# Patient Record
Sex: FEMALE | Race: WHITE | NOT HISPANIC OR LATINO | Employment: UNEMPLOYED | URBAN - METROPOLITAN AREA
[De-identification: names, ages, dates, MRNs, and addresses within clinical notes are randomized per-mention and may not be internally consistent; named-entity substitution may affect disease eponyms.]

---

## 2016-08-25 LAB — PAP SMEAR: NORMAL

## 2017-04-05 DIAGNOSIS — F41.9 CHRONIC ANXIETY: ICD-10-CM

## 2017-04-05 DIAGNOSIS — I10 ESSENTIAL HYPERTENSION: ICD-10-CM

## 2017-04-05 RX ORDER — CITALOPRAM 20 MG/1
TABLET, FILM COATED ORAL
Qty: 30 TABLET | Refills: 0 | Status: SHIPPED | OUTPATIENT
Start: 2017-04-05 | End: 2017-05-04 | Stop reason: SDUPTHER

## 2017-04-05 RX ORDER — LISINOPRIL 5 MG/1
TABLET ORAL
Qty: 30 TABLET | Refills: 0 | Status: SHIPPED | OUTPATIENT
Start: 2017-04-05 | End: 2017-05-04 | Stop reason: SDUPTHER

## 2017-04-05 NOTE — TELEPHONE ENCOUNTER
Patient needs to be seen. I refilled medicatin for 30 days, but she has not been seen in almost a year and a half.

## 2017-04-10 ENCOUNTER — TELEPHONE (OUTPATIENT)
Dept: FAMILY MEDICINE | Facility: CLINIC | Age: 57
End: 2017-04-10

## 2017-04-10 NOTE — TELEPHONE ENCOUNTER
----- Message from Alyse Cruz sent at 4/10/2017  2:36 PM CDT -----  Contact: patient  Patient returning a missed call. Please advise. Call to pod. No answer.  Call back .  Thanks!

## 2017-05-03 ENCOUNTER — DOCUMENTATION ONLY (OUTPATIENT)
Dept: FAMILY MEDICINE | Facility: CLINIC | Age: 57
End: 2017-05-03

## 2017-05-03 NOTE — PROGRESS NOTES
Pre-Visit Chart Review  For Appointment Scheduled on 5-4-17    Health Maintenance Due   Topic Date Due    TETANUS VACCINE  02/04/1979    Colonoscopy  02/04/2011    Pap Smear  09/05/2016

## 2017-05-04 ENCOUNTER — OFFICE VISIT (OUTPATIENT)
Dept: FAMILY MEDICINE | Facility: CLINIC | Age: 57
End: 2017-05-04
Payer: COMMERCIAL

## 2017-05-04 VITALS
BODY MASS INDEX: 20.1 KG/M2 | HEIGHT: 64 IN | SYSTOLIC BLOOD PRESSURE: 112 MMHG | OXYGEN SATURATION: 97 % | DIASTOLIC BLOOD PRESSURE: 59 MMHG | RESPIRATION RATE: 24 BRPM | WEIGHT: 117.75 LBS | HEART RATE: 76 BPM | TEMPERATURE: 99 F

## 2017-05-04 DIAGNOSIS — F41.9 CHRONIC ANXIETY: ICD-10-CM

## 2017-05-04 DIAGNOSIS — Z00.00 ANNUAL PHYSICAL EXAM: Primary | ICD-10-CM

## 2017-05-04 DIAGNOSIS — F41.9 ANXIETY: ICD-10-CM

## 2017-05-04 DIAGNOSIS — I10 GOOD HYPERTENSION CONTROL: ICD-10-CM

## 2017-05-04 DIAGNOSIS — I10 ESSENTIAL HYPERTENSION: ICD-10-CM

## 2017-05-04 PROCEDURE — 99999 PR PBB SHADOW E&M-EST. PATIENT-LVL III: CPT | Mod: PBBFAC,,, | Performed by: FAMILY MEDICINE

## 2017-05-04 PROCEDURE — 3074F SYST BP LT 130 MM HG: CPT | Mod: S$GLB,,, | Performed by: FAMILY MEDICINE

## 2017-05-04 PROCEDURE — 99396 PREV VISIT EST AGE 40-64: CPT | Mod: S$GLB,,, | Performed by: FAMILY MEDICINE

## 2017-05-04 PROCEDURE — 3078F DIAST BP <80 MM HG: CPT | Mod: S$GLB,,, | Performed by: FAMILY MEDICINE

## 2017-05-04 RX ORDER — LISINOPRIL 5 MG/1
5 TABLET ORAL DAILY
Qty: 90 TABLET | Refills: 3 | Status: SHIPPED | OUTPATIENT
Start: 2017-05-04 | End: 2018-06-06 | Stop reason: SDUPTHER

## 2017-05-04 RX ORDER — CITALOPRAM 20 MG/1
20 TABLET, FILM COATED ORAL NIGHTLY
Qty: 90 TABLET | Refills: 3 | Status: SHIPPED | OUTPATIENT
Start: 2017-05-04 | End: 2018-06-06 | Stop reason: SDUPTHER

## 2017-05-04 NOTE — PROGRESS NOTES
Subjective:       Patient ID: Claire Bowser is a 57 y.o. female.    Chief Complaint: Annual Exam    HPI Comments: 57 year old female in for annual exam and refills.  She is not fasting.  She has no active complaints.    Past Medical History:  No date: Anxiety  02/2017: Flu      Comment: [facility]  No date: Hypertension    Past Surgical History:  No date: TUBAL LIGATION    Review of patient's family history indicates:    Diabetes                       Mother                    Heart disease                  Mother                    Kidney disease                 Mother                    Hypertension                   Mother                    Stroke                         Mother                    Hearing loss                   Mother                    Melanoma                       Mother                    COPD                           Father                    Liver disease                  Paternal Grandfather      Alcohol abuse                  Paternal Grandfather      Liver disease                  Sister                    Emphysema                      Brother                   COPD                           Brother                   Sleep apnea                    Brother                   No Known Problems              Son                       Alcohol abuse                  Paternal Grandmother      Cirrhosis                      Paternal Grandmother      Heart disease                  Maternal Aunt             Diabetes                       Maternal Aunt             Cancer                         Maternal Aunt               Comment: female    Heart disease                  Maternal Uncle            Hypertension                   Maternal Uncle            No Known Problems              Paternal Uncle            Psoriasis                      Neg Hx                    Lupus                          Neg Hx                    Eczema                         Neg Hx                    Social  History    Marital status:              Spouse name:                       Years of education:                 Number of children:               Social History Main Topics    Smoking status: Current Every Day Smoker                                                     Packs/day: 0.33      Years: 7.00           Types: Cigarettes    Smokeless status: Never Used                        Comment: [phone]    Alcohol use: No                Current Outpatient Prescriptions:     citalopram (CELEXA) 20 MG tablet, Take 1 tablet (20 mg total) by mouth every evening., Disp: 90 tablet, Rfl: 3    estradiol-norethindrone acet 0.5-0.1 mg per tablet, Take 1 tablet by mouth once daily. , Disp: , Rfl:     lisinopril (PRINIVIL,ZESTRIL) 5 MG tablet, Take 1 tablet (5 mg total) by mouth once daily., Disp: 90 tablet, Rfl: 3    multivitamin (THERAGRAN) per tablet, Take 1 tablet by mouth once daily. Every day, Disp: , Rfl:     TETANUS VACCINE due on 02/04/1979-DECLIN[facility]  Colonoscopy due on 02/04/2011-SHE DEFERS FOR A YEAR DUE TO HUSBANDS MEDICAL BILLS  Pap Smear due on 09/05/2016-DID IN September, DR. SELF      Review of Systems   Constitutional: Negative for chills, diaphoresis, fatigue, fever and unexpected weight change.   HENT: Negative for congestion, ear pain, hearing loss, postnasal drip and sinus pressure.    Eyes: Negative for itching and visual disturbance.   Respiratory: Negative for cough, chest tightness, shortness of breath and wheezing.    Cardiovascular: Negative for chest pain, palpitations and leg swelling.   Gastrointestinal: Negative for abdominal pain, blood in stool, constipation, diarrhea, nausea and vomiting.   Genitourinary: Negative for dysuria, frequency, hematuria, menstrual problem, pelvic pain, vaginal bleeding and vaginal discharge.   Musculoskeletal: Negative for arthralgias, back pain, joint swelling and myalgias.   Neurological: Negative for dizziness and headaches.   Hematological: Negative  for adenopathy.   Psychiatric/Behavioral: Negative for sleep disturbance. The patient is not nervous/anxious.        Objective:      Physical Exam   Constitutional: She is oriented to person, place, and time. She appears well-developed and well-nourished. No distress.   Good blood pressure control  Patient is normal weight with bmi of 20.5.   Weight is increased by 5.8lb since last visit of 5/30/14.     HENT:   Head: Normocephalic and atraumatic.   Right Ear: External ear normal.   Left Ear: External ear normal.   Nose: Nose normal.   Mouth/Throat: Oropharynx is clear and moist. No oropharyngeal exudate.   Eyes: Conjunctivae and EOM are normal. Pupils are equal, round, and reactive to light. Right eye exhibits no discharge. Left eye exhibits no discharge. No scleral icterus.   Neck: Normal range of motion. Neck supple. No JVD present. No tracheal deviation present. No thyromegaly present.   Cardiovascular: Normal rate, regular rhythm and normal heart sounds.  Exam reveals no gallop and no friction rub.    No murmur heard.  Pulmonary/Chest: Effort normal and breath sounds normal. No respiratory distress. She has no wheezes. She has no rales. She exhibits no tenderness.   Abdominal: Soft. Bowel sounds are normal. She exhibits no distension and no mass. There is no tenderness. There is no rebound and no guarding. No hernia.   Musculoskeletal: Normal range of motion. She exhibits no edema, tenderness or deformity.   Lymphadenopathy:     She has no cervical adenopathy.   Neurological: She is alert and oriented to person, place, and time. She has normal reflexes. She displays normal reflexes. No cranial nerve deficit. She exhibits normal muscle tone. Coordination normal.   Skin: Skin is warm and dry. No rash noted. She is not diaphoretic. No erythema. No pallor.   Psychiatric: She has a normal mood and affect. Her behavior is normal. Judgment and thought content normal.   Nursing note and vitals reviewed.      Assessment:        1. Annual physical exam    2. Good hypertension control    3. Anxiety    4. Chronic anxiety    5. Essential hypertension    6. Body mass index (BMI) 20.0-20.9, adult        Plan:       1. Annual physical exam  - Comprehensive metabolic panel; Future  - CBC auto differential; Future  - Lipid panel; Future    2. Good hypertension control  No changes needed  - Comprehensive metabolic panel; Future  - CBC auto differential; Future  - Lipid panel; Future    3. Anxiety  Stable on citalopram    4. Chronic anxiety  - citalopram (CELEXA) 20 MG tablet; Take 1 tablet (20 mg total) by mouth every evening.  Dispense: 90 tablet; Refill: 3    5. Essential hypertension  - lisinopril (PRINIVIL,ZESTRIL) 5 MG tablet; Take 1 tablet (5 mg total) by mouth once daily.  Dispense: 90 tablet; Refill: 3    6. Body mass index (BMI) 20.0-20.9, adult           Patient readiness: acceptance and barriers:none    During the course of the visit the patient was educated and counseled about the following:     Hypertension:   Medication: no change.  Dietary sodium restriction.  Regular aerobic exercise.    Goals: Hypertension: Reduce Blood Pressure    Did patient meet goals/outcomes: Yes    The following self management tools provided: blood pressure log    Patient Instructions (the written plan) was given to the patient/family.     Time spent with patient: 45 minutes

## 2017-05-04 NOTE — MR AVS SNAPSHOT
[facility]  [address]  [place] [address]  Phone: [phone]  Fax: [phone]                  Claire Bowser   2017 2:40 PM   Office Visit    Description:  Female : 1960   Provider:  Samuel Brady MD   Department:  [facility]           Reason for Visit     Annual Exam           Diagnoses this Visit        Comments    Annual physical exam    -  Primary     Good hypertension control         Anxiety         Chronic anxiety         Essential hypertension                To Do List           Future Appointments        Provider Department Dept Phone    2017 8:45 AM [facility] [phone]      Goals (5 Years of Data)     None       These Medications        Disp Refills Start End    citalopram (CELEXA) 20 MG tablet 90 tablet 3 2017     Take 1 tablet (20 mg total) by mouth every evening. - Oral    Pharmacy: [facility] [address] [place], [place] [id number] [place] Ph #: [phone]       lisinopril (PRINIVIL,ZESTRIL) 5 MG tablet 90 tablet 3 2017     Take 1 tablet (5 mg total) by mouth once daily. - Oral    Pharmacy: [facility] [address] [place] [id number] [place] Ph #: [phone]         [facility]     [facility] Care [facility] -  Assistance  Unless otherwise directed by your provider, please contact [facility], our nurse care line that is available for  assistance.     Registered nurses in the [facility] provide: appointment scheduling, clinical advisement, health education, and other advisory services.  Call: [phone] (toll free)               Medications           Message regarding Medications     Verify the changes and/or additions to your medication regime listed below are the same as discussed with your clinician today.  If any of these changes or additions are incorrect, please notify your healthcare provider.        CHANGE how you are taking these  "medications     Start Taking Instead of    citalopram (CELEXA) 20 MG tablet citalopram (CELEXA) 20 MG tablet    Dosage:  Take 1 tablet (20 mg total) by mouth every evening. Dosage:  TAKE ONE TABLET BY MOUTH IN THE EVENING    Reason for Change:  Reorder     lisinopril (PRINIVIL,ZESTRIL) 5 MG tablet lisinopril (PRINIVIL,ZESTRIL) 5 MG tablet    Dosage:  Take 1 tablet (5 mg total) by mouth once daily. Dosage:  TAKE ONE TABLET BY MOUTH ONCE DAILY    Reason for Change:  Reorder       STOP taking these medications     busPIRone (BUSPAR) 10 MG tablet Take 1 tablet (10 mg total) by mouth 2 (two) times daily.           Verify that the below list of medications is an accurate representation of the medications you are currently taking.  If none reported, the list may be blank. If incorrect, please contact your healthcare provider. Carry this list with you in case of emergency.           Current Medications     citalopram (CELEXA) 20 MG tablet Take 1 tablet (20 mg total) by mouth every evening.    estradiol-norethindrone acet 0.5-0.1 mg per tablet Take 1 tablet by mouth once daily.     lisinopril (PRINIVIL,ZESTRIL) 5 MG tablet Take 1 tablet (5 mg total) by mouth once daily.    multivitamin (THERAGRAN) per tablet Take 1 tablet by mouth once daily. Every day           Clinical Reference Information           Your Vitals Were     BP Pulse Temp Resp Height Weight    112/59 (BP Location: Right arm, Patient Position: Sitting, BP Method: Automatic) 76 98.6 °F (37 °C) (Oral) 24 5' 3.5" (1.613 m) 53.4 kg (117 lb 11.6 oz)    SpO2 BMI             97% 20.53 kg/m2         Blood Pressure          Most Recent Value    BP  (!)  112/59      Allergies as of 5/4/2017     No Known Drug Allergies      Immunizations Administered on Date of Encounter - 5/4/2017     None      Orders Placed During Today's Visit     Future Labs/Procedures Expected by Expires    CBC auto differential  5/4/2017 5/5/2018    Comprehensive metabolic panel  5/4/2017 5/5/2018 "    Lipid panel  5/4/2017 5/5/2018      Smoking Cessation     If you would like to quit smoking:   You may be eligible for free services if you are a [place] resident and started smoking cigarettes before September 1, 1988.  Call the [employer]oking [employer]essation [employer] (S[employer]) toll free at (047) 924-9291 or (038) 496-5243.   Call 1-800-QUIT-NOW if you do not meet the above criteria.   Contact us via email: [email or web address]   View our website for more information: [email or web address]        Language Assistance Services     ATTENTION: Language assistance services are available, free of charge. Please call [phone].      ATENCIÓN: Si habla español, tiene a tejada disposición servicios gratuitos de asistencia lingüística. Llame al [phone].     CHÚ Ý: N?u b?n nói Ti?ng Vi?t, có các d?ch v? h? tr? ngôn ng? mi?n phí dành cho b?n. G?i s? [phone].         [facility] complies with applicable Federal civil rights laws and does not discriminate on the basis of race, color, national origin, age, disability, or sex.

## 2017-05-09 ENCOUNTER — LAB VISIT (OUTPATIENT)
Dept: LAB | Facility: HOSPITAL | Age: 57
End: 2017-05-09
Attending: FAMILY MEDICINE
Payer: COMMERCIAL

## 2017-05-09 ENCOUNTER — PATIENT MESSAGE (OUTPATIENT)
Dept: FAMILY MEDICINE | Facility: CLINIC | Age: 57
End: 2017-05-09

## 2017-05-09 DIAGNOSIS — Z00.00 ANNUAL PHYSICAL EXAM: ICD-10-CM

## 2017-05-09 DIAGNOSIS — I10 GOOD HYPERTENSION CONTROL: ICD-10-CM

## 2017-05-09 LAB
ALBUMIN SERPL BCP-MCNC: 3.9 G/DL
ALP SERPL-CCNC: 95 U/L
ALT SERPL W/O P-5'-P-CCNC: 7 U/L
ANION GAP SERPL CALC-SCNC: 11 MMOL/L
AST SERPL-CCNC: 15 U/L
BASOPHILS # BLD AUTO: 0.04 K/UL
BASOPHILS NFR BLD: 0.6 %
BILIRUB SERPL-MCNC: 0.7 MG/DL
BUN SERPL-MCNC: 9 MG/DL
CALCIUM SERPL-MCNC: 9.2 MG/DL
CHLORIDE SERPL-SCNC: 106 MMOL/L
CHOLEST/HDLC SERPL: 4.1 {RATIO}
CO2 SERPL-SCNC: 25 MMOL/L
CREAT SERPL-MCNC: 0.8 MG/DL
DIFFERENTIAL METHOD: ABNORMAL
EOSINOPHIL # BLD AUTO: 0.1 K/UL
EOSINOPHIL NFR BLD: 1.7 %
ERYTHROCYTE [DISTWIDTH] IN BLOOD BY AUTOMATED COUNT: 13.2 %
EST. GFR  (AFRICAN AMERICAN): >60 ML/MIN/1.73 M^2
EST. GFR  (NON AFRICAN AMERICAN): >60 ML/MIN/1.73 M^2
GLUCOSE SERPL-MCNC: 83 MG/DL
HCT VFR BLD AUTO: 42.3 %
HDL/CHOLESTEROL RATIO: 24.5 %
HDLC SERPL-MCNC: 196 MG/DL
HDLC SERPL-MCNC: 48 MG/DL
HGB BLD-MCNC: 14.5 G/DL
LDLC SERPL CALC-MCNC: 125 MG/DL
LYMPHOCYTES # BLD AUTO: 1.5 K/UL
LYMPHOCYTES NFR BLD: 23.8 %
MCH RBC QN AUTO: 33 PG
MCHC RBC AUTO-ENTMCNC: 34.3 %
MCV RBC AUTO: 96 FL
MONOCYTES # BLD AUTO: 0.5 K/UL
MONOCYTES NFR BLD: 7.5 %
NEUTROPHILS # BLD AUTO: 4.2 K/UL
NEUTROPHILS NFR BLD: 66.1 %
NONHDLC SERPL-MCNC: 148 MG/DL
PLATELET # BLD AUTO: 252 K/UL
PMV BLD AUTO: 10.6 FL
POTASSIUM SERPL-SCNC: 4.1 MMOL/L
PROT SERPL-MCNC: 7.3 G/DL
RBC # BLD AUTO: 4.4 M/UL
SODIUM SERPL-SCNC: 142 MMOL/L
TRIGL SERPL-MCNC: 115 MG/DL
WBC # BLD AUTO: 6.29 K/UL

## 2017-05-09 PROCEDURE — 36415 COLL VENOUS BLD VENIPUNCTURE: CPT | Mod: PO

## 2017-05-09 PROCEDURE — 80061 LIPID PANEL: CPT

## 2017-05-09 PROCEDURE — 80053 COMPREHEN METABOLIC PANEL: CPT

## 2017-05-09 PROCEDURE — 85025 COMPLETE CBC W/AUTO DIFF WBC: CPT

## 2017-11-22 DIAGNOSIS — Z12.11 COLON CANCER SCREENING: ICD-10-CM

## 2018-06-06 DIAGNOSIS — I10 ESSENTIAL HYPERTENSION: ICD-10-CM

## 2018-06-06 DIAGNOSIS — F41.9 CHRONIC ANXIETY: ICD-10-CM

## 2018-06-06 RX ORDER — CITALOPRAM 20 MG/1
TABLET, FILM COATED ORAL
Qty: 30 TABLET | Refills: 0 | Status: SHIPPED | OUTPATIENT
Start: 2018-06-06 | End: 2018-07-12 | Stop reason: SDUPTHER

## 2018-06-06 RX ORDER — LISINOPRIL 5 MG/1
TABLET ORAL
Qty: 30 TABLET | Refills: 0 | Status: SHIPPED | OUTPATIENT
Start: 2018-06-06 | End: 2018-07-12 | Stop reason: SDUPTHER

## 2018-06-07 NOTE — TELEPHONE ENCOUNTER
Spoke to patient, I informed the patient that  refilled her medication this time but is over due a office visit and needs to be seen. Patient states she was driving now and she would call back ans set up an appointment .

## 2018-07-12 DIAGNOSIS — F41.9 CHRONIC ANXIETY: ICD-10-CM

## 2018-07-12 DIAGNOSIS — I10 ESSENTIAL HYPERTENSION: ICD-10-CM

## 2018-07-12 RX ORDER — CITALOPRAM 20 MG/1
20 TABLET, FILM COATED ORAL NIGHTLY
Qty: 30 TABLET | Refills: 2 | Status: SHIPPED | OUTPATIENT
Start: 2018-07-12 | End: 2018-08-10 | Stop reason: SDUPTHER

## 2018-07-12 RX ORDER — LISINOPRIL 5 MG/1
5 TABLET ORAL DAILY
Qty: 30 TABLET | Refills: 2 | Status: SHIPPED | OUTPATIENT
Start: 2018-07-12 | End: 2018-08-10 | Stop reason: SDUPTHER

## 2018-07-12 NOTE — TELEPHONE ENCOUNTER
Patient sent email about making appt- advised her to make a 40 min annual check up appt. Advised she can get in much sooner with Mrs. Natarajan.

## 2018-08-10 ENCOUNTER — OFFICE VISIT (OUTPATIENT)
Dept: FAMILY MEDICINE | Facility: CLINIC | Age: 58
End: 2018-08-10
Payer: COMMERCIAL

## 2018-08-10 VITALS
TEMPERATURE: 98 F | DIASTOLIC BLOOD PRESSURE: 76 MMHG | HEIGHT: 64 IN | HEART RATE: 64 BPM | SYSTOLIC BLOOD PRESSURE: 121 MMHG | BODY MASS INDEX: 19.46 KG/M2 | WEIGHT: 114 LBS

## 2018-08-10 DIAGNOSIS — Z00.00 ANNUAL PHYSICAL EXAM: Primary | ICD-10-CM

## 2018-08-10 DIAGNOSIS — L57.0 ACTINIC KERATOSIS: ICD-10-CM

## 2018-08-10 DIAGNOSIS — I10 ESSENTIAL HYPERTENSION: ICD-10-CM

## 2018-08-10 DIAGNOSIS — Z12.11 SCREEN FOR COLON CANCER: ICD-10-CM

## 2018-08-10 DIAGNOSIS — F41.1 GENERALIZED ANXIETY DISORDER: ICD-10-CM

## 2018-08-10 DIAGNOSIS — Z79.899 HIGH RISK MEDICATION USE: ICD-10-CM

## 2018-08-10 DIAGNOSIS — Z72.0 TOBACCO USE: ICD-10-CM

## 2018-08-10 PROCEDURE — 99999 PR PBB SHADOW E&M-EST. PATIENT-LVL IV: CPT | Mod: PBBFAC,,, | Performed by: NURSE PRACTITIONER

## 2018-08-10 PROCEDURE — 99396 PREV VISIT EST AGE 40-64: CPT | Mod: S$GLB,,, | Performed by: NURSE PRACTITIONER

## 2018-08-10 PROCEDURE — 3078F DIAST BP <80 MM HG: CPT | Mod: CPTII,S$GLB,, | Performed by: NURSE PRACTITIONER

## 2018-08-10 PROCEDURE — 3074F SYST BP LT 130 MM HG: CPT | Mod: CPTII,S$GLB,, | Performed by: NURSE PRACTITIONER

## 2018-08-10 RX ORDER — IBUPROFEN 200 MG
1 TABLET ORAL DAILY
Qty: 28 PATCH | Refills: 0 | Status: SHIPPED | OUTPATIENT
Start: 2018-08-10 | End: 2019-10-28 | Stop reason: SDUPTHER

## 2018-08-10 RX ORDER — CITALOPRAM 40 MG/1
40 TABLET, FILM COATED ORAL NIGHTLY
Qty: 90 TABLET | Refills: 3 | Status: SHIPPED | OUTPATIENT
Start: 2018-08-10 | End: 2019-09-23 | Stop reason: SDUPTHER

## 2018-08-10 RX ORDER — NICOTINE 7MG/24HR
1 PATCH, TRANSDERMAL 24 HOURS TRANSDERMAL DAILY
Qty: 28 PATCH | Refills: 0 | Status: SHIPPED | OUTPATIENT
Start: 2018-08-10 | End: 2019-10-28 | Stop reason: SDUPTHER

## 2018-08-10 RX ORDER — LISINOPRIL 5 MG/1
5 TABLET ORAL DAILY
Qty: 90 TABLET | Refills: 3 | Status: SHIPPED | OUTPATIENT
Start: 2018-08-10 | End: 2019-09-23 | Stop reason: SDUPTHER

## 2018-08-10 NOTE — PATIENT INSTRUCTIONS
Kicking the Smoking Habit  If you smoke, quitting is one of the best changes you can make for your heart and your overall health. Your risk of heart attack goes down within one day of putting out that last cigarette. As you go longer without smoking, your risk goes down even more. Quitting isnt easy, but millions of people have done it. You can, too. Its never too late to quit.  Getting started  Boost your chances of success by deciding on your quit plan. Your health care provider and cardiac rehab team can help you develop this plan. Even if youve already quit, its easy to slip back into smoking.  Your plan can help you avoid and recover from relapse.  In any case, start by setting a date to quit within a month, and do it.    Keys to your quit plan  · Talk to your healthcare provider about prescription medicines and nicotine replacement products that help stop the urge to smoke.   · Join a support group or quit smoking program. Talking with others about the challenges of quitting can help you get through them.  · Ask other smokers in your household to quit with you.  · Look for the cues in your life that you associate with smoking and avoid them.  Track your triggers  What gives you that [id number] feeling? List all the situations that make you want a cigarette. Then think of other ways to deal with these situations. Here are some examples:  Situation How I'll handle it   Finishing a meal Get up from the table and take a walk   Having an argument Find a quiet place and breathe deeply   Feeling lonely or bored Call a friend to talk         Tips for quitting successfully  · List the benefits of quitting such as reducing heart risks and saving money. Keep this list and review it whenever you feel like smoking.  · Get support. Let your friends know you may call them to chat when you have an urge to smoke.  · If youve tried to quit before without success, this time avoid the triggers that may cause  the relapse.  · Make the most of slip-ups. Try to learn from them, and then get back on track.  · Be accountable to your friends and your calendar so that you stay on track.  For family and friends  · Be supportive and patient. Quitting smoking can be difficult and stressful.  · If you smoke, nows a great time to quit. Even if you dont quit, never smoke around your loved one. Secondhand smoke is dangerous to his or her heart.  · The best goals are accomplished in teams. Remember that when your loved one states he or she wants to stop smoking.  Date Last Reviewed: 7/1/2016  © 0672-6464 [employer]. [address] [place] [address], [place] [address]. All rights reserved. This information is not intended as a substitute for professional medical care. Always follow your healthcare professional's instructions.        Prevention Guidelines, Women Ages 50 to 64  Screening tests and vaccines are an important part of managing your health. Health counseling is essential, too. Below are guidelines for these, for women ages 50 to 64. Talk with your healthcare provider to make sure youre up to date on what you need.  Screening Who needs it How often   Type 2 diabetes or prediabetes All adults beginning at age 45 and adults without symptoms at any age who are overweight or obese and have 1 or more additional risk factors for diabetes. At  least every 3 years   Alcohol misuse All women in this age group At routine exams   Blood pressure All women in this age group Every 2 years if your blood pressure is less than 120/80 mm Hg; yearly if your systolic blood pressure is 120 to 139 mm Hg, or your diastolic blood pressure reading is 80 to 89 mm Hg   Breast cancer All women in this age group Yearly mammogram and clinical breast exam1   Cervical cancer All women in this age group, except women who have had a complete hysterectomy Pap test every 3 years or Pap test with human papillomavirus (HPV) test every 5 years   Chlamydia  Women at increased risk for infection At routine exams   Colorectal cancer All women in this age group Flexible sigmoidoscopy every 5 years, or colonoscopy every 10 years, or double-contrast barium enema every 5 years; yearly fecal occult blood test or fecal immunochemical test; or a stool DNA test as often as your health care provider advises; talk with your health care provider about which tests are best for you   Depression All women in this age group At routine exams   Gonorrhea Sexually active women at increased risk for infection At routine exams   Hepatitis C Anyone at increased risk; 1 time for those born between 1945 and 1965 At routine exams   High cholesterol or triglycerides All women in this age group who are at risk for coronary artery disease At least every 5 years   HIV All women At routine exams   Lung cancer Adults age 55 to 80 who have smoked Yearly screening in smokers with 30 pack-year history of smoking or who quit within 15 years   Obesity All women in this age group At routine exams   Osteoporosis Women who are postmenopausal Ask your healthcare provider   Syphilis Women at increased risk for infection - talk with your healthcare provider At routine exams   Tuberculosis Women at increased risk for infection - talk with your healthcare provider Ask your healthcare provider   Vision All women in this age group Ask your healthcare provider   Vaccine Who needs it How often   Chickenpox (varicella) All women in this age group who have no record of this infection or vaccine 2 doses; the second dose should be given at least 4 weeks after the first dose   Hepatitis A Women at increased risk for infection - talk with your healthcare provider 2 doses given at least 6 months apart   Hepatitis B Women at increased risk for infection - talk with your healthcare provider 3 doses over 6 months; second dose should be given 1 month after the first dose; the third dose should be given at least 2 months after  the second dose and at least 4 months after the first dose   Haemophilus influenzaeType B (HIB) Women at increased risk for infection - talk with your healthcare provider 1 to 3 doses   Influenza (flu) All women in this age group Once a year   Measles, mumps, rubella (MMR) Women in this age group through their late 50s who have no record of these infections or vaccines 1 dose   Meningococcal Women at increased risk for infection - talk with your healthcare provider 1 or more doses   Pneumococcal conjugate vaccine (PCV13) and pneumococcal polysaccharide vaccine (PPSV23) Women at increased risk for infection - talk with your healthcare provider PCV13: 1 dose ages 19 to 65 (protects against 13 types of pneumococcal bacteria)  PPSV23: 1 to 2 doses through age 64, or 1 dose at 65 or older (protects against 23 types of pneumococcal bacteria)   Tetanus/diphtheria/pertussis (Td/Tdap) booster All women in this age group Td every 10 years, or a one-time dose of Tdap instead of a Td booster after age 18, then Td every 10 years   Zoster All women ages 60 and older 1 dose   Counseling Who needs it How often   BRCA gene mutation testing for breast and ovarian cancer susceptibility Women with increased risk for having gene mutation When your risk is known   Breast cancer and chemoprevention Women at high risk for breast cancer When your risk is known   Diet and exercise Women who are overweight or obese When diagnosed, and then at routine exams   Sexually transmitted infection prevention Women at increased risk for infection - talk with your healthcare provider At routine exams   Use of daily aspirin Women ages 55 and up in this age group who are at risk for cardiovascular health problems such as stroke When your risk is known   Use of tobacco and the health effects it can cause All women in this age group Every exam   1American Cancer Society  Date Last Reviewed: 1/26/2016  © 4038-5915 [employer]. [address] [place] [address]  Road, [place] [address]. All rights reserved. This information is not intended as a substitute for professional medical care. Always follow your healthcare professional's instructions.        Planning to Quit Smoking  Your healthcare provider may have told you that you need to give up tobacco. Only you can decide if and when you are ready to quit. Quitting is hard to do. But the benefits will be worth it. When you  decide to quit, come up with a plan thats right for you. Discuss your plan with your healthcare provider. And talk to your provider about medicines to help you quit.    Line up support  To quit smoking, youll need a plan and some help. Pick a date within the next 2 to 4 weeks to quit. Use the time between now and that date to arrange for support.  · Classes and counselors. Quit-smoking classes  people like you through the process. Get to know others in a class, and support each other beyond the class. Telephone counseling also helps you keep on track. Ask your healthcare provider, local hospital, or public health department to put you in touch with a class and a phone counselor.  · Family and friends. Tell your family and friends about your quit date. Ask them to support your change. If they smoke, arrange to see them in smoke-free places. Forbid smoking in your home and car.     Finding something to replace cigarettes may be hard to do. Be aware that some things you choose may be as harmful as cigarettes:  · Smokeless (chewing) tobacco is just as harmful as regular tobacco. Tobacco should not be used as a substitute for cigarettes.  · Herbal medicines or teas may affect how your body handles nicotine. Talk to your healthcare provider before using these products.  · E-cigarettes have less toxins than the smoke from a regular cigarette. But the FDA says that these devices may still have substances that can cause cancer. E-cigarettes are not well regulated. They have not been studied enough to know  if they are a good aid to help you stop smoking. Talk with your healthcare provider before using these products.      Quit-smoking products  Many products can help you quit smoking. Some are prescription medicines that help curb your cravings and withdrawal symptoms. Other products slowly lessen the level of nicotine your body absorbs. Nicotine is the highly addictive substance found in cigarettes, cigars, and chewing tobacco. Nicotine replacement products can help get your body used to slowly decreasing amounts of nicotine after you quit smoking. These products include a nicotine patch, gum, lozenge, nasal spray, and inhaler. Be sure you follow the directions for your medicine or product carefully. Your healthcare provider may tell you to start taking the prescription medicine a week before you plan to quit. Do not smoke while you use nicotine products. Doing so can be very harmful to your health.  For more information  · https://smokefree.gov/bjni-vy-fi-expert  · National [employer] [place] Smoking Quitline: 877-44U-QUIT (590-224-7997)   Date Last Reviewed: 2/1/2017 © 2000-2017 [employer]. [address] [place] [address], [place] [address]. All rights reserved. This information is not intended as a substitute for professional medical care. Always follow your healthcare professional's instructions.

## 2018-08-10 NOTE — PROGRESS NOTES
Subjective:       Patient ID: Claire Bowser is a 58 y.o. female.    Chief Complaint: Annual Exam    HPI   Chief Complaint  Chief Complaint   Patient presents with    Annual Exam       HPI  Claire Bowser is a 58 y.o. female with medical diagnoses as listed in the medical history and problem list that presents for an annual exam and follow up of hypertension and anxiety.  Blood pressure controlled today 121/76. BMI today is 19.88 and patient has lost 3 pounds since last visit 5/4/17.  Patient is a smoker and is interested in quitting.       PAST MEDICAL HISTORY:  Past Medical History:   Diagnosis Date    Anxiety     Flu 02/2017    [facility]    Hypertension        PAST SURGICAL HISTORY:  Past Surgical History:   Procedure Laterality Date    TUBAL LIGATION         SOCIAL HISTORY:  Social History     Social History    Marital status:      Spouse name: N/A    Number of children: N/A    Years of education: N/A     Occupational History    Not on file.     Social History Main Topics    Smoking status: Current Every Day Smoker     Packs/day: 0.33     Years: 7.00     Types: Cigarettes    Smokeless tobacco: Never Used      Comment: [phone]    Alcohol use No    Drug use: Unknown    Sexual activity: Not on file     Other Topics Concern    Not on file     Social History Narrative    No narrative on file       FAMILY HISTORY:  Family History   Problem Relation Age of Onset    Diabetes Mother     Heart disease Mother     Kidney disease Mother     Hypertension Mother     Stroke Mother     Hearing loss Mother     Melanoma Mother     COPD Father     Liver disease Paternal Grandfather     Alcohol abuse Paternal Grandfather     Liver disease Sister     Emphysema Brother     COPD Brother     Sleep apnea Brother     No Known Problems Son     Alcohol abuse Paternal Grandmother     Cirrhosis Paternal Grandmother     Heart disease Maternal Aunt     Diabetes Maternal Aunt     Cancer  Maternal Aunt         female    Heart disease Maternal Uncle     Hypertension Maternal Uncle     No Known Problems Paternal Uncle     Psoriasis Neg Hx     Lupus Neg Hx     Eczema Neg Hx        ALLERGIES AND MEDICATIONS: updated and reviewed.  Review of patient's allergies indicates:   Allergen Reactions    No known drug allergies      Current Outpatient Prescriptions   Medication Sig Dispense Refill    citalopram (CELEXA) 40 MG tablet Take 1 tablet (40 mg total) by mouth every evening. 90 tablet 3    lisinopril (PRINIVIL,ZESTRIL) 5 MG tablet Take 1 tablet (5 mg total) by mouth once daily. 90 tablet 3    multivitamin (THERAGRAN) per tablet Take 1 tablet by mouth once daily. Every day      nicotine (NICODERM CQ) 14 mg/24 hr Place 1 patch onto the skin once daily. Month 1 28 patch 0    nicotine (NICODERM CQ) 7 mg/24 hr Place 1 patch onto the skin once daily. Month 2 28 patch 0     No current facility-administered medications for this visit.        I have reviewed the patient's medical history in detail and updated the computerized patient record.    Review of Systems   Constitutional: Negative for activity change, appetite change, chills, fatigue and fever.        Walks regularly QOD for an hour   HENT: Negative for congestion, ear pain, postnasal drip, rhinorrhea, sinus pain, sinus pressure, sore throat and trouble swallowing.    Eyes: Negative for visual disturbance.        Vision corrected with glasses   Respiratory: Negative for cough, shortness of breath and wheezing.    Cardiovascular: Negative for chest pain, palpitations and leg swelling.   Gastrointestinal: Negative for abdominal pain, constipation, diarrhea, nausea and vomiting.   Genitourinary: Negative for difficulty urinating, dysuria, menstrual problem and urgency.        Post menopausal x 10 years, still having some hot flashes at night. Nocturia 3 times.   Musculoskeletal: Negative for arthralgias and myalgias.   Skin: Negative for rash and  "wound.        Left forearm skin growth x 3 months, flakes but never goes away   Neurological: Negative for dizziness, numbness and headaches.   Hematological: Bruises/bleeds easily.        Has noted some easier bruising to arms   Psychiatric/Behavioral: Negative for dysphoric mood and sleep disturbance. The patient is nervous/anxious.         Some anxiety. Elderly mother is ill and this has been stressful, feels like she would like to increase dose of celexa.       Objective:       Vitals:    08/10/18 0854   BP: 121/76   BP Location: Right arm   Patient Position: Sitting   BP Method: Large (Automatic)   Pulse: 64   Temp: 98.3 °F (36.8 °C)   TempSrc: Oral   Weight: 51.7 kg (114 lb)   Height: 5' 3.5" (1.613 m)     Physical Exam   Constitutional: She is oriented to person, place, and time. Vital signs are normal. She appears well-developed and well-nourished. No distress.   Blood pressure 121/76   HENT:   Head: Normocephalic and atraumatic.   Right Ear: Tympanic membrane, external ear and ear canal normal.   Left Ear: Tympanic membrane, external ear and ear canal normal.   Nose: Nose normal. No mucosal edema.   Mouth/Throat: Oropharynx is clear and moist and mucous membranes are normal. No oropharyngeal exudate.   Eyes: Conjunctivae and lids are normal. Pupils are equal, round, and reactive to light. Right eye exhibits no discharge. Left eye exhibits no discharge. Right conjunctiva is not injected. Left conjunctiva is not injected.   Neck: Normal range of motion. Neck supple. Normal carotid pulses present. Carotid bruit is not present. No thyromegaly present.   Cardiovascular: Normal rate, regular rhythm, S1 normal, S2 normal, normal heart sounds, intact distal pulses and normal pulses.  Exam reveals no gallop and no friction rub.    No murmur heard.  Pulses:       Carotid pulses are 2+ on the right side, and 2+ on the left side.       Radial pulses are 2+ on the right side, and 2+ on the left side.        Posterior " tibial pulses are 2+ on the right side, and 2+ on the left side.   Pulmonary/Chest: Effort normal and breath sounds normal. No respiratory distress. She has no decreased breath sounds. She has no wheezes. She has no rhonchi. She has no rales.   Abdominal: Soft. Normal appearance, normal aorta and bowel sounds are normal. She exhibits no distension, no abdominal bruit, no pulsatile midline mass and no mass. There is no hepatosplenomegaly. There is no tenderness. No hernia.   Musculoskeletal: Normal range of motion. She exhibits no edema, tenderness or deformity.   Lymphadenopathy:        Head (right side): No submental, no submandibular, no tonsillar, no preauricular, no posterior auricular and no occipital adenopathy present.        Head (left side): No submental, no submandibular, no tonsillar, no preauricular, no posterior auricular and no occipital adenopathy present.     She has no cervical adenopathy.   Neurological: She is alert and oriented to person, place, and time. She has normal strength.   Skin: Skin is warm, dry and intact. Lesion noted. No rash noted. She is not diaphoretic. No pallor.        Psychiatric: She has a normal mood and affect. Her speech is normal and behavior is normal. Judgment and thought content normal. Her mood appears not anxious. Cognition and memory are normal. She does not exhibit a depressed mood.   Nursing note and vitals reviewed.      Assessment:       1. Annual physical exam    2. Essential hypertension    3. Generalized anxiety disorder    4. Tobacco use    5. Actinic keratosis    6. Body mass index (BMI) 19.9 or less, adult    7. High risk medication use    8. Screen for colon cancer        Plan:   Claire was seen today for annual exam.    Diagnoses and all orders for this visit:    Annual physical exam   Discussed healthy diet, regular exercise, necessary labs, age appropriate cancer screening, and routine vaccinations. Refuses colonoscopy but agrees to do Fit  Kit   Educational handouts provided. Prevention guidelines women age 50-64  Essential hypertension  -     lisinopril (PRINIVIL,ZESTRIL) 5 MG tablet; Take 1 tablet (5 mg total) by mouth once daily.  - Blood pressure controlled 121/76, continue current medication without change    Generalized anxiety disorder  -     citalopram (CELEXA) 40 MG tablet; Take 1 tablet (40 mg total) by mouth every evening, increased dose    Tobacco use  -     Ambulatory referral to Smoking Cessation Program  -     nicotine (NICODERM CQ) 14 mg/24 hr; Place 1 patch onto the skin once daily. Month 1  -     nicotine (NICODERM CQ) 7 mg/24 hr; Place 1 patch onto the skin once daily. Month 2  - Educational handouts provided. Kicking the smoking habit; Planning to quit smoking    Actinic keratosis   Right forearm, 3 mm in diameter, raised, flaky   Unable to perform cryo, none available in clinic and dermatology will not allow to borrow, will jayson patient when available to come back in    Body mass index (BMI) 19.9 or less, adult   BMI 19.88 today with 3 pound weight loss since last visit   Maintain healthy weight with heathy diet and exercise/active lifestyle    High risk medication use  -     Comprehensive metabolic panel; Future  -     Lipid panel; Future    Screen for colon cancer  -     Fecal Immunochemical Test (iFOBT); Future    Follow-up in about 1 year (around 8/10/2019) for annual.     Patient readiness: acceptance and barriers:none    During the course of the visit the patient was educated and counseled about the following:     Hypertension:   Medication: no change.  Dietary sodium restriction.  Regular aerobic exercise.  Follow up: 1 year and as needed.    Goals: Hypertension: Reduce Blood Pressure    Did patient meet goals/outcomes: Yes    The following self management tools provided: declined    Patient Instructions (the written plan) was given to the patient/family.     Time spent with patient: 30 minutes    Barriers to medications  present (no )    Adverse reactions to current medications (no)    Over the counter medications reviewed (Yes)

## 2018-08-11 ENCOUNTER — LAB VISIT (OUTPATIENT)
Dept: LAB | Facility: HOSPITAL | Age: 58
End: 2018-08-11
Attending: NURSE PRACTITIONER
Payer: COMMERCIAL

## 2018-08-11 DIAGNOSIS — Z79.899 HIGH RISK MEDICATION USE: ICD-10-CM

## 2018-08-11 LAB
ALBUMIN SERPL BCP-MCNC: 3.9 G/DL
ALP SERPL-CCNC: 99 U/L
ALT SERPL W/O P-5'-P-CCNC: 9 U/L
ANION GAP SERPL CALC-SCNC: 8 MMOL/L
AST SERPL-CCNC: 18 U/L
BILIRUB SERPL-MCNC: 0.9 MG/DL
BUN SERPL-MCNC: 11 MG/DL
CALCIUM SERPL-MCNC: 10.1 MG/DL
CHLORIDE SERPL-SCNC: 103 MMOL/L
CHOLEST SERPL-MCNC: 213 MG/DL
CHOLEST/HDLC SERPL: 3.8 {RATIO}
CO2 SERPL-SCNC: 29 MMOL/L
CREAT SERPL-MCNC: 0.8 MG/DL
EST. GFR  (AFRICAN AMERICAN): >60 ML/MIN/1.73 M^2
EST. GFR  (NON AFRICAN AMERICAN): >60 ML/MIN/1.73 M^2
GLUCOSE SERPL-MCNC: 81 MG/DL
HDLC SERPL-MCNC: 56 MG/DL
HDLC SERPL: 26.3 %
LDLC SERPL CALC-MCNC: 134.8 MG/DL
NONHDLC SERPL-MCNC: 157 MG/DL
POTASSIUM SERPL-SCNC: 4.3 MMOL/L
PROT SERPL-MCNC: 7.1 G/DL
SODIUM SERPL-SCNC: 140 MMOL/L
TRIGL SERPL-MCNC: 111 MG/DL

## 2018-08-11 PROCEDURE — 80053 COMPREHEN METABOLIC PANEL: CPT

## 2018-08-11 PROCEDURE — 80061 LIPID PANEL: CPT

## 2018-08-11 PROCEDURE — 36415 COLL VENOUS BLD VENIPUNCTURE: CPT | Mod: PO

## 2018-12-27 DIAGNOSIS — Z12.39 BREAST CANCER SCREENING: ICD-10-CM

## 2019-07-23 ENCOUNTER — DOCUMENTATION ONLY (OUTPATIENT)
Dept: FAMILY MEDICINE | Facility: CLINIC | Age: 59
End: 2019-07-23

## 2019-07-23 NOTE — PROGRESS NOTES
Pre-Visit Chart Review  For Appointment Scheduled on 7-30-19    Health Maintenance Due   Topic Date Due    Fecal Occult Blood Test (FOBT)/FitKit  1960    TETANUS VACCINE  02/04/1979    Mammogram  12/16/2018    Pap Smear with HPV Cotest  08/25/2019

## 2019-07-30 ENCOUNTER — OFFICE VISIT (OUTPATIENT)
Dept: FAMILY MEDICINE | Facility: CLINIC | Age: 59
End: 2019-07-30
Attending: FAMILY MEDICINE
Payer: COMMERCIAL

## 2019-07-30 ENCOUNTER — DOCUMENTATION ONLY (OUTPATIENT)
Dept: FAMILY MEDICINE | Facility: CLINIC | Age: 59
End: 2019-07-30

## 2019-07-30 VITALS
HEIGHT: 64 IN | RESPIRATION RATE: 20 BRPM | DIASTOLIC BLOOD PRESSURE: 94 MMHG | OXYGEN SATURATION: 97 % | BODY MASS INDEX: 19.5 KG/M2 | SYSTOLIC BLOOD PRESSURE: 156 MMHG | TEMPERATURE: 98 F | HEART RATE: 61 BPM | WEIGHT: 114.19 LBS

## 2019-07-30 DIAGNOSIS — L02.11 ABSCESS, NECK: Primary | ICD-10-CM

## 2019-07-30 PROCEDURE — 3080F PR MOST RECENT DIASTOLIC BLOOD PRESSURE >= 90 MM HG: ICD-10-PCS | Mod: CPTII,S$GLB,, | Performed by: FAMILY MEDICINE

## 2019-07-30 PROCEDURE — 3080F DIAST BP >= 90 MM HG: CPT | Mod: CPTII,S$GLB,, | Performed by: FAMILY MEDICINE

## 2019-07-30 PROCEDURE — 10060 I&D ABSCESS SIMPLE/SINGLE: CPT | Mod: S$GLB,,, | Performed by: FAMILY MEDICINE

## 2019-07-30 PROCEDURE — 3077F SYST BP >= 140 MM HG: CPT | Mod: CPTII,S$GLB,, | Performed by: FAMILY MEDICINE

## 2019-07-30 PROCEDURE — 3077F PR MOST RECENT SYSTOLIC BLOOD PRESSURE >= 140 MM HG: ICD-10-PCS | Mod: CPTII,S$GLB,, | Performed by: FAMILY MEDICINE

## 2019-07-30 PROCEDURE — 99999 PR PBB SHADOW E&M-EST. PATIENT-LVL III: CPT | Mod: PBBFAC,,, | Performed by: FAMILY MEDICINE

## 2019-07-30 PROCEDURE — 99213 PR OFFICE/OUTPT VISIT, EST, LEVL III, 20-29 MIN: ICD-10-PCS | Mod: 25,S$GLB,, | Performed by: FAMILY MEDICINE

## 2019-07-30 PROCEDURE — 99999 PR PBB SHADOW E&M-EST. PATIENT-LVL III: ICD-10-PCS | Mod: PBBFAC,,, | Performed by: FAMILY MEDICINE

## 2019-07-30 PROCEDURE — 99213 OFFICE O/P EST LOW 20 MIN: CPT | Mod: 25,S$GLB,, | Performed by: FAMILY MEDICINE

## 2019-07-30 PROCEDURE — 3008F BODY MASS INDEX DOCD: CPT | Mod: CPTII,S$GLB,, | Performed by: FAMILY MEDICINE

## 2019-07-30 PROCEDURE — 3008F PR BODY MASS INDEX (BMI) DOCUMENTED: ICD-10-PCS | Mod: CPTII,S$GLB,, | Performed by: FAMILY MEDICINE

## 2019-07-30 PROCEDURE — 10060 PR DRAIN SKIN ABSCESS SIMPLE: ICD-10-PCS | Mod: S$GLB,,, | Performed by: FAMILY MEDICINE

## 2019-07-30 RX ORDER — SULFAMETHOXAZOLE AND TRIMETHOPRIM 800; 160 MG/1; MG/1
1 TABLET ORAL 2 TIMES DAILY
Qty: 20 TABLET | Refills: 0 | Status: SHIPPED | OUTPATIENT
Start: 2019-07-30 | End: 2019-08-09

## 2019-07-30 NOTE — PROGRESS NOTES
Subjective:       Patient ID: Claire Bowser is a 59 y.o. female.    Chief Complaint: Cyst (neck)    59-year-old female with a tender bump on the left posterior neck just off midline the develops several days ago.  Onset occurred after she spent some time in a hot tub.  She had no other rashes and no evidence of hot tub dermatitis.  The area has not drained.  She has had no fever or chills.  She has no known at antibiotic allergies.  She has been using Neosporin topically.    Past Medical History:  No date: Anxiety  02/2017: Flu      Comment:  [facility]  No date: Hypertension    Past Surgical History:  No date: TUBAL LIGATION    Current Outpatient Medications on File Prior to Visit:  citalopram (CELEXA) 40 MG tablet, Take 1 tablet (40 mg total) by mouth every evening., Disp: 90 tablet, Rfl: 3  lisinopril (PRINIVIL,ZESTRIL) 5 MG tablet, Take 1 tablet (5 mg total) by mouth once daily., Disp: 90 tablet, Rfl: 3  multivitamin (THERAGRAN) per tablet, Take 1 tablet by mouth once daily. Every day, Disp: , Rfl:   nicotine (NICODERM CQ) 14 mg/24 hr, Place 1 patch onto the skin once daily. Month 1, Disp: 28 patch, Rfl: 0  nicotine (NICODERM CQ) 7 mg/24 hr, Place 1 patch onto the skin once daily. Month 2, Disp: 28 patch, Rfl: 0    No current facility-administered medications on file prior to visit.         Review of Systems   Constitutional: Negative for activity change, chills, fever and unexpected weight change.   HENT: Negative for hearing loss, rhinorrhea and trouble swallowing.    Eyes: Negative for discharge and visual disturbance.   Respiratory: Negative for chest tightness and wheezing.    Cardiovascular: Negative for chest pain and palpitations.   Gastrointestinal: Negative for blood in stool, constipation, diarrhea and vomiting.   Endocrine: Negative for polydipsia and polyuria.   Genitourinary: Negative for difficulty urinating, dysuria, hematuria and menstrual problem.   Musculoskeletal: Positive for neck  pain. Negative for arthralgias and joint swelling.   Neurological: Negative for weakness and headaches.   Psychiatric/Behavioral: Positive for dysphoric mood. Negative for confusion.       Objective:      Physical Exam   Constitutional: She appears well-developed and well-nourished. No distress.   Elevated blood pressure but patient very apprehensive  Normal weight with a BMI of 19.9 she is down 3.5 lb from her May 4, 2017 last visit with me   Skin: She is not diaphoretic.        Nursing note and vitals reviewed.      Assessment:       1. Abscess, neck        Plan:       1. Abscess, neck    Following verbal consent the area was cleaned with Betadine and a small amount of lidocaine without epi was placed near the area that it was pointing.  An 11 blade scalpel was used to enter the abscess draining a small amount of caseous material followed by a few tenths of a mL of purulent material.  All debris was removed from the cavity followed by some fresh red blood and clots.  The cavity was cleaned thoroughly and a gauze dressing was placed over it with a bandage.  No packing was placed.  Wound care instructions were given.  Bactrim twice daily for 10 days was sent to pharmacy.       - sulfamethoxazole-trimethoprim 800-160mg (BACTRIM DS) 800-160 mg Tab; Take 1 tablet by mouth 2 (two) times daily. for 10 days  Dispense: 20 tablet; Refill: 0

## 2019-07-30 NOTE — PATIENT INSTRUCTIONS
Controlling High Blood Pressure  High blood pressure (hypertension) is often called the silent killer. This is because many people who have it dont know it. High blood pressure is defined as 140/90 mm Hg or higher. Know your blood pressure and remember to check it regularly. Doing so can save your life. Here are some things you can do to help control your blood pressure.    Choose heart-healthy foods  · Select low-salt, low-fat foods. Limit sodium intake to 2,400 mg per day or the amount suggested by your healthcare provider.  · Limit canned, dried, cured, packaged, and fast foods. These can contain a lot of salt.  · Eat 8 to 10 servings of fruits and vegetables every day.  · Choose lean meats, fish, or chicken.  · Eat whole-grain pasta, brown rice, and beans.  · Eat 2 to 3 servings of low-fat or fat-free dairy products.  · Ask your doctor about the DASH eating plan. This plan helps reduce blood pressure.  · When you go to a restaurant, ask that your meal be prepared with no added salt.  Maintain a healthy weight  · Ask your healthcare provider how many calories to eat a day. Then stick to that number.  · Ask your healthcare provider what weight range is healthiest for you. If you are overweight, a weight loss of only 3% to 5% of your body weight can help lower blood pressure. Generally, a good weight loss goal is to lose 10% of your body weight in a year.  · Limit snacks and sweets.  · Get regular exercise.  Get up and get active  · Choose activities you enjoy. Find ones you can do with friends or family. This includes bicycling, dancing, walking, and jogging.  · Park farther away from building entrances.  · Use stairs instead of the elevator.  · When you can, walk or bike instead of driving.  · [place] leaves, garden, or do household repairs.  · Be active at a moderate to vigorous level of physical activity for at least 40 minutes for a minimum of 3 to 4 days a week.   Manage stress  · Make time to relax and enjoy  life. Find time to laugh.  · Communicate your concerns with your loved ones and your healthcare provider.  · Visit with family and friends, and keep up with hobbies.  Limit alcohol and quit smoking  · Men should have no more than 2 drinks per day.  · Women should have no more than 1 drink per day.  · Talk with your healthcare provider about quitting smoking. Smoking significantly increases your risk for heart disease and stroke. Ask your healthcare provider about community smoking cessation programs and other options.  Medicines  If lifestyle changes arent enough, your healthcare provider may prescribe high blood pressure medicine. Take all medicines as prescribed. If you have any questions about your medicines, ask your healthcare provider before stopping or changing them.   Date Last Reviewed: 4/27/2016  © 0995-0577 [employer], [employer] [address] [place] [address], [place] [address]. All rights reserved. This information is not intended as a substitute for professional medical care. Always follow your healthcare professional's instructions.

## 2019-07-31 ENCOUNTER — HOSPITAL ENCOUNTER (OUTPATIENT)
Dept: RADIOLOGY | Facility: CLINIC | Age: 59
Discharge: HOME | End: 2019-07-31
Attending: FAMILY MEDICINE
Payer: COMMERCIAL

## 2019-07-31 DIAGNOSIS — Z12.39 BREAST CANCER SCREENING: ICD-10-CM

## 2019-07-31 PROCEDURE — 77063 BREAST TOMOSYNTHESIS BI: CPT | Mod: 26,,, | Performed by: RADIOLOGY

## 2019-07-31 PROCEDURE — 77063 MAMMO DIGITAL SCREENING BILAT WITH TOMOSYNTHESIS_CAD: ICD-10-PCS | Mod: 26,,, | Performed by: RADIOLOGY

## 2019-07-31 PROCEDURE — 77067 SCR MAMMO BI INCL CAD: CPT | Mod: 26,,, | Performed by: RADIOLOGY

## 2019-07-31 PROCEDURE — 77067 SCR MAMMO BI INCL CAD: CPT | Mod: TC,PO

## 2019-07-31 PROCEDURE — 77067 MAMMO DIGITAL SCREENING BILAT WITH TOMOSYNTHESIS_CAD: ICD-10-PCS | Mod: 26,,, | Performed by: RADIOLOGY

## 2019-09-23 DIAGNOSIS — I10 ESSENTIAL HYPERTENSION: ICD-10-CM

## 2019-09-23 DIAGNOSIS — F41.1 GENERALIZED ANXIETY DISORDER: ICD-10-CM

## 2019-09-23 RX ORDER — LISINOPRIL 5 MG/1
TABLET ORAL
Qty: 90 TABLET | Refills: 0 | Status: SHIPPED | OUTPATIENT
Start: 2019-09-23 | End: 2019-10-28 | Stop reason: SDUPTHER

## 2019-09-23 RX ORDER — CITALOPRAM 40 MG/1
TABLET, FILM COATED ORAL
Qty: 90 TABLET | Refills: 0 | Status: SHIPPED | OUTPATIENT
Start: 2019-09-23 | End: 2019-10-28 | Stop reason: SDUPTHER

## 2019-09-23 NOTE — TELEPHONE ENCOUNTER
Patient was in on 7/30/19 and blood pressure was elevated. She is due for her annual and labs, The appt in July was a sick visit. Blood pressure was not addressed. Schedule annual physical please.  Thanks.  Yumiko

## 2019-09-24 ENCOUNTER — PATIENT OUTREACH (OUTPATIENT)
Dept: ADMINISTRATIVE | Facility: HOSPITAL | Age: 59
End: 2019-09-24

## 2019-09-24 NOTE — PROGRESS NOTES
Health Maintenance Due   Topic Date Due    TETANUS VACCINE  02/04/1979    Shingles Vaccine (1 of 2) 02/04/2011    Colonoscopy  02/04/2011    Influenza Vaccine (1) 09/01/2019    Pap Smear with HPV Cotest  08/25/2019

## 2019-09-24 NOTE — LETTER
AUTHORIZATION FOR RELEASE OF   CONFIDENTIAL INFORMATION    Dear LAB ARACELI,    We are seeing Claire Bowser, date of birth 1960, in the clinic at [facility]. Samuel Brady MD is the patient's PCP. Claire Bowser has an outstanding lab/procedure at the time we reviewed her chart. In order to help keep her health information updated, she has authorized us to request the following medical record(s):        (  )  MAMMOGRAM                                      (  )  COLONOSCOPY      ( X )  PAP SMEAR                                          (  )  OUTSIDE LAB RESULTS     (  )  DEXA SCAN                                          (  )  EYE EXAM            (  )  FOOT EXAM                                          (  )  ENTIRE RECORD     (  )  OUTSIDE IMMUNIZATIONS                 (  )  _______________         Please fax records to [facility], Robert W Taylor, MD, [phone]    Ivana Martinez LPN  Clinical Care Coordinator  [address] [address]  P: [phone]  F: [phone]            Patient Name: Claire Bowser  : 1960  Patient Phone #: [phone]

## 2019-10-11 DIAGNOSIS — Z12.11 COLON CANCER SCREENING: ICD-10-CM

## 2019-10-14 ENCOUNTER — PATIENT OUTREACH (OUTPATIENT)
Dept: ADMINISTRATIVE | Facility: HOSPITAL | Age: 59
End: 2019-10-14

## 2019-10-14 NOTE — LETTER
October 22, 2019    Claire Bowser  [address] [facility]  [place] [address]             [facility]  [address]  [facility] [address]  Phone: [phone] [facility] is committed to your overall health.  To help you get the most out of each of your visits, we will review your information to make sure you are up to date on all of your recommended tests and/or procedures.       Dr. Samuel Brady MD has found that your chart shows you may be due for a:     PAP SMEAR (CERVICAL CANCER SCREENING)   COLORECTAL CANCER SCREENING   IMMUNIZATIONS     If you have had any of the above done at another facility, please bring the records or information with you so that your record at [facility] will be complete.  If you would like to schedule any of these, please contact the clinic at [phone].     If you are currently taking medication, please bring it with you to your appointment for review.     Also, if you have any type of Advanced Directives, please bring them with you to your office visit so we may scan them into your chart.     Thank You,     Your [facility] Team,   MD Ivana Esteban LPN Clinical Care Coordinator   [facility]   [address] [place] [address]   Phone [phone]   Fax [phone]

## 2019-10-14 NOTE — LETTER
October 22, 2019    Claire Bowser  [address] [facility]  [place] [address]             [facility]  [address]  [facility] [address]  Phone: [phone] [facility] is committed to your overall health.  To help you get the most out of each of your visits, we will review your information to make sure you are up to date on all of your recommended tests and/or procedures.       Dr. Samuel Brady MD has found that your chart shows you may be due for a:     PAP SMEAR (CERVICAL CANCER SCREENING)   COLORECTAL CANCER SCREENING   IMMUNIZATIONS     If you have had any of the above done at another facility, please bring the records or information with you so that your record at [facility] will be complete.  If you would like to schedule any of these, please contact the clinic at [phone].     If you are currently taking medication, please bring it with you to your appointment for review.     Also, if you have any type of Advanced Directives, please bring them with you to your office visit so we may scan them into your chart.     Thank You,     Your [facility] Team,   MD Ivana Esteban LPN Clinical Care Coordinator   [facility]   [address] [place] [address]   Phone [phone]   Fax [phone]

## 2019-10-28 ENCOUNTER — OFFICE VISIT (OUTPATIENT)
Dept: FAMILY MEDICINE | Facility: CLINIC | Age: 59
End: 2019-10-28
Payer: COMMERCIAL

## 2019-10-28 VITALS
RESPIRATION RATE: 16 BRPM | SYSTOLIC BLOOD PRESSURE: 130 MMHG | BODY MASS INDEX: 22.51 KG/M2 | TEMPERATURE: 98 F | OXYGEN SATURATION: 95 % | HEART RATE: 61 BPM | WEIGHT: 114.63 LBS | DIASTOLIC BLOOD PRESSURE: 82 MMHG | HEIGHT: 60 IN

## 2019-10-28 DIAGNOSIS — Z00.00 ANNUAL PHYSICAL EXAM: Primary | ICD-10-CM

## 2019-10-28 DIAGNOSIS — R35.1 NOCTURIA: ICD-10-CM

## 2019-10-28 DIAGNOSIS — Z72.0 TOBACCO USE: ICD-10-CM

## 2019-10-28 DIAGNOSIS — E78.2 MIXED HYPERLIPIDEMIA: ICD-10-CM

## 2019-10-28 DIAGNOSIS — F41.1 GENERALIZED ANXIETY DISORDER: ICD-10-CM

## 2019-10-28 DIAGNOSIS — Z79.899 HIGH RISK MEDICATION USE: ICD-10-CM

## 2019-10-28 DIAGNOSIS — I10 ESSENTIAL HYPERTENSION: ICD-10-CM

## 2019-10-28 LAB — HUMAN PAPILLOMAVIRUS (HPV): POSITIVE

## 2019-10-28 PROCEDURE — 3075F PR MOST RECENT SYSTOLIC BLOOD PRESS GE 130-139MM HG: ICD-10-PCS | Mod: CPTII,S$GLB,, | Performed by: NURSE PRACTITIONER

## 2019-10-28 PROCEDURE — 99999 PR PBB SHADOW E&M-EST. PATIENT-LVL IV: CPT | Mod: PBBFAC,,, | Performed by: NURSE PRACTITIONER

## 2019-10-28 PROCEDURE — 3079F PR MOST RECENT DIASTOLIC BLOOD PRESSURE 80-89 MM HG: ICD-10-PCS | Mod: CPTII,S$GLB,, | Performed by: NURSE PRACTITIONER

## 2019-10-28 PROCEDURE — 3075F SYST BP GE 130 - 139MM HG: CPT | Mod: CPTII,S$GLB,, | Performed by: NURSE PRACTITIONER

## 2019-10-28 PROCEDURE — 99999 PR PBB SHADOW E&M-EST. PATIENT-LVL IV: ICD-10-PCS | Mod: PBBFAC,,, | Performed by: NURSE PRACTITIONER

## 2019-10-28 PROCEDURE — 99396 PREV VISIT EST AGE 40-64: CPT | Mod: S$GLB,,, | Performed by: NURSE PRACTITIONER

## 2019-10-28 PROCEDURE — 3079F DIAST BP 80-89 MM HG: CPT | Mod: CPTII,S$GLB,, | Performed by: NURSE PRACTITIONER

## 2019-10-28 PROCEDURE — 99396 PR PREVENTIVE VISIT,EST,40-64: ICD-10-PCS | Mod: S$GLB,,, | Performed by: NURSE PRACTITIONER

## 2019-10-28 RX ORDER — CITALOPRAM 40 MG/1
40 TABLET, FILM COATED ORAL NIGHTLY
Qty: 90 TABLET | Refills: 1 | Status: SHIPPED | OUTPATIENT
Start: 2019-10-28 | End: 2020-07-23 | Stop reason: SDUPTHER

## 2019-10-28 RX ORDER — IBUPROFEN 200 MG
1 TABLET ORAL DAILY
Qty: 28 PATCH | Refills: 0 | Status: SHIPPED | OUTPATIENT
Start: 2019-10-28 | End: 2020-10-29 | Stop reason: SDUPTHER

## 2019-10-28 RX ORDER — LISINOPRIL 5 MG/1
5 TABLET ORAL DAILY
Qty: 90 TABLET | Refills: 1 | Status: SHIPPED | OUTPATIENT
Start: 2019-10-28 | End: 2020-07-07 | Stop reason: SDUPTHER

## 2019-10-28 RX ORDER — NICOTINE 7MG/24HR
1 PATCH, TRANSDERMAL 24 HOURS TRANSDERMAL DAILY
Qty: 28 PATCH | Refills: 0 | Status: SHIPPED | OUTPATIENT
Start: 2019-10-28 | End: 2020-10-29 | Stop reason: SDUPTHER

## 2019-10-28 NOTE — PATIENT INSTRUCTIONS
Prevention Guidelines, Women Ages 50 to 64  Screening tests and vaccines are an important part of managing your health. Health counseling is essential, too. Below are guidelines for these, for women ages 50 to 64. Talk with your healthcare provider to make sure youre up to date on what you need.  Screening Who needs it How often   Type 2 diabetes or prediabetes All adults beginning at age 45 and adults without symptoms at any age who are overweight or obese and have 1 or more additional risk factors for diabetes. At  least every 3 years   Alcohol misuse All women in this age group At routine exams   Blood pressure All women in this age group Every 2 years if your blood pressure is less than 120/80 mm Hg; yearly if your systolic blood pressure is 120 to 139 mm Hg, or your diastolic blood pressure reading is 80 to 89 mm Hg   Breast cancer All women in this age group Yearly mammogram and clinical breast exam1   Cervical cancer All women in this age group, except women who have had a complete hysterectomy Pap test every 3 years or Pap test with human papillomavirus (HPV) test every 5 years   Chlamydia Women at increased risk for infection At routine exams   Colorectal cancer All women in this age group Flexible sigmoidoscopy every 5 years, or colonoscopy every 10 years, or double-contrast barium enema every 5 years; yearly fecal occult blood test or fecal immunochemical test; or a stool DNA test as often as your health care provider advises; talk with your health care provider about which tests are best for you   Depression All women in this age group At routine exams   Gonorrhea Sexually active women at increased risk for infection At routine exams   Hepatitis C Anyone at increased risk; 1 time for those born between 1945 and 1965 At routine exams   High cholesterol or triglycerides All women in this age group who are at risk for coronary artery disease At least every 5 years   HIV All women At routine exams   Lung  cancer Adults age 55 to 80 who have smoked Yearly screening in smokers with 30 pack-year history of smoking or who quit within 15 years   Obesity All women in this age group At routine exams   Osteoporosis Women who are postmenopausal Ask your healthcare provider   Syphilis Women at increased risk for infection - talk with your healthcare provider At routine exams   Tuberculosis Women at increased risk for infection - talk with your healthcare provider Ask your healthcare provider   Vision All women in this age group Ask your healthcare provider   Vaccine Who needs it How often   Chickenpox (varicella) All women in this age group who have no record of this infection or vaccine 2 doses; the second dose should be given at least 4 weeks after the first dose   Hepatitis A Women at increased risk for infection - talk with your healthcare provider 2 doses given at least 6 months apart   Hepatitis B Women at increased risk for infection - talk with your healthcare provider 3 doses over 6 months; second dose should be given 1 month after the first dose; the third dose should be given at least 2 months after the second dose and at least 4 months after the first dose   Haemophilus influenzaeType B (HIB) Women at increased risk for infection - talk with your healthcare provider 1 to 3 doses   Influenza (flu) All women in this age group Once a year   Measles, mumps, rubella (MMR) Women in this age group through their late 50s who have no record of these infections or vaccines 1 dose   Meningococcal Women at increased risk for infection - talk with your healthcare provider 1 or more doses   Pneumococcal conjugate vaccine (PCV13) and pneumococcal polysaccharide vaccine (PPSV23) Women at increased risk for infection - talk with your healthcare provider PCV13: 1 dose ages 19 to 65 (protects against 13 types of pneumococcal bacteria)  PPSV23: 1 to 2 doses through age 64, or 1 dose at 65 or older (protects against 23 types of  pneumococcal bacteria)   Tetanus/diphtheria/pertussis (Td/Tdap) booster All women in this age group Td every 10 years, or a one-time dose of Tdap instead of a Td booster after age 18, then Td every 10 years   Zoster All women ages 60 and older 1 dose   Counseling Who needs it How often   BRCA gene mutation testing for breast and ovarian cancer susceptibility Women with increased risk for having gene mutation When your risk is known   Breast cancer and chemoprevention Women at high risk for breast cancer When your risk is known   Diet and exercise Women who are overweight or obese When diagnosed, and then at routine exams   Sexually transmitted infection prevention Women at increased risk for infection - talk with your healthcare provider At routine exams   Use of daily aspirin Women ages 55 and up in this age group who are at risk for cardiovascular health problems such as stroke When your risk is known   Use of tobacco and the health effects it can cause All women in this age group Every exam   1American Cancer Society  Date Last Reviewed: 1/26/2016  © 1724-9278 [employer]. [address] [place] [address], [place] [address]. All rights reserved. This information is not intended as a substitute for professional medical care. Always follow your healthcare professional's instructions.        Planning to Quit Smoking  Your healthcare provider may have told you that you need to give up tobacco. Only you can decide if and when you are ready to quit. Quitting is hard to do. But the benefits will be worth it. When you  decide to quit, come up with a plan thats right for you. Discuss your plan with your healthcare provider. And talk to your provider about medicines to help you quit.    Line up support  To quit smoking, youll need a plan and some help. Pick a date within the next 2 to 4 weeks to quit. Use the time between now and that date to arrange for support.  · Classes and counselors. Quit-smoking classes   people like you through the process. Get to know others in a class, and support each other beyond the class. Telephone counseling also helps you keep on track. Ask your healthcare provider, local hospital, or public health department to put you in touch with a class and a phone counselor.  · Family and friends. Tell your family and friends about your quit date. Ask them to support your change. If they smoke, arrange to see them in smoke-free places. Forbid smoking in your home and car.     Finding something to replace cigarettes may be hard to do. Be aware that some things you choose may be as harmful as cigarettes:  · Smokeless (chewing) tobacco is just as harmful as regular tobacco. Tobacco should not be used as a substitute for cigarettes.  · Herbal medicines or teas may affect how your body handles nicotine. Talk to your healthcare provider before using these products.  · E-cigarettes have less toxins than the smoke from a regular cigarette. But the FDA says that these devices may still have substances that can cause cancer. E-cigarettes are not well regulated. They have not been studied enough to know if they are a good aid to help you stop smoking. Talk with your healthcare provider before using these products.      Quit-smoking products  Many products can help you quit smoking. Some are prescription medicines that help curb your cravings and withdrawal symptoms. Other products slowly lessen the level of nicotine your body absorbs. Nicotine is the highly addictive substance found in cigarettes, cigars, and chewing tobacco. Nicotine replacement products can help get your body used to slowly decreasing amounts of nicotine after you quit smoking. These products include a nicotine patch, gum, lozenge, nasal spray, and inhaler. Be sure you follow the directions for your medicine or product carefully. Your healthcare provider may tell you to start taking the prescription medicine a week before you plan to quit. Do  not smoke while you use nicotine products. Doing so can be very harmful to your health.  For more information  · https://smokefree.gov/jhou-mn-zm-expert  · National Cancer [place] Smoking Quitline: 877-44U-QUIT (773-859-3629)   Date Last Reviewed: 2/1/2017 © 2000-2017 [employer]. [address] [place] [address], [place] [address]. All rights reserved. This information is not intended as a substitute for professional medical care. Always follow your healthcare professional's instructions.        How to Quit Smoking  Smoking is one of the hardest habits to break. About half of all people who have ever smoked have been able to quit. Most people who still smoke want to quit. Here are some of the best ways to stop smoking.    Keep trying  Most smokers make many attempts at quitting before they are successful. Its important not to give up.  Go cold turkey  Most former smokers quit cold turkey (all at once). Trying to cut back gradually doesn't seem to work as well, perhaps because it continues the smoking habit. Also, it is possible to inhale more while smoking fewer cigarettes. This results in the same amount of nicotine in your body.  Get support  Support programs can be a big help, especially for heavy smokers. These groups offer lectures, ways to change behavior, and peer support. Here are some ways to find a support program:  · Free national quitline: 800-QUIT-NOW (737-277-6217).  · Hospital quit-smoking programs.  · American Lung Association: (514.653.5910).  · American Cancer Society (282-593-9573).  Support at home is important too. Nonsmokers can offer praise and encouragement. If the smoker in your life finds it hard to quit, encourage them to keep trying.  Over-the-counter medicines  Nicotine replacement therapy may make quitting easier. Certain aids, such as the nicotine patch, gum, and lozenges, are available without a prescription. It is best to use these under a doctors care, though. The skin  "patch provides a steady supply of nicotine. Nicotine gum and lozenges give temporary bursts of low levels of nicotine. Both methods reduce the craving for cigarettes. Warning: If you have nausea, vomiting, dizziness, weakness, or a fast heartbeat, stop using these products and see your doctor.  Prescription medicines  After reviewing your smoking patterns and past attempts to quit, your doctor may offer a prescription medicine such as bupropion, varenicline, a nicotine inhaler, or nasal spray. Each has advantages and side effects. Your doctor can review these with you.  Health benefits of quitting  The benefits of quitting start right away and keep improving the longer you go without smoking. These benefits occur at any age.  So whether you are 17 or 70, quitting is a good decision. Some of the benefits include:  · 20 minutes: Blood pressure and pulse return to normal.  · 8 hours: Oxygen levels return to normal.  · 2 days: Ability to smell and taste begin to improve as damaged nerves regrow.  · 2 to 3 weeks: Circulation and lung function improve.  · 1 to 9 months: Coughing, congestion, and shortness of breath decrease; tiredness decreases.  · 1 year: Risk of heart attack decreases by half.  · 5 years: Risk of lung cancer decreases by half; risk of stroke becomes the same as a nonsmokers.  For more on how to quit smoking, try these online resources:   · Smokefree.gov  · "Clearing the Air" booklet from the National Cancer [place]: smokefree.gov/sites/default/files/pdf/clearing-the-air-accessible.pdf  Date Last Reviewed: 3/1/2017  © 8302-3976 [employer]. [address] [place] [address], [place] [address]. All rights reserved. This information is not intended as a substitute for professional medical care. Always follow your healthcare professional's instructions.        Kicking the Smoking Habit  If you smoke, quitting is one of the best changes you can make for your heart and your overall health. Your risk of " heart attack goes down within one day of putting out that last cigarette. As you go longer without smoking, your risk goes down even more. Quitting isnt easy, but millions of people have done it. You can, too. Its never too late to quit.  Getting started  Boost your chances of success by deciding on your quit plan. Your health care provider and cardiac rehab team can help you develop this plan. Even if youve already quit, its easy to slip back into smoking.  Your plan can help you avoid and recover from relapse.  In any case, start by setting a date to quit within a month, and do it.    Keys to your quit plan  · Talk to your healthcare provider about prescription medicines and nicotine replacement products that help stop the urge to smoke.   · Join a support group or quit smoking program. Talking with others about the challenges of quitting can help you get through them.  · Ask other smokers in your household to quit with you.  · Look for the cues in your life that you associate with smoking and avoid them.  Track your triggers  What gives you that O-ccst-z-cigarette feeling? List all the situations that make you want a cigarette. Then think of other ways to deal with these situations. Here are some examples:  Situation How I'll handle it   Finishing a meal Get up from the table and take a walk   Having an argument Find a quiet place and breathe deeply   Feeling lonely or bored Call a friend to talk         Tips for quitting successfully  · List the benefits of quitting such as reducing heart risks and saving money. Keep this list and review it whenever you feel like smoking.  · Get support. Let your friends know you may call them to chat when you have an urge to smoke.  · If youve tried to quit before without success, this time avoid the triggers that may cause the relapse.  · Make the most of slip-ups. Try to learn from them, and then get back on track.  · Be accountable to your friends and your calendar  so that you stay on track.  For family and friends  · Be supportive and patient. Quitting smoking can be difficult and stressful.  · If you smoke, nows a great time to quit. Even if you dont quit, never smoke around your loved one. Secondhand smoke is dangerous to his or her heart.  · The best goals are accomplished in teams. Remember that when your loved one states he or she wants to stop smoking.  Date Last Reviewed: 7/1/2016 © 2000-2017 [employer]. [address] [place] [address], [place] [address]. All rights reserved. This information is not intended as a substitute for professional medical care. Always follow your healthcare professional's instructions.        Getting Support for Quitting Smoking  You dont have to go through the process of quitting smoking without support. Tell people you are quitting. The support of friends, coworkers, and family members can make a big difference. Face-to-face or telephone counseling can also be helpful, as can a stop-smoking class or an ex-smokers group.    Set a quit date  If youre serious about quitting smoking, choose a date within the next 2 to 4 weeks. Morgan it in bright, bold letters on a calendar you use often. Tell people about your quit date. Ask for their support. Let your friends and family know how they can help you quit.  Make a contract  A quit-smoking contract gives you a goal. Write out the contract and sign it. Have it witnessed, if you like. Then keep the contract where youll see it often, or carry it with you. Read the contract when youre tempted to smoke.  Take action  On the day you quit, reread your quit contract. Think about the benefits you gain by quitting, such as better health and an improved sense of taste.  · Remove cigarettes from your home, car, or any other place where you stash them.  · Throw away all smoking materials, including matches, lighters, and ashtrays.  · Review your list of triggers and your plan for coping with  them.  · Stay away from people or settings you link with smoking.  · Make a survival kit that includes gum, mints, carrot sticks, and things to keep your hands busy.  · Talk to your healthcare provider about using quit-smoking products, such as medication or a nicotine patch, inhaler, nasal spray, gum, or lozenges.  Ask for help  Sometimes you may just need to talk when you miss smoking. Ex-smokers are good to talk to, because theyre likely to know how you feel. You may need extra support in the first few weeks after you quit. Ask a friend to call you each day to see how youre doing. Telephone counseling can also help you keep on track. Ask your healthcare provider, local hospital, or public health department to put you in touch with a phone counselor. You may also have to deal with doubters when you decide to quit. Explain to any doubters why you are quitting. Tell them that quitting is important to you. Ask for their support. Tell your smoking buddies that you can walk together instead of smoking together. If someone thinks you wont succeed, say that you have a good quit plan and ask for their support. Let him or her know youre sticking with it.     For more information  · https://smokefree.gov/wabk-zu-cy-expert  · National Cancer [place] Smoking Quitline: 877-44U-QUIT (175-866-6138)   Date Last Reviewed: 2/1/2017  © 0408-1370 [employer]. [address] [place] [address]. All rights reserved. This information is not intended as a substitute for professional medical care. Always follow your healthcare professional's instructions.        Coping with Smoking Withdrawal  For the first few days after you quit smoking, you may feel cranky, restless, depressed, or low on energy. These are symptoms of withdrawal. Your body needs time to recover from smoking. Your symptoms should lessen within a few days.    Coping with the urge to smoke  · Deep-breathe. Inhale through your nose. Count to 5.  Slowly exhale through your mouth.  · Drink water. Try to drink 8 or more 8-ounce glasses of water a day.  · Keep your hands busy. Wash your car. Draw. Do a puzzle. Build a birdhouse.  · Delay. The urge to smoke lasts only 3 to 5 minutes.  Get support  Individual, group, and telephone counseling can help keep you on track. Ask your healthcare provider for more information about resources available to you.  Control stress  After you quit, you may feel irritable and stressed. Try taking a warm bath or shower. Listen to music. Go for a walk or to the gym. Try yoga or meditate. Call friends or talk with a professional.  Exercise  Exercise helps your body and mind feel better. There are many ways to be more active. Find something you enjoy doing. See if a friend will join you for a walk or a bike ride.  Sleep better  You may feel tired but have trouble falling asleep. Try to relax before bed. Do a few stretching exercises. Read for a while. Also, avoid caffeine for at least a few hours before bedtime.  Get fit, not fat  You may notice an increased appetite. Many people who quit smoking gain a few pounds. To limit weight gain, try to watch what you eat. Cut back on fat in your diet. Snack on low-calorie foods, like fresh fruits and vegetables. Drink low-calorie liquids, especially water. Regular exercise can also help you stay fit. And remember: Your main goal is to be a nonsmoker. Stay focused on that goal.  Quit-smoking products  There are a number of products that can help you quit smoking including medicines and nicotine replacement products. They are available over-the-counter or by prescription. Ask your healthcare provider if any of these could help you quit smoking.        For more information  · https://smokefree.gov/dswl-gv-yj-expert  · National Cancer [place] Smoking Quitline: 877-44U-QUIT (780-551-1436)   Date Last Reviewed: 2/1/2017  © 3202-6191 [employer]. [address] [place] [address], [place]  [address]. All rights reserved. This information is not intended as a substitute for professional medical care. Always follow your healthcare professional's instructions.

## 2019-10-28 NOTE — PROGRESS NOTES
Subjective:       Patient ID: Claire Bowser is a 59 y.o. female.    Chief Complaint: Annual Exam    Chief Complaint  Chief Complaint   Patient presents with    Annual Exam       HPI  Claire Bowser is a 59 y.o. female with medical diagnoses as listed in the medical history and problem list that presents for an annual visit. Patient is routinely followed for HTN and LILIA and tobacco use smoking 1 PPD of cigarettes. Today's blood pressure is 130/82. Current BMI of 114 which is unchanged since her visit on 7/30/2019.        PAST MEDICAL HISTORY:  Past Medical History:   Diagnosis Date    Anxiety     Flu 02/2017    [facility]    Hypertension        PAST SURGICAL HISTORY:  Past Surgical History:   Procedure Laterality Date    TUBAL LIGATION         SOCIAL HISTORY:  Social History     Socioeconomic History    Marital status:      Spouse name: Not on file    Number of children: Not on file    Years of education: Not on file    Highest education level: Not on file   Occupational History    Not on file   Social Needs    Financial resource strain: Somewhat hard    Food insecurity:     Worry: Never true     Inability: Never true    Transportation needs:     Medical: No     Non-medical: No   Tobacco Use    Smoking status: Current Every Day Smoker     Packs/day: 1.00     Years: 7.00     Pack years: 7.00     Types: Cigarettes    Smokeless tobacco: Never Used    Tobacco comment: [phone]   Substance and Sexual Activity    Alcohol use: No     Frequency: 2-4 times a month     Drinks per session: 1 or 2     Binge frequency: Never    Drug use: Never    Sexual activity: Not on file   Lifestyle    Physical activity:     Days per week: 4 days     Minutes per session: 20 min    Stress: Very much   Relationships    Social connections:     Talks on phone: More than three times a week     Gets together: Once a week     Attends Sabianism service: Not on file     Active member of club or organization:  Yes     Attends meetings of clubs or organizations: More than 4 times per year     Relationship status:    Other Topics Concern    Are you pregnant or think you may be? Not Asked    Breast-feeding Not Asked   Social History Narrative    Not on file       FAMILY HISTORY:  Family History   Problem Relation Age of Onset    Diabetes Mother     Heart disease Mother     Kidney disease Mother     Hypertension Mother     Stroke Mother     Hearing loss Mother     Melanoma Mother     COPD Father     Liver disease Paternal Grandfather     Alcohol abuse Paternal Grandfather     Liver disease Sister     Emphysema Brother     COPD Brother     Sleep apnea Brother     No Known Problems Son     Alcohol abuse Paternal Grandmother     Cirrhosis Paternal Grandmother     Heart disease Maternal Aunt     Diabetes Maternal Aunt     Cancer Maternal Aunt         female    Heart disease Maternal Uncle     Hypertension Maternal Uncle     No Known Problems Paternal Uncle     Psoriasis Neg Hx     Lupus Neg Hx     Eczema Neg Hx        ALLERGIES AND MEDICATIONS: updated and reviewed.  Review of patient's allergies indicates:   Allergen Reactions    No known drug allergies      Current Outpatient Medications   Medication Sig Dispense Refill    citalopram (CELEXA) 40 MG tablet Take 1 tablet (40 mg total) by mouth every evening. 90 tablet 1    lisinopril (PRINIVIL,ZESTRIL) 5 MG tablet Take 1 tablet (5 mg total) by mouth once daily. 90 tablet 1    multivitamin (THERAGRAN) per tablet Take 1 tablet by mouth once daily. Every day      nicotine (NICODERM CQ) 14 mg/24 hr Place 1 patch onto the skin once daily. Month 2 28 patch 0    nicotine (NICODERM CQ) 21 mg/24 hr Place 1 patch onto the skin once daily. Month 1 28 patch 0    nicotine (NICODERM CQ) 7 mg/24 hr Place 1 patch onto the skin once daily. Month 3 28 patch 0     No current facility-administered medications for this visit.        I have reviewed the  patient's medical, family, and social history in detail and updated the computerized patient record.    Review of Systems   Constitutional: Negative for activity change, appetite change, chills, fatigue, fever and unexpected weight change.        Patient states she is less active since retiring in February.    HENT: Negative for congestion, ear pain, hearing loss, postnasal drip, rhinorrhea, sinus pressure, sinus pain, sneezing, sore throat and trouble swallowing.    Eyes: Negative for discharge and visual disturbance.        Vision corrected with glasses.    Respiratory: Negative for cough, chest tightness, shortness of breath, wheezing and stridor.    Cardiovascular: Negative for chest pain, palpitations and leg swelling.   Gastrointestinal: Negative for abdominal pain, blood in stool, constipation, diarrhea, nausea and vomiting.   Endocrine: Positive for polydipsia and polyuria. Negative for polyphagia.        Nocturia x 3-4. Patient states she is thirsty throughout the day and drinks water and tea until she goes to bed, Family history of diabetes in mother.   Genitourinary: Negative for difficulty urinating, dysuria, frequency, hematuria and menstrual problem.   Musculoskeletal: Negative for arthralgias, back pain, joint swelling and neck pain.   Skin: Negative for rash and wound.   Neurological: Negative for dizziness, weakness, light-headedness, numbness and headaches.   Hematological: Does not bruise/bleed easily.   Psychiatric/Behavioral: Negative for confusion, dysphoric mood and sleep disturbance. The patient is nervous/anxious.         Patient states she is recently worried about finding a new job. States she has increased worry, but is able to deal with the worry in her own way. Does not wish to change her medication for anxiety at this time.          Objective:      Vitals:    10/28/19 0927   BP: 130/82   BP Location: Right arm   Patient Position: Sitting   BP Method: Small (Manual)   Pulse: 61   Resp:  16   Temp: 98.1 °F (36.7 °C)   TempSrc: Oral   SpO2: 95%   Weight: 52 kg (114 lb 10.2 oz)   Height: 5' (1.524 m)     Physical Exam   Constitutional: She is oriented to person, place, and time. Vital signs are normal. She appears well-developed and well-nourished. No distress.   Blood pressure 130/82   HENT:   Head: Normocephalic and atraumatic.   Right Ear: Hearing, tympanic membrane, external ear and ear canal normal.   Left Ear: Hearing, tympanic membrane, external ear and ear canal normal.   Nose: Nose normal. No mucosal edema.   Mouth/Throat: Oropharynx is clear and moist and mucous membranes are normal. No oropharyngeal exudate.   Eyes: Pupils are equal, round, and reactive to light. Conjunctivae, EOM and lids are normal. Right eye exhibits no discharge. Left eye exhibits no discharge. Right conjunctiva is not injected. Left conjunctiva is not injected.   Neck: Normal range of motion. Neck supple. Normal carotid pulses present. Carotid bruit is not present. No thyromegaly present.   Cardiovascular: Normal rate, regular rhythm, S1 normal, S2 normal, normal heart sounds, intact distal pulses and normal pulses.   No murmur heard.  Pulses:       Carotid pulses are 2+ on the right side, and 2+ on the left side.       Radial pulses are 2+ on the right side, and 2+ on the left side.        Posterior tibial pulses are 2+ on the right side, and 2+ on the left side.   No edema   Pulmonary/Chest: Effort normal and breath sounds normal. No respiratory distress. She has no decreased breath sounds. She has no wheezes. She has no rhonchi. She has no rales.   Abdominal: Soft. Normal appearance and bowel sounds are normal. She exhibits no distension, no abdominal bruit, no pulsatile midline mass and no mass. There is no hepatosplenomegaly. There is no tenderness. No hernia.   Musculoskeletal: Normal range of motion. She exhibits no edema, tenderness or deformity.   Lymphadenopathy:        Head (right side): No submental, no  submandibular and no tonsillar adenopathy present.        Head (left side): No submental, no submandibular and no tonsillar adenopathy present.     She has no cervical adenopathy.        Right: No supraclavicular adenopathy present.        Left: No supraclavicular adenopathy present.   Neurological: She is alert and oriented to person, place, and time. She has normal strength. Gait normal.   Skin: Skin is warm, dry and intact. No rash noted. She is not diaphoretic. No erythema. No pallor.   Psychiatric: She has a normal mood and affect. Her speech is normal and behavior is normal. Judgment and thought content normal. Her mood appears not anxious. Cognition and memory are normal. She does not exhibit a depressed mood.   Nursing note and vitals reviewed.        Assessment:       1. Annual physical exam    2. Generalized anxiety disorder    3. Essential hypertension    4. Mixed hyperlipidemia    5. Nocturia    6. Tobacco use    7. BMI 22.0-22.9, adult    8. High risk medication use          Plan:       Claire was seen today for annual exam.    Diagnoses and all orders for this visit:    Annual physical exam   Discussed age and gender appropriate health maintenance recommendations, healthy diet, regular exercise, necessary labs, age appropriate cancer screening, and routine vaccinations.  Patient declines all immunizations today.  She states that she has a fit kit at home and will mail this in.  She has her Pap smear done with Dr. Aden Lee and has an appointment scheduled on 11/20/2019.   Educational handouts provided.  Prevention guidelines women age 50-64    Generalized anxiety disorder  -     CBC auto differential; Future  -     Comprehensive metabolic panel; Future  -     citalopram (CELEXA) 40 MG tablet; Take 1 tablet (40 mg total) by mouth every evening.  - current medication effective in relieving symptoms, continue as is    Essential hypertension  -     CBC auto differential; Future  -     Comprehensive  metabolic panel; Future  -     lisinopril (PRINIVIL,ZESTRIL) 5 MG tablet; Take 1 tablet (5 mg total) by mouth once daily.  - Blood pressure controlled 130/82, continue current medication without change    Mixed hyperlipidemia  -     Comprehensive metabolic panel; Future  -     Lipid panel; Future  -   Lab Results   Component Value Date    CHOL 213 (H) 08/11/2018    CHOL 196 05/09/2017    CHOL 192 12/11/2015     Lab Results   Component Value Date    HDL 56 08/11/2018    HDL 48 05/09/2017    HDL 50 12/11/2015     Lab Results   Component Value Date    LDLCALC 134.8 08/11/2018    LDLCALC 125.0 05/09/2017    LDLCALC 121.8 12/11/2015     Lab Results   Component Value Date    TRIG 111 08/11/2018    TRIG 115 05/09/2017    TRIG 101 12/11/2015     Lab Results   Component Value Date    CHOLHDL 26.3 08/11/2018    CHOLHDL 24.5 05/09/2017    CHOLHDL 26.0 12/11/2015   The 10-year ASCVD risk score (Pretty HAMPTON Jr., et al., 2013) is: 8.5%    Values used to calculate the score:      Age: 59 years      Sex: Female      Is Non- : No      Diabetic: No      Tobacco smoker: Yes      Systolic Blood Pressure: 130 mmHg      Is BP treated: Yes      HDL Cholesterol: 56 mg/dL      Total Cholesterol: 213 mg/dL   - patient is currently not taking a statin medication, need for statin medication will be reassessed when new lipid panel results are available    Nocturia   Most likely related to continue drinking of ice tea and water up until bedtime, discussed limiting fluids after 6:00 p.m..  Patient is concerned about diabetes and comprehensive metabolic panel has been ordered with her blood work.     Tobacco use  -     nicotine (NICODERM CQ) 14 mg/24 hr; Place 1 patch onto the skin once daily. Month 2  -     nicotine (NICODERM CQ) 7 mg/24 hr; Place 1 patch onto the skin once daily. Month 3  -     nicotine (NICODERM CQ) 21 mg/24 hr; Place 1 patch onto the skin once daily. Month 1  - Educational handouts provided.  Planning to  quit smoking.  How to quit smoking.  Kicking the smoking habit.  Getting support for quitting smoking.  Coping with smoking withdrawal.  - patient declines referral to smoking cessation program    BMI 22.0-22.9, adult   BMI today is 22.39 and the patient's weight is unchanged at 114 lb since her last visit on 7/30/2019   Maintain healthy weight with heathy diet and exercise/active lifestyle    High risk medication use  -     CBC auto differential; Future  -     Comprehensive metabolic panel; Future      Follow up in about 6 months (around 4/28/2020) for Dr. Brady, 20 minutes, schedule lab fasting.      Patient readiness: acceptance and barriers:none    During the course of the visit the patient was educated and counseled about the following:     Hypertension:   Medication: no change.  Dietary sodium restriction.  Regular aerobic exercise.  Follow up: 6 months and as needed.    Goals: Hypertension: Reduce Blood Pressure    Did patient meet goals/outcomes: Yes    The following self management tools provided: declined    Patient Instructions (the written plan) was given to the patient/family.     Time spent with patient: 45 minutes    Barriers to medications present (no )    Adverse reactions to current medications (no)    Over the counter medications reviewed (Yes)

## 2019-10-30 ENCOUNTER — LAB VISIT (OUTPATIENT)
Dept: LAB | Facility: HOSPITAL | Age: 59
End: 2019-10-30
Attending: NURSE PRACTITIONER
Payer: COMMERCIAL

## 2019-10-30 DIAGNOSIS — F41.1 GENERALIZED ANXIETY DISORDER: ICD-10-CM

## 2019-10-30 DIAGNOSIS — I10 ESSENTIAL HYPERTENSION: ICD-10-CM

## 2019-10-30 DIAGNOSIS — Z79.899 HIGH RISK MEDICATION USE: ICD-10-CM

## 2019-10-30 DIAGNOSIS — Z72.0 TOBACCO USE: ICD-10-CM

## 2019-10-30 DIAGNOSIS — E78.2 MIXED HYPERLIPIDEMIA: ICD-10-CM

## 2019-10-30 LAB
ALBUMIN SERPL BCP-MCNC: 3.8 G/DL (ref 3.5–5.2)
ALP SERPL-CCNC: 95 U/L (ref 55–135)
ALT SERPL W/O P-5'-P-CCNC: 8 U/L (ref 10–44)
ANION GAP SERPL CALC-SCNC: 6 MMOL/L (ref 8–16)
AST SERPL-CCNC: 18 U/L (ref 10–40)
BASOPHILS # BLD AUTO: 0.07 K/UL (ref 0–0.2)
BASOPHILS NFR BLD: 1.2 % (ref 0–1.9)
BILIRUB SERPL-MCNC: 0.6 MG/DL (ref 0.1–1)
BUN SERPL-MCNC: 9 MG/DL (ref 6–20)
CALCIUM SERPL-MCNC: 9.9 MG/DL (ref 8.7–10.5)
CHLORIDE SERPL-SCNC: 107 MMOL/L (ref 95–110)
CHOLEST SERPL-MCNC: 215 MG/DL (ref 120–199)
CHOLEST/HDLC SERPL: 4.6 {RATIO} (ref 2–5)
CO2 SERPL-SCNC: 29 MMOL/L (ref 23–29)
CREAT SERPL-MCNC: 0.8 MG/DL (ref 0.5–1.4)
DIFFERENTIAL METHOD: ABNORMAL
EOSINOPHIL # BLD AUTO: 0.2 K/UL (ref 0–0.5)
EOSINOPHIL NFR BLD: 2.5 % (ref 0–8)
ERYTHROCYTE [DISTWIDTH] IN BLOOD BY AUTOMATED COUNT: 12.5 % (ref 11.5–14.5)
EST. GFR  (AFRICAN AMERICAN): >60 ML/MIN/1.73 M^2
EST. GFR  (NON AFRICAN AMERICAN): >60 ML/MIN/1.73 M^2
GLUCOSE SERPL-MCNC: 85 MG/DL (ref 70–110)
HCT VFR BLD AUTO: 42.2 % (ref 37–48.5)
HDLC SERPL-MCNC: 47 MG/DL (ref 40–75)
HDLC SERPL: 21.9 % (ref 20–50)
HGB BLD-MCNC: 14.1 G/DL (ref 12–16)
IMM GRANULOCYTES # BLD AUTO: 0.02 K/UL (ref 0–0.04)
IMM GRANULOCYTES NFR BLD AUTO: 0.3 % (ref 0–0.5)
LDLC SERPL CALC-MCNC: 127.8 MG/DL (ref 63–159)
LYMPHOCYTES # BLD AUTO: 1.9 K/UL (ref 1–4.8)
LYMPHOCYTES NFR BLD: 32.1 % (ref 18–48)
MCH RBC QN AUTO: 32.9 PG (ref 27–31)
MCHC RBC AUTO-ENTMCNC: 33.4 G/DL (ref 32–36)
MCV RBC AUTO: 98 FL (ref 82–98)
MONOCYTES # BLD AUTO: 0.5 K/UL (ref 0.3–1)
MONOCYTES NFR BLD: 8.7 % (ref 4–15)
NEUTROPHILS # BLD AUTO: 3.3 K/UL (ref 1.8–7.7)
NEUTROPHILS NFR BLD: 55.2 % (ref 38–73)
NONHDLC SERPL-MCNC: 168 MG/DL
NRBC BLD-RTO: 0 /100 WBC
PLATELET # BLD AUTO: 250 K/UL (ref 150–350)
PMV BLD AUTO: 10.6 FL (ref 9.2–12.9)
POTASSIUM SERPL-SCNC: 4.1 MMOL/L (ref 3.5–5.1)
PROT SERPL-MCNC: 6.9 G/DL (ref 6–8.4)
RBC # BLD AUTO: 4.29 M/UL (ref 4–5.4)
SODIUM SERPL-SCNC: 142 MMOL/L (ref 136–145)
TRIGL SERPL-MCNC: 201 MG/DL (ref 30–150)
WBC # BLD AUTO: 6.01 K/UL (ref 3.9–12.7)

## 2019-10-30 PROCEDURE — 80061 LIPID PANEL: CPT

## 2019-10-30 PROCEDURE — 80053 COMPREHEN METABOLIC PANEL: CPT

## 2019-10-30 PROCEDURE — 36415 COLL VENOUS BLD VENIPUNCTURE: CPT | Mod: PO

## 2019-10-30 PROCEDURE — 85025 COMPLETE CBC W/AUTO DIFF WBC: CPT

## 2019-12-20 ENCOUNTER — PATIENT OUTREACH (OUTPATIENT)
Dept: ADMINISTRATIVE | Facility: HOSPITAL | Age: 59
End: 2019-12-20

## 2020-04-06 ENCOUNTER — PATIENT MESSAGE (OUTPATIENT)
Dept: FAMILY MEDICINE | Facility: CLINIC | Age: 60
End: 2020-04-06

## 2020-05-05 ENCOUNTER — PATIENT MESSAGE (OUTPATIENT)
Dept: ADMINISTRATIVE | Facility: HOSPITAL | Age: 60
End: 2020-05-05

## 2020-07-07 DIAGNOSIS — I10 ESSENTIAL HYPERTENSION: ICD-10-CM

## 2020-07-07 RX ORDER — LISINOPRIL 5 MG/1
5 TABLET ORAL DAILY
Qty: 90 TABLET | Refills: 0 | Status: SHIPPED | OUTPATIENT
Start: 2020-07-07 | End: 2020-11-27

## 2020-07-14 ENCOUNTER — OFFICE VISIT (OUTPATIENT)
Dept: PRIMARY CARE CLINIC | Facility: CLINIC | Age: 60
End: 2020-07-14
Payer: COMMERCIAL

## 2020-07-14 VITALS
RESPIRATION RATE: 18 BRPM | SYSTOLIC BLOOD PRESSURE: 134 MMHG | OXYGEN SATURATION: 97 % | TEMPERATURE: 99 F | DIASTOLIC BLOOD PRESSURE: 71 MMHG | HEART RATE: 76 BPM

## 2020-07-14 DIAGNOSIS — R05.9 COUGH: ICD-10-CM

## 2020-07-14 DIAGNOSIS — U07.1 COVID-19: Primary | ICD-10-CM

## 2020-07-14 PROCEDURE — 99203 PR OFFICE/OUTPT VISIT, NEW, LEVL III, 30-44 MIN: ICD-10-PCS | Mod: S$GLB,,, | Performed by: EMERGENCY MEDICINE

## 2020-07-14 PROCEDURE — 3078F PR MOST RECENT DIASTOLIC BLOOD PRESSURE < 80 MM HG: ICD-10-PCS | Mod: CPTII,S$GLB,, | Performed by: EMERGENCY MEDICINE

## 2020-07-14 PROCEDURE — 3075F PR MOST RECENT SYSTOLIC BLOOD PRESS GE 130-139MM HG: ICD-10-PCS | Mod: CPTII,S$GLB,, | Performed by: EMERGENCY MEDICINE

## 2020-07-14 PROCEDURE — 3078F DIAST BP <80 MM HG: CPT | Mod: CPTII,S$GLB,, | Performed by: EMERGENCY MEDICINE

## 2020-07-14 PROCEDURE — U0003 INFECTIOUS AGENT DETECTION BY NUCLEIC ACID (DNA OR RNA); SEVERE ACUTE RESPIRATORY SYNDROME CORONAVIRUS 2 (SARS-COV-2) (CORONAVIRUS DISEASE [COVID-19]), AMPLIFIED PROBE TECHNIQUE, MAKING USE OF HIGH THROUGHPUT TECHNOLOGIES AS DESCRIBED BY CMS-2020-01-R: HCPCS

## 2020-07-14 PROCEDURE — 3075F SYST BP GE 130 - 139MM HG: CPT | Mod: CPTII,S$GLB,, | Performed by: EMERGENCY MEDICINE

## 2020-07-14 PROCEDURE — 99203 OFFICE O/P NEW LOW 30 MIN: CPT | Mod: S$GLB,,, | Performed by: EMERGENCY MEDICINE

## 2020-07-14 NOTE — PROGRESS NOTES
Subjective:        Time seen by provider: 11:27 AM on 07/14/2020    Claire Bowser is a 60 y.o. female with PMHx of HTN and tobacco use who presents for an evaluation of possible COVID-19. The patient c/o HA and abdominal pain. She denies fever or any other symptoms at this time. Patient reports daily exposure to mother who tested positive for COVID-19 yesterday. No pertinent PSHx. She is a current every day smoker.    Review of Systems   Constitutional: Negative for activity change, appetite change, fatigue and fever.   HENT: Negative for congestion, rhinorrhea and sore throat.    Respiratory: Negative for cough, chest tightness, shortness of breath and wheezing.    Cardiovascular: Negative for chest pain and palpitations.   Gastrointestinal: Positive for abdominal pain. Negative for diarrhea, nausea and vomiting.   Musculoskeletal: Negative for arthralgias and myalgias.   Skin: Negative for rash.   Neurological: Positive for headaches. Negative for weakness, light-headedness and numbness.       Objective:      Physical Exam  Vitals signs and nursing note reviewed.   Constitutional:       General: She is not in acute distress.     Appearance: She is well-developed. She is not diaphoretic.   HENT:      Head: Normocephalic and atraumatic.      Nose: Nose normal.   Eyes:      Conjunctiva/sclera: Conjunctivae normal.   Neck:      Musculoskeletal: Normal range of motion.   Cardiovascular:      Rate and Rhythm: Normal rate and regular rhythm.      Heart sounds: Normal heart sounds. No murmur.   Pulmonary:      Effort: No respiratory distress.      Breath sounds: Normal breath sounds. No wheezing.   Musculoskeletal: Normal range of motion.   Skin:     General: Skin is warm and dry.   Neurological:      Mental Status: She is alert and oriented to person, place, and time.         Assessment and Plan:      Diagnoses and all orders for this visit:    COVID-19  -     COVID-19 Routine Screening  - Discharge home and await  results.   - Return to clinic or ED for new or worsening symptoms.   - Follow-up with PCP as needed.     Scribe Attestation:   I, Jessica Wise, am scribing for, and in the presence of, Bettie Bravo PA-C. I performed the above scribed service and the documentation accurately describes the services I performed. I attest to the accuracy of the note.    I, Bettie Bravo PA-C, personally performed the services described in this documentation. All medical record entries made by the scribe were at my direction and in my presence.  I have reviewed the chart and agree that the record reflects my personal performance and is accurate and complete. Bettie Bravo PA-C.  1:11 PM 07/14/2020

## 2020-07-16 LAB — SARS-COV-2 RNA RESP QL NAA+PROBE: NOT DETECTED

## 2020-07-23 DIAGNOSIS — F41.1 GENERALIZED ANXIETY DISORDER: ICD-10-CM

## 2020-07-23 RX ORDER — CITALOPRAM 40 MG/1
40 TABLET, FILM COATED ORAL NIGHTLY
Qty: 90 TABLET | Refills: 0 | Status: SHIPPED | OUTPATIENT
Start: 2020-07-23 | End: 2020-10-29 | Stop reason: ALTCHOICE

## 2020-07-24 ENCOUNTER — OFFICE VISIT (OUTPATIENT)
Dept: PRIMARY CARE CLINIC | Facility: CLINIC | Age: 60
End: 2020-07-24
Payer: COMMERCIAL

## 2020-07-24 VITALS
DIASTOLIC BLOOD PRESSURE: 70 MMHG | HEART RATE: 68 BPM | TEMPERATURE: 98 F | SYSTOLIC BLOOD PRESSURE: 157 MMHG | OXYGEN SATURATION: 98 % | RESPIRATION RATE: 16 BRPM

## 2020-07-24 DIAGNOSIS — R05.9 COUGH: ICD-10-CM

## 2020-07-24 PROCEDURE — 99213 OFFICE O/P EST LOW 20 MIN: CPT | Mod: S$GLB,,, | Performed by: NURSE PRACTITIONER

## 2020-07-24 PROCEDURE — U0003 INFECTIOUS AGENT DETECTION BY NUCLEIC ACID (DNA OR RNA); SEVERE ACUTE RESPIRATORY SYNDROME CORONAVIRUS 2 (SARS-COV-2) (CORONAVIRUS DISEASE [COVID-19]), AMPLIFIED PROBE TECHNIQUE, MAKING USE OF HIGH THROUGHPUT TECHNOLOGIES AS DESCRIBED BY CMS-2020-01-R: HCPCS

## 2020-07-24 PROCEDURE — 3078F PR MOST RECENT DIASTOLIC BLOOD PRESSURE < 80 MM HG: ICD-10-PCS | Mod: CPTII,S$GLB,, | Performed by: NURSE PRACTITIONER

## 2020-07-24 PROCEDURE — 3078F DIAST BP <80 MM HG: CPT | Mod: CPTII,S$GLB,, | Performed by: NURSE PRACTITIONER

## 2020-07-24 PROCEDURE — 99213 PR OFFICE/OUTPT VISIT, EST, LEVL III, 20-29 MIN: ICD-10-PCS | Mod: S$GLB,,, | Performed by: NURSE PRACTITIONER

## 2020-07-24 PROCEDURE — 3077F SYST BP >= 140 MM HG: CPT | Mod: CPTII,S$GLB,, | Performed by: NURSE PRACTITIONER

## 2020-07-24 PROCEDURE — 3077F PR MOST RECENT SYSTOLIC BLOOD PRESSURE >= 140 MM HG: ICD-10-PCS | Mod: CPTII,S$GLB,, | Performed by: NURSE PRACTITIONER

## 2020-07-24 NOTE — PATIENT INSTRUCTIONS
Instructions for Patients with Confirmed or Suspected COVID-19    If you are awaiting your test result, you will either be called or it will be released to the patient portal.  If you have any questions about your test, please visit [email or web address] or call our COVID-19 information line at [phone].      Instructions for non-hospitalized or discharged patients with confirmed or suspected COVID-19:       Stay home except to get medical care.    Separate yourself from other people and animals in your home.    Call ahead before visiting your doctor.    Wear a face mask.    Cover your coughs and sneezes.    Clean your hands often.    Avoid sharing personal household items.    Clean all high-touch surfaces every day.    Monitor your symptoms. Seek prompt medical attention if your illness is worsening (e.g., difficulty breathing). Before seeking care, call your healthcare provider.    If you have a medical emergency and must call 911, notify the dispatcher that you have or are being evaluated for COVID-19. If possible, put on a face mask before emergency medical services arrive.    Use the following symptom-based strategy to return to normal activity following a suspected or confirmed case of COVID-19. Continue isolation until:   o At least 3 days (72 hours) have passed since recovery defined as resolution of fever without the use of fever-reducing medications and improvement in respiratory symptoms (e.g. cough, shortness of breath), and   o At least 10 days have passed since the first positive test.       As one of the next steps, you will receive a call or text from the [place] Department of Health ([facility]) COVID-19 Tracing Team. See the contact information below so you know not to ignore the health departments call. It is important that you contact them back immediately so they can help.     Contact Tracer Number:  [phone]  Caller ID for most carriers: [place] [facility]    What is  contact tracing?   Contact tracing is a process that helps identify everyone who has been in close contact with an infected person. Contact tracers let those people know they may have been exposed and guide them on next steps. Confidentiality is important for everyone; no one will be told who may have exposed them to the virus.   Your involvement is important. The more we know about where and how this virus is spreading, the better chance we have at stopping it from spreading further.  What does exposure mean?   Exposure means you have been within 6 feet for more than 15 minutes with a person who has or had COVID-19.  What kind of questions do the contact tracers ask?   A contact tracer will confirm your basic contact information including name, address, phone number, and next of kin, as well as asking about any symptoms you may have had. Theyll also ask you how you think you may have gotten sick, such as places where you may have been exposed to the virus, and people you were with. Those names will never be shared with anyone outside of that call, and will only be used to help trace and stop the spread of the virus.   I have privacy concerns. How will the state use my information?   Your privacy about your health is important. All calls are completed using call centers that use the appropriate health privacy protection measures (HIPAA compliance), meaning that your patient information is safe. No one will ever ask you any questions related to immigration status. Your health comes first.   Do I have to participate?   You do not have to participate, but we strongly encourage you to. Contact tracing can help us catch and control new outbreaks as theyre developing to keep your friends and family safe.   What if I dont hear from anyone?   If you dont receive a call within 24 hours, you can call the number above right away to inquire about your status. That line is open from 8:00 am - 8:00 p.m., 7 days a  week.  Contact tracing saves lives! Together, we have the power to beat this virus and keep our loved ones and neighbors safe.       Instructions for household members, intimate partners and caregivers in a non-healthcare setting of a patient with confirmed or suspected COVID-19:         Close contacts should monitor their health and call their healthcare provider right away if they develop symptoms suggestive of COVID-19 (e.g., fever, cough, shortness of breath).    Stay home except to get medical care. Separate yourself from other people and animals in the home.   Monitor the patients symptoms. If the patient is getting sicker, call his or her healthcare provider. If the patient has a medical emergency and you need to call 911, notify the dispatch personnel that the patient has or is being evaluated for COVID-19.    Wear a facemask when around other people such as sharing a room or vehicle and before entering a healthcare provider's office.   Cover coughs and sneezes with a tissue. Throw used tissues in a lined trash can immediately and wash hands.   Clean hands often with soap and water for at least 20 seconds or with an alcohol-based hand , rubbing hands together until they feel dry. Avoid touching your eyes, nose, and mouth with unwashed hands.   Clean all high-touch; surfaces every day, including counters, tabletops, doorknobs, bathroom fixtures, toilets, phones, keyboards, tablets, bedside tables, etc. Use a household cleaning spray or wipe according to label instructions.   Avoid sharing personal household items such as dishes, drinking glasses, cups, towels, bedding, etc. After these items are used, they should be washed thoroughly with soap and water.   Continue isolation until:   At least 3 days (72 hours) have passed since recovery defined as resolution of fever without the use of fever-reducing medications and improvement in respiratory symptoms (e.g. cough, shortness of breath),  and    At least 10 days have passed since the patients first positive test.    https://www.cdc.gov/coronavirus/2019-ncov/your-health/index.htm

## 2020-07-24 NOTE — PROGRESS NOTES
Subjective:        Time seen by provider: 9:50 AM on 07/24/2020    Claire Bowser is a 60 y.o. female with PMHx of HTN and tobacco use who presents for an evaluation of possible COVID-19. The patient c/o mild cough and intermittent HA x 1 week, believing HA to be stress-induced. She denies or any other symptoms at this time. Patient reports contact with mother who recently passed and tested positive for COVID-19. No pertinent PSHx. She is a current every day smoker.     Review of Systems   Constitutional: Negative for activity change, appetite change, fatigue and fever.   HENT: Negative for congestion, rhinorrhea and sore throat.    Respiratory: Positive for cough. Negative for chest tightness, shortness of breath and wheezing.    Cardiovascular: Negative for chest pain and palpitations.   Gastrointestinal: Negative for diarrhea, nausea and vomiting.   Musculoskeletal: Negative for arthralgias and myalgias.   Skin: Negative for rash.   Neurological: Positive for headaches. Negative for weakness, light-headedness and numbness.       Objective:      Physical Exam  Vitals signs and nursing note reviewed.   Constitutional:       General: She is not in acute distress.     Appearance: She is well-developed. She is not diaphoretic.   HENT:      Head: Normocephalic and atraumatic.      Nose: Nose normal.   Eyes:      Conjunctiva/sclera: Conjunctivae normal.   Neck:      Musculoskeletal: Normal range of motion.   Cardiovascular:      Rate and Rhythm: Normal rate and regular rhythm.      Heart sounds: Normal heart sounds. No murmur.   Pulmonary:      Effort: No respiratory distress.      Breath sounds: Normal breath sounds. No wheezing.   Musculoskeletal: Normal range of motion.   Skin:     General: Skin is warm and dry.   Neurological:      Mental Status: She is alert and oriented to person, place, and time.         Assessment:       1. Exposure to COVID-19 Virus infection  Plan:     1. Exposure to COVID-19 Virus  infection  - patient's headaches are baseline for her; she states she is requiring negative COVID-19 test to return to work due to recent exposure by mother. COVID-19 test pending    2. Discharge home and await results.   3. Return to clinic or ED for new or worsening symptoms.   4. Follow-up with PCP as needed.     Scribe Attestation:   IJessica, am scribing for, and in the presence of, RODNEY Medina. I performed the above scribed service and the documentation accurately describes the services I performed. I attest to the accuracy of the note.    I, RODNEY Medina, personally performed the services described in this documentation. All medical record entries made by the scribe were at my direction and in my presence.  I have reviewed the chart and agree that the record reflects my personal performance and is accurate and complete. RODNEY Medina.  10:01 AM 07/24/2020

## 2020-07-25 DIAGNOSIS — U07.1 COVID-19 VIRUS DETECTED: ICD-10-CM

## 2020-07-25 LAB — SARS-COV-2 RNA RESP QL NAA+PROBE: DETECTED

## 2020-08-07 ENCOUNTER — LAB VISIT (OUTPATIENT)
Dept: PRIMARY CARE CLINIC | Facility: OTHER | Age: 60
End: 2020-08-07
Attending: INTERNAL MEDICINE
Payer: COMMERCIAL

## 2020-08-07 DIAGNOSIS — Z03.818 ENCOUNTER FOR OBSERVATION FOR SUSPECTED EXPOSURE TO OTHER BIOLOGICAL AGENTS RULED OUT: ICD-10-CM

## 2020-08-07 PROCEDURE — U0003 INFECTIOUS AGENT DETECTION BY NUCLEIC ACID (DNA OR RNA); SEVERE ACUTE RESPIRATORY SYNDROME CORONAVIRUS 2 (SARS-COV-2) (CORONAVIRUS DISEASE [COVID-19]), AMPLIFIED PROBE TECHNIQUE, MAKING USE OF HIGH THROUGHPUT TECHNOLOGIES AS DESCRIBED BY CMS-2020-01-R: HCPCS

## 2020-08-12 LAB — SARS-COV-2 RNA RESP QL NAA+PROBE: NORMAL

## 2020-10-15 ENCOUNTER — PATIENT OUTREACH (OUTPATIENT)
Dept: ADMINISTRATIVE | Facility: HOSPITAL | Age: 60
End: 2020-10-15

## 2020-10-15 NOTE — PROGRESS NOTES
Chart review completed 10/15/2020.  Care Everywhere updates requested and reviewed.  Immunizations reconciled. Media reports reviewed.  Duplicate HM overrides and  orders removed.  Overdue HM topic chart audit and/or requested.  Overdue lab testing linked to upcoming lab appointments if applies.    Portal message sent for overdue HM    Health Maintenance Due   Topic Date Due    HIV Screening  1976    TETANUS VACCINE  1979    Shingles Vaccine (1 of 2) 2011    Colorectal Cancer Screening  2011    Influenza Vaccine (1) 2020

## 2020-10-15 NOTE — LETTER
October 23, 2020    Claire Bowser  [address] [facility]  [place] [address]             [facility]  [address]  [place] [address]  Phone: [phone] Dear Annette [facility] is committed to your overall health.  To help you get the most out of each of your visits, we will review your information to make sure you are up to date on all of your recommended tests and/or procedures.       Samuel Brady MD  has found that your chart shows you may be due for the following test and/or procedures:     COLON CANCER SCREEN   UPDATED IMMUNIZATIONS     If you have had any of the above done at another facility, please bring the records or information with you so that your record at [facility] will be complete.  If you would like to schedule any of these, please contact me.     If you are currently taking medication, please bring it with you to your appointment for review.       Thank you for letting us care for you,     Wendy Hernandez LPN, Clinical Care Coordinator   [address] [facility] [place] [address]   P: [phone]   F: [phone]

## 2020-10-26 DIAGNOSIS — Z12.11 COLON CANCER SCREENING: ICD-10-CM

## 2020-10-29 ENCOUNTER — LAB VISIT (OUTPATIENT)
Dept: LAB | Facility: HOSPITAL | Age: 60
End: 2020-10-29
Attending: FAMILY MEDICINE
Payer: COMMERCIAL

## 2020-10-29 ENCOUNTER — OFFICE VISIT (OUTPATIENT)
Dept: FAMILY MEDICINE | Facility: CLINIC | Age: 60
End: 2020-10-29
Attending: FAMILY MEDICINE
Payer: COMMERCIAL

## 2020-10-29 VITALS
WEIGHT: 115.31 LBS | HEIGHT: 62 IN | BODY MASS INDEX: 21.22 KG/M2 | OXYGEN SATURATION: 95 % | HEART RATE: 73 BPM | DIASTOLIC BLOOD PRESSURE: 80 MMHG | RESPIRATION RATE: 18 BRPM | TEMPERATURE: 98 F | SYSTOLIC BLOOD PRESSURE: 150 MMHG

## 2020-10-29 DIAGNOSIS — Z83.3 FAMILY HISTORY OF DIABETES MELLITUS (DM): ICD-10-CM

## 2020-10-29 DIAGNOSIS — Z72.0 TOBACCO USE: ICD-10-CM

## 2020-10-29 DIAGNOSIS — M54.2 NECK PAIN, ACUTE: ICD-10-CM

## 2020-10-29 DIAGNOSIS — F41.1 GENERALIZED ANXIETY DISORDER: ICD-10-CM

## 2020-10-29 DIAGNOSIS — E78.2 MIXED HYPERLIPIDEMIA: ICD-10-CM

## 2020-10-29 DIAGNOSIS — G47.00 INSOMNIA, UNSPECIFIED TYPE: ICD-10-CM

## 2020-10-29 DIAGNOSIS — I10 ESSENTIAL HYPERTENSION: ICD-10-CM

## 2020-10-29 LAB
25(OH)D3+25(OH)D2 SERPL-MCNC: 28 NG/ML (ref 30–96)
ALBUMIN SERPL BCP-MCNC: 3.6 G/DL (ref 3.5–5.2)
ALP SERPL-CCNC: 142 U/L (ref 55–135)
ALT SERPL W/O P-5'-P-CCNC: 11 U/L (ref 10–44)
ANION GAP SERPL CALC-SCNC: 11 MMOL/L (ref 8–16)
AST SERPL-CCNC: 16 U/L (ref 10–40)
BASOPHILS # BLD AUTO: 0.08 K/UL (ref 0–0.2)
BASOPHILS NFR BLD: 1 % (ref 0–1.9)
BILIRUB SERPL-MCNC: 0.3 MG/DL (ref 0.1–1)
BUN SERPL-MCNC: 8 MG/DL (ref 6–20)
CALCIUM SERPL-MCNC: 10.1 MG/DL (ref 8.7–10.5)
CHLORIDE SERPL-SCNC: 105 MMOL/L (ref 95–110)
CO2 SERPL-SCNC: 26 MMOL/L (ref 23–29)
CREAT SERPL-MCNC: 0.8 MG/DL (ref 0.5–1.4)
DIFFERENTIAL METHOD: ABNORMAL
EOSINOPHIL # BLD AUTO: 0.2 K/UL (ref 0–0.5)
EOSINOPHIL NFR BLD: 2.6 % (ref 0–8)
ERYTHROCYTE [DISTWIDTH] IN BLOOD BY AUTOMATED COUNT: 11.9 % (ref 11.5–14.5)
EST. GFR  (AFRICAN AMERICAN): >60 ML/MIN/1.73 M^2
EST. GFR  (NON AFRICAN AMERICAN): >60 ML/MIN/1.73 M^2
ESTIMATED AVG GLUCOSE: 100 MG/DL (ref 68–131)
GLUCOSE SERPL-MCNC: 76 MG/DL (ref 70–110)
HBA1C MFR BLD HPLC: 5.1 % (ref 4–5.6)
HCT VFR BLD AUTO: 38.9 % (ref 37–48.5)
HGB BLD-MCNC: 12.5 G/DL (ref 12–16)
IMM GRANULOCYTES # BLD AUTO: 0.03 K/UL (ref 0–0.04)
IMM GRANULOCYTES NFR BLD AUTO: 0.4 % (ref 0–0.5)
LYMPHOCYTES # BLD AUTO: 1.7 K/UL (ref 1–4.8)
LYMPHOCYTES NFR BLD: 21.4 % (ref 18–48)
MCH RBC QN AUTO: 31.8 PG (ref 27–31)
MCHC RBC AUTO-ENTMCNC: 32.1 G/DL (ref 32–36)
MCV RBC AUTO: 99 FL (ref 82–98)
MONOCYTES # BLD AUTO: 0.6 K/UL (ref 0.3–1)
MONOCYTES NFR BLD: 7.3 % (ref 4–15)
NEUTROPHILS # BLD AUTO: 5.5 K/UL (ref 1.8–7.7)
NEUTROPHILS NFR BLD: 67.3 % (ref 38–73)
NRBC BLD-RTO: 0 /100 WBC
PLATELET # BLD AUTO: 340 K/UL (ref 150–350)
PMV BLD AUTO: 10.8 FL (ref 9.2–12.9)
POTASSIUM SERPL-SCNC: 4 MMOL/L (ref 3.5–5.1)
PROT SERPL-MCNC: 7.5 G/DL (ref 6–8.4)
RBC # BLD AUTO: 3.93 M/UL (ref 4–5.4)
SODIUM SERPL-SCNC: 142 MMOL/L (ref 136–145)
TSH SERPL DL<=0.005 MIU/L-ACNC: 0.68 UIU/ML (ref 0.4–4)
VIT B12 SERPL-MCNC: 272 PG/ML (ref 210–950)
WBC # BLD AUTO: 8.09 K/UL (ref 3.9–12.7)

## 2020-10-29 PROCEDURE — 99999 PR PBB SHADOW E&M-EST. PATIENT-LVL IV: ICD-10-PCS | Mod: PBBFAC,,, | Performed by: FAMILY MEDICINE

## 2020-10-29 PROCEDURE — 36415 COLL VENOUS BLD VENIPUNCTURE: CPT | Mod: PO

## 2020-10-29 PROCEDURE — 82306 VITAMIN D 25 HYDROXY: CPT

## 2020-10-29 PROCEDURE — 84443 ASSAY THYROID STIM HORMONE: CPT

## 2020-10-29 PROCEDURE — 80053 COMPREHEN METABOLIC PANEL: CPT

## 2020-10-29 PROCEDURE — 99999 PR PBB SHADOW E&M-EST. PATIENT-LVL IV: CPT | Mod: PBBFAC,,, | Performed by: FAMILY MEDICINE

## 2020-10-29 PROCEDURE — 82607 VITAMIN B-12: CPT

## 2020-10-29 PROCEDURE — 99214 OFFICE O/P EST MOD 30 MIN: CPT | Mod: S$GLB,,, | Performed by: FAMILY MEDICINE

## 2020-10-29 PROCEDURE — 3077F PR MOST RECENT SYSTOLIC BLOOD PRESSURE >= 140 MM HG: ICD-10-PCS | Mod: CPTII,S$GLB,, | Performed by: FAMILY MEDICINE

## 2020-10-29 PROCEDURE — 83036 HEMOGLOBIN GLYCOSYLATED A1C: CPT

## 2020-10-29 PROCEDURE — 3079F PR MOST RECENT DIASTOLIC BLOOD PRESSURE 80-89 MM HG: ICD-10-PCS | Mod: CPTII,S$GLB,, | Performed by: FAMILY MEDICINE

## 2020-10-29 PROCEDURE — 3079F DIAST BP 80-89 MM HG: CPT | Mod: CPTII,S$GLB,, | Performed by: FAMILY MEDICINE

## 2020-10-29 PROCEDURE — 3008F PR BODY MASS INDEX (BMI) DOCUMENTED: ICD-10-PCS | Mod: CPTII,S$GLB,, | Performed by: FAMILY MEDICINE

## 2020-10-29 PROCEDURE — 3077F SYST BP >= 140 MM HG: CPT | Mod: CPTII,S$GLB,, | Performed by: FAMILY MEDICINE

## 2020-10-29 PROCEDURE — 3008F BODY MASS INDEX DOCD: CPT | Mod: CPTII,S$GLB,, | Performed by: FAMILY MEDICINE

## 2020-10-29 PROCEDURE — 99214 PR OFFICE/OUTPT VISIT, EST, LEVL IV, 30-39 MIN: ICD-10-PCS | Mod: S$GLB,,, | Performed by: FAMILY MEDICINE

## 2020-10-29 PROCEDURE — 85025 COMPLETE CBC W/AUTO DIFF WBC: CPT

## 2020-10-29 RX ORDER — CYCLOBENZAPRINE HCL 5 MG
5 TABLET ORAL 3 TIMES DAILY PRN
Qty: 20 TABLET | Refills: 0 | Status: SHIPPED | OUTPATIENT
Start: 2020-10-29 | End: 2020-10-29 | Stop reason: SDUPTHER

## 2020-10-29 RX ORDER — SERTRALINE HYDROCHLORIDE 25 MG/1
25 TABLET, FILM COATED ORAL DAILY
Qty: 30 TABLET | Refills: 3 | Status: SHIPPED | OUTPATIENT
Start: 2020-10-29 | End: 2020-10-29 | Stop reason: SDUPTHER

## 2020-10-29 RX ORDER — TRAZODONE HYDROCHLORIDE 50 MG/1
50 TABLET ORAL NIGHTLY
Qty: 30 TABLET | Refills: 11 | Status: SHIPPED | OUTPATIENT
Start: 2020-10-29 | End: 2020-10-29 | Stop reason: SDUPTHER

## 2020-10-29 RX ORDER — IBUPROFEN 200 MG
1 TABLET ORAL DAILY
Qty: 28 PATCH | Refills: 0 | Status: SHIPPED | OUTPATIENT
Start: 2020-10-29 | End: 2021-07-06

## 2020-10-29 RX ORDER — IBUPROFEN 200 MG
1 TABLET ORAL DAILY
Qty: 28 PATCH | Refills: 0 | Status: SHIPPED | OUTPATIENT
Start: 2020-10-29 | End: 2021-01-04 | Stop reason: SDUPTHER

## 2020-10-29 RX ORDER — NICOTINE 7MG/24HR
1 PATCH, TRANSDERMAL 24 HOURS TRANSDERMAL DAILY
Qty: 28 PATCH | Refills: 0 | Status: SHIPPED | OUTPATIENT
Start: 2020-10-29 | End: 2021-07-06

## 2020-10-29 NOTE — PROGRESS NOTES
Subjective:       Patient ID: Claire Bowser is a 60 y.o. female.    Chief Complaint: Neck Pain, Toe Pain, and Medication Refill    This patient is new to me.  Mrs Rangel presents to the clinic today with multiple complaints. Patient states she recently lost her mother to COVID, her mother in law to natural causes, and her father in law also. Patient states she feels like she is at the bottom. Patient reports she is feeling down and has previously been on anxiety and depression medication and feels like she needs this again. Patient states she takes celexa nightly but does not feel like it is helping any longer and would like to try a different medication.  Patient states when her father in law passed away they had to clean out the house and she was in line at the bank on Saturday with an arm full of rolled coins. Patient states since then she has had pain in her neck. Patient states she could not get comfortable last night to fall asleep due to the pain. Patient states she has not had any other injury to her neck to cause this pain.   Patient previously was prescribed Nicoderm to help her quit smoking. Patient states her insurance did not want to pay for this so she is still smoking. Patient would like to try sending the prescription to [facility] and see if she can get this covered to help her quit smoking.  Patient also has complaints of tingling to her bilateral big toes. Patient states her mother had diabetes and is concerned about developing diabetes. Patient would like blood work to check on this.  Patient recently had COVID and states since then she is having what she believes is side effects. Patient states she has generalized fatigue, hair loss, and insomnia. Patient states she has been stressed a lot recently so she is unsure if the fatigue and hair loss is stress or COVID side effects.  Patient educated on plan of care, verbalized understanding.     Review of Systems   Constitutional: Positive for appetite  change and fatigue. Negative for activity change, chills, diaphoresis and fever.   HENT: Negative for congestion, ear pain, postnasal drip, sinus pressure, sneezing and sore throat.    Eyes: Negative for pain, discharge, redness and itching.   Respiratory: Negative for apnea, cough, chest tightness, shortness of breath and wheezing.    Cardiovascular: Negative for chest pain and leg swelling.   Gastrointestinal: Negative for abdominal distention, abdominal pain, constipation, diarrhea, nausea and vomiting.   Genitourinary: Negative for difficulty urinating, dysuria, flank pain and frequency.   Musculoskeletal: Positive for neck pain and neck stiffness.   Skin: Negative for color change, rash and wound.   Neurological: Negative for dizziness.   Psychiatric/Behavioral: Positive for behavioral problems and sleep disturbance. Negative for self-injury and suicidal ideas. The patient is nervous/anxious.        Patient Active Problem List   Diagnosis    Anxiety    Hypertension    Generalized anxiety disorder    Tobacco use    Mixed hyperlipidemia    BMI 22.0-22.9, adult       Objective:      Physical Exam  Vitals signs reviewed.   Constitutional:       General: She is not in acute distress.     Appearance: Normal appearance. She is well-developed.   HENT:      Head: Normocephalic.      Nose: Nose normal.   Eyes:      Conjunctiva/sclera: Conjunctivae normal.      Pupils: Pupils are equal, round, and reactive to light.   Neck:      Musculoskeletal: Normal range of motion and neck supple.   Cardiovascular:      Rate and Rhythm: Normal rate and regular rhythm.      Heart sounds: Normal heart sounds.   Pulmonary:      Effort: Pulmonary effort is normal. No respiratory distress.      Breath sounds: Normal breath sounds. No stridor. No wheezing, rhonchi or rales.   Abdominal:      General: There is no distension.      Palpations: Abdomen is soft.      Tenderness: There is no abdominal tenderness.   Skin:     General: Skin  is warm and dry.      Findings: No rash.   Neurological:      Mental Status: She is alert and oriented to person, place, and time.   Psychiatric:         Attention and Perception: Attention normal.         Mood and Affect: Mood is anxious and depressed. Affect is tearful.         Behavior: Behavior normal.         Thought Content: Thought content does not include homicidal or suicidal ideation. Thought content does not include homicidal or suicidal plan.         Lab Results   Component Value Date    WBC 6.01 10/30/2019    HGB 14.1 10/30/2019    HCT 42.2 10/30/2019     10/30/2019    CHOL 215 (H) 10/30/2019    TRIG 201 (H) 10/30/2019    HDL 47 10/30/2019    ALT 8 (L) 10/30/2019    AST 18 10/30/2019     10/30/2019    K 4.1 10/30/2019     10/30/2019    CREATININE 0.8 10/30/2019    BUN 9 10/30/2019    CO2 29 10/30/2019    TSH 1.313 09/29/2012     The 10-year ASCVD risk score (Pretty MEMO Jr., et al., 2013) is: 13.7%    Values used to calculate the score:      Age: 60 years      Sex: Female      Is Non- : No      Diabetic: No      Tobacco smoker: Yes      Systolic Blood Pressure: 150 mmHg      Is BP treated: Yes      HDL Cholesterol: 47 mg/dL      Total Cholesterol: 215 mg/dL    Assessment:       1. BMI 21.0-21.9, adult    2. Generalized anxiety disorder    3. Essential hypertension    4. Neck pain, acute    5. Mixed hyperlipidemia    6. Tobacco use    7. Family history of diabetes mellitus (DM)    8. Insomnia, unspecified type        Plan:       Claire was seen today for neck pain, toe pain and medication refill.    Diagnoses and all orders for this visit:    BMI 21.0-21.9, adult  -     CBC Auto Differential; Future  -     Comprehensive Metabolic Panel; Future  -     TSH; Future  Continue healthy diet and regular exercise as tolerated.  Continue medications as prescribed.  Follow up with PCP    Generalized anxiety disorder  -     CBC Auto Differential; Future  -     Comprehensive  Metabolic Panel; Future  -     Vitamin B12; Future  -     Vitamin D; Future  -     sertraline (ZOLOFT) 25 MG tablet; Take 1 tablet (25 mg total) by mouth once daily.  -     traZODone (DESYREL) 50 MG tablet; Take 1 tablet (50 mg total) by mouth every evening.    Essential hypertension  Stable.   Patient does have slightly elevated BP today and believes this is due to pain and stress.  Patient is scheduled for follow up with PCP  Continue medications as prescribed.  Follow up with PCP    Neck pain, acute  -     cyclobenzaprine (FLEXERIL) 5 MG tablet; Take 1 tablet (5 mg total) by mouth 3 (three) times daily as needed for Muscle spasms.    Mixed hyperlipidemia  -     Lipid Panel; Future    Tobacco use  -     nicotine (NICODERM CQ) 21 mg/24 hr; Place 1 patch onto the skin once daily. Month 1  -     nicotine (NICODERM CQ) 14 mg/24 hr; Place 1 patch onto the skin once daily. Month 2  -     nicotine (NICODERM CQ) 7 mg/24 hr; Place 1 patch onto the skin once daily. Month 3    Family history of diabetes mellitus (DM)  -     Hemoglobin A1C; Future    Insomnia, unspecified type  -     traZODone (DESYREL) 50 MG tablet; Take 1 tablet (50 mg total) by mouth every evening.      Follow up in about 3 months (around 1/29/2021), or if symptoms worsen or fail to improve.      [employer] was given to patient on 10/29/2020 10:21 AM

## 2020-10-29 NOTE — TELEPHONE ENCOUNTER
----- Message from Liam Green sent at 10/29/2020  1:39 PM CDT -----  Regarding: pt advice  Contact: pt  Type:  Patient Returning Call    Who Called:  pt  Does the patient know what this is regarding?:  would like meds called into another pharmacy.  sertraline (ZOLOFT) 25 MG tablet  and cyclobenzaprine (FLEXERIL) 5 MG tablet , and  traZODone (DESYREL) 50 MG tablet .  Call into [facility] on [address]  Best Call Back Number:  [phone]  Additional Information:  Thank you

## 2020-10-29 NOTE — TELEPHONE ENCOUNTER
Care Due:                  Date            Visit Type   Department     Provider  --------------------------------------------------------------------------------                                ESTABLISHED                              PATIENT      [facility]  Last Visit: 07-      [facility]       Samuel Brady                                           [facility]  Next Visit: 12-      [facility]       Samuel Brady                                                            Last  Test          Frequency    Reason                     Performed    Due Date  --------------------------------------------------------------------------------    CMP.........  12 months..  lisinopriL...............  10-   10-    Powered by [employer]. Reference number: [id number]. 10/29/2020 2:58:14 PM   CDT

## 2020-10-29 NOTE — TELEPHONE ENCOUNTER
Flexeril, zoloft, desyrel sent to [facility] but pharmacy is closed today due to weather. Phoned in as written to [facility] on [address]. Please sign phone in order.

## 2020-10-30 DIAGNOSIS — R74.8 ELEVATED ALKALINE PHOSPHATASE LEVEL: Primary | ICD-10-CM

## 2020-10-30 DIAGNOSIS — D64.9 ANEMIA, UNSPECIFIED TYPE: ICD-10-CM

## 2020-10-30 RX ORDER — CYCLOBENZAPRINE HCL 5 MG
5 TABLET ORAL 3 TIMES DAILY PRN
Qty: 20 TABLET | Refills: 0 | Status: SHIPPED | OUTPATIENT
Start: 2020-10-30 | End: 2020-12-04 | Stop reason: SDUPTHER

## 2020-10-30 RX ORDER — SERTRALINE HYDROCHLORIDE 25 MG/1
25 TABLET, FILM COATED ORAL DAILY
Qty: 30 TABLET | Refills: 3 | Status: SHIPPED | OUTPATIENT
Start: 2020-10-30 | End: 2021-02-24

## 2020-10-30 RX ORDER — TRAZODONE HYDROCHLORIDE 50 MG/1
50 TABLET ORAL NIGHTLY
Qty: 30 TABLET | Refills: 11 | Status: SHIPPED | OUTPATIENT
Start: 2020-10-30 | End: 2021-11-03

## 2020-11-04 ENCOUNTER — TELEPHONE (OUTPATIENT)
Dept: FAMILY MEDICINE | Facility: CLINIC | Age: 60
End: 2020-11-04

## 2020-11-04 NOTE — TELEPHONE ENCOUNTER
Spoke to pt about her recent blood work and that BEVERLEY Kelly and SILVESTRE Mar  wanted to recheck her blood work prior to pts appt with Dr. Brady on 12/24/20.   Pt is scheduled for the labs on 12/17/20 at 8am

## 2020-11-17 ENCOUNTER — OFFICE VISIT (OUTPATIENT)
Dept: FAMILY MEDICINE | Facility: CLINIC | Age: 60
End: 2020-11-17
Payer: COMMERCIAL

## 2020-11-17 VITALS
DIASTOLIC BLOOD PRESSURE: 82 MMHG | BODY MASS INDEX: 21.7 KG/M2 | OXYGEN SATURATION: 98 % | SYSTOLIC BLOOD PRESSURE: 140 MMHG | TEMPERATURE: 97 F | WEIGHT: 117.94 LBS | HEART RATE: 86 BPM | HEIGHT: 62 IN

## 2020-11-17 DIAGNOSIS — M25.511 TRIGGER POINT OF SHOULDER REGION, RIGHT: Primary | ICD-10-CM

## 2020-11-17 PROCEDURE — 1125F PR PAIN SEVERITY QUANTIFIED, PAIN PRESENT: ICD-10-PCS | Mod: S$GLB,,, | Performed by: FAMILY MEDICINE

## 2020-11-17 PROCEDURE — 99999 PR PBB SHADOW E&M-EST. PATIENT-LVL III: ICD-10-PCS | Mod: PBBFAC,,, | Performed by: FAMILY MEDICINE

## 2020-11-17 PROCEDURE — 3077F SYST BP >= 140 MM HG: CPT | Mod: CPTII,S$GLB,, | Performed by: FAMILY MEDICINE

## 2020-11-17 PROCEDURE — 3079F PR MOST RECENT DIASTOLIC BLOOD PRESSURE 80-89 MM HG: ICD-10-PCS | Mod: CPTII,S$GLB,, | Performed by: FAMILY MEDICINE

## 2020-11-17 PROCEDURE — 3079F DIAST BP 80-89 MM HG: CPT | Mod: CPTII,S$GLB,, | Performed by: FAMILY MEDICINE

## 2020-11-17 PROCEDURE — 99999 PR PBB SHADOW E&M-EST. PATIENT-LVL III: CPT | Mod: PBBFAC,,, | Performed by: FAMILY MEDICINE

## 2020-11-17 PROCEDURE — 3077F PR MOST RECENT SYSTOLIC BLOOD PRESSURE >= 140 MM HG: ICD-10-PCS | Mod: CPTII,S$GLB,, | Performed by: FAMILY MEDICINE

## 2020-11-17 PROCEDURE — 99213 PR OFFICE/OUTPT VISIT, EST, LEVL III, 20-29 MIN: ICD-10-PCS | Mod: S$GLB,,, | Performed by: FAMILY MEDICINE

## 2020-11-17 PROCEDURE — 3008F BODY MASS INDEX DOCD: CPT | Mod: CPTII,S$GLB,, | Performed by: FAMILY MEDICINE

## 2020-11-17 PROCEDURE — 3008F PR BODY MASS INDEX (BMI) DOCUMENTED: ICD-10-PCS | Mod: CPTII,S$GLB,, | Performed by: FAMILY MEDICINE

## 2020-11-17 PROCEDURE — 99213 OFFICE O/P EST LOW 20 MIN: CPT | Mod: S$GLB,,, | Performed by: FAMILY MEDICINE

## 2020-11-17 PROCEDURE — 1125F AMNT PAIN NOTED PAIN PRSNT: CPT | Mod: S$GLB,,, | Performed by: FAMILY MEDICINE

## 2020-11-17 RX ORDER — GABAPENTIN 300 MG/1
CAPSULE ORAL
COMMUNITY
Start: 2020-11-08 | End: 2020-12-04 | Stop reason: ALTCHOICE

## 2020-11-17 RX ORDER — TIZANIDINE 2 MG/1
TABLET ORAL
Qty: 30 TABLET | Refills: 0 | Status: SHIPPED | OUTPATIENT
Start: 2020-11-17 | End: 2020-12-04 | Stop reason: ALTCHOICE

## 2020-11-17 RX ORDER — TIZANIDINE 2 MG/1
TABLET ORAL
COMMUNITY
Start: 2020-11-08 | End: 2020-11-17 | Stop reason: SDUPTHER

## 2020-11-17 RX ORDER — KETOROLAC TROMETHAMINE 10 MG/1
TABLET, FILM COATED ORAL
COMMUNITY
Start: 2020-11-08 | End: 2020-12-04 | Stop reason: ALTCHOICE

## 2020-11-17 NOTE — PATIENT INSTRUCTIONS
Trigger Point Injection  The cause of your muscle pain or spasms may be one or more trigger points. Your healthcare provider may decide to inject the painful spots to relax the muscle. This can help relieve your pain. Relaxing the muscle can also make movement easier. You may then be able to exercise to strengthen the muscle and help it heal.    What is a trigger point?  A trigger point is a tight, painful knot of muscle fiber. It can form where a muscle is strained or injured. The knot can sometimes be felt under the skin. A trigger point is very tender to the touch. Pain may also spread to other parts of the affected muscle. Muscles around a knee, shoulder blade, or other bones are prone to trigger points. This is because these muscles are more likely to be injured.     Injecting a trigger point can help relax the affected muscle and relieve pain.   About the injections  Any muscle in the body can have one or more trigger points. Several injections may be needed in each trigger point to best relieve pain. These injections may be given in sessions about 2 weeks apart, depending on the preference of your healthcare provider. In some cases, you may not feel much change in your symptoms until after the third injection.  Risks and possible complications  Risks and complications are very rare, but may include:  · Infection  · Bleeding  · Lung puncture (pneumothorax)  · Nerve damage   Date Last Reviewed: 9/21/2015 © 2000-2017 [employer] [address] [place] [address], [place] [address]. All rights reserved. This information is not intended as a substitute for professional medical care. Always follow your healthcare professional's instructions.

## 2020-11-17 NOTE — PROGRESS NOTES
Subjective:       Patient ID: Claire Bowser is a 60 y.o. female.    Chief Complaint: No chief complaint on file.    Patient here today as a follow-up from an urgent care visit approximately 10 days ago for neck pain.  She states that shortly before the visit she was caring a large amount of groceries in her right side for long period of time which she thinks exacerbated her pain.  She denies any numbness or tingling of the upper extremity.  Pain is made worse with certain movements of her head and neck.  She localizes the pain to the region of her traps mostly on the right side but occasionally radiating to the left.  She has been scribed muscle relaxers and anti-inflammatories for this which she finds some mild relief with.  She has a disc with her with x-rays from urgent care.   At urgent care told her she may need an MRI.    Review of Systems   Constitutional: Negative for activity change, chills and fever.   HENT: Negative for nasal congestion and sinus pressure/congestion.    Eyes: Negative for itching.   Respiratory: Negative for chest tightness and shortness of breath.    Cardiovascular: Negative for chest pain and palpitations.   Gastrointestinal: Negative for abdominal pain, constipation, nausea and vomiting.   Endocrine: Negative for cold intolerance.   Genitourinary: Negative for difficulty urinating and menstrual problem.   Musculoskeletal: Positive for myalgias and neck pain. Negative for arthralgias and joint swelling.   Integumentary:  Negative for rash.   Allergic/Immunologic: Negative for environmental allergies.   Neurological: Negative for dizziness, weakness and headaches.   Psychiatric/Behavioral: Negative for agitation and confusion.         Objective:      Physical Exam  Vitals signs and nursing note reviewed.   Constitutional:       Appearance: She is well-developed.   HENT:      Head: Normocephalic and atraumatic.   Eyes:      Pupils: Pupils are equal, round, and reactive to light.    Neck:      Musculoskeletal: Normal range of motion and neck supple.   Cardiovascular:      Rate and Rhythm: Normal rate and regular rhythm.      Heart sounds: No murmur.   Pulmonary:      Effort: Pulmonary effort is normal. No respiratory distress.      Breath sounds: Normal breath sounds. No wheezing or rales.   Abdominal:      General: There is no distension.      Palpations: Abdomen is soft.      Tenderness: There is no abdominal tenderness. There is no guarding.   Musculoskeletal: Normal range of motion.      Cervical back: She exhibits tenderness, pain and spasm. She exhibits normal range of motion, no bony tenderness, no swelling, no edema and no deformity.        Back:    Skin:     General: Skin is warm and dry.   Neurological:      Mental Status: She is alert and oriented to person, place, and time.      Deep Tendon Reflexes: Reflexes normal.   Psychiatric:         Behavior: Behavior normal.         Thought Content: Thought content normal.         Judgment: Judgment normal.         Outside x-ray images reviewed in clinic by me.  A than some mild degenerative disc findings there is no acute findings on this x-ray.  No bony abnormalities and no acute fractures.    Assessment:       1. Trigger point of shoulder region, right        Plan:       - patient has that pain as result of a trigger point in the right trapezius region.  Refilled patient's muscle relaxer that seem to be the most effective treatment for this.  Offered patient a trigger point injection in clinic today which he declined.  She may return to clinic if pain fails to improve or she reconsiders the procedure.

## 2020-12-03 ENCOUNTER — PATIENT MESSAGE (OUTPATIENT)
Dept: FAMILY MEDICINE | Facility: CLINIC | Age: 60
End: 2020-12-03

## 2020-12-04 ENCOUNTER — HOSPITAL ENCOUNTER (OUTPATIENT)
Dept: RADIOLOGY | Facility: CLINIC | Age: 60
Discharge: HOME | End: 2020-12-04
Attending: FAMILY MEDICINE
Payer: COMMERCIAL

## 2020-12-04 ENCOUNTER — OFFICE VISIT (OUTPATIENT)
Dept: FAMILY MEDICINE | Facility: CLINIC | Age: 60
End: 2020-12-04
Payer: COMMERCIAL

## 2020-12-04 VITALS
HEIGHT: 62 IN | DIASTOLIC BLOOD PRESSURE: 88 MMHG | TEMPERATURE: 98 F | OXYGEN SATURATION: 97 % | BODY MASS INDEX: 21.67 KG/M2 | WEIGHT: 117.75 LBS | SYSTOLIC BLOOD PRESSURE: 160 MMHG | HEART RATE: 82 BPM

## 2020-12-04 DIAGNOSIS — M25.50 DIFFUSE ARTHRALGIA: Primary | ICD-10-CM

## 2020-12-04 DIAGNOSIS — F17.200 SMOKER: ICD-10-CM

## 2020-12-04 DIAGNOSIS — M25.50 DIFFUSE ARTHRALGIA: ICD-10-CM

## 2020-12-04 PROCEDURE — 99999 PR PBB SHADOW E&M-EST. PATIENT-LVL V: ICD-10-PCS | Mod: PBBFAC,,, | Performed by: FAMILY MEDICINE

## 2020-12-04 PROCEDURE — 3079F PR MOST RECENT DIASTOLIC BLOOD PRESSURE 80-89 MM HG: ICD-10-PCS | Mod: CPTII,S$GLB,, | Performed by: FAMILY MEDICINE

## 2020-12-04 PROCEDURE — 73620 X-RAY EXAM OF FOOT: CPT | Mod: TC,50,FY,PO

## 2020-12-04 PROCEDURE — 3079F DIAST BP 80-89 MM HG: CPT | Mod: CPTII,S$GLB,, | Performed by: FAMILY MEDICINE

## 2020-12-04 PROCEDURE — 3077F PR MOST RECENT SYSTOLIC BLOOD PRESSURE >= 140 MM HG: ICD-10-PCS | Mod: CPTII,S$GLB,, | Performed by: FAMILY MEDICINE

## 2020-12-04 PROCEDURE — 3008F BODY MASS INDEX DOCD: CPT | Mod: CPTII,S$GLB,, | Performed by: FAMILY MEDICINE

## 2020-12-04 PROCEDURE — 99999 PR PBB SHADOW E&M-EST. PATIENT-LVL V: CPT | Mod: PBBFAC,,, | Performed by: FAMILY MEDICINE

## 2020-12-04 PROCEDURE — 73620 X-RAY EXAM OF FOOT: CPT | Mod: 26,S$GLB,, | Performed by: RADIOLOGY

## 2020-12-04 PROCEDURE — 73610 X-RAY EXAM OF ANKLE: CPT | Mod: TC,50,FY,PO

## 2020-12-04 PROCEDURE — 73110 X-RAY EXAM OF WRIST: CPT | Mod: 26,S$GLB,, | Performed by: RADIOLOGY

## 2020-12-04 PROCEDURE — 99214 OFFICE O/P EST MOD 30 MIN: CPT | Mod: S$GLB,,, | Performed by: FAMILY MEDICINE

## 2020-12-04 PROCEDURE — 73130 X-RAY EXAM OF HAND: CPT | Mod: 26,S$GLB,, | Performed by: RADIOLOGY

## 2020-12-04 PROCEDURE — 73562 XR KNEE ORTHO BILAT: ICD-10-PCS | Mod: 26,S$GLB,, | Performed by: RADIOLOGY

## 2020-12-04 PROCEDURE — 73562 X-RAY EXAM OF KNEE 3: CPT | Mod: 26,S$GLB,, | Performed by: RADIOLOGY

## 2020-12-04 PROCEDURE — 1125F AMNT PAIN NOTED PAIN PRSNT: CPT | Mod: S$GLB,,, | Performed by: FAMILY MEDICINE

## 2020-12-04 PROCEDURE — 3008F PR BODY MASS INDEX (BMI) DOCUMENTED: ICD-10-PCS | Mod: CPTII,S$GLB,, | Performed by: FAMILY MEDICINE

## 2020-12-04 PROCEDURE — 73562 X-RAY EXAM OF KNEE 3: CPT | Mod: TC,50,FY,PO

## 2020-12-04 PROCEDURE — 73110 XR WRIST COMPLETE 3 VIEWS BILATERAL: ICD-10-PCS | Mod: 26,S$GLB,, | Performed by: RADIOLOGY

## 2020-12-04 PROCEDURE — 1125F PR PAIN SEVERITY QUANTIFIED, PAIN PRESENT: ICD-10-PCS | Mod: S$GLB,,, | Performed by: FAMILY MEDICINE

## 2020-12-04 PROCEDURE — 73610 X-RAY EXAM OF ANKLE: CPT | Mod: 26,S$GLB,, | Performed by: RADIOLOGY

## 2020-12-04 PROCEDURE — 73130 X-RAY EXAM OF HAND: CPT | Mod: TC,50,FY,PO

## 2020-12-04 PROCEDURE — 3077F SYST BP >= 140 MM HG: CPT | Mod: CPTII,S$GLB,, | Performed by: FAMILY MEDICINE

## 2020-12-04 PROCEDURE — 73620 XR FOOT AP BILAT: ICD-10-PCS | Mod: 26,S$GLB,, | Performed by: RADIOLOGY

## 2020-12-04 PROCEDURE — 73130 XR HAND COMPLETE 3 VIEWS BILATERAL: ICD-10-PCS | Mod: 26,S$GLB,, | Performed by: RADIOLOGY

## 2020-12-04 PROCEDURE — 73610 XR ANKLE COMPLETE 3 VIEW BILATERAL: ICD-10-PCS | Mod: 26,S$GLB,, | Performed by: RADIOLOGY

## 2020-12-04 PROCEDURE — 99214 PR OFFICE/OUTPT VISIT, EST, LEVL IV, 30-39 MIN: ICD-10-PCS | Mod: S$GLB,,, | Performed by: FAMILY MEDICINE

## 2020-12-04 PROCEDURE — 73110 X-RAY EXAM OF WRIST: CPT | Mod: TC,50,FY,PO

## 2020-12-04 RX ORDER — CYCLOBENZAPRINE HCL 5 MG
5 TABLET ORAL 3 TIMES DAILY PRN
Qty: 90 TABLET | Refills: 3 | Status: SHIPPED | OUTPATIENT
Start: 2020-12-04 | End: 2020-12-07 | Stop reason: SDUPTHER

## 2020-12-04 NOTE — PROGRESS NOTES
Subjective:       Patient ID: Claire Bowser is a 60 y.o. female.    Chief Complaint: Pain    HPI     Ms. Bowser presents to clinic for joint pain. Going on for the last couple of months.  Has received injections for her neck along with flexeril and gabapentin for her joint pain (located in her room began to upper arms, wrists, hands, knees, ankles, and feet).  States that flexeril was the only thing that helps her with joint and muscle pain.  Also has been having swelling in her hands and feet.     Past Medical History:   Diagnosis Date    Anxiety     Flu 02/2017    [facility]    Hypertension        Past Surgical History:   Procedure Laterality Date    TUBAL LIGATION         Family History   Problem Relation Age of Onset    Diabetes Mother     Heart disease Mother     Kidney disease Mother     Hypertension Mother     Stroke Mother     Hearing loss Mother     Melanoma Mother     COPD Father     Liver disease Paternal Grandfather     Alcohol abuse Paternal Grandfather     Liver disease Sister     Emphysema Brother     COPD Brother     Sleep apnea Brother     No Known Problems Son     Alcohol abuse Paternal Grandmother     Cirrhosis Paternal Grandmother     Heart disease Maternal Aunt     Diabetes Maternal Aunt     Cancer Maternal Aunt         female    Heart disease Maternal Uncle     Hypertension Maternal Uncle     No Known Problems Paternal Uncle     Psoriasis Neg Hx     Lupus Neg Hx     Eczema Neg Hx        Social History     Tobacco Use    Smoking status: Current Every Day Smoker     Packs/day: 1.00     Years: 7.00     Pack years: 7.00     Types: Cigarettes    Smokeless tobacco: Never Used    Tobacco comment: [phone]   Substance Use Topics    Alcohol use: No     Frequency: 2-4 times a month     Drinks per session: 1 or 2     Binge frequency: Never    Drug use: Never       Social History     Substance and Sexual Activity   Sexual Activity Not on file          Current  "Outpatient Medications:     lisinopriL (PRINIVIL,ZESTRIL) 5 MG tablet, Take 1 tablet by mouth once daily, Disp: 90 tablet, Rfl: 0    multivitamin (THERAGRAN) per tablet, Take 1 tablet by mouth once daily. Every day, Disp: , Rfl:     sertraline (ZOLOFT) 25 MG tablet, Take 1 tablet (25 mg total) by mouth once daily., Disp: 30 tablet, Rfl: 3    traZODone (DESYREL) 50 MG tablet, Take 1 tablet (50 mg total) by mouth every evening., Disp: 30 tablet, Rfl: 11    nicotine (NICODERM CQ) 14 mg/24 hr, Place 1 patch onto the skin once daily. Month 2 (Patient not taking: Reported on 12/4/2020), Disp: 28 patch, Rfl: 0    nicotine (NICODERM CQ) 21 mg/24 hr, Place 1 patch onto the skin once daily. Month 1 (Patient not taking: Reported on 12/4/2020), Disp: 28 patch, Rfl: 0    nicotine (NICODERM CQ) 7 mg/24 hr, Place 1 patch onto the skin once daily. Month 3 (Patient not taking: Reported on 12/4/2020), Disp: 28 patch, Rfl: 0     Review of patient's allergies indicates:   Allergen Reactions    No known drug allergies             Review of Systems   Constitutional: Negative for chills and fever.   HENT: Negative for congestion and sore throat.    Eyes: Negative for visual disturbance.   Respiratory: Negative for cough and shortness of breath.    Cardiovascular: Negative for chest pain.   Gastrointestinal: Negative for abdominal pain, constipation, diarrhea, nausea and vomiting.   Genitourinary: Negative for dysuria.   Musculoskeletal: Positive for arthralgias and joint swelling.   Skin: Negative for rash and wound.   Neurological: Negative for dizziness and headaches.   Hematological: Does not bruise/bleed easily.           Objective:          Vitals:    12/04/20 0907 12/04/20 0940   BP: (!) 174/98 (!) 160/88   Pulse: 82    Temp: 97.8 °F (36.6 °C)    TempSrc: Other (see comments)    SpO2: 97%    Weight: 53.4 kg (117 lb 11.6 oz)    Height: 5' 2" (1.575 m)        Physical Exam  Vitals signs reviewed.   Constitutional:       " General: She is not in acute distress.     Appearance: Normal appearance. She is well-developed.   HENT:      Head: Normocephalic and atraumatic.      Right Ear: External ear normal.      Left Ear: External ear normal.   Eyes:      Conjunctiva/sclera: Conjunctivae normal.   Cardiovascular:      Rate and Rhythm: Normal rate and regular rhythm.      Pulses: Normal pulses.      Heart sounds: Normal heart sounds.   Pulmonary:      Effort: Pulmonary effort is normal.      Breath sounds: Normal breath sounds.   Abdominal:      General: Bowel sounds are normal.      Palpations: Abdomen is soft.      Tenderness: There is no abdominal tenderness.   Musculoskeletal: Normal range of motion.      Right knee: She exhibits normal range of motion.      Left knee: She exhibits normal range of motion.      Right ankle: She exhibits normal range of motion.      Left ankle: She exhibits normal range of motion.      Right upper arm: She exhibits tenderness.      Left upper arm: She exhibits tenderness.        Hands:    Skin:     General: Skin is warm.      Findings: No rash.   Neurological:      General: No focal deficit present.      Mental Status: She is alert.   Psychiatric:         Speech: Speech normal.         Behavior: Behavior normal. Behavior is cooperative.                 Assessment/Plan     Claire was seen today for pain.    Diagnoses and all orders for this visit:    Diffuse arthralgia  -     X-Ray Hand 3 View Bilateral; Future  -     X-Ray Wrist Complete Bilateral; Future  -     X-ray Knee Ortho Bilateral; Future  -     X-Ray Foot AP Bilateral; Future  -     X-Ray Ankle Complete Bilateral; Future  -     Sedimentation rate; Future  -     C-reactive protein; Future  -     MALCOM; Future  -     Rheumatoid factor; Future  -     Cyclic citrul peptide antibody, IgG; Future  -     Sjogrens syndrome-A extractable nuclear antibody; Future  -     cyclobenzaprine (FLEXERIL) 5 MG tablet; Take 1 tablet (5 mg total) by mouth 3 (three)  times daily as needed for Muscle spasms.  -     Ambulatory referral/consult to Rheumatology; Future    Smoker  -     Ambulatory referral/consult to Smoking Cessation Program; Future          Follow up if symptoms worsen or fail to improve.    Future Appointments   Date Time Provider Department Center   12/17/2020  8:00 AM JOHN DOWNS [facility] [place]   12/24/2020  3:40 PM Samuel Brady MD [facility] MED [place]   1/14/2021  8:00 AM Samuel Brady MD [facility] [place]   3/15/2021  1:00 PM Tia Cortez MD [facility] [place] ISREAL Henson MD  [facility]

## 2020-12-05 ENCOUNTER — NURSE TRIAGE (OUTPATIENT)
Dept: ADMINISTRATIVE | Facility: CLINIC | Age: 60
End: 2020-12-05

## 2020-12-07 ENCOUNTER — TELEPHONE (OUTPATIENT)
Dept: FAMILY MEDICINE | Facility: CLINIC | Age: 60
End: 2020-12-07

## 2020-12-07 DIAGNOSIS — M25.50 DIFFUSE ARTHRALGIA: ICD-10-CM

## 2020-12-07 RX ORDER — CYCLOBENZAPRINE HCL 5 MG
5 TABLET ORAL 3 TIMES DAILY PRN
Qty: 90 TABLET | Refills: 3 | Status: SHIPPED | OUTPATIENT
Start: 2020-12-07 | End: 2020-12-17

## 2020-12-07 NOTE — TELEPHONE ENCOUNTER
----- Message from Marta Bernard sent at 12/7/2020 11:00 AM CST -----  Regarding: Pt Message/Results  Contact: [phone]  Type: Needs Medical Advice  Who Called: Pt  Best Call Back Number: [phone]  Additional Information: pt is requesting a  call back in regards to lab results. Please and thank you

## 2020-12-07 NOTE — TELEPHONE ENCOUNTER
----- Message from Hazel Garcia sent at 12/5/2020  8:58 AM CST -----  Type:  Needs Medical Advice    Who Called: pt  Advice Regarding: please resend Rx cyclobenzaprine (FLEXERIL) 5 MG tablet to [facility]  Would the patient rather a call back or a response via [facility]? call  Best Call Back Number:   Additional Information: n/a

## 2020-12-10 NOTE — PROGRESS NOTES
RHEUMATOLOGY CLINIC INITIAL VISIT    Reason for referral:-  Referred for evaluation of diffuse arthralgia    Chief complaints:- Joint pain       HPI:-  Claire Price a 60 y.o. pleasant female with PMH of HTN, HLPD, LILIA, tobacco abuse  comes in for an initial visit with above chief complaints.     Patient states that she developed bilateral feet and ankle pain and swelling at the end of the day a few months ago.  Progressed to the cervical spine (neck region) to bilateral shoulder/upper arms and then now bilateral hands pain.  Hand pain associated with swelling - unable to wear her rings.  AM stiffness - over 1 hour.  Takes Advil - which alleviates the symptoms (would take 3 at one time and takes over a hour for symptoms to be alleviated).  Difficulty with ambulation due to the pain and swelling.  Heat provides minimal alleviation of symptoms.  Pain is sharp that is worsen with any movement.  Decreased hand strength and  due to the pain.  Denies any trauma to the affected areas     +stressors in 2020 - had 3 parents that passed away (between her and ).  + COVID in July     Labs: elevated RF/CCP. Elevated inflammatory markers    Fhx: no rheumatological, thyroid or IBD that she is aware of     Tobacco (1/2 ppd x 10 years), EtOH (none), recreational/illicit drugs (denies)    Occupation:      Denies Hx of clots/miscarriages -  (no complication - full term)    ROS: Denies any mouth ulcers, Raynaud's, rash, photosensitivity, unintentional weight loss, vision changes, SOB, CP, urinary/bowel symptoms, N/V, fever/chills, Sicca symptoms, recent travels/sick contacts    joint swelling, arthralgia, myalgia, depression and insomnia (from all the stressors and discomfort), hair loss (in the last few months)     Review of Systems   Constitutional: Negative for chills, diaphoresis, fever, malaise/fatigue and weight loss.   HENT: Negative for congestion, ear discharge, ear pain, hearing loss,  nosebleeds, sinus pain and tinnitus.    Eyes: Negative for photophobia, pain, discharge and redness.   Respiratory: Negative for cough, hemoptysis, sputum production, shortness of breath, wheezing and stridor.    Cardiovascular: Negative for chest pain, palpitations, orthopnea, claudication, leg swelling and PND.   Gastrointestinal: Negative for abdominal pain, constipation, diarrhea, heartburn, nausea and vomiting.   Genitourinary: Negative for dysuria, frequency, hematuria and urgency.   Musculoskeletal: Positive for joint pain, myalgias and neck pain. Negative for back pain.   Skin: Negative for rash.   Neurological: Positive for weakness. Negative for dizziness, tingling, tremors and headaches.   Endo/Heme/Allergies: Does not bruise/bleed easily.   Psychiatric/Behavioral: Positive for depression. Negative for hallucinations and suicidal ideas. The patient has insomnia. The patient is not nervous/anxious.        Past Medical History:   Diagnosis Date    Anxiety     Flu 02/2017    [facility]    Hypertension        Past Surgical History:   Procedure Laterality Date    TUBAL LIGATION          Social History     Tobacco Use    Smoking status: Current Every Day Smoker     Packs/day: 1.00     Years: 7.00     Pack years: 7.00     Types: Cigarettes    Smokeless tobacco: Never Used    Tobacco comment: [phone]   Substance Use Topics    Alcohol use: No     Frequency: 2-4 times a month     Drinks per session: 1 or 2     Binge frequency: Never    Drug use: Never       Family History   Problem Relation Age of Onset    Diabetes Mother     Heart disease Mother     Kidney disease Mother     Hypertension Mother     Stroke Mother     Hearing loss Mother     Melanoma Mother     COPD Father     Liver disease Paternal Grandfather     Alcohol abuse Paternal Grandfather     Liver disease Sister     Emphysema Brother     COPD Brother     Sleep apnea Brother     No Known Problems Son     Alcohol  "abuse Paternal Grandmother     Cirrhosis Paternal Grandmother     Heart disease Maternal Aunt     Diabetes Maternal Aunt     Cancer Maternal Aunt         female    Heart disease Maternal Uncle     Hypertension Maternal Uncle     No Known Problems Paternal Uncle     Psoriasis Neg Hx     Lupus Neg Hx     Eczema Neg Hx        Review of patient's allergies indicates:   Allergen Reactions    No known drug allergies        Vitals:    12/14/20 1255   BP: 133/77   Pulse: 98   Weight: 54.1 kg (119 lb 2.5 oz)   Height: 5' 2" (1.575 m)   PainSc:   9       Physical Exam   Constitutional: She is oriented to person, place, and time and well-developed, well-nourished, and in no distress.   HENT:   Head: Normocephalic and atraumatic.   No mouth ulcers  No malar rash    Eyes: Pupils are equal, round, and reactive to light. Conjunctivae are normal.   Neck: Normal range of motion. Neck supple.   Cardiovascular: Normal rate, regular rhythm and normal heart sounds.   Pulmonary/Chest: Effort normal and breath sounds normal.   Abdominal: Soft. Bowel sounds are normal.   Musculoskeletal: Normal range of motion.      Comments: Bilateral wrist synovitis  Decrease bilateral  strength   2nd and 3rd MCP synovitis in bilateral hands   Mild ankle synovitis  +bilateral feet squeeze test    Neurological: She is alert and oriented to person, place, and time. Gait normal.   Skin: Skin is warm and dry.   No rash    Psychiatric: Mood, memory, affect and judgment normal.       Labs:-  Results for MILTON ROBERTSON (MRN [id number]) as of 12/10/2020 11:50   Ref. Range 12/4/2020 10:09 12/4/2020 10:09 12/4/2020 10:09 12/4/2020 10:09 12/4/2020 10:18   Sed Rate Latest Ref Range: 0 - 20 mm/Hr     68 (H)   CRP Latest Ref Range: 0.0 - 8.2 mg/L     32.7 (H)   MALCOM Screen Latest Ref Range: Negative <1:80      Negative <1:80   Anti-SSA Antibody Latest Ref Range: 0.00 - 0.99 Ratio     0.05   Anti-SSA Interpretation Latest Ref Range: Negative      Negative "   CCP Antibodies Latest Ref Range: <5.0 U/mL     821.2 (H)   Rheumatoid Factor Latest Ref Range: 0.0 - 15.0 IU/mL     293.0 (H)   XR ANKLE COMPLETE 3 VIEW BILATERAL Unknown  Rpt      XR HAND COMPLETE 3 VIEWS BILATERAL Unknown    Rpt    XR KNEE ORTHO BILAT Unknown Rpt       XR WRIST COMPLETE 3 VIEWS BILATERAL Unknown   Rpt       Radiographs:-  Independent visualization of images done.  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    Old and Outside medical records :-  Reviewed old and all outside medical records available in Care Everywhere.    Medication List with Changes/Refills   New Medications    PREDNISONE (DELTASONE) 10 MG TABLET    Take 1 tablet (10 mg total) by mouth once daily.   Current Medications    CYCLOBENZAPRINE (FLEXERIL) 5 MG TABLET    Take 1 tablet (5 mg total) by mouth 3 (three) times daily as needed for Muscle spasms.    LISINOPRIL (PRINIVIL,ZESTRIL) 5 MG TABLET    Take 1 tablet by mouth once daily    MULTIVITAMIN (THERAGRAN) PER TABLET    Take 1 tablet by mouth once daily. Every day    NICOTINE (NICODERM CQ) 14 MG/24 HR    Place 1 patch onto the skin once daily. Month 2    NICOTINE (NICODERM CQ) 21 MG/24 HR    Place 1 patch onto the skin once daily. Month 1    NICOTINE (NICODERM CQ) 7 MG/24 HR    Place 1 patch onto the skin once daily. Month 3    SERTRALINE (ZOLOFT) 25 MG TABLET    Take 1 tablet (25 mg total) by mouth once daily.    TRAZODONE (DESYREL) 50 MG TABLET    Take 1 tablet (50 mg total) by mouth every evening.       Assessment/Plans:-  60 y.o. pleasant female with PMH of HTN, HLPD, LILIA, tobacco abuse referral for evaluation of joint pain with +RF/CCP.    Patient developed joint pain a few months ago (around July) after increased stressors (family) and COVID infection. Associated with decreased ROM and joint  swelling.     Labs: remarkable for +RF/CCP and elevated inflammatory markers.     Patient meets 2010 ACR/EULAR criteria for diagnosis of Rheumatoid arthritis. Imaging - no signs of erosion   - Plan to start MTX    Plan  - PreDMARDs  - cervical xray  - CXR  - CBC, CMP  - education provided on the disease process and management of RA - ACR handout also provided   - education provided on the potential side effect of MTX - ACR handout also provided  - start prednisone 10mg daily - to be take with food  - education provided on the potential side effect  - DEXA scan   - lipid panel    - NSAIDs to be taken PRN - take with food   - education provided on the importance of smoking cessation - referral to smoking cessation in place.  Awaiting call to schedule  - recommendation for annual flu vaccination - to be obtained prior to initiation of MTX      Time spent: 60 minutes in face to face discussion concerning diagnosis, prognosis, review of lab and test results, benefits of treatment as well as management of disease, counseling of patient and coordination of care between various health care providers.  Greater than half the time spent was used for coordination of care and counseling of patient.      Follow up in about 2 weeks (around 12/28/2020).    Thank you for allowing me to participate in the care ofClaire Bowser.    Disclaimer: This note was prepared using voice recognition system and is likely to have sound alike errors and is not proof read.  Please call me with any questions.

## 2020-12-13 ENCOUNTER — PATIENT OUTREACH (OUTPATIENT)
Dept: ADMINISTRATIVE | Facility: OTHER | Age: 60
End: 2020-12-13

## 2020-12-13 NOTE — PROGRESS NOTES
Chart was reviewed for overdue [facility]active [facility] Encounters ([facility])  topics  Updates were requested from care everywhere  Health Maintenance has been updated  LINKS immunization registry triggered

## 2020-12-14 ENCOUNTER — OFFICE VISIT (OUTPATIENT)
Dept: RHEUMATOLOGY | Facility: CLINIC | Age: 60
End: 2020-12-14
Payer: COMMERCIAL

## 2020-12-14 VITALS
SYSTOLIC BLOOD PRESSURE: 133 MMHG | BODY MASS INDEX: 21.93 KG/M2 | DIASTOLIC BLOOD PRESSURE: 77 MMHG | HEIGHT: 62 IN | WEIGHT: 119.19 LBS | HEART RATE: 98 BPM

## 2020-12-14 DIAGNOSIS — Z72.0 TOBACCO USE: ICD-10-CM

## 2020-12-14 DIAGNOSIS — E78.2 MIXED HYPERLIPIDEMIA: ICD-10-CM

## 2020-12-14 DIAGNOSIS — I10 ESSENTIAL HYPERTENSION: Primary | ICD-10-CM

## 2020-12-14 DIAGNOSIS — M06.9 RHEUMATOID ARTHRITIS, INVOLVING UNSPECIFIED SITE, UNSPECIFIED WHETHER RHEUMATOID FACTOR PRESENT: ICD-10-CM

## 2020-12-14 DIAGNOSIS — M25.50 DIFFUSE ARTHRALGIA: ICD-10-CM

## 2020-12-14 PROCEDURE — 3078F DIAST BP <80 MM HG: CPT | Mod: CPTII,S$GLB,, | Performed by: STUDENT IN AN ORGANIZED HEALTH CARE EDUCATION/TRAINING PROGRAM

## 2020-12-14 PROCEDURE — 99999 PR PBB SHADOW E&M-EST. PATIENT-LVL IV: ICD-10-PCS | Mod: PBBFAC,,, | Performed by: STUDENT IN AN ORGANIZED HEALTH CARE EDUCATION/TRAINING PROGRAM

## 2020-12-14 PROCEDURE — 99205 PR OFFICE/OUTPT VISIT, NEW, LEVL V, 60-74 MIN: ICD-10-PCS | Mod: S$GLB,,, | Performed by: STUDENT IN AN ORGANIZED HEALTH CARE EDUCATION/TRAINING PROGRAM

## 2020-12-14 PROCEDURE — 3078F PR MOST RECENT DIASTOLIC BLOOD PRESSURE < 80 MM HG: ICD-10-PCS | Mod: CPTII,S$GLB,, | Performed by: STUDENT IN AN ORGANIZED HEALTH CARE EDUCATION/TRAINING PROGRAM

## 2020-12-14 PROCEDURE — 99205 OFFICE O/P NEW HI 60 MIN: CPT | Mod: S$GLB,,, | Performed by: STUDENT IN AN ORGANIZED HEALTH CARE EDUCATION/TRAINING PROGRAM

## 2020-12-14 PROCEDURE — 3008F PR BODY MASS INDEX (BMI) DOCUMENTED: ICD-10-PCS | Mod: CPTII,S$GLB,, | Performed by: STUDENT IN AN ORGANIZED HEALTH CARE EDUCATION/TRAINING PROGRAM

## 2020-12-14 PROCEDURE — 1125F AMNT PAIN NOTED PAIN PRSNT: CPT | Mod: S$GLB,,, | Performed by: STUDENT IN AN ORGANIZED HEALTH CARE EDUCATION/TRAINING PROGRAM

## 2020-12-14 PROCEDURE — 99999 PR PBB SHADOW E&M-EST. PATIENT-LVL IV: CPT | Mod: PBBFAC,,, | Performed by: STUDENT IN AN ORGANIZED HEALTH CARE EDUCATION/TRAINING PROGRAM

## 2020-12-14 PROCEDURE — 3008F BODY MASS INDEX DOCD: CPT | Mod: CPTII,S$GLB,, | Performed by: STUDENT IN AN ORGANIZED HEALTH CARE EDUCATION/TRAINING PROGRAM

## 2020-12-14 PROCEDURE — 1125F PR PAIN SEVERITY QUANTIFIED, PAIN PRESENT: ICD-10-PCS | Mod: S$GLB,,, | Performed by: STUDENT IN AN ORGANIZED HEALTH CARE EDUCATION/TRAINING PROGRAM

## 2020-12-14 PROCEDURE — 3075F SYST BP GE 130 - 139MM HG: CPT | Mod: CPTII,S$GLB,, | Performed by: STUDENT IN AN ORGANIZED HEALTH CARE EDUCATION/TRAINING PROGRAM

## 2020-12-14 PROCEDURE — 3075F PR MOST RECENT SYSTOLIC BLOOD PRESS GE 130-139MM HG: ICD-10-PCS | Mod: CPTII,S$GLB,, | Performed by: STUDENT IN AN ORGANIZED HEALTH CARE EDUCATION/TRAINING PROGRAM

## 2020-12-14 RX ORDER — PREDNISONE 10 MG/1
10 TABLET ORAL DAILY
Qty: 30 TABLET | Refills: 1 | Status: SHIPPED | OUTPATIENT
Start: 2020-12-14 | End: 2021-09-26

## 2020-12-14 ASSESSMENT — ROUTINE ASSESSMENT OF PATIENT INDEX DATA (RAPID3)
PSYCHOLOGICAL DISTRESS SCORE: 7.7
MDHAQ FUNCTION SCORE: 2.2
FATIGUE SCORE: 10
PAIN SCORE: 10
WHEN YOU AWAKENED IN THE MORNING OVER THE LAST WEEK, PLEASE INDICATE THE AMOUNT OF TIME IT TAKES UNTIL YOU ARE AS LIMBER AS YOU WILL BE FOR THE DAY: 1-2 HOURS
AM STIFFNESS SCORE: 1, YES
PATIENT GLOBAL ASSESSMENT SCORE: 10
TOTAL RAPID3 SCORE: 9.11

## 2020-12-14 NOTE — LETTER
December 14, 2020      Diane Henson MD  [address]  [place] [address]           [place] [facility]  [address]  [place] [address]  Phone: [phone]  Fax: [phone]          Patient: Claire Bowser   MR Number: [id number]   YOB: 1960   Date of Visit: 12/14/2020       Dear Dr. Diane Henson:    Thank you for referring Claire Bowser to me for evaluation. Attached you will find relevant portions of my assessment and plan of care.    If you have questions, please do not hesitate to call me. I look forward to following Claire Bowser along with you.    Sincerely,    Tia Cortez MD    Enclosure  CC:  No Recipients    If you would like to receive this communication electronically, please contact [email or web address] or [phone] to request more information on [facility] access.    For providers and/or their staff who would like to refer a patient to [facility], please contact us through our one-stop-shop provider referral line, [facility] [place], at [phone].    If you feel you have received this communication in error or would no longer like to receive these types of communications, please e-mail [email or web address]

## 2020-12-17 ENCOUNTER — PATIENT MESSAGE (OUTPATIENT)
Dept: RHEUMATOLOGY | Facility: CLINIC | Age: 60
End: 2020-12-17

## 2020-12-17 ENCOUNTER — HOSPITAL ENCOUNTER (OUTPATIENT)
Dept: RADIOLOGY | Facility: CLINIC | Age: 60
Discharge: HOME | End: 2020-12-17
Attending: STUDENT IN AN ORGANIZED HEALTH CARE EDUCATION/TRAINING PROGRAM
Payer: COMMERCIAL

## 2020-12-17 ENCOUNTER — IMMUNIZATION (OUTPATIENT)
Dept: FAMILY MEDICINE | Facility: CLINIC | Age: 60
End: 2020-12-17
Payer: COMMERCIAL

## 2020-12-17 ENCOUNTER — TELEPHONE (OUTPATIENT)
Dept: RHEUMATOLOGY | Facility: CLINIC | Age: 60
End: 2020-12-17

## 2020-12-17 ENCOUNTER — LAB VISIT (OUTPATIENT)
Dept: LAB | Facility: HOSPITAL | Age: 60
End: 2020-12-17
Attending: FAMILY MEDICINE
Payer: COMMERCIAL

## 2020-12-17 DIAGNOSIS — K27.9 PUD (PEPTIC ULCER DISEASE): Primary | ICD-10-CM

## 2020-12-17 DIAGNOSIS — Z72.0 TOBACCO USE: ICD-10-CM

## 2020-12-17 DIAGNOSIS — M06.9 RHEUMATOID ARTHRITIS, INVOLVING UNSPECIFIED SITE, UNSPECIFIED WHETHER RHEUMATOID FACTOR PRESENT: ICD-10-CM

## 2020-12-17 DIAGNOSIS — D64.9 ANEMIA, UNSPECIFIED TYPE: ICD-10-CM

## 2020-12-17 DIAGNOSIS — M25.50 DIFFUSE ARTHRALGIA: ICD-10-CM

## 2020-12-17 DIAGNOSIS — I10 ESSENTIAL HYPERTENSION: ICD-10-CM

## 2020-12-17 DIAGNOSIS — R74.8 ELEVATED ALKALINE PHOSPHATASE LEVEL: ICD-10-CM

## 2020-12-17 DIAGNOSIS — E78.2 MIXED HYPERLIPIDEMIA: ICD-10-CM

## 2020-12-17 DIAGNOSIS — D64.9 ANEMIA, UNSPECIFIED TYPE: Primary | ICD-10-CM

## 2020-12-17 LAB
BASOPHILS # BLD AUTO: 0.08 K/UL (ref 0–0.2)
BASOPHILS NFR BLD: 0.7 % (ref 0–1.9)
CHOLEST SERPL-MCNC: 197 MG/DL (ref 120–199)
CHOLEST/HDLC SERPL: 3.8 {RATIO} (ref 2–5)
DIFFERENTIAL METHOD: ABNORMAL
EOSINOPHIL # BLD AUTO: 0.2 K/UL (ref 0–0.5)
EOSINOPHIL NFR BLD: 1.4 % (ref 0–8)
ERYTHROCYTE [DISTWIDTH] IN BLOOD BY AUTOMATED COUNT: 12.4 % (ref 11.5–14.5)
FOLATE SERPL-MCNC: 3.7 NG/ML (ref 4–24)
HCT VFR BLD AUTO: 33.3 % (ref 37–48.5)
HDLC SERPL-MCNC: 52 MG/DL (ref 40–75)
HDLC SERPL: 26.4 % (ref 20–50)
HGB BLD-MCNC: 10.7 G/DL (ref 12–16)
IMM GRANULOCYTES # BLD AUTO: 0.06 K/UL (ref 0–0.04)
IMM GRANULOCYTES NFR BLD AUTO: 0.5 % (ref 0–0.5)
LDLC SERPL CALC-MCNC: 116.8 MG/DL (ref 63–159)
LYMPHOCYTES # BLD AUTO: 1.8 K/UL (ref 1–4.8)
LYMPHOCYTES NFR BLD: 15.3 % (ref 18–48)
MCH RBC QN AUTO: 30.3 PG (ref 27–31)
MCHC RBC AUTO-ENTMCNC: 32.1 G/DL (ref 32–36)
MCV RBC AUTO: 94 FL (ref 82–98)
MONOCYTES # BLD AUTO: 0.9 K/UL (ref 0.3–1)
MONOCYTES NFR BLD: 7.5 % (ref 4–15)
NEUTROPHILS # BLD AUTO: 8.6 K/UL (ref 1.8–7.7)
NEUTROPHILS NFR BLD: 74.6 % (ref 38–73)
NONHDLC SERPL-MCNC: 145 MG/DL
NRBC BLD-RTO: 0 /100 WBC
PLATELET # BLD AUTO: 547 K/UL (ref 150–350)
PMV BLD AUTO: 10.3 FL (ref 9.2–12.9)
RBC # BLD AUTO: 3.53 M/UL (ref 4–5.4)
TRIGL SERPL-MCNC: 141 MG/DL (ref 30–150)
WBC # BLD AUTO: 11.54 K/UL (ref 3.9–12.7)

## 2020-12-17 PROCEDURE — 71046 X-RAY EXAM CHEST 2 VIEWS: CPT | Mod: 26,,, | Performed by: RADIOLOGY

## 2020-12-17 PROCEDURE — 71046 X-RAY EXAM CHEST 2 VIEWS: CPT | Mod: TC,FY,PO

## 2020-12-17 PROCEDURE — 72050 X-RAY EXAM NECK SPINE 4/5VWS: CPT | Mod: TC,FY,PO

## 2020-12-17 PROCEDURE — 36415 COLL VENOUS BLD VENIPUNCTURE: CPT | Mod: PO

## 2020-12-17 PROCEDURE — 90686 IIV4 VACC NO PRSV 0.5 ML IM: CPT | Mod: S$GLB,,, | Performed by: FAMILY MEDICINE

## 2020-12-17 PROCEDURE — 77080 DEXA BONE DENSITY SPINE HIP: ICD-10-PCS | Mod: 26,,, | Performed by: RADIOLOGY

## 2020-12-17 PROCEDURE — 85025 COMPLETE CBC W/AUTO DIFF WBC: CPT | Mod: 91

## 2020-12-17 PROCEDURE — 82746 ASSAY OF FOLIC ACID SERUM: CPT

## 2020-12-17 PROCEDURE — 72050 XR CERVICAL SPINE COMPLETE 5 VIEW: ICD-10-PCS | Mod: 26,,, | Performed by: RADIOLOGY

## 2020-12-17 PROCEDURE — 72050 X-RAY EXAM NECK SPINE 4/5VWS: CPT | Mod: 26,,, | Performed by: RADIOLOGY

## 2020-12-17 PROCEDURE — 77080 DXA BONE DENSITY AXIAL: CPT | Mod: 26,,, | Performed by: RADIOLOGY

## 2020-12-17 PROCEDURE — 84080 ASSAY ALKALINE PHOSPHATASES: CPT

## 2020-12-17 PROCEDURE — 90686 FLU VACCINE (QUAD) GREATER THAN OR EQUAL TO 3YO PRESERVATIVE FREE IM: ICD-10-PCS | Mod: S$GLB,,, | Performed by: FAMILY MEDICINE

## 2020-12-17 PROCEDURE — 71046 XR CHEST PA AND LATERAL: ICD-10-PCS | Mod: 26,,, | Performed by: RADIOLOGY

## 2020-12-17 PROCEDURE — 90471 FLU VACCINE (QUAD) GREATER THAN OR EQUAL TO 3YO PRESERVATIVE FREE IM: ICD-10-PCS | Mod: S$GLB,,, | Performed by: FAMILY MEDICINE

## 2020-12-17 PROCEDURE — 84075 ASSAY ALKALINE PHOSPHATASE: CPT | Mod: 91

## 2020-12-17 PROCEDURE — 77080 DXA BONE DENSITY AXIAL: CPT | Mod: TC,PO

## 2020-12-17 PROCEDURE — 90471 IMMUNIZATION ADMIN: CPT | Mod: S$GLB,,, | Performed by: FAMILY MEDICINE

## 2020-12-17 PROCEDURE — 80061 LIPID PANEL: CPT

## 2020-12-17 RX ORDER — PANTOPRAZOLE SODIUM 40 MG/1
40 TABLET, DELAYED RELEASE ORAL DAILY
Qty: 30 TABLET | Refills: 11 | Status: SHIPPED | OUTPATIENT
Start: 2020-12-17 | End: 2021-03-23 | Stop reason: SDUPTHER

## 2020-12-17 NOTE — PROGRESS NOTES
Two person identification name, d.o.b with verbal feedback.  Aseptic technique used. Administration influenza  vaccine on R deltoid.  Tolerated well.  VIS 8/7/15  given/mp

## 2020-12-17 NOTE — TELEPHONE ENCOUNTER
Drop in hemoglobin with recent blood draw    Patient admits to black stool - uncertain if this was prior to usage of prednisone 10mg daily    Protonix 40mg ordered and sent to pharmacy on file     Repeat CBC in 1 week     If symptomatic anemia - patient instructed to go to ED

## 2020-12-26 LAB
ALP BONE CFR SERPL: 25 % (ref 28–66)
ALP INTEST CFR SERPL: 0 % (ref 1–24)
ALP ISOS SERPL-IMP: ABNORMAL
ALP LIVER CFR SERPL: 75 % (ref 25–69)
ALP MACROHEPATIC CFR SERPL: ABNORMAL %
ALP PLAC CFR SERPL: 0 %
ALP SERPL-CCNC: 98 U/L (ref 37–153)

## 2020-12-27 DIAGNOSIS — R74.8 ELEVATED ALKALINE PHOSPHATASE LEVEL: Primary | ICD-10-CM

## 2020-12-27 DIAGNOSIS — D64.9 ANEMIA, UNSPECIFIED TYPE: ICD-10-CM

## 2020-12-31 ENCOUNTER — HOSPITAL ENCOUNTER (OUTPATIENT)
Dept: RADIOLOGY | Facility: HOSPITAL | Age: 60
Discharge: HOME | End: 2020-12-31
Attending: PHYSICIAN ASSISTANT
Payer: COMMERCIAL

## 2020-12-31 DIAGNOSIS — R74.8 ELEVATED ALKALINE PHOSPHATASE LEVEL: ICD-10-CM

## 2020-12-31 PROCEDURE — 76700 US ABDOMEN COMPLETE: ICD-10-PCS | Mod: 26,,, | Performed by: RADIOLOGY

## 2020-12-31 PROCEDURE — 76700 US EXAM ABDOM COMPLETE: CPT | Mod: 26,,, | Performed by: RADIOLOGY

## 2020-12-31 PROCEDURE — 76700 US EXAM ABDOM COMPLETE: CPT | Mod: TC

## 2021-01-03 DIAGNOSIS — N28.1 BILATERAL RENAL CYSTS: Primary | ICD-10-CM

## 2021-01-04 ENCOUNTER — OFFICE VISIT (OUTPATIENT)
Dept: RHEUMATOLOGY | Facility: CLINIC | Age: 61
End: 2021-01-04
Payer: COMMERCIAL

## 2021-01-04 VITALS
WEIGHT: 126.75 LBS | HEIGHT: 62 IN | HEART RATE: 80 BPM | SYSTOLIC BLOOD PRESSURE: 176 MMHG | OXYGEN SATURATION: 97 % | BODY MASS INDEX: 23.32 KG/M2 | DIASTOLIC BLOOD PRESSURE: 80 MMHG

## 2021-01-04 DIAGNOSIS — Z72.0 TOBACCO USE: ICD-10-CM

## 2021-01-04 DIAGNOSIS — Z51.81 MEDICATION MONITORING ENCOUNTER: ICD-10-CM

## 2021-01-04 DIAGNOSIS — M06.9 RHEUMATOID ARTHRITIS, INVOLVING UNSPECIFIED SITE, UNSPECIFIED WHETHER RHEUMATOID FACTOR PRESENT: Primary | ICD-10-CM

## 2021-01-04 DIAGNOSIS — M81.0 OSTEOPOROSIS, UNSPECIFIED OSTEOPOROSIS TYPE, UNSPECIFIED PATHOLOGICAL FRACTURE PRESENCE: ICD-10-CM

## 2021-01-04 PROCEDURE — 99999 PR PBB SHADOW E&M-EST. PATIENT-LVL IV: ICD-10-PCS | Mod: PBBFAC,,, | Performed by: STUDENT IN AN ORGANIZED HEALTH CARE EDUCATION/TRAINING PROGRAM

## 2021-01-04 PROCEDURE — 3079F PR MOST RECENT DIASTOLIC BLOOD PRESSURE 80-89 MM HG: ICD-10-PCS | Mod: CPTII,S$GLB,, | Performed by: STUDENT IN AN ORGANIZED HEALTH CARE EDUCATION/TRAINING PROGRAM

## 2021-01-04 PROCEDURE — 99214 OFFICE O/P EST MOD 30 MIN: CPT | Mod: S$GLB,,, | Performed by: STUDENT IN AN ORGANIZED HEALTH CARE EDUCATION/TRAINING PROGRAM

## 2021-01-04 PROCEDURE — 3008F BODY MASS INDEX DOCD: CPT | Mod: CPTII,S$GLB,, | Performed by: STUDENT IN AN ORGANIZED HEALTH CARE EDUCATION/TRAINING PROGRAM

## 2021-01-04 PROCEDURE — 3077F PR MOST RECENT SYSTOLIC BLOOD PRESSURE >= 140 MM HG: ICD-10-PCS | Mod: CPTII,S$GLB,, | Performed by: STUDENT IN AN ORGANIZED HEALTH CARE EDUCATION/TRAINING PROGRAM

## 2021-01-04 PROCEDURE — 99214 PR OFFICE/OUTPT VISIT, EST, LEVL IV, 30-39 MIN: ICD-10-PCS | Mod: S$GLB,,, | Performed by: STUDENT IN AN ORGANIZED HEALTH CARE EDUCATION/TRAINING PROGRAM

## 2021-01-04 PROCEDURE — 3008F PR BODY MASS INDEX (BMI) DOCUMENTED: ICD-10-PCS | Mod: CPTII,S$GLB,, | Performed by: STUDENT IN AN ORGANIZED HEALTH CARE EDUCATION/TRAINING PROGRAM

## 2021-01-04 PROCEDURE — 1125F AMNT PAIN NOTED PAIN PRSNT: CPT | Mod: S$GLB,,, | Performed by: STUDENT IN AN ORGANIZED HEALTH CARE EDUCATION/TRAINING PROGRAM

## 2021-01-04 PROCEDURE — 3079F DIAST BP 80-89 MM HG: CPT | Mod: CPTII,S$GLB,, | Performed by: STUDENT IN AN ORGANIZED HEALTH CARE EDUCATION/TRAINING PROGRAM

## 2021-01-04 PROCEDURE — 1125F PR PAIN SEVERITY QUANTIFIED, PAIN PRESENT: ICD-10-PCS | Mod: S$GLB,,, | Performed by: STUDENT IN AN ORGANIZED HEALTH CARE EDUCATION/TRAINING PROGRAM

## 2021-01-04 PROCEDURE — 3077F SYST BP >= 140 MM HG: CPT | Mod: CPTII,S$GLB,, | Performed by: STUDENT IN AN ORGANIZED HEALTH CARE EDUCATION/TRAINING PROGRAM

## 2021-01-04 PROCEDURE — 99999 PR PBB SHADOW E&M-EST. PATIENT-LVL IV: CPT | Mod: PBBFAC,,, | Performed by: STUDENT IN AN ORGANIZED HEALTH CARE EDUCATION/TRAINING PROGRAM

## 2021-01-04 RX ORDER — ALENDRONATE SODIUM 70 MG/1
70 TABLET ORAL
Qty: 4 TABLET | Refills: 11 | Status: SHIPPED | OUTPATIENT
Start: 2021-01-04 | End: 2021-07-26 | Stop reason: SDUPTHER

## 2021-01-04 RX ORDER — METHOTREXATE 2.5 MG/1
10 TABLET ORAL
Qty: 16 TABLET | Refills: 3 | Status: SHIPPED | OUTPATIENT
Start: 2021-01-04 | End: 2021-02-09 | Stop reason: SDUPTHER

## 2021-01-04 RX ORDER — IBUPROFEN 200 MG
1 TABLET ORAL DAILY
Qty: 28 PATCH | Refills: 0 | Status: SHIPPED | OUTPATIENT
Start: 2021-01-04 | End: 2021-07-06

## 2021-01-04 RX ORDER — FOLIC ACID 1 MG/1
1 TABLET ORAL DAILY
Qty: 360 TABLET | Refills: 0 | Status: SHIPPED | OUTPATIENT
Start: 2021-01-04 | End: 2021-07-26 | Stop reason: SDUPTHER

## 2021-01-07 ENCOUNTER — LAB VISIT (OUTPATIENT)
Dept: LAB | Facility: HOSPITAL | Age: 61
End: 2021-01-07
Attending: STUDENT IN AN ORGANIZED HEALTH CARE EDUCATION/TRAINING PROGRAM
Payer: COMMERCIAL

## 2021-01-07 DIAGNOSIS — M81.0 OSTEOPOROSIS, UNSPECIFIED OSTEOPOROSIS TYPE, UNSPECIFIED PATHOLOGICAL FRACTURE PRESENCE: ICD-10-CM

## 2021-01-07 DIAGNOSIS — M06.9 RHEUMATOID ARTHRITIS, INVOLVING UNSPECIFIED SITE, UNSPECIFIED WHETHER RHEUMATOID FACTOR PRESENT: ICD-10-CM

## 2021-01-07 LAB
ALBUMIN SERPL BCP-MCNC: 3.5 G/DL (ref 3.5–5.2)
ALP SERPL-CCNC: 111 U/L (ref 55–135)
ALT SERPL W/O P-5'-P-CCNC: 19 U/L (ref 10–44)
ANION GAP SERPL CALC-SCNC: 9 MMOL/L (ref 8–16)
AST SERPL-CCNC: 18 U/L (ref 10–40)
BASOPHILS # BLD AUTO: 0.06 K/UL (ref 0–0.2)
BASOPHILS NFR BLD: 0.5 % (ref 0–1.9)
BILIRUB SERPL-MCNC: 0.5 MG/DL (ref 0.1–1)
BUN SERPL-MCNC: 10 MG/DL (ref 6–20)
CALCIUM SERPL-MCNC: 9.9 MG/DL (ref 8.7–10.5)
CALCIUM SERPL-MCNC: 9.9 MG/DL (ref 8.7–10.5)
CHLORIDE SERPL-SCNC: 103 MMOL/L (ref 95–110)
CO2 SERPL-SCNC: 30 MMOL/L (ref 23–29)
CREAT SERPL-MCNC: 0.8 MG/DL (ref 0.5–1.4)
CRP SERPL-MCNC: 27.3 MG/L (ref 0–8.2)
DIFFERENTIAL METHOD: ABNORMAL
EOSINOPHIL # BLD AUTO: 0.1 K/UL (ref 0–0.5)
EOSINOPHIL NFR BLD: 1.1 % (ref 0–8)
ERYTHROCYTE [DISTWIDTH] IN BLOOD BY AUTOMATED COUNT: 14.4 % (ref 11.5–14.5)
ERYTHROCYTE [SEDIMENTATION RATE] IN BLOOD BY WESTERGREN METHOD: 51 MM/HR (ref 0–20)
EST. GFR  (AFRICAN AMERICAN): >60 ML/MIN/1.73 M^2
EST. GFR  (NON AFRICAN AMERICAN): >60 ML/MIN/1.73 M^2
GLUCOSE SERPL-MCNC: 93 MG/DL (ref 70–110)
HCT VFR BLD AUTO: 34.7 % (ref 37–48.5)
HGB BLD-MCNC: 11.1 G/DL (ref 12–16)
IMM GRANULOCYTES # BLD AUTO: 0.07 K/UL (ref 0–0.04)
IMM GRANULOCYTES NFR BLD AUTO: 0.6 % (ref 0–0.5)
LYMPHOCYTES # BLD AUTO: 2.1 K/UL (ref 1–4.8)
LYMPHOCYTES NFR BLD: 18 % (ref 18–48)
MCH RBC QN AUTO: 31.2 PG (ref 27–31)
MCHC RBC AUTO-ENTMCNC: 32 G/DL (ref 32–36)
MCV RBC AUTO: 98 FL (ref 82–98)
MONOCYTES # BLD AUTO: 0.8 K/UL (ref 0.3–1)
MONOCYTES NFR BLD: 7.1 % (ref 4–15)
NEUTROPHILS # BLD AUTO: 8.5 K/UL (ref 1.8–7.7)
NEUTROPHILS NFR BLD: 72.7 % (ref 38–73)
NRBC BLD-RTO: 0 /100 WBC
PLATELET # BLD AUTO: 293 K/UL (ref 150–350)
PMV BLD AUTO: 9.9 FL (ref 9.2–12.9)
POTASSIUM SERPL-SCNC: 3.8 MMOL/L (ref 3.5–5.1)
PROT SERPL-MCNC: 7.4 G/DL (ref 6–8.4)
RBC # BLD AUTO: 3.56 M/UL (ref 4–5.4)
SODIUM SERPL-SCNC: 142 MMOL/L (ref 136–145)
WBC # BLD AUTO: 11.71 K/UL (ref 3.9–12.7)

## 2021-01-07 PROCEDURE — 36415 COLL VENOUS BLD VENIPUNCTURE: CPT

## 2021-01-07 PROCEDURE — 85025 COMPLETE CBC W/AUTO DIFF WBC: CPT

## 2021-01-07 PROCEDURE — 80053 COMPREHEN METABOLIC PANEL: CPT

## 2021-01-07 PROCEDURE — 85651 RBC SED RATE NONAUTOMATED: CPT

## 2021-01-07 PROCEDURE — 86140 C-REACTIVE PROTEIN: CPT

## 2021-01-14 ENCOUNTER — OFFICE VISIT (OUTPATIENT)
Dept: FAMILY MEDICINE | Facility: CLINIC | Age: 61
End: 2021-01-14
Attending: FAMILY MEDICINE
Payer: COMMERCIAL

## 2021-01-14 VITALS
OXYGEN SATURATION: 97 % | SYSTOLIC BLOOD PRESSURE: 154 MMHG | BODY MASS INDEX: 22.84 KG/M2 | TEMPERATURE: 98 F | HEART RATE: 93 BPM | HEIGHT: 62 IN | WEIGHT: 124.13 LBS | DIASTOLIC BLOOD PRESSURE: 94 MMHG

## 2021-01-14 DIAGNOSIS — F41.1 GENERALIZED ANXIETY DISORDER: ICD-10-CM

## 2021-01-14 DIAGNOSIS — Z72.0 TOBACCO USE: ICD-10-CM

## 2021-01-14 DIAGNOSIS — I10 ESSENTIAL HYPERTENSION: ICD-10-CM

## 2021-01-14 DIAGNOSIS — M05.79 RHEUMATOID ARTHRITIS INVOLVING MULTIPLE SITES WITH POSITIVE RHEUMATOID FACTOR: ICD-10-CM

## 2021-01-14 DIAGNOSIS — Z00.00 PREVENTATIVE HEALTH CARE: Primary | ICD-10-CM

## 2021-01-14 DIAGNOSIS — Z12.31 OTHER SCREENING MAMMOGRAM: ICD-10-CM

## 2021-01-14 DIAGNOSIS — E78.2 MIXED HYPERLIPIDEMIA: ICD-10-CM

## 2021-01-14 PROCEDURE — 3078F DIAST BP <80 MM HG: CPT | Mod: CPTII,S$GLB,, | Performed by: FAMILY MEDICINE

## 2021-01-14 PROCEDURE — 99396 PR PREVENTIVE VISIT,EST,40-64: ICD-10-PCS | Mod: S$GLB,,, | Performed by: FAMILY MEDICINE

## 2021-01-14 PROCEDURE — 99999 PR PBB SHADOW E&M-EST. PATIENT-LVL V: ICD-10-PCS | Mod: PBBFAC,,, | Performed by: FAMILY MEDICINE

## 2021-01-14 PROCEDURE — 3078F PR MOST RECENT DIASTOLIC BLOOD PRESSURE < 80 MM HG: ICD-10-PCS | Mod: CPTII,S$GLB,, | Performed by: FAMILY MEDICINE

## 2021-01-14 PROCEDURE — 3008F PR BODY MASS INDEX (BMI) DOCUMENTED: ICD-10-PCS | Mod: CPTII,S$GLB,, | Performed by: FAMILY MEDICINE

## 2021-01-14 PROCEDURE — 3008F BODY MASS INDEX DOCD: CPT | Mod: CPTII,S$GLB,, | Performed by: FAMILY MEDICINE

## 2021-01-14 PROCEDURE — 3074F PR MOST RECENT SYSTOLIC BLOOD PRESSURE < 130 MM HG: ICD-10-PCS | Mod: CPTII,S$GLB,, | Performed by: FAMILY MEDICINE

## 2021-01-14 PROCEDURE — 1125F PR PAIN SEVERITY QUANTIFIED, PAIN PRESENT: ICD-10-PCS | Mod: S$GLB,,, | Performed by: FAMILY MEDICINE

## 2021-01-14 PROCEDURE — 1125F AMNT PAIN NOTED PAIN PRSNT: CPT | Mod: S$GLB,,, | Performed by: FAMILY MEDICINE

## 2021-01-14 PROCEDURE — 99999 PR PBB SHADOW E&M-EST. PATIENT-LVL V: CPT | Mod: PBBFAC,,, | Performed by: FAMILY MEDICINE

## 2021-01-14 PROCEDURE — 99396 PREV VISIT EST AGE 40-64: CPT | Mod: S$GLB,,, | Performed by: FAMILY MEDICINE

## 2021-01-14 PROCEDURE — 3074F SYST BP LT 130 MM HG: CPT | Mod: CPTII,S$GLB,, | Performed by: FAMILY MEDICINE

## 2021-01-14 RX ORDER — LISINOPRIL 10 MG/1
10 TABLET ORAL DAILY
Qty: 30 TABLET | Refills: 5 | Status: SHIPPED | OUTPATIENT
Start: 2021-01-14 | End: 2021-08-10 | Stop reason: SDUPTHER

## 2021-01-14 RX ORDER — CYANOCOBALAMIN (VITAMIN B-12) 500 MCG
400 TABLET ORAL DAILY
COMMUNITY
Start: 2020-12-14 | End: 2023-02-08

## 2021-02-05 ENCOUNTER — PATIENT OUTREACH (OUTPATIENT)
Dept: ADMINISTRATIVE | Facility: OTHER | Age: 61
End: 2021-02-05

## 2021-02-09 ENCOUNTER — OFFICE VISIT (OUTPATIENT)
Dept: GASTROENTEROLOGY | Facility: CLINIC | Age: 61
End: 2021-02-09
Payer: COMMERCIAL

## 2021-02-09 ENCOUNTER — OFFICE VISIT (OUTPATIENT)
Dept: RHEUMATOLOGY | Facility: CLINIC | Age: 61
End: 2021-02-09
Payer: COMMERCIAL

## 2021-02-09 ENCOUNTER — HOSPITAL ENCOUNTER (OUTPATIENT)
Dept: RADIOLOGY | Facility: CLINIC | Age: 61
Discharge: HOME | End: 2021-02-09
Attending: FAMILY MEDICINE
Payer: COMMERCIAL

## 2021-02-09 VITALS
DIASTOLIC BLOOD PRESSURE: 79 MMHG | BODY MASS INDEX: 23.55 KG/M2 | SYSTOLIC BLOOD PRESSURE: 162 MMHG | HEART RATE: 97 BPM | WEIGHT: 128 LBS | HEIGHT: 62 IN

## 2021-02-09 VITALS — HEIGHT: 62 IN | BODY MASS INDEX: 23.93 KG/M2 | WEIGHT: 130.06 LBS | RESPIRATION RATE: 16 BRPM

## 2021-02-09 DIAGNOSIS — Z72.0 TOBACCO USE: Primary | ICD-10-CM

## 2021-02-09 DIAGNOSIS — I10 ESSENTIAL HYPERTENSION: ICD-10-CM

## 2021-02-09 DIAGNOSIS — K92.1 BLACK STOOLS: Primary | ICD-10-CM

## 2021-02-09 DIAGNOSIS — D64.9 ANEMIA, UNSPECIFIED TYPE: ICD-10-CM

## 2021-02-09 DIAGNOSIS — R10.13 EPIGASTRIC BURNING SENSATION: ICD-10-CM

## 2021-02-09 DIAGNOSIS — Z51.81 MEDICATION MONITORING ENCOUNTER: ICD-10-CM

## 2021-02-09 DIAGNOSIS — M81.0 OSTEOPOROSIS, UNSPECIFIED OSTEOPOROSIS TYPE, UNSPECIFIED PATHOLOGICAL FRACTURE PRESENCE: ICD-10-CM

## 2021-02-09 DIAGNOSIS — M06.9 RHEUMATOID ARTHRITIS, INVOLVING UNSPECIFIED SITE, UNSPECIFIED WHETHER RHEUMATOID FACTOR PRESENT: ICD-10-CM

## 2021-02-09 DIAGNOSIS — M25.50 DIFFUSE ARTHRALGIA: ICD-10-CM

## 2021-02-09 DIAGNOSIS — Z12.31 OTHER SCREENING MAMMOGRAM: ICD-10-CM

## 2021-02-09 PROCEDURE — 77067 SCR MAMMO BI INCL CAD: CPT | Mod: 26,,, | Performed by: RADIOLOGY

## 2021-02-09 PROCEDURE — 3077F PR MOST RECENT SYSTOLIC BLOOD PRESSURE >= 140 MM HG: ICD-10-PCS | Mod: CPTII,S$GLB,, | Performed by: INTERNAL MEDICINE

## 2021-02-09 PROCEDURE — 99999 PR PBB SHADOW E&M-EST. PATIENT-LVL III: CPT | Mod: PBBFAC,,, | Performed by: STUDENT IN AN ORGANIZED HEALTH CARE EDUCATION/TRAINING PROGRAM

## 2021-02-09 PROCEDURE — 3078F PR MOST RECENT DIASTOLIC BLOOD PRESSURE < 80 MM HG: ICD-10-PCS | Mod: CPTII,S$GLB,, | Performed by: STUDENT IN AN ORGANIZED HEALTH CARE EDUCATION/TRAINING PROGRAM

## 2021-02-09 PROCEDURE — 99999 PR PBB SHADOW E&M-EST. PATIENT-LVL III: ICD-10-PCS | Mod: PBBFAC,,, | Performed by: STUDENT IN AN ORGANIZED HEALTH CARE EDUCATION/TRAINING PROGRAM

## 2021-02-09 PROCEDURE — 99204 OFFICE O/P NEW MOD 45 MIN: CPT | Mod: S$GLB,,, | Performed by: INTERNAL MEDICINE

## 2021-02-09 PROCEDURE — 99999 PR PBB SHADOW E&M-EST. PATIENT-LVL III: ICD-10-PCS | Mod: PBBFAC,,, | Performed by: INTERNAL MEDICINE

## 2021-02-09 PROCEDURE — 3008F BODY MASS INDEX DOCD: CPT | Mod: CPTII,S$GLB,, | Performed by: INTERNAL MEDICINE

## 2021-02-09 PROCEDURE — 3077F SYST BP >= 140 MM HG: CPT | Mod: CPTII,S$GLB,, | Performed by: INTERNAL MEDICINE

## 2021-02-09 PROCEDURE — 3008F BODY MASS INDEX DOCD: CPT | Mod: CPTII,S$GLB,, | Performed by: STUDENT IN AN ORGANIZED HEALTH CARE EDUCATION/TRAINING PROGRAM

## 2021-02-09 PROCEDURE — 1126F AMNT PAIN NOTED NONE PRSNT: CPT | Mod: S$GLB,,, | Performed by: INTERNAL MEDICINE

## 2021-02-09 PROCEDURE — 99999 PR PBB SHADOW E&M-EST. PATIENT-LVL III: CPT | Mod: PBBFAC,,, | Performed by: INTERNAL MEDICINE

## 2021-02-09 PROCEDURE — 99214 OFFICE O/P EST MOD 30 MIN: CPT | Mod: S$GLB,,, | Performed by: STUDENT IN AN ORGANIZED HEALTH CARE EDUCATION/TRAINING PROGRAM

## 2021-02-09 PROCEDURE — 1125F AMNT PAIN NOTED PAIN PRSNT: CPT | Mod: S$GLB,,, | Performed by: STUDENT IN AN ORGANIZED HEALTH CARE EDUCATION/TRAINING PROGRAM

## 2021-02-09 PROCEDURE — 77067 MAMMO DIGITAL SCREENING BILAT WITH TOMO: ICD-10-PCS | Mod: 26,,, | Performed by: RADIOLOGY

## 2021-02-09 PROCEDURE — 99204 PR OFFICE/OUTPT VISIT, NEW, LEVL IV, 45-59 MIN: ICD-10-PCS | Mod: S$GLB,,, | Performed by: INTERNAL MEDICINE

## 2021-02-09 PROCEDURE — 77063 MAMMO DIGITAL SCREENING BILAT WITH TOMO: ICD-10-PCS | Mod: 26,,, | Performed by: RADIOLOGY

## 2021-02-09 PROCEDURE — 3008F PR BODY MASS INDEX (BMI) DOCUMENTED: ICD-10-PCS | Mod: CPTII,S$GLB,, | Performed by: INTERNAL MEDICINE

## 2021-02-09 PROCEDURE — 77063 BREAST TOMOSYNTHESIS BI: CPT | Mod: 26,,, | Performed by: RADIOLOGY

## 2021-02-09 PROCEDURE — 3078F PR MOST RECENT DIASTOLIC BLOOD PRESSURE < 80 MM HG: ICD-10-PCS | Mod: CPTII,S$GLB,, | Performed by: INTERNAL MEDICINE

## 2021-02-09 PROCEDURE — 1125F PR PAIN SEVERITY QUANTIFIED, PAIN PRESENT: ICD-10-PCS | Mod: S$GLB,,, | Performed by: STUDENT IN AN ORGANIZED HEALTH CARE EDUCATION/TRAINING PROGRAM

## 2021-02-09 PROCEDURE — 3078F DIAST BP <80 MM HG: CPT | Mod: CPTII,S$GLB,, | Performed by: INTERNAL MEDICINE

## 2021-02-09 PROCEDURE — 99214 PR OFFICE/OUTPT VISIT, EST, LEVL IV, 30-39 MIN: ICD-10-PCS | Mod: S$GLB,,, | Performed by: STUDENT IN AN ORGANIZED HEALTH CARE EDUCATION/TRAINING PROGRAM

## 2021-02-09 PROCEDURE — 1126F PR PAIN SEVERITY QUANTIFIED, NO PAIN PRESENT: ICD-10-PCS | Mod: S$GLB,,, | Performed by: INTERNAL MEDICINE

## 2021-02-09 PROCEDURE — 3077F SYST BP >= 140 MM HG: CPT | Mod: CPTII,S$GLB,, | Performed by: STUDENT IN AN ORGANIZED HEALTH CARE EDUCATION/TRAINING PROGRAM

## 2021-02-09 PROCEDURE — 3008F PR BODY MASS INDEX (BMI) DOCUMENTED: ICD-10-PCS | Mod: CPTII,S$GLB,, | Performed by: STUDENT IN AN ORGANIZED HEALTH CARE EDUCATION/TRAINING PROGRAM

## 2021-02-09 PROCEDURE — 3077F PR MOST RECENT SYSTOLIC BLOOD PRESSURE >= 140 MM HG: ICD-10-PCS | Mod: CPTII,S$GLB,, | Performed by: STUDENT IN AN ORGANIZED HEALTH CARE EDUCATION/TRAINING PROGRAM

## 2021-02-09 PROCEDURE — 3078F DIAST BP <80 MM HG: CPT | Mod: CPTII,S$GLB,, | Performed by: STUDENT IN AN ORGANIZED HEALTH CARE EDUCATION/TRAINING PROGRAM

## 2021-02-09 PROCEDURE — 77067 SCR MAMMO BI INCL CAD: CPT | Mod: TC,PO

## 2021-02-09 RX ORDER — METHOTREXATE 2.5 MG/1
15 TABLET ORAL
Qty: 24 TABLET | Refills: 2 | Status: SHIPPED | OUTPATIENT
Start: 2021-02-09 | End: 2021-03-23 | Stop reason: SDUPTHER

## 2021-02-09 RX ORDER — PREDNISONE 10 MG/1
10 TABLET ORAL DAILY
Qty: 30 TABLET | Refills: 0 | Status: SHIPPED | OUTPATIENT
Start: 2021-02-09 | End: 2021-03-09

## 2021-02-09 ASSESSMENT — ROUTINE ASSESSMENT OF PATIENT INDEX DATA (RAPID3)
PATIENT GLOBAL ASSESSMENT SCORE: 6.5
WHEN YOU AWAKENED IN THE MORNING OVER THE LAST WEEK, PLEASE INDICATE THE AMOUNT OF TIME IT TAKES UNTIL YOU ARE AS LIMBER AS YOU WILL BE FOR THE DAY: 1 HOUR
PAIN SCORE: 7
PSYCHOLOGICAL DISTRESS SCORE: 5.5
MDHAQ FUNCTION SCORE: 0.5
AM STIFFNESS SCORE: 1, YES
TOTAL RAPID3 SCORE: 5.06
FATIGUE SCORE: 6.5

## 2021-02-23 ENCOUNTER — PATIENT MESSAGE (OUTPATIENT)
Dept: RHEUMATOLOGY | Facility: CLINIC | Age: 61
End: 2021-02-23

## 2021-02-24 DIAGNOSIS — F41.1 GENERALIZED ANXIETY DISORDER: ICD-10-CM

## 2021-02-24 RX ORDER — SERTRALINE HYDROCHLORIDE 25 MG/1
25 TABLET, FILM COATED ORAL DAILY
Qty: 90 TABLET | Refills: 3 | Status: SHIPPED | OUTPATIENT
Start: 2021-02-24 | End: 2022-03-17

## 2021-02-26 ENCOUNTER — ANESTHESIA EVENT (OUTPATIENT)
Dept: ENDOSCOPY | Facility: HOSPITAL | Age: 61
End: 2021-02-26
Payer: COMMERCIAL

## 2021-02-26 ENCOUNTER — HOSPITAL ENCOUNTER (OUTPATIENT)
Facility: HOSPITAL | Age: 61
Discharge: HOME | End: 2021-02-26
Attending: INTERNAL MEDICINE | Admitting: INTERNAL MEDICINE
Payer: COMMERCIAL

## 2021-02-26 ENCOUNTER — ANESTHESIA (OUTPATIENT)
Dept: ENDOSCOPY | Facility: HOSPITAL | Age: 61
End: 2021-02-26
Payer: COMMERCIAL

## 2021-02-26 DIAGNOSIS — K63.5 POLYP OF SIGMOID COLON, UNSPECIFIED TYPE: Primary | ICD-10-CM

## 2021-02-26 DIAGNOSIS — K92.1 BLACK STOOL: ICD-10-CM

## 2021-02-26 PROCEDURE — 63600175 PHARM REV CODE 636 W HCPCS: Performed by: NURSE ANESTHETIST, CERTIFIED REGISTERED

## 2021-02-26 PROCEDURE — 45380 PR COLONOSCOPY,BIOPSY: ICD-10-PCS | Mod: 59,,, | Performed by: INTERNAL MEDICINE

## 2021-02-26 PROCEDURE — 45385 PR COLONOSCOPY,REMV LESN,SNARE: ICD-10-PCS | Mod: 33,,, | Performed by: INTERNAL MEDICINE

## 2021-02-26 PROCEDURE — D9220A PRA ANESTHESIA: Mod: 33,ANES,, | Performed by: ANESTHESIOLOGY

## 2021-02-26 PROCEDURE — 25000003 PHARM REV CODE 250: Performed by: INTERNAL MEDICINE

## 2021-02-26 PROCEDURE — 43239 EGD BIOPSY SINGLE/MULTIPLE: CPT | Mod: 51,,, | Performed by: INTERNAL MEDICINE

## 2021-02-26 PROCEDURE — 45380 COLONOSCOPY AND BIOPSY: CPT | Mod: 59,,, | Performed by: INTERNAL MEDICINE

## 2021-02-26 PROCEDURE — 45385 COLONOSCOPY W/LESION REMOVAL: CPT | Performed by: INTERNAL MEDICINE

## 2021-02-26 PROCEDURE — 88305 TISSUE EXAM BY PATHOLOGIST: CPT | Mod: 59 | Performed by: PATHOLOGY

## 2021-02-26 PROCEDURE — D9220A PRA ANESTHESIA: Mod: 33,CRNA,, | Performed by: NURSE ANESTHETIST, CERTIFIED REGISTERED

## 2021-02-26 PROCEDURE — 37000009 HC ANESTHESIA EA ADD 15 MINS: Performed by: INTERNAL MEDICINE

## 2021-02-26 PROCEDURE — 37000008 HC ANESTHESIA 1ST 15 MINUTES: Performed by: INTERNAL MEDICINE

## 2021-02-26 PROCEDURE — D9220A PRA ANESTHESIA: ICD-10-PCS | Mod: 33,CRNA,, | Performed by: NURSE ANESTHETIST, CERTIFIED REGISTERED

## 2021-02-26 PROCEDURE — 45385 COLONOSCOPY W/LESION REMOVAL: CPT | Mod: 33,,, | Performed by: INTERNAL MEDICINE

## 2021-02-26 PROCEDURE — 45380 COLONOSCOPY AND BIOPSY: CPT | Performed by: INTERNAL MEDICINE

## 2021-02-26 PROCEDURE — 88305 TISSUE EXAM BY PATHOLOGIST: CPT | Mod: 26,,, | Performed by: PATHOLOGY

## 2021-02-26 PROCEDURE — 88305 TISSUE EXAM BY PATHOLOGIST: ICD-10-PCS | Mod: 26,,, | Performed by: PATHOLOGY

## 2021-02-26 PROCEDURE — 27201012 HC FORCEPS, HOT/COLD, DISP: Performed by: INTERNAL MEDICINE

## 2021-02-26 PROCEDURE — 43239 EGD BIOPSY SINGLE/MULTIPLE: CPT | Performed by: INTERNAL MEDICINE

## 2021-02-26 PROCEDURE — 43239 PR EGD, FLEX, W/BIOPSY, SGL/MULTI: ICD-10-PCS | Mod: 51,,, | Performed by: INTERNAL MEDICINE

## 2021-02-26 PROCEDURE — 25000003 PHARM REV CODE 250: Performed by: NURSE ANESTHETIST, CERTIFIED REGISTERED

## 2021-02-26 PROCEDURE — D9220A PRA ANESTHESIA: ICD-10-PCS | Mod: 33,ANES,, | Performed by: ANESTHESIOLOGY

## 2021-02-26 PROCEDURE — 27201089 HC SNARE, DISP (ANY): Performed by: INTERNAL MEDICINE

## 2021-02-26 RX ORDER — SODIUM CHLORIDE 9 MG/ML
INJECTION, SOLUTION INTRAVENOUS CONTINUOUS
Status: DISCONTINUED | OUTPATIENT
Start: 2021-02-26 | End: 2021-02-26 | Stop reason: HOSPADM

## 2021-02-26 RX ORDER — PROPOFOL 10 MG/ML
VIAL (ML) INTRAVENOUS
Status: DISCONTINUED | OUTPATIENT
Start: 2021-02-26 | End: 2021-02-26

## 2021-02-26 RX ORDER — LIDOCAINE HYDROCHLORIDE 20 MG/ML
INJECTION INTRAVENOUS
Status: DISCONTINUED | OUTPATIENT
Start: 2021-02-26 | End: 2021-02-26

## 2021-02-26 RX ADMIN — PROPOFOL 50 MG: 10 INJECTION, EMULSION INTRAVENOUS at 11:02

## 2021-02-26 RX ADMIN — SODIUM CHLORIDE: 0.9 INJECTION, SOLUTION INTRAVENOUS at 10:02

## 2021-02-26 RX ADMIN — PROPOFOL 100 MG: 10 INJECTION, EMULSION INTRAVENOUS at 11:02

## 2021-02-26 RX ADMIN — LIDOCAINE HYDROCHLORIDE 100 MG: 20 INJECTION, SOLUTION INTRAVENOUS at 11:02

## 2021-03-01 VITALS
WEIGHT: 127 LBS | TEMPERATURE: 98 F | DIASTOLIC BLOOD PRESSURE: 60 MMHG | BODY MASS INDEX: 22.5 KG/M2 | SYSTOLIC BLOOD PRESSURE: 125 MMHG | HEIGHT: 63 IN | HEART RATE: 65 BPM | RESPIRATION RATE: 16 BRPM | OXYGEN SATURATION: 100 %

## 2021-03-03 LAB
FINAL PATHOLOGIC DIAGNOSIS: NORMAL
GROSS: NORMAL
Lab: NORMAL

## 2021-03-18 ENCOUNTER — PATIENT OUTREACH (OUTPATIENT)
Dept: ADMINISTRATIVE | Facility: OTHER | Age: 61
End: 2021-03-18

## 2021-03-19 ENCOUNTER — LAB VISIT (OUTPATIENT)
Dept: LAB | Facility: HOSPITAL | Age: 61
End: 2021-03-19
Attending: STUDENT IN AN ORGANIZED HEALTH CARE EDUCATION/TRAINING PROGRAM
Payer: COMMERCIAL

## 2021-03-19 DIAGNOSIS — M06.9 RHEUMATOID ARTHRITIS, INVOLVING UNSPECIFIED SITE, UNSPECIFIED WHETHER RHEUMATOID FACTOR PRESENT: ICD-10-CM

## 2021-03-19 LAB
ALBUMIN SERPL BCP-MCNC: 3.8 G/DL (ref 3.5–5.2)
ALP SERPL-CCNC: 81 U/L (ref 55–135)
ALT SERPL W/O P-5'-P-CCNC: 12 U/L (ref 10–44)
ANION GAP SERPL CALC-SCNC: 6 MMOL/L (ref 8–16)
AST SERPL-CCNC: 13 U/L (ref 10–40)
BASOPHILS # BLD AUTO: 0.15 K/UL (ref 0–0.2)
BASOPHILS NFR BLD: 1.4 % (ref 0–1.9)
BILIRUB SERPL-MCNC: 0.5 MG/DL (ref 0.1–1)
BUN SERPL-MCNC: 12 MG/DL (ref 6–20)
CALCIUM SERPL-MCNC: 9.8 MG/DL (ref 8.7–10.5)
CHLORIDE SERPL-SCNC: 106 MMOL/L (ref 95–110)
CO2 SERPL-SCNC: 29 MMOL/L (ref 23–29)
CREAT SERPL-MCNC: 0.8 MG/DL (ref 0.5–1.4)
CRP SERPL-MCNC: 1.7 MG/L (ref 0–8.2)
DIFFERENTIAL METHOD: ABNORMAL
EOSINOPHIL # BLD AUTO: 0.2 K/UL (ref 0–0.5)
EOSINOPHIL NFR BLD: 2.1 % (ref 0–8)
ERYTHROCYTE [DISTWIDTH] IN BLOOD BY AUTOMATED COUNT: 15.2 % (ref 11.5–14.5)
ERYTHROCYTE [SEDIMENTATION RATE] IN BLOOD BY WESTERGREN METHOD: 12 MM/HR (ref 0–20)
EST. GFR  (AFRICAN AMERICAN): >60 ML/MIN/1.73 M^2
EST. GFR  (NON AFRICAN AMERICAN): >60 ML/MIN/1.73 M^2
GLUCOSE SERPL-MCNC: 75 MG/DL (ref 70–110)
HCT VFR BLD AUTO: 40.9 % (ref 37–48.5)
HGB BLD-MCNC: 13.2 G/DL (ref 12–16)
IMM GRANULOCYTES # BLD AUTO: 0.11 K/UL (ref 0–0.04)
IMM GRANULOCYTES NFR BLD AUTO: 1 % (ref 0–0.5)
LYMPHOCYTES # BLD AUTO: 3.2 K/UL (ref 1–4.8)
LYMPHOCYTES NFR BLD: 30 % (ref 18–48)
MCH RBC QN AUTO: 32.2 PG (ref 27–31)
MCHC RBC AUTO-ENTMCNC: 32.3 G/DL (ref 32–36)
MCV RBC AUTO: 100 FL (ref 82–98)
MONOCYTES # BLD AUTO: 0.9 K/UL (ref 0.3–1)
MONOCYTES NFR BLD: 8.4 % (ref 4–15)
NEUTROPHILS # BLD AUTO: 6.1 K/UL (ref 1.8–7.7)
NEUTROPHILS NFR BLD: 57.1 % (ref 38–73)
NRBC BLD-RTO: 0 /100 WBC
PLATELET # BLD AUTO: 357 K/UL (ref 150–350)
PMV BLD AUTO: 10.1 FL (ref 9.2–12.9)
POTASSIUM SERPL-SCNC: 3.3 MMOL/L (ref 3.5–5.1)
PROT SERPL-MCNC: 7.3 G/DL (ref 6–8.4)
RBC # BLD AUTO: 4.1 M/UL (ref 4–5.4)
SODIUM SERPL-SCNC: 141 MMOL/L (ref 136–145)
WBC # BLD AUTO: 10.73 K/UL (ref 3.9–12.7)

## 2021-03-19 PROCEDURE — 86140 C-REACTIVE PROTEIN: CPT | Performed by: STUDENT IN AN ORGANIZED HEALTH CARE EDUCATION/TRAINING PROGRAM

## 2021-03-19 PROCEDURE — 85025 COMPLETE CBC W/AUTO DIFF WBC: CPT | Performed by: STUDENT IN AN ORGANIZED HEALTH CARE EDUCATION/TRAINING PROGRAM

## 2021-03-19 PROCEDURE — 85651 RBC SED RATE NONAUTOMATED: CPT | Mod: PO | Performed by: STUDENT IN AN ORGANIZED HEALTH CARE EDUCATION/TRAINING PROGRAM

## 2021-03-19 PROCEDURE — 36415 COLL VENOUS BLD VENIPUNCTURE: CPT | Mod: PO | Performed by: STUDENT IN AN ORGANIZED HEALTH CARE EDUCATION/TRAINING PROGRAM

## 2021-03-19 PROCEDURE — 80053 COMPREHEN METABOLIC PANEL: CPT | Performed by: STUDENT IN AN ORGANIZED HEALTH CARE EDUCATION/TRAINING PROGRAM

## 2021-03-23 ENCOUNTER — PATIENT MESSAGE (OUTPATIENT)
Dept: RHEUMATOLOGY | Facility: CLINIC | Age: 61
End: 2021-03-23

## 2021-03-23 ENCOUNTER — OFFICE VISIT (OUTPATIENT)
Dept: RHEUMATOLOGY | Facility: CLINIC | Age: 61
End: 2021-03-23
Payer: COMMERCIAL

## 2021-03-23 DIAGNOSIS — I10 ESSENTIAL HYPERTENSION: ICD-10-CM

## 2021-03-23 DIAGNOSIS — M06.9 RHEUMATOID ARTHRITIS, INVOLVING UNSPECIFIED SITE, UNSPECIFIED WHETHER RHEUMATOID FACTOR PRESENT: ICD-10-CM

## 2021-03-23 DIAGNOSIS — M25.50 DIFFUSE ARTHRALGIA: ICD-10-CM

## 2021-03-23 DIAGNOSIS — D64.9 ANEMIA, UNSPECIFIED TYPE: ICD-10-CM

## 2021-03-23 DIAGNOSIS — K27.9 PUD (PEPTIC ULCER DISEASE): ICD-10-CM

## 2021-03-23 DIAGNOSIS — Z51.81 MEDICATION MONITORING ENCOUNTER: ICD-10-CM

## 2021-03-23 DIAGNOSIS — M81.0 OSTEOPOROSIS, UNSPECIFIED OSTEOPOROSIS TYPE, UNSPECIFIED PATHOLOGICAL FRACTURE PRESENCE: ICD-10-CM

## 2021-03-23 DIAGNOSIS — Z72.0 TOBACCO USE: Primary | ICD-10-CM

## 2021-03-23 PROCEDURE — 99214 PR OFFICE/OUTPT VISIT, EST, LEVL IV, 30-39 MIN: ICD-10-PCS | Mod: 95,,, | Performed by: STUDENT IN AN ORGANIZED HEALTH CARE EDUCATION/TRAINING PROGRAM

## 2021-03-23 PROCEDURE — 99214 OFFICE O/P EST MOD 30 MIN: CPT | Mod: 95,,, | Performed by: STUDENT IN AN ORGANIZED HEALTH CARE EDUCATION/TRAINING PROGRAM

## 2021-03-23 RX ORDER — PANTOPRAZOLE SODIUM 40 MG/1
40 TABLET, DELAYED RELEASE ORAL DAILY
Qty: 30 TABLET | Refills: 11 | Status: SHIPPED | OUTPATIENT
Start: 2021-03-23 | End: 2022-01-03 | Stop reason: SDUPTHER

## 2021-03-23 RX ORDER — METHOTREXATE 2.5 MG/1
20 TABLET ORAL
Qty: 32 TABLET | Refills: 3 | Status: SHIPPED | OUTPATIENT
Start: 2021-03-23 | End: 2021-07-26 | Stop reason: SDUPTHER

## 2021-03-23 RX ORDER — PREDNISONE 10 MG/1
10 TABLET ORAL DAILY
Qty: 30 TABLET | Refills: 0 | Status: SHIPPED | OUTPATIENT
Start: 2021-03-23 | End: 2021-05-18

## 2021-04-28 ENCOUNTER — LAB VISIT (OUTPATIENT)
Dept: LAB | Facility: HOSPITAL | Age: 61
End: 2021-04-28
Attending: STUDENT IN AN ORGANIZED HEALTH CARE EDUCATION/TRAINING PROGRAM
Payer: COMMERCIAL

## 2021-04-28 DIAGNOSIS — M06.9 RHEUMATOID ARTHRITIS, INVOLVING UNSPECIFIED SITE, UNSPECIFIED WHETHER RHEUMATOID FACTOR PRESENT: ICD-10-CM

## 2021-04-28 LAB
ALBUMIN SERPL BCP-MCNC: 3.7 G/DL (ref 3.5–5.2)
ALP SERPL-CCNC: 81 U/L (ref 55–135)
ALT SERPL W/O P-5'-P-CCNC: 13 U/L (ref 10–44)
ANION GAP SERPL CALC-SCNC: 10 MMOL/L (ref 8–16)
AST SERPL-CCNC: 15 U/L (ref 10–40)
BASOPHILS # BLD AUTO: 0.09 K/UL (ref 0–0.2)
BASOPHILS NFR BLD: 0.8 % (ref 0–1.9)
BILIRUB SERPL-MCNC: 0.7 MG/DL (ref 0.1–1)
BUN SERPL-MCNC: 9 MG/DL (ref 6–20)
CALCIUM SERPL-MCNC: 9.6 MG/DL (ref 8.7–10.5)
CHLORIDE SERPL-SCNC: 106 MMOL/L (ref 95–110)
CO2 SERPL-SCNC: 25 MMOL/L (ref 23–29)
CREAT SERPL-MCNC: 0.8 MG/DL (ref 0.5–1.4)
CRP SERPL-MCNC: 6.6 MG/L (ref 0–8.2)
DIFFERENTIAL METHOD: ABNORMAL
EOSINOPHIL # BLD AUTO: 0.1 K/UL (ref 0–0.5)
EOSINOPHIL NFR BLD: 0.5 % (ref 0–8)
ERYTHROCYTE [DISTWIDTH] IN BLOOD BY AUTOMATED COUNT: 14.7 % (ref 11.5–14.5)
ERYTHROCYTE [SEDIMENTATION RATE] IN BLOOD BY WESTERGREN METHOD: 18 MM/HR (ref 0–20)
EST. GFR  (AFRICAN AMERICAN): >60 ML/MIN/1.73 M^2
EST. GFR  (NON AFRICAN AMERICAN): >60 ML/MIN/1.73 M^2
GLUCOSE SERPL-MCNC: 107 MG/DL (ref 70–110)
HCT VFR BLD AUTO: 38 % (ref 37–48.5)
HGB BLD-MCNC: 12.6 G/DL (ref 12–16)
IMM GRANULOCYTES # BLD AUTO: 0.06 K/UL (ref 0–0.04)
IMM GRANULOCYTES NFR BLD AUTO: 0.5 % (ref 0–0.5)
LYMPHOCYTES # BLD AUTO: 0.7 K/UL (ref 1–4.8)
LYMPHOCYTES NFR BLD: 6.5 % (ref 18–48)
MCH RBC QN AUTO: 32.6 PG (ref 27–31)
MCHC RBC AUTO-ENTMCNC: 33.2 G/DL (ref 32–36)
MCV RBC AUTO: 98 FL (ref 82–98)
MONOCYTES # BLD AUTO: 0.3 K/UL (ref 0.3–1)
MONOCYTES NFR BLD: 2.8 % (ref 4–15)
NEUTROPHILS # BLD AUTO: 9.7 K/UL (ref 1.8–7.7)
NEUTROPHILS NFR BLD: 88.9 % (ref 38–73)
NRBC BLD-RTO: 0 /100 WBC
PLATELET # BLD AUTO: 318 K/UL (ref 150–450)
PMV BLD AUTO: 10.5 FL (ref 9.2–12.9)
POTASSIUM SERPL-SCNC: 3.8 MMOL/L (ref 3.5–5.1)
PROT SERPL-MCNC: 7.1 G/DL (ref 6–8.4)
RBC # BLD AUTO: 3.87 M/UL (ref 4–5.4)
SODIUM SERPL-SCNC: 141 MMOL/L (ref 136–145)
WBC # BLD AUTO: 10.93 K/UL (ref 3.9–12.7)

## 2021-04-28 PROCEDURE — 85651 RBC SED RATE NONAUTOMATED: CPT | Mod: PO | Performed by: STUDENT IN AN ORGANIZED HEALTH CARE EDUCATION/TRAINING PROGRAM

## 2021-04-28 PROCEDURE — 85025 COMPLETE CBC W/AUTO DIFF WBC: CPT | Performed by: STUDENT IN AN ORGANIZED HEALTH CARE EDUCATION/TRAINING PROGRAM

## 2021-04-28 PROCEDURE — 80053 COMPREHEN METABOLIC PANEL: CPT | Performed by: STUDENT IN AN ORGANIZED HEALTH CARE EDUCATION/TRAINING PROGRAM

## 2021-04-28 PROCEDURE — 86140 C-REACTIVE PROTEIN: CPT | Performed by: STUDENT IN AN ORGANIZED HEALTH CARE EDUCATION/TRAINING PROGRAM

## 2021-04-28 PROCEDURE — 36415 COLL VENOUS BLD VENIPUNCTURE: CPT | Mod: PO | Performed by: STUDENT IN AN ORGANIZED HEALTH CARE EDUCATION/TRAINING PROGRAM

## 2021-06-09 ENCOUNTER — TELEPHONE (OUTPATIENT)
Dept: RHEUMATOLOGY | Facility: CLINIC | Age: 61
End: 2021-06-09

## 2021-06-10 ENCOUNTER — PATIENT MESSAGE (OUTPATIENT)
Dept: RHEUMATOLOGY | Facility: CLINIC | Age: 61
End: 2021-06-10

## 2021-07-02 ENCOUNTER — PATIENT OUTREACH (OUTPATIENT)
Dept: ADMINISTRATIVE | Facility: OTHER | Age: 61
End: 2021-07-02

## 2021-07-06 ENCOUNTER — PATIENT MESSAGE (OUTPATIENT)
Dept: RHEUMATOLOGY | Facility: CLINIC | Age: 61
End: 2021-07-06

## 2021-07-06 ENCOUNTER — OFFICE VISIT (OUTPATIENT)
Dept: DERMATOLOGY | Facility: CLINIC | Age: 61
End: 2021-07-06
Payer: COMMERCIAL

## 2021-07-06 DIAGNOSIS — D23.9 DERMATOFIBROMA: ICD-10-CM

## 2021-07-06 DIAGNOSIS — D18.01 CHERRY ANGIOMA: ICD-10-CM

## 2021-07-06 DIAGNOSIS — D69.2 SOLAR PURPURA: ICD-10-CM

## 2021-07-06 DIAGNOSIS — D48.5 NEOPLASM OF UNCERTAIN BEHAVIOR OF SKIN: ICD-10-CM

## 2021-07-06 DIAGNOSIS — L82.1 SEBORRHEIC KERATOSES: Primary | ICD-10-CM

## 2021-07-06 PROCEDURE — 88305 TISSUE EXAM BY PATHOLOGIST: CPT | Mod: 26,,, | Performed by: PATHOLOGY

## 2021-07-06 PROCEDURE — 99203 PR OFFICE/OUTPT VISIT, NEW, LEVL III, 30-44 MIN: ICD-10-PCS | Mod: 25,S$GLB,, | Performed by: STUDENT IN AN ORGANIZED HEALTH CARE EDUCATION/TRAINING PROGRAM

## 2021-07-06 PROCEDURE — 88342 IMHCHEM/IMCYTCHM 1ST ANTB: CPT | Mod: 26,,, | Performed by: PATHOLOGY

## 2021-07-06 PROCEDURE — 1126F PR PAIN SEVERITY QUANTIFIED, NO PAIN PRESENT: ICD-10-PCS | Mod: S$GLB,,, | Performed by: STUDENT IN AN ORGANIZED HEALTH CARE EDUCATION/TRAINING PROGRAM

## 2021-07-06 PROCEDURE — 88342 IMHCHEM/IMCYTCHM 1ST ANTB: CPT | Performed by: PATHOLOGY

## 2021-07-06 PROCEDURE — 1126F AMNT PAIN NOTED NONE PRSNT: CPT | Mod: S$GLB,,, | Performed by: STUDENT IN AN ORGANIZED HEALTH CARE EDUCATION/TRAINING PROGRAM

## 2021-07-06 PROCEDURE — 99999 PR PBB SHADOW E&M-EST. PATIENT-LVL III: ICD-10-PCS | Mod: PBBFAC,,, | Performed by: STUDENT IN AN ORGANIZED HEALTH CARE EDUCATION/TRAINING PROGRAM

## 2021-07-06 PROCEDURE — 99999 PR PBB SHADOW E&M-EST. PATIENT-LVL III: CPT | Mod: PBBFAC,,, | Performed by: STUDENT IN AN ORGANIZED HEALTH CARE EDUCATION/TRAINING PROGRAM

## 2021-07-06 PROCEDURE — 88342 CHG IMMUNOCYTOCHEMISTRY: ICD-10-PCS | Mod: 26,,, | Performed by: PATHOLOGY

## 2021-07-06 PROCEDURE — 11102 TANGNTL BX SKIN SINGLE LES: CPT | Mod: S$GLB,,, | Performed by: STUDENT IN AN ORGANIZED HEALTH CARE EDUCATION/TRAINING PROGRAM

## 2021-07-06 PROCEDURE — 11102 PR TANGENTIAL BIOPSY, SKIN, SINGLE LESION: ICD-10-PCS | Mod: S$GLB,,, | Performed by: STUDENT IN AN ORGANIZED HEALTH CARE EDUCATION/TRAINING PROGRAM

## 2021-07-06 PROCEDURE — 88305 TISSUE EXAM BY PATHOLOGIST: CPT | Performed by: PATHOLOGY

## 2021-07-06 PROCEDURE — 99203 OFFICE O/P NEW LOW 30 MIN: CPT | Mod: 25,S$GLB,, | Performed by: STUDENT IN AN ORGANIZED HEALTH CARE EDUCATION/TRAINING PROGRAM

## 2021-07-06 PROCEDURE — 88305 TISSUE EXAM BY PATHOLOGIST: ICD-10-PCS | Mod: 26,,, | Performed by: PATHOLOGY

## 2021-07-06 RX ORDER — CYCLOBENZAPRINE HCL 5 MG
5 TABLET ORAL 3 TIMES DAILY PRN
COMMUNITY
Start: 2021-06-05 | End: 2022-01-03 | Stop reason: SDUPTHER

## 2021-07-13 LAB
FINAL PATHOLOGIC DIAGNOSIS: NORMAL
GROSS: NORMAL
Lab: NORMAL
MICROSCOPIC EXAM: NORMAL

## 2021-07-21 ENCOUNTER — LAB VISIT (OUTPATIENT)
Dept: LAB | Facility: HOSPITAL | Age: 61
End: 2021-07-21
Attending: STUDENT IN AN ORGANIZED HEALTH CARE EDUCATION/TRAINING PROGRAM
Payer: COMMERCIAL

## 2021-07-21 DIAGNOSIS — M06.9 RHEUMATOID ARTHRITIS, INVOLVING UNSPECIFIED SITE, UNSPECIFIED WHETHER RHEUMATOID FACTOR PRESENT: ICD-10-CM

## 2021-07-21 LAB
BASOPHILS # BLD AUTO: 0.1 K/UL (ref 0–0.2)
BASOPHILS NFR BLD: 0.7 % (ref 0–1.9)
DIFFERENTIAL METHOD: ABNORMAL
EOSINOPHIL # BLD AUTO: 0.2 K/UL (ref 0–0.5)
EOSINOPHIL NFR BLD: 1.2 % (ref 0–8)
ERYTHROCYTE [DISTWIDTH] IN BLOOD BY AUTOMATED COUNT: 14.6 % (ref 11.5–14.5)
ERYTHROCYTE [SEDIMENTATION RATE] IN BLOOD BY WESTERGREN METHOD: 19 MM/HR (ref 0–20)
HCT VFR BLD AUTO: 37.7 % (ref 37–48.5)
HGB BLD-MCNC: 12.2 G/DL (ref 12–16)
IMM GRANULOCYTES # BLD AUTO: 0.14 K/UL (ref 0–0.04)
IMM GRANULOCYTES NFR BLD AUTO: 1 % (ref 0–0.5)
LYMPHOCYTES # BLD AUTO: 1.9 K/UL (ref 1–4.8)
LYMPHOCYTES NFR BLD: 13 % (ref 18–48)
MCH RBC QN AUTO: 34.2 PG (ref 27–31)
MCHC RBC AUTO-ENTMCNC: 32.4 G/DL (ref 32–36)
MCV RBC AUTO: 106 FL (ref 82–98)
MONOCYTES # BLD AUTO: 1.3 K/UL (ref 0.3–1)
MONOCYTES NFR BLD: 8.9 % (ref 4–15)
NEUTROPHILS # BLD AUTO: 11 K/UL (ref 1.8–7.7)
NEUTROPHILS NFR BLD: 75.2 % (ref 38–73)
NRBC BLD-RTO: 0 /100 WBC
PLATELET # BLD AUTO: 273 K/UL (ref 150–450)
PMV BLD AUTO: 10.5 FL (ref 9.2–12.9)
RBC # BLD AUTO: 3.57 M/UL (ref 4–5.4)
WBC # BLD AUTO: 14.56 K/UL (ref 3.9–12.7)

## 2021-07-21 PROCEDURE — 85025 COMPLETE CBC W/AUTO DIFF WBC: CPT | Performed by: STUDENT IN AN ORGANIZED HEALTH CARE EDUCATION/TRAINING PROGRAM

## 2021-07-21 PROCEDURE — 80053 COMPREHEN METABOLIC PANEL: CPT | Performed by: STUDENT IN AN ORGANIZED HEALTH CARE EDUCATION/TRAINING PROGRAM

## 2021-07-21 PROCEDURE — 86140 C-REACTIVE PROTEIN: CPT | Performed by: STUDENT IN AN ORGANIZED HEALTH CARE EDUCATION/TRAINING PROGRAM

## 2021-07-21 PROCEDURE — 36415 COLL VENOUS BLD VENIPUNCTURE: CPT | Mod: PO | Performed by: STUDENT IN AN ORGANIZED HEALTH CARE EDUCATION/TRAINING PROGRAM

## 2021-07-21 PROCEDURE — 85651 RBC SED RATE NONAUTOMATED: CPT | Mod: PO | Performed by: STUDENT IN AN ORGANIZED HEALTH CARE EDUCATION/TRAINING PROGRAM

## 2021-07-22 LAB
ALBUMIN SERPL BCP-MCNC: 3.9 G/DL (ref 3.5–5.2)
ALP SERPL-CCNC: 77 U/L (ref 55–135)
ALT SERPL W/O P-5'-P-CCNC: 14 U/L (ref 10–44)
ANION GAP SERPL CALC-SCNC: 14 MMOL/L (ref 8–16)
AST SERPL-CCNC: 18 U/L (ref 10–40)
BILIRUB SERPL-MCNC: 0.3 MG/DL (ref 0.1–1)
BUN SERPL-MCNC: 12 MG/DL (ref 6–20)
CALCIUM SERPL-MCNC: 10.3 MG/DL (ref 8.7–10.5)
CHLORIDE SERPL-SCNC: 105 MMOL/L (ref 95–110)
CO2 SERPL-SCNC: 24 MMOL/L (ref 23–29)
CREAT SERPL-MCNC: 1 MG/DL (ref 0.5–1.4)
CRP SERPL-MCNC: 6.4 MG/L (ref 0–8.2)
EST. GFR  (AFRICAN AMERICAN): >60 ML/MIN/1.73 M^2
EST. GFR  (NON AFRICAN AMERICAN): >60 ML/MIN/1.73 M^2
GLUCOSE SERPL-MCNC: 130 MG/DL (ref 70–110)
POTASSIUM SERPL-SCNC: 3.3 MMOL/L (ref 3.5–5.1)
PROT SERPL-MCNC: 7 G/DL (ref 6–8.4)
SODIUM SERPL-SCNC: 143 MMOL/L (ref 136–145)

## 2021-07-23 ENCOUNTER — PATIENT MESSAGE (OUTPATIENT)
Dept: RHEUMATOLOGY | Facility: CLINIC | Age: 61
End: 2021-07-23

## 2021-07-26 ENCOUNTER — OFFICE VISIT (OUTPATIENT)
Dept: RHEUMATOLOGY | Facility: CLINIC | Age: 61
End: 2021-07-26
Payer: COMMERCIAL

## 2021-07-26 VITALS
SYSTOLIC BLOOD PRESSURE: 163 MMHG | HEART RATE: 97 BPM | HEIGHT: 63 IN | WEIGHT: 140.75 LBS | DIASTOLIC BLOOD PRESSURE: 93 MMHG | BODY MASS INDEX: 24.94 KG/M2

## 2021-07-26 DIAGNOSIS — Z72.0 TOBACCO USE: Primary | ICD-10-CM

## 2021-07-26 DIAGNOSIS — M06.9 RHEUMATOID ARTHRITIS, INVOLVING UNSPECIFIED SITE, UNSPECIFIED WHETHER RHEUMATOID FACTOR PRESENT: ICD-10-CM

## 2021-07-26 DIAGNOSIS — Z51.81 MEDICATION MONITORING ENCOUNTER: ICD-10-CM

## 2021-07-26 DIAGNOSIS — I10 ESSENTIAL HYPERTENSION: ICD-10-CM

## 2021-07-26 DIAGNOSIS — M81.0 OSTEOPOROSIS, UNSPECIFIED OSTEOPOROSIS TYPE, UNSPECIFIED PATHOLOGICAL FRACTURE PRESENCE: ICD-10-CM

## 2021-07-26 PROCEDURE — 3080F PR MOST RECENT DIASTOLIC BLOOD PRESSURE >= 90 MM HG: ICD-10-PCS | Mod: CPTII,S$GLB,, | Performed by: STUDENT IN AN ORGANIZED HEALTH CARE EDUCATION/TRAINING PROGRAM

## 2021-07-26 PROCEDURE — 1159F PR MEDICATION LIST DOCUMENTED IN MEDICAL RECORD: ICD-10-PCS | Mod: CPTII,S$GLB,, | Performed by: STUDENT IN AN ORGANIZED HEALTH CARE EDUCATION/TRAINING PROGRAM

## 2021-07-26 PROCEDURE — 3077F SYST BP >= 140 MM HG: CPT | Mod: CPTII,S$GLB,, | Performed by: STUDENT IN AN ORGANIZED HEALTH CARE EDUCATION/TRAINING PROGRAM

## 2021-07-26 PROCEDURE — 3077F PR MOST RECENT SYSTOLIC BLOOD PRESSURE >= 140 MM HG: ICD-10-PCS | Mod: CPTII,S$GLB,, | Performed by: STUDENT IN AN ORGANIZED HEALTH CARE EDUCATION/TRAINING PROGRAM

## 2021-07-26 PROCEDURE — 1125F PR PAIN SEVERITY QUANTIFIED, PAIN PRESENT: ICD-10-PCS | Mod: CPTII,S$GLB,, | Performed by: STUDENT IN AN ORGANIZED HEALTH CARE EDUCATION/TRAINING PROGRAM

## 2021-07-26 PROCEDURE — 1160F RVW MEDS BY RX/DR IN RCRD: CPT | Mod: CPTII,S$GLB,, | Performed by: STUDENT IN AN ORGANIZED HEALTH CARE EDUCATION/TRAINING PROGRAM

## 2021-07-26 PROCEDURE — 99214 PR OFFICE/OUTPT VISIT, EST, LEVL IV, 30-39 MIN: ICD-10-PCS | Mod: S$GLB,,, | Performed by: STUDENT IN AN ORGANIZED HEALTH CARE EDUCATION/TRAINING PROGRAM

## 2021-07-26 PROCEDURE — 3008F PR BODY MASS INDEX (BMI) DOCUMENTED: ICD-10-PCS | Mod: CPTII,S$GLB,, | Performed by: STUDENT IN AN ORGANIZED HEALTH CARE EDUCATION/TRAINING PROGRAM

## 2021-07-26 PROCEDURE — 99999 PR PBB SHADOW E&M-EST. PATIENT-LVL III: CPT | Mod: PBBFAC,,, | Performed by: STUDENT IN AN ORGANIZED HEALTH CARE EDUCATION/TRAINING PROGRAM

## 2021-07-26 PROCEDURE — 99214 OFFICE O/P EST MOD 30 MIN: CPT | Mod: S$GLB,,, | Performed by: STUDENT IN AN ORGANIZED HEALTH CARE EDUCATION/TRAINING PROGRAM

## 2021-07-26 PROCEDURE — 3008F BODY MASS INDEX DOCD: CPT | Mod: CPTII,S$GLB,, | Performed by: STUDENT IN AN ORGANIZED HEALTH CARE EDUCATION/TRAINING PROGRAM

## 2021-07-26 PROCEDURE — 1125F AMNT PAIN NOTED PAIN PRSNT: CPT | Mod: CPTII,S$GLB,, | Performed by: STUDENT IN AN ORGANIZED HEALTH CARE EDUCATION/TRAINING PROGRAM

## 2021-07-26 PROCEDURE — 1159F MED LIST DOCD IN RCRD: CPT | Mod: CPTII,S$GLB,, | Performed by: STUDENT IN AN ORGANIZED HEALTH CARE EDUCATION/TRAINING PROGRAM

## 2021-07-26 PROCEDURE — 99999 PR PBB SHADOW E&M-EST. PATIENT-LVL III: ICD-10-PCS | Mod: PBBFAC,,, | Performed by: STUDENT IN AN ORGANIZED HEALTH CARE EDUCATION/TRAINING PROGRAM

## 2021-07-26 PROCEDURE — 3080F DIAST BP >= 90 MM HG: CPT | Mod: CPTII,S$GLB,, | Performed by: STUDENT IN AN ORGANIZED HEALTH CARE EDUCATION/TRAINING PROGRAM

## 2021-07-26 PROCEDURE — 1160F PR REVIEW ALL MEDS BY PRESCRIBER/CLIN PHARMACIST DOCUMENTED: ICD-10-PCS | Mod: CPTII,S$GLB,, | Performed by: STUDENT IN AN ORGANIZED HEALTH CARE EDUCATION/TRAINING PROGRAM

## 2021-07-26 RX ORDER — FOLIC ACID 1 MG/1
1 TABLET ORAL DAILY
Qty: 360 TABLET | Refills: 0 | Status: SHIPPED | OUTPATIENT
Start: 2021-07-26 | End: 2021-10-11 | Stop reason: SDUPTHER

## 2021-07-26 RX ORDER — METHOTREXATE 2.5 MG/1
20 TABLET ORAL
Qty: 32 TABLET | Refills: 3 | Status: SHIPPED | OUTPATIENT
Start: 2021-07-26 | End: 2021-10-11 | Stop reason: SDUPTHER

## 2021-07-26 RX ORDER — ALENDRONATE SODIUM 70 MG/1
70 TABLET ORAL
Qty: 4 TABLET | Refills: 11 | Status: SHIPPED | OUTPATIENT
Start: 2021-07-26 | End: 2022-05-25

## 2021-07-26 RX ORDER — PREDNISONE 5 MG/1
10 TABLET ORAL DAILY
Qty: 60 TABLET | Refills: 0 | Status: SHIPPED | OUTPATIENT
Start: 2021-07-26 | End: 2021-09-17 | Stop reason: SDUPTHER

## 2021-07-26 ASSESSMENT — ROUTINE ASSESSMENT OF PATIENT INDEX DATA (RAPID3)
FATIGUE SCORE: 8.5
PAIN SCORE: 7.5
PATIENT GLOBAL ASSESSMENT SCORE: 3
AM STIFFNESS SCORE: 1, YES
MDHAQ FUNCTION SCORE: 0.9
TOTAL RAPID3 SCORE: 4.5
PSYCHOLOGICAL DISTRESS SCORE: 5.5

## 2021-08-08 DIAGNOSIS — I10 ESSENTIAL HYPERTENSION: ICD-10-CM

## 2021-08-10 DIAGNOSIS — I10 ESSENTIAL HYPERTENSION: ICD-10-CM

## 2021-08-10 RX ORDER — LISINOPRIL 10 MG/1
10 TABLET ORAL DAILY
Qty: 30 TABLET | Refills: 0 | Status: SHIPPED | OUTPATIENT
Start: 2021-08-10 | End: 2021-09-06 | Stop reason: SDUPTHER

## 2021-08-10 RX ORDER — LISINOPRIL 10 MG/1
TABLET ORAL
Qty: 30 TABLET | Refills: 0 | OUTPATIENT
Start: 2021-08-10

## 2021-09-06 ENCOUNTER — PATIENT MESSAGE (OUTPATIENT)
Dept: FAMILY MEDICINE | Facility: CLINIC | Age: 61
End: 2021-09-06

## 2021-09-07 DIAGNOSIS — I10 ESSENTIAL HYPERTENSION: ICD-10-CM

## 2021-09-09 RX ORDER — LISINOPRIL 10 MG/1
TABLET ORAL
Qty: 30 TABLET | Refills: 0 | OUTPATIENT
Start: 2021-09-09

## 2021-09-16 ENCOUNTER — TELEPHONE (OUTPATIENT)
Dept: PULMONOLOGY | Facility: CLINIC | Age: 61
End: 2021-09-16

## 2021-09-17 ENCOUNTER — OFFICE VISIT (OUTPATIENT)
Dept: FAMILY MEDICINE | Facility: CLINIC | Age: 61
End: 2021-09-17
Attending: FAMILY MEDICINE
Payer: COMMERCIAL

## 2021-09-17 VITALS
DIASTOLIC BLOOD PRESSURE: 88 MMHG | HEIGHT: 63 IN | SYSTOLIC BLOOD PRESSURE: 132 MMHG | HEART RATE: 85 BPM | BODY MASS INDEX: 24.92 KG/M2 | OXYGEN SATURATION: 99 % | WEIGHT: 140.63 LBS | TEMPERATURE: 99 F

## 2021-09-17 DIAGNOSIS — R00.2 PALPITATIONS: ICD-10-CM

## 2021-09-17 DIAGNOSIS — I10 ESSENTIAL HYPERTENSION: Primary | ICD-10-CM

## 2021-09-17 DIAGNOSIS — M05.79 RHEUMATOID ARTHRITIS INVOLVING MULTIPLE SITES WITH POSITIVE RHEUMATOID FACTOR: ICD-10-CM

## 2021-09-17 PROCEDURE — 3079F DIAST BP 80-89 MM HG: CPT | Mod: CPTII,S$GLB,, | Performed by: FAMILY MEDICINE

## 2021-09-17 PROCEDURE — 99999 PR PBB SHADOW E&M-EST. PATIENT-LVL IV: ICD-10-PCS | Mod: PBBFAC,,, | Performed by: FAMILY MEDICINE

## 2021-09-17 PROCEDURE — 1159F PR MEDICATION LIST DOCUMENTED IN MEDICAL RECORD: ICD-10-PCS | Mod: CPTII,S$GLB,, | Performed by: FAMILY MEDICINE

## 2021-09-17 PROCEDURE — 4010F ACE/ARB THERAPY RXD/TAKEN: CPT | Mod: CPTII,S$GLB,, | Performed by: FAMILY MEDICINE

## 2021-09-17 PROCEDURE — 4010F PR ACE/ARB THEARPY RXD/TAKEN: ICD-10-PCS | Mod: CPTII,S$GLB,, | Performed by: FAMILY MEDICINE

## 2021-09-17 PROCEDURE — 99214 OFFICE O/P EST MOD 30 MIN: CPT | Mod: S$GLB,,, | Performed by: FAMILY MEDICINE

## 2021-09-17 PROCEDURE — 3008F PR BODY MASS INDEX (BMI) DOCUMENTED: ICD-10-PCS | Mod: CPTII,S$GLB,, | Performed by: FAMILY MEDICINE

## 2021-09-17 PROCEDURE — 1160F PR REVIEW ALL MEDS BY PRESCRIBER/CLIN PHARMACIST DOCUMENTED: ICD-10-PCS | Mod: CPTII,S$GLB,, | Performed by: FAMILY MEDICINE

## 2021-09-17 PROCEDURE — 1160F RVW MEDS BY RX/DR IN RCRD: CPT | Mod: CPTII,S$GLB,, | Performed by: FAMILY MEDICINE

## 2021-09-17 PROCEDURE — 3075F PR MOST RECENT SYSTOLIC BLOOD PRESS GE 130-139MM HG: ICD-10-PCS | Mod: CPTII,S$GLB,, | Performed by: FAMILY MEDICINE

## 2021-09-17 PROCEDURE — 3008F BODY MASS INDEX DOCD: CPT | Mod: CPTII,S$GLB,, | Performed by: FAMILY MEDICINE

## 2021-09-17 PROCEDURE — 1159F MED LIST DOCD IN RCRD: CPT | Mod: CPTII,S$GLB,, | Performed by: FAMILY MEDICINE

## 2021-09-17 PROCEDURE — 99214 PR OFFICE/OUTPT VISIT, EST, LEVL IV, 30-39 MIN: ICD-10-PCS | Mod: S$GLB,,, | Performed by: FAMILY MEDICINE

## 2021-09-17 PROCEDURE — 3075F SYST BP GE 130 - 139MM HG: CPT | Mod: CPTII,S$GLB,, | Performed by: FAMILY MEDICINE

## 2021-09-17 PROCEDURE — 99999 PR PBB SHADOW E&M-EST. PATIENT-LVL IV: CPT | Mod: PBBFAC,,, | Performed by: FAMILY MEDICINE

## 2021-09-17 PROCEDURE — 3079F PR MOST RECENT DIASTOLIC BLOOD PRESSURE 80-89 MM HG: ICD-10-PCS | Mod: CPTII,S$GLB,, | Performed by: FAMILY MEDICINE

## 2021-09-17 RX ORDER — LISINOPRIL 10 MG/1
10 TABLET ORAL DAILY
Qty: 30 TABLET | Refills: 5 | Status: SHIPPED | OUTPATIENT
Start: 2021-09-17 | End: 2021-10-18

## 2021-09-17 RX ORDER — PREDNISONE 5 MG/1
TABLET ORAL
Qty: 45 TABLET | Refills: 1 | Status: SHIPPED | OUTPATIENT
Start: 2021-09-17 | End: 2021-10-11

## 2021-09-17 RX ORDER — METOPROLOL SUCCINATE 50 MG/1
50 TABLET, EXTENDED RELEASE ORAL DAILY
Qty: 30 TABLET | Refills: 5 | Status: SHIPPED | OUTPATIENT
Start: 2021-09-17 | End: 2022-04-01

## 2021-09-18 ENCOUNTER — TELEPHONE (OUTPATIENT)
Dept: RHEUMATOLOGY | Facility: CLINIC | Age: 61
End: 2021-09-18

## 2021-09-18 RX ORDER — PREDNISONE 5 MG/1
5 TABLET ORAL DAILY
Qty: 30 TABLET | Refills: 0 | Status: SHIPPED | OUTPATIENT
Start: 2021-09-18 | End: 2021-10-11

## 2021-10-07 ENCOUNTER — PATIENT OUTREACH (OUTPATIENT)
Dept: ADMINISTRATIVE | Facility: OTHER | Age: 61
End: 2021-10-07

## 2021-10-08 ENCOUNTER — LAB VISIT (OUTPATIENT)
Dept: LAB | Facility: HOSPITAL | Age: 61
End: 2021-10-08
Attending: STUDENT IN AN ORGANIZED HEALTH CARE EDUCATION/TRAINING PROGRAM
Payer: COMMERCIAL

## 2021-10-08 DIAGNOSIS — M06.9 RHEUMATOID ARTHRITIS, INVOLVING UNSPECIFIED SITE, UNSPECIFIED WHETHER RHEUMATOID FACTOR PRESENT: ICD-10-CM

## 2021-10-08 LAB
ALBUMIN SERPL BCP-MCNC: 3.8 G/DL (ref 3.5–5.2)
ALP SERPL-CCNC: 96 U/L (ref 55–135)
ALT SERPL W/O P-5'-P-CCNC: 20 U/L (ref 10–44)
ANION GAP SERPL CALC-SCNC: 13 MMOL/L (ref 8–16)
AST SERPL-CCNC: 17 U/L (ref 10–40)
BASOPHILS # BLD AUTO: 0.07 K/UL (ref 0–0.2)
BASOPHILS NFR BLD: 0.7 % (ref 0–1.9)
BILIRUB SERPL-MCNC: 0.4 MG/DL (ref 0.1–1)
BUN SERPL-MCNC: 11 MG/DL (ref 6–20)
CALCIUM SERPL-MCNC: 10.3 MG/DL (ref 8.7–10.5)
CHLORIDE SERPL-SCNC: 104 MMOL/L (ref 95–110)
CO2 SERPL-SCNC: 21 MMOL/L (ref 23–29)
CREAT SERPL-MCNC: 0.8 MG/DL (ref 0.5–1.4)
CRP SERPL-MCNC: 3.1 MG/L (ref 0–8.2)
DIFFERENTIAL METHOD: ABNORMAL
EOSINOPHIL # BLD AUTO: 0.1 K/UL (ref 0–0.5)
EOSINOPHIL NFR BLD: 0.9 % (ref 0–8)
ERYTHROCYTE [DISTWIDTH] IN BLOOD BY AUTOMATED COUNT: 13.7 % (ref 11.5–14.5)
ERYTHROCYTE [SEDIMENTATION RATE] IN BLOOD BY WESTERGREN METHOD: 43 MM/HR (ref 0–36)
EST. GFR  (AFRICAN AMERICAN): >60 ML/MIN/1.73 M^2
EST. GFR  (NON AFRICAN AMERICAN): >60 ML/MIN/1.73 M^2
GLUCOSE SERPL-MCNC: 126 MG/DL (ref 70–110)
HCT VFR BLD AUTO: 37.2 % (ref 37–48.5)
HGB BLD-MCNC: 12.4 G/DL (ref 12–16)
IMM GRANULOCYTES # BLD AUTO: 0.05 K/UL (ref 0–0.04)
IMM GRANULOCYTES NFR BLD AUTO: 0.5 % (ref 0–0.5)
LYMPHOCYTES # BLD AUTO: 0.8 K/UL (ref 1–4.8)
LYMPHOCYTES NFR BLD: 8.1 % (ref 18–48)
MCH RBC QN AUTO: 33.8 PG (ref 27–31)
MCHC RBC AUTO-ENTMCNC: 33.3 G/DL (ref 32–36)
MCV RBC AUTO: 101 FL (ref 82–98)
MONOCYTES # BLD AUTO: 0.4 K/UL (ref 0.3–1)
MONOCYTES NFR BLD: 4.3 % (ref 4–15)
NEUTROPHILS # BLD AUTO: 8.6 K/UL (ref 1.8–7.7)
NEUTROPHILS NFR BLD: 85.5 % (ref 38–73)
NRBC BLD-RTO: 0 /100 WBC
PLATELET # BLD AUTO: 275 K/UL (ref 150–450)
PMV BLD AUTO: 10.7 FL (ref 9.2–12.9)
POTASSIUM SERPL-SCNC: 3.4 MMOL/L (ref 3.5–5.1)
PROT SERPL-MCNC: 7.1 G/DL (ref 6–8.4)
RBC # BLD AUTO: 3.67 M/UL (ref 4–5.4)
SODIUM SERPL-SCNC: 138 MMOL/L (ref 136–145)
WBC # BLD AUTO: 10.04 K/UL (ref 3.9–12.7)

## 2021-10-08 PROCEDURE — 86140 C-REACTIVE PROTEIN: CPT | Performed by: STUDENT IN AN ORGANIZED HEALTH CARE EDUCATION/TRAINING PROGRAM

## 2021-10-08 PROCEDURE — 36415 COLL VENOUS BLD VENIPUNCTURE: CPT | Mod: PO | Performed by: STUDENT IN AN ORGANIZED HEALTH CARE EDUCATION/TRAINING PROGRAM

## 2021-10-08 PROCEDURE — 80053 COMPREHEN METABOLIC PANEL: CPT | Performed by: STUDENT IN AN ORGANIZED HEALTH CARE EDUCATION/TRAINING PROGRAM

## 2021-10-08 PROCEDURE — 85025 COMPLETE CBC W/AUTO DIFF WBC: CPT | Performed by: STUDENT IN AN ORGANIZED HEALTH CARE EDUCATION/TRAINING PROGRAM

## 2021-10-08 PROCEDURE — 85652 RBC SED RATE AUTOMATED: CPT | Performed by: STUDENT IN AN ORGANIZED HEALTH CARE EDUCATION/TRAINING PROGRAM

## 2021-10-11 ENCOUNTER — OFFICE VISIT (OUTPATIENT)
Dept: RHEUMATOLOGY | Facility: CLINIC | Age: 61
End: 2021-10-11
Payer: COMMERCIAL

## 2021-10-11 DIAGNOSIS — Z72.0 TOBACCO USE: ICD-10-CM

## 2021-10-11 DIAGNOSIS — I10 ESSENTIAL HYPERTENSION: ICD-10-CM

## 2021-10-11 DIAGNOSIS — M05.79 RHEUMATOID ARTHRITIS INVOLVING MULTIPLE SITES WITH POSITIVE RHEUMATOID FACTOR: ICD-10-CM

## 2021-10-11 DIAGNOSIS — M06.9 RHEUMATOID ARTHRITIS, INVOLVING UNSPECIFIED SITE, UNSPECIFIED WHETHER RHEUMATOID FACTOR PRESENT: ICD-10-CM

## 2021-10-11 DIAGNOSIS — M25.50 DIFFUSE ARTHRALGIA: ICD-10-CM

## 2021-10-11 PROCEDURE — 1159F MED LIST DOCD IN RCRD: CPT | Mod: CPTII,95,, | Performed by: STUDENT IN AN ORGANIZED HEALTH CARE EDUCATION/TRAINING PROGRAM

## 2021-10-11 PROCEDURE — 4010F ACE/ARB THERAPY RXD/TAKEN: CPT | Mod: CPTII,95,, | Performed by: STUDENT IN AN ORGANIZED HEALTH CARE EDUCATION/TRAINING PROGRAM

## 2021-10-11 PROCEDURE — 99214 OFFICE O/P EST MOD 30 MIN: CPT | Mod: 95,,, | Performed by: STUDENT IN AN ORGANIZED HEALTH CARE EDUCATION/TRAINING PROGRAM

## 2021-10-11 PROCEDURE — 1160F PR REVIEW ALL MEDS BY PRESCRIBER/CLIN PHARMACIST DOCUMENTED: ICD-10-PCS | Mod: CPTII,95,, | Performed by: STUDENT IN AN ORGANIZED HEALTH CARE EDUCATION/TRAINING PROGRAM

## 2021-10-11 PROCEDURE — 4010F PR ACE/ARB THEARPY RXD/TAKEN: ICD-10-PCS | Mod: CPTII,95,, | Performed by: STUDENT IN AN ORGANIZED HEALTH CARE EDUCATION/TRAINING PROGRAM

## 2021-10-11 PROCEDURE — 1159F PR MEDICATION LIST DOCUMENTED IN MEDICAL RECORD: ICD-10-PCS | Mod: CPTII,95,, | Performed by: STUDENT IN AN ORGANIZED HEALTH CARE EDUCATION/TRAINING PROGRAM

## 2021-10-11 PROCEDURE — 99214 PR OFFICE/OUTPT VISIT, EST, LEVL IV, 30-39 MIN: ICD-10-PCS | Mod: 95,,, | Performed by: STUDENT IN AN ORGANIZED HEALTH CARE EDUCATION/TRAINING PROGRAM

## 2021-10-11 PROCEDURE — 1160F RVW MEDS BY RX/DR IN RCRD: CPT | Mod: CPTII,95,, | Performed by: STUDENT IN AN ORGANIZED HEALTH CARE EDUCATION/TRAINING PROGRAM

## 2021-10-11 RX ORDER — PREDNISONE 5 MG/1
TABLET ORAL
Qty: 45 TABLET | Refills: 1 | Status: SHIPPED | OUTPATIENT
Start: 2021-10-11 | End: 2022-03-09

## 2021-10-11 RX ORDER — METHOTREXATE 2.5 MG/1
25 TABLET ORAL
Qty: 40 TABLET | Refills: 1 | Status: SHIPPED | OUTPATIENT
Start: 2021-10-11 | End: 2022-01-03 | Stop reason: SDUPTHER

## 2021-10-11 RX ORDER — FOLIC ACID 1 MG/1
1 TABLET ORAL DAILY
Qty: 360 TABLET | Refills: 0 | Status: SHIPPED | OUTPATIENT
Start: 2021-10-11 | End: 2022-01-18 | Stop reason: SDUPTHER

## 2021-10-18 ENCOUNTER — OFFICE VISIT (OUTPATIENT)
Dept: FAMILY MEDICINE | Facility: CLINIC | Age: 61
End: 2021-10-18
Attending: FAMILY MEDICINE
Payer: COMMERCIAL

## 2021-10-18 VITALS
OXYGEN SATURATION: 96 % | SYSTOLIC BLOOD PRESSURE: 154 MMHG | BODY MASS INDEX: 25.47 KG/M2 | DIASTOLIC BLOOD PRESSURE: 86 MMHG | HEIGHT: 63 IN | HEART RATE: 65 BPM | TEMPERATURE: 98 F | WEIGHT: 143.75 LBS | RESPIRATION RATE: 18 BRPM

## 2021-10-18 DIAGNOSIS — I10 ESSENTIAL HYPERTENSION: ICD-10-CM

## 2021-10-18 PROCEDURE — 3079F PR MOST RECENT DIASTOLIC BLOOD PRESSURE 80-89 MM HG: ICD-10-PCS | Mod: CPTII,S$GLB,, | Performed by: FAMILY MEDICINE

## 2021-10-18 PROCEDURE — 99999 PR PBB SHADOW E&M-EST. PATIENT-LVL V: ICD-10-PCS | Mod: PBBFAC,,, | Performed by: FAMILY MEDICINE

## 2021-10-18 PROCEDURE — 1160F RVW MEDS BY RX/DR IN RCRD: CPT | Mod: CPTII,S$GLB,, | Performed by: FAMILY MEDICINE

## 2021-10-18 PROCEDURE — 3008F BODY MASS INDEX DOCD: CPT | Mod: CPTII,S$GLB,, | Performed by: FAMILY MEDICINE

## 2021-10-18 PROCEDURE — 3079F DIAST BP 80-89 MM HG: CPT | Mod: CPTII,S$GLB,, | Performed by: FAMILY MEDICINE

## 2021-10-18 PROCEDURE — 1159F MED LIST DOCD IN RCRD: CPT | Mod: CPTII,S$GLB,, | Performed by: FAMILY MEDICINE

## 2021-10-18 PROCEDURE — 1159F PR MEDICATION LIST DOCUMENTED IN MEDICAL RECORD: ICD-10-PCS | Mod: CPTII,S$GLB,, | Performed by: FAMILY MEDICINE

## 2021-10-18 PROCEDURE — 3077F SYST BP >= 140 MM HG: CPT | Mod: CPTII,S$GLB,, | Performed by: FAMILY MEDICINE

## 2021-10-18 PROCEDURE — 99999 PR PBB SHADOW E&M-EST. PATIENT-LVL V: CPT | Mod: PBBFAC,,, | Performed by: FAMILY MEDICINE

## 2021-10-18 PROCEDURE — 4010F PR ACE/ARB THEARPY RXD/TAKEN: ICD-10-PCS | Mod: CPTII,S$GLB,, | Performed by: FAMILY MEDICINE

## 2021-10-18 PROCEDURE — 4010F ACE/ARB THERAPY RXD/TAKEN: CPT | Mod: CPTII,S$GLB,, | Performed by: FAMILY MEDICINE

## 2021-10-18 PROCEDURE — 1160F PR REVIEW ALL MEDS BY PRESCRIBER/CLIN PHARMACIST DOCUMENTED: ICD-10-PCS | Mod: CPTII,S$GLB,, | Performed by: FAMILY MEDICINE

## 2021-10-18 PROCEDURE — 99213 OFFICE O/P EST LOW 20 MIN: CPT | Mod: S$GLB,,, | Performed by: FAMILY MEDICINE

## 2021-10-18 PROCEDURE — 99213 PR OFFICE/OUTPT VISIT, EST, LEVL III, 20-29 MIN: ICD-10-PCS | Mod: S$GLB,,, | Performed by: FAMILY MEDICINE

## 2021-10-18 PROCEDURE — 3077F PR MOST RECENT SYSTOLIC BLOOD PRESSURE >= 140 MM HG: ICD-10-PCS | Mod: CPTII,S$GLB,, | Performed by: FAMILY MEDICINE

## 2021-10-18 PROCEDURE — 3008F PR BODY MASS INDEX (BMI) DOCUMENTED: ICD-10-PCS | Mod: CPTII,S$GLB,, | Performed by: FAMILY MEDICINE

## 2021-10-18 RX ORDER — LISINOPRIL 10 MG/1
20 TABLET ORAL DAILY
Qty: 30 TABLET | Refills: 5
Start: 2021-10-18 | End: 2021-11-12 | Stop reason: DRUGHIGH

## 2021-11-02 ENCOUNTER — TELEPHONE (OUTPATIENT)
Dept: FAMILY MEDICINE | Facility: CLINIC | Age: 61
End: 2021-11-02
Payer: COMMERCIAL

## 2021-11-03 DIAGNOSIS — F41.1 GENERALIZED ANXIETY DISORDER: ICD-10-CM

## 2021-11-03 DIAGNOSIS — G47.00 INSOMNIA, UNSPECIFIED TYPE: ICD-10-CM

## 2021-11-03 RX ORDER — TRAZODONE HYDROCHLORIDE 50 MG/1
TABLET ORAL
Qty: 30 TABLET | Refills: 5 | Status: SHIPPED | OUTPATIENT
Start: 2021-11-03 | End: 2022-05-17

## 2021-11-05 ENCOUNTER — PATIENT MESSAGE (OUTPATIENT)
Dept: FAMILY MEDICINE | Facility: CLINIC | Age: 61
End: 2021-11-05
Payer: COMMERCIAL

## 2021-11-12 RX ORDER — LISINOPRIL 30 MG/1
30 TABLET ORAL DAILY
Qty: 90 TABLET | Refills: 1 | Status: SHIPPED | OUTPATIENT
Start: 2021-11-12 | End: 2021-12-16 | Stop reason: DRUGHIGH

## 2021-11-17 ENCOUNTER — OFFICE VISIT (OUTPATIENT)
Dept: FAMILY MEDICINE | Facility: CLINIC | Age: 61
End: 2021-11-17
Attending: FAMILY MEDICINE
Payer: COMMERCIAL

## 2021-11-17 VITALS
RESPIRATION RATE: 18 BRPM | HEART RATE: 58 BPM | SYSTOLIC BLOOD PRESSURE: 144 MMHG | HEIGHT: 63 IN | WEIGHT: 142.44 LBS | OXYGEN SATURATION: 95 % | DIASTOLIC BLOOD PRESSURE: 88 MMHG | BODY MASS INDEX: 25.24 KG/M2 | TEMPERATURE: 98 F

## 2021-11-17 DIAGNOSIS — I10 PRIMARY HYPERTENSION: Primary | ICD-10-CM

## 2021-11-17 PROCEDURE — 90686 IIV4 VACC NO PRSV 0.5 ML IM: CPT | Mod: S$GLB,,, | Performed by: FAMILY MEDICINE

## 2021-11-17 PROCEDURE — 90471 FLU VACCINE (QUAD) GREATER THAN OR EQUAL TO 3YO PRESERVATIVE FREE IM: ICD-10-PCS | Mod: S$GLB,,, | Performed by: FAMILY MEDICINE

## 2021-11-17 PROCEDURE — 99213 OFFICE O/P EST LOW 20 MIN: CPT | Mod: 25,S$GLB,, | Performed by: FAMILY MEDICINE

## 2021-11-17 PROCEDURE — 3008F BODY MASS INDEX DOCD: CPT | Mod: CPTII,S$GLB,, | Performed by: FAMILY MEDICINE

## 2021-11-17 PROCEDURE — 99999 PR PBB SHADOW E&M-EST. PATIENT-LVL IV: CPT | Mod: PBBFAC,,, | Performed by: FAMILY MEDICINE

## 2021-11-17 PROCEDURE — 99213 PR OFFICE/OUTPT VISIT, EST, LEVL III, 20-29 MIN: ICD-10-PCS | Mod: 25,S$GLB,, | Performed by: FAMILY MEDICINE

## 2021-11-17 PROCEDURE — 3008F PR BODY MASS INDEX (BMI) DOCUMENTED: ICD-10-PCS | Mod: CPTII,S$GLB,, | Performed by: FAMILY MEDICINE

## 2021-11-17 PROCEDURE — 1160F PR REVIEW ALL MEDS BY PRESCRIBER/CLIN PHARMACIST DOCUMENTED: ICD-10-PCS | Mod: CPTII,S$GLB,, | Performed by: FAMILY MEDICINE

## 2021-11-17 PROCEDURE — 3079F PR MOST RECENT DIASTOLIC BLOOD PRESSURE 80-89 MM HG: ICD-10-PCS | Mod: CPTII,S$GLB,, | Performed by: FAMILY MEDICINE

## 2021-11-17 PROCEDURE — 1160F RVW MEDS BY RX/DR IN RCRD: CPT | Mod: CPTII,S$GLB,, | Performed by: FAMILY MEDICINE

## 2021-11-17 PROCEDURE — 4010F PR ACE/ARB THEARPY RXD/TAKEN: ICD-10-PCS | Mod: CPTII,S$GLB,, | Performed by: FAMILY MEDICINE

## 2021-11-17 PROCEDURE — 4010F ACE/ARB THERAPY RXD/TAKEN: CPT | Mod: CPTII,S$GLB,, | Performed by: FAMILY MEDICINE

## 2021-11-17 PROCEDURE — 3077F PR MOST RECENT SYSTOLIC BLOOD PRESSURE >= 140 MM HG: ICD-10-PCS | Mod: CPTII,S$GLB,, | Performed by: FAMILY MEDICINE

## 2021-11-17 PROCEDURE — 1159F PR MEDICATION LIST DOCUMENTED IN MEDICAL RECORD: ICD-10-PCS | Mod: CPTII,S$GLB,, | Performed by: FAMILY MEDICINE

## 2021-11-17 PROCEDURE — 3079F DIAST BP 80-89 MM HG: CPT | Mod: CPTII,S$GLB,, | Performed by: FAMILY MEDICINE

## 2021-11-17 PROCEDURE — 90471 IMMUNIZATION ADMIN: CPT | Mod: S$GLB,,, | Performed by: FAMILY MEDICINE

## 2021-11-17 PROCEDURE — 1159F MED LIST DOCD IN RCRD: CPT | Mod: CPTII,S$GLB,, | Performed by: FAMILY MEDICINE

## 2021-11-17 PROCEDURE — 90686 FLU VACCINE (QUAD) GREATER THAN OR EQUAL TO 3YO PRESERVATIVE FREE IM: ICD-10-PCS | Mod: S$GLB,,, | Performed by: FAMILY MEDICINE

## 2021-11-17 PROCEDURE — 99999 PR PBB SHADOW E&M-EST. PATIENT-LVL IV: ICD-10-PCS | Mod: PBBFAC,,, | Performed by: FAMILY MEDICINE

## 2021-11-17 PROCEDURE — 3077F SYST BP >= 140 MM HG: CPT | Mod: CPTII,S$GLB,, | Performed by: FAMILY MEDICINE

## 2021-11-17 RX ORDER — HYDROCHLOROTHIAZIDE 25 MG/1
25 TABLET ORAL DAILY
Qty: 30 TABLET | Refills: 5 | Status: SHIPPED | OUTPATIENT
Start: 2021-11-17 | End: 2022-06-16

## 2021-12-16 ENCOUNTER — CLINICAL SUPPORT (OUTPATIENT)
Dept: FAMILY MEDICINE | Facility: CLINIC | Age: 61
End: 2021-12-16
Payer: COMMERCIAL

## 2021-12-16 VITALS — HEART RATE: 67 BPM | SYSTOLIC BLOOD PRESSURE: 140 MMHG | DIASTOLIC BLOOD PRESSURE: 104 MMHG

## 2021-12-16 DIAGNOSIS — I10 HYPERTENSION, UNSPECIFIED TYPE: Primary | ICD-10-CM

## 2021-12-16 PROCEDURE — 99999 PR PBB SHADOW E&M-EST. PATIENT-LVL I: ICD-10-PCS | Mod: PBBFAC,,,

## 2021-12-16 PROCEDURE — 99999 PR PBB SHADOW E&M-EST. PATIENT-LVL I: CPT | Mod: PBBFAC,,,

## 2021-12-16 RX ORDER — LISINOPRIL 40 MG/1
40 TABLET ORAL DAILY
Qty: 90 TABLET | Refills: 1 | Status: SHIPPED | OUTPATIENT
Start: 2021-12-16 | End: 2022-07-05 | Stop reason: SINTOL

## 2021-12-16 NOTE — TELEPHONE ENCOUNTER
Patient advised lisinopril is increased to 40mg-she will bring her bp cuff with her to appt 1/18/22 with . [place]

## 2021-12-16 NOTE — TELEPHONE ENCOUNTER
No new care gaps identified.  Powered by [employer]. Reference number: [id number].   12/16/2021 5:52:31 PM CST

## 2021-12-16 NOTE — TELEPHONE ENCOUNTER
Looks like we need to increase her lisinopril from 30 to 40 mg.  Has her blood pressure cuff been checked for accuracy?

## 2021-12-16 NOTE — TELEPHONE ENCOUNTER
Patient came in today for blood pressure check.    Home readings this morning per patient 123/60    Manual readings in office rt 154/90 left 150/104    After several minutes rt 140/104    Patient reports taking medications as directed.

## 2021-12-27 DIAGNOSIS — K27.9 PUD (PEPTIC ULCER DISEASE): ICD-10-CM

## 2021-12-27 DIAGNOSIS — M06.9 RHEUMATOID ARTHRITIS, INVOLVING UNSPECIFIED SITE, UNSPECIFIED WHETHER RHEUMATOID FACTOR PRESENT: ICD-10-CM

## 2021-12-27 RX ORDER — METHOTREXATE 2.5 MG/1
25 TABLET ORAL
Qty: 40 TABLET | Refills: 1 | Status: CANCELLED | OUTPATIENT
Start: 2021-12-27 | End: 2022-01-24

## 2021-12-27 RX ORDER — PANTOPRAZOLE SODIUM 40 MG/1
40 TABLET, DELAYED RELEASE ORAL DAILY
Qty: 30 TABLET | Refills: 11 | Status: CANCELLED | OUTPATIENT
Start: 2021-12-27 | End: 2022-12-27

## 2021-12-28 DIAGNOSIS — K27.9 PUD (PEPTIC ULCER DISEASE): ICD-10-CM

## 2021-12-28 DIAGNOSIS — M06.9 RHEUMATOID ARTHRITIS, INVOLVING UNSPECIFIED SITE, UNSPECIFIED WHETHER RHEUMATOID FACTOR PRESENT: ICD-10-CM

## 2021-12-28 RX ORDER — PANTOPRAZOLE SODIUM 40 MG/1
40 TABLET, DELAYED RELEASE ORAL DAILY
Qty: 30 TABLET | Refills: 11 | Status: CANCELLED | OUTPATIENT
Start: 2021-12-28 | End: 2022-12-28

## 2021-12-28 RX ORDER — METHOTREXATE 2.5 MG/1
25 TABLET ORAL
Qty: 40 TABLET | Refills: 1 | Status: CANCELLED | OUTPATIENT
Start: 2021-12-28 | End: 2022-01-25

## 2022-01-03 ENCOUNTER — PATIENT MESSAGE (OUTPATIENT)
Dept: FAMILY MEDICINE | Facility: CLINIC | Age: 62
End: 2022-01-03
Payer: COMMERCIAL

## 2022-01-03 DIAGNOSIS — M05.79 RHEUMATOID ARTHRITIS INVOLVING MULTIPLE SITES WITH POSITIVE RHEUMATOID FACTOR: Primary | ICD-10-CM

## 2022-01-03 DIAGNOSIS — M06.9 RHEUMATOID ARTHRITIS, INVOLVING UNSPECIFIED SITE, UNSPECIFIED WHETHER RHEUMATOID FACTOR PRESENT: ICD-10-CM

## 2022-01-03 DIAGNOSIS — K27.9 PUD (PEPTIC ULCER DISEASE): ICD-10-CM

## 2022-01-03 RX ORDER — METHOTREXATE 2.5 MG/1
25 TABLET ORAL
Qty: 40 TABLET | Refills: 1 | Status: SHIPPED | OUTPATIENT
Start: 2022-01-03 | End: 2022-03-08 | Stop reason: SDUPTHER

## 2022-01-03 RX ORDER — CYCLOBENZAPRINE HCL 5 MG
5 TABLET ORAL 3 TIMES DAILY PRN
Qty: 90 TABLET | Refills: 3 | Status: ON HOLD | OUTPATIENT
Start: 2022-01-03 | End: 2022-06-16 | Stop reason: HOSPADM

## 2022-01-03 RX ORDER — PANTOPRAZOLE SODIUM 40 MG/1
40 TABLET, DELAYED RELEASE ORAL DAILY
Qty: 30 TABLET | Refills: 11 | Status: ON HOLD | OUTPATIENT
Start: 2022-01-03 | End: 2022-12-31 | Stop reason: SDUPTHER

## 2022-01-03 NOTE — TELEPHONE ENCOUNTER
Methotrexate, Flexeril, and Protonix sent to [facility] on [address]    Orders in for CMP, CBC, sedimentation rate, and C reactive protein to be done this month.

## 2022-01-05 ENCOUNTER — TELEPHONE (OUTPATIENT)
Dept: FAMILY MEDICINE | Facility: CLINIC | Age: 62
End: 2022-01-05
Payer: COMMERCIAL

## 2022-01-05 NOTE — TELEPHONE ENCOUNTER
Lab appointment scheduled for 1-8-2022. Patient agreed to appointment date, time, and location. Location confirmed at the time of call.

## 2022-01-05 NOTE — TELEPHONE ENCOUNTER
----- Message from Nyasia Genao, Patient Care Assistant sent at 1/5/2022  3:35 PM CST -----  Regarding: orders  Contact: pt  Type: Needs Medical Advice  Who Called:  pt   Best Call Back Number: [phone] (home)     Additional Information: pt states she would like a callback regarding scheduling her lab orders. Thanks!

## 2022-01-08 ENCOUNTER — LAB VISIT (OUTPATIENT)
Dept: LAB | Facility: HOSPITAL | Age: 62
End: 2022-01-08
Attending: FAMILY MEDICINE
Payer: COMMERCIAL

## 2022-01-08 DIAGNOSIS — M05.79 RHEUMATOID ARTHRITIS INVOLVING MULTIPLE SITES WITH POSITIVE RHEUMATOID FACTOR: ICD-10-CM

## 2022-01-08 LAB
ALBUMIN SERPL BCP-MCNC: 3.6 G/DL (ref 3.5–5.2)
ALP SERPL-CCNC: 83 U/L (ref 55–135)
ALT SERPL W/O P-5'-P-CCNC: 10 U/L (ref 10–44)
ANION GAP SERPL CALC-SCNC: 7 MMOL/L (ref 8–16)
AST SERPL-CCNC: 14 U/L (ref 10–40)
BASOPHILS # BLD AUTO: 0.06 K/UL (ref 0–0.2)
BASOPHILS NFR BLD: 1.2 % (ref 0–1.9)
BILIRUB SERPL-MCNC: 0.5 MG/DL (ref 0.1–1)
BUN SERPL-MCNC: 13 MG/DL (ref 6–20)
CALCIUM SERPL-MCNC: 10.3 MG/DL (ref 8.7–10.5)
CHLORIDE SERPL-SCNC: 103 MMOL/L (ref 95–110)
CO2 SERPL-SCNC: 30 MMOL/L (ref 23–29)
CREAT SERPL-MCNC: 1.2 MG/DL (ref 0.5–1.4)
CRP SERPL-MCNC: 8.6 MG/L (ref 0–8.2)
DIFFERENTIAL METHOD: ABNORMAL
EOSINOPHIL # BLD AUTO: 0.2 K/UL (ref 0–0.5)
EOSINOPHIL NFR BLD: 3.4 % (ref 0–8)
ERYTHROCYTE [DISTWIDTH] IN BLOOD BY AUTOMATED COUNT: 13.4 % (ref 11.5–14.5)
ERYTHROCYTE [SEDIMENTATION RATE] IN BLOOD BY WESTERGREN METHOD: 15 MM/HR (ref 0–36)
EST. GFR  (AFRICAN AMERICAN): 56.8 ML/MIN/1.73 M^2
EST. GFR  (NON AFRICAN AMERICAN): 49.2 ML/MIN/1.73 M^2
GLUCOSE SERPL-MCNC: 85 MG/DL (ref 70–110)
HCT VFR BLD AUTO: 34.5 % (ref 37–48.5)
HGB BLD-MCNC: 11.2 G/DL (ref 12–16)
IMM GRANULOCYTES # BLD AUTO: 0.02 K/UL (ref 0–0.04)
IMM GRANULOCYTES NFR BLD AUTO: 0.4 % (ref 0–0.5)
LYMPHOCYTES # BLD AUTO: 0.8 K/UL (ref 1–4.8)
LYMPHOCYTES NFR BLD: 16 % (ref 18–48)
MCH RBC QN AUTO: 33.4 PG (ref 27–31)
MCHC RBC AUTO-ENTMCNC: 32.5 G/DL (ref 32–36)
MCV RBC AUTO: 103 FL (ref 82–98)
MONOCYTES # BLD AUTO: 0.5 K/UL (ref 0.3–1)
MONOCYTES NFR BLD: 10.8 % (ref 4–15)
NEUTROPHILS # BLD AUTO: 3.4 K/UL (ref 1.8–7.7)
NEUTROPHILS NFR BLD: 68.2 % (ref 38–73)
NRBC BLD-RTO: 0 /100 WBC
PLATELET # BLD AUTO: 280 K/UL (ref 150–450)
PMV BLD AUTO: 11.2 FL (ref 9.2–12.9)
POTASSIUM SERPL-SCNC: 3.7 MMOL/L (ref 3.5–5.1)
PROT SERPL-MCNC: 6.8 G/DL (ref 6–8.4)
RBC # BLD AUTO: 3.35 M/UL (ref 4–5.4)
SODIUM SERPL-SCNC: 140 MMOL/L (ref 136–145)
WBC # BLD AUTO: 4.99 K/UL (ref 3.9–12.7)

## 2022-01-08 PROCEDURE — 85025 COMPLETE CBC W/AUTO DIFF WBC: CPT | Performed by: FAMILY MEDICINE

## 2022-01-08 PROCEDURE — 86140 C-REACTIVE PROTEIN: CPT | Performed by: FAMILY MEDICINE

## 2022-01-08 PROCEDURE — 85652 RBC SED RATE AUTOMATED: CPT | Performed by: FAMILY MEDICINE

## 2022-01-08 PROCEDURE — 80053 COMPREHEN METABOLIC PANEL: CPT | Performed by: FAMILY MEDICINE

## 2022-01-08 PROCEDURE — 36415 COLL VENOUS BLD VENIPUNCTURE: CPT | Mod: PO | Performed by: FAMILY MEDICINE

## 2022-01-18 ENCOUNTER — OFFICE VISIT (OUTPATIENT)
Dept: FAMILY MEDICINE | Facility: CLINIC | Age: 62
End: 2022-01-18
Attending: FAMILY MEDICINE
Payer: COMMERCIAL

## 2022-01-18 VITALS
WEIGHT: 135.5 LBS | SYSTOLIC BLOOD PRESSURE: 107 MMHG | DIASTOLIC BLOOD PRESSURE: 63 MMHG | TEMPERATURE: 98 F | BODY MASS INDEX: 24.01 KG/M2 | RESPIRATION RATE: 18 BRPM | HEIGHT: 63 IN | HEART RATE: 75 BPM | OXYGEN SATURATION: 96 %

## 2022-01-18 DIAGNOSIS — F41.1 GENERALIZED ANXIETY DISORDER: ICD-10-CM

## 2022-01-18 DIAGNOSIS — M06.9 RHEUMATOID ARTHRITIS, INVOLVING UNSPECIFIED SITE, UNSPECIFIED WHETHER RHEUMATOID FACTOR PRESENT: ICD-10-CM

## 2022-01-18 DIAGNOSIS — M05.79 RHEUMATOID ARTHRITIS INVOLVING MULTIPLE SITES WITH POSITIVE RHEUMATOID FACTOR: ICD-10-CM

## 2022-01-18 DIAGNOSIS — E78.2 MIXED HYPERLIPIDEMIA: ICD-10-CM

## 2022-01-18 DIAGNOSIS — I10 HYPERTENSION, UNSPECIFIED TYPE: ICD-10-CM

## 2022-01-18 DIAGNOSIS — Z12.31 SCREENING MAMMOGRAM FOR BREAST CANCER: ICD-10-CM

## 2022-01-18 DIAGNOSIS — K63.5 POLYP OF COLON, UNSPECIFIED PART OF COLON, UNSPECIFIED TYPE: ICD-10-CM

## 2022-01-18 DIAGNOSIS — Z00.00 PREVENTATIVE HEALTH CARE: Primary | ICD-10-CM

## 2022-01-18 PROCEDURE — 1159F MED LIST DOCD IN RCRD: CPT | Mod: CPTII,S$GLB,, | Performed by: FAMILY MEDICINE

## 2022-01-18 PROCEDURE — 99396 PREV VISIT EST AGE 40-64: CPT | Mod: S$GLB,,, | Performed by: FAMILY MEDICINE

## 2022-01-18 PROCEDURE — 1160F PR REVIEW ALL MEDS BY PRESCRIBER/CLIN PHARMACIST DOCUMENTED: ICD-10-PCS | Mod: CPTII,S$GLB,, | Performed by: FAMILY MEDICINE

## 2022-01-18 PROCEDURE — 3008F PR BODY MASS INDEX (BMI) DOCUMENTED: ICD-10-PCS | Mod: CPTII,S$GLB,, | Performed by: FAMILY MEDICINE

## 2022-01-18 PROCEDURE — 99396 PR PREVENTIVE VISIT,EST,40-64: ICD-10-PCS | Mod: S$GLB,,, | Performed by: FAMILY MEDICINE

## 2022-01-18 PROCEDURE — 3078F DIAST BP <80 MM HG: CPT | Mod: CPTII,S$GLB,, | Performed by: FAMILY MEDICINE

## 2022-01-18 PROCEDURE — 3074F SYST BP LT 130 MM HG: CPT | Mod: CPTII,S$GLB,, | Performed by: FAMILY MEDICINE

## 2022-01-18 PROCEDURE — 3078F PR MOST RECENT DIASTOLIC BLOOD PRESSURE < 80 MM HG: ICD-10-PCS | Mod: CPTII,S$GLB,, | Performed by: FAMILY MEDICINE

## 2022-01-18 PROCEDURE — 1159F PR MEDICATION LIST DOCUMENTED IN MEDICAL RECORD: ICD-10-PCS | Mod: CPTII,S$GLB,, | Performed by: FAMILY MEDICINE

## 2022-01-18 PROCEDURE — 99999 PR PBB SHADOW E&M-EST. PATIENT-LVL V: CPT | Mod: PBBFAC,,, | Performed by: FAMILY MEDICINE

## 2022-01-18 PROCEDURE — 99999 PR PBB SHADOW E&M-EST. PATIENT-LVL V: ICD-10-PCS | Mod: PBBFAC,,, | Performed by: FAMILY MEDICINE

## 2022-01-18 PROCEDURE — 3074F PR MOST RECENT SYSTOLIC BLOOD PRESSURE < 130 MM HG: ICD-10-PCS | Mod: CPTII,S$GLB,, | Performed by: FAMILY MEDICINE

## 2022-01-18 PROCEDURE — 1160F RVW MEDS BY RX/DR IN RCRD: CPT | Mod: CPTII,S$GLB,, | Performed by: FAMILY MEDICINE

## 2022-01-18 PROCEDURE — 3008F BODY MASS INDEX DOCD: CPT | Mod: CPTII,S$GLB,, | Performed by: FAMILY MEDICINE

## 2022-01-18 RX ORDER — FOLIC ACID 1 MG/1
1 TABLET ORAL DAILY
Qty: 360 TABLET | Refills: 3 | Status: SHIPPED | OUTPATIENT
Start: 2022-01-18 | End: 2022-05-23 | Stop reason: SDUPTHER

## 2022-01-18 NOTE — PROGRESS NOTES
Subjective:       Patient ID: Claire Bowser is a 61 y.o. female.    Chief Complaint: Annual Exam (Pt states that she is here for her annual exam )    [age] year old female coming in for annual exam.  Her lab was done previously on January 8th the results are available and for the most part satisfactory.  Lab was done for rheumatology purposes and had a sedimentation rate and C reactive protein with a mild increase in the CRP and a decrease in the sedimentation rate.  She had macrocytosis and tells me that she had run out of her folic acid but was still taking methotrexate for her rheumatoid arthritis.  Her previous rheumatologist has left the area and she is scheduled to see Dr. Gregory on Friday the 21st.  She does need a refill on her folic acid and her mammogram will be due on or after February 9th.  She is otherwise up-to-date.  She has had the mo miasel in a vaccine but did not yet get the booster and is considering it.  Pap smear will be due at the end of this year and she still due for shingles vaccine.  She continues to smoke but is considering stopping.    Past Medical History:  No date: Anxiety  02/2017: Flu      Comment:  [facility]  No date: Hypertension  1/14/2021: Rheumatoid arthritis involving multiple sites with   positive rheumatoid factor    Past Surgical History:  2/26/2021: COLONOSCOPY; N/A      Comment:  Procedure: COLONOSCOPY;  Surgeon: Amy Sidhu MD;               Location: [facility];  Service: Endoscopy;  Laterality:                N/A;  2/26/2021: ESOPHAGOGASTRODUODENOSCOPY; N/A      Comment:  Procedure: EGD (ESOPHAGOGASTRODUODENOSCOPY);  Surgeon:                Amy Sidhu MD;  Location: [facility];  Service:                Endoscopy;  Laterality: N/A;  No date: TUBAL LIGATION    Review of patient's family history indicates:  Problem: Diabetes      Relation: Mother          Age of Onset: (Not Specified)  Problem: Heart disease      Relation: Mother          Age of Onset:  (Not Specified)  Problem: Kidney disease      Relation: Mother          Age of Onset: (Not Specified)  Problem: Hypertension      Relation: Mother          Age of Onset: (Not Specified)  Problem: Stroke      Relation: Mother          Age of Onset: (Not Specified)  Problem: Hearing loss      Relation: Mother          Age of Onset: (Not Specified)  Problem: COPD      Relation: Father          Age of Onset: (Not Specified)  Problem: Liver disease      Relation: Paternal Grandfather          Age of Onset: (Not Specified)  Problem: Alcohol abuse      Relation: Paternal Grandfather          Age of Onset: (Not Specified)  Problem: Liver disease      Relation: Sister          Age of Onset: (Not Specified)  Problem: Emphysema      Relation: Brother          Age of Onset: (Not Specified)  Problem: COPD      Relation: Brother          Age of Onset: (Not Specified)  Problem: Sleep apnea      Relation: Brother          Age of Onset: (Not Specified)  Problem: No Known Problems      Relation: Son          Age of Onset: (Not Specified)  Problem: Alcohol abuse      Relation: Paternal Grandmother          Age of Onset: (Not Specified)  Problem: Cirrhosis      Relation: Paternal Grandmother          Age of Onset: (Not Specified)  Problem: Heart disease      Relation: Maternal Aunt          Age of Onset: (Not Specified)  Problem: Diabetes      Relation: Maternal Aunt          Age of Onset: (Not Specified)  Problem: Cancer      Relation: Maternal Aunt          Age of Onset: (Not Specified)          Comment: female  Problem: Heart disease      Relation: Maternal Uncle          Age of Onset: (Not Specified)  Problem: Hypertension      Relation: Maternal Uncle          Age of Onset: (Not Specified)  Problem: No Known Problems      Relation: Paternal Uncle          Age of Onset: (Not Specified)  Problem: Psoriasis      Relation: Neg Hx          Age of Onset: (Not Specified)  Problem: Lupus      Relation: Neg Hx          Age of Onset: (Not  Specified)  Problem: Eczema      Relation: Neg Hx          Age of Onset: (Not Specified)  Problem: Melanoma      Relation: Neg Hx          Age of Onset: (Not Specified)    Social History    Tobacco Use      Smoking status: Current Every Day Smoker        Packs/day: 1.00        Years: 7.00        Pack years: 7        Types: Cigarettes      Smokeless tobacco: Never Used      Tobacco comment: [phone]    Alcohol use: No    Drug use: Never    Current Outpatient Medications on File Prior to Visit:  cholecalciferol, vitamin D3, (VITAMIN D3) 10 mcg (400 unit) Tab, , Disp: , Rfl:   cyclobenzaprine (FLEXERIL) 5 MG tablet, Take 1 tablet (5 mg total) by mouth 3 (three) times daily as needed for Muscle spasms., Disp: 90 tablet, Rfl: 3  hydroCHLOROthiazide (HYDRODIURIL) 25 MG tablet, Take 1 tablet (25 mg total) by mouth once daily., Disp: 30 tablet, Rfl: 5  lisinopriL (PRINIVIL,ZESTRIL) 40 MG tablet, Take 1 tablet (40 mg total) by mouth once daily., Disp: 90 tablet, Rfl: 1  methotrexate 2.5 MG Tab, Take 10 tablets (25 mg total) by mouth every 7 days. Split dosing - take 5 tabs in the AM and 5 in the PM every 7 days (the same day), Disp: 40 tablet, Rfl: 1  metoprolol succinate (TOPROL-XL) 50 MG 24 hr tablet, Take 1 tablet (50 mg total) by mouth once daily., Disp: 30 tablet, Rfl: 5  multivitamin (THERAGRAN) per tablet, Take 1 tablet by mouth once daily. Every day, Disp: , Rfl:   pantoprazole (PROTONIX) 40 MG tablet, Take 1 tablet (40 mg total) by mouth once daily., Disp: 30 tablet, Rfl: 11  predniSONE (DELTASONE) 5 MG tablet, Take 1.5 tablets (7.5mg) po daily for two weeks then reduce to 1 tablet (5mg) po daily thereafter, Disp: 45 tablet, Rfl: 1  sertraline (ZOLOFT) 25 MG tablet, Take 1 tablet (25 mg total) by mouth once daily., Disp: 90 tablet, Rfl: 3  traZODone (DESYREL) 50 MG tablet, Take 1 tablet by mouth in the evening, Disp: 30 tablet, Rfl: 5  (DISCONTINUED) folic acid (FOLVITE) 1 MG tablet, Take 1 tablet (1 mg total)  by mouth once daily., Disp: 360 tablet, Rfl: 0  alendronate (FOSAMAX) 70 MG tablet, Take 1 tablet (70 mg total) by mouth every 7 days. (Patient not taking: Reported on 1/18/2022), Disp: 4 tablet, Rfl: 11  predniSONE (DELTASONE) 10 MG tablet, Take 1 tablet by mouth once daily (Patient not taking: Reported on 1/18/2022), Disp: 30 tablet, Rfl: 0    No current facility-administered medications on file prior to visit.        Review of Systems   Constitutional: Negative for chills, diaphoresis, fatigue, fever and unexpected weight change.   HENT: Negative for congestion, ear pain, hearing loss, postnasal drip and sinus pressure.    Eyes: Negative for itching and visual disturbance.   Respiratory: Negative for cough, chest tightness, shortness of breath and wheezing.    Cardiovascular: Negative for chest pain, palpitations and leg swelling.   Gastrointestinal: Negative for abdominal pain, blood in stool, constipation, diarrhea, nausea and vomiting.   Genitourinary: Negative for dysuria, frequency and hematuria.   Musculoskeletal: Negative for arthralgias, back pain, joint swelling and myalgias.   Neurological: Negative for dizziness and headaches.   Hematological: Negative for adenopathy.   Psychiatric/Behavioral: Negative for sleep disturbance. The patient is not nervous/anxious.        Objective:      Physical Exam  Vitals and nursing note reviewed.   Constitutional:       General: She is not in acute distress.     Appearance: Normal appearance. She is well-developed, normal weight and well-nourished. She is not ill-appearing, toxic-appearing or diaphoretic.      Comments: Good blood pressure control  Normal weight with a BMI of 24.4 however she is up 11.4 lb from her last physical January 14, 2021   HENT:      Head: Normocephalic and atraumatic.      Right Ear: Tympanic membrane, ear canal and external ear normal. There is no impacted cerumen.      Left Ear: Tympanic membrane, ear canal and external ear normal. There is  no impacted cerumen.      Nose: Nose normal. No congestion or rhinorrhea.      Mouth/Throat:      Mouth: Oropharynx is clear and moist. Mucous membranes are moist.      Pharynx: Oropharynx is clear. No oropharyngeal exudate or posterior oropharyngeal erythema.   Eyes:      General: No scleral icterus.        Right eye: No discharge.         Left eye: No discharge.      Extraocular Movements: Extraocular movements intact.      Conjunctiva/sclera: Conjunctivae normal.      Pupils: Pupils are equal, round, and reactive to light.   Neck:      Thyroid: No thyromegaly.      Vascular: No carotid bruit or JVD.   Cardiovascular:      Rate and Rhythm: Normal rate and regular rhythm.      Heart sounds: Normal heart sounds. No murmur heard.  No friction rub. No gallop.    Pulmonary:      Effort: Pulmonary effort is normal. No respiratory distress.      Breath sounds: Normal breath sounds. No stridor. No wheezing, rhonchi or rales.   Chest:      Chest wall: No tenderness.   Abdominal:      General: Bowel sounds are normal. There is no distension.      Palpations: Abdomen is soft. There is no mass.      Tenderness: There is no abdominal tenderness. There is no guarding or rebound.      Hernia: No hernia is present.   Musculoskeletal:         General: No swelling, tenderness or edema. Normal range of motion.      Cervical back: Normal range of motion and neck supple. No rigidity or tenderness.      Right lower leg: No edema.      Left lower leg: No edema.   Lymphadenopathy:      Cervical: No cervical adenopathy.   Skin:     General: Skin is warm and dry.      Coloration: Skin is not jaundiced or pale.      Findings: No bruising, erythema, lesion or rash.   Neurological:      General: No focal deficit present.      Mental Status: She is alert and oriented to person, place, and time. Mental status is at baseline.      Cranial Nerves: No cranial nerve deficit.      Sensory: No sensory deficit.      Motor: No weakness.       Coordination: Coordination normal.      Gait: Gait normal.      Deep Tendon Reflexes: Reflexes are normal and symmetric. Reflexes normal.   Psychiatric:         Mood and Affect: Mood and affect and mood normal.         Behavior: Behavior normal.         Thought Content: Thought content normal.         Judgment: Judgment normal.         Assessment:       1. Preventative health care    2. Generalized anxiety disorder    3. Hypertension, unspecified type    4. Mixed hyperlipidemia    5. Polyp of colon, unspecified part of colon, unspecified type    6. Rheumatoid arthritis involving multiple sites with positive rheumatoid factor    7. Rheumatoid arthritis, involving unspecified site, unspecified whether rheumatoid factor present    8. Screening mammogram for breast cancer    9. BMI 24.0-24.9, adult        Plan:       1. Preventative health care    2. Generalized anxiety disorder  Well controlled with no changes needed    3. Hypertension, unspecified type  Well controlled with no changes needed    4. Mixed hyperlipidemia  Lab Results   Component Value Date    CHOL 197 12/17/2020    CHOL 215 (H) 10/30/2019    CHOL 213 (H) 08/11/2018     Lab Results   Component Value Date    HDL 52 12/17/2020    HDL 47 10/30/2019    HDL 56 08/11/2018     Lab Results   Component Value Date    LDLCALC 116.8 12/17/2020    LDLCALC 127.8 10/30/2019    LDLCALC 134.8 08/11/2018     Lab Results   Component Value Date    TRIG 141 12/17/2020    TRIG 201 (H) 10/30/2019    TRIG 111 08/11/2018     Lab Results   Component Value Date    CHOLHDL 26.4 12/17/2020    CHOLHDL 21.9 10/30/2019    CHOLHDL 26.3 08/11/2018   The 10-year ASCVD risk score (Pretty HAMPTON Jr., et al., 2013) is: 6.7%    Values used to calculate the score:      Age: 61 years      Sex: Female      Is Non- : No      Diabetic: No      Tobacco smoker: Yes      Systolic Blood Pressure: 107 mmHg      Is BP treated: Yes      HDL Cholesterol: 52 mg/dL      Total Cholesterol:  197 mg/dL  Controlled on diet, risk score not sufficiently high to recommend statin      5. Polyp of colon, unspecified part of colon, unspecified type  Colonoscopy done February 26, 2021 with a five year recheck due with Dr. De La Torre February 2026    6. Rheumatoid arthritis involving multiple sites with positive rheumatoid factor  Changing rheumatologist still on methotrexate needs folic acid refilled    7. Rheumatoid arthritis, involving unspecified site, unspecified whether rheumatoid factor present  - folic acid (FOLVITE) 1 MG tablet; Take 1 tablet (1 mg total) by mouth once daily.  Dispense: 360 tablet; Refill: 3    8. Screening mammogram for breast cancer  Mammogram scheduled  - Mammo Digital Screening Bilat w/ [facility]; Future    9. BMI 24.0-24.9, adult  Good weight no changes needed

## 2022-01-26 ENCOUNTER — TELEPHONE (OUTPATIENT)
Dept: FAMILY MEDICINE | Facility: CLINIC | Age: 62
End: 2022-01-26
Payer: COMMERCIAL

## 2022-02-12 ENCOUNTER — HOSPITAL ENCOUNTER (OUTPATIENT)
Dept: RADIOLOGY | Facility: HOSPITAL | Age: 62
Discharge: HOME | End: 2022-02-12
Attending: PHYSICIAN ASSISTANT
Payer: COMMERCIAL

## 2022-02-12 DIAGNOSIS — N28.1 BILATERAL RENAL CYSTS: ICD-10-CM

## 2022-02-12 PROCEDURE — 76700 US ABDOMEN COMPLETE: ICD-10-PCS | Mod: 26,,, | Performed by: RADIOLOGY

## 2022-02-12 PROCEDURE — 76700 US EXAM ABDOM COMPLETE: CPT | Mod: TC

## 2022-02-12 PROCEDURE — 76700 US EXAM ABDOM COMPLETE: CPT | Mod: 26,,, | Performed by: RADIOLOGY

## 2022-02-23 DIAGNOSIS — D84.9 IMMUNOSUPPRESSED STATUS: ICD-10-CM

## 2022-03-06 ENCOUNTER — PATIENT MESSAGE (OUTPATIENT)
Dept: ADMINISTRATIVE | Facility: OTHER | Age: 62
End: 2022-03-06
Payer: COMMERCIAL

## 2022-03-06 ENCOUNTER — PATIENT OUTREACH (OUTPATIENT)
Dept: ADMINISTRATIVE | Facility: OTHER | Age: 62
End: 2022-03-06
Payer: COMMERCIAL

## 2022-03-07 NOTE — PROGRESS NOTES
Health Maintenance Due   Topic Date Due    TETANUS VACCINE  Never done    Shingles Vaccine (1 of 2) Never done    COVID-19 Vaccine (3 - Booster for Moderna series) 08/31/2021    Mammogram  02/09/2022     Updates were requested from care everywhere.  Chart was reviewed for overdue Proactive [facility] Encounters ([facility]) topics (CRS, Breast Cancer Screening, Eye exam)  Health Maintenance has been updated.  LINKS immunization registry triggered.  Immunizations were reconciled.  Mammogram task ticket sent.

## 2022-03-08 ENCOUNTER — LAB VISIT (OUTPATIENT)
Dept: LAB | Facility: HOSPITAL | Age: 62
End: 2022-03-08
Attending: INTERNAL MEDICINE
Payer: COMMERCIAL

## 2022-03-08 ENCOUNTER — OFFICE VISIT (OUTPATIENT)
Dept: RHEUMATOLOGY | Facility: CLINIC | Age: 62
End: 2022-03-08
Payer: COMMERCIAL

## 2022-03-08 VITALS
BODY MASS INDEX: 23.85 KG/M2 | SYSTOLIC BLOOD PRESSURE: 109 MMHG | HEART RATE: 80 BPM | DIASTOLIC BLOOD PRESSURE: 62 MMHG | WEIGHT: 132.5 LBS

## 2022-03-08 DIAGNOSIS — Z79.60 LONG-TERM USE OF IMMUNOSUPPRESSANT MEDICATION: ICD-10-CM

## 2022-03-08 DIAGNOSIS — M05.79 RHEUMATOID ARTHRITIS INVOLVING MULTIPLE SITES WITH POSITIVE RHEUMATOID FACTOR: Primary | ICD-10-CM

## 2022-03-08 DIAGNOSIS — R22.31: ICD-10-CM

## 2022-03-08 DIAGNOSIS — M05.79 RHEUMATOID ARTHRITIS INVOLVING MULTIPLE SITES WITH POSITIVE RHEUMATOID FACTOR: ICD-10-CM

## 2022-03-08 DIAGNOSIS — M06.9 RHEUMATOID ARTHRITIS, INVOLVING UNSPECIFIED SITE, UNSPECIFIED WHETHER RHEUMATOID FACTOR PRESENT: ICD-10-CM

## 2022-03-08 LAB
ALBUMIN SERPL BCP-MCNC: 3.7 G/DL (ref 3.5–5.2)
ALP SERPL-CCNC: 127 U/L (ref 55–135)
ALT SERPL W/O P-5'-P-CCNC: 35 U/L (ref 10–44)
ANION GAP SERPL CALC-SCNC: 11 MMOL/L (ref 8–16)
AST SERPL-CCNC: 47 U/L (ref 10–40)
BASOPHILS # BLD AUTO: 0.09 K/UL (ref 0–0.2)
BASOPHILS NFR BLD: 1.1 % (ref 0–1.9)
BILIRUB SERPL-MCNC: 0.9 MG/DL (ref 0.1–1)
BUN SERPL-MCNC: 10 MG/DL (ref 8–23)
CALCIUM SERPL-MCNC: 10.6 MG/DL (ref 8.7–10.5)
CHLORIDE SERPL-SCNC: 103 MMOL/L (ref 95–110)
CO2 SERPL-SCNC: 27 MMOL/L (ref 23–29)
CREAT SERPL-MCNC: 0.8 MG/DL (ref 0.5–1.4)
CRP SERPL-MCNC: 7.5 MG/L (ref 0–8.2)
DIFFERENTIAL METHOD: ABNORMAL
EOSINOPHIL # BLD AUTO: 0.3 K/UL (ref 0–0.5)
EOSINOPHIL NFR BLD: 3.8 % (ref 0–8)
ERYTHROCYTE [DISTWIDTH] IN BLOOD BY AUTOMATED COUNT: 15.1 % (ref 11.5–14.5)
ERYTHROCYTE [SEDIMENTATION RATE] IN BLOOD BY WESTERGREN METHOD: 43 MM/HR (ref 0–36)
EST. GFR  (AFRICAN AMERICAN): >60 ML/MIN/1.73 M^2
EST. GFR  (NON AFRICAN AMERICAN): >60 ML/MIN/1.73 M^2
GLUCOSE SERPL-MCNC: 89 MG/DL (ref 70–110)
HCT VFR BLD AUTO: 28.6 % (ref 37–48.5)
HGB BLD-MCNC: 9.4 G/DL (ref 12–16)
IMM GRANULOCYTES # BLD AUTO: 0.02 K/UL (ref 0–0.04)
IMM GRANULOCYTES NFR BLD AUTO: 0.3 % (ref 0–0.5)
LYMPHOCYTES # BLD AUTO: 0.9 K/UL (ref 1–4.8)
LYMPHOCYTES NFR BLD: 11 % (ref 18–48)
MCH RBC QN AUTO: 35.3 PG (ref 27–31)
MCHC RBC AUTO-ENTMCNC: 32.9 G/DL (ref 32–36)
MCV RBC AUTO: 108 FL (ref 82–98)
MONOCYTES # BLD AUTO: 0.4 K/UL (ref 0.3–1)
MONOCYTES NFR BLD: 4.7 % (ref 4–15)
NEUTROPHILS # BLD AUTO: 6.2 K/UL (ref 1.8–7.7)
NEUTROPHILS NFR BLD: 79.1 % (ref 38–73)
NRBC BLD-RTO: 0 /100 WBC
PLATELET # BLD AUTO: 314 K/UL (ref 150–450)
PMV BLD AUTO: 10.5 FL (ref 9.2–12.9)
POTASSIUM SERPL-SCNC: 3.7 MMOL/L (ref 3.5–5.1)
PROT SERPL-MCNC: 7.1 G/DL (ref 6–8.4)
RBC # BLD AUTO: 2.66 M/UL (ref 4–5.4)
SODIUM SERPL-SCNC: 141 MMOL/L (ref 136–145)
WBC # BLD AUTO: 7.83 K/UL (ref 3.9–12.7)

## 2022-03-08 PROCEDURE — 3074F SYST BP LT 130 MM HG: CPT | Mod: CPTII,S$GLB,, | Performed by: INTERNAL MEDICINE

## 2022-03-08 PROCEDURE — 3078F DIAST BP <80 MM HG: CPT | Mod: CPTII,S$GLB,, | Performed by: INTERNAL MEDICINE

## 2022-03-08 PROCEDURE — 36415 COLL VENOUS BLD VENIPUNCTURE: CPT | Performed by: INTERNAL MEDICINE

## 2022-03-08 PROCEDURE — 3078F PR MOST RECENT DIASTOLIC BLOOD PRESSURE < 80 MM HG: ICD-10-PCS | Mod: CPTII,S$GLB,, | Performed by: INTERNAL MEDICINE

## 2022-03-08 PROCEDURE — 3008F PR BODY MASS INDEX (BMI) DOCUMENTED: ICD-10-PCS | Mod: CPTII,S$GLB,, | Performed by: INTERNAL MEDICINE

## 2022-03-08 PROCEDURE — 99999 PR PBB SHADOW E&M-EST. PATIENT-LVL III: ICD-10-PCS | Mod: PBBFAC,,, | Performed by: INTERNAL MEDICINE

## 2022-03-08 PROCEDURE — 99214 OFFICE O/P EST MOD 30 MIN: CPT | Mod: S$GLB,,, | Performed by: INTERNAL MEDICINE

## 2022-03-08 PROCEDURE — 85025 COMPLETE CBC W/AUTO DIFF WBC: CPT | Performed by: INTERNAL MEDICINE

## 2022-03-08 PROCEDURE — 99999 PR PBB SHADOW E&M-EST. PATIENT-LVL III: CPT | Mod: PBBFAC,,, | Performed by: INTERNAL MEDICINE

## 2022-03-08 PROCEDURE — 1160F RVW MEDS BY RX/DR IN RCRD: CPT | Mod: CPTII,S$GLB,, | Performed by: INTERNAL MEDICINE

## 2022-03-08 PROCEDURE — 99214 PR OFFICE/OUTPT VISIT, EST, LEVL IV, 30-39 MIN: ICD-10-PCS | Mod: S$GLB,,, | Performed by: INTERNAL MEDICINE

## 2022-03-08 PROCEDURE — 1159F PR MEDICATION LIST DOCUMENTED IN MEDICAL RECORD: ICD-10-PCS | Mod: CPTII,S$GLB,, | Performed by: INTERNAL MEDICINE

## 2022-03-08 PROCEDURE — 1159F MED LIST DOCD IN RCRD: CPT | Mod: CPTII,S$GLB,, | Performed by: INTERNAL MEDICINE

## 2022-03-08 PROCEDURE — 86140 C-REACTIVE PROTEIN: CPT | Performed by: INTERNAL MEDICINE

## 2022-03-08 PROCEDURE — 3074F PR MOST RECENT SYSTOLIC BLOOD PRESSURE < 130 MM HG: ICD-10-PCS | Mod: CPTII,S$GLB,, | Performed by: INTERNAL MEDICINE

## 2022-03-08 PROCEDURE — 85652 RBC SED RATE AUTOMATED: CPT | Performed by: INTERNAL MEDICINE

## 2022-03-08 PROCEDURE — 3008F BODY MASS INDEX DOCD: CPT | Mod: CPTII,S$GLB,, | Performed by: INTERNAL MEDICINE

## 2022-03-08 PROCEDURE — 1160F PR REVIEW ALL MEDS BY PRESCRIBER/CLIN PHARMACIST DOCUMENTED: ICD-10-PCS | Mod: CPTII,S$GLB,, | Performed by: INTERNAL MEDICINE

## 2022-03-08 PROCEDURE — 80053 COMPREHEN METABOLIC PANEL: CPT | Performed by: INTERNAL MEDICINE

## 2022-03-08 RX ORDER — METHOTREXATE 2.5 MG/1
25 TABLET ORAL
Qty: 40 TABLET | Refills: 1 | Status: SHIPPED | OUTPATIENT
Start: 2022-03-08 | End: 2022-05-17

## 2022-03-08 RX ORDER — DOXYCYCLINE 100 MG/1
100 CAPSULE ORAL DAILY
Qty: 7 CAPSULE | Refills: 0 | Status: SHIPPED | OUTPATIENT
Start: 2022-03-08 | End: 2022-03-15

## 2022-03-09 NOTE — PROGRESS NOTES
Illness:  62-year-old female who has been followed by Dr. Cortez since December, 2020. She presented at that time with a several month history of foot and ankle pain.  It then spread to the hands.  She was diagnosed as having rheumatoid arthritis.  She was started initially on prednisone and then methotrexate.  She had been tapered off her prednisone.  She states her pain is worse with stopping the prednisone.  Her methotrexate had been increased up to 25 mg weekly.  She cut it back to 20 mg because of hair loss.  Her last rheumatology visit was in October.  She has had no recent joint swelling.  She has had no morning stiffness.  She does complain of fatigue for several days after taking the methotrexate.    She has a sore on the right hand.  It started initially is an abrasion.  It is been present for 2 months.  She has been using Neosporin.  She has had no similar breakout elsewhere.  She denies any fever, headache, conjunctivitis, oral ulcers, dry eye or mouth, Raynaud's phenomena, pleurisy, numbness or tingling.  She has had no abdominal pain.  She has had no other recent medical problems.    Physical examination:  Skin:  She has an ulcer overlying the right 3rd MCP with some induration.  It is tender to palpation.  ENT:  Adequate tears in saliva.  No conjunctivitis or oral ulcers.  Chest:  Clear to auscultation and percussion  Cardiac:  No murmurs, gallops, rubs  Abdomen:  No organomegaly or masses.  No tenderness to palpation  Extremities:  No pedal edema  Musculoskeletal:  She has full range of motion of all joints.  She has no soft tissue swelling, erythema, or increased warmth.  She does have bony hypertrophy of the PIP.  She has dorsal kyphosis.  She has no tender areas to palpation.    Assessment:  1. No evidence of active rheumatoid arthritis  2. She has underlying osteoarthritis  3. For uncle of the hand    Plans:  1. Laboratory studies obtained  2. I placed her on doxycycline 100 mg daily for 7  days  3. If her furuncle does not improve, she should see Dermatology  4. Continue methotrexate 20 mg weekly  5. Continue folic acid    6. Return in 3 months.    Answers for HPI/ROS submitted by the patient on 3/7/2022  fever: No  eye redness: No  mouth sores: No  headaches: No  shortness of breath: No  chest pain: No  trouble swallowing: No  diarrhea: No  constipation: No  unexpected weight change: No  genital sore: No  dysuria: No  During the last 3 days, have you had a skin rash?: No  Bruises or bleeds easily: Yes  cough: No

## 2022-03-17 DIAGNOSIS — F41.1 GENERALIZED ANXIETY DISORDER: ICD-10-CM

## 2022-03-17 RX ORDER — SERTRALINE HYDROCHLORIDE 25 MG/1
TABLET, FILM COATED ORAL
Qty: 90 TABLET | Refills: 0 | Status: SHIPPED | OUTPATIENT
Start: 2022-03-17 | End: 2022-07-05 | Stop reason: SDUPTHER

## 2022-03-31 DIAGNOSIS — R00.2 PALPITATIONS: ICD-10-CM

## 2022-03-31 DIAGNOSIS — I10 ESSENTIAL HYPERTENSION: ICD-10-CM

## 2022-03-31 NOTE — TELEPHONE ENCOUNTER
No new care gaps identified.  Powered by [employer]. Reference number: [id number].   3/31/2022 11:23:40 AM CDT

## 2022-04-01 RX ORDER — METOPROLOL SUCCINATE 50 MG/1
TABLET, EXTENDED RELEASE ORAL
Qty: 90 TABLET | Refills: 3 | Status: SHIPPED | OUTPATIENT
Start: 2022-04-01 | End: 2022-07-05 | Stop reason: SDUPTHER

## 2022-04-01 NOTE — TELEPHONE ENCOUNTER
Refill Authorization Note   Claire Bowser  is requesting a refill authorization.  Brief Assessment and Rationale for Refill:  Approve     Medication Therapy Plan:       Medication Reconciliation Completed: No   Comments:   --->Care Gap information included below if applicable.       Requested Prescriptions   Pending Prescriptions Disp Refills    metoprolol succinate (TOPROL-XL) 50 MG 24 hr tablet [Pharmacy Med Name: Metoprolol Succinate ER 50 MG Oral Tablet Extended Release 24 Hour] 90 tablet 3     Sig: Take 1 tablet by mouth once daily       Cardiovascular:  Beta Blockers Passed - 3/31/2022 11:23 AM        Passed - Patient is at least 18 years old        Passed - Last BP in normal range within 360 days     BP Readings from Last 3 Encounters:   03/08/22 109/62   01/18/22 107/63   12/16/21 (!) 140/104               Passed - Last Heart Rate in normal range within 360 days     Pulse Readings from Last 1 Encounters:   03/08/22 80              Passed - Valid encounter within last 15 months     Recent Visits  Date Type Provider Dept   01/18/22 Office Visit Samuel Brady MD [facility]   11/17/21 Office Visit Samuel Brady MD [facility]   10/18/21 Office Visit Samuel Brady MD [facility]   09/17/21 Office Visit Samuel Brady MD [facility]   01/14/21 Office Visit Samuel Brady MD [facility]   Showing recent visits within past 720 days and meeting all other requirements  Future Appointments  No visits were found meeting these conditions.  Showing future appointments within next 150 days and meeting all other requirements      Future Appointments              In 2 months [facility], [place]    In 2 months [id number]    In 9 months [facility]                Passed - Matches previous order       Previous Authorizing Provider:  Samuel Brady MD (metoprolol succinate (TOPROL-XL) 50 MG 24 hr tablet)  Previous Pharmacy: [facility] [address] [place] [id number] [facility]            Passed - No ED/Hospital visits since last PCP visit     Last PCP Visit: 1/18/2022 Last Admission: 2/26/2021 Last ED Visit:               Appointments  past 12m or future 3m with PCP    Date Provider   Last Visit   1/18/2022 Samuel Brady MD   Next Visit   Visit date not found Samuel Brady MD   ED visits in past 90 days: 0     Note composed:10:40 AM 04/01/2022

## 2022-05-17 DIAGNOSIS — F41.1 GENERALIZED ANXIETY DISORDER: ICD-10-CM

## 2022-05-17 DIAGNOSIS — G47.00 INSOMNIA, UNSPECIFIED TYPE: ICD-10-CM

## 2022-05-17 RX ORDER — TRAZODONE HYDROCHLORIDE 50 MG/1
TABLET ORAL
Qty: 30 TABLET | Refills: 2 | Status: SHIPPED | OUTPATIENT
Start: 2022-05-17 | End: 2022-09-01

## 2022-05-17 NOTE — TELEPHONE ENCOUNTER
No new care gaps identified.  [facility] Embedded Care Gaps. Reference number: [id number]. 5/17/2022   3:37:09 PM CDT

## 2022-05-18 NOTE — TELEPHONE ENCOUNTER
Refill Authorization Note   Claire Bowser  is requesting a refill authorization.  Brief Assessment and Rationale for Refill:  Approve     Medication Therapy Plan:       Medication Reconciliation Completed: No   Comments:     No Care Gaps recommended.     Note composed:7:15 PM 05/17/2022

## 2022-05-23 ENCOUNTER — OFFICE VISIT (OUTPATIENT)
Dept: FAMILY MEDICINE | Facility: CLINIC | Age: 62
End: 2022-05-23
Payer: COMMERCIAL

## 2022-05-23 ENCOUNTER — HOSPITAL ENCOUNTER (OUTPATIENT)
Dept: RADIOLOGY | Facility: CLINIC | Age: 62
Discharge: HOME | End: 2022-05-23
Attending: PHYSICIAN ASSISTANT
Payer: COMMERCIAL

## 2022-05-23 ENCOUNTER — TELEPHONE (OUTPATIENT)
Dept: FAMILY MEDICINE | Facility: CLINIC | Age: 62
End: 2022-05-23
Payer: COMMERCIAL

## 2022-05-23 VITALS
TEMPERATURE: 99 F | DIASTOLIC BLOOD PRESSURE: 70 MMHG | SYSTOLIC BLOOD PRESSURE: 124 MMHG | HEART RATE: 104 BPM | OXYGEN SATURATION: 93 % | HEIGHT: 63 IN | WEIGHT: 110.69 LBS | BODY MASS INDEX: 19.61 KG/M2

## 2022-05-23 DIAGNOSIS — J40 BRONCHITIS: Primary | ICD-10-CM

## 2022-05-23 DIAGNOSIS — U07.1 COVID-19: ICD-10-CM

## 2022-05-23 DIAGNOSIS — Z72.0 TOBACCO USE: ICD-10-CM

## 2022-05-23 DIAGNOSIS — E04.1 THYROID NODULE: ICD-10-CM

## 2022-05-23 DIAGNOSIS — J40 BRONCHITIS: ICD-10-CM

## 2022-05-23 DIAGNOSIS — M06.9 RHEUMATOID ARTHRITIS, INVOLVING UNSPECIFIED SITE, UNSPECIFIED WHETHER RHEUMATOID FACTOR PRESENT: ICD-10-CM

## 2022-05-23 DIAGNOSIS — B34.9 VIRAL ILLNESS: ICD-10-CM

## 2022-05-23 DIAGNOSIS — J18.9 PNEUMONIA OF BOTH LOWER LOBES DUE TO INFECTIOUS ORGANISM: ICD-10-CM

## 2022-05-23 PROCEDURE — 71046 XR CHEST PA AND LATERAL: ICD-10-PCS | Mod: 26,,, | Performed by: RADIOLOGY

## 2022-05-23 PROCEDURE — 4010F PR ACE/ARB THEARPY RXD/TAKEN: ICD-10-PCS | Mod: CPTII,S$GLB,, | Performed by: PHYSICIAN ASSISTANT

## 2022-05-23 PROCEDURE — 71046 X-RAY EXAM CHEST 2 VIEWS: CPT | Mod: TC,FY,PO

## 2022-05-23 PROCEDURE — 3078F PR MOST RECENT DIASTOLIC BLOOD PRESSURE < 80 MM HG: ICD-10-PCS | Mod: CPTII,S$GLB,, | Performed by: PHYSICIAN ASSISTANT

## 2022-05-23 PROCEDURE — 99999 PR PBB SHADOW E&M-EST. PATIENT-LVL V: ICD-10-PCS | Mod: PBBFAC,,, | Performed by: PHYSICIAN ASSISTANT

## 2022-05-23 PROCEDURE — 1160F PR REVIEW ALL MEDS BY PRESCRIBER/CLIN PHARMACIST DOCUMENTED: ICD-10-PCS | Mod: CPTII,S$GLB,, | Performed by: PHYSICIAN ASSISTANT

## 2022-05-23 PROCEDURE — 3008F BODY MASS INDEX DOCD: CPT | Mod: CPTII,S$GLB,, | Performed by: PHYSICIAN ASSISTANT

## 2022-05-23 PROCEDURE — 3074F SYST BP LT 130 MM HG: CPT | Mod: CPTII,S$GLB,, | Performed by: PHYSICIAN ASSISTANT

## 2022-05-23 PROCEDURE — 99214 OFFICE O/P EST MOD 30 MIN: CPT | Mod: S$GLB,,, | Performed by: PHYSICIAN ASSISTANT

## 2022-05-23 PROCEDURE — 3008F PR BODY MASS INDEX (BMI) DOCUMENTED: ICD-10-PCS | Mod: CPTII,S$GLB,, | Performed by: PHYSICIAN ASSISTANT

## 2022-05-23 PROCEDURE — 1160F RVW MEDS BY RX/DR IN RCRD: CPT | Mod: CPTII,S$GLB,, | Performed by: PHYSICIAN ASSISTANT

## 2022-05-23 PROCEDURE — 4010F ACE/ARB THERAPY RXD/TAKEN: CPT | Mod: CPTII,S$GLB,, | Performed by: PHYSICIAN ASSISTANT

## 2022-05-23 PROCEDURE — 1159F PR MEDICATION LIST DOCUMENTED IN MEDICAL RECORD: ICD-10-PCS | Mod: CPTII,S$GLB,, | Performed by: PHYSICIAN ASSISTANT

## 2022-05-23 PROCEDURE — 1159F MED LIST DOCD IN RCRD: CPT | Mod: CPTII,S$GLB,, | Performed by: PHYSICIAN ASSISTANT

## 2022-05-23 PROCEDURE — 3074F PR MOST RECENT SYSTOLIC BLOOD PRESSURE < 130 MM HG: ICD-10-PCS | Mod: CPTII,S$GLB,, | Performed by: PHYSICIAN ASSISTANT

## 2022-05-23 PROCEDURE — 71046 X-RAY EXAM CHEST 2 VIEWS: CPT | Mod: 26,,, | Performed by: RADIOLOGY

## 2022-05-23 PROCEDURE — 3078F DIAST BP <80 MM HG: CPT | Mod: CPTII,S$GLB,, | Performed by: PHYSICIAN ASSISTANT

## 2022-05-23 PROCEDURE — 99999 PR PBB SHADOW E&M-EST. PATIENT-LVL V: CPT | Mod: PBBFAC,,, | Performed by: PHYSICIAN ASSISTANT

## 2022-05-23 PROCEDURE — 99214 PR OFFICE/OUTPT VISIT, EST, LEVL IV, 30-39 MIN: ICD-10-PCS | Mod: S$GLB,,, | Performed by: PHYSICIAN ASSISTANT

## 2022-05-23 RX ORDER — DOXYCYCLINE 100 MG/1
100 CAPSULE ORAL 2 TIMES DAILY
Qty: 20 CAPSULE | Refills: 0 | Status: ON HOLD | OUTPATIENT
Start: 2022-05-23 | End: 2022-05-26 | Stop reason: HOSPADM

## 2022-05-23 RX ORDER — FOLIC ACID 1 MG/1
1 TABLET ORAL DAILY
Qty: 360 TABLET | Refills: 3 | Status: SHIPPED | OUTPATIENT
Start: 2022-05-23 | End: 2023-05-31 | Stop reason: SDUPTHER

## 2022-05-23 NOTE — TELEPHONE ENCOUNTER
----- Message from Jenniffer  sent at 5/23/2022  8:04 AM CDT -----  Regarding: same day access  Type: Needs Medical Advice    Who Called:      Best Call Back Number:   Additional Information: Requesting a call back from Nurse, regarding pt was covid pos on 05/13 and things are getting worse per pt ,her breathing is very funny SOB ,headaches are worse ,cough-fever 100.5 with sweats and wants to be seen today ,pt has access to Pt Portal and needs meds,please advise and call back

## 2022-05-23 NOTE — PROGRESS NOTES
Subjective:       Patient ID: Claire Bowser is a 62 y.o. female.    Chief Complaint: Shortness of Breath    HPI   Pt. With recent covid 19 illness  Sx started 5-12-22  Seen urgent for DX  Given Paxlovid but was unable to tolerate it because of GI SX  Still has cough with wheeze  Still everyday smoker   Review of Systems   Constitutional: Positive for activity change, chills and fatigue. Negative for appetite change, diaphoresis, fever and unexpected weight change.   HENT: Positive for nasal congestion and postnasal drip. Negative for sinus pressure/congestion.    Eyes: Negative.    Respiratory: Positive for cough, shortness of breath and wheezing.    Cardiovascular: Negative.  Negative for chest pain and leg swelling.   Gastrointestinal: Negative.    Endocrine: Negative.    Genitourinary: Negative.    Musculoskeletal: Negative.    Integumentary:  Negative for rash. Negative.   Neurological: Negative.          Objective:      Physical Exam  Vitals reviewed.   Constitutional:       General: She is not in acute distress.     Appearance: Normal appearance. She is normal weight. She is not ill-appearing, toxic-appearing or diaphoretic.   HENT:      Head: Normocephalic and atraumatic.      Right Ear: Tympanic membrane, ear canal and external ear normal. There is no impacted cerumen.      Left Ear: Tympanic membrane, ear canal and external ear normal. There is no impacted cerumen.      Nose: Nose normal.      Mouth/Throat:      Mouth: Mucous membranes are moist.      Pharynx: Oropharynx is clear. No oropharyngeal exudate or posterior oropharyngeal erythema.   Eyes:      General: No scleral icterus.     Conjunctiva/sclera: Conjunctivae normal.   Neck:      Vascular: No carotid bruit.      Comments: L thyroid enlargement  Cardiovascular:      Rate and Rhythm: Normal rate and regular rhythm.      Pulses: Normal pulses.      Heart sounds: Normal heart sounds. No murmur heard.    No friction rub. No gallop.   Pulmonary:       Effort: Pulmonary effort is normal. No respiratory distress.      Breath sounds: Normal breath sounds. No stridor. No wheezing, rhonchi or rales.   Musculoskeletal:         General: No swelling.      Cervical back: Normal range of motion and neck supple. No rigidity or tenderness.      Right lower leg: No edema.      Left lower leg: No edema.   Lymphadenopathy:      Cervical: No cervical adenopathy.   Skin:     General: Skin is warm and dry.      Findings: No rash.   Neurological:      General: No focal deficit present.      Mental Status: She is alert and oriented to person, place, and time.         Assessment:       Problem List Items Addressed This Visit     Tobacco use    Relevant Orders    Ambulatory referral/consult to Smoking Cessation Program    X-Ray Chest PA And Lateral (Completed)      Other Visit Diagnoses     Bronchitis    -  Primary    Relevant Orders    CBC Auto Differential    X-Ray Chest PA And Lateral (Completed)    Viral illness        Relevant Orders    CBC Auto Differential    COVID-19        Relevant Orders    CBC Auto Differential    Comprehensive Metabolic Panel    X-Ray Chest PA And Lateral (Completed)    Thyroid nodule        Relevant Orders    US Soft Tissue Head Neck Thyroid    TSH    Rheumatoid arthritis, involving unspecified site, unspecified whether rheumatoid factor present        Relevant Medications    folic acid (FOLVITE) 1 MG tablet    Pneumonia of both lower lobes due to infectious organism        Relevant Medications    doxycycline (MONODOX) 100 MG capsule          Plan:       Claire was seen today for shortness of breath.    Diagnoses and all orders for this visit:    Bronchitis  -     CBC Auto Differential; Future  -     X-Ray Chest PA And Lateral; Future    Viral illness  -     CBC Auto Differential; Future    COVID-19  -     CBC Auto Differential; Future  -     Comprehensive Metabolic Panel; Future  -     X-Ray Chest PA And Lateral; Future    Tobacco use  -     Ambulatory  referral/consult to Smoking Cessation Program; Future  -     X-Ray Chest PA And Lateral; Future    Thyroid nodule  -     US Soft Tissue Head Neck Thyroid; Future  -     TSH; Future    Rheumatoid arthritis, involving unspecified site, unspecified whether rheumatoid factor present  -     folic acid (FOLVITE) 1 MG tablet; Take 1 tablet (1 mg total) by mouth once daily.    Pneumonia of both lower lobes due to infectious organism  -     doxycycline (MONODOX) 100 MG capsule; Take 1 capsule (100 mg total) by mouth 2 (two) times daily. for 10 days    discussed otc's  Use albuteral    Chest xray today shows shows patchy infiltrates both lower lobes R>L    F/u check 3 wks  F/u PCP

## 2022-05-25 ENCOUNTER — HOSPITAL ENCOUNTER (OUTPATIENT)
Facility: HOSPITAL | Age: 62
Discharge: HOME | End: 2022-05-26
Attending: EMERGENCY MEDICINE | Admitting: HOSPITALIST
Payer: COMMERCIAL

## 2022-05-25 DIAGNOSIS — U07.1 COVID-19: ICD-10-CM

## 2022-05-25 DIAGNOSIS — N30.00 ACUTE CYSTITIS WITHOUT HEMATURIA: Primary | ICD-10-CM

## 2022-05-25 DIAGNOSIS — N28.9 ACUTE RENAL INSUFFICIENCY: ICD-10-CM

## 2022-05-25 DIAGNOSIS — R06.02 SOB (SHORTNESS OF BREATH): ICD-10-CM

## 2022-05-25 PROBLEM — E87.6 HYPOKALEMIA: Status: ACTIVE | Noted: 2022-05-25

## 2022-05-25 PROBLEM — J18.9 PNEUMONIA: Status: ACTIVE | Noted: 2022-05-25

## 2022-05-25 PROBLEM — E86.0 DEHYDRATION: Status: ACTIVE | Noted: 2022-05-25

## 2022-05-25 PROBLEM — N17.9 AKI (ACUTE KIDNEY INJURY): Status: ACTIVE | Noted: 2022-05-25

## 2022-05-25 LAB
ALBUMIN SERPL BCP-MCNC: 2.7 G/DL (ref 3.5–5.2)
ALP SERPL-CCNC: 98 U/L (ref 55–135)
ALT SERPL W/O P-5'-P-CCNC: 9 U/L (ref 10–44)
ANION GAP SERPL CALC-SCNC: 11 MMOL/L (ref 8–16)
ANISOCYTOSIS BLD QL SMEAR: SLIGHT
AST SERPL-CCNC: 13 U/L (ref 10–40)
BACTERIA #/AREA URNS HPF: ABNORMAL /HPF
BASOPHILS # BLD AUTO: 0.16 K/UL (ref 0–0.2)
BASOPHILS NFR BLD: 1 % (ref 0–1.9)
BILIRUB SERPL-MCNC: 0.4 MG/DL (ref 0.1–1)
BILIRUB UR QL STRIP: NEGATIVE
BUN SERPL-MCNC: 24 MG/DL (ref 8–23)
CALCIUM SERPL-MCNC: 11.8 MG/DL (ref 8.7–10.5)
CHLORIDE SERPL-SCNC: 95 MMOL/L (ref 95–110)
CK SERPL-CCNC: 22 U/L (ref 20–180)
CLARITY UR: CLEAR
CO2 SERPL-SCNC: 30 MMOL/L (ref 23–29)
COLOR UR: YELLOW
CREAT SERPL-MCNC: 1.7 MG/DL (ref 0.5–1.4)
CRP SERPL-MCNC: 65.6 MG/L (ref 0–8.2)
DIFFERENTIAL METHOD: ABNORMAL
EOSINOPHIL # BLD AUTO: 0.5 K/UL (ref 0–0.5)
EOSINOPHIL NFR BLD: 3.2 % (ref 0–8)
ERYTHROCYTE [DISTWIDTH] IN BLOOD BY AUTOMATED COUNT: 13.3 % (ref 11.5–14.5)
EST. GFR  (AFRICAN AMERICAN): 37 ML/MIN/1.73 M^2
EST. GFR  (NON AFRICAN AMERICAN): 32 ML/MIN/1.73 M^2
FERRITIN SERPL-MCNC: 544 NG/ML (ref 20–300)
GLUCOSE SERPL-MCNC: 87 MG/DL (ref 70–110)
GLUCOSE UR QL STRIP: NEGATIVE
HCT VFR BLD AUTO: 30.6 % (ref 37–48.5)
HGB BLD-MCNC: 11.1 G/DL (ref 12–16)
HGB UR QL STRIP: NEGATIVE
HYALINE CASTS #/AREA URNS LPF: 5 /LPF
IMM GRANULOCYTES # BLD AUTO: 0.32 K/UL (ref 0–0.04)
IMM GRANULOCYTES NFR BLD AUTO: 2.1 % (ref 0–0.5)
KETONES UR QL STRIP: NEGATIVE
LACTATE SERPL-SCNC: 1.5 MMOL/L (ref 0.5–2.2)
LACTATE SERPL-SCNC: 2.5 MMOL/L (ref 0.5–2.2)
LDH SERPL L TO P-CCNC: 181 U/L (ref 110–260)
LEUKOCYTE ESTERASE UR QL STRIP: ABNORMAL
LYMPHOCYTES # BLD AUTO: 2.5 K/UL (ref 1–4.8)
LYMPHOCYTES NFR BLD: 15.8 % (ref 18–48)
MCH RBC QN AUTO: 35.1 PG (ref 27–31)
MCHC RBC AUTO-ENTMCNC: 36.3 G/DL (ref 32–36)
MCV RBC AUTO: 97 FL (ref 82–98)
MICROSCOPIC COMMENT: ABNORMAL
MONOCYTES # BLD AUTO: 2.1 K/UL (ref 0.3–1)
MONOCYTES NFR BLD: 13.2 % (ref 4–15)
NEUTROPHILS # BLD AUTO: 10.1 K/UL (ref 1.8–7.7)
NEUTROPHILS NFR BLD: 64.7 % (ref 38–73)
NITRITE UR QL STRIP: NEGATIVE
NRBC BLD-RTO: 0 /100 WBC
PH UR STRIP: 7 [PH] (ref 5–8)
PLATELET # BLD AUTO: 460 K/UL (ref 150–450)
PMV BLD AUTO: 10.5 FL (ref 9.2–12.9)
POTASSIUM SERPL-SCNC: 3 MMOL/L (ref 3.5–5.1)
PROT SERPL-MCNC: 6.4 G/DL (ref 6–8.4)
PROT UR QL STRIP: NEGATIVE
RBC # BLD AUTO: 3.16 M/UL (ref 4–5.4)
RBC #/AREA URNS HPF: 2 /HPF (ref 0–4)
SARS-COV-2 RDRP RESP QL NAA+PROBE: NEGATIVE
SODIUM SERPL-SCNC: 136 MMOL/L (ref 136–145)
SP GR UR STRIP: 1.01 (ref 1–1.03)
SQUAMOUS #/AREA URNS HPF: 5 /HPF
TROPONIN I SERPL DL<=0.01 NG/ML-MCNC: 0.06 NG/ML (ref 0–0.03)
URN SPEC COLLECT METH UR: ABNORMAL
UROBILINOGEN UR STRIP-ACNC: NEGATIVE EU/DL
WBC # BLD AUTO: 15.53 K/UL (ref 3.9–12.7)
WBC #/AREA URNS HPF: 14 /HPF (ref 0–5)

## 2022-05-25 PROCEDURE — 96365 THER/PROPH/DIAG IV INF INIT: CPT

## 2022-05-25 PROCEDURE — G0378 HOSPITAL OBSERVATION PER HR: HCPCS

## 2022-05-25 PROCEDURE — 63600175 PHARM REV CODE 636 W HCPCS: Performed by: HOSPITALIST

## 2022-05-25 PROCEDURE — 63600175 PHARM REV CODE 636 W HCPCS: Performed by: PHYSICIAN ASSISTANT

## 2022-05-25 PROCEDURE — 85025 COMPLETE CBC W/AUTO DIFF WBC: CPT | Performed by: PHYSICIAN ASSISTANT

## 2022-05-25 PROCEDURE — 25000003 PHARM REV CODE 250: Performed by: HOSPITALIST

## 2022-05-25 PROCEDURE — 81000 URINALYSIS NONAUTO W/SCOPE: CPT | Performed by: PHYSICIAN ASSISTANT

## 2022-05-25 PROCEDURE — 36415 COLL VENOUS BLD VENIPUNCTURE: CPT | Performed by: PHYSICIAN ASSISTANT

## 2022-05-25 PROCEDURE — 83615 LACTATE (LD) (LDH) ENZYME: CPT | Performed by: PHYSICIAN ASSISTANT

## 2022-05-25 PROCEDURE — 82550 ASSAY OF CK (CPK): CPT | Performed by: PHYSICIAN ASSISTANT

## 2022-05-25 PROCEDURE — 93010 ELECTROCARDIOGRAM REPORT: CPT | Mod: ,,, | Performed by: INTERNAL MEDICINE

## 2022-05-25 PROCEDURE — 80053 COMPREHEN METABOLIC PANEL: CPT | Performed by: PHYSICIAN ASSISTANT

## 2022-05-25 PROCEDURE — 87040 BLOOD CULTURE FOR BACTERIA: CPT | Mod: 59 | Performed by: PHYSICIAN ASSISTANT

## 2022-05-25 PROCEDURE — 93010 EKG 12-LEAD: ICD-10-PCS | Mod: ,,, | Performed by: INTERNAL MEDICINE

## 2022-05-25 PROCEDURE — 84484 ASSAY OF TROPONIN QUANT: CPT | Performed by: PHYSICIAN ASSISTANT

## 2022-05-25 PROCEDURE — 83605 ASSAY OF LACTIC ACID: CPT | Performed by: PHYSICIAN ASSISTANT

## 2022-05-25 PROCEDURE — 93005 ELECTROCARDIOGRAM TRACING: CPT

## 2022-05-25 PROCEDURE — 86140 C-REACTIVE PROTEIN: CPT | Performed by: PHYSICIAN ASSISTANT

## 2022-05-25 PROCEDURE — 25000003 PHARM REV CODE 250: Performed by: PHYSICIAN ASSISTANT

## 2022-05-25 PROCEDURE — 82728 ASSAY OF FERRITIN: CPT | Performed by: PHYSICIAN ASSISTANT

## 2022-05-25 PROCEDURE — 99285 EMERGENCY DEPT VISIT HI MDM: CPT | Mod: 25

## 2022-05-25 PROCEDURE — 87086 URINE CULTURE/COLONY COUNT: CPT | Performed by: PHYSICIAN ASSISTANT

## 2022-05-25 PROCEDURE — U0002 COVID-19 LAB TEST NON-CDC: HCPCS | Performed by: PHYSICIAN ASSISTANT

## 2022-05-25 PROCEDURE — 96361 HYDRATE IV INFUSION ADD-ON: CPT

## 2022-05-25 RX ORDER — METOPROLOL SUCCINATE 50 MG/1
50 TABLET, EXTENDED RELEASE ORAL DAILY
Status: DISCONTINUED | OUTPATIENT
Start: 2022-05-26 | End: 2022-05-26 | Stop reason: HOSPADM

## 2022-05-25 RX ORDER — ONDANSETRON 4 MG/1
8 TABLET, ORALLY DISINTEGRATING ORAL EVERY 8 HOURS PRN
Status: DISCONTINUED | OUTPATIENT
Start: 2022-05-25 | End: 2022-05-26 | Stop reason: HOSPADM

## 2022-05-25 RX ORDER — CYCLOBENZAPRINE HCL 5 MG
5 TABLET ORAL 3 TIMES DAILY PRN
Status: DISCONTINUED | OUTPATIENT
Start: 2022-05-25 | End: 2022-05-26 | Stop reason: HOSPADM

## 2022-05-25 RX ORDER — SERTRALINE HYDROCHLORIDE 25 MG/1
25 TABLET, FILM COATED ORAL DAILY
Status: DISCONTINUED | OUTPATIENT
Start: 2022-05-26 | End: 2022-05-26 | Stop reason: HOSPADM

## 2022-05-25 RX ORDER — SODIUM CHLORIDE 9 MG/ML
INJECTION, SOLUTION INTRAVENOUS CONTINUOUS
Status: ACTIVE | OUTPATIENT
Start: 2022-05-25 | End: 2022-05-26

## 2022-05-25 RX ORDER — ACETAMINOPHEN 500 MG
1000 TABLET ORAL EVERY 6 HOURS PRN
Status: DISCONTINUED | OUTPATIENT
Start: 2022-05-25 | End: 2022-05-26 | Stop reason: HOSPADM

## 2022-05-25 RX ORDER — POTASSIUM CHLORIDE 20 MEQ/1
40 TABLET, EXTENDED RELEASE ORAL ONCE
Status: COMPLETED | OUTPATIENT
Start: 2022-05-25 | End: 2022-05-25

## 2022-05-25 RX ADMIN — SODIUM CHLORIDE 1000 ML: 0.9 INJECTION, SOLUTION INTRAVENOUS at 10:05

## 2022-05-25 RX ADMIN — SODIUM CHLORIDE, SODIUM LACTATE, POTASSIUM CHLORIDE, AND CALCIUM CHLORIDE 500 ML: .6; .31; .03; .02 INJECTION, SOLUTION INTRAVENOUS at 11:05

## 2022-05-25 RX ADMIN — POTASSIUM CHLORIDE 40 MEQ: 1500 TABLET, EXTENDED RELEASE ORAL at 11:05

## 2022-05-25 RX ADMIN — SODIUM CHLORIDE: 0.9 INJECTION, SOLUTION INTRAVENOUS at 05:05

## 2022-05-25 RX ADMIN — TRAZODONE HYDROCHLORIDE 50 MG: 50 TABLET ORAL at 08:05

## 2022-05-25 RX ADMIN — CEFTRIAXONE 1 G: 1 INJECTION, SOLUTION INTRAVENOUS at 07:05

## 2022-05-25 RX ADMIN — AZITHROMYCIN MONOHYDRATE 500 MG: 500 INJECTION, POWDER, LYOPHILIZED, FOR SOLUTION INTRAVENOUS at 08:05

## 2022-05-25 RX ADMIN — ACETAMINOPHEN 1000 MG: 500 TABLET ORAL at 08:05

## 2022-05-25 NOTE — PHARMACY MED REC
"Admission Medication History     The home medication history was taken by Valerie Jordan CPhT.    Medication history obtained from Patient     You may go to "Admission" then "Reconcile Home Medications" tabs to review and/or act upon these items.      The home medication list has been updated by the Pharmacy department.    Please read ALL comments highlighted in yellow.    Please address this information as you see fit.     Feel free to contact us if you have any questions or require assistance.      Valerie Jordan CPhT.  [phone]      .          "

## 2022-05-25 NOTE — ED PROVIDER NOTES
Encounter Date: 5/25/2022       History     Chief Complaint   Patient presents with    Shortness of Breath     Dx'd with COVID on 5/13--stated now on antibiotics for pneumonia. Not getting better     Claire Bowser is a 62 y.o. female presenting for evaluation of shortness of breath persisting and worsening over the last several days since being diagnosed with COVID-19 on May 13th.  She took a few doses of Paxolovid and had associated nausea and vomiting.  She was evaluated by her PCP earlier this weeks and started on Doxycyline for possible pneumonia.  She has had a decreased appetite.  No abdominal pain.  She had COVID-19 in July of 2020 and has had both doses of the vaccine.     The history is provided by the patient.     Review of patient's allergies indicates:   Allergen Reactions    No known drug allergies      Past Medical History:   Diagnosis Date    Anxiety     Flu 02/2017    [facility]    Hypertension     Rheumatoid arthritis involving multiple sites with positive rheumatoid factor 1/14/2021     Past Surgical History:   Procedure Laterality Date    COLONOSCOPY N/A 2/26/2021    Procedure: COLONOSCOPY;  Surgeon: Amy Sidhu MD;  Location: [facility];  Service: Endoscopy;  Laterality: N/A;    ESOPHAGOGASTRODUODENOSCOPY N/A 2/26/2021    Procedure: EGD (ESOPHAGOGASTRODUODENOSCOPY);  Surgeon: Amy Sidhu MD;  Location: [facility];  Service: Endoscopy;  Laterality: N/A;    TUBAL LIGATION       Family History   Problem Relation Age of Onset    Diabetes Mother     Heart disease Mother     Kidney disease Mother     Hypertension Mother     Stroke Mother     Hearing loss Mother     COPD Father     Liver disease Paternal Grandfather     Alcohol abuse Paternal Grandfather     Liver disease Sister     Emphysema Brother     COPD Brother     Sleep apnea Brother     No Known Problems Son     Alcohol abuse Paternal Grandmother     Cirrhosis Paternal Grandmother     Heart disease  Maternal Aunt     Diabetes Maternal Aunt     Cancer Maternal Aunt         female    Heart disease Maternal Uncle     Hypertension Maternal Uncle     No Known Problems Paternal Uncle     Psoriasis Neg Hx     Lupus Neg Hx     Eczema Neg Hx     Melanoma Neg Hx      Social History     Tobacco Use    Smoking status: Current Every Day Smoker     Packs/day: 1.00     Years: 7.00     Pack years: 7.00     Types: Cigarettes    Smokeless tobacco: Never Used    Tobacco comment: [phone]   Substance Use Topics    Alcohol use: No    Drug use: Never     Review of Systems   Constitutional: Positive for appetite change and fatigue. Negative for activity change and fever.   HENT: Negative for congestion, rhinorrhea and sore throat.    Eyes: Negative for visual disturbance.   Respiratory: Positive for cough and shortness of breath. Negative for chest tightness and wheezing.    Cardiovascular: Negative for chest pain and palpitations.   Gastrointestinal: Positive for nausea and vomiting. Negative for diarrhea.   Genitourinary: Negative for dysuria and hematuria.   Musculoskeletal: Negative for arthralgias and myalgias.   Skin: Negative for rash.   Neurological: Negative for weakness, light-headedness, numbness and headaches.       Physical Exam     Initial Vitals [05/25/22 0924]   BP Pulse Resp Temp SpO2   (!) 81/42 98 16 97.6 °F (36.4 °C) 97 %      MAP       --         Physical Exam    Nursing note and vitals reviewed.  Constitutional: She appears well-developed and well-nourished. She is not diaphoretic. No distress.   HENT:   Head: Normocephalic and atraumatic.   Nose: Nose normal.   Mouth/Throat: Oropharynx is clear and moist.   Eyes: Conjunctivae are normal.   Neck: Neck supple.   Normal range of motion.  Cardiovascular: Normal rate, regular rhythm, normal heart sounds and intact distal pulses.   Pulmonary/Chest: Breath sounds normal. No respiratory distress. She has no wheezes. She has no rhonchi. She has no  rales.   Equal, bilateral breath sounds noted without wheezing.     Abdominal: Abdomen is soft. She exhibits no distension and no mass. There is no abdominal tenderness.   Musculoskeletal:         General: No tenderness or edema. Normal range of motion.      Cervical back: Normal range of motion and neck supple.     Neurological: She is alert and oriented to person, place, and time. She has normal strength. No sensory deficit.   Skin: Skin is warm and dry. No rash and no abscess noted. No erythema.   Psychiatric: She has a normal mood and affect.         ED Course   Procedures  Labs Reviewed   CBC W/ AUTO DIFFERENTIAL - Abnormal; Notable for the following components:       Result Value    WBC 15.53 (*)     RBC 3.16 (*)     Hemoglobin 11.1 (*)     Hematocrit 30.6 (*)     MCH 35.1 (*)     MCHC 36.3 (*)     Platelets 460 (*)     Immature Granulocytes 2.1 (*)     Gran # (ANC) 10.1 (*)     Immature Grans (Abs) 0.32 (*)     Mono # 2.1 (*)     Lymph % 15.8 (*)     All other components within normal limits   COMPREHENSIVE METABOLIC PANEL - Abnormal; Notable for the following components:    Potassium 3.0 (*)     CO2 30 (*)     BUN 24 (*)     Creatinine 1.7 (*)     Calcium 11.8 (*)     Albumin 2.7 (*)     ALT 9 (*)     eGFR if  37 (*)     eGFR if non  32 (*)     All other components within normal limits   C-REACTIVE PROTEIN - Abnormal; Notable for the following components:    CRP 65.6 (*)     All other components within normal limits   FERRITIN - Abnormal; Notable for the following components:    Ferritin 544 (*)     All other components within normal limits   LACTIC ACID, PLASMA - Abnormal; Notable for the following components:    Lactate (Lactic Acid) 2.5 (*)     All other components within normal limits   TROPONIN I - Abnormal; Notable for the following components:    Troponin I 0.062 (*)     All other components within normal limits   CULTURE, BLOOD   CULTURE, BLOOD   LACTATE DEHYDROGENASE    CK   SARS-COV-2 RNA AMPLIFICATION, QUAL   URINALYSIS, REFLEX TO URINE CULTURE   LACTIC ACID, PLASMA          Imaging Results          X-Ray Chest 1 View (Final result)  Result time 05/25/22 10:17:18    Final result by Sebastian Olivera MD (05/25/22 10:17:18)                 Impression:      No acute process.  No significant change.      Electronically signed by: Sebastian Olivera MD  Date:    05/25/2022  Time:    10:17             Narrative:    EXAMINATION:  XR CHEST 1 VIEW    CLINICAL HISTORY:  Shortness of breath    TECHNIQUE:  Single frontal view of the chest was performed.    COMPARISON:  05/23/2022    FINDINGS:  The cardiomediastinal silhouette is within normal limits.  There are mild chronic interstitial changes.  Pleuroparenchymal changes are present the lung apices.                                 Medications   sodium chloride 0.9% bolus 1,000 mL (0 mLs Intravenous Stopped 5/25/22 1140)   lactated ringers bolus 500 mL (0 mLs Intravenous Stopped 5/25/22 1223)   potassium chloride SA CR tablet 40 mEq (40 mEq Oral Given 5/25/22 1151)     Medical Decision Making:   History:   Old Medical Records: I decided to obtain old medical records.  Old Records Summarized: records from clinic visits and records from previous admission(s).  Differential Diagnosis:   Pneumonia   Dehydration   COVID-19   Sepsis   Clinical Tests:   Lab Tests: Ordered and Reviewed  Radiological Study: Ordered and Reviewed       APC / Resident Notes:   Initially hypotensive at 81/42.  BP has slowly improved with IV fluids.  Some renal insuffiency with elevated creatinine and BUN, hypokalemia.  WBCs 15 and initial lactic acid is 2.5  COVID-19 test is now negative and CXR shows no evidence for pneumonia.  We do feel she would benefit from admission to the hospital for further evaluation and treatment.  Hospital medicine agrees to admission. Pt agrees to admission.         ED Course as of 05/25/22 1342   Wed May 25, 2022   1117 Lactate, Nitesh(!):  2.5 [HS]   1117 BP(!): 81/42  Will initiate sepsis protocol.  Pt has already received 1000 ml of normal saline and will be given an additional 500 cc of lactated ringers.  [HS]   1129 Potassium(!): 3.0 [HS]   1130 Creatinine(!): 1.7 [HS]   1130 BUN(!): 24 [HS]   1130 eGFR if non (!): 32 [HS]   1130 WBC(!): 15.53 [HS]   1242 Pt is a smoker    [HS]      ED Course User Index  [HS] Yumiko Bangura PA-C             Clinical Impression:   Final diagnoses:  [R06.02] SOB (shortness of breath)  [U07.1] COVID-19  [N28.9] Acute renal insufficiency          ED Disposition Condition    Observation               Yumiko Bangura PA-C  05/25/22 1342

## 2022-05-25 NOTE — NURSING
Pt arrived via wc, HL intact, resp even et unlabored. Spouse at bedside. POC reviewed Belongings w patient.

## 2022-05-25 NOTE — PLAN OF CARE
[facility]  Initial Discharge Assessment       Primary Care Provider: Samuel Brady MD    Admission Diagnosis: SOB (shortness of breath) [R06.02]  Acute renal insufficiency [N28.9]  COVID-19 [U07.1]    Admission Date: 5/25/2022  Expected Discharge Date:     Discharge Barriers Identified: None    Payor: BLUE CROSS BLUE SHIELD / Plan: [place] PPO / Product Type: PPO /     Extended Emergency Contact Information  Primary Emergency Contact: Robin Bowser  Address: [address] [place] [address]           [place] [address] [place]  Mobile Phone: [phone]  Relation: Spouse  Preferred language: English   needed? No    Discharge Plan A: Home  Discharge Plan B: Home with family      [facility] [place] - [address]  [address]  [place] [address]  Phone: [phone] Fax: [phone]    SW met with patient at bedside to complete discharge planning assessment.  Patient alert and oriented xs 4.  Patient verified all demographic information on facesheet is correct.  Patient verified PCP is Dr. Brady.  Patient verified primary health insurance is BCBS.  Patient with NO home health or DME.  Patient with NO POA or Living Will.  Patient not on dialysis or medication coumadin.  Patient with no 30 day admission.  Patient with no financial issues at this time.  Patient family will provide transportation upon discharge from facility.  Patient independent with ADLs, live with spouse, drives self.      Initial Assessment (most recent)     Adult Discharge Assessment - 05/25/22 1604        Discharge Assessment    Assessment Type Discharge Planning Assessment     Confirmed/corrected address, phone number and insurance Yes     Confirmed Demographics Correct on Facesheet     Source of Information patient;family     Does patient/caregiver understand observation status Yes     Communicated RASHI with patient/caregiver Yes     Lives With spouse     Facility Arrived From: home      Do you expect to return to your current living situation? Yes     Do you have help at home or someone to help you manage your care at home? Yes     Who are your caregiver(s) and their phone number(s)? spouse     Prior to hospitilization cognitive status: Alert/Oriented     Current cognitive status: Alert/Oriented     Walking or Climbing Stairs Difficulty none     Dressing/Bathing Difficulty none     Equipment Currently Used at Home none     Readmission within 30 days? No     Patient currently being followed by outpatient case management? No     Do you currently have service(s) that help you manage your care at home? No     Do you take prescription medications? Yes     Do you have prescription coverage? Yes     Do you have any problems affording any of your prescribed medications? No     Is the patient taking medications as prescribed? yes     Who is going to help you get home at discharge? spouse     How do you get to doctors appointments? car, drives self     Are you on dialysis? No     Do you take coumadin? No     Discharge Plan A Home     Discharge Plan B Home with family     DME Needed Upon Discharge  none     Discharge Plan discussed with: Patient     Discharge Barriers Identified None

## 2022-05-26 ENCOUNTER — TELEPHONE (OUTPATIENT)
Dept: FAMILY MEDICINE | Facility: CLINIC | Age: 62
End: 2022-05-26
Payer: COMMERCIAL

## 2022-05-26 VITALS
TEMPERATURE: 98 F | WEIGHT: 109.13 LBS | BODY MASS INDEX: 19.34 KG/M2 | SYSTOLIC BLOOD PRESSURE: 119 MMHG | DIASTOLIC BLOOD PRESSURE: 62 MMHG | RESPIRATION RATE: 18 BRPM | HEART RATE: 62 BPM | HEIGHT: 63 IN | OXYGEN SATURATION: 94 %

## 2022-05-26 PROBLEM — E87.6 HYPOKALEMIA: Status: RESOLVED | Noted: 2022-05-25 | Resolved: 2022-05-26

## 2022-05-26 PROBLEM — E86.0 DEHYDRATION: Status: RESOLVED | Noted: 2022-05-25 | Resolved: 2022-05-26

## 2022-05-26 PROBLEM — N17.9 AKI (ACUTE KIDNEY INJURY): Status: RESOLVED | Noted: 2022-05-25 | Resolved: 2022-05-26

## 2022-05-26 LAB
ANION GAP SERPL CALC-SCNC: 8 MMOL/L (ref 8–16)
BASOPHILS # BLD AUTO: ABNORMAL K/UL (ref 0–0.2)
BASOPHILS NFR BLD: 0 % (ref 0–1.9)
BUN SERPL-MCNC: 18 MG/DL (ref 8–23)
CALCIUM SERPL-MCNC: 10.7 MG/DL (ref 8.7–10.5)
CHLORIDE SERPL-SCNC: 107 MMOL/L (ref 95–110)
CO2 SERPL-SCNC: 26 MMOL/L (ref 23–29)
CREAT SERPL-MCNC: 1.1 MG/DL (ref 0.5–1.4)
DIFFERENTIAL METHOD: ABNORMAL
EOSINOPHIL # BLD AUTO: ABNORMAL K/UL (ref 0–0.5)
EOSINOPHIL NFR BLD: 5 % (ref 0–8)
ERYTHROCYTE [DISTWIDTH] IN BLOOD BY AUTOMATED COUNT: 13.3 % (ref 11.5–14.5)
EST. GFR  (AFRICAN AMERICAN): >60 ML/MIN/1.73 M^2
EST. GFR  (NON AFRICAN AMERICAN): 54 ML/MIN/1.73 M^2
GLUCOSE SERPL-MCNC: 72 MG/DL (ref 70–110)
HCT VFR BLD AUTO: 27.7 % (ref 37–48.5)
HGB BLD-MCNC: 9.3 G/DL (ref 12–16)
IMM GRANULOCYTES # BLD AUTO: ABNORMAL K/UL (ref 0–0.04)
IMM GRANULOCYTES NFR BLD AUTO: ABNORMAL % (ref 0–0.5)
LYMPHOCYTES # BLD AUTO: ABNORMAL K/UL (ref 1–4.8)
LYMPHOCYTES NFR BLD: 23 % (ref 18–48)
MCH RBC QN AUTO: 33.6 PG (ref 27–31)
MCHC RBC AUTO-ENTMCNC: 33.6 G/DL (ref 32–36)
MCV RBC AUTO: 100 FL (ref 82–98)
MONOCYTES # BLD AUTO: ABNORMAL K/UL (ref 0.3–1)
MONOCYTES NFR BLD: 12 % (ref 4–15)
NEUTROPHILS NFR BLD: 60 % (ref 38–73)
NRBC BLD-RTO: 0 /100 WBC
PLATELET # BLD AUTO: 460 K/UL (ref 150–450)
PLATELET BLD QL SMEAR: ABNORMAL
PMV BLD AUTO: 10.3 FL (ref 9.2–12.9)
POTASSIUM SERPL-SCNC: 3.5 MMOL/L (ref 3.5–5.1)
RBC # BLD AUTO: 2.77 M/UL (ref 4–5.4)
SODIUM SERPL-SCNC: 141 MMOL/L (ref 136–145)
WBC # BLD AUTO: 8.89 K/UL (ref 3.9–12.7)

## 2022-05-26 PROCEDURE — 80048 BASIC METABOLIC PNL TOTAL CA: CPT | Performed by: HOSPITALIST

## 2022-05-26 PROCEDURE — 85007 BL SMEAR W/DIFF WBC COUNT: CPT | Mod: NCS | Performed by: HOSPITALIST

## 2022-05-26 PROCEDURE — 63600175 PHARM REV CODE 636 W HCPCS: Performed by: HOSPITALIST

## 2022-05-26 PROCEDURE — 85027 COMPLETE CBC AUTOMATED: CPT | Performed by: HOSPITALIST

## 2022-05-26 PROCEDURE — G0378 HOSPITAL OBSERVATION PER HR: HCPCS

## 2022-05-26 PROCEDURE — 96375 TX/PRO/DX INJ NEW DRUG ADDON: CPT

## 2022-05-26 PROCEDURE — 36415 COLL VENOUS BLD VENIPUNCTURE: CPT | Performed by: HOSPITALIST

## 2022-05-26 PROCEDURE — 25000003 PHARM REV CODE 250: Performed by: HOSPITALIST

## 2022-05-26 RX ORDER — SODIUM CHLORIDE 9 MG/ML
INJECTION, SOLUTION INTRAVENOUS CONTINUOUS
Status: ACTIVE | OUTPATIENT
Start: 2022-05-26 | End: 2022-05-26

## 2022-05-26 RX ORDER — CEFDINIR 300 MG/1
300 CAPSULE ORAL 2 TIMES DAILY
Qty: 14 CAPSULE | Refills: 0 | Status: ON HOLD | OUTPATIENT
Start: 2022-05-26 | End: 2022-06-05 | Stop reason: HOSPADM

## 2022-05-26 RX ADMIN — SODIUM CHLORIDE: 0.9 INJECTION, SOLUTION INTRAVENOUS at 09:05

## 2022-05-26 RX ADMIN — SERTRALINE HYDROCHLORIDE 25 MG: 25 TABLET ORAL at 09:05

## 2022-05-26 RX ADMIN — METOPROLOL SUCCINATE 50 MG: 50 TABLET, EXTENDED RELEASE ORAL at 09:05

## 2022-05-26 RX ADMIN — CEFTRIAXONE 1 G: 1 INJECTION, SOLUTION INTRAVENOUS at 09:05

## 2022-05-26 NOTE — SUBJECTIVE & OBJECTIVE
Past Medical History:   Diagnosis Date    Anxiety     Flu 02/2017    [facility]    Hypertension     Rheumatoid arthritis involving multiple sites with positive rheumatoid factor 1/14/2021       Past Surgical History:   Procedure Laterality Date    COLONOSCOPY N/A 2/26/2021    Procedure: COLONOSCOPY;  Surgeon: Amy Sidhu MD;  Location: [facility];  Service: Endoscopy;  Laterality: N/A;    ESOPHAGOGASTRODUODENOSCOPY N/A 2/26/2021    Procedure: EGD (ESOPHAGOGASTRODUODENOSCOPY);  Surgeon: Amy Sidhu MD;  Location: [facility];  Service: Endoscopy;  Laterality: N/A;    TUBAL LIGATION         Review of patient's allergies indicates:   Allergen Reactions    No known drug allergies        No current facility-administered medications on file prior to encounter.     Current Outpatient Medications on File Prior to Encounter   Medication Sig    cholecalciferol, vitamin D3, (VITAMIN D3) 10 mcg (400 unit) Tab Take 400 Units by mouth once daily.    doxycycline (MONODOX) 100 MG capsule Take 1 capsule (100 mg total) by mouth 2 (two) times daily. for 10 days    folic acid (FOLVITE) 1 MG tablet Take 1 tablet (1 mg total) by mouth once daily.    hydroCHLOROthiazide (HYDRODIURIL) 25 MG tablet Take 1 tablet (25 mg total) by mouth once daily.    lisinopriL (PRINIVIL,ZESTRIL) 40 MG tablet Take 1 tablet (40 mg total) by mouth once daily.    methotrexate 2.5 MG Tab TAKE 10 TABLETS BY MOUTH EVERY 7 DAYS SPLIT DOSING. TAKE 5 TABS IN THE AM AND 5 TABS IN THE PM EVERY 7 DAYS THE SAME DAY    metoprolol succinate (TOPROL-XL) 50 MG 24 hr tablet Take 1 tablet by mouth once daily    pantoprazole (PROTONIX) 40 MG tablet Take 1 tablet (40 mg total) by mouth once daily.    sertraline (ZOLOFT) 25 MG tablet Take 1 tablet by mouth once daily    traZODone (DESYREL) 50 MG tablet Take 1 tablet by mouth in the evening    cyclobenzaprine (FLEXERIL) 5 MG tablet Take 1 tablet (5 mg total) by mouth 3 (three) times daily as needed for Muscle  spasms.    [DISCONTINUED] alendronate (FOSAMAX) 70 MG tablet Take 1 tablet (70 mg total) by mouth every 7 days.    [DISCONTINUED] multivitamin (THERAGRAN) per tablet Take 1 tablet by mouth once daily. Every day     Family History       Problem Relation (Age of Onset)    Alcohol abuse Paternal Grandfather, Paternal Grandmother    COPD Father, Brother    Cancer Maternal Aunt    Cirrhosis Paternal Grandmother    Diabetes Mother, Maternal Aunt    Emphysema Brother    Hearing loss Mother    Heart disease Mother, Maternal Aunt, Maternal Uncle    Hypertension Mother, Maternal Uncle    Kidney disease Mother    Liver disease Paternal Grandfather, Sister    No Known Problems Son, Paternal Uncle    Sleep apnea Brother    Stroke Mother          Tobacco Use    Smoking status: Current Every Day Smoker     Packs/day: 1.00     Years: 7.00     Pack years: 7.00     Types: Cigarettes    Smokeless tobacco: Never Used    Tobacco comment: [phone]   Substance and Sexual Activity    Alcohol use: No    Drug use: Never    Sexual activity: Yes     Partners: Male     Review of Systems   Constitutional:  Negative for chills, fatigue and fever.   HENT:  Negative for congestion and sinus pressure.    Eyes:  Negative for pain and visual disturbance.   Respiratory:  Positive for cough and shortness of breath. Negative for wheezing.    Cardiovascular:  Negative for chest pain, palpitations and leg swelling.   Gastrointestinal:  Positive for nausea and vomiting. Negative for abdominal pain and diarrhea.   Genitourinary:  Negative for difficulty urinating, hematuria, urgency and vaginal discharge.   Musculoskeletal:  Negative for arthralgias, joint swelling and myalgias.   Skin:  Negative for rash and wound.   Neurological:  Positive for weakness and light-headedness. Negative for dizziness and headaches.   Hematological:  Negative for adenopathy. Does not bruise/bleed easily.   Psychiatric/Behavioral:  Negative for confusion and dysphoric mood.  The patient is not nervous/anxious.    Objective:     Vital Signs (Most Recent):  Temp: 97.8 °F (36.6 °C) (05/25/22 2023)  Pulse: 61 (05/25/22 1756)  Resp: 18 (05/25/22 1644)  BP: (!) 123/58 (05/25/22 1644)  SpO2: (!) 92 % (05/25/22 1644)   Vital Signs (24h Range):  Temp:  [97.6 °F (36.4 °C)-98.2 °F (36.8 °C)] 97.8 °F (36.6 °C)  Pulse:  [54-98] 61  Resp:  [16-20] 18  SpO2:  [92 %-97 %] 92 %  BP: ()/(42-62) 123/58     Weight: 49.5 kg (109 lb 2 oz)  Body mass index is 19.33 kg/m².    Physical Exam  Constitutional:       General: She is not in acute distress.     Appearance: She is ill-appearing. She is not diaphoretic.   HENT:      Head: Normocephalic and atraumatic.      Right Ear: External ear normal.      Left Ear: External ear normal.      Mouth/Throat:      Mouth: Mucous membranes are dry.      Pharynx: No oropharyngeal exudate.   Eyes:      General: No scleral icterus.        Right eye: No discharge.         Left eye: No discharge.      Conjunctiva/sclera: Conjunctivae normal.   Neck:      Thyroid: No thyromegaly.      Vascular: No JVD.   Cardiovascular:      Rate and Rhythm: Normal rate and regular rhythm.      Heart sounds:     No gallop.   Pulmonary:      Effort: Pulmonary effort is normal.      Breath sounds: Normal breath sounds. No wheezing.   Abdominal:      General: Bowel sounds are normal. There is no distension.      Palpations: Abdomen is soft. There is no hepatomegaly or mass.      Tenderness: There is no abdominal tenderness.   Musculoskeletal:         General: No deformity.      Cervical back: Normal range of motion and neck supple.   Lymphadenopathy:      Cervical: No cervical adenopathy.   Skin:     General: Skin is warm and dry.      Findings: No rash.   Neurological:      Mental Status: She is alert and oriented to person, place, and time.   Psychiatric:         Behavior: Behavior normal. Behavior is cooperative.           Significant Labs: BMP:   Recent Labs   Lab 05/25/22  1036   GLU 87       K 3.0*   CL 95   CO2 30*   BUN 24*   CREATININE 1.7*   CALCIUM 11.8*     CBC:   Recent Labs   Lab 05/25/22  1036   WBC 15.53*   HGB 11.1*   HCT 30.6*   *     Cardiac Markers: No results for input(s): CKMB, MYOGLOBIN, BNP, TROPISTAT in the last 48 hours.  Lactic Acid:   Recent Labs   Lab 05/25/22  1036 05/25/22  1342   LACTATE 2.5* 1.5     Troponin:   Recent Labs   Lab 05/25/22  1036   TROPONINI 0.062*     Urine Studies:   Recent Labs   Lab 05/25/22  1312   COLORU Yellow   APPEARANCEUA Clear   PHUR 7.0   SPECGRAV 1.010   PROTEINUA Negative   GLUCUA Negative   KETONESU Negative   BILIRUBINUA Negative   OCCULTUA Negative   NITRITE Negative   UROBILINOGEN Negative   LEUKOCYTESUR Trace*   RBCUA 2   WBCUA 14*   BACTERIA Occasional   SQUAMEPITHEL 5   HYALINECASTS 5*       Significant Imaging: I have reviewed all pertinent imaging results/findings within the past 24 hours.  I also reviewed the chest x-ray done two days ago.

## 2022-05-26 NOTE — DISCHARGE INSTRUCTIONS
Follow up with primary physician resume previous home medications Eat balanced meals if you have any problems go to nearest Emergency room.

## 2022-05-26 NOTE — HPI
Patient presented to ED today complaining of lightheadedness and shortness of breath.  Both have been going on for the past several days.  The lightheadedness is worse when she changes position from sitting to standing.  The shortness of breath is constant and worse with exertion.  Not worsened by lying flat.  Associated with cough.  She was diagnosed with COVID-19 a couple of weeks ago and was treated with Paxlovid, which caused her to be severely nauseous.  She took only a couple of days of it.  Her primary care diagnosed her with pneumonia two days ago and prescribed an oral antibiotc doxycycline.  Symptoms didn't decrease since starting the antibiotic.  She has rheumatoid arthritis and is on methotrexate.  Not on daily steroid therapy.

## 2022-05-26 NOTE — ASSESSMENT & PLAN NOTE
Due to volume depletion.  Will give IV saline to expand volume.  Monitor kidney function and urine output.  Avoid NSAID's.

## 2022-05-26 NOTE — H&P
[facility] Hospital Medicine  History & Physical    Patient Name: Claire Bowser  MRN: [id number]  Patient Class: OP- Observation  Admission Date: 5/25/2022  Attending Physician: Paul Lobo MD   Primary Care Provider: Samuel Brady MD         Patient information was obtained from patient, past medical records and ER records.     Subjective:     Principal Problem:SHANDA (acute kidney injury)    Chief Complaint:   Chief Complaint   Patient presents with    Shortness of Breath     Dx'd with COVID on 5/13--stated now on antibiotics for pneumonia. Not getting better        HPI: Patient presented to ED today complaining of lightheadedness and shortness of breath.  Both have been going on for the past several days.  The lightheadedness is worse when she changes position from sitting to standing.  The shortness of breath is constant and worse with exertion.  Not worsened by lying flat.  Associated with cough.  She was diagnosed with COVID-19 a couple of weeks ago and was treated with Paxlovid, which caused her to be severely nauseous.  She took only a couple of days of it.  Her primary care diagnosed her with pneumonia two days ago and prescribed an oral antibiotc doxycycline.  Symptoms didn't decrease since starting the antibiotic.  She has rheumatoid arthritis and is on methotrexate.  Not on daily steroid therapy.      Past Medical History:   Diagnosis Date    Anxiety     Flu 02/2017    [facility]    Hypertension     Rheumatoid arthritis involving multiple sites with positive rheumatoid factor 1/14/2021       Past Surgical History:   Procedure Laterality Date    COLONOSCOPY N/A 2/26/2021    Procedure: COLONOSCOPY;  Surgeon: Amy Sidhu MD;  Location: [facility];  Service: Endoscopy;  Laterality: N/A;    ESOPHAGOGASTRODUODENOSCOPY N/A 2/26/2021    Procedure: EGD (ESOPHAGOGASTRODUODENOSCOPY);  Surgeon: Amy Sidhu MD;  Location: [facility];  Service: Endoscopy;   Laterality: N/A;    TUBAL LIGATION         Review of patient's allergies indicates:   Allergen Reactions    No known drug allergies        No current facility-administered medications on file prior to encounter.     Current Outpatient Medications on File Prior to Encounter   Medication Sig    cholecalciferol, vitamin D3, (VITAMIN D3) 10 mcg (400 unit) Tab Take 400 Units by mouth once daily.    doxycycline (MONODOX) 100 MG capsule Take 1 capsule (100 mg total) by mouth 2 (two) times daily. for 10 days    folic acid (FOLVITE) 1 MG tablet Take 1 tablet (1 mg total) by mouth once daily.    hydroCHLOROthiazide (HYDRODIURIL) 25 MG tablet Take 1 tablet (25 mg total) by mouth once daily.    lisinopriL (PRINIVIL,ZESTRIL) 40 MG tablet Take 1 tablet (40 mg total) by mouth once daily.    methotrexate 2.5 MG Tab TAKE 10 TABLETS BY MOUTH EVERY 7 DAYS SPLIT DOSING. TAKE 5 TABS IN THE AM AND 5 TABS IN THE PM EVERY 7 DAYS THE SAME DAY    metoprolol succinate (TOPROL-XL) 50 MG 24 hr tablet Take 1 tablet by mouth once daily    pantoprazole (PROTONIX) 40 MG tablet Take 1 tablet (40 mg total) by mouth once daily.    sertraline (ZOLOFT) 25 MG tablet Take 1 tablet by mouth once daily    traZODone (DESYREL) 50 MG tablet Take 1 tablet by mouth in the evening    cyclobenzaprine (FLEXERIL) 5 MG tablet Take 1 tablet (5 mg total) by mouth 3 (three) times daily as needed for Muscle spasms.    [DISCONTINUED] alendronate (FOSAMAX) 70 MG tablet Take 1 tablet (70 mg total) by mouth every 7 days.    [DISCONTINUED] multivitamin (THERAGRAN) per tablet Take 1 tablet by mouth once daily. Every day     Family History       Problem Relation (Age of Onset)    Alcohol abuse Paternal Grandfather, Paternal Grandmother    COPD Father, Brother    Cancer Maternal Aunt    Cirrhosis Paternal Grandmother    Diabetes Mother, Maternal Aunt    Emphysema Brother    Hearing loss Mother    Heart disease Mother, Maternal Aunt, Maternal Uncle    Hypertension  Mother, Maternal Uncle    Kidney disease Mother    Liver disease Paternal Grandfather, Sister    No Known Problems Son, Paternal Uncle    Sleep apnea Brother    Stroke Mother          Tobacco Use    Smoking status: Current Every Day Smoker     Packs/day: 1.00     Years: 7.00     Pack years: 7.00     Types: Cigarettes    Smokeless tobacco: Never Used    Tobacco comment: [phone]   Substance and Sexual Activity    Alcohol use: No    Drug use: Never    Sexual activity: Yes     Partners: Male     Review of Systems   Constitutional:  Negative for chills, fatigue and fever.   HENT:  Negative for congestion and sinus pressure.    Eyes:  Negative for pain and visual disturbance.   Respiratory:  Positive for cough and shortness of breath. Negative for wheezing.    Cardiovascular:  Negative for chest pain, palpitations and leg swelling.   Gastrointestinal:  Positive for nausea and vomiting. Negative for abdominal pain and diarrhea.   Genitourinary:  Negative for difficulty urinating, hematuria, urgency and vaginal discharge.   Musculoskeletal:  Negative for arthralgias, joint swelling and myalgias.   Skin:  Negative for rash and wound.   Neurological:  Positive for weakness and light-headedness. Negative for dizziness and headaches.   Hematological:  Negative for adenopathy. Does not bruise/bleed easily.   Psychiatric/Behavioral:  Negative for confusion and dysphoric mood. The patient is not nervous/anxious.    Objective:     Vital Signs (Most Recent):  Temp: 97.8 °F (36.6 °C) (05/25/22 2023)  Pulse: 61 (05/25/22 1756)  Resp: 18 (05/25/22 1644)  BP: (!) 123/58 (05/25/22 1644)  SpO2: (!) 92 % (05/25/22 1644)   Vital Signs (24h Range):  Temp:  [97.6 °F (36.4 °C)-98.2 °F (36.8 °C)] 97.8 °F (36.6 °C)  Pulse:  [54-98] 61  Resp:  [16-20] 18  SpO2:  [92 %-97 %] 92 %  BP: ()/(42-62) 123/58     Weight: 49.5 kg (109 lb 2 oz)  Body mass index is 19.33 kg/m².    Physical Exam  Constitutional:       General: She is not in  acute distress.     Appearance: She is ill-appearing. She is not diaphoretic.   HENT:      Head: Normocephalic and atraumatic.      Right Ear: External ear normal.      Left Ear: External ear normal.      Mouth/Throat:      Mouth: Mucous membranes are dry.      Pharynx: No oropharyngeal exudate.   Eyes:      General: No scleral icterus.        Right eye: No discharge.         Left eye: No discharge.      Conjunctiva/sclera: Conjunctivae normal.   Neck:      Thyroid: No thyromegaly.      Vascular: No JVD.   Cardiovascular:      Rate and Rhythm: Normal rate and regular rhythm.      Heart sounds:     No gallop.   Pulmonary:      Effort: Pulmonary effort is normal.      Breath sounds: Normal breath sounds. No wheezing.   Abdominal:      General: Bowel sounds are normal. There is no distension.      Palpations: Abdomen is soft. There is no hepatomegaly or mass.      Tenderness: There is no abdominal tenderness.   Musculoskeletal:         General: No deformity.      Cervical back: Normal range of motion and neck supple.   Lymphadenopathy:      Cervical: No cervical adenopathy.   Skin:     General: Skin is warm and dry.      Findings: No rash.   Neurological:      Mental Status: She is alert and oriented to person, place, and time.   Psychiatric:         Behavior: Behavior normal. Behavior is cooperative.           Significant Labs: BMP:   Recent Labs   Lab 05/25/22  1036   GLU 87      K 3.0*   CL 95   CO2 30*   BUN 24*   CREATININE 1.7*   CALCIUM 11.8*     CBC:   Recent Labs   Lab 05/25/22  1036   WBC 15.53*   HGB 11.1*   HCT 30.6*   *     Cardiac Markers: No results for input(s): CKMB, MYOGLOBIN, BNP, TROPISTAT in the last 48 hours.  Lactic Acid:   Recent Labs   Lab 05/25/22  1036 05/25/22  1342   LACTATE 2.5* 1.5     Troponin:   Recent Labs   Lab 05/25/22  1036   TROPONINI 0.062*     Urine Studies:   Recent Labs   Lab 05/25/22  1312   COLORU Yellow   APPEARANCEUA Clear   PHUR 7.0   SPECGRAV 1.010    PROTEINUA Negative   GLUCUA Negative   KETONESU Negative   BILIRUBINUA Negative   OCCULTUA Negative   NITRITE Negative   UROBILINOGEN Negative   LEUKOCYTESUR Trace*   RBCUA 2   WBCUA 14*   BACTERIA Occasional   SQUAMEPITHEL 5   HYALINECASTS 5*       Significant Imaging: I have reviewed all pertinent imaging results/findings within the past 24 hours.  I also reviewed the chest x-ray done two days ago.    Assessment/Plan:     * SHANDA (acute kidney injury)  Due to volume depletion.  Will give IV saline to expand volume.  Monitor kidney function and urine output.  Avoid NSAID's.      Acute cystitis without hematuria  Ceftriaxone will cover this problem.      Hypokalemia  Give supplemental potassium and monitor level.      Dehydration  Give saline IV.      Pneumonia  Start IVAB.  Monitor leukocyte count.  Monitor symptoms.    Antibiotics (From admission, onward)            Start     Stop Route Frequency Ordered    05/25/22 1806  cefTRIAXone (ROCEPHIN) 1 g/50 mL D5W IVPB         -- IV Every 24 hours (non-standard times) 05/25/22 1806    05/25/22 1805  azithromycin 500 mg in dextrose 5 % 250 mL IVPB (ready to mix system)         -- IV Every 24 hours (non-standard times) 05/25/22 1806                VTE Risk Mitigation (From admission, onward)         Ordered     IP VTE LOW RISK PATIENT  Once         05/25/22 1628                   Paul Lobo MD  Department of Hospital Medicine   [facility]

## 2022-05-26 NOTE — TELEPHONE ENCOUNTER
----- Message from Jodee Martinez sent at 5/26/2022  9:45 AM CDT -----  Contact: Patient  Type:  Sooner Apoointment Request    Caller is requesting a sooner appointment.  Caller declined first available appointment listed below.  Caller will not accept being placed on the waitlist and is requesting a message be sent to doctor.    Name of Caller: Patient     When is the first available appointment? 06/07    Would the patient rather a call back or a response via [facility]?  Call    Best Call Back Number: [phone] (home)     Additional Information:  Patient needs to get a 1 week Hospital follow up

## 2022-05-26 NOTE — TELEPHONE ENCOUNTER
Hospital follow up appointment scheduled for the date of 6-8-22. Patient agreed to appointment date, time, and location.

## 2022-05-26 NOTE — PLAN OF CARE
POC reviewed with Patient verbalizes understanding VSS afebrile denies pain spo2 97% appetite good ABX infusing per orders,dc instructions explained verbalizes understanding at this time no acute distress noted at this time

## 2022-05-26 NOTE — TELEPHONE ENCOUNTER
----- Message from Rosaura Vallejo sent at 5/26/2022  2:11 PM CDT -----  Contact: Patient  Type: Patient Call Back         Who called: Patient         What is the request in detail: calling back to see if an appt on 6/7 was avail for a hospital f/u; states she was discharged 5/26; states he spoke with someone this morning but she could not confirm bc she had not be physically discharged;  please advise             Best call back number: [phone]         Additional Information:            Thank You

## 2022-05-26 NOTE — ASSESSMENT & PLAN NOTE
Start IVAB.  Monitor leukocyte count.  Monitor symptoms.    Antibiotics (From admission, onward)            Start     Stop Route Frequency Ordered    05/25/22 1806  cefTRIAXone (ROCEPHIN) 1 g/50 mL D5W IVPB         -- IV Every 24 hours (non-standard times) 05/25/22 1806 05/25/22 1805  azithromycin 500 mg in dextrose 5 % 250 mL IVPB (ready to mix system)         -- IV Every 24 hours (non-standard times) 05/25/22 1806

## 2022-05-26 NOTE — PLAN OF CARE
Pt is cleared from  for discharge.  Medical rounding completed.       05/26/22 1214   Final Note   Assessment Type Final Discharge Note   Anticipated Discharge Disposition Home   Hospital Resources/Appts/Education Provided Appointments scheduled by Navigator/Coordinator  (office will call pt with appointment)

## 2022-05-26 NOTE — TELEPHONE ENCOUNTER
Call placed to patient to assist with scheduling hospital follow up appointment. Patient states she did not call to schedule this appointment as she is still admitted to the hospital. States it may have been the hospital personal that called. Patient states she will call to schedule appointment once she has been discharged from hospital.

## 2022-05-27 LAB — BACTERIA UR CULT: NORMAL

## 2022-05-27 NOTE — DISCHARGE SUMMARY
[facility] Medicine  Discharge Summary      Patient Name: Claire Bowser  MRN: [id number]  Patient Class: OP- Observation  Admission Date: 5/25/2022  Hospital Length of Stay: 0 days  Discharge Date and Time: 5/26/2022  2:01 PM  Attending Physician: No att. providers found   Discharging Provider: Paul Lobo MD  Primary Care Provider: Samuel Brady MD      HPI:   Patient presented to ED today complaining of lightheadedness and shortness of breath.  Both have been going on for the past several days.  The lightheadedness is worse when she changes position from sitting to standing.  The shortness of breath is constant and worse with exertion.  Not worsened by lying flat.  Associated with cough.  She was diagnosed with COVID-19 a couple of weeks ago and was treated with Paxlovid, which caused her to be severely nauseous.  She took only a couple of days of it.  Her primary care diagnosed her with pneumonia two days ago and prescribed an oral antibiotc doxycycline.  Symptoms didn't decrease since starting the antibiotic.  She has rheumatoid arthritis and is on methotrexate.  Not on daily steroid therapy.      * No surgery found *      Hospital Course:   She was given IVAB for lower respiratory infection and for simple cystitis.  Also was given saline for volume expansion, as she appeared to be dehydrated.  Creatinine was higher than baseline.  By day 2, she felt better.  Creatinine came down to normal.  She had no fevers during her stay.  On discharge, cefdinir was prescribed.    Physical Exam  Constitutional:       General: She is not in acute distress.     Appearance: She is ill-appearing. She is not diaphoretic.   HENT:      Head: Normocephalic and atraumatic.      Right Ear: External ear normal.      Left Ear: External ear normal.      Mouth/Throat:      Mouth: Mucous membranes are dry.      Pharynx: No oropharyngeal exudate.   Eyes:      General: No scleral icterus.        Right eye:  No discharge.         Left eye: No discharge.      Conjunctiva/sclera: Conjunctivae normal.   Neck:      Thyroid: No thyromegaly.      Vascular: No JVD.   Cardiovascular:      Rate and Rhythm: Normal rate and regular rhythm.      Heart sounds:     No gallop.        Goals of Care Treatment Preferences:  Code Status: Full Code      Consults:     No new Assessment & Plan notes have been filed under this hospital service since the last note was generated.  Service: Hospital Medicine    Final Active Diagnoses:    Diagnosis Date Noted POA    Pneumonia [J18.9] 05/25/2022 Yes    Acute cystitis without hematuria [N30.00] 05/25/2022 Yes      Problems Resolved During this Admission:    Diagnosis Date Noted Date Resolved POA    PRINCIPAL PROBLEM:  SHANDA (acute kidney injury) [N17.9] 05/25/2022 05/26/2022 Yes    Dehydration [E86.0] 05/25/2022 05/26/2022 Yes    Hypokalemia [E87.6] 05/25/2022 05/26/2022 Yes       Discharged Condition: good    Disposition: Home or Self Care    Follow Up:   Follow-up Information     SILVESTRE Leonard Follow up on 5/31/2022.    Specialty: Internal Medicine  Why: apt in avs  Contact information:  [address] [facility]  [address] [place] [address] [id number] [phone]                       Patient Instructions:      Diet Adult Regular     Activity as tolerated       Significant Diagnostic Studies:   Creatinine   Date Value Ref Range Status   05/26/2022 1.1 0.5 - 1.4 mg/dL Final   05/25/2022 1.7 (H) 0.5 - 1.4 mg/dL Final   ]    Pending Diagnostic Studies:     None         Medications:  Reconciled Home Medications:      Medication List      START taking these medications    cefdinir 300 MG capsule  Commonly known as: OMNICEF  Take 1 capsule (300 mg total) by mouth 2 (two) times daily. for 7 days        CONTINUE taking these medications    cholecalciferol (vitamin D3) 10 mcg (400 unit) Tab  Commonly known as: VITAMIN D3  Take 400 Units by mouth once daily.     cyclobenzaprine 5 MG tablet  Commonly known as:  FLEXERIL  Take 1 tablet (5 mg total) by mouth 3 (three) times daily as needed for Muscle spasms.     folic acid 1 MG tablet  Commonly known as: FOLVITE  Take 1 tablet (1 mg total) by mouth once daily.     hydroCHLOROthiazide 25 MG tablet  Commonly known as: HYDRODIURIL  Take 1 tablet (25 mg total) by mouth once daily.     lisinopriL 40 MG tablet  Commonly known as: PRINIVIL,ZESTRIL  Take 1 tablet (40 mg total) by mouth once daily.     methotrexate 2.5 MG Tab  TAKE 10 TABLETS BY MOUTH EVERY 7 DAYS SPLIT DOSING. TAKE 5 TABS IN THE AM AND 5 TABS IN THE PM EVERY 7 DAYS THE SAME DAY     metoprolol succinate 50 MG 24 hr tablet  Commonly known as: TOPROL-XL  Take 1 tablet by mouth once daily     pantoprazole 40 MG tablet  Commonly known as: PROTONIX  Take 1 tablet (40 mg total) by mouth once daily.     sertraline 25 MG tablet  Commonly known as: ZOLOFT  Take 1 tablet by mouth once daily     traZODone 50 MG tablet  Commonly known as: DESYREL  Take 1 tablet by mouth in the evening        STOP taking these medications    doxycycline 100 MG capsule  Commonly known as: MONODOX            Indwelling Lines/Drains at time of discharge:   Lines/Drains/Airways     None                 Time spent on the discharge of patient: 19 minutes         Paul Lobo MD  Department of Hospital Medicine  [facility]

## 2022-05-27 NOTE — HOSPITAL COURSE
She was given IVAB for lower respiratory infection and for simple cystitis.  Also was given saline for volume expansion, as she appeared to be dehydrated.  Creatinine was higher than baseline.  By day 2, she felt better.  Creatinine came down to normal.  She had no fevers during her stay.  On discharge, cefdinir was prescribed.    Physical Exam  Constitutional:       General: She is not in acute distress.     Appearance: She is ill-appearing. She is not diaphoretic.   HENT:      Head: Normocephalic and atraumatic.      Right Ear: External ear normal.      Left Ear: External ear normal.      Mouth/Throat:      Mouth: Mucous membranes are dry.      Pharynx: No oropharyngeal exudate.   Eyes:      General: No scleral icterus.        Right eye: No discharge.         Left eye: No discharge.      Conjunctiva/sclera: Conjunctivae normal.   Neck:      Thyroid: No thyromegaly.      Vascular: No JVD.   Cardiovascular:      Rate and Rhythm: Normal rate and regular rhythm.      Heart sounds:     No gallop.

## 2022-05-29 LAB
BACTERIA BLD CULT: ABNORMAL

## 2022-05-30 LAB — BACTERIA BLD CULT: NORMAL

## 2022-05-31 ENCOUNTER — PATIENT MESSAGE (OUTPATIENT)
Dept: ADMINISTRATIVE | Facility: HOSPITAL | Age: 62
End: 2022-05-31
Payer: COMMERCIAL

## 2022-06-02 ENCOUNTER — HOSPITAL ENCOUNTER (OUTPATIENT)
Facility: HOSPITAL | Age: 62
Discharge: HOME | End: 2022-06-06
Attending: EMERGENCY MEDICINE | Admitting: INTERNAL MEDICINE
Payer: COMMERCIAL

## 2022-06-02 DIAGNOSIS — R07.9 CHEST PAIN: ICD-10-CM

## 2022-06-02 DIAGNOSIS — K85.90 ACUTE PANCREATITIS, UNSPECIFIED COMPLICATION STATUS, UNSPECIFIED PANCREATITIS TYPE: Primary | ICD-10-CM

## 2022-06-02 PROBLEM — E86.0 DEHYDRATION: Status: ACTIVE | Noted: 2022-06-02

## 2022-06-02 PROBLEM — Z71.89 ACP (ADVANCE CARE PLANNING): Status: ACTIVE | Noted: 2022-06-02

## 2022-06-02 PROBLEM — E83.52 HYPERCALCEMIA: Status: ACTIVE | Noted: 2022-06-02

## 2022-06-02 PROBLEM — R10.13 EPIGASTRIC PAIN: Status: ACTIVE | Noted: 2022-06-02

## 2022-06-02 LAB
ALBUMIN SERPL BCP-MCNC: 2.8 G/DL (ref 3.5–5.2)
ALP SERPL-CCNC: 105 U/L (ref 55–135)
ALT SERPL W/O P-5'-P-CCNC: 13 U/L (ref 10–44)
ANION GAP SERPL CALC-SCNC: 11 MMOL/L (ref 8–16)
AST SERPL-CCNC: 21 U/L (ref 10–40)
BASOPHILS # BLD AUTO: 0.16 K/UL (ref 0–0.2)
BASOPHILS NFR BLD: 1.1 % (ref 0–1.9)
BILIRUB SERPL-MCNC: 0.5 MG/DL (ref 0.1–1)
BILIRUB UR QL STRIP: NEGATIVE
BUN SERPL-MCNC: 13 MG/DL (ref 8–23)
CALCIUM SERPL-MCNC: 11.7 MG/DL (ref 8.7–10.5)
CHLORIDE SERPL-SCNC: 104 MMOL/L (ref 95–110)
CLARITY UR: ABNORMAL
CO2 SERPL-SCNC: 26 MMOL/L (ref 23–29)
COLOR UR: YELLOW
CREAT SERPL-MCNC: 1.5 MG/DL (ref 0.5–1.4)
DIFFERENTIAL METHOD: ABNORMAL
EOSINOPHIL # BLD AUTO: 0.1 K/UL (ref 0–0.5)
EOSINOPHIL NFR BLD: 0.9 % (ref 0–8)
ERYTHROCYTE [DISTWIDTH] IN BLOOD BY AUTOMATED COUNT: 13.8 % (ref 11.5–14.5)
EST. GFR  (AFRICAN AMERICAN): 43 ML/MIN/1.73 M^2
EST. GFR  (NON AFRICAN AMERICAN): 37 ML/MIN/1.73 M^2
GLUCOSE SERPL-MCNC: 119 MG/DL (ref 70–110)
GLUCOSE UR QL STRIP: NEGATIVE
HCT VFR BLD AUTO: 32.2 % (ref 37–48.5)
HGB BLD-MCNC: 11.2 G/DL (ref 12–16)
HGB UR QL STRIP: NEGATIVE
IMM GRANULOCYTES # BLD AUTO: 0.13 K/UL (ref 0–0.04)
IMM GRANULOCYTES NFR BLD AUTO: 0.9 % (ref 0–0.5)
KETONES UR QL STRIP: NEGATIVE
LEUKOCYTE ESTERASE UR QL STRIP: NEGATIVE
LIPASE SERPL-CCNC: 746 U/L (ref 4–60)
LYMPHOCYTES # BLD AUTO: 2.3 K/UL (ref 1–4.8)
LYMPHOCYTES NFR BLD: 15.1 % (ref 18–48)
MCH RBC QN AUTO: 34.1 PG (ref 27–31)
MCHC RBC AUTO-ENTMCNC: 34.8 G/DL (ref 32–36)
MCV RBC AUTO: 98 FL (ref 82–98)
MONOCYTES # BLD AUTO: 1 K/UL (ref 0.3–1)
MONOCYTES NFR BLD: 6.4 % (ref 4–15)
NEUTROPHILS # BLD AUTO: 11.3 K/UL (ref 1.8–7.7)
NEUTROPHILS NFR BLD: 75.6 % (ref 38–73)
NITRITE UR QL STRIP: NEGATIVE
NRBC BLD-RTO: 0 /100 WBC
PH UR STRIP: 7 [PH] (ref 5–8)
PLATELET # BLD AUTO: 441 K/UL (ref 150–450)
PMV BLD AUTO: 10.3 FL (ref 9.2–12.9)
POTASSIUM SERPL-SCNC: 3.1 MMOL/L (ref 3.5–5.1)
PROT SERPL-MCNC: 7 G/DL (ref 6–8.4)
PROT UR QL STRIP: ABNORMAL
RBC # BLD AUTO: 3.28 M/UL (ref 4–5.4)
SARS-COV-2 RDRP RESP QL NAA+PROBE: NEGATIVE
SODIUM SERPL-SCNC: 141 MMOL/L (ref 136–145)
SP GR UR STRIP: 1.01 (ref 1–1.03)
TRIGL SERPL-MCNC: 192 MG/DL (ref 30–150)
URN SPEC COLLECT METH UR: ABNORMAL
UROBILINOGEN UR STRIP-ACNC: NEGATIVE EU/DL
WBC # BLD AUTO: 14.92 K/UL (ref 3.9–12.7)

## 2022-06-02 PROCEDURE — 63600175 PHARM REV CODE 636 W HCPCS: Performed by: INTERNAL MEDICINE

## 2022-06-02 PROCEDURE — A4216 STERILE WATER/SALINE, 10 ML: HCPCS | Performed by: INTERNAL MEDICINE

## 2022-06-02 PROCEDURE — G0378 HOSPITAL OBSERVATION PER HR: HCPCS

## 2022-06-02 PROCEDURE — 96361 HYDRATE IV INFUSION ADD-ON: CPT

## 2022-06-02 PROCEDURE — 63600175 PHARM REV CODE 636 W HCPCS: Performed by: EMERGENCY MEDICINE

## 2022-06-02 PROCEDURE — 36415 COLL VENOUS BLD VENIPUNCTURE: CPT | Performed by: INTERNAL MEDICINE

## 2022-06-02 PROCEDURE — 96374 THER/PROPH/DIAG INJ IV PUSH: CPT | Mod: 59

## 2022-06-02 PROCEDURE — 25000003 PHARM REV CODE 250: Performed by: EMERGENCY MEDICINE

## 2022-06-02 PROCEDURE — 84478 ASSAY OF TRIGLYCERIDES: CPT | Performed by: INTERNAL MEDICINE

## 2022-06-02 PROCEDURE — 83690 ASSAY OF LIPASE: CPT | Performed by: EMERGENCY MEDICINE

## 2022-06-02 PROCEDURE — 96372 THER/PROPH/DIAG INJ SC/IM: CPT | Mod: 59 | Performed by: INTERNAL MEDICINE

## 2022-06-02 PROCEDURE — 96375 TX/PRO/DX INJ NEW DRUG ADDON: CPT

## 2022-06-02 PROCEDURE — 25000003 PHARM REV CODE 250: Performed by: INTERNAL MEDICINE

## 2022-06-02 PROCEDURE — 81003 URINALYSIS AUTO W/O SCOPE: CPT | Performed by: EMERGENCY MEDICINE

## 2022-06-02 PROCEDURE — U0002 COVID-19 LAB TEST NON-CDC: HCPCS | Performed by: INTERNAL MEDICINE

## 2022-06-02 PROCEDURE — 80053 COMPREHEN METABOLIC PANEL: CPT | Performed by: EMERGENCY MEDICINE

## 2022-06-02 PROCEDURE — 99285 EMERGENCY DEPT VISIT HI MDM: CPT | Mod: 25

## 2022-06-02 PROCEDURE — 85025 COMPLETE CBC W/AUTO DIFF WBC: CPT | Performed by: EMERGENCY MEDICINE

## 2022-06-02 RX ORDER — POLYETHYLENE GLYCOL 3350 17 G/17G
17 POWDER, FOR SOLUTION ORAL DAILY PRN
Status: DISCONTINUED | OUTPATIENT
Start: 2022-06-02 | End: 2022-06-06 | Stop reason: HOSPADM

## 2022-06-02 RX ORDER — LANOLIN ALCOHOL/MO/W.PET/CERES
800 CREAM (GRAM) TOPICAL
Status: DISCONTINUED | OUTPATIENT
Start: 2022-06-02 | End: 2022-06-06 | Stop reason: HOSPADM

## 2022-06-02 RX ORDER — ACETAMINOPHEN 325 MG/1
650 TABLET ORAL EVERY 8 HOURS PRN
Status: DISCONTINUED | OUTPATIENT
Start: 2022-06-02 | End: 2022-06-06 | Stop reason: HOSPADM

## 2022-06-02 RX ORDER — FAMOTIDINE 10 MG/ML
20 INJECTION INTRAVENOUS
Status: COMPLETED | OUTPATIENT
Start: 2022-06-02 | End: 2022-06-02

## 2022-06-02 RX ORDER — SODIUM,POTASSIUM PHOSPHATES 280-250MG
2 POWDER IN PACKET (EA) ORAL
Status: DISCONTINUED | OUTPATIENT
Start: 2022-06-02 | End: 2022-06-06 | Stop reason: HOSPADM

## 2022-06-02 RX ORDER — IBUPROFEN 200 MG
24 TABLET ORAL
Status: DISCONTINUED | OUTPATIENT
Start: 2022-06-02 | End: 2022-06-06 | Stop reason: HOSPADM

## 2022-06-02 RX ORDER — ONDANSETRON 2 MG/ML
4 INJECTION INTRAMUSCULAR; INTRAVENOUS EVERY 8 HOURS PRN
Status: DISCONTINUED | OUTPATIENT
Start: 2022-06-02 | End: 2022-06-06 | Stop reason: HOSPADM

## 2022-06-02 RX ORDER — NALOXONE HCL 0.4 MG/ML
0.02 VIAL (ML) INJECTION
Status: DISCONTINUED | OUTPATIENT
Start: 2022-06-02 | End: 2022-06-06 | Stop reason: HOSPADM

## 2022-06-02 RX ORDER — MORPHINE SULFATE 4 MG/ML
4 INJECTION, SOLUTION INTRAMUSCULAR; INTRAVENOUS
Status: COMPLETED | OUTPATIENT
Start: 2022-06-02 | End: 2022-06-02

## 2022-06-02 RX ORDER — ONDANSETRON 2 MG/ML
4 INJECTION INTRAMUSCULAR; INTRAVENOUS
Status: COMPLETED | OUTPATIENT
Start: 2022-06-02 | End: 2022-06-02

## 2022-06-02 RX ORDER — PANTOPRAZOLE SODIUM 40 MG/1
40 TABLET, DELAYED RELEASE ORAL DAILY
Status: DISCONTINUED | OUTPATIENT
Start: 2022-06-02 | End: 2022-06-06 | Stop reason: HOSPADM

## 2022-06-02 RX ORDER — SODIUM CHLORIDE 0.9 % (FLUSH) 0.9 %
10 SYRINGE (ML) INJECTION EVERY 8 HOURS
Status: DISCONTINUED | OUTPATIENT
Start: 2022-06-02 | End: 2022-06-06 | Stop reason: HOSPADM

## 2022-06-02 RX ORDER — FOLIC ACID 1 MG/1
1 TABLET ORAL DAILY
Status: DISCONTINUED | OUTPATIENT
Start: 2022-06-02 | End: 2022-06-06 | Stop reason: HOSPADM

## 2022-06-02 RX ORDER — HYDROCODONE BITARTRATE AND ACETAMINOPHEN 5; 325 MG/1; MG/1
1 TABLET ORAL EVERY 6 HOURS PRN
Status: DISCONTINUED | OUTPATIENT
Start: 2022-06-02 | End: 2022-06-06 | Stop reason: HOSPADM

## 2022-06-02 RX ORDER — TALC
6 POWDER (GRAM) TOPICAL NIGHTLY PRN
Status: DISCONTINUED | OUTPATIENT
Start: 2022-06-02 | End: 2022-06-06 | Stop reason: HOSPADM

## 2022-06-02 RX ORDER — GLUCAGON 1 MG
1 KIT INJECTION
Status: DISCONTINUED | OUTPATIENT
Start: 2022-06-02 | End: 2022-06-06 | Stop reason: HOSPADM

## 2022-06-02 RX ORDER — IBUPROFEN 200 MG
16 TABLET ORAL
Status: DISCONTINUED | OUTPATIENT
Start: 2022-06-02 | End: 2022-06-06 | Stop reason: HOSPADM

## 2022-06-02 RX ORDER — ENOXAPARIN SODIUM 100 MG/ML
40 INJECTION SUBCUTANEOUS EVERY 24 HOURS
Status: DISCONTINUED | OUTPATIENT
Start: 2022-06-02 | End: 2022-06-06 | Stop reason: HOSPADM

## 2022-06-02 RX ORDER — SERTRALINE HYDROCHLORIDE 25 MG/1
25 TABLET, FILM COATED ORAL DAILY
Status: DISCONTINUED | OUTPATIENT
Start: 2022-06-02 | End: 2022-06-06 | Stop reason: HOSPADM

## 2022-06-02 RX ORDER — LISINOPRIL 40 MG/1
40 TABLET ORAL DAILY
Status: DISCONTINUED | OUTPATIENT
Start: 2022-06-02 | End: 2022-06-06 | Stop reason: HOSPADM

## 2022-06-02 RX ORDER — SODIUM CHLORIDE, SODIUM LACTATE, POTASSIUM CHLORIDE, CALCIUM CHLORIDE 600; 310; 30; 20 MG/100ML; MG/100ML; MG/100ML; MG/100ML
INJECTION, SOLUTION INTRAVENOUS CONTINUOUS
Status: DISCONTINUED | OUTPATIENT
Start: 2022-06-02 | End: 2022-06-06 | Stop reason: HOSPADM

## 2022-06-02 RX ADMIN — FAMOTIDINE 20 MG: 10 INJECTION, SOLUTION INTRAVENOUS at 09:06

## 2022-06-02 RX ADMIN — MORPHINE SULFATE 4 MG: 4 INJECTION INTRAVENOUS at 11:06

## 2022-06-02 RX ADMIN — SODIUM CHLORIDE, SODIUM LACTATE, POTASSIUM CHLORIDE, AND CALCIUM CHLORIDE: .6; .31; .03; .02 INJECTION, SOLUTION INTRAVENOUS at 03:06

## 2022-06-02 RX ADMIN — SODIUM CHLORIDE, SODIUM LACTATE, POTASSIUM CHLORIDE, AND CALCIUM CHLORIDE: .6; .31; .03; .02 INJECTION, SOLUTION INTRAVENOUS at 11:06

## 2022-06-02 RX ADMIN — FOLIC ACID 1 MG: 1 TABLET ORAL at 03:06

## 2022-06-02 RX ADMIN — ENOXAPARIN SODIUM 40 MG: 100 INJECTION SUBCUTANEOUS at 04:06

## 2022-06-02 RX ADMIN — ONDANSETRON 4 MG: 2 INJECTION INTRAMUSCULAR; INTRAVENOUS at 09:06

## 2022-06-02 RX ADMIN — TRAZODONE HYDROCHLORIDE 50 MG: 50 TABLET ORAL at 09:06

## 2022-06-02 RX ADMIN — PANTOPRAZOLE SODIUM 40 MG: 40 TABLET, DELAYED RELEASE ORAL at 03:06

## 2022-06-02 RX ADMIN — Medication 10 ML: at 03:06

## 2022-06-02 RX ADMIN — SERTRALINE HYDROCHLORIDE 25 MG: 25 TABLET ORAL at 03:06

## 2022-06-02 RX ADMIN — LISINOPRIL 40 MG: 40 TABLET ORAL at 03:06

## 2022-06-02 NOTE — H&P
[facility] Medicine  History & Physical    Patient Name: Claire Bowser  MRN: [id number]  Patient Class: OP- Observation  Admission Date: 6/2/2022  Attending Physician: Jose Jimenez MD   Primary Care Provider: Samuel Brady MD         Patient information was obtained from patient, spouse/SO and ER records.     Subjective:     Principal Problem:Acute pancreatitis    Chief Complaint:   Chief Complaint   Patient presents with    Abdominal Pain     Started last night         HPI: Patient is a 62-year-old  female with past medical history significant for hypertension, hyperlipidemia, anxiety disorder and rheumatoid arthritis (on methotrexate therapy) and recent history of COVID-19 infection (May 13, 2022; received Paxlovid for 3 days) is being admitted to Hospital Medicine under observation status from [facility] Emergency Room with ongoing epigastric abdominal pain for 1 day.  Abdominal pain is moderate to severe in intensity, nonradiating, without any aggravating or relieving factors.  Patient reports markedly reduced oral intake and feels dehydrated.  Denies any recent fevers, chills, prior history of acute pancreatitis, melena, bleeding per rectum or hematemesis.  No recent use of nonsteroidal anti-inflammatory medications reported.  For COVID treatment patient has received and completed 10 days of oral doxycycline and 9 days of cefuroxime antibiotic therapy.  No recent fevers or chills reported.  Patient had 3 loose bowel movements today.  No mucus per rectum reported.  No urinary difficulties present.  Patient denies any alcohol consumption.              Past Medical History:   Diagnosis Date    Anxiety     Flu 02/2017    [facility]    Hypertension     Rheumatoid arthritis involving multiple sites with positive rheumatoid factor 1/14/2021       Past Surgical History:   Procedure Laterality Date    COLONOSCOPY N/A 2/26/2021    Procedure:  COLONOSCOPY;  Surgeon: Amy Sidhu MD;  Location: [facility];  Service: Endoscopy;  Laterality: N/A;    ESOPHAGOGASTRODUODENOSCOPY N/A 2/26/2021    Procedure: EGD (ESOPHAGOGASTRODUODENOSCOPY);  Surgeon: Amy Sidhu MD;  Location: [facility] ENDO;  Service: Endoscopy;  Laterality: N/A;    TUBAL LIGATION         Review of patient's allergies indicates:   Allergen Reactions    No known drug allergies        No current facility-administered medications on file prior to encounter.     Current Outpatient Medications on File Prior to Encounter   Medication Sig    cefdinir (OMNICEF) 300 MG capsule Take 1 capsule (300 mg total) by mouth 2 (two) times daily. for 7 days    cholecalciferol, vitamin D3, (VITAMIN D3) 10 mcg (400 unit) Tab Take 400 Units by mouth once daily.    cyclobenzaprine (FLEXERIL) 5 MG tablet Take 1 tablet (5 mg total) by mouth 3 (three) times daily as needed for Muscle spasms.    folic acid (FOLVITE) 1 MG tablet Take 1 tablet (1 mg total) by mouth once daily.    hydroCHLOROthiazide (HYDRODIURIL) 25 MG tablet Take 1 tablet (25 mg total) by mouth once daily.    lisinopriL (PRINIVIL,ZESTRIL) 40 MG tablet Take 1 tablet (40 mg total) by mouth once daily.    methotrexate 2.5 MG Tab TAKE 10 TABLETS BY MOUTH EVERY 7 DAYS SPLIT DOSING. TAKE 5 TABS IN THE AM AND 5 TABS IN THE PM EVERY 7 DAYS THE SAME DAY    metoprolol succinate (TOPROL-XL) 50 MG 24 hr tablet Take 1 tablet by mouth once daily    pantoprazole (PROTONIX) 40 MG tablet Take 1 tablet (40 mg total) by mouth once daily.    sertraline (ZOLOFT) 25 MG tablet Take 1 tablet by mouth once daily    traZODone (DESYREL) 50 MG tablet Take 1 tablet by mouth in the evening     Family History       Problem Relation (Age of Onset)    Alcohol abuse Paternal Grandfather, Paternal Grandmother    COPD Father, Brother    Cancer Maternal Aunt    Cirrhosis Paternal Grandmother    Diabetes Mother, Maternal Aunt    Emphysema Brother    Hearing loss Mother     Heart disease Mother, Maternal Aunt, Maternal Uncle    Hypertension Mother, Maternal Uncle    Kidney disease Mother    Liver disease Paternal Grandfather, Sister    No Known Problems Son, Paternal Uncle    Sleep apnea Brother    Stroke Mother          Tobacco Use    Smoking status: Current Every Day Smoker     Packs/day: 1.00     Years: 7.00     Pack years: 7.00     Types: Cigarettes    Smokeless tobacco: Never Used    Tobacco comment: [phone]   Substance and Sexual Activity    Alcohol use: No    Drug use: Never    Sexual activity: Yes     Partners: Male     Review of Systems   Constitutional:  Positive for fatigue.   Gastrointestinal:  Positive for abdominal pain, diarrhea and nausea.   Neurological:  Positive for weakness.   All other systems reviewed and are negative.  Objective:     Vital Signs (Most Recent):  Temp: 97.4 °F (36.3 °C) (06/02/22 0823)  Pulse: 63 (06/02/22 1331)  Resp: 18 (06/02/22 1331)  BP: (!) 168/77 (06/02/22 1333)  SpO2: 98 % (06/02/22 1331)   Vital Signs (24h Range):  Temp:  [97.4 °F (36.3 °C)] 97.4 °F (36.3 °C)  Pulse:  [46-72] 63  Resp:  [16-18] 18  SpO2:  [95 %-98 %] 98 %  BP: (161-200)/(67-83) 168/77     Weight: 49.4 kg (109 lb)  Body mass index is 19.31 kg/m².    Physical Exam  Constitutional:       Appearance: She is well-developed.   HENT:      Head: Normocephalic and atraumatic.      Mouth/Throat:      Mouth: Mucous membranes are dry.   Eyes:      Conjunctiva/sclera: Conjunctivae normal.      Pupils: Pupils are equal, round, and reactive to light.   Neck:      Thyroid: No thyromegaly.      Vascular: No JVD.   Cardiovascular:      Rate and Rhythm: Normal rate and regular rhythm.      Heart sounds: No murmur heard.    No friction rub. No gallop.   Pulmonary:      Effort: Pulmonary effort is normal.      Breath sounds: Normal breath sounds.   Abdominal:      General: Bowel sounds are normal. There is no distension.      Palpations: Abdomen is soft. There is no mass.       Tenderness: There is no abdominal tenderness.      Comments: Epigastric tenderness to deep palpation without peritoneal signs guarding or rigidity.  No hepatosplenomegaly present.   Musculoskeletal:         General: Normal range of motion.      Cervical back: Neck supple.   Skin:     General: Skin is warm and dry.   Neurological:      Mental Status: She is oriented to person, place, and time.      Cranial Nerves: No cranial nerve deficit.   Psychiatric:         Behavior: Behavior normal.         CRANIAL NERVES     CN III, IV, VI   Pupils are equal, round, and reactive to light.     Significant Labs: All pertinent labs within the past 24 hours have been reviewed.  CBC:   Recent Labs   Lab 06/02/22  0905   WBC 14.92*   HGB 11.2*   HCT 32.2*        CMP:   Recent Labs   Lab 06/02/22  0905      K 3.1*      CO2 26   *   BUN 13   CREATININE 1.5*   CALCIUM 11.7*   PROT 7.0   ALBUMIN 2.8*   BILITOT 0.5   ALKPHOS 105   AST 21   ALT 13   ANIONGAP 11   EGFRNONAA 37*     Lactic Acid: No results for input(s): LACTATE in the last 48 hours.  Lipase:   Recent Labs   Lab 06/02/22  0905   LIPASE 746*     Urine Studies:   Recent Labs   Lab 06/02/22  0949   COLORU Yellow   APPEARANCEUA Hazy*   PHUR 7.0   SPECGRAV 1.015   PROTEINUA Trace*   GLUCUA Negative   KETONESU Negative   BILIRUBINUA Negative   OCCULTUA Negative   NITRITE Negative   UROBILINOGEN Negative   LEUKOCYTESUR Negative       Significant Imaging:   CT abdomen and pelvis without contrast:  1. Findings suspicious for acute pancreatitis.  Assessment somewhat limited in the absence of IV contrast.  2. Pulmonary emphysema with minimal scattered ground-glass disease which could reflect edema, fibrosis, pneumonitis.  3. Questionable gallbladder sludge.      Assessment/Plan:     * Acute pancreatitis  Clear liquid diet.  Supportive care with IV fluid hydration, use of intravenous antiemetics and narcotics as needed.  Trend lipase level.  CT abdomen and  pelvis results reviewed with patient and her .  Check triglyceride level.      ACP (advance care planning)  Advance Care Planning     Date: 06/02/2022    Living Will  During this visit, I engaged the patient  in the advance care planning process.  The patient and I reviewed the role for advance directives and their purpose in directing future healthcare if the patient's unable to speak for him/herself.  At this point in time, the patient does have full decision-making capacity.  We discussed different extreme health states that she could experience, and reviewed what kind of medical care she would want in those situations.  The patient communicated that if she were comatose and had little chance of a meaningful recovery, she would want machines/life-sustaining treatments used. I spent a total of 16 minutes engaging the patient in this advance care planning discussion.                Hypercalcemia  Likely secondary to dehydration and concomitant use of activated vitamin D3.  Continue IV fluid hydration.  Hold vitamin-D supplementation.  Monitor serum calcium daily.      Dehydration  Continue IV fluid hydration.      Epigastric pain  Likely secondary to acute pancreatitis.  See management of acute pancreatitis.      Hypokalemia  Replete potassium chloride.  Follow BMP.      Rheumatoid arthritis involving multiple sites with positive rheumatoid factor  On weekly methotrexate.      Mixed hyperlipidemia  Chronic problem. Will continue chronic medications and monitor for any changes, adjusting as needed.          Tobacco use  Smoking cessation counseling performed. Dangers of cigarette smoking were reviewed with patient in detail and patient was encouraged to quit. Nicotine replacement options were discussed for > 3 minutes.        Hypertension  Chronic problem. Will continue chronic medications and monitor for any changes, adjusting as needed.          Anxiety  Chronic problem. Will continue chronic medications  and monitor for any changes, adjusting as needed.          DVT prophylaxis:  Use sequential compression stockings and Randy hose.  Lovenox 40 mg subQ daily.    Jose Jimenez MD  Department of Hospital Medicine   [facility] - Emergency Dept

## 2022-06-02 NOTE — SUBJECTIVE & OBJECTIVE
Past Medical History:   Diagnosis Date    Anxiety     Flu 02/2017    [facility]    Hypertension     Rheumatoid arthritis involving multiple sites with positive rheumatoid factor 1/14/2021       Past Surgical History:   Procedure Laterality Date    COLONOSCOPY N/A 2/26/2021    Procedure: COLONOSCOPY;  Surgeon: Amy Sidhu MD;  Location: [facility];  Service: Endoscopy;  Laterality: N/A;    ESOPHAGOGASTRODUODENOSCOPY N/A 2/26/2021    Procedure: EGD (ESOPHAGOGASTRODUODENOSCOPY);  Surgeon: Amy Sidhu MD;  Location: [facility];  Service: Endoscopy;  Laterality: N/A;    TUBAL LIGATION         Review of patient's allergies indicates:   Allergen Reactions    No known drug allergies        No current facility-administered medications on file prior to encounter.     Current Outpatient Medications on File Prior to Encounter   Medication Sig    cefdinir (OMNICEF) 300 MG capsule Take 1 capsule (300 mg total) by mouth 2 (two) times daily. for 7 days    cholecalciferol, vitamin D3, (VITAMIN D3) 10 mcg (400 unit) Tab Take 400 Units by mouth once daily.    cyclobenzaprine (FLEXERIL) 5 MG tablet Take 1 tablet (5 mg total) by mouth 3 (three) times daily as needed for Muscle spasms.    folic acid (FOLVITE) 1 MG tablet Take 1 tablet (1 mg total) by mouth once daily.    hydroCHLOROthiazide (HYDRODIURIL) 25 MG tablet Take 1 tablet (25 mg total) by mouth once daily.    lisinopriL (PRINIVIL,ZESTRIL) 40 MG tablet Take 1 tablet (40 mg total) by mouth once daily.    methotrexate 2.5 MG Tab TAKE 10 TABLETS BY MOUTH EVERY 7 DAYS SPLIT DOSING. TAKE 5 TABS IN THE AM AND 5 TABS IN THE PM EVERY 7 DAYS THE SAME DAY    metoprolol succinate (TOPROL-XL) 50 MG 24 hr tablet Take 1 tablet by mouth once daily    pantoprazole (PROTONIX) 40 MG tablet Take 1 tablet (40 mg total) by mouth once daily.    sertraline (ZOLOFT) 25 MG tablet Take 1 tablet by mouth once daily    traZODone (DESYREL) 50 MG tablet Take 1 tablet by mouth in the  evening     Family History       Problem Relation (Age of Onset)    Alcohol abuse Paternal Grandfather, Paternal Grandmother    COPD Father, Brother    Cancer Maternal Aunt    Cirrhosis Paternal Grandmother    Diabetes Mother, Maternal Aunt    Emphysema Brother    Hearing loss Mother    Heart disease Mother, Maternal Aunt, Maternal Uncle    Hypertension Mother, Maternal Uncle    Kidney disease Mother    Liver disease Paternal Grandfather, Sister    No Known Problems Son, Paternal Uncle    Sleep apnea Brother    Stroke Mother          Tobacco Use    Smoking status: Current Every Day Smoker     Packs/day: 1.00     Years: 7.00     Pack years: 7.00     Types: Cigarettes    Smokeless tobacco: Never Used    Tobacco comment: [phone]   Substance and Sexual Activity    Alcohol use: No    Drug use: Never    Sexual activity: Yes     Partners: Male     Review of Systems   Constitutional:  Positive for fatigue.   Gastrointestinal:  Positive for abdominal pain, diarrhea and nausea.   Neurological:  Positive for weakness.   All other systems reviewed and are negative.  Objective:     Vital Signs (Most Recent):  Temp: 97.4 °F (36.3 °C) (06/02/22 0823)  Pulse: 63 (06/02/22 1331)  Resp: 18 (06/02/22 1331)  BP: (!) 168/77 (06/02/22 1333)  SpO2: 98 % (06/02/22 1331)   Vital Signs (24h Range):  Temp:  [97.4 °F (36.3 °C)] 97.4 °F (36.3 °C)  Pulse:  [46-72] 63  Resp:  [16-18] 18  SpO2:  [95 %-98 %] 98 %  BP: (161-200)/(67-83) 168/77     Weight: 49.4 kg (109 lb)  Body mass index is 19.31 kg/m².    Physical Exam  Constitutional:       Appearance: She is well-developed.   HENT:      Head: Normocephalic and atraumatic.      Mouth/Throat:      Mouth: Mucous membranes are dry.   Eyes:      Conjunctiva/sclera: Conjunctivae normal.      Pupils: Pupils are equal, round, and reactive to light.   Neck:      Thyroid: No thyromegaly.      Vascular: No JVD.   Cardiovascular:      Rate and Rhythm: Normal rate and regular rhythm.      Heart sounds:  No murmur heard.    No friction rub. No gallop.   Pulmonary:      Effort: Pulmonary effort is normal.      Breath sounds: Normal breath sounds.   Abdominal:      General: Bowel sounds are normal. There is no distension.      Palpations: Abdomen is soft. There is no mass.      Tenderness: There is no abdominal tenderness.      Comments: Epigastric tenderness to deep palpation without peritoneal signs guarding or rigidity.  No hepatosplenomegaly present.   Musculoskeletal:         General: Normal range of motion.      Cervical back: Neck supple.   Skin:     General: Skin is warm and dry.   Neurological:      Mental Status: She is oriented to person, place, and time.      Cranial Nerves: No cranial nerve deficit.   Psychiatric:         Behavior: Behavior normal.         CRANIAL NERVES     CN III, IV, VI   Pupils are equal, round, and reactive to light.     Significant Labs: All pertinent labs within the past 24 hours have been reviewed.  CBC:   Recent Labs   Lab 06/02/22  0905   WBC 14.92*   HGB 11.2*   HCT 32.2*        CMP:   Recent Labs   Lab 06/02/22  0905      K 3.1*      CO2 26   *   BUN 13   CREATININE 1.5*   CALCIUM 11.7*   PROT 7.0   ALBUMIN 2.8*   BILITOT 0.5   ALKPHOS 105   AST 21   ALT 13   ANIONGAP 11   EGFRNONAA 37*     Lactic Acid: No results for input(s): LACTATE in the last 48 hours.  Lipase:   Recent Labs   Lab 06/02/22  0905   LIPASE 746*     Urine Studies:   Recent Labs   Lab 06/02/22  0949   COLORU Yellow   APPEARANCEUA Hazy*   PHUR 7.0   SPECGRAV 1.015   PROTEINUA Trace*   GLUCUA Negative   KETONESU Negative   BILIRUBINUA Negative   OCCULTUA Negative   NITRITE Negative   UROBILINOGEN Negative   LEUKOCYTESUR Negative       Significant Imaging:   CT abdomen and pelvis without contrast:  1. Findings suspicious for acute pancreatitis.  Assessment somewhat limited in the absence of IV contrast.  2. Pulmonary emphysema with minimal scattered ground-glass disease which could  reflect edema, fibrosis, pneumonitis.  3. Questionable gallbladder sludge.

## 2022-06-02 NOTE — ASSESSMENT & PLAN NOTE
Advance Care Planning     Date: 06/02/2022    Living Will  During this visit, I engaged the patient  in the advance care planning process.  The patient and I reviewed the role for advance directives and their purpose in directing future healthcare if the patient's unable to speak for him/herself.  At this point in time, the patient does have full decision-making capacity.  We discussed different extreme health states that she could experience, and reviewed what kind of medical care she would want in those situations.  The patient communicated that if she were comatose and had little chance of a meaningful recovery, she would want machines/life-sustaining treatments used. I spent a total of 16 minutes engaging the patient in this advance care planning discussion.

## 2022-06-02 NOTE — HPI
Patient is a 62-year-old  female with past medical history significant for hypertension, hyperlipidemia, anxiety disorder and rheumatoid arthritis (on methotrexate therapy) and recent history of COVID-19 infection (May 13, 2022; received Paxlovid for 3 days) is being admitted to Hospital Medicine under observation status from [facility] Emergency Room with ongoing epigastric abdominal pain for 1 day.  Abdominal pain is moderate to severe in intensity, nonradiating, without any aggravating or relieving factors.  Patient reports markedly reduced oral intake and feels dehydrated.  Denies any recent fevers, chills, prior history of acute pancreatitis, melena, bleeding per rectum or hematemesis.  No recent use of nonsteroidal anti-inflammatory medications reported.  For COVID treatment patient has received and completed 10 days of oral doxycycline and 9 days of cefuroxime antibiotic therapy.  No recent fevers or chills reported.  Patient had 3 loose bowel movements today.  No mucus per rectum reported.  No urinary difficulties present.  Patient denies any alcohol consumption.

## 2022-06-02 NOTE — PHARMACY MED REC
"Admission Medication History     The home medication history was taken by Valerie Jordan CPhT.    Medication history obtained from Patient     You may go to "Admission" then "Reconcile Home Medications" tabs to review and/or act upon these items.      The home medication list has been updated by the Pharmacy department.    Please read ALL comments highlighted in yellow.    Please address this information as you see fit.     Feel free to contact us if you have any questions or require assistance.      Valerie Jordan CPhT.  [phone]        .          "

## 2022-06-02 NOTE — ASSESSMENT & PLAN NOTE
Clear liquid diet.  Supportive care with IV fluid hydration, use of intravenous antiemetics and narcotics as needed.  Trend lipase level.  CT abdomen and pelvis results reviewed with patient and her .  Check triglyceride level.

## 2022-06-02 NOTE — PLAN OF CARE
Problem: Adult Inpatient Plan of Care  Goal: Plan of Care Review  Outcome: Ongoing, Progressing  Goal: Patient-Specific Goal (Individualized)  Outcome: Ongoing, Progressing  Goal: Absence of Hospital-Acquired Illness or Injury  Outcome: Ongoing, Progressing  Goal: Optimal Comfort and Wellbeing  Outcome: Ongoing, Progressing  Goal: Readiness for Transition of Care  Outcome: Ongoing, Progressing     Problem: Fluid Imbalance (Pneumonia)  Goal: Fluid Balance  Outcome: Ongoing, Progressing     Problem: Infection (Pneumonia)  Goal: Resolution of Infection Signs and Symptoms  Outcome: Ongoing, Progressing     Problem: Respiratory Compromise (Pneumonia)  Goal: Effective Oxygenation and Ventilation  Outcome: Ongoing, Progressing

## 2022-06-02 NOTE — ASSESSMENT & PLAN NOTE
Likely secondary to dehydration and concomitant use of activated vitamin D3.  Continue IV fluid hydration.  Hold vitamin-D supplementation.  Monitor serum calcium daily.

## 2022-06-02 NOTE — NURSING
Report called by Leodan at 1434. Pt arrived to unit at approximately 1500 via wheelchair, on RA. IV intact free from S/S of infection/infiltration. Tele monitor on (#[id number]). Admission/ Shift assessment done. Vitals WNL.  Pt and family oriented to the unit. All questions have been answered, verbalized understanding. Will continue to monitor.

## 2022-06-02 NOTE — ED PROVIDER NOTES
Encounter Date: 6/2/2022    SCRIBE #1 NOTE: Chapin WAYNE am scribing for, and in the presence of, Navarro Beckham MD.       History     Chief Complaint   Patient presents with    Abdominal Pain     Started last night      Time seen by provider: 8:36 AM on 06/02/2022    Claire Bowser is a 62 y.o. female who presents to the ED with diarrhea, abdominal pain, and nausea. The Pt states that she has had symptoms all last night and states she has not felt well since being diagnosed with COVID-19 on May 13th. The patient denies vomiting, fever, hematuria, blood in stool, dysuria, or any other symptoms at this time. PMHx of HTN and anxiety. PSHx of tubal ligation and colonoscopy.      The history is provided by the patient and the spouse.     Review of patient's allergies indicates:   Allergen Reactions    No known drug allergies      Past Medical History:   Diagnosis Date    Anxiety     Flu 02/2017    [facility]    Hypertension     Rheumatoid arthritis involving multiple sites with positive rheumatoid factor 1/14/2021     Past Surgical History:   Procedure Laterality Date    COLONOSCOPY N/A 2/26/2021    Procedure: COLONOSCOPY;  Surgeon: Amy Sidhu MD;  Location: [facility];  Service: Endoscopy;  Laterality: N/A;    ESOPHAGOGASTRODUODENOSCOPY N/A 2/26/2021    Procedure: EGD (ESOPHAGOGASTRODUODENOSCOPY);  Surgeon: Amy Sidhu MD;  Location: [facility];  Service: Endoscopy;  Laterality: N/A;    TUBAL LIGATION       Family History   Problem Relation Age of Onset    Diabetes Mother     Heart disease Mother     Kidney disease Mother     Hypertension Mother     Stroke Mother     Hearing loss Mother     COPD Father     Liver disease Paternal Grandfather     Alcohol abuse Paternal Grandfather     Liver disease Sister     Emphysema Brother     COPD Brother     Sleep apnea Brother     No Known Problems Son     Alcohol abuse Paternal Grandmother     Cirrhosis Paternal Grandmother      Heart disease Maternal Aunt     Diabetes Maternal Aunt     Cancer Maternal Aunt         female    Heart disease Maternal Uncle     Hypertension Maternal Uncle     No Known Problems Paternal Uncle     Psoriasis Neg Hx     Lupus Neg Hx     Eczema Neg Hx     Melanoma Neg Hx      Social History     Tobacco Use    Smoking status: Current Every Day Smoker     Packs/day: 1.00     Years: 7.00     Pack years: 7.00     Types: Cigarettes    Smokeless tobacco: Never Used    Tobacco comment: [phone]   Substance Use Topics    Alcohol use: No    Drug use: Never     Review of Systems   Constitutional: Negative for fever.   HENT: Negative for sore throat.    Respiratory: Negative for shortness of breath.    Cardiovascular: Negative for chest pain.   Gastrointestinal: Positive for abdominal pain, diarrhea and nausea. Negative for blood in stool and vomiting.   Genitourinary: Negative for dysuria and hematuria.   Musculoskeletal: Negative for back pain.   Skin: Negative for rash.   Neurological: Negative for weakness.   Hematological: Does not bruise/bleed easily.       Physical Exam     Initial Vitals [06/02/22 0823]   BP Pulse Resp Temp SpO2   (!) 200/83 (!) 50 16 97.4 °F (36.3 °C) 96 %      MAP       --         Physical Exam    Nursing note and vitals reviewed.  Constitutional: She appears well-developed and well-nourished. She is not diaphoretic. No distress.   HENT:   Head: Normocephalic and atraumatic.   Eyes: EOM are normal. Pupils are equal, round, and reactive to light.   Neck: Neck supple.   Normal range of motion.  Cardiovascular: Normal rate, regular rhythm, normal heart sounds and intact distal pulses. Exam reveals no gallop and no friction rub.    No murmur heard.  Pulmonary/Chest: Breath sounds normal. No respiratory distress. She has no wheezes. She has no rhonchi. She has no rales.   Abdominal: Abdomen is soft. Bowel sounds are normal. There is abdominal tenderness in the epigastric area.    Mild epigastric tenderness. There is no rebound and no guarding.   Musculoskeletal:         General: Normal range of motion.      Cervical back: Normal range of motion and neck supple.     Neurological: She is alert and oriented to person, place, and time.   Skin: Skin is warm and dry.   Psychiatric: She has a normal mood and affect. Her behavior is normal. Judgment and thought content normal.         ED Course   Procedures  Labs Reviewed   COMPREHENSIVE METABOLIC PANEL - Abnormal; Notable for the following components:       Result Value    Potassium 3.1 (*)     Glucose 119 (*)     Creatinine 1.5 (*)     Calcium 11.7 (*)     Albumin 2.8 (*)     eGFR if  43 (*)     eGFR if non  37 (*)     All other components within normal limits   CBC W/ AUTO DIFFERENTIAL - Abnormal; Notable for the following components:    WBC 14.92 (*)     RBC 3.28 (*)     Hemoglobin 11.2 (*)     Hematocrit 32.2 (*)     MCH 34.1 (*)     Immature Granulocytes 0.9 (*)     Gran # (ANC) 11.3 (*)     Immature Grans (Abs) 0.13 (*)     Gran % 75.6 (*)     Lymph % 15.1 (*)     All other components within normal limits   URINALYSIS, REFLEX TO URINE CULTURE - Abnormal; Notable for the following components:    Appearance, UA Hazy (*)     Protein, UA Trace (*)     All other components within normal limits    Narrative:     Specimen Source->Urine   LIPASE - Abnormal; Notable for the following components:    Lipase 746 (*)     All other components within normal limits   SARS-COV-2 RNA AMPLIFICATION, QUAL          Imaging Results          CT Abdomen Pelvis  Without Contrast (Final result)  Result time 06/02/22 11:31:44    Final result by Jacques Rodríguez MD (06/02/22 11:31:44)                 Impression:      1. Findings suspicious for acute pancreatitis.  Assessment somewhat limited in the absence of IV contrast.  2. Pulmonary emphysema with minimal scattered ground-glass disease which could reflect edema, fibrosis,  pneumonitis.  3. Questionable gallbladder sludge.      Electronically signed by: Jacques Rodríguez  Date:    06/02/2022  Time:    11:31             Narrative:    EXAMINATION:  CT ABDOMEN PELVIS WITHOUT CONTRAST    CLINICAL HISTORY:  Epigastric pain;    TECHNIQUE:  Low dose axial images, sagittal and coronal reformations were obtained from the lung bases to the pubic symphysis.  Oral contrast was not administered.    COMPARISON:  None    FINDINGS:  There is centrilobular emphysema in the lung bases.  Minimal scattered ground-glass disease as well with some retained mucus secretions peripheral airways right middle lobe and left upper lobe.  Normal size heart.  There is some layering high density in the gallbladder near the tip without calcification.    There are simple cystic lesions along each kidney upper pole.  No hydroureteronephrosis or calcified nephro ureterolithiasis.  There is diffuse heterogeneous density of the pancreas which is slightly thickened with adjacent fat stranding.  Remaining solid abdominal organs are grossly unremarkable on this noncontrast exam.    There is no enteric contrast which limits bowel assessment.  Stomach is mildly distended.  Remaining bowel loops are normal in caliber.  Normal appendix.    Atherosclerosis.  In trace free fluid surrounding the pancreas.  Unremarkable noncontrast uterus and urinary bladder.    Mild multilevel degenerative changes in the spine.                                 Medications   ondansetron injection 4 mg (4 mg Intravenous Given 6/2/22 0908)   famotidine (PF) injection 20 mg (20 mg Intravenous Given 6/2/22 0908)   morphine injection 4 mg (4 mg Intravenous Given 6/2/22 1144)     Medical Decision Making:   History:   Old Medical Records: I decided to obtain old medical records.  Initial Assessment:   62-year-old female presents with multiple complaints.  Differential Diagnosis:   Initial differential diagnosis included but not limited to pancreatitis,  gastritis, and peptic ulcer disease.  Clinical Tests:   Lab Tests: Ordered and Reviewed  Radiological Study: Ordered and Reviewed  ED Management:  The patient was emergently evaluated in the emergency department, her evaluation was significant for an elderly female with epigastric tenderness.  The patient's labs were significant for an elevated lipase level.  The patient's CT scan shows acute pancreatitis per Radiology.  This is likely the etiology of her symptoms.  The patient was treated with parental Pepcid, parental morphine, and parental Zofran here in the emergency department.  I will admit her to the hospitalist service for further care.  The case was discussed with the hospitalist on call, Dr. Jimenez.  He has accepted the patient for admission.          Scribe Attestation:   Scribe #1: I performed the above scribed service and the documentation accurately describes the services I performed. I attest to the accuracy of the note.              I, Dr. Navarro Beckham, personally performed the services described in this documentation. All medical record entries made by the scribe were at my direction and in my presence.  I have reviewed the chart and agree that the record reflects my personal performance and is accurate and complete. Navarro Beckham MD.  1:47 PM 06/02/2022      Clinical Impression:   Final diagnoses:  [K85.90] Acute pancreatitis, unspecified complication status, unspecified pancreatitis type (Primary)          ED Disposition Condition    Observation               Navarro Beckham MD  06/02/22 2412

## 2022-06-03 LAB
ALBUMIN SERPL BCP-MCNC: 2.2 G/DL (ref 3.5–5.2)
ALP SERPL-CCNC: 102 U/L (ref 55–135)
ALT SERPL W/O P-5'-P-CCNC: 11 U/L (ref 10–44)
ANION GAP SERPL CALC-SCNC: 9 MMOL/L (ref 8–16)
ANISOCYTOSIS BLD QL SMEAR: SLIGHT
AST SERPL-CCNC: 20 U/L (ref 10–40)
BASOPHILS # BLD AUTO: 0.14 K/UL (ref 0–0.2)
BASOPHILS NFR BLD: 0.6 % (ref 0–1.9)
BILIRUB SERPL-MCNC: 0.5 MG/DL (ref 0.1–1)
BUN SERPL-MCNC: 15 MG/DL (ref 8–23)
CALCIUM SERPL-MCNC: 10.8 MG/DL (ref 8.7–10.5)
CHLORIDE SERPL-SCNC: 105 MMOL/L (ref 95–110)
CO2 SERPL-SCNC: 26 MMOL/L (ref 23–29)
CREAT SERPL-MCNC: 1.3 MG/DL (ref 0.5–1.4)
DIFFERENTIAL METHOD: ABNORMAL
EOSINOPHIL # BLD AUTO: 0.1 K/UL (ref 0–0.5)
EOSINOPHIL NFR BLD: 0.3 % (ref 0–8)
ERYTHROCYTE [DISTWIDTH] IN BLOOD BY AUTOMATED COUNT: 14.3 % (ref 11.5–14.5)
EST. GFR  (AFRICAN AMERICAN): 51 ML/MIN/1.73 M^2
EST. GFR  (NON AFRICAN AMERICAN): 44 ML/MIN/1.73 M^2
GLUCOSE SERPL-MCNC: 87 MG/DL (ref 70–110)
HCT VFR BLD AUTO: 37 % (ref 37–48.5)
HGB BLD-MCNC: 12.8 G/DL (ref 12–16)
HYPOCHROMIA BLD QL SMEAR: ABNORMAL
IMM GRANULOCYTES # BLD AUTO: 0.24 K/UL (ref 0–0.04)
IMM GRANULOCYTES NFR BLD AUTO: 1 % (ref 0–0.5)
LIPASE SERPL-CCNC: 756 U/L (ref 4–60)
LYMPHOCYTES # BLD AUTO: 3.4 K/UL (ref 1–4.8)
LYMPHOCYTES NFR BLD: 14.7 % (ref 18–48)
MCH RBC QN AUTO: 33.9 PG (ref 27–31)
MCHC RBC AUTO-ENTMCNC: 34.6 G/DL (ref 32–36)
MCV RBC AUTO: 98 FL (ref 82–98)
MONOCYTES # BLD AUTO: 1.8 K/UL (ref 0.3–1)
MONOCYTES NFR BLD: 7.7 % (ref 4–15)
NEUTROPHILS # BLD AUTO: 17.4 K/UL (ref 1.8–7.7)
NEUTROPHILS NFR BLD: 75.7 % (ref 38–73)
NRBC BLD-RTO: 0 /100 WBC
OVALOCYTES BLD QL SMEAR: ABNORMAL
PLATELET # BLD AUTO: 388 K/UL (ref 150–450)
PLATELET BLD QL SMEAR: ABNORMAL
PMV BLD AUTO: 10.7 FL (ref 9.2–12.9)
POCT GLUCOSE: 97 MG/DL (ref 70–110)
POIKILOCYTOSIS BLD QL SMEAR: SLIGHT
POLYCHROMASIA BLD QL SMEAR: ABNORMAL
POTASSIUM SERPL-SCNC: 3.5 MMOL/L (ref 3.5–5.1)
PROT SERPL-MCNC: 6.3 G/DL (ref 6–8.4)
RBC # BLD AUTO: 3.78 M/UL (ref 4–5.4)
SODIUM SERPL-SCNC: 140 MMOL/L (ref 136–145)
WBC # BLD AUTO: 23.06 K/UL (ref 3.9–12.7)

## 2022-06-03 PROCEDURE — 25000003 PHARM REV CODE 250: Performed by: INTERNAL MEDICINE

## 2022-06-03 PROCEDURE — 80053 COMPREHEN METABOLIC PANEL: CPT | Performed by: INTERNAL MEDICINE

## 2022-06-03 PROCEDURE — 63600175 PHARM REV CODE 636 W HCPCS: Performed by: INTERNAL MEDICINE

## 2022-06-03 PROCEDURE — 96361 HYDRATE IV INFUSION ADD-ON: CPT

## 2022-06-03 PROCEDURE — 99214 PR OFFICE/OUTPT VISIT, EST, LEVL IV, 30-39 MIN: ICD-10-PCS | Mod: ,,, | Performed by: INTERNAL MEDICINE

## 2022-06-03 PROCEDURE — A4216 STERILE WATER/SALINE, 10 ML: HCPCS | Performed by: INTERNAL MEDICINE

## 2022-06-03 PROCEDURE — 99214 OFFICE O/P EST MOD 30 MIN: CPT | Mod: ,,, | Performed by: INTERNAL MEDICINE

## 2022-06-03 PROCEDURE — 96366 THER/PROPH/DIAG IV INF ADDON: CPT

## 2022-06-03 PROCEDURE — 85025 COMPLETE CBC W/AUTO DIFF WBC: CPT | Performed by: INTERNAL MEDICINE

## 2022-06-03 PROCEDURE — 83690 ASSAY OF LIPASE: CPT | Performed by: INTERNAL MEDICINE

## 2022-06-03 PROCEDURE — 96372 THER/PROPH/DIAG INJ SC/IM: CPT | Performed by: INTERNAL MEDICINE

## 2022-06-03 PROCEDURE — G0378 HOSPITAL OBSERVATION PER HR: HCPCS

## 2022-06-03 PROCEDURE — 36415 COLL VENOUS BLD VENIPUNCTURE: CPT | Performed by: INTERNAL MEDICINE

## 2022-06-03 PROCEDURE — 97161 PT EVAL LOW COMPLEX 20 MIN: CPT

## 2022-06-03 RX ADMIN — TRAZODONE HYDROCHLORIDE 50 MG: 50 TABLET ORAL at 08:06

## 2022-06-03 RX ADMIN — LISINOPRIL 40 MG: 40 TABLET ORAL at 09:06

## 2022-06-03 RX ADMIN — HYDROCODONE BITARTRATE AND ACETAMINOPHEN 1 TABLET: 5; 325 TABLET ORAL at 08:06

## 2022-06-03 RX ADMIN — FOLIC ACID 1 MG: 1 TABLET ORAL at 09:06

## 2022-06-03 RX ADMIN — PIPERACILLIN SODIUM AND TAZOBACTAM SODIUM 4.5 G: 4; .5 INJECTION, POWDER, LYOPHILIZED, FOR SOLUTION INTRAVENOUS at 06:06

## 2022-06-03 RX ADMIN — PANTOPRAZOLE SODIUM 40 MG: 40 TABLET, DELAYED RELEASE ORAL at 09:06

## 2022-06-03 RX ADMIN — HYDROCODONE BITARTRATE AND ACETAMINOPHEN 1 TABLET: 5; 325 TABLET ORAL at 10:06

## 2022-06-03 RX ADMIN — Medication 10 ML: at 02:06

## 2022-06-03 RX ADMIN — SERTRALINE HYDROCHLORIDE 25 MG: 25 TABLET ORAL at 09:06

## 2022-06-03 RX ADMIN — PIPERACILLIN SODIUM AND TAZOBACTAM SODIUM 4.5 G: 4; .5 INJECTION, POWDER, LYOPHILIZED, FOR SOLUTION INTRAVENOUS at 10:06

## 2022-06-03 RX ADMIN — ENOXAPARIN SODIUM 40 MG: 100 INJECTION SUBCUTANEOUS at 06:06

## 2022-06-03 NOTE — CONSULTS
[facility]     CC: Abdominal pain    HPI 62 y.o. female with past medical history significant for hypertension, hyperlipidemia, anxiety disorder and rheumatoid arthritis (on methotrexate therapy) and recent history of COVID-19 infection (May 13, 2022; received Paxlovid for 3 days) is being admitted to Hospital Medicine under observation status from [facility] Emergency Room with ongoing epigastric abdominal pain for 1 day.  Abdominal pain is moderate to severe in intensity, nonradiating, without any aggravating or relieving factors.  Patient reports markedly reduced oral intake and feels dehydrated.  Denies any recent fevers, chills, prior history of acute pancreatitis, melena, bleeding per rectum or hematemesis.  No recent use of nonsteroidal anti-inflammatory medications reported.  For COVID treatment patient has received and completed 10 days of oral doxycycline and 9 days of cefuroxime antibiotic therapy.  No recent fevers or chills reported.  Patient had 3 loose bowel movements today.  No mucus per rectum reported.  No urinary difficulties present.  Patient denies any alcohol consumption.                    Overview/Hospital Course:  No notes on file     Interval History:  Patient is continuing to report severe epigastric abdominal pain associated with nausea.  Worsening leukocytosis noted.  No hematemesis, melena or bleeding per rectum reported.  Patient denied any chest pain or shortness of breath.    FURTHER HISTORY:  Above obtained from independent review of records from admitting provider as well as from direct discussion with nursing who states patient appears comfortable at present.  In addition, on my interview, I note the following:  Patient complains of abdominal pain, recent onset, moderate to severe, associated with nausea and nonbloody emesis, with no alleviating/exacerbating factors.  No EtOH use.  No abdominal surgeries.  No new medications.  Sludge noted in  GB on imaging.  Lipase elevated and patient has leukocytosis but LFTs WNL.  No other acute GI issues.      Past Medical History:   Diagnosis Date    Anxiety     Flu 02/2017    [facility]    Hypertension     Rheumatoid arthritis involving multiple sites with positive rheumatoid factor 1/14/2021       Past Surgical History:   Procedure Laterality Date    COLONOSCOPY N/A 2/26/2021    Procedure: COLONOSCOPY;  Surgeon: Amy Sidhu MD;  Location: [facility] ENDO;  Service: Endoscopy;  Laterality: N/A;    ESOPHAGOGASTRODUODENOSCOPY N/A 2/26/2021    Procedure: EGD (ESOPHAGOGASTRODUODENOSCOPY);  Surgeon: Amy Sidhu MD;  Location: [facility] ENDO;  Service: Endoscopy;  Laterality: N/A;    TUBAL LIGATION         Social History     Tobacco Use    Smoking status: Current Every Day Smoker     Packs/day: 1.00     Years: 7.00     Pack years: 7.00     Types: Cigarettes    Smokeless tobacco: Never Used    Tobacco comment: [phone]   Substance Use Topics    Alcohol use: No    Drug use: Never       Family History   Problem Relation Age of Onset    Diabetes Mother     Heart disease Mother     Kidney disease Mother     Hypertension Mother     Stroke Mother     Hearing loss Mother     COPD Father     Liver disease Paternal Grandfather     Alcohol abuse Paternal Grandfather     Liver disease Sister     Emphysema Brother     COPD Brother     Sleep apnea Brother     No Known Problems Son     Alcohol abuse Paternal Grandmother     Cirrhosis Paternal Grandmother     Heart disease Maternal Aunt     Diabetes Maternal Aunt     Cancer Maternal Aunt         female    Heart disease Maternal Uncle     Hypertension Maternal Uncle     No Known Problems Paternal Uncle     Psoriasis Neg Hx     Lupus Neg Hx     Eczema Neg Hx     Melanoma Neg Hx        Review of Systems  General ROS: negative for - chills, fever or weight loss  Psychological ROS: negative for - hallucination, depression or suicidal  "ideation  Ophthalmic ROS: negative for - blurry vision, photophobia or eye pain  ENT ROS: negative for - epistaxis, sore throat or rhinorrhea  Respiratory ROS: no cough, shortness of breath, or wheezing  Cardiovascular ROS: no chest pain or dyspnea on exertion  Gastrointestinal ROS: + pain, N/V  Genito-Urinary ROS: no dysuria, trouble voiding, or hematuria  Musculoskeletal ROS: negative for - arthralgia, myalgia, weakness  Neurological ROS: no syncope or seizures; no ataxia  Dermatological ROS: negative for pruritis, rash and jaundice    Physical Examination  BP (!) 168/77 (BP Location: Right arm, Patient Position: Lying)   Pulse 75   Temp 97.7 °F (36.5 °C) (Oral)   Resp 18   Ht 5' 3" (1.6 m)   Wt 49.4 kg (109 lb)   SpO2 (!) 94%   BMI 19.31 kg/m²   General appearance: alert, cooperative, no distress  HENT: Normocephalic, atraumatic, neck symmetrical, no nasal discharge   Eyes: conjunctivae/corneas clear, PERRL, EOM's intact, sclera anicteric  Lungs: clear to auscultation bilaterally, no dullness to percussion bilaterally, symmetric expansion, breathing unlabored  Heart: regular rate and rhythm without rub; no displacement of the PMI   Abdomen: soft with mild TTP in epigastrum  Extremities: extremities symmetric; no clubbing, cyanosis, or edema  Integument: Skin color, texture, turgor normal; no rashes; hair distrubution normal, no jaundice  Neurologic: Alert and oriented X 3, no focal sensory or motor neurologic deficits  Psychiatric: no pressured speech; normal affect; no evidence of impaired cognition, no anxiety/depression     Labs:  Lab Results   Component Value Date    WBC 23.06 (H) 06/03/2022    HGB 12.8 06/03/2022    HCT 37.0 06/03/2022    MCV 98 06/03/2022     06/03/2022         CMP  Sodium   Date Value Ref Range Status   06/03/2022 140 136 - 145 mmol/L Final     Potassium   Date Value Ref Range Status   06/03/2022 3.5 3.5 - 5.1 mmol/L Final     Chloride   Date Value Ref Range Status "   06/03/2022 105 95 - 110 mmol/L Final     CO2   Date Value Ref Range Status   06/03/2022 26 23 - 29 mmol/L Final     Glucose   Date Value Ref Range Status   06/03/2022 87 70 - 110 mg/dL Final     BUN   Date Value Ref Range Status   06/03/2022 15 8 - 23 mg/dL Final     Creatinine   Date Value Ref Range Status   06/03/2022 1.3 0.5 - 1.4 mg/dL Final     Calcium   Date Value Ref Range Status   06/03/2022 10.8 (H) 8.7 - 10.5 mg/dL Final     Total Protein   Date Value Ref Range Status   06/03/2022 6.3 6.0 - 8.4 g/dL Final     Albumin   Date Value Ref Range Status   06/03/2022 2.2 (L) 3.5 - 5.2 g/dL Final     Total Bilirubin   Date Value Ref Range Status   06/03/2022 0.5 0.1 - 1.0 mg/dL Final     Comment:     For infants and newborns, interpretation of results should be based  on gestational age, weight and in agreement with clinical  observations.    Premature Infant recommended reference ranges:  Up to 24 hours.............<8.0 mg/dL  Up to 48 hours............<12.0 mg/dL  3-5 days..................<15.0 mg/dL  6-29 days.................<15.0 mg/dL       Alkaline Phosphatase   Date Value Ref Range Status   06/03/2022 102 55 - 135 U/L Final     AST   Date Value Ref Range Status   06/03/2022 20 10 - 40 U/L Final     ALT   Date Value Ref Range Status   06/03/2022 11 10 - 44 U/L Final     Anion Gap   Date Value Ref Range Status   06/03/2022 9 8 - 16 mmol/L Final     eGFR if    Date Value Ref Range Status   06/03/2022 51 (A) >60 mL/min/1.73 m^2 Final     eGFR if non    Date Value Ref Range Status   06/03/2022 44 (A) >60 mL/min/1.73 m^2 Final     Comment:     Calculation used to obtain the estimated glomerular filtration  rate (eGFR) is the CKD-EPI equation.        Lab Results   Component Value Date    LIPASE 756 (H) 06/03/2022         Imaging:  CT scan was independently visualized and reviewed by me and showed pancreatitis and GB sludge.    I have personally reviewed these images    Case  discussed as above.    Assessment:   1.  Abdominal pain  2.  Acute pancreatitis  3.  GB sludge without overt evidence of cholecystitis/choledocholithiasis  4.  Leukocytosis    Plan:  1.  Agree with antibiotic coverage given leukocytosis  2.  IVFs for support to keep UOP greater than 0.5 cc/kg/hr  3.  Symptomatic management for pain and nausea  4.  PO intake as tolerated  5.  Consider surgical consultation for cholecystectomy  6.  Consider outpatient EUS for pancreatitis at some point.  7.  Will follow  8.  Further recommendations to follow after above.  9.  Communication will be sent to the referring provider, Dr. Jimenez regarding my assessment and plan on this patient via EPIC.      Antwan Barron MD  [facility]  [address], [address]  [place] [address]  Office: [phone]  Fax: [phone]

## 2022-06-03 NOTE — PT/OT/SLP PROGRESS
Physical Therapy      Patient Name:  Claire Bowser   MRN:  [id number]    Patient not seen this morning secondary to patient declined participation secondary to 7/10 abdominal pain. RN aware and charge nurse bringing pain medication. Will follow-up this afternoon.

## 2022-06-03 NOTE — ASSESSMENT & PLAN NOTE
Clear liquid diet.  Supportive care with IV fluid hydration, use of intravenous antiemetics and narcotics as needed.  Trend lipase level.  CT abdomen and pelvis results reviewed with patient and her .  At intravenous Zosyn antibiotic therapy.  Will consult GI specialist for opinion.

## 2022-06-03 NOTE — PT/OT/SLP EVAL
Physical Therapy Evaluation    Patient Name:  Claire Bowser   MRN:  [id number]    Recommendations:     Discharge Recommendations:  home   Discharge Equipment Recommendations: none   Barriers to discharge: None    Assessment:     Claire Bowser is a 62 y.o. female admitted with a medical diagnosis of Acute pancreatitis.  She presents with the following impairments/functional limitations:  weakness, impaired endurance, impaired functional mobilty, gait instability, impaired balance, decreased lower extremity function, pain. Patient is agreeable to participation with PT evaluation. She reports improvement in abdominal pain only rating it a 3/10 (rated 7/10 when PT attempted at 1035 this morning). She is independent at baseline and works as a . She performs supine <> sit <> stand with supervision. She ambulated 250' with no AD, CGA, and mildly antalgic gait with patient attributing to rheumatoid arthritis. Patient does endorse mild weakness and impaired endurance currently versus baseline. Upon return to room patient agreeable to sit up in chair with chair alarm on and RN notified.     Rehab Prognosis: Good; patient would benefit from acute skilled PT services to address these deficits and reach maximum level of function.    Recent Surgery: * No surgery found *      Plan:     During this hospitalization, patient to be seen 6 x/week to address the identified rehab impairments via gait training, therapeutic activities, therapeutic exercises and progress toward the following goals:    · Plan of Care Expires:  07/03/22    Subjective     Chief Complaint: 3/10 abdominal pain   Patient/Family Comments/goals: sit up in chair   Pain/Comfort:  · Pain Rating 1: 3/10  · Location 1: abdomen  · Pain Addressed 1: Pre-medicate for activity    Patients cultural, spiritual, Gnosticism conflicts given the current situation:      Living Environment:  Patient lives with spouse in a [facility] with no ARIA   Prior to admission,  patients level of function was indepdent. She works as a . She denies any recent falls or HHPT. Her spouse does the cooking and cleaning. Equipment used at home: none.  DME owned (not currently used): none.  Upon discharge, patient will have assistance from spouse.     Objective:     Communicated with SARKIS Pollack prior to session.  Patient found HOB elevated with peripheral IV  upon PT entry to room.    General Precautions: Standard, fall   Orthopedic Precautions:N/A   Braces: N/A  Respiratory Status: Room air    Exams:  · RLE Strength: 4/5  · LLE Strength: 4/5    Functional Mobility:  · Bed Mobility:     · Supine to Sit: supervision  · Transfers:     · Sit to Stand:  supervision with no AD  · Gait: 250' with no AD, CGA, and mildly antalgic gait with patient attributing to rheumatoid arthritis    Therapeutic Activities and Exercises:   Patient was educated on the importance of OOB activity and functional mobility to negate negative effects of prolonged bed rest during hospitalization, safe transfers and ambulation, and D/C planning     AM-PAC 6 CLICK MOBILITY  Total Score:23     Patient left up in chair with all lines intact, call button in reach, chair alarm on and RN notified.    GOALS:   Multidisciplinary Problems     Physical Therapy Goals        Problem: Physical Therapy    Goal Priority Disciplines Outcome Goal Variances Interventions   Physical Therapy Goal     PT, PT/OT      Description: Goals to be met by: 7/3/22     Patient will increase functional independence with mobility by performin. Supine to sit with [place]  2. Sit to stand transfer with [place]  3. Bed to chair transfer with [place] using No Assistive Device  4. Gait  x 250 feet with [place] using No Assistive Device.   5. Lower extremity exercise program x20 reps per handout, with supervision                     History:     Past Medical History:   Diagnosis Date    Anxiety     Flu 2017    [facility]    Hypertension     Rheumatoid arthritis involving multiple sites with positive rheumatoid factor 1/14/2021       Past Surgical History:   Procedure Laterality Date    COLONOSCOPY N/A 2/26/2021    Procedure: COLONOSCOPY;  Surgeon: Amy Sidhu MD;  Location: [facility];  Service: Endoscopy;  Laterality: N/A;    ESOPHAGOGASTRODUODENOSCOPY N/A 2/26/2021    Procedure: EGD (ESOPHAGOGASTRODUODENOSCOPY);  Surgeon: Amy Sidhu MD;  Location: [facility] ENDO;  Service: Endoscopy;  Laterality: N/A;    TUBAL LIGATION         Time Tracking:     PT Received On: 06/03/22  PT Start Time: 1252     PT Stop Time: 1303  PT Total Time (min): 11 min     Billable Minutes: Evaluation 11      06/03/2022

## 2022-06-03 NOTE — PT/OT/SLP PROGRESS
Occupational Therapy      Patient Name:  Claire Bowser   MRN:  [id number]    Attempted OT evaluation. Patient refused participation due to persistent abdominal pain. Will follow up when appropriate.     6/3/2022

## 2022-06-03 NOTE — PROGRESS NOTES
[facility] Hospital Medicine  Progress Note    Patient Name: Claire Bowser  MRN: [id number]  Patient Class: OP- Observation   Admission Date: 6/2/2022  Length of Stay: 0 days  Attending Physician: Jose Jimenez MD  Primary Care Provider: Samuel Brady MD        Subjective:     Principal Problem:Acute pancreatitis        HPI:  Patient is a 62-year-old  female with past medical history significant for hypertension, hyperlipidemia, anxiety disorder and rheumatoid arthritis (on methotrexate therapy) and recent history of COVID-19 infection (May 13, 2022; received Paxlovid for 3 days) is being admitted to Hospital Medicine under observation status from [facility] Emergency Room with ongoing epigastric abdominal pain for 1 day.  Abdominal pain is moderate to severe in intensity, nonradiating, without any aggravating or relieving factors.  Patient reports markedly reduced oral intake and feels dehydrated.  Denies any recent fevers, chills, prior history of acute pancreatitis, melena, bleeding per rectum or hematemesis.  No recent use of nonsteroidal anti-inflammatory medications reported.  For COVID treatment patient has received and completed 10 days of oral doxycycline and 9 days of cefuroxime antibiotic therapy.  No recent fevers or chills reported.  Patient had 3 loose bowel movements today.  No mucus per rectum reported.  No urinary difficulties present.  Patient denies any alcohol consumption.              Overview/Hospital Course:  No notes on file    Interval History:  Patient is continuing to report severe epigastric abdominal pain associated with nausea.  Worsening leukocytosis noted.  No hematemesis, melena or bleeding per rectum reported.  Patient denied any chest pain or shortness of breath.    Review of Systems   Constitutional:  Positive for fatigue.   Gastrointestinal:  Positive for abdominal pain, diarrhea and nausea.   Neurological:  Positive for  weakness.   All other systems reviewed and are negative.  Objective:     Vital Signs (Most Recent):  Temp: 97.9 °F (36.6 °C) (06/03/22 0748)  Pulse: 86 (06/03/22 0748)  Resp: 18 (06/03/22 0748)  BP: (!) 198/89 (06/03/22 0748)  SpO2: 95 % (06/03/22 0748)   Vital Signs (24h Range):  Temp:  [97.2 °F (36.2 °C)-98.4 °F (36.9 °C)] 97.9 °F (36.6 °C)  Pulse:  [46-93] 86  Resp:  [16-18] 18  SpO2:  [92 %-98 %] 95 %  BP: (148-198)/(67-89) 198/89     Weight: 49.4 kg (109 lb)  Body mass index is 19.31 kg/m².    Intake/Output Summary (Last 24 hours) at 6/3/2022 0840  Last data filed at 6/3/2022 0554  Gross per 24 hour   Intake 1753.09 ml   Output --   Net 1753.09 ml      Physical Exam  Constitutional:       Appearance: She is well-developed.   HENT:      Head: Normocephalic and atraumatic.      Mouth/Throat:      Mouth: Mucous membranes are dry.   Eyes:      Conjunctiva/sclera: Conjunctivae normal.      Pupils: Pupils are equal, round, and reactive to light.   Neck:      Thyroid: No thyromegaly.      Vascular: No JVD.   Cardiovascular:      Rate and Rhythm: Normal rate and regular rhythm.      Heart sounds: No murmur heard.    No friction rub. No gallop.   Pulmonary:      Effort: Pulmonary effort is normal.      Breath sounds: Normal breath sounds.   Abdominal:      General: Bowel sounds are normal. There is no distension.      Palpations: Abdomen is soft. There is no mass.      Tenderness: There is no abdominal tenderness.      Comments: Epigastric tenderness to deep palpation without peritoneal signs guarding or rigidity.  No hepatosplenomegaly present.   Musculoskeletal:         General: Normal range of motion.      Cervical back: Neck supple.   Skin:     General: Skin is warm and dry.   Neurological:      Mental Status: She is oriented to person, place, and time.      Cranial Nerves: No cranial nerve deficit.   Psychiatric:         Behavior: Behavior normal.       Significant Labs: All pertinent labs within the past 24 hours  have been reviewed.  CBC:   Recent Labs   Lab 06/02/22  0905 06/03/22 0421   WBC 14.92* 23.06*   HGB 11.2* 12.8   HCT 32.2* 37.0    388     CMP:   Recent Labs   Lab 06/02/22  0905 06/03/22 0421    140   K 3.1* 3.5    105   CO2 26 26   * 87   BUN 13 15   CREATININE 1.5* 1.3   CALCIUM 11.7* 10.8*   PROT 7.0 6.3   ALBUMIN 2.8* 2.2*   BILITOT 0.5 0.5   ALKPHOS 105 102   AST 21 20   ALT 13 11   ANIONGAP 11 9   EGFRNONAA 37* 44*     Lactic Acid: No results for input(s): LACTATE in the last 48 hours.  Lipase:   Recent Labs   Lab 06/02/22 0905 06/03/22 0421   LIPASE 746* 756*     Microbiology Results (last 7 days)       ** No results found for the last 168 hours. **          Significant Imaging:   CT abdomen and pelvis without contrast:  1. Findings suspicious for acute pancreatitis.  Assessment somewhat limited in the absence of IV contrast.  2. Pulmonary emphysema with minimal scattered ground-glass disease which could reflect edema, fibrosis, pneumonitis.  3. Questionable gallbladder sludge.      Assessment/Plan:      * Acute pancreatitis  Clear liquid diet.  Supportive care with IV fluid hydration, use of intravenous antiemetics and narcotics as needed.  Trend lipase level.  CT abdomen and pelvis results reviewed with patient and her .  At intravenous Zosyn antibiotic therapy.  Will consult GI specialist for opinion.      ACP (advance care planning)  Advance Care Planning     Date: 06/02/2022    Living Will  During this visit, I engaged the patient  in the advance care planning process.  The patient and I reviewed the role for advance directives and their purpose in directing future healthcare if the patient's unable to speak for him/herself.  At this point in time, the patient does have full decision-making capacity.  We discussed different extreme health states that she could experience, and reviewed what kind of medical care she would want in those situations.  The patient  communicated that if she were comatose and had little chance of a meaningful recovery, she would want machines/life-sustaining treatments used. I spent a total of 16 minutes engaging the patient in this advance care planning discussion.                Hypercalcemia  Likely secondary to dehydration and concomitant use of activated vitamin D3.  Continue IV fluid hydration.  Hold vitamin-D supplementation.  Monitor serum calcium daily.      Dehydration  Continue IV fluid hydration.      Epigastric pain  Likely secondary to acute pancreatitis.  See management of acute pancreatitis.      Hypokalemia  Replete potassium chloride.  Follow BMP.      Rheumatoid arthritis involving multiple sites with positive rheumatoid factor  On weekly methotrexate.      Mixed hyperlipidemia  Chronic problem. Will continue chronic medications and monitor for any changes, adjusting as needed.          Tobacco use  Smoking cessation counseling performed. Dangers of cigarette smoking were reviewed with patient in detail and patient was encouraged to quit. Nicotine replacement options were discussed for > 3 minutes.        Hypertension  Chronic problem. Will continue chronic medications and monitor for any changes, adjusting as needed.          Anxiety  Chronic problem. Will continue chronic medications and monitor for any changes, adjusting as needed.          Discussed with patient's  at bedside, answered all questions.  VTE Risk Mitigation (From admission, onward)         Ordered     enoxaparin injection 40 mg  Daily         06/02/22 1459     IP VTE HIGH RISK PATIENT  Once         06/02/22 1459     Place sequential compression device  Until discontinued         06/02/22 1459                Discharge Planning   RASHI: 6/5/22     Code Status: Full Code   Is the patient medically ready for discharge?:     Reason for patient still in hospital (select all that apply): Patient trending condition and Consult recommendations                      Jose Jimenez MD  Department of Hospital Medicine   [facility]

## 2022-06-03 NOTE — NURSING
Pt is A&O (x4). IV and tele intact. Pt reports no pain at this moment. Report given to BRINDA Atkins. Discussed the following:New admission. NPO status. IV intact and infusing.  All questions answered. Nurse verbalized understanding.

## 2022-06-03 NOTE — SUBJECTIVE & OBJECTIVE
Interval History:  Patient is continuing to report severe epigastric abdominal pain associated with nausea.  Worsening leukocytosis noted.  No hematemesis, melena or bleeding per rectum reported.  Patient denied any chest pain or shortness of breath.    Review of Systems   Constitutional:  Positive for fatigue.   Gastrointestinal:  Positive for abdominal pain, diarrhea and nausea.   Neurological:  Positive for weakness.   All other systems reviewed and are negative.  Objective:     Vital Signs (Most Recent):  Temp: 97.9 °F (36.6 °C) (06/03/22 0748)  Pulse: 86 (06/03/22 0748)  Resp: 18 (06/03/22 0748)  BP: (!) 198/89 (06/03/22 0748)  SpO2: 95 % (06/03/22 0748)   Vital Signs (24h Range):  Temp:  [97.2 °F (36.2 °C)-98.4 °F (36.9 °C)] 97.9 °F (36.6 °C)  Pulse:  [46-93] 86  Resp:  [16-18] 18  SpO2:  [92 %-98 %] 95 %  BP: (148-198)/(67-89) 198/89     Weight: 49.4 kg (109 lb)  Body mass index is 19.31 kg/m².    Intake/Output Summary (Last 24 hours) at 6/3/2022 0840  Last data filed at 6/3/2022 0554  Gross per 24 hour   Intake 1753.09 ml   Output --   Net 1753.09 ml      Physical Exam  Constitutional:       Appearance: She is well-developed.   HENT:      Head: Normocephalic and atraumatic.      Mouth/Throat:      Mouth: Mucous membranes are dry.   Eyes:      Conjunctiva/sclera: Conjunctivae normal.      Pupils: Pupils are equal, round, and reactive to light.   Neck:      Thyroid: No thyromegaly.      Vascular: No JVD.   Cardiovascular:      Rate and Rhythm: Normal rate and regular rhythm.      Heart sounds: No murmur heard.    No friction rub. No gallop.   Pulmonary:      Effort: Pulmonary effort is normal.      Breath sounds: Normal breath sounds.   Abdominal:      General: Bowel sounds are normal. There is no distension.      Palpations: Abdomen is soft. There is no mass.      Tenderness: There is no abdominal tenderness.      Comments: Epigastric tenderness to deep palpation without peritoneal signs guarding or rigidity.   No hepatosplenomegaly present.   Musculoskeletal:         General: Normal range of motion.      Cervical back: Neck supple.   Skin:     General: Skin is warm and dry.   Neurological:      Mental Status: She is oriented to person, place, and time.      Cranial Nerves: No cranial nerve deficit.   Psychiatric:         Behavior: Behavior normal.       Significant Labs: All pertinent labs within the past 24 hours have been reviewed.  CBC:   Recent Labs   Lab 06/02/22  0905 06/03/22  0421   WBC 14.92* 23.06*   HGB 11.2* 12.8   HCT 32.2* 37.0    388     CMP:   Recent Labs   Lab 06/02/22  0905 06/03/22  0421    140   K 3.1* 3.5    105   CO2 26 26   * 87   BUN 13 15   CREATININE 1.5* 1.3   CALCIUM 11.7* 10.8*   PROT 7.0 6.3   ALBUMIN 2.8* 2.2*   BILITOT 0.5 0.5   ALKPHOS 105 102   AST 21 20   ALT 13 11   ANIONGAP 11 9   EGFRNONAA 37* 44*     Lactic Acid: No results for input(s): LACTATE in the last 48 hours.  Lipase:   Recent Labs   Lab 06/02/22  0905 06/03/22  0421   LIPASE 746* 756*     Microbiology Results (last 7 days)       ** No results found for the last 168 hours. **          Significant Imaging:   CT abdomen and pelvis without contrast:  1. Findings suspicious for acute pancreatitis.  Assessment somewhat limited in the absence of IV contrast.  2. Pulmonary emphysema with minimal scattered ground-glass disease which could reflect edema, fibrosis, pneumonitis.  3. Questionable gallbladder sludge.

## 2022-06-03 NOTE — PLAN OF CARE
Plan of care reviewed with patient and understanding verbalized. IV intact and fluids infusing. No complaints of pain or discomfort noted. Tele in place and being monitored. No nausea or vomiting. VSS. Safety maintained. Call light in reach and instructed to call for assistance. Will continue to monitor.

## 2022-06-03 NOTE — PROGRESS NOTES
"Pharmacist Renal Dose Adjustment Note    Claire Bowser is a 62 y.o. female being treated with the medication Zosyn    Patient Data:    Vital Signs (Most Recent):  Temp: 97.9 °F (36.6 °C) (06/03/22 0748)  Pulse: 86 (06/03/22 0748)  Resp: 18 (06/03/22 0748)  BP: (!) 198/89 (06/03/22 0748)  SpO2: 95 % (06/03/22 0748)   Vital Signs (72h Range):  Temp:  [97.2 °F (36.2 °C)-98.4 °F (36.9 °C)]   Pulse:  [46-93]   Resp:  [16-18]   BP: (148-200)/(67-89)   SpO2:  [92 %-98 %]      Recent Labs   Lab 06/02/22  0905 06/03/22  0421   CREATININE 1.5* 1.3     Serum creatinine: 1.3 mg/dL 06/03/22 0421  Estimated creatinine clearance: 35.4 mL/min    Pharmacist Renal Dose Adjustment Note  Patient is on Zosyn 4.5 g IV Q6h over 30 minutes (intermittent infusion). Due to the patient's current diagnosis, zosyn via extended infusion provides better clinical outcomes regarding decreased mortality and decreased length of stay in comparison to intermittent infusion. Zosyn dose will be adjusted to Zosyn 4.5 g IV Q8h over 4 hours. Per pharmacy protocol, Zosyn can be adjusted automatically. Providers can override adjustment by re-ordering intermittent infusion with "dispense as written" in the administration instructions.     Joseline Goldman  [phone]       "

## 2022-06-03 NOTE — PLAN OF CARE
[facility]  Initial Discharge Assessment       Primary Care Provider: Samuel Brady MD    Admission Diagnosis: Acute pancreatitis, unspecified complication status, unspecified pancreatitis type [K85.90]    Admission Date: 6/2/2022  Expected Discharge Date:     Discharge Barriers Identified: None    Payor: BLUE CROSS BLUE SHIELD / Plan: [employer] [place] PPO / Product Type: PPO /     Extended Emergency Contact Information  Primary Emergency Contact: Robin Bowser  Address: [address] [place] [address]           [place] [address] [place]  Mobile Phone: [phone]  Relation: Spouse  Preferred language: English   needed? No    Discharge Plan A: Home  Discharge Plan B: Home      [facility]  [place], [place] - [address]  [address]  [facility] [address]  Phone: [phone] Fax: [phone]    SW met with patient and patient's spouse at bedside to complete discharge planning assessment.  Patient alert and oriented xs 4.  Patient verified all demographic information on facesheet is correct.  Patient verified PCP is Dr. Brady.  Patient verified primary health insurance is BCBS.  Patient with NO home health or DME.  Patient with NO POA or Living Will.  Patient not on dialysis or medication coumadin.  Patient with no 30 day admission.  Patient with no financial issues at this time.  Patient family will provide transportation upon discharge from facility.  Patient independent with ADLs, live with spouse, drives self.      Initial Assessment (most recent)     Adult Discharge Assessment - 06/03/22 1608        Discharge Assessment    Assessment Type Discharge Planning Assessment     Confirmed/corrected address, phone number and insurance Yes     Confirmed Demographics Correct on Facesheet     Source of Information patient     Does patient/caregiver understand observation status Yes     Communicated RASHI with patient/caregiver Yes     Lives With spouse     Facility Arrived From:  home     Do you expect to return to your current living situation? Yes     Do you have help at home or someone to help you manage your care at home? Yes     Who are your caregiver(s) and their phone number(s)? spouse     Prior to hospitilization cognitive status: Alert/Oriented     Current cognitive status: Alert/Oriented     Walking or Climbing Stairs Difficulty none     Dressing/Bathing Difficulty none     Equipment Currently Used at Home none     Readmission within 30 days? No     Patient currently being followed by outpatient case management? No     Do you currently have service(s) that help you manage your care at home? No     Do you take prescription medications? Yes     Do you have prescription coverage? Yes     Do you have any problems affording any of your prescribed medications? No     Is the patient taking medications as prescribed? yes     Who is going to help you get home at discharge? spouse     How do you get to doctors appointments? car, drives self     Are you on dialysis? No     Do you take coumadin? No     Discharge Plan A Home     Discharge Plan B Home     DME Needed Upon Discharge  none     Discharge Plan discussed with: Patient;Spouse/sig other     Discharge Barriers Identified None

## 2022-06-04 LAB
ALBUMIN SERPL BCP-MCNC: 1.9 G/DL (ref 3.5–5.2)
ALP SERPL-CCNC: 97 U/L (ref 55–135)
ALT SERPL W/O P-5'-P-CCNC: 11 U/L (ref 10–44)
ANION GAP SERPL CALC-SCNC: 7 MMOL/L (ref 8–16)
ANISOCYTOSIS BLD QL SMEAR: SLIGHT
AST SERPL-CCNC: 21 U/L (ref 10–40)
BASOPHILS # BLD AUTO: 0.1 K/UL (ref 0–0.2)
BASOPHILS NFR BLD: 0.5 % (ref 0–1.9)
BILIRUB SERPL-MCNC: 0.7 MG/DL (ref 0.1–1)
BUN SERPL-MCNC: 15 MG/DL (ref 8–23)
CALCIUM SERPL-MCNC: 9.9 MG/DL (ref 8.7–10.5)
CHLORIDE SERPL-SCNC: 101 MMOL/L (ref 95–110)
CO2 SERPL-SCNC: 28 MMOL/L (ref 23–29)
CREAT SERPL-MCNC: 1.2 MG/DL (ref 0.5–1.4)
DIFFERENTIAL METHOD: ABNORMAL
EOSINOPHIL # BLD AUTO: 0.1 K/UL (ref 0–0.5)
EOSINOPHIL NFR BLD: 0.7 % (ref 0–8)
ERYTHROCYTE [DISTWIDTH] IN BLOOD BY AUTOMATED COUNT: 14.3 % (ref 11.5–14.5)
EST. GFR  (AFRICAN AMERICAN): 56 ML/MIN/1.73 M^2
EST. GFR  (NON AFRICAN AMERICAN): 49 ML/MIN/1.73 M^2
GLUCOSE SERPL-MCNC: 74 MG/DL (ref 70–110)
HCT VFR BLD AUTO: 30.6 % (ref 37–48.5)
HGB BLD-MCNC: 10.6 G/DL (ref 12–16)
IMM GRANULOCYTES # BLD AUTO: 0.15 K/UL (ref 0–0.04)
IMM GRANULOCYTES NFR BLD AUTO: 0.7 % (ref 0–0.5)
LIPASE SERPL-CCNC: 345 U/L (ref 4–60)
LYMPHOCYTES # BLD AUTO: 2.9 K/UL (ref 1–4.8)
LYMPHOCYTES NFR BLD: 13.9 % (ref 18–48)
MCH RBC QN AUTO: 33.3 PG (ref 27–31)
MCHC RBC AUTO-ENTMCNC: 34.6 G/DL (ref 32–36)
MCV RBC AUTO: 96 FL (ref 82–98)
MONOCYTES # BLD AUTO: 2.1 K/UL (ref 0.3–1)
MONOCYTES NFR BLD: 10 % (ref 4–15)
NEUTROPHILS # BLD AUTO: 15.7 K/UL (ref 1.8–7.7)
NEUTROPHILS NFR BLD: 74.2 % (ref 38–73)
NRBC BLD-RTO: 0 /100 WBC
PLATELET # BLD AUTO: 266 K/UL (ref 150–450)
PLATELET BLD QL SMEAR: ABNORMAL
PMV BLD AUTO: 10.2 FL (ref 9.2–12.9)
POLYCHROMASIA BLD QL SMEAR: ABNORMAL
POTASSIUM SERPL-SCNC: 3.3 MMOL/L (ref 3.5–5.1)
PROT SERPL-MCNC: 5.4 G/DL (ref 6–8.4)
RBC # BLD AUTO: 3.18 M/UL (ref 4–5.4)
SODIUM SERPL-SCNC: 136 MMOL/L (ref 136–145)
WBC # BLD AUTO: 21.09 K/UL (ref 3.9–12.7)

## 2022-06-04 PROCEDURE — 96361 HYDRATE IV INFUSION ADD-ON: CPT

## 2022-06-04 PROCEDURE — 25000003 PHARM REV CODE 250: Performed by: INTERNAL MEDICINE

## 2022-06-04 PROCEDURE — 36415 COLL VENOUS BLD VENIPUNCTURE: CPT | Performed by: INTERNAL MEDICINE

## 2022-06-04 PROCEDURE — 99214 PR OFFICE/OUTPT VISIT, EST, LEVL IV, 30-39 MIN: ICD-10-PCS | Mod: ,,, | Performed by: INTERNAL MEDICINE

## 2022-06-04 PROCEDURE — 96365 THER/PROPH/DIAG IV INF INIT: CPT

## 2022-06-04 PROCEDURE — 80053 COMPREHEN METABOLIC PANEL: CPT | Performed by: INTERNAL MEDICINE

## 2022-06-04 PROCEDURE — 85025 COMPLETE CBC W/AUTO DIFF WBC: CPT | Performed by: INTERNAL MEDICINE

## 2022-06-04 PROCEDURE — 96372 THER/PROPH/DIAG INJ SC/IM: CPT | Performed by: INTERNAL MEDICINE

## 2022-06-04 PROCEDURE — G0378 HOSPITAL OBSERVATION PER HR: HCPCS

## 2022-06-04 PROCEDURE — A4216 STERILE WATER/SALINE, 10 ML: HCPCS | Performed by: INTERNAL MEDICINE

## 2022-06-04 PROCEDURE — 96366 THER/PROPH/DIAG IV INF ADDON: CPT

## 2022-06-04 PROCEDURE — 63600175 PHARM REV CODE 636 W HCPCS: Performed by: INTERNAL MEDICINE

## 2022-06-04 PROCEDURE — 83690 ASSAY OF LIPASE: CPT | Performed by: INTERNAL MEDICINE

## 2022-06-04 PROCEDURE — 99214 OFFICE O/P EST MOD 30 MIN: CPT | Mod: ,,, | Performed by: INTERNAL MEDICINE

## 2022-06-04 RX ADMIN — PIPERACILLIN SODIUM AND TAZOBACTAM SODIUM 4.5 G: 4; .5 INJECTION, POWDER, LYOPHILIZED, FOR SOLUTION INTRAVENOUS at 10:06

## 2022-06-04 RX ADMIN — ENOXAPARIN SODIUM 40 MG: 100 INJECTION SUBCUTANEOUS at 06:06

## 2022-06-04 RX ADMIN — Medication 10 ML: at 02:06

## 2022-06-04 RX ADMIN — FOLIC ACID 1 MG: 1 TABLET ORAL at 10:06

## 2022-06-04 RX ADMIN — POTASSIUM BICARBONATE 35 MEQ: 391 TABLET, EFFERVESCENT ORAL at 10:06

## 2022-06-04 RX ADMIN — TRAZODONE HYDROCHLORIDE 50 MG: 50 TABLET ORAL at 09:06

## 2022-06-04 RX ADMIN — Medication 10 ML: at 06:06

## 2022-06-04 RX ADMIN — LISINOPRIL 40 MG: 40 TABLET ORAL at 10:06

## 2022-06-04 RX ADMIN — PIPERACILLIN SODIUM AND TAZOBACTAM SODIUM 4.5 G: 4; .5 INJECTION, POWDER, LYOPHILIZED, FOR SOLUTION INTRAVENOUS at 06:06

## 2022-06-04 RX ADMIN — HYDROCODONE BITARTRATE AND ACETAMINOPHEN 1 TABLET: 5; 325 TABLET ORAL at 10:06

## 2022-06-04 RX ADMIN — PIPERACILLIN SODIUM AND TAZOBACTAM SODIUM 4.5 G: 4; .5 INJECTION, POWDER, LYOPHILIZED, FOR SOLUTION INTRAVENOUS at 02:06

## 2022-06-04 RX ADMIN — PANTOPRAZOLE SODIUM 40 MG: 40 TABLET, DELAYED RELEASE ORAL at 10:06

## 2022-06-04 RX ADMIN — POTASSIUM BICARBONATE 35 MEQ: 391 TABLET, EFFERVESCENT ORAL at 06:06

## 2022-06-04 RX ADMIN — SERTRALINE HYDROCHLORIDE 25 MG: 25 TABLET ORAL at 10:06

## 2022-06-04 RX ADMIN — SODIUM CHLORIDE, SODIUM LACTATE, POTASSIUM CHLORIDE, AND CALCIUM CHLORIDE: .6; .31; .03; .02 INJECTION, SOLUTION INTRAVENOUS at 12:06

## 2022-06-04 NOTE — PT/OT/SLP PROGRESS
Occupational Therapy      Patient Name:  Claire Bowser   MRN:  [id number]    Patient not seen today secondary to Other (Comment), Patient unwilling to participate (Pt reports nausea in AM and Diarrhea in PM.). Will follow-up.    6/4/2022

## 2022-06-04 NOTE — PROGRESS NOTES
[facility] Hospital Medicine  Progress Note    Patient Name: Claire Bowser  MRN: [id number]  Patient Class: OP- Observation   Admission Date: 6/2/2022  Length of Stay: 0 days  Attending Physician: Jose Jimenez MD  Primary Care Provider: Samuel Brady MD        Subjective:     Principal Problem:Acute pancreatitis        HPI:  Patient is a 62-year-old  female with past medical history significant for hypertension, hyperlipidemia, anxiety disorder and rheumatoid arthritis (on methotrexate therapy) and recent history of COVID-19 infection (May 13, 2022; received Paxlovid for 3 days) is being admitted to Hospital Medicine under observation status from [facility] Emergency Room with ongoing epigastric abdominal pain for 1 day.  Abdominal pain is moderate to severe in intensity, nonradiating, without any aggravating or relieving factors.  Patient reports markedly reduced oral intake and feels dehydrated.  Denies any recent fevers, chills, prior history of acute pancreatitis, melena, bleeding per rectum or hematemesis.  No recent use of nonsteroidal anti-inflammatory medications reported.  For COVID treatment patient has received and completed 10 days of oral doxycycline and 9 days of cefuroxime antibiotic therapy.  No recent fevers or chills reported.  Patient had 3 loose bowel movements today.  No mucus per rectum reported.  No urinary difficulties present.  Patient denies any alcohol consumption.              Overview/Hospital Course:  No notes on file    Interval History:  Patient is continuing to report severe epigastric abdominal pain associated with nausea.  No hematemesis, melena or bleeding per rectum reported.  Patient denied any chest pain or shortness of breath.  Patient's  is present at bedside.    Review of Systems   Constitutional:  Positive for fatigue.   Gastrointestinal:  Positive for abdominal pain, diarrhea and nausea.   Neurological:  Positive  for weakness.   All other systems reviewed and are negative.  Objective:     Vital Signs (Most Recent):  Temp: 97.9 °F (36.6 °C) (06/04/22 0850)  Pulse: 98 (06/04/22 0850)  Resp: 17 (06/04/22 0850)  BP: 136/81 (06/04/22 0850)  SpO2: (!) 91 % (06/04/22 0850)   Vital Signs (24h Range):  Temp:  [97.7 °F (36.5 °C)-98.8 °F (37.1 °C)] 97.9 °F (36.6 °C)  Pulse:  [] 98  Resp:  [16-19] 17  SpO2:  [91 %-94 %] 91 %  BP: (106-168)/(55-81) 136/81     Weight: 52.4 kg (115 lb 8.3 oz)  Body mass index is 20.46 kg/m².    Intake/Output Summary (Last 24 hours) at 6/4/2022 0931  Last data filed at 6/4/2022 0719  Gross per 24 hour   Intake 2381.92 ml   Output --   Net 2381.92 ml        Physical Exam  Constitutional:       Appearance: She is well-developed.   HENT:      Head: Normocephalic and atraumatic.      Mouth/Throat:      Mouth: Mucous membranes are dry.   Eyes:      Conjunctiva/sclera: Conjunctivae normal.      Pupils: Pupils are equal, round, and reactive to light.   Neck:      Thyroid: No thyromegaly.      Vascular: No JVD.   Cardiovascular:      Rate and Rhythm: Normal rate and regular rhythm.      Heart sounds: No murmur heard.    No friction rub. No gallop.   Pulmonary:      Effort: Pulmonary effort is normal.      Breath sounds: Normal breath sounds.   Abdominal:      General: Bowel sounds are normal. There is no distension.      Palpations: Abdomen is soft. There is no mass.      Tenderness: There is no abdominal tenderness.      Comments: Epigastric tenderness to deep palpation without peritoneal signs guarding or rigidity.  No hepatosplenomegaly present.   Musculoskeletal:         General: Normal range of motion.      Cervical back: Neck supple.   Skin:     General: Skin is warm and dry.   Neurological:      Mental Status: She is oriented to person, place, and time.      Cranial Nerves: No cranial nerve deficit.   Psychiatric:         Behavior: Behavior normal.       Significant Labs: All pertinent labs within the  past 24 hours have been reviewed.  CBC:   Recent Labs   Lab 06/03/22 0421 06/04/22 0426   WBC 23.06* 21.09*   HGB 12.8 10.6*   HCT 37.0 30.6*    266       CMP:   Recent Labs   Lab 06/03/22 0421 06/04/22 0426    136   K 3.5 3.3*    101   CO2 26 28   GLU 87 74   BUN 15 15   CREATININE 1.3 1.2   CALCIUM 10.8* 9.9   PROT 6.3 5.4*   ALBUMIN 2.2* 1.9*   BILITOT 0.5 0.7   ALKPHOS 102 97   AST 20 21   ALT 11 11   ANIONGAP 9 7*   EGFRNONAA 44* 49*       Lactic Acid: No results for input(s): LACTATE in the last 48 hours.  Lipase:   Recent Labs   Lab 06/03/22 0421   LIPASE 756*       Microbiology Results (last 7 days)       ** No results found for the last 168 hours. **          Significant Imaging:   CT abdomen and pelvis without contrast:  1. Findings suspicious for acute pancreatitis.  Assessment somewhat limited in the absence of IV contrast.  2. Pulmonary emphysema with minimal scattered ground-glass disease which could reflect edema, fibrosis, pneumonitis.  3. Questionable gallbladder sludge.      Assessment/Plan:      * Acute pancreatitis  Clear liquid diet.  Supportive care with IV fluid hydration, use of intravenous antiemetics and narcotics as needed.  Trend lipase level.  CT abdomen and pelvis results reviewed with patient and her .  At intravenous Zosyn antibiotic therapy.  Follow GI recommendations.  Consulting general surgeon for opinion regarding persisting epigastric pain, gallbladder sludge.    ACP (advance care planning)  Advance Care Planning     Date: 06/02/2022    Living Will  During this visit, I engaged the patient  in the advance care planning process.  The patient and I reviewed the role for advance directives and their purpose in directing future healthcare if the patient's unable to speak for him/herself.  At this point in time, the patient does have full decision-making capacity.  We discussed different extreme health states that she could experience, and reviewed what  kind of medical care she would want in those situations.  The patient communicated that if she were comatose and had little chance of a meaningful recovery, she would want machines/life-sustaining treatments used. I spent a total of 16 minutes engaging the patient in this advance care planning discussion.                Hypercalcemia  Likely secondary to dehydration and concomitant use of activated vitamin D3.  Continue IV fluid hydration.  Hold vitamin-D supplementation.  Monitor serum calcium daily.      Dehydration  Continue IV fluid hydration.      Epigastric pain  Likely secondary to acute pancreatitis.  See management of acute pancreatitis.      Hypokalemia  Replete potassium chloride.  Follow BMP.      Rheumatoid arthritis involving multiple sites with positive rheumatoid factor  On weekly methotrexate.      Mixed hyperlipidemia  Chronic problem. Will continue chronic medications and monitor for any changes, adjusting as needed.          Tobacco use  Smoking cessation counseling performed. Dangers of cigarette smoking were reviewed with patient in detail and patient was encouraged to quit. Nicotine replacement options were discussed for > 3 minutes.        Hypertension  Chronic problem. Will continue chronic medications and monitor for any changes, adjusting as needed.          Anxiety  Chronic problem. Will continue chronic medications and monitor for any changes, adjusting as needed.          Encouraged patient to get out of bed, increase ambulation.  VTE Risk Mitigation (From admission, onward)         Ordered     enoxaparin injection 40 mg  Daily         06/02/22 1459     IP VTE HIGH RISK PATIENT  Once         06/02/22 1459     Place sequential compression device  Until discontinued         06/02/22 1459                Discharge Planning   RASHI:  June 5, 2022    Code Status: Full Code   Is the patient medically ready for discharge?:     Reason for patient still in hospital (select all that apply):  Patient trending condition and Consult recommendations  Discharge Plan A: Home                  Jose Jimenez MD  Department of Hospital Medicine   [facility]

## 2022-06-04 NOTE — PROGRESS NOTES
[facility] Note    CC: Abdominal pain    HPI 62 y.o. female with past medical history significant for hypertension, hyperlipidemia, anxiety disorder and rheumatoid arthritis (on methotrexate therapy) and recent history of COVID-19 infection (May 13, 2022; received Paxlovid for 3 days) is being admitted to Hospital Medicine under observation status from [facility] Emergency Room with ongoing epigastric abdominal pain for 1 day.  Abdominal pain is moderate to severe in intensity, nonradiating, without any aggravating or relieving factors.  Patient reports markedly reduced oral intake and feels dehydrated.  Denies any recent fevers, chills, prior history of acute pancreatitis, melena, bleeding per rectum or hematemesis.  No recent use of nonsteroidal anti-inflammatory medications reported.  For COVID treatment patient has received and completed 10 days of oral doxycycline and 9 days of cefuroxime antibiotic therapy.  No recent fevers or chills reported.  Patient had 3 loose bowel movements today.  No mucus per rectum reported.  No urinary difficulties present.  Patient denies any alcohol consumption.     Overview/Hospital Course:  No notes on file     Interval History:  Patient is continuing to report severe epigastric abdominal pain associated with nausea.  Worsening leukocytosis noted.  No hematemesis, melena or bleeding per rectum reported.  Patient denied any chest pain or shortness of breath.     FURTHER HISTORY:  Above obtained from independent review of records from admitting provider as well as from direct discussion with nursing who states patient appears comfortable at present.  In addition, on my interview, I note the following:  Patient complains of abdominal pain, recent onset, moderate to severe, associated with nausea and nonbloody emesis, with no alleviating/exacerbating factors.  No EtOH use.  No abdominal surgeries.  No new medications.  Sludge noted in GB on  "imaging.  Lipase elevated and patient has leukocytosis but LFTs WNL.  No other acute GI issues.    INTERVAL HISTORY:  Patient tolerating clears.  Pain improved.  Leukocytosis improved.  No emesis.  No change in bowel habits.      Past Medical History:   Diagnosis Date    Anxiety     Flu 02/2017    [facility]    Hypertension     Rheumatoid arthritis involving multiple sites with positive rheumatoid factor 1/14/2021         Review of Systems  General ROS: negative for - chills, fever or weight loss  Cardiovascular ROS: no chest pain or dyspnea on exertion  Gastrointestinal ROS: symptoms improved    Physical Examination  BP (!) 116/58   Pulse 97   Temp 98.3 °F (36.8 °C)   Resp 17   Ht 5' 3" (1.6 m)   Wt 52.4 kg (115 lb 8.3 oz)   SpO2 (!) 92%   BMI 20.46 kg/m²   General appearance: alert, cooperative, no distress  HENT: Normocephalic, atraumatic, neck symmetrical, no nasal discharge, sclera anicteric   Lungs: clear to auscultation bilaterally, symmetric chest wall expansion bilaterally  Heart: regular rate and rhythm without rub; no displacement of the PMI   Abdomen: soft   Extremities: extremities symmetric; no clubbing, cyanosis, or edema  Neurologic: Alert and oriented X 3, no sensory or motor neurologic deficits      Labs:  Lab Results   Component Value Date    WBC 21.09 (H) 06/04/2022    HGB 10.6 (L) 06/04/2022    HCT 30.6 (L) 06/04/2022    MCV 96 06/04/2022     06/04/2022         CMP  Sodium   Date Value Ref Range Status   06/04/2022 136 136 - 145 mmol/L Final     Potassium   Date Value Ref Range Status   06/04/2022 3.3 (L) 3.5 - 5.1 mmol/L Final     Chloride   Date Value Ref Range Status   06/04/2022 101 95 - 110 mmol/L Final     CO2   Date Value Ref Range Status   06/04/2022 28 23 - 29 mmol/L Final     Glucose   Date Value Ref Range Status   06/04/2022 74 70 - 110 mg/dL Final     BUN   Date Value Ref Range Status   06/04/2022 15 8 - 23 mg/dL Final     Creatinine   Date Value Ref Range " Status   06/04/2022 1.2 0.5 - 1.4 mg/dL Final     Calcium   Date Value Ref Range Status   06/04/2022 9.9 8.7 - 10.5 mg/dL Final     Total Protein   Date Value Ref Range Status   06/04/2022 5.4 (L) 6.0 - 8.4 g/dL Final     Albumin   Date Value Ref Range Status   06/04/2022 1.9 (L) 3.5 - 5.2 g/dL Final     Total Bilirubin   Date Value Ref Range Status   06/04/2022 0.7 0.1 - 1.0 mg/dL Final     Comment:     For infants and newborns, interpretation of results should be based  on gestational age, weight and in agreement with clinical  observations.    Premature Infant recommended reference ranges:  Up to 24 hours.............<8.0 mg/dL  Up to 48 hours............<12.0 mg/dL  3-5 days..................<15.0 mg/dL  6-29 days.................<15.0 mg/dL       Alkaline Phosphatase   Date Value Ref Range Status   06/04/2022 97 55 - 135 U/L Final     AST   Date Value Ref Range Status   06/04/2022 21 10 - 40 U/L Final     ALT   Date Value Ref Range Status   06/04/2022 11 10 - 44 U/L Final     Anion Gap   Date Value Ref Range Status   06/04/2022 7 (L) 8 - 16 mmol/L Final     eGFR if    Date Value Ref Range Status   06/04/2022 56 (A) >60 mL/min/1.73 m^2 Final     eGFR if non    Date Value Ref Range Status   06/04/2022 49 (A) >60 mL/min/1.73 m^2 Final     Comment:     Calculation used to obtain the estimated glomerular filtration  rate (eGFR) is the CKD-EPI equation.            Assessment:   1.  Abdominal pain  2.  Acute pancreatitis  3.  GB sludge without overt evidence of cholecystitis/choledocholithiasis  4.  Leukocytosis     Plan:  1.  Agree with antibiotic coverage given leukocytosis  2.  IVFs for support to keep UOP greater than 0.5 cc/kg/hr  3.  Symptomatic management for pain and nausea  4.  PO intake as tolerated.  Advance diet  5.  Consider surgical consultation for cholecystectomy  6.  Consider outpatient EUS for pancreatitis at some point.  7.  Will follow  8.  Further recommendations to  follow after above.      Antwan Barron MD  [facility]  [address], [address]  [place] [address]  Office: [phone]  Fax: [phone]

## 2022-06-04 NOTE — PT/OT/SLP PROGRESS
Physical Therapy      Patient Name:  Claire Bowser   MRN:  [id number]    Patient not seen today secondary to Patient unwilling to participate, Nausea/vomiting. Will follow-up.

## 2022-06-04 NOTE — PLAN OF CARE
AAOX4 VSs Afebrile Pain managed with PRN medications this shift Tolerating clear liquid diet well telemetry monitored this shift no acute distress noted at this time.

## 2022-06-04 NOTE — SUBJECTIVE & OBJECTIVE
Interval History:  Patient is continuing to report severe epigastric abdominal pain associated with nausea.  No hematemesis, melena or bleeding per rectum reported.  Patient denied any chest pain or shortness of breath.  Patient's  is present at bedside.    Review of Systems   Constitutional:  Positive for fatigue.   Gastrointestinal:  Positive for abdominal pain, diarrhea and nausea.   Neurological:  Positive for weakness.   All other systems reviewed and are negative.  Objective:     Vital Signs (Most Recent):  Temp: 97.9 °F (36.6 °C) (06/04/22 0850)  Pulse: 98 (06/04/22 0850)  Resp: 17 (06/04/22 0850)  BP: 136/81 (06/04/22 0850)  SpO2: (!) 91 % (06/04/22 0850)   Vital Signs (24h Range):  Temp:  [97.7 °F (36.5 °C)-98.8 °F (37.1 °C)] 97.9 °F (36.6 °C)  Pulse:  [] 98  Resp:  [16-19] 17  SpO2:  [91 %-94 %] 91 %  BP: (106-168)/(55-81) 136/81     Weight: 52.4 kg (115 lb 8.3 oz)  Body mass index is 20.46 kg/m².    Intake/Output Summary (Last 24 hours) at 6/4/2022 0931  Last data filed at 6/4/2022 0719  Gross per 24 hour   Intake 2381.92 ml   Output --   Net 2381.92 ml        Physical Exam  Constitutional:       Appearance: She is well-developed.   HENT:      Head: Normocephalic and atraumatic.      Mouth/Throat:      Mouth: Mucous membranes are dry.   Eyes:      Conjunctiva/sclera: Conjunctivae normal.      Pupils: Pupils are equal, round, and reactive to light.   Neck:      Thyroid: No thyromegaly.      Vascular: No JVD.   Cardiovascular:      Rate and Rhythm: Normal rate and regular rhythm.      Heart sounds: No murmur heard.    No friction rub. No gallop.   Pulmonary:      Effort: Pulmonary effort is normal.      Breath sounds: Normal breath sounds.   Abdominal:      General: Bowel sounds are normal. There is no distension.      Palpations: Abdomen is soft. There is no mass.      Tenderness: There is no abdominal tenderness.      Comments: Epigastric tenderness to deep palpation without peritoneal signs  guarding or rigidity.  No hepatosplenomegaly present.   Musculoskeletal:         General: Normal range of motion.      Cervical back: Neck supple.   Skin:     General: Skin is warm and dry.   Neurological:      Mental Status: She is oriented to person, place, and time.      Cranial Nerves: No cranial nerve deficit.   Psychiatric:         Behavior: Behavior normal.       Significant Labs: All pertinent labs within the past 24 hours have been reviewed.  CBC:   Recent Labs   Lab 06/03/22 0421 06/04/22 0426   WBC 23.06* 21.09*   HGB 12.8 10.6*   HCT 37.0 30.6*    266       CMP:   Recent Labs   Lab 06/03/22 0421 06/04/22 0426    136   K 3.5 3.3*    101   CO2 26 28   GLU 87 74   BUN 15 15   CREATININE 1.3 1.2   CALCIUM 10.8* 9.9   PROT 6.3 5.4*   ALBUMIN 2.2* 1.9*   BILITOT 0.5 0.7   ALKPHOS 102 97   AST 20 21   ALT 11 11   ANIONGAP 9 7*   EGFRNONAA 44* 49*       Lactic Acid: No results for input(s): LACTATE in the last 48 hours.  Lipase:   Recent Labs   Lab 06/03/22 0421   LIPASE 756*       Microbiology Results (last 7 days)       ** No results found for the last 168 hours. **          Significant Imaging:   CT abdomen and pelvis without contrast:  1. Findings suspicious for acute pancreatitis.  Assessment somewhat limited in the absence of IV contrast.  2. Pulmonary emphysema with minimal scattered ground-glass disease which could reflect edema, fibrosis, pneumonitis.  3. Questionable gallbladder sludge.

## 2022-06-04 NOTE — ASSESSMENT & PLAN NOTE
Clear liquid diet.  Supportive care with IV fluid hydration, use of intravenous antiemetics and narcotics as needed.  Trend lipase level.  CT abdomen and pelvis results reviewed with patient and her .  At intravenous Zosyn antibiotic therapy.  Follow GI recommendations.  Consulting general surgeon for opinion regarding persisting epigastric pain, gallbladder sludge.

## 2022-06-04 NOTE — PLAN OF CARE
Plan of care reviewed with pt. Pt verbalized understanding. Patient is alert and oriented x 4, able to make needs known. Continuous cardiac monitoring, TELE #[id number], NSR. A-febrile throughout the shift. ABX administered as scheduled. Meds given per MAR. IVF infusing as ordered. Pt on room air. Pain managed with PRN pain medication. Purposeful hourly/q2hr rounding done during shift to promote patient safety. Safety maintained with side rails up x3, bed wheels locked, bed in lowest positioned, call light in reach. Patient educated to call for assistance with ambulation if needed, verbalized understanding. Pt remains free of falls. Will continue to monitor.

## 2022-06-05 PROBLEM — E43 SEVERE MALNUTRITION: Status: ACTIVE | Noted: 2022-06-05

## 2022-06-05 LAB
ALBUMIN SERPL BCP-MCNC: 1.7 G/DL (ref 3.5–5.2)
ALP SERPL-CCNC: 110 U/L (ref 55–135)
ALT SERPL W/O P-5'-P-CCNC: 13 U/L (ref 10–44)
ANION GAP SERPL CALC-SCNC: 7 MMOL/L (ref 8–16)
ANISOCYTOSIS BLD QL SMEAR: SLIGHT
AST SERPL-CCNC: 27 U/L (ref 10–40)
BASOPHILS # BLD AUTO: 0.1 K/UL (ref 0–0.2)
BASOPHILS NFR BLD: 0.6 % (ref 0–1.9)
BILIRUB SERPL-MCNC: 0.8 MG/DL (ref 0.1–1)
BUN SERPL-MCNC: 9 MG/DL (ref 8–23)
CALCIUM SERPL-MCNC: 9.4 MG/DL (ref 8.7–10.5)
CHLORIDE SERPL-SCNC: 103 MMOL/L (ref 95–110)
CO2 SERPL-SCNC: 26 MMOL/L (ref 23–29)
CREAT SERPL-MCNC: 1 MG/DL (ref 0.5–1.4)
DIFFERENTIAL METHOD: ABNORMAL
EOSINOPHIL # BLD AUTO: 0.3 K/UL (ref 0–0.5)
EOSINOPHIL NFR BLD: 1.5 % (ref 0–8)
ERYTHROCYTE [DISTWIDTH] IN BLOOD BY AUTOMATED COUNT: 14.6 % (ref 11.5–14.5)
EST. GFR  (AFRICAN AMERICAN): >60 ML/MIN/1.73 M^2
EST. GFR  (NON AFRICAN AMERICAN): >60 ML/MIN/1.73 M^2
GLUCOSE SERPL-MCNC: 69 MG/DL (ref 70–110)
HCT VFR BLD AUTO: 26.3 % (ref 37–48.5)
HGB BLD-MCNC: 9.2 G/DL (ref 12–16)
HYPOCHROMIA BLD QL SMEAR: ABNORMAL
IMM GRANULOCYTES # BLD AUTO: 0.17 K/UL (ref 0–0.04)
IMM GRANULOCYTES NFR BLD AUTO: 1 % (ref 0–0.5)
LIPASE SERPL-CCNC: 106 U/L (ref 4–60)
LYMPHOCYTES # BLD AUTO: 2.7 K/UL (ref 1–4.8)
LYMPHOCYTES NFR BLD: 15.5 % (ref 18–48)
MCH RBC QN AUTO: 33.5 PG (ref 27–31)
MCHC RBC AUTO-ENTMCNC: 35 G/DL (ref 32–36)
MCV RBC AUTO: 96 FL (ref 82–98)
MONOCYTES # BLD AUTO: 2 K/UL (ref 0.3–1)
MONOCYTES NFR BLD: 11.7 % (ref 4–15)
NEUTROPHILS # BLD AUTO: 12 K/UL (ref 1.8–7.7)
NEUTROPHILS NFR BLD: 69.7 % (ref 38–73)
NRBC BLD-RTO: 0 /100 WBC
OVALOCYTES BLD QL SMEAR: ABNORMAL
PLATELET # BLD AUTO: 226 K/UL (ref 150–450)
PLATELET BLD QL SMEAR: ABNORMAL
PMV BLD AUTO: 11 FL (ref 9.2–12.9)
POIKILOCYTOSIS BLD QL SMEAR: SLIGHT
POLYCHROMASIA BLD QL SMEAR: ABNORMAL
POTASSIUM SERPL-SCNC: 3.4 MMOL/L (ref 3.5–5.1)
PROT SERPL-MCNC: 5.2 G/DL (ref 6–8.4)
RBC # BLD AUTO: 2.75 M/UL (ref 4–5.4)
SODIUM SERPL-SCNC: 136 MMOL/L (ref 136–145)
WBC # BLD AUTO: 17.15 K/UL (ref 3.9–12.7)

## 2022-06-05 PROCEDURE — G0378 HOSPITAL OBSERVATION PER HR: HCPCS

## 2022-06-05 PROCEDURE — 99214 PR OFFICE/OUTPT VISIT, EST, LEVL IV, 30-39 MIN: ICD-10-PCS | Mod: ,,, | Performed by: INTERNAL MEDICINE

## 2022-06-05 PROCEDURE — 96376 TX/PRO/DX INJ SAME DRUG ADON: CPT

## 2022-06-05 PROCEDURE — 85025 COMPLETE CBC W/AUTO DIFF WBC: CPT | Performed by: INTERNAL MEDICINE

## 2022-06-05 PROCEDURE — 25000003 PHARM REV CODE 250: Performed by: INTERNAL MEDICINE

## 2022-06-05 PROCEDURE — 96361 HYDRATE IV INFUSION ADD-ON: CPT

## 2022-06-05 PROCEDURE — 63600175 PHARM REV CODE 636 W HCPCS: Performed by: INTERNAL MEDICINE

## 2022-06-05 PROCEDURE — 96366 THER/PROPH/DIAG IV INF ADDON: CPT

## 2022-06-05 PROCEDURE — 80053 COMPREHEN METABOLIC PANEL: CPT | Performed by: INTERNAL MEDICINE

## 2022-06-05 PROCEDURE — 96372 THER/PROPH/DIAG INJ SC/IM: CPT | Performed by: INTERNAL MEDICINE

## 2022-06-05 PROCEDURE — 97116 GAIT TRAINING THERAPY: CPT

## 2022-06-05 PROCEDURE — 99214 OFFICE O/P EST MOD 30 MIN: CPT | Mod: ,,, | Performed by: INTERNAL MEDICINE

## 2022-06-05 PROCEDURE — 83690 ASSAY OF LIPASE: CPT | Performed by: INTERNAL MEDICINE

## 2022-06-05 PROCEDURE — 36415 COLL VENOUS BLD VENIPUNCTURE: CPT | Performed by: INTERNAL MEDICINE

## 2022-06-05 PROCEDURE — A4216 STERILE WATER/SALINE, 10 ML: HCPCS | Performed by: INTERNAL MEDICINE

## 2022-06-05 RX ORDER — CIPROFLOXACIN 500 MG/1
500 TABLET ORAL EVERY 12 HOURS
Qty: 10 TABLET | Refills: 0 | Status: SHIPPED | OUTPATIENT
Start: 2022-06-05 | End: 2022-06-14

## 2022-06-05 RX ADMIN — LISINOPRIL 40 MG: 40 TABLET ORAL at 08:06

## 2022-06-05 RX ADMIN — POTASSIUM BICARBONATE 35 MEQ: 391 TABLET, EFFERVESCENT ORAL at 09:06

## 2022-06-05 RX ADMIN — PIPERACILLIN SODIUM AND TAZOBACTAM SODIUM 4.5 G: 4; .5 INJECTION, POWDER, LYOPHILIZED, FOR SOLUTION INTRAVENOUS at 09:06

## 2022-06-05 RX ADMIN — Medication 10 ML: at 09:06

## 2022-06-05 RX ADMIN — FOLIC ACID 1 MG: 1 TABLET ORAL at 08:06

## 2022-06-05 RX ADMIN — ENOXAPARIN SODIUM 40 MG: 100 INJECTION SUBCUTANEOUS at 05:06

## 2022-06-05 RX ADMIN — POTASSIUM BICARBONATE 35 MEQ: 391 TABLET, EFFERVESCENT ORAL at 06:06

## 2022-06-05 RX ADMIN — PANTOPRAZOLE SODIUM 40 MG: 40 TABLET, DELAYED RELEASE ORAL at 08:06

## 2022-06-05 RX ADMIN — ONDANSETRON 4 MG: 2 INJECTION INTRAMUSCULAR; INTRAVENOUS at 09:06

## 2022-06-05 RX ADMIN — TRAZODONE HYDROCHLORIDE 50 MG: 50 TABLET ORAL at 09:06

## 2022-06-05 RX ADMIN — PIPERACILLIN SODIUM AND TAZOBACTAM SODIUM 4.5 G: 4; .5 INJECTION, POWDER, LYOPHILIZED, FOR SOLUTION INTRAVENOUS at 01:06

## 2022-06-05 RX ADMIN — SERTRALINE HYDROCHLORIDE 25 MG: 25 TABLET ORAL at 08:06

## 2022-06-05 RX ADMIN — PIPERACILLIN SODIUM AND TAZOBACTAM SODIUM 4.5 G: 4; .5 INJECTION, POWDER, LYOPHILIZED, FOR SOLUTION INTRAVENOUS at 04:06

## 2022-06-05 RX ADMIN — SODIUM CHLORIDE, SODIUM LACTATE, POTASSIUM CHLORIDE, AND CALCIUM CHLORIDE: .6; .31; .03; .02 INJECTION, SOLUTION INTRAVENOUS at 09:06

## 2022-06-05 NOTE — CONSULTS
[facility]  General Surgery  Consult Note    Inpatient consult to General Surgery  Consult performed by: Ramón Hwang III, MD  Consult ordered by: Jose Jimenez MD  Reason for consult: Pancreatitis        Subjective:     Chief Complaint/Reason for Admission:  Pancreatitis    History of Present Illness:  Patient is 62-year-old female admitted to the hospital with acute pancreatitis.  She presented with sudden onset of severe epigastric and mid abdominal pain radiating to her back.  Associated with nausea and vomiting.  CT scan showed evidence of acute pancreatitis, sludge in the gallbladder without evidence of acute cholecystitis.  She is feeling better today.  She states pain is still present but much improved.  Nausea is much better.  She is tolerating clear liquids.  Lipase on admission was 7 50. It is 345 today.  Her LFTs are within normal limits.  Patient states she does not drink alcohol.  She was recently diagnosed as COVID positive in mid May.  She received Paxlovid for 3 days during that time.  Patient states that the medication upset her stomach quite a bit.  She stopped the medication at 3 days.  Pancreatitis occurred about 10 days later.  She has no previous abdominal surgical history.  She has past medical history of rheumatoid arthritis.  She has long smoking history and has evidence of emphysema on imaging.      No current facility-administered medications on file prior to encounter.     Current Outpatient Medications on File Prior to Encounter   Medication Sig    cholecalciferol, vitamin D3, (VITAMIN D3) 10 mcg (400 unit) Tab Take 400 Units by mouth once daily.    cyclobenzaprine (FLEXERIL) 5 MG tablet Take 1 tablet (5 mg total) by mouth 3 (three) times daily as needed for Muscle spasms.    folic acid (FOLVITE) 1 MG tablet Take 1 tablet (1 mg total) by mouth once daily.    hydroCHLOROthiazide (HYDRODIURIL) 25 MG tablet Take 1 tablet (25 mg total) by mouth once daily.     lisinopriL (PRINIVIL,ZESTRIL) 40 MG tablet Take 1 tablet (40 mg total) by mouth once daily.    methotrexate 2.5 MG Tab TAKE 10 TABLETS BY MOUTH EVERY 7 DAYS SPLIT DOSING. TAKE 5 TABS IN THE AM AND 5 TABS IN THE PM EVERY 7 DAYS THE SAME DAY    metoprolol succinate (TOPROL-XL) 50 MG 24 hr tablet Take 1 tablet by mouth once daily    pantoprazole (PROTONIX) 40 MG tablet Take 1 tablet (40 mg total) by mouth once daily.    sertraline (ZOLOFT) 25 MG tablet Take 1 tablet by mouth once daily    traZODone (DESYREL) 50 MG tablet Take 1 tablet by mouth in the evening       Review of patient's allergies indicates:   Allergen Reactions    No known drug allergies        Past Medical History:   Diagnosis Date    Anxiety     Flu 02/2017    [facility]    Hypertension     Rheumatoid arthritis involving multiple sites with positive rheumatoid factor 1/14/2021     Past Surgical History:   Procedure Laterality Date    COLONOSCOPY N/A 2/26/2021    Procedure: COLONOSCOPY;  Surgeon: Amy Sidhu MD;  Location: [facility];  Service: Endoscopy;  Laterality: N/A;    ESOPHAGOGASTRODUODENOSCOPY N/A 2/26/2021    Procedure: EGD (ESOPHAGOGASTRODUODENOSCOPY);  Surgeon: Amy Sidhu MD;  Location: [facility] ENDO;  Service: Endoscopy;  Laterality: N/A;    TUBAL LIGATION       Family History     Problem Relation (Age of Onset)    Alcohol abuse Paternal Grandfather, Paternal Grandmother    COPD Father, Brother    Cancer Maternal Aunt    Cirrhosis Paternal Grandmother    Diabetes Mother, Maternal Aunt    Emphysema Brother    Hearing loss Mother    Heart disease Mother, Maternal Aunt, Maternal Uncle    Hypertension Mother, Maternal Uncle    Kidney disease Mother    Liver disease Paternal Grandfather, Sister    No Known Problems Son, Paternal Uncle    Sleep apnea Brother    Stroke Mother        Tobacco Use    Smoking status: Current Every Day Smoker     Packs/day: 1.00     Years: 7.00     Pack years: 7.00     Types: Cigarettes     Smokeless tobacco: Never Used    Tobacco comment: [phone]   Substance and Sexual Activity    Alcohol use: No    Drug use: Never    Sexual activity: Yes     Partners: Male     Review of Systems   Constitutional: Negative for appetite change, chills, fever and unexpected weight change.   HENT: Negative for hearing loss, rhinorrhea, sore throat and voice change.    Eyes: Negative for photophobia and visual disturbance.   Respiratory: Negative for cough, choking and shortness of breath.    Cardiovascular: Negative for chest pain, palpitations and leg swelling.   Gastrointestinal: Positive for abdominal pain. Negative for blood in stool, constipation, diarrhea, nausea and vomiting.   Endocrine: Negative for cold intolerance, heat intolerance, polydipsia and polyuria.   Musculoskeletal: Negative for arthralgias, back pain, joint swelling and neck stiffness.   Skin: Negative for color change, pallor and rash.   Neurological: Negative for dizziness, seizures, syncope and headaches.   Hematological: Negative for adenopathy. Does not bruise/bleed easily.   Psychiatric/Behavioral: Negative for agitation, behavioral problems and confusion.     Objective:     Vital Signs (Most Recent):  Temp: 98.1 °F (36.7 °C) (06/04/22 1945)  Pulse: 103 (06/04/22 1945)  Resp: 18 (06/04/22 1945)  BP: 129/61 (06/04/22 1945)  SpO2: (!) 92 % (06/04/22 1945) Vital Signs (24h Range):  Temp:  [97.9 °F (36.6 °C)-98.8 °F (37.1 °C)] 98.1 °F (36.7 °C)  Pulse:  [] 103  Resp:  [17-18] 18  SpO2:  [91 %-93 %] 92 %  BP: (106-136)/(55-81) 129/61     Weight: 52.4 kg (115 lb 8.3 oz)  Body mass index is 20.46 kg/m².      Intake/Output Summary (Last 24 hours) at 6/4/2022 2032  Last data filed at 6/4/2022 1955  Gross per 24 hour   Intake 2288.99 ml   Output --   Net 2288.99 ml       Physical Exam  Constitutional:       General: She is awake. She is not in acute distress.     Appearance: Normal appearance. She is well-developed.   HENT:      Head:  Normocephalic and atraumatic.   Pulmonary:      Effort: Pulmonary effort is normal. No tachypnea, bradypnea, accessory muscle usage or respiratory distress.   Abdominal:      General: There is no distension.      Palpations: Abdomen is soft.      Tenderness: There is abdominal tenderness in the epigastric area. There is no guarding.      Comments: Still moderately tender epigastrium with palpation.  Did not guard on exam today.   Musculoskeletal:      Cervical back: Neck supple.   Skin:     Coloration: Skin is not jaundiced.   Neurological:      Mental Status: She is alert and oriented to person, place, and time.   Psychiatric:         Behavior: Behavior is cooperative.         Significant Labs:  CBC:   Recent Labs   Lab 06/04/22 0426   WBC 21.09*   RBC 3.18*   HGB 10.6*   HCT 30.6*      MCV 96   MCH 33.3*   MCHC 34.6     CMP:   Recent Labs   Lab 06/04/22 0426   GLU 74   CALCIUM 9.9   ALBUMIN 1.9*   PROT 5.4*      K 3.3*   CO2 28      BUN 15   CREATININE 1.2   ALKPHOS 97   ALT 11   AST 21   BILITOT 0.7     Lipase:   Recent Labs   Lab 06/04/22 0426   LIPASE 345*       Significant Diagnostics:  I have reviewed all pertinent imaging results/findings within the past 24 hours.    Assessment/Plan:     Active Diagnoses:    Diagnosis Date Noted POA    PRINCIPAL PROBLEM:  Acute pancreatitis [K85.90] 06/02/2022 Yes    Epigastric pain [R10.13] 06/02/2022 Yes    Dehydration [E86.0] 06/02/2022 Yes    Hypercalcemia [E83.52] 06/02/2022 Yes    ACP (advance care planning) [Z71.89] 06/02/2022 Not Applicable    Hypokalemia [E87.6] 05/25/2022 Yes    Rheumatoid arthritis involving multiple sites with positive rheumatoid factor [M05.79] 01/14/2021 Yes    Mixed hyperlipidemia [E78.2] 10/28/2019 Yes    Tobacco use [Z72.0] 08/10/2018 Yes    Hypertension [I10] 10/04/2013 Yes    Anxiety [F41.9]  Yes      Problems Resolved During this Admission:   -patient admitted with acute pancreatitis.  She does not drink  alcohol.  She has sludge in the gallbladder.  This is most likely biliary pancreatitis.  She did received Paxlovid for 3 days about a week to 10 days before her presentation.  Would be difficult to rule out the medication as the cause but biliary pancreatitis would be more likely.  I agree that cholecystectomy is indicated.  Will plan on cholecystectomy once pancreatitis completely resolves.  I am okay with discharge and scheduling as outpatient in the short term.  If patient would like to stay in the hospital will find time to perform surgery this hospital stay.     Thank you for your consult.     Ramón Hwang III, MD  General Surgery  [facility]

## 2022-06-05 NOTE — ASSESSMENT & PLAN NOTE
Clear liquid diet.  Supportive care with IV fluid hydration, use of intravenous antiemetics and narcotics as needed.  Trend lipase level.  CT abdomen and pelvis results reviewed with patient and her .  At intravenous Zosyn antibiotic therapy.  Follow GI recommendations.  Patient is not ready for discharge.  General surgeon recommendations for outpatient elective laparoscopic cholecystectomy noted.

## 2022-06-05 NOTE — PROGRESS NOTES
[facility] Note    CC: Abdominal pain    HPI 62 y.o. female with past medical history significant for hypertension, hyperlipidemia, anxiety disorder and rheumatoid arthritis (on methotrexate therapy) and recent history of COVID-19 infection (May 13, 2022; received Paxlovid for 3 days) is being admitted to Hospital Medicine under observation status from [facility] with ongoing epigastric abdominal pain for 1 day.  Abdominal pain is moderate to severe in intensity, nonradiating, without any aggravating or relieving factors.  Patient reports markedly reduced oral intake and feels dehydrated.  Denies any recent fevers, chills, prior history of acute pancreatitis, melena, bleeding per rectum or hematemesis.  No recent use of nonsteroidal anti-inflammatory medications reported.  For COVID treatment patient has received and completed 10 days of oral doxycycline and 9 days of cefuroxime antibiotic therapy.  No recent fevers or chills reported.  Patient had 3 loose bowel movements today.  No mucus per rectum reported.  No urinary difficulties present.  Patient denies any alcohol consumption.     Overview/Hospital Course:  No notes on file     Interval History:  Patient is continuing to report severe epigastric abdominal pain associated with nausea.  Worsening leukocytosis noted.  No hematemesis, melena or bleeding per rectum reported.  Patient denied any chest pain or shortness of breath.     FURTHER HISTORY:  Above obtained from independent review of records from admitting provider as well as from direct discussion with nursing who states patient appears comfortable at present.  In addition, on my interview, I note the following:  Patient complains of abdominal pain, recent onset, moderate to severe, associated with nausea and nonbloody emesis, with no alleviating/exacerbating factors.  No EtOH use.  No abdominal surgeries.  No new medications.  Sludge noted in GB on  "imaging.  Lipase elevated and patient has leukocytosis but LFTs WNL.  No other acute GI issues.    INTERVAL HISTORY:  Patient tolerating clears.  Pain improved.  Leukocytosis improved.  No emesis.  No change in bowel habits.  Surgical recommendations noted and case discussed with hospital medicine.  Patient prefers to have cholecystectomy as outpatient.    Past Medical History:   Diagnosis Date    Anxiety     Flu 02/2017    [facility]    Hypertension     Rheumatoid arthritis involving multiple sites with positive rheumatoid factor 1/14/2021         Review of Systems  General ROS: negative for - chills, fever or weight loss  Cardiovascular ROS: no chest pain or dyspnea on exertion  Gastrointestinal ROS: symptoms improved    Physical Examination  BP (!) 118/55   Pulse 85   Temp 98.1 °F (36.7 °C)   Resp 17   Ht 5' 3" (1.6 m)   Wt 52.9 kg (116 lb 10 oz)   SpO2 (!) 93%   BMI 20.66 kg/m²   General appearance: alert, cooperative, no distress  HENT: Normocephalic, atraumatic, neck symmetrical, no nasal discharge, sclera anicteric   Lungs: clear to auscultation bilaterally, symmetric chest wall expansion bilaterally  Heart: regular rate and rhythm without rub; no displacement of the PMI   Abdomen: soft   Extremities: extremities symmetric; no clubbing, cyanosis, or edema  Neurologic: Alert and oriented X 3, no sensory or motor neurologic deficits      Labs:  Lab Results   Component Value Date    WBC 17.15 (H) 06/05/2022    HGB 9.2 (L) 06/05/2022    HCT 26.3 (L) 06/05/2022    MCV 96 06/05/2022     06/05/2022         CMP  Sodium   Date Value Ref Range Status   06/05/2022 136 136 - 145 mmol/L Final     Potassium   Date Value Ref Range Status   06/05/2022 3.4 (L) 3.5 - 5.1 mmol/L Final     Chloride   Date Value Ref Range Status   06/05/2022 103 95 - 110 mmol/L Final     CO2   Date Value Ref Range Status   06/05/2022 26 23 - 29 mmol/L Final     Glucose   Date Value Ref Range Status   06/05/2022 69 (L) " 70 - 110 mg/dL Final     BUN   Date Value Ref Range Status   06/05/2022 9 8 - 23 mg/dL Final     Creatinine   Date Value Ref Range Status   06/05/2022 1.0 0.5 - 1.4 mg/dL Final     Calcium   Date Value Ref Range Status   06/05/2022 9.4 8.7 - 10.5 mg/dL Final     Total Protein   Date Value Ref Range Status   06/05/2022 5.2 (L) 6.0 - 8.4 g/dL Final     Albumin   Date Value Ref Range Status   06/05/2022 1.7 (L) 3.5 - 5.2 g/dL Final     Total Bilirubin   Date Value Ref Range Status   06/05/2022 0.8 0.1 - 1.0 mg/dL Final     Comment:     For infants and newborns, interpretation of results should be based  on gestational age, weight and in agreement with clinical  observations.    Premature Infant recommended reference ranges:  Up to 24 hours.............<8.0 mg/dL  Up to 48 hours............<12.0 mg/dL  3-5 days..................<15.0 mg/dL  6-29 days.................<15.0 mg/dL       Alkaline Phosphatase   Date Value Ref Range Status   06/05/2022 110 55 - 135 U/L Final     AST   Date Value Ref Range Status   06/05/2022 27 10 - 40 U/L Final     ALT   Date Value Ref Range Status   06/05/2022 13 10 - 44 U/L Final     Anion Gap   Date Value Ref Range Status   06/05/2022 7 (L) 8 - 16 mmol/L Final     eGFR if    Date Value Ref Range Status   06/05/2022 >60 >60 mL/min/1.73 m^2 Final     eGFR if non    Date Value Ref Range Status   06/05/2022 >60 >60 mL/min/1.73 m^2 Final     Comment:     Calculation used to obtain the estimated glomerular filtration  rate (eGFR) is the CKD-EPI equation.            Assessment:   1.  Abdominal pain  2.  Acute pancreatitis  3.  GB sludge without overt evidence of cholecystitis/choledocholithiasis  4.  Leukocytosis     Plan:  1.  Advance diet  2.  Symptomatic management for pain and nausea  3.  Outpatient surgical follow up for cholecystectomy  4.  Consider outpatient EUS for pancreatitis at some point.  5.  Follow up in my office in 4-6 weeks. Will sign off for  now.  Call for questions.        Antwan Barron MD  [facility]  [address], [address]  [place] [address]  Office: [phone]  Fax: [phone]

## 2022-06-05 NOTE — PT/OT/SLP PROGRESS
Physical Therapy Treatment    Patient Name:  Claire Bowser   MRN:  [id number]    Recommendations:     Discharge Recommendations:  home   Discharge Equipment Recommendations: none   Barriers to discharge: None    Assessment:     Claire Bowser is a 62 y.o. female admitted with a medical diagnosis of Acute pancreatitis.  She presents with the following impairments/functional limitations:  impaired endurance, impaired balance, gait instability, impaired functional mobilty, decreased lower extremity function, weakness  .    Rehab Prognosis: Good; patient would benefit from acute skilled PT services to address these deficits and reach maximum level of function.    Recent Surgery: * No surgery found *      Plan:     During this hospitalization, patient to be seen 6 x/week to address the identified rehab impairments via gait training, therapeutic activities, therapeutic exercises and progress toward the following goals:    · Plan of Care Expires:  07/03/22    Subjective     Patient/Family Comments/goals: agrees to work with PT to walk in rollins    Objective:     Communicated with nurse prior to session.  Patient found supine with peripheral IV upon PT entry to room.     General Precautions: Standard, fall   Orthopedic Precautions:N/A   Braces: N/A  Respiratory Status: Room air     Functional Mobility training:  · Bed Mobility:     · Rolling Left:  stand by assistance  · Supine to Sit: stand by assistance  · Sit to Supine: stand by assistance  · Transfers:     · Sit to Stand:  stand by assistance with no AD  · Gait: 250' with SBA to mod I (assist only for IV pole)      AM-PAC 6 CLICK MOBILITY  Turning over in bed (including adjusting bedclothes, sheets and blankets)?: 4  Sitting down on and standing up from a chair with arms (e.g., wheelchair, bedside commode, etc.): 4  Moving from lying on back to sitting on the side of the bed?: 4  Moving to and from a bed to a chair (including a wheelchair)?: 4  Need to walk in hospital  room?: 3  Climbing 3-5 steps with a railing?: 3 (est.)  Basic Mobility Total Score: 22       Therapeutic Activities and Exercises:   mobility training as above with cues for technique    Patient left supine (declines to sit in chair) with all lines intact, call button in reach and visitor present.    GOALS:   Multidisciplinary Problems     Physical Therapy Goals        Problem: Physical Therapy    Goal Priority Disciplines Outcome Goal Variances Interventions   Physical Therapy Goal     PT, PT/OT      Description: Goals to be met by: 7/3/22     Patient will increase functional independence with mobility by performin. Supine to sit with [place]  2. Sit to stand transfer with [place]  3. Bed to chair transfer with [place] using No Assistive Device  4. Gait  x 250 feet with [place] using No Assistive Device.   5. Lower extremity exercise program x20 reps per handout, with supervision                     Time Tracking:     PT Received On: 22  PT Start Time: 857     PT Stop Time: 907  PT Total Time (min): 10 min     Billable Minutes: Gait Training 10    Treatment Type: Treatment  PT/PTA: PT     PTA Visit Number: 0     2022

## 2022-06-05 NOTE — SUBJECTIVE & OBJECTIVE
Interval History:  Patient is continuing to report severe epigastric abdominal pain , patient was unable to tolerate diet..  No hematemesis, melena or bleeding per rectum reported.  Patient denied any chest pain or shortness of breath.  Patient's  is present at bedside.    Review of Systems   Constitutional:  Positive for fatigue.   Gastrointestinal:  Positive for abdominal pain, diarrhea and nausea.   Neurological:  Positive for weakness.   All other systems reviewed and are negative.  Objective:     Vital Signs (Most Recent):  Temp: 98.1 °F (36.7 °C) (06/05/22 1033)  Pulse: 85 (06/05/22 1033)  Resp: 17 (06/05/22 1033)  BP: (!) 118/55 (06/05/22 1033)  SpO2: (!) 93 % (06/05/22 1033)   Vital Signs (24h Range):  Temp:  [97.7 °F (36.5 °C)-99.3 °F (37.4 °C)] 98.1 °F (36.7 °C)  Pulse:  [] 85  Resp:  [17-18] 17  SpO2:  [86 %-93 %] 93 %  BP: (118-136)/(55-77) 118/55     Weight: 52.9 kg (116 lb 10 oz)  Body mass index is 20.66 kg/m².    Intake/Output Summary (Last 24 hours) at 6/5/2022 1514  Last data filed at 6/5/2022 0915  Gross per 24 hour   Intake 2260.85 ml   Output --   Net 2260.85 ml        Physical Exam  Constitutional:       Appearance: She is well-developed.   HENT:      Head: Normocephalic and atraumatic.      Mouth/Throat:      Mouth: Mucous membranes are dry.   Eyes:      Conjunctiva/sclera: Conjunctivae normal.      Pupils: Pupils are equal, round, and reactive to light.   Neck:      Thyroid: No thyromegaly.      Vascular: No JVD.   Cardiovascular:      Rate and Rhythm: Normal rate and regular rhythm.      Heart sounds: No murmur heard.    No friction rub. No gallop.   Pulmonary:      Effort: Pulmonary effort is normal.      Breath sounds: Normal breath sounds.   Abdominal:      General: Bowel sounds are normal. There is no distension.      Palpations: Abdomen is soft. There is no mass.      Tenderness: There is no abdominal tenderness.      Comments: Epigastric tenderness to deep palpation  without peritoneal signs guarding or rigidity.  No hepatosplenomegaly present.   Musculoskeletal:         General: Normal range of motion.      Cervical back: Neck supple.   Skin:     General: Skin is warm and dry.   Neurological:      Mental Status: She is oriented to person, place, and time.      Cranial Nerves: No cranial nerve deficit.   Psychiatric:         Behavior: Behavior normal.       Significant Labs: All pertinent labs within the past 24 hours have been reviewed.  CBC:   Recent Labs   Lab 06/04/22 0426 06/05/22 0426   WBC 21.09* 17.15*   HGB 10.6* 9.2*   HCT 30.6* 26.3*    226       CMP:   Recent Labs   Lab 06/04/22 0426 06/05/22 0426    136   K 3.3* 3.4*    103   CO2 28 26   GLU 74 69*   BUN 15 9   CREATININE 1.2 1.0   CALCIUM 9.9 9.4   PROT 5.4* 5.2*   ALBUMIN 1.9* 1.7*   BILITOT 0.7 0.8   ALKPHOS 97 110   AST 21 27   ALT 11 13   ANIONGAP 7* 7*   EGFRNONAA 49* >60       Lactic Acid: No results for input(s): LACTATE in the last 48 hours.  Lipase:   Recent Labs   Lab 06/04/22 0426 06/05/22 0426   LIPASE 345* 106*       Microbiology Results (last 7 days)       ** No results found for the last 168 hours. **          Significant Imaging:   CT abdomen and pelvis without contrast:  1. Findings suspicious for acute pancreatitis.  Assessment somewhat limited in the absence of IV contrast.  2. Pulmonary emphysema with minimal scattered ground-glass disease which could reflect edema, fibrosis, pneumonitis.  3. Questionable gallbladder sludge.

## 2022-06-05 NOTE — PROGRESS NOTES
[facility] Hospital Medicine  Progress Note    Patient Name: Claire Bowser  MRN: [id number]  Patient Class: OP- Observation   Admission Date: 6/2/2022  Length of Stay: 0 days  Attending Physician: Jose Jimenez MD  Primary Care Provider: Samuel Brady MD        Subjective:     Principal Problem:Acute pancreatitis        HPI:  Patient is a 62-year-old  female with past medical history significant for hypertension, hyperlipidemia, anxiety disorder and rheumatoid arthritis (on methotrexate therapy) and recent history of COVID-19 infection (May 13, 2022; received Paxlovid for 3 days) is being admitted to Hospital Medicine under observation status from [facility] Emergency Room with ongoing epigastric abdominal pain for 1 day.  Abdominal pain is moderate to severe in intensity, nonradiating, without any aggravating or relieving factors.  Patient reports markedly reduced oral intake and feels dehydrated.  Denies any recent fevers, chills, prior history of acute pancreatitis, melena, bleeding per rectum or hematemesis.  No recent use of nonsteroidal anti-inflammatory medications reported.  For COVID treatment patient has received and completed 10 days of oral doxycycline and 9 days of cefuroxime antibiotic therapy.  No recent fevers or chills reported.  Patient had 3 loose bowel movements today.  No mucus per rectum reported.  No urinary difficulties present.  Patient denies any alcohol consumption.              Overview/Hospital Course:  No notes on file    Interval History:  Patient is continuing to report severe epigastric abdominal pain , patient was unable to tolerate diet..  No hematemesis, melena or bleeding per rectum reported.  Patient denied any chest pain or shortness of breath.  Patient's  is present at bedside.    Review of Systems   Constitutional:  Positive for fatigue.   Gastrointestinal:  Positive for abdominal pain, diarrhea and nausea.    Neurological:  Positive for weakness.   All other systems reviewed and are negative.  Objective:     Vital Signs (Most Recent):  Temp: 98.1 °F (36.7 °C) (06/05/22 1033)  Pulse: 85 (06/05/22 1033)  Resp: 17 (06/05/22 1033)  BP: (!) 118/55 (06/05/22 1033)  SpO2: (!) 93 % (06/05/22 1033)   Vital Signs (24h Range):  Temp:  [97.7 °F (36.5 °C)-99.3 °F (37.4 °C)] 98.1 °F (36.7 °C)  Pulse:  [] 85  Resp:  [17-18] 17  SpO2:  [86 %-93 %] 93 %  BP: (118-136)/(55-77) 118/55     Weight: 52.9 kg (116 lb 10 oz)  Body mass index is 20.66 kg/m².    Intake/Output Summary (Last 24 hours) at 6/5/2022 1514  Last data filed at 6/5/2022 0915  Gross per 24 hour   Intake 2260.85 ml   Output --   Net 2260.85 ml        Physical Exam  Constitutional:       Appearance: She is well-developed.   HENT:      Head: Normocephalic and atraumatic.      Mouth/Throat:      Mouth: Mucous membranes are dry.   Eyes:      Conjunctiva/sclera: Conjunctivae normal.      Pupils: Pupils are equal, round, and reactive to light.   Neck:      Thyroid: No thyromegaly.      Vascular: No JVD.   Cardiovascular:      Rate and Rhythm: Normal rate and regular rhythm.      Heart sounds: No murmur heard.    No friction rub. No gallop.   Pulmonary:      Effort: Pulmonary effort is normal.      Breath sounds: Normal breath sounds.   Abdominal:      General: Bowel sounds are normal. There is no distension.      Palpations: Abdomen is soft. There is no mass.      Tenderness: There is no abdominal tenderness.      Comments: Epigastric tenderness to deep palpation without peritoneal signs guarding or rigidity.  No hepatosplenomegaly present.   Musculoskeletal:         General: Normal range of motion.      Cervical back: Neck supple.   Skin:     General: Skin is warm and dry.   Neurological:      Mental Status: She is oriented to person, place, and time.      Cranial Nerves: No cranial nerve deficit.   Psychiatric:         Behavior: Behavior normal.       Significant Labs:  All pertinent labs within the past 24 hours have been reviewed.  CBC:   Recent Labs   Lab 06/04/22 0426 06/05/22 0426   WBC 21.09* 17.15*   HGB 10.6* 9.2*   HCT 30.6* 26.3*    226       CMP:   Recent Labs   Lab 06/04/22 0426 06/05/22 0426    136   K 3.3* 3.4*    103   CO2 28 26   GLU 74 69*   BUN 15 9   CREATININE 1.2 1.0   CALCIUM 9.9 9.4   PROT 5.4* 5.2*   ALBUMIN 1.9* 1.7*   BILITOT 0.7 0.8   ALKPHOS 97 110   AST 21 27   ALT 11 13   ANIONGAP 7* 7*   EGFRNONAA 49* >60       Lactic Acid: No results for input(s): LACTATE in the last 48 hours.  Lipase:   Recent Labs   Lab 06/04/22 0426 06/05/22 0426   LIPASE 345* 106*       Microbiology Results (last 7 days)       ** No results found for the last 168 hours. **          Significant Imaging:   CT abdomen and pelvis without contrast:  1. Findings suspicious for acute pancreatitis.  Assessment somewhat limited in the absence of IV contrast.  2. Pulmonary emphysema with minimal scattered ground-glass disease which could reflect edema, fibrosis, pneumonitis.  3. Questionable gallbladder sludge.      Assessment/Plan:      * Acute pancreatitis  Clear liquid diet.  Supportive care with IV fluid hydration, use of intravenous antiemetics and narcotics as needed.  Trend lipase level.  CT abdomen and pelvis results reviewed with patient and her .  At intravenous Zosyn antibiotic therapy.  Follow GI recommendations.  Patient is not ready for discharge.  General surgeon recommendations for outpatient elective laparoscopic cholecystectomy noted.    ACP (advance care planning)  Advance Care Planning     Date: 06/02/2022    Living Will  During this visit, I engaged the patient  in the advance care planning process.  The patient and I reviewed the role for advance directives and their purpose in directing future healthcare if the patient's unable to speak for him/herself.  At this point in time, the patient does have full decision-making capacity.  We  discussed different extreme health states that she could experience, and reviewed what kind of medical care she would want in those situations.  The patient communicated that if she were comatose and had little chance of a meaningful recovery, she would want machines/life-sustaining treatments used. I spent a total of 16 minutes engaging the patient in this advance care planning discussion.                Hypercalcemia  Likely secondary to dehydration and concomitant use of activated vitamin D3.  Continue IV fluid hydration.  Hold vitamin-D supplementation.  Monitor serum calcium daily.      Dehydration  Continue IV fluid hydration.      Epigastric pain  Likely secondary to acute pancreatitis.  See management of acute pancreatitis.      Hypokalemia  Replete potassium chloride.  Follow BMP.      Rheumatoid arthritis involving multiple sites with positive rheumatoid factor  On weekly methotrexate.      Mixed hyperlipidemia  Chronic problem. Will continue chronic medications and monitor for any changes, adjusting as needed.          Tobacco use  Smoking cessation counseling performed. Dangers of cigarette smoking were reviewed with patient in detail and patient was encouraged to quit. Nicotine replacement options were discussed for > 3 minutes.        Hypertension  Chronic problem. Will continue chronic medications and monitor for any changes, adjusting as needed.          Anxiety  Chronic problem. Will continue chronic medications and monitor for any changes, adjusting as needed.            VTE Risk Mitigation (From admission, onward)         Ordered     enoxaparin injection 40 mg  Daily         06/02/22 1459     IP VTE HIGH RISK PATIENT  Once         06/02/22 1459     Place sequential compression device  Until discontinued         06/02/22 1459                Discharge Planning   RASHI: 6/6/2022     Code Status: Full Code   Is the patient medically ready for discharge?:     Reason for patient still in hospital  (select all that apply): Patient trending condition and Consult recommendations  Discharge Plan A: Home   Discharge Delays: None known at this time              Jose Jimenez MD  Department of Hospital Medicine   [facility]

## 2022-06-05 NOTE — DISCHARGE SUMMARY
[facility] Hospital Medicine  Discharge Summary      Patient Name: Claire Bowser  MRN: [id number]  Patient Class: OP- Observation  Admission Date: 6/2/2022  Hospital Length of Stay: 0 days  Discharge Date and Time:  06/05/2022 11:18 AM  Attending Physician: Jose Jimenez MD   Discharging Provider: Jose Jimenez MD  Primary Care Provider: Samuel Brady MD      HPI:   Patient is a 62-year-old  female with past medical history significant for hypertension, hyperlipidemia, anxiety disorder and rheumatoid arthritis (on methotrexate therapy) and recent history of COVID-19 infection (May 13, 2022; received Paxlovid for 3 days) is being admitted to Hospital Medicine under observation status from [facility] Emergency Room with ongoing epigastric abdominal pain for 1 day.  Abdominal pain is moderate to severe in intensity, nonradiating, without any aggravating or relieving factors.  Patient reports markedly reduced oral intake and feels dehydrated.  Denies any recent fevers, chills, prior history of acute pancreatitis, melena, bleeding per rectum or hematemesis.  No recent use of nonsteroidal anti-inflammatory medications reported.  For COVID treatment patient has received and completed 10 days of oral doxycycline and 9 days of cefuroxime antibiotic therapy.  No recent fevers or chills reported.  Patient had 3 loose bowel movements today.  No mucus per rectum reported.  No urinary difficulties present.  Patient denies any alcohol consumption.              * No surgery found *      Hospital Course:   Patient was admitted to medicine service.  Patient was provided supportive care with bowel rest, IV fluid hydration and use of intravenous antiemetics and narcotics.  Serum lipase level was trended.  Dr. Salter from GI followed the patient.  Patient was also placed on antibiotic therapy.  Much allergy results remain negative.  A consultation with general surgeon obtain for  possibility of biliary pancreatitis.  Patient to follow-up with general surgeon as outpatient for elective laparoscopic cholecystectomy.  Discharge plan of care reviewed with patient and her  in detail.  During hospital stay patient was counseled regarding smoking cessation.  Patient will have LFTs checked next week.  Discharge plan of care reviewed with patient and her  who voiced understanding.    Goals of Care Treatment Preferences:  Code Status: Full Code      Consults:   Consults (From admission, onward)        Status Ordering Provider     Inpatient consult to General Surgery  Once        Provider:  Ramón Hwang III, MD    Completed SULTANGREGORIO     Inpatient consult to Gastroenterology  Once        Provider:  Antwan Salter MD    Completed SULTAN, AQIB     Case Management/  Once        Provider:  (Not yet assigned)    Acknowledged SULKATIE AQIB        CT abdomen and pelvis without contrast:  1. Findings suspicious for acute pancreatitis.  Assessment somewhat limited in the absence of IV contrast.  2. Pulmonary emphysema with minimal scattered ground-glass disease which could reflect edema, fibrosis, pneumonitis.  3. Questionable gallbladder sludge.    Final Active Diagnoses:    Diagnosis Date Noted POA    PRINCIPAL PROBLEM:  Acute pancreatitis [K85.90] 06/02/2022 Yes    Severe malnutrition [E43] 06/05/2022 Yes    Epigastric pain [R10.13] 06/02/2022 Yes    Dehydration [E86.0] 06/02/2022 Yes    Hypercalcemia [E83.52] 06/02/2022 Yes    ACP (advance care planning) [Z71.89] 06/02/2022 Not Applicable    Hypokalemia [E87.6] 05/25/2022 Yes    Rheumatoid arthritis involving multiple sites with positive rheumatoid factor [M05.79] 01/14/2021 Yes    Mixed hyperlipidemia [E78.2] 10/28/2019 Yes    Tobacco use [Z72.0] 08/10/2018 Yes    Hypertension [I10] 10/04/2013 Yes    Anxiety [F41.9]  Yes      Problems Resolved During this Admission:       Discharged Condition:  good    Disposition: Home or Self Care    Follow Up:   Follow-up Information     Samuel Brady MD Follow up in 1 week(s).    Specialty: Family Medicine  Contact information:  [address]  [address]  [place] [address]  [phone]             Ramón Hwang III, MD Follow up.    Specialties: General Surgery, Surgery  Why: Please call Dr. Hwang does office on Monday morning to schedule elective laparoscopic cholecystectomy.  Contact information:  [address]  [address]  [place] [address] [phone]             Antwan Salter MD Follow up.    Specialty: Gastroenterology  Why: As needed  Contact information:  [address]  [address]  [place] [address]  [phone]                       Patient Instructions:      COMPREHENSIVE METABOLIC PANEL   Standing Status: Future Standing Exp. Date: 08/04/23     Diet Cardiac   Order Comments: Low-fat diet     Notify your health care provider if you experience any of the following:  temperature >100.4     Notify your health care provider if you experience any of the following:  persistent nausea and vomiting or diarrhea     Notify your health care provider if you experience any of the following:  severe uncontrolled pain     Activity as tolerated   Order Comments: Fall precautions       Significant Diagnostic Studies: Labs:   CMP   Recent Labs   Lab 06/04/22  0426 06/05/22 0426    136   K 3.3* 3.4*    103   CO2 28 26   GLU 74 69*   BUN 15 9   CREATININE 1.2 1.0   CALCIUM 9.9 9.4   PROT 5.4* 5.2*   ALBUMIN 1.9* 1.7*   BILITOT 0.7 0.8   ALKPHOS 97 110   AST 21 27   ALT 11 13   ANIONGAP 7* 7*   ESTGFRAFRICA 56* >60   EGFRNONAA 49* >60   , CBC   Recent Labs   Lab 06/04/22  0426 06/05/22  0426   WBC 21.09* 17.15*   HGB 10.6* 9.2*   HCT 30.6* 26.3*    226    and INR No results found for: INR, PROTIME    Pending Diagnostic Studies:     None         Medications:  Reconciled Home Medications:      Medication List      START taking these medications     ciprofloxacin HCl 500 MG tablet  Commonly known as: CIPRO  Take 1 tablet (500 mg total) by mouth every 12 (twelve) hours.        CONTINUE taking these medications    cholecalciferol (vitamin D3) 10 mcg (400 unit) Tab  Commonly known as: VITAMIN D3  Take 400 Units by mouth once daily.     cyclobenzaprine 5 MG tablet  Commonly known as: FLEXERIL  Take 1 tablet (5 mg total) by mouth 3 (three) times daily as needed for Muscle spasms.     folic acid 1 MG tablet  Commonly known as: FOLVITE  Take 1 tablet (1 mg total) by mouth once daily.     hydroCHLOROthiazide 25 MG tablet  Commonly known as: HYDRODIURIL  Take 1 tablet (25 mg total) by mouth once daily.     lisinopriL 40 MG tablet  Commonly known as: PRINIVIL,ZESTRIL  Take 1 tablet (40 mg total) by mouth once daily.     methotrexate 2.5 MG Tab  TAKE 10 TABLETS BY MOUTH EVERY 7 DAYS SPLIT DOSING. TAKE 5 TABS IN THE AM AND 5 TABS IN THE PM EVERY 7 DAYS THE SAME DAY     metoprolol succinate 50 MG 24 hr tablet  Commonly known as: TOPROL-XL  Take 1 tablet by mouth once daily     pantoprazole 40 MG tablet  Commonly known as: PROTONIX  Take 1 tablet (40 mg total) by mouth once daily.     sertraline 25 MG tablet  Commonly known as: ZOLOFT  Take 1 tablet by mouth once daily     traZODone 50 MG tablet  Commonly known as: DESYREL  Take 1 tablet by mouth in the evening        STOP taking these medications    cefdinir 300 MG capsule  Commonly known as: OMNICEF            Indwelling Lines/Drains at time of discharge:   Lines/Drains/Airways     None                 Time spent on the discharge of patient: 30 minutes         Jose Jimenez MD  Department of Hospital Medicine  [facility]

## 2022-06-05 NOTE — SUBJECTIVE & OBJECTIVE
Interval History:  Patient is continuing to report severe epigastric abdominal pain associated with nausea.  No hematemesis, melena or bleeding per rectum reported.  Patient denied any chest pain or shortness of breath.  Patient's  is present at bedside.    Review of Systems   Constitutional:  Positive for fatigue.   Gastrointestinal:  Positive for abdominal pain, diarrhea and nausea.   Neurological:  Positive for weakness.   All other systems reviewed and are negative.  Objective:     Vital Signs (Most Recent):  Temp: 99.3 °F (37.4 °C) (06/05/22 0725)  Pulse: 90 (06/05/22 0725)  Resp: 18 (06/05/22 0725)  BP: 136/60 (06/05/22 0725)  SpO2: (!) 90 % (06/05/22 0725)   Vital Signs (24h Range):  Temp:  [97.7 °F (36.5 °C)-99.3 °F (37.4 °C)] 99.3 °F (37.4 °C)  Pulse:  [] 90  Resp:  [17-18] 18  SpO2:  [86 %-93 %] 90 %  BP: (116-136)/(58-77) 136/60     Weight: 52.9 kg (116 lb 10 oz)  Body mass index is 20.66 kg/m².    Intake/Output Summary (Last 24 hours) at 6/5/2022 0933  Last data filed at 6/5/2022 0551  Gross per 24 hour   Intake 1910.85 ml   Output --   Net 1910.85 ml        Physical Exam  Constitutional:       Appearance: She is well-developed.   HENT:      Head: Normocephalic and atraumatic.      Mouth/Throat:      Mouth: Mucous membranes are dry.   Eyes:      Conjunctiva/sclera: Conjunctivae normal.      Pupils: Pupils are equal, round, and reactive to light.   Neck:      Thyroid: No thyromegaly.      Vascular: No JVD.   Cardiovascular:      Rate and Rhythm: Normal rate and regular rhythm.      Heart sounds: No murmur heard.    No friction rub. No gallop.   Pulmonary:      Effort: Pulmonary effort is normal.      Breath sounds: Normal breath sounds.   Abdominal:      General: Bowel sounds are normal. There is no distension.      Palpations: Abdomen is soft. There is no mass.      Tenderness: There is no abdominal tenderness.      Comments: Epigastric tenderness to deep palpation without peritoneal signs  guarding or rigidity.  No hepatosplenomegaly present.   Musculoskeletal:         General: Normal range of motion.      Cervical back: Neck supple.   Skin:     General: Skin is warm and dry.   Neurological:      Mental Status: She is oriented to person, place, and time.      Cranial Nerves: No cranial nerve deficit.   Psychiatric:         Behavior: Behavior normal.       Significant Labs: All pertinent labs within the past 24 hours have been reviewed.  CBC:   Recent Labs   Lab 06/04/22 0426 06/05/22 0426   WBC 21.09* 17.15*   HGB 10.6* 9.2*   HCT 30.6* 26.3*    226       CMP:   Recent Labs   Lab 06/04/22 0426 06/05/22 0426    136   K 3.3* 3.4*    103   CO2 28 26   GLU 74 69*   BUN 15 9   CREATININE 1.2 1.0   CALCIUM 9.9 9.4   PROT 5.4* 5.2*   ALBUMIN 1.9* 1.7*   BILITOT 0.7 0.8   ALKPHOS 97 110   AST 21 27   ALT 11 13   ANIONGAP 7* 7*   EGFRNONAA 49* >60       Lactic Acid: No results for input(s): LACTATE in the last 48 hours.  Lipase:   Recent Labs   Lab 06/04/22 0426 06/05/22 0426   LIPASE 345* 106*       Microbiology Results (last 7 days)       ** No results found for the last 168 hours. **          Significant Imaging:   CT abdomen and pelvis without contrast:  1. Findings suspicious for acute pancreatitis.  Assessment somewhat limited in the absence of IV contrast.  2. Pulmonary emphysema with minimal scattered ground-glass disease which could reflect edema, fibrosis, pneumonitis.  3. Questionable gallbladder sludge.

## 2022-06-05 NOTE — PLAN OF CARE
Pt has been cleared by [facility] for DC.        06/05/22 1122   Final Note   Assessment Type Final Discharge Note   Anticipated Discharge Disposition Home   What phone number can be called within the next 1-3 days to see how you are doing after discharge? [phone]   Hospital Resources/Appts/Education Provided Appointments scheduled and added to AVS;Appointments scheduled by Navigator/Coordinator   Post-Acute Status   Discharge Delays None known at this time

## 2022-06-05 NOTE — PLAN OF CARE
Plan of care reviewed with pt. Pt verbalized understanding. Patient is alert and oriented x 4, able to make needs known. Continuous cardiac monitoring, TELE #5019, NSR. A-febrile throughout the shift. ABX administered as scheduled. Meds given per MAR. IVF infusing as ordered. Pt on room air. Purposeful hourly/q2hr rounding done during shift to promote patient safety. Safety maintained with side rails up x3, bed wheels locked, bed in lowest positioned, call light in reach. Patient educated to call for assistance with ambulation if needed, verbalized understanding. Pt remains free of falls. Will continue to monitor.

## 2022-06-05 NOTE — PT/OT/SLP PROGRESS
Occupational Therapy      Patient Name:  Claire Bowser   MRN:  [id number]    Patient not seen today secondary to patient was unwilling to participate.     6/5/2022

## 2022-06-05 NOTE — DISCHARGE INSTRUCTIONS
Please stop tobacco cigarette smoking.    Avoid use of nonsteroidal anti-inflammatory medication use.  Check CMP blood test next week.

## 2022-06-05 NOTE — PLAN OF CARE
Plan of care reviewed with pt. Pt verbalized understanding. Patient is alert and oriented x 4, able to make needs known. Continuous cardiac monitoring, TELE #0125, NSR. A-febrile throughout the shift. ABX administered as scheduled. Meds given per MAR. IVF infusing as ordered. Pt on room air. Pain managed with PRN pain medication. Purposeful hourly/q2hr rounding done during shift to promote patient safety. Safety maintained with side rails up x3, bed wheels locked, bed in lowest positioned, call light in reach. Patient educated to call for assistance with ambulation if needed, verbalized understanding. Pt remains free of falls. Will continue to monitor.

## 2022-06-06 ENCOUNTER — TELEPHONE (OUTPATIENT)
Dept: GASTROENTEROLOGY | Facility: CLINIC | Age: 62
End: 2022-06-06
Payer: COMMERCIAL

## 2022-06-06 VITALS
BODY MASS INDEX: 20.66 KG/M2 | WEIGHT: 116.63 LBS | HEIGHT: 63 IN | OXYGEN SATURATION: 95 % | TEMPERATURE: 97 F | DIASTOLIC BLOOD PRESSURE: 62 MMHG | HEART RATE: 92 BPM | SYSTOLIC BLOOD PRESSURE: 145 MMHG | RESPIRATION RATE: 17 BRPM

## 2022-06-06 LAB
ALBUMIN SERPL BCP-MCNC: 1.7 G/DL (ref 3.5–5.2)
ALP SERPL-CCNC: 101 U/L (ref 55–135)
ALT SERPL W/O P-5'-P-CCNC: 15 U/L (ref 10–44)
ANION GAP SERPL CALC-SCNC: 8 MMOL/L (ref 8–16)
AST SERPL-CCNC: 26 U/L (ref 10–40)
BASOPHILS # BLD AUTO: 0.08 K/UL (ref 0–0.2)
BASOPHILS NFR BLD: 0.6 % (ref 0–1.9)
BILIRUB SERPL-MCNC: 0.7 MG/DL (ref 0.1–1)
BUN SERPL-MCNC: 6 MG/DL (ref 8–23)
CALCIUM SERPL-MCNC: 9.3 MG/DL (ref 8.7–10.5)
CHLORIDE SERPL-SCNC: 105 MMOL/L (ref 95–110)
CO2 SERPL-SCNC: 25 MMOL/L (ref 23–29)
CREAT SERPL-MCNC: 0.9 MG/DL (ref 0.5–1.4)
DIFFERENTIAL METHOD: ABNORMAL
EOSINOPHIL # BLD AUTO: 0.4 K/UL (ref 0–0.5)
EOSINOPHIL NFR BLD: 3.2 % (ref 0–8)
ERYTHROCYTE [DISTWIDTH] IN BLOOD BY AUTOMATED COUNT: 14.5 % (ref 11.5–14.5)
EST. GFR  (AFRICAN AMERICAN): >60 ML/MIN/1.73 M^2
EST. GFR  (NON AFRICAN AMERICAN): >60 ML/MIN/1.73 M^2
GLUCOSE SERPL-MCNC: 79 MG/DL (ref 70–110)
HCT VFR BLD AUTO: 25.2 % (ref 37–48.5)
HGB BLD-MCNC: 9 G/DL (ref 12–16)
IMM GRANULOCYTES # BLD AUTO: 0.09 K/UL (ref 0–0.04)
IMM GRANULOCYTES NFR BLD AUTO: 0.7 % (ref 0–0.5)
LYMPHOCYTES # BLD AUTO: 1.8 K/UL (ref 1–4.8)
LYMPHOCYTES NFR BLD: 13.4 % (ref 18–48)
MCH RBC QN AUTO: 34.2 PG (ref 27–31)
MCHC RBC AUTO-ENTMCNC: 35.7 G/DL (ref 32–36)
MCV RBC AUTO: 96 FL (ref 82–98)
MONOCYTES # BLD AUTO: 1.5 K/UL (ref 0.3–1)
MONOCYTES NFR BLD: 10.8 % (ref 4–15)
NEUTROPHILS # BLD AUTO: 9.6 K/UL (ref 1.8–7.7)
NEUTROPHILS NFR BLD: 71.3 % (ref 38–73)
NRBC BLD-RTO: 0 /100 WBC
PLATELET # BLD AUTO: 217 K/UL (ref 150–450)
PMV BLD AUTO: 10.7 FL (ref 9.2–12.9)
POTASSIUM SERPL-SCNC: 3.3 MMOL/L (ref 3.5–5.1)
PROT SERPL-MCNC: 5.4 G/DL (ref 6–8.4)
RBC # BLD AUTO: 2.63 M/UL (ref 4–5.4)
SODIUM SERPL-SCNC: 138 MMOL/L (ref 136–145)
WBC # BLD AUTO: 13.43 K/UL (ref 3.9–12.7)

## 2022-06-06 PROCEDURE — 36415 COLL VENOUS BLD VENIPUNCTURE: CPT

## 2022-06-06 PROCEDURE — 25000003 PHARM REV CODE 250: Performed by: INTERNAL MEDICINE

## 2022-06-06 PROCEDURE — 80053 COMPREHEN METABOLIC PANEL: CPT

## 2022-06-06 PROCEDURE — G0378 HOSPITAL OBSERVATION PER HR: HCPCS

## 2022-06-06 PROCEDURE — 85025 COMPLETE CBC W/AUTO DIFF WBC: CPT

## 2022-06-06 PROCEDURE — 96361 HYDRATE IV INFUSION ADD-ON: CPT

## 2022-06-06 PROCEDURE — 96366 THER/PROPH/DIAG IV INF ADDON: CPT

## 2022-06-06 PROCEDURE — 97116 GAIT TRAINING THERAPY: CPT | Mod: CQ

## 2022-06-06 PROCEDURE — 63600175 PHARM REV CODE 636 W HCPCS: Performed by: INTERNAL MEDICINE

## 2022-06-06 PROCEDURE — 97165 OT EVAL LOW COMPLEX 30 MIN: CPT

## 2022-06-06 RX ADMIN — PIPERACILLIN SODIUM AND TAZOBACTAM SODIUM 4.5 G: 4; .5 INJECTION, POWDER, LYOPHILIZED, FOR SOLUTION INTRAVENOUS at 03:06

## 2022-06-06 RX ADMIN — SODIUM CHLORIDE, SODIUM LACTATE, POTASSIUM CHLORIDE, AND CALCIUM CHLORIDE: .6; .31; .03; .02 INJECTION, SOLUTION INTRAVENOUS at 12:06

## 2022-06-06 RX ADMIN — SERTRALINE HYDROCHLORIDE 25 MG: 25 TABLET ORAL at 09:06

## 2022-06-06 RX ADMIN — PIPERACILLIN SODIUM AND TAZOBACTAM SODIUM 4.5 G: 4; .5 INJECTION, POWDER, LYOPHILIZED, FOR SOLUTION INTRAVENOUS at 10:06

## 2022-06-06 RX ADMIN — FOLIC ACID 1 MG: 1 TABLET ORAL at 09:06

## 2022-06-06 RX ADMIN — LISINOPRIL 40 MG: 40 TABLET ORAL at 09:06

## 2022-06-06 RX ADMIN — PANTOPRAZOLE SODIUM 40 MG: 40 TABLET, DELAYED RELEASE ORAL at 09:06

## 2022-06-06 NOTE — PLAN OF CARE
POC reviewed with patient; understanding verbalized. Tele in place with NSR. PRN nausea medication administered, as needed. Scheduled IV antibiotics administered. IVF @ 125. Free from falls and injuries this shift. Pt with nonskid footwear on, bed in lowest position, and locked with bed rails up x 2. Pt has call light and personal items within reach. VSS and afebrile this shift. All questions and concerns addressed at this time. Will continue to monitor.

## 2022-06-06 NOTE — PLAN OF CARE
Problem: Physical Therapy  Goal: Physical Therapy Goal  Description: Goals to be met by: 7/3/22     Patient will increase functional independence with mobility by performin. Supine to sit with [place]  2. Sit to stand transfer with [place]  3. Bed to chair transfer with [place] using No Assistive Device  4. Gait  x 250 feet with [place] using No Assistive Device.   5. Lower extremity exercise program x20 reps per handout, with supervision    Outcome: Ongoing, Progressing   Ambulate with assistance for safety.

## 2022-06-06 NOTE — NURSING
Maksim HIGGINS reviewed and discussed w patient and spouse no questions.States [facility] notified of med ready. Belongings given to patients . Escorted out via  w [place].

## 2022-06-06 NOTE — PT/OT/SLP PROGRESS
Physical Therapy Treatment    Patient Name:  Claire Bowser   MRN:  [id number]    Recommendations:     Discharge Recommendations:  home   Discharge Equipment Recommendations: none   Barriers to discharge: None    Assessment:     Claire Bowser is a 62 y.o. female admitted with a medical diagnosis of Acute pancreatitis.  She presents with the following impairments/functional limitations:  weakness, impaired endurance . Agreed to participate in therapy. Reports needing gallbladder surgery in the near future and still having trouble keeping food down.  Ambulated 250' with SBA, IV in tow.     Rehab Prognosis: Good; patient would benefit from acute skilled PT services to address these deficits and reach maximum level of function.    Recent Surgery: * No surgery found *      Plan:     During this hospitalization, patient to be seen 6 x/week to address the identified rehab impairments via gait training, therapeutic activities, therapeutic exercises and progress toward the following goals:    · Plan of Care Expires:  07/03/22    Subjective     Chief Complaint: nausea/ vomiting  Patient/Family Comments/goals: to return home  Pain/Comfort:  · Pain Rating 1: 0/10      Objective:     Communicated with nurse Melvin prior to session.  Patient found supine with telemetry upon PT entry to room.     General Precautions: Standard, fall   Orthopedic Precautions:N/A   Braces: N/A  Respiratory Status: Room air     Functional Mobility:  · Bed Mobility:     · Supine to Sit: modified independence  · Sit to Supine: modified independence  · Transfers:     · Sit to Stand:  supervision with no AD  · Gait: 250' with SBA , IV in tow.       AM-PAC 6 CLICK MOBILITY          Therapeutic Activities and Exercises:   Ambulated with SBA for safety.     Patient left supine with all lines intact, call button in reach and nurse Melvin notified..    GOALS:   Multidisciplinary Problems     Physical Therapy Goals        Problem: Physical Therapy    Goal  Priority Disciplines Outcome Goal Variances Interventions   Physical Therapy Goal     PT, PT/OT      Description: Goals to be met by: 7/3/22     Patient will increase functional independence with mobility by performin. Supine to sit with [place]  2. Sit to stand transfer with [place]  3. Bed to chair transfer with [place] using No Assistive Device  4. Gait  x 250 feet with [place] using No Assistive Device.   5. Lower extremity exercise program x20 reps per handout, with supervision                     Time Tracking:     PT Received On: 22  PT Start Time: 1410     PT Stop Time: 1420  PT Total Time (min): 10 min     Billable Minutes: Gait Training 10min    Treatment Type: Treatment  PT/PTA: PTA     PTA Visit Number: 1     2022

## 2022-06-06 NOTE — PT/OT/SLP EVAL
Occupational Therapy   Evaluation and Discharge Note    Name: Claire Bowser  MRN: [id number]  Admitting Diagnosis:  Acute pancreatitis   Recent Surgery: * No surgery found *      Recommendations:     Discharge Recommendations: home  Discharge Equipment Recommendations:  none  Barriers to discharge:  None    Assessment:     Claire Bowser is a 62 y.o. female with a medical diagnosis of Acute pancreatitis. At this time, patient is independent with ADLs. Patient does not require further acute OT services.     Plan:     During this hospitalization, patient does not require further acute OT services.  Please re-consult if situation changes.    · Plan of Care Reviewed with: patient    Subjective     Chief Complaint: none  Patient/Family Comments/goals: none    Occupational Profile:  Living Environment: Patient lives with spouse in a [facility].   Previous level of function: Patient was independent with ADLs and mobility.   Roles and Routines: Patient still employed and still drives.   Equipment Used at home:  none  Assistance upon Discharge: Patient will receive assistance from spouse if needed.     Pain/Comfort:  · Pain Rating 1: 0/10  · Pain Rating Post-Intervention 1: 0/10    Patients cultural, spiritual, Anabaptist conflicts given the current situation:      Objective:     Communicated with: nurse Melvin prior to session.  Patient found HOB elevated with telemetry, peripheral IV upon OT entry to room.    General Precautions: Standard, fall   Orthopedic Precautions:N/A   Braces: N/A  Respiratory Status: Room air     Occupational Performance:    Bed Mobility:    · Patient completed Scooting/Bridging with modified independence  · Patient completed Supine to Sit with modified independence  · Patient completed Sit to Supine with modified independence    Functional Mobility/Transfers:  · Patient completed Sit <> Stand Transfer with modified independence  with  no assistive device   · Patient completed Toilet Transfer Stand Pivot  technique with modified independence with  no AD    Activities of Daily Living:  · Grooming: independence   · Upper Body Dressing: independence   · Lower Body Dressing: independence   · Toileting: independence     Cognitive/Visual Perceptual:  Cognitive/Psychosocial Skills:     -       Oriented to: x4   -       Follows Commands/attention:Follows multistep  commands  -       Communication: clear/fluent  -       Safety awareness/insight to disability: intact   -       Mood/Affect/Coping skills/emotional control: Appropriate to situation and Cooperative  Visual/Perceptual:      -Intact     Physical Exam:  Postural examination/scapula alignment:    -       Rounded shoulders  -       Forward head  Upper Extremity Range of Motion:     -       Right Upper Extremity: WFL  -       Left Upper Extremity: WFL  Upper Extremity Strength:    -       Right Upper Extremity: WFL  -       Left Upper Extremity: WFL   Strength:    -       Right Upper Extremity: WFL  -       Left Upper Extremity: WFL  Fine Motor Coordination:    -       Intact  Gross motor coordination:   WFL    AMPAC 6 Click ADL:  AMPAC Total Score: 24    Treatment & Education:  OT ed pt on OT role & POC as well as discharge recommendations.    Education:    Patient left HOB elevated with all lines intact, call button in reach, nurse notified and spouse present    GOALS:   Multidisciplinary Problems     Occupational Therapy Goals     Not on file                History:     Past Medical History:   Diagnosis Date    Anxiety     Flu 02/2017    [facility]    Hypertension     Rheumatoid arthritis involving multiple sites with positive rheumatoid factor 1/14/2021       Past Surgical History:   Procedure Laterality Date    COLONOSCOPY N/A 2/26/2021    Procedure: COLONOSCOPY;  Surgeon: Amy Sidhu MD;  Location: [facility];  Service: Endoscopy;  Laterality: N/A;    ESOPHAGOGASTRODUODENOSCOPY N/A 2/26/2021    Procedure: EGD (ESOPHAGOGASTRODUODENOSCOPY);   Surgeon: Amy Sidhu MD;  Location[facility];  Service: Endoscopy;  Laterality: N/A;    TUBAL LIGATION         Time Tracking:     OT Date of Treatment: 06/06/22  OT Start Time: 0910  OT Stop Time: 0918  OT Total Time (min): 8 min    Billable Minutes:Evaluation 8    6/6/2022

## 2022-06-06 NOTE — TELEPHONE ENCOUNTER
----- Message from Antwan Salter MD sent at 6/5/2022  3:08 PM CDT -----  Office with me in 4-6 weeks.

## 2022-06-07 NOTE — PLAN OF CARE
06/07/22 0726   Final Note   Assessment Type Final Discharge Note   Anticipated Discharge Disposition Home

## 2022-06-08 ENCOUNTER — OFFICE VISIT (OUTPATIENT)
Dept: FAMILY MEDICINE | Facility: CLINIC | Age: 62
End: 2022-06-08
Attending: FAMILY MEDICINE
Payer: COMMERCIAL

## 2022-06-08 ENCOUNTER — LAB VISIT (OUTPATIENT)
Dept: LAB | Facility: HOSPITAL | Age: 62
End: 2022-06-08
Attending: INTERNAL MEDICINE
Payer: COMMERCIAL

## 2022-06-08 VITALS
HEIGHT: 63 IN | OXYGEN SATURATION: 96 % | TEMPERATURE: 98 F | HEART RATE: 82 BPM | RESPIRATION RATE: 18 BRPM | BODY MASS INDEX: 20.13 KG/M2 | WEIGHT: 113.63 LBS | DIASTOLIC BLOOD PRESSURE: 60 MMHG | SYSTOLIC BLOOD PRESSURE: 110 MMHG

## 2022-06-08 DIAGNOSIS — K85.90 ACUTE PANCREATITIS, UNSPECIFIED COMPLICATION STATUS, UNSPECIFIED PANCREATITIS TYPE: ICD-10-CM

## 2022-06-08 DIAGNOSIS — M05.79 RHEUMATOID ARTHRITIS INVOLVING MULTIPLE SITES WITH POSITIVE RHEUMATOID FACTOR: ICD-10-CM

## 2022-06-08 DIAGNOSIS — K85.90 ACUTE PANCREATITIS WITHOUT INFECTION OR NECROSIS, UNSPECIFIED PANCREATITIS TYPE: ICD-10-CM

## 2022-06-08 DIAGNOSIS — E43 SEVERE MALNUTRITION: ICD-10-CM

## 2022-06-08 DIAGNOSIS — K85.90 ACUTE PANCREATITIS WITHOUT INFECTION OR NECROSIS, UNSPECIFIED PANCREATITIS TYPE: Primary | ICD-10-CM

## 2022-06-08 PROCEDURE — 3078F PR MOST RECENT DIASTOLIC BLOOD PRESSURE < 80 MM HG: ICD-10-PCS | Mod: CPTII,S$GLB,, | Performed by: FAMILY MEDICINE

## 2022-06-08 PROCEDURE — 3008F PR BODY MASS INDEX (BMI) DOCUMENTED: ICD-10-PCS | Mod: CPTII,S$GLB,, | Performed by: FAMILY MEDICINE

## 2022-06-08 PROCEDURE — 1160F PR REVIEW ALL MEDS BY PRESCRIBER/CLIN PHARMACIST DOCUMENTED: ICD-10-PCS | Mod: CPTII,S$GLB,, | Performed by: FAMILY MEDICINE

## 2022-06-08 PROCEDURE — 1159F MED LIST DOCD IN RCRD: CPT | Mod: CPTII,S$GLB,, | Performed by: FAMILY MEDICINE

## 2022-06-08 PROCEDURE — 3074F PR MOST RECENT SYSTOLIC BLOOD PRESSURE < 130 MM HG: ICD-10-PCS | Mod: CPTII,S$GLB,, | Performed by: FAMILY MEDICINE

## 2022-06-08 PROCEDURE — 83690 ASSAY OF LIPASE: CPT | Performed by: FAMILY MEDICINE

## 2022-06-08 PROCEDURE — 3074F SYST BP LT 130 MM HG: CPT | Mod: CPTII,S$GLB,, | Performed by: FAMILY MEDICINE

## 2022-06-08 PROCEDURE — 1160F RVW MEDS BY RX/DR IN RCRD: CPT | Mod: CPTII,S$GLB,, | Performed by: FAMILY MEDICINE

## 2022-06-08 PROCEDURE — 99999 PR PBB SHADOW E&M-EST. PATIENT-LVL V: CPT | Mod: PBBFAC,,, | Performed by: FAMILY MEDICINE

## 2022-06-08 PROCEDURE — 36415 COLL VENOUS BLD VENIPUNCTURE: CPT | Mod: PO | Performed by: INTERNAL MEDICINE

## 2022-06-08 PROCEDURE — 3008F BODY MASS INDEX DOCD: CPT | Mod: CPTII,S$GLB,, | Performed by: FAMILY MEDICINE

## 2022-06-08 PROCEDURE — 99214 OFFICE O/P EST MOD 30 MIN: CPT | Mod: S$GLB,,, | Performed by: FAMILY MEDICINE

## 2022-06-08 PROCEDURE — 3078F DIAST BP <80 MM HG: CPT | Mod: CPTII,S$GLB,, | Performed by: FAMILY MEDICINE

## 2022-06-08 PROCEDURE — 80053 COMPREHEN METABOLIC PANEL: CPT | Performed by: INTERNAL MEDICINE

## 2022-06-08 PROCEDURE — 4010F ACE/ARB THERAPY RXD/TAKEN: CPT | Mod: CPTII,S$GLB,, | Performed by: FAMILY MEDICINE

## 2022-06-08 PROCEDURE — 99999 PR PBB SHADOW E&M-EST. PATIENT-LVL V: ICD-10-PCS | Mod: PBBFAC,,, | Performed by: FAMILY MEDICINE

## 2022-06-08 PROCEDURE — 80076 HEPATIC FUNCTION PANEL: CPT | Performed by: FAMILY MEDICINE

## 2022-06-08 PROCEDURE — 4010F PR ACE/ARB THEARPY RXD/TAKEN: ICD-10-PCS | Mod: CPTII,S$GLB,, | Performed by: FAMILY MEDICINE

## 2022-06-08 PROCEDURE — 1159F PR MEDICATION LIST DOCUMENTED IN MEDICAL RECORD: ICD-10-PCS | Mod: CPTII,S$GLB,, | Performed by: FAMILY MEDICINE

## 2022-06-08 PROCEDURE — 99214 PR OFFICE/OUTPT VISIT, EST, LEVL IV, 30-39 MIN: ICD-10-PCS | Mod: S$GLB,,, | Performed by: FAMILY MEDICINE

## 2022-06-08 NOTE — PROGRESS NOTES
Subjective:       Patient ID: Claire Bowser is a 62 y.o. female.    Chief Complaint: Follow-up (Hospital )    62-year-old female coming in for hospital follow-up.  She developed upper respiratory symptoms in mid May and on May 14th went to urgent care where she was diagnosed with COVID-19.  She was given Paxlovid but it made her very ill and she was only able to take one dose and part of another due to severe vomiting and she did not complete the treatment.  On May 25th she went to the emergency room and was found to have a lower respiratory infection with suggestion of pneumonia, urinary tract infection and dehydration.  She was hydrated with IV saline and the following day was discharged on oral cefdinir.  She continued to feel ill with progressively worsening GI upset nausea vomiting and abdominal discomfort and bloating.  She returned to the emergency room on June 2nd where she was diagnosed with acute pancreatitis with a lipase level greater than 700. She had no alcohol use, had no history of hyperlipidemia and had had no previous history of gallbladder disease.  she incidentally had an abdominal ultrasound in February which showed normal gallbladder with no stones no evidence of obstruction.  A CT scan done on admission on June 2nd showed some changes of pancreatitis and reported suspected biliary sludge.  She had no elevation of liver enzymes and a normal alkaline phosphatases and they remained normal through the rest of the hospital stay.  Her lipase levels gradually decreased such that on discharge on June 6 she was down to 106. She was discharged on Cipro and has an appointment coming up next week with Dr. Coreas to discuss cholecystectomy.  The CT scan of the abdomen was limited by the lack of contrast dye which is in short supply nationwide.      Past Medical History:  No date: Anxiety  02/2017: Flu      Comment:  [facility]  No date: Hypertension  1/14/2021: Rheumatoid arthritis involving  multiple sites with   positive rheumatoid factor    Past Surgical History:  2/26/2021: COLONOSCOPY; N/A      Comment:  Procedure: COLONOSCOPY;  Surgeon: mAy Sidhu MD;               Location: [facility] ENDO;  Service: Endoscopy;  Laterality:                N/A;  2/26/2021: ESOPHAGOGASTRODUODENOSCOPY; N/A      Comment:  Procedure: EGD (ESOPHAGOGASTRODUODENOSCOPY);  Surgeon:                Amy Sidhu MD;  Location: [facility] ENDO;  Service:                Endoscopy;  Laterality: N/A;  No date: TUBAL LIGATION    Current Outpatient Medications on File Prior to Visit:  cholecalciferol, vitamin D3, (VITAMIN D3) 10 mcg (400 unit) Tab, Take 400 Units by mouth once daily., Disp: , Rfl:   ciprofloxacin HCl (CIPRO) 500 MG tablet, Take 1 tablet (500 mg total) by mouth every 12 (twelve) hours., Disp: 10 tablet, Rfl: 0  cyclobenzaprine (FLEXERIL) 5 MG tablet, Take 1 tablet (5 mg total) by mouth 3 (three) times daily as needed for Muscle spasms., Disp: 90 tablet, Rfl: 3  folic acid (FOLVITE) 1 MG tablet, Take 1 tablet (1 mg total) by mouth once daily., Disp: 360 tablet, Rfl: 3  hydroCHLOROthiazide (HYDRODIURIL) 25 MG tablet, Take 1 tablet (25 mg total) by mouth once daily., Disp: 30 tablet, Rfl: 5  lisinopriL (PRINIVIL,ZESTRIL) 40 MG tablet, Take 1 tablet (40 mg total) by mouth once daily., Disp: 90 tablet, Rfl: 1  methotrexate 2.5 MG Tab, TAKE 10 TABLETS BY MOUTH EVERY 7 DAYS SPLIT DOSING. TAKE 5 TABS IN THE AM AND 5 TABS IN THE PM EVERY 7 DAYS THE SAME DAY, Disp: 40 tablet, Rfl: 1  metoprolol succinate (TOPROL-XL) 50 MG 24 hr tablet, Take 1 tablet by mouth once daily, Disp: 90 tablet, Rfl: 3  pantoprazole (PROTONIX) 40 MG tablet, Take 1 tablet (40 mg total) by mouth once daily., Disp: 30 tablet, Rfl: 11  sertraline (ZOLOFT) 25 MG tablet, Take 1 tablet by mouth once daily, Disp: 90 tablet, Rfl: 0  traZODone (DESYREL) 50 MG tablet, Take 1 tablet by mouth in the evening, Disp: 30 tablet, Rfl: 2    No current  facility-administered medications on file prior to visit.        Review of Systems   Constitutional: Negative for chills, diaphoresis and fever.   Respiratory: Negative for chest tightness and shortness of breath.    Cardiovascular: Negative for chest pain and palpitations.   Gastrointestinal: Positive for diarrhea and nausea. Negative for abdominal pain, anal bleeding, blood in stool, constipation and vomiting.   Genitourinary: Negative for dysuria, frequency and hematuria.       Objective:      Physical Exam  Vitals and nursing note reviewed.   Constitutional:       General: She is not in acute distress.     Appearance: Normal appearance. She is normal weight. She is not ill-appearing, toxic-appearing or diaphoretic.      Comments: Good blood pressure control  Normal pulse with regular rhythm  Normal weight with a BMI of 20.1 but it is noted that she has lost 21.8 lb since I last saw her January 18, 2022 and she thinks she has lost almost all of that in the last three weeks.   Cardiovascular:      Rate and Rhythm: Normal rate and regular rhythm.      Heart sounds: Normal heart sounds. No murmur heard.    No friction rub. No gallop.   Pulmonary:      Effort: Pulmonary effort is normal. No respiratory distress.      Breath sounds: Normal breath sounds. No stridor. No wheezing, rhonchi or rales.   Chest:      Chest wall: No tenderness.   Abdominal:      General: Abdomen is flat. Bowel sounds are normal.      Palpations: Abdomen is soft. There is no hepatomegaly or splenomegaly.      Tenderness: There is abdominal tenderness in the epigastric area. There is no guarding or rebound. Negative signs include Batista's sign and McBurney's sign.   Neurological:      General: No focal deficit present.      Mental Status: She is alert and oriented to person, place, and time.   Psychiatric:         Mood and Affect: Mood normal.         Behavior: Behavior normal.         Thought Content: Thought content normal.         Judgment:  Judgment normal.         Assessment:       1. Acute pancreatitis without infection or necrosis, unspecified pancreatitis type    2. Rheumatoid arthritis involving multiple sites with positive rheumatoid factor    3. Severe malnutrition        Plan:       1. Acute pancreatitis without infection or necrosis, unspecified pancreatitis type  Appears to be resolving although she still has some tenderness to deep palpation over the epigastric area.  There is no tenderness over the gallbladder with a negative Batista sign and questionable sludge on noncontrast CT scan  Will try to get ultrasound of the right upper quadrant, ERCP might be helpful but might also trigger a flare-up of the pancreatitis  - US Abdomen Limited; Future  - Hepatic Function Panel; Future  - Lipase; Future    2. Rheumatoid arthritis involving multiple sites with positive rheumatoid factor  Followed by rheumatology, on methotrexate but she states she has not taken the methotrexate for the last three weeks due to her illness    3. Severe malnutrition  Still within normal BMI range but she has lost a significant amount of body weight recently    I spent 37 minutes on this encounter.  This time includes face-to-face time, orders, chart review, test review, and documentation.

## 2022-06-09 LAB
ALBUMIN SERPL BCP-MCNC: 2.5 G/DL (ref 3.5–5.2)
ALBUMIN SERPL BCP-MCNC: 2.5 G/DL (ref 3.5–5.2)
ALP SERPL-CCNC: 97 U/L (ref 55–135)
ALP SERPL-CCNC: 97 U/L (ref 55–135)
ALT SERPL W/O P-5'-P-CCNC: 13 U/L (ref 10–44)
ALT SERPL W/O P-5'-P-CCNC: 13 U/L (ref 10–44)
ANION GAP SERPL CALC-SCNC: 11 MMOL/L (ref 8–16)
AST SERPL-CCNC: 19 U/L (ref 10–40)
AST SERPL-CCNC: 19 U/L (ref 10–40)
BILIRUB DIRECT SERPL-MCNC: 0.2 MG/DL (ref 0.1–0.3)
BILIRUB SERPL-MCNC: 0.4 MG/DL (ref 0.1–1)
BILIRUB SERPL-MCNC: 0.4 MG/DL (ref 0.1–1)
BUN SERPL-MCNC: 5 MG/DL (ref 8–23)
CALCIUM SERPL-MCNC: 10.7 MG/DL (ref 8.7–10.5)
CHLORIDE SERPL-SCNC: 101 MMOL/L (ref 95–110)
CO2 SERPL-SCNC: 25 MMOL/L (ref 23–29)
CREAT SERPL-MCNC: 0.8 MG/DL (ref 0.5–1.4)
EST. GFR  (AFRICAN AMERICAN): >60 ML/MIN/1.73 M^2
EST. GFR  (NON AFRICAN AMERICAN): >60 ML/MIN/1.73 M^2
GLUCOSE SERPL-MCNC: 100 MG/DL (ref 70–110)
LIPASE SERPL-CCNC: 139 U/L (ref 4–60)
POTASSIUM SERPL-SCNC: 3.6 MMOL/L (ref 3.5–5.1)
PROT SERPL-MCNC: 7.4 G/DL (ref 6–8.4)
PROT SERPL-MCNC: 7.4 G/DL (ref 6–8.4)
SODIUM SERPL-SCNC: 137 MMOL/L (ref 136–145)

## 2022-06-10 ENCOUNTER — OFFICE VISIT (OUTPATIENT)
Dept: RHEUMATOLOGY | Facility: CLINIC | Age: 62
End: 2022-06-10
Payer: COMMERCIAL

## 2022-06-10 VITALS
DIASTOLIC BLOOD PRESSURE: 60 MMHG | HEART RATE: 80 BPM | BODY MASS INDEX: 19.68 KG/M2 | WEIGHT: 111.13 LBS | SYSTOLIC BLOOD PRESSURE: 118 MMHG

## 2022-06-10 DIAGNOSIS — K85.90 ACUTE PANCREATITIS, UNSPECIFIED COMPLICATION STATUS, UNSPECIFIED PANCREATITIS TYPE: ICD-10-CM

## 2022-06-10 DIAGNOSIS — Z79.60 LONG-TERM USE OF IMMUNOSUPPRESSANT MEDICATION: ICD-10-CM

## 2022-06-10 DIAGNOSIS — M05.79 RHEUMATOID ARTHRITIS INVOLVING MULTIPLE SITES WITH POSITIVE RHEUMATOID FACTOR: Primary | ICD-10-CM

## 2022-06-10 PROCEDURE — 1159F MED LIST DOCD IN RCRD: CPT | Mod: CPTII,S$GLB,, | Performed by: INTERNAL MEDICINE

## 2022-06-10 PROCEDURE — 99999 PR PBB SHADOW E&M-EST. PATIENT-LVL III: ICD-10-PCS | Mod: PBBFAC,,, | Performed by: INTERNAL MEDICINE

## 2022-06-10 PROCEDURE — 4010F PR ACE/ARB THEARPY RXD/TAKEN: ICD-10-PCS | Mod: CPTII,S$GLB,, | Performed by: INTERNAL MEDICINE

## 2022-06-10 PROCEDURE — 99214 PR OFFICE/OUTPT VISIT, EST, LEVL IV, 30-39 MIN: ICD-10-PCS | Mod: S$GLB,,, | Performed by: INTERNAL MEDICINE

## 2022-06-10 PROCEDURE — 3078F DIAST BP <80 MM HG: CPT | Mod: CPTII,S$GLB,, | Performed by: INTERNAL MEDICINE

## 2022-06-10 PROCEDURE — 1160F PR REVIEW ALL MEDS BY PRESCRIBER/CLIN PHARMACIST DOCUMENTED: ICD-10-PCS | Mod: CPTII,S$GLB,, | Performed by: INTERNAL MEDICINE

## 2022-06-10 PROCEDURE — 3008F BODY MASS INDEX DOCD: CPT | Mod: CPTII,S$GLB,, | Performed by: INTERNAL MEDICINE

## 2022-06-10 PROCEDURE — 3074F SYST BP LT 130 MM HG: CPT | Mod: CPTII,S$GLB,, | Performed by: INTERNAL MEDICINE

## 2022-06-10 PROCEDURE — 3078F PR MOST RECENT DIASTOLIC BLOOD PRESSURE < 80 MM HG: ICD-10-PCS | Mod: CPTII,S$GLB,, | Performed by: INTERNAL MEDICINE

## 2022-06-10 PROCEDURE — 3074F PR MOST RECENT SYSTOLIC BLOOD PRESSURE < 130 MM HG: ICD-10-PCS | Mod: CPTII,S$GLB,, | Performed by: INTERNAL MEDICINE

## 2022-06-10 PROCEDURE — 99214 OFFICE O/P EST MOD 30 MIN: CPT | Mod: S$GLB,,, | Performed by: INTERNAL MEDICINE

## 2022-06-10 PROCEDURE — 3008F PR BODY MASS INDEX (BMI) DOCUMENTED: ICD-10-PCS | Mod: CPTII,S$GLB,, | Performed by: INTERNAL MEDICINE

## 2022-06-10 PROCEDURE — 1159F PR MEDICATION LIST DOCUMENTED IN MEDICAL RECORD: ICD-10-PCS | Mod: CPTII,S$GLB,, | Performed by: INTERNAL MEDICINE

## 2022-06-10 PROCEDURE — 99999 PR PBB SHADOW E&M-EST. PATIENT-LVL III: CPT | Mod: PBBFAC,,, | Performed by: INTERNAL MEDICINE

## 2022-06-10 PROCEDURE — 1160F RVW MEDS BY RX/DR IN RCRD: CPT | Mod: CPTII,S$GLB,, | Performed by: INTERNAL MEDICINE

## 2022-06-10 PROCEDURE — 4010F ACE/ARB THERAPY RXD/TAKEN: CPT | Mod: CPTII,S$GLB,, | Performed by: INTERNAL MEDICINE

## 2022-06-10 RX ORDER — PREDNISONE 5 MG/1
5 TABLET ORAL DAILY
Qty: 30 TABLET | Refills: 4 | Status: SHIPPED | OUTPATIENT
Start: 2022-06-10 | End: 2022-07-10

## 2022-06-11 ENCOUNTER — HOSPITAL ENCOUNTER (OUTPATIENT)
Dept: RADIOLOGY | Facility: HOSPITAL | Age: 62
Discharge: HOME | End: 2022-06-11
Attending: FAMILY MEDICINE
Payer: COMMERCIAL

## 2022-06-11 DIAGNOSIS — K85.90 ACUTE PANCREATITIS WITHOUT INFECTION OR NECROSIS, UNSPECIFIED PANCREATITIS TYPE: ICD-10-CM

## 2022-06-11 PROCEDURE — 76705 ECHO EXAM OF ABDOMEN: CPT | Mod: 26,,, | Performed by: RADIOLOGY

## 2022-06-11 PROCEDURE — 76705 ECHO EXAM OF ABDOMEN: CPT | Mod: TC

## 2022-06-11 PROCEDURE — 76705 US ABDOMEN LIMITED: ICD-10-PCS | Mod: 26,,, | Performed by: RADIOLOGY

## 2022-06-13 ENCOUNTER — TELEPHONE (OUTPATIENT)
Dept: FAMILY MEDICINE | Facility: CLINIC | Age: 62
End: 2022-06-13
Payer: COMMERCIAL

## 2022-06-13 ENCOUNTER — PATIENT MESSAGE (OUTPATIENT)
Dept: FAMILY MEDICINE | Facility: CLINIC | Age: 62
End: 2022-06-13
Payer: COMMERCIAL

## 2022-06-13 DIAGNOSIS — K86.1 CHRONIC BILIARY PANCREATITIS: Primary | ICD-10-CM

## 2022-06-13 NOTE — TELEPHONE ENCOUNTER
----- Message from Nohemi Man sent at 6/13/2022  3:41 PM CDT -----  Contact: pt  Who Called: PT  Regarding: The pt is calling to speak with the provider stating she has to make some surgery decisions and is requesting a callback.   Would the patient rather a call back or a response via [facility]? Call back  Best Call Back Number: [phone]  Additional Information:

## 2022-06-13 NOTE — PROGRESS NOTES
Illness:  62-year-old female who has been followed by Dr. Cortez since December, 2020. She presented at that time with a several month history of foot and ankle pain.  It then spread to the hands.  She was diagnosed as having rheumatoid arthritis.  She was started initially on prednisone and then methotrexate.  She had been tapered off her prednisone.  She states her pain is worse with stopping the prednisone.  Her methotrexate had been increased up to 25 mg weekly.  She cut it back to 20 mg because of hair loss.     I saw her for the 1st time in March.  She had no evidence of active synovitis.  She did have a furuncle on the right hand.  I treated her with doxycycline and it went away.  She was hospitalized for COVID since her last visit.  She subsequently developed pancreatitis P her symptoms are now improving.  She has been off methotrexate for the past month.  She developed increased pain and swelling.  She went back on prednisone, a initially 10 mg, she is now on 5 mg.    Physical examination:  Musculoskeletal:  She has synovitis of the left MCPs.  She has bony hypertrophy of the PIP.  She has no other areas of synovitis.      Assessment:  1. Rheumatoid arthritis with mild synovitis  2.  Osteoarthritis   3. Pancreatitis, possibly secondary to COVID or maybe to gallbladder sludge     Plans:  1.  Continue prednisone 5 mg daily  2.  Remain off methotrexate until her pancreatic problem has been resolved  3.  Return in 3 months  Answers for HPI/ROS submitted by the patient on 6/9/2022  fever: Yes  eye redness: No  mouth sores: No  headaches: Yes  shortness of breath: Yes  chest pain: No  trouble swallowing: No  diarrhea: Yes  constipation: No  unexpected weight change: Yes  genital sore: No  dysuria: No  During the last 3 days, have you had a skin rash?: No  Bruises or bleeds easily: No  cough: No

## 2022-06-14 ENCOUNTER — HOSPITAL ENCOUNTER (OUTPATIENT)
Facility: HOSPITAL | Age: 62
Discharge: HOME | End: 2022-06-16
Attending: EMERGENCY MEDICINE | Admitting: INTERNAL MEDICINE
Payer: COMMERCIAL

## 2022-06-14 DIAGNOSIS — R07.9 CHEST PAIN: ICD-10-CM

## 2022-06-14 DIAGNOSIS — R55 SYNCOPE, UNSPECIFIED SYNCOPE TYPE: ICD-10-CM

## 2022-06-14 DIAGNOSIS — R55 SYNCOPAL EPISODES: ICD-10-CM

## 2022-06-14 DIAGNOSIS — E87.6 HYPOKALEMIA: Primary | ICD-10-CM

## 2022-06-14 DIAGNOSIS — R55 SYNCOPE: ICD-10-CM

## 2022-06-14 DIAGNOSIS — K85.10 ACUTE BILIARY PANCREATITIS, UNSPECIFIED COMPLICATION STATUS: ICD-10-CM

## 2022-06-14 LAB
ALBUMIN SERPL BCP-MCNC: 2.4 G/DL (ref 3.5–5.2)
ALP SERPL-CCNC: 73 U/L (ref 55–135)
ALT SERPL W/O P-5'-P-CCNC: 10 U/L (ref 10–44)
ANION GAP SERPL CALC-SCNC: 10 MMOL/L (ref 8–16)
AST SERPL-CCNC: 15 U/L (ref 10–40)
BASOPHILS # BLD AUTO: 0.14 K/UL (ref 0–0.2)
BASOPHILS NFR BLD: 0.9 % (ref 0–1.9)
BILIRUB SERPL-MCNC: 0.3 MG/DL (ref 0.1–1)
BUN SERPL-MCNC: 12 MG/DL (ref 8–23)
CALCIUM SERPL-MCNC: 9.9 MG/DL (ref 8.7–10.5)
CHLORIDE SERPL-SCNC: 100 MMOL/L (ref 95–110)
CK MB SERPL-MCNC: 0.7 NG/ML (ref 0.1–6.5)
CK MB SERPL-RTO: 4.1 % (ref 0–5)
CK SERPL-CCNC: 17 U/L (ref 20–180)
CO2 SERPL-SCNC: 31 MMOL/L (ref 23–29)
CORTIS SERPL-MCNC: 7.2 UG/DL
CREAT SERPL-MCNC: 1 MG/DL (ref 0.5–1.4)
DIFFERENTIAL METHOD: ABNORMAL
EOSINOPHIL # BLD AUTO: 0.4 K/UL (ref 0–0.5)
EOSINOPHIL NFR BLD: 2.6 % (ref 0–8)
ERYTHROCYTE [DISTWIDTH] IN BLOOD BY AUTOMATED COUNT: 14.9 % (ref 11.5–14.5)
EST. GFR  (AFRICAN AMERICAN): >60 ML/MIN/1.73 M^2
EST. GFR  (NON AFRICAN AMERICAN): >60 ML/MIN/1.73 M^2
FOLATE SERPL-MCNC: 10.8 NG/ML (ref 4–24)
GLUCOSE SERPL-MCNC: 100 MG/DL (ref 70–110)
HCT VFR BLD AUTO: 25.8 % (ref 37–48.5)
HGB BLD-MCNC: 8.8 G/DL (ref 12–16)
IMM GRANULOCYTES # BLD AUTO: 0.09 K/UL (ref 0–0.04)
IMM GRANULOCYTES NFR BLD AUTO: 0.6 % (ref 0–0.5)
IRON SERPL-MCNC: 34 UG/DL (ref 30–160)
LIPASE SERPL-CCNC: 153 U/L (ref 4–60)
LYMPHOCYTES # BLD AUTO: 2.2 K/UL (ref 1–4.8)
LYMPHOCYTES NFR BLD: 14.2 % (ref 18–48)
MAGNESIUM SERPL-MCNC: 1.6 MG/DL (ref 1.6–2.6)
MCH RBC QN AUTO: 34 PG (ref 27–31)
MCHC RBC AUTO-ENTMCNC: 34.1 G/DL (ref 32–36)
MCV RBC AUTO: 100 FL (ref 82–98)
MONOCYTES # BLD AUTO: 1.3 K/UL (ref 0.3–1)
MONOCYTES NFR BLD: 8.7 % (ref 4–15)
NEUTROPHILS # BLD AUTO: 11 K/UL (ref 1.8–7.7)
NEUTROPHILS NFR BLD: 73 % (ref 38–73)
NRBC BLD-RTO: 0 /100 WBC
PLATELET # BLD AUTO: 470 K/UL (ref 150–450)
PMV BLD AUTO: 9.6 FL (ref 9.2–12.9)
POCT GLUCOSE: 103 MG/DL (ref 70–110)
POTASSIUM SERPL-SCNC: 2.8 MMOL/L (ref 3.5–5.1)
PROT SERPL-MCNC: 6.3 G/DL (ref 6–8.4)
RBC # BLD AUTO: 2.59 M/UL (ref 4–5.4)
SARS-COV-2 RDRP RESP QL NAA+PROBE: NEGATIVE
SATURATED IRON: 15 % (ref 20–50)
SODIUM SERPL-SCNC: 141 MMOL/L (ref 136–145)
TOTAL IRON BINDING CAPACITY: 231 UG/DL (ref 250–450)
TRANSFERRIN SERPL-MCNC: 156 MG/DL (ref 200–375)
TROPONIN I SERPL DL<=0.01 NG/ML-MCNC: 0.01 NG/ML (ref 0–0.03)
TROPONIN I SERPL DL<=0.01 NG/ML-MCNC: <0.006 NG/ML (ref 0–0.03)
TROPONIN I SERPL DL<=0.01 NG/ML-MCNC: <0.006 NG/ML (ref 0–0.03)
TSH SERPL DL<=0.005 MIU/L-ACNC: 0.42 UIU/ML (ref 0.4–4)
VIT B12 SERPL-MCNC: 486 PG/ML (ref 210–950)
WBC # BLD AUTO: 15.11 K/UL (ref 3.9–12.7)

## 2022-06-14 PROCEDURE — 84443 ASSAY THYROID STIM HORMONE: CPT | Performed by: INTERNAL MEDICINE

## 2022-06-14 PROCEDURE — 96361 HYDRATE IV INFUSION ADD-ON: CPT

## 2022-06-14 PROCEDURE — 36415 COLL VENOUS BLD VENIPUNCTURE: CPT | Performed by: INTERNAL MEDICINE

## 2022-06-14 PROCEDURE — 93005 ELECTROCARDIOGRAM TRACING: CPT

## 2022-06-14 PROCEDURE — 82962 GLUCOSE BLOOD TEST: CPT

## 2022-06-14 PROCEDURE — 94761 N-INVAS EAR/PLS OXIMETRY MLT: CPT

## 2022-06-14 PROCEDURE — 83690 ASSAY OF LIPASE: CPT | Performed by: EMERGENCY MEDICINE

## 2022-06-14 PROCEDURE — 96365 THER/PROPH/DIAG IV INF INIT: CPT

## 2022-06-14 PROCEDURE — A4216 STERILE WATER/SALINE, 10 ML: HCPCS | Performed by: INTERNAL MEDICINE

## 2022-06-14 PROCEDURE — G0378 HOSPITAL OBSERVATION PER HR: HCPCS

## 2022-06-14 PROCEDURE — 85025 COMPLETE CBC W/AUTO DIFF WBC: CPT | Performed by: EMERGENCY MEDICINE

## 2022-06-14 PROCEDURE — 96372 THER/PROPH/DIAG INJ SC/IM: CPT | Mod: 59 | Performed by: INTERNAL MEDICINE

## 2022-06-14 PROCEDURE — 84484 ASSAY OF TROPONIN QUANT: CPT | Mod: 91 | Performed by: INTERNAL MEDICINE

## 2022-06-14 PROCEDURE — 84466 ASSAY OF TRANSFERRIN: CPT | Performed by: INTERNAL MEDICINE

## 2022-06-14 PROCEDURE — 36415 COLL VENOUS BLD VENIPUNCTURE: CPT | Performed by: EMERGENCY MEDICINE

## 2022-06-14 PROCEDURE — 25000003 PHARM REV CODE 250: Performed by: INTERNAL MEDICINE

## 2022-06-14 PROCEDURE — 82533 TOTAL CORTISOL: CPT | Performed by: INTERNAL MEDICINE

## 2022-06-14 PROCEDURE — 93010 ELECTROCARDIOGRAM REPORT: CPT | Mod: ,,, | Performed by: SPECIALIST

## 2022-06-14 PROCEDURE — 83735 ASSAY OF MAGNESIUM: CPT | Performed by: EMERGENCY MEDICINE

## 2022-06-14 PROCEDURE — 63600175 PHARM REV CODE 636 W HCPCS: Performed by: EMERGENCY MEDICINE

## 2022-06-14 PROCEDURE — 84484 ASSAY OF TROPONIN QUANT: CPT | Performed by: EMERGENCY MEDICINE

## 2022-06-14 PROCEDURE — 93010 EKG 12-LEAD: ICD-10-PCS | Mod: ,,, | Performed by: SPECIALIST

## 2022-06-14 PROCEDURE — 82553 CREATINE MB FRACTION: CPT | Performed by: INTERNAL MEDICINE

## 2022-06-14 PROCEDURE — 99285 EMERGENCY DEPT VISIT HI MDM: CPT | Mod: 25

## 2022-06-14 PROCEDURE — 63600175 PHARM REV CODE 636 W HCPCS: Performed by: INTERNAL MEDICINE

## 2022-06-14 PROCEDURE — U0002 COVID-19 LAB TEST NON-CDC: HCPCS | Performed by: INTERNAL MEDICINE

## 2022-06-14 PROCEDURE — 25000003 PHARM REV CODE 250: Performed by: EMERGENCY MEDICINE

## 2022-06-14 PROCEDURE — 82607 VITAMIN B-12: CPT | Performed by: INTERNAL MEDICINE

## 2022-06-14 PROCEDURE — 82746 ASSAY OF FOLIC ACID SERUM: CPT | Performed by: INTERNAL MEDICINE

## 2022-06-14 PROCEDURE — 80053 COMPREHEN METABOLIC PANEL: CPT | Performed by: EMERGENCY MEDICINE

## 2022-06-14 RX ORDER — POLYETHYLENE GLYCOL 3350 17 G/17G
17 POWDER, FOR SOLUTION ORAL DAILY PRN
Status: DISCONTINUED | OUTPATIENT
Start: 2022-06-14 | End: 2022-06-16 | Stop reason: HOSPADM

## 2022-06-14 RX ORDER — ACETAMINOPHEN 325 MG/1
650 TABLET ORAL EVERY 8 HOURS PRN
Status: DISCONTINUED | OUTPATIENT
Start: 2022-06-14 | End: 2022-06-16 | Stop reason: HOSPADM

## 2022-06-14 RX ORDER — CHOLECALCIFEROL (VITAMIN D3) 10 MCG
400 TABLET ORAL DAILY
Status: DISCONTINUED | OUTPATIENT
Start: 2022-06-14 | End: 2022-06-16 | Stop reason: HOSPADM

## 2022-06-14 RX ORDER — SODIUM,POTASSIUM PHOSPHATES 280-250MG
2 POWDER IN PACKET (EA) ORAL
Status: DISCONTINUED | OUTPATIENT
Start: 2022-06-14 | End: 2022-06-16 | Stop reason: HOSPADM

## 2022-06-14 RX ORDER — FOLIC ACID 1 MG/1
1 TABLET ORAL DAILY
Status: DISCONTINUED | OUTPATIENT
Start: 2022-06-14 | End: 2022-06-16 | Stop reason: HOSPADM

## 2022-06-14 RX ORDER — LANOLIN ALCOHOL/MO/W.PET/CERES
800 CREAM (GRAM) TOPICAL
Status: DISCONTINUED | OUTPATIENT
Start: 2022-06-14 | End: 2022-06-16 | Stop reason: HOSPADM

## 2022-06-14 RX ORDER — SODIUM CHLORIDE 0.9 % (FLUSH) 0.9 %
10 SYRINGE (ML) INJECTION EVERY 8 HOURS
Status: DISCONTINUED | OUTPATIENT
Start: 2022-06-14 | End: 2022-06-16 | Stop reason: HOSPADM

## 2022-06-14 RX ORDER — ENOXAPARIN SODIUM 100 MG/ML
40 INJECTION SUBCUTANEOUS EVERY 24 HOURS
Status: DISCONTINUED | OUTPATIENT
Start: 2022-06-14 | End: 2022-06-16 | Stop reason: HOSPADM

## 2022-06-14 RX ORDER — IBUPROFEN 200 MG
24 TABLET ORAL
Status: DISCONTINUED | OUTPATIENT
Start: 2022-06-14 | End: 2022-06-16 | Stop reason: HOSPADM

## 2022-06-14 RX ORDER — HYDROCODONE BITARTRATE AND ACETAMINOPHEN 5; 325 MG/1; MG/1
1 TABLET ORAL EVERY 6 HOURS PRN
Status: DISCONTINUED | OUTPATIENT
Start: 2022-06-14 | End: 2022-06-16 | Stop reason: HOSPADM

## 2022-06-14 RX ORDER — SERTRALINE HYDROCHLORIDE 25 MG/1
25 TABLET, FILM COATED ORAL DAILY
Status: DISCONTINUED | OUTPATIENT
Start: 2022-06-14 | End: 2022-06-16 | Stop reason: HOSPADM

## 2022-06-14 RX ORDER — POTASSIUM CHLORIDE 7.45 MG/ML
10 INJECTION INTRAVENOUS
Status: COMPLETED | OUTPATIENT
Start: 2022-06-14 | End: 2022-06-14

## 2022-06-14 RX ORDER — TALC
6 POWDER (GRAM) TOPICAL NIGHTLY PRN
Status: DISCONTINUED | OUTPATIENT
Start: 2022-06-14 | End: 2022-06-16 | Stop reason: HOSPADM

## 2022-06-14 RX ORDER — GLUCAGON 1 MG
1 KIT INJECTION
Status: DISCONTINUED | OUTPATIENT
Start: 2022-06-14 | End: 2022-06-16 | Stop reason: HOSPADM

## 2022-06-14 RX ORDER — ONDANSETRON 2 MG/ML
4 INJECTION INTRAMUSCULAR; INTRAVENOUS EVERY 8 HOURS PRN
Status: DISCONTINUED | OUTPATIENT
Start: 2022-06-14 | End: 2022-06-16 | Stop reason: HOSPADM

## 2022-06-14 RX ORDER — PANTOPRAZOLE SODIUM 40 MG/1
40 TABLET, DELAYED RELEASE ORAL DAILY
Status: DISCONTINUED | OUTPATIENT
Start: 2022-06-14 | End: 2022-06-16 | Stop reason: HOSPADM

## 2022-06-14 RX ORDER — NALOXONE HCL 0.4 MG/ML
0.02 VIAL (ML) INJECTION
Status: DISCONTINUED | OUTPATIENT
Start: 2022-06-14 | End: 2022-06-16 | Stop reason: HOSPADM

## 2022-06-14 RX ORDER — SODIUM CHLORIDE 9 MG/ML
INJECTION, SOLUTION INTRAVENOUS CONTINUOUS
Status: DISCONTINUED | OUTPATIENT
Start: 2022-06-14 | End: 2022-06-16 | Stop reason: HOSPADM

## 2022-06-14 RX ORDER — IBUPROFEN 200 MG
16 TABLET ORAL
Status: DISCONTINUED | OUTPATIENT
Start: 2022-06-14 | End: 2022-06-16 | Stop reason: HOSPADM

## 2022-06-14 RX ADMIN — SODIUM CHLORIDE 1000 ML: 0.9 INJECTION, SOLUTION INTRAVENOUS at 12:06

## 2022-06-14 RX ADMIN — SERTRALINE HYDROCHLORIDE 25 MG: 25 TABLET ORAL at 05:06

## 2022-06-14 RX ADMIN — SODIUM CHLORIDE: 0.9 INJECTION, SOLUTION INTRAVENOUS at 04:06

## 2022-06-14 RX ADMIN — SODIUM CHLORIDE: 0.9 INJECTION, SOLUTION INTRAVENOUS at 03:06

## 2022-06-14 RX ADMIN — Medication 10 ML: at 02:06

## 2022-06-14 RX ADMIN — FOLIC ACID 1 MG: 1 TABLET ORAL at 04:06

## 2022-06-14 RX ADMIN — ENOXAPARIN SODIUM 40 MG: 40 INJECTION SUBCUTANEOUS at 04:06

## 2022-06-14 RX ADMIN — POTASSIUM CHLORIDE 10 MEQ: 7.46 INJECTION, SOLUTION INTRAVENOUS at 02:06

## 2022-06-14 RX ADMIN — PANTOPRAZOLE SODIUM 40 MG: 40 TABLET, DELAYED RELEASE ORAL at 03:06

## 2022-06-14 RX ADMIN — Medication 400 UNITS: at 05:06

## 2022-06-14 RX ADMIN — POTASSIUM BICARBONATE 40 MEQ: 391 TABLET, EFFERVESCENT ORAL at 02:06

## 2022-06-14 RX ADMIN — Medication 10 ML: at 09:06

## 2022-06-14 NOTE — ASSESSMENT & PLAN NOTE
Nutrition consulted. Most recent weight and BMI monitored-   Nutrition consulted. Encourage maximal PO intake. Diet supplementation ordered per nutrition approval. Will encourage PO and monitor closely for weight changes.                        Measurements:  Wt Readings from Last 1 Encounters:   06/10/22 50.4 kg (111 lb 1.8 oz)   There is no height or weight on file to calculate BMI.    Recommendations:      Patient has been screened and assessed by RD. RD will follow patient.

## 2022-06-14 NOTE — PLAN OF CARE
POC/Meds reviewed, pt verbalized understanding. Vitals stable. Afebrile. Remains on room air. IVPB abx administered. Tele In place-NSR. Up with x1 assist to BSC. IS at bedside, instructed on use and return demonstration performed. No complaints of pain. Repositions self. Hourly/Q2hr rounding performed, safety maintained. Bed in lowest position, wheels locked, SR up x2, call light in easy reach. No complaints at this time. Will continue to monitor.

## 2022-06-14 NOTE — ASSESSMENT & PLAN NOTE
Neuro-checks.  Tele-monitoring.   Check TSH.   Check Orthostatics.   2D Echocardiogram.   Carotid doppler U/S.   Fall precautions.   Check serum cortisol level.

## 2022-06-14 NOTE — ED NOTES
"Pt identifiers checked and accurate with Claire Bowser    Pt presents to ED with complaints of near syncopal episode while shopping at [employer] this AM. Pt reports "splotches" in vision with generalized weakness. Pt denies LOC, head trauma, dizziness, N/V.    "

## 2022-06-14 NOTE — ASSESSMENT & PLAN NOTE
Trend lipase level.  Clear liquid diet for now.  Trend LFTs.  Obtain MRCP for biliary dilatation seen on abdominal ultrasound on June 11, 2022.

## 2022-06-14 NOTE — H&P
[facility] [place]  [facility] Medicine  History & Physical    Patient Name: Claire Bowser  MRN: [id number]  Patient Class: OP- Observation  Admission Date: 6/14/2022  Attending Physician: Jose Jimenez MD   Primary Care Provider: Samuel Brady MD         Patient information was obtained from patient and ER records.     Subjective:     Principal Problem:Syncope    Chief Complaint:   Chief Complaint   Patient presents with    Loss of Consciousness     At [facility]. + hypotension        HPI: Patient is a 62-year-old  female with past medical history significant for hypertension, hyperlipidemia, anxiety disorder and rheumatoid arthritis (on methotrexate therapy) and recent history of COVID-19 infection (May 13, 2022; received Paxlovid for 3 days) is being admitted to Hospital Medicine under observation status from [facility] Emergency Room with syncope episode prior to presentation.  Patient was recently hospitalized at our facility from June 2nd through June 6, 2022 related to biliary pancreatitis.  Patient was brought to the emergency room via EMS.  Patient states she went to [facility] prior to seeing general surgeon for elective cholecystectomy. Patient states, she was browsing in an aisle for about 10 minutes when she started to feel dizzy like the room was spinning then lost consciousness. She states that her  stopped her from falling. When EMS arrived BP was 80/40. Patient currently feels fatigued and nauseated.  Patient was seen by her rheumatologist 3 days ago when her methotrexate therapy was discontinued and patient was placed on prednisone 5 mg daily.  Prior to her syncopal episode she had no complaints. She denies recent vomiting, dark stools, diarrhea, dysuria, hematuria, chest pain, SOB, or abdominal pain.  Patient reports her appetite has improved, she is eating better.  Last bowel movement 6 days ago.  No urinary complaints reported.        Past Medical  History:   Diagnosis Date    Anxiety     Flu 02/2017    [facility]    Hypertension     Rheumatoid arthritis involving multiple sites with positive rheumatoid factor 1/14/2021       Past Surgical History:   Procedure Laterality Date    COLONOSCOPY N/A 2/26/2021    Procedure: COLONOSCOPY;  Surgeon: Amy Sidhu MD;  Location: [facility];  Service: Endoscopy;  Laterality: N/A;    ESOPHAGOGASTRODUODENOSCOPY N/A 2/26/2021    Procedure: EGD (ESOPHAGOGASTRODUODENOSCOPY);  Surgeon: Amy Sidhu MD;  Location: [facility];  Service: Endoscopy;  Laterality: N/A;    TUBAL LIGATION         Review of patient's allergies indicates:   Allergen Reactions    No known drug allergies        No current facility-administered medications on file prior to encounter.     Current Outpatient Medications on File Prior to Encounter   Medication Sig    cholecalciferol, vitamin D3, (VITAMIN D3) 10 mcg (400 unit) Tab Take 400 Units by mouth once daily.    cyclobenzaprine (FLEXERIL) 5 MG tablet Take 1 tablet (5 mg total) by mouth 3 (three) times daily as needed for Muscle spasms.    folic acid (FOLVITE) 1 MG tablet Take 1 tablet (1 mg total) by mouth once daily.    hydroCHLOROthiazide (HYDRODIURIL) 25 MG tablet Take 1 tablet (25 mg total) by mouth once daily.    lisinopriL (PRINIVIL,ZESTRIL) 40 MG tablet Take 1 tablet (40 mg total) by mouth once daily.    methotrexate 2.5 MG Tab TAKE 10 TABLETS BY MOUTH EVERY 7 DAYS SPLIT DOSING. TAKE 5 TABS IN THE AM AND 5 TABS IN THE PM EVERY 7 DAYS THE SAME DAY    metoprolol succinate (TOPROL-XL) 50 MG 24 hr tablet Take 1 tablet by mouth once daily    pantoprazole (PROTONIX) 40 MG tablet Take 1 tablet (40 mg total) by mouth once daily.    predniSONE (DELTASONE) 5 MG tablet Take 1 tablet (5 mg total) by mouth once daily.    sertraline (ZOLOFT) 25 MG tablet Take 1 tablet by mouth once daily    traZODone (DESYREL) 50 MG tablet Take 1 tablet by mouth in the evening     [DISCONTINUED] ciprofloxacin HCl (CIPRO) 500 MG tablet Take 1 tablet (500 mg total) by mouth every 12 (twelve) hours.     Family History       Problem Relation (Age of Onset)    Alcohol abuse Paternal Grandfather, Paternal Grandmother    COPD Father, Brother    Cancer Maternal Aunt    Cirrhosis Paternal Grandmother    Diabetes Mother, Maternal Aunt    Emphysema Brother    Hearing loss Mother    Heart disease Mother, Maternal Aunt, Maternal Uncle    Hypertension Mother, Maternal Uncle    Kidney disease Mother    Liver disease Paternal Grandfather, Sister    No Known Problems Son, Paternal Uncle    Sleep apnea Brother    Stroke Mother          Tobacco Use    Smoking status: Current Every Day Smoker     Packs/day: 1.00     Years: 7.00     Pack years: 7.00     Types: Cigarettes    Smokeless tobacco: Never Used    Tobacco comment: [phone]   Substance and Sexual Activity    Alcohol use: No    Drug use: Never    Sexual activity: Yes     Partners: Male     Review of Systems   Constitutional:  Positive for appetite change.   Gastrointestinal:  Positive for abdominal pain and nausea.   Neurological:  Positive for dizziness, syncope and light-headedness.   All other systems reviewed and are negative.  Objective:     Vital Signs (Most Recent):  Pulse: 62 (06/14/22 1406)  Resp: 18 (06/14/22 1406)  BP: 136/63 (06/14/22 1406)  SpO2: 96 % (06/14/22 1406) Vital Signs (24h Range):  Pulse:  [60-69] 62  Resp:  [16-18] 18  SpO2:  [96 %-99 %] 96 %  BP: ()/(42-63) 136/63        There is no height or weight on file to calculate BMI.    Physical Exam  Constitutional:       Appearance: She is well-developed.   HENT:      Head: Normocephalic and atraumatic.   Eyes:      Conjunctiva/sclera: Conjunctivae normal.      Pupils: Pupils are equal, round, and reactive to light.   Neck:      Thyroid: No thyromegaly.      Vascular: No JVD.   Cardiovascular:      Rate and Rhythm: Normal rate and regular rhythm.      Heart sounds: No  murmur heard.    No friction rub. No gallop.   Pulmonary:      Effort: Pulmonary effort is normal.      Breath sounds: Normal breath sounds.   Abdominal:      General: Bowel sounds are normal. There is no distension.      Palpations: Abdomen is soft. There is no mass.      Tenderness: There is no abdominal tenderness.   Musculoskeletal:         General: Normal range of motion.      Cervical back: Neck supple.   Skin:     General: Skin is warm and dry.   Neurological:      Mental Status: She is oriented to person, place, and time.      Cranial Nerves: No cranial nerve deficit.   Psychiatric:         Behavior: Behavior normal.         CRANIAL NERVES     CN III, IV, VI   Pupils are equal, round, and reactive to light.     Significant Labs: All pertinent labs within the past 24 hours have been reviewed.  CBC:   Recent Labs   Lab 06/14/22  1201   WBC 15.11*   HGB 8.8*   HCT 25.8*   *     CMP:   Recent Labs   Lab 06/14/22  1201      K 2.8*      CO2 31*      BUN 12   CREATININE 1.0   CALCIUM 9.9   PROT 6.3   ALBUMIN 2.4*   BILITOT 0.3   ALKPHOS 73   AST 15   ALT 10   ANIONGAP 10   EGFRNONAA >60     Lipase:   Recent Labs   Lab 06/14/22  1201   LIPASE 153*     Troponin:   Recent Labs   Lab 06/14/22  1201   TROPONINI <0.006     Microbiology Results (last 7 days)       ** No results found for the last 168 hours. **          Significant Imaging:   US abdomen (06-11-22):  Biliary ductal dilatation.  Consider follow-up with MRCP.  Fatty infiltration of the liver.  Hypoechoic structure adjacent to the pancreatic head is suspected to be an enlarged peripancreatic lymph node.  Follow-up with MRI, with and without IV contrast, would be helpful for further evaluation.    Assessment/Plan:     * Syncope  Neuro-checks.  Tele-monitoring.   Check TSH.   Check Orthostatics.   2D Echocardiogram.   Carotid doppler U/S.   Fall precautions.   Check serum cortisol level.        Acute biliary pancreatitis  Trend lipase  level.  Clear liquid diet for now.  Trend LFTs.  Obtain MRCP for biliary dilatation seen on abdominal ultrasound on June 11, 2022.       Severe malnutrition  Nutrition consulted. Most recent weight and BMI monitored-   Nutrition consulted. Encourage maximal PO intake. Diet supplementation ordered per nutrition approval. Will encourage PO and monitor closely for weight changes.                        Measurements:  Wt Readings from Last 1 Encounters:   06/10/22 50.4 kg (111 lb 1.8 oz)   There is no height or weight on file to calculate BMI.    Recommendations:      Patient has been screened and assessed by RD. RD will follow patient.      ACP (advance care planning)  Advance Care Planning     Date: 06/14/2022    Living Will  During this visit, I engaged the patient  in the advance care planning process.  The patient and I reviewed the role for advance directives and their purpose in directing future healthcare if the patient's unable to speak for him/herself.  At this point in time, the patient does have full decision-making capacity.  We discussed different extreme health states that she could experience, and reviewed what kind of medical care she would want in those situations.  The patient communicated that if she were comatose and had little chance of a meaningful recovery, she would want machines/life-sustaining treatments used. I spent a total of 16 minutes engaging the patient in this advance care planning discussion.                Dehydration  Continue IV fluid hydration with normal saline      Rheumatoid arthritis involving multiple sites with positive rheumatoid factor  Continue prednisone 5 mg daily.  Patient may benefit with ACTH stimulation test.      Tobacco use  Smoking cessation counseling performed. Dangers of cigarette smoking were reviewed with patient in detail and patient was encouraged to quit. Nicotine replacement options were discussed for > 3 minutes.        Generalized anxiety disorder  Use  anxiolytics as needed        VTE Risk Mitigation (From admission, onward)         Ordered     enoxaparin injection 40 mg  Daily         06/14/22 1429     IP VTE HIGH RISK PATIENT  Once         06/14/22 1429     Place sequential compression device  Until discontinued         06/14/22 1429                   Jose Jimenez MD  Department of Hospital Medicine   [facility] - Emergency Dept

## 2022-06-14 NOTE — SUBJECTIVE & OBJECTIVE
Past Medical History:   Diagnosis Date    Anxiety     Flu 02/2017    [facility]    Hypertension     Rheumatoid arthritis involving multiple sites with positive rheumatoid factor 1/14/2021       Past Surgical History:   Procedure Laterality Date    COLONOSCOPY N/A 2/26/2021    Procedure: COLONOSCOPY;  Surgeon: Amy Sidhu MD;  Location: [facility];  Service: Endoscopy;  Laterality: N/A;    ESOPHAGOGASTRODUODENOSCOPY N/A 2/26/2021    Procedure: EGD (ESOPHAGOGASTRODUODENOSCOPY);  Surgeon: Amy Sidhu MD;  Location: [facility];  Service: Endoscopy;  Laterality: N/A;    TUBAL LIGATION         Review of patient's allergies indicates:   Allergen Reactions    No known drug allergies        No current facility-administered medications on file prior to encounter.     Current Outpatient Medications on File Prior to Encounter   Medication Sig    cholecalciferol, vitamin D3, (VITAMIN D3) 10 mcg (400 unit) Tab Take 400 Units by mouth once daily.    cyclobenzaprine (FLEXERIL) 5 MG tablet Take 1 tablet (5 mg total) by mouth 3 (three) times daily as needed for Muscle spasms.    folic acid (FOLVITE) 1 MG tablet Take 1 tablet (1 mg total) by mouth once daily.    hydroCHLOROthiazide (HYDRODIURIL) 25 MG tablet Take 1 tablet (25 mg total) by mouth once daily.    lisinopriL (PRINIVIL,ZESTRIL) 40 MG tablet Take 1 tablet (40 mg total) by mouth once daily.    methotrexate 2.5 MG Tab TAKE 10 TABLETS BY MOUTH EVERY 7 DAYS SPLIT DOSING. TAKE 5 TABS IN THE AM AND 5 TABS IN THE PM EVERY 7 DAYS THE SAME DAY    metoprolol succinate (TOPROL-XL) 50 MG 24 hr tablet Take 1 tablet by mouth once daily    pantoprazole (PROTONIX) 40 MG tablet Take 1 tablet (40 mg total) by mouth once daily.    predniSONE (DELTASONE) 5 MG tablet Take 1 tablet (5 mg total) by mouth once daily.    sertraline (ZOLOFT) 25 MG tablet Take 1 tablet by mouth once daily    traZODone (DESYREL) 50 MG tablet Take 1 tablet by mouth in the evening    [DISCONTINUED]  ciprofloxacin HCl (CIPRO) 500 MG tablet Take 1 tablet (500 mg total) by mouth every 12 (twelve) hours.     Family History       Problem Relation (Age of Onset)    Alcohol abuse Paternal Grandfather, Paternal Grandmother    COPD Father, Brother    Cancer Maternal Aunt    Cirrhosis Paternal Grandmother    Diabetes Mother, Maternal Aunt    Emphysema Brother    Hearing loss Mother    Heart disease Mother, Maternal Aunt, Maternal Uncle    Hypertension Mother, Maternal Uncle    Kidney disease Mother    Liver disease Paternal Grandfather, Sister    No Known Problems Son, Paternal Uncle    Sleep apnea Brother    Stroke Mother          Tobacco Use    Smoking status: Current Every Day Smoker     Packs/day: 1.00     Years: 7.00     Pack years: 7.00     Types: Cigarettes    Smokeless tobacco: Never Used    Tobacco comment: [phone]   Substance and Sexual Activity    Alcohol use: No    Drug use: Never    Sexual activity: Yes     Partners: Male     Review of Systems   Constitutional:  Positive for appetite change.   Gastrointestinal:  Positive for abdominal pain and nausea.   Neurological:  Positive for dizziness, syncope and light-headedness.   All other systems reviewed and are negative.  Objective:     Vital Signs (Most Recent):  Pulse: 62 (06/14/22 1406)  Resp: 18 (06/14/22 1406)  BP: 136/63 (06/14/22 1406)  SpO2: 96 % (06/14/22 1406) Vital Signs (24h Range):  Pulse:  [60-69] 62  Resp:  [16-18] 18  SpO2:  [96 %-99 %] 96 %  BP: ()/(42-63) 136/63        There is no height or weight on file to calculate BMI.    Physical Exam  Constitutional:       Appearance: She is well-developed.   HENT:      Head: Normocephalic and atraumatic.   Eyes:      Conjunctiva/sclera: Conjunctivae normal.      Pupils: Pupils are equal, round, and reactive to light.   Neck:      Thyroid: No thyromegaly.      Vascular: No JVD.   Cardiovascular:      Rate and Rhythm: Normal rate and regular rhythm.      Heart sounds: No murmur heard.    No  friction rub. No gallop.   Pulmonary:      Effort: Pulmonary effort is normal.      Breath sounds: Normal breath sounds.   Abdominal:      General: Bowel sounds are normal. There is no distension.      Palpations: Abdomen is soft. There is no mass.      Tenderness: There is no abdominal tenderness.   Musculoskeletal:         General: Normal range of motion.      Cervical back: Neck supple.   Skin:     General: Skin is warm and dry.   Neurological:      Mental Status: She is oriented to person, place, and time.      Cranial Nerves: No cranial nerve deficit.   Psychiatric:         Behavior: Behavior normal.         CRANIAL NERVES     CN III, IV, VI   Pupils are equal, round, and reactive to light.     Significant Labs: All pertinent labs within the past 24 hours have been reviewed.  CBC:   Recent Labs   Lab 06/14/22  1201   WBC 15.11*   HGB 8.8*   HCT 25.8*   *     CMP:   Recent Labs   Lab 06/14/22  1201      K 2.8*      CO2 31*      BUN 12   CREATININE 1.0   CALCIUM 9.9   PROT 6.3   ALBUMIN 2.4*   BILITOT 0.3   ALKPHOS 73   AST 15   ALT 10   ANIONGAP 10   EGFRNONAA >60     Lipase:   Recent Labs   Lab 06/14/22  1201   LIPASE 153*     Troponin:   Recent Labs   Lab 06/14/22  1201   TROPONINI <0.006     Microbiology Results (last 7 days)       ** No results found for the last 168 hours. **          Significant Imaging:   US abdomen (06-11-22):  Biliary ductal dilatation.  Consider follow-up with MRCP.  Fatty infiltration of the liver.  Hypoechoic structure adjacent to the pancreatic head is suspected to be an enlarged peripancreatic lymph node.  Follow-up with MRI, with and without IV contrast, would be helpful for further evaluation.

## 2022-06-14 NOTE — ASSESSMENT & PLAN NOTE
Advance Care Planning     Date: 06/14/2022    Living Will  During this visit, I engaged the patient  in the advance care planning process.  The patient and I reviewed the role for advance directives and their purpose in directing future healthcare if the patient's unable to speak for him/herself.  At this point in time, the patient does have full decision-making capacity.  We discussed different extreme health states that she could experience, and reviewed what kind of medical care she would want in those situations.  The patient communicated that if she were comatose and had little chance of a meaningful recovery, she would want machines/life-sustaining treatments used. I spent a total of 16 minutes engaging the patient in this advance care planning discussion.

## 2022-06-14 NOTE — NURSING
Pt to room 412. Oriented to room. Instructed to call on call light for assistance ambulating. VSS. Room air. Bed alarm set. Bed locked and low, call light in reach.

## 2022-06-14 NOTE — HPI
Patient is a 62-year-old  female with past medical history significant for hypertension, hyperlipidemia, anxiety disorder and rheumatoid arthritis (on methotrexate therapy) and recent history of COVID-19 infection (May 13, 2022; received Paxlovid for 3 days) is being admitted to Hospital Medicine under observation status from [facility] Emergency Room with syncope episode prior to presentation.  Patient was recently hospitalized at our facility from June 2nd through June 6, 2022 related to biliary pancreatitis.  Patient was brought to the emergency room via EMS.  Patient states she went to [facility] prior to seeing general surgeon for elective cholecystectomy. Patient states, she was browsing in an aisle for about 10 minutes when she started to feel dizzy like the room was spinning then lost consciousness. She states that her  stopped her from falling. When EMS arrived BP was 80/40. Patient currently feels fatigued and nauseated.  Patient was seen by her rheumatologist 3 days ago when her methotrexate therapy was discontinued and patient was placed on prednisone 5 mg daily.  Prior to her syncopal episode she had no complaints. She denies recent vomiting, dark stools, diarrhea, dysuria, hematuria, chest pain, SOB, or abdominal pain.  Patient reports her appetite has improved, she is eating better.  Last bowel movement 6 days ago.  No urinary complaints reported.

## 2022-06-14 NOTE — ED PROVIDER NOTES
Encounter Date: 6/14/2022    SCRIBE #1 NOTE: I, Indu Beebe, am scribing for, and in the presence of, Jacques Henson MD.       History     Chief Complaint   Patient presents with    Loss of Consciousness     At [facility]. + hypotension     Time seen by provider: 11:50 AM on 06/14/2022    Claire Bowser is a 62 y.o. female who presents to the ED via EMS after a syncopal event at [facility] prior to arrival. Patient states she was browsing in an aisle for about 10 minutes when she started to feel dizzy like the room was spinning then lost consciousness. She states that her  stopped her from falling. When EMS arrived BP was 80/40. Patient currently feels fatigued and nauseated. Prior to her syncopal episode she had no complaints. She denies recent vomiting, dark stools, diarrhea, dysuria, hematuria, chest pain, SOB, or abdominal pain. Patient has a PMHx of HTN and rheumatoid arthritis on prednisone and methotrexate and has since been tapered off methotrexate. Earlier this month patient was admitted with suspected biliary pancreatitis and was referred to surgery for a possible elective cholecystectomy as an outpatient. Patient is currently on a bland diet. She reports being constipated stating her last bowel movement was 6 days ago (6/8/22).    The history is provided by the patient.     Review of patient's allergies indicates:   Allergen Reactions    No known drug allergies      Past Medical History:   Diagnosis Date    Anxiety     Flu 02/2017    [facility]    Hypertension     Rheumatoid arthritis involving multiple sites with positive rheumatoid factor 1/14/2021     Past Surgical History:   Procedure Laterality Date    COLONOSCOPY N/A 2/26/2021    Procedure: COLONOSCOPY;  Surgeon: Amy Sidhu MD;  Location: [facility];  Service: Endoscopy;  Laterality: N/A;    ESOPHAGOGASTRODUODENOSCOPY N/A 2/26/2021    Procedure: EGD (ESOPHAGOGASTRODUODENOSCOPY);  Surgeon: Amy Sidhu MD;   Location: [facility];  Service: Endoscopy;  Laterality: N/A;    TUBAL LIGATION       Family History   Problem Relation Age of Onset    Diabetes Mother     Heart disease Mother     Kidney disease Mother     Hypertension Mother     Stroke Mother     Hearing loss Mother     COPD Father     Liver disease Paternal Grandfather     Alcohol abuse Paternal Grandfather     Liver disease Sister     Emphysema Brother     COPD Brother     Sleep apnea Brother     No Known Problems Son     Alcohol abuse Paternal Grandmother     Cirrhosis Paternal Grandmother     Heart disease Maternal Aunt     Diabetes Maternal Aunt     Cancer Maternal Aunt         female    Heart disease Maternal Uncle     Hypertension Maternal Uncle     No Known Problems Paternal Uncle     Psoriasis Neg Hx     Lupus Neg Hx     Eczema Neg Hx     Melanoma Neg Hx      Social History     Tobacco Use    Smoking status: Current Every Day Smoker     Packs/day: 1.00     Years: 7.00     Pack years: 7.00     Types: Cigarettes    Smokeless tobacco: Never Used    Tobacco comment: [phone]   Substance Use Topics    Alcohol use: No    Drug use: Never     Review of Systems   Constitutional: Positive for fatigue. Negative for fever.   HENT: Negative for sore throat.    Respiratory: Negative for shortness of breath.    Cardiovascular: Negative for chest pain.   Gastrointestinal: Positive for constipation and nausea. Negative for abdominal pain, blood in stool, diarrhea and vomiting.   Genitourinary: Negative for dysuria and hematuria.   Musculoskeletal: Negative for back pain.   Skin: Negative for rash.   Neurological: Positive for dizziness and syncope. Negative for weakness and headaches.   Hematological: Does not bruise/bleed easily.   Psychiatric/Behavioral: Negative for confusion.       Physical Exam     Initial Vitals   BP Pulse Resp Temp SpO2   06/14/22 1147 06/14/22 1147 06/14/22 1147 06/14/22 1533 06/14/22 1147   (!) 71/42 63 16 98.9  °F (37.2 °C) 96 %      MAP       --                Physical Exam    Nursing note and vitals reviewed.  Constitutional: She appears well-developed and well-nourished. She is not diaphoretic. No distress.   HENT:   Head: Normocephalic and atraumatic.   Mouth/Throat: Oropharynx is clear and moist.   Eyes: Conjunctivae are normal. No scleral icterus.   Neck: Neck supple.   Cardiovascular: Normal rate, regular rhythm, normal heart sounds and intact distal pulses. Exam reveals no gallop and no friction rub.    No murmur heard.  Pulmonary/Chest: Breath sounds normal. She has no wheezes. She has no rhonchi. She has no rales.   Abdominal: Abdomen is soft. She exhibits no distension. There is no abdominal tenderness. There is no guarding.   Musculoskeletal:         General: No edema. Normal range of motion.      Cervical back: Neck supple.     Neurological: She is alert and oriented to person, place, and time. She has normal strength. No cranial nerve deficit or sensory deficit.   Skin: Capillary refill takes less than 2 seconds. No rash noted. No erythema.   Psychiatric: She has a normal mood and affect. Her speech is normal. Thought content normal.         ED Course   Procedures  Labs Reviewed   CBC W/ AUTO DIFFERENTIAL - Abnormal; Notable for the following components:       Result Value    WBC 15.11 (*)     RBC 2.59 (*)     Hemoglobin 8.8 (*)     Hematocrit 25.8 (*)      (*)     MCH 34.0 (*)     RDW 14.9 (*)     Platelets 470 (*)     Immature Granulocytes 0.6 (*)     Gran # (ANC) 11.0 (*)     Immature Grans (Abs) 0.09 (*)     Mono # 1.3 (*)     Lymph % 14.2 (*)     All other components within normal limits   COMPREHENSIVE METABOLIC PANEL - Abnormal; Notable for the following components:    Potassium 2.8 (*)     CO2 31 (*)     Albumin 2.4 (*)     All other components within normal limits   LIPASE - Abnormal; Notable for the following components:    Lipase 153 (*)     All other components within normal limits    TROPONIN I   MAGNESIUM   SARS-COV-2 RNA AMPLIFICATION, QUAL   TROPONIN I   CORTISOL, RANDOM   IRON AND TIBC   FOLATE   VITAMIN B12   TSH   POCT GLUCOSE   POCT GLUCOSE MONITORING CONTINUOUS          Imaging Results          US Carotid Bilateral (In process)                  Medications   cholecalciferol (vitamin D3) capsule/tablet 400 Units (has no administration in time range)   folic acid tablet 1 mg (has no administration in time range)   pantoprazole EC tablet 40 mg (40 mg Oral Given 6/14/22 1532)   sertraline tablet 25 mg (has no administration in time range)   sodium chloride 0.9% flush 10 mL (10 mLs Intravenous Given 6/14/22 1430)   melatonin tablet 6 mg (has no administration in time range)   polyethylene glycol packet 17 g (has no administration in time range)   acetaminophen tablet 650 mg (has no administration in time range)   HYDROcodone-acetaminophen 5-325 mg per tablet 1 tablet (has no administration in time range)   naloxone 0.4 mg/mL injection 0.02 mg (has no administration in time range)   potassium bicarbonate disintegrating tablet 50 mEq (has no administration in time range)   potassium bicarbonate disintegrating tablet 35 mEq (has no administration in time range)   potassium bicarbonate disintegrating tablet 60 mEq (has no administration in time range)   magnesium oxide tablet 800 mg (has no administration in time range)   magnesium oxide tablet 800 mg (has no administration in time range)   potassium, sodium phosphates 280-160-250 mg packet 2 packet (has no administration in time range)   potassium, sodium phosphates 280-160-250 mg packet 2 packet (has no administration in time range)   potassium, sodium phosphates 280-160-250 mg packet 2 packet (has no administration in time range)   glucose chewable tablet 16 g (has no administration in time range)   glucose chewable tablet 24 g (has no administration in time range)   glucagon (human recombinant) injection 1 mg (has no administration in time  range)   dextrose 10% bolus 125 mL (has no administration in time range)   dextrose 10% bolus 250 mL (has no administration in time range)   0.9%  NaCl infusion ( Intravenous New Bag 6/14/22 1533)   enoxaparin injection 40 mg (has no administration in time range)   ondansetron injection 4 mg (has no administration in time range)   sodium chloride 0.9% bolus 1,000 mL (0 mLs Intravenous Stopped 6/14/22 1342)   potassium chloride 10 mEq in 100 mL IVPB (0 mEq Intravenous Stopped 6/14/22 1506)   potassium bicarbonate disintegrating tablet 40 mEq (40 mEq Oral Given 6/14/22 1405)     Medical Decision Making:   History:   Old Medical Records: I decided to obtain old medical records.  Clinical Tests:   Lab Tests: Ordered and Reviewed  Medical Tests: Reviewed and Ordered          Scribe Attestation:   Scribe #1: I performed the above scribed service and the documentation accurately describes the services I performed. I attest to the accuracy of the note.         Claire Bowser is a 62 y.o. female presenting with syncopal episode while at the store.  Unclear etiology.  Workup significant for hypokalemia.  No clear EKG change or arrhythmia.  Low suspicion for ACS.  Initial cardiac biomarker is negative.  In the setting of significant hypokalemia I will admit for cardiac monitoring and IV repletion.  Oral repletion also begun here.  Hypokalemia may be related to modify diet with recent chronic pancreatitis with outpatient workup ongoing.  No current abdominal pain or sign of acute pancreatitis with minimally persistent elevated lipase noted.  She has a nonfocal neurological exam with no other complaints suggestive of acute intracranial process such as CVA.  I have discussed with hospital medicine who will assume care.  No clear sign of acute infectious process with mild leukocytosis possibly related to chronic steroid therapy.      ED Course as of 06/14/22 1540   Tue Jun 14, 2022   1220 EKG:  NSR with sinus arrhythmia, rate of  71, normal intervals and axis.  There are no acute ST or T wave changes suggestive of acute ischemia or infarction.  No sign of arrhythmia including no delta waves, Brugada syndrome, or signs of hypertrophic cardiomyopathy. [MR]      ED Course User Index  [MR] Jacques Henson MD             Clinical Impression:   Final diagnoses:  [R55] Syncopal episodes  [E87.6] Hypokalemia (Primary)  [R55] Syncope  [R07.9] Chest pain          ED Disposition Condition    Observation               Jacques Henson MD  06/14/22 [id number]

## 2022-06-15 LAB
ALBUMIN SERPL BCP-MCNC: 2.3 G/DL (ref 3.5–5.2)
ALP SERPL-CCNC: 80 U/L (ref 55–135)
ALT SERPL W/O P-5'-P-CCNC: 11 U/L (ref 10–44)
ANION GAP SERPL CALC-SCNC: 8 MMOL/L (ref 8–16)
AST SERPL-CCNC: 13 U/L (ref 10–40)
BASOPHILS # BLD AUTO: 0.11 K/UL (ref 0–0.2)
BASOPHILS NFR BLD: 0.9 % (ref 0–1.9)
BILIRUB SERPL-MCNC: 0.2 MG/DL (ref 0.1–1)
BUN SERPL-MCNC: 10 MG/DL (ref 8–23)
CALCIUM SERPL-MCNC: 9.4 MG/DL (ref 8.7–10.5)
CHLORIDE SERPL-SCNC: 106 MMOL/L (ref 95–110)
CHOLEST SERPL-MCNC: 140 MG/DL (ref 120–199)
CHOLEST/HDLC SERPL: 4.2 {RATIO} (ref 2–5)
CK MB SERPL-MCNC: 0.6 NG/ML (ref 0.1–6.5)
CK MB SERPL-RTO: 4 % (ref 0–5)
CK SERPL-CCNC: 15 U/L (ref 20–180)
CO2 SERPL-SCNC: 27 MMOL/L (ref 23–29)
CREAT SERPL-MCNC: 0.8 MG/DL (ref 0.5–1.4)
DIFFERENTIAL METHOD: ABNORMAL
EOSINOPHIL # BLD AUTO: 0.4 K/UL (ref 0–0.5)
EOSINOPHIL NFR BLD: 3.2 % (ref 0–8)
ERYTHROCYTE [DISTWIDTH] IN BLOOD BY AUTOMATED COUNT: 14.6 % (ref 11.5–14.5)
EST. GFR  (AFRICAN AMERICAN): >60 ML/MIN/1.73 M^2
EST. GFR  (NON AFRICAN AMERICAN): >60 ML/MIN/1.73 M^2
GLUCOSE SERPL-MCNC: 92 MG/DL (ref 70–110)
HCT VFR BLD AUTO: 25.7 % (ref 37–48.5)
HDLC SERPL-MCNC: 33 MG/DL (ref 40–75)
HDLC SERPL: 23.6 % (ref 20–50)
HGB BLD-MCNC: 8.8 G/DL (ref 12–16)
IMM GRANULOCYTES # BLD AUTO: 0.05 K/UL (ref 0–0.04)
IMM GRANULOCYTES NFR BLD AUTO: 0.4 % (ref 0–0.5)
LDLC SERPL CALC-MCNC: 48 MG/DL (ref 63–159)
LIPASE SERPL-CCNC: 127 U/L (ref 4–60)
LIPASE SERPL-CCNC: 127 U/L (ref 4–60)
LYMPHOCYTES # BLD AUTO: 2.7 K/UL (ref 1–4.8)
LYMPHOCYTES NFR BLD: 20.9 % (ref 18–48)
MAGNESIUM SERPL-MCNC: 1.5 MG/DL (ref 1.6–2.6)
MCH RBC QN AUTO: 33.6 PG (ref 27–31)
MCHC RBC AUTO-ENTMCNC: 34.2 G/DL (ref 32–36)
MCV RBC AUTO: 98 FL (ref 82–98)
MONOCYTES # BLD AUTO: 1.1 K/UL (ref 0.3–1)
MONOCYTES NFR BLD: 8.5 % (ref 4–15)
NEUTROPHILS # BLD AUTO: 8.4 K/UL (ref 1.8–7.7)
NEUTROPHILS NFR BLD: 66.1 % (ref 38–73)
NONHDLC SERPL-MCNC: 107 MG/DL
NRBC BLD-RTO: 0 /100 WBC
PHOSPHATE SERPL-MCNC: 1.7 MG/DL (ref 2.7–4.5)
PLATELET # BLD AUTO: 473 K/UL (ref 150–450)
PMV BLD AUTO: 9.7 FL (ref 9.2–12.9)
POTASSIUM SERPL-SCNC: 3 MMOL/L (ref 3.5–5.1)
PROT SERPL-MCNC: 6.2 G/DL (ref 6–8.4)
RBC # BLD AUTO: 2.62 M/UL (ref 4–5.4)
SODIUM SERPL-SCNC: 141 MMOL/L (ref 136–145)
TRIGL SERPL-MCNC: 295 MG/DL (ref 30–150)
TROPONIN I SERPL DL<=0.01 NG/ML-MCNC: 0.02 NG/ML (ref 0–0.03)
TROPONIN I SERPL DL<=0.01 NG/ML-MCNC: <0.006 NG/ML (ref 0–0.03)
TSH SERPL DL<=0.005 MIU/L-ACNC: 1.84 UIU/ML (ref 0.4–4)
WBC # BLD AUTO: 12.71 K/UL (ref 3.9–12.7)

## 2022-06-15 PROCEDURE — 99204 OFFICE O/P NEW MOD 45 MIN: CPT | Mod: ,,, | Performed by: STUDENT IN AN ORGANIZED HEALTH CARE EDUCATION/TRAINING PROGRAM

## 2022-06-15 PROCEDURE — 99214 OFFICE O/P EST MOD 30 MIN: CPT | Mod: ,,, | Performed by: INTERNAL MEDICINE

## 2022-06-15 PROCEDURE — 96366 THER/PROPH/DIAG IV INF ADDON: CPT

## 2022-06-15 PROCEDURE — 85025 COMPLETE CBC W/AUTO DIFF WBC: CPT | Performed by: INTERNAL MEDICINE

## 2022-06-15 PROCEDURE — 80053 COMPREHEN METABOLIC PANEL: CPT | Performed by: INTERNAL MEDICINE

## 2022-06-15 PROCEDURE — G0378 HOSPITAL OBSERVATION PER HR: HCPCS

## 2022-06-15 PROCEDURE — 25000003 PHARM REV CODE 250: Performed by: INTERNAL MEDICINE

## 2022-06-15 PROCEDURE — 84443 ASSAY THYROID STIM HORMONE: CPT | Performed by: INTERNAL MEDICINE

## 2022-06-15 PROCEDURE — A4216 STERILE WATER/SALINE, 10 ML: HCPCS | Performed by: INTERNAL MEDICINE

## 2022-06-15 PROCEDURE — 83735 ASSAY OF MAGNESIUM: CPT | Performed by: INTERNAL MEDICINE

## 2022-06-15 PROCEDURE — 83690 ASSAY OF LIPASE: CPT | Performed by: INTERNAL MEDICINE

## 2022-06-15 PROCEDURE — 80061 LIPID PANEL: CPT | Performed by: INTERNAL MEDICINE

## 2022-06-15 PROCEDURE — 99214 PR OFFICE/OUTPT VISIT, EST, LEVL IV, 30-39 MIN: ICD-10-PCS | Mod: ,,, | Performed by: INTERNAL MEDICINE

## 2022-06-15 PROCEDURE — 96367 TX/PROPH/DG ADDL SEQ IV INF: CPT

## 2022-06-15 PROCEDURE — 97165 OT EVAL LOW COMPLEX 30 MIN: CPT

## 2022-06-15 PROCEDURE — 82553 CREATINE MB FRACTION: CPT | Performed by: INTERNAL MEDICINE

## 2022-06-15 PROCEDURE — 84100 ASSAY OF PHOSPHORUS: CPT | Performed by: INTERNAL MEDICINE

## 2022-06-15 PROCEDURE — 97161 PT EVAL LOW COMPLEX 20 MIN: CPT

## 2022-06-15 PROCEDURE — 36415 COLL VENOUS BLD VENIPUNCTURE: CPT | Performed by: INTERNAL MEDICINE

## 2022-06-15 PROCEDURE — 96361 HYDRATE IV INFUSION ADD-ON: CPT

## 2022-06-15 PROCEDURE — 97116 GAIT TRAINING THERAPY: CPT

## 2022-06-15 PROCEDURE — 99204 PR OFFICE/OUTPT VISIT, NEW, LEVL IV, 45-59 MIN: ICD-10-PCS | Mod: ,,, | Performed by: STUDENT IN AN ORGANIZED HEALTH CARE EDUCATION/TRAINING PROGRAM

## 2022-06-15 PROCEDURE — 84484 ASSAY OF TROPONIN QUANT: CPT | Performed by: INTERNAL MEDICINE

## 2022-06-15 PROCEDURE — 63600175 PHARM REV CODE 636 W HCPCS: Performed by: INTERNAL MEDICINE

## 2022-06-15 RX ORDER — MAGNESIUM SULFATE HEPTAHYDRATE 40 MG/ML
2 INJECTION, SOLUTION INTRAVENOUS ONCE
Status: COMPLETED | OUTPATIENT
Start: 2022-06-15 | End: 2022-06-15

## 2022-06-15 RX ADMIN — Medication 400 UNITS: at 08:06

## 2022-06-15 RX ADMIN — MAGNESIUM SULFATE 2 G: 2 INJECTION INTRAVENOUS at 12:06

## 2022-06-15 RX ADMIN — PANTOPRAZOLE SODIUM 40 MG: 40 TABLET, DELAYED RELEASE ORAL at 08:06

## 2022-06-15 RX ADMIN — POTASSIUM PHOSPHATE, MONOBASIC AND POTASSIUM PHOSPHATE, DIBASIC 30 MMOL: 224; 236 INJECTION, SOLUTION, CONCENTRATE INTRAVENOUS at 03:06

## 2022-06-15 RX ADMIN — SERTRALINE HYDROCHLORIDE 25 MG: 25 TABLET ORAL at 08:06

## 2022-06-15 RX ADMIN — FOLIC ACID 1 MG: 1 TABLET ORAL at 08:06

## 2022-06-15 RX ADMIN — Medication 10 ML: at 05:06

## 2022-06-15 NOTE — ASSESSMENT & PLAN NOTE
Neuro-checks.  Tele-monitoring.   Check TSH.   Patient is not orthostatic.  2D Echocardiogram.   Carotid doppler U/S results reviewed.   Fall precautions.   Check serum cortisol level.

## 2022-06-15 NOTE — PT/OT/SLP EVAL
Physical Therapy Evaluation and Discharge Note    Patient Name:  Claire Bowser   MRN:  [id number]    Recommendations:     Discharge Recommendations:  home   Discharge Equipment Recommendations: none   Barriers to discharge: None    Assessment:     Claire Bowser is a 62 y.o. female admitted with a medical diagnosis of Syncope. .  At this time, patient is functioning at their prior level of function and does not require further acute PT services. Upon arrival, pt was w/ transport sitting on EOB on room air. Spouse present w/in room. Pt was Indep sup<>sit, Indep sit<>stand w/o AD, and amb 250ft w/o AD, tends to cross midline, has slight LOB. Pt reported she has been walking like that all her life. Sup BP-147/71, Sitting /72, standing BP-180/81 2nd attempt 170/81. After amb, /98. Pt reported Asymptomatic during session. Eval/DCPT    Recent Surgery: * No surgery found *      Plan:     During this hospitalization, patient does not require further acute PT services.  Please re-consult if situation changes.      Subjective     Chief Complaint: None stated  Patient/Family Comments/goals: Return home w/ spouse  Pain/Comfort:  Pain Rating 1: 0/10    Patients cultural, spiritual, Sikhism conflicts given the current situation: no    Living Environment:  Pt lives w/  in 1SH, has small steps upon entry.  Prior to admission, patients level of function was independent.  Equipment used at home: none.  DME owned (not currently used): none.  Upon discharge, patient will have assistance from .    Objective:     Communicated with Tim PARRY prior to session.  Patient found sitting edge of bed with peripheral IV upon PT entry to room.    General Precautions: Standard,     Orthopedic Precautions:N/A   Braces: N/A   Respiratory Status: Room air    Exams:  · Cognitive Exam:  Patient is oriented to Person, Place, Time and Situation  · Gross Motor Coordination:  WFL  · Postural Exam:  Patient presented with the  following abnormalities:    · -       No postural abnormalities identified  · RLE ROM: WFL  · RLE Strength: WFL  · LLE ROM: WFL  · LLE Strength: WFL    Functional Mobility:  · Bed Mobility:     · Supine to Sit: independence  · Sit to Supine: independence  · Transfers:     · Sit to Stand:  independence with no AD  · Gait: 250ft w/o AD, tends to cross midline, has LOB  · Balance: good static and Good dynamic, Fair+ gait    AM-PAC 6 CLICK MOBILITY  Total Score:24       Therapeutic Activities and Exercises:   Safety awareness eval/DCPT, importance of mob, BP w/ PT activity     AM-PAC 6 CLICK MOBILITY  Total Score:24     Patient left sitting edge of bed with all lines intact, call button in reach and Echocardiogram present.    GOALS:   Multidisciplinary Problems     Physical Therapy Goals     Not on file                History:     Past Medical History:   Diagnosis Date    Anxiety     Flu 02/2017    [facility]    Hypertension     Rheumatoid arthritis involving multiple sites with positive rheumatoid factor 1/14/2021       Past Surgical History:   Procedure Laterality Date    COLONOSCOPY N/A 2/26/2021    Procedure: COLONOSCOPY;  Surgeon: Amy Sidhu MD;  Location: [facility];  Service: Endoscopy;  Laterality: N/A;    ESOPHAGOGASTRODUODENOSCOPY N/A 2/26/2021    Procedure: EGD (ESOPHAGOGASTRODUODENOSCOPY);  Surgeon: Amy Sidhu MD;  Location: [facility];  Service: Endoscopy;  Laterality: N/A;    TUBAL LIGATION         Time Tracking:     PT Received On: 06/15/22  PT Start Time: 1112     PT Stop Time: 1136  PT Total Time (min): 24 min     Billable Minutes: Evaluation 14 and Gait Training 10      06/15/2022

## 2022-06-15 NOTE — CONSULTS
[facility]     CC: Abnormal imaging    HPI 62 y.o. female with abnormal imaging, characterized by oval lesion at pancreatic neck, with no biliary dilation or elevation of LFTs.  She had recent admission for pancreatitis and has no complaints of such at this time.  Her lipase is improved from prior and she has no imaging findings of acute pancreatitis.  She presented for syncopal episode.  Denies abdominal pain or emesis.  Notes from past consult reviewed.    Past Medical History:   Diagnosis Date    Anxiety     Flu 02/2017    [facility]    Hypertension     Rheumatoid arthritis involving multiple sites with positive rheumatoid factor 1/14/2021       Past Surgical History:   Procedure Laterality Date    COLONOSCOPY N/A 2/26/2021    Procedure: COLONOSCOPY;  Surgeon: Amy Sidhu MD;  Location: [facility];  Service: Endoscopy;  Laterality: N/A;    ESOPHAGOGASTRODUODENOSCOPY N/A 2/26/2021    Procedure: EGD (ESOPHAGOGASTRODUODENOSCOPY);  Surgeon: Amy Sidhu MD;  Location: [facility];  Service: Endoscopy;  Laterality: N/A;    TUBAL LIGATION         Social History     Tobacco Use    Smoking status: Current Every Day Smoker     Packs/day: 1.00     Years: 7.00     Pack years: 7.00     Types: Cigarettes    Smokeless tobacco: Never Used    Tobacco comment: [phone]   Substance Use Topics    Alcohol use: No    Drug use: Never       Family History   Problem Relation Age of Onset    Diabetes Mother     Heart disease Mother     Kidney disease Mother     Hypertension Mother     Stroke Mother     Hearing loss Mother     COPD Father     Liver disease Paternal Grandfather     Alcohol abuse Paternal Grandfather     Liver disease Sister     Emphysema Brother     COPD Brother     Sleep apnea Brother     No Known Problems Son     Alcohol abuse Paternal Grandmother     Cirrhosis Paternal Grandmother     Heart disease Maternal Aunt     Diabetes Maternal Aunt     Cancer  "Maternal Aunt         female    Heart disease Maternal Uncle     Hypertension Maternal Uncle     No Known Problems Paternal Uncle     Psoriasis Neg Hx     Lupus Neg Hx     Eczema Neg Hx     Melanoma Neg Hx        Review of Systems  General ROS: negative for - chills, fever or weight loss  Psychological ROS: negative for - hallucination, depression or suicidal ideation  Ophthalmic ROS: negative for - blurry vision, photophobia or eye pain  ENT ROS: negative for - epistaxis, sore throat or rhinorrhea  Respiratory ROS: no cough, shortness of breath, or wheezing + syncopal episode PTA  Cardiovascular ROS: no chest pain or dyspnea on exertion  Gastrointestinal ROS: no pain or emesis  Genito-Urinary ROS: no dysuria, trouble voiding, or hematuria  Musculoskeletal ROS: negative for - arthralgia, myalgia, weakness  Neurological ROS: no syncope or seizures; no ataxia  Dermatological ROS: negative for pruritis, rash and jaundice    Physical Examination  BP (!) 158/73   Pulse 63   Temp 96.4 °F (35.8 °C)   Resp 16   Ht 5' 3" (1.6 m)   Wt 52.2 kg (115 lb)   SpO2 97%   BMI 20.37 kg/m²   General appearance: alert, cooperative, no distress  HENT: Normocephalic, atraumatic, neck symmetrical, no nasal discharge   Eyes: conjunctivae/corneas clear, PERRL, EOM's intact, sclera anicteric  Lungs: clear to auscultation bilaterally, no dullness to percussion bilaterally, symmetric expansion, breathing unlabored  Heart: regular rate and rhythm without rub; no displacement of the PMI   Abdomen: soft, NT  Extremities: extremities symmetric; no clubbing, cyanosis, or edema  Integument: Skin color, texture, turgor normal; no rashes; hair distrubution normal, no jaundice  Neurologic: Alert and oriented X 3, no focal sensory or motor neurologic deficits  Psychiatric: no pressured speech; normal affect; no evidence of impaired cognition, no anxiety/depression     Labs:  Lab Results   Component Value Date    WBC 12.71 (H) 06/15/2022    " HGB 8.8 (L) 06/15/2022    HCT 25.7 (L) 06/15/2022    MCV 98 06/15/2022     (H) 06/15/2022         CMP  Sodium   Date Value Ref Range Status   06/15/2022 141 136 - 145 mmol/L Final     Potassium   Date Value Ref Range Status   06/15/2022 3.0 (L) 3.5 - 5.1 mmol/L Final     Chloride   Date Value Ref Range Status   06/15/2022 106 95 - 110 mmol/L Final     CO2   Date Value Ref Range Status   06/15/2022 27 23 - 29 mmol/L Final     Glucose   Date Value Ref Range Status   06/15/2022 92 70 - 110 mg/dL Final     BUN   Date Value Ref Range Status   06/15/2022 10 8 - 23 mg/dL Final     Creatinine   Date Value Ref Range Status   06/15/2022 0.8 0.5 - 1.4 mg/dL Final     Calcium   Date Value Ref Range Status   06/15/2022 9.4 8.7 - 10.5 mg/dL Final     Total Protein   Date Value Ref Range Status   06/15/2022 6.2 6.0 - 8.4 g/dL Final     Albumin   Date Value Ref Range Status   06/15/2022 2.3 (L) 3.5 - 5.2 g/dL Final     Total Bilirubin   Date Value Ref Range Status   06/15/2022 0.2 0.1 - 1.0 mg/dL Final     Comment:     For infants and newborns, interpretation of results should be based  on gestational age, weight and in agreement with clinical  observations.    Premature Infant recommended reference ranges:  Up to 24 hours.............<8.0 mg/dL  Up to 48 hours............<12.0 mg/dL  3-5 days..................<15.0 mg/dL  6-29 days.................<15.0 mg/dL       Alkaline Phosphatase   Date Value Ref Range Status   06/15/2022 80 55 - 135 U/L Final     AST   Date Value Ref Range Status   06/15/2022 13 10 - 40 U/L Final     ALT   Date Value Ref Range Status   06/15/2022 11 10 - 44 U/L Final     Anion Gap   Date Value Ref Range Status   06/15/2022 8 8 - 16 mmol/L Final     eGFR if    Date Value Ref Range Status   06/15/2022 >60 >60 mL/min/1.73 m^2 Final     eGFR if non    Date Value Ref Range Status   06/15/2022 >60 >60 mL/min/1.73 m^2 Final     Comment:     Calculation used to obtain the  estimated glomerular filtration  rate (eGFR) is the CKD-EPI equation.          Lab Results   Component Value Date    LIPASE 127 (H) 06/15/2022    LIPASE 127 (H) 06/15/2022         Imaging:  MRCP was independently visualized and reviewed by me and showed no CBD dilatation.  1.5 cm abnormality again noted.  No PD dilation.  No pancreatitis or obstruction noted.    I have personally reviewed these images    Imaging reviewed as above.    Assessment:   1.  Recent admission for acute pancreatitis - now resolved  2.  Abnormal imaging  3.  Syncope    Plan:  1.  IVFs   2.  Diet as tolerated  3.  Patient does not have recurrent acute pancreatitis.  She does not have abdominal pain or imaging findings of pancreatitis this admission and her lipase is improved from prior.  In addition she does not have distended CBD or filling defect on imaging.  She has no indication for ERCP.  She does have lesion noted on imaging and recommendation for this remains outpatient EUS.    4.  Recommend workup syncopal episode per primary team which is reason for this presentation.  5.  Outpatient referral for EUS.  GI to sign off.  6.  Communication will be sent to the referring provider regarding my assessment and plan on this patient via EPIC.      Antwan Barron MD  [facility]  [address], [address]  [place] [address]  Office: [phone]  Fax: [phone]

## 2022-06-15 NOTE — CARE UPDATE
06/14/22 2040   Patient Assessment/Suction   Level of Consciousness (AVPU) alert   Respiratory Effort Normal;Unlabored   Expansion/Accessory Muscles/Retractions no use of accessory muscles   PRE-TX-O2   O2 Device (Oxygen Therapy) room air   SpO2 95 %   Pulse Oximetry Type Intermittent   $ Pulse Oximetry - Multiple Charge Pulse Oximetry - Multiple   Pulse 62   Resp 16

## 2022-06-15 NOTE — CONSULTS
[facility] Surgery  Consult Note    Consults  Subjective:     Chief Complaint/Reason for Admission:  Syncopal episode    History of Present Illness:  This is a 62-year-old female admitted for a syncopal episode.  During workup, it was noted that her lipase was elevated consistent with biochemical pancreatitis.  She was having some mild abdominal pain around the time of admission.  Pain has since resolved.  I was consulted for possible biliary pancreatitis as the cause of her pancreatitis.    No current facility-administered medications on file prior to encounter.     Current Outpatient Medications on File Prior to Encounter   Medication Sig    cholecalciferol, vitamin D3, (VITAMIN D3) 10 mcg (400 unit) Tab Take 400 Units by mouth once daily.    cyclobenzaprine (FLEXERIL) 5 MG tablet Take 1 tablet (5 mg total) by mouth 3 (three) times daily as needed for Muscle spasms.    folic acid (FOLVITE) 1 MG tablet Take 1 tablet (1 mg total) by mouth once daily.    hydroCHLOROthiazide (HYDRODIURIL) 25 MG tablet Take 1 tablet (25 mg total) by mouth once daily.    lisinopriL (PRINIVIL,ZESTRIL) 40 MG tablet Take 1 tablet (40 mg total) by mouth once daily.    methotrexate 2.5 MG Tab TAKE 10 TABLETS BY MOUTH EVERY 7 DAYS SPLIT DOSING. TAKE 5 TABS IN THE AM AND 5 TABS IN THE PM EVERY 7 DAYS THE SAME DAY    metoprolol succinate (TOPROL-XL) 50 MG 24 hr tablet Take 1 tablet by mouth once daily    pantoprazole (PROTONIX) 40 MG tablet Take 1 tablet (40 mg total) by mouth once daily.    predniSONE (DELTASONE) 5 MG tablet Take 1 tablet (5 mg total) by mouth once daily.    sertraline (ZOLOFT) 25 MG tablet Take 1 tablet by mouth once daily    traZODone (DESYREL) 50 MG tablet Take 1 tablet by mouth in the evening       Review of patient's allergies indicates:   Allergen Reactions    No known drug allergies        Past Medical History:   Diagnosis Date    Anxiety     Flu 02/2017    [facility]     Hypertension     Rheumatoid arthritis involving multiple sites with positive rheumatoid factor 1/14/2021     Past Surgical History:   Procedure Laterality Date    COLONOSCOPY N/A 2/26/2021    Procedure: COLONOSCOPY;  Surgeon: Amy Sidhu MD;  Location: [facility] ENDO;  Service: Endoscopy;  Laterality: N/A;    ESOPHAGOGASTRODUODENOSCOPY N/A 2/26/2021    Procedure: EGD (ESOPHAGOGASTRODUODENOSCOPY);  Surgeon: Amy Sidhu MD;  Location: [facility] ENDO;  Service: Endoscopy;  Laterality: N/A;    TUBAL LIGATION       Family History     Problem Relation (Age of Onset)    Alcohol abuse Paternal Grandfather, Paternal Grandmother    COPD Father, Brother    Cancer Maternal Aunt    Cirrhosis Paternal Grandmother    Diabetes Mother, Maternal Aunt    Emphysema Brother    Hearing loss Mother    Heart disease Mother, Maternal Aunt, Maternal Uncle    Hypertension Mother, Maternal Uncle    Kidney disease Mother    Liver disease Paternal Grandfather, Sister    No Known Problems Son, Paternal Uncle    Sleep apnea Brother    Stroke Mother        Tobacco Use    Smoking status: Current Every Day Smoker     Packs/day: 1.00     Years: 7.00     Pack years: 7.00     Types: Cigarettes    Smokeless tobacco: Never Used    Tobacco comment: [phone]   Substance and Sexual Activity    Alcohol use: No    Drug use: Never    Sexual activity: Yes     Partners: Male     Review of Systems   Constitutional: Negative for fever.   HENT: Negative.    Eyes: Negative.    Respiratory: Negative.    Cardiovascular: Negative.    Gastrointestinal: Negative.    Endocrine: Negative.    Genitourinary: Negative.    Musculoskeletal: Negative.    Skin: Negative.    Allergic/Immunologic: Negative.    Neurological: Negative.    Hematological: Negative.    Psychiatric/Behavioral: Negative.      Objective:     Vital Signs (Most Recent):  Temp: 96.4 °F (35.8 °C) (06/15/22 1531)  Pulse: 63 (06/15/22 1531)  Resp: 16 (06/15/22 1531)  BP: (!) 158/73 (06/15/22  1531)  SpO2: 97 % (06/15/22 1531) Vital Signs (24h Range):  Temp:  [96.4 °F (35.8 °C)-98.9 °F (37.2 °C)] 96.4 °F (35.8 °C)  Pulse:  [61-67] 63  Resp:  [16-18] 16  SpO2:  [92 %-97 %] 97 %  BP: (115-167)/(60-79) 158/73     Weight: 52.2 kg (115 lb)  Body mass index is 20.37 kg/m².      Intake/Output Summary (Last 24 hours) at 6/15/2022 1810  Last data filed at 6/15/2022 1700  Gross per 24 hour   Intake 655.28 ml   Output 0 ml   Net 655.28 ml       Physical Exam  Constitutional:       General: She is not in acute distress.     Appearance: Normal appearance. She is not ill-appearing, toxic-appearing or diaphoretic.   HENT:      Head: Normocephalic.      Nose: Nose normal.   Eyes:      Conjunctiva/sclera: Conjunctivae normal.   Cardiovascular:      Rate and Rhythm: Normal rate and regular rhythm.   Pulmonary:      Effort: Pulmonary effort is normal.   Abdominal:      Palpations: Abdomen is soft.      Tenderness: There is no abdominal tenderness.   Musculoskeletal:         General: Normal range of motion.      Cervical back: Normal range of motion.   Skin:     General: Skin is warm.   Neurological:      General: No focal deficit present.      Mental Status: She is alert.   Psychiatric:         Mood and Affect: Mood normal.         Significant Labs:  CBC:   Recent Labs   Lab 06/15/22  0225   WBC 12.71*   RBC 2.62*   HGB 8.8*   HCT 25.7*   *   MCV 98   MCH 33.6*   MCHC 34.2     CMP:   Recent Labs   Lab 06/15/22  0225   GLU 92   CALCIUM 9.4   ALBUMIN 2.3*   PROT 6.2      K 3.0*   CO2 27      BUN 10   CREATININE 0.8   ALKPHOS 80   ALT 11   AST 13   BILITOT 0.2     Coagulation: No results for input(s): PT, INR, APTT in the last 48 hours.  Lactic Acid: No results for input(s): LACTATE in the last 48 hours.  Lipase:   Recent Labs   Lab 06/15/22  0229   LIPASE 127*  127*       Significant Diagnostics:  Initial CT was reviewed as well as MRCP and right upper quadrant ultrasound.  No evidence of gallstones on any  imaging modality.  Possible pancreatitis on initial CT although the lack of IV contrast limits the study.  No gallbladder wall thickening or pericholecystic fluid.  She has a 1.5 cm mass in the pancreas which is not completely classified    Assessment/Plan:     Active Diagnoses:    Diagnosis Date Noted POA    PRINCIPAL PROBLEM:  Syncope [R55] 06/14/2022 Yes    Acute biliary pancreatitis [K85.10] 06/14/2022 Yes    Severe malnutrition [E43] 06/05/2022 Yes    ACP (advance care planning) [Z71.89] 06/02/2022 Not Applicable    Dehydration [E86.0] 06/02/2022 Yes    Rheumatoid arthritis involving multiple sites with positive rheumatoid factor [M05.79] 01/14/2021 Yes    Generalized anxiety disorder [F41.1] 08/10/2018 Yes    Tobacco use [Z72.0] 08/10/2018 Yes      Problems Resolved During this Admission:     62-year-old female admitted with syncope.  Imaging so far has not shown any evidence of gallstones making biliary pancreatitis unlikely.  She has no filling defects on MRCP.    Agree with GI assessment.  No urgent indications for ERCP.  She does need an EUS as the likely neck next best test to evaluate the 1.5 cm pancreatic mass.  Agree that this can be done as an outpatient.  No current indications for cholecystectomy, especially not until the pancreatic mass has been worked up further.    I will sign off.  Please call with questions.      Thank you for your consult. I will sign off. Please contact us if you have any additional questions.    Paul Sheehan MD  General Surgery  [facility]

## 2022-06-15 NOTE — PROGRESS NOTES
[facility] Hospital Medicine  Progress Note    Patient Name: Claire Bowser  MRN: [id number]  Patient Class: OP- Observation   Admission Date: 6/14/2022  Length of Stay: 0 days  Attending Physician: Jose Jimenez MD  Primary Care Provider: Samuel Brady MD        Subjective:     Principal Problem:Syncope        HPI:  Patient is a 62-year-old  female with past medical history significant for hypertension, hyperlipidemia, anxiety disorder and rheumatoid arthritis (on methotrexate therapy) and recent history of COVID-19 infection (May 13, 2022; received Paxlovid for 3 days) is being admitted to Hospital Medicine under observation status from [facility] Emergency Room with syncope episode prior to presentation.  Patient was recently hospitalized at our facility from June 2nd through June 6, 2022 related to biliary pancreatitis.  Patient was brought to the emergency room via EMS.  Patient states she went to [facility] prior to seeing general surgeon for elective cholecystectomy. Patient states, she was browsing in an aisle for about 10 minutes when she started to feel dizzy like the room was spinning then lost consciousness. She states that her  stopped her from falling. When EMS arrived BP was 80/40. Patient currently feels fatigued and nauseated.  Patient was seen by her rheumatologist 3 days ago when her methotrexate therapy was discontinued and patient was placed on prednisone 5 mg daily.  Prior to her syncopal episode she had no complaints. She denies recent vomiting, dark stools, diarrhea, dysuria, hematuria, chest pain, SOB, or abdominal pain.  Patient reports her appetite has improved, she is eating better.  Last bowel movement 6 days ago.  No urinary complaints reported.        Overview/Hospital Course:  No notes on file    Interval History:  Feeling better, no further abdominal pain nausea vomiting lightheadedness dizziness near-syncope or syncope  episodes reported.  Denies any chest pain, palpitations.  Scheduled for MRCP today.  Patient's  is at bedside.    Review of Systems   Constitutional:  Positive for appetite change.   Gastrointestinal:  Positive for abdominal pain and nausea.   Neurological:  Positive for dizziness, syncope and light-headedness.   All other systems reviewed and are negative.  Objective:     Vital Signs (Most Recent):  Temp: 96.6 °F (35.9 °C) (06/15/22 0754)  Pulse: 67 (06/15/22 0754)  Resp: 16 (06/15/22 0754)  BP: (!) 155/69 (06/15/22 0754)  SpO2: 95 % (06/15/22 0754)   Vital Signs (24h Range):  Temp:  [96.6 °F (35.9 °C)-98.9 °F (37.2 °C)] 96.6 °F (35.9 °C)  Pulse:  [60-69] 67  Resp:  [16-18] 16  SpO2:  [92 %-99 %] 95 %  BP: ()/(42-70) 155/69     Weight: 52.2 kg (115 lb)  Body mass index is 20.37 kg/m².    Intake/Output Summary (Last 24 hours) at 6/15/2022 0926  Last data filed at 6/14/2022 1927  Gross per 24 hour   Intake 1515.28 ml   Output --   Net 1515.28 ml      Physical Exam  Constitutional:       Appearance: She is well-developed.   HENT:      Head: Normocephalic and atraumatic.   Eyes:      Conjunctiva/sclera: Conjunctivae normal.      Pupils: Pupils are equal, round, and reactive to light.   Neck:      Thyroid: No thyromegaly.      Vascular: No JVD.   Cardiovascular:      Rate and Rhythm: Normal rate and regular rhythm.      Heart sounds: No murmur heard.    No friction rub. No gallop.   Pulmonary:      Effort: Pulmonary effort is normal.      Breath sounds: Normal breath sounds.   Abdominal:      General: Bowel sounds are normal. There is no distension.      Palpations: Abdomen is soft. There is no mass.      Tenderness: There is no abdominal tenderness.   Musculoskeletal:         General: Normal range of motion.      Cervical back: Neck supple.   Skin:     General: Skin is warm and dry.   Neurological:      Mental Status: She is oriented to person, place, and time.      Cranial Nerves: No cranial nerve  deficit.   Psychiatric:         Behavior: Behavior normal.       Significant Labs: All pertinent labs within the past 24 hours have been reviewed.  CBC:   Recent Labs   Lab 06/14/22  1201 06/15/22  0225   WBC 15.11* 12.71*   HGB 8.8* 8.8*   HCT 25.8* 25.7*   * 473*     CMP:   Recent Labs   Lab 06/14/22  1201 06/15/22  0225    141   K 2.8* 3.0*    106   CO2 31* 27    92   BUN 12 10   CREATININE 1.0 0.8   CALCIUM 9.9 9.4   PROT 6.3 6.2   ALBUMIN 2.4* 2.3*   BILITOT 0.3 0.2   ALKPHOS 73 80   AST 15 13   ALT 10 11   ANIONGAP 10 8   EGFRNONAA >60 >60     TSH:   Recent Labs   Lab 06/15/22  0229   TSH 1.841     Urine Studies: No results for input(s): COLORU, APPEARANCEUA, PHUR, SPECGRAV, PROTEINUA, GLUCUA, KETONESU, BILIRUBINUA, OCCULTUA, NITRITE, UROBILINOGEN, LEUKOCYTESUR, RBCUA, WBCUA, BACTERIA, SQUAMEPITHEL, HYALINECASTS in the last 48 hours.    Invalid input(s): [facility]  Microbiology Results (last 7 days)       ** No results found for the last 168 hours. **          Significant Imaging:   Carotid US: No evidence of a hemodynamically significant carotid bifurcation stenosis.    MRCP: Pending.     ECHO: Pending.       Assessment/Plan:      * Syncope  Neuro-checks.  Tele-monitoring.   Check TSH.   Patient is not orthostatic.  2D Echocardiogram.   Carotid doppler U/S results reviewed.   Fall precautions.   Check serum cortisol level.        Acute biliary pancreatitis  Trend lipase level.  Currently NPO.  Trend LFTs.  Follow MRCP for biliary dilatation seen on abdominal ultrasound on June 11, 2022. Consulting GI specialist for opinion and possible need for ERCP.      Severe malnutrition  Nutrition consulted. Most recent weight and BMI monitored-   Nutrition consulted. Encourage maximal PO intake. Diet supplementation ordered per nutrition approval. Will encourage PO and monitor closely for weight changes.                        Measurements:  Wt Readings from Last 1 Encounters:   06/10/22 50.4  kg (111 lb 1.8 oz)   There is no height or weight on file to calculate BMI.    Recommendations:      Patient has been screened and assessed by RD. RD will follow patient.      ACP (advance care planning)  Advance Care Planning     Date: 06/14/2022    Living Will  During this visit, I engaged the patient  in the advance care planning process.  The patient and I reviewed the role for advance directives and their purpose in directing future healthcare if the patient's unable to speak for him/herself.  At this point in time, the patient does have full decision-making capacity.  We discussed different extreme health states that she could experience, and reviewed what kind of medical care she would want in those situations.  The patient communicated that if she were comatose and had little chance of a meaningful recovery, she would want machines/life-sustaining treatments used. I spent a total of 16 minutes engaging the patient in this advance care planning discussion.                Dehydration  Continue IV fluid hydration with normal saline      Rheumatoid arthritis involving multiple sites with positive rheumatoid factor  Continue prednisone 5 mg daily.  Patient may benefit with ACTH stimulation test.      Tobacco use  Smoking cessation counseling performed. Dangers of cigarette smoking were reviewed with patient in detail and patient was encouraged to quit. Nicotine replacement options were discussed for > 3 minutes.        Generalized anxiety disorder  Use anxiolytics as needed        VTE Risk Mitigation (From admission, onward)         Ordered     enoxaparin injection 40 mg  Daily         06/14/22 1429     IP VTE HIGH RISK PATIENT  Once         06/14/22 1429     Place sequential compression device  Until discontinued         06/14/22 1429                Discharge Planning   RASHI:6/16/22      Code Status: Full Code   Is the patient medically ready for discharge?:     Reason for patient still in hospital (select all  that apply): Patient trending condition and Consult recommendations                     Jose Jimenez MD  Department of Hospital Medicine   [facility]

## 2022-06-15 NOTE — PLAN OF CARE
[facility]  Initial Discharge Assessment       Primary Care Provider: Samuel Brady MD    Admission Diagnosis: Hypokalemia [E87.6]  Syncopal episodes [R55]  Syncope [R55]  Chest pain [R07.9]    Admission Date: 6/14/2022  Expected Discharge Date:  6/16/2022    Cm completed the assessment with pt at bedside. Pt lives with spouse and independent in care. Demographic:  Pt verbalized is current on face sheet. PCP and insurance verified by patient.  Pt denies diabetes, dialysis and coumadin. Denies POA/LW. Denies dme.  Disposition:  Pt will discharge to home. Spouse will provide transportation on discharge.   No Needs verbalized at this time.    Discharge Barriers Identified: None    Payor: BLUE [employer] BLUE SHIELD / Plan: [employer] [place] PPO / Product Type: PPO /     Extended Emergency Contact Information  Primary Emergency Contact: Robin Bowser  Address: [address] [place] [address] [facility]  Mobile Phone: [phone]  Relation: Spouse  Preferred language: English   needed? No    Discharge Plan A: Home with family  Discharge Plan B: Home with family      [facility]  [place], [place] - [address] [address]  [address]  [facility] [address]  Phone: [phone] Fax: [phone]      Initial Assessment (most recent)     Adult Discharge Assessment - 06/15/22 1227        Discharge Assessment    Assessment Type Discharge Planning Assessment     Confirmed/corrected address, phone number and insurance Yes     Confirmed Demographics Correct on Facesheet     Source of Information patient     Does patient/caregiver understand observation status Yes     Communicated RASHI with patient/caregiver Yes     Lives With spouse     Facility Arrived From: home     Do you expect to return to your current living situation? Yes     Do you have help at home or someone to help you manage your care at home? Yes     Who are your caregiver(s) and their phone number(s)? spouse- Robin  - [phone]     Prior to hospitilization cognitive status: Alert/Oriented     Current cognitive status: Alert/Oriented     Walking or Climbing Stairs Difficulty none     Dressing/Bathing Difficulty none     Equipment Currently Used at Home none     Readmission within 30 days? No     Do you currently have service(s) that help you manage your care at home? No     Do you take prescription medications? Yes     Do you have prescription coverage? Yes     Do you have any problems affording any of your prescribed medications? No     Is the patient taking medications as prescribed? yes     Who is going to help you get home at discharge? spouse     How do you get to doctors appointments? car, drives self     Are you on dialysis? No     Do you take coumadin? No     Discharge Plan A Home with family     Discharge Plan B Home with family     DME Needed Upon Discharge  none     Discharge Barriers Identified None

## 2022-06-15 NOTE — PT/OT/SLP EVAL
Occupational Therapy   Evaluation and Discharge Note    Name: Claire Bowser  MRN: [id number]  Admitting Diagnosis:  Syncope   Recent Surgery: * No surgery found *      Recommendations:     Discharge Recommendations: home  Discharge Equipment Recommendations:  none  Barriers to discharge:       Assessment:     Claire Bowser is a 62 y.o. female with a medical diagnosis of Syncope. At this time, patient is functioning at their prior level of function and does not require further acute OT services.     Plan:     During this hospitalization, patient does not require further acute OT services.  Please re-consult if situation changes.    · Plan of Care Reviewed with: patient, spouse    Subjective     Chief Complaint: No complaints  Patient/Family Comments/goals: To go home    Occupational Profile:  Living Environment: Pt lives with spouse in 1 story home with no ARIA. Pt has tub/shower and standard toilet.  Previous level of function: Independent  Equipment Used at home:  none  Assistance upon Discharge: Spouse    Pain/Comfort:  · Pain Rating 1: 0/10  · Pain Rating Post-Intervention 1: 0/10    Patients cultural, spiritual, Islam conflicts given the current situation:      Objective:     Communicated with: Nurse Craig prior to session.  Patient found HOB elevated with peripheral IV, telemetry upon OT entry to room.    General Precautions: Standard, fall   Orthopedic Precautions:N/A   Braces: N/A  Respiratory Status: Room air     Occupational Performance:    Bed Mobility:    · Patient completed Supine to Sit with independence  · Patient completed Sit to Supine with independence    Functional Mobility/Transfers:  · Patient completed Sit <> Stand Transfer with independence  with  no assistive device   · Functional Mobility: Pt was able to ambulate in room with no AD and no loss of balance during OT Eval.    Activities of Daily Living:  · Feeding:  independence    · Grooming: independence    · Bathing: supervision as a  precaution  · Upper Body Dressing: independence    · Lower Body Dressing: independence    · Toileting: independence      Cognitive/Visual Perceptual:  Pt alert and oriented    Physical Exam:  Upper Extremity Strength:    -       Right Upper Extremity: WNL  -       Left Upper Extremity: WNL    AMPAC 6 Click ADL:  AMPAC Total Score: 24    Treatment & Education:  OT provided education in role of OT. Pt/spouse verbalized understanding and pt was agreeable to OT Eval. Pt did not feel that further OT was indicated.  OT provided instruction in home safety with ADL's/IADL's including review of home set up and DME/AE. Pt/spouse verbalized understanding.  Education:    Patient left HOB elevated with all lines intact, call button in reach, Nurse Rafa notified and Spouse present    GOALS:   Multidisciplinary Problems     Occupational Therapy Goals        Problem: Occupational Therapy    Goal Priority Disciplines Outcome Interventions   Occupational Therapy Goal     OT, PT/OT                     History:     Past Medical History:   Diagnosis Date    Anxiety     Flu 02/2017    [facility]    Hypertension     Rheumatoid arthritis involving multiple sites with positive rheumatoid factor 1/14/2021       Past Surgical History:   Procedure Laterality Date    COLONOSCOPY N/A 2/26/2021    Procedure: COLONOSCOPY;  Surgeon: Amy Sidhu MD;  Location: [facility];  Service: Endoscopy;  Laterality: N/A;    ESOPHAGOGASTRODUODENOSCOPY N/A 2/26/2021    Procedure: EGD (ESOPHAGOGASTRODUODENOSCOPY);  Surgeon: Amy Sidhu MD;  Location: [facility];  Service: Endoscopy;  Laterality: N/A;    TUBAL LIGATION         Time Tracking:     OT Date of Treatment: 06/15/22  OT Start Time: 0924  OT Stop Time: 0932  OT Total Time (min): 8 min    Billable Minutes:Evaluation 8    6/15/2022

## 2022-06-15 NOTE — ASSESSMENT & PLAN NOTE
Trend lipase level.  Currently NPO.  Trend LFTs.  Follow MRCP for biliary dilatation seen on abdominal ultrasound on June 11, 2022. Consulting GI specialist for opinion and possible need for ERCP.

## 2022-06-15 NOTE — PLAN OF CARE
POC/Meds reviewed, pt verbalized understanding. Vitals stable. Afebrile. Remains on room air. IVPB abx administered. Tele In place-NSR. Uses bathroom independently. IS at bedside, instructed on use and return demonstration performed. No complaints of pain. Repositions self. Hourly/Q2hr rounding performed, safety maintained. Bed in lowest position, wheels locked, SR up x2, call light in easy reach. No complaints at this time. Will continue to monitor.

## 2022-06-15 NOTE — SUBJECTIVE & OBJECTIVE
Interval History:  Feeling better, no further abdominal pain nausea vomiting lightheadedness dizziness near-syncope or syncope episodes reported.  Denies any chest pain, palpitations.  Scheduled for MRCP today.  Patient's  is at bedside.    Review of Systems   Constitutional:  Positive for appetite change.   Gastrointestinal:  Positive for abdominal pain and nausea.   Neurological:  Positive for dizziness, syncope and light-headedness.   All other systems reviewed and are negative.  Objective:     Vital Signs (Most Recent):  Temp: 96.6 °F (35.9 °C) (06/15/22 0754)  Pulse: 67 (06/15/22 0754)  Resp: 16 (06/15/22 0754)  BP: (!) 155/69 (06/15/22 0754)  SpO2: 95 % (06/15/22 0754)   Vital Signs (24h Range):  Temp:  [96.6 °F (35.9 °C)-98.9 °F (37.2 °C)] 96.6 °F (35.9 °C)  Pulse:  [60-69] 67  Resp:  [16-18] 16  SpO2:  [92 %-99 %] 95 %  BP: ()/(42-70) 155/69     Weight: 52.2 kg (115 lb)  Body mass index is 20.37 kg/m².    Intake/Output Summary (Last 24 hours) at 6/15/2022 0926  Last data filed at 6/14/2022 1927  Gross per 24 hour   Intake 1515.28 ml   Output --   Net 1515.28 ml      Physical Exam  Constitutional:       Appearance: She is well-developed.   HENT:      Head: Normocephalic and atraumatic.   Eyes:      Conjunctiva/sclera: Conjunctivae normal.      Pupils: Pupils are equal, round, and reactive to light.   Neck:      Thyroid: No thyromegaly.      Vascular: No JVD.   Cardiovascular:      Rate and Rhythm: Normal rate and regular rhythm.      Heart sounds: No murmur heard.    No friction rub. No gallop.   Pulmonary:      Effort: Pulmonary effort is normal.      Breath sounds: Normal breath sounds.   Abdominal:      General: Bowel sounds are normal. There is no distension.      Palpations: Abdomen is soft. There is no mass.      Tenderness: There is no abdominal tenderness.   Musculoskeletal:         General: Normal range of motion.      Cervical back: Neck supple.   Skin:     General: Skin is warm and  dry.   Neurological:      Mental Status: She is oriented to person, place, and time.      Cranial Nerves: No cranial nerve deficit.   Psychiatric:         Behavior: Behavior normal.       Significant Labs: All pertinent labs within the past 24 hours have been reviewed.  CBC:   Recent Labs   Lab 06/14/22  1201 06/15/22  0225   WBC 15.11* 12.71*   HGB 8.8* 8.8*   HCT 25.8* 25.7*   * 473*     CMP:   Recent Labs   Lab 06/14/22  1201 06/15/22  0225    141   K 2.8* 3.0*    106   CO2 31* 27    92   BUN 12 10   CREATININE 1.0 0.8   CALCIUM 9.9 9.4   PROT 6.3 6.2   ALBUMIN 2.4* 2.3*   BILITOT 0.3 0.2   ALKPHOS 73 80   AST 15 13   ALT 10 11   ANIONGAP 10 8   EGFRNONAA >60 >60     TSH:   Recent Labs   Lab 06/15/22  0229   TSH 1.841     Urine Studies: No results for input(s): COLORU, APPEARANCEUA, PHUR, SPECGRAV, PROTEINUA, GLUCUA, KETONESU, BILIRUBINUA, OCCULTUA, NITRITE, UROBILINOGEN, LEUKOCYTESUR, RBCUA, WBCUA, BACTERIA, SQUAMEPITHEL, HYALINECASTS in the last 48 hours.    Invalid input(s): WRIGHTSUR  Microbiology Results (last 7 days)       ** No results found for the last 168 hours. **          Significant Imaging:   Carotid US: No evidence of a hemodynamically significant carotid bifurcation stenosis.    MRCP: Pending.     ECHO: Pending.

## 2022-06-16 VITALS
HEART RATE: 80 BPM | DIASTOLIC BLOOD PRESSURE: 84 MMHG | BODY MASS INDEX: 20.38 KG/M2 | OXYGEN SATURATION: 96 % | RESPIRATION RATE: 16 BRPM | WEIGHT: 115 LBS | TEMPERATURE: 97 F | SYSTOLIC BLOOD PRESSURE: 170 MMHG | HEIGHT: 63 IN

## 2022-06-16 LAB
ALBUMIN SERPL BCP-MCNC: 2.3 G/DL (ref 3.5–5.2)
ALP SERPL-CCNC: 87 U/L (ref 55–135)
ALT SERPL W/O P-5'-P-CCNC: 13 U/L (ref 10–44)
ANION GAP SERPL CALC-SCNC: 10 MMOL/L (ref 8–16)
AORTIC ROOT ANNULUS: 2.95 CM
AORTIC VALVE CUSP SEPERATION: 2.05 CM
AST SERPL-CCNC: 15 U/L (ref 10–40)
AV INDEX (PROSTH): 0.7
AV MEAN GRADIENT: 5 MMHG
AV PEAK GRADIENT: 10 MMHG
AV VALVE AREA: 2.11 CM2
AV VELOCITY RATIO: 0.79
BASOPHILS # BLD AUTO: 0.11 K/UL (ref 0–0.2)
BASOPHILS NFR BLD: 1.3 % (ref 0–1.9)
BILIRUB SERPL-MCNC: 0.4 MG/DL (ref 0.1–1)
BSA FOR ECHO PROCEDURE: 1.52 M2
BUN SERPL-MCNC: 4 MG/DL (ref 8–23)
CALCIUM SERPL-MCNC: 9.2 MG/DL (ref 8.7–10.5)
CHLORIDE SERPL-SCNC: 105 MMOL/L (ref 95–110)
CO2 SERPL-SCNC: 26 MMOL/L (ref 23–29)
CREAT SERPL-MCNC: 0.7 MG/DL (ref 0.5–1.4)
CV ECHO LV RWT: 0.34 CM
DIFFERENTIAL METHOD: ABNORMAL
DOP CALC AO PEAK VEL: 1.55 M/S
DOP CALC AO VTI: 32.86 CM
DOP CALC LVOT AREA: 3 CM2
DOP CALC LVOT DIAMETER: 1.96 CM
DOP CALC LVOT PEAK VEL: 1.23 M/S
DOP CALC LVOT STROKE VOLUME: 69.42 CM3
DOP CALC MV VTI: 28.04 CM
DOP CALCLVOT PEAK VEL VTI: 23.02 CM
E WAVE DECELERATION TIME: 161.12 MSEC
E/A RATIO: 1.7
E/E' RATIO: 8.76 M/S
ECHO LV POSTERIOR WALL: 0.79 CM (ref 0.6–1.1)
EJECTION FRACTION: 54 %
EOSINOPHIL # BLD AUTO: 0.6 K/UL (ref 0–0.5)
EOSINOPHIL NFR BLD: 7.6 % (ref 0–8)
ERYTHROCYTE [DISTWIDTH] IN BLOOD BY AUTOMATED COUNT: 14.6 % (ref 11.5–14.5)
EST. GFR  (AFRICAN AMERICAN): >60 ML/MIN/1.73 M^2
EST. GFR  (NON AFRICAN AMERICAN): >60 ML/MIN/1.73 M^2
FRACTIONAL SHORTENING: 14 % (ref 28–44)
GLUCOSE SERPL-MCNC: 84 MG/DL (ref 70–110)
HCT VFR BLD AUTO: 25.9 % (ref 37–48.5)
HGB BLD-MCNC: 8.8 G/DL (ref 12–16)
IMM GRANULOCYTES # BLD AUTO: 0.06 K/UL (ref 0–0.04)
IMM GRANULOCYTES NFR BLD AUTO: 0.7 % (ref 0–0.5)
INTERVENTRICULAR SEPTUM: 0.76 CM (ref 0.6–1.1)
IVRT: 117.98 MSEC
LA MAJOR: 4.31 CM
LA MINOR: 4.64 CM
LA WIDTH: 3.95 CM
LEFT ATRIUM SIZE: 2.95 CM
LEFT ATRIUM VOLUME INDEX MOD: 24.6 ML/M2
LEFT ATRIUM VOLUME INDEX: 28.9 ML/M2
LEFT ATRIUM VOLUME MOD: 37.58 CM3
LEFT ATRIUM VOLUME: 44.26 CM3
LEFT INTERNAL DIMENSION IN SYSTOLE: 4 CM (ref 2.1–4)
LEFT VENTRICLE DIASTOLIC VOLUME INDEX: 65.8 ML/M2
LEFT VENTRICLE DIASTOLIC VOLUME: 100.67 ML
LEFT VENTRICLE MASS INDEX: 76 G/M2
LEFT VENTRICLE SYSTOLIC VOLUME INDEX: 45.8 ML/M2
LEFT VENTRICLE SYSTOLIC VOLUME: 70.11 ML
LEFT VENTRICULAR INTERNAL DIMENSION IN DIASTOLE: 4.67 CM (ref 3.5–6)
LEFT VENTRICULAR MASS: 116.08 G
LV LATERAL E/E' RATIO: 8.36 M/S
LV SEPTAL E/E' RATIO: 9.2 M/S
LYMPHOCYTES # BLD AUTO: 2 K/UL (ref 1–4.8)
LYMPHOCYTES NFR BLD: 23.5 % (ref 18–48)
MAGNESIUM SERPL-MCNC: 1.9 MG/DL (ref 1.6–2.6)
MCH RBC QN AUTO: 32.8 PG (ref 27–31)
MCHC RBC AUTO-ENTMCNC: 34 G/DL (ref 32–36)
MCV RBC AUTO: 97 FL (ref 82–98)
MONOCYTES # BLD AUTO: 0.9 K/UL (ref 0.3–1)
MONOCYTES NFR BLD: 10.3 % (ref 4–15)
MV A" WAVE DURATION": 10.66 MSEC
MV MEAN GRADIENT: 0 MMHG
MV PEAK A VEL: 0.54 M/S
MV PEAK E VEL: 0.92 M/S
MV PEAK GRADIENT: 4 MMHG
MV STENOSIS PRESSURE HALF TIME: 46.73 MS
MV VALVE AREA BY CONTINUITY EQUATION: 2.48 CM2
MV VALVE AREA P 1/2 METHOD: 4.71 CM2
NEUTROPHILS # BLD AUTO: 4.8 K/UL (ref 1.8–7.7)
NEUTROPHILS NFR BLD: 56.6 % (ref 38–73)
NRBC BLD-RTO: 0 /100 WBC
PHOSPHATE SERPL-MCNC: 3.6 MG/DL (ref 2.7–4.5)
PISA MRMAX VEL: 0.05 M/S
PISA TR MAX VEL: 3 M/S
PLATELET # BLD AUTO: 484 K/UL (ref 150–450)
PMV BLD AUTO: 9.5 FL (ref 9.2–12.9)
POTASSIUM SERPL-SCNC: 3 MMOL/L (ref 3.5–5.1)
PROT SERPL-MCNC: 6.4 G/DL (ref 6–8.4)
PULM VEIN S/D RATIO: 1.42
PV PEAK D VEL: 0.53 M/S
PV PEAK S VEL: 0.75 M/S
PV PEAK VELOCITY: 0.81 CM/S
RA MAJOR: 4.06 CM
RA PRESSURE: 8 MMHG
RA WIDTH: 2.9 CM
RBC # BLD AUTO: 2.68 M/UL (ref 4–5.4)
RIGHT VENTRICULAR END-DIASTOLIC DIMENSION: 2.48 CM
RV TISSUE DOPPLER FREE WALL SYSTOLIC VELOCITY 1 (APICAL 4 CHAMBER VIEW): 14.83 CM/S
SODIUM SERPL-SCNC: 141 MMOL/L (ref 136–145)
TDI LATERAL: 0.11 M/S
TDI SEPTAL: 0.1 M/S
TDI: 0.11 M/S
TR MAX PG: 36 MMHG
TRICUSPID ANNULAR PLANE SYSTOLIC EXCURSION: 2.7 CM
TV REST PULMONARY ARTERY PRESSURE: 44 MMHG
WBC # BLD AUTO: 8.42 K/UL (ref 3.9–12.7)

## 2022-06-16 PROCEDURE — 96361 HYDRATE IV INFUSION ADD-ON: CPT

## 2022-06-16 PROCEDURE — 84100 ASSAY OF PHOSPHORUS: CPT | Performed by: INTERNAL MEDICINE

## 2022-06-16 PROCEDURE — 83735 ASSAY OF MAGNESIUM: CPT | Performed by: INTERNAL MEDICINE

## 2022-06-16 PROCEDURE — 80053 COMPREHEN METABOLIC PANEL: CPT | Performed by: INTERNAL MEDICINE

## 2022-06-16 PROCEDURE — 99406 BEHAV CHNG SMOKING 3-10 MIN: CPT

## 2022-06-16 PROCEDURE — 36415 COLL VENOUS BLD VENIPUNCTURE: CPT | Performed by: INTERNAL MEDICINE

## 2022-06-16 PROCEDURE — 25000003 PHARM REV CODE 250: Performed by: INTERNAL MEDICINE

## 2022-06-16 PROCEDURE — G0378 HOSPITAL OBSERVATION PER HR: HCPCS

## 2022-06-16 PROCEDURE — 85025 COMPLETE CBC W/AUTO DIFF WBC: CPT | Performed by: INTERNAL MEDICINE

## 2022-06-16 RX ORDER — POTASSIUM CHLORIDE 750 MG/1
20 TABLET, EXTENDED RELEASE ORAL DAILY
Qty: 30 TABLET | Refills: 0 | Status: SHIPPED | OUTPATIENT
Start: 2022-06-16 | End: 2022-07-05 | Stop reason: ALTCHOICE

## 2022-06-16 RX ADMIN — SERTRALINE HYDROCHLORIDE 25 MG: 25 TABLET ORAL at 09:06

## 2022-06-16 RX ADMIN — POTASSIUM BICARBONATE 60 MEQ: 391 TABLET, EFFERVESCENT ORAL at 09:06

## 2022-06-16 RX ADMIN — Medication 400 UNITS: at 09:06

## 2022-06-16 RX ADMIN — POTASSIUM BICARBONATE 60 MEQ: 391 TABLET, EFFERVESCENT ORAL at 11:06

## 2022-06-16 RX ADMIN — PANTOPRAZOLE SODIUM 40 MG: 40 TABLET, DELAYED RELEASE ORAL at 09:06

## 2022-06-16 RX ADMIN — FOLIC ACID 1 MG: 1 TABLET ORAL at 09:06

## 2022-06-16 NOTE — PLAN OF CARE
Problem: Adult Inpatient Plan of Care  Goal: Plan of Care Review  Outcome: Met  Goal: Patient-Specific Goal (Individualized)  Outcome: Met  Goal: Absence of Hospital-Acquired Illness or Injury  Outcome: Met  Goal: Optimal Comfort and Wellbeing  Outcome: Met  Goal: Readiness for Transition of Care  Outcome: Met     Problem: Fluid Imbalance (Pneumonia)  Goal: Fluid Balance  Outcome: Met     Problem: Infection (Pneumonia)  Goal: Resolution of Infection Signs and Symptoms  Outcome: Met     Problem: Respiratory Compromise (Pneumonia)  Goal: Effective Oxygenation and Ventilation  Outcome: Met       POC reviewed with patient for shift. Orthostatic VS obtained during shift. Safety/Fall precautions maintained. No issues with dizziness or syncope throughout shift. Reviewed discharge instructions with patient. Verbalized understanding of instructions. Informed of potassium rich foods and low fat diet. Informed of all follow up appts on discharge paper. Pt. Verbalized understanding. PIV and telemetry monitor removed intact. Pt. Left floor via wheelchair with PCT.

## 2022-06-16 NOTE — PLAN OF CARE
Pt is cleared from case manger for discharge.  Appointments in avs.       06/16/22 1111   Final Note   Assessment Type Final Discharge Note   Anticipated Discharge Disposition Home   Hospital Resources/Appts/Education Provided Appointments scheduled by Navigator/Coordinator

## 2022-06-16 NOTE — DISCHARGE SUMMARY
[facility] Hospital Medicine  Discharge Summary      Patient Name: Claire Bowser  MRN: [id number]  Patient Class: OP- Observation  Admission Date: 6/14/2022  Hospital Length of Stay: 0 days  Discharge Date and Time:  06/16/2022 9:30 AM  Attending Physician: Jose Jimenez MD   Discharging Provider: Jose Jimenez MD  Primary Care Provider: Samuel Brady MD      HPI:   Patient is a 62-year-old  female with past medical history significant for hypertension, hyperlipidemia, anxiety disorder and rheumatoid arthritis (on methotrexate therapy) and recent history of COVID-19 infection (May 13, 2022; received Paxlovid for 3 days) is being admitted to Hospital Medicine under observation status from [facility] Emergency Room with syncope episode prior to presentation.  Patient was recently hospitalized at our facility from June 2nd through June 6, 2022 related to biliary pancreatitis.  Patient was brought to the emergency room via EMS.  Patient states she went to [facility] prior to seeing general surgeon for elective cholecystectomy. Patient states, she was browsing in an aisle for about 10 minutes when she started to feel dizzy like the room was spinning then lost consciousness. She states that her  stopped her from falling. When EMS arrived BP was 80/40. Patient currently feels fatigued and nauseated.  Patient was seen by her rheumatologist 3 days ago when her methotrexate therapy was discontinued and patient was placed on prednisone 5 mg daily.  Prior to her syncopal episode she had no complaints. She denies recent vomiting, dark stools, diarrhea, dysuria, hematuria, chest pain, SOB, or abdominal pain.  Patient reports her appetite has improved, she is eating better.  Last bowel movement 6 days ago.  No urinary complaints reported.        * No surgery found *      Hospital Course:   Patient was placed on tele-monitoring and routine neuro-checks performed. 2 D ECHO and  carotid ultrasounds obtained, results below. Serial cardiac enzymes and TSH reviewed. Patient remained asymptomatic during hospital stay and ambulated without any difficulties.  During hospital stay patient was evaluated by GI specialist and general surgeon regarding elevated lipases and distended common bile duct seen on right upper quadrant ultrasound.  Patient underwent MRCP.  No further need for ERCP reported by GI specialist.  Regarding pancreatic lesion seen on the imaging, patient was directed to follow-up with Dr. Grant with EUS for further management and care.  Patient advised to stay well hydrated and improve nutritional status.  Hydrochlorothiazide and Flexeril were discontinued.  Fall precautions discussed with patient and her .  Patient is feeling back to baseline and is anxious to go home.  In case of worsening symptoms, stroke or stroke-like symptoms, patient to return to the nearest emergency room as soon as possible.  Once again patient was counseled regarding smoking cessation and dangers of tobacco cigarette smoking discussed with the patient.      Goals of Care Treatment Preferences:  Code Status: Full Code      Consults:   Consults (From admission, onward)        Status Ordering Provider     Inpatient consult to General Surgery  Once        Provider:  Paul Sheehan MD    Completed GREGORIO BARAHONA     Inpatient consult to Gastroenterology  Once        Provider:  Antwan Salter MD    Completed GREGORIO BARAHONA     Case Management/  Once        Provider:  (Not yet assigned)    Acknowledged GREGORIO BARAHONA        Microbiology Results (last 7 days)     ** No results found for the last 168 hours. **        Carotid US: No evidence of a hemodynamically significant carotid bifurcation stenosis.     MRCP:   The common bile duct, gallbladder and intrahepatic biliary ducts are within normal limits.  There is a 1.5 cm mass of the neck of the pancreas.  This may represent a cyst,  pancreatic pseudocyst or a neoplasm and further evaluation by CT of the pancreas with and without contrast or endoscopic ultrasound may be indicated.     ECHO:   · The left ventricle is normal in size with normal systolic function.  · The estimated ejection fraction is 54%.  · Normal left ventricular diastolic function.  · Atrial fibrillation not observed.  · Normal right ventricular size with normal right ventricular systolic function.  · There is mild pulmonary hypertension.  · Mild to moderate tricuspid regurgitation.  · Intermediate central venous pressure (8 mmHg).  · The estimated PA systolic pressure is 44 mmHg.  · No bubble study        Final Active Diagnoses:    Diagnosis Date Noted POA    PRINCIPAL PROBLEM:  Syncope [R55] 06/14/2022 Yes    Acute biliary pancreatitis [K85.10] 06/14/2022 Yes    Severe malnutrition [E43] 06/05/2022 Yes    ACP (advance care planning) [Z71.89] 06/02/2022 Not Applicable    Dehydration [E86.0] 06/02/2022 Yes    Rheumatoid arthritis involving multiple sites with positive rheumatoid factor [M05.79] 01/14/2021 Yes    Generalized anxiety disorder [F41.1] 08/10/2018 Yes    Tobacco use [Z72.0] 08/10/2018 Yes      Problems Resolved During this Admission:       Discharged Condition: good    Disposition: Home or Self Care    Follow Up:   Follow-up Information     Donavon Jewell III, MD Follow up.    Specialty: Gastroenterology  Why: Pt need EUS  Contact information:  [address] [facility] [address]  [phone]             Samuel Brady MD Follow up in 1 week(s).    Specialty: Family Medicine  Contact information:  [address] [facility] [address] [facility] [address]  [facility] [address] [phone]                       Patient Instructions:      CBC W/ AUTO DIFFERENTIAL   Standing Status: Future Standing Exp. Date: 08/15/23     COMPREHENSIVE METABOLIC PANEL   Standing Status: Future Standing Exp. Date: 08/15/23     Diet Cardiac   Order Comments: Low fat diet     Notify your health care  provider if you experience any of the following:  temperature >100.4     Notify your health care provider if you experience any of the following:  persistent nausea and vomiting or diarrhea     Notify your health care provider if you experience any of the following:  severe uncontrolled pain     Notify your health care provider if you experience any of the following:  persistent dizziness, light-headedness, or visual disturbances     Notify your health care provider if you experience any of the following:  increased confusion or weakness     Activity as tolerated   Order Comments: Fall precautions       Significant Diagnostic Studies: Labs:   CMP   Recent Labs   Lab 06/14/22  1201 06/15/22  0225 06/16/22 0414    141 141   K 2.8* 3.0* 3.0*    106 105   CO2 31* 27 26    92 84   BUN 12 10 4*   CREATININE 1.0 0.8 0.7   CALCIUM 9.9 9.4 9.2   PROT 6.3 6.2 6.4   ALBUMIN 2.4* 2.3* 2.3*   BILITOT 0.3 0.2 0.4   ALKPHOS 73 80 87   AST 15 13 15   ALT 10 11 13   ANIONGAP 10 8 10   ESTGFRAFRICA >60 >60 >60   EGFRNONAA >60 >60 >60   , CBC   Recent Labs   Lab 06/14/22  1201 06/15/22  0225 06/16/22  0414   WBC 15.11* 12.71* 8.42   HGB 8.8* 8.8* 8.8*   HCT 25.8* 25.7* 25.9*   * 473* 484*    and INR No results found for: INR, PROTIME    Pending Diagnostic Studies:     Procedure Component Value Units Date/Time    Echo [[id number]] Resulted: 06/15/22 1214    Order Status: Sent Lab Status: In process Updated: 06/15/22 1214     AV mean gradient 5 mmHg      Ao peak leland 1.55 m/s      Ao VTI 32.86 cm      IVRT 117.98 msec      IVS 0.76 cm      LA size 2.95 cm      Left Atrium Major Axis 4.31 cm      Left Atrium Minor Axis 4.64 cm      LVIDd 4.67 cm      LVIDs 4.00 cm      LVOT diameter 1.96 cm      LVOT peak VTI 23.02 cm      Posterior Wall 0.79 cm      MV Peak A Leland 0.54 m/s      E wave deceleration time 161.12 msec      MV Peak E Leland 0.92 m/s      PV Peak D Leland 0.53 m/s      PV Peak S Leland 0.75 m/s      RA Major  "Axis 4.06 cm      RA Width 2.90 cm      RVDD 2.48 cm      TAPSE 2.70 cm      TR Max Leland 3.00 m/s      LA WIDTH 3.95 cm      Ao root annulus 2.95 cm      AORTIC VALVE CUSP SEPERATION 2.05 cm      PV PEAK VELOCITY 0.81 cm/s      MV stenosis pressure 1/2 time 46.73 ms      LV Diastolic Volume 100.67 mL      LV Systolic Volume 70.11 mL      LVOT peak leland 1.23 m/s      TDI LATERAL 0.11 m/s      TDI SEPTAL 0.10 m/s      Mr max leland 0.05 m/s      LA volume (mod) 37.58 cm3      MV "A" wave duration 10.66 msec      MV mean gradient 0 mmHg      MV peak gradient 4 mmHg      RV S' 14.83 cm/s      MV VTI 28.04 cm      LV LATERAL E/E' RATIO 8.36 m/s      LV SEPTAL E/E' RATIO 9.20 m/s      FS 14 %      LA volume 44.26 cm3      LV mass 116.08 g      Left Ventricle Relative Wall Thickness 0.34 cm      AV valve area 2.11 cm2      AV Velocity Ratio 0.79     AV index (prosthetic) 0.70     MV valve area p 1/2 method 4.71 cm2      MV valve area by continuity eq 2.48 cm2      E/A ratio 1.70     Mean e' 0.11 m/s      Pulm vein S/D ratio 1.42     LVOT area 3.0 cm2      LVOT stroke volume 69.42 cm3      AV peak gradient 10 mmHg      E/E' ratio 8.76 m/s      LV Systolic Volume Index 45.8 mL/m2      LV Diastolic Volume Index 65.80 mL/m2      LA Volume Index 28.9 mL/m2      LV Mass Index 76 g/m2      Triscuspid Valve Regurgitation Peak Gradient 36 mmHg      LA Volume Index (Mod) 24.6 mL/m2      BSA 1.52 m2          Medications:  Reconciled Home Medications:      Medication List      START taking these medications    potassium chloride SA 10 MEQ tablet  Commonly known as: K-DUR,KLOR-CON  Take 2 tablets (20 mEq total) by mouth once daily.        CONTINUE taking these medications    cholecalciferol (vitamin D3) 10 mcg (400 unit) Tab  Commonly known as: VITAMIN D3  Take 400 Units by mouth once daily.     folic acid 1 MG tablet  Commonly known as: FOLVITE  Take 1 tablet (1 mg total) by mouth once daily.     lisinopriL 40 MG tablet  Commonly known as: " PRINIVIL,ZESTRIL  Take 1 tablet (40 mg total) by mouth once daily.     methotrexate 2.5 MG Tab  TAKE 10 TABLETS BY MOUTH EVERY 7 DAYS SPLIT DOSING. TAKE 5 TABS IN THE AM AND 5 TABS IN THE PM EVERY 7 DAYS THE SAME DAY     metoprolol succinate 50 MG 24 hr tablet  Commonly known as: TOPROL-XL  Take 1 tablet by mouth once daily     pantoprazole 40 MG tablet  Commonly known as: PROTONIX  Take 1 tablet (40 mg total) by mouth once daily.     predniSONE 5 MG tablet  Commonly known as: DELTASONE  Take 1 tablet (5 mg total) by mouth once daily.     sertraline 25 MG tablet  Commonly known as: ZOLOFT  Take 1 tablet by mouth once daily     traZODone 50 MG tablet  Commonly known as: DESYREL  Take 1 tablet by mouth in the evening        STOP taking these medications    cyclobenzaprine 5 MG tablet  Commonly known as: FLEXERIL     hydroCHLOROthiazide 25 MG tablet  Commonly known as: HYDRODIURIL            Indwelling Lines/Drains at time of discharge:   Lines/Drains/Airways     None                 Time spent on the discharge of patient: 28 minutes         Jose Jimenez MD  Department of Hospital Medicine  [facility]

## 2022-06-16 NOTE — CARE UPDATE
06/16/22 1100   Tobacco Cessation Intervention   Do you use any type of tobacco product? Yes   Are you interested in quitting use of tobacco products? Not interested   Are you interested in Nicotine Replacement for symptom relief? No   $ Smoking Cessation Charges Smoking Cessation - Intermediate (CTTS)

## 2022-06-22 ENCOUNTER — PATIENT MESSAGE (OUTPATIENT)
Dept: FAMILY MEDICINE | Facility: CLINIC | Age: 62
End: 2022-06-22
Payer: COMMERCIAL

## 2022-06-23 ENCOUNTER — LAB VISIT (OUTPATIENT)
Dept: LAB | Facility: HOSPITAL | Age: 62
End: 2022-06-23
Attending: INTERNAL MEDICINE
Payer: COMMERCIAL

## 2022-06-23 DIAGNOSIS — R55 SYNCOPE: ICD-10-CM

## 2022-06-23 DIAGNOSIS — E87.6 HYPOKALEMIA: ICD-10-CM

## 2022-06-23 LAB
ALBUMIN SERPL BCP-MCNC: 3.1 G/DL (ref 3.5–5.2)
ALP SERPL-CCNC: 84 U/L (ref 55–135)
ALT SERPL W/O P-5'-P-CCNC: 10 U/L (ref 10–44)
ANION GAP SERPL CALC-SCNC: 8 MMOL/L (ref 8–16)
AST SERPL-CCNC: 15 U/L (ref 10–40)
BASOPHILS # BLD AUTO: 0.09 K/UL (ref 0–0.2)
BASOPHILS NFR BLD: 0.8 % (ref 0–1.9)
BILIRUB SERPL-MCNC: 0.3 MG/DL (ref 0.1–1)
BUN SERPL-MCNC: 11 MG/DL (ref 8–23)
CALCIUM SERPL-MCNC: 10.8 MG/DL (ref 8.7–10.5)
CHLORIDE SERPL-SCNC: 104 MMOL/L (ref 95–110)
CO2 SERPL-SCNC: 27 MMOL/L (ref 23–29)
CREAT SERPL-MCNC: 0.9 MG/DL (ref 0.5–1.4)
DIFFERENTIAL METHOD: ABNORMAL
EOSINOPHIL # BLD AUTO: 0.2 K/UL (ref 0–0.5)
EOSINOPHIL NFR BLD: 1.3 % (ref 0–8)
ERYTHROCYTE [DISTWIDTH] IN BLOOD BY AUTOMATED COUNT: 15 % (ref 11.5–14.5)
EST. GFR  (AFRICAN AMERICAN): >60 ML/MIN/1.73 M^2
EST. GFR  (NON AFRICAN AMERICAN): >60 ML/MIN/1.73 M^2
GLUCOSE SERPL-MCNC: 119 MG/DL (ref 70–110)
HCT VFR BLD AUTO: 32.2 % (ref 37–48.5)
HGB BLD-MCNC: 10 G/DL (ref 12–16)
IMM GRANULOCYTES # BLD AUTO: 0.04 K/UL (ref 0–0.04)
IMM GRANULOCYTES NFR BLD AUTO: 0.3 % (ref 0–0.5)
LYMPHOCYTES # BLD AUTO: 1.2 K/UL (ref 1–4.8)
LYMPHOCYTES NFR BLD: 9.8 % (ref 18–48)
MCH RBC QN AUTO: 33 PG (ref 27–31)
MCHC RBC AUTO-ENTMCNC: 31.1 G/DL (ref 32–36)
MCV RBC AUTO: 106 FL (ref 82–98)
MONOCYTES # BLD AUTO: 0.6 K/UL (ref 0.3–1)
MONOCYTES NFR BLD: 4.8 % (ref 4–15)
NEUTROPHILS # BLD AUTO: 9.9 K/UL (ref 1.8–7.7)
NEUTROPHILS NFR BLD: 83 % (ref 38–73)
NRBC BLD-RTO: 0 /100 WBC
PLATELET # BLD AUTO: 370 K/UL (ref 150–450)
PMV BLD AUTO: 11 FL (ref 9.2–12.9)
POTASSIUM SERPL-SCNC: 3.9 MMOL/L (ref 3.5–5.1)
PROT SERPL-MCNC: 7.4 G/DL (ref 6–8.4)
RBC # BLD AUTO: 3.03 M/UL (ref 4–5.4)
SODIUM SERPL-SCNC: 139 MMOL/L (ref 136–145)
WBC # BLD AUTO: 11.98 K/UL (ref 3.9–12.7)

## 2022-06-23 PROCEDURE — 36415 COLL VENOUS BLD VENIPUNCTURE: CPT | Mod: PO | Performed by: INTERNAL MEDICINE

## 2022-06-23 PROCEDURE — 85025 COMPLETE CBC W/AUTO DIFF WBC: CPT | Performed by: INTERNAL MEDICINE

## 2022-06-23 PROCEDURE — 80053 COMPREHEN METABOLIC PANEL: CPT | Performed by: INTERNAL MEDICINE

## 2022-06-27 ENCOUNTER — OFFICE VISIT (OUTPATIENT)
Dept: FAMILY MEDICINE | Facility: CLINIC | Age: 62
End: 2022-06-27
Payer: COMMERCIAL

## 2022-06-27 VITALS
WEIGHT: 108.94 LBS | TEMPERATURE: 99 F | DIASTOLIC BLOOD PRESSURE: 74 MMHG | BODY MASS INDEX: 19.3 KG/M2 | HEART RATE: 96 BPM | SYSTOLIC BLOOD PRESSURE: 120 MMHG | OXYGEN SATURATION: 95 % | HEIGHT: 63 IN

## 2022-06-27 DIAGNOSIS — R55 SYNCOPE, UNSPECIFIED SYNCOPE TYPE: ICD-10-CM

## 2022-06-27 DIAGNOSIS — Z09 HOSPITAL DISCHARGE FOLLOW-UP: Primary | ICD-10-CM

## 2022-06-27 PROCEDURE — 3074F PR MOST RECENT SYSTOLIC BLOOD PRESSURE < 130 MM HG: ICD-10-PCS | Mod: CPTII,S$GLB,, | Performed by: PHYSICIAN ASSISTANT

## 2022-06-27 PROCEDURE — 99999 PR PBB SHADOW E&M-EST. PATIENT-LVL IV: ICD-10-PCS | Mod: PBBFAC,,, | Performed by: PHYSICIAN ASSISTANT

## 2022-06-27 PROCEDURE — 3008F PR BODY MASS INDEX (BMI) DOCUMENTED: ICD-10-PCS | Mod: CPTII,S$GLB,, | Performed by: PHYSICIAN ASSISTANT

## 2022-06-27 PROCEDURE — 3078F PR MOST RECENT DIASTOLIC BLOOD PRESSURE < 80 MM HG: ICD-10-PCS | Mod: CPTII,S$GLB,, | Performed by: PHYSICIAN ASSISTANT

## 2022-06-27 PROCEDURE — 99999 PR PBB SHADOW E&M-EST. PATIENT-LVL IV: CPT | Mod: PBBFAC,,, | Performed by: PHYSICIAN ASSISTANT

## 2022-06-27 PROCEDURE — 4010F PR ACE/ARB THEARPY RXD/TAKEN: ICD-10-PCS | Mod: CPTII,S$GLB,, | Performed by: PHYSICIAN ASSISTANT

## 2022-06-27 PROCEDURE — 1159F PR MEDICATION LIST DOCUMENTED IN MEDICAL RECORD: ICD-10-PCS | Mod: CPTII,S$GLB,, | Performed by: PHYSICIAN ASSISTANT

## 2022-06-27 PROCEDURE — 4010F ACE/ARB THERAPY RXD/TAKEN: CPT | Mod: CPTII,S$GLB,, | Performed by: PHYSICIAN ASSISTANT

## 2022-06-27 PROCEDURE — 3078F DIAST BP <80 MM HG: CPT | Mod: CPTII,S$GLB,, | Performed by: PHYSICIAN ASSISTANT

## 2022-06-27 PROCEDURE — 3074F SYST BP LT 130 MM HG: CPT | Mod: CPTII,S$GLB,, | Performed by: PHYSICIAN ASSISTANT

## 2022-06-27 PROCEDURE — 99214 PR OFFICE/OUTPT VISIT, EST, LEVL IV, 30-39 MIN: ICD-10-PCS | Mod: S$GLB,,, | Performed by: PHYSICIAN ASSISTANT

## 2022-06-27 PROCEDURE — 1159F MED LIST DOCD IN RCRD: CPT | Mod: CPTII,S$GLB,, | Performed by: PHYSICIAN ASSISTANT

## 2022-06-27 PROCEDURE — 3008F BODY MASS INDEX DOCD: CPT | Mod: CPTII,S$GLB,, | Performed by: PHYSICIAN ASSISTANT

## 2022-06-27 PROCEDURE — 99214 OFFICE O/P EST MOD 30 MIN: CPT | Mod: S$GLB,,, | Performed by: PHYSICIAN ASSISTANT

## 2022-06-27 NOTE — PROGRESS NOTES
Subjective:       Patient ID: Claire Bowser is a 62 y.o. female.    Chief Complaint: Hospital Follow Up    Patient is new to me. She presents for hospital follow up.   She hospitalized 6/14/2022 for a syncopal episode.  She was previously diagnosed with covid and took paxlovid.  She developed severe GI symptoms and ended up hospitalized with biliary pancreatitis.  She is scheduled for EUS and biopsy of pancreatic mass. She has lost over 25 lbs since 1/2022.  She was previous on 3 BP medications but these were held since hospitalization. Her syncope work up was unremarkable except for low bp. Since her discharge she is feeling well.   Patients patient medical/surgical, social and family histories have been reviewed       Review of Systems   Constitutional: Positive for unexpected weight change. Negative for activity change, appetite change, chills, diaphoresis, fatigue and fever.   Respiratory: Negative for cough.    Cardiovascular: Negative for chest pain, palpitations and leg swelling.   Gastrointestinal: Negative for abdominal pain, diarrhea, nausea and vomiting.   Neurological: Negative for dizziness, weakness, light-headedness and headaches.       Objective:      Physical Exam  Constitutional:       General: She is awake. She is not in acute distress.     Appearance: Normal appearance. She is well-developed, well-groomed and underweight. She is not ill-appearing.   HENT:      Head: Normocephalic and atraumatic.   Eyes:      Conjunctiva/sclera: Conjunctivae normal.   Cardiovascular:      Rate and Rhythm: Normal rate and regular rhythm.      Heart sounds: Normal heart sounds.   Pulmonary:      Effort: Pulmonary effort is normal.      Breath sounds: Normal breath sounds.   Musculoskeletal:      Right lower leg: No edema.      Left lower leg: No edema.   Skin:     General: Skin is warm and dry.   Neurological:      Mental Status: She is alert.   Psychiatric:         Mood and Affect: Mood normal.         Behavior:  "Behavior is cooperative.         Assessment:       1. Hospital discharge follow-up    2. Syncope, unspecified syncope type        Plan:       Claire was seen today for hospital follow up.    Diagnoses and all orders for this visit:    Hospital discharge follow-up    Syncope, unspecified syncope type,   BP today 120/74 and off medications.  Continue BP log at home   Follow up with GI regarding the pancreatic mass bx and EUS.            Documentation entered by me for this encounter may have been done in part using speech-recognition technology. Although I have made an effort to ensure accuracy, "sound like" errors may exist and should be interpreted in context.   I spent a total of 35 minutes on the day of the visit.This includes face to face time and non-face to face time preparing to see the patient (eg, review of tests), obtaining and/or reviewing separately obtained history, documenting clinical information in the electronic or other health record, independently interpreting results and communicating results to the patient/family/caregiver, or care coordinator.    "

## 2022-06-29 ENCOUNTER — HOSPITAL ENCOUNTER (OUTPATIENT)
Dept: PREADMISSION TESTING | Facility: HOSPITAL | Age: 62
Discharge: HOME | End: 2022-06-29
Attending: INTERNAL MEDICINE
Payer: COMMERCIAL

## 2022-07-01 ENCOUNTER — ANESTHESIA (OUTPATIENT)
Dept: SURGERY | Facility: HOSPITAL | Age: 62
End: 2022-07-01
Payer: COMMERCIAL

## 2022-07-01 ENCOUNTER — ANESTHESIA EVENT (OUTPATIENT)
Dept: SURGERY | Facility: HOSPITAL | Age: 62
End: 2022-07-01
Payer: COMMERCIAL

## 2022-07-01 ENCOUNTER — HOSPITAL ENCOUNTER (OUTPATIENT)
Facility: HOSPITAL | Age: 62
Discharge: HOME | End: 2022-07-01
Attending: INTERNAL MEDICINE | Admitting: INTERNAL MEDICINE
Payer: COMMERCIAL

## 2022-07-01 VITALS
HEIGHT: 65 IN | DIASTOLIC BLOOD PRESSURE: 76 MMHG | SYSTOLIC BLOOD PRESSURE: 142 MMHG | OXYGEN SATURATION: 97 % | BODY MASS INDEX: 17.99 KG/M2 | HEART RATE: 63 BPM | RESPIRATION RATE: 18 BRPM | WEIGHT: 108 LBS | TEMPERATURE: 97 F

## 2022-07-01 VITALS
OXYGEN SATURATION: 100 % | DIASTOLIC BLOOD PRESSURE: 58 MMHG | RESPIRATION RATE: 20 BRPM | SYSTOLIC BLOOD PRESSURE: 118 MMHG | HEART RATE: 69 BPM

## 2022-07-01 DIAGNOSIS — K86.89 PANCREATIC MASS: ICD-10-CM

## 2022-07-01 PROCEDURE — 27000675 HC TUBING MICRODRIP: Performed by: NURSE ANESTHETIST, CERTIFIED REGISTERED

## 2022-07-01 PROCEDURE — 37000008 HC ANESTHESIA 1ST 15 MINUTES: Performed by: INTERNAL MEDICINE

## 2022-07-01 PROCEDURE — 27202103: Performed by: NURSE ANESTHETIST, CERTIFIED REGISTERED

## 2022-07-01 PROCEDURE — 27000671 HC TUBING MICROBORE EXT: Performed by: NURSE ANESTHETIST, CERTIFIED REGISTERED

## 2022-07-01 PROCEDURE — 43239 EGD BIOPSY SINGLE/MULTIPLE: CPT | Performed by: INTERNAL MEDICINE

## 2022-07-01 PROCEDURE — 25000003 PHARM REV CODE 250: Performed by: NURSE ANESTHETIST, CERTIFIED REGISTERED

## 2022-07-01 PROCEDURE — 37000009 HC ANESTHESIA EA ADD 15 MINS: Performed by: INTERNAL MEDICINE

## 2022-07-01 PROCEDURE — 27000284 HC CANNULA NASAL: Performed by: NURSE ANESTHETIST, CERTIFIED REGISTERED

## 2022-07-01 PROCEDURE — 63600175 PHARM REV CODE 636 W HCPCS: Performed by: NURSE ANESTHETIST, CERTIFIED REGISTERED

## 2022-07-01 PROCEDURE — 27202053 HC NEEDLE BIOPSY EUS: Performed by: INTERNAL MEDICINE

## 2022-07-01 PROCEDURE — 43238 EGD US FINE NEEDLE BX/ASPIR: CPT | Performed by: INTERNAL MEDICINE

## 2022-07-01 PROCEDURE — 27200043 HC FORCEPS, BIOPSY: Performed by: INTERNAL MEDICINE

## 2022-07-01 RX ORDER — PROPOFOL 10 MG/ML
VIAL (ML) INTRAVENOUS
Status: DISCONTINUED | OUTPATIENT
Start: 2022-07-01 | End: 2022-07-01

## 2022-07-01 RX ORDER — LABETALOL HYDROCHLORIDE 5 MG/ML
INJECTION, SOLUTION INTRAVENOUS
Status: DISCONTINUED | OUTPATIENT
Start: 2022-07-01 | End: 2022-07-01

## 2022-07-01 RX ADMIN — PROPOFOL 50 MG: 10 INJECTION, EMULSION INTRAVENOUS at 09:07

## 2022-07-01 RX ADMIN — SODIUM CHLORIDE: 0.9 INJECTION, SOLUTION INTRAVENOUS at 08:07

## 2022-07-01 RX ADMIN — LABETALOL HYDROCHLORIDE 5 MG: 5 INJECTION, SOLUTION INTRAVENOUS at 09:07

## 2022-07-01 NOTE — ANESTHESIA PREPROCEDURE EVALUATION
07/01/2022  Claire Bowser is a 62 y.o., female.    Patient Active Problem List   Diagnosis    Anxiety    Hypertension    Generalized anxiety disorder    Tobacco use    Mixed hyperlipidemia    BMI 22.0-22.9, adult    Rheumatoid arthritis involving multiple sites with positive rheumatoid factor    Black stool    Colon polyps    Pneumonia    Hypokalemia    Acute cystitis without hematuria    Acute pancreatitis    Epigastric pain    Dehydration    Hypercalcemia    ACP (advance care planning)    Severe malnutrition    Long-term use of immunosuppressant medication    Syncope    Acute biliary pancreatitis       Past Surgical History:   Procedure Laterality Date    COLONOSCOPY N/A 2/26/2021    Procedure: COLONOSCOPY;  Surgeon: Amy Sidhu MD;  Location: [facility] ENDO;  Service: Endoscopy;  Laterality: N/A;    ESOPHAGOGASTRODUODENOSCOPY N/A 2/26/2021    Procedure: EGD (ESOPHAGOGASTRODUODENOSCOPY);  Surgeon: Amy Sidhu MD;  Location: [facility] ENDO;  Service: Endoscopy;  Laterality: N/A;    TUBAL LIGATION          Tobacco Use:  The patient  reports that she has been smoking cigarettes. She has a 7.00 pack-year smoking history. She has never used smokeless tobacco.     Results for orders placed or performed during the hospital encounter of 06/14/22   EKG and show to ED MD    Collection Time: 06/14/22 12:10 PM    Narrative    Test Reason : R55,    Vent. Rate : 071 BPM     Atrial Rate : 071 BPM     P-R Int : 192 ms          QRS Dur : 076 ms      QT Int : 416 ms       P-R-T Axes : 085 047 047 degrees     QTc Int : 452 ms    Normal sinus rhythm with sinus arrhythmia  Normal ECG  When compared with ECG of 25-MAY-2022 10:49,  Nonspecific T wave abnormality has replaced inverted T waves in Inferior  leads  T wave inversion no longer evident in Anterior leads  Nonspecific T wave abnormality, worse  in Lateral leads  Confirmed by Ta Wynne MD ([id number]) on 6/17/2022 7:46:52 PM    Referred By: [email or web address]   SELF           Confirmed By:Ta Wynne MD             Lab Results   Component Value Date    WBC 11.98 06/23/2022    HGB 10.0 (L) 06/23/2022    HCT 32.2 (L) 06/23/2022     (H) 06/23/2022     06/23/2022     BMP  Lab Results   Component Value Date     06/23/2022    K 3.9 06/23/2022     06/23/2022    CO2 27 06/23/2022    BUN 11 06/23/2022    CREATININE 0.9 06/23/2022    CALCIUM 10.8 (H) 06/23/2022    ANIONGAP 8 06/23/2022     (H) 06/23/2022    GLU 84 06/16/2022    GLU 92 06/15/2022       Results for orders placed during the hospital encounter of 06/14/22    Echo    Interpretation Summary  · The left ventricle is normal in size with normal systolic function.  · The estimated ejection fraction is 54%.  · Normal left ventricular diastolic function.  · Atrial fibrillation not observed.  · Normal right ventricular size with normal right ventricular systolic function.  · There is mild pulmonary hypertension.  · Mild to moderate tricuspid regurgitation.  · Intermediate central venous pressure (8 mmHg).  · The estimated PA systolic pressure is 44 mmHg.  · No bubble study            Pre-op Assessment    I have reviewed the Patient Summary Reports.     I have reviewed the Nursing Notes. I have reviewed the NPO Status.   I have reviewed the Medications.     Review of Systems  Anesthesia Hx:  No problems with previous Anesthesia  Denies Family Hx of Anesthesia complications.   Denies Personal Hx of Anesthesia complications.   Social:  Non-Smoker    Hematology/Oncology:         -- Anemia:   EENT/Dental:EENT/Dental Normal   Cardiovascular:   Hypertension (resolved after weight loss, no longer takes antihypertensives.)    Pulmonary:   Pneumonia (Jun 2022)    Renal/:  Renal/ Normal     Hepatic/GI:  Hepatic/GI Normal  Hepatic/GI Symptoms: (Pacreatic mass)     Musculoskeletal:  Musculoskeletal Normal    Neurological:   Neuromuscular Disease, (mal nutrition)    Endocrine:  Endocrine Normal    Psych:   anxiety          Physical Exam  General: Well nourished    Airway:  Mallampati: III / II  Mouth Opening: Small, but > 3cm  TM Distance: Normal  Tongue: Normal  Neck ROM: Normal ROM    Dental:  Periodontal disease  Lower front teeth are very loose  Chest/Lungs:  Clear to auscultation, Normal Respiratory Rate    Heart:  Rate: Normal  Rhythm: Regular Rhythm        Anesthesia Plan  Type of Anesthesia, risks & benefits discussed:    Anesthesia Type: MAC  Intra-op Monitoring Plan: Standard ASA Monitors  Post Op Pain Control Plan: IV/PO Opioids PRN  (medical reason for not using multimodal pain management)  Informed Consent: Informed consent signed with the Patient and all parties understand the risks and agree with anesthesia plan.  All questions answered.   ASA Score: 3  Anesthesia Plan Notes: MAC with propofol    Ready For Surgery From Anesthesia Perspective.     .

## 2022-07-01 NOTE — ANESTHESIA POSTPROCEDURE EVALUATION
Anesthesia Post Evaluation    Patient: Claire Bowser    Procedure(s) Performed: Procedure(s) (LRB):  ULTRASOUND, UPPER GI TRACT, ENDOSCOPIC (N/A)    Final Anesthesia Type: MAC      Patient location during evaluation: GI PACU  Patient participation: Yes- Able to Participate  Level of consciousness: awake and alert  Post-procedure vital signs: reviewed and stable  Pain management: adequate  Airway patency: patent    PONV status at discharge: No PONV  Anesthetic complications: no      Cardiovascular status: blood pressure returned to baseline and stable  Respiratory status: unassisted and room air  Hydration status: euvolemic  Follow-up not needed.          Vitals Value Taken Time   /76 07/01/22 0950   Temp 36.3 °C (97.3 °F) 07/01/22 0930   Pulse 63 07/01/22 0950   Resp 18 07/01/22 0950   SpO2 97 % 07/01/22 0950         Event Time   Out of Recovery 10:02:22         Pain/Gerald Score: No data recorded

## 2022-07-01 NOTE — TRANSFER OF CARE
"Anesthesia Transfer of Care Note    Patient: Claire Bowser    Procedure(s) Performed: Procedure(s) (LRB):  ULTRASOUND, UPPER GI TRACT, ENDOSCOPIC (N/A)    Patient location: GI    Anesthesia Type: MAC    Transport from OR: Transported from OR on room air with adequate spontaneous ventilation    Post pain: adequate analgesia    Post assessment: no apparent anesthetic complications    Post vital signs: stable    Level of consciousness: awake and alert    Nausea/Vomiting: no nausea/vomiting    Complications: none    Transfer of care protocol was followed      Last vitals:   Visit Vitals  BP (!) 140/65 (BP Location: Left arm, Patient Position: Lying)   Pulse 67   Temp 36 °C (96.8 °F) (Axillary)   Resp 16   Ht 5' 5" (1.651 m)   Wt 49 kg (108 lb)   SpO2 100%   Breastfeeding No   BMI 17.97 kg/m²     "

## 2022-07-01 NOTE — DISCHARGE INSTRUCTIONS
No driving for 24 hours  No alcohol for 24 hours  Do not make any critical decisions or sign legal documents until tomorrow.  Doctor Best will call you with results of pathology in about 1 week.

## 2022-07-01 NOTE — [employer]ATION PATIENT INSTRUCTIONS
Discharge Summary/Instructions after an Endoscopic Procedure  Patient Name: Claire Bowser  Patient MRN: [id number]  Patient YOB: 1960 Friday, July 1, 2022  Donavon Jewell III, MD  RESTRICTIONS:  During your procedure today, you received medications for sedation.  These   medications may affect your judgment, balance and coordination.  Therefore,   for 24 hours, you have the following restrictions:   - DO NOT drive a car, operate machinery, make legal/financial decisions,   sign important papers or drink alcohol.    ACTIVITY:  Today: no heavy lifting, straining or running due to procedural   sedation/anesthesia.  The following day: return to full activity including work.  DIET:  Eat and drink normally unless instructed otherwise.     TREATMENT FOR COMMON SIDE EFFECTS:  - Mild abdominal pain, nausea, belching, bloating or excessive gas:  rest,   eat lightly and use a heating pad.  - Sore Throat: treat with throat lozenges and/or gargle with warm salt   water.  - Because air was used during the procedure, expelling large amounts of air   from your rectum or belching is normal.  - If a bowel prep was taken, you may not have a bowel movement for 1-3 days.    This is normal.  SYMPTOMS TO WATCH FOR AND REPORT TO YOUR PHYSICIAN:  1. Abdominal pain or bloating, other than gas cramps.  2. Chest pain.  3. Back pain.  4. Signs of infection such as: chills or fever occurring within 24 hours   after the procedure.  5. Rectal bleeding, which would show as bright red, maroon, or black stools.   (A tablespoon of blood from the rectum is not serious, especially if   hemorrhoids are present.)  6. Vomiting.  7. Weakness or dizziness.  GO DIRECTLY TO THE NEAREST EMERGENCY ROOM IF YOU HAVE ANY OF THE FOLLOWING:      Difficulty breathing              Chills and/or fever over 101 F   Persistent vomiting and/or vomiting blood   Severe abdominal pain   Severe chest pain   Black, tarry stools   Bleeding- more than one  tablespoon   Any other symptom or condition that you feel may need urgent attention  Your doctor recommends these additional instructions:  If any biopsies were taken, your doctors clinic will contact you in 1 to 2   weeks with any results.  - Discharge patient to home.   - Patient has a contact number available for emergencies.  The signs and   symptoms of potential delayed complications were discussed with the   patient.  Return to normal activities tomorrow.  Written discharge   instructions were provided to the patient.   - Resume regular diet.   - Continue present medications.   - Appearance not typical for cancer or cyst; suspect pancreatic phelgmon   from recent pancreatitis. Assuming path is benign, will proceed w/ lap   martin and MRI pancreas in 2-3 months.  For questions, problems or results please call your physician - Donavon Jewell III, MD at Work:  [phone].  [facility], EMERGENCY ROOM PHONE NUMBER: [phone]  IF A COMPLICATION OR EMERGENCY SITUATION ARISES AND YOU ARE UNABLE TO REACH   YOUR PHYSICIAN - GO DIRECTLY TO THE EMERGENCY ROOM.  Donavon Jewell III, MD  7/1/2022 9:31:28 AM  This report has been verified and signed electronically.  Dear patient,  As a result of recent federal legislation (The Federal Cures Act), you may   receive lab or pathology results from your procedure in your [facility]   account before your physician is able to contact you. Your physician or   their representative will relay the results to you with their   recommendations at their soonest availability.  Thank you,  [employer]ATION

## 2022-07-01 NOTE — H&P
GASTROENTEROLOGY PRE-PROCEDURE H&P NOTE  Patient Name: Claire Bowser  Patient MRN: [id number]  Patient : 1960    Service date: 2022    PCP: Samuel Brady MD    No chief complaint on file.      HPI: Patient is a 62 y.o. female with PMHx as below here for evaluation of pancreatic lesion on CT.     Past Medical History:  Past Medical History:   Diagnosis Date    Anxiety     Flu 2017    [facility]    Hypertension     Rheumatoid arthritis involving multiple sites with positive rheumatoid factor 2021        Past Surgical History:  Past Surgical History:   Procedure Laterality Date    COLONOSCOPY N/A 2021    Procedure: COLONOSCOPY;  Surgeon: Amy Sidhu MD;  Location: [facility] ENDO;  Service: Endoscopy;  Laterality: N/A;    ESOPHAGOGASTRODUODENOSCOPY N/A 2021    Procedure: EGD (ESOPHAGOGASTRODUODENOSCOPY);  Surgeon: Amy Sidhu MD;  Location: [facility] ENDO;  Service: Endoscopy;  Laterality: N/A;    TUBAL LIGATION          Home Medications:  Medications Prior to Admission   Medication Sig Dispense Refill Last Dose    cholecalciferol, vitamin D3, (VITAMIN D3) 10 mcg (400 unit) Tab Take 400 Units by mouth once daily.   2022 at Unknown time    folic acid (FOLVITE) 1 MG tablet Take 1 tablet (1 mg total) by mouth once daily. 360 tablet 3 2022 at Unknown time    lisinopriL (PRINIVIL,ZESTRIL) 40 MG tablet Take 1 tablet (40 mg total) by mouth once daily. 90 tablet 1 Past Month at Unknown time    metoprolol succinate (TOPROL-XL) 50 MG 24 hr tablet Take 1 tablet by mouth once daily 90 tablet 3 Past Month at Unknown time    pantoprazole (PROTONIX) 40 MG tablet Take 1 tablet (40 mg total) by mouth once daily. 30 tablet 11 2022 at Unknown time    potassium chloride SA (K-DUR,KLOR-CON) 10 MEQ tablet Take 2 tablets (20 mEq total) by mouth once daily. 30 tablet 0 2022 at Unknown time    predniSONE (DELTASONE) 5 MG tablet Take 1 tablet (5 mg total) by mouth once  daily. 30 tablet 4 6/30/2022 at Unknown time    sertraline (ZOLOFT) 25 MG tablet Take 1 tablet by mouth once daily 90 tablet 0 6/30/2022 at Unknown time    traZODone (DESYREL) 50 MG tablet Take 1 tablet by mouth in the evening 30 tablet 2 6/30/2022 at Unknown time       Inpatient Medications:        Review of patient's allergies indicates:   Allergen Reactions    No known drug allergies        Social History:   Social History     Occupational History    Not on file   Tobacco Use    Smoking status: Current Every Day Smoker     Packs/day: 1.00     Years: 7.00     Pack years: 7.00     Types: Cigarettes    Smokeless tobacco: Never Used    Tobacco comment: [phone]   Substance and Sexual Activity    Alcohol use: No    Drug use: Never    Sexual activity: Yes     Partners: Male       Family History:   Family History   Problem Relation Age of Onset    Diabetes Mother     Heart disease Mother     Kidney disease Mother     Hypertension Mother     Stroke Mother     Hearing loss Mother     COPD Father     Liver disease Paternal Grandfather     Alcohol abuse Paternal Grandfather     Liver disease Sister     Emphysema Brother     COPD Brother     Sleep apnea Brother     No Known Problems Son     Alcohol abuse Paternal Grandmother     Cirrhosis Paternal Grandmother     Heart disease Maternal Aunt     Diabetes Maternal Aunt     Cancer Maternal Aunt         female    Heart disease Maternal Uncle     Hypertension Maternal Uncle     No Known Problems Paternal Uncle     Psoriasis Neg Hx     Lupus Neg Hx     Eczema Neg Hx     Melanoma Neg Hx        Review of Systems:  A 10 point review of systems was performed and was normal, except as mentioned in the HPI, including constitutional, HEENT, heme, lymph, cardiovascular, respiratory, gastrointestinal, genitourinary, neurologic, endocrine, psychiatric and musculoskeletal.      OBJECTIVE:    Physical Exam:  24 Hour Vital Sign Ranges: Temp:  [98.7 °F  "(37.1 °C)] 98.7 °F (37.1 °C)  Pulse:  [69] 69  Resp:  [16] 16  SpO2:  [97 %] 97 %  BP: (171)/(70) 171/70  Most recent vitals: BP (!) 171/70 (BP Location: Left arm, Patient Position: Lying)   Pulse 69   Temp 98.7 °F (37.1 °C) (Oral)   Resp 16   Ht 5' 5" (1.651 m)   Wt 49 kg (108 lb)   SpO2 97%   Breastfeeding No   BMI 17.97 kg/m²    GEN: well-developed, well-nourished, awake and alert, non-toxic appearing adult  HEENT: PERRL, sclera anicteric, oral mucosa pink and moist without lesion  NECK: trachea midline; Good ROM  CV: regular rate and rhythm, no murmurs or gallops  RESP: clear to auscultation bilaterally, no wheezes, rhonci or rales  ABD: soft, non-tender, non-distended, normal bowel sounds  EXT: no swelling or edema, 2+ pulses distally  SKIN: no rashes or jaundice  PSYCH: normal affect    Labs:   No results for input(s): WBC, MCV, PLT in the last 72 hours.    Invalid input(s): HGBAU  No results for input(s): NA, K, CL, CO2, BUN, GLU in the last 72 hours.    Invalid input(s): CREA  No results for input(s): ALB in the last 72 hours.    Invalid input(s): ALKP, SGOT, SGPT, TBIL, DBIL, TPRO  No results for input(s): PT, INR, PTT in the last 72 hours.      IMPRESSION / RECOMMENDATIONS:  EGD / EUS w/ interventions as warranted.   RIsks, benefits, alternatives discussed in detail regarding upcoming procedures and sedation. Some of the more common endoscopic complications include but not limited to immediate or delayed perforation, bleeding, infections, pain, inadvertent injury to surrounding tissue / organs and possible need for surgical evaluation. Patient expressed understanding, all questions answered and will proceed with procedure as planned.     Donavon ALVARADO [employer] III  7/1/2022  8:56 AM      "

## 2022-07-05 ENCOUNTER — OFFICE VISIT (OUTPATIENT)
Dept: FAMILY MEDICINE | Facility: CLINIC | Age: 62
End: 2022-07-05
Attending: FAMILY MEDICINE
Payer: COMMERCIAL

## 2022-07-05 VITALS
SYSTOLIC BLOOD PRESSURE: 148 MMHG | DIASTOLIC BLOOD PRESSURE: 84 MMHG | HEIGHT: 65 IN | RESPIRATION RATE: 17 BRPM | OXYGEN SATURATION: 96 % | HEART RATE: 66 BPM | TEMPERATURE: 98 F | WEIGHT: 110.31 LBS | BODY MASS INDEX: 18.38 KG/M2

## 2022-07-05 DIAGNOSIS — I95.9 HYPOTENSION, UNSPECIFIED HYPOTENSION TYPE: ICD-10-CM

## 2022-07-05 DIAGNOSIS — K86.89 PANCREATIC MASS: ICD-10-CM

## 2022-07-05 DIAGNOSIS — F41.1 GENERALIZED ANXIETY DISORDER: ICD-10-CM

## 2022-07-05 DIAGNOSIS — R55 SYNCOPE, UNSPECIFIED SYNCOPE TYPE: Primary | ICD-10-CM

## 2022-07-05 DIAGNOSIS — R00.2 PALPITATIONS: ICD-10-CM

## 2022-07-05 DIAGNOSIS — I10 ESSENTIAL HYPERTENSION: ICD-10-CM

## 2022-07-05 PROCEDURE — 3077F PR MOST RECENT SYSTOLIC BLOOD PRESSURE >= 140 MM HG: ICD-10-PCS | Mod: CPTII,S$GLB,, | Performed by: FAMILY MEDICINE

## 2022-07-05 PROCEDURE — 4010F PR ACE/ARB THEARPY RXD/TAKEN: ICD-10-PCS | Mod: CPTII,S$GLB,, | Performed by: FAMILY MEDICINE

## 2022-07-05 PROCEDURE — 1159F MED LIST DOCD IN RCRD: CPT | Mod: CPTII,S$GLB,, | Performed by: FAMILY MEDICINE

## 2022-07-05 PROCEDURE — 4010F ACE/ARB THERAPY RXD/TAKEN: CPT | Mod: CPTII,S$GLB,, | Performed by: FAMILY MEDICINE

## 2022-07-05 PROCEDURE — 3008F BODY MASS INDEX DOCD: CPT | Mod: CPTII,S$GLB,, | Performed by: FAMILY MEDICINE

## 2022-07-05 PROCEDURE — 1160F RVW MEDS BY RX/DR IN RCRD: CPT | Mod: CPTII,S$GLB,, | Performed by: FAMILY MEDICINE

## 2022-07-05 PROCEDURE — 3079F DIAST BP 80-89 MM HG: CPT | Mod: CPTII,S$GLB,, | Performed by: FAMILY MEDICINE

## 2022-07-05 PROCEDURE — 99213 PR OFFICE/OUTPT VISIT, EST, LEVL III, 20-29 MIN: ICD-10-PCS | Mod: S$GLB,,, | Performed by: FAMILY MEDICINE

## 2022-07-05 PROCEDURE — 3077F SYST BP >= 140 MM HG: CPT | Mod: CPTII,S$GLB,, | Performed by: FAMILY MEDICINE

## 2022-07-05 PROCEDURE — 3008F PR BODY MASS INDEX (BMI) DOCUMENTED: ICD-10-PCS | Mod: CPTII,S$GLB,, | Performed by: FAMILY MEDICINE

## 2022-07-05 PROCEDURE — 1159F PR MEDICATION LIST DOCUMENTED IN MEDICAL RECORD: ICD-10-PCS | Mod: CPTII,S$GLB,, | Performed by: FAMILY MEDICINE

## 2022-07-05 PROCEDURE — 99213 OFFICE O/P EST LOW 20 MIN: CPT | Mod: S$GLB,,, | Performed by: FAMILY MEDICINE

## 2022-07-05 PROCEDURE — 1160F PR REVIEW ALL MEDS BY PRESCRIBER/CLIN PHARMACIST DOCUMENTED: ICD-10-PCS | Mod: CPTII,S$GLB,, | Performed by: FAMILY MEDICINE

## 2022-07-05 PROCEDURE — 99999 PR PBB SHADOW E&M-EST. PATIENT-LVL IV: ICD-10-PCS | Mod: PBBFAC,,, | Performed by: FAMILY MEDICINE

## 2022-07-05 PROCEDURE — 3079F PR MOST RECENT DIASTOLIC BLOOD PRESSURE 80-89 MM HG: ICD-10-PCS | Mod: CPTII,S$GLB,, | Performed by: FAMILY MEDICINE

## 2022-07-05 PROCEDURE — 99999 PR PBB SHADOW E&M-EST. PATIENT-LVL IV: CPT | Mod: PBBFAC,,, | Performed by: FAMILY MEDICINE

## 2022-07-05 RX ORDER — METOPROLOL SUCCINATE 50 MG/1
25 TABLET, EXTENDED RELEASE ORAL DAILY
Qty: 90 TABLET | Refills: 3
Start: 2022-07-05 | End: 2022-07-19

## 2022-07-05 RX ORDER — SERTRALINE HYDROCHLORIDE 25 MG/1
25 TABLET, FILM COATED ORAL DAILY
Qty: 90 TABLET | Refills: 1 | Status: ON HOLD | OUTPATIENT
Start: 2022-07-05 | End: 2022-10-18

## 2022-07-05 NOTE — PROGRESS NOTES
Subjective:       Patient ID: Claire Bowser is a 62 y.o. female.    Chief Complaint: Follow-up (Pt states that she is here to follow up, patient had a pneumonia dx on 05/23/2022)    62-year-old female last seen June 8, 2022 after an episode of pancreatitis had an MRCP ordered but she was admitted to the hospital for a syncopal episode June 14 through 16 after she became dizzy with vertigo and proceeded to black out while in [employer] shopping.  Her  was able to catch urine prevent any injury and EMS was called obtaining a blood pressure of 80/40 on arrival.  She was taken to [facility] where she was admitted taken off of lisinopril and metoprolol that had been used for high blood pressure and palpitations.  The patient reports no palpitations prior to on set of her vertigo and syncope.  She had no complaints of chest pain she did have some visual changes just prior to blacking out.  She had the MRI MRCP while in the hospital and it did show a pancreatic mass.  After discharge she was admitted for an outpatient endoscopic ultrasound of the pancreas finding a pancreatic mass of uncertain etiology with possibilities including a pancreatic cyst, pancreatic pseudocyst may be resulting from the pancreatitis, or pancreatic neoplasm.  The pathology report from biopsies taken is still pending at this time.  The patient's blood pressure has increased to 154/76 and on repeat 148 over 84 with a good pulse of 66 and irregular rhythm.    Review of Systems   Constitutional: Positive for unexpected weight change. Negative for activity change.   HENT: Negative for hearing loss, rhinorrhea and trouble swallowing.    Eyes: Negative for discharge and visual disturbance.   Respiratory: Negative for chest tightness and wheezing.    Cardiovascular: Negative for chest pain and palpitations.   Gastrointestinal: Negative for blood in stool, constipation, diarrhea and vomiting.   Endocrine: Negative for polydipsia and  polyuria.   Genitourinary: Negative for difficulty urinating, dysuria, hematuria and menstrual problem.   Musculoskeletal: Negative for arthralgias, joint swelling and neck pain.   Neurological: Negative for dizziness, weakness, light-headedness and headaches.   Psychiatric/Behavioral: Negative for confusion and dysphoric mood.       Objective:      Physical Exam  Vitals and nursing note reviewed.   Constitutional:       General: She is not in acute distress.     Appearance: Normal appearance. She is not ill-appearing, toxic-appearing or diaphoretic.      Comments: Mildly elevated blood pressure  Underweight with a BMI of 18.4 she is down 3.4 lb from her previous visit June 8, 2022   HENT:      Head: Normocephalic and atraumatic.   Cardiovascular:      Rate and Rhythm: Normal rate and regular rhythm.   Pulmonary:      Effort: Pulmonary effort is normal.      Breath sounds: Normal breath sounds.   Neurological:      General: No focal deficit present.      Mental Status: She is oriented to person, place, and time. Mental status is at baseline.   Psychiatric:         Mood and Affect: Mood normal.         Behavior: Behavior normal.         Thought Content: Thought content normal.         Judgment: Judgment normal.         Assessment:       1. Syncope, unspecified syncope type    2. Pancreatic mass    3. Essential hypertension    4. Palpitations    5. Generalized anxiety disorder    6. Hypotension, unspecified hypotension type    7. Body mass index (BMI) less than 19        Plan:       1. Syncope, unspecified syncope type  Apparently secondary to hypotension brought on by weight loss.  She had been taken off of hydrochlorothiazide prior to the incident but was taken off of Toprol XL 50 daily and lisinopril daily subsequently.    2. Pancreatic mass  Pathology report still pending    3. Essential hypertension  Pressure elevated at this time, pulse adequate.  We do not want to resume lisinopril which potentially could cause  or aggravate pancreatitis.  Will resume 1/2 metoprolol daily  - metoprolol succinate (TOPROL-XL) 50 MG 24 hr tablet; Take 0.5 tablets (25 mg total) by mouth once daily.  Dispense: 90 tablet; Refill: 3    4. Palpitations  See above  - metoprolol succinate (TOPROL-XL) 50 MG 24 hr tablet; Take 0.5 tablets (25 mg total) by mouth once daily.  Dispense: 90 tablet; Refill: 3    5. Generalized anxiety disorder  Needs refill of sertraline  - sertraline (ZOLOFT) 25 MG tablet; Take 1 tablet (25 mg total) by mouth once daily.  Dispense: 90 tablet; Refill: 1    6. Hypotension, unspecified hypotension type  See above    7. Body mass index (BMI) less than 19    Office visit in three weeks to recheck pulse, blood pressure, and potassium level.  Patient told she may discontinue potassium since the hydrochlorothiazide has been discontinued and her last potassium level was good at 3.9

## 2022-07-13 ENCOUNTER — OFFICE VISIT (OUTPATIENT)
Dept: GASTROENTEROLOGY | Facility: CLINIC | Age: 62
End: 2022-07-13
Payer: COMMERCIAL

## 2022-07-13 VITALS
DIASTOLIC BLOOD PRESSURE: 68 MMHG | BODY MASS INDEX: 18.33 KG/M2 | HEIGHT: 65 IN | WEIGHT: 110 LBS | SYSTOLIC BLOOD PRESSURE: 149 MMHG | HEART RATE: 77 BPM

## 2022-07-13 DIAGNOSIS — K85.90 ACUTE PANCREATITIS, UNSPECIFIED COMPLICATION STATUS, UNSPECIFIED PANCREATITIS TYPE: ICD-10-CM

## 2022-07-13 DIAGNOSIS — K63.5 POLYP OF COLON, UNSPECIFIED PART OF COLON, UNSPECIFIED TYPE: ICD-10-CM

## 2022-07-13 DIAGNOSIS — F41.9 ANXIETY: ICD-10-CM

## 2022-07-13 DIAGNOSIS — K85.10 ACUTE BILIARY PANCREATITIS, UNSPECIFIED COMPLICATION STATUS: Primary | ICD-10-CM

## 2022-07-13 PROCEDURE — 3078F DIAST BP <80 MM HG: CPT | Mod: CPTII,S$GLB,, | Performed by: INTERNAL MEDICINE

## 2022-07-13 PROCEDURE — 1159F PR MEDICATION LIST DOCUMENTED IN MEDICAL RECORD: ICD-10-PCS | Mod: CPTII,S$GLB,, | Performed by: INTERNAL MEDICINE

## 2022-07-13 PROCEDURE — 3077F PR MOST RECENT SYSTOLIC BLOOD PRESSURE >= 140 MM HG: ICD-10-PCS | Mod: CPTII,S$GLB,, | Performed by: INTERNAL MEDICINE

## 2022-07-13 PROCEDURE — 4010F ACE/ARB THERAPY RXD/TAKEN: CPT | Mod: CPTII,S$GLB,, | Performed by: INTERNAL MEDICINE

## 2022-07-13 PROCEDURE — 3008F PR BODY MASS INDEX (BMI) DOCUMENTED: ICD-10-PCS | Mod: CPTII,S$GLB,, | Performed by: INTERNAL MEDICINE

## 2022-07-13 PROCEDURE — 99214 PR OFFICE/OUTPT VISIT, EST, LEVL IV, 30-39 MIN: ICD-10-PCS | Mod: S$GLB,,, | Performed by: INTERNAL MEDICINE

## 2022-07-13 PROCEDURE — 99999 PR PBB SHADOW E&M-EST. PATIENT-LVL III: CPT | Mod: PBBFAC,,, | Performed by: INTERNAL MEDICINE

## 2022-07-13 PROCEDURE — 99999 PR PBB SHADOW E&M-EST. PATIENT-LVL III: ICD-10-PCS | Mod: PBBFAC,,, | Performed by: INTERNAL MEDICINE

## 2022-07-13 PROCEDURE — 1159F MED LIST DOCD IN RCRD: CPT | Mod: CPTII,S$GLB,, | Performed by: INTERNAL MEDICINE

## 2022-07-13 PROCEDURE — 3078F PR MOST RECENT DIASTOLIC BLOOD PRESSURE < 80 MM HG: ICD-10-PCS | Mod: CPTII,S$GLB,, | Performed by: INTERNAL MEDICINE

## 2022-07-13 PROCEDURE — 4010F PR ACE/ARB THEARPY RXD/TAKEN: ICD-10-PCS | Mod: CPTII,S$GLB,, | Performed by: INTERNAL MEDICINE

## 2022-07-13 PROCEDURE — 99214 OFFICE O/P EST MOD 30 MIN: CPT | Mod: S$GLB,,, | Performed by: INTERNAL MEDICINE

## 2022-07-13 PROCEDURE — 3077F SYST BP >= 140 MM HG: CPT | Mod: CPTII,S$GLB,, | Performed by: INTERNAL MEDICINE

## 2022-07-13 PROCEDURE — 3008F BODY MASS INDEX DOCD: CPT | Mod: CPTII,S$GLB,, | Performed by: INTERNAL MEDICINE

## 2022-07-13 NOTE — PROGRESS NOTES
"Subjective:       Patient ID: Claire Bowser is a 62 y.o. female.    This is an established patient.      Chief Complaint: Pancreatitis    Patient seen for follow up of hospital admission for pancreatitis, acute, associated with abnormality on imaging (lesion at pancreatic neck), now with symptoms improved, with no alleviating/exacerbating factors.  She has had follow up with Dr. Jewell for EUS.  Lesion appeared to be inflammatory focus/phlegmon rather than neoplasm or cyst, and FNA was consistent with that as well.  Dr. Jewell recommended referral for cholecystectomy to be followed by repeat MRI in 2-3 months.  She is feeling better since admission and wished to discuss plan.  No other acute GI issues.    Review of Systems   Constitutional: Negative for chills, fatigue and fever.   HENT: Negative for sore throat and trouble swallowing.    Respiratory: Negative for cough, shortness of breath and wheezing.    Cardiovascular: Negative for chest pain and palpitations.   Gastrointestinal: Positive for abdominal pain (improved). Negative for blood in stool, nausea and vomiting.   Genitourinary: Negative for dysuria and hematuria.   Musculoskeletal: Negative for arthralgias and myalgias.   Integumentary:  Negative for color change and rash.   Neurological: Negative for dizziness and headaches.   Hematological: Negative for adenopathy.   Psychiatric/Behavioral: Negative for confusion. The patient is not nervous/anxious.    All other systems reviewed and are negative.        Objective:       Vitals:    07/13/22 1518   BP: (!) 149/68   Pulse: 77   Weight: 49.9 kg (110 lb 0.2 oz)   Height: 5' 5" (1.651 m)       Physical Exam  Constitutional:       Appearance: She is well-developed.   HENT:      Head: Normocephalic and atraumatic.   Eyes:      General: No scleral icterus.     Pupils: Pupils are equal, round, and reactive to light.   Cardiovascular:      Rate and Rhythm: Normal rate and regular rhythm.      Heart sounds: " No murmur heard.  Pulmonary:      Effort: Pulmonary effort is normal.      Breath sounds: Normal breath sounds. No wheezing.   Abdominal:      General: Bowel sounds are normal. There is no distension.      Palpations: Abdomen is soft.      Tenderness: There is no abdominal tenderness.   Musculoskeletal:         General: No tenderness.      Cervical back: Normal range of motion.   Lymphadenopathy:      Cervical: No cervical adenopathy.   Skin:     General: Skin is warm and dry.      Findings: No rash.   Neurological:      Mental Status: She is alert.           Lab Results   Component Value Date    WBC 11.98 06/23/2022    HGB 10.0 (L) 06/23/2022    HCT 32.2 (L) 06/23/2022     (H) 06/23/2022     06/23/2022         CMP  Sodium   Date Value Ref Range Status   06/23/2022 139 136 - 145 mmol/L Final     Potassium   Date Value Ref Range Status   06/23/2022 3.9 3.5 - 5.1 mmol/L Final     Chloride   Date Value Ref Range Status   06/23/2022 104 95 - 110 mmol/L Final     CO2   Date Value Ref Range Status   06/23/2022 27 23 - 29 mmol/L Final     Glucose   Date Value Ref Range Status   06/23/2022 119 (H) 70 - 110 mg/dL Final     BUN   Date Value Ref Range Status   06/23/2022 11 8 - 23 mg/dL Final     Creatinine   Date Value Ref Range Status   06/23/2022 0.9 0.5 - 1.4 mg/dL Final     Calcium   Date Value Ref Range Status   06/23/2022 10.8 (H) 8.7 - 10.5 mg/dL Final     Total Protein   Date Value Ref Range Status   06/23/2022 7.4 6.0 - 8.4 g/dL Final     Albumin   Date Value Ref Range Status   06/23/2022 3.1 (L) 3.5 - 5.2 g/dL Final     Total Bilirubin   Date Value Ref Range Status   06/23/2022 0.3 0.1 - 1.0 mg/dL Final     Comment:     For infants and newborns, interpretation of results should be based  on gestational age, weight and in agreement with clinical  observations.    Premature Infant recommended reference ranges:  Up to 24 hours.............<8.0 mg/dL  Up to 48 hours............<12.0 mg/dL  3-5  days..................<15.0 mg/dL  6-29 days.................<15.0 mg/dL       Alkaline Phosphatase   Date Value Ref Range Status   06/23/2022 84 55 - 135 U/L Final     AST   Date Value Ref Range Status   06/23/2022 15 10 - 40 U/L Final     ALT   Date Value Ref Range Status   06/23/2022 10 10 - 44 U/L Final     Anion Gap   Date Value Ref Range Status   06/23/2022 8 8 - 16 mmol/L Final     eGFR if    Date Value Ref Range Status   06/23/2022 >60.0 >60 mL/min/1.73 m^2 Final     eGFR if non    Date Value Ref Range Status   06/23/2022 >60.0 >60 mL/min/1.73 m^2 Final     Comment:     Calculation used to obtain the estimated glomerular filtration  rate (eGFR) is the CKD-EPI equation.          Old records from Dr. Jewell reviewed and are as summarized above in the HPI.    Assessment:       Problem List Items Addressed This Visit     Anxiety    Colon polyps    Acute pancreatitis    Relevant Orders    Ambulatory referral/consult to General Surgery    Acute biliary pancreatitis - Primary    Relevant Orders    Ambulatory referral/consult to General Surgery          Plan:       1.  Agree with Dr. Jewell.  Recommend follow up with Dr. Baker for cholecystectomy  2.  Agree with plan for repeat MRI in 2-3 months  3.  Repeat colonoscopy for surveillance in 2026  4.  Follow up in my office PRN in interim.

## 2022-07-14 ENCOUNTER — TELEPHONE (OUTPATIENT)
Dept: FAMILY MEDICINE | Facility: CLINIC | Age: 62
End: 2022-07-14
Payer: COMMERCIAL

## 2022-07-14 NOTE — TELEPHONE ENCOUNTER
"----- Message from Renee Salamanca sent at 7/14/2022  1:49 PM CDT -----  "Type:  Patient Call Back    Who Called:PT    What is the reqeust in detail:Pt was informed doctor will be putting in new labs before her appointment on 7/19. Please advise    Can the clinic reply by [facility]?no    Best Call Back Number:[phone]      Additional Information:Pt not sure what orders             "

## 2022-07-19 ENCOUNTER — OFFICE VISIT (OUTPATIENT)
Dept: FAMILY MEDICINE | Facility: CLINIC | Age: 62
End: 2022-07-19
Attending: FAMILY MEDICINE
Payer: COMMERCIAL

## 2022-07-19 VITALS
RESPIRATION RATE: 17 BRPM | SYSTOLIC BLOOD PRESSURE: 178 MMHG | OXYGEN SATURATION: 99 % | HEART RATE: 68 BPM | DIASTOLIC BLOOD PRESSURE: 80 MMHG | TEMPERATURE: 98 F | WEIGHT: 111.88 LBS | BODY MASS INDEX: 18.64 KG/M2 | HEIGHT: 65 IN

## 2022-07-19 DIAGNOSIS — M05.79 RHEUMATOID ARTHRITIS INVOLVING MULTIPLE SITES WITH POSITIVE RHEUMATOID FACTOR: ICD-10-CM

## 2022-07-19 DIAGNOSIS — R00.2 PALPITATIONS: ICD-10-CM

## 2022-07-19 DIAGNOSIS — Z79.60 LONG-TERM USE OF IMMUNOSUPPRESSANT MEDICATION: ICD-10-CM

## 2022-07-19 DIAGNOSIS — K85.90 ACUTE PANCREATITIS, UNSPECIFIED COMPLICATION STATUS, UNSPECIFIED PANCREATITIS TYPE: ICD-10-CM

## 2022-07-19 DIAGNOSIS — I10 ESSENTIAL HYPERTENSION: ICD-10-CM

## 2022-07-19 DIAGNOSIS — I10 PRIMARY HYPERTENSION: Primary | ICD-10-CM

## 2022-07-19 PROCEDURE — 4010F PR ACE/ARB THEARPY RXD/TAKEN: ICD-10-PCS | Mod: CPTII,S$GLB,, | Performed by: FAMILY MEDICINE

## 2022-07-19 PROCEDURE — 3008F BODY MASS INDEX DOCD: CPT | Mod: CPTII,S$GLB,, | Performed by: FAMILY MEDICINE

## 2022-07-19 PROCEDURE — 1159F PR MEDICATION LIST DOCUMENTED IN MEDICAL RECORD: ICD-10-PCS | Mod: CPTII,S$GLB,, | Performed by: FAMILY MEDICINE

## 2022-07-19 PROCEDURE — 3079F PR MOST RECENT DIASTOLIC BLOOD PRESSURE 80-89 MM HG: ICD-10-PCS | Mod: CPTII,S$GLB,, | Performed by: FAMILY MEDICINE

## 2022-07-19 PROCEDURE — 3008F PR BODY MASS INDEX (BMI) DOCUMENTED: ICD-10-PCS | Mod: CPTII,S$GLB,, | Performed by: FAMILY MEDICINE

## 2022-07-19 PROCEDURE — 1160F RVW MEDS BY RX/DR IN RCRD: CPT | Mod: CPTII,S$GLB,, | Performed by: FAMILY MEDICINE

## 2022-07-19 PROCEDURE — 99213 PR OFFICE/OUTPT VISIT, EST, LEVL III, 20-29 MIN: ICD-10-PCS | Mod: S$GLB,,, | Performed by: FAMILY MEDICINE

## 2022-07-19 PROCEDURE — 3079F DIAST BP 80-89 MM HG: CPT | Mod: CPTII,S$GLB,, | Performed by: FAMILY MEDICINE

## 2022-07-19 PROCEDURE — 3077F PR MOST RECENT SYSTOLIC BLOOD PRESSURE >= 140 MM HG: ICD-10-PCS | Mod: CPTII,S$GLB,, | Performed by: FAMILY MEDICINE

## 2022-07-19 PROCEDURE — 99213 OFFICE O/P EST LOW 20 MIN: CPT | Mod: S$GLB,,, | Performed by: FAMILY MEDICINE

## 2022-07-19 PROCEDURE — 99999 PR PBB SHADOW E&M-EST. PATIENT-LVL IV: ICD-10-PCS | Mod: PBBFAC,,, | Performed by: FAMILY MEDICINE

## 2022-07-19 PROCEDURE — 1160F PR REVIEW ALL MEDS BY PRESCRIBER/CLIN PHARMACIST DOCUMENTED: ICD-10-PCS | Mod: CPTII,S$GLB,, | Performed by: FAMILY MEDICINE

## 2022-07-19 PROCEDURE — 4010F ACE/ARB THERAPY RXD/TAKEN: CPT | Mod: CPTII,S$GLB,, | Performed by: FAMILY MEDICINE

## 2022-07-19 PROCEDURE — 3077F SYST BP >= 140 MM HG: CPT | Mod: CPTII,S$GLB,, | Performed by: FAMILY MEDICINE

## 2022-07-19 PROCEDURE — 99999 PR PBB SHADOW E&M-EST. PATIENT-LVL IV: CPT | Mod: PBBFAC,,, | Performed by: FAMILY MEDICINE

## 2022-07-19 PROCEDURE — 1159F MED LIST DOCD IN RCRD: CPT | Mod: CPTII,S$GLB,, | Performed by: FAMILY MEDICINE

## 2022-07-19 RX ORDER — METOPROLOL SUCCINATE 50 MG/1
50 TABLET, EXTENDED RELEASE ORAL DAILY
Qty: 90 TABLET | Refills: 3 | Status: ON HOLD
Start: 2022-07-19 | End: 2022-10-19 | Stop reason: SDUPTHER

## 2022-07-19 NOTE — PROGRESS NOTES
Subjective:       Patient ID: Claire Bowser is a 62 y.o. female.    Chief Complaint: Follow-up (Pt states that she is here to follow up from pancreatitis, patient states she would like to speak with you regarding if you are in agreement with the results from the [place])    62-year-old female coming in to follow-up from her previous visit of July 5, 2022. She developed COVID in late May and was treated with Paxlovid taking it for only three days due to severe nausea.  She was subsequently admitted due to shortness of breath and worsening symptoms on May 25 to to May 26, 2022 with pneumonia failing outpatient treatment with doxycycline.  She improved and was discharged only to return to the hospital on June 2nd remaining in-house from June 2nd through June 6 with acute pancreatitis suspected to be biliary due to a CT of the abdomen that showed suspected sludge as well as pancreatitis.  She then had an ultrasound on June 11, 2022 showing dilated bile ducts but no sign of stone and no sign of sludge.  She was readmitted to the hospital with a syncopal episode that occurred while she was shopping in [employer] on June 14 and remained in the hospital through June 16. Her blood pressure at the time of admission was 80/40 and she was dehydrated, at discharge she was taken off of metoprolol 50 mg daily, lisinopril 40 mg daily, and hydrochlorothiazide 25 mg daily.  During this hospitalization she also had an MRI and MRCP which showed a 1.5 cm mass in the neck of the pancreas felt possibly a cyst, pseudocyst, or solid tumor and a biopsy was recommended.  After discharge she was readmitted briefly for an endoscopic ultrasound and biopsy biopsy later improving to show inflammatory debris but no sign of malignancy.  In the interval she was seen by me on July 5, 2022 with her blood pressure increasing and was put back on 1/2 of her previous dose of metoprolol, 25 mg daily.  She is here for a follow-up from that visit.  She feels well,  she has no abdominal pain and she is eating well with no postprandial bloating or belching.  She has no diarrhea or constipation issues at this time.  Blood pressure is noted to be still elevated and pulse is satisfactory at a rate of 68 and regular rhythm.    Past Medical History:  No date: Anxiety  02/2017: Flu      Comment:  [facility]  No date: Hypertension  1/14/2021: Rheumatoid arthritis involving multiple sites with   positive rheumatoid factor    Past Surgical History:  2/26/2021: COLONOSCOPY; N/A      Comment:  Procedure: COLONOSCOPY;  Surgeon: Amy Sidhu MD;               Location: [facility];  Service: Endoscopy;  Laterality:                N/A;  7/1/2022: ENDOSCOPIC ULTRASOUND OF UPPER GASTROINTESTINAL TRACT; N/A      Comment:  Procedure: ULTRASOUND, UPPER GI TRACT, ENDOSCOPIC;                 Surgeon: Donavon Jewell III, MD;  Location: [facility];  Service: Endoscopy;  Laterality: N/A;  2/26/2021: ESOPHAGOGASTRODUODENOSCOPY; N/A      Comment:  Procedure: EGD (ESOPHAGOGASTRODUODENOSCOPY);  Surgeon:                Amy Sidhu MD;  Location: [facility];  Service:                Endoscopy;  Laterality: N/A;  No date: TUBAL LIGATION    Current Outpatient Medications on File Prior to Visit:  cholecalciferol, vitamin D3, (VITAMIN D3) 10 mcg (400 unit) Tab, Take 400 Units by mouth once daily., Disp: , Rfl:   folic acid (FOLVITE) 1 MG tablet, Take 1 tablet (1 mg total) by mouth once daily., Disp: 360 tablet, Rfl: 3  pantoprazole (PROTONIX) 40 MG tablet, Take 1 tablet (40 mg total) by mouth once daily., Disp: 30 tablet, Rfl: 11  sertraline (ZOLOFT) 25 MG tablet, Take 1 tablet (25 mg total) by mouth once daily., Disp: 90 tablet, Rfl: 1  traZODone (DESYREL) 50 MG tablet, Take 1 tablet by mouth in the evening, Disp: 30 tablet, Rfl: 2  metoprolol succinate (TOPROL-XL) 50 MG 24 hr tablet, Take 0.5 tablets (25 mg total) by mouth once daily., Disp: 90 tablet, Rfl: 3    No current  facility-administered medications on file prior to visit.        Review of Systems   Constitutional: Negative for chills, diaphoresis and fever.   Gastrointestinal: Negative for abdominal distention, abdominal pain, constipation, diarrhea, nausea and vomiting.       Objective:      Physical Exam  Vitals and nursing note reviewed.   Constitutional:       General: She is not in acute distress.     Appearance: Normal appearance. She is not ill-appearing, toxic-appearing or diaphoretic.      Comments: Elevated systolic blood pressure  Underweight still with a BMI of 18.6 she is up 1.6 lb from her   Cardiovascular:      Rate and Rhythm: Normal rate and regular rhythm.      Heart sounds: No murmur heard.    No friction rub. No gallop.   Pulmonary:      Effort: Pulmonary effort is normal. No respiratory distress.      Breath sounds: Normal breath sounds. No stridor. No wheezing, rhonchi or rales.   Abdominal:      General: Abdomen is flat. Bowel sounds are normal. There is no distension.      Palpations: Abdomen is soft. There is no mass.      Tenderness: There is no abdominal tenderness. There is no guarding or rebound.      Hernia: No hernia is present.   Neurological:      Mental Status: She is alert.         Assessment:       1. Primary hypertension    2. Acute pancreatitis, unspecified complication status, unspecified pancreatitis type    3. Rheumatoid arthritis involving multiple sites with positive rheumatoid factor    4. Long-term use of immunosuppressant medication    5. Essential hypertension    6. Palpitations        Plan:     1. Primary hypertension  Elevated systolic blood pressure with adequate pulses.  The metoprolol is being increased to its original dose of 50 mg daily.  If needed we could add HCTZ possibly starting with 1/2 dose 12.5 mg. I would prefer to avoid using the lisinopril since it does have the potential side effect of pancreatitis.  Patient is to wait 7 to 10 days and then get several blood  pressure and pulse readings and report to may    2. Acute pancreatitis, unspecified complication status, unspecified pancreatitis type  Appears to be resolving with a cystic mass in the pancreas that is likely the result of the pancreatitis and perhaps a pseudocyst in formation.  Patient is to see General surgery in the near future to consider cholecystectomy.  At this point it appears less likely that the gallbladder was the etiology of the pancreatitis.  Paxlovid's side effect profile is essentially unknown and it may have contributed but the lisinopril was also a potential factor.  Consideration to performing a HIDA scan prior to doing a cholecystectomy may be useful    3. Rheumatoid arthritis involving multiple sites with positive rheumatoid factor  Followed by Rheumatology, patient recently taken off of methotrexate because of her recent medical issues    4. Long-term use of immunosuppressant medication  See above    5. Essential hypertension  - metoprolol succinate (TOPROL-XL) 50 MG 24 hr tablet; Take 1 tablet (50 mg total) by mouth once daily.  Dispense: 90 tablet; Refill: 3    6. Palpitations  - metoprolol succinate (TOPROL-XL) 50 MG 24 hr tablet; Take 1 tablet (50 mg total) by mouth once daily.  Dispense: 90 tablet; Refill: 3

## 2022-07-21 ENCOUNTER — OFFICE VISIT (OUTPATIENT)
Dept: SURGERY | Facility: CLINIC | Age: 62
End: 2022-07-21
Payer: COMMERCIAL

## 2022-07-21 VITALS
HEART RATE: 96 BPM | DIASTOLIC BLOOD PRESSURE: 83 MMHG | SYSTOLIC BLOOD PRESSURE: 129 MMHG | TEMPERATURE: 98 F | BODY MASS INDEX: 18.49 KG/M2 | HEIGHT: 65 IN | WEIGHT: 111 LBS

## 2022-07-21 DIAGNOSIS — K85.10 ACUTE BILIARY PANCREATITIS, UNSPECIFIED COMPLICATION STATUS: ICD-10-CM

## 2022-07-21 PROCEDURE — 4010F ACE/ARB THERAPY RXD/TAKEN: CPT | Mod: CPTII,S$GLB,, | Performed by: SURGERY

## 2022-07-21 PROCEDURE — 99214 PR OFFICE/OUTPT VISIT, EST, LEVL IV, 30-39 MIN: ICD-10-PCS | Mod: S$GLB,,, | Performed by: SURGERY

## 2022-07-21 PROCEDURE — 99214 OFFICE O/P EST MOD 30 MIN: CPT | Mod: S$GLB,,, | Performed by: SURGERY

## 2022-07-21 PROCEDURE — 3074F SYST BP LT 130 MM HG: CPT | Mod: CPTII,S$GLB,, | Performed by: SURGERY

## 2022-07-21 PROCEDURE — 4010F PR ACE/ARB THEARPY RXD/TAKEN: ICD-10-PCS | Mod: CPTII,S$GLB,, | Performed by: SURGERY

## 2022-07-21 PROCEDURE — 1159F MED LIST DOCD IN RCRD: CPT | Mod: CPTII,S$GLB,, | Performed by: SURGERY

## 2022-07-21 PROCEDURE — 3008F BODY MASS INDEX DOCD: CPT | Mod: CPTII,S$GLB,, | Performed by: SURGERY

## 2022-07-21 PROCEDURE — 3079F DIAST BP 80-89 MM HG: CPT | Mod: CPTII,S$GLB,, | Performed by: SURGERY

## 2022-07-21 PROCEDURE — 3074F PR MOST RECENT SYSTOLIC BLOOD PRESSURE < 130 MM HG: ICD-10-PCS | Mod: CPTII,S$GLB,, | Performed by: SURGERY

## 2022-07-21 PROCEDURE — 3079F PR MOST RECENT DIASTOLIC BLOOD PRESSURE 80-89 MM HG: ICD-10-PCS | Mod: CPTII,S$GLB,, | Performed by: SURGERY

## 2022-07-21 PROCEDURE — 1159F PR MEDICATION LIST DOCUMENTED IN MEDICAL RECORD: ICD-10-PCS | Mod: CPTII,S$GLB,, | Performed by: SURGERY

## 2022-07-21 PROCEDURE — 3008F PR BODY MASS INDEX (BMI) DOCUMENTED: ICD-10-PCS | Mod: CPTII,S$GLB,, | Performed by: SURGERY

## 2022-07-21 NOTE — PROGRESS NOTES
Subjective:       Patient ID: Claire Bowser is a 62 y.o. female.    Chief Complaint: Establish Care (Gallbladder)      HPI:  62 year old female presents to the office to discuss cholecystectomy. She was admitted for biliary pancreatitis last month. She has been feeling well since discharge. She has no pain today. No fever or chills since discharge.  She had imaging while inpatient including MRCP.  MRCP did show a 2 cm mass at the neck of the pancreas.  She had an endoscopic ultrasound on July 1st that showed irregular mass suspicious for inflammatory phlegmon.  Biopsies have returned negative for malignancy.  Plan is for cholecystectomy with follow-up MRCP in 3 months.      Past Medical History:   Diagnosis Date    Anxiety     Flu 02/2017    [facility]    Hypertension     Rheumatoid arthritis involving multiple sites with positive rheumatoid factor 1/14/2021     Past Surgical History:   Procedure Laterality Date    COLONOSCOPY N/A 2/26/2021    Procedure: COLONOSCOPY;  Surgeon: Amy Sidhu MD;  Location: [facility];  Service: Endoscopy;  Laterality: N/A;    ENDOSCOPIC ULTRASOUND OF UPPER GASTROINTESTINAL TRACT N/A 7/1/2022    Procedure: ULTRASOUND, UPPER GI TRACT, ENDOSCOPIC;  Surgeon: Donavon Jewell III, MD;  Location: [facility];  Service: Endoscopy;  Laterality: N/A;    ESOPHAGOGASTRODUODENOSCOPY N/A 2/26/2021    Procedure: EGD (ESOPHAGOGASTRODUODENOSCOPY);  Surgeon: Amy Sidhu MD;  Location: [facility];  Service: Endoscopy;  Laterality: N/A;    TUBAL LIGATION       Review of patient's allergies indicates:   Allergen Reactions    No known drug allergies      Medication List with Changes/Refills   Current Medications    CHOLECALCIFEROL, VITAMIN D3, (VITAMIN D3) 10 MCG (400 UNIT) TAB    Take 400 Units by mouth once daily.    FOLIC ACID (FOLVITE) 1 MG TABLET    Take 1 tablet (1 mg total) by mouth once daily.    METOPROLOL SUCCINATE (TOPROL-XL) 50 MG 24 HR TABLET    Take 1 tablet  (50 mg total) by mouth once daily.    PANTOPRAZOLE (PROTONIX) 40 MG TABLET    Take 1 tablet (40 mg total) by mouth once daily.    SERTRALINE (ZOLOFT) 25 MG TABLET    Take 1 tablet (25 mg total) by mouth once daily.    TRAZODONE (DESYREL) 50 MG TABLET    Take 1 tablet by mouth in the evening     Family History   Problem Relation Age of Onset    Diabetes Mother     Heart disease Mother     Kidney disease Mother     Hypertension Mother     Stroke Mother     Hearing loss Mother     COPD Father     Liver disease Paternal Grandfather     Alcohol abuse Paternal Grandfather     Liver disease Sister     Emphysema Brother     COPD Brother     Sleep apnea Brother     No Known Problems Son     Alcohol abuse Paternal Grandmother     Cirrhosis Paternal Grandmother     Heart disease Maternal Aunt     Diabetes Maternal Aunt     Cancer Maternal Aunt         female    Heart disease Maternal Uncle     Hypertension Maternal Uncle     No Known Problems Paternal Uncle     Psoriasis Neg Hx     Lupus Neg Hx     Eczema Neg Hx     Melanoma Neg Hx      Social History     Socioeconomic History    Marital status:    Tobacco Use    Smoking status: Current Every Day Smoker     Packs/day: 1.00     Years: 7.00     Pack years: 7.00     Types: Cigarettes    Smokeless tobacco: Never Used    Tobacco comment: [phone]   Substance and Sexual Activity    Alcohol use: No    Drug use: Never    Sexual activity: Yes     Partners: Male     Social Determinants of Health     Financial Resource Strain: Low Risk     Difficulty of Paying Living Expenses: Not hard at all   Food Insecurity: No Food Insecurity    Worried About Running Out of Food in the Last Year: Never true    Ran Out of Food in the Last Year: Never true   Transportation Needs: No Transportation Needs    Lack of Transportation (Medical): No    Lack of Transportation (Non-Medical): No   Physical Activity: Insufficiently Active    Days of Exercise per  Week: 2 days    Minutes of Exercise per Session: 20 min   Stress: Stress Concern Present    Feeling of Stress : To some extent   Social Connections: Unknown    Frequency of Communication with Friends and Family: Twice a week    Frequency of Social Gatherings with Friends and Family: Once a week    Active Member of Clubs or Organizations: Yes    Attends Club or Organization Meetings: More than 4 times per year    Marital Status:    Housing Stability: Low Risk     Unable to Pay for Housing in the Last Year: No    Number of Places Lived in the Last Year: 1    Unstable Housing in the Last Year: No         Review of Systems   Constitutional: Negative for appetite change, chills, fever and unexpected weight change.   HENT: Negative for hearing loss, rhinorrhea, sore throat and voice change.    Eyes: Negative for photophobia and visual disturbance.   Respiratory: Negative for cough, choking and shortness of breath.    Cardiovascular: Negative for chest pain, palpitations and leg swelling.   Gastrointestinal: Negative for abdominal pain, blood in stool, constipation, diarrhea, nausea and vomiting.   Endocrine: Negative for cold intolerance, heat intolerance, polydipsia and polyuria.   Musculoskeletal: Negative for arthralgias, back pain, joint swelling and neck stiffness.   Skin: Negative for color change, pallor and rash.   Neurological: Negative for dizziness, seizures, syncope and headaches.   Hematological: Negative for adenopathy. Does not bruise/bleed easily.   Psychiatric/Behavioral: Negative for agitation, behavioral problems and confusion.       Objective:      Physical Exam  Constitutional:       General: She is not in acute distress.     Appearance: Normal appearance. She is well-developed. She is not toxic-appearing.   HENT:      Head: Normocephalic and atraumatic. No abrasion or laceration.      Right Ear: External ear normal.      Left Ear: External ear normal.      Nose: Nose normal.   Eyes:       Pupils: Pupils are equal, round, and reactive to light.   Neck:      Trachea: Trachea and phonation normal. No tracheal deviation.   Cardiovascular:      Rate and Rhythm: Normal rate and regular rhythm.   Pulmonary:      Effort: Pulmonary effort is normal. No tachypnea, accessory muscle usage or respiratory distress.   Abdominal:      General: There is no distension.      Palpations: Abdomen is soft. There is no mass.      Tenderness: There is no abdominal tenderness. There is no guarding or rebound.   Musculoskeletal:      Cervical back: Neck supple. Normal range of motion.   Lymphadenopathy:      Cervical: No cervical adenopathy.   Skin:     General: Skin is warm.   Neurological:      Mental Status: She is alert and oriented to person, place, and time.      Coordination: Coordination normal.      Gait: Gait normal.   Psychiatric:         Speech: Speech normal.         Behavior: Behavior normal.         Assessment/Plan:   Acute biliary pancreatitis, unspecified complication status  -     Ambulatory referral/consult to General Surgery    Will plan for cholecystectomy in the next week or 2. Patient needs to discuss definite timing with her work.  Risks and benefits of the surgery were discussed with the patient.      I discussed the proposed procedures with the patient including risks, benefits, indications, alternatives and special concerns.  The patient appears to understand and agrees to go ahead with surgery.  I have made no promises, warranties or verbal agreements beyond what was discussed above.    No follow-ups on file.

## 2022-07-25 ENCOUNTER — LAB VISIT (OUTPATIENT)
Dept: LAB | Facility: HOSPITAL | Age: 62
End: 2022-07-25
Attending: SURGERY
Payer: COMMERCIAL

## 2022-07-25 ENCOUNTER — HOSPITAL ENCOUNTER (OUTPATIENT)
Dept: RADIOLOGY | Facility: HOSPITAL | Age: 62
Discharge: HOME | End: 2022-07-25
Attending: SURGERY
Payer: COMMERCIAL

## 2022-07-25 DIAGNOSIS — K85.10 ACUTE BILIARY PANCREATITIS, UNSPECIFIED COMPLICATION STATUS: ICD-10-CM

## 2022-07-25 LAB
ALBUMIN SERPL BCP-MCNC: 3.1 G/DL (ref 3.5–5.2)
ALP SERPL-CCNC: 72 U/L (ref 55–135)
ALT SERPL W/O P-5'-P-CCNC: 8 U/L (ref 10–44)
ANION GAP SERPL CALC-SCNC: 9 MMOL/L (ref 8–16)
AST SERPL-CCNC: 13 U/L (ref 10–40)
BASOPHILS # BLD AUTO: 0.05 K/UL (ref 0–0.2)
BASOPHILS NFR BLD: 0.6 % (ref 0–1.9)
BILIRUB SERPL-MCNC: 0.5 MG/DL (ref 0.1–1)
BUN SERPL-MCNC: 8 MG/DL (ref 8–23)
CALCIUM SERPL-MCNC: 9.8 MG/DL (ref 8.7–10.5)
CHLORIDE SERPL-SCNC: 99 MMOL/L (ref 95–110)
CO2 SERPL-SCNC: 29 MMOL/L (ref 23–29)
CREAT SERPL-MCNC: 0.8 MG/DL (ref 0.5–1.4)
DIFFERENTIAL METHOD: ABNORMAL
EOSINOPHIL # BLD AUTO: 0.1 K/UL (ref 0–0.5)
EOSINOPHIL NFR BLD: 1 % (ref 0–8)
ERYTHROCYTE [DISTWIDTH] IN BLOOD BY AUTOMATED COUNT: 13.4 % (ref 11.5–14.5)
EST. GFR  (AFRICAN AMERICAN): >60 ML/MIN/1.73 M^2
EST. GFR  (NON AFRICAN AMERICAN): >60 ML/MIN/1.73 M^2
GLUCOSE SERPL-MCNC: 149 MG/DL (ref 70–110)
HCT VFR BLD AUTO: 29.6 % (ref 37–48.5)
HGB BLD-MCNC: 9.5 G/DL (ref 12–16)
IMM GRANULOCYTES # BLD AUTO: 0.03 K/UL (ref 0–0.04)
IMM GRANULOCYTES NFR BLD AUTO: 0.4 % (ref 0–0.5)
LYMPHOCYTES # BLD AUTO: 1.5 K/UL (ref 1–4.8)
LYMPHOCYTES NFR BLD: 17.7 % (ref 18–48)
MCH RBC QN AUTO: 31.7 PG (ref 27–31)
MCHC RBC AUTO-ENTMCNC: 32.1 G/DL (ref 32–36)
MCV RBC AUTO: 99 FL (ref 82–98)
MONOCYTES # BLD AUTO: 0.7 K/UL (ref 0.3–1)
MONOCYTES NFR BLD: 8.2 % (ref 4–15)
NEUTROPHILS # BLD AUTO: 6.1 K/UL (ref 1.8–7.7)
NEUTROPHILS NFR BLD: 72.1 % (ref 38–73)
NRBC BLD-RTO: 0 /100 WBC
PLATELET # BLD AUTO: 455 K/UL (ref 150–450)
PMV BLD AUTO: 9.2 FL (ref 9.2–12.9)
POTASSIUM SERPL-SCNC: 3 MMOL/L (ref 3.5–5.1)
PROT SERPL-MCNC: 7.1 G/DL (ref 6–8.4)
RBC # BLD AUTO: 3 M/UL (ref 4–5.4)
SODIUM SERPL-SCNC: 137 MMOL/L (ref 136–145)
WBC # BLD AUTO: 8.41 K/UL (ref 3.9–12.7)

## 2022-07-25 PROCEDURE — 36415 COLL VENOUS BLD VENIPUNCTURE: CPT | Performed by: SURGERY

## 2022-07-25 PROCEDURE — 80053 COMPREHEN METABOLIC PANEL: CPT | Performed by: SURGERY

## 2022-07-25 PROCEDURE — 71046 X-RAY EXAM CHEST 2 VIEWS: CPT | Mod: TC

## 2022-07-25 PROCEDURE — 85025 COMPLETE CBC W/AUTO DIFF WBC: CPT | Performed by: SURGERY

## 2022-07-27 ENCOUNTER — ANESTHESIA EVENT (OUTPATIENT)
Dept: SURGERY | Facility: HOSPITAL | Age: 62
End: 2022-07-27
Payer: COMMERCIAL

## 2022-07-27 ENCOUNTER — HOSPITAL ENCOUNTER (OUTPATIENT)
Facility: HOSPITAL | Age: 62
Discharge: HOME | End: 2022-07-27
Attending: SURGERY | Admitting: SURGERY
Payer: COMMERCIAL

## 2022-07-27 ENCOUNTER — ANESTHESIA (OUTPATIENT)
Dept: SURGERY | Facility: HOSPITAL | Age: 62
End: 2022-07-27
Payer: COMMERCIAL

## 2022-07-27 VITALS
DIASTOLIC BLOOD PRESSURE: 67 MMHG | RESPIRATION RATE: 18 BRPM | TEMPERATURE: 98 F | BODY MASS INDEX: 18.49 KG/M2 | HEIGHT: 65 IN | SYSTOLIC BLOOD PRESSURE: 133 MMHG | HEART RATE: 64 BPM | WEIGHT: 111 LBS | OXYGEN SATURATION: 99 %

## 2022-07-27 DIAGNOSIS — K85.10 ACUTE BILIARY PANCREATITIS WITHOUT INFECTION OR NECROSIS: ICD-10-CM

## 2022-07-27 DIAGNOSIS — Z01.818 PREOP TESTING: Primary | ICD-10-CM

## 2022-07-27 DIAGNOSIS — K85.10 ACUTE BILIARY PANCREATITIS, UNSPECIFIED COMPLICATION STATUS: ICD-10-CM

## 2022-07-27 LAB
POTASSIUM SERPL-SCNC: 2.9 MMOL/L (ref 3.5–5.1)
POTASSIUM SERPL-SCNC: 3.1 MMOL/L (ref 3.5–5.1)

## 2022-07-27 PROCEDURE — 25000003 PHARM REV CODE 250: Performed by: ANESTHESIOLOGY

## 2022-07-27 PROCEDURE — 25000003 PHARM REV CODE 250: Performed by: NURSE ANESTHETIST, CERTIFIED REGISTERED

## 2022-07-27 PROCEDURE — 63600175 PHARM REV CODE 636 W HCPCS: Performed by: SURGERY

## 2022-07-27 PROCEDURE — 25000003 PHARM REV CODE 250: Performed by: SURGERY

## 2022-07-27 PROCEDURE — 27201423 OPTIME MED/SURG SUP & DEVICES STERILE SUPPLY: Performed by: SURGERY

## 2022-07-27 PROCEDURE — 37000008 HC ANESTHESIA 1ST 15 MINUTES: Performed by: SURGERY

## 2022-07-27 PROCEDURE — 36000709 HC OR TIME LEV III EA ADD 15 MIN: Performed by: SURGERY

## 2022-07-27 PROCEDURE — 36000708 HC OR TIME LEV III 1ST 15 MIN: Performed by: SURGERY

## 2022-07-27 PROCEDURE — 47562 LAPAROSCOPIC CHOLECYSTECTOMY: CPT | Mod: ,,, | Performed by: SURGERY

## 2022-07-27 PROCEDURE — 63600175 PHARM REV CODE 636 W HCPCS: Performed by: NURSE ANESTHETIST, CERTIFIED REGISTERED

## 2022-07-27 PROCEDURE — 27000653 HC CATH, IV CATHLN: Performed by: ANESTHESIOLOGY

## 2022-07-27 PROCEDURE — 84132 ASSAY OF SERUM POTASSIUM: CPT | Mod: 91 | Performed by: ANESTHESIOLOGY

## 2022-07-27 PROCEDURE — 27201107 HC STYLET, STANDARD: Performed by: ANESTHESIOLOGY

## 2022-07-27 PROCEDURE — 47562 PR LAP,CHOLECYSTECTOMY: ICD-10-PCS | Mod: ,,, | Performed by: SURGERY

## 2022-07-27 PROCEDURE — 71000033 HC RECOVERY, INTIAL HOUR: Performed by: SURGERY

## 2022-07-27 PROCEDURE — 37000009 HC ANESTHESIA EA ADD 15 MINS: Performed by: SURGERY

## 2022-07-27 PROCEDURE — 27000675 HC TUBING MICRODRIP: Performed by: ANESTHESIOLOGY

## 2022-07-27 PROCEDURE — 71000039 HC RECOVERY, EACH ADD'L HOUR: Performed by: SURGERY

## 2022-07-27 PROCEDURE — 27000671 HC TUBING MICROBORE EXT: Performed by: ANESTHESIOLOGY

## 2022-07-27 PROCEDURE — 27000080 OPTIME MED/SURG SUP & DEVICES GENERAL CLASSIFICATION: Performed by: SURGERY

## 2022-07-27 PROCEDURE — 84132 ASSAY OF SERUM POTASSIUM: CPT | Performed by: STUDENT IN AN ORGANIZED HEALTH CARE EDUCATION/TRAINING PROGRAM

## 2022-07-27 PROCEDURE — 27202107 HC XP QUATRO SENSOR: Performed by: ANESTHESIOLOGY

## 2022-07-27 PROCEDURE — 71000015 HC POSTOP RECOV 1ST HR: Performed by: SURGERY

## 2022-07-27 PROCEDURE — C9290 INJ, BUPIVACAINE LIPOSOME: HCPCS | Performed by: SURGERY

## 2022-07-27 PROCEDURE — 27000673 HC TUBING BLOOD Y: Performed by: ANESTHESIOLOGY

## 2022-07-27 RX ORDER — HYDROMORPHONE HYDROCHLORIDE 1 MG/ML
0.2 INJECTION, SOLUTION INTRAMUSCULAR; INTRAVENOUS; SUBCUTANEOUS EVERY 5 MIN PRN
Status: DISCONTINUED | OUTPATIENT
Start: 2022-07-27 | End: 2022-07-27 | Stop reason: HOSPADM

## 2022-07-27 RX ORDER — PROPOFOL 10 MG/ML
VIAL (ML) INTRAVENOUS
Status: DISCONTINUED | OUTPATIENT
Start: 2022-07-27 | End: 2022-07-27

## 2022-07-27 RX ORDER — ONDANSETRON 2 MG/ML
4 INJECTION INTRAMUSCULAR; INTRAVENOUS DAILY PRN
Status: DISCONTINUED | OUTPATIENT
Start: 2022-07-27 | End: 2022-07-27 | Stop reason: HOSPADM

## 2022-07-27 RX ORDER — MEPERIDINE HYDROCHLORIDE 50 MG/ML
12.5 INJECTION INTRAMUSCULAR; INTRAVENOUS; SUBCUTANEOUS EVERY 10 MIN PRN
Status: DISCONTINUED | OUTPATIENT
Start: 2022-07-27 | End: 2022-07-27 | Stop reason: HOSPADM

## 2022-07-27 RX ORDER — LIDOCAINE HYDROCHLORIDE 20 MG/ML
INJECTION, SOLUTION EPIDURAL; INFILTRATION; INTRACAUDAL; PERINEURAL
Status: DISCONTINUED | OUTPATIENT
Start: 2022-07-27 | End: 2022-07-27

## 2022-07-27 RX ORDER — LIDOCAINE HYDROCHLORIDE 20 MG/ML
JELLY TOPICAL
Status: DISCONTINUED | OUTPATIENT
Start: 2022-07-27 | End: 2022-07-27

## 2022-07-27 RX ORDER — ONDANSETRON 2 MG/ML
INJECTION INTRAMUSCULAR; INTRAVENOUS
Status: DISCONTINUED | OUTPATIENT
Start: 2022-07-27 | End: 2022-07-27

## 2022-07-27 RX ORDER — KETAMINE HYDROCHLORIDE 50 MG/ML
INJECTION, SOLUTION INTRAMUSCULAR; INTRAVENOUS
Status: DISCONTINUED | OUTPATIENT
Start: 2022-07-27 | End: 2022-07-27

## 2022-07-27 RX ORDER — OXYCODONE HYDROCHLORIDE 5 MG/1
5 TABLET ORAL
Status: DISCONTINUED | OUTPATIENT
Start: 2022-07-27 | End: 2022-07-27 | Stop reason: HOSPADM

## 2022-07-27 RX ORDER — MIDAZOLAM HYDROCHLORIDE 1 MG/ML
INJECTION INTRAMUSCULAR; INTRAVENOUS
Status: DISCONTINUED | OUTPATIENT
Start: 2022-07-27 | End: 2022-07-27

## 2022-07-27 RX ORDER — HYDROCODONE BITARTRATE AND ACETAMINOPHEN 5; 325 MG/1; MG/1
1 TABLET ORAL EVERY 6 HOURS PRN
Qty: 25 TABLET | Refills: 0 | Status: SHIPPED | OUTPATIENT
Start: 2022-07-27 | End: 2022-09-27

## 2022-07-27 RX ORDER — FENTANYL CITRATE 50 UG/ML
INJECTION, SOLUTION INTRAMUSCULAR; INTRAVENOUS
Status: DISCONTINUED | OUTPATIENT
Start: 2022-07-27 | End: 2022-07-27

## 2022-07-27 RX ORDER — BUPIVACAINE HYDROCHLORIDE AND EPINEPHRINE 5; 5 MG/ML; UG/ML
INJECTION, SOLUTION EPIDURAL; INTRACAUDAL; PERINEURAL
Status: DISCONTINUED | OUTPATIENT
Start: 2022-07-27 | End: 2022-07-27 | Stop reason: HOSPADM

## 2022-07-27 RX ORDER — ROCURONIUM BROMIDE 10 MG/ML
INJECTION, SOLUTION INTRAVENOUS
Status: DISCONTINUED | OUTPATIENT
Start: 2022-07-27 | End: 2022-07-27

## 2022-07-27 RX ORDER — SODIUM CHLORIDE 0.9 % (FLUSH) 0.9 %
10 SYRINGE (ML) INJECTION
Status: DISCONTINUED | OUTPATIENT
Start: 2022-07-27 | End: 2022-07-27 | Stop reason: HOSPADM

## 2022-07-27 RX ORDER — DIPHENHYDRAMINE HYDROCHLORIDE 50 MG/ML
12.5 INJECTION INTRAMUSCULAR; INTRAVENOUS
Status: DISCONTINUED | OUTPATIENT
Start: 2022-07-27 | End: 2022-07-27 | Stop reason: HOSPADM

## 2022-07-27 RX ORDER — SUCCINYLCHOLINE CHLORIDE 20 MG/ML
INJECTION INTRAMUSCULAR; INTRAVENOUS
Status: DISCONTINUED | OUTPATIENT
Start: 2022-07-27 | End: 2022-07-27

## 2022-07-27 RX ORDER — FAMOTIDINE 10 MG/ML
INJECTION INTRAVENOUS
Status: DISCONTINUED | OUTPATIENT
Start: 2022-07-27 | End: 2022-07-27

## 2022-07-27 RX ORDER — SODIUM CHLORIDE, SODIUM LACTATE, POTASSIUM CHLORIDE, CALCIUM CHLORIDE 600; 310; 30; 20 MG/100ML; MG/100ML; MG/100ML; MG/100ML
INJECTION, SOLUTION INTRAVENOUS CONTINUOUS PRN
Status: DISCONTINUED | OUTPATIENT
Start: 2022-07-27 | End: 2022-07-27

## 2022-07-27 RX ADMIN — PROPOFOL 150 MG: 10 INJECTION, EMULSION INTRAVENOUS at 12:07

## 2022-07-27 RX ADMIN — LIDOCAINE HYDROCHLORIDE 60 MG: 20 INJECTION, SOLUTION EPIDURAL; INFILTRATION; INTRACAUDAL; PERINEURAL at 12:07

## 2022-07-27 RX ADMIN — KETAMINE HYDROCHLORIDE 25 MG: 50 INJECTION INTRAMUSCULAR; INTRAVENOUS at 12:07

## 2022-07-27 RX ADMIN — MIDAZOLAM HYDROCHLORIDE 2 MG: 1 INJECTION, SOLUTION INTRAMUSCULAR; INTRAVENOUS at 12:07

## 2022-07-27 RX ADMIN — FAMOTIDINE 20 MG: 10 INJECTION, SOLUTION INTRAVENOUS at 12:07

## 2022-07-27 RX ADMIN — SUGAMMADEX 200 MG: 100 INJECTION, SOLUTION INTRAVENOUS at 12:07

## 2022-07-27 RX ADMIN — ROCURONIUM BROMIDE 20 MG: 10 INJECTION, SOLUTION INTRAVENOUS at 12:07

## 2022-07-27 RX ADMIN — SODIUM CHLORIDE, SODIUM LACTATE, POTASSIUM CHLORIDE, AND CALCIUM CHLORIDE: .6; .31; .03; .02 INJECTION, SOLUTION INTRAVENOUS at 11:07

## 2022-07-27 RX ADMIN — HYDROCORTISONE SODIUM SUCCINATE 100 MG: 100 INJECTION, POWDER, FOR SOLUTION INTRAMUSCULAR; INTRAVENOUS at 12:07

## 2022-07-27 RX ADMIN — SUCCINYLCHOLINE CHLORIDE 120 MG: 20 INJECTION, SOLUTION INTRAMUSCULAR; INTRAVENOUS at 12:07

## 2022-07-27 RX ADMIN — LIDOCAINE HYDROCHLORIDE 4 ML: 20 JELLY TOPICAL at 12:07

## 2022-07-27 RX ADMIN — SODIUM CHLORIDE, SODIUM LACTATE, POTASSIUM CHLORIDE, AND CALCIUM CHLORIDE: .6; .31; .03; .02 INJECTION, SOLUTION INTRAVENOUS at 12:07

## 2022-07-27 RX ADMIN — ONDANSETRON 4 MG: 2 INJECTION INTRAMUSCULAR; INTRAVENOUS at 12:07

## 2022-07-27 RX ADMIN — OXYCODONE HYDROCHLORIDE 5 MG: 5 TABLET ORAL at 02:07

## 2022-07-27 RX ADMIN — PROPOFOL 50 MG: 10 INJECTION, EMULSION INTRAVENOUS at 12:07

## 2022-07-27 RX ADMIN — FENTANYL CITRATE 50 MCG: 50 INJECTION INTRAMUSCULAR; INTRAVENOUS at 12:07

## 2022-07-27 NOTE — ANESTHESIA PROCEDURE NOTES
Intubation    Date/Time: 7/27/2022 12:15 PM  Performed by: Marlena Zamudio CRNA  Authorized by: Mike Garsia Jr., MD     Intubation:     Induction:  Intravenous    Intubated:  Postinduction    Mask Ventilation:  Easy mask    Attempts:  1    Attempted By:  CRNA    Method of Intubation:  Video laryngoscopy    Blade:  Thompson 3    Laryngeal View Grade: Grade I - full view of cords      Difficult Airway Encountered?: No      Complications:  None    Airway Device:  Oral endotracheal tube    Airway Device Size:  7.5    Style/Cuff Inflation:  Cuffed (inflated to minimal occlusive pressure)    Inflation Amount (mL):  7    Tube secured:  21    Secured at:  The lips    Placement Verified By:  Capnometry    Complicating Factors:  None    Findings Post-Intubation:  BS equal bilateral and atraumatic/condition of teeth unchanged

## 2022-07-27 NOTE — ANESTHESIA PREPROCEDURE EVALUATION
07/27/2022  Claire Bowser is a 62 y.o., female.      Pre-op Assessment    I have reviewed the Patient Summary Reports.     I have reviewed the Nursing Notes. I have reviewed the NPO Status.   I have reviewed the Medications.   Steroids Taken In Past Year: Prednisone    Review of Systems  Anesthesia Hx:  No problems with previous Anesthesia Denies Hx of Anesthetic complications  Neg history of prior surgery. Denies Family Hx of Anesthesia complications.   Denies Personal Hx of Anesthesia complications.   Social:  Smoker, No Alcohol Use    Hematology/Oncology:  Hematology Normal   Oncology Normal     EENT/Dental:EENT/Dental Normal   Cardiovascular:   Hypertension    Pulmonary:  Pulmonary Normal    Renal/:  Renal/ Normal     Hepatic/GI:  Hepatic/GI Normal    Musculoskeletal:   Arthritis (RA)   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 rheumatoid factor    Black stool    Colon polyps    Pneumonia    Hypokalemia    Acute cystitis without hematuria    Acute pancreatitis    Epigastric pain    Dehydration    Hypercalcemia    ACP (advance care planning)    Severe malnutrition    Long-term use of immunosuppressant medication    Syncope    Acute biliary pancreatitis       Past Surgical History:   Procedure Laterality Date    COLONOSCOPY N/A 2/26/2021    Procedure: COLONOSCOPY;  Surgeon: Amy Sidhu MD;  Location: [facility];  Service: Endoscopy;  Laterality: N/A;    ENDOSCOPIC ULTRASOUND OF UPPER GASTROINTESTINAL TRACT N/A 7/1/2022     Procedure: ULTRASOUND, UPPER GI TRACT, ENDOSCOPIC;  Surgeon: Donavon Jewell III, MD;  [facility] END[facility];  Service: Endoscopy;  Laterality: N/A;    ESOPHAGOGASTRODUODENOSCOPY N/A 2/26/2021    Procedure: EGD (ESOPHAGOGASTRODUODENOSCOPY);  Surgeon: Amy Sidhu MD;  Location: [facility] ENDO;  Service: Endoscopy;  Laterality: N/A;    TUBAL LIGATION          Tobacco Use:  The patient  reports that she has been smoking cigarettes. She has a 7.00 pack-year smoking history. She has never used smokeless tobacco.     Results for orders placed or performed during the hospital encounter of 06/14/22   EKG and show to ED MD    Collection Time: 06/14/22 12:10 PM    Narrative    Test Reason : R55,    Vent. Rate : 071 BPM     Atrial Rate : 071 BPM     P-R Int : 192 ms          QRS Dur : 076 ms      QT Int : 416 ms       P-R-T Axes : 085 047 047 degrees     QTc Int : 452 ms    Normal sinus rhythm with sinus arrhythmia  Normal ECG  When compared with ECG of 25-MAY-2022 10:49,  Nonspecific T wave abnormality has replaced inverted T waves in Inferior  leads  T wave inversion no longer evident in Anterior leads  Nonspecific T wave abnormality, worse in Lateral leads  Confirmed by Ricci MARIANO, Ta DANIEL ([id number]) on 6/17/2022 7:46:52 PM    Referred By: OSCARERR   SELF           Confirmed By:Ta Wynne MD             Lab Results   Component Value Date    WBC 8.41 07/25/2022    HGB 9.5 (L) 07/25/2022    HCT 29.6 (L) 07/25/2022    MCV 99 (H) 07/25/2022     (H) 07/25/2022     BMP  Lab Results   Component Value Date     07/25/2022    K 2.9 (LL) 07/27/2022    CL 99 07/25/2022    CO2 29 07/25/2022    BUN 8 07/25/2022    CREATININE 0.8 07/25/2022    CALCIUM 9.8 07/25/2022    ANIONGAP 9 07/25/2022     (H) 07/25/2022     (H) 06/23/2022    GLU 84 06/16/2022       Results for orders placed during the hospital encounter of 06/14/22    Echo    Interpretation Summary  · The left ventricle is normal in size with  normal systolic function.  · The estimated ejection fraction is 54%.  · Normal left ventricular diastolic function.  · Atrial fibrillation not observed.  · Normal right ventricular size with normal right ventricular systolic function.  · There is mild pulmonary hypertension.  · Mild to moderate tricuspid regurgitation.  · Intermediate central venous pressure (8 mmHg).  · The estimated PA systolic pressure is 44 mmHg.  · No bubble study          Physical Exam  General: Well nourished and Alert    Airway:  Mallampati: II   Mouth Opening: Normal  TM Distance: Normal  Tongue: Normal  Neck ROM: Normal ROM    Dental:  Periodontal disease  Poor dentition, loose lower teeth  Chest/Lungs:  Clear to auscultation, Normal Respiratory Rate    Heart:  Rate: Normal  Rhythm: Regular Rhythm  Sounds: Normal        Anesthesia Plan  Type of Anesthesia, risks & benefits discussed:    Anesthesia Type: Gen ETT  Intra-op Monitoring Plan: Standard ASA Monitors  Post Op Pain Control Plan: multimodal analgesia  Induction:  IV  Airway Plan: Video, Post-Induction  Informed Consent: Informed consent signed with the Patient and all parties understand the risks and agree with anesthesia plan.  All questions answered. Patient consented to blood products? Yes  ASA Score: 3  Anesthesia Plan Notes: KCL 20 meq,   Hydrocortisone 100 mg IV  Tylenol 1 gm IV, Ketamine  Zofran 4 mg, Benadryl 6.25 mg, Pepcid 20 mg IV  No hyperextension of neck      Ready For Surgery From Anesthesia Perspective.     .

## 2022-07-27 NOTE — PROGRESS NOTES
Received new order for Potassium level per Dr. Garsia. Lab drawn and sent for analysis. Waiting for result before release to [facility]

## 2022-07-27 NOTE — OP NOTE
Surgery Date: 7/27/2022     Surgeon(s) and Role:     * Ramón Hwang III, MD - Primary    Assisting Surgeon: None    Pre-op Diagnosis:  Acute biliary pancreatitis, unspecified complication status [K85.10]    Post-op Diagnosis:  Post-Op Diagnosis Codes:     * Acute biliary pancreatitis, unspecified complication status [K85.10]    Procedure(s) (LRB):  CHOLECYSTECTOMY, LAPAROSCOPIC (N/A)    Anesthesia: General    Operative Findings: The patient was brought to the operating room and transferred to the operating room table in the supine position.  Patient was given general anesthesia and intubated.  The abdomen was prepped and draped.  A transverse infraumbilical incision was made.  Dissection was carried down through the skin and subcutaneous tissue.  The abdomen was insufflated with the Veress needle. The abdomen was entered with the 5 mm visiport. Under direct visualization, three ports were placed.  One 12 mm was placed in the subxiphoid position, one 5 mm in the right anterior axillary line and the other 5 mm in the mid clavicular line.  The gallbladder body was retracted superiorly and the infundibulum was retracted laterally.  The peritoneum overlying the cystic duct and artery was carefully taken down.  The cystic duct and cystic artery were individually isolated and dissected free 360 degrees.  They were individually clipped proximally and distally.  They were transected using endo lillie.  Electrocautery was used to take the gallbladder off the liver bed.  The Endo-Catch bag was used to remove the gallbladder from the abdomen.  We irrigated out the right upper quadrant with irrigation.  We checked again and there was no evidence of any bile leak or bleeding from our clips or from the liver bed.  The patient tolerated the procedure well.  We removed all of our ports. We repaired the skin with 4-0 Monocryl.  After that was done, the patient was extubated and taken to the recovery room in stable condition.   All lap and instrument counts were correct.    Estimated Blood Loss: 5 mL  complications none     Estimated Blood Loss has been documented.         Specimens:   Specimen (24h ago, onward)             Start     Ordered    07/27/22 1232  Specimen to Pathology - Surgery  Once        Comments: Pre-op Diagnosis: Acute biliary pancreatitis, unspecified complication status [K85.10]Procedure(s):CHOLECYSTECTOMY, LAPAROSCOPIC Number of specimens: 1Name of specimens: gallbladder     Question:  Release to patient  Answer:  Immediate    07/27/22 1257                [id number]

## 2022-07-27 NOTE — DISCHARGE SUMMARY
Discharge Summary  General Surgery      Admit Date: 7/27/2022    Discharge Date :7/27/2022    Attending Physician: Ramón Hwang III     Discharge Physician: Ramón Hwang III    Discharged Condition: good    Discharge Diagnosis: Acute biliary pancreatitis, unspecified complication status [K85.10]    Treatments/Procedures: Procedure(s) (LRB):  CHOLECYSTECTOMY, LAPAROSCOPIC (N/A)    Hospital Course: Uneventful; Discharged home from Recovery    Significant Diagnostic Studies: none    Disposition: Home or Self Care    Diet: Regular    Follow up: Office 10-14 days    Activity: No heavy lifting till seen in office.    Patient Instructions:   Current Discharge Medication List      START taking these medications    Details   HYDROcodone-acetaminophen (NORCO) 5-325 mg per tablet Take 1 tablet by mouth every 6 (six) hours as needed for Pain.  Qty: 25 tablet, Refills: 0    Comments: Quantity prescribed more than 7 day supply? No         CONTINUE these medications which have NOT CHANGED    Details   cholecalciferol, vitamin D3, (VITAMIN D3) 10 mcg (400 unit) Tab Take 400 Units by mouth once daily.      folic acid (FOLVITE) 1 MG tablet Take 1 tablet (1 mg total) by mouth once daily.  Qty: 360 tablet, Refills: 3    Associated Diagnoses: Rheumatoid arthritis, involving unspecified site, unspecified whether rheumatoid factor present      metoprolol succinate (TOPROL-XL) 50 MG 24 hr tablet Take 1 tablet (50 mg total) by mouth once daily.  Qty: 90 tablet, Refills: 3    Comments: .  Associated Diagnoses: Essential hypertension; Palpitations      pantoprazole (PROTONIX) 40 MG tablet Take 1 tablet (40 mg total) by mouth once daily.  Qty: 30 tablet, Refills: 11    Associated Diagnoses: PUD (peptic ulcer disease)      predniSONE (DELTASONE) 5 MG tablet Take 5 mg by mouth once daily.      sertraline (ZOLOFT) 25 MG tablet Take 1 tablet (25 mg total) by mouth once daily.  Qty: 90 tablet, Refills: 1    Associated Diagnoses:  Generalized anxiety disorder      traZODone (DESYREL) 50 MG tablet Take 1 tablet by mouth in the evening  Qty: 30 tablet, Refills: 2    Associated Diagnoses: Generalized anxiety disorder; Insomnia, unspecified type         STOP taking these medications       hydroCHLOROthiazide (HYDRODIURIL) 25 MG tablet Comments:   Reason for Stopping:               Discharge Procedure Orders   Diet Adult Regular     Lifting restrictions   Order Comments: No lifting over twenty pounds for six weeks     Remove dressing in 48 hours

## 2022-07-27 NOTE — TRANSFER OF CARE
"Anesthesia Transfer of Care Note    Patient: Claire Bowesr    Procedure(s) Performed: Procedure(s) (LRB):  CHOLECYSTECTOMY, LAPAROSCOPIC (N/A)    Patient location: PACU    Anesthesia Type: general    Transport from OR: Transported from OR on room air with adequate spontaneous ventilation    Post pain: adequate analgesia    Post assessment: no apparent anesthetic complications    Post vital signs: stable    Level of consciousness: awake and alert    Nausea/Vomiting: no nausea/vomiting    Complications: none    Transfer of care protocol was followed      Last vitals:   Visit Vitals  BP (!) 131/47 (BP Location: Right arm, Patient Position: Lying)   Pulse 87   Temp 36.6 °C (97.9 °F) (Oral)   Resp 17   Ht 5' 5" (1.651 m)   Wt 50.3 kg (111 lb)   SpO2 (!) 9%   Breastfeeding No   BMI 18.47 kg/m²     "

## 2022-07-27 NOTE — BRIEF OP NOTE
[facility]  Brief Operative Note    SUMMARY     Surgery Date: 7/27/2022     Surgeon(s) and Role:     * Ramón Hwang III, MD - Primary    Assisting Surgeon: None    Pre-op Diagnosis:  Acute biliary pancreatitis, unspecified complication status [K85.10]    Post-op Diagnosis:  Post-Op Diagnosis Codes:     * Acute biliary pancreatitis, unspecified complication status [K85.10]    Procedure(s) (LRB):  CHOLECYSTECTOMY, LAPAROSCOPIC (N/A)    Anesthesia: General    Operative Findings: The patient was brought to the operating room and transferred to the operating room table in the supine position.  Patient was given general anesthesia and intubated.  The abdomen was prepped and draped.  A transverse infraumbilical incision was made.  Dissection was carried down through the skin and subcutaneous tissue.  The abdomen was insufflated with the Veress needle. The abdomen was entered with the 5 mm visiport. Under direct visualization, three ports were placed.  One 12 mm was placed in the subxiphoid position, one 5 mm in the right anterior axillary line and the other 5 mm in the mid clavicular line.  The gallbladder body was retracted superiorly and the infundibulum was retracted laterally.  The peritoneum overlying the cystic duct and artery was carefully taken down.  The cystic duct and cystic artery were individually isolated and dissected free 360 degrees.  They were individually clipped proximally and distally.  They were transected using endo lillie.  Electrocautery was used to take the gallbladder off the liver bed.  The Endo-Catch bag was used to remove the gallbladder from the abdomen.  We irrigated out the right upper quadrant with irrigation.  We checked again and there was no evidence of any bile leak or bleeding from our clips or from the liver bed.  The patient tolerated the procedure well.  We removed all of our ports. We repaired the skin with 4-0 Monocryl.  After that was done, the patient was  extubated and taken to the recovery room in stable condition.  All lap and instrument counts were correct.    Estimated Blood Loss: 5 mL  complications none     Estimated Blood Loss has been documented.         Specimens:   Specimen (24h ago, onward)             Start     Ordered    07/27/22 1232  Specimen to Pathology - Surgery  Once        Comments: Pre-op Diagnosis: Acute biliary pancreatitis, unspecified complication status [K85.10]Procedure(s):CHOLECYSTECTOMY, LAPAROSCOPIC Number of specimens: 1Name of specimens: gallbladder     Question:  Release to patient  Answer:  Immediate    07/27/22 1257                [id number]

## 2022-07-28 NOTE — ANESTHESIA POSTPROCEDURE EVALUATION
Anesthesia Post Evaluation    Patient: Claire Bowser    Procedure(s) Performed: Procedure(s) (LRB):  CHOLECYSTECTOMY, LAPAROSCOPIC (N/A)    Final Anesthesia Type: general      Patient location during evaluation: PACU  Patient participation: Yes- Able to Participate  Level of consciousness: awake and alert and oriented  Post-procedure vital signs: reviewed and stable  Pain management: adequate  Airway patency: patent    PONV status at discharge: No PONV  Anesthetic complications: no      Cardiovascular status: blood pressure returned to baseline, hemodynamically stable and stable  Respiratory status: unassisted, spontaneous ventilation and room air  Hydration status: euvolemic  Follow-up not needed.          Vitals Value Taken Time   /67 07/27/22 1545   Temp 36.5 °C (97.7 °F) 07/27/22 1545   Pulse 64 07/27/22 1545   Resp 18 07/27/22 1545   SpO2 99 % 07/27/22 1545         Event Time   Out of Recovery 14:58:57         Pain/Gerald Score: Pain Rating Prior to Med Admin: 4 (7/27/2022  2:19 PM)  Gerald Score: 10 (7/27/2022  2:45 PM)

## 2022-08-09 ENCOUNTER — OFFICE VISIT (OUTPATIENT)
Dept: SURGERY | Facility: CLINIC | Age: 62
End: 2022-08-09
Payer: COMMERCIAL

## 2022-08-09 VITALS — TEMPERATURE: 97 F | SYSTOLIC BLOOD PRESSURE: 168 MMHG | DIASTOLIC BLOOD PRESSURE: 79 MMHG | HEART RATE: 54 BPM

## 2022-08-09 DIAGNOSIS — K85.10 ACUTE BILIARY PANCREATITIS, UNSPECIFIED COMPLICATION STATUS: Primary | ICD-10-CM

## 2022-08-09 PROCEDURE — 1159F PR MEDICATION LIST DOCUMENTED IN MEDICAL RECORD: ICD-10-PCS | Mod: CPTII,S$GLB,, | Performed by: SURGERY

## 2022-08-09 PROCEDURE — 99024 PR POST-OP FOLLOW-UP VISIT: ICD-10-PCS | Mod: S$GLB,,, | Performed by: SURGERY

## 2022-08-09 PROCEDURE — 1160F RVW MEDS BY RX/DR IN RCRD: CPT | Mod: CPTII,S$GLB,, | Performed by: SURGERY

## 2022-08-09 PROCEDURE — 4010F PR ACE/ARB THEARPY RXD/TAKEN: ICD-10-PCS | Mod: CPTII,S$GLB,, | Performed by: SURGERY

## 2022-08-09 PROCEDURE — 3077F PR MOST RECENT SYSTOLIC BLOOD PRESSURE >= 140 MM HG: ICD-10-PCS | Mod: CPTII,S$GLB,, | Performed by: SURGERY

## 2022-08-09 PROCEDURE — 99024 POSTOP FOLLOW-UP VISIT: CPT | Mod: S$GLB,,, | Performed by: SURGERY

## 2022-08-09 PROCEDURE — 3077F SYST BP >= 140 MM HG: CPT | Mod: CPTII,S$GLB,, | Performed by: SURGERY

## 2022-08-09 PROCEDURE — 3078F PR MOST RECENT DIASTOLIC BLOOD PRESSURE < 80 MM HG: ICD-10-PCS | Mod: CPTII,S$GLB,, | Performed by: SURGERY

## 2022-08-09 PROCEDURE — 1160F PR REVIEW ALL MEDS BY PRESCRIBER/CLIN PHARMACIST DOCUMENTED: ICD-10-PCS | Mod: CPTII,S$GLB,, | Performed by: SURGERY

## 2022-08-09 PROCEDURE — 4010F ACE/ARB THERAPY RXD/TAKEN: CPT | Mod: CPTII,S$GLB,, | Performed by: SURGERY

## 2022-08-09 PROCEDURE — 3078F DIAST BP <80 MM HG: CPT | Mod: CPTII,S$GLB,, | Performed by: SURGERY

## 2022-08-09 PROCEDURE — 1159F MED LIST DOCD IN RCRD: CPT | Mod: CPTII,S$GLB,, | Performed by: SURGERY

## 2022-08-10 NOTE — PROGRESS NOTES
Subjective:       Patient ID: Claire Bowser is a 62 y.o. female.    Chief Complaint: Post-op Evaluation      HPI:  S/p lap martin. Feeling well. Path benign.     Review of Systems    Objective:      Physical Exam  Constitutional:       General: She is not in acute distress.  Pulmonary:      Effort: Pulmonary effort is normal. No respiratory distress.   Abdominal:      General: There is no distension.      Palpations: Abdomen is soft.      Tenderness: There is no abdominal tenderness. There is no guarding or rebound.   Skin:     Comments: Incisions are clean, dry and intact  There is no evidence of infection, hematoma or seroma    Neurological:      Mental Status: She is alert and oriented to person, place, and time.   Psychiatric:         Behavior: Behavior is cooperative.         Assessment/Plan:   Acute biliary pancreatitis, unspecified complication status    s/p lap martin. Doing well.  Path benign.  RTC PRN

## 2022-08-24 ENCOUNTER — PATIENT MESSAGE (OUTPATIENT)
Dept: ADMINISTRATIVE | Facility: HOSPITAL | Age: 62
End: 2022-08-24
Payer: COMMERCIAL

## 2022-09-09 ENCOUNTER — OFFICE VISIT (OUTPATIENT)
Dept: RHEUMATOLOGY | Facility: CLINIC | Age: 62
End: 2022-09-09
Payer: COMMERCIAL

## 2022-09-09 ENCOUNTER — LAB VISIT (OUTPATIENT)
Dept: LAB | Facility: HOSPITAL | Age: 62
End: 2022-09-09
Attending: INTERNAL MEDICINE
Payer: COMMERCIAL

## 2022-09-09 VITALS
HEART RATE: 86 BPM | WEIGHT: 111.75 LBS | HEIGHT: 65 IN | DIASTOLIC BLOOD PRESSURE: 69 MMHG | SYSTOLIC BLOOD PRESSURE: 121 MMHG | BODY MASS INDEX: 18.62 KG/M2

## 2022-09-09 DIAGNOSIS — Z79.60 LONG-TERM USE OF IMMUNOSUPPRESSANT MEDICATION: ICD-10-CM

## 2022-09-09 DIAGNOSIS — M05.79 RHEUMATOID ARTHRITIS INVOLVING MULTIPLE SITES WITH POSITIVE RHEUMATOID FACTOR: Primary | ICD-10-CM

## 2022-09-09 DIAGNOSIS — M05.79 RHEUMATOID ARTHRITIS INVOLVING MULTIPLE SITES WITH POSITIVE RHEUMATOID FACTOR: ICD-10-CM

## 2022-09-09 LAB
ALBUMIN SERPL BCP-MCNC: 2.3 G/DL (ref 3.5–5.2)
ALP SERPL-CCNC: 100 U/L (ref 55–135)
ALT SERPL W/O P-5'-P-CCNC: <5 U/L (ref 10–44)
ANION GAP SERPL CALC-SCNC: 8 MMOL/L (ref 8–16)
AST SERPL-CCNC: 10 U/L (ref 10–40)
BASOPHILS # BLD AUTO: 0.08 K/UL (ref 0–0.2)
BASOPHILS NFR BLD: 0.8 % (ref 0–1.9)
BILIRUB SERPL-MCNC: 0.6 MG/DL (ref 0.1–1)
BUN SERPL-MCNC: 6 MG/DL (ref 8–23)
CALCIUM SERPL-MCNC: 9.6 MG/DL (ref 8.7–10.5)
CHLORIDE SERPL-SCNC: 103 MMOL/L (ref 95–110)
CO2 SERPL-SCNC: 29 MMOL/L (ref 23–29)
CREAT SERPL-MCNC: 0.7 MG/DL (ref 0.5–1.4)
CRP SERPL-MCNC: 31.4 MG/L (ref 0–8.2)
DIFFERENTIAL METHOD: ABNORMAL
EOSINOPHIL # BLD AUTO: 0.5 K/UL (ref 0–0.5)
EOSINOPHIL NFR BLD: 4.7 % (ref 0–8)
ERYTHROCYTE [DISTWIDTH] IN BLOOD BY AUTOMATED COUNT: 14.2 % (ref 11.5–14.5)
ERYTHROCYTE [SEDIMENTATION RATE] IN BLOOD BY PHOTOMETRIC METHOD: 52 MM/HR (ref 0–36)
EST. GFR  (NO RACE VARIABLE): >60 ML/MIN/1.73 M^2
GLUCOSE SERPL-MCNC: 93 MG/DL (ref 70–110)
HCT VFR BLD AUTO: 33 % (ref 37–48.5)
HGB BLD-MCNC: 10.8 G/DL (ref 12–16)
IMM GRANULOCYTES # BLD AUTO: 0.03 K/UL (ref 0–0.04)
IMM GRANULOCYTES NFR BLD AUTO: 0.3 % (ref 0–0.5)
LYMPHOCYTES # BLD AUTO: 0.9 K/UL (ref 1–4.8)
LYMPHOCYTES NFR BLD: 9.6 % (ref 18–48)
MCH RBC QN AUTO: 30 PG (ref 27–31)
MCHC RBC AUTO-ENTMCNC: 32.7 G/DL (ref 32–36)
MCV RBC AUTO: 92 FL (ref 82–98)
MONOCYTES # BLD AUTO: 0.5 K/UL (ref 0.3–1)
MONOCYTES NFR BLD: 5.5 % (ref 4–15)
NEUTROPHILS # BLD AUTO: 7.8 K/UL (ref 1.8–7.7)
NEUTROPHILS NFR BLD: 79.1 % (ref 38–73)
NRBC BLD-RTO: 0 /100 WBC
PLATELET # BLD AUTO: 366 K/UL (ref 150–450)
PMV BLD AUTO: 11.6 FL (ref 9.2–12.9)
POTASSIUM SERPL-SCNC: 2.9 MMOL/L (ref 3.5–5.1)
PROT SERPL-MCNC: 6.1 G/DL (ref 6–8.4)
RBC # BLD AUTO: 3.6 M/UL (ref 4–5.4)
SODIUM SERPL-SCNC: 140 MMOL/L (ref 136–145)
WBC # BLD AUTO: 9.8 K/UL (ref 3.9–12.7)

## 2022-09-09 PROCEDURE — 1159F MED LIST DOCD IN RCRD: CPT | Mod: CPTII,S$GLB,, | Performed by: INTERNAL MEDICINE

## 2022-09-09 PROCEDURE — 36415 COLL VENOUS BLD VENIPUNCTURE: CPT | Performed by: INTERNAL MEDICINE

## 2022-09-09 PROCEDURE — 4010F PR ACE/ARB THEARPY RXD/TAKEN: ICD-10-PCS | Mod: CPTII,S$GLB,, | Performed by: INTERNAL MEDICINE

## 2022-09-09 PROCEDURE — 99214 PR OFFICE/OUTPT VISIT, EST, LEVL IV, 30-39 MIN: ICD-10-PCS | Mod: S$GLB,,, | Performed by: INTERNAL MEDICINE

## 2022-09-09 PROCEDURE — 3008F PR BODY MASS INDEX (BMI) DOCUMENTED: ICD-10-PCS | Mod: CPTII,S$GLB,, | Performed by: INTERNAL MEDICINE

## 2022-09-09 PROCEDURE — 99999 PR PBB SHADOW E&M-EST. PATIENT-LVL III: CPT | Mod: PBBFAC,,, | Performed by: INTERNAL MEDICINE

## 2022-09-09 PROCEDURE — 3078F PR MOST RECENT DIASTOLIC BLOOD PRESSURE < 80 MM HG: ICD-10-PCS | Mod: CPTII,S$GLB,, | Performed by: INTERNAL MEDICINE

## 2022-09-09 PROCEDURE — 3074F PR MOST RECENT SYSTOLIC BLOOD PRESSURE < 130 MM HG: ICD-10-PCS | Mod: CPTII,S$GLB,, | Performed by: INTERNAL MEDICINE

## 2022-09-09 PROCEDURE — 1160F RVW MEDS BY RX/DR IN RCRD: CPT | Mod: CPTII,S$GLB,, | Performed by: INTERNAL MEDICINE

## 2022-09-09 PROCEDURE — 1160F PR REVIEW ALL MEDS BY PRESCRIBER/CLIN PHARMACIST DOCUMENTED: ICD-10-PCS | Mod: CPTII,S$GLB,, | Performed by: INTERNAL MEDICINE

## 2022-09-09 PROCEDURE — 80053 COMPREHEN METABOLIC PANEL: CPT | Performed by: INTERNAL MEDICINE

## 2022-09-09 PROCEDURE — 99999 PR PBB SHADOW E&M-EST. PATIENT-LVL III: ICD-10-PCS | Mod: PBBFAC,,, | Performed by: INTERNAL MEDICINE

## 2022-09-09 PROCEDURE — 86140 C-REACTIVE PROTEIN: CPT | Performed by: INTERNAL MEDICINE

## 2022-09-09 PROCEDURE — 1159F PR MEDICATION LIST DOCUMENTED IN MEDICAL RECORD: ICD-10-PCS | Mod: CPTII,S$GLB,, | Performed by: INTERNAL MEDICINE

## 2022-09-09 PROCEDURE — 99214 OFFICE O/P EST MOD 30 MIN: CPT | Mod: S$GLB,,, | Performed by: INTERNAL MEDICINE

## 2022-09-09 PROCEDURE — 3074F SYST BP LT 130 MM HG: CPT | Mod: CPTII,S$GLB,, | Performed by: INTERNAL MEDICINE

## 2022-09-09 PROCEDURE — 3078F DIAST BP <80 MM HG: CPT | Mod: CPTII,S$GLB,, | Performed by: INTERNAL MEDICINE

## 2022-09-09 PROCEDURE — 85652 RBC SED RATE AUTOMATED: CPT | Performed by: INTERNAL MEDICINE

## 2022-09-09 PROCEDURE — 85025 COMPLETE CBC W/AUTO DIFF WBC: CPT | Performed by: INTERNAL MEDICINE

## 2022-09-09 PROCEDURE — 4010F ACE/ARB THERAPY RXD/TAKEN: CPT | Mod: CPTII,S$GLB,, | Performed by: INTERNAL MEDICINE

## 2022-09-09 PROCEDURE — 3008F BODY MASS INDEX DOCD: CPT | Mod: CPTII,S$GLB,, | Performed by: INTERNAL MEDICINE

## 2022-09-09 RX ORDER — CYCLOBENZAPRINE HCL 5 MG
5 TABLET ORAL NIGHTLY
Qty: 30 TABLET | Refills: 4 | Status: SHIPPED | OUTPATIENT
Start: 2022-09-09 | End: 2022-10-09

## 2022-09-09 RX ORDER — LEFLUNOMIDE 20 MG/1
20 TABLET ORAL DAILY
Qty: 30 TABLET | Refills: 3 | Status: SHIPPED | OUTPATIENT
Start: 2022-09-09 | End: 2022-12-27

## 2022-09-10 NOTE — PROGRESS NOTES
History of present illness:  62-year-old female who has been followed by Dr. Cortez since December, 2020. She presented at that time with a several month history of foot and ankle pain.  It then spread to the hands.  She was diagnosed as having rheumatoid arthritis.  She was started initially on prednisone and then methotrexate.  She had been tapered off her prednisone.  She states her pain is worse with stopping the prednisone.  Her methotrexate had been increased up to 25 mg weekly.  She cut it back to 20 mg because of hair loss.     She remains on prednisone 5 mg daily.  She is been off methotrexate since she had COVID.  She subsequently developed gallstone pancreatitis.  She had gallbladder surgery in July.  She has recovered.    She is still having significant arthritic pain.  Her worst pain is shoulder pain at night.  She had taken Flexeril in the past but it was discontinued.  In general, however, she is feeling better since she is been off the methotrexate.  She is had no further oral ulcers.  Her hair has started growing back.  She is had no other recent medical problems.      Physical examination:  Musculoskeletal:  Cervical spine has good range of motion without pain on range of motion.    Shoulders, elbows, wrists are unremarkable.    She has synovitis of the right 2nd and 4th MCP and PIP.  She has synovitis of the left 2nd and 3rd MCP.  She has synovitis of the left 2nd through 5th PIP.  Hips, knees, ankles, small joints the feet are unremarkable.    Assessment:  1. She has evidence of active rheumatoid arthritis   2. She was having side effects from methotrexate     Plans:  1.  I discussed with her different options and elected to place her on leflunomide 20 mg daily  2.  She is to resume Flexeril 5 mg at bedtime  3.  Continue prednisone 5 mg daily until leflunomide is in her system  4.  Laboratories today and monthly  5.  Return in 3 months  Answers submitted by the patient for this  visit:  Rheumatology Questionnaire (Submitted on 9/7/2022)  fever: No  eye redness: No  mouth sores: No  headaches: No  shortness of breath: No  chest pain: No  trouble swallowing: No  diarrhea: No  constipation: No  unexpected weight change: Yes  genital sore: No  dysuria: No  During the last 3 days, have you had a skin rash?: No  Bruises or bleeds easily: Yes  cough: No

## 2022-09-19 ENCOUNTER — PATIENT MESSAGE (OUTPATIENT)
Dept: FAMILY MEDICINE | Facility: CLINIC | Age: 62
End: 2022-09-19
Payer: COMMERCIAL

## 2022-09-19 DIAGNOSIS — E87.6 HYPOKALEMIA: Primary | ICD-10-CM

## 2022-09-19 DIAGNOSIS — Z98.890 DIARRHEA FOLLOWING GASTROINTESTINAL SURGERY: ICD-10-CM

## 2022-09-19 DIAGNOSIS — R19.7 DIARRHEA FOLLOWING GASTROINTESTINAL SURGERY: ICD-10-CM

## 2022-09-19 RX ORDER — POTASSIUM CHLORIDE 20 MEQ/1
TABLET, EXTENDED RELEASE ORAL
Qty: 70 TABLET | Refills: 2 | Status: SHIPPED | OUTPATIENT
Start: 2022-09-19 | End: 2022-11-18 | Stop reason: SDUPTHER

## 2022-09-19 NOTE — TELEPHONE ENCOUNTER
Her potassium is very low at 2.9.  Please confirm that she is no longer taking the hydrochlorothiazide/diuretic/fluid pill.  Is she having any vomiting or diarrhea now?  Any kidney function changes?  Is she on a potassium supplement now?  She will need to go on a potassium supplement until we can get her levels back to normal and stable.    E-mail sent to patient

## 2022-09-20 ENCOUNTER — PATIENT MESSAGE (OUTPATIENT)
Dept: FAMILY MEDICINE | Facility: CLINIC | Age: 62
End: 2022-09-20
Payer: COMMERCIAL

## 2022-09-20 NOTE — TELEPHONE ENCOUNTER
Patient started on K Dur 20, two twice a day for three days then one twice a day.  Order in for potassium level Monday.

## 2022-09-26 ENCOUNTER — HOSPITAL ENCOUNTER (EMERGENCY)
Facility: HOSPITAL | Age: 62
Discharge: HOME | End: 2022-09-26
Attending: EMERGENCY MEDICINE
Payer: COMMERCIAL

## 2022-09-26 ENCOUNTER — TELEPHONE (OUTPATIENT)
Dept: FAMILY MEDICINE | Facility: CLINIC | Age: 62
End: 2022-09-26
Payer: COMMERCIAL

## 2022-09-26 ENCOUNTER — LAB VISIT (OUTPATIENT)
Dept: LAB | Facility: HOSPITAL | Age: 62
End: 2022-09-26
Attending: FAMILY MEDICINE
Payer: COMMERCIAL

## 2022-09-26 VITALS
SYSTOLIC BLOOD PRESSURE: 133 MMHG | OXYGEN SATURATION: 99 % | RESPIRATION RATE: 18 BRPM | DIASTOLIC BLOOD PRESSURE: 72 MMHG | WEIGHT: 100 LBS | HEIGHT: 63 IN | BODY MASS INDEX: 17.72 KG/M2 | HEART RATE: 66 BPM | TEMPERATURE: 98 F

## 2022-09-26 DIAGNOSIS — M79.606 LEG PAIN: ICD-10-CM

## 2022-09-26 DIAGNOSIS — I82.462 ACUTE DEEP VEIN THROMBOSIS (DVT) OF CALF MUSCLE VEIN OF LEFT LOWER EXTREMITY: Primary | ICD-10-CM

## 2022-09-26 DIAGNOSIS — E87.6 HYPOKALEMIA: ICD-10-CM

## 2022-09-26 LAB
ANION GAP SERPL CALC-SCNC: 11 MMOL/L (ref 8–16)
BASOPHILS # BLD AUTO: 0.11 K/UL (ref 0–0.2)
BASOPHILS NFR BLD: 1 % (ref 0–1.9)
BUN SERPL-MCNC: 13 MG/DL (ref 8–23)
CALCIUM SERPL-MCNC: 10 MG/DL (ref 8.7–10.5)
CHLORIDE SERPL-SCNC: 98 MMOL/L (ref 95–110)
CO2 SERPL-SCNC: 28 MMOL/L (ref 23–29)
CREAT SERPL-MCNC: 0.8 MG/DL (ref 0.5–1.4)
DIFFERENTIAL METHOD: ABNORMAL
EOSINOPHIL # BLD AUTO: 0.6 K/UL (ref 0–0.5)
EOSINOPHIL NFR BLD: 5.4 % (ref 0–8)
ERYTHROCYTE [DISTWIDTH] IN BLOOD BY AUTOMATED COUNT: 14.9 % (ref 11.5–14.5)
EST. GFR  (NO RACE VARIABLE): >60 ML/MIN/1.73 M^2
GLUCOSE SERPL-MCNC: 133 MG/DL (ref 70–110)
HCT VFR BLD AUTO: 33.6 % (ref 37–48.5)
HGB BLD-MCNC: 11.2 G/DL (ref 12–16)
IMM GRANULOCYTES # BLD AUTO: 0.04 K/UL (ref 0–0.04)
IMM GRANULOCYTES NFR BLD AUTO: 0.4 % (ref 0–0.5)
LYMPHOCYTES # BLD AUTO: 0.9 K/UL (ref 1–4.8)
LYMPHOCYTES NFR BLD: 8 % (ref 18–48)
MAGNESIUM SERPL-MCNC: 1.7 MG/DL (ref 1.6–2.6)
MCH RBC QN AUTO: 30.4 PG (ref 27–31)
MCHC RBC AUTO-ENTMCNC: 33.3 G/DL (ref 32–36)
MCV RBC AUTO: 91 FL (ref 82–98)
MONOCYTES # BLD AUTO: 0.8 K/UL (ref 0.3–1)
MONOCYTES NFR BLD: 7.4 % (ref 4–15)
NEUTROPHILS # BLD AUTO: 8.5 K/UL (ref 1.8–7.7)
NEUTROPHILS NFR BLD: 77.8 % (ref 38–73)
NRBC BLD-RTO: 0 /100 WBC
PLATELET # BLD AUTO: 403 K/UL (ref 150–450)
PMV BLD AUTO: 9.2 FL (ref 9.2–12.9)
POTASSIUM SERPL-SCNC: 3.5 MMOL/L (ref 3.5–5.1)
POTASSIUM SERPL-SCNC: 3.5 MMOL/L (ref 3.5–5.1)
RBC # BLD AUTO: 3.68 M/UL (ref 4–5.4)
SODIUM SERPL-SCNC: 137 MMOL/L (ref 136–145)
WBC # BLD AUTO: 10.94 K/UL (ref 3.9–12.7)

## 2022-09-26 PROCEDURE — 85025 COMPLETE CBC W/AUTO DIFF WBC: CPT | Performed by: EMERGENCY MEDICINE

## 2022-09-26 PROCEDURE — 84132 ASSAY OF SERUM POTASSIUM: CPT | Performed by: FAMILY MEDICINE

## 2022-09-26 PROCEDURE — 36415 COLL VENOUS BLD VENIPUNCTURE: CPT | Performed by: EMERGENCY MEDICINE

## 2022-09-26 PROCEDURE — 83735 ASSAY OF MAGNESIUM: CPT | Performed by: EMERGENCY MEDICINE

## 2022-09-26 PROCEDURE — 80048 BASIC METABOLIC PNL TOTAL CA: CPT | Performed by: EMERGENCY MEDICINE

## 2022-09-26 PROCEDURE — 99284 EMERGENCY DEPT VISIT MOD MDM: CPT | Mod: 25

## 2022-09-26 PROCEDURE — 36415 COLL VENOUS BLD VENIPUNCTURE: CPT | Performed by: FAMILY MEDICINE

## 2022-09-26 RX ORDER — TRAMADOL HYDROCHLORIDE 50 MG/1
50 TABLET ORAL EVERY 6 HOURS PRN
Qty: 10 TABLET | Refills: 0 | Status: SHIPPED | OUTPATIENT
Start: 2022-09-26 | End: 2022-10-25 | Stop reason: ALTCHOICE

## 2022-09-26 RX ORDER — MUPIROCIN 20 MG/G
OINTMENT TOPICAL 3 TIMES DAILY
Qty: 30 G | Refills: 0 | Status: SHIPPED | OUTPATIENT
Start: 2022-09-26 | End: 2022-12-29

## 2022-09-26 NOTE — TELEPHONE ENCOUNTER
----- Message from Radha Brady sent at 9/26/2022  8:49 AM CDT -----  Contact: Self  Type:  Same Day Appointment Request    Caller is requesting a same day appointment.  Caller declined first available appointment listed below.      Name of Caller:  Patient  When is the first available appointment?  N/a  Symptoms:  potassium issues, L Calf pain, can't stand on it long  Best Call Back Number:  [phone]  Additional Information:   Please call pt back to advise. Thank You

## 2022-09-26 NOTE — TELEPHONE ENCOUNTER
A week ago patient injured front of leg on piece of wood. Since has had severe pain in calf to point she can't bear weight or walk normally. Describes pain as 10/10. Directed her to go to ED and she agreed to.

## 2022-09-26 NOTE — ED PROVIDER NOTES
"Encounter Date: 9/26/2022    SCRIBE #1 NOTE: IIndu, lai scribing for, and in the presence of,  Navarro Beckham MD.     History     Chief Complaint   Patient presents with    Cellulitis     Left lower leg wound x 1 week      Time seen by provider: 2:08 PM on 09/26/2022    Claire Bowser is a 62 y.o. female who presents to the ED with a CC of wounds to the left anterior shin and cramping to the left calf that started one week ago after she "bumped into a wooden box". Patient denies fever, chest pain, cough, congestion, n/v/d, abd pain, or SOB. She had blood work done on 9/9/22 with her PCP and was found to be hypokalemic at 2.9. She was placed on PO potassium and had a repeat K+ level today of 3.5. PMHx of HTN, RA, and anxiety. PSHx of tubal ligation.    The history is provided by the patient.   Review of patient's allergies indicates:   Allergen Reactions    No known drug allergies      Past Medical History:   Diagnosis Date    Anxiety     Digestive disorder     Flu 02/2017    [facility]    Hypertension     Rheumatoid arthritis involving multiple sites with positive rheumatoid factor 01/14/2021     Past Surgical History:   Procedure Laterality Date    COLONOSCOPY N/A 2/26/2021    Procedure: COLONOSCOPY;  Surgeon: Amy Sidhu MD;  Location: [facility];  Service: Endoscopy;  Laterality: N/A;    ENDOSCOPIC ULTRASOUND OF UPPER GASTROINTESTINAL TRACT N/A 7/1/2022    Procedure: ULTRASOUND, UPPER GI TRACT, ENDOSCOPIC;  Surgeon: Donavon Jewell III, MD;  Location: [facility] ENDO;  Service: Endoscopy;  Laterality: N/A;    ESOPHAGOGASTRODUODENOSCOPY N/A 2/26/2021    Procedure: EGD (ESOPHAGOGASTRODUODENOSCOPY);  Surgeon: Amy Sidhu MD;  Location: [facility];  Service: Endoscopy;  Laterality: N/A;    LAPAROSCOPIC CHOLECYSTECTOMY N/A 7/27/2022    Procedure: CHOLECYSTECTOMY, LAPAROSCOPIC;  Surgeon: Ramón Hwang III, MD;  Location: [facility] OR;  Service: General;  Laterality: N/A;    TUBAL " LIGATION       Family History   Problem Relation Age of Onset    Diabetes Mother     Heart disease Mother     Kidney disease Mother     Hypertension Mother     Stroke Mother     Hearing loss Mother     COPD Father     Liver disease Paternal Grandfather     Alcohol abuse Paternal Grandfather     Liver disease Sister     Emphysema Brother     COPD Brother     Sleep apnea Brother     No Known Problems Son     Alcohol abuse Paternal Grandmother     Cirrhosis Paternal Grandmother     Heart disease Maternal Aunt     Diabetes Maternal Aunt     Cancer Maternal Aunt         female    Heart disease Maternal Uncle     Hypertension Maternal Uncle     No Known Problems Paternal Uncle     Psoriasis Neg Hx     Lupus Neg Hx     Eczema Neg Hx     Melanoma Neg Hx      Social History     Tobacco Use    Smoking status: Every Day     Packs/day: 1.00     Years: 7.00     Pack years: 7.00     Types: Cigarettes    Smokeless tobacco: Never    Tobacco comments:     [phone]   Substance Use Topics    Alcohol use: No    Drug use: Never     Review of Systems   Constitutional:  Negative for fever.   HENT:  Negative for sore throat.    Respiratory:  Negative for cough and shortness of breath.    Cardiovascular:  Negative for chest pain.   Gastrointestinal:  Negative for abdominal pain, diarrhea, nausea and vomiting.   Genitourinary:  Negative for dysuria.   Musculoskeletal:  Positive for myalgias (left calf).   Skin:  Positive for wound (anterior left shin).   Neurological:  Negative for weakness.   Hematological:  Does not bruise/bleed easily.     Physical Exam     Initial Vitals   BP Pulse Resp Temp SpO2   09/26/22 1539 09/26/22 1343 09/26/22 1343 09/26/22 1343 09/26/22 1343   133/72 102 18 98.4 °F (36.9 °C) 99 %      MAP       --                Physical Exam    Nursing note and vitals reviewed.  Constitutional: She appears well-developed and well-nourished. She is not diaphoretic. No distress.   HENT:   Head: Normocephalic and atraumatic.    Eyes: EOM are normal. Pupils are equal, round, and reactive to light.   Neck: Neck supple.   Normal range of motion.  Cardiovascular:  Normal rate, regular rhythm, normal heart sounds and intact distal pulses.     Exam reveals no gallop and no friction rub.       No murmur heard.  Pulmonary/Chest: Breath sounds normal. No respiratory distress. She has no wheezes. She has no rhonchi. She has no rales.   Musculoskeletal:         General: Normal range of motion.      Cervical back: Normal range of motion and neck supple.     Neurological: She is alert and oriented to person, place, and time.   Skin: Skin is warm and dry. Abrasion noted.        Psychiatric: She has a normal mood and affect. Her behavior is normal. Judgment and thought content normal.       ED Course   Procedures  Labs Reviewed   CBC W/ AUTO DIFFERENTIAL - Abnormal; Notable for the following components:       Result Value    RBC 3.68 (*)     Hemoglobin 11.2 (*)     Hematocrit 33.6 (*)     RDW 14.9 (*)     Gran # (ANC) 8.5 (*)     Lymph # 0.9 (*)     Eos # 0.6 (*)     Gran % 77.8 (*)     Lymph % 8.0 (*)     All other components within normal limits   BASIC METABOLIC PANEL - Abnormal; Notable for the following components:    Glucose 133 (*)     All other components within normal limits   MAGNESIUM          Imaging Results              US Lower Extremity Veins Left (Final result)  Result time 09/26/22 14:59:40      Final result by Sebastian Olivera MD (09/26/22 14:59:40)                   Impression:      Positive examination demonstrating left lower extremity DVT involving the popliteal, posterior tibial, anterior tibial, and peroneal veins.    COMMUNICATION  This critical result was discovered/received at 14:55.  The critical information above was relayed directly by me by telephone to Dr. Brando Beck on 09/26/2022 at 14:58.      Electronically signed by: Sebastian Olivera MD  Date:    09/26/2022  Time:    14:59               Narrative:     EXAMINATION:  US LOWER EXTREMITY VEINS LEFT    CLINICAL HISTORY:  Pain in leg, unspecified    TECHNIQUE:  Duplex and color flow Doppler evaluation and graded compression of the left lower extremity veins was performed.    COMPARISON:  None    FINDINGS:  Left thigh veins: The common femoral and femoral veins are patent.  Thrombus is present within the left popliteal vein.    Left calf veins: Thrombosis is present within the posterior tibial, anterior tibial, and peroneal veins.    Contralateral CFV: The contralateral (right) common femoral vein is patent and free of thrombus.    Miscellaneous: None                                       X-Ray Tibia Fibula 2 View Left (Final result)  Result time 09/26/22 14:46:13      Final result by Sebastian Olivera MD (09/26/22 14:46:13)                   Impression:      No acute osseous abnormality.      Electronically signed by: Sebastian Olivera MD  Date:    09/26/2022  Time:    14:46               Narrative:    EXAMINATION:  XR TIBIA FIBULA 2 VIEW LEFT    CLINICAL HISTORY:  Pain in leg, unspecified    TECHNIQUE:  AP and lateral views of the left tibia and fibula were performed.    COMPARISON:  None.    FINDINGS:  There is no fracture or dislocation.  The soft tissues are unremarkable.                                       Medications - No data to display  Medical Decision Making:   History:   Old Medical Records: I decided to obtain old medical records.  Initial Assessment:   62-year-old female presented for evaluation of leg pain.  Differential Diagnosis:   Initial differential diagnosis included but not limited to strain, abscess, cellulitis, and DVT.  Clinical Tests:   Lab Tests: Ordered and Reviewed  Radiological Study: Reviewed and Ordered  ED Management:  The patient was emergently evaluated in the emergency department, her evaluation was significant for an elderly female with abrasions noted to the anterior lower leg with mild redness around them.  There is no signs  of an abscess.  Patient does report calf pain to this leg.  The patient's labs showed no acute abnormalities.  The patient's x-ray shows no acute bony abnormalities per Radiology.  The patient's ultrasound does show  positive DVT to the region.  This is likely the etiology of her symptoms.  The patient is stable for discharge home.  She will be discharged home on p.o. Eliquis, p.o. Ultram, and topical Bactroban to apply to her abrasions.  She is referred to primary care for follow-up.        Scribe Attestation:   Scribe #1: I performed the above scribed service and the documentation accurately describes the services I performed. I attest to the accuracy of the note.               I, Dr. Navarro Beckham, personally performed the services described in this documentation. All medical record entries made by the scribe were at my direction and in my presence.  I have reviewed the chart and agree that the record reflects my personal performance and is accurate and complete. Navarro Beckham MD.  4:13 PM 09/26/2022      Clinical Impression:   Final diagnoses:  [M79.606] Leg pain  [I82.462] Acute deep vein thrombosis (DVT) of calf muscle vein of left lower extremity (Primary)      ED Disposition Condition    Discharge Stable          ED Prescriptions       Medication Sig Dispense Start Date End Date Auth. Provider    traMADoL (ULTRAM) 50 mg tablet Take 1 tablet (50 mg total) by mouth every 6 (six) hours as needed for Pain. 10 tablet 9/26/2022 -- Navarro Beckham MD    mupirocin (BACTROBAN) 2 % ointment Apply topically 3 (three) times daily. 30 g 9/26/2022 -- Navarro Beckham MD    apixaban (ELIQUIS) 5 mg Tab Take 1 tablet (5 mg total) by mouth 2 (two) times daily. 60 tablet 9/26/2022 10/26/2022 Navarro Beckham MD          Follow-up Information       Follow up With Specialties Details Why Contact Info    Samuel Brady MD Family Medicine Schedule an appointment as soon as possible for a visit   [address] [facility]  [address]  [place] [place] [address]  [phone]               Navarro Beckham MD  09/26/22 2417

## 2022-09-26 NOTE — ED NOTES
D/C instructions and Rx in pt possession along with belongings. No other needs at this time. AAOx4, ABC's intact, NADN. Pt ambulatory to ED Lobby without assistance, steady gait.

## 2022-09-27 ENCOUNTER — TELEPHONE (OUTPATIENT)
Dept: FAMILY MEDICINE | Facility: CLINIC | Age: 62
End: 2022-09-27
Payer: COMMERCIAL

## 2022-09-27 NOTE — TELEPHONE ENCOUNTER
----- Message from Leodan Macdonald sent at 9/27/2022 10:21 AM CDT -----  Type:  Patient Returning Call    Who Called:  Pt  Who Left Message for Patient:  Jes  Does the patient know what this is regarding?:  Test results   Best Call Back Number:  [phone]     Additional Information:  Please advise -- Thank you

## 2022-09-27 NOTE — TELEPHONE ENCOUNTER
ER follow up appointment scheduled for the date of 9-29-22 with BEVERLEY Thorpe. Patient agreed to appointment date, time, location, provider.

## 2022-09-27 NOTE — TELEPHONE ENCOUNTER
Patient states that she was seen in the ER with a DVT and was put on eliquis.  She states that she was told to follow up with Dr. Brady.

## 2022-09-28 NOTE — TELEPHONE ENCOUNTER
----- Message from Jes Estrada MA sent at 9/27/2022 10:35 AM CDT -----  Patient notified by phone of results and verbalized understanding.  Patient states that the diarrhea has resolved.

## 2022-09-28 NOTE — TELEPHONE ENCOUNTER
Good, as long as the potassium loss has stopped we should be able to catch-up and hopefully get her off the potassium

## 2022-09-29 ENCOUNTER — OFFICE VISIT (OUTPATIENT)
Dept: FAMILY MEDICINE | Facility: CLINIC | Age: 62
End: 2022-09-29
Payer: COMMERCIAL

## 2022-09-29 VITALS
DIASTOLIC BLOOD PRESSURE: 62 MMHG | HEART RATE: 80 BPM | TEMPERATURE: 99 F | SYSTOLIC BLOOD PRESSURE: 108 MMHG | OXYGEN SATURATION: 96 % | HEIGHT: 63 IN | BODY MASS INDEX: 17.57 KG/M2 | WEIGHT: 99.19 LBS

## 2022-09-29 DIAGNOSIS — I82.4Y2 ACUTE DEEP VEIN THROMBOSIS (DVT) OF PROXIMAL VEIN OF LEFT LOWER EXTREMITY: Primary | ICD-10-CM

## 2022-09-29 PROCEDURE — 1160F PR REVIEW ALL MEDS BY PRESCRIBER/CLIN PHARMACIST DOCUMENTED: ICD-10-PCS | Mod: CPTII,S$GLB,, | Performed by: NURSE PRACTITIONER

## 2022-09-29 PROCEDURE — 4010F PR ACE/ARB THEARPY RXD/TAKEN: ICD-10-PCS | Mod: CPTII,S$GLB,, | Performed by: NURSE PRACTITIONER

## 2022-09-29 PROCEDURE — 1159F PR MEDICATION LIST DOCUMENTED IN MEDICAL RECORD: ICD-10-PCS | Mod: CPTII,S$GLB,, | Performed by: NURSE PRACTITIONER

## 2022-09-29 PROCEDURE — 99999 PR PBB SHADOW E&M-EST. PATIENT-LVL V: CPT | Mod: PBBFAC,,, | Performed by: NURSE PRACTITIONER

## 2022-09-29 PROCEDURE — 99999 PR PBB SHADOW E&M-EST. PATIENT-LVL V: ICD-10-PCS | Mod: PBBFAC,,, | Performed by: NURSE PRACTITIONER

## 2022-09-29 PROCEDURE — 3074F PR MOST RECENT SYSTOLIC BLOOD PRESSURE < 130 MM HG: ICD-10-PCS | Mod: CPTII,S$GLB,, | Performed by: NURSE PRACTITIONER

## 2022-09-29 PROCEDURE — 3008F PR BODY MASS INDEX (BMI) DOCUMENTED: ICD-10-PCS | Mod: CPTII,S$GLB,, | Performed by: NURSE PRACTITIONER

## 2022-09-29 PROCEDURE — 3078F PR MOST RECENT DIASTOLIC BLOOD PRESSURE < 80 MM HG: ICD-10-PCS | Mod: CPTII,S$GLB,, | Performed by: NURSE PRACTITIONER

## 2022-09-29 PROCEDURE — 4010F ACE/ARB THERAPY RXD/TAKEN: CPT | Mod: CPTII,S$GLB,, | Performed by: NURSE PRACTITIONER

## 2022-09-29 PROCEDURE — 3074F SYST BP LT 130 MM HG: CPT | Mod: CPTII,S$GLB,, | Performed by: NURSE PRACTITIONER

## 2022-09-29 PROCEDURE — 1159F MED LIST DOCD IN RCRD: CPT | Mod: CPTII,S$GLB,, | Performed by: NURSE PRACTITIONER

## 2022-09-29 PROCEDURE — 99214 PR OFFICE/OUTPT VISIT, EST, LEVL IV, 30-39 MIN: ICD-10-PCS | Mod: S$GLB,,, | Performed by: NURSE PRACTITIONER

## 2022-09-29 PROCEDURE — 3078F DIAST BP <80 MM HG: CPT | Mod: CPTII,S$GLB,, | Performed by: NURSE PRACTITIONER

## 2022-09-29 PROCEDURE — 3008F BODY MASS INDEX DOCD: CPT | Mod: CPTII,S$GLB,, | Performed by: NURSE PRACTITIONER

## 2022-09-29 PROCEDURE — 99214 OFFICE O/P EST MOD 30 MIN: CPT | Mod: S$GLB,,, | Performed by: NURSE PRACTITIONER

## 2022-09-29 PROCEDURE — 1160F RVW MEDS BY RX/DR IN RCRD: CPT | Mod: CPTII,S$GLB,, | Performed by: NURSE PRACTITIONER

## 2022-09-29 RX ORDER — HYDROCODONE BITARTRATE AND ACETAMINOPHEN 5; 325 MG/1; MG/1
1 TABLET ORAL EVERY 8 HOURS PRN
Qty: 60 TABLET | Refills: 0 | Status: SHIPPED | OUTPATIENT
Start: 2022-09-29 | End: 2022-10-06 | Stop reason: SDUPTHER

## 2022-09-29 RX ORDER — HYDROCHLOROTHIAZIDE 25 MG/1
25 TABLET ORAL DAILY
COMMUNITY
End: 2022-12-29

## 2022-09-29 NOTE — PROGRESS NOTES
This dictation has been generated using Modal Fluency Dictation some phonetic errors may occur. Please contact author for clarification if needed.     Problem List Items Addressed This Visit    None  Visit Diagnoses       Acute deep vein thrombosis (DVT) of proximal vein of left lower extremity    -  Primary    Relevant Medications    HYDROcodone-acetaminophen (NORCO) 5-325 mg per tablet            Orders Placed This Encounter    HYDROcodone-acetaminophen (NORCO) 5-325 mg per tablet     Acute DVT.  Recent ER visit.  Reviewed CT results with the patient.  No red flags no chest pain or shortness of breath.  Ongoing leg pain without relief from tramadol use pain medication as above.  Discussed strict ER follow-up contact emergency services for symptoms of pulmonary emboli.    Follow up in about 2 weeks (around 10/13/2022).    ________________________________________________________________  ________________________________________________________________      Chief Complaint   Patient presents with    Hospital Follow Up     History of present illness  This 62 y.o. presents today for complaint of follow-up from ER.  Recent diagnosis with DVT and 3 veins in the lower extremity on the left.  Patient denies chest pain shortness of breath sweating.  Notes ongoing left leg pain.  Notes an abrasion on the from the leg without signs and symptoms of cellulitis.  No drainage noted.  Notes swelling has improved somewhat in leg but still painful.  She is taking blood thinner as directed.  She does ask about duration of expected pain.        Past Medical History:   Diagnosis Date    Anxiety     Digestive disorder     Flu 02/2017    [facility]    Hypertension     Rheumatoid arthritis involving multiple sites with positive rheumatoid factor 01/14/2021       Past Surgical History:   Procedure Laterality Date    COLONOSCOPY N/A 2/26/2021    Procedure: COLONOSCOPY;  Surgeon: Amy Sidhu MD;  Location: [facility];   Service: Endoscopy;  Laterality: N/A;    ENDOSCOPIC ULTRASOUND OF UPPER GASTROINTESTINAL TRACT N/A 7/1/2022    Procedure: ULTRASOUND, UPPER GI TRACT, ENDOSCOPIC;  Surgeon: Donavon Jewell III, MD;  Location: [facility] ENDO;  Service: Endoscopy;  Laterality: N/A;    ESOPHAGOGASTRODUODENOSCOPY N/A 2/26/2021    Procedure: EGD (ESOPHAGOGASTRODUODENOSCOPY);  Surgeon: Amy Sidhu MD;  Location: [facility] ENDO;  Service: Endoscopy;  Laterality: N/A;    LAPAROSCOPIC CHOLECYSTECTOMY N/A 7/27/2022    Procedure: CHOLECYSTECTOMY, LAPAROSCOPIC;  Surgeon: Ramón Hwang III, MD;  Location: [facility] OR;  Service: General;  Laterality: N/A;    TUBAL LIGATION         Family History   Problem Relation Age of Onset    Diabetes Mother     Heart disease Mother     Kidney disease Mother     Hypertension Mother     Stroke Mother     Hearing loss Mother     COPD Father     Liver disease Paternal Grandfather     Alcohol abuse Paternal Grandfather     Liver disease Sister     Emphysema Brother     COPD Brother     Sleep apnea Brother     No Known Problems Son     Alcohol abuse Paternal Grandmother     Cirrhosis Paternal Grandmother     Heart disease Maternal Aunt     Diabetes Maternal Aunt     Cancer Maternal Aunt         female    Heart disease Maternal Uncle     Hypertension Maternal Uncle     No Known Problems Paternal Uncle     Psoriasis Neg Hx     Lupus Neg Hx     Eczema Neg Hx     Melanoma Neg Hx        Social History     Socioeconomic History    Marital status:    Tobacco Use    Smoking status: Every Day     Packs/day: 1.00     Years: 7.00     Pack years: 7.00     Types: Cigarettes    Smokeless tobacco: Never    Tobacco comments:     [phone]   Substance and Sexual Activity    Alcohol use: No    Drug use: Never    Sexual activity: Yes     Partners: Male     Social Determinants of Health     Financial Resource Strain: Low Risk     Difficulty of Paying Living Expenses: Not hard at all   Food Insecurity: No Food Insecurity     Worried About Running Out of Food in the Last Year: Never true    Ran Out of Food in the Last Year: Never true   Transportation Needs: No Transportation Needs    Lack of Transportation (Medical): No    Lack of Transportation (Non-Medical): No   Physical Activity: Insufficiently Active    Days of Exercise per Week: 2 days    Minutes of Exercise per Session: 20 min   Stress: Stress Concern Present    Feeling of Stress : To some extent   Social Connections: Unknown    Frequency of Communication with Friends and Family: Twice a week    Frequency of Social Gatherings with Friends and Family: Once a week    Active Member of Clubs or Organizations: Yes    Attends Club or Organization Meetings: More than 4 times per year    Marital Status:    Housing Stability: Low Risk     Unable to Pay for Housing in the Last Year: No    Number of Places Lived in the Last Year: 1    Unstable Housing in the Last Year: No       Current Outpatient Medications   Medication Sig Dispense Refill    apixaban (ELIQUIS) 5 mg Tab Take 1 tablet (5 mg total) by mouth 2 (two) times daily. 60 tablet 0    cholecalciferol, vitamin D3, (VITAMIN D3) 10 mcg (400 unit) Tab Take 400 Units by mouth once daily.      cyclobenzaprine (FLEXERIL) 5 MG tablet Take 1 tablet (5 mg total) by mouth nightly. 30 tablet 4    folic acid (FOLVITE) 1 MG tablet Take 1 tablet (1 mg total) by mouth once daily. 360 tablet 3    hydroCHLOROthiazide (HYDRODIURIL) 25 MG tablet Take 25 mg by mouth once daily.      leflunomide (ARAVA) 20 MG Tab Take 1 tablet (20 mg total) by mouth once daily. 30 tablet 3    metoprolol succinate (TOPROL-XL) 50 MG 24 hr tablet Take 1 tablet (50 mg total) by mouth once daily. (Patient taking differently: Take 50 mg by mouth once daily. 1/2 tab qd) 90 tablet 3    mupirocin (BACTROBAN) 2 % ointment Apply topically 3 (three) times daily. 30 g 0    pantoprazole (PROTONIX) 40 MG tablet Take 1 tablet (40 mg total) by mouth once daily. 30 tablet 11     potassium chloride SA (K-DUR,KLOR-CON) 20 MEQ tablet Take two twice a day for three days then reduce to one twice a day 70 tablet 2    predniSONE (DELTASONE) 5 MG tablet Take 5 mg by mouth once daily.      sertraline (ZOLOFT) 25 MG tablet Take 1 tablet (25 mg total) by mouth once daily. 90 tablet 1    traMADoL (ULTRAM) 50 mg tablet Take 1 tablet (50 mg total) by mouth every 6 (six) hours as needed for Pain. 10 tablet 0    traZODone (DESYREL) 50 MG tablet Take 1 tablet (50 mg total) by mouth every evening. 90 tablet 1    HYDROcodone-acetaminophen (NORCO) 5-325 mg per tablet Take 1 tablet by mouth every 8 (eight) hours as needed for Pain (dvt left lower leg pain). 60 tablet 0     No current facility-administered medications for this visit.       Review of patient's allergies indicates:   Allergen Reactions    No known drug allergies        Physical examination  Vitals Reviewed\  Vitals:    09/29/22 1511   BP: 108/62   Pulse: 80   Temp: 99.3 °F (37.4 °C)     Body mass index is 17.57 kg/m². .FLOWAMB[14    Weight: 45 kg (99 lb 3.3 oz)    Gen. Well-dressed well-nourished   Skin warm dry and intact.  No rashes noted.   Neck is supple without adenopathy  Chest.  Respirations are even unlabored.  Lungs are clear to auscultation.  Cardiac regular rate and rhythm.  No chest wall adenopathy noted.  Neuro. Awake alert oriented x4.  Normal judgment and cognition noted.  Extremities no clubbing cyanosis or edema noted.  Homans positive.  For brace shins noted to the shin with minimal erythematous changes noted around to which they state has improved.  No lymphatic streaking.    Call or return to clinic prn if these symptoms worsen or fail to improve as anticipated.

## 2022-09-29 NOTE — PATIENT INSTRUCTIONS
eBnja Rangel,     If you are due for any health screening(s) below please notify me so we can arrange them to be ordered and scheduled to maintain your health. Most healthy patients complete it. Don't lose out on improving your health.     Tests to Keep You Healthy    Mammogram: ORDERED BUT NOT SCHEDULED  Colon Cancer Screening: Met on 2/26/2021  Cervical Cancer Screening: Met on 10/28/2019  Last Blood Pressure <= 139/89 (9/29/2022): Yes  Tobacco Cessation: NO      Breast Cancer Screening    Breast cancer is the second most common cancer in women after skin cancer, and the second leading cause of death from cancer after lung cancer. Mammograms can detect breast cancer early, which significantly increases the chances of curing the cancer.      A screening mammogram is an x-ray image of the breasts used for early breast cancer detection. It can help reduce the number of deaths from breast cancer among women. To get a clear image, the breast is placed between two plastic plates to make it flat. How often a mammogram is needed depends on your age and your breast cancer risk.    Although you are still overdue for this important screening, due to the COVID-19 pandemic, we recommend scheduling it in the near future.

## 2022-09-30 ENCOUNTER — TELEPHONE (OUTPATIENT)
Dept: FAMILY MEDICINE | Facility: CLINIC | Age: 62
End: 2022-09-30
Payer: COMMERCIAL

## 2022-09-30 DIAGNOSIS — I82.4Y2 ACUTE DEEP VEIN THROMBOSIS (DVT) OF PROXIMAL VEIN OF LEFT LOWER EXTREMITY: ICD-10-CM

## 2022-09-30 NOTE — TELEPHONE ENCOUNTER
----- Message from Elizabeth Hays sent at 9/30/2022  3:49 PM CDT -----  Contact: [phone]  Type: Needs Medical Advice  Who Called:  Pt     Best Call Back Number: [phone]    Additional Information: Pt is calling about herHYDROcodone-acetaminophen (NORCO) 5-325 mg per tablet. Pt states pharmacy wants a form filled out and faxed back. Pls call back and advise      [facility] [address] [place] [phone] [place]  [address] [facility] [address]  Phone: [phone] Fax: [phone]

## 2022-09-30 NOTE — TELEPHONE ENCOUNTER
Spoke with pharmacy via phone. They state that pt's Dillon needs PA in order to fill. Due to timeframe pharmacist stated that he can authorize 7 day fill now for pt but provider will need to send new prescription for anything after the 7 days. Spoke with pt via phone to inform her of 7 day fill. Pt verbalized understanding. No further questions.

## 2022-10-06 RX ORDER — HYDROCODONE BITARTRATE AND ACETAMINOPHEN 5; 325 MG/1; MG/1
1 TABLET ORAL EVERY 8 HOURS PRN
Qty: 40 TABLET | Refills: 0 | Status: SHIPPED | OUTPATIENT
Start: 2022-10-07 | End: 2022-10-13 | Stop reason: ALTCHOICE

## 2022-10-06 NOTE — TELEPHONE ENCOUNTER
Pt had an rx for 60 tablets. She has a dvt and other meds contraindicated.   Sending 40 tablets please do PA  Tell the pt that the med supply has to last until the end of the month.

## 2022-10-08 ENCOUNTER — LAB VISIT (OUTPATIENT)
Dept: LAB | Facility: HOSPITAL | Age: 62
End: 2022-10-08
Attending: INTERNAL MEDICINE
Payer: COMMERCIAL

## 2022-10-08 DIAGNOSIS — M05.79 RHEUMATOID ARTHRITIS INVOLVING MULTIPLE SITES WITH POSITIVE RHEUMATOID FACTOR: ICD-10-CM

## 2022-10-08 DIAGNOSIS — Z79.60 LONG-TERM USE OF IMMUNOSUPPRESSANT MEDICATION: ICD-10-CM

## 2022-10-08 LAB
ALBUMIN SERPL BCP-MCNC: 2.8 G/DL (ref 3.5–5.2)
ALP SERPL-CCNC: 83 U/L (ref 55–135)
ALT SERPL W/O P-5'-P-CCNC: 10 U/L (ref 10–44)
ANION GAP SERPL CALC-SCNC: 6 MMOL/L (ref 8–16)
AST SERPL-CCNC: 15 U/L (ref 10–40)
BASOPHILS # BLD AUTO: 0.15 K/UL (ref 0–0.2)
BASOPHILS NFR BLD: 2 % (ref 0–1.9)
BILIRUB SERPL-MCNC: 0.6 MG/DL (ref 0.1–1)
BUN SERPL-MCNC: 16 MG/DL (ref 8–23)
CALCIUM SERPL-MCNC: 10.5 MG/DL (ref 8.7–10.5)
CHLORIDE SERPL-SCNC: 100 MMOL/L (ref 95–110)
CO2 SERPL-SCNC: 35 MMOL/L (ref 23–29)
CREAT SERPL-MCNC: 0.8 MG/DL (ref 0.5–1.4)
CRP SERPL-MCNC: 3.19 MG/DL
DIFFERENTIAL METHOD: ABNORMAL
EOSINOPHIL # BLD AUTO: 0.6 K/UL (ref 0–0.5)
EOSINOPHIL NFR BLD: 8.3 % (ref 0–8)
ERYTHROCYTE [DISTWIDTH] IN BLOOD BY AUTOMATED COUNT: 14.8 % (ref 11.5–14.5)
ERYTHROCYTE [SEDIMENTATION RATE] IN BLOOD BY WESTERGREN METHOD: 30 MM/HR (ref 0–20)
EST. GFR  (NO RACE VARIABLE): >60 ML/MIN/1.73 M^2
GLUCOSE SERPL-MCNC: 110 MG/DL (ref 70–110)
HCT VFR BLD AUTO: 32.7 % (ref 37–48.5)
HGB BLD-MCNC: 10.2 G/DL (ref 12–16)
IMM GRANULOCYTES # BLD AUTO: 0.02 K/UL (ref 0–0.04)
IMM GRANULOCYTES NFR BLD AUTO: 0.3 % (ref 0–0.5)
LYMPHOCYTES # BLD AUTO: 1.8 K/UL (ref 1–4.8)
LYMPHOCYTES NFR BLD: 24.3 % (ref 18–48)
MCH RBC QN AUTO: 28.5 PG (ref 27–31)
MCHC RBC AUTO-ENTMCNC: 31.2 G/DL (ref 32–36)
MCV RBC AUTO: 91 FL (ref 82–98)
MONOCYTES # BLD AUTO: 0.9 K/UL (ref 0.3–1)
MONOCYTES NFR BLD: 11.2 % (ref 4–15)
NEUTROPHILS # BLD AUTO: 4.1 K/UL (ref 1.8–7.7)
NEUTROPHILS NFR BLD: 53.9 % (ref 38–73)
NRBC BLD-RTO: 0 /100 WBC
PLATELET # BLD AUTO: 516 K/UL (ref 150–450)
PMV BLD AUTO: 8.8 FL (ref 9.2–12.9)
POTASSIUM SERPL-SCNC: 3 MMOL/L (ref 3.5–5.1)
PROT SERPL-MCNC: 7.8 G/DL (ref 6–8.4)
RBC # BLD AUTO: 3.58 M/UL (ref 4–5.4)
SODIUM SERPL-SCNC: 141 MMOL/L (ref 136–145)
WBC # BLD AUTO: 7.56 K/UL (ref 3.9–12.7)

## 2022-10-08 PROCEDURE — 85025 COMPLETE CBC W/AUTO DIFF WBC: CPT | Performed by: INTERNAL MEDICINE

## 2022-10-08 PROCEDURE — 86140 C-REACTIVE PROTEIN: CPT | Performed by: INTERNAL MEDICINE

## 2022-10-08 PROCEDURE — 85651 RBC SED RATE NONAUTOMATED: CPT | Performed by: INTERNAL MEDICINE

## 2022-10-08 PROCEDURE — 36415 COLL VENOUS BLD VENIPUNCTURE: CPT | Performed by: INTERNAL MEDICINE

## 2022-10-08 PROCEDURE — 80053 COMPREHEN METABOLIC PANEL: CPT | Performed by: INTERNAL MEDICINE

## 2022-10-10 ENCOUNTER — PATIENT MESSAGE (OUTPATIENT)
Dept: ADMINISTRATIVE | Facility: HOSPITAL | Age: 62
End: 2022-10-10
Payer: COMMERCIAL

## 2022-10-13 ENCOUNTER — OFFICE VISIT (OUTPATIENT)
Dept: FAMILY MEDICINE | Facility: CLINIC | Age: 62
End: 2022-10-13
Payer: COMMERCIAL

## 2022-10-13 VITALS
BODY MASS INDEX: 16.06 KG/M2 | SYSTOLIC BLOOD PRESSURE: 114 MMHG | HEART RATE: 83 BPM | DIASTOLIC BLOOD PRESSURE: 70 MMHG | HEIGHT: 63 IN | OXYGEN SATURATION: 96 % | WEIGHT: 90.63 LBS

## 2022-10-13 DIAGNOSIS — L03.116 CELLULITIS OF LEFT LOWER EXTREMITY: ICD-10-CM

## 2022-10-13 DIAGNOSIS — I82.4Y2 ACUTE DEEP VEIN THROMBOSIS (DVT) OF PROXIMAL VEIN OF LEFT LOWER EXTREMITY: Primary | ICD-10-CM

## 2022-10-13 PROCEDURE — 3074F SYST BP LT 130 MM HG: CPT | Mod: CPTII,S$GLB,, | Performed by: NURSE PRACTITIONER

## 2022-10-13 PROCEDURE — 3078F DIAST BP <80 MM HG: CPT | Mod: CPTII,S$GLB,, | Performed by: NURSE PRACTITIONER

## 2022-10-13 PROCEDURE — 99214 PR OFFICE/OUTPT VISIT, EST, LEVL IV, 30-39 MIN: ICD-10-PCS | Mod: S$GLB,,, | Performed by: NURSE PRACTITIONER

## 2022-10-13 PROCEDURE — 99214 OFFICE O/P EST MOD 30 MIN: CPT | Mod: S$GLB,,, | Performed by: NURSE PRACTITIONER

## 2022-10-13 PROCEDURE — 1159F PR MEDICATION LIST DOCUMENTED IN MEDICAL RECORD: ICD-10-PCS | Mod: CPTII,S$GLB,, | Performed by: NURSE PRACTITIONER

## 2022-10-13 PROCEDURE — 4010F PR ACE/ARB THEARPY RXD/TAKEN: ICD-10-PCS | Mod: CPTII,S$GLB,, | Performed by: NURSE PRACTITIONER

## 2022-10-13 PROCEDURE — 1159F MED LIST DOCD IN RCRD: CPT | Mod: CPTII,S$GLB,, | Performed by: NURSE PRACTITIONER

## 2022-10-13 PROCEDURE — 3078F PR MOST RECENT DIASTOLIC BLOOD PRESSURE < 80 MM HG: ICD-10-PCS | Mod: CPTII,S$GLB,, | Performed by: NURSE PRACTITIONER

## 2022-10-13 PROCEDURE — 99999 PR PBB SHADOW E&M-EST. PATIENT-LVL IV: CPT | Mod: PBBFAC,,, | Performed by: NURSE PRACTITIONER

## 2022-10-13 PROCEDURE — 4010F ACE/ARB THERAPY RXD/TAKEN: CPT | Mod: CPTII,S$GLB,, | Performed by: NURSE PRACTITIONER

## 2022-10-13 PROCEDURE — 99999 PR PBB SHADOW E&M-EST. PATIENT-LVL IV: ICD-10-PCS | Mod: PBBFAC,,, | Performed by: NURSE PRACTITIONER

## 2022-10-13 PROCEDURE — 3074F PR MOST RECENT SYSTOLIC BLOOD PRESSURE < 130 MM HG: ICD-10-PCS | Mod: CPTII,S$GLB,, | Performed by: NURSE PRACTITIONER

## 2022-10-13 RX ORDER — SULFAMETHOXAZOLE AND TRIMETHOPRIM 800; 160 MG/1; MG/1
1 TABLET ORAL 2 TIMES DAILY
Qty: 14 TABLET | Refills: 0 | Status: SHIPPED | OUTPATIENT
Start: 2022-10-13 | End: 2022-10-20

## 2022-10-13 NOTE — PROGRESS NOTES
This dictation has been generated using Modal Fluency Dictation some phonetic errors may occur. Please contact author for clarification if needed.     Problem List Items Addressed This Visit    None  Visit Diagnoses       Acute deep vein thrombosis (DVT) of proximal vein of left lower extremity    -  Primary    Cellulitis of left lower extremity                Orders Placed This Encounter    sulfamethoxazole-trimethoprim 800-160mg (BACTRIM DS) 800-160 mg Tab    apixaban (ELIQUIS) 5 mg Tab       Cellulitis left lower leg.  Add Bactrim as above.  DVT Eliquis 6 months and discuss  LOV Acute DVT.  Recent ER visit.  Reviewed CT results with the patient.  No red flags no chest pain or shortness of breath.  Ongoing leg pain without relief from tramadol use pain medication as above.  Discussed strict ER follow-up contact emergency services for symptoms of pulmonary emboli.    Follow up in about 1 week (around 10/20/2022).    ________________________________________________________________  ________________________________________________________________      No chief complaint on file.    History of present illness  This 62 y.o. presents today for complaint of notes overall improvement in the swelling and pain in the leg.  She has some tenderness of the scan and redness around the abrasion sites.  Patient denies any drainage.  No fever chills.  No longer taking hydrocodone.  She did take a couple of tramadol recently but seems to be out at this point.  LOV follow-up from ER.  Recent diagnosis with DVT and 3 veins in the lower extremity on the left.  Patient denies chest pain shortness of breath sweating.  Notes ongoing left leg pain.  Notes an abrasion on the from the leg without signs and symptoms of cellulitis.  No drainage noted.  Notes swelling has improved somewhat in leg but still painful.  She is taking blood thinner as directed.  She does ask about duration of expected pain.        Past Medical History:   Diagnosis  Date    Anxiety     Digestive disorder     Flu 02/2017    [facility]    Hypertension     Rheumatoid arthritis involving multiple sites with positive rheumatoid factor 01/14/2021       Past Surgical History:   Procedure Laterality Date    COLONOSCOPY N/A 2/26/2021    Procedure: COLONOSCOPY;  Surgeon: Amy Sidhu MD;  Location: [facility];  Service: Endoscopy;  Laterality: N/A;    ENDOSCOPIC ULTRASOUND OF UPPER GASTROINTESTINAL TRACT N/A 7/1/2022    Procedure: ULTRASOUND, UPPER GI TRACT, ENDOSCOPIC;  Surgeon: Donavon Jewell III, MD;  Location: [facility] ENDO;  Service: Endoscopy;  Laterality: N/A;    ESOPHAGOGASTRODUODENOSCOPY N/A 2/26/2021    Procedure: EGD (ESOPHAGOGASTRODUODENOSCOPY);  Surgeon: Amy Sidhu MD;  Location: [facility];  Service: Endoscopy;  Laterality: N/A;    LAPAROSCOPIC CHOLECYSTECTOMY N/A 7/27/2022    Procedure: CHOLECYSTECTOMY, LAPAROSCOPIC;  Surgeon: Ramón Hwang III, MD;  Location: [facility] OR;  Service: General;  Laterality: N/A;    TUBAL LIGATION         Family History   Problem Relation Age of Onset    Diabetes Mother     Heart disease Mother     Kidney disease Mother     Hypertension Mother     Stroke Mother     Hearing loss Mother     COPD Father     Liver disease Paternal Grandfather     Alcohol abuse Paternal Grandfather     Liver disease Sister     Emphysema Brother     COPD Brother     Sleep apnea Brother     No Known Problems Son     Alcohol abuse Paternal Grandmother     Cirrhosis Paternal Grandmother     Heart disease Maternal Aunt     Diabetes Maternal Aunt     Cancer Maternal Aunt         female    Heart disease Maternal Uncle     Hypertension Maternal Uncle     No Known Problems Paternal Uncle     Psoriasis Neg Hx     Lupus Neg Hx     Eczema Neg Hx     Melanoma Neg Hx        Social History     Socioeconomic History    Marital status:    Tobacco Use    Smoking status: Every Day     Packs/day: 1.00     Years: 7.00     Pack years: 7.00     Types:  Cigarettes    Smokeless tobacco: Never    Tobacco comments:     [phone]   Substance and Sexual Activity    Alcohol use: No    Drug use: Never    Sexual activity: Yes     Partners: Male     Social Determinants of Health     Financial Resource Strain: Low Risk     Difficulty of Paying Living Expenses: Not hard at all   Food Insecurity: No Food Insecurity    Worried About Running Out of Food in the Last Year: Never true    Ran Out of Food in the Last Year: Never true   Transportation Needs: No Transportation Needs    Lack of Transportation (Medical): No    Lack of Transportation (Non-Medical): No   Physical Activity: Insufficiently Active    Days of Exercise per Week: 2 days    Minutes of Exercise per Session: 20 min   Stress: Stress Concern Present    Feeling of Stress : To some extent   Social Connections: Unknown    Frequency of Communication with Friends and Family: Twice a week    Frequency of Social Gatherings with Friends and Family: Once a week    Active Member of Clubs or Organizations: Yes    Attends Club or Organization Meetings: More than 4 times per year    Marital Status:    Housing Stability: Low Risk     Unable to Pay for Housing in the Last Year: No    Number of Places Lived in the Last Year: 1    Unstable Housing in the Last Year: No       Current Outpatient Medications   Medication Sig Dispense Refill    cholecalciferol, vitamin D3, (VITAMIN D3) 10 mcg (400 unit) Tab Take 400 Units by mouth once daily.      folic acid (FOLVITE) 1 MG tablet Take 1 tablet (1 mg total) by mouth once daily. 360 tablet 3    hydroCHLOROthiazide (HYDRODIURIL) 25 MG tablet Take 25 mg by mouth once daily.      metoprolol succinate (TOPROL-XL) 50 MG 24 hr tablet Take 1 tablet (50 mg total) by mouth once daily. (Patient taking differently: Take 50 mg by mouth once daily. 1/2 tab qd) 90 tablet 3    mupirocin (BACTROBAN) 2 % ointment Apply topically 3 (three) times daily. 30 g 0    pantoprazole (PROTONIX) 40 MG tablet  Take 1 tablet (40 mg total) by mouth once daily. 30 tablet 11    potassium chloride SA (K-DUR,KLOR-CON) 20 MEQ tablet Take two twice a day for three days then reduce to one twice a day 70 tablet 2    predniSONE (DELTASONE) 5 MG tablet Take 5 mg by mouth once daily.      sertraline (ZOLOFT) 25 MG tablet Take 1 tablet (25 mg total) by mouth once daily. 90 tablet 1    traMADoL (ULTRAM) 50 mg tablet Take 1 tablet (50 mg total) by mouth every 6 (six) hours as needed for Pain. 10 tablet 0    traZODone (DESYREL) 50 MG tablet Take 1 tablet (50 mg total) by mouth every evening. 90 tablet 1    apixaban (ELIQUIS) 5 mg Tab Take 1 tablet (5 mg total) by mouth 2 (two) times daily. 60 tablet 6    leflunomide (ARAVA) 20 MG Tab Take 1 tablet (20 mg total) by mouth once daily. 30 tablet 3    sulfamethoxazole-trimethoprim 800-160mg (BACTRIM DS) 800-160 mg Tab Take 1 tablet by mouth 2 (two) times daily. for 7 days 14 tablet 0     No current facility-administered medications for this visit.       Review of patient's allergies indicates:   Allergen Reactions    No known drug allergies        Physical examination  Vitals Reviewed\  Vitals:    10/13/22 0804   BP: 114/70   Pulse: 83     Body mass index is 16.05 kg/m². .FLOWAMB[14    Weight: 41.1 kg (90 lb 9.7 oz)    Gen. Well-dressed well-nourished   Skin warm dry and intact.  No rashes noted.   Neck is supple without adenopathy  Chest.  Respirations are even unlabored.  Lungs are clear to auscultation.  Cardiac regular rate and rhythm.  No chest wall adenopathy noted.  Neuro. Awake alert oriented x4.  Normal judgment and cognition noted.  Extremities no clubbing cyanosis or edema noted.  Homans positive.  For brace shins noted to the shin with minimal erythematous changes noted around to which they state has improved.  No lymphatic streaking.     Call or return to clinic prn if these symptoms worsen or fail to improve as anticipated.

## 2022-10-13 NOTE — PATIENT INSTRUCTIONS
Benja Rangel,     If you are due for any health screening(s) below please notify me so we can arrange them to be ordered and scheduled to maintain your health. Most healthy patients complete it. Don't lose out on improving your health.     Tests to Keep You Healthy    Mammogram: ORDERED BUT NOT SCHEDULED  Colon Cancer Screening: Met on 2/26/2021  Cervical Cancer Screening: Met on 10/28/2019  Last Blood Pressure <= 139/89 (10/13/2022): Yes  Tobacco Cessation: NO      Breast Cancer Screening    Breast cancer is the second most common cancer in women after skin cancer, and the second leading cause of death from cancer after lung cancer. Mammograms can detect breast cancer early, which significantly increases the chances of curing the cancer.      A screening mammogram is an x-ray image of the breasts used for early breast cancer detection. It can help reduce the number of deaths from breast cancer among women. To get a clear image, the breast is placed between two plastic plates to make it flat. How often a mammogram is needed depends on your age and your breast cancer risk.    Although you are still overdue for this important screening, due to the COVID-19 pandemic, we recommend scheduling it in the near future.

## 2022-10-17 ENCOUNTER — HOSPITAL ENCOUNTER (OUTPATIENT)
Facility: HOSPITAL | Age: 62
Discharge: HOME | End: 2022-10-19
Attending: EMERGENCY MEDICINE | Admitting: EMERGENCY MEDICINE
Payer: COMMERCIAL

## 2022-10-17 DIAGNOSIS — R00.0 TACHYCARDIA: ICD-10-CM

## 2022-10-17 DIAGNOSIS — R79.89 ELEVATED TROPONIN: Primary | ICD-10-CM

## 2022-10-17 DIAGNOSIS — R07.9 CHEST PAIN: ICD-10-CM

## 2022-10-17 DIAGNOSIS — I10 ESSENTIAL HYPERTENSION: ICD-10-CM

## 2022-10-17 DIAGNOSIS — I25.10 ASCVD (ARTERIOSCLEROTIC CARDIOVASCULAR DISEASE): ICD-10-CM

## 2022-10-17 DIAGNOSIS — K86.1 CHRONIC BILIARY PANCREATITIS: ICD-10-CM

## 2022-10-17 DIAGNOSIS — E83.42 HYPOMAGNESEMIA: ICD-10-CM

## 2022-10-17 DIAGNOSIS — E87.6 HYPOKALEMIA: ICD-10-CM

## 2022-10-17 DIAGNOSIS — R00.2 PALPITATIONS: ICD-10-CM

## 2022-10-17 DIAGNOSIS — K85.90 ACUTE PANCREATITIS, UNSPECIFIED COMPLICATION STATUS, UNSPECIFIED PANCREATITIS TYPE: ICD-10-CM

## 2022-10-17 PROBLEM — R73.9 HYPERGLYCEMIA: Status: ACTIVE | Noted: 2022-10-17

## 2022-10-17 LAB
ALBUMIN SERPL BCP-MCNC: 2.9 G/DL (ref 3.5–5.2)
ALP SERPL-CCNC: 95 U/L (ref 55–135)
ALT SERPL W/O P-5'-P-CCNC: 6 U/L (ref 10–44)
ANION GAP SERPL CALC-SCNC: 13 MMOL/L (ref 8–16)
AST SERPL-CCNC: 14 U/L (ref 10–40)
BASOPHILS # BLD AUTO: 0.11 K/UL (ref 0–0.2)
BASOPHILS NFR BLD: 1.5 % (ref 0–1.9)
BILIRUB SERPL-MCNC: 0.7 MG/DL (ref 0.1–1)
BUN SERPL-MCNC: 13 MG/DL (ref 8–23)
CALCIUM SERPL-MCNC: 10.2 MG/DL (ref 8.7–10.5)
CHLORIDE SERPL-SCNC: 91 MMOL/L (ref 95–110)
CO2 SERPL-SCNC: 29 MMOL/L (ref 23–29)
CREAT SERPL-MCNC: 0.9 MG/DL (ref 0.5–1.4)
DIFFERENTIAL METHOD: ABNORMAL
EOSINOPHIL # BLD AUTO: 0.7 K/UL (ref 0–0.5)
EOSINOPHIL NFR BLD: 9.8 % (ref 0–8)
ERYTHROCYTE [DISTWIDTH] IN BLOOD BY AUTOMATED COUNT: 15 % (ref 11.5–14.5)
EST. GFR  (NO RACE VARIABLE): >60 ML/MIN/1.73 M^2
GLUCOSE SERPL-MCNC: 174 MG/DL (ref 70–110)
HCT VFR BLD AUTO: 36.4 % (ref 37–48.5)
HGB BLD-MCNC: 12.1 G/DL (ref 12–16)
IMM GRANULOCYTES # BLD AUTO: 0.02 K/UL (ref 0–0.04)
IMM GRANULOCYTES NFR BLD AUTO: 0.3 % (ref 0–0.5)
INFLUENZA A, MOLECULAR: NEGATIVE
INFLUENZA B, MOLECULAR: NEGATIVE
LYMPHOCYTES # BLD AUTO: 1.8 K/UL (ref 1–4.8)
LYMPHOCYTES NFR BLD: 25 % (ref 18–48)
MAGNESIUM SERPL-MCNC: 1.5 MG/DL (ref 1.6–2.6)
MCH RBC QN AUTO: 29.8 PG (ref 27–31)
MCHC RBC AUTO-ENTMCNC: 33.2 G/DL (ref 32–36)
MCV RBC AUTO: 90 FL (ref 82–98)
MONOCYTES # BLD AUTO: 0.8 K/UL (ref 0.3–1)
MONOCYTES NFR BLD: 11.2 % (ref 4–15)
NEUTROPHILS # BLD AUTO: 3.7 K/UL (ref 1.8–7.7)
NEUTROPHILS NFR BLD: 52.2 % (ref 38–73)
NRBC BLD-RTO: 0 /100 WBC
PLATELET # BLD AUTO: 467 K/UL (ref 150–450)
PMV BLD AUTO: 9.5 FL (ref 9.2–12.9)
POTASSIUM SERPL-SCNC: 2.5 MMOL/L (ref 3.5–5.1)
PROT SERPL-MCNC: 7.5 G/DL (ref 6–8.4)
RBC # BLD AUTO: 4.06 M/UL (ref 4–5.4)
SARS-COV-2 RDRP RESP QL NAA+PROBE: NEGATIVE
SODIUM SERPL-SCNC: 133 MMOL/L (ref 136–145)
SPECIMEN SOURCE: NORMAL
TROPONIN I SERPL DL<=0.01 NG/ML-MCNC: 0.04 NG/ML (ref 0–0.03)
TSH SERPL DL<=0.005 MIU/L-ACNC: 2.25 UIU/ML (ref 0.4–4)
WBC # BLD AUTO: 7.16 K/UL (ref 3.9–12.7)

## 2022-10-17 PROCEDURE — G0378 HOSPITAL OBSERVATION PER HR: HCPCS

## 2022-10-17 PROCEDURE — 96376 TX/PRO/DX INJ SAME DRUG ADON: CPT

## 2022-10-17 PROCEDURE — 86803 HEPATITIS C AB TEST: CPT | Performed by: EMERGENCY MEDICINE

## 2022-10-17 PROCEDURE — 80053 COMPREHEN METABOLIC PANEL: CPT | Performed by: EMERGENCY MEDICINE

## 2022-10-17 PROCEDURE — 94761 N-INVAS EAR/PLS OXIMETRY MLT: CPT

## 2022-10-17 PROCEDURE — 93010 ELECTROCARDIOGRAM REPORT: CPT | Mod: ,,, | Performed by: SPECIALIST

## 2022-10-17 PROCEDURE — 87502 INFLUENZA DNA AMP PROBE: CPT | Performed by: EMERGENCY MEDICINE

## 2022-10-17 PROCEDURE — 63600175 PHARM REV CODE 636 W HCPCS: Performed by: EMERGENCY MEDICINE

## 2022-10-17 PROCEDURE — 96367 TX/PROPH/DG ADDL SEQ IV INF: CPT

## 2022-10-17 PROCEDURE — 96361 HYDRATE IV INFUSION ADD-ON: CPT

## 2022-10-17 PROCEDURE — 25000003 PHARM REV CODE 250: Performed by: EMERGENCY MEDICINE

## 2022-10-17 PROCEDURE — 93005 ELECTROCARDIOGRAM TRACING: CPT

## 2022-10-17 PROCEDURE — 99900035 HC TECH TIME PER 15 MIN (STAT)

## 2022-10-17 PROCEDURE — 84443 ASSAY THYROID STIM HORMONE: CPT | Performed by: EMERGENCY MEDICINE

## 2022-10-17 PROCEDURE — 87389 HIV-1 AG W/HIV-1&-2 AB AG IA: CPT | Performed by: EMERGENCY MEDICINE

## 2022-10-17 PROCEDURE — 85025 COMPLETE CBC W/AUTO DIFF WBC: CPT | Performed by: EMERGENCY MEDICINE

## 2022-10-17 PROCEDURE — 83735 ASSAY OF MAGNESIUM: CPT | Performed by: EMERGENCY MEDICINE

## 2022-10-17 PROCEDURE — 96365 THER/PROPH/DIAG IV INF INIT: CPT

## 2022-10-17 PROCEDURE — 36415 COLL VENOUS BLD VENIPUNCTURE: CPT | Performed by: EMERGENCY MEDICINE

## 2022-10-17 PROCEDURE — 84484 ASSAY OF TROPONIN QUANT: CPT | Performed by: NURSE PRACTITIONER

## 2022-10-17 PROCEDURE — 96366 THER/PROPH/DIAG IV INF ADDON: CPT

## 2022-10-17 PROCEDURE — 93010 EKG 12-LEAD: ICD-10-PCS | Mod: ,,, | Performed by: SPECIALIST

## 2022-10-17 PROCEDURE — U0002 COVID-19 LAB TEST NON-CDC: HCPCS | Performed by: EMERGENCY MEDICINE

## 2022-10-17 PROCEDURE — 99285 EMERGENCY DEPT VISIT HI MDM: CPT | Mod: 25

## 2022-10-17 PROCEDURE — 36415 COLL VENOUS BLD VENIPUNCTURE: CPT | Performed by: NURSE PRACTITIONER

## 2022-10-17 PROCEDURE — 25000003 PHARM REV CODE 250: Performed by: NURSE PRACTITIONER

## 2022-10-17 PROCEDURE — 63600175 PHARM REV CODE 636 W HCPCS: Performed by: NURSE PRACTITIONER

## 2022-10-17 PROCEDURE — 84484 ASSAY OF TROPONIN QUANT: CPT | Mod: 91 | Performed by: EMERGENCY MEDICINE

## 2022-10-17 RX ORDER — SERTRALINE HYDROCHLORIDE 25 MG/1
25 TABLET, FILM COATED ORAL DAILY
Status: DISCONTINUED | OUTPATIENT
Start: 2022-10-18 | End: 2022-10-19 | Stop reason: HOSPADM

## 2022-10-17 RX ORDER — ACETAMINOPHEN 325 MG/1
650 TABLET ORAL EVERY 4 HOURS PRN
Status: DISCONTINUED | OUTPATIENT
Start: 2022-10-17 | End: 2022-10-19 | Stop reason: HOSPADM

## 2022-10-17 RX ORDER — SODIUM,POTASSIUM PHOSPHATES 280-250MG
2 POWDER IN PACKET (EA) ORAL
Status: DISCONTINUED | OUTPATIENT
Start: 2022-10-17 | End: 2022-10-19 | Stop reason: HOSPADM

## 2022-10-17 RX ORDER — IBUPROFEN 200 MG
16 TABLET ORAL
Status: DISCONTINUED | OUTPATIENT
Start: 2022-10-17 | End: 2022-10-19 | Stop reason: HOSPADM

## 2022-10-17 RX ORDER — MAG HYDROX/ALUMINUM HYD/SIMETH 200-200-20
30 SUSPENSION, ORAL (FINAL DOSE FORM) ORAL 4 TIMES DAILY PRN
Status: DISCONTINUED | OUTPATIENT
Start: 2022-10-17 | End: 2022-10-19 | Stop reason: HOSPADM

## 2022-10-17 RX ORDER — SIMETHICONE 80 MG
1 TABLET,CHEWABLE ORAL 4 TIMES DAILY PRN
Status: DISCONTINUED | OUTPATIENT
Start: 2022-10-17 | End: 2022-10-19 | Stop reason: HOSPADM

## 2022-10-17 RX ORDER — POTASSIUM CHLORIDE 7.45 MG/ML
10 INJECTION INTRAVENOUS
Status: DISPENSED | OUTPATIENT
Start: 2022-10-17 | End: 2022-10-17

## 2022-10-17 RX ORDER — HYDROCHLOROTHIAZIDE 25 MG/1
25 TABLET ORAL DAILY
Status: DISCONTINUED | OUTPATIENT
Start: 2022-10-18 | End: 2022-10-19

## 2022-10-17 RX ORDER — INSULIN ASPART 100 [IU]/ML
1-10 INJECTION, SOLUTION INTRAVENOUS; SUBCUTANEOUS
Status: DISCONTINUED | OUTPATIENT
Start: 2022-10-17 | End: 2022-10-19 | Stop reason: HOSPADM

## 2022-10-17 RX ORDER — PANTOPRAZOLE SODIUM 40 MG/1
40 TABLET, DELAYED RELEASE ORAL DAILY
Status: DISCONTINUED | OUTPATIENT
Start: 2022-10-18 | End: 2022-10-19 | Stop reason: HOSPADM

## 2022-10-17 RX ORDER — POTASSIUM CHLORIDE 20 MEQ/1
40 TABLET, EXTENDED RELEASE ORAL
Status: COMPLETED | OUTPATIENT
Start: 2022-10-17 | End: 2022-10-17

## 2022-10-17 RX ORDER — NALOXONE HCL 0.4 MG/ML
0.02 VIAL (ML) INJECTION
Status: DISCONTINUED | OUTPATIENT
Start: 2022-10-17 | End: 2022-10-19 | Stop reason: HOSPADM

## 2022-10-17 RX ORDER — SODIUM CHLORIDE 9 MG/ML
INJECTION, SOLUTION INTRAVENOUS
Status: COMPLETED | OUTPATIENT
Start: 2022-10-17 | End: 2022-10-17

## 2022-10-17 RX ORDER — ACETAMINOPHEN 325 MG/1
650 TABLET ORAL EVERY 6 HOURS PRN
Status: DISCONTINUED | OUTPATIENT
Start: 2022-10-17 | End: 2022-10-19 | Stop reason: HOSPADM

## 2022-10-17 RX ORDER — TALC
9 POWDER (GRAM) TOPICAL NIGHTLY PRN
Status: DISCONTINUED | OUTPATIENT
Start: 2022-10-17 | End: 2022-10-19 | Stop reason: HOSPADM

## 2022-10-17 RX ORDER — LANOLIN ALCOHOL/MO/W.PET/CERES
800 CREAM (GRAM) TOPICAL
Status: DISCONTINUED | OUTPATIENT
Start: 2022-10-17 | End: 2022-10-19 | Stop reason: HOSPADM

## 2022-10-17 RX ORDER — IBUPROFEN 200 MG
24 TABLET ORAL
Status: DISCONTINUED | OUTPATIENT
Start: 2022-10-17 | End: 2022-10-19 | Stop reason: HOSPADM

## 2022-10-17 RX ORDER — TRAZODONE HYDROCHLORIDE 50 MG/1
50 TABLET ORAL NIGHTLY
Status: DISCONTINUED | OUTPATIENT
Start: 2022-10-17 | End: 2022-10-19 | Stop reason: HOSPADM

## 2022-10-17 RX ORDER — HYDROCODONE BITARTRATE AND ACETAMINOPHEN 5; 325 MG/1; MG/1
1 TABLET ORAL EVERY 6 HOURS PRN
Status: DISCONTINUED | OUTPATIENT
Start: 2022-10-17 | End: 2022-10-19 | Stop reason: HOSPADM

## 2022-10-17 RX ORDER — AMOXICILLIN 250 MG
1 CAPSULE ORAL 2 TIMES DAILY
Status: DISCONTINUED | OUTPATIENT
Start: 2022-10-17 | End: 2022-10-19 | Stop reason: HOSPADM

## 2022-10-17 RX ORDER — IPRATROPIUM BROMIDE AND ALBUTEROL SULFATE 2.5; .5 MG/3ML; MG/3ML
3 SOLUTION RESPIRATORY (INHALATION) EVERY 4 HOURS PRN
Status: DISCONTINUED | OUTPATIENT
Start: 2022-10-17 | End: 2022-10-19 | Stop reason: HOSPADM

## 2022-10-17 RX ORDER — SODIUM CHLORIDE 0.9 % (FLUSH) 0.9 %
10 SYRINGE (ML) INJECTION EVERY 8 HOURS PRN
Status: DISCONTINUED | OUTPATIENT
Start: 2022-10-17 | End: 2022-10-19 | Stop reason: HOSPADM

## 2022-10-17 RX ORDER — POTASSIUM CHLORIDE 7.45 MG/ML
10 INJECTION INTRAVENOUS
Status: COMPLETED | OUTPATIENT
Start: 2022-10-17 | End: 2022-10-18

## 2022-10-17 RX ORDER — ONDANSETRON 2 MG/ML
4 INJECTION INTRAMUSCULAR; INTRAVENOUS EVERY 8 HOURS PRN
Status: DISCONTINUED | OUTPATIENT
Start: 2022-10-17 | End: 2022-10-19 | Stop reason: HOSPADM

## 2022-10-17 RX ORDER — METOPROLOL SUCCINATE 25 MG/1
25 TABLET, EXTENDED RELEASE ORAL DAILY
Status: DISCONTINUED | OUTPATIENT
Start: 2022-10-18 | End: 2022-10-19 | Stop reason: HOSPADM

## 2022-10-17 RX ORDER — MAGNESIUM SULFATE HEPTAHYDRATE 40 MG/ML
2 INJECTION, SOLUTION INTRAVENOUS
Status: COMPLETED | OUTPATIENT
Start: 2022-10-17 | End: 2022-10-17

## 2022-10-17 RX ORDER — GLUCAGON 1 MG
1 KIT INJECTION
Status: DISCONTINUED | OUTPATIENT
Start: 2022-10-17 | End: 2022-10-19 | Stop reason: HOSPADM

## 2022-10-17 RX ADMIN — APIXABAN 5 MG: 2.5 TABLET, FILM COATED ORAL at 10:10

## 2022-10-17 RX ADMIN — SODIUM CHLORIDE: 0.9 INJECTION, SOLUTION INTRAVENOUS at 06:10

## 2022-10-17 RX ADMIN — POTASSIUM CHLORIDE 10 MEQ: 7.46 INJECTION, SOLUTION INTRAVENOUS at 06:10

## 2022-10-17 RX ADMIN — POTASSIUM CHLORIDE 10 MEQ: 7.46 INJECTION, SOLUTION INTRAVENOUS at 11:10

## 2022-10-17 RX ADMIN — TRAZODONE HYDROCHLORIDE 50 MG: 50 TABLET ORAL at 10:10

## 2022-10-17 RX ADMIN — MAGNESIUM SULFATE 2 G: 2 INJECTION INTRAVENOUS at 07:10

## 2022-10-17 RX ADMIN — POTASSIUM CHLORIDE 10 MEQ: 7.46 INJECTION, SOLUTION INTRAVENOUS at 10:10

## 2022-10-17 RX ADMIN — POTASSIUM CHLORIDE 40 MEQ: 1500 TABLET, EXTENDED RELEASE ORAL at 06:10

## 2022-10-17 NOTE — ED NOTES
Pt identifiers checked and accurate with Claire Bowser    Pt presents to ED with complaints of decreased appetite, food intolerance, nausea and intermittent chest pain. Pt reports appetite decrease and nausea while eating x several weeks with excessive weight loss since March.

## 2022-10-17 NOTE — ED PROVIDER NOTES
Encounter Date: 10/17/2022    SCRIBE #1 NOTE: I, Olivia Cortez, am scribing for, and in the presence of,  Eamon Soni MD.     History     Chief Complaint   Patient presents with    Nausea     Started last week    Shortness of Breath     Recent DVT left leg      Time seen by provider: 5:45 PM on 10/17/2022    Claire Bowser is a 62 y.o. female who presents to the ED with an onset of nausea and SOB on exertion for 1 week, as well as a few brief episodes of chest pain that woke her up last night. Patient endorses decreased appetite and fluid intake over the last week as well. She denies known sick contact. The patient denies vomiting, fever or any other symptoms at this time. Patient has a PMHx of HTN, rheumatoid arthritis, anxiety and recent LLE DVT on Eliquis.    The history is provided by the patient.   Review of patient's allergies indicates:   Allergen Reactions    No known drug allergies      Past Medical History:   Diagnosis Date    Anxiety     Digestive disorder     Flu 02/2017    [facility]    Hypertension     Rheumatoid arthritis involving multiple sites with positive rheumatoid factor 01/14/2021     Past Surgical History:   Procedure Laterality Date    COLONOSCOPY N/A 2/26/2021    Procedure: COLONOSCOPY;  Surgeon: Aym Sidhu MD;  Location: [facility] ENDO;  Service: Endoscopy;  Laterality: N/A;    ENDOSCOPIC ULTRASOUND OF UPPER GASTROINTESTINAL TRACT N/A 7/1/2022    Procedure: ULTRASOUND, UPPER GI TRACT, ENDOSCOPIC;  Surgeon: Donavon Jewell III, MD;  Location: [facility] ENDO;  Service: Endoscopy;  Laterality: N/A;    ESOPHAGOGASTRODUODENOSCOPY N/A 2/26/2021    Procedure: EGD (ESOPHAGOGASTRODUODENOSCOPY);  Surgeon: Amy Sidhu MD;  Location: [facility];  Service: Endoscopy;  Laterality: N/A;    LAPAROSCOPIC CHOLECYSTECTOMY N/A 7/27/2022    Procedure: CHOLECYSTECTOMY, LAPAROSCOPIC;  Surgeon: Ramón Hwang III, MD;  Location: [facility] OR;  Service: General;  Laterality: N/A;    TUBAL  LIGATION       Family History   Problem Relation Age of Onset    Diabetes Mother     Heart disease Mother     Kidney disease Mother     Hypertension Mother     Stroke Mother     Hearing loss Mother     COPD Father     Liver disease Paternal Grandfather     Alcohol abuse Paternal Grandfather     Liver disease Sister     Emphysema Brother     COPD Brother     Sleep apnea Brother     No Known Problems Son     Alcohol abuse Paternal Grandmother     Cirrhosis Paternal Grandmother     Heart disease Maternal Aunt     Diabetes Maternal Aunt     Cancer Maternal Aunt         female    Heart disease Maternal Uncle     Hypertension Maternal Uncle     No Known Problems Paternal Uncle     Psoriasis Neg Hx     Lupus Neg Hx     Eczema Neg Hx     Melanoma Neg Hx      Social History     Tobacco Use    Smoking status: Every Day     Packs/day: 1.00     Years: 7.00     Pack years: 7.00     Types: Cigarettes    Smokeless tobacco: Never    Tobacco comments:     [phone]   Substance Use Topics    Alcohol use: No    Drug use: Never     Review of Systems   Constitutional:  Negative for activity change, appetite change, chills, fatigue and fever.   Eyes:  Negative for visual disturbance.   Respiratory:  Positive for shortness of breath. Negative for apnea.    Cardiovascular:  Positive for chest pain. Negative for palpitations.   Gastrointestinal:  Positive for nausea. Negative for abdominal distention, abdominal pain and vomiting.   Genitourinary:  Negative for difficulty urinating.   Musculoskeletal:  Negative for neck pain.   Skin:  Negative for pallor and rash.   Neurological:  Negative for headaches.   Hematological:  Bruises/bleeds easily.   Psychiatric/Behavioral:  Negative for agitation.      Physical Exam     Initial Vitals [10/17/22 1732]   BP Pulse Resp Temp SpO2   (!) 140/79 (!) 123 20 98.2 °F (36.8 °C) 98 %      MAP       --         Physical Exam    Nursing note and vitals reviewed.  Constitutional: She appears well-developed  and well-nourished.   HENT:   Head: Normocephalic and atraumatic.   Eyes: Conjunctivae are normal.   Neck: Neck supple.   Normal range of motion.  Cardiovascular:  Normal rate, regular rhythm and normal heart sounds.     Exam reveals no gallop and no friction rub.       No murmur heard.  Pulmonary/Chest: Effort normal and breath sounds normal. No respiratory distress. She has no wheezes. She has no rhonchi. She has no rales.   Abdominal: Abdomen is soft. She exhibits no distension. There is no abdominal tenderness.   Musculoskeletal:         General: Normal range of motion.      Cervical back: Normal range of motion and neck supple.     Neurological: She is alert and oriented to person, place, and time.   Skin: Skin is warm and dry. No erythema.   Psychiatric: She has a normal mood and affect.         ED Course   Procedures  Labs Reviewed   CBC W/ AUTO DIFFERENTIAL - Abnormal; Notable for the following components:       Result Value    Hematocrit 36.4 (*)     RDW 15.0 (*)     Platelets 467 (*)     Eos # 0.7 (*)     Eosinophil % 9.8 (*)     All other components within normal limits   COMPREHENSIVE METABOLIC PANEL - Abnormal; Notable for the following components:    Sodium 133 (*)     Potassium 2.5 (*)     Chloride 91 (*)     Glucose 174 (*)     Albumin 2.9 (*)     ALT 6 (*)     All other components within normal limits    Narrative:      POTASSIUM  critical result(s) called and verbal readback obtained   from SARKIS HUERTA by TN3 10/17/2022 18:24   MAGNESIUM - Abnormal; Notable for the following components:    Magnesium 1.5 (*)     All other components within normal limits   TROPONIN I - Abnormal; Notable for the following components:    Troponin I 0.044 (*)     All other components within normal limits   INFLUENZA A & B BY MOLECULAR   SARS-COV-2 RNA AMPLIFICATION, QUAL   TSH   HIV 1 / 2 ANTIBODY   HEPATITIS C ANTIBODY   URINALYSIS, REFLEX TO URINE CULTURE     EKG Readings: (Independently Interpreted)   Rhythm:  Normal Sinus Rhythm. Heart Rate: 96. Ectopy: No Ectopy. Conduction: Normal. ST Segments: Normal ST Segments. ST Segment Depression: II, III, V2, V1, V3, V4, V5, V6 and AVF. T Waves: Normal. Clinical Impression: Normal Sinus Rhythm     Imaging Results    None          Medications   potassium chloride 10 mEq in 100 mL IVPB (0 mEq Intravenous Stopped 10/17/22 1952)   magnesium sulfate 2g in water 50mL IVPB (premix) (2 g Intravenous New Bag 10/17/22 1921)   apixaban tablet 5 mg (has no administration in time range)   hydroCHLOROthiazide tablet 25 mg (has no administration in time range)   pantoprazole EC tablet 40 mg (has no administration in time range)   sertraline tablet 25 mg (has no administration in time range)   traZODone tablet 50 mg (has no administration in time range)   metoprolol succinate (TOPROL-XL) 24 hr tablet 25 mg (has no administration in time range)   0.9%  NaCl infusion (0 mL/hr Intravenous Stopped 10/17/22 1922)   potassium chloride SA CR tablet 40 mEq (40 mEq Oral Given 10/17/22 1835)     Medical Decision Making:   History:   Old Medical Records: I decided to obtain old medical records.  Independently Interpreted Test(s):   I have ordered and independently interpreted EKG Reading(s) - see prior notes  Clinical Tests:   Lab Tests: Ordered and Reviewed  Medical Tests: Ordered and Reviewed  ED Management:  Claire Bowser is a 62 y.o. female who presents to the ED with an onset of nausea and SOB on exertion for 1 week, as well as a few brief episodes of chest pain that woke her up last night.  She has an elevated troponin concerning for possible non STEMI.  She also has a markedly lower potassium (2.5 as well as mild hypomagnesemia.  She will be admitted for electrolyte repletion and serial enzymes.     APC / Resident Notes:   I, Dr. Eamon Soni III, personally performed the services described in this documentation. All medical record entries made by the scribe were at my direction and in my  presence.  I have reviewed the chart and agree that the record reflects my personal performance and is accurate and complete   Scribe Attestation:   Scribe #1: I performed the above scribed service and the documentation accurately describes the services I performed. I attest to the accuracy of the note.                   Clinical Impression:   Final diagnoses:  [R00.0] Tachycardia  [R77.8] Elevated troponin (Primary)  [E87.6] Hypokalemia  [E83.42] Hypomagnesemia      ED Disposition Condition    Admit Stable                Eamon Soni III, MD  10/17/22 2025       Eamon Soni III, MD  10/17/22 2026

## 2022-10-18 PROBLEM — I82.409 ACUTE DVT (DEEP VENOUS THROMBOSIS): Status: ACTIVE | Noted: 2022-10-18

## 2022-10-18 PROBLEM — R63.4 WEIGHT LOSS, UNINTENTIONAL: Status: ACTIVE | Noted: 2022-10-18

## 2022-10-18 LAB
ALBUMIN SERPL BCP-MCNC: 2.3 G/DL (ref 3.5–5.2)
ALP SERPL-CCNC: 77 U/L (ref 55–135)
ALT SERPL W/O P-5'-P-CCNC: <5 U/L (ref 10–44)
ANION GAP SERPL CALC-SCNC: 8 MMOL/L (ref 8–16)
AST SERPL-CCNC: 16 U/L (ref 10–40)
BASOPHILS # BLD AUTO: 0.09 K/UL (ref 0–0.2)
BASOPHILS NFR BLD: 1.9 % (ref 0–1.9)
BILIRUB SERPL-MCNC: 0.8 MG/DL (ref 0.1–1)
BILIRUB UR QL STRIP: NEGATIVE
BUN SERPL-MCNC: 9 MG/DL (ref 8–23)
CALCIUM SERPL-MCNC: 8.7 MG/DL (ref 8.7–10.5)
CANCER AG19-9 SERPL-ACNC: 44.6 U/ML (ref 0–40)
CHLORIDE SERPL-SCNC: 106 MMOL/L (ref 95–110)
CLARITY UR: CLEAR
CO2 SERPL-SCNC: 24 MMOL/L (ref 23–29)
COLOR UR: YELLOW
CREAT SERPL-MCNC: 0.6 MG/DL (ref 0.5–1.4)
D DIMER PPP IA.FEU-MCNC: 9.61 MG/L FEU
DIFFERENTIAL METHOD: ABNORMAL
EOSINOPHIL # BLD AUTO: 0.6 K/UL (ref 0–0.5)
EOSINOPHIL NFR BLD: 12.7 % (ref 0–8)
ERYTHROCYTE [DISTWIDTH] IN BLOOD BY AUTOMATED COUNT: 15 % (ref 11.5–14.5)
EST. GFR  (NO RACE VARIABLE): >60 ML/MIN/1.73 M^2
ESTIMATED AVG GLUCOSE: 94 MG/DL (ref 68–131)
GLUCOSE SERPL-MCNC: 87 MG/DL (ref 70–110)
GLUCOSE UR QL STRIP: NEGATIVE
HBA1C MFR BLD: 4.9 % (ref 4–5.6)
HCT VFR BLD AUTO: 27.4 % (ref 37–48.5)
HCV AB SERPL QL IA: NORMAL
HGB BLD-MCNC: 9.4 G/DL (ref 12–16)
HGB UR QL STRIP: NEGATIVE
HIV 1+2 AB+HIV1 P24 AG SERPL QL IA: NORMAL
IMM GRANULOCYTES # BLD AUTO: 0.02 K/UL (ref 0–0.04)
IMM GRANULOCYTES NFR BLD AUTO: 0.4 % (ref 0–0.5)
KETONES UR QL STRIP: NEGATIVE
LEUKOCYTE ESTERASE UR QL STRIP: NEGATIVE
LIPASE SERPL-CCNC: 438 U/L (ref 4–60)
LYMPHOCYTES # BLD AUTO: 1.3 K/UL (ref 1–4.8)
LYMPHOCYTES NFR BLD: 26 % (ref 18–48)
MAGNESIUM SERPL-MCNC: 2.1 MG/DL (ref 1.6–2.6)
MCH RBC QN AUTO: 30.4 PG (ref 27–31)
MCHC RBC AUTO-ENTMCNC: 34.3 G/DL (ref 32–36)
MCV RBC AUTO: 89 FL (ref 82–98)
MONOCYTES # BLD AUTO: 0.6 K/UL (ref 0.3–1)
MONOCYTES NFR BLD: 11.6 % (ref 4–15)
NEUTROPHILS # BLD AUTO: 2.3 K/UL (ref 1.8–7.7)
NEUTROPHILS NFR BLD: 47.4 % (ref 38–73)
NITRITE UR QL STRIP: NEGATIVE
NRBC BLD-RTO: 0 /100 WBC
PH UR STRIP: 8 [PH] (ref 5–8)
PLATELET # BLD AUTO: 345 K/UL (ref 150–450)
PMV BLD AUTO: 9.3 FL (ref 9.2–12.9)
POCT GLUCOSE: 116 MG/DL (ref 70–110)
POCT GLUCOSE: 83 MG/DL (ref 70–110)
POCT GLUCOSE: 87 MG/DL (ref 70–110)
POTASSIUM SERPL-SCNC: 3.8 MMOL/L (ref 3.5–5.1)
PROT SERPL-MCNC: 5.8 G/DL (ref 6–8.4)
PROT UR QL STRIP: NEGATIVE
RBC # BLD AUTO: 3.09 M/UL (ref 4–5.4)
SODIUM SERPL-SCNC: 138 MMOL/L (ref 136–145)
SP GR UR STRIP: 1.01 (ref 1–1.03)
TROPONIN I SERPL DL<=0.01 NG/ML-MCNC: 0.04 NG/ML (ref 0–0.03)
TROPONIN I SERPL DL<=0.01 NG/ML-MCNC: 0.04 NG/ML (ref 0–0.03)
URN SPEC COLLECT METH UR: NORMAL
UROBILINOGEN UR STRIP-ACNC: NEGATIVE EU/DL
WBC # BLD AUTO: 4.81 K/UL (ref 3.9–12.7)

## 2022-10-18 PROCEDURE — G0378 HOSPITAL OBSERVATION PER HR: HCPCS

## 2022-10-18 PROCEDURE — 85025 COMPLETE CBC W/AUTO DIFF WBC: CPT | Performed by: NURSE PRACTITIONER

## 2022-10-18 PROCEDURE — 99406 BEHAV CHNG SMOKING 3-10 MIN: CPT

## 2022-10-18 PROCEDURE — 25000003 PHARM REV CODE 250: Performed by: SPECIALIST

## 2022-10-18 PROCEDURE — 81003 URINALYSIS AUTO W/O SCOPE: CPT | Performed by: EMERGENCY MEDICINE

## 2022-10-18 PROCEDURE — 97161 PT EVAL LOW COMPLEX 20 MIN: CPT

## 2022-10-18 PROCEDURE — 96376 TX/PRO/DX INJ SAME DRUG ADON: CPT

## 2022-10-18 PROCEDURE — 25500020 PHARM REV CODE 255

## 2022-10-18 PROCEDURE — 85379 FIBRIN DEGRADATION QUANT: CPT | Performed by: SPECIALIST

## 2022-10-18 PROCEDURE — 99220 PR INITIAL OBSERVATION CARE,LEVL III: ICD-10-PCS | Mod: 25,,, | Performed by: SPECIALIST

## 2022-10-18 PROCEDURE — 84484 ASSAY OF TROPONIN QUANT: CPT | Performed by: NURSE PRACTITIONER

## 2022-10-18 PROCEDURE — 99220 PR INITIAL OBSERVATION CARE,LEVL III: CPT | Mod: 25,,, | Performed by: SPECIALIST

## 2022-10-18 PROCEDURE — 97165 OT EVAL LOW COMPLEX 30 MIN: CPT

## 2022-10-18 PROCEDURE — 80053 COMPREHEN METABOLIC PANEL: CPT | Performed by: NURSE PRACTITIONER

## 2022-10-18 PROCEDURE — 83690 ASSAY OF LIPASE: CPT | Performed by: SPECIALIST

## 2022-10-18 PROCEDURE — 83036 HEMOGLOBIN GLYCOSYLATED A1C: CPT | Performed by: NURSE PRACTITIONER

## 2022-10-18 PROCEDURE — 36415 COLL VENOUS BLD VENIPUNCTURE: CPT | Performed by: NURSE PRACTITIONER

## 2022-10-18 PROCEDURE — 86301 IMMUNOASSAY TUMOR CA 19-9: CPT | Performed by: SPECIALIST

## 2022-10-18 PROCEDURE — 63600175 PHARM REV CODE 636 W HCPCS: Performed by: NURSE PRACTITIONER

## 2022-10-18 PROCEDURE — 25000003 PHARM REV CODE 250: Performed by: NURSE PRACTITIONER

## 2022-10-18 PROCEDURE — 83735 ASSAY OF MAGNESIUM: CPT | Performed by: NURSE PRACTITIONER

## 2022-10-18 PROCEDURE — 94761 N-INVAS EAR/PLS OXIMETRY MLT: CPT

## 2022-10-18 PROCEDURE — 99900035 HC TECH TIME PER 15 MIN (STAT)

## 2022-10-18 PROCEDURE — 36415 COLL VENOUS BLD VENIPUNCTURE: CPT | Performed by: SPECIALIST

## 2022-10-18 RX ORDER — ASPIRIN 81 MG/1
81 TABLET ORAL DAILY
Status: DISCONTINUED | OUTPATIENT
Start: 2022-10-18 | End: 2022-10-19 | Stop reason: HOSPADM

## 2022-10-18 RX ADMIN — APIXABAN 5 MG: 2.5 TABLET, FILM COATED ORAL at 10:10

## 2022-10-18 RX ADMIN — ASPIRIN 81 MG: 81 TABLET, COATED ORAL at 10:10

## 2022-10-18 RX ADMIN — TRAZODONE HYDROCHLORIDE 50 MG: 50 TABLET ORAL at 08:10

## 2022-10-18 RX ADMIN — PANTOPRAZOLE SODIUM 40 MG: 40 TABLET, DELAYED RELEASE ORAL at 10:10

## 2022-10-18 RX ADMIN — POTASSIUM CHLORIDE 10 MEQ: 7.46 INJECTION, SOLUTION INTRAVENOUS at 01:10

## 2022-10-18 RX ADMIN — APIXABAN 5 MG: 2.5 TABLET, FILM COATED ORAL at 08:10

## 2022-10-18 RX ADMIN — POTASSIUM CHLORIDE 10 MEQ: 7.46 INJECTION, SOLUTION INTRAVENOUS at 12:10

## 2022-10-18 RX ADMIN — METOPROLOL SUCCINATE 25 MG: 25 TABLET, EXTENDED RELEASE ORAL at 10:10

## 2022-10-18 RX ADMIN — SERTRALINE 25 MG: 25 TABLET, FILM COATED ORAL at 10:10

## 2022-10-18 RX ADMIN — IOHEXOL 75 ML: 350 INJECTION, SOLUTION INTRAVENOUS at 02:10

## 2022-10-18 NOTE — ASSESSMENT & PLAN NOTE
Acute problem  No documented history of diabetes   Likely secondary to prednisone use for RA   Will cover with sliding scale  Hemoglobin A1c pending

## 2022-10-18 NOTE — PROGRESS NOTES
[facility] Hospital Medicine  Progress Note    Patient Name: Claire Bowser  MRN: [id number]  Patient Class: OP- Observation   Admission Date: 10/17/2022  Length of Stay: 0 days  Attending Physician: Genny Tucker MD  Primary Care Provider: Samuel Brady MD        Subjective:     Principal Problem:Chest pain        HPI:  Claire Bowser is a 62-year-old female who presents emergency room complaining of generalized weakness, exertional dyspnea, left-sided chest pain, and nausea which onset approximately 1 week ago.  She denies any fever or chills.  No known sick contacts or travel.  She is vaccinated for COVID.  Shortness of breath is minimally relieved with rest and worsens with exertion.  Previous medical history includes pancreatitis, anxiety, hypertension, hyperlipidemia, rheumatoid arthritis, DVT, active smoker.  ER workup:  CBC unremarkable.  CMP with sodium of 133, potassium of 2.5, glucose of 174? ? , albumin of 2.9, magnesium decreased at 1.5.  Troponin mildly elevated 0.044.  EKG demonstrated ST Segment Depression: II, III, V2, V1, V3, V4, V5, V6 and AVF.  Patient was given magnesium and potassium in ER.  Patient admitted to Hospital Medicine for treatment management.  Will replete electrolytes p.r.n., trend troponin, and cardiac consult in a.m..      Overview/Hospital Course:  No notes on file    Interval History:  Notes reviewed, no acute events overnight.  Patient resting comfortably in bed and denies acute pain at this time. Pt reports cp is int, sharp quality, left ant cw, worsens with exertion and assoc with sob. Pt reports unintentional weight loss over the past 6 months.  She reports  intermittent nausea and decreased appetite.  Cardiology at bedside and plans for nuclear stress test in the a.m. due to equipment malfunctioning today.  Will also obtain CT chest abdomen pelvis to assess for unintentional weight loss.  Patient verbalized understanding and appreciative of care.   Will continue to monitor closely.    Review of Systems   Constitutional:  Positive for activity change, appetite change and unexpected weight change. Negative for chills, diaphoresis and fever.   HENT:  Negative for congestion, nosebleeds and tinnitus.    Eyes:  Negative for photophobia and visual disturbance.   Respiratory:  Positive for shortness of breath. Negative for cough, chest tightness and wheezing.    Cardiovascular:  Positive for chest pain. Negative for palpitations and leg swelling.   Gastrointestinal:  Positive for nausea. Negative for abdominal distention, abdominal pain, constipation, diarrhea and vomiting.   Endocrine: Negative for cold intolerance and heat intolerance.   Genitourinary:  Negative for difficulty urinating, dysuria, frequency, hematuria and urgency.   Musculoskeletal:  Negative for arthralgias, back pain and myalgias.   Skin:  Negative for pallor, rash and wound.   Allergic/Immunologic: Negative for immunocompromised state.   Neurological:  Positive for weakness. Negative for dizziness, tremors, facial asymmetry and speech difficulty.   Hematological:  Negative for adenopathy. Does not bruise/bleed easily.   Psychiatric/Behavioral:  Negative for confusion and sleep disturbance. The patient is not nervous/anxious.    All other systems reviewed and are negative.  Objective:     Vital Signs (Most Recent):  Temp: 98 °F (36.7 °C) (10/18/22 0810)  Pulse: 72 (10/18/22 1015)  Resp: 20 (10/18/22 0810)  BP: 132/62 (10/18/22 1015)  SpO2: 98 % (10/18/22 0948) Vital Signs (24h Range):  Temp:  [98 °F (36.7 °C)-98.2 °F (36.8 °C)] 98 °F (36.7 °C)  Pulse:  [] 72  Resp:  [16-20] 20  SpO2:  [92 %-99 %] 98 %  BP: (114-140)/(57-79) 132/62     Weight: 41.8 kg (92 lb 2.4 oz)  Body mass index is 16.32 kg/m².    Intake/Output Summary (Last 24 hours) at 10/18/2022 1024  Last data filed at 10/17/2022 1952  Gross per 24 hour   Intake 1100 ml   Output --   Net 1100 ml      Physical Exam  Vitals and nursing  note reviewed.   Constitutional:       General: She is not in acute distress.     Appearance: She is well-developed. She is not diaphoretic.      Comments: cachectic-appearing   HENT:      Head: Normocephalic.      Mouth/Throat:      Mouth: Mucous membranes are moist.      Pharynx: Oropharynx is clear.   Eyes:      General: No scleral icterus.     Conjunctiva/sclera: Conjunctivae normal.      Pupils: Pupils are equal, round, and reactive to light.   Neck:      Vascular: No JVD.   Cardiovascular:      Rate and Rhythm: Normal rate and regular rhythm.      Heart sounds: Normal heart sounds. No murmur heard.    No friction rub. No gallop.   Pulmonary:      Effort: Pulmonary effort is normal. No respiratory distress.      Breath sounds: Normal breath sounds. No wheezing or rales.   Abdominal:      General: Bowel sounds are normal. There is no distension.      Palpations: Abdomen is soft.      Tenderness: There is no abdominal tenderness. There is no guarding or rebound.   Musculoskeletal:         General: No tenderness. Normal range of motion.      Cervical back: Normal range of motion and neck supple.      Comments: Muscle wasting   Lymphadenopathy:      Cervical: No cervical adenopathy.   Skin:     General: Skin is warm and dry.      Capillary Refill: Capillary refill takes less than 2 seconds.      Coloration: Skin is not pale.      Findings: Lesion present. No erythema or rash.      Comments: Scabbed lesion to left anterior lower extremity with no erythema or drainage   Neurological:      Mental Status: She is alert and oriented to person, place, and time.      Cranial Nerves: No cranial nerve deficit.      Sensory: No sensory deficit.      Coordination: Coordination normal.      Deep Tendon Reflexes: Reflexes normal.   Psychiatric:         Behavior: Behavior normal.         Thought Content: Thought content normal.         Judgment: Judgment normal.       Significant Labs: All pertinent labs within the past 24 hours  have been reviewed.  CBC:   Recent Labs   Lab 10/17/22  1746 10/18/22  0340   WBC 7.16 4.81   HGB 12.1 9.4*   HCT 36.4* 27.4*   * 345     CMP:   Recent Labs   Lab 10/17/22  1746 10/18/22  0340   * 138   K 2.5* 3.8   CL 91* 106   CO2 29 24   * 87   BUN 13 9   CREATININE 0.9 0.6   CALCIUM 10.2 8.7   PROT 7.5 5.8*   ALBUMIN 2.9* 2.3*   BILITOT 0.7 0.8   ALKPHOS 95 77   AST 14 16   ALT 6* <5*   ANIONGAP 13 8       Significant Imaging: I have reviewed all pertinent imaging results/findings within the past 24 hours.      Assessment/Plan:      * Chest pain  Acute problem   Continuous cardiac monitor   Troponins trended and remained flat  Cardiology consult appreciated  Plan for nuclear stress test in AM - unable to complete today secondary to equipment malfunctioning     Acute DVT (deep venous thrombosis)  Recently dx 3 weeks ago  US reviewed  Cont eliquis  Monitor for bleeding      Weight loss, unintentional  Pt reports 40 pound unintentional weight loss over the past 6 months   Chart reviewed and patient had and pancreatic head mass that was biopsied and was negative for acute pathology   Patient reports chronic nausea and decreased p.o. intake secondary to this and also poor appetite   Will obtain CT chest abdomen and pelvis to assess for malignancy  Patient is a chronic smoker   Nutrition also consulted    Hyperglycemia  Acute problem - improved today  No documented history of diabetes   Likely secondary to prednisone use for RA   Will cover with sliding scale  Hemoglobin A1c pending        Severe malnutrition  Nutrition consulted. Most recent weight and BMI monitored-                         Measurements:  Wt Readings from Last 1 Encounters:   10/17/22 41.8 kg (92 lb 2.4 oz)   Body mass index is 16.32 kg/m².    Recommendations:      Patient has been screened and assessed by RD. RD will follow patient.      Hypokalemia  Acute problem  Patient has hypokalemia which is currently controlled. Last  electrolytes reviewed-   Recent Labs   Lab 10/17/22  1746 10/18/22  0340   K 2.5* 3.8   . Will replace potassium and monitor electrolytes closely.       Tobacco use  Chronic  Dangers of cigarette smoking were reviewed with patient in detail for 10 minutes and patient was encouraged to quit. Nicotine replacement options were discussed.      Hypertension  Chronic problem   Chronic, controlled.  Latest blood pressure and vitals reviewed-   Temp:  [98.2 °F (36.8 °C)]   Pulse:  []   Resp:  [20]   BP: (114-140)/(57-79)   SpO2:  [95 %-99 %] .   Home meds for hypertension were reviewed and noted below.   Hypertension Medications             hydroCHLOROthiazide (HYDRODIURIL) 25 MG tablet Take 25 mg by mouth once daily.    metoprolol succinate (TOPROL-XL) 50 MG 24 hr tablet Take 1 tablet (50 mg total) by mouth once daily.          While in the hospital, will manage blood pressure as follows; Continue home antihypertensive regimen    Will utilize p.r.n. blood pressure medication only if patient's blood pressure greater than  180/110 and she develops symptoms such as worsening chest pain or shortness of breath.        VTE Risk Mitigation (From admission, onward)         Ordered     apixaban tablet 5 mg  2 times daily         10/17/22 1958                Discharge Planning   RASHI:      Code Status: Full Code   Is the patient medically ready for discharge?:     Reason for patient still in hospital (select all that apply): Patient trending condition, Treatment and Imaging                     Kari Bellamy NP  Department of Hospital Medicine   [facility]

## 2022-10-18 NOTE — CARE UPDATE
10/17/22 8158   Patient Assessment/Suction   Level of Consciousness (AVPU) alert   Respiratory Effort Unlabored   PRE-TX-O2   O2 Device (Oxygen Therapy) room air   SpO2 95 %   Pulse Oximetry Type Intermittent   $ Pulse Oximetry - Multiple Charge Pulse Oximetry - Multiple   Pulse 88   Resp 19   Aerosol Therapy   $ Aerosol Therapy Charges PRN treatment not required  (pt states she doesnt take at home)

## 2022-10-18 NOTE — CARE UPDATE
10/18/22 1030   Tobacco Cessation Intervention   Do you use any type of tobacco product? Yes   Are you interested in quitting use of tobacco products? Not interested   Are you interested in Nicotine Replacement for symptom relief? No   $ Smoking Cessation Charges Smoking Cessation - Intermediate (CTTS)

## 2022-10-18 NOTE — ASSESSMENT & PLAN NOTE
Pt reports 40 pound unintentional weight loss over the past 6 months   Chart reviewed and patient had and pancreatic head mass that was biopsied and was negative for acute pathology   Patient reports chronic nausea and decreased p.o. intake secondary to this and also poor appetite   Will obtain CT chest abdomen and pelvis to assess for malignancy  Patient is a chronic smoker   Nutrition also consulted

## 2022-10-18 NOTE — ASSESSMENT & PLAN NOTE
Contributing Nutrition Diagnosis  Severe acute illness related malnutrition    Related to (etiology):   Decreased appetite    Signs and Symptoms (as evidenced by):   1) PO intakes < 75% of needs x > 1 month ( < 50% x 1-2 weeks)  2) moderate-severe wasting as charted below  3) > 20% wt loss in < 1 year    Interventions:  Collaboration with other providers and nutrition education  Nutrition Diagnosis Status:   new

## 2022-10-18 NOTE — CONSULTS
[facility]  Adult Nutrition  Consult Note    SUMMARY     Recommendations  1) Advance PO diet to goal of Cardiac diet   2) Add Boost breeze BID ( optifast also appropriate)   3) weigh weekly   4) Nutrition education and handout given    Goals: 1) PO intakes > 75% of meals and supplements at f/u  Nutrition Goal Status: new  Communication of RD Recs:  (POC, sticky note)    Assessment and Plan    Severe malnutrition  Contributing Nutrition Diagnosis  Severe acute illness related malnutrition    Related to (etiology):   Decreased appetite    Signs and Symptoms (as evidenced by):   1) PO intakes < 75% of needs x > 1 month ( < 50% x 1-2 weeks)  2) moderate-severe wasting as charted below  3) > 20% wt loss in < 1 year    Interventions:  Collaboration with other providers and nutrition education  Nutrition Diagnosis Status:   new         Malnutrition Assessment     Skin (Micronutrient):  (Eric = 22 leg wound)   Micronutrient Evaluation: suspected deficiency  Micronutrient Evaluation Comments: check iron   Weight Loss (Malnutrition): greater than 20% in 1 year  Energy Intake (Malnutrition): less than 75% for greater than or equal to 1 month   Orbital Region (Subcutaneous Fat Loss): moderate depletion  Upper Arm Region (Subcutaneous Fat Loss): severe depletion  Thoracic and Lumbar Region: severe depletion   [place] Region (Muscle Loss): moderate depletion  Clavicle Bone Region (Muscle Loss): severe depletion  Clavicle and Acromion Bone Region (Muscle Loss): severe depletion  Scapular Bone Region (Muscle Loss): severe depletion  Dorsal Hand (Muscle Loss): moderate depletion  Patellar Region (Muscle Loss): moderate depletion  Anterior Thigh Region (Muscle Loss): moderate depletion  Posterior Calf Region (Muscle Loss): moderate depletion   Edema (Fluid Accumulation): 0-->no edema present   Subcutaneous Fat Loss (Final Summary): severe protein-calorie malnutrition  Muscle Loss Evaluation (Final Summary):  "severe protein-calorie malnutrition         Reason for Assessment    Reason For Assessment: consult  Diagnosis:  (chest pain)  Relevant Medical History: recent COVID May 2022, smoking, HTN, HLD, pancreatitis, DVT, anxiety  Interdisciplinary Rounds: did not attend    General Information Comments: 61 y/o female admitted with chest pain s/p nausea and SOB yesterday. NPO for stress test today. Pt with poor appetite x 1-2 days, takes 3-4 bites and feels nauseous/gagging happens. She usually eats well but has been losing a lot of weight r/t being in and out of the hospital with various illnesses since May 2022 including pancreatitis and had gallbladder removed. Pt does not tolerate regular Boost. I educated pt on high kcal/protein diet and also low fat supplements she may tolerate better. NFPE moderate-severe wasting seen 10/18/22.    Nutrition Discharge Planning: To be determined- Cardiac diet + low fat supplements 2-3 x daily    Nutrition Risk Screen    Nutrition Risk Screen: no indicators present    Nutrition/Diet History    Patient Reported Diet/Restrictions/Preferences: general  Spiritual, Cultural Beliefs, Orthodox Practices, Values that Affect Care: no  Food Allergies: NKFA  Factors Affecting Nutritional Intake: decreased appetite, nausea/vomiting, NPO    Anthropometrics    Temp: 96.8 °F (36 °C)  Height Method: Measured (office 21)  Height: 5' 2.5"  Height (inches): 62.5 in  Weight Method: Bed Scale  Weight: 41.8 kg (92 lb 2.4 oz)  Weight (lb): 92.15 lb  Ideal Body Weight (IBW), Female: 112.5 lb  % Ideal Body Weight, Female (lb): 81.91 %  BMI (Calculated): 16.6  BMI Grade: 16 - 16.9 protein-energy malnutrition grade II  Usual Body Weight (UBW), k.6 kg (21 ( and May 2022 per pt))  % Usual Body Weight: 64.84  % Weight Change From Usual Weight: -35.29 %       Lab/Procedures/Meds    Pertinent Labs Reviewed: reviewed  BMP  Lab Results   Component Value Date     10/18/2022    K 3.8 10/18/2022    "  10/18/2022    CO2 24 10/18/2022    BUN 9 10/18/2022    CREATININE 0.6 10/18/2022    CALCIUM 8.7 10/18/2022    ANIONGAP 8 10/18/2022    ESTGFRAFRICA >60.0 07/25/2022    EGFRNONAA >60.0 07/25/2022     Lab Results   Component Value Date    ALBUMIN 2.3 (L) 10/18/2022       Pertinent Medications Reviewed: reviewed  Pertinent Medications Comments: senna, hydrochlorothiazide, insulin, KNaPhos    Estimated/Assessed Needs    Weight Used For Calorie Calculations: 41.8 kg (92 lb 2.4 oz)  Energy Calorie Requirements (kcal): MSj ( x 1.5 wt gain) = 1415 kcal  Energy Need Method: [place]  Protein Requirements: 1.2-1.5 g protein/kg ( 50-62 g)  Weight Used For Protein Calculations: 41.8 kg (92 lb 2.4 oz)  Fluid Requirements (mL): 1400 ml or per MD  Estimated Fluid Requirement Method: RDA Method  CHO Requirement: N/A      Nutrition Prescription Ordered    Current Diet Order: NPO x 1 day    Evaluation of Received Nutrient/Fluid Intake    Energy Calories Required: not meeting needs  Protein Required: not meeting needs  Fluid Required: not meeting needs  Tolerance: not tolerating     Intake/Output Summary (Last 24 hours) at 10/18/2022 1152  Last data filed at 10/17/2022 1952  Gross per 24 hour   Intake 1100 ml   Output --   Net 1100 ml       % Intake of Estimated Energy Needs: 0%  % Meal Intake: NPO    Nutrition Risk    Level of Risk/Frequency of Follow-up: moderate 2 x weekly      Monitor and Evaluation    Food and Nutrient Intake: energy intake, food and beverage intake  Food and Nutrient Adminstration: diet order  Anthropometric Measurements: weight  Biochemical Data, Medical Tests and Procedures: electrolyte and renal panel  Nutrition-Focused Physical Findings: overall appearance, skin       Nutrition Follow-Up    RD Follow-up?: Yes

## 2022-10-18 NOTE — ASSESSMENT & PLAN NOTE
Acute problem  Patient has hypokalemia which is currently controlled. Last electrolytes reviewed-   Recent Labs   Lab 10/17/22  0741 10/18/22  0340   K 2.5* 3.8   . Will replace potassium and monitor electrolytes closely.

## 2022-10-18 NOTE — SUBJECTIVE & OBJECTIVE
Past Medical History:   Diagnosis Date    Anxiety     Digestive disorder     Flu 02/2017    [facility]    Hypertension     Rheumatoid arthritis involving multiple sites with positive rheumatoid factor 01/14/2021       Past Surgical History:   Procedure Laterality Date    COLONOSCOPY N/A 2/26/2021    Procedure: COLONOSCOPY;  Surgeon: Amy Sidhu MD;  Location: [facility] ENDO;  Service: Endoscopy;  Laterality: N/A;    ENDOSCOPIC ULTRASOUND OF UPPER GASTROINTESTINAL TRACT N/A 7/1/2022    Procedure: ULTRASOUND, UPPER GI TRACT, ENDOSCOPIC;  Surgeon: Donavon Jewell III, MD;  Location: [facility] ENDO;  Service: Endoscopy;  Laterality: N/A;    ESOPHAGOGASTRODUODENOSCOPY N/A 2/26/2021    Procedure: EGD (ESOPHAGOGASTRODUODENOSCOPY);  Surgeon: Amy Sidhu MD;  Location: [facility] ENDO;  Service: Endoscopy;  Laterality: N/A;    LAPAROSCOPIC CHOLECYSTECTOMY N/A 7/27/2022    Procedure: CHOLECYSTECTOMY, LAPAROSCOPIC;  Surgeon: Ramón Hwang III, MD;  Location: [facility] OR;  Service: General;  Laterality: N/A;    TUBAL LIGATION         Review of patient's allergies indicates:   Allergen Reactions    No known drug allergies        No current facility-administered medications on file prior to encounter.     Current Outpatient Medications on File Prior to Encounter   Medication Sig    apixaban (ELIQUIS) 5 mg Tab Take 1 tablet (5 mg total) by mouth 2 (two) times daily.    cholecalciferol, vitamin D3, (VITAMIN D3) 10 mcg (400 unit) Tab Take 400 Units by mouth once daily.    folic acid (FOLVITE) 1 MG tablet Take 1 tablet (1 mg total) by mouth once daily.    hydroCHLOROthiazide (HYDRODIURIL) 25 MG tablet Take 25 mg by mouth once daily.    leflunomide (ARAVA) 20 MG Tab Take 1 tablet (20 mg total) by mouth once daily.    metoprolol succinate (TOPROL-XL) 50 MG 24 hr tablet Take 1 tablet (50 mg total) by mouth once daily. (Patient taking differently: Take 50 mg by mouth once daily. 1/2 tab qd)    mupirocin (BACTROBAN) 2 %  ointment Apply topically 3 (three) times daily.    pantoprazole (PROTONIX) 40 MG tablet Take 1 tablet (40 mg total) by mouth once daily.    potassium chloride SA (K-DUR,KLOR-CON) 20 MEQ tablet Take two twice a day for three days then reduce to one twice a day    predniSONE (DELTASONE) 5 MG tablet Take 5 mg by mouth once daily.    sertraline (ZOLOFT) 25 MG tablet Take 1 tablet (25 mg total) by mouth once daily.    sulfamethoxazole-trimethoprim 800-160mg (BACTRIM DS) 800-160 mg Tab Take 1 tablet by mouth 2 (two) times daily. for 7 days    traMADoL (ULTRAM) 50 mg tablet Take 1 tablet (50 mg total) by mouth every 6 (six) hours as needed for Pain.    traZODone (DESYREL) 50 MG tablet Take 1 tablet (50 mg total) by mouth every evening.     Family History       Problem Relation (Age of Onset)    Alcohol abuse Paternal Grandfather, Paternal Grandmother    COPD Father, Brother    Cancer Maternal Aunt    Cirrhosis Paternal Grandmother    Diabetes Mother, Maternal Aunt    Emphysema Brother    Hearing loss Mother    Heart disease Mother, Maternal Aunt, Maternal Uncle    Hypertension Mother, Maternal Uncle    Kidney disease Mother    Liver disease Paternal Grandfather, Sister    No Known Problems Son, Paternal Uncle    Sleep apnea Brother    Stroke Mother          Tobacco Use    Smoking status: Every Day     Packs/day: 1.00     Years: 7.00     Pack years: 7.00     Types: Cigarettes    Smokeless tobacco: Never    Tobacco comments:     [phone]   Substance and Sexual Activity    Alcohol use: No    Drug use: Never    Sexual activity: Yes     Partners: Male     Review of Systems   Constitutional:  Positive for activity change. Negative for chills, diaphoresis and fever.   HENT:  Negative for congestion, nosebleeds and tinnitus.    Eyes:  Negative for photophobia and visual disturbance.   Respiratory:  Positive for chest tightness and shortness of breath. Negative for cough and wheezing.    Cardiovascular:  Negative for chest  pain, palpitations and leg swelling.   Gastrointestinal:  Positive for nausea. Negative for abdominal distention, abdominal pain, constipation, diarrhea and vomiting.   Endocrine: Negative for cold intolerance and heat intolerance.   Genitourinary:  Negative for difficulty urinating, dysuria, frequency, hematuria and urgency.   Musculoskeletal:  Negative for arthralgias, back pain and myalgias.   Skin:  Negative for pallor, rash and wound.   Allergic/Immunologic: Negative for immunocompromised state.   Neurological:  Positive for weakness. Negative for dizziness, tremors, facial asymmetry and speech difficulty.   Hematological:  Negative for adenopathy. Does not bruise/bleed easily.   Psychiatric/Behavioral:  Negative for confusion and sleep disturbance. The patient is not nervous/anxious.    Objective:     Vital Signs (Most Recent):  Temp: 98.2 °F (36.8 °C) (10/17/22 1732)  Pulse: 87 (10/17/22 2102)  Resp: 20 (10/17/22 1732)  BP: 129/70 (10/17/22 2102)  SpO2: 97 % (10/17/22 2102) Vital Signs (24h Range):  Temp:  [98.2 °F (36.8 °C)] 98.2 °F (36.8 °C)  Pulse:  [] 87  Resp:  [20] 20  SpO2:  [95 %-99 %] 97 %  BP: (114-140)/(57-79) 129/70     Weight: 40.8 kg (90 lb)  Body mass index is 15.94 kg/m².    Physical Exam  Vitals and nursing note reviewed.   Constitutional:       General: She is not in acute distress.     Appearance: She is well-developed. She is not diaphoretic.   HENT:      Head: Normocephalic.      Mouth/Throat:      Mouth: Mucous membranes are moist.      Pharynx: Oropharynx is clear.   Eyes:      General: No scleral icterus.     Conjunctiva/sclera: Conjunctivae normal.      Pupils: Pupils are equal, round, and reactive to light.   Neck:      Vascular: No JVD.   Cardiovascular:      Rate and Rhythm: Normal rate and regular rhythm.      Heart sounds: Normal heart sounds. No murmur heard.    No friction rub. No gallop.   Pulmonary:      Effort: Pulmonary effort is normal. No respiratory distress.       Breath sounds: Normal breath sounds. No wheezing or rales.   Abdominal:      General: Bowel sounds are normal. There is no distension.      Palpations: Abdomen is soft.      Tenderness: There is no abdominal tenderness. There is no guarding or rebound.   Musculoskeletal:         General: No tenderness. Normal range of motion.      Cervical back: Normal range of motion and neck supple.   Lymphadenopathy:      Cervical: No cervical adenopathy.   Skin:     General: Skin is warm and dry.      Capillary Refill: Capillary refill takes less than 2 seconds.      Coloration: Skin is not pale.      Findings: No erythema or rash.   Neurological:      Mental Status: She is alert and oriented to person, place, and time.      Cranial Nerves: No cranial nerve deficit.      Sensory: No sensory deficit.      Coordination: Coordination normal.      Deep Tendon Reflexes: Reflexes normal.   Psychiatric:         Behavior: Behavior normal.         Thought Content: Thought content normal.         Judgment: Judgment normal.         CRANIAL NERVES     CN III, IV, VI   Pupils are equal, round, and reactive to light.     Significant Labs: All pertinent labs within the past 24 hours have been reviewed.  CBC:   Recent Labs   Lab 10/17/22  1746   WBC 7.16   HGB 12.1   HCT 36.4*   *     CMP:   Recent Labs   Lab 10/17/22  1746   *   K 2.5*   CL 91*   CO2 29   *   BUN 13   CREATININE 0.9   CALCIUM 10.2   PROT 7.5   ALBUMIN 2.9*   BILITOT 0.7   ALKPHOS 95   AST 14   ALT 6*   ANIONGAP 13     Cardiac Markers: No results for input(s): CKMB, MYOGLOBIN, BNP, TROPISTAT in the last 48 hours.    Significant Imaging: I have reviewed all pertinent imaging results/findings within the past 24 hours.

## 2022-10-18 NOTE — PT/OT/SLP EVAL
Physical Therapy Evaluation and Discharge Note    Patient Name:  Claire Bowser   MRN:  [id number]    Recommendations:     Discharge Recommendations:  home   Discharge Equipment Recommendations: none   Barriers to discharge: None    Assessment:     Claire Bowser is a 62 y.o. female admitted with a medical diagnosis of Chest pain. .  At this time, patient is functioning at their prior level of function and does not require further acute PT services.   Pt alert and mobilizing well in bed and to bathroom. Pt ambulated at hallways 250ft with SBA. Pt denies chest pain.    Recent Surgery: * No surgery found *      Plan:     During this hospitalization, patient does not require further acute PT services.  Please re-consult if situation changes.      Subjective   Stated unable to complete stress testing today  Pt stated that she had lost a lot of weight, not hungry  Pt asking for coffee- nurse Haven ISAACS    Chief Complaint: none  Patient/Family Comments/goals: get back home  Pain/Comfort:  Pain Rating 1: 0/10    Patients cultural, spiritual, Anglican conflicts given the current situation:      Living Environment:  Home with spouse  Prior to admission, patients level of function was ambulatory and indep.  Equipment used at home: none.  DME owned (not currently used): none.  Upon discharge, patient will have assistance from family.    Objective:     Communicated with nurse Orourke prior to session.  Patient found HOB elevated with telemetry upon PT entry to room.    General Precautions: Standard,     Orthopedic Precautions:N/A   Braces: N/A   Respiratory Status: Room air    Exams:  Postural Exam:  Patient presented with the following abnormalities:    -       Rounded shoulders  -       Forward head  -       BMI 16  RLE ROM: WFL  RLE Strength: WFL  LLE ROM: WFL  LLE Strength: WFL    Functional Mobility:  Bed Mobility:     Rolling Right: independence  Supine to Sit: independence  Sit to Supine: independence  Transfers:     Sit to  Stand:  independence with no AD  Gait: 250ft with SBA to supervision with no loss of balance    AM-PAC 6 CLICK MOBILITY  Total Score:21       Treatment and Education:   Patient was educated on the importance of OOB activity and functional mobility to negate negative effects of prolonged bed rest during hospitalization, safe transfers and ambulation, and D/C planning   Pt educated on safe mobility    AM-PAC 6 CLICK MOBILITY  Total Score:21     Patient left HOB elevated with all lines intact, call button in reach, and nurse Haven notified.    GOALS:   Multidisciplinary Problems       Physical Therapy Goals       Not on file                    History:     Past Medical History:   Diagnosis Date    Anxiety     Digestive disorder     Flu 02/2017    [facility]    Hypertension     Rheumatoid arthritis involving multiple sites with positive rheumatoid factor 01/14/2021       Past Surgical History:   Procedure Laterality Date    COLONOSCOPY N/A 2/26/2021    Procedure: COLONOSCOPY;  Surgeon: Amy Sidhu MD;  Location: [facility];  Service: Endoscopy;  Laterality: N/A;    ENDOSCOPIC ULTRASOUND OF UPPER GASTROINTESTINAL TRACT N/A 7/1/2022    Procedure: ULTRASOUND, UPPER GI TRACT, ENDOSCOPIC;  Surgeon: Donavon Jewell III, MD;  Location: [facility];  Service: Endoscopy;  Laterality: N/A;    ESOPHAGOGASTRODUODENOSCOPY N/A 2/26/2021    Procedure: EGD (ESOPHAGOGASTRODUODENOSCOPY);  Surgeon: Amy Sidhu MD;  Location: [facility];  Service: Endoscopy;  Laterality: N/A;    LAPAROSCOPIC CHOLECYSTECTOMY N/A 7/27/2022    Procedure: CHOLECYSTECTOMY, LAPAROSCOPIC;  Surgeon: Ramón Hwang III, MD;  Location: [facility] OR;  Service: General;  Laterality: N/A;    TUBAL LIGATION         Time Tracking:     PT Received On: 10/18/22  PT Start Time: 0958     PT Stop Time: 1013  PT Total Time (min): 15 min     Billable Minutes: Evaluation 15      10/18/2022

## 2022-10-18 NOTE — ED NOTES
Report given to SARKIS Osman on PCU. All questions/concerns addressed at this time. Pt updated on plan of care. Pt to be transferred to room 211 via W/C with all personal belongings. Pt placed on telemetry box #[id number] with report called to monitor room. Claire Bowser with MRN [id number] visualized by resource RN, [place], at this time. Pt is suitable for inpatient floor assignment.

## 2022-10-18 NOTE — NURSING
Nurses Note -- 4 Eyes      10/18/2022   2:38 AM      Skin assessed during: Admit      [x] No Pressure Injuries Present    []Prevention Measures Documented      [x] Yes- Altered Skin Integrity Present or Discovered   [] LDA Added if Not in Epic (Describe Wound)   [x] New Altered Skin Integrity was Present on Admit and Documented in LDA   [x] Wound Image Taken    Wound Care Consulted? Yes    Attending Nurse:  Jacqui Rincon RN     Second RN/Staff Member:    Jada Gillette RN

## 2022-10-18 NOTE — PT/OT/SLP EVAL
Occupational Therapy   Evaluation and Discharge Note    Name: Claire Bowser  MRN: [id number]  Admitting Diagnosis:  Chest pain   Recent Surgery: * No surgery found *    Recommendations:     Discharge Recommendations:  home  Discharge Equipment Recommendations:  None   Barriers to discharge:  None    Assessment:     Claire Bowser is a 62 y.o. female with a medical diagnosis of Chest pain. At this time, patient is functioning at their prior level of function and does not require further acute OT services.     Plan:     During this hospitalization, patient does not require further acute OT services.  Please re-consult if situation changes.      Subjective     Chief Complaint: None  Patient/Family Comments/goals: To go home    Occupational Profile:  Living Environment: Pt lives with her .   Previous level of function: Independent   Equipment Used at home:  None  Assistance upon Discharge: Pt will have assistance from her family.     Pain/Comfort:  Pain Rating 1: 0/10    Patients cultural, spiritual, Orthodoxy conflicts given the current situation: yes    Objective:     Communicated with: nurse prior to session.  Patient found HOB elevated upon OT entry to room.    General Precautions: Standard   Orthopedic Precautions: N/A  Braces: N/A  Respiratory Status: Room air     Occupational Performance:    Bed Mobility:    Patient completed Scooting/Bridging with independence  Patient completed Supine to Sit with independence  Patient completed Sit to Supine with independence    Functional Mobility/Transfers:  Patient completed Sit <> Stand Transfer with independence with no assistive device     Activities of Daily Living:  Feeding:  independence  Grooming: independence  Upper Body Dressing: independence  Lower Body Dressing: independence  Toileting: independence    Cognitive/Visual Perceptual:  Cognitive/Psychosocial Skills:  -       Oriented to: Person, Place, Time, and Situation   -       Follows Commands/attention:  Follows multistep commands  -       Communication: clear/fluent    Physical Exam:  Upper Extremity Range of Motion:  -       Right Upper Extremity: WFL  -       Left Upper Extremity: WFL  Upper Extremity Strength: -       Right Upper Extremity: WFL  -       Left Upper Extremity: WFL    AMPAC 6 Click ADL:  AMPAC Total Score: 24    Treatment & Education:  Pt was given education on role of OT. Pt verbalized understanding.     Patient left HOB elevated with all lines intact and call button in reach.    GOALS:   Multidisciplinary Problems       Occupational Therapy Goals       Not on file                    History:     Past Medical History:   Diagnosis Date    Anxiety     Digestive disorder     Flu 02/2017    [facility]    Hypertension     Rheumatoid arthritis involving multiple sites with positive rheumatoid factor 01/14/2021         Past Surgical History:   Procedure Laterality Date    COLONOSCOPY N/A 2/26/2021    Procedure: COLONOSCOPY;  Surgeon: Amy Sidhu MD;  Location: [facility];  Service: Endoscopy;  Laterality: N/A;    ENDOSCOPIC ULTRASOUND OF UPPER GASTROINTESTINAL TRACT N/A 7/1/2022    Procedure: ULTRASOUND, UPPER GI TRACT, ENDOSCOPIC;  Surgeon: Donavon Jewell III, MD;  Location: [facility];  Service: Endoscopy;  Laterality: N/A;    ESOPHAGOGASTRODUODENOSCOPY N/A 2/26/2021    Procedure: EGD (ESOPHAGOGASTRODUODENOSCOPY);  Surgeon: Amy Sidhu MD;  Location: [facility];  Service: Endoscopy;  Laterality: N/A;    LAPAROSCOPIC CHOLECYSTECTOMY N/A 7/27/2022    Procedure: CHOLECYSTECTOMY, LAPAROSCOPIC;  Surgeon: Ramón Hwang III, MD;  Location: [facility];  Service: General;  Laterality: N/A;    TUBAL LIGATION         Time Tracking:     OT Date of Treatment: 10/18/22  OT Start Time: 1046  OT Stop Time: 1055  OT Total Time (min): 9 min    Billable Minutes:Evaluation 9    10/18/2022

## 2022-10-18 NOTE — MEDICAL/APP STUDENT
[facility]  Progress Note    Patient Name: Claire Bowser  MRN: [id number]  Patient Class: OP- Observation   Admission Date: 10/17/2022  Length of Stay: 0 days  Attending Physician: Genny Tucker MD  Primary Care Provider: Samuel Brady MD    Subjective:     Principal Problem: Chest pain    Chief Complaint:   Chief Complaint   Patient presents with    Nausea     Started last week    Shortness of Breath     Recent DVT left leg        HPI: Claire Bowser is a 62-year-old female who presents emergency room complaining of generalized weakness, exertional dyspnea, left-sided chest pain, and nausea which onset approximately 1 week ago.  She denies any fever or chills.  No known sick contacts or travel.  She is vaccinated for COVID.  Shortness of breath is minimally relieved with rest and worsens with exertion.  Previous medical history includes pancreatitis, anxiety, hypertension, hyperlipidemia, rheumatoid arthritis, DVT, active smoker.  ER workup:  CBC unremarkable.  CMP with sodium of 133, potassium of 2.5, glucose of 174? ? , albumin of 2.9, magnesium decreased at 1.5.  Troponin mildly elevated 0.044.  EKG demonstrated ST Segment Depression: II, III, V2, V1, V3, V4, V5, V6 and AVF.  Patient was given magnesium and potassium in ER.  Patient admitted to Hospital Medicine for treatment management.  Will replete electrolytes p.r.n., trend troponin, and cardiac consult in a.m..    Hospital Course: No notes on file    Interval History:     10/18/2022:  Patient seen and examined today by Hospital Medicine. She states that she still feels generalized weakness and decreased appetite as well as SOB, CP, and nausea. However, her symptoms seem to be somewhat better from yesterday upon being admitted. She states that she has had about 40 lbs of unintentional weight loss approx. over the last 6 months. She has no PMH of thyroid disease or cancer or any past FamHx of cancer that she is aware of. It was  discussed with the patient that further workup will be done and imaging will be ordered to evaluate the weight loss and she expressed understanding. She has no other questions or concerns at this time. During chart review it was found that she has a pancreatic mass. Upon workup for her cholecystectomy during EUS (7/1/22), she was found to have a hypoechoic 29mm x 25 mm well-defined mass located in the pancreatic neck. FNA and core biopsy was taken from the mass and there was no evidence of malignancy identified.     Past Medical History:   Diagnosis Date    Anxiety     Digestive disorder     Flu 02/2017    [facility]    Hypertension     Rheumatoid arthritis involving multiple sites with positive rheumatoid factor 01/14/2021       Past Surgical History:   Procedure Laterality Date    COLONOSCOPY N/A 2/26/2021    Procedure: COLONOSCOPY;  Surgeon: Amy Sidhu MD;  Location: [facility];  Service: Endoscopy;  Laterality: N/A;    ENDOSCOPIC ULTRASOUND OF UPPER GASTROINTESTINAL TRACT N/A 7/1/2022    Procedure: ULTRASOUND, UPPER GI TRACT, ENDOSCOPIC;  Surgeon: Donavon Jewell III, MD;  Location: [facility] ENDO;  Service: Endoscopy;  Laterality: N/A;    ESOPHAGOGASTRODUODENOSCOPY N/A 2/26/2021    Procedure: EGD (ESOPHAGOGASTRODUODENOSCOPY);  Surgeon: Amy Sidhu MD;  Location: [facility];  Service: Endoscopy;  Laterality: N/A;    LAPAROSCOPIC CHOLECYSTECTOMY N/A 7/27/2022    Procedure: CHOLECYSTECTOMY, LAPAROSCOPIC;  Surgeon: Ramón Hwang III, MD;  Location: [facility] OR;  Service: General;  Laterality: N/A;    TUBAL LIGATION         Review of patient's allergies indicates:   Allergen Reactions    No known drug allergies        No current facility-administered medications on file prior to encounter.     Current Outpatient Medications on File Prior to Encounter   Medication Sig    apixaban (ELIQUIS) 5 mg Tab Take 1 tablet (5 mg total) by mouth 2 (two) times daily.    cholecalciferol, vitamin D3, (VITAMIN  D3) 10 mcg (400 unit) Tab Take 400 Units by mouth once daily.    folic acid (FOLVITE) 1 MG tablet Take 1 tablet (1 mg total) by mouth once daily.    hydroCHLOROthiazide (HYDRODIURIL) 25 MG tablet Take 25 mg by mouth once daily.    leflunomide (ARAVA) 20 MG Tab Take 1 tablet (20 mg total) by mouth once daily.    metoprolol succinate (TOPROL-XL) 50 MG 24 hr tablet Take 1 tablet (50 mg total) by mouth once daily. (Patient taking differently: Take 50 mg by mouth once daily. 1/2 tab qd)    pantoprazole (PROTONIX) 40 MG tablet Take 1 tablet (40 mg total) by mouth once daily.    potassium chloride SA (K-DUR,KLOR-CON) 20 MEQ tablet Take two twice a day for three days then reduce to one twice a day    predniSONE (DELTASONE) 5 MG tablet Take 5 mg by mouth once daily.    sertraline (ZOLOFT) 25 MG tablet Take 1 tablet (25 mg total) by mouth once daily.    sulfamethoxazole-trimethoprim 800-160mg (BACTRIM DS) 800-160 mg Tab Take 1 tablet by mouth 2 (two) times daily. for 7 days    traZODone (DESYREL) 50 MG tablet Take 1 tablet (50 mg total) by mouth every evening.    mupirocin (BACTROBAN) 2 % ointment Apply topically 3 (three) times daily.    traMADoL (ULTRAM) 50 mg tablet Take 1 tablet (50 mg total) by mouth every 6 (six) hours as needed for Pain.     Family History       Problem Relation (Age of Onset)    Alcohol abuse Paternal Grandfather, Paternal Grandmother    COPD Father, Brother    Cancer Maternal Aunt    Cirrhosis Paternal Grandmother    Diabetes Mother, Maternal Aunt    Emphysema Brother    Hearing loss Mother    Heart disease Mother, Maternal Aunt, Maternal Uncle    Hypertension Mother, Maternal Uncle    Kidney disease Mother    Liver disease Paternal Grandfather, Sister    No Known Problems Son, Paternal Uncle    Sleep apnea Brother    Stroke Mother          Tobacco Use    Smoking status: Every Day     Packs/day: 1.00     Years: 7.00     Pack years: 7.00     Types: Cigarettes    Smokeless tobacco: Never    Tobacco  comments:     [phone]   Substance and Sexual Activity    Alcohol use: No    Drug use: Never    Sexual activity: Yes     Partners: Male     Review of Systems   Constitutional:  Positive for appetite change (decreased) and unexpected weight change (unintentional weight loss). Negative for chills and fever.   Respiratory:  Positive for shortness of breath.    Cardiovascular:  Positive for chest pain.   Gastrointestinal:  Positive for nausea. Negative for abdominal pain, diarrhea and vomiting.   Neurological:  Positive for weakness (generalized).   Objective:     Vital Signs (Most Recent):  Temp: 98 °F (36.7 °C) (10/18/22 0810)  Pulse: 72 (10/18/22 1015)  Resp: 20 (10/18/22 0810)  BP: 132/62 (10/18/22 1015)  SpO2: 98 % (10/18/22 0948) Vital Signs (24h Range):  Temp:  [98 °F (36.7 °C)-98.2 °F (36.8 °C)] 98 °F (36.7 °C)  Pulse:  [] 72  Resp:  [16-20] 20  SpO2:  [92 %-99 %] 98 %  BP: (114-140)/(57-79) 132/62     Weight: 41.8 kg (92 lb 2.4 oz)  Body mass index is 16.32 kg/m².    Intake/Output Summary (Last 24 hours) at 10/18/2022 1049  Last data filed at 10/17/2022 1952  Gross per 24 hour   Intake 1100 ml   Output --   Net 1100 ml      Physical Exam  Constitutional:       General: She is awake. She is not in acute distress.     Appearance: She is cachectic. She is not toxic-appearing or diaphoretic.   Cardiovascular:      Rate and Rhythm: Normal rate and regular rhythm.      Heart sounds: S1 normal and S2 normal. No murmur heard.    No friction rub. No gallop.   Pulmonary:      Effort: Pulmonary effort is normal. No accessory muscle usage or respiratory distress.      Breath sounds: Normal breath sounds. No wheezing, rhonchi or rales.   Abdominal:      General: Abdomen is flat. There is no distension.      Tenderness: There is no abdominal tenderness. There is no guarding.   Skin:     General: Skin is warm and dry.   Neurological:      Mental Status: She is alert and oriented to person, place, and time.    Psychiatric:         Mood and Affect: Mood normal.         Behavior: Behavior normal. Behavior is cooperative.       Significant Labs: All pertinent labs within the past 24 hours have been reviewed.  CBC:   Recent Labs   Lab 10/17/22  1746 10/18/22  0340   WBC 7.16 4.81   HGB 12.1 9.4*   HCT 36.4* 27.4*   * 345     CMP:   Recent Labs   Lab 10/17/22  1746 10/18/22  0340   * 138   K 2.5* 3.8   CL 91* 106   CO2 29 24   * 87   BUN 13 9   CREATININE 0.9 0.6   CALCIUM 10.2 8.7   PROT 7.5 5.8*   ALBUMIN 2.9* 2.3*   BILITOT 0.7 0.8   ALKPHOS 95 77   AST 14 16   ALT 6* <5*   ANIONGAP 13 8     Lipase:   Recent Labs   Lab 10/18/22  0948   LIPASE 438*     Magnesium:   Recent Labs   Lab 10/17/22  1746 10/18/22  0340   MG 1.5* 2.1     Troponin:   Recent Labs   Lab 10/17/22  1746 10/17/22  2321 10/18/22  0340   TROPONINI 0.044* 0.038* 0.037*       Significant Imaging: I have reviewed all pertinent imaging results/findings within the past 24 hours.      Assessment/Plan:      Chest pain  - Telemetry monitoring  - Trending troponin  - 0.044>0.038>0.037 , trending down  - Cardiology consulted and visited patient this AM. NM stress test was ordered for tomorrow in the AM.  - NPO at midnight    2. Hyperglycemia  - No documented PMH of DM  - Likely secondary to steroids for RA  - SSI to cover elevated glucose readings  - A1c results pending  - Glucose is controlled this AM.    3. Hypokalemia  - Replaced Potassium and currently has resolved  - Will continue to trend and monitor for electrolyte changes    4. Tobacco use  - Chronic, 1/2 pack a day  - Smoking cessation counseling given    5. Hypertension  - Chronic, controlled  - Continued home BP medication  - PRN BP medication if becomes uncontrolled  - Will continue to monitor for changes    6. Acute DVT (deep venous thrombosis)  - Reviewed Left LE US from 9/26/22, shows DVT involving popliteal, posterior tibial, anterior tibial, and peroneal veins.  - Was put on  Eliquis and will continue  - Monitor for bleeding or any other acute changes    7. Severe malnutrition  - Loss of appetite  - 40 lbs unintentional weight loss over approx the last 6 months  - Dietician consult ?    8. Weight loss, unintentional  - No known FamHx or cancer  - No known PMH of cancer or thyroid disease  - CT chest, abdomen, pelvis ordered  - Loss of appetite  - 40 lbs unintentional weight loss over approx the last 6 months  - Dietician consult ?          Active Diagnoses:    Diagnosis Date Noted POA    PRINCIPAL PROBLEM:  Chest pain [R07.9] 10/17/2022 Yes    Weight loss, unintentional [R63.4] 10/18/2022 Yes    Acute DVT (deep venous thrombosis) [I82.409] 10/18/2022 Yes    Hyperglycemia [R73.9] 10/17/2022 Yes    Severe malnutrition [E43] 06/05/2022 Yes    Hypokalemia [E87.6] 05/25/2022 Yes    Tobacco use [Z72.0] 08/10/2018 Yes    Hypertension [I10] 10/04/2013 Yes      Problems Resolved During this Admission:     VTE Risk Mitigation (From admission, onward)           Ordered     apixaban tablet 5 mg  2 times daily         10/17/22 1958                       Maggi Cordoba  [facility]

## 2022-10-18 NOTE — ASSESSMENT & PLAN NOTE
Chronic problem   Chronic, controlled.  Latest blood pressure and vitals reviewed-   Temp:  [98.2 °F (36.8 °C)]   Pulse:  []   Resp:  [20]   BP: (114-140)/(57-79)   SpO2:  [95 %-99 %] .   Home meds for hypertension were reviewed and noted below.   Hypertension Medications             hydroCHLOROthiazide (HYDRODIURIL) 25 MG tablet Take 25 mg by mouth once daily.    metoprolol succinate (TOPROL-XL) 50 MG 24 hr tablet Take 1 tablet (50 mg total) by mouth once daily.          While in the hospital, will manage blood pressure as follows; Continue home antihypertensive regimen    Will utilize p.r.n. blood pressure medication only if patient's blood pressure greater than  180/110 and she develops symptoms such as worsening chest pain or shortness of breath.

## 2022-10-18 NOTE — PLAN OF CARE
SW met with patient at bedside regarding resources for Housing, financial, food.  Patient expressed in assessment the possibility of loosing her job due to illness and multiple hospital visits.  SW discussed with patient [facility].  SW left patient information at bedside for review.       10/18/22 1158   Post-Acute Status   Post-Acute Authorization Other   Hospital Resources/Appts/Education Provided Community resources provided

## 2022-10-18 NOTE — SUBJECTIVE & OBJECTIVE
Interval History:  Notes reviewed, no acute events overnight.  Patient resting comfortably in bed and denies acute pain at this time. Pt reports cp is int, sharp quality, left ant cw, worsens with exertion and assoc with sob. Pt reports unintentional weight loss over the past 6 months.  She reports  intermittent nausea and decreased appetite.  Cardiology at bedside and plans for nuclear stress test in the a.m. due to equipment malfunctioning today.  Will also obtain CT chest abdomen pelvis to assess for unintentional weight loss.  Patient verbalized understanding and appreciative of care.  Will continue to monitor closely.    Review of Systems   Constitutional:  Positive for activity change, appetite change and unexpected weight change. Negative for chills, diaphoresis and fever.   HENT:  Negative for congestion, nosebleeds and tinnitus.    Eyes:  Negative for photophobia and visual disturbance.   Respiratory:  Positive for shortness of breath. Negative for cough, chest tightness and wheezing.    Cardiovascular:  Positive for chest pain. Negative for palpitations and leg swelling.   Gastrointestinal:  Positive for nausea. Negative for abdominal distention, abdominal pain, constipation, diarrhea and vomiting.   Endocrine: Negative for cold intolerance and heat intolerance.   Genitourinary:  Negative for difficulty urinating, dysuria, frequency, hematuria and urgency.   Musculoskeletal:  Negative for arthralgias, back pain and myalgias.   Skin:  Negative for pallor, rash and wound.   Allergic/Immunologic: Negative for immunocompromised state.   Neurological:  Positive for weakness. Negative for dizziness, tremors, facial asymmetry and speech difficulty.   Hematological:  Negative for adenopathy. Does not bruise/bleed easily.   Psychiatric/Behavioral:  Negative for confusion and sleep disturbance. The patient is not nervous/anxious.    All other systems reviewed and are negative.  Objective:     Vital Signs (Most  Recent):  Temp: 98 °F (36.7 °C) (10/18/22 0810)  Pulse: 72 (10/18/22 1015)  Resp: 20 (10/18/22 0810)  BP: 132/62 (10/18/22 1015)  SpO2: 98 % (10/18/22 0948) Vital Signs (24h Range):  Temp:  [98 °F (36.7 °C)-98.2 °F (36.8 °C)] 98 °F (36.7 °C)  Pulse:  [] 72  Resp:  [16-20] 20  SpO2:  [92 %-99 %] 98 %  BP: (114-140)/(57-79) 132/62     Weight: 41.8 kg (92 lb 2.4 oz)  Body mass index is 16.32 kg/m².    Intake/Output Summary (Last 24 hours) at 10/18/2022 1024  Last data filed at 10/17/2022 1952  Gross per 24 hour   Intake 1100 ml   Output --   Net 1100 ml      Physical Exam  Vitals and nursing note reviewed.   Constitutional:       General: She is not in acute distress.     Appearance: She is well-developed. She is not diaphoretic.      Comments: cachectic-appearing   HENT:      Head: Normocephalic.      Mouth/Throat:      Mouth: Mucous membranes are moist.      Pharynx: Oropharynx is clear.   Eyes:      General: No scleral icterus.     Conjunctiva/sclera: Conjunctivae normal.      Pupils: Pupils are equal, round, and reactive to light.   Neck:      Vascular: No JVD.   Cardiovascular:      Rate and Rhythm: Normal rate and regular rhythm.      Heart sounds: Normal heart sounds. No murmur heard.    No friction rub. No gallop.   Pulmonary:      Effort: Pulmonary effort is normal. No respiratory distress.      Breath sounds: Normal breath sounds. No wheezing or rales.   Abdominal:      General: Bowel sounds are normal. There is no distension.      Palpations: Abdomen is soft.      Tenderness: There is no abdominal tenderness. There is no guarding or rebound.   Musculoskeletal:         General: No tenderness. Normal range of motion.      Cervical back: Normal range of motion and neck supple.      Comments: Muscle wasting   Lymphadenopathy:      Cervical: No cervical adenopathy.   Skin:     General: Skin is warm and dry.      Capillary Refill: Capillary refill takes less than 2 seconds.      Coloration: Skin is not pale.       Findings: Lesion present. No erythema or rash.      Comments: Scabbed lesion to left anterior lower extremity with no erythema or drainage   Neurological:      Mental Status: She is alert and oriented to person, place, and time.      Cranial Nerves: No cranial nerve deficit.      Sensory: No sensory deficit.      Coordination: Coordination normal.      Deep Tendon Reflexes: Reflexes normal.   Psychiatric:         Behavior: Behavior normal.         Thought Content: Thought content normal.         Judgment: Judgment normal.       Significant Labs: All pertinent labs within the past 24 hours have been reviewed.  CBC:   Recent Labs   Lab 10/17/22  1746 10/18/22  0340   WBC 7.16 4.81   HGB 12.1 9.4*   HCT 36.4* 27.4*   * 345     CMP:   Recent Labs   Lab 10/17/22  1746 10/18/22  0340   * 138   K 2.5* 3.8   CL 91* 106   CO2 29 24   * 87   BUN 13 9   CREATININE 0.9 0.6   CALCIUM 10.2 8.7   PROT 7.5 5.8*   ALBUMIN 2.9* 2.3*   BILITOT 0.7 0.8   ALKPHOS 95 77   AST 14 16   ALT 6* <5*   ANIONGAP 13 8       Significant Imaging: I have reviewed all pertinent imaging results/findings within the past 24 hours.

## 2022-10-18 NOTE — ASSESSMENT & PLAN NOTE
Nutrition consulted. Most recent weight and BMI monitored-                         Measurements:  Wt Readings from Last 1 Encounters:   10/17/22 41.8 kg (92 lb 2.4 oz)   Body mass index is 16.32 kg/m².    Recommendations:      Patient has been screened and assessed by RD. RD will follow patient.

## 2022-10-18 NOTE — ASSESSMENT & PLAN NOTE
Acute problem   Continuous cardiac monitor   Troponins trended and remained flat  Cardiology consult appreciated  Plan for nuclear stress test in AM - unable to complete today secondary to equipment malfunctioning

## 2022-10-18 NOTE — PLAN OF CARE
[facility]  Initial Discharge Assessment       Primary Care Provider: Samuel Brady MD    Admission Diagnosis: Hypokalemia [E87.6]  Hypomagnesemia [E83.42]  Tachycardia [R00.0]  Elevated troponin [R77.8]    Admission Date: 10/17/2022  Expected Discharge Date:     Discharge Barriers Identified: None    Payor: BLUE CROSS BLUE SHIELD / Plan: [place] PPO / Product Type: PPO /     Extended Emergency Contact Information  Primary Emergency Contact: Robin Bowser  Address: [address] [place] [address]           [place] [address] [place]  Mobile Phone: [phone]  Relation: Spouse  Preferred language: English   needed? No    Discharge Plan A: Home  Discharge Plan B: Home with family      [facility]  [place] - [id number]  [id number]  [place] [address]  Phone: [phone] Fax: [phone]    SW met with patient at bedside to complete discharge planning assessment.  Patient alert and oriented xs 4.  Patient verified all demographic information on facesheet is correct.  Patient verified PCP is Dr. Brady.  Patient verified primary health insurance is BCBS.  Patient with NO home health or DME.  Patient with NO POA or Living Will.  Patient not on dialysis or medication coumadin.  Patient with no 30 day admission.  Patient with no financial issues at this time.  Patient family will provide transportation upon discharge from facility.  Patient independent with ADLs, live with spouse and adult son, drives self.      Initial Assessment (most recent)       Adult Discharge Assessment - 10/18/22 0800          Discharge Assessment    Assessment Type Discharge Planning Assessment     Confirmed/corrected address, phone number and insurance Yes     Confirmed Demographics Correct on Facesheet     Source of Information patient     Does patient/caregiver understand observation status Yes     Communicated RASHI with patient/caregiver Yes     Lives With child(alex),  adult;spouse     Facility Arrived From: home     Do you expect to return to your current living situation? Yes     Do you have help at home or someone to help you manage your care at home? Yes     Who are your caregiver(s) and their phone number(s)? spouse     Prior to hospitilization cognitive status: Alert/Oriented     Current cognitive status: Alert/Oriented     Walking or Climbing Stairs Difficulty none     Dressing/Bathing Difficulty none     Equipment Currently Used at Home none     Readmission within 30 days? No     Patient currently being followed by outpatient case management? No     Do you currently have service(s) that help you manage your care at home? No     Do you take prescription medications? Yes     Do you have prescription coverage? Yes     Do you have any problems affording any of your prescribed medications? No     Is the patient taking medications as prescribed? yes     Who is going to help you get home at discharge? spouse     How do you get to doctors appointments? car, drives self     Are you on dialysis? No     Do you take coumadin? No     Discharge Plan A Home     Discharge Plan B Home with family     DME Needed Upon Discharge  none     Discharge Plan discussed with: Patient     Discharge Barriers Identified None        Physical Activity    On average, how many days per week do you engage in moderate to strenuous exercise (like a brisk walk)? 2 days     On average, how many minutes do you engage in exercise at this level? 60 min        Financial Resource Strain    How hard is it for you to pay for the very basics like food, housing, medical care, and heating? Somewhat hard        Housing Stability    In the last 12 months, was there a time when you were not able to pay the mortgage or rent on time? No     In the last 12 months, was there a time when you did not have a steady place to sleep or slept in a shelter (including now)? No        Transportation Needs    In the past 12 months, has  lack of transportation kept you from medical appointments or from getting medications? No     In the past 12 months, has lack of transportation kept you from meetings, work, or from getting things needed for daily living? No        Food Insecurity    Within the past 12 months, you worried that your food would run out before you got the money to buy more. Sometimes true     Within the past 12 months, the food you bought just didn't last and you didn't have money to get more. Sometimes true        Stress    Do you feel stress - tense, restless, nervous, or anxious, or unable to sleep at night because your mind is troubled all the time - these days? Only a little        Social Connections    In a typical week, how many times do you talk on the phone with family, friends, or neighbors? More than three times a week     How often do you get together with friends or relatives? More than three times a week     How often do you attend Quaker or Sabianism services? 1 to 4 times per year     Do you belong to any clubs or organizations such as Quaker groups, unions, fraternal or athletic groups, or school groups? Yes     How often do you attend meetings of the clubs or organizations you belong to? 1 to 4 times per year     Are you , , , , never , or living with a partner?         Alcohol Use    Q1: How often do you have a drink containing alcohol? Never     Q2: How many drinks containing alcohol do you have on a typical day when you are drinking? Patient does not drink     Q3: How often do you have six or more drinks on one occasion? Never

## 2022-10-18 NOTE — MED STUDENT ASSESSMENT & PLAN
Chest pain  - Telemetry monitoring  - Trending troponin  - 0.044>0.038>0.037 , trended down  - Cardiology consulted and visited patient. NM stress test was ordered for this morning.  - NPO  - No reported CP today- resolved    2. Hyperglycemia  - No documented PMH of DM  - Likely secondary to steroids for RA  - SSI to cover elevated glucose readings  - A1c: 4.9  - Glucose remains controlled.    3. Hypokalemia  - Replaced Potassium  - Currently, slightly under normal limits  - Will continue to trend and monitor for electrolyte changes and replace if needed.    4. Tobacco use  - Chronic, 1/2 pack a day  - Smoking cessation counseling given    5. Hypertension  - Chronic, controlled  - Continued home BP medication  - PRN BP medication if becomes uncontrolled  - Will continue to monitor for changes    6. Acute DVT (deep venous thrombosis)  - Reviewed Left LE US from 9/26/22, shows DVT involving popliteal, posterior tibial, anterior tibial, and peroneal veins.  - Was put on Eliquis and will continue  - No acute signs of bleeding at this time  - Will continue to monitor for bleeding or any other acute changes    7. Severe malnutrition  - Loss of appetite  - 40 lbs unintentional weight loss over approx the last 6 months  - Nutrition consulted and visited the patient. Will follow their recommendations.  - GI consult ordered    8. Weight loss, unintentional  - No known FamHx or cancer  - No known PMH of cancer or thyroid disease  - CT chest, abdomen, pelvis resulted and shows signs of acute pancreatitis as well as pancreatic pseudocysts.  - Loss of appetite  - 40 lbs unintentional weight loss over approx the last 6 months  - Nutrition consulted and visited the patient. Will follow their recommendations.  - GI consult ordered

## 2022-10-18 NOTE — ASSESSMENT & PLAN NOTE
Acute problem  Patient has hypokalemia which is currently uncontrolled. Last electrolytes reviewed- Recent Labs   Lab 10/17/22  1746   K 2.5*   . Will replace potassium and monitor electrolytes closely.

## 2022-10-18 NOTE — HPI
Claire Bowser is a 62-year-old female who presents emergency room complaining of generalized weakness, exertional dyspnea, left-sided chest pain, and nausea which onset approximately 1 week ago.  She denies any fever or chills.  No known sick contacts or travel.  She is vaccinated for COVID.  Shortness of breath is minimally relieved with rest and worsens with exertion.  Previous medical history includes pancreatitis, anxiety, hypertension, hyperlipidemia, rheumatoid arthritis, DVT, active smoker.  ER workup:  CBC unremarkable.  CMP with sodium of 133, potassium of 2.5, glucose of 174? ? , albumin of 2.9, magnesium decreased at 1.5.  Troponin mildly elevated 0.044.  EKG demonstrated ST Segment Depression: II, III, V2, V1, V3, V4, V5, V6 and AVF.  Patient was given magnesium and potassium in ER.  Patient admitted to Hospital Medicine for treatment management.  Will replete electrolytes p.r.n., trend troponin, and cardiac consult in a.m..

## 2022-10-18 NOTE — H&P
[facility] Medicine  History & Physical    Patient Name: Claire Bowser  MRN: [id number]  Patient Class: OP- Observation  Admission Date: 10/17/2022  Attending Physician: Genny Tucker MD   Primary Care Provider: Samuel Brady MD         Patient information was obtained from patient, past medical records and ER records.     Subjective:     Principal Problem:Chest pain    Chief Complaint:   Chief Complaint   Patient presents with    Nausea     Started last week    Shortness of Breath     Recent DVT left leg         HPI: Claire Bowser is a 62-year-old female who presents emergency room complaining of generalized weakness, exertional dyspnea, left-sided chest pain, and nausea which onset approximately 1 week ago.  She denies any fever or chills.  No known sick contacts or travel.  She is vaccinated for COVID.  Shortness of breath is minimally relieved with rest and worsens with exertion.  Previous medical history includes pancreatitis, anxiety, hypertension, hyperlipidemia, rheumatoid arthritis, DVT, active smoker.  ER workup:  CBC unremarkable.  CMP with sodium of 133, potassium of 2.5, glucose of 174? ? , albumin of 2.9, magnesium decreased at 1.5.  Troponin mildly elevated 0.044.  EKG demonstrated ST Segment Depression: II, III, V2, V1, V3, V4, V5, V6 and AVF.  Patient was given magnesium and potassium in ER.  Patient admitted to Hospital Medicine for treatment management.  Will replete electrolytes p.r.n., trend troponin, and cardiac consult in a.m..      Past Medical History:   Diagnosis Date    Anxiety     Digestive disorder     Flu 02/2017    [facility]    Hypertension     Rheumatoid arthritis involving multiple sites with positive rheumatoid factor 01/14/2021       Past Surgical History:   Procedure Laterality Date    COLONOSCOPY N/A 2/26/2021    Procedure: COLONOSCOPY;  Surgeon: Amy Sidhu MD;  Location: [facility];  Service: Endoscopy;  Laterality: N/A;     ENDOSCOPIC ULTRASOUND OF UPPER GASTROINTESTINAL TRACT N/A 7/1/2022    Procedure: ULTRASOUND, UPPER GI TRACT, ENDOSCOPIC;  Surgeon: Donavon Jewell III, MD;  Location: [facility] ENDO;  Service: Endoscopy;  Laterality: N/A;    ESOPHAGOGASTRODUODENOSCOPY N/A 2/26/2021    Procedure: EGD (ESOPHAGOGASTRODUODENOSCOPY);  Surgeon: Amy Sidhu MD;  Location: [facility] ENDO;  Service: Endoscopy;  Laterality: N/A;    LAPAROSCOPIC CHOLECYSTECTOMY N/A 7/27/2022    Procedure: CHOLECYSTECTOMY, LAPAROSCOPIC;  Surgeon: Ramón Hwang III, MD;  Location: [facility] OR;  Service: General;  Laterality: N/A;    TUBAL LIGATION         Review of patient's allergies indicates:   Allergen Reactions    No known drug allergies        No current facility-administered medications on file prior to encounter.     Current Outpatient Medications on File Prior to Encounter   Medication Sig    apixaban (ELIQUIS) 5 mg Tab Take 1 tablet (5 mg total) by mouth 2 (two) times daily.    cholecalciferol, vitamin D3, (VITAMIN D3) 10 mcg (400 unit) Tab Take 400 Units by mouth once daily.    folic acid (FOLVITE) 1 MG tablet Take 1 tablet (1 mg total) by mouth once daily.    hydroCHLOROthiazide (HYDRODIURIL) 25 MG tablet Take 25 mg by mouth once daily.    leflunomide (ARAVA) 20 MG Tab Take 1 tablet (20 mg total) by mouth once daily.    metoprolol succinate (TOPROL-XL) 50 MG 24 hr tablet Take 1 tablet (50 mg total) by mouth once daily. (Patient taking differently: Take 50 mg by mouth once daily. 1/2 tab qd)    mupirocin (BACTROBAN) 2 % ointment Apply topically 3 (three) times daily.    pantoprazole (PROTONIX) 40 MG tablet Take 1 tablet (40 mg total) by mouth once daily.    potassium chloride SA (K-DUR,KLOR-CON) 20 MEQ tablet Take two twice a day for three days then reduce to one twice a day    predniSONE (DELTASONE) 5 MG tablet Take 5 mg by mouth once daily.    sertraline (ZOLOFT) 25 MG tablet Take 1 tablet (25 mg total) by mouth once daily.     sulfamethoxazole-trimethoprim 800-160mg (BACTRIM DS) 800-160 mg Tab Take 1 tablet by mouth 2 (two) times daily. for 7 days    traMADoL (ULTRAM) 50 mg tablet Take 1 tablet (50 mg total) by mouth every 6 (six) hours as needed for Pain.    traZODone (DESYREL) 50 MG tablet Take 1 tablet (50 mg total) by mouth every evening.     Family History       Problem Relation (Age of Onset)    Alcohol abuse Paternal Grandfather, Paternal Grandmother    COPD Father, Brother    Cancer Maternal Aunt    Cirrhosis Paternal Grandmother    Diabetes Mother, Maternal Aunt    Emphysema Brother    Hearing loss Mother    Heart disease Mother, Maternal Aunt, Maternal Uncle    Hypertension Mother, Maternal Uncle    Kidney disease Mother    Liver disease Paternal Grandfather, Sister    No Known Problems Son, Paternal Uncle    Sleep apnea Brother    Stroke Mother          Tobacco Use    Smoking status: Every Day     Packs/day: 1.00     Years: 7.00     Pack years: 7.00     Types: Cigarettes    Smokeless tobacco: Never    Tobacco comments:     [phone]   Substance and Sexual Activity    Alcohol use: No    Drug use: Never    Sexual activity: Yes     Partners: Male     Review of Systems   Constitutional:  Positive for activity change. Negative for chills, diaphoresis and fever.   HENT:  Negative for congestion, nosebleeds and tinnitus.    Eyes:  Negative for photophobia and visual disturbance.   Respiratory:  Positive for chest tightness and shortness of breath. Negative for cough and wheezing.    Cardiovascular:  Negative for chest pain, palpitations and leg swelling.   Gastrointestinal:  Positive for nausea. Negative for abdominal distention, abdominal pain, constipation, diarrhea and vomiting.   Endocrine: Negative for cold intolerance and heat intolerance.   Genitourinary:  Negative for difficulty urinating, dysuria, frequency, hematuria and urgency.   Musculoskeletal:  Negative for arthralgias, back pain and myalgias.   Skin:   Negative for pallor, rash and wound.   Allergic/Immunologic: Negative for immunocompromised state.   Neurological:  Positive for weakness. Negative for dizziness, tremors, facial asymmetry and speech difficulty.   Hematological:  Negative for adenopathy. Does not bruise/bleed easily.   Psychiatric/Behavioral:  Negative for confusion and sleep disturbance. The patient is not nervous/anxious.    Objective:     Vital Signs (Most Recent):  Temp: 98.2 °F (36.8 °C) (10/17/22 1732)  Pulse: 87 (10/17/22 2102)  Resp: 20 (10/17/22 1732)  BP: 129/70 (10/17/22 2102)  SpO2: 97 % (10/17/22 2102) Vital Signs (24h Range):  Temp:  [98.2 °F (36.8 °C)] 98.2 °F (36.8 °C)  Pulse:  [] 87  Resp:  [20] 20  SpO2:  [95 %-99 %] 97 %  BP: (114-140)/(57-79) 129/70     Weight: 40.8 kg (90 lb)  Body mass index is 15.94 kg/m².    Physical Exam  Vitals and nursing note reviewed.   Constitutional:       General: She is not in acute distress.     Appearance: She is well-developed. She is not diaphoretic.   HENT:      Head: Normocephalic.      Mouth/Throat:      Mouth: Mucous membranes are moist.      Pharynx: Oropharynx is clear.   Eyes:      General: No scleral icterus.     Conjunctiva/sclera: Conjunctivae normal.      Pupils: Pupils are equal, round, and reactive to light.   Neck:      Vascular: No JVD.   Cardiovascular:      Rate and Rhythm: Normal rate and regular rhythm.      Heart sounds: Normal heart sounds. No murmur heard.    No friction rub. No gallop.   Pulmonary:      Effort: Pulmonary effort is normal. No respiratory distress.      Breath sounds: Normal breath sounds. No wheezing or rales.   Abdominal:      General: Bowel sounds are normal. There is no distension.      Palpations: Abdomen is soft.      Tenderness: There is no abdominal tenderness. There is no guarding or rebound.   Musculoskeletal:         General: No tenderness. Normal range of motion.      Cervical back: Normal range of motion and neck supple.   Lymphadenopathy:       Cervical: No cervical adenopathy.   Skin:     General: Skin is warm and dry.      Capillary Refill: Capillary refill takes less than 2 seconds.      Coloration: Skin is not pale.      Findings: No erythema or rash.   Neurological:      Mental Status: She is alert and oriented to person, place, and time.      Cranial Nerves: No cranial nerve deficit.      Sensory: No sensory deficit.      Coordination: Coordination normal.      Deep Tendon Reflexes: Reflexes normal.   Psychiatric:         Behavior: Behavior normal.         Thought Content: Thought content normal.         Judgment: Judgment normal.         CRANIAL NERVES     CN III, IV, VI   Pupils are equal, round, and reactive to light.     Significant Labs: All pertinent labs within the past 24 hours have been reviewed.  CBC:   Recent Labs   Lab 10/17/22  1746   WBC 7.16   HGB 12.1   HCT 36.4*   *     CMP:   Recent Labs   Lab 10/17/22  1746   *   K 2.5*   CL 91*   CO2 29   *   BUN 13   CREATININE 0.9   CALCIUM 10.2   PROT 7.5   ALBUMIN 2.9*   BILITOT 0.7   ALKPHOS 95   AST 14   ALT 6*   ANIONGAP 13     Cardiac Markers: No results for input(s): CKMB, MYOGLOBIN, BNP, TROPISTAT in the last 48 hours.    Significant Imaging: I have reviewed all pertinent imaging results/findings within the past 24 hours.    Assessment/Plan:     * Chest pain  Acute problem   Continuous cardiac monitor   Trend troponins   Cardiology consult in a.m.         Hyperglycemia  Acute problem  No documented history of diabetes   Likely secondary to prednisone use for RA   Will cover with sliding scale  Hemoglobin A1c pending        Hypokalemia  Acute problem  Patient has hypokalemia which is currently uncontrolled. Last electrolytes reviewed- Recent Labs   Lab 10/17/22  1746   K 2.5*   . Will replace potassium and monitor electrolytes closely.       Tobacco use  Chronic  Dangers of cigarette smoking were reviewed with patient in detail for 10 minutes and patient was  encouraged to quit. Nicotine replacement options were discussed.      Hypertension  Chronic problem   Chronic, controlled.  Latest blood pressure and vitals reviewed-   Temp:  [98.2 °F (36.8 °C)]   Pulse:  []   Resp:  [20]   BP: (114-140)/(57-79)   SpO2:  [95 %-99 %] .   Home meds for hypertension were reviewed and noted below.   Hypertension Medications             hydroCHLOROthiazide (HYDRODIURIL) 25 MG tablet Take 25 mg by mouth once daily.    metoprolol succinate (TOPROL-XL) 50 MG 24 hr tablet Take 1 tablet (50 mg total) by mouth once daily.          While in the hospital, will manage blood pressure as follows; Continue home antihypertensive regimen    Will utilize p.r.n. blood pressure medication only if patient's blood pressure greater than  180/110 and she develops symptoms such as worsening chest pain or shortness of breath.        VTE Risk Mitigation (From admission, onward)         Ordered     apixaban tablet 5 mg  2 times daily         10/17/22 1958                   Sebastian Jewell NP  Department of Hospital Medicine   [facility] - Emergency Dept

## 2022-10-18 NOTE — PLAN OF CARE
POC discussed with patient, verbalized understanding. Patient with uneventful night, no complaints voiced. No complaints of nausea or CP voiced. NSR on telemetry. VS stable. IVF infusing. Received 4 K riders as ordered. Monitoring labs. States voided in brief, unable to obtain urine for lab. Call light at bedside. In nad.

## 2022-10-18 NOTE — ASSESSMENT & PLAN NOTE
Acute problem - improved today  No documented history of diabetes   Likely secondary to prednisone use for RA   Will cover with sliding scale  Hemoglobin A1c pending

## 2022-10-18 NOTE — PLAN OF CARE
Recommendations  1) Advance PO diet to goal of Cardiac diet   2) Add Boost breeze BID ( optifast also appropriate)   3) weigh weekly   4) Nutrition education and handout given    Goals: 1) PO intakes > 75% of meals and supplements at f/u  Nutrition Goal Status: new  Communication of RD Recs:  (POC, sticky note)

## 2022-10-18 NOTE — CARE UPDATE
10/18/22 0740   Patient Assessment/Suction   Level of Consciousness (AVPU) alert   PRE-TX-O2   O2 Device (Oxygen Therapy) room air   SpO2 97 %   Pulse Oximetry Type Intermittent   $ Pulse Oximetry - Multiple Charge Pulse Oximetry - Multiple

## 2022-10-18 NOTE — MED STUDENT SUBJECTIVE & OBJECTIVE
Medical Student Subjective & Objective     Principal Problem: Chest pain    Chief Complaint:   Chief Complaint   Patient presents with    Nausea     Started last week    Shortness of Breath     Recent DVT left leg        HPI: Claire Bowser is a 62-year-old female who presents emergency room complaining of generalized weakness, exertional dyspnea, left-sided chest pain, and nausea which onset approximately 1 week ago.  She denies any fever or chills.  No known sick contacts or travel.  She is vaccinated for COVID.  Shortness of breath is minimally relieved with rest and worsens with exertion.  Previous medical history includes pancreatitis, anxiety, hypertension, hyperlipidemia, rheumatoid arthritis, DVT, active smoker.  ER workup:  CBC unremarkable.  CMP with sodium of 133, potassium of 2.5, glucose of 174? ? , albumin of 2.9, magnesium decreased at 1.5.  Troponin mildly elevated 0.044.  EKG demonstrated ST Segment Depression: II, III, V2, V1, V3, V4, V5, V6 and AVF.  Patient was given magnesium and potassium in ER.  Patient admitted to Hospital Medicine for treatment management.  Will replete electrolytes p.r.n., trend troponin, and cardiac consult in a.m..    Hospital Course: No notes on file    Interval History:     10/18/2022:  Patient seen and examined today by Hospital Medicine. She states that she still feels generalized weakness and decreased appetite as well as SOB, CP, and nausea. However, her symptoms seem to be somewhat better from yesterday upon being admitted. She states that she has had about 40 lbs of unintentional weight loss approx. over the last 6 months. She has no PMH of thyroid disease or cancer or any past FamHx of cancer that she is aware of. It was discussed with the patient that further workup will be done and imaging will be ordered to evaluate the weight loss and she expressed understanding. She has no other questions or concerns at this time. During chart review it was found that she has  a pancreatic mass. Upon workup for her cholecystectomy during EUS (7/1/22), she was found to have a hypoechoic 29mm x 25 mm well-defined mass located in the pancreatic neck. FNA and core biopsy was taken from the mass and there was no evidence of malignancy identified.    10/19/2022:  Patient seen and examined by [facility] Medicine. She remained afebrile and on room air throughout the night. She says she feels a lot better today than she did yesterday and thinks the IV fluids may be perking her up. She does report she has an appetite and feels like eating but knew that she was NPO and would have to wait until after the stress test. She did mention that the Boost breeze drink recommended by nutrition made her have an episode of diarrhea. She denies CP, SOB, N/V, hematochezia, and hematuria.     Past Medical History:   Diagnosis Date    Anxiety     Digestive disorder     Flu 02/2017    [facility]    Hypertension     Rheumatoid arthritis involving multiple sites with positive rheumatoid factor 01/14/2021       Past Surgical History:   Procedure Laterality Date    COLONOSCOPY N/A 2/26/2021    Procedure: COLONOSCOPY;  Surgeon: Amy Sdihu MD;  Location: [facility];  Service: Endoscopy;  Laterality: N/A;    ENDOSCOPIC ULTRASOUND OF UPPER GASTROINTESTINAL TRACT N/A 7/1/2022    Procedure: ULTRASOUND, UPPER GI TRACT, ENDOSCOPIC;  Surgeon: Donavon Jewell III, MD;  Location: [facility] ENDO;  Service: Endoscopy;  Laterality: N/A;    ESOPHAGOGASTRODUODENOSCOPY N/A 2/26/2021    Procedure: EGD (ESOPHAGOGASTRODUODENOSCOPY);  Surgeon: Amy Sidhu MD;  Location: [facility] ENDO;  Service: Endoscopy;  Laterality: N/A;    LAPAROSCOPIC CHOLECYSTECTOMY N/A 7/27/2022    Procedure: CHOLECYSTECTOMY, LAPAROSCOPIC;  Surgeon: Ramón Hwang III, MD;  Location: [facility] OR;  Service: General;  Laterality: N/A;    TUBAL LIGATION         Review of patient's allergies indicates:   Allergen Reactions    No known drug allergies         No current facility-administered medications on file prior to encounter.     Current Outpatient Medications on File Prior to Encounter   Medication Sig    apixaban (ELIQUIS) 5 mg Tab Take 1 tablet (5 mg total) by mouth 2 (two) times daily.    cholecalciferol, vitamin D3, (VITAMIN D3) 10 mcg (400 unit) Tab Take 400 Units by mouth once daily.    folic acid (FOLVITE) 1 MG tablet Take 1 tablet (1 mg total) by mouth once daily.    hydroCHLOROthiazide (HYDRODIURIL) 25 MG tablet Take 25 mg by mouth once daily.    leflunomide (ARAVA) 20 MG Tab Take 1 tablet (20 mg total) by mouth once daily.    metoprolol succinate (TOPROL-XL) 50 MG 24 hr tablet Take 1 tablet (50 mg total) by mouth once daily. (Patient taking differently: Take 50 mg by mouth once daily. 1/2 tab qd)    pantoprazole (PROTONIX) 40 MG tablet Take 1 tablet (40 mg total) by mouth once daily.    potassium chloride SA (K-DUR,KLOR-CON) 20 MEQ tablet Take two twice a day for three days then reduce to one twice a day    predniSONE (DELTASONE) 5 MG tablet Take 5 mg by mouth once daily.    sulfamethoxazole-trimethoprim 800-160mg (BACTRIM DS) 800-160 mg Tab Take 1 tablet by mouth 2 (two) times daily. for 7 days    traZODone (DESYREL) 50 MG tablet Take 1 tablet (50 mg total) by mouth every evening.    mupirocin (BACTROBAN) 2 % ointment Apply topically 3 (three) times daily.    sertraline (ZOLOFT) 25 MG tablet Take 1 tablet by mouth once daily    traMADoL (ULTRAM) 50 mg tablet Take 1 tablet (50 mg total) by mouth every 6 (six) hours as needed for Pain.     Family History       Problem Relation (Age of Onset)    Alcohol abuse Paternal Grandfather, Paternal Grandmother    COPD Father, Brother    Cancer Maternal Aunt    Cirrhosis Paternal Grandmother    Diabetes Mother, Maternal Aunt    Emphysema Brother    Hearing loss Mother    Heart disease Mother, Maternal Aunt, Maternal Uncle    Hypertension Mother, Maternal Uncle    Kidney disease Mother    Liver disease Paternal  Grandfather, Sister    No Known Problems Son, Paternal Uncle    Sleep apnea Brother    Stroke Mother          Tobacco Use    Smoking status: Every Day     Packs/day: 1.00     Years: 7.00     Pack years: 7.00     Types: Cigarettes    Smokeless tobacco: Never    Tobacco comments:     [phone]   Substance and Sexual Activity    Alcohol use: No    Drug use: Never    Sexual activity: Yes     Partners: Male     Review of Systems   Constitutional:  Positive for unexpected weight change (unintentional weight loss). Negative for chills and fever.   Respiratory:  Negative for shortness of breath.    Cardiovascular:  Negative for chest pain.   Gastrointestinal:  Positive for diarrhea (one episode). Negative for abdominal pain, blood in stool, nausea and vomiting.   Genitourinary:  Negative for hematuria.   Neurological:  Positive for weakness (generalized but improving).   Objective:     Vital Signs (Most Recent):  Temp: 96.1 °F (35.6 °C) (10/19/22 0728)  Pulse: 73 (10/19/22 0756)  Resp: 16 (10/19/22 0756)  BP: 133/60 (10/19/22 0728)  SpO2: 97 % (10/19/22 0756) Vital Signs (24h Range):  Temp:  [96.1 °F (35.6 °C)-98.7 °F (37.1 °C)] 96.1 °F (35.6 °C)  Pulse:  [70-97] 73  Resp:  [16-20] 16  SpO2:  [96 %-99 %] 97 %  BP: (127-158)/(60-76) 133/60     Weight: 44.2 kg (97 lb 7.1 oz)  Body mass index is 17.54 kg/m².    Intake/Output Summary (Last 24 hours) at 10/19/2022 0921  Last data filed at 10/19/2022 0547  Gross per 24 hour   Intake 236 ml   Output 200 ml   Net 36 ml      Physical Exam  Constitutional:       General: She is awake. She is not in acute distress.     Appearance: She is cachectic. She is not toxic-appearing or diaphoretic.   Cardiovascular:      Rate and Rhythm: Normal rate and regular rhythm.      Heart sounds: S1 normal and S2 normal. No murmur heard.    No friction rub. No gallop.   Pulmonary:      Effort: Pulmonary effort is normal. No accessory muscle usage or respiratory distress.      Breath sounds:  Examination of the right-upper field reveals decreased breath sounds. Examination of the right-middle field reveals decreased breath sounds. Examination of the right-lower field reveals decreased breath sounds. Decreased breath sounds present. No wheezing, rhonchi or rales.   Abdominal:      General: Abdomen is flat. There is no distension.      Tenderness: There is no abdominal tenderness. There is no guarding.   Skin:     General: Skin is warm and dry.   Neurological:      Mental Status: She is alert and oriented to person, place, and time.   Psychiatric:         Mood and Affect: Mood normal.         Behavior: Behavior normal. Behavior is cooperative.       Significant Labs: All pertinent labs within the past 24 hours have been reviewed.  CBC:   Recent Labs   Lab 10/17/22  1746 10/18/22  0340 10/19/22  0459   WBC 7.16 4.81 7.04   HGB 12.1 9.4* 10.1*   HCT 36.4* 27.4* 31.5*   * 345 361     CMP:   Recent Labs   Lab 10/17/22  1746 10/18/22  0340 10/19/22  0459   * 138 137   K 2.5* 3.8 3.4*   CL 91* 106 103   CO2 29 24 24   * 87 85   BUN 13 9 6*   CREATININE 0.9 0.6 0.7   CALCIUM 10.2 8.7 8.9   PROT 7.5 5.8* 6.2   ALBUMIN 2.9* 2.3* 2.4*   BILITOT 0.7 0.8 1.0   ALKPHOS 95 77 83   AST 14 16 15   ALT 6* <5* <5*   ANIONGAP 13 8 10     Lipase:   Recent Labs   Lab 10/18/22  0948   LIPASE 438*     Magnesium:   Recent Labs   Lab 10/17/22  1746 10/18/22  0340 10/19/22  0459   MG 1.5* 2.1 1.8     Troponin:   Recent Labs   Lab 10/17/22  1746 10/17/22  2321 10/18/22  0340   TROPONINI 0.044* 0.038* 0.037*       Significant Imaging: I have reviewed all pertinent imaging results/findings within the past 24 hours.    Narrative & Impression  EXAMINATION:  CT CHEST ABDOMEN PELVIS WITH CONTRAST (XPD)     CLINICAL HISTORY:  Weight loss, unintended;     TECHNIQUE:  Low dose axial images, sagittal and coronal reformations were obtained from the thoracic inlet to the pubic symphysis following the IV administration of 75  mL of Omnipaque 350 and no p.o. contrast     COMPARISON:  06/02/2022 abdomen and pelvis.  Chest none     FINDINGS:  Chest;.  Apical pleural and parenchymal scarring.  Emphysematous changes.  Dependent hypoventilatory changes.  Mild bronchiectasis and mucous plugging.  For example mucous plugging right middle lobe axial series 6, images 307 through 286.  Larger linear branching nodular opacity more caudally in the right lower lobe extending for a length of just over 2 cm for example axial series 605 images 107 through 106 and axial series 6 images 317 through 345 and smaller linear branching nodular opacity in the left apex axial series 6 image 91 likely represent mucous plugging.     No significant hilar or mediastinal adenopathy.  No pleural effusion on the right.  Small left pleural effusion and slight platelike atelectasis in the posterior dependent left lung base abutting the effusion.     Abdomen and pelvis; liver and spleen unremarkable appearance.  Cholecystectomy clips.  No biliary duct dilatation.  Abdominal aorta tapers without aneurysmal dilatation.  Adrenal glands unremarkable appearance.  Renal enhancement is symmetrical and there is no hydronephrosis.  Large upper pole renal cysts, some 6 cm on the left, 4.6 cm on the right and small subcentimeter renal cysts.     Circumscribed 1.3 cm cystic lesion in the body of the pancreas axial series 3, image 44 which corresponds as was seen and described as being in the neck of the pancreas on the prior MRCP.  Today's study however this appears contiguous with another thin walled fluid collection anterior to the pancreas that measures 2.4 x 1.1 cm for example axial series 3 images 41 through 44.  Other new peripancreatic fluid collections with somewhat defined fluid collection anterior to the tail the pancreas wrapping posterior to the stomach and posterolateral to the stomach with diameter of only up to approximately 10 mm, oval thin walled 1.6 cm collection in  the vannesa hepatis axial series 3, image 55 and more ill-defined peripancreatic fluid collections elsewhere.  Slightly irregular thick enhancing wall cystic necrotic structure in the head the pancreas measuring 8 mm series 3, image 59.  Combination of pancreatic neoplasm and pancreatitis cannot be excluded but findings could all represent pancreatitis.     Trace free fluid the pericolic gutters with large although still fairly small amount within the pelvis.  No free intraperitoneal air.  Diverticulosis without CT findings of acute diverticulitis.  Appendix is not identified with certainty but no abnormal appendix inflammatory changes appendiceal region is seen     Impression:     Findings suggesting acute pancreatitis with multiple pseudocysts formation.  This includes peripancreatic fluid collections with thin well-defined walls, ill-defined peripancreatic fluid, fluid in the pericolic gutters and more so in the pelvis most small left pleural effusion.     Additional findings as detailed above.        Electronically signed by: Liz Mccormick MD  Date:                                            10/18/2022  Time:                                           16:00      Medical Student Subjective & Objective

## 2022-10-18 NOTE — CONSULTS
[facility]  Cardiology  Consult Note    Patient Name: Claire Bowser  MRN: [id number]  Admission Date: 10/17/2022  Hospital Length of Stay: 0 days  Code Status: Full Code   Attending Provider: Genny Tucker MD   Consulting Provider: Ta Wynne MD  Primary Care Physician: Samuel Brady MD  Principal Problem:Chest pain    Patient information was obtained from patient, past medical records, and ER records.     Consults  Subjective:     Chief Complaint:  Chest pain     HPI:  problem 1 patient has some chest pain associated with weakness  and  brought  her in the emergency room; EKGs demonstrated slight ST-T changes I;slight bump in troponins are noted   She has not had previous cardiovascular history nnor has she she had revascularization   Patient does smoke cigarettes   She has not had diabetes but is being treated for hypertension with HydroDIURIL and metoprolol   Problem 2.  In July she had COVID and ended up having DVT left lower extremity and was started on high-dose apixaban several weeks ago in the emergency room and now is on apixaban 5 mg b.I.d.- ultrasound a lower extremity demonstrates DVT in the popliteal vein   Problem 3.  She had lesion at the head of the pancreas that was biopsied and apparently was negative but she has lost 45 lb of weight due to persistent nausea over the past several months shin she had COVID   She is no better since she has had her gallbladder removed   She is not having vomiting or diarrhea.  She has been on PPI eyes      Past Medical History:   Diagnosis Date    Anxiety     Digestive disorder     Flu 02/2017    [facility]    Hypertension     Rheumatoid arthritis involving multiple sites with positive rheumatoid factor 01/14/2021       Past Surgical History:   Procedure Laterality Date    COLONOSCOPY N/A 2/26/2021    Procedure: COLONOSCOPY;  Surgeon: Amy Sidhu MD;  Location: [facility];  Service: Endoscopy;  Laterality: N/A;     ENDOSCOPIC ULTRASOUND OF UPPER GASTROINTESTINAL TRACT N/A 7/1/2022    Procedure: ULTRASOUND, UPPER GI TRACT, ENDOSCOPIC;  Surgeon: Donavon Jewell III, MD;  Location: [facility] ENDO;  Service: Endoscopy;  Laterality: N/A;    ESOPHAGOGASTRODUODENOSCOPY N/A 2/26/2021    Procedure: EGD (ESOPHAGOGASTRODUODENOSCOPY);  Surgeon: Amy Sidhu MD;  Location: [facility] ENDO;  Service: Endoscopy;  Laterality: N/A;    LAPAROSCOPIC CHOLECYSTECTOMY N/A 7/27/2022    Procedure: CHOLECYSTECTOMY, LAPAROSCOPIC;  Surgeon: Ramón Hwang III, MD;  Location: [facility] OR;  Service: General;  Laterality: N/A;    TUBAL LIGATION         Review of patient's allergies indicates:   Allergen Reactions    No known drug allergies        No current facility-administered medications on file prior to encounter.     Current Outpatient Medications on File Prior to Encounter   Medication Sig    apixaban (ELIQUIS) 5 mg Tab Take 1 tablet (5 mg total) by mouth 2 (two) times daily.    cholecalciferol, vitamin D3, (VITAMIN D3) 10 mcg (400 unit) Tab Take 400 Units by mouth once daily.    folic acid (FOLVITE) 1 MG tablet Take 1 tablet (1 mg total) by mouth once daily.    hydroCHLOROthiazide (HYDRODIURIL) 25 MG tablet Take 25 mg by mouth once daily.    leflunomide (ARAVA) 20 MG Tab Take 1 tablet (20 mg total) by mouth once daily.    metoprolol succinate (TOPROL-XL) 50 MG 24 hr tablet Take 1 tablet (50 mg total) by mouth once daily. (Patient taking differently: Take 50 mg by mouth once daily. 1/2 tab qd)    pantoprazole (PROTONIX) 40 MG tablet Take 1 tablet (40 mg total) by mouth once daily.    potassium chloride SA (K-DUR,KLOR-CON) 20 MEQ tablet Take two twice a day for three days then reduce to one twice a day    predniSONE (DELTASONE) 5 MG tablet Take 5 mg by mouth once daily.    sertraline (ZOLOFT) 25 MG tablet Take 1 tablet (25 mg total) by mouth once daily.    sulfamethoxazole-trimethoprim 800-160mg (BACTRIM DS) 800-160 mg Tab Take 1 tablet by mouth 2  (two) times daily. for 7 days    traZODone (DESYREL) 50 MG tablet Take 1 tablet (50 mg total) by mouth every evening.    mupirocin (BACTROBAN) 2 % ointment Apply topically 3 (three) times daily.    traMADoL (ULTRAM) 50 mg tablet Take 1 tablet (50 mg total) by mouth every 6 (six) hours as needed for Pain.     Family History       Problem Relation (Age of Onset)    Alcohol abuse Paternal Grandfather, Paternal Grandmother    COPD Father, Brother    Cancer Maternal Aunt    Cirrhosis Paternal Grandmother    Diabetes Mother, Maternal Aunt    Emphysema Brother    Hearing loss Mother    Heart disease Mother, Maternal Aunt, Maternal Uncle    Hypertension Mother, Maternal Uncle    Kidney disease Mother    Liver disease Paternal Grandfather, Sister    No Known Problems Son, Paternal Uncle    Sleep apnea Brother    Stroke Mother          Tobacco Use    Smoking status: Every Day     Packs/day: 1.00     Years: 7.00     Pack years: 7.00     Types: Cigarettes    Smokeless tobacco: Never    Tobacco comments:     [phone]   Substance and Sexual Activity    Alcohol use: No    Drug use: Never    Sexual activity: Yes     Partners: Male     ROS  Objective:     Vital Signs (Most Recent):  Temp: 98 °F (36.7 °C) (10/18/22 0810)  Pulse: 72 (10/18/22 0810)  Resp: 20 (10/18/22 0810)  BP: 132/62 (10/18/22 0810)  SpO2: 98 % (10/18/22 0948) Vital Signs (24h Range):  Temp:  [98 °F (36.7 °C)-98.2 °F (36.8 °C)] 98 °F (36.7 °C)  Pulse:  [] 72  Resp:  [16-20] 20  SpO2:  [92 %-99 %] 98 %  BP: (114-140)/(57-79) 132/62     Weight: 41.8 kg (92 lb 2.4 oz)  Body mass index is 16.32 kg/m².    SpO2: 98 %  O2 Device (Oxygen Therapy): room air      Intake/Output Summary (Last 24 hours) at 10/18/2022 0949  Last data filed at 10/17/2022 1952  Gross per 24 hour   Intake 1100 ml   Output --   Net 1100 ml       Lines/Drains/Airways       Peripheral Intravenous Line  Duration                  Peripheral IV - Single Lumen 10/17/22 1800 20 G Right Antecubital  <1 day                    Physical Exam blood pressure is 90/60 right arm   Thin frail lady with muscle wasting   Soft right carotid bruit   Tongue is normal   No supraclavicular adenopathy   Lungs slightly diminished breath sounds   Cardiac sounds are normal   Abdomen no palpable masses   Extremities good femoral pulses decreased elasticity   Slight minimal swelling left lower extremity   Neurologic intact    Significant Labs: CMP   Recent Labs   Lab 10/17/22  1746 10/18/22  0340   * 138   K 2.5* 3.8   CL 91* 106   CO2 29 24   * 87   BUN 13 9   CREATININE 0.9 0.6   CALCIUM 10.2 8.7   PROT 7.5 5.8*   ALBUMIN 2.9* 2.3*   BILITOT 0.7 0.8   ALKPHOS 95 77   AST 14 16   ALT 6* <5*   ANIONGAP 13 8   , CBC   Recent Labs   Lab 10/17/22  1746 10/18/22  0340   WBC 7.16 4.81   HGB 12.1 9.4*   HCT 36.4* 27.4*   * 345   , and Troponin   Recent Labs   Lab 10/17/22  1746 10/17/22  2321 10/18/22  0340   TROPONINI 0.044* 0.038* 0.037*       Significant Imagin/15  /22   The common bile duct, gallbladder and intrahepatic biliary ducts are within normal limits.  There is a 1.5 cm mass of the neck of the pancreas.  This may represent a cyst, pancreatic pseudocyst or a neoplasm and further evaluation by CT of the pancreas with and without contrast or endoscopic ultrasound may be indicated.      Assessment and Plan:    1.  Atypical chest pain associated with EKG changes and slight bump in troponin   Will give low-dose aspirin and continue metoprolol and repeat EKG   Stress test in the morning   Patient may have underlying coronary disease   2.  Significant 45 lb weight loss  and persistent nauseaassociated with pancreatic lesion and DVT- rule out aggressive cancer- recommend  re-consult GI              VTE Risk Mitigation (From admission, onward)           Ordered     apixaban tablet 5 mg  2 times daily         10/17/22 1958                    Thank you for your consult.     Ta Wynne MD  Cardiology    [facility]

## 2022-10-19 VITALS
SYSTOLIC BLOOD PRESSURE: 158 MMHG | BODY MASS INDEX: 17.27 KG/M2 | WEIGHT: 97.44 LBS | DIASTOLIC BLOOD PRESSURE: 75 MMHG | HEART RATE: 104 BPM | HEIGHT: 63 IN | TEMPERATURE: 98 F | RESPIRATION RATE: 18 BRPM | OXYGEN SATURATION: 100 %

## 2022-10-19 PROBLEM — R73.9 HYPERGLYCEMIA: Status: RESOLVED | Noted: 2022-10-17 | Resolved: 2022-10-19

## 2022-10-19 LAB
ALBUMIN SERPL BCP-MCNC: 2.4 G/DL (ref 3.5–5.2)
ALP SERPL-CCNC: 83 U/L (ref 55–135)
ALT SERPL W/O P-5'-P-CCNC: <5 U/L (ref 10–44)
ANION GAP SERPL CALC-SCNC: 10 MMOL/L (ref 8–16)
AORTIC ROOT ANNULUS: 3.19 CM
AORTIC VALVE CUSP SEPERATION: 2.08 CM
ASCENDING AORTA: 2.63 CM
AST SERPL-CCNC: 15 U/L (ref 10–40)
AV INDEX (PROSTH): 0.77
AV MEAN GRADIENT: 3 MMHG
AV PEAK GRADIENT: 8 MMHG
AV VALVE AREA: 2.5 CM2
AV VELOCITY RATIO: 0.75
BASOPHILS # BLD AUTO: 0.14 K/UL (ref 0–0.2)
BASOPHILS NFR BLD: 2 % (ref 0–1.9)
BILIRUB SERPL-MCNC: 1 MG/DL (ref 0.1–1)
BSA FOR ECHO PROCEDURE: 1.36 M2
BUN SERPL-MCNC: 6 MG/DL (ref 8–23)
CALCIUM SERPL-MCNC: 8.9 MG/DL (ref 8.7–10.5)
CHLORIDE SERPL-SCNC: 103 MMOL/L (ref 95–110)
CO2 SERPL-SCNC: 24 MMOL/L (ref 23–29)
CREAT SERPL-MCNC: 0.7 MG/DL (ref 0.5–1.4)
CV ECHO LV RWT: 0.65 CM
CV PHARM DOSE: 0.8 MG
CV STRESS BASE HR: 77 BPM
DIASTOLIC BLOOD PRESSURE: 84 MMHG
DIFFERENTIAL METHOD: ABNORMAL
DOP CALC AO PEAK VEL: 1.44 M/S
DOP CALC AO VTI: 24.46 CM
DOP CALC LVOT AREA: 3.3 CM2
DOP CALC LVOT DIAMETER: 2.04 CM
DOP CALC LVOT PEAK VEL: 1.08 M/S
DOP CALC LVOT STROKE VOLUME: 61.22 CM3
DOP CALC MV VTI: 0.21 CM
DOP CALCLVOT PEAK VEL VTI: 18.74 CM
E WAVE DECELERATION TIME: 207.84 MSEC
E/A RATIO: 0.67
E/E' RATIO: 5.76 M/S
ECHO LV POSTERIOR WALL: 1.02 CM (ref 0.6–1.1)
EJECTION FRACTION: 55 %
EOSINOPHIL # BLD AUTO: 1 K/UL (ref 0–0.5)
EOSINOPHIL NFR BLD: 14.2 % (ref 0–8)
ERYTHROCYTE [DISTWIDTH] IN BLOOD BY AUTOMATED COUNT: 14.9 % (ref 11.5–14.5)
EST. GFR  (NO RACE VARIABLE): >60 ML/MIN/1.73 M^2
FRACTIONAL SHORTENING: 22 % (ref 28–44)
GLUCOSE SERPL-MCNC: 85 MG/DL (ref 70–110)
HCT VFR BLD AUTO: 31.5 % (ref 37–48.5)
HGB BLD-MCNC: 10.1 G/DL (ref 12–16)
IMM GRANULOCYTES # BLD AUTO: 0.01 K/UL (ref 0–0.04)
IMM GRANULOCYTES NFR BLD AUTO: 0.1 % (ref 0–0.5)
INTERVENTRICULAR SEPTUM: 0.99 CM (ref 0.6–1.1)
IVRT: 121.79 MSEC
LA MAJOR: 3.33 CM
LA MINOR: 4.58 CM
LA WIDTH: 3.51 CM
LEFT ATRIUM SIZE: 3.19 CM
LEFT ATRIUM VOLUME INDEX MOD: 20.5 ML/M2
LEFT ATRIUM VOLUME INDEX: 26.6 ML/M2
LEFT ATRIUM VOLUME MOD: 28.31 CM3
LEFT ATRIUM VOLUME: 36.7 CM3
LEFT INTERNAL DIMENSION IN SYSTOLE: 2.44 CM (ref 2.1–4)
LEFT VENTRICLE DIASTOLIC VOLUME INDEX: 28.03 ML/M2
LEFT VENTRICLE DIASTOLIC VOLUME: 38.68 ML
LEFT VENTRICLE MASS INDEX: 64 G/M2
LEFT VENTRICLE SYSTOLIC VOLUME INDEX: 15.2 ML/M2
LEFT VENTRICLE SYSTOLIC VOLUME: 21.02 ML
LEFT VENTRICULAR INTERNAL DIMENSION IN DIASTOLE: 3.13 CM (ref 3.5–6)
LEFT VENTRICULAR MASS: 88.07 G
LV LATERAL E/E' RATIO: 5.44 M/S
LV SEPTAL E/E' RATIO: 6.13 M/S
LYMPHOCYTES # BLD AUTO: 1.1 K/UL (ref 1–4.8)
LYMPHOCYTES NFR BLD: 14.9 % (ref 18–48)
MAGNESIUM SERPL-MCNC: 1.8 MG/DL (ref 1.6–2.6)
MCH RBC QN AUTO: 29.1 PG (ref 27–31)
MCHC RBC AUTO-ENTMCNC: 32.1 G/DL (ref 32–36)
MCV RBC AUTO: 91 FL (ref 82–98)
MONOCYTES # BLD AUTO: 0.7 K/UL (ref 0.3–1)
MONOCYTES NFR BLD: 10.4 % (ref 4–15)
MV A" WAVE DURATION": 8.37 MSEC
MV MEAN GRADIENT: 0 MMHG
MV PEAK A VEL: 0.73 M/S
MV PEAK E VEL: 0.49 M/S
MV PEAK GRADIENT: 0 MMHG
MV STENOSIS PRESSURE HALF TIME: 60.27 MS
MV VALVE AREA BY CONTINUITY EQUATION: 291.53 CM2
MV VALVE AREA P 1/2 METHOD: 3.65 CM2
NEUTROPHILS # BLD AUTO: 4.1 K/UL (ref 1.8–7.7)
NEUTROPHILS NFR BLD: 58.4 % (ref 38–73)
NRBC BLD-RTO: 0 /100 WBC
OHS CV CPX 85 PERCENT MAX PREDICTED HEART RATE MALE: 129
OHS CV CPX MAX PREDICTED HEART RATE: 152
OHS CV CPX PATIENT IS FEMALE: 1
OHS CV CPX PATIENT IS MALE: 0
OHS CV CPX PEAK DIASTOLIC BLOOD PRESSURE: 65 MMHG
OHS CV CPX PEAK HEAR RATE: 151 BPM
OHS CV CPX PEAK RATE PRESSURE PRODUCT: NORMAL
OHS CV CPX PEAK SYSTOLIC BLOOD PRESSURE: 181 MMHG
OHS CV CPX PERCENT MAX PREDICTED HEART RATE ACHIEVED: 99
OHS CV CPX RATE PRESSURE PRODUCT PRESENTING: NORMAL
PISA MRMAX VEL: 0.06 M/S
PISA TR MAX VEL: 3.31 M/S
PLATELET # BLD AUTO: 361 K/UL (ref 150–450)
PMV BLD AUTO: 10.7 FL (ref 9.2–12.9)
POCT GLUCOSE: 81 MG/DL (ref 70–110)
POCT GLUCOSE: 98 MG/DL (ref 70–110)
POTASSIUM SERPL-SCNC: 3.4 MMOL/L (ref 3.5–5.1)
PROT SERPL-MCNC: 6.2 G/DL (ref 6–8.4)
PULM VEIN S/D RATIO: 1.85
PV PEAK D VEL: 0.4 M/S
PV PEAK S VEL: 0.74 M/S
PV PEAK VELOCITY: 0.84 CM/S
RA MAJOR: 4.16 CM
RA PRESSURE: 8 MMHG
RA WIDTH: 2.62 CM
RBC # BLD AUTO: 3.47 M/UL (ref 4–5.4)
RIGHT VENTRICULAR END-DIASTOLIC DIMENSION: 2.25 CM
RV TISSUE DOPPLER FREE WALL SYSTOLIC VELOCITY 1 (APICAL 4 CHAMBER VIEW): 12.51 CM/S
SINUS: 2.52 CM
SODIUM SERPL-SCNC: 137 MMOL/L (ref 136–145)
STJ: 2.58 CM
SYSTOLIC BLOOD PRESSURE: 146 MMHG
TDI LATERAL: 0.09 M/S
TDI SEPTAL: 0.08 M/S
TDI: 0.09 M/S
TR MAX PG: 44 MMHG
TRICUSPID ANNULAR PLANE SYSTOLIC EXCURSION: 2.95 CM
TV REST PULMONARY ARTERY PRESSURE: 52 MMHG
WBC # BLD AUTO: 7.04 K/UL (ref 3.9–12.7)

## 2022-10-19 PROCEDURE — 94761 N-INVAS EAR/PLS OXIMETRY MLT: CPT

## 2022-10-19 PROCEDURE — 99225 PR SUBSEQUENT OBSERVATION CARE,LEVEL II: CPT | Mod: 25,,, | Performed by: SPECIALIST

## 2022-10-19 PROCEDURE — 63600175 PHARM REV CODE 636 W HCPCS: Performed by: HOSPITALIST

## 2022-10-19 PROCEDURE — 96375 TX/PRO/DX INJ NEW DRUG ADDON: CPT

## 2022-10-19 PROCEDURE — 85025 COMPLETE CBC W/AUTO DIFF WBC: CPT | Performed by: NURSE PRACTITIONER

## 2022-10-19 PROCEDURE — 99900035 HC TECH TIME PER 15 MIN (STAT)

## 2022-10-19 PROCEDURE — 80053 COMPREHEN METABOLIC PANEL: CPT | Performed by: NURSE PRACTITIONER

## 2022-10-19 PROCEDURE — 83735 ASSAY OF MAGNESIUM: CPT | Performed by: NURSE PRACTITIONER

## 2022-10-19 PROCEDURE — 25000003 PHARM REV CODE 250: Performed by: NURSE PRACTITIONER

## 2022-10-19 PROCEDURE — 99225 PR SUBSEQUENT OBSERVATION CARE,LEVEL II: ICD-10-PCS | Mod: 25,,, | Performed by: SPECIALIST

## 2022-10-19 PROCEDURE — G0378 HOSPITAL OBSERVATION PER HR: HCPCS

## 2022-10-19 PROCEDURE — 25000003 PHARM REV CODE 250: Performed by: SPECIALIST

## 2022-10-19 PROCEDURE — 36415 COLL VENOUS BLD VENIPUNCTURE: CPT | Performed by: NURSE PRACTITIONER

## 2022-10-19 PROCEDURE — 99213 PR OFFICE/OUTPT VISIT, EST, LEVL III, 20-29 MIN: ICD-10-PCS | Mod: ,,, | Performed by: INTERNAL MEDICINE

## 2022-10-19 PROCEDURE — 99213 OFFICE O/P EST LOW 20 MIN: CPT | Mod: ,,, | Performed by: INTERNAL MEDICINE

## 2022-10-19 RX ORDER — HYDROCHLOROTHIAZIDE 12.5 MG/1
12.5 TABLET ORAL DAILY
Status: DISCONTINUED | OUTPATIENT
Start: 2022-10-20 | End: 2022-10-19 | Stop reason: HOSPADM

## 2022-10-19 RX ORDER — REGADENOSON 0.08 MG/ML
0.4 INJECTION, SOLUTION INTRAVENOUS ONCE
Status: COMPLETED | OUTPATIENT
Start: 2022-10-19 | End: 2022-10-19

## 2022-10-19 RX ORDER — SODIUM CHLORIDE 9 MG/ML
INJECTION, SOLUTION INTRAVENOUS CONTINUOUS
Status: DISCONTINUED | OUTPATIENT
Start: 2022-10-19 | End: 2022-10-19 | Stop reason: HOSPADM

## 2022-10-19 RX ORDER — METOPROLOL SUCCINATE 50 MG/1
50 TABLET, EXTENDED RELEASE ORAL DAILY
Status: ON HOLD
Start: 2022-10-19 | End: 2022-12-31 | Stop reason: SDUPTHER

## 2022-10-19 RX ADMIN — PANTOPRAZOLE SODIUM 40 MG: 40 TABLET, DELAYED RELEASE ORAL at 11:10

## 2022-10-19 RX ADMIN — METOPROLOL SUCCINATE 25 MG: 25 TABLET, EXTENDED RELEASE ORAL at 11:10

## 2022-10-19 RX ADMIN — SERTRALINE 25 MG: 25 TABLET, FILM COATED ORAL at 11:10

## 2022-10-19 RX ADMIN — POTASSIUM BICARBONATE 20 MEQ: 391 TABLET, EFFERVESCENT ORAL at 11:10

## 2022-10-19 RX ADMIN — REGADENOSON 0.4 MG: 0.08 INJECTION, SOLUTION INTRAVENOUS at 10:10

## 2022-10-19 RX ADMIN — ASPIRIN 81 MG: 81 TABLET, COATED ORAL at 11:10

## 2022-10-19 RX ADMIN — APIXABAN 5 MG: 2.5 TABLET, FILM COATED ORAL at 11:10

## 2022-10-19 RX ADMIN — SODIUM CHLORIDE: 0.9 INJECTION, SOLUTION INTRAVENOUS at 07:10

## 2022-10-19 NOTE — MEDICAL/APP STUDENT
[facility]  Progress Note    Patient Name: Claire Bowser  MRN: [id number]  Patient Class: OP- Observation   Admission Date: 10/17/2022  Length of Stay: 0 days  Attending Physician: Genny Tucker MD  Primary Care Provider: Samuel Brady MD    Subjective:     Principal Problem: Chest pain    Chief Complaint:   Chief Complaint   Patient presents with    Nausea     Started last week    Shortness of Breath     Recent DVT left leg        HPI: Claire Bowser is a 62-year-old female who presents emergency room complaining of generalized weakness, exertional dyspnea, left-sided chest pain, and nausea which onset approximately 1 week ago.  She denies any fever or chills.  No known sick contacts or travel.  She is vaccinated for COVID.  Shortness of breath is minimally relieved with rest and worsens with exertion.  Previous medical history includes pancreatitis, anxiety, hypertension, hyperlipidemia, rheumatoid arthritis, DVT, active smoker.  ER workup:  CBC unremarkable.  CMP with sodium of 133, potassium of 2.5, glucose of 174? ? , albumin of 2.9, magnesium decreased at 1.5.  Troponin mildly elevated 0.044.  EKG demonstrated ST Segment Depression: II, III, V2, V1, V3, V4, V5, V6 and AVF.  Patient was given magnesium and potassium in ER.  Patient admitted to Hospital Medicine for treatment management.  Will replete electrolytes p.r.n., trend troponin, and cardiac consult in a.m..    Hospital Course: No notes on file    Interval History:     10/18/2022:  Patient seen and examined today by Hospital Medicine. She states that she still feels generalized weakness and decreased appetite as well as SOB, CP, and nausea. However, her symptoms seem to be somewhat better from yesterday upon being admitted. She states that she has had about 40 lbs of unintentional weight loss approx. over the last 6 months. She has no PMH of thyroid disease or cancer or any past FamHx of cancer that she is aware of. It was  discussed with the patient that further workup will be done and imaging will be ordered to evaluate the weight loss and she expressed understanding. She has no other questions or concerns at this time. During chart review it was found that she has a pancreatic mass. Upon workup for her cholecystectomy during EUS (7/1/22), she was found to have a hypoechoic 29mm x 25 mm well-defined mass located in the pancreatic neck. FNA and core biopsy was taken from the mass and there was no evidence of malignancy identified.    10/19/2022:  Patient seen and examined by Hospital Medicine. She remained afebrile and on room air throughout the night. She says she feels a lot better today than she did yesterday and thinks the IV fluids may be perking her up. She does report she has an appetite and feels like eating but knew that she was NPO and would have to wait until after the stress test. She did mention that the Boost breeze drink recommended by nutrition made her have an episode of diarrhea. She denies CP, SOB, N/V, hematochezia, and hematuria.     Past Medical History:   Diagnosis Date    Anxiety     Digestive disorder     Flu 02/2017    [facility]    Hypertension     Rheumatoid arthritis involving multiple sites with positive rheumatoid factor 01/14/2021       Past Surgical History:   Procedure Laterality Date    COLONOSCOPY N/A 2/26/2021    Procedure: COLONOSCOPY;  Surgeon: Amy Sidhu MD;  Location: [facility];  Service: Endoscopy;  Laterality: N/A;    ENDOSCOPIC ULTRASOUND OF UPPER GASTROINTESTINAL TRACT N/A 7/1/2022    Procedure: ULTRASOUND, UPPER GI TRACT, ENDOSCOPIC;  Surgeon: Donavon Jewell III, MD;  Location: [facility];  Service: Endoscopy;  Laterality: N/A;    ESOPHAGOGASTRODUODENOSCOPY N/A 2/26/2021    Procedure: EGD (ESOPHAGOGASTRODUODENOSCOPY);  Surgeon: Amy Sidhu MD;  Location: [facility];  Service: Endoscopy;  Laterality: N/A;    LAPAROSCOPIC CHOLECYSTECTOMY N/A 7/27/2022    Procedure:  CHOLECYSTECTOMY, LAPAROSCOPIC;  Surgeon: Ramón Hwang III, MD;  Location: [facility];  Service: General;  Laterality: N/A;    TUBAL LIGATION         Review of patient's allergies indicates:   Allergen Reactions    No known drug allergies        No current facility-administered medications on file prior to encounter.     Current Outpatient Medications on File Prior to Encounter   Medication Sig    apixaban (ELIQUIS) 5 mg Tab Take 1 tablet (5 mg total) by mouth 2 (two) times daily.    cholecalciferol, vitamin D3, (VITAMIN D3) 10 mcg (400 unit) Tab Take 400 Units by mouth once daily.    folic acid (FOLVITE) 1 MG tablet Take 1 tablet (1 mg total) by mouth once daily.    hydroCHLOROthiazide (HYDRODIURIL) 25 MG tablet Take 25 mg by mouth once daily.    leflunomide (ARAVA) 20 MG Tab Take 1 tablet (20 mg total) by mouth once daily.    metoprolol succinate (TOPROL-XL) 50 MG 24 hr tablet Take 1 tablet (50 mg total) by mouth once daily. (Patient taking differently: Take 50 mg by mouth once daily. 1/2 tab qd)    pantoprazole (PROTONIX) 40 MG tablet Take 1 tablet (40 mg total) by mouth once daily.    potassium chloride SA (K-DUR,KLOR-CON) 20 MEQ tablet Take two twice a day for three days then reduce to one twice a day    predniSONE (DELTASONE) 5 MG tablet Take 5 mg by mouth once daily.    sulfamethoxazole-trimethoprim 800-160mg (BACTRIM DS) 800-160 mg Tab Take 1 tablet by mouth 2 (two) times daily. for 7 days    traZODone (DESYREL) 50 MG tablet Take 1 tablet (50 mg total) by mouth every evening.    mupirocin (BACTROBAN) 2 % ointment Apply topically 3 (three) times daily.    sertraline (ZOLOFT) 25 MG tablet Take 1 tablet by mouth once daily    traMADoL (ULTRAM) 50 mg tablet Take 1 tablet (50 mg total) by mouth every 6 (six) hours as needed for Pain.     Family History       Problem Relation (Age of Onset)    Alcohol abuse Paternal Grandfather, Paternal Grandmother    COPD Father, Brother    Cancer Maternal Aunt    Cirrhosis  Paternal Grandmother    Diabetes Mother, Maternal Aunt    Emphysema Brother    Hearing loss Mother    Heart disease Mother, Maternal Aunt, Maternal Uncle    Hypertension Mother, Maternal Uncle    Kidney disease Mother    Liver disease Paternal Grandfather, Sister    No Known Problems Son, Paternal Uncle    Sleep apnea Brother    Stroke Mother          Tobacco Use    Smoking status: Every Day     Packs/day: 1.00     Years: 7.00     Pack years: 7.00     Types: Cigarettes    Smokeless tobacco: Never    Tobacco comments:     [phone]   Substance and Sexual Activity    Alcohol use: No    Drug use: Never    Sexual activity: Yes     Partners: Male     Review of Systems   Constitutional:  Positive for unexpected weight change (unintentional weight loss). Negative for chills and fever.   Respiratory:  Negative for shortness of breath.    Cardiovascular:  Negative for chest pain.   Gastrointestinal:  Positive for diarrhea (one episode). Negative for abdominal pain, blood in stool, nausea and vomiting.   Genitourinary:  Negative for hematuria.   Neurological:  Positive for weakness (generalized but improving).   Objective:     Vital Signs (Most Recent):  Temp: 96.1 °F (35.6 °C) (10/19/22 0728)  Pulse: 73 (10/19/22 0756)  Resp: 16 (10/19/22 0756)  BP: 133/60 (10/19/22 0728)  SpO2: 97 % (10/19/22 0756) Vital Signs (24h Range):  Temp:  [96.1 °F (35.6 °C)-98.7 °F (37.1 °C)] 96.1 °F (35.6 °C)  Pulse:  [70-97] 73  Resp:  [16-20] 16  SpO2:  [96 %-99 %] 97 %  BP: (127-158)/(60-76) 133/60     Weight: 44.2 kg (97 lb 7.1 oz)  Body mass index is 17.54 kg/m².    Intake/Output Summary (Last 24 hours) at 10/19/2022 0978  Last data filed at 10/19/2022 0524  Gross per 24 hour   Intake 236 ml   Output 200 ml   Net 36 ml      Physical Exam  Constitutional:       General: She is awake. She is not in acute distress.     Appearance: She is cachectic. She is not toxic-appearing or diaphoretic.   Cardiovascular:      Rate and Rhythm: Normal rate  and regular rhythm.      Heart sounds: S1 normal and S2 normal. No murmur heard.    No friction rub. No gallop.   Pulmonary:      Effort: Pulmonary effort is normal. No accessory muscle usage or respiratory distress.      Breath sounds: Examination of the right-upper field reveals decreased breath sounds. Examination of the right-middle field reveals decreased breath sounds. Examination of the right-lower field reveals decreased breath sounds. Decreased breath sounds present. No wheezing, rhonchi or rales.   Abdominal:      General: Abdomen is flat. There is no distension.      Tenderness: There is no abdominal tenderness. There is no guarding.   Skin:     General: Skin is warm and dry.   Neurological:      Mental Status: She is alert and oriented to person, place, and time.   Psychiatric:         Mood and Affect: Mood normal.         Behavior: Behavior normal. Behavior is cooperative.       Significant Labs: All pertinent labs within the past 24 hours have been reviewed.  CBC:   Recent Labs   Lab 10/17/22  1746 10/18/22  0340 10/19/22  0459   WBC 7.16 4.81 7.04   HGB 12.1 9.4* 10.1*   HCT 36.4* 27.4* 31.5*   * 345 361     CMP:   Recent Labs   Lab 10/17/22  1746 10/18/22  0340 10/19/22  0459   * 138 137   K 2.5* 3.8 3.4*   CL 91* 106 103   CO2 29 24 24   * 87 85   BUN 13 9 6*   CREATININE 0.9 0.6 0.7   CALCIUM 10.2 8.7 8.9   PROT 7.5 5.8* 6.2   ALBUMIN 2.9* 2.3* 2.4*   BILITOT 0.7 0.8 1.0   ALKPHOS 95 77 83   AST 14 16 15   ALT 6* <5* <5*   ANIONGAP 13 8 10     Lipase:   Recent Labs   Lab 10/18/22  0948   LIPASE 438*     Magnesium:   Recent Labs   Lab 10/17/22  1746 10/18/22  0340 10/19/22  0459   MG 1.5* 2.1 1.8     Troponin:   Recent Labs   Lab 10/17/22  1746 10/17/22  2321 10/18/22  0340   TROPONINI 0.044* 0.038* 0.037*       Significant Imaging: I have reviewed all pertinent imaging results/findings within the past 24 hours.    Narrative & Impression  EXAMINATION:  CT CHEST ABDOMEN PELVIS  WITH CONTRAST (XPD)     CLINICAL HISTORY:  Weight loss, unintended;     TECHNIQUE:  Low dose axial images, sagittal and coronal reformations were obtained from the thoracic inlet to the pubic symphysis following the IV administration of 75 mL of Omnipaque 350 and no p.o. contrast     COMPARISON:  06/02/2022 abdomen and pelvis.  Chest none     FINDINGS:  Chest;.  Apical pleural and parenchymal scarring.  Emphysematous changes.  Dependent hypoventilatory changes.  Mild bronchiectasis and mucous plugging.  For example mucous plugging right middle lobe axial series 6, images 307 through 286.  Larger linear branching nodular opacity more caudally in the right lower lobe extending for a length of just over 2 cm for example axial series 605 images 107 through 106 and axial series 6 images 317 through 345 and smaller linear branching nodular opacity in the left apex axial series 6 image 91 likely represent mucous plugging.     No significant hilar or mediastinal adenopathy.  No pleural effusion on the right.  Small left pleural effusion and slight platelike atelectasis in the posterior dependent left lung base abutting the effusion.     Abdomen and pelvis; liver and spleen unremarkable appearance.  Cholecystectomy clips.  No biliary duct dilatation.  Abdominal aorta tapers without aneurysmal dilatation.  Adrenal glands unremarkable appearance.  Renal enhancement is symmetrical and there is no hydronephrosis.  Large upper pole renal cysts, some 6 cm on the left, 4.6 cm on the right and small subcentimeter renal cysts.     Circumscribed 1.3 cm cystic lesion in the body of the pancreas axial series 3, image 44 which corresponds as was seen and described as being in the neck of the pancreas on the prior MRCP.  Today's study however this appears contiguous with another thin walled fluid collection anterior to the pancreas that measures 2.4 x 1.1 cm for example axial series 3 images 41 through 44.  Other new peripancreatic fluid  collections with somewhat defined fluid collection anterior to the tail the pancreas wrapping posterior to the stomach and posterolateral to the stomach with diameter of only up to approximately 10 mm, oval thin walled 1.6 cm collection in the vannesa hepatis axial series 3, image 55 and more ill-defined peripancreatic fluid collections elsewhere.  Slightly irregular thick enhancing wall cystic necrotic structure in the head the pancreas measuring 8 mm series 3, image 59.  Combination of pancreatic neoplasm and pancreatitis cannot be excluded but findings could all represent pancreatitis.     Trace free fluid the pericolic gutters with large although still fairly small amount within the pelvis.  No free intraperitoneal air.  Diverticulosis without CT findings of acute diverticulitis.  Appendix is not identified with certainty but no abnormal appendix inflammatory changes appendiceal region is seen     Impression:     Findings suggesting acute pancreatitis with multiple pseudocysts formation.  This includes peripancreatic fluid collections with thin well-defined walls, ill-defined peripancreatic fluid, fluid in the pericolic gutters and more so in the pelvis most small left pleural effusion.     Additional findings as detailed above.        Electronically signed by: Liz Mccormick MD  Date:                                            10/18/2022  Time:                                           16:00        Assessment/Plan:      Chest pain  - Telemetry monitoring  - Trending troponin  - 0.044>0.038>0.037 , trended down  - Cardiology consulted and visited patient. NM stress test was ordered for this morning.  - NPO  - No reported CP today- resolved    2. Hyperglycemia  - No documented PMH of DM  - Likely secondary to steroids for RA  - SSI to cover elevated glucose readings  - A1c: 4.9  - Glucose remains controlled.    3. Hypokalemia  - Replaced Potassium  - Currently, slightly under normal limits  - Will continue to trend  and monitor for electrolyte changes and replace if needed.    4. Tobacco use  - Chronic, 1/2 pack a day  - Smoking cessation counseling given    5. Hypertension  - Chronic, controlled  - Continued home BP medication  - PRN BP medication if becomes uncontrolled  - Will continue to monitor for changes    6. Acute DVT (deep venous thrombosis)  - Reviewed Left LE US from 9/26/22, shows DVT involving popliteal, posterior tibial, anterior tibial, and peroneal veins.  - Was put on Eliquis and will continue  - No acute signs of bleeding at this time  - Will continue to monitor for bleeding or any other acute changes    7. Severe malnutrition  - Loss of appetite  - 40 lbs unintentional weight loss over approx the last 6 months  - Nutrition consulted and visited the patient. Will follow their recommendations.  - GI consult ordered    8. Weight loss, unintentional  - No known FamHx or cancer  - No known PMH of cancer or thyroid disease  - CT chest, abdomen, pelvis resulted and shows signs of acute pancreatitis as well as pancreatic pseudocysts.  - Loss of appetite  - 40 lbs unintentional weight loss over approx the last 6 months  - Nutrition consulted and visited the patient. Will follow their recommendations.  - GI consult ordered          Active Diagnoses:    Diagnosis Date Noted POA    PRINCIPAL PROBLEM:  Chest pain [R07.9] 10/17/2022 Yes    Weight loss, unintentional [R63.4] 10/18/2022 Yes    Acute DVT (deep venous thrombosis) [I82.409] 10/18/2022 Yes    Hyperglycemia [R73.9] 10/17/2022 Yes    Severe malnutrition [E43] 06/05/2022 Yes    Hypokalemia [E87.6] 05/25/2022 Yes    Tobacco use [Z72.0] 08/10/2018 Yes    Hypertension [I10] 10/04/2013 Yes      Problems Resolved During this Admission:     VTE Risk Mitigation (From admission, onward)           Ordered     apixaban tablet 5 mg  2 times daily         10/17/22 1958                       Maggi Cordoba  [facility]

## 2022-10-19 NOTE — NURSING
Pt signed  AMA form for bed alarm refusal. Risk factors explained. Education provided. NP notified.

## 2022-10-19 NOTE — NURSING
Pt discharged to home via pov accompanied by . Iv and tele dc'd. All personal belongings taken, all questions answered. Pt and  state understanding dc instructions. Wheeled out by transport.

## 2022-10-19 NOTE — HOSPITAL COURSE
Pt was monitored closely during her stay.  Her symptoms improved.  Cardiology was consulted.  She had mild troponin elevation which was trended without significant change.  She underwent stress test on 10/19 which was negative for any acute ischemia.  She underwent CT of the chest abdomen and pelvis due to report of weight loss, unintentional, and elevated CA 19-9.  Pt had been evaluated in the past for pancreas mass which was benign after tissue biopsy.  CT here showed acute pancreatitis with findings suggestive of cirrhosis.  She had associated elevated lipase count.  She was tolerating oral intake without issue.  GI was consulted to evaluate Pt. They recommended discharge with close outpatient follow-up with Dr. Jewell who has EUS capabilities.  Pt is due for repeat MRI of the abdomen which was ordered to be performed as outpatient in accordance with GI recommendations.  Pt was eager to discharge.  The importance of close follow-up and further workup was stressed to Pt who verbalized understanding and agreement with discharge plan.  Return precautions were provided.    Pt was seen on day of discharge and deemed appropriate for discharge.

## 2022-10-19 NOTE — CONSULTS
Wounds to left lower leg anteriorly noted present on admit. Plan urgotul cut to cover the wounds then cover with a foam dressing and change twice a week. Wound care to see this patient on 10/19/22 for follow up assessment.

## 2022-10-19 NOTE — DISCHARGE SUMMARY
[facility] Hospital Medicine  Discharge Summary      Patient Name: Claire Bowser  MRN: [id number]  Patient Class: OP- Observation  Admission Date: 10/17/2022  Hospital Length of Stay: 0 days  Discharge Date and Time: 10/19/2022  3:50 PM  Attending Physician: No att. providers found   Discharging Provider: Jenniffer Giordano NP  Primary Care Provider: Samuel Brady MD      HPI:   Claire Bowser is a 62-year-old female who presents emergency room complaining of generalized weakness, exertional dyspnea, left-sided chest pain, and nausea which onset approximately 1 week ago.  She denies any fever or chills.  No known sick contacts or travel.  She is vaccinated for COVID.  Shortness of breath is minimally relieved with rest and worsens with exertion.  Previous medical history includes pancreatitis, anxiety, hypertension, hyperlipidemia, rheumatoid arthritis, DVT, active smoker.  ER workup:  CBC unremarkable.  CMP with sodium of 133, potassium of 2.5, glucose of 174? ? , albumin of 2.9, magnesium decreased at 1.5.  Troponin mildly elevated 0.044.  EKG demonstrated ST Segment Depression: II, III, V2, V1, V3, V4, V5, V6 and AVF.  Patient was given magnesium and potassium in ER.  Patient admitted to Hospital Medicine for treatment management.  Will replete electrolytes p.r.n., trend troponin, and cardiac consult in a.m..      * No surgery found *      Hospital Course:   Pt was monitored closely during her stay.  Her symptoms improved.  Cardiology was consulted.  She had mild troponin elevation which was trended without significant change.  She underwent stress test on 10/19 which was negative for any acute ischemia.  She underwent CT of the chest abdomen and pelvis due to report of weight loss, unintentional, and elevated CA 19-9.  Pt had been evaluated in the past for pancreas mass which was benign after tissue biopsy.  CT here showed acute pancreatitis with findings suggestive of cirrhosis.  She had  associated elevated lipase count.  She was tolerating oral intake without issue.  GI was consulted to evaluate Pt. They recommended discharge with close outpatient follow-up with Dr. Jewell who has EUS capabilities.  Pt is due for repeat MRI of the abdomen which was ordered to be performed as outpatient in accordance with GI recommendations.  Pt was eager to discharge.  The importance of close follow-up and further workup was stressed to Pt who verbalized understanding and agreement with discharge plan.  Return precautions were provided.    Pt was seen on day of discharge and deemed appropriate for discharge.       Goals of Care Treatment Preferences:  Code Status: Full Code      Consults:   Consults (From admission, onward)        Status Ordering Provider     Inpatient consult to Gastroenterology  Once        Provider:  Antwan Salter MD    Completed HEIDI JJ     Inpatient consult to Cardiology  Once        Provider:  Ta Wynne MD    Acknowledged DILIP MCNAMARA     IP consult to dietary  Once        Provider:  (Not yet assigned)    Completed DILIP MCNAMARA          No new Assessment & Plan notes have been filed under this hospital service since the last note was generated.  Service: Hospital Medicine    Final Active Diagnoses:    Diagnosis Date Noted POA    PRINCIPAL PROBLEM:  Chest pain [R07.9] 10/17/2022 Yes    Weight loss, unintentional [R63.4] 10/18/2022 Yes    Acute DVT (deep venous thrombosis) [I82.409] 10/18/2022 Yes    Severe malnutrition [E43] 06/05/2022 Yes    Hypokalemia [E87.6] 05/25/2022 Yes    Tobacco use [Z72.0] 08/10/2018 Yes    Hypertension [I10] 10/04/2013 Yes      Problems Resolved During this Admission:    Diagnosis Date Noted Date Resolved POA    Hyperglycemia [R73.9] 10/17/2022 10/19/2022 Yes       Discharged Condition: good    Disposition: Home or Self Care    Follow Up:   Follow-up Information     Samuel Brady MD Follow up in 1 week(s).    Specialty:  Family Medicine  Why: with NP Darren Thorpe Nov 1, 2022 at 10:20am  Contact information:  [address]  [address]  [place] [address] [phone]             Donavon Jewell III, MD Follow up in 1 week(s).    Specialty: Gastroenterology  Why: Nov 2, 2022 at 10:00am  Contact information:  [address] [facility]  [place] [address]  [phone]                       Patient Instructions:      MRI Abdomen W WO Contrast_INC MRCP (XPD)   Standing Status: Future Standing Exp. Date: 10/19/23     Order Specific Question Answer Comments   Does the patient have a pacemaker or a defibrilator (Note: Some facilities may not be able to schedule an MRI for patients with pacemakers and defibrillators. You should contact your local radiology department to determine if this is the case.)? No    Does the patient have an aneurysm or surgical clip, pump, nerve/brain stimulator, middle/inner ear prosthesis, or other metal implant or foreign object (bullet, shrapnel)? If they have a card related to their implant, ask them to bring it. Issues related to the implant may cause the MRI to be delayed. No    Is the patient claustrophobic? No    Will the patient require sedation? No    Does the patient have any of the following conditions? Diabetes, History of Renal Disease or Hypertension requiring medical therapy? Yes    May the Radiologist modify the order per protocol to meet the clinical needs of the patient? Yes    Is this part of a Research Study? No    Does the patient have on a skin patch for medication with aluminized backing? No      Diet full liquid     Notify your health care provider if you experience any of the following:  temperature >100.4     Notify your health care provider if you experience any of the following:  persistent nausea and vomiting or diarrhea     Notify your health care provider if you experience any of the following:  severe uncontrolled pain     Notify your health care provider if you experience any of the  following:  difficulty breathing or increased cough     Notify your health care provider if you experience any of the following:  persistent dizziness, light-headedness, or visual disturbances     Notify your health care provider if you experience any of the following:  increased confusion or weakness     Activity as tolerated       Significant Diagnostic Studies: Labs:   CMP   Recent Labs   Lab 10/17/22  1746 10/18/22  0340 10/19/22  0459   * 138 137   K 2.5* 3.8 3.4*   CL 91* 106 103   CO2 29 24 24   * 87 85   BUN 13 9 6*   CREATININE 0.9 0.6 0.7   CALCIUM 10.2 8.7 8.9   PROT 7.5 5.8* 6.2   ALBUMIN 2.9* 2.3* 2.4*   BILITOT 0.7 0.8 1.0   ALKPHOS 95 77 83   AST 14 16 15   ALT 6* <5* <5*   ANIONGAP 13 8 10   , CBC   Recent Labs   Lab 10/17/22  1746 10/18/22  0340 10/19/22  0459   WBC 7.16 4.81 7.04   HGB 12.1 9.4* 10.1*   HCT 36.4* 27.4* 31.5*   * 345 361   , INR No results found for: INR, PROTIME and Troponin   Recent Labs   Lab 10/17/22  1746 10/17/22  2321 10/18/22  0340   TROPONINI 0.044* 0.038* 0.037*        NM Myocardial Perfusion Spect Multi Pharmacologic [[id number]] Resulted: 10/19/22 1229   Order Status: Completed Updated: 10/19/22 1231   Narrative:     EXAMINATION:   NM MYOCARDIAL PERFUSION SPECT MULTI PHARM     CLINICAL HISTORY:   Chest pain/anginal equiv, high CAD risk, not treadmill candidate;     TECHNIQUE:   SPECT images in short, vertical and horizontal long axis were acquired 30 minutes after the injection of 12 mCi of Tc-99m sestamibi/tetrofosmin at rest and 30 mCi during a cardiac stress. The clinical stress and ECG portion of the study is to be read separately.     COMPARISON:   None.     FINDINGS:   The quality of the study is slightly compromised by GI activity adjacent to the inferior wall.     Stress SPECT images demonstrate homogenous distribution of the tracer throughout the left ventricle. On the resting images, there is matched homogenous distribution of the tracer  throughout the left ventricle.     The gated post-stress images reveal normal wall motion and normal systolic wall thickening with an estimated LVEF of 82 %. The LV cavity is not dilated with an end-diastolic volume of 33 ml and an end-systolic volume of 6 ml.    Impression:       1.  Scintigraphically negative for ischemia or infarct.   2. the global left ventricular systolic function is good with an LV ejection fraction of 82 % and no evidence of LV dilatation. Wall motion is normal.       Electronically signed by: Liz Mccormick MD   Date: 10/19/2022   Time: 12:29   CT Chest Abdomen Pelvis With Contrast (xpd) [[id number]] Resulted: 10/18/22 1600   Order Status: Completed Updated: 10/18/22 1603   Narrative:     EXAMINATION:   CT CHEST ABDOMEN PELVIS WITH CONTRAST (XPD)     CLINICAL HISTORY:   Weight loss, unintended;     TECHNIQUE:   Low dose axial images, sagittal and coronal reformations were obtained from the thoracic inlet to the pubic symphysis following the IV administration of 75 mL of Omnipaque 350 and no p.o. contrast     COMPARISON:   06/02/2022 abdomen and pelvis.  Chest none     FINDINGS:   Chest;.  Apical pleural and parenchymal scarring.  Emphysematous changes.  Dependent hypoventilatory changes.  Mild bronchiectasis and mucous plugging.  For example mucous plugging right middle lobe axial series 6, images 307 through 286.  Larger linear branching nodular opacity more caudally in the right lower lobe extending for a length of just over 2 cm for example axial series 605 images 107 through 106 and axial series 6 images 317 through 345 and smaller linear branching nodular opacity in the left apex axial series 6 image 91 likely represent mucous plugging.     No significant hilar or mediastinal adenopathy.  No pleural effusion on the right.  Small left pleural effusion and slight platelike atelectasis in the posterior dependent left lung base abutting the effusion.     Abdomen and pelvis; liver and spleen  unremarkable appearance.  Cholecystectomy clips.  No biliary duct dilatation.  Abdominal aorta tapers without aneurysmal dilatation.  Adrenal glands unremarkable appearance.  Renal enhancement is symmetrical and there is no hydronephrosis.  Large upper pole renal cysts, some 6 cm on the left, 4.6 cm on the right and small subcentimeter renal cysts.     Circumscribed 1.3 cm cystic lesion in the body of the pancreas axial series 3, image 44 which corresponds as was seen and described as being in the neck of the pancreas on the prior MRCP.  Today's study however this appears contiguous with another thin walled fluid collection anterior to the pancreas that measures 2.4 x 1.1 cm for example axial series 3 images 41 through 44.  Other new peripancreatic fluid collections with somewhat defined fluid collection anterior to the tail the pancreas wrapping posterior to the stomach and posterolateral to the stomach with diameter of only up to approximately 10 mm, oval thin walled 1.6 cm collection in the vannesa hepatis axial series 3, image 55 and more ill-defined peripancreatic fluid collections elsewhere.  Slightly irregular thick enhancing wall cystic necrotic structure in the head the pancreas measuring 8 mm series 3, image 59.  Combination of pancreatic neoplasm and pancreatitis cannot be excluded but findings could all represent pancreatitis.     Trace free fluid the pericolic gutters with large although still fairly small amount within the pelvis.  No free intraperitoneal air.  Diverticulosis without CT findings of acute diverticulitis.  Appendix is not identified with certainty but no abnormal appendix inflammatory changes appendiceal region is seen    Impression:       Findings suggesting acute pancreatitis with multiple pseudocysts formation.  This includes peripancreatic fluid collections with thin well-defined walls, ill-defined peripancreatic fluid, fluid in the pericolic gutters and more so in the pelvis most  small left pleural effusion.     Additional findings as detailed above.       Electronically signed by: Liz Mccormick MD   Date: 10/18/2022   Time: 16:00       Pending Diagnostic Studies:     Procedure Component Value Units Date/Time    EKG 12-lead [[id number]]     Order Status: Sent Lab Status: No result          Medications:  Reconciled Home Medications:      Medication List      CONTINUE taking these medications    apixaban 5 mg Tab  Commonly known as: ELIQUIS  Take 1 tablet (5 mg total) by mouth 2 (two) times daily.     cholecalciferol (vitamin D3) 10 mcg (400 unit) Tab  Commonly known as: VITAMIN D3  Take 400 Units by mouth once daily.     folic acid 1 MG tablet  Commonly known as: FOLVITE  Take 1 tablet (1 mg total) by mouth once daily.     hydroCHLOROthiazide 25 MG tablet  Commonly known as: HYDRODIURIL  Take 25 mg by mouth once daily.     leflunomide 20 MG Tab  Commonly known as: ARAVA  Take 1 tablet (20 mg total) by mouth once daily.     metoprolol succinate 50 MG 24 hr tablet  Commonly known as: TOPROL-XL  Take 1 tablet (50 mg total) by mouth once daily. 1/2 tab qd     mupirocin 2 % ointment  Commonly known as: BACTROBAN  Apply topically 3 (three) times daily.     pantoprazole 40 MG tablet  Commonly known as: PROTONIX  Take 1 tablet (40 mg total) by mouth once daily.     potassium chloride SA 20 MEQ tablet  Commonly known as: K-DUR,KLOR-CON  Take two twice a day for three days then reduce to one twice a day     predniSONE 5 MG tablet  Commonly known as: DELTASONE  Take 5 mg by mouth once daily.     sertraline 25 MG tablet  Commonly known as: ZOLOFT  Take 1 tablet by mouth once daily     sulfamethoxazole-trimethoprim 800-160mg 800-160 mg Tab  Commonly known as: BACTRIM DS  Take 1 tablet by mouth 2 (two) times daily. for 7 days     traMADoL 50 mg tablet  Commonly known as: ULTRAM  Take 1 tablet (50 mg total) by mouth every 6 (six) hours as needed for Pain.     traZODone 50 MG tablet  Commonly known as:  DESYREL  Take 1 tablet (50 mg total) by mouth every evening.            Indwelling Lines/Drains at time of discharge:   Lines/Drains/Airways     None                 Time spent on the discharge of patient: 36 minutes         Jenniffer Giordano NP  Department of Hospital Medicine  [facility]

## 2022-10-19 NOTE — CARE UPDATE
10/18/22 2004   Patient Assessment/Suction   Level of Consciousness (AVPU) alert   Respiratory Effort Normal;Unlabored   Expansion/Accessory Muscles/Retractions expansion symmetric;no retractions;no use of accessory muscles   PRE-TX-O2   O2 Device (Oxygen Therapy) room air   SpO2 98 %   Pulse Oximetry Type Intermittent   Pulse 97   Resp 18   Aerosol Therapy   $ Aerosol Therapy Charges PRN treatment not required   Respiratory Treatment Status (SVN) PRN treatment not required

## 2022-10-19 NOTE — PLAN OF CARE
[facility]  Discharge Reassessment    Primary Care Provider: Samuel Brady MD    Expected Discharge Date: 10/19/2022    Per MDRs, patient pending GI consult.  SW spoke to patient's nurse who stated she will check and return call.  CM following for discharge planning needs.    Reassessment (most recent)       Discharge Reassessment - 10/19/22 1150          Discharge Reassessment    Assessment Type Discharge Planning Reassessment     Did the patient's condition or plan change since previous assessment? Yes     Discharge Plan discussed with: Patient     Communicated RASHI with patient/caregiver Yes     Discharge Plan A Home     Discharge Plan B Home with family     DME Needed Upon Discharge  none     Discharge Barriers Identified None     Why the patient remains in the hospital Requires continued medical care

## 2022-10-19 NOTE — CONSULTS
[facility]     CC: Epigastric/chest pain    HPI 62 y.o. female who presents emergency room complaining of generalized weakness, exertional dyspnea, left-sided chest pain, and nausea which onset approximately 1 week ago.  She denies any fever or chills.  No known sick contacts or travel.  She is vaccinated for COVID.  Shortness of breath is minimally relieved with rest and worsens with exertion.  Previous medical history includes pancreatitis, anxiety, hypertension, hyperlipidemia, rheumatoid arthritis, DVT, active smoker.  ER workup:  CBC unremarkable.  CMP with sodium of 133, potassium of 2.5, glucose of 174? ? , albumin of 2.9, magnesium decreased at 1.5.  Troponin mildly elevated 0.044.  EKG demonstrated ST Segment Depression: II, III, V2, V1, V3, V4, V5, V6 and AVF.  Patient was given magnesium and potassium in ER.  Patient admitted to Hospital Medicine for treatment management.  Will replete electrolytes p.r.n., trend troponin, and cardiac consult in a.m..        Overview/Hospital Course:  No notes on file     Interval History:  Notes reviewed, no acute events overnight.  Patient resting comfortably in bed and denies acute pain at this time. Pt reports cp is int, sharp quality, left ant cw, worsens with exertion and assoc with sob. Pt reports unintentional weight loss over the past 6 months.  She reports  intermittent nausea and decreased appetite.  Cardiology at bedside and plans for nuclear stress test in the a.m. due to equipment malfunctioning today.  Will also obtain CT chest abdomen pelvis to assess for unintentional weight loss.  Patient verbalized understanding and appreciative of care.  Will continue to monitor closely.    FURTHER HISTORY:  Above obtained from independent review of records from admitting provider as well as from direct discussion with primary team who states cardiology OK with patient D/C.  In addition, on my interview, I note the following:  Patient complains of  epigastric/chest pain, recent onset, now improved with no specific alleviating/exacerbating factors.  She does not endorse specific GI symptoms at this time and she is tolerating PO fluids.  She appears comfortable.  She had recent pancreatitis complicated by pseudocysts for which she has had EUS with FNA and no evidence of malignancy.  Repeat imaging done this admission.  Findings of CT reviewed independently.  Results from recent EUS by Dr. Jewell reviewed as well and repeat MRI is now due for evaluation.      Past Medical History:   Diagnosis Date    Anxiety     Digestive disorder     Flu 02/2017    [facility]    Hypertension     Rheumatoid arthritis involving multiple sites with positive rheumatoid factor 01/14/2021       Past Surgical History:   Procedure Laterality Date    COLONOSCOPY N/A 2/26/2021    Procedure: COLONOSCOPY;  Surgeon: Amy Sidhu MD;  Location: [facility];  Service: Endoscopy;  Laterality: N/A;    ENDOSCOPIC ULTRASOUND OF UPPER GASTROINTESTINAL TRACT N/A 7/1/2022    Procedure: ULTRASOUND, UPPER GI TRACT, ENDOSCOPIC;  Surgeon: Donavon Jewell III, MD;  Location: [facility];  Service: Endoscopy;  Laterality: N/A;    ESOPHAGOGASTRODUODENOSCOPY N/A 2/26/2021    Procedure: EGD (ESOPHAGOGASTRODUODENOSCOPY);  Surgeon: Amy Sidhu MD;  Location: [facility];  Service: Endoscopy;  Laterality: N/A;    LAPAROSCOPIC CHOLECYSTECTOMY N/A 7/27/2022    Procedure: CHOLECYSTECTOMY, LAPAROSCOPIC;  Surgeon: Ramón Hwang III, MD;  Location: [facility] OR;  Service: General;  Laterality: N/A;    TUBAL LIGATION         Social History     Tobacco Use    Smoking status: Every Day     Packs/day: 1.00     Years: 7.00     Pack years: 7.00     Types: Cigarettes    Smokeless tobacco: Never    Tobacco comments:     [phone]   Substance Use Topics    Alcohol use: No    Drug use: Never       Family History   Problem Relation Age of Onset    Diabetes Mother     Heart disease Mother     Kidney  "disease Mother     Hypertension Mother     Stroke Mother     Hearing loss Mother     COPD Father     Liver disease Paternal Grandfather     Alcohol abuse Paternal Grandfather     Liver disease Sister     Emphysema Brother     COPD Brother     Sleep apnea Brother     No Known Problems Son     Alcohol abuse Paternal Grandmother     Cirrhosis Paternal Grandmother     Heart disease Maternal Aunt     Diabetes Maternal Aunt     Cancer Maternal Aunt         female    Heart disease Maternal Uncle     Hypertension Maternal Uncle     No Known Problems Paternal Uncle     Psoriasis Neg Hx     Lupus Neg Hx     Eczema Neg Hx     Melanoma Neg Hx        Review of Systems  General ROS: negative for - chills, fever or weight loss  Psychological ROS: negative for - hallucination, depression or suicidal ideation  Ophthalmic ROS: negative for - blurry vision, photophobia or eye pain  ENT ROS: negative for - epistaxis, sore throat or rhinorrhea  Respiratory ROS: no cough, shortness of breath, or wheezing  Cardiovascular ROS: no chest pain or dyspnea on exertion  Gastrointestinal ROS: as above  Genito-Urinary ROS: no dysuria, trouble voiding, or hematuria  Musculoskeletal ROS: negative for - arthralgia, myalgia, weakness  Neurological ROS: no syncope or seizures; no ataxia  Dermatological ROS: negative for pruritis, rash and jaundice    Physical Examination  BP (!) 158/75   Pulse 104   Temp 97.5 °F (36.4 °C) (Oral)   Resp 18   Ht 5' 2.5" (1.588 m)   Wt 44.2 kg (97 lb 7.1 oz)   SpO2 100%   BMI 17.54 kg/m²   General appearance: alert, cooperative, no distress  HENT: Normocephalic, atraumatic, neck symmetrical, no nasal discharge   Eyes: conjunctivae/corneas clear, PERRL, EOM's intact, sclera anicteric  Lungs: clear to auscultation bilaterally, no dullness to percussion bilaterally, symmetric expansion, breathing unlabored  Heart: regular rate and rhythm without rub; no displacement of the PMI   Abdomen: soft, NT  Extremities: " extremities symmetric; no clubbing, cyanosis, or edema  Integument: Skin color, texture, turgor normal; no rashes; hair distrubution normal, no jaundice  Neurologic: Alert and oriented X 3, no focal sensory or motor neurologic deficits  Psychiatric: no pressured speech; normal affect; no evidence of impaired cognition, no anxiety/depression     Labs:  Lab Results   Component Value Date    WBC 7.04 10/19/2022    HGB 10.1 (L) 10/19/2022    HCT 31.5 (L) 10/19/2022    MCV 91 10/19/2022     10/19/2022         CMP  Sodium   Date Value Ref Range Status   10/19/2022 137 136 - 145 mmol/L Final     Potassium   Date Value Ref Range Status   10/19/2022 3.4 (L) 3.5 - 5.1 mmol/L Final     Chloride   Date Value Ref Range Status   10/19/2022 103 95 - 110 mmol/L Final     CO2   Date Value Ref Range Status   10/19/2022 24 23 - 29 mmol/L Final     Glucose   Date Value Ref Range Status   10/19/2022 85 70 - 110 mg/dL Final     BUN   Date Value Ref Range Status   10/19/2022 6 (L) 8 - 23 mg/dL Final     Creatinine   Date Value Ref Range Status   10/19/2022 0.7 0.5 - 1.4 mg/dL Final     Calcium   Date Value Ref Range Status   10/19/2022 8.9 8.7 - 10.5 mg/dL Final     Total Protein   Date Value Ref Range Status   10/19/2022 6.2 6.0 - 8.4 g/dL Final     Albumin   Date Value Ref Range Status   10/19/2022 2.4 (L) 3.5 - 5.2 g/dL Final     Total Bilirubin   Date Value Ref Range Status   10/19/2022 1.0 0.1 - 1.0 mg/dL Final     Comment:     For infants and newborns, interpretation of results should be based  on gestational age, weight and in agreement with clinical  observations.    Premature Infant recommended reference ranges:  Up to 24 hours.............<8.0 mg/dL  Up to 48 hours............<12.0 mg/dL  3-5 days..................<15.0 mg/dL  6-29 days.................<15.0 mg/dL       Alkaline Phosphatase   Date Value Ref Range Status   10/19/2022 83 55 - 135 U/L Final     AST   Date Value Ref Range Status   10/19/2022 15 10 - 40 U/L  Final     ALT   Date Value Ref Range Status   10/19/2022 <5 (L) 10 - 44 U/L Final     Anion Gap   Date Value Ref Range Status   10/19/2022 10 8 - 16 mmol/L Final     eGFR if    Date Value Ref Range Status   07/25/2022 >60.0 >60 mL/min/1.73 m^2 Final     eGFR if non    Date Value Ref Range Status   07/25/2022 >60.0 >60 mL/min/1.73 m^2 Final     Comment:     Calculation used to obtain the estimated glomerular filtration  rate (eGFR) is the CKD-EPI equation.            Imaging:  CT scan was independently visualized and reviewed by me and showed findings as above seemingly consistent with evolving sequelae of acute pancreatitis with pseudocysts/fluid collection/phlegmon but with mass unable to be completely ruled out.    I have personally reviewed these images    Case discussed with primary team as above.    Assessment:   Recent acute pancreatitis  Pancreatic fluid collection/pseudocyst  Chest pain  Abnormal imaging    Plan:  No indication for inpatient GI workup at this time  Diet as tolerated  Follow up with Dr. Jewell for ongoing management of above  Agree with MRI as outpatient for further evaluation of pancreas.  GI to sign off. Call for questions.    Antwan Barron MD  [facility]  [address], [address]  [place] [address]  Office: [phone]  Fax: [phone]

## 2022-10-19 NOTE — PLAN OF CARE
Patient cleared for discharge from case management standpoint.    TANJA scheduled hospital follow up and places on AVS.  TANJA sent TEAMS message to scheduling to attempt to get sooner appt for patient with PCP.       10/19/22 1516   Final Note   Assessment Type Final Discharge Note   Anticipated Discharge Disposition Home   Hospital Resources/Appts/Education Provided Appointments scheduled and added to AVS;Provided patient/caregiver with written discharge plan information;Provided education on problems/symptoms using teachback

## 2022-10-19 NOTE — CONSULTS
[facility]  Cardiology  Progress Note    Patient Name: Claire Bowser  MRN: [id number]  Admission Date: 10/17/2022  Hospital Length of Stay: 0 days  Code Status: Full Code   Attending Physician: Genny Tucker MD   Primary Care Physician: Samuel Brady MD  Expected Discharge Date: 10/19/2022  Principal Problem:Chest pain    Subjective:     Hospital Course:  Nuclear stress test is basically normal  Nonspecific ST-T changes    Interval History:  45 lb weight loss associated with abnormalities the pancreas elevated lipase  GI to reassess and possibly re-biopsy      ROS  Objective:     Vital Signs (Most Recent):  Temp: 97.5 °F (36.4 °C) (10/19/22 1127)  Pulse: 104 (10/19/22 1137)  Resp: 18 (10/19/22 1137)  BP: (!) 158/75 (10/19/22 1150)  SpO2: 100 % (10/19/22 1137)   Vital Signs (24h Range):  Temp:  [96.1 °F (35.6 °C)-98.7 °F (37.1 °C)] 97.5 °F (36.4 °C)  Pulse:  [] 104  Resp:  [16-20] 18  SpO2:  [96 %-100 %] 100 %  BP: (127-158)/(60-84) 158/75     Weight: 44.2 kg (97 lb 7.1 oz)  Body mass index is 17.54 kg/m².    SpO2: 100 %  O2 Device (Oxygen Therapy): room air      Intake/Output Summary (Last 24 hours) at 10/19/2022 1209  Last data filed at 10/19/2022 0547  Gross per 24 hour   Intake 236 ml   Output 200 ml   Net 36 ml       Lines/Drains/Airways       Peripheral Intravenous Line  Duration                  Peripheral IV - Single Lumen 10/17/22 1800 20 G Right Antecubital 1 day                    Physical Exam of thin woman  Lungs are clear slight diminished breath sounds  Cardiac regular  Abdomen is soft    Significant Labs: CMP   Recent Labs   Lab 10/17/22  1746 10/18/22  0340 10/19/22  0459   * 138 137   K 2.5* 3.8 3.4*   CL 91* 106 103   CO2 29 24 24   * 87 85   BUN 13 9 6*   CREATININE 0.9 0.6 0.7   CALCIUM 10.2 8.7 8.9   PROT 7.5 5.8* 6.2   ALBUMIN 2.9* 2.3* 2.4*   BILITOT 0.7 0.8 1.0   ALKPHOS 95 77 83   AST 14 16 15   ALT 6* <5* <5*   ANIONGAP 13 8 10    and CBC   Recent  Labs   Lab 10/17/22  1746 10/18/22  0340 10/19/22  0459   WBC 7.16 4.81 7.04   HGB 12.1 9.4* 10.1*   HCT 36.4* 27.4* 31.5*   * 345 361       Significant Imaging:   Assessment and Plan:   Stable cardiovascular status  Troponin elevation probably secondary to stress demand  Nuclear MPI study is normal  Recommend continue current medicines plus potassium replacement orally  For GI workup for pancreas  Continue Eliquis for DVT  Brief HPI:     Active Diagnoses:    Diagnosis Date Noted POA    PRINCIPAL PROBLEM:  Chest pain [R07.9] 10/17/2022 Yes    Weight loss, unintentional [R63.4] 10/18/2022 Yes    Acute DVT (deep venous thrombosis) [I82.409] 10/18/2022 Yes    Hyperglycemia [R73.9] 10/17/2022 Yes    Severe malnutrition [E43] 06/05/2022 Yes    Hypokalemia [E87.6] 05/25/2022 Yes    Tobacco use [Z72.0] 08/10/2018 Yes    Hypertension [I10] 10/04/2013 Yes      Problems Resolved During this Admission:       VTE Risk Mitigation (From admission, onward)           Ordered     apixaban tablet 5 mg  2 times daily         10/17/22 1958                    Ta Wynne MD  Cardiology  [facility]

## 2022-10-19 NOTE — PLAN OF CARE
Problem: Adult Inpatient Plan of Care  Goal: Plan of Care Review  10/19/2022 0321 by Ange Santiago RN  Outcome: Ongoing, Progressing  10/19/2022 0320 by Ange Santiago RN  Outcome: Ongoing, Progressing  Goal: Readiness for Transition of Care  Outcome: Ongoing, Progressing     Problem: Infection  Goal: Absence of Infection Signs and Symptoms  10/19/2022 0321 by Ange Santiago RN  Outcome: Ongoing, Progressing  10/19/2022 0320 by Ange Santiago RN  Outcome: Ongoing, Progressing     Problem: Fluid Imbalance (Pneumonia)  Goal: Fluid Balance  Outcome: Ongoing, Progressing     Problem: Impaired Wound Healing  Goal: Optimal Wound Healing  10/19/2022 0321 by Ange Santiago RN  Outcome: Ongoing, Progressing  10/19/2022 0320 by Ange Santiago RN  Outcome: Ongoing, Progressing     Problem: Malnutrition  Goal: Improved Nutritional Intake  Outcome: Ongoing, Progressing     Problem: Chest Pain  Goal: Resolution of Chest Pain Symptoms  Outcome: Ongoing, Progressing   Pt is alert and oriented x4. Blood sugar monitored. NPO at midnight for stress test. Ambulates to bathroom independently. Swallows whole. No complaints of chets

## 2022-10-20 ENCOUNTER — TELEPHONE (OUTPATIENT)
Dept: MEDSURG UNIT | Facility: HOSPITAL | Age: 62
End: 2022-10-20
Payer: COMMERCIAL

## 2022-10-25 ENCOUNTER — OFFICE VISIT (OUTPATIENT)
Dept: FAMILY MEDICINE | Facility: CLINIC | Age: 62
End: 2022-10-25
Payer: COMMERCIAL

## 2022-10-25 ENCOUNTER — LAB VISIT (OUTPATIENT)
Dept: LAB | Facility: HOSPITAL | Age: 62
End: 2022-10-25
Attending: FAMILY MEDICINE
Payer: COMMERCIAL

## 2022-10-25 ENCOUNTER — HOSPITAL ENCOUNTER (OUTPATIENT)
Dept: RADIOLOGY | Facility: HOSPITAL | Age: 62
Discharge: HOME | End: 2022-10-25
Attending: NURSE PRACTITIONER
Payer: COMMERCIAL

## 2022-10-25 VITALS
DIASTOLIC BLOOD PRESSURE: 70 MMHG | WEIGHT: 88.88 LBS | SYSTOLIC BLOOD PRESSURE: 124 MMHG | HEART RATE: 81 BPM | HEIGHT: 63 IN | OXYGEN SATURATION: 95 % | BODY MASS INDEX: 15.75 KG/M2 | TEMPERATURE: 99 F

## 2022-10-25 DIAGNOSIS — K86.89 PANCREATIC MASS: ICD-10-CM

## 2022-10-25 DIAGNOSIS — E87.6 HYPOKALEMIA: ICD-10-CM

## 2022-10-25 DIAGNOSIS — Z09 HOSPITAL DISCHARGE FOLLOW-UP: ICD-10-CM

## 2022-10-25 DIAGNOSIS — Z09 HOSPITAL DISCHARGE FOLLOW-UP: Primary | ICD-10-CM

## 2022-10-25 DIAGNOSIS — K85.90 ACUTE PANCREATITIS, UNSPECIFIED COMPLICATION STATUS, UNSPECIFIED PANCREATITIS TYPE: ICD-10-CM

## 2022-10-25 DIAGNOSIS — K86.1 CHRONIC BILIARY PANCREATITIS: ICD-10-CM

## 2022-10-25 DIAGNOSIS — S81.802D WOUND OF LEFT LOWER EXTREMITY, SUBSEQUENT ENCOUNTER: ICD-10-CM

## 2022-10-25 LAB
ALBUMIN SERPL BCP-MCNC: 2.7 G/DL (ref 3.5–5.2)
ALP SERPL-CCNC: 101 U/L (ref 55–135)
ALT SERPL W/O P-5'-P-CCNC: 7 U/L (ref 10–44)
ANION GAP SERPL CALC-SCNC: 10 MMOL/L (ref 8–16)
AST SERPL-CCNC: 9 U/L (ref 10–40)
BILIRUB SERPL-MCNC: 0.3 MG/DL (ref 0.1–1)
BUN SERPL-MCNC: 10 MG/DL (ref 8–23)
CALCIUM SERPL-MCNC: 9.9 MG/DL (ref 8.7–10.5)
CHLORIDE SERPL-SCNC: 97 MMOL/L (ref 95–110)
CO2 SERPL-SCNC: 28 MMOL/L (ref 23–29)
CREAT SERPL-MCNC: 0.7 MG/DL (ref 0.5–1.4)
EST. GFR  (NO RACE VARIABLE): >60 ML/MIN/1.73 M^2
GLUCOSE SERPL-MCNC: 106 MG/DL (ref 70–110)
POTASSIUM SERPL-SCNC: 3 MMOL/L (ref 3.5–5.1)
PROT SERPL-MCNC: 6.9 G/DL (ref 6–8.4)
SODIUM SERPL-SCNC: 135 MMOL/L (ref 136–145)

## 2022-10-25 PROCEDURE — 4010F ACE/ARB THERAPY RXD/TAKEN: CPT | Mod: CPTII,S$GLB,,

## 2022-10-25 PROCEDURE — 4010F PR ACE/ARB THEARPY RXD/TAKEN: ICD-10-PCS | Mod: CPTII,S$GLB,,

## 2022-10-25 PROCEDURE — 36415 COLL VENOUS BLD VENIPUNCTURE: CPT | Mod: PO

## 2022-10-25 PROCEDURE — 1159F PR MEDICATION LIST DOCUMENTED IN MEDICAL RECORD: ICD-10-PCS | Mod: CPTII,S$GLB,,

## 2022-10-25 PROCEDURE — 3074F PR MOST RECENT SYSTOLIC BLOOD PRESSURE < 130 MM HG: ICD-10-PCS | Mod: CPTII,S$GLB,,

## 2022-10-25 PROCEDURE — 99999 PR PBB SHADOW E&M-EST. PATIENT-LVL V: CPT | Mod: PBBFAC,,,

## 2022-10-25 PROCEDURE — 74183 MRI ABD W/O CNTR FLWD CNTR: CPT | Mod: TC

## 2022-10-25 PROCEDURE — 1160F PR REVIEW ALL MEDS BY PRESCRIBER/CLIN PHARMACIST DOCUMENTED: ICD-10-PCS | Mod: CPTII,S$GLB,,

## 2022-10-25 PROCEDURE — A9585 GADOBUTROL INJECTION: HCPCS | Performed by: NURSE PRACTITIONER

## 2022-10-25 PROCEDURE — 80053 COMPREHEN METABOLIC PANEL: CPT

## 2022-10-25 PROCEDURE — 74183 MRI ABDOMEN WITH AND WO_INC MRCP (XPD): ICD-10-PCS | Mod: 26,,, | Performed by: RADIOLOGY

## 2022-10-25 PROCEDURE — 99999 PR PBB SHADOW E&M-EST. PATIENT-LVL V: ICD-10-PCS | Mod: PBBFAC,,,

## 2022-10-25 PROCEDURE — 76376 3D RENDER W/INTRP POSTPROCES: CPT | Mod: 26,,, | Performed by: RADIOLOGY

## 2022-10-25 PROCEDURE — 1159F MED LIST DOCD IN RCRD: CPT | Mod: CPTII,S$GLB,,

## 2022-10-25 PROCEDURE — 3078F PR MOST RECENT DIASTOLIC BLOOD PRESSURE < 80 MM HG: ICD-10-PCS | Mod: CPTII,S$GLB,,

## 2022-10-25 PROCEDURE — 74183 MRI ABD W/O CNTR FLWD CNTR: CPT | Mod: 26,,, | Performed by: RADIOLOGY

## 2022-10-25 PROCEDURE — 3078F DIAST BP <80 MM HG: CPT | Mod: CPTII,S$GLB,,

## 2022-10-25 PROCEDURE — 99214 PR OFFICE/OUTPT VISIT, EST, LEVL IV, 30-39 MIN: ICD-10-PCS | Mod: S$GLB,,,

## 2022-10-25 PROCEDURE — 99214 OFFICE O/P EST MOD 30 MIN: CPT | Mod: S$GLB,,,

## 2022-10-25 PROCEDURE — 25500020 PHARM REV CODE 255: Performed by: NURSE PRACTITIONER

## 2022-10-25 PROCEDURE — 3044F PR MOST RECENT HEMOGLOBIN A1C LEVEL <7.0%: ICD-10-PCS | Mod: CPTII,S$GLB,,

## 2022-10-25 PROCEDURE — 76376 MRI ABDOMEN WITH AND WO_INC MRCP (XPD): ICD-10-PCS | Mod: 26,,, | Performed by: RADIOLOGY

## 2022-10-25 PROCEDURE — 3044F HG A1C LEVEL LT 7.0%: CPT | Mod: CPTII,S$GLB,,

## 2022-10-25 PROCEDURE — 1160F RVW MEDS BY RX/DR IN RCRD: CPT | Mod: CPTII,S$GLB,,

## 2022-10-25 PROCEDURE — 3074F SYST BP LT 130 MM HG: CPT | Mod: CPTII,S$GLB,,

## 2022-10-25 RX ORDER — GADOBUTROL 604.72 MG/ML
4 INJECTION INTRAVENOUS
Status: COMPLETED | OUTPATIENT
Start: 2022-10-25 | End: 2022-10-25

## 2022-10-25 RX ORDER — POTASSIUM CHLORIDE 20 MEQ/1
20 TABLET, EXTENDED RELEASE ORAL 2 TIMES DAILY
Status: ON HOLD | COMMUNITY
End: 2022-12-31 | Stop reason: HOSPADM

## 2022-10-25 RX ADMIN — GADOBUTROL 4 ML: 604.72 INJECTION INTRAVENOUS at 07:10

## 2022-10-25 NOTE — PROGRESS NOTES
Transitional Care Note  Admit date: 10/17/2022  Discharge date: 10/19/2022  Hospitalized at: [facility]  Discharge diagnoses: Chest pain, weight loss, Acute DVT, hyperglycemia (R), HTN, taboacco use, Hypokalemia, Severe malnutrition     Family and/or Caretaker present at visit?  Yes.   Diagnostic tests reviewed/disposition: I have reviewed all completed as well as pending diagnostic tests at the time of discharge.  Disease/illness education: As below  Medication changes: None today  Home health/community services discussion/referrals: Patient does not have home health established from hospital visit.  They do not need home health.  If needed, we will set up home health for the patient.   Establishment or re-establishment of referral orders for community resources: No other necessary community resources.   Discussion with other health care providers: No discussion with other health care providers necessary.       Subjective:       Patient ID: Claire Bowser is a 62 y.o. female.    Chief Complaint: Hospital Follow Up    Presents to the clinic for hospital discharge follow up. Presentation and course can be seen below. Today presents doing fairly well. Had MRI done this morning, pending results at this time. Has not had GI follow up scheduled. Will get this done today. Had low potassium levels in the hospital and taking potassium supplements currently so will recheck this. Continues to have declined appetite.     Previously saw Darren Thorpe NP for wound on her leg. Wound care saw her in the hospital. Healing well now. Likely no longer needs the previously scheduled follow up so will cancel today.     HPI:   Claire Bowser is a 62-year-old female who presents emergency room complaining of generalized weakness, exertional dyspnea, left-sided chest pain, and nausea which onset approximately 1 week ago.  She denies any fever or chills.  No known sick contacts or travel.  She is vaccinated for COVID.  Shortness of breath is  minimally relieved with rest and worsens with exertion.  Previous medical history includes pancreatitis, anxiety, hypertension, hyperlipidemia, rheumatoid arthritis, DVT, active smoker.  ER workup:  CBC unremarkable.  CMP with sodium of 133, potassium of 2.5, glucose of 174? ? , albumin of 2.9, magnesium decreased at 1.5.  Troponin mildly elevated 0.044.  EKG demonstrated ST Segment Depression: II, III, V2, V1, V3, V4, V5, V6 and AVF.  Patient was given magnesium and potassium in ER.  Patient admitted to Hospital Medicine for treatment management.  Will replete electrolytes p.r.n., trend troponin, and cardiac consult in a.m..        * No surgery found *       Hospital Course:   Pt was monitored closely during her stay.  Her symptoms improved.  Cardiology was consulted.  She had mild troponin elevation which was trended without significant change.  She underwent stress test on 10/19 which was negative for any acute ischemia.  She underwent CT of the chest abdomen and pelvis due to report of weight loss, unintentional, and elevated CA 19-9.  Pt had been evaluated in the past for pancreas mass which was benign after tissue biopsy.  CT here showed acute pancreatitis with findings suggestive of cirrhosis.  She had associated elevated lipase count.  She was tolerating oral intake without issue.  GI was consulted to evaluate Pt. They recommended discharge with close outpatient follow-up with Dr. Jewell who has EUS capabilities.  Pt is due for repeat MRI of the abdomen which was ordered to be performed as outpatient in accordance with GI recommendations.  Pt was eager to discharge.  The importance of close follow-up and further workup was stressed to Pt who verbalized understanding and agreement with discharge plan.  Return precautions were provided.    Past Medical History:   Diagnosis Date    Anxiety     Digestive disorder     Flu 02/2017    [facility]    Hypertension     Rheumatoid arthritis involving  multiple sites with positive rheumatoid factor 01/14/2021       Review of patient's allergies indicates:   Allergen Reactions    No known drug allergies          Current Outpatient Medications:     apixaban (ELIQUIS) 5 mg Tab, Take 1 tablet (5 mg total) by mouth 2 (two) times daily., Disp: 60 tablet, Rfl: 6    cholecalciferol, vitamin D3, (VITAMIN D3) 10 mcg (400 unit) Tab, Take 400 Units by mouth once daily., Disp: , Rfl:     folic acid (FOLVITE) 1 MG tablet, Take 1 tablet (1 mg total) by mouth once daily., Disp: 360 tablet, Rfl: 3    hydroCHLOROthiazide (HYDRODIURIL) 25 MG tablet, Take 25 mg by mouth once daily., Disp: , Rfl:     leflunomide (ARAVA) 20 MG Tab, Take 1 tablet (20 mg total) by mouth once daily., Disp: 30 tablet, Rfl: 3    metoprolol succinate (TOPROL-XL) 50 MG 24 hr tablet, Take 1 tablet (50 mg total) by mouth once daily. 1/2 tab qd, Disp: , Rfl:     pantoprazole (PROTONIX) 40 MG tablet, Take 1 tablet (40 mg total) by mouth once daily., Disp: 30 tablet, Rfl: 11    potassium chloride SA (K-DUR,KLOR-CON) 20 MEQ tablet, Take 20 mEq by mouth 2 (two) times daily., Disp: , Rfl:     predniSONE (DELTASONE) 5 MG tablet, Take 5 mg by mouth once daily., Disp: , Rfl:     sertraline (ZOLOFT) 25 MG tablet, Take 1 tablet by mouth once daily, Disp: 90 tablet, Rfl: 0    traZODone (DESYREL) 50 MG tablet, Take 1 tablet (50 mg total) by mouth every evening., Disp: 90 tablet, Rfl: 1    mupirocin (BACTROBAN) 2 % ointment, Apply topically 3 (three) times daily. (Patient not taking: Reported on 10/25/2022), Disp: 30 g, Rfl: 0    potassium chloride SA (K-DUR,KLOR-CON) 20 MEQ tablet, Take two twice a day for three days then reduce to one twice a day (Patient taking differently: 20 mEq 2 (two) times daily.), Disp: 70 tablet, Rfl: 2    traMADoL (ULTRAM) 50 mg tablet, Take 1 tablet (50 mg total) by mouth every 6 (six) hours as needed for Pain., Disp: 10 tablet, Rfl: 0  No current facility-administered medications for this  "visit.    Review of Systems   Constitutional:  Positive for appetite change and unexpected weight change. Negative for activity change, chills, diaphoresis, fatigue and fever.   HENT:  Negative for congestion, ear pain, postnasal drip, rhinorrhea, sinus pressure, sneezing, sore throat and trouble swallowing.    Eyes:  Negative for pain, itching and visual disturbance.   Respiratory:  Negative for cough, chest tightness, shortness of breath and wheezing.    Cardiovascular:  Negative for chest pain, palpitations and leg swelling.   Gastrointestinal:  Negative for abdominal distention, abdominal pain, blood in stool, constipation, diarrhea, nausea and vomiting.   Endocrine: Negative for cold intolerance and heat intolerance.   Genitourinary:  Negative for difficulty urinating, dysuria, frequency, hematuria and urgency.   Musculoskeletal:  Negative for arthralgias, back pain, myalgias and neck pain.   Skin:  Positive for wound. Negative for color change, pallor and rash.   Neurological:  Negative for dizziness, syncope, weakness, numbness and headaches.   Hematological:  Negative for adenopathy.   Psychiatric/Behavioral:  Negative for behavioral problems. The patient is not nervous/anxious.      Objective:      /70 (BP Location: Right arm, Patient Position: Sitting, BP Method: Medium (Manual))   Pulse 81   Temp 98.6 °F (37 °C) (Oral)   Ht 5' 2.5" (1.588 m)   Wt 40.3 kg (88 lb 13.5 oz)   SpO2 95%   BMI 15.99 kg/m²   Physical Exam  Vitals reviewed.   Constitutional:       General: She is not in acute distress.     Appearance: Normal appearance. She is not ill-appearing, toxic-appearing or diaphoretic.   HENT:      Head: Normocephalic.      Right Ear: External ear normal.      Left Ear: External ear normal.      Nose: Nose normal. No congestion or rhinorrhea.      Mouth/Throat:      Mouth: Mucous membranes are moist.      Pharynx: Oropharynx is clear.   Eyes:      General: No scleral icterus.        Right eye: " No discharge.         Left eye: No discharge.      Extraocular Movements: Extraocular movements intact.      Conjunctiva/sclera: Conjunctivae normal.   Cardiovascular:      Rate and Rhythm: Normal rate and regular rhythm.      Pulses: Normal pulses.      Heart sounds: Normal heart sounds. No murmur heard.    No friction rub. No gallop.   Pulmonary:      Effort: Pulmonary effort is normal. No respiratory distress.      Breath sounds: No wheezing, rhonchi or rales.      Comments: Decreased breath sounds throughout  Chest:      Chest wall: No tenderness.   Musculoskeletal:         General: No swelling, tenderness or deformity. Normal range of motion.      Cervical back: Normal range of motion.      Right lower leg: No edema.      Left lower leg: No edema.   Skin:     General: Skin is warm and dry.      Capillary Refill: Capillary refill takes less than 2 seconds.      Coloration: Skin is not jaundiced.      Findings: No bruising, erythema, lesion or rash.      Comments: Wound of LLE healing well.      Neurological:      Mental Status: She is alert and oriented to person, place, and time.       Assessment:       1. Hospital discharge follow-up    2. Acute pancreatitis, unspecified complication status, unspecified pancreatitis type    3. Pancreatic mass    4. Hypokalemia    5. Wound of left lower extremity, subsequent encounter          Plan:       Hospital discharge follow-up  -     Ambulatory referral/consult to Gastroenterology; Future; Expected date: 11/01/2022  -     Comprehensive Metabolic Panel; Future; Expected date: 10/25/2022    Acute pancreatitis, unspecified complication status, unspecified pancreatitis type  -     Ambulatory referral/consult to Gastroenterology; Future; Expected date: 11/01/2022    Pancreatic mass  -     Ambulatory referral/consult to Gastroenterology; Future; Expected date: 11/01/2022       -       Pending MRI at this time. Will get follow up with GI scheduled.    Hypokalemia  -      Comprehensive Metabolic Panel; Future; Expected date: 10/25/2022         -    Continue meds as prescribed at this time.     Wound of left lower extremity, subsequent encounter         -     Healing well.         Has follow up parveen/ Caitlyn scheduled.        Quinn Lieberman PA-C  Family Medicine Physician Assistant       I spent a total of 20 minutes on the day of the visit.This includes face to face time and non-face to face time preparing to see the patient (eg, review of tests), obtaining and/or reviewing separately obtained history, documenting clinical information in the electronic or other health record, independently interpreting results and communicating results to the patient/family/caregiver, or care coordinator.      We have addressed [4] Moderate: 1 or more chronic illnesses with exacerbation, progression, or side effects of treatment / 2 or more stable chronic illnesses / 1 undiagnosed new problem with uncertain prognosis / 1 acute illness with systemic symptoms / 1 acute complicated injury  The complexity of the data reviewed and analyzed for this visit was [3] Limited (Reviewed prior external note, ordered unique testing or reviewed the results of each unique test)   The risk of complications and/or morbidity or mortality are [4] Moderate risk (I.e. prescription drug management / decision regarding minor surgery with identified pt or procedure risk factors / decision regarding elective major surgery without identified pt or procedure risk factors / diagnosis or treatment significantly limited by social determinants of health)   The level of Medical Decision Making for this visit is [4] Moderate

## 2022-10-25 NOTE — PATIENT INSTRUCTIONS
Benja Rangel,     If you are due for any health screening(s) below please notify me so we can arrange them to be ordered and scheduled to maintain your health. Most healthy patients complete it. Don't lose out on improving your health.     Tests to Keep You Healthy    Mammogram: ORDERED BUT NOT SCHEDULED  Colon Cancer Screening: Met on 2/26/2021  Cervical Cancer Screening: Met on 10/28/2019  Last Blood Pressure <= 139/89 (10/25/2022): Yes  Tobacco Cessation: NO      Breast Cancer Screening    Breast cancer is the second most common cancer in women after skin cancer, and the second leading cause of death from cancer after lung cancer. Mammograms can detect breast cancer early, which significantly increases the chances of curing the cancer.      A screening mammogram is an x-ray image of the breasts used for early breast cancer detection. It can help reduce the number of deaths from breast cancer among women. To get a clear image, the breast is placed between two plastic plates to make it flat. How often a mammogram is needed depends on your age and your breast cancer risk.    Although you are still overdue for this important screening, due to the COVID-19 pandemic, we recommend scheduling it in the near future.

## 2022-10-26 DIAGNOSIS — E87.6 HYPOKALEMIA: Primary | ICD-10-CM

## 2022-11-04 ENCOUNTER — LAB VISIT (OUTPATIENT)
Dept: LAB | Facility: HOSPITAL | Age: 62
End: 2022-11-04
Attending: INTERNAL MEDICINE
Payer: COMMERCIAL

## 2022-11-04 DIAGNOSIS — M05.79 RHEUMATOID ARTHRITIS INVOLVING MULTIPLE SITES WITH POSITIVE RHEUMATOID FACTOR: ICD-10-CM

## 2022-11-04 DIAGNOSIS — Z79.60 LONG-TERM USE OF IMMUNOSUPPRESSANT MEDICATION: ICD-10-CM

## 2022-11-04 LAB
ALBUMIN SERPL BCP-MCNC: 2.9 G/DL (ref 3.5–5.2)
ALP SERPL-CCNC: 97 U/L (ref 55–135)
ALT SERPL W/O P-5'-P-CCNC: 10 U/L (ref 10–44)
ANION GAP SERPL CALC-SCNC: 9 MMOL/L (ref 8–16)
AST SERPL-CCNC: 14 U/L (ref 10–40)
BASOPHILS # BLD AUTO: 0.16 K/UL (ref 0–0.2)
BASOPHILS NFR BLD: 2.1 % (ref 0–1.9)
BILIRUB SERPL-MCNC: 0.4 MG/DL (ref 0.1–1)
BUN SERPL-MCNC: 11 MG/DL (ref 8–23)
CALCIUM SERPL-MCNC: 9.8 MG/DL (ref 8.7–10.5)
CHLORIDE SERPL-SCNC: 92 MMOL/L (ref 95–110)
CO2 SERPL-SCNC: 34 MMOL/L (ref 23–29)
CREAT SERPL-MCNC: 0.7 MG/DL (ref 0.5–1.4)
CRP SERPL-MCNC: 4.12 MG/DL
DIFFERENTIAL METHOD: ABNORMAL
EOSINOPHIL # BLD AUTO: 0.9 K/UL (ref 0–0.5)
EOSINOPHIL NFR BLD: 11.8 % (ref 0–8)
ERYTHROCYTE [DISTWIDTH] IN BLOOD BY AUTOMATED COUNT: 15 % (ref 11.5–14.5)
ERYTHROCYTE [SEDIMENTATION RATE] IN BLOOD BY WESTERGREN METHOD: 16 MM/HR (ref 0–20)
EST. GFR  (NO RACE VARIABLE): >60 ML/MIN/1.73 M^2
GLUCOSE SERPL-MCNC: 108 MG/DL (ref 70–110)
HCT VFR BLD AUTO: 36 % (ref 37–48.5)
HGB BLD-MCNC: 11.6 G/DL (ref 12–16)
IMM GRANULOCYTES # BLD AUTO: 0.02 K/UL (ref 0–0.04)
IMM GRANULOCYTES NFR BLD AUTO: 0.3 % (ref 0–0.5)
LYMPHOCYTES # BLD AUTO: 1.6 K/UL (ref 1–4.8)
LYMPHOCYTES NFR BLD: 21 % (ref 18–48)
MCH RBC QN AUTO: 29.6 PG (ref 27–31)
MCHC RBC AUTO-ENTMCNC: 32.2 G/DL (ref 32–36)
MCV RBC AUTO: 92 FL (ref 82–98)
MONOCYTES # BLD AUTO: 0.9 K/UL (ref 0.3–1)
MONOCYTES NFR BLD: 11.1 % (ref 4–15)
NEUTROPHILS # BLD AUTO: 4.1 K/UL (ref 1.8–7.7)
NEUTROPHILS NFR BLD: 53.7 % (ref 38–73)
NRBC BLD-RTO: 0 /100 WBC
PLATELET # BLD AUTO: 528 K/UL (ref 150–450)
PMV BLD AUTO: 10.4 FL (ref 9.2–12.9)
POTASSIUM SERPL-SCNC: 2.6 MMOL/L (ref 3.5–5.1)
PROT SERPL-MCNC: 7.2 G/DL (ref 6–8.4)
RBC # BLD AUTO: 3.92 M/UL (ref 4–5.4)
SODIUM SERPL-SCNC: 135 MMOL/L (ref 136–145)
WBC # BLD AUTO: 7.63 K/UL (ref 3.9–12.7)

## 2022-11-04 PROCEDURE — 80053 COMPREHEN METABOLIC PANEL: CPT | Performed by: INTERNAL MEDICINE

## 2022-11-04 PROCEDURE — 86140 C-REACTIVE PROTEIN: CPT | Performed by: INTERNAL MEDICINE

## 2022-11-04 PROCEDURE — 85025 COMPLETE CBC W/AUTO DIFF WBC: CPT | Performed by: INTERNAL MEDICINE

## 2022-11-04 PROCEDURE — 36415 COLL VENOUS BLD VENIPUNCTURE: CPT | Performed by: INTERNAL MEDICINE

## 2022-11-04 PROCEDURE — 85651 RBC SED RATE NONAUTOMATED: CPT | Performed by: INTERNAL MEDICINE

## 2022-11-09 ENCOUNTER — LAB VISIT (OUTPATIENT)
Dept: LAB | Facility: HOSPITAL | Age: 62
End: 2022-11-09
Payer: MEDICAID

## 2022-11-09 DIAGNOSIS — E87.6 HYPOKALEMIA: ICD-10-CM

## 2022-11-09 LAB
ANION GAP SERPL CALC-SCNC: 11 MMOL/L (ref 8–16)
BUN SERPL-MCNC: 13 MG/DL (ref 8–23)
CALCIUM SERPL-MCNC: 10.5 MG/DL (ref 8.7–10.5)
CHLORIDE SERPL-SCNC: 95 MMOL/L (ref 95–110)
CO2 SERPL-SCNC: 32 MMOL/L (ref 23–29)
CREAT SERPL-MCNC: 0.7 MG/DL (ref 0.5–1.4)
EST. GFR  (NO RACE VARIABLE): >60 ML/MIN/1.73 M^2
GLUCOSE SERPL-MCNC: 92 MG/DL (ref 70–110)
POTASSIUM SERPL-SCNC: 2.9 MMOL/L (ref 3.5–5.1)
SODIUM SERPL-SCNC: 138 MMOL/L (ref 136–145)

## 2022-11-09 PROCEDURE — 80048 BASIC METABOLIC PNL TOTAL CA: CPT

## 2022-11-09 PROCEDURE — 36415 COLL VENOUS BLD VENIPUNCTURE: CPT | Mod: PO

## 2022-11-11 ENCOUNTER — TELEPHONE (OUTPATIENT)
Dept: FAMILY MEDICINE | Facility: CLINIC | Age: 62
End: 2022-11-11
Payer: MEDICAID

## 2022-11-11 NOTE — TELEPHONE ENCOUNTER
Spoke with patient, she don't know who or why she received a call, she not having any issues, her appointment with Dr Brady in January

## 2022-11-11 NOTE — TELEPHONE ENCOUNTER
----- Message from Diane Benavides sent at 11/11/2022 10:08 AM CST -----  Contact: Self  Type:  Patient Returning Call    Who Called:patient  Who Left Message for Patient:nobody  Does the patient know what this is regarding?:no  Would the patient rather a call back or a response via [facility]? call  Best Call Back Number:[phone]    Additional Information: patient states she just missed a call but no voicemail. Wanted to make sure it wasn't from your office and important. Req call back.

## 2022-11-18 ENCOUNTER — LAB VISIT (OUTPATIENT)
Dept: LAB | Facility: HOSPITAL | Age: 62
End: 2022-11-18
Attending: INTERNAL MEDICINE
Payer: MEDICAID

## 2022-11-18 DIAGNOSIS — K81.9 CHOLECYSTITIS, UNSPECIFIED: Primary | ICD-10-CM

## 2022-11-18 DIAGNOSIS — E87.6 HYPOKALEMIA: ICD-10-CM

## 2022-11-18 DIAGNOSIS — K86.89 SUBCUTANEOUS NODULAR FAT NECROSIS IN PANCREATITIS: ICD-10-CM

## 2022-11-18 DIAGNOSIS — K85.90 ACUTE PANCREATITIS: ICD-10-CM

## 2022-11-18 LAB — CANCER AG19-9 SERPL-ACNC: 113.2 U/ML (ref 0–40)

## 2022-11-18 PROCEDURE — 86301 IMMUNOASSAY TUMOR CA 19-9: CPT | Performed by: INTERNAL MEDICINE

## 2022-11-18 PROCEDURE — 36415 COLL VENOUS BLD VENIPUNCTURE: CPT | Mod: PO | Performed by: INTERNAL MEDICINE

## 2022-11-18 RX ORDER — POTASSIUM CHLORIDE 20 MEQ/1
40 TABLET, EXTENDED RELEASE ORAL 2 TIMES DAILY
Qty: 120 TABLET | Refills: 0 | Status: SHIPPED | OUTPATIENT
Start: 2022-11-18 | End: 2022-12-29

## 2022-11-19 ENCOUNTER — LAB VISIT (OUTPATIENT)
Dept: LAB | Facility: HOSPITAL | Age: 62
End: 2022-11-19
Attending: INTERNAL MEDICINE
Payer: MEDICAID

## 2022-11-19 DIAGNOSIS — K81.9 CHOLECYSTITIS, UNSPECIFIED: ICD-10-CM

## 2022-11-19 DIAGNOSIS — K85.90 ACUTE PANCREATITIS: Primary | ICD-10-CM

## 2022-11-19 DIAGNOSIS — K86.89 SUBCUTANEOUS NODULAR FAT NECROSIS IN PANCREATITIS: ICD-10-CM

## 2022-11-19 PROCEDURE — 82653 EL-1 FECAL QUANTITATIVE: CPT | Performed by: INTERNAL MEDICINE

## 2022-11-22 LAB — ELASTASE 1, FECAL: 436 MCG/G

## 2022-11-28 ENCOUNTER — TELEPHONE (OUTPATIENT)
Dept: RHEUMATOLOGY | Facility: CLINIC | Age: 62
End: 2022-11-28
Payer: MEDICAID

## 2022-11-28 NOTE — TELEPHONE ENCOUNTER
will be out of the office Dec 9th.Called to reschedule patient's appt. Left voicemail.     Thank you,   Cassidy

## 2022-11-29 ENCOUNTER — ANESTHESIA EVENT (OUTPATIENT)
Dept: SURGERY | Facility: HOSPITAL | Age: 62
End: 2022-11-29
Payer: COMMERCIAL

## 2022-11-29 ENCOUNTER — ANESTHESIA (OUTPATIENT)
Dept: SURGERY | Facility: HOSPITAL | Age: 62
End: 2022-11-29
Payer: MEDICAID

## 2022-11-29 ENCOUNTER — HOSPITAL ENCOUNTER (OUTPATIENT)
Facility: HOSPITAL | Age: 62
Discharge: HOME | End: 2022-11-29
Attending: INTERNAL MEDICINE | Admitting: INTERNAL MEDICINE
Payer: COMMERCIAL

## 2022-11-29 VITALS
RESPIRATION RATE: 18 BRPM | TEMPERATURE: 98 F | OXYGEN SATURATION: 97 % | HEIGHT: 66 IN | WEIGHT: 82 LBS | HEART RATE: 84 BPM | SYSTOLIC BLOOD PRESSURE: 113 MMHG | DIASTOLIC BLOOD PRESSURE: 59 MMHG | BODY MASS INDEX: 13.18 KG/M2

## 2022-11-29 DIAGNOSIS — K85.90 ACUTE PANCREATITIS, UNSPECIFIED COMPLICATION STATUS, UNSPECIFIED PANCREATITIS TYPE: ICD-10-CM

## 2022-11-29 LAB — POTASSIUM SERPL-SCNC: 3.2 MMOL/L (ref 3.5–5.1)

## 2022-11-29 PROCEDURE — 63600175 PHARM REV CODE 636 W HCPCS: Performed by: NURSE ANESTHETIST, CERTIFIED REGISTERED

## 2022-11-29 PROCEDURE — 37000008 HC ANESTHESIA 1ST 15 MINUTES: Performed by: INTERNAL MEDICINE

## 2022-11-29 PROCEDURE — 43238 EGD US FINE NEEDLE BX/ASPIR: CPT | Performed by: INTERNAL MEDICINE

## 2022-11-29 PROCEDURE — 37000009 HC ANESTHESIA EA ADD 15 MINS: Performed by: INTERNAL MEDICINE

## 2022-11-29 PROCEDURE — 00731 ANES UPR GI NDSC PX NOS: CPT | Performed by: INTERNAL MEDICINE

## 2022-11-29 PROCEDURE — 25000003 PHARM REV CODE 250: Performed by: NURSE ANESTHETIST, CERTIFIED REGISTERED

## 2022-11-29 PROCEDURE — 84132 ASSAY OF SERUM POTASSIUM: CPT | Performed by: ANESTHESIOLOGY

## 2022-11-29 PROCEDURE — 27202053 HC NEEDLE BIOPSY EUS: Performed by: INTERNAL MEDICINE

## 2022-11-29 RX ORDER — LIDOCAINE HCL/PF 100 MG/5ML
SYRINGE (ML) INTRAVENOUS
Status: DISCONTINUED | OUTPATIENT
Start: 2022-11-29 | End: 2022-11-29

## 2022-11-29 RX ORDER — PHENYLEPHRINE HYDROCHLORIDE 10 MG/ML
INJECTION INTRAVENOUS
Status: DISCONTINUED | OUTPATIENT
Start: 2022-11-29 | End: 2022-11-29

## 2022-11-29 RX ORDER — PROPOFOL 10 MG/ML
INJECTION, EMULSION INTRAVENOUS
Status: DISCONTINUED | OUTPATIENT
Start: 2022-11-29 | End: 2022-11-29

## 2022-11-29 RX ADMIN — PROPOFOL 150 MG: 10 INJECTION, EMULSION INTRAVENOUS at 12:11

## 2022-11-29 RX ADMIN — SODIUM CHLORIDE: 0.9 INJECTION, SOLUTION INTRAVENOUS at 12:11

## 2022-11-29 RX ADMIN — PHENYLEPHRINE HYDROCHLORIDE 100 MCG: 10 INJECTION INTRAVENOUS at 12:11

## 2022-11-29 RX ADMIN — PROPOFOL 50 MG: 10 INJECTION, EMULSION INTRAVENOUS at 12:11

## 2022-11-29 RX ADMIN — PROPOFOL 100 MG: 10 INJECTION, EMULSION INTRAVENOUS at 12:11

## 2022-11-29 RX ADMIN — LIDOCAINE HYDROCHLORIDE 100 MG: 20 INJECTION, SOLUTION INTRAVENOUS at 12:11

## 2022-11-29 NOTE — ANESTHESIA POSTPROCEDURE EVALUATION
Anesthesia Post Evaluation    Patient: Claire Bowser    Procedure(s) Performed: Procedure(s) (LRB):  ULTRASOUND, UPPER GI TRACT, ENDOSCOPIC (N/A)    Final Anesthesia Type: MAC      Patient location during evaluation: GI PACU  Patient participation: Yes- Able to Participate  Level of consciousness: awake and alert  Post-procedure vital signs: reviewed and stable  Pain management: adequate  Airway patency: patent    PONV status at discharge: No PONV  Anesthetic complications: no      Cardiovascular status: blood pressure returned to baseline and stable  Respiratory status: unassisted and room air  Hydration status: euvolemic  Follow-up not needed.          Vitals Value Taken Time   /58 11/29/22 1312   Temp 36.7 °C (98.1 °F) 11/29/22 1303   Pulse 77 11/29/22 1314   Resp 18 11/29/22 1305   SpO2 94 % 11/29/22 1314   Vitals shown include unvalidated device data.      No case tracking events are documented in the log.      Pain/Gerald Score: No data recorded

## 2022-11-29 NOTE — [employer] PATIENT INSTRUCTIONS
Discharge Summary/Instructions after an Endoscopic Procedure  Patient Name: Claire Bowser  Patient MRN: [id number]  Patient YOB: 1960 Tuesday, November 29, 2022  Donavon Jewell III, MD  RESTRICTIONS:  During your procedure today, you received medications for sedation.  These   medications may affect your judgment, balance and coordination.  Therefore,   for 24 hours, you have the following restrictions:   - DO NOT drive a car, operate machinery, make legal/financial decisions,   sign important papers or drink alcohol.    ACTIVITY:  Today: no heavy lifting, straining or running due to procedural   sedation/anesthesia.  The following day: return to full activity including work.  DIET:  Eat and drink normally unless instructed otherwise.     TREATMENT FOR COMMON SIDE EFFECTS:  - Mild abdominal pain, nausea, belching, bloating or excessive gas:  rest,   eat lightly and use a heating pad.  - Sore Throat: treat with throat lozenges and/or gargle with warm salt   water.  - Because air was used during the procedure, expelling large amounts of air   from your rectum or belching is normal.  - If a bowel prep was taken, you may not have a bowel movement for 1-3 days.    This is normal.  SYMPTOMS TO WATCH FOR AND REPORT TO YOUR PHYSICIAN:  1. Abdominal pain or bloating, other than gas cramps.  2. Chest pain.  3. Back pain.  4. Signs of infection such as: chills or fever occurring within 24 hours   after the procedure.  5. Rectal bleeding, which would show as bright red, maroon, or black stools.   (A tablespoon of blood from the rectum is not serious, especially if   hemorrhoids are present.)  6. Vomiting.  7. Weakness or dizziness.  GO DIRECTLY TO THE NEAREST EMERGENCY ROOM IF YOU HAVE ANY OF THE FOLLOWING:      Difficulty breathing              Chills and/or fever over 101 F   Persistent vomiting and/or vomiting blood   Severe abdominal pain   Severe chest pain   Black, tarry stools   Bleeding- more than one  tablespoon   Any other symptom or condition that you feel may need urgent attention  Your doctor recommends these additional instructions:  If any biopsies were taken, your doctors clinic will contact you in 1 to 2   weeks with any results.  - Discharge patient to home.   - Patient has a contact number available for emergencies.  The signs and   symptoms of potential delayed complications were discussed with the   patient.  Return to normal activities tomorrow.  Written discharge   instructions were provided to the patient.   - Resume regular diet.   - Continue present medications.   - F/u path; Restart blood thinner thursday AM. Consider repeat imaging vs   enzymes pending biopsy.   - Unfortunately, poor visualization today given inflammation and   pancreatitis changes.  For questions, problems or results please call your physician - Donavon Jewell III, MD at Work:  [phone].  [facility], EMERGENCY ROOM PHONE NUMBER: [phone]  IF A COMPLICATION OR EMERGENCY SITUATION ARISES AND YOU ARE UNABLE TO REACH   YOUR PHYSICIAN - GO DIRECTLY TO THE EMERGENCY ROOM.  Donavon Jewell III, MD  11/29/2022 12:50:09 PM  This report has been verified and signed electronically.  Dear patient,  As a result of recent federal legislation (The Federal Cures Act), you may   receive lab or pathology results from your procedure in your [facility]   account before your physician is able to contact you. Your physician or   their representative will relay the results to you with their   recommendations at their soonest availability.  Thank you,  [employer]

## 2022-11-29 NOTE — TRANSFER OF CARE
"Anesthesia Transfer of Care Note    Patient: Claire Bowser    Procedure(s) Performed: Procedure(s) (LRB):  ULTRASOUND, UPPER GI TRACT, ENDOSCOPIC (N/A)    Patient location: GI    Anesthesia Type: MAC    Transport from OR: Transported from OR on room air with adequate spontaneous ventilation    Post pain: adequate analgesia    Post assessment: no apparent anesthetic complications    Post vital signs: stable    Level of consciousness: awake    Nausea/Vomiting: no nausea/vomiting    Complications: none    Transfer of care protocol was followed      Last vitals:   Visit Vitals  /60   Pulse 77   Temp 36.9 °C (98.4 °F)   Resp 18   Ht 5' 6" (1.676 m)   Wt 37.2 kg (82 lb)   SpO2 95%   BMI 13.24 kg/m²     "

## 2022-11-29 NOTE — ANESTHESIA PREPROCEDURE EVALUATION
11/29/2022  Claire Bowser is a 62 y.o., female.      Pre-op Assessment    I have reviewed the Patient Summary Reports.     I have reviewed the Nursing Notes. I have reviewed the NPO Status.   I have reviewed the Medications.   Steroids Taken In Past Year: Prednisone    Review of Systems  Anesthesia Hx:  No problems with previous Anesthesia  Neg history of prior surgery. Denies Family Hx of Anesthesia complications.   Denies Personal Hx of Anesthesia complications.   Social:  Smoker, No Alcohol Use    Hematology/Oncology:  Hematology Normal   Oncology Normal     EENT/Dental:EENT/Dental Normal   Cardiovascular:   Hypertension    Pulmonary:  Pulmonary Normal    Renal/:  Renal/ Normal     Hepatic/GI:  Hepatic/GI Normal  Hepatic/GI Symptoms: (history of benign pacreatic mass)    Musculoskeletal:   Arthritis (RA)   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 rheumatoid factor    Black stool    Colon polyps    Pneumonia    Hypokalemia    Acute cystitis without hematuria    Acute pancreatitis    Epigastric pain    Dehydration    Hypercalcemia    ACP (advance care planning)    Severe malnutrition    Long-term use of immunosuppressant medication    Syncope    Acute biliary pancreatitis    Chest pain    Weight loss, unintentional    Acute DVT (deep venous thrombosis)       Past Surgical History:   Procedure Laterality Date    COLONOSCOPY N/A 2/26/2021    Procedure: COLONOSCOPY;  Surgeon: Amy Sidhu MD;  Location: [facility];  Service:  Endoscopy;  Laterality: N/A;    ENDOSCOPIC ULTRASOUND OF UPPER GASTROINTESTINAL TRACT N/A 7/1/2022    Procedure: ULTRASOUND, UPPER GI TRACT, ENDOSCOPIC;  Surgeon: Donavon Jewell III, MD;  Location: [facility] ENDO;  Service: Endoscopy;  Laterality: N/A;    ESOPHAGOGASTRODUODENOSCOPY N/A 2/26/2021    Procedure: EGD (ESOPHAGOGASTRODUODENOSCOPY);  Surgeon: Amy Sidhu MD;  Location: [facility] ENDO;  Service: Endoscopy;  Laterality: N/A;    LAPAROSCOPIC CHOLECYSTECTOMY N/A 7/27/2022    Procedure: CHOLECYSTECTOMY, LAPAROSCOPIC;  Surgeon: Ramón Hwang III, MD;  Location: [facility] OR;  Service: General;  Laterality: N/A;    TUBAL LIGATION          Tobacco Use:  The patient  reports that she has been smoking cigarettes. She has a 7.00 pack-year smoking history. She has never used smokeless tobacco.     Results for orders placed or performed during the hospital encounter of 10/17/22   EKG 12-lead    Collection Time: 10/17/22  6:05 PM    Narrative    Test Reason : R00.0,    Vent. Rate : 096 BPM     Atrial Rate : 096 BPM     P-R Int : 138 ms          QRS Dur : 074 ms      QT Int : 360 ms       P-R-T Axes : 082 041 012 degrees     QTc Int : 454 ms    Normal sinus rhythm  ST and T wave abnormality, consider inferior ischemia  ST and T wave abnormality, consider anterior ischemia  Abnormal ECG  When compared with ECG of 14-JUN-2022 12:10,  T wave inversion now evident in Inferior leads  T wave inversion now evident in Anterior leads  Nonspecific T wave abnormality, improved in Lateral leads  Confirmed by Ricci MARIANO, Ta DANIEL ([id number]) on 10/19/2022 11:35:38 AM    Referred By: [email or web address]   SELF           Confirmed By:Ta Wynne MD             Lab Results   Component Value Date    WBC 7.63 11/04/2022    HGB 11.6 (L) 11/04/2022    HCT 36.0 (L) 11/04/2022    MCV 92 11/04/2022     (H) 11/04/2022     BMP  Lab Results   Component Value Date     11/09/2022    K 2.9 (L) 11/09/2022    CL 95 11/09/2022    CO2 32 (H)  11/09/2022    BUN 13 11/09/2022    CREATININE 0.7 11/09/2022    CALCIUM 10.5 11/09/2022    ANIONGAP 11 11/09/2022    GLU 92 11/09/2022     11/04/2022     10/25/2022       Results for orders placed during the hospital encounter of 06/14/22    Echo    Interpretation Summary  · The left ventricle is normal in size with normal systolic function.  · The estimated ejection fraction is 54%.  · Normal left ventricular diastolic function.  · Atrial fibrillation not observed.  · Normal right ventricular size with normal right ventricular systolic function.  · There is mild pulmonary hypertension.  · Mild to moderate tricuspid regurgitation.  · Intermediate central venous pressure (8 mmHg).  · The estimated PA systolic pressure is 44 mmHg.  · No bubble study          Physical Exam  General: Well nourished and Alert    Airway:  Mallampati: II   Mouth Opening: Normal  TM Distance: Normal  Tongue: Normal  Neck ROM: Normal ROM    Dental:  Periodontal disease  Poor dentition, loose lower teeth  Chest/Lungs:  Clear to auscultation, Normal Respiratory Rate    Heart:  Rate: Normal  Rhythm: Regular Rhythm  Sounds: Normal        Anesthesia Plan  Type of Anesthesia, risks & benefits discussed:    Anesthesia Type: MAC  Intra-op Monitoring Plan: Standard ASA Monitors  Informed Consent: Informed consent signed with the Patient and all parties understand the risks and agree with anesthesia plan.  All questions answered. Patient consented to blood products? Yes  ASA Score: 3  Anesthesia Plan Notes: MAC with propofol  No hyperextension of neck      Ready For Surgery From Anesthesia Perspective.     .

## 2022-11-29 NOTE — H&P
GASTROENTEROLOGY PRE-PROCEDURE H&P NOTE  Patient Name: Claire Bowser  Patient MRN: [id number]  Patient : 1960    Service date: 2022    PCP: Samuel Brady MD    No chief complaint on file.      HPI: Patient is a 62 y.o. female with PMHx as below here for evaluation of HOP cyst, f/u neck lesion and mild elevation Ca 19-9.     Past Medical History:  Past Medical History:   Diagnosis Date    Anxiety     Digestive disorder     Flu 2017    [facility]    Hypertension     Rheumatoid arthritis involving multiple sites with positive rheumatoid factor 2021        Past Surgical History:  Past Surgical History:   Procedure Laterality Date    COLONOSCOPY N/A 2021    Procedure: COLONOSCOPY;  Surgeon: Amy Sidhu MD;  Location: [facility] ENDO;  Service: Endoscopy;  Laterality: N/A;    ENDOSCOPIC ULTRASOUND OF UPPER GASTROINTESTINAL TRACT N/A 2022    Procedure: ULTRASOUND, UPPER GI TRACT, ENDOSCOPIC;  Surgeon: Donavon Jewell III, MD;  Location: [facility] ENDO;  Service: Endoscopy;  Laterality: N/A;    ESOPHAGOGASTRODUODENOSCOPY N/A 2021    Procedure: EGD (ESOPHAGOGASTRODUODENOSCOPY);  Surgeon: Amy Sidhu MD;  Location: [facility] ENDO;  Service: Endoscopy;  Laterality: N/A;    LAPAROSCOPIC CHOLECYSTECTOMY N/A 2022    Procedure: CHOLECYSTECTOMY, LAPAROSCOPIC;  Surgeon: Ramón Hwang III, MD;  Location: [facility] OR;  Service: General;  Laterality: N/A;    TUBAL LIGATION          Home Medications:  Medications Prior to Admission   Medication Sig Dispense Refill Last Dose    folic acid (FOLVITE) 1 MG tablet Take 1 tablet (1 mg total) by mouth once daily. 360 tablet 3 2022    leflunomide (ARAVA) 20 MG Tab Take 1 tablet (20 mg total) by mouth once daily. 30 tablet 3 2022    metoprolol succinate (TOPROL-XL) 50 MG 24 hr tablet Take 1 tablet (50 mg total) by mouth once daily. 1/2 tab qd   2022    pantoprazole (PROTONIX) 40 MG tablet Take 1 tablet (40 mg total) by mouth  once daily. 30 tablet 11 11/28/2022    potassium chloride SA (K-DUR,KLOR-CON) 20 MEQ tablet Take 20 mEq by mouth 2 (two) times daily.   11/28/2022    potassium chloride SA (K-DUR,KLOR-CON) 20 MEQ tablet Take 2 tablets (40 mEq total) by mouth 2 (two) times daily. 120 tablet 0 11/28/2022    predniSONE (DELTASONE) 5 MG tablet Take 5 mg by mouth once daily.   11/28/2022    sertraline (ZOLOFT) 25 MG tablet Take 1 tablet by mouth once daily 90 tablet 0 11/28/2022    traZODone (DESYREL) 50 MG tablet Take 1 tablet (50 mg total) by mouth every evening. 90 tablet 1 11/28/2022    apixaban (ELIQUIS) 5 mg Tab Take 1 tablet (5 mg total) by mouth 2 (two) times daily. 60 tablet 6 11/26/2022    cholecalciferol, vitamin D3, (VITAMIN D3) 10 mcg (400 unit) Tab Take 400 Units by mouth once daily.   11/26/2022    hydroCHLOROthiazide (HYDRODIURIL) 25 MG tablet Take 25 mg by mouth once daily.       mupirocin (BACTROBAN) 2 % ointment Apply topically 3 (three) times daily. (Patient not taking: Reported on 10/25/2022) 30 g 0        Inpatient Medications:        Review of patient's allergies indicates:   Allergen Reactions    No known drug allergies        Social History:   Social History     Occupational History    Not on file   Tobacco Use    Smoking status: Every Day     Packs/day: 1.00     Years: 7.00     Pack years: 7.00     Types: Cigarettes    Smokeless tobacco: Never    Tobacco comments:     [phone]   Substance and Sexual Activity    Alcohol use: No    Drug use: Never    Sexual activity: Yes     Partners: Male       Family History:   Family History   Problem Relation Age of Onset    Diabetes Mother     Heart disease Mother     Kidney disease Mother     Hypertension Mother     Stroke Mother     Hearing loss Mother     COPD Father     Liver disease Paternal Grandfather     Alcohol abuse Paternal Grandfather     Liver disease Sister     Emphysema Brother     COPD Brother     Sleep apnea Brother     No Known Problems Son     Alcohol  "abuse Paternal Grandmother     Cirrhosis Paternal Grandmother     Heart disease Maternal Aunt     Diabetes Maternal Aunt     Cancer Maternal Aunt         female    Heart disease Maternal Uncle     Hypertension Maternal Uncle     No Known Problems Paternal Uncle     Psoriasis Neg Hx     Lupus Neg Hx     Eczema Neg Hx     Melanoma Neg Hx        Review of Systems:  A 10 point review of systems was performed and was normal, except as mentioned in the HPI, including constitutional, HEENT, heme, lymph, cardiovascular, respiratory, gastrointestinal, genitourinary, neurologic, endocrine, psychiatric and musculoskeletal.      OBJECTIVE:    Physical Exam:  24 Hour Vital Sign Ranges: Temp:  [98.4 °F (36.9 °C)] 98.4 °F (36.9 °C)  Pulse:  [77] 77  Resp:  [18] 18  SpO2:  [95 %] 95 %  BP: (122)/(60) 122/60  Most recent vitals: /60   Pulse 77   Temp 98.4 °F (36.9 °C)   Resp 18   Ht 5' 6" (1.676 m)   Wt 37.2 kg (82 lb)   SpO2 95% Comment: ROOM AIR  BMI 13.24 kg/m²    GEN: well-developed, well-nourished, awake and alert, non-toxic appearing adult  HEENT: PERRL, sclera anicteric, oral mucosa pink and moist without lesion  NECK: trachea midline; Good ROM  CV: regular rate and rhythm, no murmurs or gallops  RESP: clear to auscultation bilaterally, no wheezes, rhonci or rales  ABD: soft, non-tender, non-distended, normal bowel sounds  EXT: no swelling or edema, 2+ pulses distally  SKIN: no rashes or jaundice  PSYCH: normal affect    Labs:   No results for input(s): WBC, MCV, PLT in the last 72 hours.    Invalid input(s): HGBAU  Recent Labs     11/29/22  1003   K 3.2*     No results for input(s): ALB in the last 72 hours.    Invalid input(s): ALKP, SGOT, SGPT, TBIL, DBIL, TPRO  No results for input(s): PT, INR, PTT in the last 72 hours.      IMPRESSION / RECOMMENDATIONS:  EUS  with interventions as warranted.   RIsks, benefits, alternatives discussed in detail regarding upcoming procedures and sedation. Some of the more " common endoscopic complications include but not limited to immediate or delayed perforation, bleeding, infections, pain, inadvertent injury to surrounding tissue / organs and possible need for surgical evaluation. Patient expressed understanding, all questions answered and will proceed with procedure as planned.     Donavon Jewell III  11/29/2022  12:10 PM

## 2022-12-09 ENCOUNTER — PATIENT OUTREACH (OUTPATIENT)
Dept: ADMINISTRATIVE | Facility: HOSPITAL | Age: 62
End: 2022-12-09

## 2022-12-29 ENCOUNTER — HOSPITAL ENCOUNTER (INPATIENT)
Facility: HOSPITAL | Age: 62
LOS: 2 days | Discharge: HOME | DRG: 438 | End: 2022-12-31
Attending: EMERGENCY MEDICINE | Admitting: FAMILY MEDICINE
Payer: MEDICAID

## 2022-12-29 DIAGNOSIS — R07.9 CHEST PAIN: ICD-10-CM

## 2022-12-29 DIAGNOSIS — K27.9 PUD (PEPTIC ULCER DISEASE): ICD-10-CM

## 2022-12-29 DIAGNOSIS — R00.2 PALPITATIONS: ICD-10-CM

## 2022-12-29 DIAGNOSIS — I10 ESSENTIAL HYPERTENSION: ICD-10-CM

## 2022-12-29 DIAGNOSIS — K85.90 ACUTE PANCREATITIS: ICD-10-CM

## 2022-12-29 LAB
ALBUMIN SERPL BCP-MCNC: 2.3 G/DL (ref 3.5–5.2)
ALP SERPL-CCNC: 105 U/L (ref 55–135)
ALT SERPL W/O P-5'-P-CCNC: 17 U/L (ref 10–44)
ANION GAP SERPL CALC-SCNC: 8 MMOL/L (ref 8–16)
AST SERPL-CCNC: 25 U/L (ref 10–40)
BASOPHILS # BLD AUTO: 0.07 K/UL (ref 0–0.2)
BASOPHILS NFR BLD: 0.9 % (ref 0–1.9)
BILIRUB SERPL-MCNC: 0.7 MG/DL (ref 0.1–1)
BILIRUB UR QL STRIP: NEGATIVE
BUN SERPL-MCNC: 10 MG/DL (ref 8–23)
CALCIUM SERPL-MCNC: 9.8 MG/DL (ref 8.7–10.5)
CHLORIDE SERPL-SCNC: 98 MMOL/L (ref 95–110)
CLARITY UR: CLEAR
CO2 SERPL-SCNC: 30 MMOL/L (ref 23–29)
COLOR UR: YELLOW
CREAT SERPL-MCNC: 0.7 MG/DL (ref 0.5–1.4)
DIFFERENTIAL METHOD: ABNORMAL
EOSINOPHIL # BLD AUTO: 0.2 K/UL (ref 0–0.5)
EOSINOPHIL NFR BLD: 2.9 % (ref 0–8)
ERYTHROCYTE [DISTWIDTH] IN BLOOD BY AUTOMATED COUNT: 16.5 % (ref 11.5–14.5)
EST. GFR  (NO RACE VARIABLE): >60 ML/MIN/1.73 M^2
GLUCOSE SERPL-MCNC: 111 MG/DL (ref 70–110)
GLUCOSE UR QL STRIP: NEGATIVE
HCT VFR BLD AUTO: 34.8 % (ref 37–48.5)
HGB BLD-MCNC: 11.5 G/DL (ref 12–16)
HGB UR QL STRIP: NEGATIVE
IMM GRANULOCYTES # BLD AUTO: 0.04 K/UL (ref 0–0.04)
IMM GRANULOCYTES NFR BLD AUTO: 0.5 % (ref 0–0.5)
KETONES UR QL STRIP: NEGATIVE
LEUKOCYTE ESTERASE UR QL STRIP: NEGATIVE
LIPASE SERPL-CCNC: 955 U/L (ref 4–60)
LYMPHOCYTES # BLD AUTO: 0.8 K/UL (ref 1–4.8)
LYMPHOCYTES NFR BLD: 10 % (ref 18–48)
MAGNESIUM SERPL-MCNC: 1.7 MG/DL (ref 1.6–2.6)
MCH RBC QN AUTO: 30.1 PG (ref 27–31)
MCHC RBC AUTO-ENTMCNC: 33 G/DL (ref 32–36)
MCV RBC AUTO: 91 FL (ref 82–98)
MONOCYTES # BLD AUTO: 0.7 K/UL (ref 0.3–1)
MONOCYTES NFR BLD: 8.5 % (ref 4–15)
NEUTROPHILS # BLD AUTO: 6 K/UL (ref 1.8–7.7)
NEUTROPHILS NFR BLD: 77.2 % (ref 38–73)
NITRITE UR QL STRIP: NEGATIVE
NRBC BLD-RTO: 0 /100 WBC
PH UR STRIP: 7 [PH] (ref 5–8)
PLATELET # BLD AUTO: 510 K/UL (ref 150–450)
PMV BLD AUTO: 9 FL (ref 9.2–12.9)
POTASSIUM SERPL-SCNC: 3.2 MMOL/L (ref 3.5–5.1)
PROT SERPL-MCNC: 6.1 G/DL (ref 6–8.4)
PROT UR QL STRIP: NEGATIVE
RBC # BLD AUTO: 3.82 M/UL (ref 4–5.4)
SODIUM SERPL-SCNC: 136 MMOL/L (ref 136–145)
SP GR UR STRIP: >1.03 (ref 1–1.03)
URN SPEC COLLECT METH UR: ABNORMAL
UROBILINOGEN UR STRIP-ACNC: NEGATIVE EU/DL
WBC # BLD AUTO: 7.8 K/UL (ref 3.9–12.7)

## 2022-12-29 PROCEDURE — 25500020 PHARM REV CODE 255: Performed by: EMERGENCY MEDICINE

## 2022-12-29 PROCEDURE — 36415 COLL VENOUS BLD VENIPUNCTURE: CPT | Performed by: NURSE PRACTITIONER

## 2022-12-29 PROCEDURE — 12000002 HC ACUTE/MED SURGE SEMI-PRIVATE ROOM

## 2022-12-29 PROCEDURE — G0378 HOSPITAL OBSERVATION PER HR: HCPCS

## 2022-12-29 PROCEDURE — 96375 TX/PRO/DX INJ NEW DRUG ADDON: CPT

## 2022-12-29 PROCEDURE — 96366 THER/PROPH/DIAG IV INF ADDON: CPT

## 2022-12-29 PROCEDURE — 99285 EMERGENCY DEPT VISIT HI MDM: CPT | Mod: 25

## 2022-12-29 PROCEDURE — 96365 THER/PROPH/DIAG IV INF INIT: CPT

## 2022-12-29 PROCEDURE — 63600175 PHARM REV CODE 636 W HCPCS: Performed by: EMERGENCY MEDICINE

## 2022-12-29 PROCEDURE — 63600175 PHARM REV CODE 636 W HCPCS: Performed by: FAMILY MEDICINE

## 2022-12-29 PROCEDURE — 25000003 PHARM REV CODE 250: Performed by: EMERGENCY MEDICINE

## 2022-12-29 PROCEDURE — 81003 URINALYSIS AUTO W/O SCOPE: CPT | Performed by: NURSE PRACTITIONER

## 2022-12-29 PROCEDURE — 83735 ASSAY OF MAGNESIUM: CPT | Performed by: NURSE PRACTITIONER

## 2022-12-29 PROCEDURE — 96376 TX/PRO/DX INJ SAME DRUG ADON: CPT

## 2022-12-29 PROCEDURE — 25000003 PHARM REV CODE 250: Performed by: FAMILY MEDICINE

## 2022-12-29 PROCEDURE — 85025 COMPLETE CBC W/AUTO DIFF WBC: CPT | Performed by: NURSE PRACTITIONER

## 2022-12-29 PROCEDURE — 83690 ASSAY OF LIPASE: CPT | Performed by: NURSE PRACTITIONER

## 2022-12-29 PROCEDURE — 80053 COMPREHEN METABOLIC PANEL: CPT | Performed by: NURSE PRACTITIONER

## 2022-12-29 RX ORDER — SERTRALINE HYDROCHLORIDE 25 MG/1
25 TABLET, FILM COATED ORAL DAILY
Status: DISCONTINUED | OUTPATIENT
Start: 2022-12-30 | End: 2022-12-31 | Stop reason: HOSPADM

## 2022-12-29 RX ORDER — TRAZODONE HYDROCHLORIDE 50 MG/1
50 TABLET ORAL NIGHTLY
Status: DISCONTINUED | OUTPATIENT
Start: 2022-12-29 | End: 2022-12-31 | Stop reason: HOSPADM

## 2022-12-29 RX ORDER — ACETAMINOPHEN 325 MG/1
650 TABLET ORAL EVERY 4 HOURS PRN
Status: DISCONTINUED | OUTPATIENT
Start: 2022-12-29 | End: 2022-12-31 | Stop reason: HOSPADM

## 2022-12-29 RX ORDER — ONDANSETRON 2 MG/ML
4 INJECTION INTRAMUSCULAR; INTRAVENOUS
Status: COMPLETED | OUTPATIENT
Start: 2022-12-29 | End: 2022-12-29

## 2022-12-29 RX ORDER — GLUCAGON 1 MG
1 KIT INJECTION
Status: DISCONTINUED | OUTPATIENT
Start: 2022-12-29 | End: 2022-12-31 | Stop reason: HOSPADM

## 2022-12-29 RX ORDER — PANTOPRAZOLE SODIUM 40 MG/1
40 TABLET, DELAYED RELEASE ORAL DAILY
Status: DISCONTINUED | OUTPATIENT
Start: 2022-12-30 | End: 2022-12-31 | Stop reason: HOSPADM

## 2022-12-29 RX ORDER — POLYETHYLENE GLYCOL 3350 17 G/17G
17 POWDER, FOR SOLUTION ORAL 2 TIMES DAILY PRN
Status: DISCONTINUED | OUTPATIENT
Start: 2022-12-29 | End: 2022-12-31 | Stop reason: HOSPADM

## 2022-12-29 RX ORDER — LANOLIN ALCOHOL/MO/W.PET/CERES
800 CREAM (GRAM) TOPICAL
Status: DISCONTINUED | OUTPATIENT
Start: 2022-12-29 | End: 2022-12-31 | Stop reason: HOSPADM

## 2022-12-29 RX ORDER — POTASSIUM CHLORIDE 7.45 MG/ML
10 INJECTION INTRAVENOUS ONCE
Status: COMPLETED | OUTPATIENT
Start: 2022-12-29 | End: 2022-12-29

## 2022-12-29 RX ORDER — SODIUM,POTASSIUM PHOSPHATES 280-250MG
2 POWDER IN PACKET (EA) ORAL
Status: DISCONTINUED | OUTPATIENT
Start: 2022-12-29 | End: 2022-12-31 | Stop reason: HOSPADM

## 2022-12-29 RX ORDER — CYCLOBENZAPRINE HCL 5 MG
5 TABLET ORAL NIGHTLY
Status: ON HOLD | COMMUNITY
Start: 2022-11-22 | End: 2022-12-31 | Stop reason: HOSPADM

## 2022-12-29 RX ORDER — MORPHINE SULFATE 4 MG/ML
4 INJECTION, SOLUTION INTRAMUSCULAR; INTRAVENOUS
Status: COMPLETED | OUTPATIENT
Start: 2022-12-29 | End: 2022-12-29

## 2022-12-29 RX ORDER — HYDROCODONE BITARTRATE AND ACETAMINOPHEN 5; 325 MG/1; MG/1
1 TABLET ORAL EVERY 6 HOURS PRN
Status: DISCONTINUED | OUTPATIENT
Start: 2022-12-29 | End: 2022-12-31 | Stop reason: HOSPADM

## 2022-12-29 RX ORDER — MAG HYDROX/ALUMINUM HYD/SIMETH 200-200-20
30 SUSPENSION, ORAL (FINAL DOSE FORM) ORAL 4 TIMES DAILY PRN
Status: DISCONTINUED | OUTPATIENT
Start: 2022-12-29 | End: 2022-12-31 | Stop reason: HOSPADM

## 2022-12-29 RX ORDER — PREDNISONE 5 MG/1
5 TABLET ORAL DAILY
Status: DISCONTINUED | OUTPATIENT
Start: 2022-12-30 | End: 2022-12-31 | Stop reason: HOSPADM

## 2022-12-29 RX ORDER — SODIUM CHLORIDE 0.9 % (FLUSH) 0.9 %
10 SYRINGE (ML) INJECTION EVERY 12 HOURS PRN
Status: DISCONTINUED | OUTPATIENT
Start: 2022-12-29 | End: 2022-12-31 | Stop reason: HOSPADM

## 2022-12-29 RX ORDER — NALOXONE HCL 0.4 MG/ML
0.02 VIAL (ML) INJECTION
Status: DISCONTINUED | OUTPATIENT
Start: 2022-12-29 | End: 2022-12-31 | Stop reason: HOSPADM

## 2022-12-29 RX ORDER — PROCHLORPERAZINE EDISYLATE 5 MG/ML
5 INJECTION INTRAMUSCULAR; INTRAVENOUS EVERY 6 HOURS PRN
Status: DISCONTINUED | OUTPATIENT
Start: 2022-12-29 | End: 2022-12-31 | Stop reason: HOSPADM

## 2022-12-29 RX ORDER — SODIUM CHLORIDE 9 MG/ML
1000 INJECTION, SOLUTION INTRAVENOUS
Status: COMPLETED | OUTPATIENT
Start: 2022-12-29 | End: 2022-12-29

## 2022-12-29 RX ORDER — IBUPROFEN 200 MG
16 TABLET ORAL
Status: DISCONTINUED | OUTPATIENT
Start: 2022-12-29 | End: 2022-12-31 | Stop reason: HOSPADM

## 2022-12-29 RX ORDER — MORPHINE SULFATE 2 MG/ML
2 INJECTION, SOLUTION INTRAMUSCULAR; INTRAVENOUS EVERY 4 HOURS PRN
Status: DISCONTINUED | OUTPATIENT
Start: 2022-12-29 | End: 2022-12-31 | Stop reason: HOSPADM

## 2022-12-29 RX ORDER — PANCRELIPASE 36000; 180000; 114000 [USP'U]/1; [USP'U]/1; [USP'U]/1
2 CAPSULE, DELAYED RELEASE PELLETS ORAL 3 TIMES DAILY
Status: ON HOLD | COMMUNITY
Start: 2022-12-14 | End: 2022-12-31 | Stop reason: SDUPTHER

## 2022-12-29 RX ORDER — ONDANSETRON 4 MG/1
4 TABLET, ORALLY DISINTEGRATING ORAL EVERY 6 HOURS PRN
Status: ON HOLD | COMMUNITY
Start: 2022-11-21 | End: 2022-12-31 | Stop reason: HOSPADM

## 2022-12-29 RX ORDER — ACETAMINOPHEN 325 MG/1
650 TABLET ORAL EVERY 8 HOURS PRN
Status: DISCONTINUED | OUTPATIENT
Start: 2022-12-29 | End: 2022-12-31 | Stop reason: HOSPADM

## 2022-12-29 RX ORDER — IBUPROFEN 200 MG
24 TABLET ORAL
Status: DISCONTINUED | OUTPATIENT
Start: 2022-12-29 | End: 2022-12-31 | Stop reason: HOSPADM

## 2022-12-29 RX ADMIN — PROCHLORPERAZINE EDISYLATE 5 MG: 5 INJECTION INTRAMUSCULAR; INTRAVENOUS at 08:12

## 2022-12-29 RX ADMIN — MORPHINE SULFATE 4 MG: 4 INJECTION, SOLUTION INTRAMUSCULAR; INTRAVENOUS at 12:12

## 2022-12-29 RX ADMIN — IOHEXOL 100 ML: 350 INJECTION, SOLUTION INTRAVENOUS at 12:12

## 2022-12-29 RX ADMIN — PANCRELIPASE 3 CAPSULE: 60000; 12000; 38000 CAPSULE, DELAYED RELEASE PELLETS ORAL at 08:12

## 2022-12-29 RX ADMIN — ONDANSETRON 4 MG: 2 INJECTION INTRAMUSCULAR; INTRAVENOUS at 12:12

## 2022-12-29 RX ADMIN — SODIUM CHLORIDE 1000 ML: 0.9 INJECTION, SOLUTION INTRAVENOUS at 12:12

## 2022-12-29 RX ADMIN — TRAZODONE HYDROCHLORIDE 50 MG: 50 TABLET ORAL at 08:12

## 2022-12-29 RX ADMIN — POTASSIUM CHLORIDE 10 MEQ: 7.46 INJECTION, SOLUTION INTRAVENOUS at 12:12

## 2022-12-29 RX ADMIN — MORPHINE SULFATE 2 MG: 2 INJECTION, SOLUTION INTRAMUSCULAR; INTRAVENOUS at 08:12

## 2022-12-29 NOTE — ED PROVIDER NOTES
Encounter Date: 12/29/2022       History     Chief Complaint   Patient presents with    Abdominal Pain     X 3 DAYS    Nausea     Patient presents emergency department with reported abdominal pain onset over last 3 days associated nausea vomiting unable to keep anything down over last 2 days patient has a history of pancreatitis apparently idiopathic in nature she had 1st onset of pancreatitis in May she is lost a significant amount of weight she had call with cystectomy performed in July but has had little impact on her symptoms patient states she was seen Dr. Alysia cuellar but has lost her insurance he was providing her with Creon samples however since losing the insurance she has been unable to afford the medications      Review of patient's allergies indicates:   Allergen Reactions    No known drug allergies      Past Medical History:   Diagnosis Date    Anxiety     Digestive disorder     Flu 02/2017    [facility]    Hypertension     Rheumatoid arthritis involving multiple sites with positive rheumatoid factor 01/14/2021     Past Surgical History:   Procedure Laterality Date    COLONOSCOPY N/A 2/26/2021    Procedure: COLONOSCOPY;  Surgeon: Amy Sidhu MD;  Location: [facility];  Service: Endoscopy;  Laterality: N/A;    ENDOSCOPIC ULTRASOUND OF UPPER GASTROINTESTINAL TRACT N/A 7/1/2022    Procedure: ULTRASOUND, UPPER GI TRACT, ENDOSCOPIC;  Surgeon: Donavon Jewell III, MD;  Location: [facility];  Service: Endoscopy;  Laterality: N/A;    ENDOSCOPIC ULTRASOUND OF UPPER GASTROINTESTINAL TRACT N/A 11/29/2022    Procedure: ULTRASOUND, UPPER GI TRACT, ENDOSCOPIC;  Surgeon: Donavon Jewell III, MD;  Location: [facility];  Service: Endoscopy;  Laterality: N/A;    ESOPHAGOGASTRODUODENOSCOPY N/A 2/26/2021    Procedure: EGD (ESOPHAGOGASTRODUODENOSCOPY);  Surgeon: Amy Sidhu MD;  Location: [facility];  Service: Endoscopy;  Laterality: N/A;    LAPAROSCOPIC CHOLECYSTECTOMY N/A 7/27/2022    Procedure:  CHOLECYSTECTOMY, LAPAROSCOPIC;  Surgeon: Ramón Hwang III, MD;  Location: [facility];  Service: General;  Laterality: N/A;    TUBAL LIGATION       Family History   Problem Relation Age of Onset    Diabetes Mother     Heart disease Mother     Kidney disease Mother     Hypertension Mother     Stroke Mother     Hearing loss Mother     COPD Father     Liver disease Paternal Grandfather     Alcohol abuse Paternal Grandfather     Liver disease Sister     Emphysema Brother     COPD Brother     Sleep apnea Brother     No Known Problems Son     Alcohol abuse Paternal Grandmother     Cirrhosis Paternal Grandmother     Heart disease Maternal Aunt     Diabetes Maternal Aunt     Cancer Maternal Aunt         female    Heart disease Maternal Uncle     Hypertension Maternal Uncle     No Known Problems Paternal Uncle     Psoriasis Neg Hx     Lupus Neg Hx     Eczema Neg Hx     Melanoma Neg Hx      Social History     Tobacco Use    Smoking status: Every Day     Packs/day: 1.00     Years: 7.00     Pack years: 7.00     Types: Cigarettes    Smokeless tobacco: Never    Tobacco comments:     [phone]   Substance Use Topics    Alcohol use: No    Drug use: Never     Review of Systems   Constitutional:  Positive for activity change and appetite change. Negative for chills, fatigue and fever.   Respiratory:  Negative for cough and shortness of breath.    Cardiovascular:  Negative for chest pain.   Gastrointestinal:  Positive for abdominal distention, abdominal pain, nausea and vomiting. Negative for anal bleeding, blood in stool, constipation and diarrhea.   Genitourinary:  Positive for decreased urine volume. Negative for dysuria and hematuria.   All other systems reviewed and are negative.    Physical Exam     Initial Vitals [12/29/22 1032]   BP Pulse Resp Temp SpO2   (!) 159/88 110 18 98.1 °F (36.7 °C) 99 %      MAP       --         Physical Exam    Constitutional: She appears well-developed and well-nourished. No distress.   HENT:    Head: Normocephalic and atraumatic.   Right Ear: External ear normal.   Left Ear: External ear normal.   Mouth/Throat: Oropharynx is clear and moist.   Eyes: Conjunctivae and EOM are normal. Pupils are equal, round, and reactive to light.   Neck: Neck supple.   Normal range of motion.  Cardiovascular:  Regular rhythm, normal heart sounds and intact distal pulses.           Tachycardic   Pulmonary/Chest: Breath sounds normal. No respiratory distress.   Abdominal: Abdomen is soft. She exhibits distension. There is abdominal tenderness.   Absent bowel sounds throughout   Musculoskeletal:         General: No edema. Normal range of motion.      Cervical back: Normal range of motion and neck supple.     Neurological: She is alert and oriented to person, place, and time. GCS score is 15. GCS eye subscore is 4. GCS verbal subscore is 5. GCS motor subscore is 6.   Skin: Skin is warm and dry. Capillary refill takes less than 2 seconds. No rash noted.   Psychiatric: She has a normal mood and affect. Her behavior is normal.       ED Course   Procedures  Labs Reviewed   CBC W/ AUTO DIFFERENTIAL - Abnormal; Notable for the following components:       Result Value    RBC 3.82 (*)     Hemoglobin 11.5 (*)     Hematocrit 34.8 (*)     RDW 16.5 (*)     Platelets 510 (*)     MPV 9.0 (*)     Lymph # 0.8 (*)     Gran % 77.2 (*)     Lymph % 10.0 (*)     All other components within normal limits    Narrative:     For upper or mid abdominal pain.   COMPREHENSIVE METABOLIC PANEL - Abnormal; Notable for the following components:    Potassium 3.2 (*)     CO2 30 (*)     Glucose 111 (*)     Albumin 2.3 (*)     All other components within normal limits    Narrative:     For upper or mid abdominal pain.   URINALYSIS, REFLEX TO URINE CULTURE - Abnormal; Notable for the following components:    Specific Gravity, UA >1.030 (*)     All other components within normal limits    Narrative:     In and Out Cath as needed it patient unable to  void  Specimen Source->Urine   LIPASE - Abnormal; Notable for the following components:    Lipase 955 (*)     All other components within normal limits    Narrative:     For upper or mid abdominal pain.   MAGNESIUM   MAGNESIUM          Imaging Results              CT Abdomen Pelvis With Contrast (Final result)  Result time 12/29/22 13:26:22      Final result by Rosaura Duran MD (12/29/22 13:26:22)                   Narrative:    All CT scans at this facility used dose modulation, iterative reconstruction and/or weight-based dosing when appropriate to reduce radiation doses  as low as reasonably achievable.    HISTORY: Pancreatitis, acute, severe    FINDINGS: Axial postcontrast imaging was performed with 100 mL Omnipaque 350 IV contrast .    COMPARISON: 10/18/2022    CT ABDOMEN: There are mild emphysematous changes in lung bases. There are no infiltrates or pleural effusions. There is development of moderate ascites.  The liver and spleen demonstrate normal enhancement without focal mass or biliary duct dilatation.    There are multiple enlarging peripancreatic pseudocysts. The largest is anterior to the tail of the pancreas measuring 5.3 x 2.3 cm. There is a 2 cm cyst within the head of the pancreas. There is normal enhancement of the pancreas    The adrenal glands are normal.    The kidneys enhance symmetrically without hydronephrosis. There are bilateral renal cysts largest arising from the upper pole the left kidney measuring 6.2 cm.    There is dilatation of the duodenum measuring 3.4 cm which may be secondary to focal ileus. The remainder the small bowel and colon are normal. The aorta is normal in caliber. There is no mesenteric or retroperitoneal adenopathy.    . CT PELVIS: Moderate ascites. The uterus, ovaries and bladder are normal. There are degenerative changes of the spine.    IMPRESSION: Development of moderate ascites    Enlarging pseudocyst anterior to the pancreatic tail and within the head of  the pancreas    Bilateral renal cysts    Prior cholecystectomy    Dilatation of the second portion of the duodenum most likely secondary to ileus    Electronically signed by:  Rosaura Duran MD  12/29/2022 1:26 PM CST Workstation: [id number]                                     Medications   0.9%  NaCl infusion (1,000 mLs Intravenous New Bag 12/29/22 1205)   ondansetron injection 4 mg (4 mg Intravenous Given 12/29/22 1204)   morphine injection 4 mg (4 mg Intravenous Given 12/29/22 1204)   potassium chloride 10 mEq in 100 mL IVPB (10 mEq Intravenous New Bag 12/29/22 1214)   iohexoL (OMNIPAQUE 350) injection 100 mL (100 mLs Intravenous Given 12/29/22 1249)     Medical Decision Making:   ED Management:  Laboratory evaluation reviewed patient has evidence of pancreatitis laboratory evaluation with significant elevation of her lipase CT scan shows evidence of evolving pancreatic pseudocyst and ascites I have discussed patient's findings with Dr. Leong patient received IV fluid resuscitation pain control and nausea medication will admit for further evaluation and treatment                        Clinical Impression:   Final diagnoses:  [K85.90] Acute pancreatitis        ED Disposition Condition    Observation                 Syd Pérez MD  12/29/22 7085

## 2022-12-29 NOTE — CONSULTS
GASTROENTEROLOGY INPATIENT CONSULT NOTE  Patient Name: Claire Bowser  Patient MRN: [id number]  Patient : 1960    Admit Date: 2022  Service date: 2022    Reason for Consult: acute / chronic pancreatitis; ascites    PCP: Samuel Brady MD    Chief Complaint   Patient presents with    Abdominal Pain     X 3 DAYS    Nausea       HPI: Patient is a 62 y.o. female with PMHx BTL, COVID , ongoing tobacco use, cholecystectomy, DVT (Eliquis) that presents for evaluation of pancreatitis. Acute / chronic, intermittent, progressive on admission. No ETOH use. States also developed mild ascites recently which is new. + wt loss but has been off pancreatic enzymes since lost job / insurance.     CHART REVIEW:  CT ABd  - emphysema; multiple pancreatic pseudocysts; mild dilated duodenum ? Ileus ?; moderate ascites  EUS  - chronic panc; panc cysts in HOP / BOP; Mass like region in Neck s/p FNA; 6mm CBD s/p martin  -Bx - pancreatic fibrosis  Labs  - CA 19-9 - 113  Labs 10/'22 - CA 19-9 44.6  MRI 10/'22 - nml biliary s/p martin; 7mm HOP cyst; no mass; fluid by tail; tics  CT 10/'22 - pancreatitis w/ multiple cysts <2cm  EGD / EUS  - ill defined 2.5 cm region in neck s/p FNA  -HP neg gastritis; Neg panc bx w/ low suspicion for cancer. surgery for lap martin and MRI pancreas 2 months  MRCP  - 1.5 cm mass vs cyst in neck of panc; nml CBD / PD  EGD  - mild gastrtis  Colon  - small polyps.     Past Medical History:  Past Medical History:   Diagnosis Date    Anxiety     Digestive disorder     Flu 2017    [facility]    Hypertension     Rheumatoid arthritis involving multiple sites with positive rheumatoid factor 2021        Past Surgical History:  Past Surgical History:   Procedure Laterality Date    COLONOSCOPY N/A 2021    Procedure: COLONOSCOPY;  Surgeon: Amy Sidhu MD;  Location: [facility];  Service: Endoscopy;  Laterality: N/A;    ENDOSCOPIC ULTRASOUND OF  UPPER GASTROINTESTINAL TRACT N/A 7/1/2022    Procedure: ULTRASOUND, UPPER GI TRACT, ENDOSCOPIC;  Surgeon: Donavon Jewell III, MD;  Location: [facility] ENDO;  Service: Endoscopy;  Laterality: N/A;    ENDOSCOPIC ULTRASOUND OF UPPER GASTROINTESTINAL TRACT N/A 11/29/2022    Procedure: ULTRASOUND, UPPER GI TRACT, ENDOSCOPIC;  Surgeon: Donavon Jewell III, MD;  Location: [facility] ENDO;  Service: Endoscopy;  Laterality: N/A;    ESOPHAGOGASTRODUODENOSCOPY N/A 2/26/2021    Procedure: EGD (ESOPHAGOGASTRODUODENOSCOPY);  Surgeon: Amy Sidhu MD;  Location: [facility] ENDO;  Service: Endoscopy;  Laterality: N/A;    LAPAROSCOPIC CHOLECYSTECTOMY N/A 7/27/2022    Procedure: CHOLECYSTECTOMY, LAPAROSCOPIC;  Surgeon: Ramón Hwang III, MD;  Location: [facility] OR;  Service: General;  Laterality: N/A;    TUBAL LIGATION          Home Medications:  (Not in a hospital admission)      Inpatient Medications:        Review of patient's allergies indicates:   Allergen Reactions    No known drug allergies        Social History:   Social History     Occupational History    Not on file   Tobacco Use    Smoking status: Every Day     Packs/day: 1.00     Years: 7.00     Pack years: 7.00     Types: Cigarettes    Smokeless tobacco: Never    Tobacco comments:     [phone]   Substance and Sexual Activity    Alcohol use: No    Drug use: Never    Sexual activity: Yes     Partners: Male       Family History:   Family History   Problem Relation Age of Onset    Diabetes Mother     Heart disease Mother     Kidney disease Mother     Hypertension Mother     Stroke Mother     Hearing loss Mother     COPD Father     Liver disease Paternal Grandfather     Alcohol abuse Paternal Grandfather     Liver disease Sister     Emphysema Brother     COPD Brother     Sleep apnea Brother     No Known Problems Son     Alcohol abuse Paternal Grandmother     Cirrhosis Paternal Grandmother     Heart disease Maternal Aunt     Diabetes Maternal Aunt     Cancer Maternal Aunt   "       female    Heart disease Maternal Uncle     Hypertension Maternal Uncle     No Known Problems Paternal Uncle     Psoriasis Neg Hx     Lupus Neg Hx     Eczema Neg Hx     Melanoma Neg Hx        Review of Systems:  A 10 point review of systems was performed and was normal, except as mentioned in the HPI, including constitutional, HEENT, heme, lymph, cardiovascular, respiratory, gastrointestinal, genitourinary, neurologic, endocrine, psychiatric and musculoskeletal.      OBJECTIVE:    Physical Exam:  24 Hour Vital Sign Ranges: Temp:  [98.1 °F (36.7 °C)] 98.1 °F (36.7 °C)  Pulse:  [] 90  Resp:  [16-18] 16  SpO2:  [96 %-99 %] 98 %  BP: (136-160)/(70-88) 144/88  Most recent vitals: BP (!) 144/88   Pulse 90   Temp 98.1 °F (36.7 °C) (Oral)   Resp 16   Ht 5' 4" (1.626 m)   Wt 36.3 kg (80 lb)   SpO2 98%   Breastfeeding No   BMI 13.73 kg/m²    GEN: well-developed, well-nourished, awake and alert, non-toxic appearing adult  HEENT: PERRL, sclera anicteric, oral mucosa pink and moist without lesion  NECK: trachea midline; Good ROM  CV: regular rate and rhythm, no murmurs or gallops  RESP: clear to auscultation bilaterally, no wheezes, rhonci or rales  ABD: soft, min-tender, mild-distended, normal bowel sounds  EXT: no swelling or edema, 1+ pulses distally  SKIN: no rashes or jaundice  PSYCH: normal affect    Labs:   Recent Labs     12/29/22  1111   WBC 7.80   MCV 91   *     Recent Labs     12/29/22  1111      K 3.2*   CL 98   CO2 30*   BUN 10   *     No results for input(s): ALB in the last 72 hours.    Invalid input(s): ALKP, SGOT, SGPT, TBIL, DBIL, TPRO  No results for input(s): PT, INR, PTT in the last 72 hours.      Radiology Review:  CT Abdomen Pelvis With Contrast   Final Result            IMPRESSION / RECOMMENDATIONS:  62 y.o. female with PMHx BTL, COVID 5/'22, ongoing tobacco use, cholecystectomy, DVT (Eliquis) that presents for evaluation of pancreatitis and abd swelling. Get para " to r/o PD leak or possible malignancy. No evidence of cancer on imaging or EUS but limited by acute / chronic pancreatitis findings.     -Send off amylase, cytology, cell count, protein, albumin, and culture on ascites  -Send off Ca 19-9 but no evidence of cancer on imaging / bx yet.   -Restart pancreatic enzymes  -Tobacco cessation  -Social work for assistance w/ medicaid, etc to see if can get back coverage  -If ascites positive for elevated amylase, EGD / ERCP tomorrow w/ PD stenting.     Thank you for this consult.    Donavon ALVARADO DauteriRezQ III  12/29/2022  5:07 PM

## 2022-12-29 NOTE — H&P
[facility]   History & Physical   Patient Name: Claire Bowser  MRN: [id number]  Admission Date: 12/29/2022 10:28 AM  Attending Physician:  Chapin Leong MD  Primary Care Provider: Samuel Brady MD  Face-to-Face encounter date: 12/29/2022    Patient information was obtained from patient, past medical records, ER physician, and ER records.     HISTORY OF PRESENT ILLNESS:     Claire Bowser is a 62 y.o. White female   With PMH of recurrent pancreatitis, severe protein calorie malnutrition, hypertension, GERD, rheumatoid arthritis, DVT in September.    who presents with 2-3 days worsening left-sided abdominal pain that is sharp and achy in nature.  Some radiation to left flank.  Pain 8/10 at worse.  Exacerbated by trying to eat.  No fevers, chills.  She endorses nausea, but no vomiting.  Has had decreased p.o. intake and 30 lb weight loss since last June.    Has been seen for recurrent pancreatitis in the past and was seeing Dr. Joseph as an outpatient before losing insurance.  She was getting Creon samples, but has run out of these as she can not afford the cost out of pocket.      ED evaluation: Significant for potassium 3.2.  Lipase 955.  Urinalysis unremarkable.      CT of the abdomen/pelvis demonstrates moderate ascites, enlarging pseudocyst, findings consistent with ileus.        REVIEW OF SYSTEMS:     All systems reviewed and are negative except as noted per above.    PAST MEDICAL HISTORY:     Past Medical History:   Diagnosis Date    Anxiety     Digestive disorder     Flu 02/2017    [facility]    Hypertension     Rheumatoid arthritis involving multiple sites with positive rheumatoid factor 01/14/2021       PAST SURGICAL HISTORY:     Past Surgical History:   Procedure Laterality Date    COLONOSCOPY N/A 2/26/2021    Procedure: COLONOSCOPY;  Surgeon: Amy Sidhu MD;  Location: [facility];  Service: Endoscopy;  Laterality: N/A;    ENDOSCOPIC ULTRASOUND OF UPPER  GASTROINTESTINAL TRACT N/A 7/1/2022    Procedure: ULTRASOUND, UPPER GI TRACT, ENDOSCOPIC;  Surgeon: Donavon Jewell III, MD;  Location: [facility] ENDO;  Service: Endoscopy;  Laterality: N/A;    ENDOSCOPIC ULTRASOUND OF UPPER GASTROINTESTINAL TRACT N/A 11/29/2022    Procedure: ULTRASOUND, UPPER GI TRACT, ENDOSCOPIC;  Surgeon: Donavon Jewell III, MD;  Location: [facility] ENDO;  Service: Endoscopy;  Laterality: N/A;    ESOPHAGOGASTRODUODENOSCOPY N/A 2/26/2021    Procedure: EGD (ESOPHAGOGASTRODUODENOSCOPY);  Surgeon: Amy Sidhu MD;  Location: [facility] ENDO;  Service: Endoscopy;  Laterality: N/A;    LAPAROSCOPIC CHOLECYSTECTOMY N/A 7/27/2022    Procedure: CHOLECYSTECTOMY, LAPAROSCOPIC;  Surgeon: Ramón Hwang III, MD;  Location: [facility] OR;  Service: General;  Laterality: N/A;    TUBAL LIGATION         ALLERGIES:   No known drug allergies    FAMILY HISTORY:     Family History   Problem Relation Age of Onset    Diabetes Mother     Heart disease Mother     Kidney disease Mother     Hypertension Mother     Stroke Mother     Hearing loss Mother     COPD Father     Liver disease Paternal Grandfather     Alcohol abuse Paternal Grandfather     Liver disease Sister     Emphysema Brother     COPD Brother     Sleep apnea Brother     No Known Problems Son     Alcohol abuse Paternal Grandmother     Cirrhosis Paternal Grandmother     Heart disease Maternal Aunt     Diabetes Maternal Aunt     Cancer Maternal Aunt         female    Heart disease Maternal Uncle     Hypertension Maternal Uncle     No Known Problems Paternal Uncle     Psoriasis Neg Hx     Lupus Neg Hx     Eczema Neg Hx     Melanoma Neg Hx        SOCIAL HISTORY:     Social History     Tobacco Use    Smoking status: Every Day     Packs/day: 1.00     Years: 7.00     Pack years: 7.00     Types: Cigarettes    Smokeless tobacco: Never    Tobacco comments:     [phone]   Substance Use Topics    Alcohol use: No        Social History     Substance and Sexual Activity    Sexual Activity Yes    Partners: Male        HOME MEDICATIONS:     Prior to Admission medications    Medication Sig Start Date End Date Taking? Authorizing Provider   apixaban (ELIQUIS) 5 mg Tab Take 1 tablet (5 mg total) by mouth 2 (two) times daily. 10/13/22  Yes Darren Thorpe NP   cholecalciferol, vitamin D3, (VITAMIN D3) 10 mcg (400 unit) Tab Take 400 Units by mouth once daily. 12/14/20  Yes Historical Provider   CREON 36,000-114,000- 180,000 unit CpDR Take 2 capsules by mouth 3 (three) times daily. 12/14/22  Yes Historical Provider   cyclobenzaprine (FLEXERIL) 5 MG tablet Take 5 mg by mouth nightly. 11/22/22  Yes Historical Provider   folic acid (FOLVITE) 1 MG tablet Take 1 tablet (1 mg total) by mouth once daily. 5/23/22 5/23/23 Yes Flash Fairbanks PA-C   leflunomide (ARAVA) 20 MG Tab Take 1 tablet by mouth once daily 12/27/22  Yes Hernandez Gregory MD   metoprolol succinate (TOPROL-XL) 50 MG 24 hr tablet Take 1 tablet (50 mg total) by mouth once daily. 1/2 tab qd 10/19/22  Yes Jenniffer Giordano NP   ondansetron (ZOFRAN-ODT) 4 MG TbDL Take 4 mg by mouth every 6 (six) hours as needed. 11/21/22  Yes Historical Provider   pantoprazole (PROTONIX) 40 MG tablet Take 1 tablet (40 mg total) by mouth once daily. 1/3/22 1/3/23 Yes Samuel Brady MD   potassium chloride SA (K-DUR,KLOR-CON) 20 MEQ tablet Take 20 mEq by mouth 2 (two) times daily.   Yes Historical Provider   predniSONE (DELTASONE) 5 MG tablet Take 5 mg by mouth once daily.   Yes Historical Provider   sertraline (ZOLOFT) 25 MG tablet Take 1 tablet by mouth once daily 10/18/22  Yes Samuel Brady MD   traZODone (DESYREL) 50 MG tablet Take 1 tablet (50 mg total) by mouth every evening. 9/1/22  Yes Samuel Brady MD   hydroCHLOROthiazide (HYDRODIURIL) 25 MG tablet Take 25 mg by mouth once daily.  12/29/22  Historical Provider   mupirocin (BACTROBAN) 2 % ointment Apply topically 3 (three) times daily.  Patient not taking: Reported on 10/25/2022  "9/26/22 12/29/22  Navarro Beckham MD   potassium chloride SA (K-DUR,KLOR-CON) 20 MEQ tablet Take 2 tablets (40 mEq total) by mouth 2 (two) times daily. 11/18/22 12/29/22  Azul Davis PA-C       PHYSICAL EXAM:     BP (!) 147/75   Pulse 84   Temp 98.1 °F (36.7 °C) (Oral)   Resp 16   Ht 5' 4" (1.626 m)   Wt 36.3 kg (80 lb)   SpO2 98%   Breastfeeding No   BMI 13.73 kg/m²     General:  Alert and oriented x4.  No acute distress  HEENT:  Normocephalic  Cardiovascular:  Regular rate and rhythm, no murmurs rubs or gallops.  No lower extremity edema.  Pulmonary:  Clear to auscultation bilaterally  Abdomen:  Soft, moderate tenderness to palpation in left upper quadrant, minimal distention.  No guarding.  No rebound.  Negative Batista's.  Positive bowel sounds.  Extremity:  Moves all extremities equally.  Dermatology:  No rashes appreciated on exposed skin  Psychiatric:  Normal affect    LABS AND IMAGING:     Labs Reviewed   CBC W/ AUTO DIFFERENTIAL - Abnormal; Notable for the following components:       Result Value    RBC 3.82 (*)     Hemoglobin 11.5 (*)     Hematocrit 34.8 (*)     RDW 16.5 (*)     Platelets 510 (*)     MPV 9.0 (*)     Lymph # 0.8 (*)     Gran % 77.2 (*)     Lymph % 10.0 (*)     All other components within normal limits    Narrative:     For upper or mid abdominal pain.   COMPREHENSIVE METABOLIC PANEL - Abnormal; Notable for the following components:    Potassium 3.2 (*)     CO2 30 (*)     Glucose 111 (*)     Albumin 2.3 (*)     All other components within normal limits    Narrative:     For upper or mid abdominal pain.   URINALYSIS, REFLEX TO URINE CULTURE - Abnormal; Notable for the following components:    Specific Gravity, UA >1.030 (*)     All other components within normal limits    Narrative:     In and Out Cath as needed it patient unable to void  Specimen Source->Urine   LIPASE - Abnormal; Notable for the following components:    Lipase 955 (*)     All other components within normal " limits    Narrative:     For upper or mid abdominal pain.   MAGNESIUM   MAGNESIUM       CT Abdomen Pelvis With Contrast   Final Result          ASSESSMENT & PLAN:   Claire Bowser is a 62 y.o. female admitted for    Active Hospital Problems    Diagnosis  POA    *Acute pancreatitis [K85.90]  Yes    Acute DVT (deep venous thrombosis) [I82.409]  Yes    Severe protein-calorie malnutrition [E43]  Unknown    Rheumatoid arthritis involving multiple sites with positive rheumatoid factor [M05.79]  Yes    Hypertension [I10]  Yes      Resolved Hospital Problems   No resolved problems to display.        Plan    1. Acute pancreatitis.    Clear liquid diet   Pain management  Consult Gastroenterology as known to their service  Worsening pseudocyst, I will hold off on consulting surgery for now   Will plan to slowly advance diet as tolerated  Antinausea medications   IV fluids  Creon as diet is tolerated    2. History of acute DVT   Three months ago  Will restart Eliquis for now, although affording at discharge will be a challenge.    3. Severe protein calorie malnutrition   Likely secondary to poor p.o. intake associated with pancreatitis an enzyme deficiency   Consult nutritionist     Rheumatoid arthritis   Does not appear to be in active flare     5. Hypertension       Admit to observation status.      Chapin Leong MD  [facility] Hospitalist  12/29/2022

## 2022-12-30 ENCOUNTER — ANESTHESIA EVENT (OUTPATIENT)
Dept: SURGERY | Facility: HOSPITAL | Age: 62
DRG: 438 | End: 2022-12-30
Payer: MEDICAID

## 2022-12-30 ENCOUNTER — ANESTHESIA (OUTPATIENT)
Dept: SURGERY | Facility: HOSPITAL | Age: 62
DRG: 438 | End: 2022-12-30
Payer: MEDICAID

## 2022-12-30 ENCOUNTER — TELEPHONE (OUTPATIENT)
Dept: RADIOLOGY | Facility: HOSPITAL | Age: 62
End: 2022-12-30

## 2022-12-30 LAB
ALBUMIN FLD-MCNC: 1.1 G/DL
ALBUMIN SERPL BCP-MCNC: 1.8 G/DL (ref 3.5–5.2)
ALP SERPL-CCNC: 85 U/L (ref 55–135)
ALT SERPL W/O P-5'-P-CCNC: 14 U/L (ref 10–44)
AMYLASE, BODY FLUID: 2096 U/L
ANION GAP SERPL CALC-SCNC: 4 MMOL/L (ref 8–16)
APPEARANCE FLD: CLEAR
AST SERPL-CCNC: 23 U/L (ref 10–40)
BASOPHILS # BLD AUTO: 0.08 K/UL (ref 0–0.2)
BASOPHILS NFR BLD: 1.3 % (ref 0–1.9)
BILIRUB SERPL-MCNC: 0.8 MG/DL (ref 0.1–1)
BODY FLD TYPE: NORMAL
BODY FLUID SOURCE AMYLASE: NORMAL
BUN SERPL-MCNC: 8 MG/DL (ref 8–23)
CALCIUM SERPL-MCNC: 8.8 MG/DL (ref 8.7–10.5)
CHLORIDE SERPL-SCNC: 102 MMOL/L (ref 95–110)
CO2 SERPL-SCNC: 28 MMOL/L (ref 23–29)
COLOR FLD: YELLOW
CREAT SERPL-MCNC: 0.6 MG/DL (ref 0.5–1.4)
DIFFERENTIAL METHOD: ABNORMAL
EOSINOPHIL # BLD AUTO: 0.6 K/UL (ref 0–0.5)
EOSINOPHIL NFR BLD: 10.6 % (ref 0–8)
ERYTHROCYTE [DISTWIDTH] IN BLOOD BY AUTOMATED COUNT: 17.1 % (ref 11.5–14.5)
EST. GFR  (NO RACE VARIABLE): >60 ML/MIN/1.73 M^2
GLUCOSE SERPL-MCNC: 72 MG/DL (ref 70–110)
HCT VFR BLD AUTO: 28.5 % (ref 37–48.5)
HGB BLD-MCNC: 9.1 G/DL (ref 12–16)
IMM GRANULOCYTES # BLD AUTO: 0.02 K/UL (ref 0–0.04)
IMM GRANULOCYTES NFR BLD AUTO: 0.3 % (ref 0–0.5)
LYMPHOCYTES # BLD AUTO: 0.7 K/UL (ref 1–4.8)
LYMPHOCYTES NFR BLD: 11.4 % (ref 18–48)
LYMPHOCYTES NFR FLD MANUAL: 13 %
MAGNESIUM SERPL-MCNC: 1.7 MG/DL (ref 1.6–2.6)
MCH RBC QN AUTO: 29.6 PG (ref 27–31)
MCHC RBC AUTO-ENTMCNC: 31.9 G/DL (ref 32–36)
MCV RBC AUTO: 93 FL (ref 82–98)
MONOCYTES # BLD AUTO: 0.6 K/UL (ref 0.3–1)
MONOCYTES NFR BLD: 9.6 % (ref 4–15)
MONOS+MACROS NFR FLD MANUAL: 85 %
NEUTROPHILS # BLD AUTO: 4 K/UL (ref 1.8–7.7)
NEUTROPHILS NFR BLD: 66.8 % (ref 38–73)
NEUTROPHILS NFR FLD MANUAL: 2 %
NRBC BLD-RTO: 0 /100 WBC
PLATELET # BLD AUTO: 391 K/UL (ref 150–450)
PMV BLD AUTO: 10.1 FL (ref 9.2–12.9)
POTASSIUM SERPL-SCNC: 3.1 MMOL/L (ref 3.5–5.1)
PROT FLD-MCNC: <3 G/DL
PROT SERPL-MCNC: 4.6 G/DL (ref 6–8.4)
RBC # BLD AUTO: 3.07 M/UL (ref 4–5.4)
SODIUM SERPL-SCNC: 134 MMOL/L (ref 136–145)
SPECIMEN SOURCE: NORMAL
SPECIMEN SOURCE: NORMAL
WBC # BLD AUTO: 6.03 K/UL (ref 3.9–12.7)
WBC # FLD: 336 /CU MM

## 2022-12-30 PROCEDURE — D9220A PRA ANESTHESIA: Mod: CRNA,,, | Performed by: NURSE ANESTHETIST, CERTIFIED REGISTERED

## 2022-12-30 PROCEDURE — 37000008 HC ANESTHESIA 1ST 15 MINUTES: Performed by: INTERNAL MEDICINE

## 2022-12-30 PROCEDURE — 27202125 HC BALLOON, EXTRACTION (ANY): Performed by: INTERNAL MEDICINE

## 2022-12-30 PROCEDURE — 37000009 HC ANESTHESIA EA ADD 15 MINS: Performed by: INTERNAL MEDICINE

## 2022-12-30 PROCEDURE — 87075 CULTR BACTERIA EXCEPT BLOOD: CPT | Performed by: INTERNAL MEDICINE

## 2022-12-30 PROCEDURE — 87205 SMEAR GRAM STAIN: CPT | Performed by: INTERNAL MEDICINE

## 2022-12-30 PROCEDURE — D9220A PRA ANESTHESIA: ICD-10-PCS | Mod: CRNA,,, | Performed by: NURSE ANESTHETIST, CERTIFIED REGISTERED

## 2022-12-30 PROCEDURE — 85025 COMPLETE CBC W/AUTO DIFF WBC: CPT | Performed by: FAMILY MEDICINE

## 2022-12-30 PROCEDURE — 25000003 PHARM REV CODE 250: Performed by: RADIOLOGY

## 2022-12-30 PROCEDURE — 84157 ASSAY OF PROTEIN OTHER: CPT | Performed by: INTERNAL MEDICINE

## 2022-12-30 PROCEDURE — 43274 ERCP DUCT STENT PLACEMENT: CPT | Performed by: INTERNAL MEDICINE

## 2022-12-30 PROCEDURE — 87070 CULTURE OTHR SPECIMN AEROBIC: CPT | Performed by: INTERNAL MEDICINE

## 2022-12-30 PROCEDURE — 86301 IMMUNOASSAY TUMOR CA 19-9: CPT | Performed by: INTERNAL MEDICINE

## 2022-12-30 PROCEDURE — 12000002 HC ACUTE/MED SURGE SEMI-PRIVATE ROOM

## 2022-12-30 PROCEDURE — 80053 COMPREHEN METABOLIC PANEL: CPT | Performed by: FAMILY MEDICINE

## 2022-12-30 PROCEDURE — 82042 OTHER SOURCE ALBUMIN QUAN EA: CPT | Performed by: INTERNAL MEDICINE

## 2022-12-30 PROCEDURE — 89051 BODY FLUID CELL COUNT: CPT | Performed by: INTERNAL MEDICINE

## 2022-12-30 PROCEDURE — 25000003 PHARM REV CODE 250: Performed by: FAMILY MEDICINE

## 2022-12-30 PROCEDURE — 25000003 PHARM REV CODE 250: Performed by: NURSE ANESTHETIST, CERTIFIED REGISTERED

## 2022-12-30 PROCEDURE — 63600175 PHARM REV CODE 636 W HCPCS: Performed by: FAMILY MEDICINE

## 2022-12-30 PROCEDURE — D9220A PRA ANESTHESIA: ICD-10-PCS | Mod: ANES,,, | Performed by: ANESTHESIOLOGY

## 2022-12-30 PROCEDURE — 63600175 PHARM REV CODE 636 W HCPCS: Performed by: INTERNAL MEDICINE

## 2022-12-30 PROCEDURE — C1769 GUIDE WIRE: HCPCS | Performed by: INTERNAL MEDICINE

## 2022-12-30 PROCEDURE — C1877 STENT, NON-COAT/COV W/O DEL: HCPCS | Performed by: INTERNAL MEDICINE

## 2022-12-30 PROCEDURE — 27201028 HC NEEDLE, SCLERO: Performed by: INTERNAL MEDICINE

## 2022-12-30 PROCEDURE — 36415 COLL VENOUS BLD VENIPUNCTURE: CPT | Performed by: INTERNAL MEDICINE

## 2022-12-30 PROCEDURE — 63600175 PHARM REV CODE 636 W HCPCS: Performed by: NURSE ANESTHETIST, CERTIFIED REGISTERED

## 2022-12-30 PROCEDURE — 83735 ASSAY OF MAGNESIUM: CPT | Performed by: FAMILY MEDICINE

## 2022-12-30 PROCEDURE — 25000003 PHARM REV CODE 250: Performed by: INTERNAL MEDICINE

## 2022-12-30 PROCEDURE — D9220A PRA ANESTHESIA: Mod: ANES,,, | Performed by: ANESTHESIOLOGY

## 2022-12-30 PROCEDURE — 96376 TX/PRO/DX INJ SAME DRUG ADON: CPT | Mod: 59

## 2022-12-30 PROCEDURE — 82150 ASSAY OF AMYLASE: CPT | Performed by: INTERNAL MEDICINE

## 2022-12-30 PROCEDURE — 43262 ENDO CHOLANGIOPANCREATOGRAPH: CPT | Performed by: INTERNAL MEDICINE

## 2022-12-30 PROCEDURE — 43236 UPPR GI SCOPE W/SUBMUC INJ: CPT | Performed by: INTERNAL MEDICINE

## 2022-12-30 RX ORDER — PHENYLEPHRINE HCL IN 0.9% NACL 1 MG/10 ML
SYRINGE (ML) INTRAVENOUS
Status: DISCONTINUED | OUTPATIENT
Start: 2022-12-30 | End: 2022-12-30

## 2022-12-30 RX ORDER — LIDOCAINE HYDROCHLORIDE 10 MG/ML
INJECTION INFILTRATION; PERINEURAL
Status: COMPLETED | OUTPATIENT
Start: 2022-12-30 | End: 2022-12-30

## 2022-12-30 RX ORDER — SUCCINYLCHOLINE CHLORIDE 20 MG/ML
INJECTION INTRAMUSCULAR; INTRAVENOUS
Status: DISCONTINUED | OUTPATIENT
Start: 2022-12-30 | End: 2022-12-30

## 2022-12-30 RX ORDER — ONDANSETRON 2 MG/ML
INJECTION INTRAMUSCULAR; INTRAVENOUS
Status: DISCONTINUED | OUTPATIENT
Start: 2022-12-30 | End: 2022-12-30

## 2022-12-30 RX ORDER — PROPOFOL 10 MG/ML
VIAL (ML) INTRAVENOUS
Status: DISCONTINUED | OUTPATIENT
Start: 2022-12-30 | End: 2022-12-30

## 2022-12-30 RX ORDER — INDOMETHACIN 50 MG/1
SUPPOSITORY RECTAL
Status: DISCONTINUED | OUTPATIENT
Start: 2022-12-30 | End: 2022-12-30 | Stop reason: HOSPADM

## 2022-12-30 RX ORDER — EPINEPHRINE 0.1 MG/ML
INJECTION INTRAVENOUS
Status: DISCONTINUED | OUTPATIENT
Start: 2022-12-30 | End: 2022-12-30 | Stop reason: HOSPADM

## 2022-12-30 RX ORDER — DEXAMETHASONE SODIUM PHOSPHATE 4 MG/ML
INJECTION, SOLUTION INTRA-ARTICULAR; INTRALESIONAL; INTRAMUSCULAR; INTRAVENOUS; SOFT TISSUE
Status: DISCONTINUED | OUTPATIENT
Start: 2022-12-30 | End: 2022-12-30

## 2022-12-30 RX ORDER — LIDOCAINE HYDROCHLORIDE 20 MG/ML
INJECTION, SOLUTION EPIDURAL; INFILTRATION; INTRACAUDAL; PERINEURAL
Status: DISCONTINUED | OUTPATIENT
Start: 2022-12-30 | End: 2022-12-30

## 2022-12-30 RX ORDER — ROCURONIUM BROMIDE 10 MG/ML
INJECTION, SOLUTION INTRAVENOUS
Status: DISCONTINUED | OUTPATIENT
Start: 2022-12-30 | End: 2022-12-30

## 2022-12-30 RX ORDER — FAMOTIDINE 10 MG/ML
INJECTION INTRAVENOUS
Status: DISCONTINUED | OUTPATIENT
Start: 2022-12-30 | End: 2022-12-30

## 2022-12-30 RX ORDER — MIDAZOLAM HYDROCHLORIDE 1 MG/ML
INJECTION INTRAMUSCULAR; INTRAVENOUS
Status: DISCONTINUED | OUTPATIENT
Start: 2022-12-30 | End: 2022-12-30

## 2022-12-30 RX ORDER — POTASSIUM CHLORIDE 7.45 MG/ML
10 INJECTION INTRAVENOUS
Status: DISCONTINUED | OUTPATIENT
Start: 2022-12-30 | End: 2022-12-30

## 2022-12-30 RX ORDER — LIDOCAINE HYDROCHLORIDE 20 MG/ML
JELLY TOPICAL
Status: DISCONTINUED | OUTPATIENT
Start: 2022-12-30 | End: 2022-12-30

## 2022-12-30 RX ORDER — FENTANYL CITRATE 50 UG/ML
INJECTION, SOLUTION INTRAMUSCULAR; INTRAVENOUS
Status: DISCONTINUED | OUTPATIENT
Start: 2022-12-30 | End: 2022-12-30

## 2022-12-30 RX ADMIN — ROCURONIUM BROMIDE 15 MG: 10 INJECTION, SOLUTION INTRAVENOUS at 01:12

## 2022-12-30 RX ADMIN — SUGAMMADEX 200 MG: 100 INJECTION, SOLUTION INTRAVENOUS at 02:12

## 2022-12-30 RX ADMIN — PANCRELIPASE 3 CAPSULE: 60000; 12000; 38000 CAPSULE, DELAYED RELEASE PELLETS ORAL at 08:12

## 2022-12-30 RX ADMIN — TRAZODONE HYDROCHLORIDE 50 MG: 50 TABLET ORAL at 08:12

## 2022-12-30 RX ADMIN — ROCURONIUM BROMIDE 5 MG: 10 INJECTION, SOLUTION INTRAVENOUS at 01:12

## 2022-12-30 RX ADMIN — LIDOCAINE HYDROCHLORIDE 8 ML: 10 INJECTION, SOLUTION INFILTRATION; PERINEURAL at 09:12

## 2022-12-30 RX ADMIN — DEXAMETHASONE SODIUM PHOSPHATE 8 MG: 4 INJECTION, SOLUTION INTRAMUSCULAR; INTRAVENOUS at 01:12

## 2022-12-30 RX ADMIN — PROPOFOL 80 MG: 10 INJECTION, EMULSION INTRAVENOUS at 01:12

## 2022-12-30 RX ADMIN — Medication 100 MCG: at 01:12

## 2022-12-30 RX ADMIN — POTASSIUM BICARBONATE 35 MEQ: 391 TABLET, EFFERVESCENT ORAL at 10:12

## 2022-12-30 RX ADMIN — POTASSIUM BICARBONATE 35 MEQ: 391 TABLET, EFFERVESCENT ORAL at 04:12

## 2022-12-30 RX ADMIN — LIDOCAINE HYDROCHLORIDE 1 EACH: 20 JELLY TOPICAL at 01:12

## 2022-12-30 RX ADMIN — SODIUM CHLORIDE, SODIUM LACTATE, POTASSIUM CHLORIDE, AND CALCIUM CHLORIDE: .6; .31; .03; .02 INJECTION, SOLUTION INTRAVENOUS at 01:12

## 2022-12-30 RX ADMIN — Medication 120 MG: at 01:12

## 2022-12-30 RX ADMIN — FAMOTIDINE 20 MG: 10 INJECTION, SOLUTION INTRAVENOUS at 01:12

## 2022-12-30 RX ADMIN — PROCHLORPERAZINE EDISYLATE 5 MG: 5 INJECTION INTRAMUSCULAR; INTRAVENOUS at 10:12

## 2022-12-30 RX ADMIN — FENTANYL CITRATE 50 MCG: 50 INJECTION INTRAMUSCULAR; INTRAVENOUS at 01:12

## 2022-12-30 RX ADMIN — MIDAZOLAM HYDROCHLORIDE 1 MG: 1 INJECTION, SOLUTION INTRAMUSCULAR; INTRAVENOUS at 01:12

## 2022-12-30 RX ADMIN — SERTRALINE HYDROCHLORIDE 25 MG: 25 TABLET ORAL at 08:12

## 2022-12-30 RX ADMIN — ONDANSETRON 4 MG: 2 INJECTION INTRAMUSCULAR; INTRAVENOUS at 01:12

## 2022-12-30 RX ADMIN — LIDOCAINE HYDROCHLORIDE 60 MG: 20 INJECTION, SOLUTION EPIDURAL; INFILTRATION; INTRACAUDAL; PERINEURAL at 01:12

## 2022-12-30 RX ADMIN — PREDNISONE 5 MG: 5 TABLET ORAL at 08:12

## 2022-12-30 NOTE — ANESTHESIA PREPROCEDURE EVALUATION
12/30/2022  Claire Bowser is a 62 y.o., female.      Pre-op Assessment    I have reviewed the Patient Summary Reports.     I have reviewed the Nursing Notes. I have reviewed the NPO Status.   I have reviewed the Medications.   Steroids Taken In Past Year: Prednisone    Review of Systems  Anesthesia Hx:  Denies Family Hx of Anesthesia complications.   Denies Personal Hx of Anesthesia complications.   Social:  Smoker, No Alcohol Use    Hematology/Oncology:  Hematology Normal   Oncology Normal     EENT/Dental:   Both lower incisors are loose.   Cardiovascular:   Hypertension    Pulmonary:  Pulmonary Normal    Renal/:  Renal/ Normal     Hepatic/GI:  Hepatic/GI Normal Recurrent pancreatitis.  1300 cc ascites drained today.  Mild nausea recently but no emesis. Hepatic/GI Symptoms: (history of benign pacreatic mass)    Musculoskeletal:   Arthritis (RA)  Patient has full range of motion of her neck with no pain currently. 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 rheumatoid factor    Black stool    Colon polyps    Pneumonia    Hypokalemia    Acute cystitis without hematuria    Acute pancreatitis    Epigastric pain    Dehydration    Hypercalcemia    ACP (advance care planning)    Severe protein-calorie malnutrition    Long-term use of immunosuppressant medication    Syncope    Acute biliary pancreatitis    Chest pain    Weight loss, unintentional    Acute DVT (deep venous thrombosis)       Past Surgical History:   Procedure Laterality  Date    COLONOSCOPY N/A 2/26/2021    Procedure: COLONOSCOPY;  Surgeon: Amy Sidhu MD;  Location: [facility] ENDO;  Service: Endoscopy;  Laterality: N/A;    ENDOSCOPIC ULTRASOUND OF UPPER GASTROINTESTINAL TRACT N/A 7/1/2022    Procedure: ULTRASOUND, UPPER GI TRACT, ENDOSCOPIC;  Surgeon: Donavon Jewell III, MD;  Location: [facility] ENDO;  Service: Endoscopy;  Laterality: N/A;    ENDOSCOPIC ULTRASOUND OF UPPER GASTROINTESTINAL TRACT N/A 11/29/2022    Procedure: ULTRASOUND, UPPER GI TRACT, ENDOSCOPIC;  Surgeon: Donavon Jewell III, MD;  Location: [facility] ENDO;  Service: Endoscopy;  Laterality: N/A;    ESOPHAGOGASTRODUODENOSCOPY N/A 2/26/2021    Procedure: EGD (ESOPHAGOGASTRODUODENOSCOPY);  Surgeon: Amy Sidhu MD;  Location: [facility] ENDO;  Service: Endoscopy;  Laterality: N/A;    LAPAROSCOPIC CHOLECYSTECTOMY N/A 7/27/2022    Procedure: CHOLECYSTECTOMY, LAPAROSCOPIC;  Surgeon: Ramón Hwang III, MD;  Location: [facility] OR;  Service: General;  Laterality: N/A;    TUBAL LIGATION          Tobacco Use:  The patient  reports that she has been smoking cigarettes. She has a 7.00 pack-year smoking history. She has never used smokeless tobacco.     Results for orders placed or performed during the hospital encounter of 10/17/22   EKG 12-lead    Collection Time: 10/17/22  6:05 PM    Narrative    Test Reason : R00.0,    Vent. Rate : 096 BPM     Atrial Rate : 096 BPM     P-R Int : 138 ms          QRS Dur : 074 ms      QT Int : 360 ms       P-R-T Axes : 082 041 012 degrees     QTc Int : 454 ms    Normal sinus rhythm  ST and T wave abnormality, consider inferior ischemia  ST and T wave abnormality, consider anterior ischemia  Abnormal ECG  When compared with ECG of 14-JUN-2022 12:10,  T wave inversion now evident in Inferior leads  T wave inversion now evident in Anterior leads  Nonspecific T wave abnormality, improved in Lateral leads  Confirmed by Ta Wynne MD ([id number]) on 10/19/2022 11:35:38 AM    Referred By:  [email or web address]   SELF           Confirmed By:Ta Wynne MD             Lab Results   Component Value Date    WBC 6.03 12/30/2022    HGB 9.1 (L) 12/30/2022    HCT 28.5 (L) 12/30/2022    MCV 93 12/30/2022     12/30/2022     BMP  Lab Results   Component Value Date     (L) 12/30/2022    K 3.1 (L) 12/30/2022     12/30/2022    CO2 28 12/30/2022    BUN 8 12/30/2022    CREATININE 0.6 12/30/2022    CALCIUM 8.8 12/30/2022    ANIONGAP 4 (L) 12/30/2022    GLU 72 12/30/2022     (H) 12/29/2022    GLU 92 11/09/2022       Results for orders placed during the hospital encounter of 06/14/22    Echo    Interpretation Summary  · The left ventricle is normal in size with normal systolic function.  · The estimated ejection fraction is 54%.  · Normal left ventricular diastolic function.  · Atrial fibrillation not observed.  · Normal right ventricular size with normal right ventricular systolic function.  · There is mild pulmonary hypertension.  · Mild to moderate tricuspid regurgitation.  · Intermediate central venous pressure (8 mmHg).  · The estimated PA systolic pressure is 44 mmHg.  · No bubble study          Physical Exam  General: Well nourished, Alert, Cooperative and Oriented    Airway:  Mallampati: II   Mouth Opening: Normal  TM Distance: Normal  Tongue: Normal  Neck ROM: Normal ROM    Dental:  Periodontal disease  Poor dentition, loose lower teeth  Chest/Lungs:  Clear to auscultation, Normal Respiratory Rate    Heart:  Rate: Normal  Rhythm: Regular Rhythm  Sounds: Normal        Anesthesia Plan  Type of Anesthesia, risks & benefits discussed:    Anesthesia Type: Gen ETT  Intra-op Monitoring Plan: Standard ASA Monitors  Informed Consent: Informed consent signed with the Patient and all parties understand the risks and agree with anesthesia plan.  All questions answered. Patient consented to blood products? Yes  ASA Score: 3  Anesthesia Plan Notes: Zofran, Pepcid, Decadron 4 mg      Ready For Surgery From  Anesthesia Perspective.     .

## 2022-12-30 NOTE — PROGRESS NOTES
[facility] Medicine Progress Note  Patient Name: Claire Bowser MRN: [id number]   Patient Class: IP- Inpatient  Length of Stay: 0   Admission Date: 12/29/2022 10:28 AM Attending Physician: Chapin Leong MD   Primary Care Provider: Samuel Brady MD Face-to-Face encounter date: 12/30/2022   Chief Complaint: Abdominal Pain (X 3 DAYS) and Nausea    Assessment & Plan:   Claire Bowser is a 62 y.o. female admitted for acute pancreatitis    Active Problems/Assessment & Plan  1. Acute pancreatitis   Advance diet as tolerated   Pain management   Gastroenterology following   ERCP today::  - A mild ectactic, slight narrowed region just above ampulla was found.   - A pancreatic sphincterotomy was performed. Pancreatic sludge / debris seen folowing sphincterotomy.   - One pancreatic stent was placed into the ventral pancreatic duct.   - The entire main bile duct was mildly dilated.   - The patient has had a cholecystectomy.   - A biliary sphincterotomy was performed.    12/29-EUS   Unrevealing    Patient to resume clear diet.  Recommendation for repeat ERCP in 4-6 weeks.    2. History of acute DVT   Will plan to restart Eliquis on discharge     3. Severe protein calorie malnutrition   Nutrition following   Recommending ensure Clear once diet restarts   Anticipate improvement of appetite with interventions as above    4. Ascites   Paracentesis today   Fluid counts not consistent with SBP    Core measures:  - Code status: full code   - Consultants:  Gastroenterology  - Diet:  Liquid diet.  Advance as tolerated    Discussed with GI physician    Hospital Course     12/30/2022          Discharge Planning:   No mobility needs. Patient will be discharged in 1 day    PT C/S for debility/fraility/deconditioning and assistance in discharge needs.     Subjective:    Interval History   Patient is doing well.  Pain much improved today.  Seen after ERCP earlier today.  Patient also had paracentesis.  Denies chest  "pain, shortness of breath, palpitations, abdominal pain, nausea/vomiting.   No concerns/issues overnight reported by the patient or the nursing staff.  Reviewed the labs and discussed the plan of care.   No family present at bedside.     Review of Systems   All other Review of Systems were found to be negative expect for that mentioned already in HPI.   Objective:   Physical Exam  BP (!) 154/90 (BP Location: Left arm, Patient Position: Lying)   Pulse 77   Temp 97.1 °F (36.2 °C) (Oral)   Resp 18   Ht 5' 4" (1.626 m)   Wt 42.6 kg (94 lb)   SpO2 97%   Breastfeeding No   BMI 16.14 kg/m²   Vitals reviewed.    Constitutional: No distress.  Cachectic appearing  HENT: Atraumatic.   Cardiovascular: Normal rate, regular rhythm and normal heart sounds.   Pulmonary/Chest: Effort normal. Clear to auscultation bilaterally. No wheezes.   Abdominal: Soft. Bowel sounds are normal. Exhibits no distension and no mass. No tenderness  Neurological: Alert.   Skin: Skin is warm and dry.     Following labs were Reviewed   Recent Labs   Lab 12/30/22  0647   WBC 6.03   HGB 9.1*   HCT 28.5*      CALCIUM 8.8   ALBUMIN 1.8*   PROT 4.6*   *   K 3.1*   CO2 28      BUN 8   CREATININE 0.6   ALKPHOS 85   ALT 14   AST 23   BILITOT 0.8     No results found for: POCTGLUCOSE     All labs within the past 24 hours have been reviewed  Microbiology Results (last 7 days)       Procedure Component Value Units Date/Time    Culture, Body Fluid (Aerobic) w/ GS [[id number]] Collected: 12/30/22 0933    Order Status: Completed Specimen: Peritoneal Fluid Updated: 12/30/22 1622     Gram Stain Result Few WBC's      No organisms seen    Narrative:      Peritoneal Fluid    Culture, Anaerobic [[id number]] Collected: 12/30/22 0933    Order Status: Sent Specimen: Body Fluid from Peritoneal Fluid Updated: 12/30/22 0956          FL ERCP Biliary And Pancreatic By Mobile Card Tech   Final Result      US Guided Paracentesis   Final Result      CT Abdomen Pelvis " With Contrast   Final Result          Inpatient medications  Scheduled Meds:   lipase-protease-amylase 12,000-38,000-60,000 units  3 capsule Oral TID    pantoprazole  40 mg Oral Daily    predniSONE  5 mg Oral Daily    sertraline  25 mg Oral Daily    traZODone  50 mg Oral QHS     Continuous Infusions:  PRN Meds:.acetaminophen, acetaminophen, aluminum-magnesium hydroxide-simethicone, dextrose 10%, dextrose 10%, glucagon (human recombinant), glucose, glucose, HYDROcodone-acetaminophen, magnesium oxide, magnesium oxide, morphine, naloxone, polyethylene glycol, potassium bicarbonate, potassium bicarbonate, potassium bicarbonate, potassium, sodium phosphates, potassium, sodium phosphates, potassium, sodium phosphates, prochlorperazine, prochlorperazine, sodium chloride 0.9%    Above encounter included review of the medical records, interviewing and examining the patient face-to-face, discussion with family and other health care providers, ordering and interpreting lab/test results and formulating a plan of care.     Medical Decision Making:    [] Low Complexity  [x] Moderate Complexity  [] High Complexity    Chapin Leong  [facility] Hospitalist  12/30/2022

## 2022-12-30 NOTE — PLAN OF CARE
[facility]  Initial Discharge Assessment       Primary Care Provider: Samuel Brady MD    Admission Diagnosis: Acute pancreatitis [K85.90]    Admission Date: 12/29/2022  Expected Discharge Date:     Discharge assessment completed with patient and spouse at bedside. Information verified as correct on facesheet. Denies HH/HD/DME at home/coumadin. Discharge plan is home. Spouse to provide transport on discharge. Patient and spouse in contact with Mila Hernandez and patient is now [place] medicaid PENDING.     Discharge Barriers Identified: None    Payor: MEDICAID / Plan: PENDING MEDICAID / Product Type: Government /     Extended Emergency Contact Information  Primary Emergency Contact: NielsRobin  Address: [address] [place] [id number] [place]  Mobile Phone: [phone]  Relation: Spouse  Preferred language: English   needed? No    Discharge Plan A: Home with family  Discharge Plan B: Home      [facility] [address] [place] [id number] [address]  [address] [employer]  [facility] [address]  Phone: [phone] Fax: [phone]      Initial Assessment (most recent)       Adult Discharge Assessment - 12/30/22 1152          Discharge Assessment    Assessment Type Discharge Planning Assessment     Confirmed/corrected address, phone number and insurance Yes     Confirmed Demographics Correct on Facesheet     Source of Information patient     Does patient/caregiver understand observation status Yes     Communicated RASHI with patient/caregiver Yes     Reason For Admission acute pancreatitis     People in Home spouse     Facility Arrived From: home     Do you expect to return to your current living situation? Yes     Do you have help at home or someone to help you manage your care at home? Yes     Who are your caregiver(s) and their phone number(s)? jase Robin     Prior to hospitilization cognitive status: Alert/Oriented     Current cognitive status: Alert/Oriented      Equipment Currently Used at Home none     Readmission within 30 days? No     Patient currently being followed by outpatient case management? No     Do you currently have service(s) that help you manage your care at home? No     Do you take prescription medications? Yes     Do you have prescription coverage? No   In-state medicaid PENDING    Do you have any problems affording any of your prescribed medications? TBD     Is the patient taking medications as prescribed? yes     Who is going to help you get home at discharge? spouse     How do you get to doctors appointments? car, drives self;family or friend will provide     Are you on dialysis? No     Do you take coumadin? No     Discharge Plan A Home with family     Discharge Plan B Home     DME Needed Upon Discharge  none     Discharge Plan discussed with: Patient;Spouse/sig other     Discharge Barriers Identified None

## 2022-12-30 NOTE — PLAN OF CARE
Tolerated well.  VSS.  1.3L dark yellow fluid drained and sample sent for testing.  AAO x 3, denies pain or nausea, INGRAM x 4, resp REU.  Large bandaid and gauze to site-RLQ with paper tape reinforcement.  Transported back to room via w/c with transporter in attendance

## 2022-12-30 NOTE — TRANSFER OF CARE
"Anesthesia Transfer of Care Note    Patient: Claire Bowser    Procedure(s) Performed: Procedure(s) (LRB):  ERCP (ENDOSCOPIC RETROGRADE CHOLANGIOPANCREATOGRAPHY) (N/A)    Patient location: PACU    Anesthesia Type: general    Transport from OR: Transported from OR on room air with adequate spontaneous ventilation    Post pain: adequate analgesia    Post assessment: no apparent anesthetic complications and tolerated procedure well    Post vital signs: stable    Level of consciousness: responds to stimulation    Nausea/Vomiting: no nausea/vomiting    Complications: none    Transfer of care protocol was followedComments: SV, exchanging well.  To PACU, VSS upon arrival. Report to RN       Last vitals:   Visit Vitals  /63   Pulse 88   Temp 36.7 °C (98 °F) (Oral)   Resp 16   Ht 5' 4" (1.626 m)   Wt 42.6 kg (94 lb)   SpO2 99%   Breastfeeding No   BMI 16.14 kg/m²     "

## 2022-12-30 NOTE — PROVATION PATIENT INSTRUCTIONS
Discharge Summary/Instructions after an Endoscopic Procedure  Patient Name: Claire Bowser  Patient MRN: [id number]  Patient YOB: 1960 Friday, December 30, 2022  Donavon Jewell III, MD  RESTRICTIONS:  During your procedure today, you received medications for sedation.  These   medications may affect your judgment, balance and coordination.  Therefore,   for 24 hours, you have the following restrictions:   - DO NOT drive a car, operate machinery, make legal/financial decisions,   sign important papers or drink alcohol.    ACTIVITY:  Today: no heavy lifting, straining or running due to procedural   sedation/anesthesia.  The following day: return to full activity including work.  DIET:  Eat and drink normally unless instructed otherwise.     TREATMENT FOR COMMON SIDE EFFECTS:  - Mild abdominal pain, nausea, belching, bloating or excessive gas:  rest,   eat lightly and use a heating pad.  - Sore Throat: treat with throat lozenges and/or gargle with warm salt   water.  - Because air was used during the procedure, expelling large amounts of air   from your rectum or belching is normal.  - If a bowel prep was taken, you may not have a bowel movement for 1-3 days.    This is normal.  SYMPTOMS TO WATCH FOR AND REPORT TO YOUR PHYSICIAN:  1. Abdominal pain or bloating, other than gas cramps.  2. Chest pain.  3. Back pain.  4. Signs of infection such as: chills or fever occurring within 24 hours   after the procedure.  5. Rectal bleeding, which would show as bright red, maroon, or black stools.   (A tablespoon of blood from the rectum is not serious, especially if   hemorrhoids are present.)  6. Vomiting.  7. Weakness or dizziness.  GO DIRECTLY TO THE NEAREST EMERGENCY ROOM IF YOU HAVE ANY OF THE FOLLOWING:      Difficulty breathing              Chills and/or fever over 101 F   Persistent vomiting and/or vomiting blood   Severe abdominal pain   Severe chest pain   Black, tarry stools   Bleeding- more than one  tablespoon   Any other symptom or condition that you feel may need urgent attention  Your doctor recommends these additional instructions:  If any biopsies were taken, your doctors clinic will contact you in 1 to 2   weeks with any results.  - Clear liquid diet.   - ERCP in 4-6 weeks.   - Return patient to hospital acosta for ongoing care.  For questions, problems or results please call your physician - Donavon Jewell III, MD at Work:  [phone].  [facility], EMERGENCY ROOM PHONE NUMBER: [phone]  IF A COMPLICATION OR EMERGENCY SITUATION ARISES AND YOU ARE UNABLE TO REACH   YOUR PHYSICIAN - GO DIRECTLY TO THE EMERGENCY ROOM.  Donavon Jewell III, MD  12/30/2022 2:47:20 PM  This report has been verified and signed electronically.  Dear patient,  As a result of recent federal legislation (The Federal Cures Act), you may   receive lab or pathology results from your procedure in your [facility]   account before your physician is able to contact you. Your physician or   their representative will relay the results to you with their   recommendations at their soonest availability.  Thank you,  PROVATION

## 2022-12-30 NOTE — PLAN OF CARE
Third liter of LR initiated. Dr. Jewell at bedside to speak with patient about procedure and follow up. Pt verbalized understanding

## 2022-12-30 NOTE — CARE UPDATE
Case Management sent secure chat to Mila Hernandez requesting her to run patient for medicaid. CM awaiting response at this time.     0918 - Per Mila- she contacted spouse and will follow along with patient.

## 2022-12-30 NOTE — PLAN OF CARE
Problem: Adult Inpatient Plan of Care  Goal: Plan of Care Review  Outcome: Ongoing, Progressing  Goal: Patient-Specific Goal (Individualized)  Outcome: Ongoing, Progressing  Goal: Absence of Hospital-Acquired Illness or Injury  Outcome: Ongoing, Progressing  Goal: Optimal Comfort and Wellbeing  Outcome: Ongoing, Progressing  Goal: Readiness for Transition of Care  Outcome: Ongoing, Progressing     Problem: Fluid Imbalance (Pneumonia)  Goal: Fluid Balance  Outcome: Ongoing, Progressing     Problem: Infection (Pneumonia)  Goal: Resolution of Infection Signs and Symptoms  Outcome: Ongoing, Progressing     Problem: Respiratory Compromise (Pneumonia)  Goal: Effective Oxygenation and Ventilation  Outcome: Ongoing, Progressing     Problem: Impaired Wound Healing  Goal: Optimal Wound Healing  Outcome: Ongoing, Progressing

## 2022-12-30 NOTE — CONSULTS
"[facility]  Adult Nutrition   Consult Note (Initial Assessment)     SUMMARY     Recommendations  Recommendation/Intervention:   1. Advance diet as tolerated to [place].   2. Offer Ensure Clear then +HP when diet advances. 3. Consider an appetite stimulant when appropriate.  4. RD to follow for tolerance, intake and labs PRN.    Goals:   1. Diet to advance in 24-48 hrs.   2. Intake to be >/= 50% EEN / EPN meals and supplements.   3. Lab values trend to target range.  Nutrition Goal Status: new    Dietitian Rounds Brief  RD consult for severe malnutrition. Patient admit for acute pancreatitis. Pt reports multiple illnesses since May of this year resulting in poor intake/appetite and wt loss. Patient is NPO for scope today.   Severe Malnutrition in the context of chronic illness related to inadequate protein-energy intake > 1 month and weight loss > 5% in 1 month.; muscle wasting to clavicle (deltoid muscle).  RD to offer Ensure Clear when diet progresses; pt states others give her diarrhea. Last BM 12/27/22.     Diet order:   Current Diet Order: Clear Liquid diet      Evaluation of Received Nutrient/Fluid Intake  Energy Calories Required: not meeting needs  Protein Required: not meeting needs  Fluid Required: not meeting needs  Tolerance: tolerating     % Intake of Estimated Energy Needs: 0%  % Meal Intake: NPO    Intake/Output Summary (Last 24 hours) at 12/30/2022 1327  Last data filed at 12/30/2022 0938  Gross per 24 hour   Intake 1100 ml   Output 1300 ml   Net -200 ml      Anthropometrics  Temp: 98 °F (36.7 °C)  Height Method: Stated  Height: 5' 4" (162.6 cm)  Height (inches): 64 in  Weight Method: Bed Scale  Weight: 42.6 kg (94 lb)  Weight (lb): 94 lb  Ideal Body Weight (IBW), Female: 120 lb  % Ideal Body Weight, Female (lb): 78.33 %  BMI (Calculated): 16.1     Estimated/Assessed Needs  Weight Used For Calorie Calculations: 42.6 kg (93 lb 14.7 oz)  Energy Calorie Requirements (kcal): 2080-7573 " (30-35)     Protein Requirements: 51-64 (1.2-1.5)  Weight Used For Protein Calculations: 42.6 kg (93 lb 14.7 oz)     Estimated Fluid Requirement Method: RDA Method  RDA Method (mL): 1278     Reason for Assessment  Reason For Assessment: consult  Diagnosis: gastrointestinal disease  Relevant Medical History: pancreatitis, severe protein calorie malnutrition, hypertension, GERD, rheumatoid arthritis, DVT  Interdisciplinary Rounds: did not attend    Nutrition/Diet History  Food Allergies: NKFA  Factors Affecting Nutritional Intake: decreased appetite, abdominal pain    Nutrition Risk Screen  Nutrition Risk Screen: no indicators present     MST Score: 4  Have you recently lost weight without trying?: Yes: 24-33 lbs  Weight loss score: 3  Have you been eating poorly because of a decreased appetite?: Yes  Appetite score: 1     Weight History:  Wt Readings from Last 5 Encounters:   12/29/22 42.6 kg (94 lb)   11/29/22 37.2 kg (82 lb)   10/25/22 40.3 kg (88 lb 13.5 oz)   10/19/22 44.2 kg (97 lb 7.1 oz)   10/13/22 41.1 kg (90 lb 9.7 oz)      Lab/Procedures/Meds: Pertinent Labs/Meds Reviewed    Medications:Pertinent Medications Reviewed  Scheduled Meds:   lipase-protease-amylase 12,000-38,000-60,000 units  3 capsule Oral TID    pantoprazole  40 mg Oral Daily    predniSONE  5 mg Oral Daily    sertraline  25 mg Oral Daily    traZODone  50 mg Oral QHS     Continuous Infusions:  PRN Meds:.acetaminophen, acetaminophen, aluminum-magnesium hydroxide-simethicone, dextrose 10%, dextrose 10%, glucagon (human recombinant), glucose, glucose, HYDROcodone-acetaminophen, magnesium oxide, magnesium oxide, morphine, naloxone, polyethylene glycol, potassium bicarbonate, potassium bicarbonate, potassium bicarbonate, potassium, sodium phosphates, potassium, sodium phosphates, potassium, sodium phosphates, prochlorperazine, prochlorperazine, sodium chloride 0.9%    Labs: Pertinent Labs Reviewed  Clinical Chemistry:  Recent Labs   Lab  12/29/22  1111 12/30/22  0647    134*   K 3.2* 3.1*   CL 98 102   CO2 30* 28   * 72   BUN 10 8   CREATININE 0.7 0.6   CALCIUM 9.8 8.8   PROT 6.1 4.6*   ALBUMIN 2.3* 1.8*   BILITOT 0.7 0.8   ALKPHOS 105 85   AST 25 23   ALT 17 14   ANIONGAP 8 4*   MG 1.7 1.7   LIPASE 955*  --      CBC:   Recent Labs   Lab 12/30/22  0647   WBC 6.03   RBC 3.07*   HGB 9.1*   HCT 28.5*      MCV 93   MCH 29.6   MCHC 31.9*     Monitor and Evaluation  Food and Nutrient Intake: energy intake, food and beverage intake  Food and Nutrient Adminstration: diet order  Knowledge/Beliefs/Attitudes: food and nutrition knowledge/skill  Physical Activity and Function: nutrition-related ADLs and IADLs  Anthropometric Measurements: weight, weight change, body mass index  Biochemical Data, Medical Tests and Procedures: inflammatory profile, glucose/endocrine profile, gastrointestinal profile, electrolyte and renal panel, lipid profile  Nutrition-Focused Physical Findings: overall appearance     Nutrition Risk  Level of Risk/Frequency of Follow-up: moderate - high     Nutrition Follow-Up  RD Follow-up?: Yes      Sara Duggan RD, LDN 12/30/2022 1:27 PM

## 2022-12-31 VITALS
TEMPERATURE: 99 F | DIASTOLIC BLOOD PRESSURE: 92 MMHG | OXYGEN SATURATION: 97 % | HEIGHT: 64 IN | WEIGHT: 94 LBS | RESPIRATION RATE: 18 BRPM | HEART RATE: 79 BPM | BODY MASS INDEX: 16.05 KG/M2 | SYSTOLIC BLOOD PRESSURE: 144 MMHG

## 2022-12-31 LAB
ALBUMIN SERPL BCP-MCNC: 1.8 G/DL (ref 3.5–5.2)
ALP SERPL-CCNC: 104 U/L (ref 55–135)
ALT SERPL W/O P-5'-P-CCNC: 15 U/L (ref 10–44)
ANION GAP SERPL CALC-SCNC: 4 MMOL/L (ref 8–16)
AST SERPL-CCNC: 22 U/L (ref 10–40)
BASOPHILS # BLD AUTO: 0.01 K/UL (ref 0–0.2)
BASOPHILS NFR BLD: 0.3 % (ref 0–1.9)
BILIRUB SERPL-MCNC: 0.7 MG/DL (ref 0.1–1)
BUN SERPL-MCNC: 9 MG/DL (ref 8–23)
CALCIUM SERPL-MCNC: 8.8 MG/DL (ref 8.7–10.5)
CANCER AG19-9 SERPL-ACNC: 71 U/ML (ref 0–35)
CHLORIDE SERPL-SCNC: 102 MMOL/L (ref 95–110)
CO2 SERPL-SCNC: 29 MMOL/L (ref 23–29)
CREAT SERPL-MCNC: 0.5 MG/DL (ref 0.5–1.4)
DIFFERENTIAL METHOD: ABNORMAL
EOSINOPHIL # BLD AUTO: 0 K/UL (ref 0–0.5)
EOSINOPHIL NFR BLD: 0 % (ref 0–8)
ERYTHROCYTE [DISTWIDTH] IN BLOOD BY AUTOMATED COUNT: 16.5 % (ref 11.5–14.5)
EST. GFR  (NO RACE VARIABLE): >60 ML/MIN/1.73 M^2
GLUCOSE SERPL-MCNC: 116 MG/DL (ref 70–110)
HCT VFR BLD AUTO: 28.8 % (ref 37–48.5)
HGB BLD-MCNC: 9.4 G/DL (ref 12–16)
IMM GRANULOCYTES # BLD AUTO: 0.02 K/UL (ref 0–0.04)
IMM GRANULOCYTES NFR BLD AUTO: 0.5 % (ref 0–0.5)
LYMPHOCYTES # BLD AUTO: 0.5 K/UL (ref 1–4.8)
LYMPHOCYTES NFR BLD: 11.5 % (ref 18–48)
MAGNESIUM SERPL-MCNC: 1.6 MG/DL (ref 1.6–2.6)
MCH RBC QN AUTO: 30 PG (ref 27–31)
MCHC RBC AUTO-ENTMCNC: 32.6 G/DL (ref 32–36)
MCV RBC AUTO: 92 FL (ref 82–98)
MONOCYTES # BLD AUTO: 0.3 K/UL (ref 0.3–1)
MONOCYTES NFR BLD: 7.4 % (ref 4–15)
NEUTROPHILS # BLD AUTO: 3.2 K/UL (ref 1.8–7.7)
NEUTROPHILS NFR BLD: 80.3 % (ref 38–73)
NRBC BLD-RTO: 0 /100 WBC
PLATELET # BLD AUTO: 406 K/UL (ref 150–450)
PMV BLD AUTO: 9.9 FL (ref 9.2–12.9)
POTASSIUM SERPL-SCNC: 4.1 MMOL/L (ref 3.5–5.1)
PROT SERPL-MCNC: 4.6 G/DL (ref 6–8.4)
RBC # BLD AUTO: 3.13 M/UL (ref 4–5.4)
SODIUM SERPL-SCNC: 135 MMOL/L (ref 136–145)
WBC # BLD AUTO: 3.93 K/UL (ref 3.9–12.7)

## 2022-12-31 PROCEDURE — 25000003 PHARM REV CODE 250: Performed by: FAMILY MEDICINE

## 2022-12-31 PROCEDURE — 36415 COLL VENOUS BLD VENIPUNCTURE: CPT | Performed by: FAMILY MEDICINE

## 2022-12-31 PROCEDURE — 83735 ASSAY OF MAGNESIUM: CPT | Performed by: FAMILY MEDICINE

## 2022-12-31 PROCEDURE — 80053 COMPREHEN METABOLIC PANEL: CPT | Performed by: FAMILY MEDICINE

## 2022-12-31 PROCEDURE — 85025 COMPLETE CBC W/AUTO DIFF WBC: CPT | Performed by: FAMILY MEDICINE

## 2022-12-31 PROCEDURE — 63600175 PHARM REV CODE 636 W HCPCS: Performed by: FAMILY MEDICINE

## 2022-12-31 RX ORDER — PANCRELIPASE 36000; 180000; 114000 [USP'U]/1; [USP'U]/1; [USP'U]/1
2 CAPSULE, DELAYED RELEASE PELLETS ORAL 3 TIMES DAILY
Qty: 180 CAPSULE | Refills: 0 | Status: SHIPPED | OUTPATIENT
Start: 2022-12-31 | End: 2023-01-30

## 2022-12-31 RX ORDER — PANTOPRAZOLE SODIUM 40 MG/1
40 TABLET, DELAYED RELEASE ORAL DAILY
Qty: 30 TABLET | Refills: 0 | Status: ON HOLD | OUTPATIENT
Start: 2022-12-31 | End: 2023-02-16

## 2022-12-31 RX ORDER — METOPROLOL SUCCINATE 50 MG/1
25 TABLET, EXTENDED RELEASE ORAL DAILY
Status: ON HOLD
Start: 2022-12-31 | End: 2023-02-16 | Stop reason: HOSPADM

## 2022-12-31 RX ORDER — PREDNISONE 5 MG/1
5 TABLET ORAL DAILY
Qty: 30 TABLET | Refills: 0 | Status: SHIPPED | OUTPATIENT
Start: 2022-12-31 | End: 2023-01-30

## 2022-12-31 RX ADMIN — PANCRELIPASE 3 CAPSULE: 60000; 12000; 38000 CAPSULE, DELAYED RELEASE PELLETS ORAL at 08:12

## 2022-12-31 RX ADMIN — PANTOPRAZOLE SODIUM 40 MG: 40 TABLET, DELAYED RELEASE ORAL at 05:12

## 2022-12-31 RX ADMIN — SERTRALINE HYDROCHLORIDE 25 MG: 25 TABLET ORAL at 08:12

## 2022-12-31 RX ADMIN — PREDNISONE 5 MG: 5 TABLET ORAL at 08:12

## 2022-12-31 NOTE — DISCHARGE SUMMARY
Critical access hospital Medicine  Discharge Summary      Patient Name: Claire Bowser  MRN: [id number]  ARIA: [id number]  Patient Class: IP- Inpatient  Admission Date: 12/29/2022  Hospital Length of Stay: 1 days  Discharge Date and Time:  12/31/2022 3:13 PM  Attending Physician: No att. providers found   Discharging Provider: Chapin Leong MD  Primary Care Provider: Samuel Brady MD    Primary Care Team: Networked reference to record PCT     HPI:   No notes on file    Procedure(s) (LRB):  ERCP (ENDOSCOPIC RETROGRADE CHOLANGIOPANCREATOGRAPHY) (N/A)      Hospital Course:   Patient admitted to the hospital for further evaluation.    Gastroenterology was consulted   Electrolytes replaced   GI performed ERCP:  12/30/2022   - A mild ectactic, slight narrowed region just above ampulla was found.   - A pancreatic sphincterotomy was performed. Pancreatic sludge / debris seen folowing sphincterotomy.   - One pancreatic stent was placed into the ventral pancreatic duct.   - The entire main bile duct was mildly dilated.   - The patient has had a cholecystectomy.   - A biliary sphincterotomy was performed.    12/29 EUS   Unrevealing    Patient had history of DVT 2 months prior.  Had not been on Eliquis secondary to cost of medication.  Fortunately were able to get patient established with insurance.    At discharge patient given prescription for Eliquis 5 mg b.i.d.     Patient noted to have ascites.  Paracentesis ordered.  Fluid significant for no evidence of SBP.  Patient noted to have protein calorie malnutrition likely secondary to malabsorption from lack of Creon with meals.  It is felt that she is likely to do well now that she will have access to Creon and is postprocedure as above.    Patient given prescription for 1 month supply for medications with directions to follow up with PCP in next week or so and follow-up with gastroenterology in 1-2 weeks.    At time of discharge he was tolerating regular diet well  with Creon.  Patient discharged home in stable condition.  All questions answered.      General:  Alert and oriented x4.  No acute distress.  Cachectic.  HEENT:  Normocephalic  Cardiovascular:  Regular rate and rhythm, no murmurs rubs or gallops.  No lower extremity edema.  Pulmonary:  Clear to auscultation bilaterally  Abdomen:  Soft, nontender, nondistended.  No guarding.  No rebound.  Negative Batista's.  Positive bowel sounds.  Extremity:  Moves all extremities equally.  Dermatology:  No rashes appreciated on exposed skin  Psychiatric:  Normal affect       Goals of Care Treatment Preferences:  Code Status: Full Code      Consults:   Consults (From admission, onward)        Status Ordering Provider     Case Management/  Once        Provider:  (Not yet assigned)    Completed CHAPIN PRESTON     Inpatient consult to Registered Dietitian/Nutritionist  Once        Provider:  (Not yet assigned)    Completed CHAPIN PRESTON     Inpatient consult to Gastroenterology  Once        Provider:  Morgan Brown MD    Acknowledged CHAPIN PRESTON     Inpatient consult to   Once        Provider:  (Not yet assigned)    Completed SREEKANTH DUNNE III     Inpatient consult to Hospitalist  Once        Provider:  Chapin Preston MD    Acknowledged CHAPIN PRESTON          No new Assessment & Plan notes have been filed under this hospital service since the last note was generated.  Service: Hospital Medicine    Final Active Diagnoses:    Diagnosis Date Noted POA    PRINCIPAL PROBLEM:  Acute pancreatitis [K85.90] 06/02/2022 Yes    Acute DVT (deep venous thrombosis) [I82.409] 10/18/2022 Yes    Severe protein-calorie malnutrition [E43] 06/05/2022 Unknown    Rheumatoid arthritis involving multiple sites with positive rheumatoid factor [M05.79] 01/14/2021 Yes    Hypertension [I10] 10/04/2013 Yes      Problems Resolved During this Admission:       Discharged Condition: fair    Disposition: Home or Self  Care    Follow Up:   Follow-up Information     Donavon Jewell III, MD Follow up in 2 week(s).    Specialty: Gastroenterology  Contact information:  [address]  [place] [address]  [phone]             Samuel Brady MD Follow up in 1 week(s).    Specialty: Family Medicine  Contact information:  [address]  [address]  [place] [address]  [phone]                       Patient Instructions:   No discharge procedures on file.    Significant Diagnostic Studies: Labs:   BMP:   Recent Labs   Lab 12/30/22  0647 12/31/22  0614   GLU 72 116*   * 135*   K 3.1* 4.1    102   CO2 28 29   BUN 8 9   CREATININE 0.6 0.5   CALCIUM 8.8 8.8   MG 1.7 1.6   , CBC   Recent Labs   Lab 12/30/22  0647 12/31/22  0614   WBC 6.03 3.93   HGB 9.1* 9.4*   HCT 28.5* 28.8*    406   , A1C:   Recent Labs   Lab 10/18/22  0340   HGBA1C 4.9    and All labs within the past 24 hours have been reviewed    Pending Diagnostic Studies:     None         Medications:  Reconciled Home Medications:      Medication List      CHANGE how you take these medications    metoprolol succinate 50 MG 24 hr tablet  Commonly known as: TOPROL-XL  Take 0.5 tablets (25 mg total) by mouth once daily. 1/2 tab qd  What changed: how much to take        CONTINUE taking these medications    apixaban 5 mg Tab  Commonly known as: ELIQUIS  Take 1 tablet (5 mg total) by mouth 2 (two) times daily.     cholecalciferol (vitamin D3) 10 mcg (400 unit) Tab  Commonly known as: VITAMIN D3  Take 400 Units by mouth once daily.     CREON 36,000-114,000- 180,000 unit Cpdr  Generic drug: lipase-protease-amylase  Take 2 capsules by mouth 3 (three) times daily.     folic acid 1 MG tablet  Commonly known as: FOLVITE  Take 1 tablet (1 mg total) by mouth once daily.     leflunomide 20 MG Tab  Commonly known as: ARAVA  Take 1 tablet by mouth once daily     pantoprazole 40 MG tablet  Commonly known as: PROTONIX  Take 1 tablet (40 mg total) by mouth once daily.      predniSONE 5 MG tablet  Commonly known as: DELTASONE  Take 1 tablet (5 mg total) by mouth once daily.     sertraline 25 MG tablet  Commonly known as: ZOLOFT  Take 1 tablet by mouth once daily     traZODone 50 MG tablet  Commonly known as: DESYREL  Take 1 tablet (50 mg total) by mouth every evening.        STOP taking these medications    cyclobenzaprine 5 MG tablet  Commonly known as: FLEXERIL     ondansetron 4 MG Tbdl  Commonly known as: ZOFRAN-ODT     potassium chloride SA 20 MEQ tablet  Commonly known as: K-DUR,KLOR-CON            Indwelling Lines/Drains at time of discharge:   Lines/Drains/Airways     None                 Time spent on the discharge of patient: 35 minutes         Chapin Leong MD  Department of Hospital Medicine  [facility]

## 2022-12-31 NOTE — PLAN OF CARE
12/31/22 1332   Final Note   Assessment Type Final Discharge Note   Anticipated Discharge Disposition Home   What phone number can be called within the next 1-3 days to see how you are doing after discharge? [phone]   Post-Acute Status   Discharge Delays None known at this time       Discharge orders and chart reviewed with no further post-acute discharge needs identified at this time.  At this time, patient is cleared for discharge from Case Management standpoint.

## 2022-12-31 NOTE — ANESTHESIA POSTPROCEDURE EVALUATION
Anesthesia Post Evaluation    Patient: Claire Bowser    Procedure(s) Performed: Procedure(s) (LRB):  ERCP (ENDOSCOPIC RETROGRADE CHOLANGIOPANCREATOGRAPHY) (N/A)    Final Anesthesia Type: general      Patient location during evaluation: PACU  Patient participation: Yes- Able to Participate  Level of consciousness: awake and alert  Post-procedure vital signs: reviewed and stable  Pain management: adequate  Airway patency: patent    PONV status at discharge: No PONV  Anesthetic complications: no      Cardiovascular status: stable  Respiratory status: unassisted and spontaneous ventilation  Hydration status: euvolemic  Follow-up not needed.          Vitals Value Taken Time   /90 12/30/22 1636   Temp 36.2 °C (97.1 °F) 12/30/22 1636   Pulse 77 12/30/22 1636   Resp 18 12/30/22 1636   SpO2 97 % 12/30/22 1636         Event Time   Out of Recovery 12/30/2022 15:56:00         Pain/Gerald Score: Pain Rating Prior to Med Admin: 7 (12/29/2022  8:41 PM)  Pain Rating Post Med Admin: 0 (12/29/2022  9:41 PM)  Gerald Score: 10 (12/30/2022  3:15 PM)

## 2022-12-31 NOTE — HOSPITAL COURSE
Patient admitted to the hospital for further evaluation.    Gastroenterology was consulted   Electrolytes replaced   GI performed ERCP:  12/30/2022   - A mild ectactic, slight narrowed region just above ampulla was found.   - A pancreatic sphincterotomy was performed. Pancreatic sludge / debris seen folowing sphincterotomy.   - One pancreatic stent was placed into the ventral pancreatic duct.   - The entire main bile duct was mildly dilated.   - The patient has had a cholecystectomy.   - A biliary sphincterotomy was performed.    12/29 EUS   Unrevealing    Patient had history of DVT 2 months prior.  Had not been on Eliquis secondary to cost of medication.  Fortunately were able to get patient established with insurance.    At discharge patient given prescription for Eliquis 5 mg b.i.d.     Patient noted to have ascites.  Paracentesis ordered.  Fluid significant for no evidence of SBP.  Patient noted to have protein calorie malnutrition likely secondary to malabsorption from lack of Creon with meals.  It is felt that she is likely to do well now that she will have access to Creon and is postprocedure as above.    Patient given prescription for 1 month supply for medications with directions to follow up with PCP in next week or so and follow-up with gastroenterology in 1-2 weeks.    At time of discharge he was tolerating regular diet well with Creon.  Patient discharged home in stable condition.  All questions answered.      General:  Alert and oriented x4.  No acute distress.  Cachectic.  HEENT:  Normocephalic  Cardiovascular:  Regular rate and rhythm, no murmurs rubs or gallops.  No lower extremity edema.  Pulmonary:  Clear to auscultation bilaterally  Abdomen:  Soft, nontender, nondistended.  No guarding.  No rebound.  Negative Batista's.  Positive bowel sounds.  Extremity:  Moves all extremities equally.  Dermatology:  No rashes appreciated on exposed skin  Psychiatric:  Normal affect

## 2023-01-02 ENCOUNTER — HOSPITAL ENCOUNTER (INPATIENT)
Facility: HOSPITAL | Age: 63
LOS: 4 days | Discharge: HOME | DRG: 438 | End: 2023-01-06
Attending: EMERGENCY MEDICINE | Admitting: INTERNAL MEDICINE
Payer: MEDICAID

## 2023-01-02 DIAGNOSIS — M05.79 RHEUMATOID ARTHRITIS INVOLVING MULTIPLE SITES WITH POSITIVE RHEUMATOID FACTOR: ICD-10-CM

## 2023-01-02 DIAGNOSIS — K86.1 OTHER CHRONIC PANCREATITIS: ICD-10-CM

## 2023-01-02 DIAGNOSIS — R53.1 WEAKNESS: ICD-10-CM

## 2023-01-02 DIAGNOSIS — R10.12 LEFT UPPER QUADRANT ABDOMINAL PAIN: ICD-10-CM

## 2023-01-02 DIAGNOSIS — R10.9 ABDOMINAL PAIN: ICD-10-CM

## 2023-01-02 DIAGNOSIS — E86.0 DEHYDRATION: Primary | ICD-10-CM

## 2023-01-02 DIAGNOSIS — K86.3 PANCREATIC PSEUDOCYST: ICD-10-CM

## 2023-01-02 PROBLEM — D84.9 IMMUNOCOMPROMISED: Status: ACTIVE | Noted: 2023-01-02

## 2023-01-02 LAB
ALBUMIN SERPL BCP-MCNC: 2.1 G/DL (ref 3.5–5.2)
ALP SERPL-CCNC: 107 U/L (ref 55–135)
ALT SERPL W/O P-5'-P-CCNC: 14 U/L (ref 10–44)
ANION GAP SERPL CALC-SCNC: 9 MMOL/L (ref 8–16)
AST SERPL-CCNC: 19 U/L (ref 10–40)
BACTERIA #/AREA URNS HPF: NEGATIVE /HPF
BACTERIA FLD AEROBE CULT: NO GROWTH
BACTERIA SPEC ANAEROBE CULT: NORMAL
BASOPHILS # BLD AUTO: 0.03 K/UL (ref 0–0.2)
BASOPHILS NFR BLD: 0.2 % (ref 0–1.9)
BILIRUB SERPL-MCNC: 1.2 MG/DL (ref 0.1–1)
BILIRUB UR QL STRIP: ABNORMAL
BUN SERPL-MCNC: 11 MG/DL (ref 8–23)
CALCIUM SERPL-MCNC: 9.1 MG/DL (ref 8.7–10.5)
CHLORIDE SERPL-SCNC: 99 MMOL/L (ref 95–110)
CLARITY UR: CLEAR
CO2 SERPL-SCNC: 25 MMOL/L (ref 23–29)
COLOR UR: YELLOW
CREAT SERPL-MCNC: 0.5 MG/DL (ref 0.5–1.4)
CREAT SERPL-MCNC: 0.5 MG/DL (ref 0.5–1.4)
DIFFERENTIAL METHOD: ABNORMAL
EOSINOPHIL # BLD AUTO: 0 K/UL (ref 0–0.5)
EOSINOPHIL NFR BLD: 0 % (ref 0–8)
ERYTHROCYTE [DISTWIDTH] IN BLOOD BY AUTOMATED COUNT: 17.6 % (ref 11.5–14.5)
EST. GFR  (NO RACE VARIABLE): >60 ML/MIN/1.73 M^2
GLUCOSE SERPL-MCNC: 71 MG/DL (ref 70–110)
GLUCOSE UR QL STRIP: NEGATIVE
GRAM STN SPEC: NORMAL
GRAM STN SPEC: NORMAL
HCT VFR BLD AUTO: 34.6 % (ref 37–48.5)
HGB BLD-MCNC: 11.2 G/DL (ref 12–16)
HGB UR QL STRIP: NEGATIVE
HYALINE CASTS #/AREA URNS LPF: 14 /LPF
IMM GRANULOCYTES # BLD AUTO: 0.07 K/UL (ref 0–0.04)
IMM GRANULOCYTES NFR BLD AUTO: 0.4 % (ref 0–0.5)
KETONES UR QL STRIP: ABNORMAL
LACTATE SERPL-SCNC: 1.5 MMOL/L (ref 0.5–1.9)
LEUKOCYTE ESTERASE UR QL STRIP: NEGATIVE
LIPASE SERPL-CCNC: 271 U/L (ref 4–60)
LYMPHOCYTES # BLD AUTO: 0.5 K/UL (ref 1–4.8)
LYMPHOCYTES NFR BLD: 3 % (ref 18–48)
MCH RBC QN AUTO: 30.3 PG (ref 27–31)
MCHC RBC AUTO-ENTMCNC: 32.4 G/DL (ref 32–36)
MCV RBC AUTO: 94 FL (ref 82–98)
MICROSCOPIC COMMENT: ABNORMAL
MONOCYTES # BLD AUTO: 0.6 K/UL (ref 0.3–1)
MONOCYTES NFR BLD: 3.7 % (ref 4–15)
NEUTROPHILS # BLD AUTO: 15.3 K/UL (ref 1.8–7.7)
NEUTROPHILS NFR BLD: 92.7 % (ref 38–73)
NITRITE UR QL STRIP: NEGATIVE
NRBC BLD-RTO: 0 /100 WBC
PH UR STRIP: 7 [PH] (ref 5–8)
PLATELET # BLD AUTO: 539 K/UL (ref 150–450)
PMV BLD AUTO: 9.1 FL (ref 9.2–12.9)
POTASSIUM SERPL-SCNC: 3.4 MMOL/L (ref 3.5–5.1)
PROT SERPL-MCNC: 5.8 G/DL (ref 6–8.4)
PROT UR QL STRIP: ABNORMAL
RBC # BLD AUTO: 3.7 M/UL (ref 4–5.4)
RBC #/AREA URNS HPF: 2 /HPF (ref 0–4)
SAMPLE: NORMAL
SODIUM SERPL-SCNC: 133 MMOL/L (ref 136–145)
SP GR UR STRIP: 1.02 (ref 1–1.03)
SQUAMOUS #/AREA URNS HPF: 6 /HPF
URN SPEC COLLECT METH UR: ABNORMAL
UROBILINOGEN UR STRIP-ACNC: ABNORMAL EU/DL
WBC # BLD AUTO: 16.54 K/UL (ref 3.9–12.7)
WBC #/AREA URNS HPF: 3 /HPF (ref 0–5)

## 2023-01-02 PROCEDURE — 63600175 PHARM REV CODE 636 W HCPCS: Performed by: INTERNAL MEDICINE

## 2023-01-02 PROCEDURE — 85025 COMPLETE CBC W/AUTO DIFF WBC: CPT | Performed by: NURSE PRACTITIONER

## 2023-01-02 PROCEDURE — 93010 EKG 12-LEAD: ICD-10-PCS | Mod: ,,, | Performed by: INTERNAL MEDICINE

## 2023-01-02 PROCEDURE — G0378 HOSPITAL OBSERVATION PER HR: HCPCS

## 2023-01-02 PROCEDURE — 87040 BLOOD CULTURE FOR BACTERIA: CPT | Performed by: NURSE PRACTITIONER

## 2023-01-02 PROCEDURE — 25500020 PHARM REV CODE 255: Performed by: NURSE PRACTITIONER

## 2023-01-02 PROCEDURE — 12000002 HC ACUTE/MED SURGE SEMI-PRIVATE ROOM

## 2023-01-02 PROCEDURE — 93005 ELECTROCARDIOGRAM TRACING: CPT | Performed by: INTERNAL MEDICINE

## 2023-01-02 PROCEDURE — 80053 COMPREHEN METABOLIC PANEL: CPT | Performed by: NURSE PRACTITIONER

## 2023-01-02 PROCEDURE — 25000003 PHARM REV CODE 250: Performed by: EMERGENCY MEDICINE

## 2023-01-02 PROCEDURE — 93010 ELECTROCARDIOGRAM REPORT: CPT | Mod: ,,, | Performed by: INTERNAL MEDICINE

## 2023-01-02 PROCEDURE — 81001 URINALYSIS AUTO W/SCOPE: CPT | Performed by: NURSE PRACTITIONER

## 2023-01-02 PROCEDURE — 63600175 PHARM REV CODE 636 W HCPCS: Performed by: EMERGENCY MEDICINE

## 2023-01-02 PROCEDURE — 96361 HYDRATE IV INFUSION ADD-ON: CPT

## 2023-01-02 PROCEDURE — 83690 ASSAY OF LIPASE: CPT | Performed by: NURSE PRACTITIONER

## 2023-01-02 PROCEDURE — 83605 ASSAY OF LACTIC ACID: CPT | Performed by: NURSE PRACTITIONER

## 2023-01-02 PROCEDURE — 99285 EMERGENCY DEPT VISIT HI MDM: CPT | Mod: 25

## 2023-01-02 PROCEDURE — 96375 TX/PRO/DX INJ NEW DRUG ADDON: CPT

## 2023-01-02 PROCEDURE — 96367 TX/PROPH/DG ADDL SEQ IV INF: CPT

## 2023-01-02 PROCEDURE — 96365 THER/PROPH/DIAG IV INF INIT: CPT

## 2023-01-02 RX ORDER — FOLIC ACID 1 MG/1
1 TABLET ORAL DAILY
Status: DISCONTINUED | OUTPATIENT
Start: 2023-01-03 | End: 2023-01-06 | Stop reason: HOSPADM

## 2023-01-02 RX ORDER — ACETAMINOPHEN 325 MG/1
650 TABLET ORAL EVERY 4 HOURS PRN
Status: DISCONTINUED | OUTPATIENT
Start: 2023-01-02 | End: 2023-01-06 | Stop reason: HOSPADM

## 2023-01-02 RX ORDER — ONDANSETRON 2 MG/ML
4 INJECTION INTRAMUSCULAR; INTRAVENOUS EVERY 6 HOURS PRN
Status: DISCONTINUED | OUTPATIENT
Start: 2023-01-02 | End: 2023-01-06 | Stop reason: HOSPADM

## 2023-01-02 RX ORDER — LEFLUNOMIDE 20 MG/1
20 TABLET ORAL DAILY
Status: DISCONTINUED | OUTPATIENT
Start: 2023-01-03 | End: 2023-01-06 | Stop reason: HOSPADM

## 2023-01-02 RX ORDER — CYANOCOBALAMIN (VITAMIN B-12) 500 MCG
400 TABLET ORAL DAILY
Status: DISCONTINUED | OUTPATIENT
Start: 2023-01-03 | End: 2023-01-06 | Stop reason: HOSPADM

## 2023-01-02 RX ORDER — MORPHINE SULFATE 2 MG/ML
2 INJECTION, SOLUTION INTRAMUSCULAR; INTRAVENOUS
Status: COMPLETED | OUTPATIENT
Start: 2023-01-02 | End: 2023-01-02

## 2023-01-02 RX ORDER — PANTOPRAZOLE SODIUM 40 MG/1
40 TABLET, DELAYED RELEASE ORAL DAILY
Status: DISCONTINUED | OUTPATIENT
Start: 2023-01-03 | End: 2023-01-06 | Stop reason: HOSPADM

## 2023-01-02 RX ORDER — MAGNESIUM GLUCONATE 27 MG(500)
1 TABLET ORAL DAILY
Status: ON HOLD | COMMUNITY
End: 2023-01-25 | Stop reason: CLARIF

## 2023-01-02 RX ORDER — METOPROLOL SUCCINATE 25 MG/1
25 TABLET, EXTENDED RELEASE ORAL DAILY
Status: DISCONTINUED | OUTPATIENT
Start: 2023-01-03 | End: 2023-01-06 | Stop reason: HOSPADM

## 2023-01-02 RX ORDER — LANOLIN ALCOHOL/MO/W.PET/CERES
800 CREAM (GRAM) TOPICAL
Status: DISCONTINUED | OUTPATIENT
Start: 2023-01-02 | End: 2023-01-06 | Stop reason: HOSPADM

## 2023-01-02 RX ORDER — HYDROMORPHONE HYDROCHLORIDE 1 MG/ML
0.5 INJECTION, SOLUTION INTRAMUSCULAR; INTRAVENOUS; SUBCUTANEOUS EVERY 6 HOURS PRN
Status: DISCONTINUED | OUTPATIENT
Start: 2023-01-02 | End: 2023-01-06 | Stop reason: HOSPADM

## 2023-01-02 RX ORDER — HYDROCODONE BITARTRATE AND ACETAMINOPHEN 5; 325 MG/1; MG/1
1 TABLET ORAL EVERY 6 HOURS PRN
Status: DISCONTINUED | OUTPATIENT
Start: 2023-01-02 | End: 2023-01-06 | Stop reason: HOSPADM

## 2023-01-02 RX ORDER — PREDNISONE 5 MG/1
5 TABLET ORAL DAILY
Status: DISCONTINUED | OUTPATIENT
Start: 2023-01-03 | End: 2023-01-06 | Stop reason: HOSPADM

## 2023-01-02 RX ORDER — SODIUM,POTASSIUM PHOSPHATES 280-250MG
2 POWDER IN PACKET (EA) ORAL
Status: DISCONTINUED | OUTPATIENT
Start: 2023-01-02 | End: 2023-01-06 | Stop reason: HOSPADM

## 2023-01-02 RX ORDER — TRAZODONE HYDROCHLORIDE 50 MG/1
50 TABLET ORAL NIGHTLY
Status: DISCONTINUED | OUTPATIENT
Start: 2023-01-02 | End: 2023-01-06 | Stop reason: HOSPADM

## 2023-01-02 RX ORDER — ONDANSETRON 2 MG/ML
4 INJECTION INTRAMUSCULAR; INTRAVENOUS
Status: COMPLETED | OUTPATIENT
Start: 2023-01-02 | End: 2023-01-02

## 2023-01-02 RX ORDER — MEROPENEM AND SODIUM CHLORIDE 1 G/50ML
1 INJECTION, SOLUTION INTRAVENOUS
Status: COMPLETED | OUTPATIENT
Start: 2023-01-02 | End: 2023-01-02

## 2023-01-02 RX ORDER — CYCLOBENZAPRINE HCL 5 MG
5 TABLET ORAL NIGHTLY
COMMUNITY
End: 2023-04-12

## 2023-01-02 RX ADMIN — ONDANSETRON HYDROCHLORIDE 4 MG: 2 SOLUTION INTRAMUSCULAR; INTRAVENOUS at 08:01

## 2023-01-02 RX ADMIN — IOHEXOL 100 ML: 350 INJECTION, SOLUTION INTRAVENOUS at 06:01

## 2023-01-02 RX ADMIN — PIPERACILLIN AND TAZOBACTAM 4.5 G: 4; .5 INJECTION, POWDER, LYOPHILIZED, FOR SOLUTION INTRAVENOUS; PARENTERAL at 09:01

## 2023-01-02 RX ADMIN — SODIUM CHLORIDE, SODIUM LACTATE, POTASSIUM CHLORIDE, AND CALCIUM CHLORIDE 500 ML: .6; .31; .03; .02 INJECTION, SOLUTION INTRAVENOUS at 09:01

## 2023-01-02 RX ADMIN — MORPHINE SULFATE 2 MG: 2 INJECTION, SOLUTION INTRAMUSCULAR; INTRAVENOUS at 08:01

## 2023-01-02 RX ADMIN — MEROPENEM AND SODIUM CHLORIDE 1 G: 1 INJECTION, SOLUTION INTRAVENOUS at 10:01

## 2023-01-02 NOTE — FIRST PROVIDER EVALUATION
"Medical screening examination initiated.  I have conducted a focused provider triage encounter, findings are as follows:    Brief history of present illness:  Pt is a 63 yo female presenting for weakness, nausea and vomiting since Saturday.  Pt was admitted last week and had a pancreatic stent placed last week.  Pt states symptoms worse since that time.  No relief with home Zofran.      Vitals:    01/02/23 1311   BP: (!) 140/56   Pulse: (!) 122   Resp: 18   Temp: 98.5 °F (36.9 °C)   SpO2: 98%   Weight: 42.6 kg (94 lb)   Height: 5' 4" (1.626 m)       Pertinent physical exam:  Abdomen distended.  Tachycardic.      Brief workup plan:  labs, imaging    Preliminary workup initiated; this workup will be continued and followed by the physician or advanced practice provider that is assigned to the patient when roomed.  "

## 2023-01-03 ENCOUNTER — ANESTHESIA EVENT (OUTPATIENT)
Dept: SURGERY | Facility: HOSPITAL | Age: 63
DRG: 438 | End: 2023-01-03
Payer: MEDICAID

## 2023-01-03 ENCOUNTER — ANESTHESIA (OUTPATIENT)
Dept: SURGERY | Facility: HOSPITAL | Age: 63
DRG: 438 | End: 2023-01-03
Payer: COMMERCIAL

## 2023-01-03 VITALS
HEART RATE: 99 BPM | RESPIRATION RATE: 16 BRPM | DIASTOLIC BLOOD PRESSURE: 52 MMHG | OXYGEN SATURATION: 100 % | SYSTOLIC BLOOD PRESSURE: 108 MMHG

## 2023-01-03 LAB
ALBUMIN SERPL BCP-MCNC: 1.5 G/DL (ref 3.5–5.2)
ALP SERPL-CCNC: 74 U/L (ref 55–135)
ALT SERPL W/O P-5'-P-CCNC: 11 U/L (ref 10–44)
ANION GAP SERPL CALC-SCNC: 5 MMOL/L (ref 8–16)
AST SERPL-CCNC: 11 U/L (ref 10–40)
BILIRUB SERPL-MCNC: 1 MG/DL (ref 0.1–1)
BUN SERPL-MCNC: 11 MG/DL (ref 8–23)
CALCIUM SERPL-MCNC: 8.2 MG/DL (ref 8.7–10.5)
CHLORIDE SERPL-SCNC: 98 MMOL/L (ref 95–110)
CO2 SERPL-SCNC: 28 MMOL/L (ref 23–29)
CREAT SERPL-MCNC: 0.6 MG/DL (ref 0.5–1.4)
CRP SERPL-MCNC: 21.96 MG/DL
EST. GFR  (NO RACE VARIABLE): >60 ML/MIN/1.73 M^2
GLUCOSE SERPL-MCNC: 78 MG/DL (ref 70–110)
LIPASE SERPL-CCNC: 185 U/L (ref 4–60)
POTASSIUM SERPL-SCNC: 3.4 MMOL/L (ref 3.5–5.1)
PROCALCITONIN SERPL IA-MCNC: 3.74 NG/ML (ref 0–0.5)
PROT SERPL-MCNC: 4.3 G/DL (ref 6–8.4)
SODIUM SERPL-SCNC: 131 MMOL/L (ref 136–145)

## 2023-01-03 PROCEDURE — 25000003 PHARM REV CODE 250: Performed by: INTERNAL MEDICINE

## 2023-01-03 PROCEDURE — 25000003 PHARM REV CODE 250: Performed by: NURSE ANESTHETIST, CERTIFIED REGISTERED

## 2023-01-03 PROCEDURE — 96372 THER/PROPH/DIAG INJ SC/IM: CPT | Performed by: INTERNAL MEDICINE

## 2023-01-03 PROCEDURE — 25000242 PHARM REV CODE 250 ALT 637 W/ HCPCS: Performed by: INTERNAL MEDICINE

## 2023-01-03 PROCEDURE — 84145 PROCALCITONIN (PCT): CPT | Performed by: INTERNAL MEDICINE

## 2023-01-03 PROCEDURE — 80053 COMPREHEN METABOLIC PANEL: CPT | Performed by: INTERNAL MEDICINE

## 2023-01-03 PROCEDURE — 96375 TX/PRO/DX INJ NEW DRUG ADDON: CPT | Mod: 59

## 2023-01-03 PROCEDURE — 83690 ASSAY OF LIPASE: CPT | Performed by: INTERNAL MEDICINE

## 2023-01-03 PROCEDURE — 96367 TX/PROPH/DG ADDL SEQ IV INF: CPT | Mod: 59

## 2023-01-03 PROCEDURE — 27202053 HC NEEDLE BIOPSY EUS: Performed by: INTERNAL MEDICINE

## 2023-01-03 PROCEDURE — G0378 HOSPITAL OBSERVATION PER HR: HCPCS

## 2023-01-03 PROCEDURE — 12000002 HC ACUTE/MED SURGE SEMI-PRIVATE ROOM

## 2023-01-03 PROCEDURE — 63600175 PHARM REV CODE 636 W HCPCS: Performed by: INTERNAL MEDICINE

## 2023-01-03 PROCEDURE — 86140 C-REACTIVE PROTEIN: CPT | Performed by: INTERNAL MEDICINE

## 2023-01-03 PROCEDURE — 96376 TX/PRO/DX INJ SAME DRUG ADON: CPT | Mod: 59

## 2023-01-03 PROCEDURE — 96366 THER/PROPH/DIAG IV INF ADDON: CPT | Mod: 59

## 2023-01-03 PROCEDURE — 63600175 PHARM REV CODE 636 W HCPCS: Performed by: NURSE ANESTHETIST, CERTIFIED REGISTERED

## 2023-01-03 PROCEDURE — 96361 HYDRATE IV INFUSION ADD-ON: CPT | Mod: 59

## 2023-01-03 PROCEDURE — 85027 COMPLETE CBC AUTOMATED: CPT | Performed by: INTERNAL MEDICINE

## 2023-01-03 PROCEDURE — 37000009 HC ANESTHESIA EA ADD 15 MINS: Performed by: INTERNAL MEDICINE

## 2023-01-03 PROCEDURE — 43238 EGD US FINE NEEDLE BX/ASPIR: CPT | Performed by: INTERNAL MEDICINE

## 2023-01-03 PROCEDURE — 37000008 HC ANESTHESIA 1ST 15 MINUTES: Performed by: INTERNAL MEDICINE

## 2023-01-03 RX ORDER — ENOXAPARIN SODIUM 100 MG/ML
40 INJECTION SUBCUTANEOUS EVERY 24 HOURS
Status: DISCONTINUED | OUTPATIENT
Start: 2023-01-03 | End: 2023-01-06 | Stop reason: HOSPADM

## 2023-01-03 RX ORDER — SODIUM CHLORIDE 9 MG/ML
INJECTION, SOLUTION INTRAVENOUS CONTINUOUS
Status: DISCONTINUED | OUTPATIENT
Start: 2023-01-03 | End: 2023-01-03

## 2023-01-03 RX ORDER — PROPOFOL 10 MG/ML
VIAL (ML) INTRAVENOUS
Status: DISCONTINUED | OUTPATIENT
Start: 2023-01-03 | End: 2023-01-03

## 2023-01-03 RX ORDER — MEROPENEM AND SODIUM CHLORIDE 1 G/50ML
1 INJECTION, SOLUTION INTRAVENOUS
Status: DISCONTINUED | OUTPATIENT
Start: 2023-01-03 | End: 2023-01-04

## 2023-01-03 RX ORDER — PHENYLEPHRINE HYDROCHLORIDE 10 MG/ML
INJECTION INTRAVENOUS
Status: DISCONTINUED | OUTPATIENT
Start: 2023-01-03 | End: 2023-01-03

## 2023-01-03 RX ORDER — LIDOCAINE HCL/PF 100 MG/5ML
SYRINGE (ML) INTRAVENOUS
Status: DISCONTINUED | OUTPATIENT
Start: 2023-01-03 | End: 2023-01-03

## 2023-01-03 RX ADMIN — MEROPENEM AND SODIUM CHLORIDE 1 G: 1 INJECTION, SOLUTION INTRAVENOUS at 09:01

## 2023-01-03 RX ADMIN — SODIUM CHLORIDE, SODIUM LACTATE, POTASSIUM CHLORIDE, AND CALCIUM CHLORIDE: .6; .31; .03; .02 INJECTION, SOLUTION INTRAVENOUS at 01:01

## 2023-01-03 RX ADMIN — SODIUM CHLORIDE: 0.9 INJECTION, SOLUTION INTRAVENOUS at 03:01

## 2023-01-03 RX ADMIN — PROPOFOL 100 MG: 10 INJECTION, EMULSION INTRAVENOUS at 01:01

## 2023-01-03 RX ADMIN — PHENYLEPHRINE HYDROCHLORIDE 100 MCG: 10 INJECTION INTRAVENOUS at 01:01

## 2023-01-03 RX ADMIN — LIDOCAINE HYDROCHLORIDE 75 MG: 20 INJECTION, SOLUTION INTRAVENOUS at 01:01

## 2023-01-03 RX ADMIN — PREDNISONE 5 MG: 5 TABLET ORAL at 09:01

## 2023-01-03 RX ADMIN — TRAZODONE HYDROCHLORIDE 50 MG: 50 TABLET ORAL at 09:01

## 2023-01-03 RX ADMIN — TRAZODONE HYDROCHLORIDE 50 MG: 50 TABLET ORAL at 01:01

## 2023-01-03 RX ADMIN — PANTOPRAZOLE SODIUM 40 MG: 40 TABLET, DELAYED RELEASE ORAL at 09:01

## 2023-01-03 RX ADMIN — GLYCOPYRROLATE 0.2 MG: 0.2 INJECTION, SOLUTION INTRAMUSCULAR; INTRAVITREAL at 01:01

## 2023-01-03 RX ADMIN — ENOXAPARIN SODIUM 40 MG: 40 INJECTION SUBCUTANEOUS at 09:01

## 2023-01-03 RX ADMIN — FOLIC ACID 1 MG: 1 TABLET ORAL at 09:01

## 2023-01-03 RX ADMIN — HYDROCODONE BITARTRATE AND ACETAMINOPHEN 1 TABLET: 5; 325 TABLET ORAL at 01:01

## 2023-01-03 RX ADMIN — VANCOMYCIN HYDROCHLORIDE 750 MG: 750 INJECTION, POWDER, LYOPHILIZED, FOR SOLUTION INTRAVENOUS at 01:01

## 2023-01-03 RX ADMIN — LEUCINE, PHENYLALANINE, LYSINE, METHIONINE, ISOLEUCINE, VALINE, HISTIDINE, THREONINE, TRYPTOPHAN, ALANINE, GLYCINE, ARGININE, PROLINE, SERINE, TYROSINE, SODIUM ACETATE, DIBASIC POTASSIUM PHOSPHATE, MAGNESIUM CHLORIDE, SODIUM CHLORIDE, CALCIUM CHLORIDE, DEXTROSE
311; 238; 247; 170; 255; 247; 204; 179; 77; 880; 438; 489; 289; 213; 17; 297; 261; 51; 77; 33; 5 INJECTION INTRAVENOUS at 10:01

## 2023-01-03 RX ADMIN — Medication 400 UNITS: at 09:01

## 2023-01-03 RX ADMIN — ONDANSETRON 4 MG: 2 INJECTION INTRAMUSCULAR; INTRAVENOUS at 01:01

## 2023-01-03 RX ADMIN — PROPOFOL 50 MG: 10 INJECTION, EMULSION INTRAVENOUS at 01:01

## 2023-01-03 RX ADMIN — METOPROLOL SUCCINATE 25 MG: 25 TABLET, EXTENDED RELEASE ORAL at 09:01

## 2023-01-03 RX ADMIN — HYDROCODONE BITARTRATE AND ACETAMINOPHEN 1 TABLET: 5; 325 TABLET ORAL at 09:01

## 2023-01-03 RX ADMIN — MEROPENEM AND SODIUM CHLORIDE 1 G: 1 INJECTION, SOLUTION INTRAVENOUS at 04:01

## 2023-01-03 NOTE — TRANSFER OF CARE
"Anesthesia Transfer of Care Note    Patient: Claire Bowser    Procedure(s) Performed: Procedure(s) (LRB):  ULTRASOUND, UPPER GI TRACT, ENDOSCOPIC (N/A)    Patient location: GI    Anesthesia Type: MAC    Transport from OR: Transported from OR on room air with adequate spontaneous ventilation    Post pain: adequate analgesia    Post assessment: no apparent anesthetic complications    Post vital signs: stable    Level of consciousness: awake and alert    Nausea/Vomiting: no nausea/vomiting    Complications: none    Transfer of care protocol was followed      Last vitals:   Visit Vitals  BP (!) 92/44 (BP Location: Right leg, Patient Position: Lying)   Pulse 94   Temp 36.9 °C (98.5 °F) (Oral)   Resp (!) 21   Ht 5' 4" (1.626 m)   Wt 37 kg (81 lb 9.1 oz)   SpO2 100%   Breastfeeding No   BMI 14.00 kg/m²     "

## 2023-01-03 NOTE — CONSULTS
[facility]  Adult Nutrition   Consult Note (Initial Assessment)     SUMMARY     Recommendations  Recommendation/Intervention:   1. Advance diet as tolerated to [place].   2. Order Ensure Clear with meals when appropriate.   3. Recommend an appetite stimulant.   4. Consider Clinimix E @ 50ml/hr for supplemental nutrition support. Add Severe Malnutrition to problem list.    Goals: 1. Patient diet to advance in 24-48 hrs. 2. Po intake or NS to meet >/=50% EEN and EPN.  Nutrition Goal Status: new  Communication of RD Recs: reviewed with physician    Dietitian Rounds Brief  RD consult for weight loss; poor appetite. Patient identified last admit:Severe Malnutrition. Pt reports multiple illnesses since May of this year resulting in poor intake/appetite and wt loss. Patient is NPO for scope today. Significant other waiting in room. Explained Clinimix was for added kcal and protein, he voiced understanding. RD to offer Ensure Clear when diet progresses; pt states others give her diarrhea.   Also recommend Clinimix E @ 50 ml/hr to replace Current IV fluids. Provides 408 kcal and 51 gm protein. Patient  may need JPEG for long-term nutrition support. Last BM 1/2/23. MD ordered Clinimix to start post-procedure.  Severe Malnutrition in the context of chronic illness related to inadequate protein-energy intake > 1 month and weight loss > 5% in 1 month and > 10% in 6 months.; muscle wasting to clavicle (deltoid muscle); and poor intake > 1 month.    Diet order:   Current Diet Order: NPO      Evaluation of Received Nutrient/Fluid Intake  Energy Calories Required: not meeting needs  Protein Required: not meeting needs  Fluid Required: not meeting needs  Tolerance: tolerating     % Intake of Estimated Energy Needs: 0%  % Meal Intake: NPO    Intake/Output Summary (Last 24 hours) at 1/3/2023 1005  Last data filed at 1/3/2023 0700  Gross per 24 hour   Intake 1125 ml   Output --   Net 1125 ml      Anthropometrics  Temp: 99.3  "°F (37.4 °C)  Height Method: Stated  Height: 5' 4" (162.6 cm)  Height (inches): 64 in  Weight Method: Bed Scale  Weight: 37 kg (81 lb 9.1 oz)  Weight (lb): 81.57 lb  Ideal Body Weight (IBW), Female: 120 lb  % Ideal Body Weight, Female (lb): 67.98 %  BMI (Calculated): 14       Estimated/Assessed Needs  Weight Used For Calorie Calculations: 37 kg (81 lb 9.1 oz)  Energy Calorie Requirements (kcal): 9729-2738 (35-40) ; 1650 (44.5) (MSJ X 1.25 + 500)  Energy Need Method: Kcal/kg, [facility]  Protein Requirements: 55-74 (1.5-2.0)  Weight Used For Protein Calculations: 37 kg (81 lb 9.1 oz)  Fluid Requirements (mL): 1480 (40ml/kg)  Estimated Fluid Requirement Method: RDA Method  RDA Method (mL): 1295     Reason for Assessment  Reason For Assessment: consult  Diagnosis: gastrointestinal disease  Relevant Medical History: chronic pancreatitis with acute  flare ups;s/p ERCP with stent insertion in Pancreatic duct and had 1300 ml paracentesis 12/30/22.  Interdisciplinary Rounds: did not attend    Nutrition/Diet History  Food Allergies: NKFA  Factors Affecting Nutritional Intake: NPO    Nutrition Risk Screen  Nutrition Risk Screen: unintentional loss of 10 lbs or more in the past 2 months, reduced oral intake over the last month       Altered Skin Integrity 01/03/23 0118 Thoracic spine Ulceration-Wound Image: Images linked  MST Score: 4  Have you recently lost weight without trying?: Yes: 24-33 lbs  Weight loss score: 3  Have you been eating poorly because of a decreased appetite?: Yes  Appetite score: 1       Weight History:  Wt Readings from Last 5 Encounters:   01/03/23 37 kg (81 lb 9.1 oz)   12/29/22 42.6 kg (94 lb)   11/29/22 37.2 kg (82 lb)   10/25/22 40.3 kg (88 lb 13.5 oz)   10/19/22 44.2 kg (97 lb 7.1 oz)        Lab/Procedures/Meds: Pertinent Labs/Meds Reviewed    Medications:Pertinent Medications Reviewed  Scheduled Meds:   cholecalciferol (vitamin D3)  400 Units Oral Daily    folic acid  1 mg Oral Daily    " leflunomide  20 mg Oral Daily    meropenem (MERREM) IVPB  1 g Intravenous Q8H    metoprolol succinate  25 mg Oral Daily    pantoprazole  40 mg Oral Daily    predniSONE  5 mg Oral Daily    traZODone  50 mg Oral QHS     Continuous Infusions:   sodium chloride 0.9% 75 mL/hr at 01/03/23 0356     PRN Meds:.acetaminophen, HYDROcodone-acetaminophen, HYDROmorphone, iohexoL, magnesium oxide, magnesium oxide, ondansetron, potassium bicarbonate, potassium bicarbonate, potassium bicarbonate, potassium, sodium phosphates, potassium, sodium phosphates, potassium, sodium phosphates    Labs: Pertinent Labs Reviewed  Clinical Chemistry:  Recent Labs   Lab 12/29/22  1111 12/31/22  0614 01/02/23  1624 01/03/23  0620    135* 133* 131*   K 3.2* 4.1 3.4* 3.4*   CL 98 102 99 98   CO2 30* 29 25 28   * 116* 71 78   BUN 10 9 11 11   CREATININE 0.7 0.5 0.5 0.6   CALCIUM 9.8 8.8 9.1 8.2*   PROT 6.1 4.6* 5.8* 4.3*   ALBUMIN 2.3* 1.8* 2.1* 1.5*   BILITOT 0.7 0.7 1.2* 1.0   ALKPHOS 105 104 107 74   AST 25 22 19 11   ALT 17 15 14 11   ANIONGAP 8 4* 9 5*   MG 1.7 1.6  --   --    LIPASE 955*  --  271* 185*    < > = values in this interval not displayed.     CBC:   Recent Labs   Lab 01/02/23  1624   WBC 16.54*   RBC 3.70*   HGB 11.2*   HCT 34.6*   *   MCV 94   MCH 30.3   MCHC 32.4     Lipid Panel:  Triglycerides elevated    Recent Labs   Lab 01/03/23  0620   CRP 21.96*     Monitor and Evaluation  Food and Nutrient Intake: energy intake, food and beverage intake  Food and Nutrient Adminstration: diet order  Knowledge/Beliefs/Attitudes: food and nutrition knowledge/skill  Physical Activity and Function: nutrition-related ADLs and IADLs  Anthropometric Measurements: weight, weight change, body mass index  Biochemical Data, Medical Tests and Procedures: electrolyte and renal panel, lipid profile, gastrointestinal profile, glucose/endocrine profile, inflammatory profile  Nutrition-Focused Physical Findings: overall appearance      Nutrition Risk  Level of Risk/Frequency of Follow-up: high     Nutrition Follow-Up  RD Follow-up?: Yes      Sara Duggan RD, LDN 01/03/2023 9:49 AM

## 2023-01-03 NOTE — SUBJECTIVE & OBJECTIVE
Past Medical History:   Diagnosis Date    Anxiety     Digestive disorder     Flu 02/2017    [facility]    Hypertension     Rheumatoid arthritis involving multiple sites with positive rheumatoid factor 01/14/2021       Past Surgical History:   Procedure Laterality Date    COLONOSCOPY N/A 2/26/2021    Procedure: COLONOSCOPY;  Surgeon: Amy Sidhu MD;  Location: [facility] ENDO;  Service: Endoscopy;  Laterality: N/A;    ENDOSCOPIC ULTRASOUND OF UPPER GASTROINTESTINAL TRACT N/A 7/1/2022    Procedure: ULTRASOUND, UPPER GI TRACT, ENDOSCOPIC;  Surgeon: Donavon Jewell III, MD;  Location: [facility] ENDO;  Service: Endoscopy;  Laterality: N/A;    ENDOSCOPIC ULTRASOUND OF UPPER GASTROINTESTINAL TRACT N/A 11/29/2022    Procedure: ULTRASOUND, UPPER GI TRACT, ENDOSCOPIC;  Surgeon: Donavon Jewell III, MD;  Location: [facility] ENDO;  Service: Endoscopy;  Laterality: N/A;    ESOPHAGOGASTRODUODENOSCOPY N/A 2/26/2021    Procedure: EGD (ESOPHAGOGASTRODUODENOSCOPY);  Surgeon: Amy Sidhu MD;  Location: [facility] ENDO;  Service: Endoscopy;  Laterality: N/A;    LAPAROSCOPIC CHOLECYSTECTOMY N/A 7/27/2022    Procedure: CHOLECYSTECTOMY, LAPAROSCOPIC;  Surgeon: Ramón Hwang III, MD;  Location: [facility] OR;  Service: General;  Laterality: N/A;    TUBAL LIGATION         Review of patient's allergies indicates:   Allergen Reactions    No known drug allergies        No current facility-administered medications on file prior to encounter.     Current Outpatient Medications on File Prior to Encounter   Medication Sig    apixaban (ELIQUIS) 5 mg Tab Take 1 tablet (5 mg total) by mouth 2 (two) times daily.    cholecalciferol, vitamin D3, (VITAMIN D3) 10 mcg (400 unit) Tab Take 400 Units by mouth once daily.    CREON 36,000-114,000- 180,000 unit CpDR Take 2 capsules by mouth 3 (three) times daily.    cyclobenzaprine (FLEXERIL) 5 MG tablet Take 5 mg by mouth nightly.    folic acid (FOLVITE) 1 MG tablet Take 1 tablet (1 mg total) by mouth  once daily.    leflunomide (ARAVA) 20 MG Tab Take 1 tablet by mouth once daily (Patient taking differently: Take 20 mg by mouth once daily.)    metoprolol succinate (TOPROL-XL) 50 MG 24 hr tablet Take 0.5 tablets (25 mg total) by mouth once daily. 1/2 tab qd    pantoprazole (PROTONIX) 40 MG tablet Take 1 tablet (40 mg total) by mouth once daily.    potassium gluconate 600 mg (99 mg) Tab Take 1 tablet by mouth once daily.    predniSONE (DELTASONE) 5 MG tablet Take 1 tablet (5 mg total) by mouth once daily.    sertraline (ZOLOFT) 25 MG tablet Take 1 tablet by mouth once daily (Patient taking differently: Take 25 mg by mouth once daily.)    traZODone (DESYREL) 50 MG tablet Take 1 tablet (50 mg total) by mouth every evening.     Family History       Problem Relation (Age of Onset)    Alcohol abuse Paternal Grandfather, Paternal Grandmother    COPD Father, Brother    Cancer Maternal Aunt    Cirrhosis Paternal Grandmother    Diabetes Mother, Maternal Aunt    Emphysema Brother    Hearing loss Mother    Heart disease Mother, Maternal Aunt, Maternal Uncle    Hypertension Mother, Maternal Uncle    Kidney disease Mother    Liver disease Paternal Grandfather, Sister    No Known Problems Son, Paternal Uncle    Sleep apnea Brother    Stroke Mother          Tobacco Use    Smoking status: Every Day     Packs/day: 1.00     Years: 7.00     Pack years: 7.00     Types: Cigarettes    Smokeless tobacco: Never    Tobacco comments:     [phone]   Substance and Sexual Activity    Alcohol use: No    Drug use: Never    Sexual activity: Yes     Partners: Male     Review of Systems   Constitutional:  Negative for activity change and appetite change.   HENT:  Negative for congestion and dental problem.    Eyes:  Negative for discharge and itching.   Respiratory:  Negative for shortness of breath.    Cardiovascular:  Negative for chest pain.   Gastrointestinal:  Positive for abdominal pain, nausea and vomiting. Negative for abdominal  distention.   Endocrine: Negative for cold intolerance.   Genitourinary:  Negative for difficulty urinating and dysuria.   Musculoskeletal:  Negative for arthralgias and back pain.   Skin:  Negative for color change.   Neurological:  Negative for dizziness and facial asymmetry.   Hematological:  Negative for adenopathy.   Psychiatric/Behavioral:  Negative for agitation and behavioral problems.    Objective:     Vital Signs (Most Recent):  Temp: 98.5 °F (36.9 °C) (01/02/23 1311)  Pulse: 92 (01/02/23 2230)  Resp: 18 (01/03/23 0118)  BP: 132/68 (01/02/23 2230)  SpO2: 99 % (01/02/23 2230) Vital Signs (24h Range):  Temp:  [98.5 °F (36.9 °C)] 98.5 °F (36.9 °C)  Pulse:  [] 92  Resp:  [16-18] 18  SpO2:  [95 %-99 %] 99 %  BP: (122-148)/(56-72) 132/68     Weight: 37 kg (81 lb 9.1 oz)  Body mass index is 14 kg/m².    Physical Exam  Vitals and nursing note reviewed.   Constitutional:       General: She is not in acute distress.  HENT:      Head: Atraumatic.      Right Ear: External ear normal.      Left Ear: External ear normal.      Nose: Nose normal.      Mouth/Throat:      Mouth: Mucous membranes are moist.   Cardiovascular:      Rate and Rhythm: Normal rate.   Pulmonary:      Effort: Pulmonary effort is normal.   Musculoskeletal:         General: Normal range of motion.      Cervical back: Normal range of motion.   Skin:     General: Skin is warm.   Neurological:      Mental Status: She is alert and oriented to person, place, and time.   Psychiatric:         Behavior: Behavior normal.           Significant Labs: All pertinent labs within the past 24 hours have been reviewed.  CBC:   Recent Labs   Lab 01/02/23  1624   WBC 16.54*   HGB 11.2*   HCT 34.6*   *     CMP:   Recent Labs   Lab 01/02/23  1624   *   K 3.4*   CL 99   CO2 25   GLU 71   BUN 11   CREATININE 0.5   CALCIUM 9.1   PROT 5.8*   ALBUMIN 2.1*   BILITOT 1.2*   ALKPHOS 107   AST 19   ALT 14   ANIONGAP 9       Significant Imaging: I have reviewed  all pertinent imaging results/findings within the past 24 hours.

## 2023-01-03 NOTE — CONSULTS
GASTROENTEROLOGY INPATIENT CONSULT NOTE  Patient Name: Claire Bowser  Patient MRN: [id number]  Patient : 1960    Admit Date: 2023  Service date: 1/3/2023    Reason for Consult: ascites / pancreatitis / abnormal CT    PCP: Samuel Brady MD    Chief Complaint   Patient presents with    Abdominal Pain     Abd distention and pain        HPI: Patient is a 62 y.o. female with PMHx BTL, COVID , ongoing tobacco use, cholecystectomy, DVT (Eliquis) that presents for evaluation of pancreatitis. Acute / chronic, intermittent, progressive on admission. No ETOH use. States also developed recent ascites with mild elevated amylase concerning for PD / cyst lekage s/p recent pancreatic ERCP. Did well after ERCP but mild pain over past 1-2 days. No f/c, bleeding or other concerns.   + wt loss but has been off pancreatic enzymes since lost job / insurance. Last dose of Eliquis 2 days ago.      CHART REVIEW:  Lipase 271 (improved since last admission)  CT ABd  - emphysema; multiple pancreatic pseudocysts now w/ air (likley from ERCP); PD stent in ventral duct;  moderate ascites; vascular dz  ERCP  - No obvious leak; HOP cyst filled w/ injection; dual sphincterotomy; PD stent placed (not placed out to tail due to narrow PD)  CA 19-9  -   EUS  - chronic panc; panc cysts in HOP / BOP; Mass like region in Neck s/p FNA; 6mm CBD s/p martin  -Bx - pancreatic fibrosis  Labs  - CA 19-9 - 113  Labs 10/'22 - CA 19-9 44.6  MRI 10/'22 - nml biliary s/p martin; 7mm HOP cyst; no mass; fluid by tail; tics  EGD / EUS  - ill defined 2.5 cm region in neck s/p FNA  -HP neg gastritis; Neg panc bx w/ low suspicion for cancer. surgery for lap martin and MRI pancreas 2 months  MRCP  - 1.5 cm mass vs cyst in neck of panc; nml CBD / PD  EGD  - mild gastrtis  Colon  - small polyps. .     Past Medical History:  Past Medical History:   Diagnosis Date    Anxiety     Digestive disorder     Flu 2017     [facility]    Hypertension     Rheumatoid arthritis involving multiple sites with positive rheumatoid factor 01/14/2021        Past Surgical History:  Past Surgical History:   Procedure Laterality Date    COLONOSCOPY N/A 2/26/2021    Procedure: COLONOSCOPY;  Surgeon: Amy Sidhu MD;  Location: [facility] ENDO;  Service: Endoscopy;  Laterality: N/A;    ENDOSCOPIC ULTRASOUND OF UPPER GASTROINTESTINAL TRACT N/A 7/1/2022    Procedure: ULTRASOUND, UPPER GI TRACT, ENDOSCOPIC;  Surgeon: Donavon Jewell III, MD;  Location: [facility] ENDO;  Service: Endoscopy;  Laterality: N/A;    ENDOSCOPIC ULTRASOUND OF UPPER GASTROINTESTINAL TRACT N/A 11/29/2022    Procedure: ULTRASOUND, UPPER GI TRACT, ENDOSCOPIC;  Surgeon: Donavon Jewell III, MD;  Location: [facility] ENDO;  Service: Endoscopy;  Laterality: N/A;    ERCP N/A 12/30/2022    Procedure: ERCP (ENDOSCOPIC RETROGRADE CHOLANGIOPANCREATOGRAPHY);  Surgeon: Donavon Jewell III, MD;  Location: [facility] ENDO;  Service: Endoscopy;  Laterality: N/A;    ESOPHAGOGASTRODUODENOSCOPY N/A 2/26/2021    Procedure: EGD (ESOPHAGOGASTRODUODENOSCOPY);  Surgeon: Amy Sidhu MD;  Location: [facility] ENDO;  Service: Endoscopy;  Laterality: N/A;    LAPAROSCOPIC CHOLECYSTECTOMY N/A 7/27/2022    Procedure: CHOLECYSTECTOMY, LAPAROSCOPIC;  Surgeon: Ramón Hwang III, MD;  Location: [facility] OR;  Service: General;  Laterality: N/A;    TUBAL LIGATION          Home Medications:  Medications Prior to Admission   Medication Sig Dispense Refill Last Dose    apixaban (ELIQUIS) 5 mg Tab Take 1 tablet (5 mg total) by mouth 2 (two) times daily. 60 tablet 0 1/1/2023    cholecalciferol, vitamin D3, (VITAMIN D3) 10 mcg (400 unit) Tab Take 400 Units by mouth once daily.   1/1/2023    CREON 36,000-114,000- 180,000 unit CpDR Take 2 capsules by mouth 3 (three) times daily. 180 capsule 0 1/1/2023    cyclobenzaprine (FLEXERIL) 5 MG tablet Take 5 mg by mouth nightly.   1/1/2023    folic acid (FOLVITE) 1 MG tablet  Take 1 tablet (1 mg total) by mouth once daily. 360 tablet 3 1/1/2023    leflunomide (ARAVA) 20 MG Tab Take 1 tablet by mouth once daily (Patient taking differently: Take 20 mg by mouth once daily.) 30 tablet 0 1/1/2023    metoprolol succinate (TOPROL-XL) 50 MG 24 hr tablet Take 0.5 tablets (25 mg total) by mouth once daily. 1/2 tab qd   1/1/2023    pantoprazole (PROTONIX) 40 MG tablet Take 1 tablet (40 mg total) by mouth once daily. 30 tablet 0 1/1/2023    potassium gluconate 600 mg (99 mg) Tab Take 1 tablet by mouth once daily.   1/1/2023    predniSONE (DELTASONE) 5 MG tablet Take 1 tablet (5 mg total) by mouth once daily. 30 tablet 0 1/1/2023    sertraline (ZOLOFT) 25 MG tablet Take 1 tablet by mouth once daily (Patient taking differently: Take 25 mg by mouth once daily.) 90 tablet 0 1/1/2023    traZODone (DESYREL) 50 MG tablet Take 1 tablet (50 mg total) by mouth every evening. 90 tablet 1 1/1/2023       Inpatient Medications:   cholecalciferol (vitamin D3)  400 Units Oral Daily    folic acid  1 mg Oral Daily    leflunomide  20 mg Oral Daily    meropenem (MERREM) IVPB  1 g Intravenous Q8H    metoprolol succinate  25 mg Oral Daily    pantoprazole  40 mg Oral Daily    predniSONE  5 mg Oral Daily    traZODone  50 mg Oral QHS     acetaminophen, HYDROcodone-acetaminophen, HYDROmorphone, iohexoL, magnesium oxide, magnesium oxide, ondansetron, potassium bicarbonate, potassium bicarbonate, potassium bicarbonate, potassium, sodium phosphates, potassium, sodium phosphates, potassium, sodium phosphates    Review of patient's allergies indicates:   Allergen Reactions    No known drug allergies        Social History:   Social History     Occupational History    Not on file   Tobacco Use    Smoking status: Every Day     Packs/day: 1.00     Years: 7.00     Pack years: 7.00     Types: Cigarettes    Smokeless tobacco: Never    Tobacco comments:     [phone]   Substance and Sexual Activity    Alcohol use: No    Drug use:  "Never    Sexual activity: Yes     Partners: Male       Family History:   Family History   Problem Relation Age of Onset    Diabetes Mother     Heart disease Mother     Kidney disease Mother     Hypertension Mother     Stroke Mother     Hearing loss Mother     COPD Father     Liver disease Paternal Grandfather     Alcohol abuse Paternal Grandfather     Liver disease Sister     Emphysema Brother     COPD Brother     Sleep apnea Brother     No Known Problems Son     Alcohol abuse Paternal Grandmother     Cirrhosis Paternal Grandmother     Heart disease Maternal Aunt     Diabetes Maternal Aunt     Cancer Maternal Aunt         female    Heart disease Maternal Uncle     Hypertension Maternal Uncle     No Known Problems Paternal Uncle     Psoriasis Neg Hx     Lupus Neg Hx     Eczema Neg Hx     Melanoma Neg Hx        Review of Systems:  A 10 point review of systems was performed and was normal, except as mentioned in the HPI, including constitutional, HEENT, heme, lymph, cardiovascular, respiratory, gastrointestinal, genitourinary, neurologic, endocrine, psychiatric and musculoskeletal.      OBJECTIVE:    Physical Exam:  24 Hour Vital Sign Ranges: Temp:  [97.6 °F (36.4 °C)-99.3 °F (37.4 °C)] 98.7 °F (37.1 °C)  Pulse:  [] 93  Resp:  [16-18] 18  SpO2:  [95 %-99 %] 96 %  BP: (105-148)/(49-72) 105/49  Most recent vitals: BP (!) 105/49   Pulse 93   Temp 98.7 °F (37.1 °C) (Oral)   Resp 18   Ht 5' 4" (1.626 m)   Wt 37 kg (81 lb 9.1 oz)   SpO2 96%   Breastfeeding No   BMI 14.00 kg/m²    GEN: well-developed, well-nourished, awake and alert, non-toxic appearing adult  HEENT: PERRL, sclera anicteric, oral mucosa pink and moist without lesion  NECK: trachea midline; Good ROM  CV: regular rate and rhythm, no murmurs or gallops  RESP: clear to auscultation bilaterally, no wheezes, rhonci or rales  ABD: soft, non-tender, non-distended, normal bowel sounds  EXT: no swelling or edema, 2+ pulses distally  SKIN: no rashes or " jaundice  PSYCH: normal affect    Labs:   Recent Labs     01/02/23  1624   WBC 16.54*   MCV 94   *     Recent Labs     01/02/23  1624 01/03/23  0620   * 131*   K 3.4* 3.4*   CL 99 98   CO2 25 28   BUN 11 11   GLU 71 78     No results for input(s): ALB in the last 72 hours.    Invalid input(s): ALKP, SGOT, SGPT, TBIL, DBIL, TPRO  No results for input(s): PT, INR, PTT in the last 72 hours.      Radiology Review:  CT Abdomen Pelvis With Contrast   Final Result            IMPRESSION / RECOMMENDATIONS:  62 y.o. female with PMHx BTL, COVID 5/'22, ongoing tobacco use, cholecystectomy, DVT (Eliquis) that presents for evaluation of pancreatitis and abd swelling. Patient w/ concens for PD / cyst leakage s/p recent ERCP and now w/ slight progression of cysts w/ air in cyst. RIsks, benefits, alternatives discussed in detail regarding upcoming procedures and sedation and possible complications. Some of the more common endoscopic complications include but not limited to immediate or delayed perforation, bleeding, infections, pain, inadvertent injury to surrounding tissue / organs and possible need for surgical evaluation. All questions answered and will proceed with procedure as planned.      -EUS w/ drainage of cyst assuming just liquid  -If any concerns, could repeat ERCP to see if can get stent to distal body / tail as possible communication w/ PD distally but unclear if PD large enough to get it to tail  -If continues to have issues, may need distal pancreatectomy  -Continue pancreatic enzymes  -Tobacco cessation    Thank you for this consult.    Donavon Jewell III  1/3/2023  12:13 PM

## 2023-01-03 NOTE — PLAN OF CARE
Problem: Fatigue  Goal: Improved Activity Tolerance  Outcome: Ongoing, Progressing     Problem: Impaired Wound Healing  Goal: Optimal Wound Healing  Outcome: Ongoing, Progressing     Problem: Fall Injury Risk  Goal: Absence of Fall and Fall-Related Injury  Outcome: Ongoing, Progressing

## 2023-01-03 NOTE — ANESTHESIA PREPROCEDURE EVALUATION
01/03/2023  Claire Bowser is a 62 y.o., female.      Pre-op Assessment    I have reviewed the Patient Summary Reports.     I have reviewed the Nursing Notes. I have reviewed the NPO Status.   I have reviewed the Medications.   Steroids Taken In Past Year: Prednisone    Review of Systems  Anesthesia Hx:  Denies Family Hx of Anesthesia complications.   Denies Personal Hx of Anesthesia complications.   Social:  Smoker, No Alcohol Use    Hematology/Oncology:     Oncology Normal    -- Anemia:   EENT/Dental:   Both lower incisors are loose.   Cardiovascular:   Hypertension    Pulmonary:  Pulmonary Normal    Renal/:  Renal/ Normal     Hepatic/GI:  Hepatic/GI Normal Recurrent pancreatitis.  Hx of having large volume of ascitic fluid drained.  Hepatic/GI Symptoms: (history of benign pacreatic mass)    Musculoskeletal:   Arthritis (RA)  Patient has full range of motion of her neck with no pain currently. 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 rheumatoid factor    Black stool    Colon polyps    Pneumonia    Hypokalemia    Acute cystitis without hematuria    Acute pancreatitis    Epigastric pain    Dehydration    Hypercalcemia    ACP (advance care planning)    Severe protein-calorie malnutrition    Long-term use of immunosuppressant medication    Syncope    Acute biliary pancreatitis    Chest pain    Weight loss, unintentional    Acute DVT (deep venous thrombosis)    Abdominal pain    Chronic pancreatitis    Immunocompromised        Past Surgical History:   Procedure Laterality Date    COLONOSCOPY N/A 2/26/2021    Procedure: COLONOSCOPY;  Surgeon: Amy Sidhu MD;  Location: [facility] ENDO;  Service: Endoscopy;  Laterality: N/A;    ENDOSCOPIC ULTRASOUND OF UPPER GASTROINTESTINAL TRACT N/A 7/1/2022    Procedure: ULTRASOUND, UPPER GI TRACT, ENDOSCOPIC;  Surgeon: Donavon Jewell III, MD;  Location: [facility] ENDO;  Service: Endoscopy;  Laterality: N/A;    ENDOSCOPIC ULTRASOUND OF UPPER GASTROINTESTINAL TRACT N/A 11/29/2022    Procedure: ULTRASOUND, UPPER GI TRACT, ENDOSCOPIC;  Surgeon: Donavon Jewell III, MD;  Location: [facility] ENDO;  Service: Endoscopy;  Laterality: N/A;    ERCP N/A 12/30/2022    Procedure: ERCP (ENDOSCOPIC RETROGRADE CHOLANGIOPANCREATOGRAPHY);  Surgeon: Donavon Jewell III, MD;  Location: [facility] ENDO;  Service: Endoscopy;  Laterality: N/A;    ESOPHAGOGASTRODUODENOSCOPY N/A 2/26/2021    Procedure: EGD (ESOPHAGOGASTRODUODENOSCOPY);  Surgeon: Amy Sidhu MD;  Location: [facility] ENDO;  Service: Endoscopy;  Laterality: N/A;    LAPAROSCOPIC CHOLECYSTECTOMY N/A 7/27/2022    Procedure: CHOLECYSTECTOMY, LAPAROSCOPIC;  Surgeon: Ramón Hwang III, MD;  Location: [facility] OR;  Service: General;  Laterality: N/A;    TUBAL LIGATION          Tobacco Use:  The patient  reports that she has been smoking cigarettes. She has a 7.00 pack-year smoking history. She has never used smokeless tobacco.     Results for orders placed or performed during the hospital encounter of 01/02/23   EKG 12-lead    Collection Time: 01/02/23  1:10 PM    Narrative    Test Reason : R53.1,    Vent. Rate : 119 BPM     Atrial Rate : 119 BPM     P-R Int : 126 ms          QRS Dur : 068 ms      QT Int : 308 ms       P-R-T Axes : 089 004 072 degrees     QTc Int : 433 ms    Sinus tachycardia  Septal infarct ,age undetermined  Abnormal ECG  When compared with ECG of 17-OCT-2022 18:05,  Septal infarct is now Present  T wave inversion no longer evident in  Anterior leads    Referred By: [email or web address]   SELF           Confirmed By:              Lab Results   Component Value Date    WBC 16.54 (H) 01/02/2023    HGB 11.2 (L) 01/02/2023    HCT 34.6 (L) 01/02/2023    MCV 94 01/02/2023     (H) 01/02/2023     BMP  Lab Results   Component Value Date     (L) 01/03/2023    K 3.4 (L) 01/03/2023    CL 98 01/03/2023    CO2 28 01/03/2023    BUN 11 01/03/2023    CREATININE 0.6 01/03/2023    CALCIUM 8.2 (L) 01/03/2023    ANIONGAP 5 (L) 01/03/2023    GLU 78 01/03/2023    GLU 71 01/02/2023     (H) 12/31/2022       Results for orders placed during the hospital encounter of 06/14/22    Echo    Interpretation Summary  · The left ventricle is normal in size with normal systolic function.  · The estimated ejection fraction is 54%.  · Normal left ventricular diastolic function.  · Atrial fibrillation not observed.  · Normal right ventricular size with normal right ventricular systolic function.  · There is mild pulmonary hypertension.  · Mild to moderate tricuspid regurgitation.  · Intermediate central venous pressure (8 mmHg).  · The estimated PA systolic pressure is 44 mmHg.  · No bubble study          Physical Exam  General: Well nourished, Alert, Cooperative and Oriented    Airway:  Mallampati: II   Mouth Opening: Normal  TM Distance: Normal  Tongue: Normal  Neck ROM: Normal ROM    Dental:  Periodontal disease  Poor dentition, loose lower teeth  Chest/Lungs:  Clear to auscultation, Normal Respiratory Rate    Heart:  Rate: Normal  Rhythm: Regular Rhythm  Sounds: Normal        Anesthesia Plan  Type of Anesthesia, risks & benefits discussed:    Anesthesia Type: MAC  Intra-op Monitoring Plan: Standard ASA Monitors  Informed Consent: Informed consent signed with the Patient and all parties understand the risks and agree with anesthesia plan.  All questions answered. Patient consented to blood products? Yes  ASA Score: 3  Anesthesia Plan Notes:       Ready For Surgery From  Anesthesia Perspective.     .

## 2023-01-03 NOTE — ANESTHESIA POSTPROCEDURE EVALUATION
Anesthesia Post Evaluation    Patient: Claire Bowser    Procedure(s) Performed: Procedure(s) (LRB):  ULTRASOUND, UPPER GI TRACT, ENDOSCOPIC (N/A)    Final Anesthesia Type: MAC      Patient location during evaluation: GI PACU  Patient participation: Yes- Able to Participate  Level of consciousness: awake and alert  Post-procedure vital signs: reviewed and stable  Pain management: adequate  Airway patency: patent    PONV status at discharge: No PONV  Anesthetic complications: no      Cardiovascular status: stable  Respiratory status: unassisted  Hydration status: euvolemic  Follow-up not needed.          Vitals Value Taken Time   /55 01/03/23 1420   Temp 36.4 °C (97.6 °F) 01/03/23 1405   Pulse 86 01/03/23 1423   Resp 16 01/03/23 1423   SpO2 95 % 01/03/23 1423   Vitals shown include unvalidated device data.      No case tracking events are documented in the log.      Pain/Gerald Score: Pain Rating Prior to Med Admin: 8 (1/3/2023  9:23 AM)  Pain Rating Post Med Admin: 5 (1/3/2023 10:15 AM)

## 2023-01-03 NOTE — CONSULTS
stage 3 pressure injury mid back 0.8x0.6cm wound bed covered 100%   yellow slough, cleaned with wound cleanser, applied Medihoney, covered with optifoam and tegaderm.  Patient moves well, encouraged to stay off back and turn side to side.  Verbalizes understanding.

## 2023-01-03 NOTE — ED PROVIDER NOTES
Encounter Date: 1/2/2023       History     Chief Complaint   Patient presents with    Abdominal Pain     Abd distention and pain      62-year-old female with a history of rheumatoid arthritis, hypertension, DVT, recent pancreatic stent/sphincterotomy secondary to ERCP findings :  ERCP  12/30:  - A mild ectactic, slight narrowed region just above ampulla was found.   - A pancreatic sphincterotomy was performed. Pancreatic sludge / debris seen folowing sphincterotomy.   - One pancreatic stent was placed into the ventral pancreatic duct.   - The entire main bile duct was mildly dilated.   - The patient has had a cholecystectomy.   - A biliary sphincterotomy was performed.  Who presents today secondary to persistent not improving abdominal pain, poor appetite and poor p.o. intake.  She denies chest pain fever or shortness of breath.  She has had constipation but did have a small bowel movement this morning.  No GI bleeding noted.  Has had decreased dark urine output but denies dysuria or flank pain.  She is had nausea and vomiting x1.  She denies any other problems or complaints.      Review of patient's allergies indicates:   Allergen Reactions    No known drug allergies      Past Medical History:   Diagnosis Date    Anxiety     Digestive disorder     Flu 02/2017    [facility]    Hypertension     Rheumatoid arthritis involving multiple sites with positive rheumatoid factor 01/14/2021     Past Surgical History:   Procedure Laterality Date    COLONOSCOPY N/A 2/26/2021    Procedure: COLONOSCOPY;  Surgeon: Amy Sidhu MD;  Location: [facility] ENDO;  Service: Endoscopy;  Laterality: N/A;    ENDOSCOPIC ULTRASOUND OF UPPER GASTROINTESTINAL TRACT N/A 7/1/2022    Procedure: ULTRASOUND, UPPER GI TRACT, ENDOSCOPIC;  Surgeon: Donavon Jewell III, MD;  Location: [facility] ENDO;  Service: Endoscopy;  Laterality: N/A;    ENDOSCOPIC ULTRASOUND OF UPPER GASTROINTESTINAL TRACT N/A 11/29/2022    Procedure: ULTRASOUND, UPPER GI  TRACT, ENDOSCOPIC;  Surgeon: Donavon Jewell III, MD;  Location: [facility] ENDO;  Service: Endoscopy;  Laterality: N/A;    ESOPHAGOGASTRODUODENOSCOPY N/A 2/26/2021    Procedure: EGD (ESOPHAGOGASTRODUODENOSCOPY);  Surgeon: Amy Sidhu MD;  Location: [facility] ENDO;  Service: Endoscopy;  Laterality: N/A;    LAPAROSCOPIC CHOLECYSTECTOMY N/A 7/27/2022    Procedure: CHOLECYSTECTOMY, LAPAROSCOPIC;  Surgeon: Ramón Hwang III, MD;  Location: [facility] OR;  Service: General;  Laterality: N/A;    TUBAL LIGATION       Family History   Problem Relation Age of Onset    Diabetes Mother     Heart disease Mother     Kidney disease Mother     Hypertension Mother     Stroke Mother     Hearing loss Mother     COPD Father     Liver disease Paternal Grandfather     Alcohol abuse Paternal Grandfather     Liver disease Sister     Emphysema Brother     COPD Brother     Sleep apnea Brother     No Known Problems Son     Alcohol abuse Paternal Grandmother     Cirrhosis Paternal Grandmother     Heart disease Maternal Aunt     Diabetes Maternal Aunt     Cancer Maternal Aunt         female    Heart disease Maternal Uncle     Hypertension Maternal Uncle     No Known Problems Paternal Uncle     Psoriasis Neg Hx     Lupus Neg Hx     Eczema Neg Hx     Melanoma Neg Hx      Social History     Tobacco Use    Smoking status: Every Day     Packs/day: 1.00     Years: 7.00     Pack years: 7.00     Types: Cigarettes    Smokeless tobacco: Never    Tobacco comments:     [phone]   Substance Use Topics    Alcohol use: No    Drug use: Never     Review of Systems   Constitutional:  Positive for activity change, appetite change and fatigue. Negative for chills and fever.   HENT: Negative.  Negative for congestion, dental problem, ear pain, rhinorrhea, sinus pressure, sinus pain, sore throat and trouble swallowing.    Eyes: Negative.  Negative for photophobia, pain, redness and visual disturbance.   Respiratory: Negative.  Negative for cough, chest  tightness, shortness of breath and wheezing.    Cardiovascular: Negative.  Negative for chest pain, palpitations and leg swelling.   Gastrointestinal:  Positive for abdominal pain, constipation, nausea and vomiting. Negative for abdominal distention, anal bleeding, blood in stool and diarrhea.   Endocrine: Negative.    Genitourinary: Negative.  Negative for decreased urine volume, difficulty urinating, dysuria, flank pain, frequency, hematuria, pelvic pain and urgency.   Musculoskeletal: Negative.  Negative for arthralgias, back pain, gait problem, joint swelling, myalgias, neck pain and neck stiffness.   Skin: Negative.  Negative for color change, pallor and rash.   Neurological:  Positive for weakness (nonfocal generalized weakness and fatigue.). Negative for dizziness, tremors, seizures, syncope, facial asymmetry, speech difficulty, light-headedness, numbness and headaches.   Hematological: Negative.  Does not bruise/bleed easily.   Psychiatric/Behavioral: Negative.  Negative for confusion.    All other systems reviewed and are negative.    Physical Exam     Initial Vitals [01/02/23 1311]   BP Pulse Resp Temp SpO2   (!) 140/56 (!) 122 18 98.5 °F (36.9 °C) 98 %      MAP       --         Physical Exam    Nursing note and vitals reviewed.  Constitutional: She appears cachectic. She is active and cooperative. She has a sickly appearance. No distress.   HENT:   Head: Normocephalic and atraumatic.   Nose: Nose normal.   Mouth/Throat: Uvula is midline and oropharynx is clear and moist. Mucous membranes are dry. No oral lesions. No uvula swelling. No oropharyngeal exudate, posterior oropharyngeal edema or posterior oropharyngeal erythema.   Eyes: Conjunctivae, EOM and lids are normal. Pupils are equal, round, and reactive to light. No scleral icterus.   Neck: Trachea normal and phonation normal. Neck supple. No thyroid mass and no thyromegaly present. No stridor present. No JVD present.   Normal range of motion.   Full  passive range of motion without pain.     Cardiovascular:  Regular rhythm, normal heart sounds, intact distal pulses and normal pulses.   Tachycardia present.   Exam reveals no gallop, no distant heart sounds, no friction rub and no decreased pulses.       No murmur heard.  Pulmonary/Chest: Effort normal and breath sounds normal. No accessory muscle usage or stridor. No tachypnea. No respiratory distress. She has no decreased breath sounds. She has no wheezes. She has no rhonchi. She has no rales.   Abdominal: Abdomen is soft. Bowel sounds are normal. She exhibits no distension, no pulsatile midline mass and no mass. There is abdominal tenderness in the right upper quadrant, epigastric area and left upper quadrant. No hernia.   No right CVA tenderness.  No left CVA tenderness. There is no rigidity, no rebound, no guarding and negative Batista's sign. negative psoas sign and negative Rovsing's sign  Musculoskeletal:         General: No tenderness or edema. Normal range of motion.      Right hand: Normal. Normal range of motion. Normal strength. Normal sensation. Normal capillary refill. Normal pulse.      Left hand: Normal. Normal range of motion. Normal strength. Normal sensation. Normal capillary refill. Normal pulse.      Cervical back: Normal, full passive range of motion without pain, normal range of motion and neck supple. No edema, erythema, rigidity or bony tenderness. No spinous process tenderness or muscular tenderness. Normal range of motion.      Thoracic back: Normal. No bony tenderness. Normal range of motion.      Lumbar back: Normal. No bony tenderness. Normal range of motion.      Right foot: Normal. Normal range of motion and normal capillary refill. No tenderness or bony tenderness. Normal pulse.      Left foot: Normal. Normal range of motion and normal capillary refill. No tenderness or bony tenderness. Normal pulse.      Comments: Pulses 2+ throughout, no extremity abnormalities     Neurological:  She is alert and oriented to person, place, and time. She has normal strength. She displays normal reflexes. No cranial nerve deficit or sensory deficit. Gait normal.   No focal deficits.   Skin: Skin is warm, dry and intact. Capillary refill takes less than 2 seconds. No ecchymosis, no petechiae and no rash noted. No erythema. No pallor.   Psychiatric: She has a normal mood and affect. Her speech is normal and behavior is normal. Judgment and thought content normal. Cognition and memory are normal.       ED Course   Procedures  Labs Reviewed   CBC W/ AUTO DIFFERENTIAL - Abnormal; Notable for the following components:       Result Value    WBC 16.54 (*)     RBC 3.70 (*)     Hemoglobin 11.2 (*)     Hematocrit 34.6 (*)     RDW 17.6 (*)     Platelets 539 (*)     MPV 9.1 (*)     Gran # (ANC) 15.3 (*)     Immature Grans (Abs) 0.07 (*)     Lymph # 0.5 (*)     Gran % 92.7 (*)     Lymph % 3.0 (*)     Mono % 3.7 (*)     All other components within normal limits   COMPREHENSIVE METABOLIC PANEL - Abnormal; Notable for the following components:    Sodium 133 (*)     Potassium 3.4 (*)     Total Protein 5.8 (*)     Albumin 2.1 (*)     Total Bilirubin 1.2 (*)     All other components within normal limits   LIPASE - Abnormal; Notable for the following components:    Lipase 271 (*)     All other components within normal limits   URINALYSIS, REFLEX TO URINE CULTURE - Abnormal; Notable for the following components:    Protein, UA 1+ (*)     Ketones, UA 1+ (*)     Bilirubin (UA) 1+ (*)     Urobilinogen, UA 4.0-6.0 (*)     All other components within normal limits    Narrative:     Specimen Source->Urine   URINALYSIS MICROSCOPIC - Abnormal; Notable for the following components:    Hyaline Casts, UA 14 (*)     All other components within normal limits    Narrative:     Specimen Source->Urine   CULTURE, BLOOD   CULTURE, BLOOD   LACTIC ACID, PLASMA   ISTAT CREATININE   POCT CREATININE        ECG Results              EKG 12-lead (In  process)  Result time 01/02/23 13:46:35      In process by Interface, Lab In [facility] (01/02/23 13:46:35)                   Narrative:    Test Reason : R53.1,    Vent. Rate : 119 BPM     Atrial Rate : 119 BPM     P-R Int : 126 ms          QRS Dur : 068 ms      QT Int : 308 ms       P-R-T Axes : 089 004 072 degrees     QTc Int : 433 ms    Sinus tachycardia  Septal infarct ,age undetermined  Abnormal ECG  When compared with ECG of 17-OCT-2022 18:05,  Septal infarct is now Present  T wave inversion no longer evident in Anterior leads    Referred By: [email or web address]   SELF           Confirmed By:                                   Imaging Results              CT Abdomen Pelvis With Contrast (Final result)  Result time 01/02/23 19:19:08      Final result by Raymundo Ramos MD (01/02/23 19:19:08)                   Narrative:    CMS MANDATED QUALITY DATA - CT RADIATION - 436    All CT scans at this facility utilize dose modulation, iterative reconstruction, and/or weight based dosing when appropriate to reduce radiation dose to as low as reasonably achievable.        Reason: Abdominal pain, acute, nonlocalized weakness, nausea and vomiting since Saturday.  Pt was admitted last week and had a pancreatic stent placed last week.  Pt states symptoms worse since that time.  No relief with home Zofran.    TECHNIQUE: CT abdomen and pelvis with 100 mL Omnipaque 350.    COMPARISON: CT 12/29/2022    CT ABDOMEN:  Partially visualized lung bases show emphysema. Distended opacified bronchioles in right middle lobe unchanged.    Stent traversing the ampulla has short segment in downstream main pancreatic duct and courses into the second portion of duodenum. Surgical clips in gallbladder fossa indicate cholecystectomy. No intrahepatic or extrahepatic bile duct dilation.    Liver, spleen, and adrenals are normal. Some mucosal edema is suggested about the stomach, similar to prior exam. Wall thickening of the small intestines also similar to the  prior exam. Diffuse intestinal mucosal hyperenhancement is unchanged. Large intestines are unremarkable. A normal appendix is present.    Well-circumscribed rim-enhancing collection coursing along posterior aspect of stomach and anterior aspect of pancreatic tail and pancreatic body is increased in size, currently measuring 3.5 cm in diameter previously measuring 2.3 cm. Rim-enhancing collection of fluid also extends anterior to the celiac axis trifurcation, with associated foci of gas which are felt to be extraluminal in location. Rim-enhancing fluid collections also lie along anterior and inferior aspect of spleen, coursing inferiorly into left upper quadrant, also slightly larger than that on prior exam. Additional rim-enhancing collections affect the pancreatic head including 2.1 cm collection which previously measured 2 cm.    Large volume low density ascites has not significantly changed. Mild aortoiliac calcifications unchanged. Diffuse subcutaneous fat stranding about the abdominal wall suggesting edema unchanged.    No acute osseous abnormality.    CT PELVIS:  Bladder is normal. Uterus and adnexa are unremarkable. Subcutaneous fat stranding occurs throughout the pelvis and proximal thighs, unchanged. Focus of increased mineralization in left acetabular roof unchanged. Tarlov cyst at level of S2 noted.    IMPRESSION:    1. Emphysema.  2. Distended opacified bronchiole in right middle lobe, unchanged.  3. Interval placement of stent in the downstream main pancreatic duct as described. Majority of the stent lies in second portion of duodenum. Correlation with desired placement is requested.  4. Multifocal rim-enhancing fluid collections in the upper abdomen, larger of which lie in peripancreatic location, with enlargement since 12/29/2022. Pancreatic pseudocyst are most likely etiology. Several collections tracking anteriorly and superior to the celiac axis trifurcation contain associated gas.  5. Unchanged  large volume ascites.  6. Unchanged diffuse body wall edema.    Electronically signed by:  Raymundo Ramos MD  1/2/2023 7:19 PM CST Workstation: [id number]                                     Medications   piperacillin-tazobactam 4.5 g in dextrose 5 % 100 mL IVPB (ready to mix system) (has no administration in time range)   lactated ringers bolus 500 mL (has no administration in time range)   iohexoL (OMNIPAQUE 350) injection 100 mL (100 mLs Intravenous Given 1/2/23 1838)   morphine injection 2 mg (2 mg Intravenous Given 1/2/23 2012)   ondansetron injection 4 mg (4 mg Intravenous Given 1/2/23 2012)     Medical Decision Making:   Clinical Tests:   Lab Tests: Reviewed  Radiological Study: Reviewed  Medical Tests: Reviewed  ED Management:  Discussed case with Dr. Addison on-call for Gastroenterology, recommends that Eliquis be held, patient be kept NPO after midnight in anticipation for endoscopic procedure tomorrow.  Will discuss with hospitalist for admission.                        Clinical Impression:   Final diagnoses:  [R53.1] Weakness               Luz Marina Burns MD  01/18/23 7250

## 2023-01-03 NOTE — PLAN OF CARE
[facility]  Initial Discharge Assessment       Primary Care Provider: Samuel Brady MD    Admission Diagnosis: Abdominal pain [R10.9]    Admission Date: 1/2/2023  Expected Discharge Date:     Discharge Barriers Identified: (P) Other (see comments) (Pt pending medicaid)    CM met with Pt at bedside to complete discharge assessment. Info on facesheet verified as correct. Pt independent in all ADLs. Pt awaiting medicaid approval and in the mean time can not afford her prescriptions;see below. No other needs identified. CM to follow.     Payor: MEDICAID / Plan: PENDING MEDICAID / Product Type: Government /     Extended Emergency Contact Information  Primary Emergency Contact: Robin Bowser  Address: [address] [place] [id number] [place]  Mobile Phone: [phone]  Relation: Spouse  Preferred language: English   needed? No    Discharge Plan A: (P) Home with family  Discharge Plan B: (P) Home with family      [facility] [address] [place] [id number] [employer]  [address] [facility] [address]  Phone: [phone] Fax: [phone]      Initial Assessment (most recent)       Adult Discharge Assessment - 01/03/23 0946          Discharge Assessment    Assessment Type Discharge Planning Assessment (P)      Confirmed/corrected address, phone number and insurance Yes (P)      Confirmed Demographics Correct on Facesheet (P)      Source of Information patient (P)      Does patient/caregiver understand observation status Yes (P)      Communicated RASHI with patient/caregiver Date not available/Unable to determine (P)      Reason For Admission abdominal pain (P)      People in Home spouse (P)      Facility Arrived From: home (P)      Do you expect to return to your current living situation? Yes (P)      Do you have help at home or someone to help you manage your care at home? Yes (P)      Who are your caregiver(s) and their phone number(s)? Robin Niels [phone]  (P)      Prior to hospitilization cognitive status: Alert/Oriented (P)      Current cognitive status: Alert/Oriented (P)      Equipment Currently Used at Home none (P)      Readmission within 30 days? Yes (P)      Patient currently being followed by outpatient case management? No (P)      Do you currently have service(s) that help you manage your care at home? No (P)      Do you take prescription medications? Yes (P)      Do you have prescription coverage? No (P)      Do you have any problems affording any of your prescribed medications? Yes (P)      If yes, what medications? Creon- pancreatic enzyme (P)      Is the patient taking medications as prescribed? no (P)      If no, which medications is patient not taking? Creon (P)      Who is going to help you get home at discharge? Robin Bowser (P)      How do you get to doctors appointments? car, drives self;family or friend will provide (P)      Are you on dialysis? No (P)      Do you take coumadin? No (P)      Discharge Plan A Home with family (P)      Discharge Plan B Home with family (P)      DME Needed Upon Discharge  none (P)      Discharge Plan discussed with: Patient (P)      Discharge Barriers Identified Other (see comments) (P)    Pt pending medicaid       Physical Activity    On average, how many days per week do you engage in moderate to strenuous exercise (like a brisk walk)? 5 days (P)      On average, how many minutes do you engage in exercise at this level? 20 min (P)         Financial Resource Strain    How hard is it for you to pay for the very basics like food, housing, medical care, and heating? Somewhat hard (P)         Housing Stability    In the last 12 months, was there a time when you were not able to pay the mortgage or rent on time? No (P)      In the last 12 months, how many places have you lived? 1 (P)      In the last 12 months, was there a time when you did not have a steady place to sleep or slept in a shelter (including now)? No (P)          Transportation Needs    In the past 12 months, has lack of transportation kept you from medical appointments or from getting medications? No (P)      In the past 12 months, has lack of transportation kept you from meetings, work, or from getting things needed for daily living? No (P)         Food Insecurity    Within the past 12 months, you worried that your food would run out before you got the money to buy more. Never true (P)      Within the past 12 months, the food you bought just didn't last and you didn't have money to get more. Never true (P)         Stress    Do you feel stress - tense, restless, nervous, or anxious, or unable to sleep at night because your mind is troubled all the time - these days? To some extent (P)         Social Connections    In a typical week, how many times do you talk on the phone with family, friends, or neighbors? More than three times a week (P)      How often do you get together with friends or relatives? More than three times a week (P)      How often do you attend Jainism or Holiness services? Never (P)      Do you belong to any clubs or organizations such as Jainism groups, unions, fraternal or athletic groups, or school groups? Yes (P)      How often do you attend meetings of the clubs or organizations you belong to? More than 4 times per year (P)      Are you , , , , never , or living with a partner?  (P)         Alcohol Use    Q1: How often do you have a drink containing alcohol? Never (P)      Q2: How many drinks containing alcohol do you have on a typical day when you are drinking? Patient does not drink (P)      Q3: How often do you have six or more drinks on one occasion? Never (P)         OTHER    Name(s) of People in Home Robin Bowser (P)

## 2023-01-03 NOTE — PROGRESS NOTES
[facility] Medicine  Progress Note    Patient Name: Claire Bowser  MRN: [id number]  Patient Class: OP- Observation  Admission Date: 1/2/2023  Attending Physician: Binu Arciniega MD   Primary Care Provider: Samuel Brady MD  DOS: 01/03/2023    Subjective:     Principal Problem:Abdominal pain    Chief Complaint:   Chief Complaint   Patient presents with    Abdominal Pain     Abd distention and pain         HPI: 62 year old patient getting admitted with abdominal pain  Patient suffers from chronic pancreatitis with acute  flare ups  Few days ago pt had ERCP with stent insertion in Pancreatic duct and had 1300 ml paracentesis  Pt did fine and went home next day  Few days later she started having abdominal pain:Pain is generalized /band like/associated with Nausea and few episodes of Vomiting  Denies radiation of pain . No aggravating and relieving  factors   Later symptoms got worse and she came to ER today and got admitted   Imaging in ER showed :Multifocal rim-enhancing fluid collections in the upper abdomen    Interval History:  1/3: Patient seen and examined.  Patient reports mild intensity abdominal pain, improved with treatment.  Minimal nausea but no vomiting.  Patient reports poor oral intake and weight loss recently.  No fever or chills.  No chest pain or shortness of breath.  Patient underwent endoscopy today with draining of pancreatic pseudocyst.  Started on Clinimix.  Family at bedside.    ROS: 3 point ROS reviewed and negative except as per HPI above      Vitals:    01/03/23 1425 01/03/23 1435 01/03/23 1454 01/03/23 1542   BP: (!) 109/59 115/61 (!) 96/59 105/62   BP Location:   Right arm    Patient Position:   Lying    Pulse: 86 84 83 71   Resp: 16 15  16   Temp:    97.7 °F (36.5 °C)   TempSrc:    Oral   SpO2: 95% 95%  95%   Weight:       Height:         Physical Exam  Constitutional:       General: She is not in acute distress.  Nontoxic nondiaphoretic  HENT:      Mouth:  Mucous membranes are moist.  No thrush  EYES:  Anicteric sclera, no conjunctival discharge  Cardiovascular:      Rate and Rhythm: Normal rate.  2+ radial pulses  GI:  Abdomen soft with minimal distention, minimal tenderness without guarding or rebound  Pulmonary:      Effort: Pulmonary effort is normal.  Lungs are clear bilaterally  Skin:     General: Skin is warm.  Dry and warm with no jaundice  Neurological:      Mental Status: She is alert and oriented to person, place, and time.  Fluent speech, follows commands appropriately  Psychiatric:         Behavior: Behavior normal.  Mood is good, insight fair      LABS:  Potassium 3.4   Sodium 131   Creatinine 1.6  CRP 21   Lipase 185    Assessment/Plan:     Abdominal pain  Pancreatic pseudocyst   Possible intra-abdominal abscess?  Multifocal rim-enhancing fluid collections in the upper abdomen per imaging  Pt suffers from pancreatitis  Check CRP/Lipase  Start iv meropenem  Also give one dose of iv vancomycin  NPO for possible EUS ?  Pancreatic duct stent misplaced ?  Consult GI MD    Appreciate GI, endoscopy today with drainage of pancreatic pseudocyst  Continue empiric broad-spectrum IV antibiotics  Follow up all culture data  Infectious disease consultation pending  GI prophylaxis with PPI   VTE prophylaxis initiate low-molecular weight heparin      Chronic pancreatitis  Serial lipase monitoring   Gentle IV fluids        Weight loss, unintentional  Severe protein calorie malnutrition  Significant weight loss, per pt  Start Clinimix, appreciate nutrition  Plan for appetite stimulant and low-dose mood stabilizer in near future    Rheumatoid arthritis involving multiple sites with positive rheumatoid factor  Immunocompromised  On Leflunomide and Prednisone   Continue for now, consider holding if infectious etiology identified    Tobacco use  Aware     Hypokalemia        Replace potassium, serial labs    High-risk patient secondary to severe exacerbation chronic illness;  parental controlled substances for symptom control; invasive endoscopy procedure today with risk factors; administration of medication requiring close monitoring of levels to prevent toxicity-vancomycin    VTE Risk Mitigation (From admission, onward)           Ordered     IP VTE HIGH RISK PATIENT  Once         01/02/23 2207     Place sequential compression device  Until discontinued         01/02/23 2207                       Binu Arciniega MD  Department of Hospital Medicine   Counts include 234 beds at the [facility]

## 2023-01-03 NOTE — PLAN OF CARE
"   01/03/23 0953   Readmission   Why were you hospitalized in the last 30 days? yes   Why were you readmitted? Related to previous admission   When you left the hospital how did you feel? "Better"   When you left the hospital where did you go? Home with Family   Did patient/caregiver refused recommended DC plan? No   Tell me about what happened between when you left the hospital and the day you returned. I started having stomach pain   Did you try to manage your symptoms your self? No   Did you call anyone? No   Did you try to see or did see a doctor or nurse before you came? No   Did you have  a follow-up appointment on discharge? Yes   Did you go? No   Why?   (readmitted before follow up)   Was this a planned readmission? No       "

## 2023-01-03 NOTE — PROGRESS NOTES
1/3/2023 Pharmacokinetic Assessment: IV Vancomycin  Vanco DAY 1 - Claire Bowser is a 62 y.o. female being treated for intra-abdominal infection. Goal 10 to 15 mcg/mL.     Dialysis Method (if applicable):N/A     Vancomycin serum concentration assessment(s) (last 3 results):  No results for input(s): VANCOMYCINRA, VANCOMYCINPE, VANCOMYCINTR, VANCOTROUGH in the last 72 hours.    Vancomycin Regimen Plan: 750 mg  x1 then 500mg Q24H. Trough/Random before 4th dose on 1/6/2023 @ 00:00.  Day of Thx Date Current Weight (kg) Laboratory  Doses    Vancomycin Level      Time SCr CrCl Scheduled Time Time Dose Dosage (mcg/ml) Peak,  Random,  Trough?         VANCOMYCIN 750mg x1 then 500mg q24h     1 1/3 37.0  0.6 57.5 - 01:18 1 750     2 1/4     01:00  2 500     3 1/5     01:00  3 500     4 1/6     00:00  - -  Trough     Rationale for Plan: per protocol ([facility])    Labs:  Estimated Creatinine Clearance: 57.5 mL/min (based on SCr of 0.6 mg/dL).  Recent Labs   Lab Result Units 01/02/23 1624 01/03/23  0620   WBC K/uL 16.54*  --    Creatinine mg/dL 0.5 0.6       Cxs:   Microbiology Results (last 7 days)       Procedure Component Value Units Date/Time    Blood culture #2 **CANNOT BE ORDERED STAT** [[id number]] Collected: 01/02/23 1624    Order Status: Completed Specimen: Blood from Peripheral, Antecubital, Right Updated: 01/02/23 2317     Blood Culture, Routine No Growth to date    Blood culture #1 **CANNOT BE ORDERED STAT** [[id number]] Collected: 01/02/23 1624    Order Status: Completed Specimen: Blood from Peripheral, Forearm, Left Updated: 01/02/23 2317     Blood Culture, Routine No Growth to date            Pharmacy will continue to follow and monitor vancomycin.   Please contact pharmacy at extension [phone] with any questions regarding this assessment.     Thank you for the consult,   Travis Sanchez

## 2023-01-03 NOTE — PLAN OF CARE
Problem: Adult Inpatient Plan of Care  Pt educated on plan of care  Goal: Plan of Care Review  Outcome: Ongoing, Progressing  Goal: Patient-Specific Goal (Individualized)  Outcome: Ongoing, Progressing  Goal: Absence of Hospital-Acquired Illness or Injury  Outcome: Ongoing, Progressing  Goal: Optimal Comfort and Wellbeing  Outcome: Ongoing, Progressing  Goal: Readiness for Transition of Care  Outcome: Ongoing, Progressing

## 2023-01-03 NOTE — ASSESSMENT & PLAN NOTE
Multifocal rim-enhancing fluid collections in the upper abdomen per imaging  Pt suffers from pancreatitis  Check CRP/Lipase  Start iv meropenem  Also give one dose of iv vancomycin  NPO for possible EUS ?  Pancreatic duct stent misplaced ?  Consult GI MD

## 2023-01-03 NOTE — [employer] PATIENT INSTRUCTIONS
Discharge Summary/Instructions after an Endoscopic Procedure  Patient Name: Claire Bowser  Patient MRN: [id number]  Patient YOB: 1960  Tuesday, January 3, 2023  Donavon Jewell III, MD  RESTRICTIONS:  During your procedure today, you received medications for sedation.  These   medications may affect your judgment, balance and coordination.  Therefore,   for 24 hours, you have the following restrictions:   - DO NOT drive a car, operate machinery, make legal/financial decisions,   sign important papers or drink alcohol.    ACTIVITY:  Today: no heavy lifting, straining or running due to procedural   sedation/anesthesia.  The following day: return to full activity including work.  DIET:  Eat and drink normally unless instructed otherwise.     TREATMENT FOR COMMON SIDE EFFECTS:  - Mild abdominal pain, nausea, belching, bloating or excessive gas:  rest,   eat lightly and use a heating pad.  - Sore Throat: treat with throat lozenges and/or gargle with warm salt   water.  - Because air was used during the procedure, expelling large amounts of air   from your rectum or belching is normal.  - If a bowel prep was taken, you may not have a bowel movement for 1-3 days.    This is normal.  SYMPTOMS TO WATCH FOR AND REPORT TO YOUR PHYSICIAN:  1. Abdominal pain or bloating, other than gas cramps.  2. Chest pain.  3. Back pain.  4. Signs of infection such as: chills or fever occurring within 24 hours   after the procedure.  5. Rectal bleeding, which would show as bright red, maroon, or black stools.   (A tablespoon of blood from the rectum is not serious, especially if   hemorrhoids are present.)  6. Vomiting.  7. Weakness or dizziness.  GO DIRECTLY TO THE NEAREST EMERGENCY ROOM IF YOU HAVE ANY OF THE FOLLOWING:      Difficulty breathing              Chills and/or fever over 101 F   Persistent vomiting and/or vomiting blood   Severe abdominal pain   Severe chest pain   Black, tarry stools   Bleeding- more than one  tablespoon   Any other symptom or condition that you feel may need urgent attention  Your doctor recommends these additional instructions:  If any biopsies were taken, your doctors clinic will contact you in 1 to 2   weeks with any results.  - Patient has a contact number available for emergencies.  The signs and   symptoms of potential delayed complications were discussed with the   patient.  Return to normal activities tomorrow.  Written discharge   instructions were provided to the patient.   - Continue present medications.   - Clear liquid diet.   - Will d/w surg-onc to see if feel distal pancreatectomy indicated vs   conservative   - Return patient to hospital [facility] for ongoing care.  For questions, problems or results please call your physician - Donavon Jewell III, MD at Work:  [phone].  [facility], EMERGENCY ROOM PHONE NUMBER: [phone]  IF A COMPLICATION OR EMERGENCY SITUATION ARISES AND YOU ARE UNABLE TO REACH   YOUR PHYSICIAN - GO DIRECTLY TO THE EMERGENCY ROOM.  Donavon Jewell III, MD  1/3/2023 1:46:41 PM  This report has been verified and signed electronically.  Dear patient,  As a result of recent federal legislation (The Federal Cures Act), you may   receive lab or pathology results from your procedure in your [facility]   account before your physician is able to contact you. Your physician or   their representative will relay the results to you with their   recommendations at their soonest availability.  Thank you,  [employer]

## 2023-01-03 NOTE — H&P
[facility] - Emergency Dept  [facility] Medicine  History & Physical    Patient Name: Claire Bowser  MRN: [id number]  Patient Class: OP- Observation  Admission Date: 1/2/2023  Attending Physician: Willie Moyer MD   Primary Care Provider: Samuel Brady MD         Patient information was obtained from patient, past medical records and ER records.     Subjective:     Principal Problem:Abdominal pain    Chief Complaint:   Chief Complaint   Patient presents with    Abdominal Pain     Abd distention and pain         HPI: 62 year old patient getting admitted with abdominal pain  Patient suffers from chronic pancreatitis with acute  flare ups  Few days ago pt had ERCP with stent insertion in Pancreatic duct and had 1300 ml paracentesis  Pt did fine and went home next day  Few days later she started having abdominal pain:Pain is generalized /band like/associated with Nausea and few episodes of Vomiting  Denies radiation of pain . 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 ENDO;  Service: Endoscopy;  Laterality: N/A;    ENDOSCOPIC ULTRASOUND OF UPPER GASTROINTESTINAL TRACT N/A 7/1/2022    Procedure: ULTRASOUND, UPPER GI TRACT, ENDOSCOPIC;  Surgeon: Donavon Jewell III, MD;  Location: [facility];  Service: Endoscopy;  Laterality: N/A;    ENDOSCOPIC ULTRASOUND OF UPPER GASTROINTESTINAL TRACT N/A 11/29/2022    Procedure: ULTRASOUND, UPPER GI TRACT, ENDOSCOPIC;  Surgeon:  Donavon Jewell III, MD;  Location: [facility] ENDO;  Service: Endoscopy;  Laterality: N/A;    ESOPHAGOGASTRODUODENOSCOPY N/A 2/26/2021    Procedure: EGD (ESOPHAGOGASTRODUODENOSCOPY);  Surgeon: Amy Sidhu MD;  Location: [facility] ENDO;  Service: Endoscopy;  Laterality: N/A;    LAPAROSCOPIC CHOLECYSTECTOMY N/A 7/27/2022    Procedure: CHOLECYSTECTOMY, LAPAROSCOPIC;  Surgeon: Ramón Hwang III, MD;  Location: [facility] OR;  Service: General;  Laterality: N/A;    TUBAL LIGATION         Review of patient's allergies indicates:   Allergen Reactions    No known drug allergies        No current facility-administered medications on file prior to encounter.     Current Outpatient Medications on File Prior to Encounter   Medication Sig    apixaban (ELIQUIS) 5 mg Tab Take 1 tablet (5 mg total) by mouth 2 (two) times daily.    cholecalciferol, vitamin D3, (VITAMIN D3) 10 mcg (400 unit) Tab Take 400 Units by mouth once daily.    CREON 36,000-114,000- 180,000 unit CpDR Take 2 capsules by mouth 3 (three) times daily.    cyclobenzaprine (FLEXERIL) 5 MG tablet Take 5 mg by mouth nightly.    folic acid (FOLVITE) 1 MG tablet Take 1 tablet (1 mg total) by mouth once daily.    leflunomide (ARAVA) 20 MG Tab Take 1 tablet by mouth once daily (Patient taking differently: Take 20 mg by mouth once daily.)    metoprolol succinate (TOPROL-XL) 50 MG 24 hr tablet Take 0.5 tablets (25 mg total) by mouth once daily. 1/2 tab qd    pantoprazole (PROTONIX) 40 MG tablet Take 1 tablet (40 mg total) by mouth once daily.    potassium gluconate 600 mg (99 mg) Tab Take 1 tablet by mouth once daily.    predniSONE (DELTASONE) 5 MG tablet Take 1 tablet (5 mg total) by mouth once daily.    sertraline (ZOLOFT) 25 MG tablet Take 1 tablet by mouth once daily (Patient taking differently: Take 25 mg by mouth once daily.)    traZODone (DESYREL) 50 MG tablet Take 1 tablet (50 mg total) by mouth every evening.     Family History       Problem Relation  (Age of Onset)    Alcohol abuse Paternal Grandfather, Paternal Grandmother    COPD Father, Brother    Cancer Maternal Aunt    Cirrhosis Paternal Grandmother    Diabetes Mother, Maternal Aunt    Emphysema Brother    Hearing loss Mother    Heart disease Mother, Maternal Aunt, Maternal Uncle    Hypertension Mother, Maternal Uncle    Kidney disease Mother    Liver disease Paternal Grandfather, Sister    No Known Problems Son, Paternal Uncle    Sleep apnea Brother    Stroke Mother          Tobacco Use    Smoking status: Every Day     Packs/day: 1.00     Years: 7.00     Pack years: 7.00     Types: Cigarettes    Smokeless tobacco: Never    Tobacco comments:     [phone]   Substance and Sexual Activity    Alcohol use: No    Drug use: Never    Sexual activity: Yes     Partners: Male     Review of Systems   Constitutional:  Negative for activity change and appetite change.   HENT:  Negative for congestion and dental problem.    Eyes:  Negative for discharge and itching.   Respiratory:  Negative for shortness of breath.    Cardiovascular:  Negative for chest pain.   Gastrointestinal:  Positive for abdominal pain, nausea and vomiting. Negative for abdominal distention.   Endocrine: Negative for cold intolerance.   Genitourinary:  Negative for difficulty urinating and dysuria.   Musculoskeletal:  Negative for arthralgias and back pain.   Skin:  Negative for color change.   Neurological:  Negative for dizziness and facial asymmetry.   Hematological:  Negative for adenopathy.   Psychiatric/Behavioral:  Negative for agitation and behavioral problems.    Objective:     Vital Signs (Most Recent):  Temp: 98.5 °F (36.9 °C) (01/02/23 1311)  Pulse: 92 (01/02/23 2230)  Resp: 18 (01/03/23 0118)  BP: 132/68 (01/02/23 2230)  SpO2: 99 % (01/02/23 2230) Vital Signs (24h Range):  Temp:  [98.5 °F (36.9 °C)] 98.5 °F (36.9 °C)  Pulse:  [] 92  Resp:  [16-18] 18  SpO2:  [95 %-99 %] 99 %  BP: (122-148)/(56-72) 132/68     Weight: 37 kg  (81 lb 9.1 oz)  Body mass index is 14 kg/m².    Physical Exam  Vitals and nursing note reviewed.   Constitutional:       General: She is not in acute distress.  HENT:      Head: Atraumatic.      Right Ear: External ear normal.      Left Ear: External ear normal.      Nose: Nose normal.      Mouth/Throat:      Mouth: Mucous membranes are moist.   Cardiovascular:      Rate and Rhythm: Normal rate.   Pulmonary:      Effort: Pulmonary effort is normal.   Musculoskeletal:         General: Normal range of motion.      Cervical back: Normal range of motion.   Skin:     General: Skin is warm.   Neurological:      Mental Status: She is alert and oriented to person, place, and time.   Psychiatric:         Behavior: Behavior normal.           Significant Labs: All pertinent labs within the past 24 hours have been reviewed.  CBC:   Recent Labs   Lab 01/02/23  1624   WBC 16.54*   HGB 11.2*   HCT 34.6*   *     CMP:   Recent Labs   Lab 01/02/23  1624   *   K 3.4*   CL 99   CO2 25   GLU 71   BUN 11   CREATININE 0.5   CALCIUM 9.1   PROT 5.8*   ALBUMIN 2.1*   BILITOT 1.2*   ALKPHOS 107   AST 19   ALT 14   ANIONGAP 9       Significant Imaging: I have reviewed all pertinent imaging results/findings within the past 24 hours.    Assessment/Plan:     * Abdominal pain  Multifocal rim-enhancing fluid collections in the upper abdomen per imaging  Pt suffers from pancreatitis  Check CRP/Lipase  Start iv meropenem  Also give one dose of iv vancomycin  NPO for possible EUS ?  Pancreatic duct stent misplaced ?  Consult GI MD        Immunocompromised  Pt has RA and on immunosuppressants       Chronic pancreatitis  Aware       Weight loss, unintentional  Significant weight loss, per pt      Rheumatoid arthritis involving multiple sites with positive rheumatoid factor  On Leflunomide and Prednisone       Tobacco use  Aware       VTE Risk Mitigation (From admission, onward)         Ordered     IP VTE HIGH RISK PATIENT  Once          01/02/23 2207     Place sequential compression device  Until discontinued         01/02/23 2207                   Willie Moyer MD  Department of Hospital Medicine   [facility] - Emergency Dept

## 2023-01-03 NOTE — HPI
62 year old patient getting admitted with abdominal pain  Patient suffers from chronic pancreatitis with acute  flare ups  Few days ago pt had ERCP with stent insertion in Pancreatic duct and had 1300 ml paracentesis  Pt did fine and went home next day  Few days later she started having abdominal pain:Pain is generalized /band like/associated with Nausea and few episodes of Vomiting  Denies radiation of pain . No aggravating and relieving  factors   Later symptoms got worse and she came to ER today and got admitted   Imaging in ER showed :Multifocal rim-enhancing fluid collections in the upper abdomen

## 2023-01-04 PROBLEM — K86.3 PANCREATIC PSEUDOCYST: Status: ACTIVE | Noted: 2023-01-04

## 2023-01-04 LAB
ALBUMIN SERPL BCP-MCNC: 1.4 G/DL (ref 3.5–5.2)
ALP SERPL-CCNC: 64 U/L (ref 55–135)
ALT SERPL W/O P-5'-P-CCNC: 10 U/L (ref 10–44)
AMYLASE, BODY FLUID: 344 U/L
ANION GAP SERPL CALC-SCNC: 4 MMOL/L (ref 8–16)
APPEARANCE FLD: CLEAR
AST SERPL-CCNC: 11 U/L (ref 10–40)
BILIRUB SERPL-MCNC: 0.6 MG/DL (ref 0.1–1)
BODY FLD TYPE: NORMAL
BODY FLUID SOURCE AMYLASE: NORMAL
BUN SERPL-MCNC: 15 MG/DL (ref 8–23)
CALCIUM SERPL-MCNC: 8.4 MG/DL (ref 8.7–10.5)
CHLORIDE SERPL-SCNC: 102 MMOL/L (ref 95–110)
CO2 SERPL-SCNC: 26 MMOL/L (ref 23–29)
COLOR FLD: YELLOW
CREAT SERPL-MCNC: 0.5 MG/DL (ref 0.5–1.4)
CRP SERPL-MCNC: 24.04 MG/DL
ERYTHROCYTE [DISTWIDTH] IN BLOOD BY AUTOMATED COUNT: 18.1 % (ref 11.5–14.5)
EST. GFR  (NO RACE VARIABLE): >60 ML/MIN/1.73 M^2
GLUCOSE SERPL-MCNC: 91 MG/DL (ref 70–110)
HCT VFR BLD AUTO: 26.8 % (ref 37–48.5)
HGB BLD-MCNC: 8.7 G/DL (ref 12–16)
LYMPHOCYTES NFR FLD MANUAL: 2 %
MCH RBC QN AUTO: 30.4 PG (ref 27–31)
MCHC RBC AUTO-ENTMCNC: 32.5 G/DL (ref 32–36)
MCV RBC AUTO: 94 FL (ref 82–98)
MESOTHL CELL NFR FLD MANUAL: 3 %
MONOS+MACROS NFR FLD MANUAL: 20 %
NEUTROPHILS NFR FLD MANUAL: 75 %
PLATELET # BLD AUTO: 416 K/UL (ref 150–450)
PMV BLD AUTO: 9.4 FL (ref 9.2–12.9)
POTASSIUM SERPL-SCNC: 3.3 MMOL/L (ref 3.5–5.1)
PROT SERPL-MCNC: 4.2 G/DL (ref 6–8.4)
RBC # BLD AUTO: 2.86 M/UL (ref 4–5.4)
SODIUM SERPL-SCNC: 132 MMOL/L (ref 136–145)
WBC # BLD AUTO: 12.43 K/UL (ref 3.9–12.7)
WBC # FLD: 1494 /CU MM

## 2023-01-04 PROCEDURE — 87075 CULTR BACTERIA EXCEPT BLOOD: CPT | Performed by: INTERNAL MEDICINE

## 2023-01-04 PROCEDURE — 99223 1ST HOSP IP/OBS HIGH 75: CPT | Mod: ,,, | Performed by: INTERNAL MEDICINE

## 2023-01-04 PROCEDURE — 25000003 PHARM REV CODE 250: Performed by: RADIOLOGY

## 2023-01-04 PROCEDURE — 82150 ASSAY OF AMYLASE: CPT | Performed by: INTERNAL MEDICINE

## 2023-01-04 PROCEDURE — 63600175 PHARM REV CODE 636 W HCPCS: Performed by: INTERNAL MEDICINE

## 2023-01-04 PROCEDURE — 25000003 PHARM REV CODE 250: Performed by: INTERNAL MEDICINE

## 2023-01-04 PROCEDURE — 85027 COMPLETE CBC AUTOMATED: CPT | Performed by: INTERNAL MEDICINE

## 2023-01-04 PROCEDURE — 87205 SMEAR GRAM STAIN: CPT | Performed by: INTERNAL MEDICINE

## 2023-01-04 PROCEDURE — 99223 PR INITIAL HOSPITAL CARE,LEVL III: ICD-10-PCS | Mod: ,,, | Performed by: INTERNAL MEDICINE

## 2023-01-04 PROCEDURE — 12000002 HC ACUTE/MED SURGE SEMI-PRIVATE ROOM

## 2023-01-04 PROCEDURE — 96366 THER/PROPH/DIAG IV INF ADDON: CPT

## 2023-01-04 PROCEDURE — 80053 COMPREHEN METABOLIC PANEL: CPT | Performed by: INTERNAL MEDICINE

## 2023-01-04 PROCEDURE — 36415 COLL VENOUS BLD VENIPUNCTURE: CPT | Performed by: INTERNAL MEDICINE

## 2023-01-04 PROCEDURE — 89051 BODY FLUID CELL COUNT: CPT | Performed by: INTERNAL MEDICINE

## 2023-01-04 PROCEDURE — 87070 CULTURE OTHR SPECIMN AEROBIC: CPT | Performed by: INTERNAL MEDICINE

## 2023-01-04 PROCEDURE — 86140 C-REACTIVE PROTEIN: CPT | Performed by: INTERNAL MEDICINE

## 2023-01-04 RX ORDER — DRONABINOL 2.5 MG/1
2.5 CAPSULE ORAL
Status: DISCONTINUED | OUTPATIENT
Start: 2023-01-04 | End: 2023-01-06 | Stop reason: HOSPADM

## 2023-01-04 RX ORDER — DRONABINOL 2.5 MG/1
2.5 CAPSULE ORAL
Status: DISCONTINUED | OUTPATIENT
Start: 2023-01-05 | End: 2023-01-05

## 2023-01-04 RX ORDER — SERTRALINE HYDROCHLORIDE 50 MG/1
50 TABLET, FILM COATED ORAL NIGHTLY
Status: DISCONTINUED | OUTPATIENT
Start: 2023-01-04 | End: 2023-01-06 | Stop reason: HOSPADM

## 2023-01-04 RX ORDER — LIDOCAINE HYDROCHLORIDE 10 MG/ML
INJECTION INFILTRATION; PERINEURAL
Status: COMPLETED | OUTPATIENT
Start: 2023-01-04 | End: 2023-01-04

## 2023-01-04 RX ADMIN — MEROPENEM AND SODIUM CHLORIDE 1 G: 1 INJECTION, SOLUTION INTRAVENOUS at 01:01

## 2023-01-04 RX ADMIN — TRAZODONE HYDROCHLORIDE 50 MG: 50 TABLET ORAL at 08:01

## 2023-01-04 RX ADMIN — SERTRALINE HYDROCHLORIDE 50 MG: 50 TABLET ORAL at 08:01

## 2023-01-04 RX ADMIN — LIDOCAINE HYDROCHLORIDE 9 ML: 10 INJECTION, SOLUTION INFILTRATION; PERINEURAL at 09:01

## 2023-01-04 RX ADMIN — Medication 400 UNITS: at 09:01

## 2023-01-04 RX ADMIN — CEFTRIAXONE 1 G: 1 INJECTION, SOLUTION INTRAVENOUS at 10:01

## 2023-01-04 RX ADMIN — VANCOMYCIN HYDROCHLORIDE 500 MG: 500 INJECTION, POWDER, LYOPHILIZED, FOR SOLUTION INTRAVENOUS at 12:01

## 2023-01-04 RX ADMIN — METOPROLOL SUCCINATE 25 MG: 25 TABLET, EXTENDED RELEASE ORAL at 09:01

## 2023-01-04 RX ADMIN — PANTOPRAZOLE SODIUM 40 MG: 40 TABLET, DELAYED RELEASE ORAL at 09:01

## 2023-01-04 RX ADMIN — PREDNISONE 5 MG: 5 TABLET ORAL at 09:01

## 2023-01-04 RX ADMIN — FOLIC ACID 1 MG: 1 TABLET ORAL at 09:01

## 2023-01-04 RX ADMIN — ENOXAPARIN SODIUM 40 MG: 40 INJECTION SUBCUTANEOUS at 05:01

## 2023-01-04 RX ADMIN — MEROPENEM AND SODIUM CHLORIDE 1 G: 1 INJECTION, SOLUTION INTRAVENOUS at 08:01

## 2023-01-04 RX ADMIN — DRONABINOL 2.5 MG: 2.5 CAPSULE ORAL at 06:01

## 2023-01-04 RX ADMIN — HYDROCODONE BITARTRATE AND ACETAMINOPHEN 1 TABLET: 5; 325 TABLET ORAL at 05:01

## 2023-01-04 RX ADMIN — LEUCINE, PHENYLALANINE, LYSINE, METHIONINE, ISOLEUCINE, VALINE, HISTIDINE, THREONINE, TRYPTOPHAN, ALANINE, GLYCINE, ARGININE, PROLINE, SERINE, TYROSINE, SODIUM ACETATE, DIBASIC POTASSIUM PHOSPHATE, MAGNESIUM CHLORIDE, SODIUM CHLORIDE, CALCIUM CHLORIDE, DEXTROSE
311; 238; 247; 170; 255; 247; 204; 179; 77; 880; 438; 489; 289; 213; 17; 297; 261; 51; 77; 33; 5 INJECTION INTRAVENOUS at 10:01

## 2023-01-04 NOTE — NURSING
01/04/2023      Assumed care of patient.   Assessment and vital signs assessed.   Labs and meds reviewed.  Last documentation =  Temp: 98 °F (36.7 °C) (01/04/23 0433)  Pulse: 90 (01/04/23 0935)  Resp: 18 (01/04/23 0935)  BP: 110/61 (01/04/23 0935)  SpO2: 98 % (01/04/23 0935)   AOX4, assisted patient to bathroom.  Patient going for paracentesis this morning.     0930  Patient off floor for procedure

## 2023-01-04 NOTE — PROGRESS NOTES
"Patient Name: Claire Bowser  Patient MRN: [id number]  Patient : 1960    Admit Date: 2023  Service date: 2023    Reason for Consult: acute / chronic panc; ascites; pseudocyst    PCP: Samuel Brady MD    S: Min abd discomfort. Angel PO. No f/c. No bleeding.     Inpatient Medications:   cholecalciferol (vitamin D3)  400 Units Oral Daily    enoxparin  40 mg Subcutaneous Daily    folic acid  1 mg Oral Daily    leflunomide  20 mg Oral Daily    meropenem (MERREM) IVPB  1 g Intravenous Q8H    metoprolol succinate  25 mg Oral Daily    pantoprazole  40 mg Oral Daily    predniSONE  5 mg Oral Daily    traZODone  50 mg Oral QHS    vancomycin (VANCOCIN) IVPB  500 mg Intravenous Q24H     acetaminophen, HYDROcodone-acetaminophen, HYDROmorphone, iohexoL, magnesium oxide, magnesium oxide, ondansetron, potassium bicarbonate, potassium bicarbonate, potassium bicarbonate, potassium, sodium phosphates, potassium, sodium phosphates, potassium, sodium phosphates, Pharmacy to dose Vancomycin consult **AND** vancomycin - pharmacy to dose    Review of Systems:  A 10 point review of systems was performed and was normal, except as mentioned in the HPI, including constitutional, HEENT, heme, lymph, cardiovascular, respiratory, gastrointestinal, genitourinary, neurologic, endocrine, psychiatric and musculoskeletal.      OBJECTIVE:    Physical Exam:  24 Hour Vital Sign Ranges: Temp:  [97.6 °F (36.4 °C)-98.7 °F (37.1 °C)] 98 °F (36.7 °C)  Pulse:  [] 88  Resp:  [14-21] 16  SpO2:  [94 %-100 %] 96 %  BP: ()/(44-75) 100/49  Most recent vitals: BP (!) 100/49   Pulse 88   Temp 98 °F (36.7 °C) (Oral)   Resp 16   Ht 5' 4" (1.626 m)   Wt 37 kg (81 lb 9.1 oz)   SpO2 96%   Breastfeeding No   BMI 14.00 kg/m²    GEN: well-developed, well-nourished, awake and alert, non-toxic appearing adult  HEENT: PERRL, sclera anicteric, oral mucosa pink and moist without lesion  NECK: trachea midline; Good ROM  CV: regular rate and rhythm, " no murmurs or gallops  RESP: clear to auscultation bilaterally, no wheezes, rhonci or rales  ABD: soft, non-tender, mild-distended, normal bowel sounds  EXT: no swelling or edema, 2+ pulses distally  SKIN: no rashes or jaundice  PSYCH: normal affect    Labs:   Recent Labs     01/02/23  1624 01/04/23  0725   WBC 16.54* 12.43   MCV 94 94   * 416     Recent Labs     01/02/23  1624 01/03/23  0620 01/04/23  0617   * 131* 132*   K 3.4* 3.4* 3.3*   CL 99 98 102   CO2 25 28 26   BUN 11 11 15   GLU 71 78 91     No results for input(s): ALB in the last 72 hours.    Invalid input(s): ALKP, SGOT, SGPT, TBIL, DBIL, TPRO  No results for input(s): PT, INR, PTT in the last 72 hours.      Radiology Review:  CT Abdomen Pelvis With Contrast   Final Result            IMPRESSION / RECOMMENDATIONS:  62 y.o. female with PMHx BTL, COVID 5/'22, ongoing tobacco use, cholecystectomy, DVT (Eliquis) that presents for evaluation of pancreatitis and abd swelling. Patient w/ concens for PD / cyst leakage s/p recent ERCP and now w/ slight progression of cysts w/ air in cyst. Underwent EUS w/ FNA w/ drainage of about 150cc fluid and improvement in WBC w/ abx.       -Paracentesis w/ additional fluid removal  -Conservative for now   -If re-accumulation of fluid or any concerns, may need axios stent at tertiary facility.   -If any concerns, could repeat ERCP to see if can get stent to distal body / tail as possible communication w/ PD distally but unclear if PD large enough to get it to tail  -If continues to have issues, may need distal pancreatectomy; Case discussed w/ surg onc and recommend endoscopic interventions for now to to see if surgery can be avoided kyle w/ degree of inflammation  -Continue pancreatic enzymes  -Encouraged Tobacco cessation      Donavon Jewell III  1/4/2023  8:09 AM

## 2023-01-04 NOTE — PROGRESS NOTES
[facility]  Progress Note    Patient Name: Claire Bowser  MRN: [id number]  Patient Class: IP- Inpatient  Admission Date: 1/2/2023  Attending Physician: Binu Arciniega MD   Primary Care Provider: Samuel Brady MD  DOS: 01/04/2023    Subjective:     Principal Problem:Abdominal pain    Chief Complaint:   Chief Complaint   Patient presents with    Abdominal Pain     Abd distention and pain         HPI: 62 year old patient getting admitted with abdominal pain  Patient suffers from chronic pancreatitis with acute  flare ups  Few days ago pt had ERCP with stent insertion in Pancreatic duct and had 1300 ml paracentesis  Pt did fine and went home next day  Few days later she started having abdominal pain:Pain is generalized /band like/associated with Nausea and few episodes of Vomiting  Denies radiation of pain . No aggravating and relieving  factors   Later symptoms got worse and she came to ER today and got admitted   Imaging in ER showed :Multifocal rim-enhancing fluid collections in the upper abdomen    Interval History:  1/4: Patient seen and examined.  Patient reports mild intensity abdominal pain, improved with treatment so far.  Minimal nausea but no vomiting.  Patient reports persistent poor oral intake and lack of appetite.  No fever or chills.  No chest pain or shortness of breath.  Patient underwent paracentesis today.    Tolerating Clinimix.  She reports worsening depression.      ROS: 3 point ROS reviewed and negative except as per HPI above      Vitals:    01/04/23 0935 01/04/23 0945 01/04/23 1621 01/04/23 1706   BP: 110/61 115/72 122/68    Pulse: 90 91 94    Resp: 18 18 18 (P) 18   Temp:   98.2 °F (36.8 °C)    TempSrc:   Oral    SpO2: 98% 100% 96%    Weight:       Height:         Physical Exam  Constitutional:       General: She is not in acute distress.  Nontoxic nondiaphoretic  HENT:      Mouth: Mucous membranes are moist.  No thrush  EYES:  Anicteric sclera, no  conjunctival discharge  Cardiovascular:      Rate and Rhythm: Normal rate.  2+ radial pulses  GI:  Abdomen soft with minimal distention, minimal tenderness without guarding or rebound  Pulmonary:      Effort: Pulmonary effort is normal.  Lungs are clear bilaterally  Skin:     General: Skin is warm.  Dry and warm with no jaundice  Neurological:      Mental Status: She is alert and oriented to person, place, and time.  Fluent speech, follows commands appropriately  Psychiatric:         Behavior: Behavior normal.  Mood is down, insight fair      LABS:  Potassium 3.3   Sodium 132   Creatinine 0.5   Hemoglobin 8.7    Assessment/Plan:     Abdominal pain  Pancreatic pseudocyst   Possible intra-abdominal abscess?  Multifocal rim-enhancing fluid collections in the upper abdomen per imaging  Pt suffers from pancreatitis  Check CRP/Lipase  Start iv meropenem  Also give one dose of iv vancomycin  NPO for possible EUS ?  Pancreatic duct stent misplaced ?  Consult GI MD    Appreciate GI, endoscopy today with drainage of pancreatic pseudocyst  Status post paracentesis  Continue empiric broad-spectrum IV antibiotics  Follow up all culture data  Infectious disease consultation pending  GI prophylaxis with PPI   VTE prophylaxis continue low-molecular weight heparin      Chronic pancreatitis  Serial lipase monitoring   Gentle IV fluids        Weight loss, unintentional  Severe protein calorie malnutrition  Significant weight loss, per pt  Continue Clinimix, appreciate nutrition  Advance to full liquid diet  Increase Zoloft dosing   Start low-dose Marinol    Rheumatoid arthritis involving multiple sites with positive rheumatoid factor  Immunocompromised  On Leflunomide and Prednisone   Continue for now, consider holding if infectious etiology identified    Tobacco use  Aware     Hypokalemia        Replace potassium, serial labs    High-risk patient secondary to severe exacerbation chronic illness; parental controlled substances for  symptom control; invasive endoscopy procedure today with risk factors; administration of medication requiring close monitoring of levels to prevent toxicity-vancomycin    VTE Risk Mitigation (From admission, onward)           Ordered     enoxaparin injection 40 mg  Daily         01/03/23 1756     IP VTE HIGH RISK PATIENT  Once         01/02/23 2207     Place sequential compression device  Until discontinued         01/02/23 2207                       Binu Arciniega MD  Department of Hospital Medicine   [facility]

## 2023-01-04 NOTE — CONSULTS
Consult Note  Infectious Disease    Reason for Consult:  ?intra-abdominal abscesses    HPI: Claire Bowser is a 62 y.o. female with a history of chronic pancreatitis since biliary pancreatitis and cholecystectomy 7/2022, underwent pancreatic stent insertion on 12/30/22 and had 1300 mL paracentesis . The culture of this was fluid was negative and there were 336 WBC mostly monos.   A few days later she had worsening abdominal pain with nausea and retching. She had no chills or fever. She came to the ED on 1/2 and CT showed numerous rim enhancing fluid collections worrisome for abscess but most consisent with pancreatic pseudocyts. She underwent EUS per Dr. Jewell who transgastricly aspirated the cyst relieving 150 cc of opaque brown fluid and this procedure brought her some relief. This fluid was not submitted for culture. She has had no fever above 99.3, and WBC are improved from 16.5 to 12.4. she has been on vanc and meropenem since admission. She has RA and is on Arava and low dose prednisone. Blood cultures are negative .. today's diagnostic paracentesis demonstrates 1494 WBC, mostly segs.  CRP is greater than 21. She is tolerating full liquids.      GI history per Dr. Jewell's note  Lipase 271 (improved since last admission)  CT ABd 1/'23 - emphysema; multiple pancreatic pseudocysts now w/ air (likley from ERCP); PD stent in ventral duct;  moderate ascites; vascular dz  ERCP 12/'22 - No obvious leak; HOP cyst filled w/ injection; dual sphincterotomy; PD stent placed (not placed out to tail due to narrow PD)  CA 19-9 12/'22 - 71  EUS 11/'22 - chronic panc; panc cysts in HOP / BOP; Mass like region in Neck s/p FNA; 6mm CBD s/p martin  -Bx - pancreatic fibrosis  Labs 11/'22 - CA 19-9 - 113  Labs 10/'22 - CA 19-9 44.6  MRI 10/'22 - nml biliary s/p martin; 7mm HOP cyst; no mass; fluid by tail; tics  EGD / EUS 7/'22 - ill defined 2.5 cm region in neck s/p FNA  -HP neg gastritis; Neg panc bx w/ low suspicion for  cancer. surgery for lap martin and MRI pancreas 2 months  MRCP 6/'22 - 1.5 cm mass vs cyst in neck of panc; nml CBD / PD  EGD '21 - mild gastrtis  Colon '21 - small polyps. .     Review of patient's allergies indicates:   Allergen Reactions    No known drug allergies      Past Medical History:   Diagnosis Date    Acute biliary pancreatitis 6/14/2022    Anxiety     Chronic pancreatitis 1/2/2023    Digestive disorder     Flu 02/2017    [facility]    Hypertension     Long-term use of immunosuppressant medication 6/10/2022    Rheumatoid arthritis involving multiple sites with positive rheumatoid factor 01/14/2021     Past Surgical History:   Procedure Laterality Date    COLONOSCOPY N/A 2/26/2021    Procedure: COLONOSCOPY;  Surgeon: Amy Sidhu MD;  Location: [facility];  Service: Endoscopy;  Laterality: N/A;    ENDOSCOPIC ULTRASOUND OF UPPER GASTROINTESTINAL TRACT N/A 7/1/2022    Procedure: ULTRASOUND, UPPER GI TRACT, ENDOSCOPIC;  Surgeon: Donavon Jewell III, MD;  Location: [facility];  Service: Endoscopy;  Laterality: N/A;    ENDOSCOPIC ULTRASOUND OF UPPER GASTROINTESTINAL TRACT N/A 11/29/2022    Procedure: ULTRASOUND, UPPER GI TRACT, ENDOSCOPIC;  Surgeon: Donavon Jewell III, MD;  Location: [facility] ENDO;  Service: Endoscopy;  Laterality: N/A;    ENDOSCOPIC ULTRASOUND OF UPPER GASTROINTESTINAL TRACT N/A 1/3/2023    Procedure: ULTRASOUND, UPPER GI TRACT, ENDOSCOPIC;  Surgeon: Donavon Jewell III, MD;  Location: [facility] ENDO;  Service: Endoscopy;  Laterality: N/A;    ERCP N/A 12/30/2022    Procedure: ERCP (ENDOSCOPIC RETROGRADE CHOLANGIOPANCREATOGRAPHY);  Surgeon: Donavon Jewell III, MD;  Location: [facility];  Service: Endoscopy;  Laterality: N/A;    ESOPHAGOGASTRODUODENOSCOPY N/A 2/26/2021    Procedure: EGD (ESOPHAGOGASTRODUODENOSCOPY);  Surgeon: Amy Sidhu MD;  Location: [facility];  Service: Endoscopy;  Laterality: N/A;    LAPAROSCOPIC CHOLECYSTECTOMY N/A 7/27/2022    Procedure:  CHOLECYSTECTOMY, LAPAROSCOPIC;  Surgeon: Ramón Hwang III, MD;  Location: [facility];  Service: General;  Laterality: N/A;    TUBAL LIGATION       Social History     Socioeconomic History    Marital status:    Tobacco Use    Smoking status: Every Day     Packs/day: 1.00     Years: 7.00     Pack years: 7.00     Types: Cigarettes    Smokeless tobacco: Never    Tobacco comments:     [phone]   Substance and Sexual Activity    Alcohol use: No    Drug use: Never    Sexual activity: Yes     Partners: Male     Social Determinants of Health     Financial Resource Strain: Medium Risk    Difficulty of Paying Living Expenses: Somewhat hard   Food Insecurity: No Food Insecurity    Worried About Running Out of Food in the Last Year: Never true    Ran Out of Food in the Last Year: Never true   Transportation Needs: No Transportation Needs    Lack of Transportation (Medical): No    Lack of Transportation (Non-Medical): No   Physical Activity: Insufficiently Active    Days of Exercise per Week: 5 days    Minutes of Exercise per Session: 20 min   Stress: Stress Concern Present    Feeling of Stress : To some extent   Social Connections: Moderately Integrated    Frequency of Communication with Friends and Family: More than three times a week    Frequency of Social Gatherings with Friends and Family: More than three times a week    Attends Rastafari Services: Never    Active Member of Clubs or Organizations: Yes    Attends Club or Organization Meetings: More than 4 times per year    Marital Status:    Housing Stability: Low Risk     Unable to Pay for Housing in the Last Year: No    Number of Places Lived in the Last Year: 1    Unstable Housing in the Last Year: No     Family History   Problem Relation Age of Onset    Diabetes Mother     Heart disease Mother     Kidney disease Mother     Hypertension Mother     Stroke Mother     Hearing loss Mother     COPD Father     Liver disease Paternal Grandfather     Alcohol  abuse Paternal Grandfather     Liver disease Sister     Emphysema Brother     COPD Brother     Sleep apnea Brother     No Known Problems Son     Alcohol abuse Paternal Grandmother     Cirrhosis Paternal Grandmother     Heart disease Maternal Aunt     Diabetes Maternal Aunt     Cancer Maternal Aunt         female    Heart disease Maternal Uncle     Hypertension Maternal Uncle     No Known Problems Paternal Uncle     Psoriasis Neg Hx     Lupus Neg Hx     Eczema Neg Hx     Melanoma Neg Hx          Review of Systems:   No chills, fever, sweats,  and has lost 30# since july  No change in vision,   No sinus congestion, purulent nasal discharge,  or facial pain  No pain in mouth or throat.    No chest pain, palpitations, syncope  No cough, sputum production, shortness of breath, dyspnea on exertion, pleurisy,   she continues to smoke  No further nausea, no vomiting, diarrhea,  and improved abd pain,  No dysphagia, odynophagia  No dysuria, hematuria,    No swelling of joints, redness of joints, injuries, or new focal pain  No unusual headaches,    No anxiety, depression, substance abuse, sleep disturbance  No diabetes, thyroid, hypogonadal conditions  No bleeding, lymphadenopathy,   malignancy, unusual bruising  No new rashes,       No recent/prior steroids     Antibiotic History: see HPI    EXAM & DIAGNOSTICS REVIEWED:   Vitals:     Temp:  [97.9 °F (36.6 °C)-98.2 °F (36.8 °C)]   Temp: 98.2 °F (36.8 °C) (01/04/23 1621)  Pulse: 94 (01/04/23 1621)  Resp: 18 (01/04/23 1706)  BP: 122/68 (01/04/23 1621)  SpO2: 96 % (01/04/23 1621)    Intake/Output Summary (Last 24 hours) at 1/4/2023 1744  Last data filed at 1/4/2023 0931  Gross per 24 hour   Intake 300 ml   Output --   Net 300 ml       General:  In NAD. Alert and attentive, cooperative, comfortable  Eyes:  Anicteric, PERRL, EOMI  ENT:  No ulcers, exudates, thrush, nares patent,    Neck:  supple, no masses or adenopathy appreciated  Lungs: Clear, no consolidation, rales, wheezes,  rub  Heart:  RRR, no gallop/murmur/rub noted  Abd:  Soft, Nontender, ND, normal BS, no masses or organomegaly appreciated.  :  Voids/   Musc:  Joints without effusion, swelling, erythema, synovitis, with generalized poor muscle bulk.   Skin:  No rashes.    Neuro:             Alert, attentive, speech fluent, face symmetric, moves all extremities, no focal weakness. Ambulatory  Psych: Calm, cooperative  Lymphatic:     No cervical, supraclavicular,  nodes  Extrem: No edema, erythema, phlebitis, cellulitis, warm and well perfused  VAD:  peripheral     Isolation:  none  Wound: She has a decubitus on her kyphotic spine        Lines/Tubes/Drains:    General Labs reviewed:  Recent Labs   Lab 12/31/22  0614 01/02/23  1624 01/04/23  0725   WBC 3.93 16.54* 12.43   HGB 9.4* 11.2* 8.7*   HCT 28.8* 34.6* 26.8*    539* 416       Recent Labs   Lab 01/02/23  1624 01/03/23  0620 01/04/23  0617   * 131* 132*   K 3.4* 3.4* 3.3*   CL 99 98 102   CO2 25 28 26   BUN 11 11 15   CREATININE 0.5 0.6 0.5   CALCIUM 9.1 8.2* 8.4*   PROT 5.8* 4.3* 4.2*   BILITOT 1.2* 1.0 0.6   ALKPHOS 107 74 64   ALT 14 11 10   AST 19 11 11     Recent Labs   Lab 01/03/23  0620 01/04/23  0617   CRP 21.96* 24.04*      Latest Reference Range & Units 12/30/22 09:29 12/30/22 09:52 01/04/23 10:04   Fluid Color   Yellow Yellow   Fluid Appearance   Clear Clear   WBC, Body Fluid /cu mm  336 1494   Body Fluid Type   Peritoneal Ascites   Segs, Fluid %  2 75   Lymphs, Fluid %  13 2   Monocytes/Macrophages, Fluid %  85 20   Mesothelial Cells, Fluid %   3   Amylase, Fluid U/L 2096  344   Body Fluid, Albumin Not Established g/dL 1.1     Body Fluid Source Amylase  Peritoneal  Ascites   Body Fluid Source, Albumin  Peritoneal     Body Fluid Source, Total Protein  Peritoneal     Body Fluid, Protein Not established g/dL <3.0           Micro:  Microbiology Results (last 7 days)       Procedure Component Value Units Date/Time    Culture, Body Fluid (Aerobic) w/ GS  [[id number]] Collected: 01/04/23 1004    Order Status: Completed Specimen: Body Fluid from Ascites Updated: 01/04/23 1625     Gram Stain Result Few WBC's      No organisms seen    Culture, Anaerobic [[id number]] Collected: 01/04/23 1004    Order Status: Sent Specimen: Body Fluid from Ascites Updated: 01/04/23 1026    Blood culture #1 **CANNOT BE ORDERED STAT** [[id number]] Collected: 01/02/23 1624    Order Status: Completed Specimen: Blood from Peripheral, Forearm, Left Updated: 01/03/23 1832     Blood Culture, Routine No Growth to date      No Growth to date    Blood culture #2 **CANNOT BE ORDERED STAT** [[id number]] Collected: 01/02/23 1624    Order Status: Completed Specimen: Blood from Peripheral, Antecubital, Right Updated: 01/03/23 1832     Blood Culture, Routine No Growth to date      No Growth to date            Imaging Reviewed:   CXR   CT abd/pelvis 12/29/22   CT abd/pelvis 1/2/23  1. Emphysema.  2. Distended opacified bronchiole in right middle lobe, unchanged.  3. Interval placement of stent in the downstream main pancreatic duct as described. Majority of the stent lies in second portion of duodenum. Correlation with desired placement is requested.  4. Multifocal rim-enhancing fluid collections in the upper abdomen, larger of which lie in peripancreatic location, with enlargement since 12/29/2022. Pancreatic pseudocyst are most likely etiology. Several collections tracking anteriorly and superior to the celiac axis trifurcation contain associated gas.  5. Unchanged large volume ascites.  6. Unchanged diffuse body wall edema.    Cardiology:    IMPRESSION & PLAN   1. Multiple pancreatic pseudocysts   While the peritoneal fluid cell count is higher, this could be associated with the procedure and not infection   I would imagine substantial signs of infection(fever, chills, prostration, worse pain, no appetite, nausea, vomiting) in an immunocompromised person should those fluid collections represent  abscesses    2. Chronic pancreatitis   Elevated CA 19-9. No cytological examination of peritoneal fluid or pseudocyst fluid    3. RA on leflunomide and low dose prednisone  4. Severely underweight, smoker      Recommendations:  Sinplifying to rocephin alone  Will f/u on peritoneal fluid culture  Will d/w Dr. Jewell    Medical Decision Making during this encounter was  [_] Low Complexity  [_] Moderate Complexity  [ xx ] High Complexity

## 2023-01-04 NOTE — PLAN OF CARE
20 ml of yellow fluid extracted from pts right abdomen with 20 gauge needle per sterile technique by dr back without difficulty, vs stable no co pain no acute distress noted

## 2023-01-05 LAB
ALBUMIN SERPL BCP-MCNC: 1.5 G/DL (ref 3.5–5.2)
ALP SERPL-CCNC: 75 U/L (ref 55–135)
ALT SERPL W/O P-5'-P-CCNC: 9 U/L (ref 10–44)
ANION GAP SERPL CALC-SCNC: 4 MMOL/L (ref 8–16)
AST SERPL-CCNC: 12 U/L (ref 10–40)
BILIRUB SERPL-MCNC: 0.5 MG/DL (ref 0.1–1)
BUN SERPL-MCNC: 13 MG/DL (ref 8–23)
CALCIUM SERPL-MCNC: 8.3 MG/DL (ref 8.7–10.5)
CHLORIDE SERPL-SCNC: 102 MMOL/L (ref 95–110)
CO2 SERPL-SCNC: 26 MMOL/L (ref 23–29)
CREAT SERPL-MCNC: 0.4 MG/DL (ref 0.5–1.4)
CRP SERPL-MCNC: 13.05 MG/DL
ERYTHROCYTE [DISTWIDTH] IN BLOOD BY AUTOMATED COUNT: 17.5 % (ref 11.5–14.5)
EST. GFR  (NO RACE VARIABLE): >60 ML/MIN/1.73 M^2
GLUCOSE SERPL-MCNC: 90 MG/DL (ref 70–110)
HCT VFR BLD AUTO: 24.5 % (ref 37–48.5)
HGB BLD-MCNC: 8 G/DL (ref 12–16)
MCH RBC QN AUTO: 30.4 PG (ref 27–31)
MCHC RBC AUTO-ENTMCNC: 32.7 G/DL (ref 32–36)
MCV RBC AUTO: 93 FL (ref 82–98)
PLATELET # BLD AUTO: 399 K/UL (ref 150–450)
PMV BLD AUTO: 9.6 FL (ref 9.2–12.9)
POTASSIUM SERPL-SCNC: 3 MMOL/L (ref 3.5–5.1)
PROCALCITONIN SERPL IA-MCNC: 2.31 NG/ML (ref 0–0.5)
PROT SERPL-MCNC: 4.4 G/DL (ref 6–8.4)
RBC # BLD AUTO: 2.63 M/UL (ref 4–5.4)
SODIUM SERPL-SCNC: 132 MMOL/L (ref 136–145)
WBC # BLD AUTO: 9.3 K/UL (ref 3.9–12.7)

## 2023-01-05 PROCEDURE — 25000242 PHARM REV CODE 250 ALT 637 W/ HCPCS: Performed by: INTERNAL MEDICINE

## 2023-01-05 PROCEDURE — 63600175 PHARM REV CODE 636 W HCPCS: Performed by: INTERNAL MEDICINE

## 2023-01-05 PROCEDURE — 84145 PROCALCITONIN (PCT): CPT | Performed by: INTERNAL MEDICINE

## 2023-01-05 PROCEDURE — 86140 C-REACTIVE PROTEIN: CPT | Performed by: INTERNAL MEDICINE

## 2023-01-05 PROCEDURE — 25000003 PHARM REV CODE 250: Performed by: INTERNAL MEDICINE

## 2023-01-05 PROCEDURE — 36415 COLL VENOUS BLD VENIPUNCTURE: CPT | Performed by: INTERNAL MEDICINE

## 2023-01-05 PROCEDURE — 99232 PR SUBSEQUENT HOSPITAL CARE,LEVL II: ICD-10-PCS | Mod: ,,, | Performed by: INTERNAL MEDICINE

## 2023-01-05 PROCEDURE — 80053 COMPREHEN METABOLIC PANEL: CPT | Performed by: INTERNAL MEDICINE

## 2023-01-05 PROCEDURE — 12000002 HC ACUTE/MED SURGE SEMI-PRIVATE ROOM

## 2023-01-05 PROCEDURE — 85027 COMPLETE CBC AUTOMATED: CPT | Performed by: INTERNAL MEDICINE

## 2023-01-05 PROCEDURE — 99232 SBSQ HOSP IP/OBS MODERATE 35: CPT | Mod: ,,, | Performed by: INTERNAL MEDICINE

## 2023-01-05 RX ADMIN — Medication 400 UNITS: at 09:01

## 2023-01-05 RX ADMIN — POTASSIUM BICARBONATE 60 MEQ: 391 TABLET, EFFERVESCENT ORAL at 08:01

## 2023-01-05 RX ADMIN — TRAZODONE HYDROCHLORIDE 50 MG: 50 TABLET ORAL at 08:01

## 2023-01-05 RX ADMIN — POTASSIUM BICARBONATE 25 MEQ: 977.5 TABLET, EFFERVESCENT ORAL at 03:01

## 2023-01-05 RX ADMIN — CEFTRIAXONE 1 G: 1 INJECTION, SOLUTION INTRAVENOUS at 08:01

## 2023-01-05 RX ADMIN — FOLIC ACID 1 MG: 1 TABLET ORAL at 09:01

## 2023-01-05 RX ADMIN — ENOXAPARIN SODIUM 40 MG: 40 INJECTION SUBCUTANEOUS at 05:01

## 2023-01-05 RX ADMIN — DRONABINOL 2.5 MG: 2.5 CAPSULE ORAL at 05:01

## 2023-01-05 RX ADMIN — PANTOPRAZOLE SODIUM 40 MG: 40 TABLET, DELAYED RELEASE ORAL at 09:01

## 2023-01-05 RX ADMIN — METOPROLOL SUCCINATE 25 MG: 25 TABLET, EXTENDED RELEASE ORAL at 09:01

## 2023-01-05 RX ADMIN — LEUCINE, PHENYLALANINE, LYSINE, METHIONINE, ISOLEUCINE, VALINE, HISTIDINE, THREONINE, TRYPTOPHAN, ALANINE, GLYCINE, ARGININE, PROLINE, SERINE, TYROSINE, SODIUM ACETATE, DIBASIC POTASSIUM PHOSPHATE, MAGNESIUM CHLORIDE, SODIUM CHLORIDE, CALCIUM CHLORIDE, DEXTROSE
311; 238; 247; 170; 255; 247; 204; 179; 77; 880; 438; 489; 289; 213; 17; 297; 261; 51; 77; 33; 5 INJECTION INTRAVENOUS at 09:01

## 2023-01-05 RX ADMIN — PREDNISONE 5 MG: 5 TABLET ORAL at 09:01

## 2023-01-05 RX ADMIN — LEFLUNOMIDE 20 MG: 20 TABLET ORAL at 09:01

## 2023-01-05 RX ADMIN — SERTRALINE HYDROCHLORIDE 50 MG: 50 TABLET ORAL at 08:01

## 2023-01-05 NOTE — PROGRESS NOTES
"[facility]  Adult Nutrition   Progress Note (Follow-Up)    SUMMARY     Recommendations  Recommendation/Intervention:   1. Advance diet as tolerated to [place].   2. Ordered Ensure Clear with meals.  3. Continue appetite stimulant.   4. Continue Clinimix E @ 50ml/hr for supplemental nutrition support.   5. Continue to replete Na and K+ with ss.  6. Add Severe Malnutrition to problem list.----  Severe Malnutrition in the context of chronic illness related to inadequate protein-energy intake > 1 month and weight loss > 5% in 1 month and > 10% in 6 months.; muscle wasting to clavicle (deltoid muscle); and poor intake > 1 month.    Goals: 1. Patient diet to advance in 24-48 hrs. 2. Po intake or NS to meet >/=50% EEN and EPN.  Nutrition Goal Status: progressing.    Dietitian Rounds Brief  Patient intake of Full Liquid diet a little better, having nausea without vomiting per nursing. MD order for appetite stimulant noted. Clinimix infusing @ 50 ml/hr. Labs reviewed. MD to continue ss to replete lytes prn. Mag and phos stable. Pt s/p paracentesis.Will add Ensure clear with meals. Last BM 1/3/23. RD to monitor intake, diet progression, and labs, making adjustments to PN, Po diet as needed.    Diet order:   Current Diet Order: Full Liquid diet     Evaluation of Received Nutrient/Fluid Intake  Energy Calories Required: not meeting needs  Protein Required: not meeting needs  Fluid Required: not meeting needs  Tolerance: tolerating     % Intake of Estimated Energy Needs: 25 - 50 %  %Meal Intake: 25 - 50 %    Intake/Output Summary (Last 24 hours) at 1/5/2023 0850  Last data filed at 1/4/2023 0931  Gross per 24 hour   Intake 300 ml   Output --   Net 300 ml      Anthropometrics  Temp: 98.6 °F (37 °C)  Height Method: Stated  Height: 5' 4" (162.6 cm)  Height (inches): 64 in  Weight Method: Bed Scale  Weight: 37 kg (81 lb 9.1 oz)  Weight (lb): 81.57 lb  Ideal Body Weight (IBW), Female: 120 lb  % Ideal Body Weight, Female " (lb): 67.98 %  BMI (Calculated): 14     Estimated/Assessed Needs  Weight Used For Calorie Calculations: 37 kg (81 lb 9.1 oz)  Energy Calorie Requirements (kcal): 1542-6331 (35-40) ; 1650 (44.5) (MSJ X 1.25 + 500)  Energy Need Method: Kcal/kg, [facility]  Protein Requirements: 55-74 (1.5-2.0)  Weight Used For Protein Calculations: 37 kg (81 lb 9.1 oz)  Fluid Requirements (mL): 1480 (40ml/kg)  Estimated Fluid Requirement Method: RDA Method  RDA Method (mL): 1295     Reason for Assessment  Reason For Assessment: consult  Diagnosis: gastrointestinal disease  Relevant Medical History: chronic pancreatitis with acute  flare ups;s/p ERCP with stent insertion in Pancreatic duct and had 1300 ml paracentesis 12/30/22.  Interdisciplinary Rounds: did not attend    Nutrition/Diet History  Food Allergies: NKFA  Factors Affecting Nutritional Intake: NPO    Nutrition Risk Screen  Nutrition Risk Screen: unintentional loss of 10 lbs or more in the past 2 months, reduced oral intake over the last month       Altered Skin Integrity 01/03/23 0118 Thoracic spine Ulceration-Wound Image: Images linked  MST Score: 4  Have you recently lost weight without trying?: Yes: 24-33 lbs  Weight loss score: 3  Have you been eating poorly because of a decreased appetite?: Yes  Appetite score: 1     Weight History:  Wt Readings from Last 5 Encounters:   01/03/23 37 kg (81 lb 9.1 oz)   12/29/22 42.6 kg (94 lb)   11/29/22 37.2 kg (82 lb)   10/25/22 40.3 kg (88 lb 13.5 oz)   10/19/22 44.2 kg (97 lb 7.1 oz)      Lab/Procedures/Meds: Pertinent Labs/Meds Reviewed    Medications:Pertinent Medications Reviewed  Scheduled Meds:   cefTRIAXone (ROCEPHIN) IVPB  1 g Intravenous Q24H    cholecalciferol (vitamin D3)  400 Units Oral Daily    dronabinoL  2.5 mg Oral with lunch    dronabinoL  2.5 mg Oral Daily with dinner    enoxparin  40 mg Subcutaneous Daily    folic acid  1 mg Oral Daily    leflunomide  20 mg Oral Daily    metoprolol succinate  25 mg Oral Daily     pantoprazole  40 mg Oral Daily    predniSONE  5 mg Oral Daily    sertraline  50 mg Oral QHS    traZODone  50 mg Oral QHS     Continuous Infusions:   amino acids 4.25%-[id number]-[id number] 50 mL/hr at 01/04/23 6279     PRN Meds:.acetaminophen, HYDROcodone-acetaminophen, HYDROmorphone, iohexoL, magnesium oxide, magnesium oxide, ondansetron, potassium bicarbonate, potassium bicarbonate, potassium bicarbonate, potassium, sodium phosphates, potassium, sodium phosphates, potassium, sodium phosphates    Labs: Pertinent Labs Reviewed  Clinical Chemistry:  Recent Labs   Lab 12/29/22  1111 12/31/22  0614 01/02/23  1624 01/03/23  0620 01/05/23  0449    135* 133* 131* 132*   K 3.2* 4.1 3.4* 3.4* 3.0*   CL 98 102 99 98 102   CO2 30* 29 25 28 26   * 116* 71 78 90   BUN 10 9 11 11 13   CREATININE 0.7 0.5 0.5 0.6 0.4*   CALCIUM 9.8 8.8 9.1 8.2* 8.3*   PROT 6.1 4.6* 5.8* 4.3* 4.4*   ALBUMIN 2.3* 1.8* 2.1* 1.5* 1.5*   BILITOT 0.7 0.7 1.2* 1.0 0.5   ALKPHOS 105 104 107 74 75   AST 25 22 19 11 12   ALT 17 15 14 11 9*   ANIONGAP 8 4* 9 5* 4*   MG 1.7 1.6  --   --   --    LIPASE 955*  --  271* 185*  --     < > = values in this interval not displayed.     CBC:   Recent Labs   Lab 01/05/23  0450   WBC 9.30   RBC 2.63*   HGB 8.0*   HCT 24.5*      MCV 93   MCH 30.4   MCHC 32.7     Inflammatory Labs:  Recent Labs   Lab 01/03/23  0620 01/04/23  0617 01/05/23  0449   CRP 21.96* 24.04* 13.05*     Monitor and Evaluation  Food and Nutrient Intake: energy intake, food and beverage intake  Food and Nutrient Adminstration: diet order  Knowledge/Beliefs/Attitudes: food and nutrition knowledge/skill  Physical Activity and Function: nutrition-related ADLs and IADLs  Anthropometric Measurements: weight, weight change, body mass index  Biochemical Data, Medical Tests and Procedures: electrolyte and renal panel, lipid profile, gastrointestinal profile, glucose/endocrine profile, inflammatory profile  Nutrition-Focused Physical Findings:  overall appearance     Nutrition Risk  Level of Risk/Frequency of Follow-up: moderate-high     Nutrition Follow-Up  RD Follow-up?: Yes      Sara Duggan RD, LDN 01/05/2023 8:50 AM

## 2023-01-05 NOTE — NURSING
01/05/2023 0725  Report received from SARKIS Hines.   Assumed care of patient.   Assessment and vital signs assessed.   Labs and meds reviewed.  Last documentation =  Temp: 98.6 °F (37 °C) (01/05/23 0721)  Pulse: 89 (01/05/23 0721)  Resp: 19 (01/05/23 0721)  BP: (!) 152/88 (01/05/23 0721)  SpO2: (!) 94 % (01/05/23 0721)   AOX4, denies pain at this time.  Education given to patient regarding new med drabinol.   IV infusing without complication.   Patient tolerating full liquid diet.  Patient scheduled for bone scan @ 1100.

## 2023-01-05 NOTE — PROGRESS NOTES
[facility] Medicine  Progress Note    Patient Name: Claire Bowser  MRN: [id number]  Patient Class: IP- Inpatient  Admission Date: 1/2/2023  Attending Physician: Binu Arciniega MD   Primary Care Provider: Samuel Brady MD  DOS: 01/05/2023    Subjective:     Principal Problem:Abdominal pain    Chief Complaint:   Chief Complaint   Patient presents with    Abdominal Pain     Abd distention and pain         HPI: 62 year old patient getting admitted with abdominal pain  Patient suffers from chronic pancreatitis with acute  flare ups  Few days ago pt had ERCP with stent insertion in Pancreatic duct and had 1300 ml paracentesis  Pt did fine and went home next day  Few days later she started having abdominal pain:Pain is generalized /band like/associated with Nausea and few episodes of Vomiting  Denies radiation of pain . No aggravating and relieving  factors   Later symptoms got worse and she came to ER today and got admitted   Imaging in ER showed :Multifocal rim-enhancing fluid collections in the upper abdomen    Interval History:  1/5: Patient seen and examined.  Abdominal pain is improved.  Minimal nausea, no vomiting.  Tolerating diet better.  No fever.  No chest pain or shortness of breath.      ROS: 2 point ROS reviewed and negative except as per HPI above      Vitals:    01/04/23 2317 01/05/23 0330 01/05/23 0721 01/05/23 1725   BP: 121/61 (!) 156/81 (!) 152/88 (!) 145/89   BP Location:  Left arm     Pulse: 83 88 89 78   Resp: 18 20 19 19   Temp: 98.4 °F (36.9 °C) 98.7 °F (37.1 °C) 98.6 °F (37 °C) 97.9 °F (36.6 °C)   TempSrc: Oral Oral Oral Oral   SpO2: (!) 94% (!) 93% (!) 94% 95%   Weight:       Height:         Physical Exam  Constitutional:       General: She is not in acute distress.  Nontoxic nondiaphoretic  HENT:      Mouth: Mucous membranes are moist.  No thrush  Cardiovascular:      Rate and Rhythm: Normal rate.  2+ radial pulses  GI:  Abdomen soft with minimal distention,  minimal tenderness without guarding or rebound  Pulmonary:      Effort: Pulmonary effort is normal.  Lungs are clear bilaterally  Neurological:      Mental Status: She is alert and oriented to person, place, and time.  Fluent speech, follows commands appropriately  Psychiatric:         Behavior: Behavior normal.  Mood is down, insight fair      LABS:  Potassium 3.0   Creatinine 0.4   Hemoglobin 8    Ascitic fluid with leukocytosis and neutrophilic predominance      Assessment/Plan:     Abdominal pain  Pancreatic pseudocyst   Possible intra-abdominal abscess?  Multifocal rim-enhancing fluid collections in the upper abdomen per imaging  Pt suffers from pancreatitis  Check CRP/Lipase  Start iv meropenem  Also give one dose of iv vancomycin  NPO for possible EUS ?  Pancreatic duct stent misplaced ?  Consult GI MD    Appreciate consultants  Status post endoscopy with drainage of pancreatic pseudocyst  Status post paracentesis  Continue empiric broad-spectrum IV antibiotics-transition to oral antibiotics in near future  Follow up all culture data  GI prophylaxis with PPI   VTE prophylaxis continue low-molecular weight heparin      Chronic pancreatitis  Serial lipase monitoring   Gentle IV fluids        Weight loss, unintentional  Severe protein calorie malnutrition  Hypokalemia  Significant weight loss, per pt  Continue Clinimix, appreciate nutrition  Advance to full liquid diet - advance diet as tolerated  Continue increased Zoloft dosing   Continue low-dose Marinol    Rheumatoid arthritis involving multiple sites with positive rheumatoid factor  Immunocompromised  On Leflunomide and Prednisone   Continue for now, consider holding if infectious etiology identified  Continue    Tobacco use  Aware     Hypokalemia        Replace potassium, serial labs    Moderate risk due to moderate intensity illness slowly improving; moderate exacerbation of chronic illness; prescription drug management    VTE Risk Mitigation (From  admission, onward)           Ordered     enoxaparin injection 40 mg  Daily         01/03/23 1756     IP VTE HIGH RISK PATIENT  Once         01/02/23 2207     Place sequential compression device  Until discontinued         01/02/23 2207                       Binu Arciniega MD  Department of Hospital Medicine   [facility]

## 2023-01-05 NOTE — PROGRESS NOTES
Consult Note  Infectious Disease    Reason for Consult:  ?intra-abdominal abscesses    HPI: Claire Bowser is a 62 y.o. female with a history of chronic pancreatitis since biliary pancreatitis and cholecystectomy 7/2022, underwent pancreatic stent insertion on 12/30/22 and had 1300 mL paracentesis . The culture of this was fluid was negative and there were 336 WBC mostly monos.   A few days later she had worsening abdominal pain with nausea and retching. She had no chills or fever. She came to the ED on 1/2 and CT showed numerous rim enhancing fluid collections worrisome for abscess but most consisent with pancreatic pseudocyts. She underwent EUS per Dr. Jewell who transgastricly aspirated the cyst relieving 150 cc of opaque brown fluid and this procedure brought her some relief. This fluid was not submitted for culture. She has had no fever above 99.3, and WBC are improved from 16.5 to 12.4. she has been on vanc and meropenem since admission. She has RA and is on Arava and low dose prednisone. Blood cultures are negative .. today's diagnostic paracentesis demonstrates 1494 WBC, mostly segs.  CRP is greater than 21. She is tolerating full liquids.      GI history per Dr. Jewell's note  Lipase 271 (improved since last admission)  CT ABd 1/'23 - emphysema; multiple pancreatic pseudocysts now w/ air (likley from ERCP); PD stent in ventral duct;  moderate ascites; vascular dz  ERCP 12/'22 - No obvious leak; HOP cyst filled w/ injection; dual sphincterotomy; PD stent placed (not placed out to tail due to narrow PD)  CA 19-9 12/'22 - 71  EUS 11/'22 - chronic panc; panc cysts in HOP / BOP; Mass like region in Neck s/p FNA; 6mm CBD s/p martin  -Bx - pancreatic fibrosis  Labs 11/'22 - CA 19-9 - 113  Labs 10/'22 - CA 19-9 44.6  MRI 10/'22 - nml biliary s/p martin; 7mm HOP cyst; no mass; fluid by tail; tics  EGD / EUS 7/'22 - ill defined 2.5 cm region in neck s/p FNA  -HP neg gastritis; Neg panc bx w/ low suspicion for  cancer. surgery for lap martin and MRI pancreas 2 months  MRCP 6/'22 - 1.5 cm mass vs cyst in neck of panc; nml CBD / PD  EGD '21 - mild gastrtis  Colon '21 - small polyps. .     1/5/23: interim reviewed. D/w Dr. Jewell this morning. Peritoneal fluid culture is negative so far. Procalcitonin is trending down(can be elevated from pancreatitis as well). CRP trending down, WBC improved.  She was a little drowsy this morning which she attributes to the Marinol she received last night.  She has taken it earlier today.  She is still only receiving full liquids and anticipates being advanced tomorrow.  No bowel movements.  She is not receiving her Creon yet.  She is ambulatory in the room and will continue to move about.  I also encouraged her to be out of the bed into the chair to relieve pressure from her back decubitus.         EXAM & DIAGNOSTICS REVIEWED:   Vitals:     Temp:  [98.4 °F (36.9 °C)-98.7 °F (37.1 °C)]   Temp: 98.6 °F (37 °C) (01/05/23 0721)  Pulse: 89 (01/05/23 0721)  Resp: 19 (01/05/23 0721)  BP: (!) 152/88 (01/05/23 0721)  SpO2: (!) 94 % (01/05/23 0721)  No intake or output data in the 24 hours ending 01/05/23 1634      General:  In NAD. Alert and attentive, cooperative, comfortable  Eyes:  Anicteric,  , EOMI  ENT:  No ulcers, exudates, thrush, nares patent,    Neck:  supple,   Lungs: Clear, but with decreased breath sounds throughout  Heart:  RRR, no gallop/murmur/rub noted  Abd:  Soft, Nontender, a little more protuberant today, normal BS, no masses or organomegaly appreciated.  :  Voids/   Musc:  Joints without effusion, swelling, erythema, synovitis, with generalized poor muscle bulk.   Skin:  No rashes.    Neuro:             Alert, attentive, speech fluent, face symmetric, moves all extremities, no focal weakness. Ambulatory  Psych: Calm, cooperative and very pleasant  Lymphatic:        Extrem: No edema, erythema, phlebitis, cellulitis, warm and well perfused  VAD:  peripheral     Isolation:   none  Wound: She has a decubitus on her kyphotic spine        Lines/Tubes/Drains:    General Labs reviewed:  Recent Labs   Lab 01/02/23  1624 01/04/23  0725 01/05/23  0450   WBC 16.54* 12.43 9.30   HGB 11.2* 8.7* 8.0*   HCT 34.6* 26.8* 24.5*   * 416 399       Recent Labs   Lab 01/03/23  0620 01/04/23  0617 01/05/23  0449   * 132* 132*   K 3.4* 3.3* 3.0*   CL 98 102 102   CO2 28 26 26   BUN 11 15 13   CREATININE 0.6 0.5 0.4*   CALCIUM 8.2* 8.4* 8.3*   PROT 4.3* 4.2* 4.4*   BILITOT 1.0 0.6 0.5   ALKPHOS 74 64 75   ALT 11 10 9*   AST 11 11 12     Recent Labs   Lab 01/03/23  0620 01/04/23  0617 01/05/23  0449   CRP 21.96* 24.04* 13.05*      Latest Reference Range & Units 12/30/22 09:29 12/30/22 09:52 01/04/23 10:04   Fluid Color   Yellow Yellow   Fluid Appearance   Clear Clear   WBC, Body Fluid /cu mm  336 1494   Body Fluid Type   Peritoneal Ascites   Segs, Fluid %  2 75   Lymphs, Fluid %  13 2   Monocytes/Macrophages, Fluid %  85 20   Mesothelial Cells, Fluid %   3   Amylase, Fluid U/L 2096  344   Body Fluid, Albumin Not Established g/dL 1.1     Body Fluid Source Amylase  Peritoneal  Ascites   Body Fluid Source, Albumin  Peritoneal     Body Fluid Source, Total Protein  Peritoneal     Body Fluid, Protein Not established g/dL <3.0           Micro:  Microbiology Results (last 7 days)       Procedure Component Value Units Date/Time    Culture, Body Fluid (Aerobic) w/ GS [[id number]] Collected: 01/04/23 1004    Order Status: Completed Specimen: Body Fluid from Ascites Updated: 01/05/23 1458     AEROBIC CULTURE - FLUID No growth     Gram Stain Result Few WBC's      No organisms seen    Blood culture #2 **CANNOT BE ORDERED STAT** [[id number]] Collected: 01/02/23 1624    Order Status: Completed Specimen: Blood from Peripheral, Antecubital, Right Updated: 01/04/23 1832     Blood Culture, Routine No Growth to date      No Growth to date      No Growth to date    Blood culture #1 **CANNOT BE ORDERED STAT** [[id number]]  Collected: 01/02/23 1624    Order Status: Completed Specimen: Blood from Peripheral, Forearm, Left Updated: 01/04/23 1832     Blood Culture, Routine No Growth to date      No Growth to date      No Growth to date    Culture, Anaerobic [[id number]] Collected: 01/04/23 1004    Order Status: Sent Specimen: Body Fluid from Ascites Updated: 01/04/23 1026            Imaging Reviewed:   CXR   CT abd/pelvis 12/29/22   CT abd/pelvis 1/2/23  1. Emphysema.  2. Distended opacified bronchiole in right middle lobe, unchanged.  3. Interval placement of stent in the downstream main pancreatic duct as described. Majority of the stent lies in second portion of duodenum. Correlation with desired placement is requested.  4. Multifocal rim-enhancing fluid collections in the upper abdomen, larger of which lie in peripancreatic location, with enlargement since 12/29/2022. Pancreatic pseudocyst are most likely etiology. Several collections tracking anteriorly and superior to the celiac axis trifurcation contain associated gas.  5. Unchanged large volume ascites.  6. Unchanged diffuse body wall edema.    Cardiology:    IMPRESSION & PLAN   1. Multiple pancreatic pseudocysts   While the peritoneal fluid cell count is higher, this could be associated with the procedure and not infection. Cannot exclude infection, but..   I would imagine substantial signs of infection(fever, chills, prostration, worse pain, no appetite, nausea, vomiting) in an immunocompromised person should those fluid collections represent abscesses   Procalcitonin can be elevated with pancreatitis, but the trend is favorable    2. Chronic pancreatitis   Elevated CA 19-9. No cytological examination of peritoneal fluid or pseudocyst fluid    3. RA on leflunomide and low dose prednisone  4. Severely underweight, smoker      Recommendations:  continue rocephin alone until ready for discharge, then po cefuroxime or cefdinir(quinolones interact with zoloft and trazodone)    Will  f/u on peritoneal fluid culture     d/w Dr. Jewell and Dr. Arciniega    Medical Decision Making during this encounter was  [_] Low Complexity  [_] Moderate Complexity  [ xx ] High Complexity

## 2023-01-06 VITALS
RESPIRATION RATE: 18 BRPM | SYSTOLIC BLOOD PRESSURE: 149 MMHG | OXYGEN SATURATION: 97 % | BODY MASS INDEX: 16.33 KG/M2 | DIASTOLIC BLOOD PRESSURE: 88 MMHG | HEIGHT: 64 IN | WEIGHT: 95.69 LBS | TEMPERATURE: 99 F | HEART RATE: 72 BPM

## 2023-01-06 LAB
ALBUMIN SERPL BCP-MCNC: 1.5 G/DL (ref 3.5–5.2)
ALP SERPL-CCNC: 74 U/L (ref 55–135)
ALT SERPL W/O P-5'-P-CCNC: 11 U/L (ref 10–44)
ANION GAP SERPL CALC-SCNC: 3 MMOL/L (ref 8–16)
AST SERPL-CCNC: 13 U/L (ref 10–40)
BACTERIA SPEC ANAEROBE CULT: NORMAL
BILIRUB SERPL-MCNC: 0.3 MG/DL (ref 0.1–1)
BUN SERPL-MCNC: 11 MG/DL (ref 8–23)
CALCIUM SERPL-MCNC: 8.7 MG/DL (ref 8.7–10.5)
CHLORIDE SERPL-SCNC: 103 MMOL/L (ref 95–110)
CO2 SERPL-SCNC: 28 MMOL/L (ref 23–29)
CREAT SERPL-MCNC: 0.4 MG/DL (ref 0.5–1.4)
CRP SERPL-MCNC: 7.8 MG/DL
ERYTHROCYTE [DISTWIDTH] IN BLOOD BY AUTOMATED COUNT: 16.8 % (ref 11.5–14.5)
EST. GFR  (NO RACE VARIABLE): >60 ML/MIN/1.73 M^2
GLUCOSE SERPL-MCNC: 99 MG/DL (ref 70–110)
HCT VFR BLD AUTO: 26.1 % (ref 37–48.5)
HGB BLD-MCNC: 8.4 G/DL (ref 12–16)
MCH RBC QN AUTO: 30.1 PG (ref 27–31)
MCHC RBC AUTO-ENTMCNC: 32.2 G/DL (ref 32–36)
MCV RBC AUTO: 94 FL (ref 82–98)
PLATELET # BLD AUTO: 373 K/UL (ref 150–450)
PMV BLD AUTO: 10.4 FL (ref 9.2–12.9)
POTASSIUM SERPL-SCNC: 3.7 MMOL/L (ref 3.5–5.1)
PROT SERPL-MCNC: 4.7 G/DL (ref 6–8.4)
RBC # BLD AUTO: 2.79 M/UL (ref 4–5.4)
SODIUM SERPL-SCNC: 134 MMOL/L (ref 136–145)
WBC # BLD AUTO: 7.12 K/UL (ref 3.9–12.7)

## 2023-01-06 PROCEDURE — 25000003 PHARM REV CODE 250: Performed by: INTERNAL MEDICINE

## 2023-01-06 PROCEDURE — 99231 PR SUBSEQUENT HOSPITAL CARE,LEVL I: ICD-10-PCS | Mod: ,,, | Performed by: INTERNAL MEDICINE

## 2023-01-06 PROCEDURE — 86140 C-REACTIVE PROTEIN: CPT | Performed by: INTERNAL MEDICINE

## 2023-01-06 PROCEDURE — 85027 COMPLETE CBC AUTOMATED: CPT | Performed by: INTERNAL MEDICINE

## 2023-01-06 PROCEDURE — 63600175 PHARM REV CODE 636 W HCPCS: Performed by: INTERNAL MEDICINE

## 2023-01-06 PROCEDURE — 99231 SBSQ HOSP IP/OBS SF/LOW 25: CPT | Mod: ,,, | Performed by: INTERNAL MEDICINE

## 2023-01-06 PROCEDURE — 80053 COMPREHEN METABOLIC PANEL: CPT | Performed by: INTERNAL MEDICINE

## 2023-01-06 PROCEDURE — 36415 COLL VENOUS BLD VENIPUNCTURE: CPT | Performed by: INTERNAL MEDICINE

## 2023-01-06 PROCEDURE — 25000242 PHARM REV CODE 250 ALT 637 W/ HCPCS: Performed by: INTERNAL MEDICINE

## 2023-01-06 RX ORDER — SERTRALINE HYDROCHLORIDE 50 MG/1
50 TABLET, FILM COATED ORAL NIGHTLY
Qty: 30 TABLET | Refills: 11 | Status: SHIPPED | OUTPATIENT
Start: 2023-01-06 | End: 2024-01-26 | Stop reason: SDUPTHER

## 2023-01-06 RX ORDER — DRONABINOL 2.5 MG/1
2.5 CAPSULE ORAL
Qty: 30 CAPSULE | Refills: 1 | Status: SHIPPED | OUTPATIENT
Start: 2023-01-07 | End: 2023-02-08

## 2023-01-06 RX ORDER — CEFUROXIME AXETIL 500 MG/1
500 TABLET ORAL 2 TIMES DAILY
Qty: 20 TABLET | Refills: 0 | Status: SHIPPED | OUTPATIENT
Start: 2023-01-06 | End: 2023-01-16

## 2023-01-06 RX ADMIN — ENOXAPARIN SODIUM 40 MG: 40 INJECTION SUBCUTANEOUS at 04:01

## 2023-01-06 RX ADMIN — DRONABINOL 2.5 MG: 2.5 CAPSULE ORAL at 04:01

## 2023-01-06 RX ADMIN — METOPROLOL SUCCINATE 25 MG: 25 TABLET, EXTENDED RELEASE ORAL at 08:01

## 2023-01-06 RX ADMIN — LEFLUNOMIDE 20 MG: 20 TABLET ORAL at 08:01

## 2023-01-06 RX ADMIN — PANTOPRAZOLE SODIUM 40 MG: 40 TABLET, DELAYED RELEASE ORAL at 08:01

## 2023-01-06 RX ADMIN — PREDNISONE 5 MG: 5 TABLET ORAL at 08:01

## 2023-01-06 RX ADMIN — Medication 400 UNITS: at 08:01

## 2023-01-06 RX ADMIN — FOLIC ACID 1 MG: 1 TABLET ORAL at 08:01

## 2023-01-06 NOTE — PROGRESS NOTES
Consult Note  Infectious Disease    Reason for Consult:  ?intra-abdominal abscesses    HPI: Claire Bowser is a 62 y.o. female with a history of chronic pancreatitis since biliary pancreatitis and cholecystectomy 7/2022, underwent pancreatic stent insertion on 12/30/22 and had 1300 mL paracentesis . The culture of this was fluid was negative and there were 336 WBC mostly monos.   A few days later she had worsening abdominal pain with nausea and retching. She had no chills or fever. She came to the ED on 1/2 and CT showed numerous rim enhancing fluid collections worrisome for abscess but most consisent with pancreatic pseudocyts. She underwent EUS per Dr. Jewell who transgastricly aspirated the cyst relieving 150 cc of opaque brown fluid and this procedure brought her some relief. This fluid was not submitted for culture. She has had no fever above 99.3, and WBC are improved from 16.5 to 12.4. she has been on vanc and meropenem since admission. She has RA and is on Arava and low dose prednisone. Blood cultures are negative .. today's diagnostic paracentesis demonstrates 1494 WBC, mostly segs.  CRP is greater than 21. She is tolerating full liquids.      GI history per Dr. Jewell's note  Lipase 271 (improved since last admission)  CT ABd 1/'23 - emphysema; multiple pancreatic pseudocysts now w/ air (likley from ERCP); PD stent in ventral duct;  moderate ascites; vascular dz  ERCP 12/'22 - No obvious leak; HOP cyst filled w/ injection; dual sphincterotomy; PD stent placed (not placed out to tail due to narrow PD)  CA 19-9 12/'22 - 71  EUS 11/'22 - chronic panc; panc cysts in HOP / BOP; Mass like region in Neck s/p FNA; 6mm CBD s/p martin  -Bx - pancreatic fibrosis  Labs 11/'22 - CA 19-9 - 113  Labs 10/'22 - CA 19-9 44.6  MRI 10/'22 - nml biliary s/p martin; 7mm HOP cyst; no mass; fluid by tail; tics  EGD / EUS 7/'22 - ill defined 2.5 cm region in neck s/p FNA  -HP neg gastritis; Neg panc bx w/ low suspicion for  cancer. surgery for lap martin and MRI pancreas 2 months  MRCP 6/'22 - 1.5 cm mass vs cyst in neck of panc; nml CBD / PD  EGD '21 - mild gastrtis  Colon '21 - small polyps. .     1/5/23: interim reviewed. D/w Dr. Jewell this morning. Peritoneal fluid culture is negative so far. Procalcitonin is trending down(can be elevated from pancreatitis as well). CRP trending down, WBC improved.  She was a little drowsy this morning which she attributes to the Marinol she received last night.  She has taken it earlier today.  She is still only receiving full liquids and anticipates being advanced tomorrow.  No bowel movements.  She is not receiving her Creon yet.  She is ambulatory in the room and will continue to move about.  I also encouraged her to be out of the bed into the chair to relieve pressure from her back decubitus.  1/6: interim reviewed. Afebrile. Peritoneal fluid culture is negative. CRP is falling steadily. Yesterday we discussed her holding her leflunomide for a week or two while recovering from this infection?she is still not eating very much, mildly bloated. Ambulating without difficulty         EXAM & DIAGNOSTICS REVIEWED:   Vitals:     Temp:  [97.7 °F (36.5 °C)-98 °F (36.7 °C)]   Temp: 98 °F (36.7 °C) (01/06/23 1201)  Pulse: 72 (01/06/23 1201)  Resp: 18 (01/06/23 1201)  BP: (!) 162/95 (nurse notified) (01/06/23 1201)  SpO2: 96 % (01/06/23 1201)  No intake or output data in the 24 hours ending 01/06/23 1337      General:  In NAD. Alert and attentive, cooperative, comfortable  Eyes:  Anicteric,  , EOMI  ENT:  No ulcers, exudates, thrush, nares patent,    Neck:  supple,   Lungs: Clear, but with decreased breath sounds throughout  Heart:  RRR, no gallop/murmur/rub noted  Abd:  Soft, Nontender, a little distended, normal BS, no masses or organomegaly appreciated.  :  Voids/   Musc:  Joints without effusion, swelling, erythema, synovitis, with generalized poor muscle bulk.   Skin:  No rashes.    Neuro:              Alert, attentive, speech fluent, face symmetric, moves all extremities, no focal weakness. Ambulatory  Psych: Calm, cooperative and very pleasant  Lymphatic:        Extrem: No edema, erythema, phlebitis, cellulitis, warm and well perfused  VAD:  peripheral     Isolation:  none  Wound: She has a decubitus on her kyphotic spine        Lines/Tubes/Drains:    General Labs reviewed:  Recent Labs   Lab 01/04/23  0725 01/05/23  0450 01/06/23  0424   WBC 12.43 9.30 7.12   HGB 8.7* 8.0* 8.4*   HCT 26.8* 24.5* 26.1*    399 373       Recent Labs   Lab 01/04/23  0617 01/05/23  0449 01/06/23  0424   * 132* 134*   K 3.3* 3.0* 3.7    102 103   CO2 26 26 28   BUN 15 13 11   CREATININE 0.5 0.4* 0.4*   CALCIUM 8.4* 8.3* 8.7   PROT 4.2* 4.4* 4.7*   BILITOT 0.6 0.5 0.3   ALKPHOS 64 75 74   ALT 10 9* 11   AST 11 12 13     Recent Labs   Lab 01/04/23  0617 01/05/23  0449 01/06/23  0424   CRP 24.04* 13.05* 7.80*      Latest Reference Range & Units 12/30/22 09:29 12/30/22 09:52 01/04/23 10:04   Fluid Color   Yellow Yellow   Fluid Appearance   Clear Clear   WBC, Body Fluid /cu mm  336 1494   Body Fluid Type   Peritoneal Ascites   Segs, Fluid %  2 75   Lymphs, Fluid %  13 2   Monocytes/Macrophages, Fluid %  85 20   Mesothelial Cells, Fluid %   3   Amylase, Fluid U/L 2096  344   Body Fluid, Albumin Not Established g/dL 1.1     Body Fluid Source Amylase  Peritoneal  Ascites   Body Fluid Source, Albumin  Peritoneal     Body Fluid Source, Total Protein  Peritoneal     Body Fluid, Protein Not established g/dL <3.0           Micro:  Microbiology Results (last 7 days)       Procedure Component Value Units Date/Time    Culture, Anaerobic [[id number]] Collected: 01/04/23 1004    Order Status: Completed Specimen: Body Fluid from Ascites Updated: 01/06/23 1335     Anaerobic Culture No anaerobes isolated    Culture, Body Fluid (Aerobic) w/ GS [[id number]] Collected: 01/04/23 1004    Order Status: Completed Specimen: Body Fluid from  Ascites Updated: 01/06/23 0919     AEROBIC CULTURE - FLUID No growth     Gram Stain Result Few WBC's      No organisms seen    Blood culture #1 **CANNOT BE ORDERED STAT** [[id number]] Collected: 01/02/23 1624    Order Status: Completed Specimen: Blood from Peripheral, Forearm, Left Updated: 01/05/23 1832     Blood Culture, Routine No Growth to date      No Growth to date      No Growth to date      No Growth to date    Blood culture #2 **CANNOT BE ORDERED STAT** [[id number]] Collected: 01/02/23 1624    Order Status: Completed Specimen: Blood from Peripheral, Antecubital, Right Updated: 01/05/23 1832     Blood Culture, Routine No Growth to date      No Growth to date      No Growth to date      No Growth to date            Imaging Reviewed:   CXR   CT abd/pelvis 12/29/22   CT abd/pelvis 1/2/23  1. Emphysema.  2. Distended opacified bronchiole in right middle lobe, unchanged.  3. Interval placement of stent in the downstream main pancreatic duct as described. Majority of the stent lies in second portion of duodenum. Correlation with desired placement is requested.  4. Multifocal rim-enhancing fluid collections in the upper abdomen, larger of which lie in peripancreatic location, with enlargement since 12/29/2022. Pancreatic pseudocyst are most likely etiology. Several collections tracking anteriorly and superior to the celiac axis trifurcation contain associated gas.  5. Unchanged large volume ascites.  6. Unchanged diffuse body wall edema.    Cardiology:    IMPRESSION & PLAN   1. Multiple pancreatic pseudocysts   While the peritoneal fluid cell count is higher, this could be associated with the procedure and not infection. Cannot exclude infection, but..   I would imagine substantial signs of infection(fever, chills, prostration, worse pain, no appetite, nausea, vomiting) in an immunocompromised person should those fluid collections represent abscesses   Procalcitonin can be elevated with pancreatitis, but the trend is  favorable   Peritoneal fluid culture negative x 2 d    2. Chronic pancreatitis   Elevated CA 19-9. No cytological examination of peritoneal fluid or pseudocyst fluid    3. RA on leflunomide and low dose prednisone  4. Severely underweight, smoker      Recommendations:  continue rocephin alone until ready for discharge, then po cefuroxime or cefdinir(quinolones interact with zoloft and trazodone) to complete 14 days today (IV+po)    Will be available as needed, thanks          Medical Decision Making during this encounter was  [_] Low Complexity  [_] Moderate Complexity  [ xx ] High Complexity

## 2023-01-06 NOTE — PLAN OF CARE
Problem: Parenteral Nutrition  Goal: Effective Intravenous Nutrition Therapy Delivery  Intervention: Optimize Intravenous Nutrition Delivery  Flowsheets (Taken 1/6/2023 1216)  Nutrition Support Management: parenteral nutrition continued as ordered--Clinimix @ 50 ml / hr (labs trending to target range) with po Full liquid diet (po intake 50% per nursing).

## 2023-01-07 LAB
BACTERIA BLD CULT: NORMAL
BACTERIA BLD CULT: NORMAL

## 2023-01-07 NOTE — PROGRESS NOTES
Pt prepared for d/c. All questions and concerns addressed. Spouse at bedside to transport pt home via private vehicle. Pt transported to vehicle via wheelchair. Home medication sent home with pt.

## 2023-01-07 NOTE — DISCHARGE SUMMARY
[facility]  Discharge Summary      Patient Name: Claire Bowser  MRN: [id number]  ARIA: [id number]  Patient Class: IP- Inpatient  Admission Date: 1/2/2023  Hospital Length of Stay: 2 days  Discharge Date and Time:  01/06/2023 6:40 PM  Attending Physician: Binu Arciniega MD   Discharging Provider: Binu Arciniega MD  Primary Care Provider: Samuel Brady MD    Primary Care Team: Networked reference to record PCT     HPI:   62 year old patient getting admitted with abdominal pain  Patient suffers from chronic pancreatitis with acute  flare ups  Few days ago pt had ERCP with stent insertion in Pancreatic duct and had 1300 ml paracentesis  Pt did fine and went home next day  Few days later she started having abdominal pain:Pain is generalized /band like/associated with Nausea and few episodes of Vomiting  Denies radiation of pain . No aggravating and relieving  factors   Later symptoms got worse and she came to ER today and got admitted   Imaging in ER showed :Multifocal rim-enhancing fluid collections in the upper abdomen      Procedure(s) (LRB):  ULTRASOUND, UPPER GI TRACT, ENDOSCOPIC (N/A)      Hospital Course by problem:   Abdominal pain  Pancreatic pseudocyst   Ruled out intra-abdominal abscess  Multifocal rim-enhancing fluid collections in the upper abdomen per imaging  Pt suffers from pancreatitis  Check CRP/Lipase  Start iv meropenem  Also give one dose of iv vancomycin  NPO for possible EUS ?  Pancreatic duct stent misplaced ?  Consult GI MD    Appreciate consultants  Status post endoscopy with drainage of pancreatic pseudocyst  Status post paracentesis  Continue empiric broad-spectrum IV antibiotics-transition to oral antibiotics in near future - discharge to complete course of oral Ceftin  Close outpatient follow-up      Chronic pancreatitis  Serial lipase monitoring   Gentle IV fluids        Weight loss, unintentional  Severe protein calorie  malnutrition  Hypokalemia  Significant weight loss, per pt  Continue Clinimix, appreciate nutrition  Advance to full liquid diet - advance diet as tolerated  Continue increased Zoloft dosing at discharge  Continue low-dose Marinol at discharge    Rheumatoid arthritis involving multiple sites with positive rheumatoid factor  Immunocompromised  On Leflunomide and Prednisone   Continue for now, consider holding if infectious etiology identified  Continue    Tobacco use  Aware     Hypokalemia        Replace potassium, serial labs    Discharge physical exam:   Constitutional:       General: She is not in acute distress.  Nontoxic nondiaphoretic  HENT:      Mouth: Mucous membranes are moist.  No thrush  Cardiovascular:      Rate and Rhythm: Normal rate.  2+ radial pulses  GI:  Abdomen soft with minimal distention, minimal tenderness without guarding or rebound  Pulmonary:      Effort: Pulmonary effort is normal.  Lungs are clear bilaterally  Psychiatric:         Behavior:  Mood is good, in good spirits today    Goals of Care Treatment Preferences:  Code Status: Full Code      Consults:   Consults (From admission, onward)          Status Ordering Provider     Inpatient consult to Infectious Diseases  Once        Provider:  Jacqui Caldwell MD    Completed RODERICK BRANTLEY     Inpatient consult to Registered Dietitian/Nutritionist  Once        Provider:  (Not yet assigned)    Completed WILLIE MOYER     Inpatient consult to Gastroenterology  Once        Provider:  Quinn Addison MD    Completed WILLIE MOYER     Inpatient consult to Hospitalist  Once        Provider:  Willie Moyer MD    Acknowledged WILLIE MOYER            No new Assessment & Plan notes have been filed under this hospital service since the last note was generated.  Service: Hospital Medicine    Final Active Diagnoses:    Diagnosis Date Noted POA    PRINCIPAL PROBLEM:  Abdominal pain [R10.9] 01/02/2023 Unknown    Pancreatic pseudocyst [K86.3] 01/04/2023 Yes     Chronic pancreatitis [K86.1] 01/02/2023 Unknown    Immunocompromised [D84.9] 01/02/2023 Unknown    Weight loss, unintentional [R63.4] 10/18/2022 Yes    Rheumatoid arthritis involving multiple sites with positive rheumatoid factor [M05.79] 01/14/2021 Yes    Tobacco use [Z72.0] 08/10/2018 Yes      Problems Resolved During this Admission:       Discharged Condition: stable    Disposition:     Follow Up:   Follow-up Information       Donavon Jewell III, MD Follow up in 1 week(s).    Specialty: Gastroenterology  Contact information:  [address] [place] [address] [address] [place] [address]  [phone]               Samuel Brady MD Follow up in 2 week(s).    Specialty: Family Medicine  Contact information:  [address] [facility] [address]  [place] [address]  [phone]                           Patient Instructions:   No discharge procedures on file.      Pending Diagnostic Studies:       Procedure Component Value Units Date/Time    CBC Without Differential [[id number]] Collected: 01/03/23 0620    Order Status: Sent Lab Status: In process Updated: 01/03/23 0642    Specimen: Blood     Narrative:      Collection has been rescheduled by RE1 at 01/03/2023 06:24            Medications:  Reconciled Home Medications:      Medication List        START taking these medications      cefUROXime 500 MG tablet  Commonly known as: CEFTIN  Take 1 tablet (500 mg total) by mouth 2 (two) times a day. for 10 days     dronabinoL 2.5 MG capsule  Commonly known as: MARINOL  Take 1 capsule (2.5 mg total) by mouth daily with dinner or evening meal.  Start taking on: January 7, 2023            CHANGE how you take these medications      leflunomide 20 MG Tab  Commonly known as: ARAVA  Take 1 tablet by mouth once daily  What changed: when to take this     sertraline 50 MG tablet  Commonly known as: ZOLOFT  Take 1 tablet (50 mg total) by mouth every evening.  What changed:   medication strength  how much to take  when to take this            CONTINUE taking these  medications      apixaban 5 mg Tab  Commonly known as: ELIQUIS  Take 1 tablet (5 mg total) by mouth 2 (two) times daily.     cholecalciferol (vitamin D3) 10 mcg (400 unit) Tab  Commonly known as: VITAMIN D3  Take 400 Units by mouth once daily.     CREON 36,000-114,000- 180,000 unit Cpdr  Generic drug: lipase-protease-amylase  Take 2 capsules by mouth 3 (three) times daily.     cyclobenzaprine 5 MG tablet  Commonly known as: FLEXERIL  Take 5 mg by mouth nightly.     folic acid 1 MG tablet  Commonly known as: FOLVITE  Take 1 tablet (1 mg total) by mouth once daily.     metoprolol succinate 50 MG 24 hr tablet  Commonly known as: TOPROL-XL  Take 0.5 tablets (25 mg total) by mouth once daily. 1/2 tab qd     pantoprazole 40 MG tablet  Commonly known as: PROTONIX  Take 1 tablet (40 mg total) by mouth once daily.     potassium gluconate 600 mg (99 mg) Tab  Take 1 tablet by mouth once daily.     predniSONE 5 MG tablet  Commonly known as: DELTASONE  Take 1 tablet (5 mg total) by mouth once daily.     traZODone 50 MG tablet  Commonly known as: DESYREL  Take 1 tablet (50 mg total) by mouth every evening.              Indwelling Lines/Drains at time of discharge:   Lines/Drains/Airways       None                   Time spent on the discharge of patient: 32 minutes         Binu Arciniega MD  Department of Hospital Medicine  [facility]

## 2023-01-08 LAB
BACTERIA FLD AEROBE CULT: NO GROWTH
GRAM STN SPEC: NORMAL
GRAM STN SPEC: NORMAL

## 2023-01-09 NOTE — PLAN OF CARE
01/06/23 1700   Final Note   Assessment Type Final Discharge Note   Anticipated Discharge Disposition Home   What phone number can be called within the next 1-3 days to see how you are doing after discharge? [phone]   Hospital Resources/Appts/Education Provided Appointments scheduled and added to AVS  (pt to schedule own follow up appts)   Post-Acute Status   Discharge Delays None known at this time     Patient cleared for discharge from case management standpoint.    Chart and discharge orders reviewed.  Patient discharged home with no further case management needs.

## 2023-01-12 ENCOUNTER — HOSPITAL ENCOUNTER (EMERGENCY)
Facility: HOSPITAL | Age: 63
Discharge: HOME | End: 2023-01-13
Attending: STUDENT IN AN ORGANIZED HEALTH CARE EDUCATION/TRAINING PROGRAM
Payer: COMMERCIAL

## 2023-01-12 DIAGNOSIS — K86.3 PANCREATIC PSEUDOCYST: ICD-10-CM

## 2023-01-12 DIAGNOSIS — K86.1 CHRONIC PANCREATITIS, UNSPECIFIED PANCREATITIS TYPE: Primary | ICD-10-CM

## 2023-01-12 DIAGNOSIS — R10.9 ABDOMINAL PAIN: ICD-10-CM

## 2023-01-12 LAB
ALBUMIN SERPL BCP-MCNC: 2.1 G/DL (ref 3.5–5.2)
ALP SERPL-CCNC: 93 U/L (ref 55–135)
ALT SERPL W/O P-5'-P-CCNC: 9 U/L (ref 10–44)
ANION GAP SERPL CALC-SCNC: 11 MMOL/L (ref 8–16)
AST SERPL-CCNC: 16 U/L (ref 10–40)
BACTERIA #/AREA URNS HPF: NEGATIVE /HPF
BASOPHILS # BLD AUTO: 0.07 K/UL (ref 0–0.2)
BASOPHILS NFR BLD: 0.3 % (ref 0–1.9)
BILIRUB SERPL-MCNC: 0.6 MG/DL (ref 0.1–1)
BILIRUB UR QL STRIP: NEGATIVE
BUN SERPL-MCNC: 8 MG/DL (ref 8–23)
CALCIUM SERPL-MCNC: 8.8 MG/DL (ref 8.7–10.5)
CHLORIDE SERPL-SCNC: 94 MMOL/L (ref 95–110)
CLARITY UR: CLEAR
CO2 SERPL-SCNC: 25 MMOL/L (ref 23–29)
COLOR UR: YELLOW
CREAT SERPL-MCNC: 0.6 MG/DL (ref 0.5–1.4)
DIFFERENTIAL METHOD: ABNORMAL
EOSINOPHIL # BLD AUTO: 0 K/UL (ref 0–0.5)
EOSINOPHIL NFR BLD: 0 % (ref 0–8)
ERYTHROCYTE [DISTWIDTH] IN BLOOD BY AUTOMATED COUNT: 16 % (ref 11.5–14.5)
EST. GFR  (NO RACE VARIABLE): >60 ML/MIN/1.73 M^2
GLUCOSE SERPL-MCNC: 91 MG/DL (ref 70–110)
GLUCOSE UR QL STRIP: NEGATIVE
HCT VFR BLD AUTO: 26.1 % (ref 37–48.5)
HGB BLD-MCNC: 8.5 G/DL (ref 12–16)
HGB UR QL STRIP: NEGATIVE
HYALINE CASTS #/AREA URNS LPF: 52 /LPF
IMM GRANULOCYTES # BLD AUTO: 0.15 K/UL (ref 0–0.04)
IMM GRANULOCYTES NFR BLD AUTO: 0.7 % (ref 0–0.5)
KETONES UR QL STRIP: NEGATIVE
LEUKOCYTE ESTERASE UR QL STRIP: NEGATIVE
LIPASE SERPL-CCNC: 102 U/L (ref 4–60)
LYMPHOCYTES # BLD AUTO: 0.9 K/UL (ref 1–4.8)
LYMPHOCYTES NFR BLD: 4.2 % (ref 18–48)
MAGNESIUM SERPL-MCNC: 1.6 MG/DL (ref 1.6–2.6)
MCH RBC QN AUTO: 30.2 PG (ref 27–31)
MCHC RBC AUTO-ENTMCNC: 32.6 G/DL (ref 32–36)
MCV RBC AUTO: 93 FL (ref 82–98)
MICROSCOPIC COMMENT: ABNORMAL
MONOCYTES # BLD AUTO: 0.7 K/UL (ref 0.3–1)
MONOCYTES NFR BLD: 3 % (ref 4–15)
NEUTROPHILS # BLD AUTO: 20.2 K/UL (ref 1.8–7.7)
NEUTROPHILS NFR BLD: 91.8 % (ref 38–73)
NITRITE UR QL STRIP: NEGATIVE
NRBC BLD-RTO: 0 /100 WBC
PH UR STRIP: 7 [PH] (ref 5–8)
PLATELET # BLD AUTO: 484 K/UL (ref 150–450)
PLATELET BLD QL SMEAR: ABNORMAL
PMV BLD AUTO: 8.7 FL (ref 9.2–12.9)
POTASSIUM SERPL-SCNC: 3.4 MMOL/L (ref 3.5–5.1)
PROT SERPL-MCNC: 5.5 G/DL (ref 6–8.4)
PROT UR QL STRIP: ABNORMAL
RBC # BLD AUTO: 2.81 M/UL (ref 4–5.4)
RBC #/AREA URNS HPF: 2 /HPF (ref 0–4)
SODIUM SERPL-SCNC: 130 MMOL/L (ref 136–145)
SP GR UR STRIP: 1.03 (ref 1–1.03)
SQUAMOUS #/AREA URNS HPF: 9 /HPF
URN SPEC COLLECT METH UR: ABNORMAL
UROBILINOGEN UR STRIP-ACNC: NEGATIVE EU/DL
WBC # BLD AUTO: 21.99 K/UL (ref 3.9–12.7)
WBC #/AREA URNS HPF: 5 /HPF (ref 0–5)

## 2023-01-12 PROCEDURE — 83735 ASSAY OF MAGNESIUM: CPT | Performed by: NURSE PRACTITIONER

## 2023-01-12 PROCEDURE — 93005 ELECTROCARDIOGRAM TRACING: CPT | Performed by: SPECIALIST

## 2023-01-12 PROCEDURE — 81001 URINALYSIS AUTO W/SCOPE: CPT | Performed by: NURSE PRACTITIONER

## 2023-01-12 PROCEDURE — 96361 HYDRATE IV INFUSION ADD-ON: CPT

## 2023-01-12 PROCEDURE — 93010 EKG 12-LEAD: ICD-10-PCS | Mod: ,,, | Performed by: SPECIALIST

## 2023-01-12 PROCEDURE — 25500020 PHARM REV CODE 255: Performed by: STUDENT IN AN ORGANIZED HEALTH CARE EDUCATION/TRAINING PROGRAM

## 2023-01-12 PROCEDURE — 83690 ASSAY OF LIPASE: CPT | Performed by: NURSE PRACTITIONER

## 2023-01-12 PROCEDURE — 93010 ELECTROCARDIOGRAM REPORT: CPT | Mod: ,,, | Performed by: SPECIALIST

## 2023-01-12 PROCEDURE — 96375 TX/PRO/DX INJ NEW DRUG ADDON: CPT

## 2023-01-12 PROCEDURE — 99285 EMERGENCY DEPT VISIT HI MDM: CPT | Mod: 25

## 2023-01-12 PROCEDURE — 63600175 PHARM REV CODE 636 W HCPCS: Performed by: STUDENT IN AN ORGANIZED HEALTH CARE EDUCATION/TRAINING PROGRAM

## 2023-01-12 PROCEDURE — 96374 THER/PROPH/DIAG INJ IV PUSH: CPT

## 2023-01-12 PROCEDURE — 80053 COMPREHEN METABOLIC PANEL: CPT | Performed by: NURSE PRACTITIONER

## 2023-01-12 PROCEDURE — 85025 COMPLETE CBC W/AUTO DIFF WBC: CPT | Performed by: NURSE PRACTITIONER

## 2023-01-12 PROCEDURE — 25000003 PHARM REV CODE 250: Performed by: STUDENT IN AN ORGANIZED HEALTH CARE EDUCATION/TRAINING PROGRAM

## 2023-01-12 RX ORDER — HYDROMORPHONE HYDROCHLORIDE 1 MG/ML
0.5 INJECTION, SOLUTION INTRAMUSCULAR; INTRAVENOUS; SUBCUTANEOUS
Status: COMPLETED | OUTPATIENT
Start: 2023-01-12 | End: 2023-01-12

## 2023-01-12 RX ORDER — OXYCODONE AND ACETAMINOPHEN 5; 325 MG/1; MG/1
1 TABLET ORAL EVERY 6 HOURS PRN
Qty: 7 TABLET | Refills: 0 | Status: ON HOLD | OUTPATIENT
Start: 2023-01-12 | End: 2023-01-22 | Stop reason: HOSPADM

## 2023-01-12 RX ORDER — ONDANSETRON 2 MG/ML
4 INJECTION INTRAMUSCULAR; INTRAVENOUS
Status: COMPLETED | OUTPATIENT
Start: 2023-01-12 | End: 2023-01-12

## 2023-01-12 RX ADMIN — ONDANSETRON 4 MG: 2 INJECTION INTRAMUSCULAR; INTRAVENOUS at 07:01

## 2023-01-12 RX ADMIN — IOHEXOL 100 ML: 350 INJECTION, SOLUTION INTRAVENOUS at 09:01

## 2023-01-12 RX ADMIN — HYDROMORPHONE HYDROCHLORIDE 0.5 MG: 0.5 INJECTION, SOLUTION INTRAMUSCULAR; INTRAVENOUS; SUBCUTANEOUS at 07:01

## 2023-01-12 RX ADMIN — SODIUM CHLORIDE 1000 ML: 0.9 INJECTION, SOLUTION INTRAVENOUS at 07:01

## 2023-01-13 VITALS
BODY MASS INDEX: 14 KG/M2 | RESPIRATION RATE: 19 BRPM | WEIGHT: 82 LBS | HEIGHT: 64 IN | OXYGEN SATURATION: 97 % | TEMPERATURE: 99 F | DIASTOLIC BLOOD PRESSURE: 67 MMHG | HEART RATE: 92 BPM | SYSTOLIC BLOOD PRESSURE: 130 MMHG

## 2023-01-13 NOTE — DISCHARGE INSTRUCTIONS

## 2023-01-13 NOTE — ED PROVIDER NOTES
Encounter Date: 1/12/2023       History     Chief Complaint   Patient presents with    Abdominal Pain     LUQ pain started today, hx of pancreatitis    Fatigue     HPI    62-year-old female with history of chronic pancreatitis, complicated by pancreatic pseudocyst with recent upper endoscopy with Dr. Grant a week ago presents now for 3 day history of midepigastric, nonradiating abdominal pain consistent prior bouts of pancreatitis.  She is tolerating p.o. but endorses some nausea.  She reports normal bowel movements.  She denies dysuria hematuria urgency or frequency.  She denies fevers or chills.    Review of patient's allergies indicates:   Allergen Reactions    No known drug allergies      Past Medical History:   Diagnosis Date    Acute biliary pancreatitis 6/14/2022    Anxiety     Chronic pancreatitis 1/2/2023    Digestive disorder     Flu 02/2017    [facility]    Hypertension     Long-term use of immunosuppressant medication 6/10/2022    Rheumatoid arthritis involving multiple sites with positive rheumatoid factor 01/14/2021     Past Surgical History:   Procedure Laterality Date    COLONOSCOPY N/A 2/26/2021    Procedure: COLONOSCOPY;  Surgeon: Amy iSdhu MD;  Location: [facility] ENDO;  Service: Endoscopy;  Laterality: N/A;    ENDOSCOPIC ULTRASOUND OF UPPER GASTROINTESTINAL TRACT N/A 7/1/2022    Procedure: ULTRASOUND, UPPER GI TRACT, ENDOSCOPIC;  Surgeon: Donavon Jewell III, MD;  Location: [facility] ENDO;  Service: Endoscopy;  Laterality: N/A;    ENDOSCOPIC ULTRASOUND OF UPPER GASTROINTESTINAL TRACT N/A 11/29/2022    Procedure: ULTRASOUND, UPPER GI TRACT, ENDOSCOPIC;  Surgeon: Donavon Jewell III, MD;  Location: [facility] ENDO;  Service: Endoscopy;  Laterality: N/A;    ENDOSCOPIC ULTRASOUND OF UPPER GASTROINTESTINAL TRACT N/A 1/3/2023    Procedure: ULTRASOUND, UPPER GI TRACT, ENDOSCOPIC;  Surgeon: Donavon Jewell III, MD;  Location: [facility] ENDO;  Service: Endoscopy;  Laterality: N/A;    ERCP N/A  12/30/2022    Procedure: ERCP (ENDOSCOPIC RETROGRADE CHOLANGIOPANCREATOGRAPHY);  Surgeon: Donavon Jewell III, MD;  Location: [facility] ENDO;  Service: Endoscopy;  Laterality: N/A;    ESOPHAGOGASTRODUODENOSCOPY N/A 2/26/2021    Procedure: EGD (ESOPHAGOGASTRODUODENOSCOPY);  Surgeon: Amy Sidhu MD;  Location: [facility] ENDO;  Service: Endoscopy;  Laterality: N/A;    LAPAROSCOPIC CHOLECYSTECTOMY N/A 7/27/2022    Procedure: CHOLECYSTECTOMY, LAPAROSCOPIC;  Surgeon: Ramón Hwang III, MD;  Location: [facility] OR;  Service: General;  Laterality: N/A;    TUBAL LIGATION       Family History   Problem Relation Age of Onset    Diabetes Mother     Heart disease Mother     Kidney disease Mother     Hypertension Mother     Stroke Mother     Hearing loss Mother     COPD Father     Liver disease Paternal Grandfather     Alcohol abuse Paternal Grandfather     Liver disease Sister     Emphysema Brother     COPD Brother     Sleep apnea Brother     No Known Problems Son     Alcohol abuse Paternal Grandmother     Cirrhosis Paternal Grandmother     Heart disease Maternal Aunt     Diabetes Maternal Aunt     Cancer Maternal Aunt         female    Heart disease Maternal Uncle     Hypertension Maternal Uncle     No Known Problems Paternal Uncle     Psoriasis Neg Hx     Lupus Neg Hx     Eczema Neg Hx     Melanoma Neg Hx      Social History     Tobacco Use    Smoking status: Every Day     Packs/day: 1.00     Years: 7.00     Pack years: 7.00     Types: Cigarettes    Smokeless tobacco: Never    Tobacco comments:     [phone]   Substance Use Topics    Alcohol use: No    Drug use: Never     Review of Systems   Constitutional:  Negative for fever.   HENT:  Negative for sore throat.    Respiratory:  Negative for shortness of breath.    Cardiovascular:  Negative for chest pain.   Gastrointestinal:  Positive for abdominal pain and nausea.   Genitourinary:  Negative for dysuria.   Musculoskeletal:  Negative for back pain.   Skin:  Negative for  rash.   Neurological:  Negative for weakness.   Hematological:  Does not bruise/bleed easily.     Physical Exam     Initial Vitals   BP Pulse Resp Temp SpO2   01/12/23 1852 01/12/23 1851 01/12/23 1851 01/12/23 1851 01/12/23 1851   (!) 80/54 103 16 98.6 °F (37 °C) 97 %      MAP       --                Physical Exam    Nursing note and vitals reviewed.  Constitutional: She appears well-developed and well-nourished. She is not diaphoretic.   HENT:   Head: Normocephalic and atraumatic.   Mouth/Throat: Oropharynx is clear and moist.   Eyes: EOM are normal. Pupils are equal, round, and reactive to light. Right eye exhibits no discharge. Left eye exhibits no discharge.   Neck: No tracheal deviation present.   Normal range of motion.  Cardiovascular:  Normal rate, regular rhythm and intact distal pulses.           Pulmonary/Chest: No respiratory distress. She has no wheezes. She exhibits no tenderness.   Abdominal: Abdomen is soft. She exhibits no distension. There is abdominal tenderness.   Midepigastric tenderness to palpation without rebound or guarding There is no rebound and no guarding.   Musculoskeletal:         General: No tenderness or edema. Normal range of motion.      Cervical back: Normal range of motion.     Neurological: She is alert and oriented to person, place, and time. She has normal strength. No cranial nerve deficit or sensory deficit. GCS eye subscore is 4. GCS verbal subscore is 5. GCS motor subscore is 6.   Skin: Skin is warm and dry. No rash noted.   Psychiatric: She has a normal mood and affect. Her behavior is normal. Thought content normal.       ED Course   Procedures  Labs Reviewed   CBC W/ AUTO DIFFERENTIAL - Abnormal; Notable for the following components:       Result Value    WBC 21.99 (*)     RBC 2.81 (*)     Hemoglobin 8.5 (*)     Hematocrit 26.1 (*)     RDW 16.0 (*)     Platelets 484 (*)     MPV 8.7 (*)     Immature Granulocytes 0.7 (*)     Gran # (ANC) 20.2 (*)     Immature Grans (Abs)  0.15 (*)     Lymph # 0.9 (*)     Gran % 91.8 (*)     Lymph % 4.2 (*)     Mono % 3.0 (*)     All other components within normal limits   COMPREHENSIVE METABOLIC PANEL - Abnormal; Notable for the following components:    Sodium 130 (*)     Potassium 3.4 (*)     Chloride 94 (*)     Total Protein 5.5 (*)     Albumin 2.1 (*)     ALT 9 (*)     All other components within normal limits   LIPASE - Abnormal; Notable for the following components:    Lipase 102 (*)     All other components within normal limits   URINALYSIS, REFLEX TO URINE CULTURE - Abnormal; Notable for the following components:    Protein, UA 1+ (*)     All other components within normal limits    Narrative:     Specimen Source->Urine   URINALYSIS MICROSCOPIC - Abnormal; Notable for the following components:    Hyaline Casts, UA 52 (*)     All other components within normal limits    Narrative:     Specimen Source->Urine   MAGNESIUM          Imaging Results              CT Abdomen Pelvis With Contrast (Final result)  Result time 01/12/23 22:40:17      Final result by Shefali Alonso MD (01/12/23 22:40:17)                   Narrative:    EXAM:  CT Abdomen and Pelvis With Intravenous Contrast    CLINICAL HISTORY:  The patient is 62 years old and is Female; Abdominal abscess/infection suspected    TECHNIQUE:  Axial computed tomography images of the abdomen and pelvis with intravenous contrast.  Sagittal and coronal reformatted images were created and reviewed.  This CT exam was performed using one or more of the following dose reduction techniques:  automated exposure control, adjustment of the mA and/or kV according to patient size, and/or use of iterative reconstruction technique.    COMPARISON:  CT of the abdomen and pelvis January 2, 2023    FINDINGS:  LUNG BASES:  Minimal emphysematous changes of the visualized lung bases is noted.  PLEURAL SPACE:  Small left pleural effusion is present, new from prior exam.    ABDOMEN:  LIVER:  Unremarkable.  No  mass.  GALLBLADDER AND BILE DUCTS:  Surgical clips are present in the right upper quadrant, consistent with previous cholecystectomy.  A stent traversing the ampulla extending into duodenum is noted. The appearance is unchanged from prior exam.  PANCREAS:  Multiple loculated peripherally enhancing fluid collections are noted specifically within the bilateral upper quadrants, left greater than right. The largest collection is located near the tail the pancreas measuring grossly 10.5 x 2.0 cm on coronal image 28 compared to prior measurement of 6.7 x 7.2 cm on coronal images 28. The pancreatic body and tail is not well visualized. The pancreatic head and uncinate process are grossly. Collection within the head pancreas is also noted.  SPLEEN:  Unremarkable.  ADRENALS:  Unremarkable.  No mass.  KIDNEYS AND URETERS:  Bilateral exophytic renal cysts. No follow-up imaging is recommended. The kidneys enhance symmetrically. There is no hydronephrosis or hydroureter of either kidney.  STOMACH AND BOWEL:  Diffuse mucosal thickening involving the stomach is present. The stomach is decompressed. Several small bowel loops are fluid-filled and normal in caliber. The majority the colon is not well distended. Minimal stool is noted distally. There is no evidence of obstruction.    PELVIS:  APPENDIX:  The appendix is normal in caliber without surrounding inflammation.  BLADDER:  The bladder is minimally distended.  REPRODUCTIVE:  Unremarkable as visualized.    ABDOMEN and PELVIS:  INTRAPERITONEAL SPACE:  Large volume ascites is present throughout the abdomen and pelvis.  No free air.  BONES/JOINTS:  No acute fracture. Multilevel degenerative change of the spine is present.  SOFT TISSUES:  The soft tissues are normal.  VASCULATURE:  Unremarkable.  No abdominal aortic aneurysm.  LYMPH NODES:  Unremarkable. No enlarged lymph nodes.    IMPRESSION:  1.  Interval increase in size of several of the peripherally enhancing loculated fluid  collections within the left upper quadrant within and surrounding the pancreas is noted.  2.  Extensive mucosal thickening involving the stomach which appears grossly similar to prior exam.  3.  Persistent large volume ascites.  4.  Chronic findings as detailed above.    Electronically signed by:  Shefali Alonso MD  1/12/2023 10:40 PM [facility] Workstation: [id number]                                     X-Ray Chest AP Portable (Final result)  Result time 01/12/23 19:36:01      Final result by Raymundo Ramos MD (01/12/23 19:36:01)                   Impression:      No acute cardiopulmonary abnormality.      Electronically signed by: Raymundo Ramos MD  Date:    01/12/2023  Time:    19:36               Narrative:    EXAMINATION:  XR CHEST AP PORTABLE    CLINICAL HISTORY:  Unspecified abdominal pain    FINDINGS:  Portable chest at 1924 compared with 07/25/2022 shows normal cardiomediastinal silhouette.    Lungs are clear. Pulmonary vasculature is normal. No acute osseous abnormality.                                       Medications   sodium chloride 0.9% bolus 1,000 mL 1,000 mL (0 mLs Intravenous Stopped 1/12/23 2040)   HYDROmorphone injection 0.5 mg (0.5 mg Intravenous Given 1/12/23 1940)   ondansetron injection 4 mg (4 mg Intravenous Given 1/12/23 1940)   iohexoL (OMNIPAQUE 350) injection 100 mL (100 mLs Intravenous Given 1/12/23 2151)                 ED Course as of 01/13/23 0442   Thu Jan 12, 2023   1060 Case discussed Dr. Addison [BS]      ED Course User Index  [BS] Renato Talbert MD               62-year-old female with history of pancreatitis presents for midepigastric abdominal pain consistent prior bouts of pancreatitis.  Patient is pleasant, chronically ill but nontoxic-appearing. Abdomen mildly tender to palpation w/o rebound or guarding. Vitals initially notable for mildly decreased blood pressure, elevated heart rate..  Labs notable for mild elevation in lipase, leukocytosis of 22 with left shift concerning for  pancreatitis versus acute infection.  CMP w/o evidence of anion gap, notable for mild dehydration.  CT abdomen pelvis notable for re-collection of pancreatic pseudocyst that is simple appearing on imaging. Patient given fluid bolus, pain control with Dilaudid with improvement in symptoms.  Patient is requesting water and tolerating p.o. Case discussed with Gastroenterology, Dr. Addison, recommended managing disposition based upon pain control.  I discussed all the findings with the patient advised her that she does not likely have an acute life-threatening cause of her abdominal pain given her acute exacerbation of chronic pancreatitis and discussed whether to stay overnight of the hospital or go home with pain control, outpatient follow-up with her gastroenterologist.  Given her pain has completely resolved, she elected to go home with pain control.  Stable for discharge with outpatient follow-up and return precautions.      Clinical Impression:   Final diagnoses:  [R10.9] Abdominal pain  [K86.1] Chronic pancreatitis, unspecified pancreatitis type (Primary)  [K86.3] Pancreatic pseudocyst        ED Disposition Condition    Discharge Stable          ED Prescriptions       Medication Sig Dispense Start Date End Date Auth. Provider    oxyCODONE-acetaminophen (PERCOCET) 5-325 mg per tablet Take 1 tablet by mouth every 6 (six) hours as needed for Pain. 7 tablet 1/12/2023 1/17/2023 Renato Talbert MD          Follow-up Information       Follow up With Specialties Details Why Contact Info    Donavon Jewell III, MD Gastroenterology Call in 1 day To recheck today's symptoms [id number] [facility] [id number]  [phone]               Renato Talbert MD  01/13/23 4047

## 2023-01-13 NOTE — FIRST PROVIDER EVALUATION
"Medical screening examination initiated.  I have conducted a focused provider triage encounter, findings are as follows:    Brief history of present illness:  Recently admitted for pancreatitis. Began having upper abdominal pain again today. Denies fever. Reports generalized weakness.     Vitals:    01/12/23 1851 01/12/23 1852   BP:  (!) 80/54   Pulse: 103    Resp: 16    Temp: 98.6 °F (37 °C)    TempSrc: Oral    SpO2: 97%    Weight: 37.2 kg (82 lb)    Height: 5' 4" (1.626 m)        Pertinent physical exam:  Diffuse upper abdominal pain.   Hypotensive    Brief workup plan:  Labs. Will need ED bed      Preliminary workup initiated; this workup will be continued and followed by the physician or advanced practice provider that is assigned to the patient when roomed.  "

## 2023-01-15 ENCOUNTER — HOSPITAL ENCOUNTER (INPATIENT)
Facility: HOSPITAL | Age: 63
LOS: 7 days | Discharge: ANOTHER HOSPITAL | DRG: 439 | End: 2023-01-22
Attending: EMERGENCY MEDICINE | Admitting: HOSPITALIST
Payer: MEDICAID

## 2023-01-15 DIAGNOSIS — R18.8 PANCREATIC ASCITES: ICD-10-CM

## 2023-01-15 DIAGNOSIS — R10.12 LEFT UPPER QUADRANT ABDOMINAL PAIN: Primary | ICD-10-CM

## 2023-01-15 DIAGNOSIS — R18.8 OTHER ASCITES: ICD-10-CM

## 2023-01-15 LAB
ALBUMIN SERPL BCP-MCNC: 1.7 G/DL (ref 3.5–5.2)
ALP SERPL-CCNC: 86 U/L (ref 55–135)
ALT SERPL W/O P-5'-P-CCNC: 10 U/L (ref 10–44)
ANION GAP SERPL CALC-SCNC: 12 MMOL/L (ref 8–16)
AST SERPL-CCNC: 14 U/L (ref 10–40)
BACTERIA #/AREA URNS HPF: NEGATIVE /HPF
BASOPHILS NFR BLD: 0 % (ref 0–1.9)
BILIRUB SERPL-MCNC: 0.8 MG/DL (ref 0.1–1)
BILIRUB UR QL STRIP: NEGATIVE
BUN SERPL-MCNC: 14 MG/DL (ref 8–23)
CALCIUM SERPL-MCNC: 9.3 MG/DL (ref 8.7–10.5)
CHLORIDE SERPL-SCNC: 94 MMOL/L (ref 95–110)
CLARITY UR: CLEAR
CO2 SERPL-SCNC: 25 MMOL/L (ref 23–29)
COLOR UR: YELLOW
CREAT SERPL-MCNC: 0.8 MG/DL (ref 0.5–1.4)
DIFFERENTIAL METHOD: ABNORMAL
EOSINOPHIL NFR BLD: 0 % (ref 0–8)
ERYTHROCYTE [DISTWIDTH] IN BLOOD BY AUTOMATED COUNT: 16.2 % (ref 11.5–14.5)
EST. GFR  (NO RACE VARIABLE): >60 ML/MIN/1.73 M^2
GLUCOSE SERPL-MCNC: 86 MG/DL (ref 70–110)
GLUCOSE UR QL STRIP: NEGATIVE
HCT VFR BLD AUTO: 28.9 % (ref 37–48.5)
HGB BLD-MCNC: 9.3 G/DL (ref 12–16)
HGB UR QL STRIP: ABNORMAL
HYALINE CASTS #/AREA URNS LPF: 20 /LPF
IMM GRANULOCYTES # BLD AUTO: ABNORMAL K/UL (ref 0–0.04)
IMM GRANULOCYTES NFR BLD AUTO: ABNORMAL % (ref 0–0.5)
KETONES UR QL STRIP: ABNORMAL
LEUKOCYTE ESTERASE UR QL STRIP: NEGATIVE
LIPASE SERPL-CCNC: 59 U/L (ref 4–60)
LYMPHOCYTES NFR BLD: 16 % (ref 18–48)
MCH RBC QN AUTO: 30 PG (ref 27–31)
MCHC RBC AUTO-ENTMCNC: 32.2 G/DL (ref 32–36)
MCV RBC AUTO: 93 FL (ref 82–98)
MICROSCOPIC COMMENT: ABNORMAL
MONOCYTES NFR BLD: 5 % (ref 4–15)
NEUTROPHILS NFR BLD: 70 % (ref 38–73)
NEUTS BAND NFR BLD MANUAL: 9 %
NITRITE UR QL STRIP: NEGATIVE
NRBC BLD-RTO: 0 /100 WBC
PH UR STRIP: 7 [PH] (ref 5–8)
PLATELET # BLD AUTO: 602 K/UL (ref 150–450)
PLATELET BLD QL SMEAR: ABNORMAL
PMV BLD AUTO: 10.1 FL (ref 9.2–12.9)
POTASSIUM SERPL-SCNC: 4 MMOL/L (ref 3.5–5.1)
PROT SERPL-MCNC: 5.2 G/DL (ref 6–8.4)
PROT UR QL STRIP: ABNORMAL
RBC # BLD AUTO: 3.1 M/UL (ref 4–5.4)
RBC #/AREA URNS HPF: 6 /HPF (ref 0–4)
SODIUM SERPL-SCNC: 131 MMOL/L (ref 136–145)
SP GR UR STRIP: >1.03 (ref 1–1.03)
SQUAMOUS #/AREA URNS HPF: 30 /HPF
URN SPEC COLLECT METH UR: ABNORMAL
UROBILINOGEN UR STRIP-ACNC: NEGATIVE EU/DL
WBC # BLD AUTO: 4.14 K/UL (ref 3.9–12.7)
WBC #/AREA URNS HPF: 21 /HPF (ref 0–5)

## 2023-01-15 PROCEDURE — 83690 ASSAY OF LIPASE: CPT | Performed by: EMERGENCY MEDICINE

## 2023-01-15 PROCEDURE — 25000003 PHARM REV CODE 250: Performed by: EMERGENCY MEDICINE

## 2023-01-15 PROCEDURE — 63600175 PHARM REV CODE 636 W HCPCS: Performed by: EMERGENCY MEDICINE

## 2023-01-15 PROCEDURE — 96361 HYDRATE IV INFUSION ADD-ON: CPT

## 2023-01-15 PROCEDURE — 80053 COMPREHEN METABOLIC PANEL: CPT | Performed by: EMERGENCY MEDICINE

## 2023-01-15 PROCEDURE — 96375 TX/PRO/DX INJ NEW DRUG ADDON: CPT

## 2023-01-15 PROCEDURE — 85027 COMPLETE CBC AUTOMATED: CPT | Performed by: EMERGENCY MEDICINE

## 2023-01-15 PROCEDURE — 85007 BL SMEAR W/DIFF WBC COUNT: CPT | Performed by: EMERGENCY MEDICINE

## 2023-01-15 PROCEDURE — 96365 THER/PROPH/DIAG IV INF INIT: CPT

## 2023-01-15 PROCEDURE — 96367 TX/PROPH/DG ADDL SEQ IV INF: CPT

## 2023-01-15 PROCEDURE — 87086 URINE CULTURE/COLONY COUNT: CPT | Performed by: EMERGENCY MEDICINE

## 2023-01-15 PROCEDURE — 99285 EMERGENCY DEPT VISIT HI MDM: CPT | Mod: 25

## 2023-01-15 PROCEDURE — 12000002 HC ACUTE/MED SURGE SEMI-PRIVATE ROOM

## 2023-01-15 PROCEDURE — 81001 URINALYSIS AUTO W/SCOPE: CPT | Performed by: EMERGENCY MEDICINE

## 2023-01-15 PROCEDURE — 25500020 PHARM REV CODE 255: Performed by: EMERGENCY MEDICINE

## 2023-01-15 RX ORDER — SODIUM CHLORIDE 9 MG/ML
INJECTION, SOLUTION INTRAVENOUS CONTINUOUS
Status: DISCONTINUED | OUTPATIENT
Start: 2023-01-15 | End: 2023-01-22 | Stop reason: HOSPADM

## 2023-01-15 RX ORDER — SODIUM CHLORIDE 9 MG/ML
1000 INJECTION, SOLUTION INTRAVENOUS
Status: COMPLETED | OUTPATIENT
Start: 2023-01-15 | End: 2023-01-15

## 2023-01-15 RX ORDER — ONDANSETRON 2 MG/ML
4 INJECTION INTRAMUSCULAR; INTRAVENOUS
Status: COMPLETED | OUTPATIENT
Start: 2023-01-15 | End: 2023-01-15

## 2023-01-15 RX ORDER — CEFEPIME HYDROCHLORIDE 1 G/50ML
1 INJECTION, SOLUTION INTRAVENOUS
Status: DISCONTINUED | OUTPATIENT
Start: 2023-01-15 | End: 2023-01-21

## 2023-01-15 RX ORDER — OXYCODONE AND ACETAMINOPHEN 5; 325 MG/1; MG/1
1 TABLET ORAL
Status: COMPLETED | OUTPATIENT
Start: 2023-01-15 | End: 2023-01-15

## 2023-01-15 RX ORDER — TRAZODONE HYDROCHLORIDE 50 MG/1
50 TABLET ORAL ONCE
Status: COMPLETED | OUTPATIENT
Start: 2023-01-15 | End: 2023-01-15

## 2023-01-15 RX ORDER — CEFEPIME HYDROCHLORIDE 1 G/50ML
1 INJECTION, SOLUTION INTRAVENOUS
Status: DISCONTINUED | OUTPATIENT
Start: 2023-01-15 | End: 2023-01-15

## 2023-01-15 RX ADMIN — VANCOMYCIN HYDROCHLORIDE 500 MG: 500 INJECTION, POWDER, LYOPHILIZED, FOR SOLUTION INTRAVENOUS at 06:01

## 2023-01-15 RX ADMIN — IOHEXOL 100 ML: 350 INJECTION, SOLUTION INTRAVENOUS at 01:01

## 2023-01-15 RX ADMIN — TRAZODONE HYDROCHLORIDE 50 MG: 50 TABLET ORAL at 09:01

## 2023-01-15 RX ADMIN — SODIUM CHLORIDE 1000 ML: 0.9 INJECTION, SOLUTION INTRAVENOUS at 05:01

## 2023-01-15 RX ADMIN — CEFEPIME HYDROCHLORIDE 1 G: 1 INJECTION, SOLUTION INTRAVENOUS at 05:01

## 2023-01-15 RX ADMIN — OXYCODONE AND ACETAMINOPHEN 1 TABLET: 325; 5 TABLET ORAL at 09:01

## 2023-01-15 RX ADMIN — SODIUM CHLORIDE: 0.9 INJECTION, SOLUTION INTRAVENOUS at 01:01

## 2023-01-15 RX ADMIN — ONDANSETRON 4 MG: 2 INJECTION INTRAMUSCULAR; INTRAVENOUS at 02:01

## 2023-01-15 NOTE — ED NOTES
Resting comfortably. Denies pain at this time. Requested nausea medicine- will notify doctor.  at bedside.

## 2023-01-15 NOTE — ED PROVIDER NOTES
Encounter Date: 1/15/2023  Chief Complaint   Patient presents with    Abdominal Pain      62-year-old female who has a prior history of depression, hypertension, hyperlipidemia, and pancreatitis, presents emergency room with complaints of having mostly left-sided abdominal pain similar to that she experienced a prior pancreatitis.  Patient admits being anorectic and say she is lost 45 lb since July of last year.  The patient states she is had some chills and sweats.  No dysphagia.  She admits that eating makes her symptoms worse.  She is had nausea but no vomiting.  No trouble urinating.  She does complain of some left flank pain.  Her last normal bowel movement was yesterday.  The patient has had no cough but subjective shortness of breath mostly when the pain is worse.  No cardiac history no palpitations.  She has had a prior cholecystectomy.  There is no history of any alcohol use.  No history of hyperlipidemia.  She does smoke.     Review of Systems   Constitutional:  Positive for appetite change. Negative for chills, diaphoresis, fatigue and fever.   HENT: Negative.  Negative for sore throat.    Respiratory: Negative.  Negative for shortness of breath.    Cardiovascular:  Negative for chest pain, palpitations and leg swelling.   Gastrointestinal:  Positive for abdominal pain and nausea. Negative for vomiting.   Genitourinary:  Positive for flank pain. Negative for difficulty urinating, dysuria and hematuria.   Musculoskeletal:  Negative for back pain.   Skin:  Negative for pallor and rash.   Neurological:  Negative for weakness.   Hematological:  Does not bruise/bleed easily.   All other systems reviewed and are negative.     Physical Exam             Initial Vitals [01/15/23 1129]   BP Pulse Resp Temp SpO2   (!) 81/54 (!) 118 20 -- 97 %       MAP           --              Physical Exam     Vitals reviewed.  Constitutional: She appears well-developed and well-nourished. She is not diaphoretic. No distress.    Appears uncomfortable   HENT:   Head: Normocephalic and atraumatic.   Nose: Nose normal.   Mouth/Throat: Oropharynx is clear and moist. No oropharyngeal exudate.   Eyes: Conjunctivae are normal. Pupils are equal, round, and reactive to light.   Neck: Neck supple. No JVD present.   Normal range of motion.  Cardiovascular:  Normal rate, regular rhythm, normal heart sounds and intact distal pulses.     Exam reveals no gallop and no friction rub.       No murmur heard.  Pulmonary/Chest: Breath sounds normal. No respiratory distress. She has no wheezes. She has no rhonchi. She has no rales. She exhibits no tenderness.   Abdominal: Abdomen is soft. Bowel sounds are normal. She exhibits no distension. There is abdominal tenderness. There is guarding. There is no rebound.   Musculoskeletal:         General: No tenderness or edema. Normal range of motion.      Cervical back: Normal range of motion and neck supple.      Lymphadenopathy:     She has no cervical adenopathy.   Neurological: She is alert and oriented to person, place, and time. She has normal strength. GCS score is 15. GCS eye subscore is 4. GCS verbal subscore is 5. GCS motor subscore is 6.   Skin: Skin is warm and dry. Capillary refill takes less than 2 seconds. No rash noted. No erythema. No pallor.   Psychiatric: She has a normal mood and affect. Her behavior is normal. Judgment and thought content normal.         ED Course   Procedures  Labs Reviewed   POCT GLUCOSE            Imaging Results    None            Medications - No data to display     History     Chief Complaint   Patient presents with    Abdominal Pain     This 62-year-old female has had a history of pancreatitis and pancreatic cyst requiring drainage and who has had also problems with ascites, presented with complaints of left-sided abdominal pain today with nausea without vomiting or fever.  Diagnostic studies done today showed some mild anemia.  Lipase is normal and remainder of the  chemistries unremarkable including LFTs.  The patient had a CT scan of the abdomen and pelvis which showed multiloculated fluid collection left jo ann given abdomen which appeared unchanged from prior studies.  There was a large volume ascites.  The patient also has some small bilateral pleural fluid collections suggestive of effusions and associated atelectasis.  During the ED course I did speak to Dr. Alysia mccartney and he felt that the patient may have the large pancreatic fluid collection with suspected PD duct leakage.  He requested the patient be transferred to [facility].  The transfer center was contacted and Dr. John LUGO in the advanced Endo section has accepted the patient in transfer.    Review of patient's allergies indicates:   Allergen Reactions    No known drug allergies      Past Medical History:   Diagnosis Date    Acute biliary pancreatitis 6/14/2022    Anxiety     Chronic pancreatitis 1/2/2023    Digestive disorder     Flu 02/2017    [facility]    Hypertension     Long-term use of immunosuppressant medication 6/10/2022    Rheumatoid arthritis involving multiple sites with positive rheumatoid factor 01/14/2021     Past Surgical History:   Procedure Laterality Date    COLONOSCOPY N/A 2/26/2021    Procedure: COLONOSCOPY;  Surgeon: Amy Sidhu MD;  Location: [facility] ENDO;  Service: Endoscopy;  Laterality: N/A;    ENDOSCOPIC ULTRASOUND OF UPPER GASTROINTESTINAL TRACT N/A 7/1/2022    Procedure: ULTRASOUND, UPPER GI TRACT, ENDOSCOPIC;  Surgeon: Donavon Jewell III, MD;  Location: [facility] ENDO;  Service: Endoscopy;  Laterality: N/A;    ENDOSCOPIC ULTRASOUND OF UPPER GASTROINTESTINAL TRACT N/A 11/29/2022    Procedure: ULTRASOUND, UPPER GI TRACT, ENDOSCOPIC;  Surgeon: Donavon Jewell III, MD;  Location: [facility] ENDO;  Service: Endoscopy;  Laterality: N/A;    ENDOSCOPIC ULTRASOUND OF UPPER GASTROINTESTINAL TRACT N/A 1/3/2023    Procedure: ULTRASOUND, UPPER GI TRACT, ENDOSCOPIC;   Surgeon: Donavon Jewell III, MD;  Location: [facility] ENDO;  Service: Endoscopy;  Laterality: N/A;    ERCP N/A 12/30/2022    Procedure: ERCP (ENDOSCOPIC RETROGRADE CHOLANGIOPANCREATOGRAPHY);  Surgeon: Donavon Jewell III, MD;  Location: [facility] ENDO;  Service: Endoscopy;  Laterality: N/A;    ESOPHAGOGASTRODUODENOSCOPY N/A 2/26/2021    Procedure: EGD (ESOPHAGOGASTRODUODENOSCOPY);  Surgeon: Amy Sidhu MD;  Location: [facility] ENDO;  Service: Endoscopy;  Laterality: N/A;    LAPAROSCOPIC CHOLECYSTECTOMY N/A 7/27/2022    Procedure: CHOLECYSTECTOMY, LAPAROSCOPIC;  Surgeon: Ramón Hwang III, MD;  Location: [facility] OR;  Service: General;  Laterality: N/A;    TUBAL LIGATION       Family History   Problem Relation Age of Onset    Diabetes Mother     Heart disease Mother     Kidney disease Mother     Hypertension Mother     Stroke Mother     Hearing loss Mother     COPD Father     Liver disease Paternal Grandfather     Alcohol abuse Paternal Grandfather     Liver disease Sister     Emphysema Brother     COPD Brother     Sleep apnea Brother     No Known Problems Son     Alcohol abuse Paternal Grandmother     Cirrhosis Paternal Grandmother     Heart disease Maternal Aunt     Diabetes Maternal Aunt     Cancer Maternal Aunt         female    Heart disease Maternal Uncle     Hypertension Maternal Uncle     No Known Problems Paternal Uncle     Psoriasis Neg Hx     Lupus Neg Hx     Eczema Neg Hx     Melanoma Neg Hx      Social History     Tobacco Use    Smoking status: Every Day     Packs/day: 1.00     Years: 7.00     Pack years: 7.00     Types: Cigarettes    Smokeless tobacco: Never    Tobacco comments:     [phone]   Substance Use Topics    Alcohol use: No    Drug use: Never     Review of Systems    Physical Exam     Initial Vitals   BP Pulse Resp Temp SpO2   01/15/23 1136 01/15/23 1136 01/15/23 1136 01/15/23 1222 01/15/23 1136   (!) 81/54 (!) 118 20 97.7 °F (36.5 °C) 97 %      MAP       --                Physical  Exam    ED Course   Procedures  Labs Reviewed   CBC W/ AUTO DIFFERENTIAL - Abnormal; Notable for the following components:       Result Value    RBC 3.10 (*)     Hemoglobin 9.3 (*)     Hematocrit 28.9 (*)     RDW 16.2 (*)     Platelets 602 (*)     Lymph % 16.0 (*)     Platelet Estimate Increased (*)     All other components within normal limits   COMPREHENSIVE METABOLIC PANEL - Abnormal; Notable for the following components:    Sodium 131 (*)     Chloride 94 (*)     Total Protein 5.2 (*)     Albumin 1.7 (*)     All other components within normal limits   LIPASE   URINALYSIS, REFLEX TO URINE CULTURE   POCT CREATININE          Imaging Results              CT Abdomen Pelvis With Contrast (Final result)  Result time 01/15/23 14:23:05      Final result by Binu Arriola MD (01/15/23 14:23:05)                   Narrative:    CMS MANDATED QUALITY DATA - CT RADIATION - 436    All CT scans at this facility utilize dose modulation, iterative reconstruction, and/or weight based dosing when appropriate to reduce radiation dose to as low as reasonably achievable.        Reason: Abdominal pain, acute, nonlocalized    TECHNIQUE: CT abdomen and pelvis with 100 mL Omnipaque 350.    COMPARISON: CT abdomen and pelvis January 12, 2023.    FINDINGS:    Small bilateral pleural effusions with overlying compressive atelectasis again noted. Heart size is normal.    The liver is normal size. No gross hepatic lesion identified. The spleen is unchanged. Gallbladder has been removed. The adrenal glands are unremarkable. Large bilateral renal cysts again noted. No hydronephrosis. The ureters are well-seen. Bladder is fluid-filled with no focal wall abnormalities. Uterus is grossly unremarkable.    Large volume ascites noted, similar to previous exam. There is redemonstration of multiloculated rim-enhancing fluid collections of the left abdomen not significantly changed when compared with previous exam.    Large and small bowel are normal  caliber. Diffuse distal small bowel wall thickening demonstrated. This finding is similar to previous exam. The stomach is decompressed.    Abdominal aorta is normal caliber with atherosclerosis. There is no acute osseous abnormality.    IMPRESSION:    1.  Multiloculated fluid collections in the left hemiabdomen are unchanged.  2.  Large volume ascites.  3.  Small bilateral pleural effusions with overlying passive atelectasis.    Electronically signed by:  Binu Arriola DO  1/15/2023 2:23 PM CST Workstation: [id number]                                     Medications   0.9%  NaCl infusion ( Intravenous New Bag 1/15/23 1330)   iohexoL (OMNIPAQUE 350) injection 100 mL (100 mLs Intravenous Given 1/15/23 1354)   ondansetron injection 4 mg (4 mg Intravenous Given 1/15/23 1433)                              Clinical Impression:   Final diagnoses:  [R10.12] Left upper quadrant abdominal pain (Primary)  [R18.8] Other ascites        ED Disposition Condition    Transfer to Another Facility Stable                Domingo Thompson Jr., MD  01/15/23 5295

## 2023-01-15 NOTE — ED TRIAGE NOTES
"Pt c/o persistent abd pain to left side with decrease po intake x " months" with new onset weakness, lethargy seen here on Thursday for abd pain ; SBP 80s rechecked x 3  "

## 2023-01-15 NOTE — PROGRESS NOTES
Pharmacist Renal Dose Adjustment Note    Claire Bowser is a 62 y.o. female being treated with the medication cefepime    Patient Data:    Vital Signs (Most Recent):  Temp: 97.7 °F (36.5 °C) (01/15/23 1222)  Pulse: 91 (01/15/23 1650)  Resp: 15 (01/15/23 1650)  BP: (!) 126/59 (01/15/23 1650)  SpO2: 98 % (01/15/23 1650)   Vital Signs (72h Range):  Temp:  [97.7 °F (36.5 °C)]   Pulse:  []   Resp:  [14-20]   BP: ()/(54-68)   SpO2:  [97 %-99 %]      Recent Labs   Lab 01/12/23  1921 01/15/23  1230   CREATININE 0.6 0.8     Serum creatinine: 0.8 mg/dL 01/15/23 1230  Estimated creatinine clearance: 42.3 mL/min    Per [facility] renal dosing protocol:     Previous Order: cefepime 1 g Q 8 hours    Will be changed to:     New Order:Q 12 hours,    Due to: crcl 30-60 ml/min    Renal dose adjustments performed as noted above.    We will continue monitoring and adjusting as necessary.    Pharmacist: Nain Gilbert, PharmD  Ext: [phone]

## 2023-01-16 PROBLEM — R18.8 ASCITES: Status: ACTIVE | Noted: 2023-01-16

## 2023-01-16 PROBLEM — K86.2 PANCREATIC CYST: Status: ACTIVE | Noted: 2023-01-16

## 2023-01-16 LAB
ALBUMIN SERPL BCP-MCNC: 1.5 G/DL (ref 3.5–5.2)
ALP SERPL-CCNC: 57 U/L (ref 55–135)
ALT SERPL W/O P-5'-P-CCNC: 9 U/L (ref 10–44)
ANION GAP SERPL CALC-SCNC: 10 MMOL/L (ref 8–16)
AST SERPL-CCNC: 9 U/L (ref 10–40)
BILIRUB DIRECT SERPL-MCNC: 0.2 MG/DL (ref 0.1–0.3)
BILIRUB SERPL-MCNC: 0.5 MG/DL (ref 0.1–1)
BUN SERPL-MCNC: 19 MG/DL (ref 8–23)
CALCIUM SERPL-MCNC: 8.7 MG/DL (ref 8.7–10.5)
CHLORIDE SERPL-SCNC: 100 MMOL/L (ref 95–110)
CO2 SERPL-SCNC: 23 MMOL/L (ref 23–29)
CREAT SERPL-MCNC: 0.9 MG/DL (ref 0.5–1.4)
ERYTHROCYTE [DISTWIDTH] IN BLOOD BY AUTOMATED COUNT: 16.3 % (ref 11.5–14.5)
EST. GFR  (NO RACE VARIABLE): >60 ML/MIN/1.73 M^2
GLUCOSE SERPL-MCNC: 65 MG/DL (ref 70–110)
HCT VFR BLD AUTO: 27.9 % (ref 37–48.5)
HGB BLD-MCNC: 9 G/DL (ref 12–16)
LIPASE SERPL-CCNC: 47 U/L (ref 4–60)
MAGNESIUM SERPL-MCNC: 1.6 MG/DL (ref 1.6–2.6)
MCH RBC QN AUTO: 30 PG (ref 27–31)
MCHC RBC AUTO-ENTMCNC: 32.3 G/DL (ref 32–36)
MCV RBC AUTO: 93 FL (ref 82–98)
PLATELET # BLD AUTO: 457 K/UL (ref 150–450)
PMV BLD AUTO: 9.4 FL (ref 9.2–12.9)
POTASSIUM SERPL-SCNC: 3.8 MMOL/L (ref 3.5–5.1)
PROT SERPL-MCNC: 4.3 G/DL (ref 6–8.4)
RBC # BLD AUTO: 3 M/UL (ref 4–5.4)
SODIUM SERPL-SCNC: 133 MMOL/L (ref 136–145)
WBC # BLD AUTO: 7.86 K/UL (ref 3.9–12.7)

## 2023-01-16 PROCEDURE — 83690 ASSAY OF LIPASE: CPT | Performed by: STUDENT IN AN ORGANIZED HEALTH CARE EDUCATION/TRAINING PROGRAM

## 2023-01-16 PROCEDURE — 25000003 PHARM REV CODE 250: Performed by: STUDENT IN AN ORGANIZED HEALTH CARE EDUCATION/TRAINING PROGRAM

## 2023-01-16 PROCEDURE — S4991 NICOTINE PATCH NONLEGEND: HCPCS | Performed by: STUDENT IN AN ORGANIZED HEALTH CARE EDUCATION/TRAINING PROGRAM

## 2023-01-16 PROCEDURE — 12000002 HC ACUTE/MED SURGE SEMI-PRIVATE ROOM

## 2023-01-16 PROCEDURE — 83735 ASSAY OF MAGNESIUM: CPT | Performed by: STUDENT IN AN ORGANIZED HEALTH CARE EDUCATION/TRAINING PROGRAM

## 2023-01-16 PROCEDURE — 80076 HEPATIC FUNCTION PANEL: CPT | Performed by: STUDENT IN AN ORGANIZED HEALTH CARE EDUCATION/TRAINING PROGRAM

## 2023-01-16 PROCEDURE — 85027 COMPLETE CBC AUTOMATED: CPT | Performed by: STUDENT IN AN ORGANIZED HEALTH CARE EDUCATION/TRAINING PROGRAM

## 2023-01-16 PROCEDURE — 36415 COLL VENOUS BLD VENIPUNCTURE: CPT | Performed by: STUDENT IN AN ORGANIZED HEALTH CARE EDUCATION/TRAINING PROGRAM

## 2023-01-16 PROCEDURE — 80048 BASIC METABOLIC PNL TOTAL CA: CPT | Performed by: STUDENT IN AN ORGANIZED HEALTH CARE EDUCATION/TRAINING PROGRAM

## 2023-01-16 PROCEDURE — 63600175 PHARM REV CODE 636 W HCPCS: Performed by: STUDENT IN AN ORGANIZED HEALTH CARE EDUCATION/TRAINING PROGRAM

## 2023-01-16 RX ORDER — DRONABINOL 2.5 MG/1
2.5 CAPSULE ORAL
Status: DISCONTINUED | OUTPATIENT
Start: 2023-01-16 | End: 2023-01-22 | Stop reason: HOSPADM

## 2023-01-16 RX ORDER — CYCLOBENZAPRINE HCL 5 MG
5 TABLET ORAL NIGHTLY
Status: DISCONTINUED | OUTPATIENT
Start: 2023-01-16 | End: 2023-01-22 | Stop reason: HOSPADM

## 2023-01-16 RX ORDER — ONDANSETRON 2 MG/ML
4 INJECTION INTRAMUSCULAR; INTRAVENOUS EVERY 6 HOURS PRN
Status: DISCONTINUED | OUTPATIENT
Start: 2023-01-16 | End: 2023-01-22 | Stop reason: HOSPADM

## 2023-01-16 RX ORDER — IBUPROFEN 200 MG
1 TABLET ORAL DAILY
Status: DISCONTINUED | OUTPATIENT
Start: 2023-01-16 | End: 2023-01-22 | Stop reason: HOSPADM

## 2023-01-16 RX ORDER — TALC
6 POWDER (GRAM) TOPICAL NIGHTLY PRN
Status: DISCONTINUED | OUTPATIENT
Start: 2023-01-16 | End: 2023-01-22 | Stop reason: HOSPADM

## 2023-01-16 RX ORDER — PANTOPRAZOLE SODIUM 40 MG/1
40 TABLET, DELAYED RELEASE ORAL DAILY
Status: DISCONTINUED | OUTPATIENT
Start: 2023-01-16 | End: 2023-01-22 | Stop reason: HOSPADM

## 2023-01-16 RX ORDER — FOLIC ACID 1 MG/1
1 TABLET ORAL DAILY
Status: DISCONTINUED | OUTPATIENT
Start: 2023-01-16 | End: 2023-01-22 | Stop reason: HOSPADM

## 2023-01-16 RX ORDER — SERTRALINE HYDROCHLORIDE 50 MG/1
50 TABLET, FILM COATED ORAL NIGHTLY
Status: DISCONTINUED | OUTPATIENT
Start: 2023-01-16 | End: 2023-01-22 | Stop reason: HOSPADM

## 2023-01-16 RX ORDER — MORPHINE SULFATE 2 MG/ML
2 INJECTION, SOLUTION INTRAMUSCULAR; INTRAVENOUS EVERY 4 HOURS PRN
Status: DISCONTINUED | OUTPATIENT
Start: 2023-01-16 | End: 2023-01-22 | Stop reason: HOSPADM

## 2023-01-16 RX ORDER — METOPROLOL SUCCINATE 25 MG/1
25 TABLET, EXTENDED RELEASE ORAL DAILY
Status: DISCONTINUED | OUTPATIENT
Start: 2023-01-16 | End: 2023-01-22 | Stop reason: HOSPADM

## 2023-01-16 RX ORDER — SODIUM CHLORIDE 0.9 % (FLUSH) 0.9 %
3 SYRINGE (ML) INJECTION EVERY 6 HOURS PRN
Status: DISCONTINUED | OUTPATIENT
Start: 2023-01-16 | End: 2023-01-22 | Stop reason: HOSPADM

## 2023-01-16 RX ORDER — OXYCODONE AND ACETAMINOPHEN 5; 325 MG/1; MG/1
1 TABLET ORAL EVERY 6 HOURS PRN
Status: DISCONTINUED | OUTPATIENT
Start: 2023-01-16 | End: 2023-01-22 | Stop reason: HOSPADM

## 2023-01-16 RX ORDER — TRAZODONE HYDROCHLORIDE 50 MG/1
50 TABLET ORAL NIGHTLY
Status: DISCONTINUED | OUTPATIENT
Start: 2023-01-16 | End: 2023-01-22 | Stop reason: HOSPADM

## 2023-01-16 RX ORDER — LEFLUNOMIDE 20 MG/1
20 TABLET ORAL DAILY
Status: DISCONTINUED | OUTPATIENT
Start: 2023-01-16 | End: 2023-01-22 | Stop reason: HOSPADM

## 2023-01-16 RX ADMIN — TRAZODONE HYDROCHLORIDE 50 MG: 50 TABLET ORAL at 09:01

## 2023-01-16 RX ADMIN — CEFEPIME HYDROCHLORIDE 1 G: 1 INJECTION, SOLUTION INTRAVENOUS at 05:01

## 2023-01-16 RX ADMIN — PANCRELIPASE 2 CAPSULE: 60000; 12000; 38000 CAPSULE, DELAYED RELEASE PELLETS ORAL at 09:01

## 2023-01-16 RX ADMIN — METOPROLOL SUCCINATE 25 MG: 25 TABLET, FILM COATED, EXTENDED RELEASE ORAL at 09:01

## 2023-01-16 RX ADMIN — SODIUM CHLORIDE: 0.9 INJECTION, SOLUTION INTRAVENOUS at 02:01

## 2023-01-16 RX ADMIN — NICOTINE 1 PATCH: 21 PATCH, EXTENDED RELEASE TRANSDERMAL at 09:01

## 2023-01-16 RX ADMIN — OXYCODONE AND ACETAMINOPHEN 1 TABLET: 5; 325 TABLET ORAL at 09:01

## 2023-01-16 RX ADMIN — CYCLOBENZAPRINE 5 MG: 5 TABLET, FILM COATED ORAL at 09:01

## 2023-01-16 RX ADMIN — SERTRALINE HYDROCHLORIDE 50 MG: 50 TABLET ORAL at 02:01

## 2023-01-16 RX ADMIN — PANCRELIPASE 2 CAPSULE: 60000; 12000; 38000 CAPSULE, DELAYED RELEASE PELLETS ORAL at 03:01

## 2023-01-16 RX ADMIN — VANCOMYCIN HYDROCHLORIDE 750 MG: 750 INJECTION, POWDER, LYOPHILIZED, FOR SOLUTION INTRAVENOUS at 05:01

## 2023-01-16 RX ADMIN — FOLIC ACID 1 MG: 1 TABLET ORAL at 09:01

## 2023-01-16 RX ADMIN — OXYCODONE AND ACETAMINOPHEN 1 TABLET: 5; 325 TABLET ORAL at 08:01

## 2023-01-16 RX ADMIN — SERTRALINE HYDROCHLORIDE 50 MG: 50 TABLET ORAL at 09:01

## 2023-01-16 RX ADMIN — PANTOPRAZOLE SODIUM 40 MG: 40 TABLET, DELAYED RELEASE ORAL at 05:01

## 2023-01-16 RX ADMIN — CYCLOBENZAPRINE 5 MG: 5 TABLET, FILM COATED ORAL at 02:01

## 2023-01-16 NOTE — NURSING
Patient here to floor from ER per cart and ER staff. Patient alert and oriented x4. Patient able to walk from cart to bed. Denies any pain, n/v. Stage 2 noted to spine. Mepilex applied. Picture in chart. Patient aware of plan of care. Call light in reach.

## 2023-01-16 NOTE — ED PROVIDER NOTES
Encounter Date: 1/15/2023       History     Chief Complaint   Patient presents with    Abdominal Pain     HPI  Review of patient's allergies indicates:   Allergen Reactions    No known drug allergies      Past Medical History:   Diagnosis Date    Acute biliary pancreatitis 6/14/2022    Anxiety     Chronic pancreatitis 1/2/2023    Digestive disorder     Flu 02/2017    [facility]    Hypertension     Long-term use of immunosuppressant medication 6/10/2022    Rheumatoid arthritis involving multiple sites with positive rheumatoid factor 01/14/2021     Past Surgical History:   Procedure Laterality Date    COLONOSCOPY N/A 2/26/2021    Procedure: COLONOSCOPY;  Surgeon: Amy Sidhu MD;  Location: [facility] ENDO;  Service: Endoscopy;  Laterality: N/A;    ENDOSCOPIC ULTRASOUND OF UPPER GASTROINTESTINAL TRACT N/A 7/1/2022    Procedure: ULTRASOUND, UPPER GI TRACT, ENDOSCOPIC;  Surgeon: Donavon Jewell III, MD;  Location: [facility];  Service: Endoscopy;  Laterality: N/A;    ENDOSCOPIC ULTRASOUND OF UPPER GASTROINTESTINAL TRACT N/A 11/29/2022    Procedure: ULTRASOUND, UPPER GI TRACT, ENDOSCOPIC;  Surgeon: Donavon Jewell III, MD;  Location: [facility] ENDO;  Service: Endoscopy;  Laterality: N/A;    ENDOSCOPIC ULTRASOUND OF UPPER GASTROINTESTINAL TRACT N/A 1/3/2023    Procedure: ULTRASOUND, UPPER GI TRACT, ENDOSCOPIC;  Surgeon: Donavon Jewell III, MD;  Location: [facility] ENDO;  Service: Endoscopy;  Laterality: N/A;    ERCP N/A 12/30/2022    Procedure: ERCP (ENDOSCOPIC RETROGRADE CHOLANGIOPANCREATOGRAPHY);  Surgeon: Donavon Jewell III, MD;  Location: [facility];  Service: Endoscopy;  Laterality: N/A;    ESOPHAGOGASTRODUODENOSCOPY N/A 2/26/2021    Procedure: EGD (ESOPHAGOGASTRODUODENOSCOPY);  Surgeon: Amy Sidhu MD;  Location: [facility] ENDO;  Service: Endoscopy;  Laterality: N/A;    LAPAROSCOPIC CHOLECYSTECTOMY N/A 7/27/2022    Procedure: CHOLECYSTECTOMY, LAPAROSCOPIC;  Surgeon: Ramón Hwang III, MD;   Location: [facility] OR;  Service: General;  Laterality: N/A;    TUBAL LIGATION       Family History   Problem Relation Age of Onset    Diabetes Mother     Heart disease Mother     Kidney disease Mother     Hypertension Mother     Stroke Mother     Hearing loss Mother     COPD Father     Liver disease Paternal Grandfather     Alcohol abuse Paternal Grandfather     Liver disease Sister     Emphysema Brother     COPD Brother     Sleep apnea Brother     No Known Problems Son     Alcohol abuse Paternal Grandmother     Cirrhosis Paternal Grandmother     Heart disease Maternal Aunt     Diabetes Maternal Aunt     Cancer Maternal Aunt         female    Heart disease Maternal Uncle     Hypertension Maternal Uncle     No Known Problems Paternal Uncle     Psoriasis Neg Hx     Lupus Neg Hx     Eczema Neg Hx     Melanoma Neg Hx      Social History     Tobacco Use    Smoking status: Every Day     Packs/day: 1.00     Years: 7.00     Pack years: 7.00     Types: Cigarettes    Smokeless tobacco: Never    Tobacco comments:     [phone]   Substance Use Topics    Alcohol use: No    Drug use: Never     Review of Systems    Physical Exam     Initial Vitals   BP Pulse Resp Temp SpO2   01/15/23 1136 01/15/23 1136 01/15/23 1136 01/15/23 1222 01/15/23 1136   (!) 81/54 (!) 118 20 97.7 °F (36.5 °C) 97 %      MAP       --                Physical Exam    ED Course   Procedures  Labs Reviewed   CBC W/ AUTO DIFFERENTIAL - Abnormal; Notable for the following components:       Result Value    RBC 3.10 (*)     Hemoglobin 9.3 (*)     Hematocrit 28.9 (*)     RDW 16.2 (*)     Platelets 602 (*)     Lymph % 16.0 (*)     Platelet Estimate Increased (*)     All other components within normal limits   COMPREHENSIVE METABOLIC PANEL - Abnormal; Notable for the following components:    Sodium 131 (*)     Chloride 94 (*)     Total Protein 5.2 (*)     Albumin 1.7 (*)     All other components within normal limits   URINALYSIS, REFLEX TO URINE CULTURE - Abnormal;  Notable for the following components:    Specific Gravity, UA >1.030 (*)     Protein, UA 1+ (*)     Ketones, UA Trace (*)     Occult Blood UA Trace (*)     All other components within normal limits    Narrative:     Specimen Source->Urine   URINALYSIS MICROSCOPIC - Abnormal; Notable for the following components:    RBC, UA 6 (*)     WBC, UA 21 (*)     Hyaline Casts, UA 20 (*)     All other components within normal limits    Narrative:     Specimen Source->Urine   CULTURE, URINE   LIPASE   POCT CREATININE          Imaging Results              CT Abdomen Pelvis With Contrast (Final result)  Result time 01/15/23 14:23:05      Final result by Binu Arriola MD (01/15/23 14:23:05)                   Narrative:    CMS MANDATED QUALITY DATA - CT RADIATION - 436    All CT scans at this facility utilize dose modulation, iterative reconstruction, and/or weight based dosing when appropriate to reduce radiation dose to as low as reasonably achievable.        Reason: Abdominal pain, acute, nonlocalized    TECHNIQUE: CT abdomen and pelvis with 100 mL Omnipaque 350.    COMPARISON: CT abdomen and pelvis January 12, 2023.    FINDINGS:    Small bilateral pleural effusions with overlying compressive atelectasis again noted. Heart size is normal.    The liver is normal size. No gross hepatic lesion identified. The spleen is unchanged. Gallbladder has been removed. The adrenal glands are unremarkable. Large bilateral renal cysts again noted. No hydronephrosis. The ureters are well-seen. Bladder is fluid-filled with no focal wall abnormalities. Uterus is grossly unremarkable.    Large volume ascites noted, similar to previous exam. There is redemonstration of multiloculated rim-enhancing fluid collections of the left abdomen not significantly changed when compared with previous exam.    Large and small bowel are normal caliber. Diffuse distal small bowel wall thickening demonstrated. This finding is similar to previous exam. The stomach  is decompressed.    Abdominal aorta is normal caliber with atherosclerosis. There is no acute osseous abnormality.    IMPRESSION:    1.  Multiloculated fluid collections in the left hemiabdomen are unchanged.  2.  Large volume ascites.  3.  Small bilateral pleural effusions with overlying passive atelectasis.    Electronically signed by:  Binu Arriola DO  1/15/2023 2:23 PM CST Workstation: [id number]                                     Medications   0.9%  NaCl infusion (150 mL/hr Intravenous Restarted 1/15/23 1920)   vancomycin - pharmacy to dose (has no administration in time range)   cefepime in dextrose 5 % 1 gram/50 mL IVPB 1 g (0 g Intravenous Stopped 1/15/23 1754)   oxyCODONE-acetaminophen 5-325 mg per tablet 1 tablet (has no administration in time range)   traZODone tablet 50 mg (has no administration in time range)   iohexoL (OMNIPAQUE 350) injection 100 mL (100 mLs Intravenous Given 1/15/23 1354)   ondansetron injection 4 mg (4 mg Intravenous Given 1/15/23 1433)   0.9%  NaCl infusion (0 mLs Intravenous Stopped 1/15/23 1920)   vancomycin 500 mg in dextrose 5 % 100 mL IVPB (ready to mix system) (0 mg Intravenous Stopped 1/15/23 1920)     Medical Decision Making:   History:   Old Medical Records: I decided to obtain old medical records.  Clinical Tests:   Lab Tests: Reviewed  Radiological Study: Reviewed  ED Management:  Patient was scheduled for transfer.  Patient needs gastroenterology specialty intervention.  [facility] is on diversion.  No other facility capable of performing this type of intervention.  Discussed with patient's gastroenterologist, .  He continues to report no real intervention can be done here.  Best course of action is admit patient here for her comfort for continued care pending transfer.  Hospitalist consulted for admission.                        Clinical Impression:   Final diagnoses:  [R10.12] Left upper quadrant abdominal pain (Primary)  [R18.8] Other  ascites        ED Disposition Condition    Admit Stable                Donavon Cat MD  01/15/23 6944

## 2023-01-16 NOTE — ED NOTES
Spoke with Gilma from [facility], [facility] is on diversion. Still awaiting bed assignment. In order to optimize medical management, the system has made a decision that our hospital medical team should admit the patient until a bed becomes available. States that patient will not lose her place in line.   ER MDs made aware of situation.

## 2023-01-16 NOTE — PROGRESS NOTES
Reviewed the H&P  Continue current care  GI evaluation pending  Transfer to main [place] when bed available

## 2023-01-16 NOTE — SUBJECTIVE & OBJECTIVE
Past Medical History:   Diagnosis Date    Acute biliary pancreatitis 6/14/2022    Anxiety     Chronic pancreatitis 1/2/2023    Digestive disorder     Flu 02/2017    [facility]    Hypertension     Long-term use of immunosuppressant medication 6/10/2022    Rheumatoid arthritis involving multiple sites with positive rheumatoid factor 01/14/2021       Past Surgical History:   Procedure Laterality Date    COLONOSCOPY N/A 2/26/2021    Procedure: COLONOSCOPY;  Surgeon: Amy Sidhu MD;  Location: [facility] ENDO;  Service: Endoscopy;  Laterality: N/A;    ENDOSCOPIC ULTRASOUND OF UPPER GASTROINTESTINAL TRACT N/A 7/1/2022    Procedure: ULTRASOUND, UPPER GI TRACT, ENDOSCOPIC;  Surgeon: Donavon Jewell III, MD;  Location: [facility] ENDO;  Service: Endoscopy;  Laterality: N/A;    ENDOSCOPIC ULTRASOUND OF UPPER GASTROINTESTINAL TRACT N/A 11/29/2022    Procedure: ULTRASOUND, UPPER GI TRACT, ENDOSCOPIC;  Surgeon: Donavon Jewell III, MD;  Location: [facility] ENDO;  Service: Endoscopy;  Laterality: N/A;    ENDOSCOPIC ULTRASOUND OF UPPER GASTROINTESTINAL TRACT N/A 1/3/2023    Procedure: ULTRASOUND, UPPER GI TRACT, ENDOSCOPIC;  Surgeon: Donavon Jewell III, MD;  Location: [facility] ENDO;  Service: Endoscopy;  Laterality: N/A;    ERCP N/A 12/30/2022    Procedure: ERCP (ENDOSCOPIC RETROGRADE CHOLANGIOPANCREATOGRAPHY);  Surgeon: Donavon Jewell III, MD;  Location: [facility] ENDO;  Service: Endoscopy;  Laterality: N/A;    ESOPHAGOGASTRODUODENOSCOPY N/A 2/26/2021    Procedure: EGD (ESOPHAGOGASTRODUODENOSCOPY);  Surgeon: Amy Sidhu MD;  Location: [facility] ENDO;  Service: Endoscopy;  Laterality: N/A;    LAPAROSCOPIC CHOLECYSTECTOMY N/A 7/27/2022    Procedure: CHOLECYSTECTOMY, LAPAROSCOPIC;  Surgeon: Ramón Hwang III, MD;  Location: [facility] OR;  Service: General;  Laterality: N/A;    TUBAL LIGATION         Review of patient's allergies indicates:   Allergen Reactions    No known drug allergies        No current  facility-administered medications on file prior to encounter.     Current Outpatient Medications on File Prior to Encounter   Medication Sig    apixaban (ELIQUIS) 5 mg Tab Take 1 tablet (5 mg total) by mouth 2 (two) times daily.    cefUROXime (CEFTIN) 500 MG tablet Take 1 tablet (500 mg total) by mouth 2 (two) times a day. for 10 days    cholecalciferol, vitamin D3, (VITAMIN D3) 10 mcg (400 unit) Tab Take 400 Units by mouth once daily.    CREON 36,000-114,000- 180,000 unit CpDR Take 2 capsules by mouth 3 (three) times daily.    cyclobenzaprine (FLEXERIL) 5 MG tablet Take 5 mg by mouth nightly.    dronabinoL (MARINOL) 2.5 MG capsule Take 1 capsule (2.5 mg total) by mouth daily with dinner or evening meal.    folic acid (FOLVITE) 1 MG tablet Take 1 tablet (1 mg total) by mouth once daily.    leflunomide (ARAVA) 20 MG Tab Take 1 tablet by mouth once daily (Patient taking differently: Take 20 mg by mouth once daily.)    metoprolol succinate (TOPROL-XL) 50 MG 24 hr tablet Take 0.5 tablets (25 mg total) by mouth once daily. 1/2 tab qd    oxyCODONE-acetaminophen (PERCOCET) 5-325 mg per tablet Take 1 tablet by mouth every 6 (six) hours as needed for Pain.    pantoprazole (PROTONIX) 40 MG tablet Take 1 tablet (40 mg total) by mouth once daily.    potassium gluconate 600 mg (99 mg) Tab Take 1 tablet by mouth once daily.    predniSONE (DELTASONE) 5 MG tablet Take 1 tablet (5 mg total) by mouth once daily.    sertraline (ZOLOFT) 50 MG tablet Take 1 tablet (50 mg total) by mouth every evening.    traZODone (DESYREL) 50 MG tablet Take 1 tablet (50 mg total) by mouth every evening.     Family History       Problem Relation (Age of Onset)    Alcohol abuse Paternal Grandfather, Paternal Grandmother    COPD Father, Brother    Cancer Maternal Aunt    Cirrhosis Paternal Grandmother    Diabetes Mother, Maternal Aunt    Emphysema Brother    Hearing loss Mother    Heart disease Mother, Maternal Aunt, Maternal Uncle    Hypertension Mother,  Maternal Uncle    Kidney disease Mother    Liver disease Paternal Grandfather, Sister    No Known Problems Son, Paternal Uncle    Sleep apnea Brother    Stroke Mother          Tobacco Use    Smoking status: Every Day     Packs/day: 1.00     Years: 7.00     Pack years: 7.00     Types: Cigarettes    Smokeless tobacco: Never    Tobacco comments:     [phone]   Substance and Sexual Activity    Alcohol use: No    Drug use: Never    Sexual activity: Yes     Partners: Male     Review of Systems   Constitutional:  Positive for chills, fatigue and fever.   HENT:  Negative for hearing loss and sore throat.    Eyes:  Negative for pain and redness.   Respiratory:  Negative for cough and shortness of breath.    Cardiovascular:  Negative for chest pain and palpitations.   Gastrointestinal:  Positive for abdominal pain and nausea.   Endocrine: Negative for polydipsia and polyuria.   Musculoskeletal:  Positive for back pain. Negative for gait problem.        Weakness   Skin:  Negative for rash and wound.   Neurological:  Negative for dizziness and headaches.   Psychiatric/Behavioral:  Negative for confusion and hallucinations.    Objective:     Vital Signs (Most Recent):  Temp: 97.7 °F (36.5 °C) (01/15/23 1222)  Pulse: 97 (01/15/23 2330)  Resp: 15 (01/15/23 2330)  BP: 104/67 (01/15/23 2330)  SpO2: 98 % (01/15/23 2330) Vital Signs (24h Range):  Temp:  [97.7 °F (36.5 °C)] 97.7 °F (36.5 °C)  Pulse:  [] 97  Resp:  [14-20] 15  SpO2:  [97 %-99 %] 98 %  BP: ()/(54-71) 104/67     Weight: 36.3 kg (80 lb)  Body mass index is 13.73 kg/m².    Physical Exam  Constitutional:       Appearance: She is ill-appearing.   HENT:      Head: Normocephalic and atraumatic.   Eyes:      Extraocular Movements: Extraocular movements intact.      Conjunctiva/sclera: Conjunctivae normal.   Cardiovascular:      Rate and Rhythm: Normal rate and regular rhythm.   Pulmonary:      Breath sounds: No wheezing or rhonchi.   Abdominal:      General:  There is distension.      Tenderness: There is abdominal tenderness.   Musculoskeletal:      Cervical back: Neck supple. No tenderness.      Right lower leg: No edema.      Left lower leg: No edema.      Comments: Moving extremities weakly   Neurological:      General: No focal deficit present.      Mental Status: She is alert and oriented to person, place, and time.           Significant Labs: All pertinent labs within the past 24 hours have been reviewed.    Significant Imaging: I have reviewed all pertinent imaging results/findings within the past 24 hours.

## 2023-01-16 NOTE — PROGRESS NOTES
VANCOMYCIN PHARMACOKINETIC NOTE:  Vancomycin Day # 1    Objective/Assessment:    Diagnosis/Indication for Vancomycin:Intra-abdominal infection      62 y.o., female; Actual Body Weight = 44.5 kg (98 lb 1.7 oz).    The patient has the following labs:  1/16/2023 Estimated Creatinine Clearance: 51.9 mL/min (based on SCr of 0.8 mg/dL). Lab Results   Component Value Date    BUN 14 01/15/2023     Lab Results   Component Value Date    WBC 4.14 01/15/2023          Plan:  Adjust vancomycin dose and/or frequency based on the patient's actual weight and renal function:  Initiate Vancomycin 750 mg IV every 24 hours following 500 mg loading dose ( ED weight : 36.3 kg).  Orders have been entered into patient's chart.        Vancomycin trough level has been ordered for 17:00 on 1/18     Pharmacy will manage vancomycin therapy, monitor serum vancomycin levels, monitor renal function and adjust regimen as necessary.    Thank you for allowing us to participate in this patient's care.     Zayra Ornelas 1/16/2023   Department of Pharmacy  Ext [phone]

## 2023-01-16 NOTE — HPI
61 yo F with PMH including chronic pancreatitis, pancreatic pseudocyst, RA, DVT on eliquis, tobacco use who presents for abdominal pain. Patient has been recently hospitalized as well as in the ED for repeated bouts of pain related to her chronic pancreatitis. She has been managed closely by GI Dr. Jewell. During hospitalization there was concerns for pancreatic duct / cyst leakage s/p recent ERCP and progression of cysts w/ air in cyst. Patient underwent EUS w/ FNA w/ drainage of about 150cc fluid and improvement in WBC w/ abx. She also had paracentesis w/ additional fluid removal. Patient states she would have some relief after the various management for her abdomen but then the pain would return, radiate to her back, and would be associated with nausea/vomiting with patient barely eating. She also endorses subjective fevers and chills. CT showed multiloculated cysts and large volume ascites. In the ED, ED physician discussed case with Dr. Jewell who recommended transfer. They spoke with [facility] accepted by Dr. John LUGO in advanced endo for transfer. Due to patient's prolonged ED stay and updated from [facility] that no beds are imminently available, hospitalist team to admit.

## 2023-01-16 NOTE — PLAN OF CARE
[facility]  Initial Discharge Assessment       Primary Care Provider: Samuel Brady MD    Admission Diagnosis: Left upper quadrant abdominal pain [R10.12]  Pancreatic ascites [R18.8]    Admission Date: 1/15/2023  Expected Discharge Date:     Discharge Barriers Identified: None    CM met with Pt at bedside to complete discharge assessment. Info verified on facesheet as correct. Pt has [place] Medicaid but it does not provide medication coverage. She has been using [facility] to afford some of her medications. Plan is to return home upon discharge with spouse providing transport. CM to follow.     Payor: MEDICAID / Plan: MEDICAID OF [place] / Product Type: Government /     Extended Emergency Contact Information  Primary Emergency Contact: NielsRobin  Address: [address] [place] [address] [facility]  Home Phone: [phone]  Mobile Phone: [phone]  Relation: Spouse  Preferred language: English   needed? No    Discharge Plan A: Home with family  Discharge Plan B: Home with family      [facility] [address] [place] [id number] [address]  [address] [facility] [address]  Phone: [phone] Fax: [phone]      Initial Assessment (most recent)       Adult Discharge Assessment - 01/16/23 1118          Discharge Assessment    Assessment Type Discharge Planning Assessment     Confirmed/corrected address, phone number and insurance Yes     Confirmed Demographics Correct on Facesheet     Source of Information patient     Communicated RASHI with patient/caregiver Date not available/Unable to determine     Reason For Admission Pancreatic cyst     People in Home spouse     Facility Arrived From: home     Do you expect to return to your current living situation? Yes     Do you have help at home or someone to help you manage your care at home? Yes     Who are your caregiver(s) and their phone number(s)? Robin Bowser [phone]     Prior to hospitilization cognitive  status: Alert/Oriented     Current cognitive status: Alert/Oriented     Equipment Currently Used at Home none     Readmission within 30 days? Yes     Patient currently being followed by outpatient case management? No     Do you currently have service(s) that help you manage your care at home? No     Do you take prescription medications? Yes     Do you have prescription coverage? No     Do you have any problems affording any of your prescribed medications? Yes     If yes, what medications? Creon and Eliquis     Is the patient taking medications as prescribed? yes     Who is going to help you get home at discharge? Robin Bowser     How do you get to doctors appointments? family or friend will provide     Are you on dialysis? No     Do you take coumadin? No     Discharge Plan A Home with family     Discharge Plan B Home with family     DME Needed Upon Discharge  none     Discharge Plan discussed with: Patient     Discharge Barriers Identified None        Food Insecurity    Within the past 12 months, the food you bought just didn't last and you didn't have money to get more. Patient refused        Stress    Do you feel stress - tense, restless, nervous, or anxious, or unable to sleep at night because your mind is troubled all the time - these days? Patient refused        Social Connections    In a typical week, how many times do you talk on the phone with family, friends, or neighbors? Patient refused     How often do you get together with friends or relatives? Patient refused     How often do you attend Sikhism or Shinto services? Never     Do you belong to any clubs or organizations such as Sikhism groups, unions, fraternal or athletic groups, or school groups? No     How often do you attend meetings of the clubs or organizations you belong to? Never     Are you , , , , never , or living with a partner?         Alcohol Use    Q1: How often do you have a drink containing  alcohol? Never     Q2: How many drinks containing alcohol do you have on a typical day when you are drinking? Patient does not drink     Q3: How often do you have six or more drinks on one occasion? Never        OTHER    Name(s) of People in Home Robin Bowser

## 2023-01-16 NOTE — H&P
[facility] - [facility] Dept  Hospital Medicine  History & Physical    Patient Name: Claire Bowser  MRN: [id number]  Patient Class: IP- Inpatient  Admission Date: 1/15/2023  Attending Physician: Natalia Kelley MD   Primary Care Provider: Samuel Brady MD         Patient information was obtained from patient, past medical records and ER records.     Subjective:     Principal Problem:Pancreatic cyst    Chief Complaint:   Chief Complaint   Patient presents with    Abdominal Pain        HPI: 61 yo F with PMH including chronic pancreatitis, pancreatic pseudocyst, RA, DVT on eliquis, tobacco use who presents for abdominal pain. Patient has been recently hospitalized as well as in the ED for repeated bouts of pain related to her chronic pancreatitis. She has been managed closely by GI Dr. Jewell. During hospitalization there was concerns for pancreatic duct / cyst leakage s/p recent ERCP and progression of cysts w/ air in cyst. Patient underwent EUS w/ FNA w/ drainage of about 150cc fluid and improvement in WBC w/ abx. She also had paracentesis w/ additional fluid removal. Patient states she would have some relief after the various management for her abdomen but then the pain would return, radiate to her back, and would be associated with nausea/vomiting with patient barely eating. She also endorses subjective fevers and chills. CT showed multiloculated cysts and large volume ascites. In the ED, ED physician discussed case with Dr. Jewell who recommended transfer. They spoke with [facility] by Dr. John LUGO in advanced endo for transfer. Due to patient's prolonged ED stay and updated from [facility] that no beds are imminently available, hospitalist team to admit.       Past Medical History:   Diagnosis Date    Acute biliary pancreatitis 6/14/2022    Anxiety     Chronic pancreatitis 1/2/2023    Digestive disorder     Flu 02/2017    [facility]    Hypertension     Long-term  use of immunosuppressant medication 6/10/2022    Rheumatoid arthritis involving multiple sites with positive rheumatoid factor 01/14/2021       Past Surgical History:   Procedure Laterality Date    COLONOSCOPY N/A 2/26/2021    Procedure: COLONOSCOPY;  Surgeon: Amy Sidhu MD;  Location: [facility] ENDO;  Service: Endoscopy;  Laterality: N/A;    ENDOSCOPIC ULTRASOUND OF UPPER GASTROINTESTINAL TRACT N/A 7/1/2022    Procedure: ULTRASOUND, UPPER GI TRACT, ENDOSCOPIC;  Surgeon: Donavon Jewell III, MD;  Location: [facility] ENDO;  Service: Endoscopy;  Laterality: N/A;    ENDOSCOPIC ULTRASOUND OF UPPER GASTROINTESTINAL TRACT N/A 11/29/2022    Procedure: ULTRASOUND, UPPER GI TRACT, ENDOSCOPIC;  Surgeon: Donavon Jewell III, MD;  Location: [facility] ENDO;  Service: Endoscopy;  Laterality: N/A;    ENDOSCOPIC ULTRASOUND OF UPPER GASTROINTESTINAL TRACT N/A 1/3/2023    Procedure: ULTRASOUND, UPPER GI TRACT, ENDOSCOPIC;  Surgeon: Donavon Jewell III, MD;  Location: [facility] ENDO;  Service: Endoscopy;  Laterality: N/A;    ERCP N/A 12/30/2022    Procedure: ERCP (ENDOSCOPIC RETROGRADE CHOLANGIOPANCREATOGRAPHY);  Surgeon: Donavon Jewell III, MD;  Location: [facility] ENDO;  Service: Endoscopy;  Laterality: N/A;    ESOPHAGOGASTRODUODENOSCOPY N/A 2/26/2021    Procedure: EGD (ESOPHAGOGASTRODUODENOSCOPY);  Surgeon: Amy Sidhu MD;  Location: [facility] ENDO;  Service: Endoscopy;  Laterality: N/A;    LAPAROSCOPIC CHOLECYSTECTOMY N/A 7/27/2022    Procedure: CHOLECYSTECTOMY, LAPAROSCOPIC;  Surgeon: Ramón Hwang III, MD;  Location: [facility] OR;  Service: General;  Laterality: N/A;    TUBAL LIGATION         Review of patient's allergies indicates:   Allergen Reactions    No known drug allergies        No current facility-administered medications on file prior to encounter.     Current Outpatient Medications on File Prior to Encounter   Medication Sig    apixaban (ELIQUIS) 5 mg Tab Take 1 tablet (5 mg total) by mouth 2 (two) times  daily.    cefUROXime (CEFTIN) 500 MG tablet Take 1 tablet (500 mg total) by mouth 2 (two) times a day. for 10 days    cholecalciferol, vitamin D3, (VITAMIN D3) 10 mcg (400 unit) Tab Take 400 Units by mouth once daily.    CREON 36,000-114,000- 180,000 unit CpDR Take 2 capsules by mouth 3 (three) times daily.    cyclobenzaprine (FLEXERIL) 5 MG tablet Take 5 mg by mouth nightly.    dronabinoL (MARINOL) 2.5 MG capsule Take 1 capsule (2.5 mg total) by mouth daily with dinner or evening meal.    folic acid (FOLVITE) 1 MG tablet Take 1 tablet (1 mg total) by mouth once daily.    leflunomide (ARAVA) 20 MG Tab Take 1 tablet by mouth once daily (Patient taking differently: Take 20 mg by mouth once daily.)    metoprolol succinate (TOPROL-XL) 50 MG 24 hr tablet Take 0.5 tablets (25 mg total) by mouth once daily. 1/2 tab qd    oxyCODONE-acetaminophen (PERCOCET) 5-325 mg per tablet Take 1 tablet by mouth every 6 (six) hours as needed for Pain.    pantoprazole (PROTONIX) 40 MG tablet Take 1 tablet (40 mg total) by mouth once daily.    potassium gluconate 600 mg (99 mg) Tab Take 1 tablet by mouth once daily.    predniSONE (DELTASONE) 5 MG tablet Take 1 tablet (5 mg total) by mouth once daily.    sertraline (ZOLOFT) 50 MG tablet Take 1 tablet (50 mg total) by mouth every evening.    traZODone (DESYREL) 50 MG tablet Take 1 tablet (50 mg total) by mouth every evening.     Family History       Problem Relation (Age of Onset)    Alcohol abuse Paternal Grandfather, Paternal Grandmother    COPD Father, Brother    Cancer Maternal Aunt    Cirrhosis Paternal Grandmother    Diabetes Mother, Maternal Aunt    Emphysema Brother    Hearing loss Mother    Heart disease Mother, Maternal Aunt, Maternal Uncle    Hypertension Mother, Maternal Uncle    Kidney disease Mother    Liver disease Paternal Grandfather, Sister    No Known Problems Son, Paternal Uncle    Sleep apnea Brother    Stroke Mother          Tobacco Use    Smoking status:  Every Day     Packs/day: 1.00     Years: 7.00     Pack years: 7.00     Types: Cigarettes    Smokeless tobacco: Never    Tobacco comments:     [phone]   Substance and Sexual Activity    Alcohol use: No    Drug use: Never    Sexual activity: Yes     Partners: Male     Review of Systems   Constitutional:  Positive for chills, fatigue and fever.   HENT:  Negative for hearing loss and sore throat.    Eyes:  Negative for pain and redness.   Respiratory:  Negative for cough and shortness of breath.    Cardiovascular:  Negative for chest pain and palpitations.   Gastrointestinal:  Positive for abdominal pain and nausea.   Endocrine: Negative for polydipsia and polyuria.   Musculoskeletal:  Positive for back pain. Negative for gait problem.        Weakness   Skin:  Negative for rash and wound.   Neurological:  Negative for dizziness and headaches.   Psychiatric/Behavioral:  Negative for confusion and hallucinations.    Objective:     Vital Signs (Most Recent):  Temp: 97.7 °F (36.5 °C) (01/15/23 1222)  Pulse: 97 (01/15/23 2330)  Resp: 15 (01/15/23 2330)  BP: 104/67 (01/15/23 2330)  SpO2: 98 % (01/15/23 2330) Vital Signs (24h Range):  Temp:  [97.7 °F (36.5 °C)] 97.7 °F (36.5 °C)  Pulse:  [] 97  Resp:  [14-20] 15  SpO2:  [97 %-99 %] 98 %  BP: ()/(54-71) 104/67     Weight: 36.3 kg (80 lb)  Body mass index is 13.73 kg/m².    Physical Exam  Constitutional:       Appearance: She is ill-appearing.   HENT:      Head: Normocephalic and atraumatic.   Eyes:      Extraocular Movements: Extraocular movements intact.      Conjunctiva/sclera: Conjunctivae normal.   Cardiovascular:      Rate and Rhythm: Normal rate and regular rhythm.   Pulmonary:      Breath sounds: No wheezing or rhonchi.   Abdominal:      General: There is distension.      Tenderness: There is abdominal tenderness.   Musculoskeletal:      Cervical back: Neck supple. No tenderness.      Right lower leg: No edema.      Left lower leg: No edema.       Comments: Moving extremities weakly   Neurological:      General: No focal deficit present.      Mental Status: She is alert and oriented to person, place, and time.           Significant Labs: All pertinent labs within the past 24 hours have been reviewed.    Significant Imaging: I have reviewed all pertinent imaging results/findings within the past 24 hours.    Assessment/Plan:     Multiloculated cysts/ fluid collections within and surrounding pancreas  Ascites  Chronic pancreatitis  RA  DVT on eliquis  Tobacco use    -GI at our facility has exhausted facilities capabilities  -Transfer to [facility] requested  -Awaiting Bed  -Due to prolonged course, inpatient admission was decided while awaiting bed  -Continue antibiotics vanc, cefepime as ordered for now  -Pain control  -Nausea control  -Resume home meds   -Nicotine patch  -Patient states has had eliquis held previously without bridging  -Will hold eliquis in case of impending procedure   -GI consult in interim    FULL CODE  DVT ppx Holding eliquis due to procedure      Natalia Kelley MD  Department of Hospital Medicine   [facility] - Emergency Dept

## 2023-01-16 NOTE — PROVIDER TRANSFER
Outside Transfer Acceptance Note / Regional Referral Center    Referring facility: [facility]   Referring provider: DAYANA KEE JR  Accepting facility: [facility]  Accepting provider: YOON YANEZ  Reason for transfer: Higher Level of Care   Transfer diagnosis: Pancreatic Ascites with concern for pancreatic duct leak and infection  Transfer specialty requested: Pancreatic Billiary  Transfer specialty notified: no  Transfer level: NUMBER 1-5: 2  Isolation status: No active isolations   Admission class or status: IP- Inpatient    Narrative     60-year-old woman with a pertinent past hx rheumatoid arthritis, chronic pancreatitis, pancreatic pseudocyst, and severe protein calorie malnutrition (BMI 13.7) presented to [facility] today with abd pain.  The patient says that she has no appetite and has lost 45 Lbs over the past 6 months.    Patient admitted to [facility] on 12/30.  Patient underwent an ERCP with pancreatic sphincterotomy and placement of stent and a 1300 mL paracentesis.      Patient readmitted on 01/02 with abd pain, N/ V.  Imaging showed multifocal rim enhancing fluid collections in the upper abdomen. Patient was given broad-spectrum IV antibiotics and was transitioned to oral antibiotics and discharged on 1/6    On presentation today BP 81/54, , RR 20, SpO2 97%, T 97.7°.  Exam is pos for abd tenderness and guarding.    WBC 4.1, Hb 9.3, Plts 602, Na 131, K 4.0, Cr 0.8, total protein 5.2, albumin 1.7, lipase 59.      CT abd pelvis with contrast pos for multiloculated fluid collections in the left hemiabdomen that are unchanged, large volume ascites, small BL pleural effusions with overlying passive atelectasis.    Patient did not appear dry on exam but is likely third spacing She was started on gentle IVF which will be increased, and patient will be given broad spectrum antibiotics.      VS (1658) HR 91, /59.    Referring provider has spoken to [facility] who  will see the patient when she is transferred.  Transfer requested for [facility].  Transfer diagnosis:  Pancreatic ascites with concern for pancreatic duct leak and infection      Instructions      Raymundo Nation-  Admit to Hospital Medicine  Upon patient arrival to floor, please send SecureChat to [facility] or call extension [phone] (if no answer, do NOT leave a callback number after the beep, rather please send a SecureChat to [facility]), for Hospital Medicine admit team assignment and for additional admit orders for the patient.  Do not page the attending physician associated with the patient on arrival (this physician may not be on duty at the time of arrival).  Rather, always send a SecureChat to [facility] or call [phone] to reach the triage physician for orders and team assignment.

## 2023-01-17 ENCOUNTER — PATIENT MESSAGE (OUTPATIENT)
Dept: ADMINISTRATIVE | Facility: HOSPITAL | Age: 63
End: 2023-01-17
Payer: MEDICAID

## 2023-01-17 LAB
BACTERIA UR CULT: NORMAL
BACTERIA UR CULT: NORMAL

## 2023-01-17 PROCEDURE — S4991 NICOTINE PATCH NONLEGEND: HCPCS | Performed by: STUDENT IN AN ORGANIZED HEALTH CARE EDUCATION/TRAINING PROGRAM

## 2023-01-17 PROCEDURE — 25000003 PHARM REV CODE 250: Performed by: STUDENT IN AN ORGANIZED HEALTH CARE EDUCATION/TRAINING PROGRAM

## 2023-01-17 PROCEDURE — 63600175 PHARM REV CODE 636 W HCPCS: Performed by: STUDENT IN AN ORGANIZED HEALTH CARE EDUCATION/TRAINING PROGRAM

## 2023-01-17 PROCEDURE — 12000002 HC ACUTE/MED SURGE SEMI-PRIVATE ROOM

## 2023-01-17 RX ADMIN — OXYCODONE AND ACETAMINOPHEN 1 TABLET: 5; 325 TABLET ORAL at 11:01

## 2023-01-17 RX ADMIN — CEFEPIME HYDROCHLORIDE 1 G: 1 INJECTION, SOLUTION INTRAVENOUS at 05:01

## 2023-01-17 RX ADMIN — METOPROLOL SUCCINATE 25 MG: 25 TABLET, FILM COATED, EXTENDED RELEASE ORAL at 08:01

## 2023-01-17 RX ADMIN — PANCRELIPASE 2 CAPSULE: 60000; 12000; 38000 CAPSULE, DELAYED RELEASE PELLETS ORAL at 09:01

## 2023-01-17 RX ADMIN — NICOTINE 1 PATCH: 21 PATCH, EXTENDED RELEASE TRANSDERMAL at 08:01

## 2023-01-17 RX ADMIN — SERTRALINE HYDROCHLORIDE 50 MG: 50 TABLET ORAL at 09:01

## 2023-01-17 RX ADMIN — SODIUM CHLORIDE: 0.9 INJECTION, SOLUTION INTRAVENOUS at 07:01

## 2023-01-17 RX ADMIN — FOLIC ACID 1 MG: 1 TABLET ORAL at 08:01

## 2023-01-17 RX ADMIN — PANCRELIPASE 2 CAPSULE: 60000; 12000; 38000 CAPSULE, DELAYED RELEASE PELLETS ORAL at 03:01

## 2023-01-17 RX ADMIN — CYCLOBENZAPRINE 5 MG: 5 TABLET, FILM COATED ORAL at 09:01

## 2023-01-17 RX ADMIN — PANTOPRAZOLE SODIUM 40 MG: 40 TABLET, DELAYED RELEASE ORAL at 05:01

## 2023-01-17 RX ADMIN — TRAZODONE HYDROCHLORIDE 50 MG: 50 TABLET ORAL at 09:01

## 2023-01-17 RX ADMIN — VANCOMYCIN HYDROCHLORIDE 750 MG: 750 INJECTION, POWDER, LYOPHILIZED, FOR SOLUTION INTRAVENOUS at 06:01

## 2023-01-17 RX ADMIN — PANCRELIPASE 2 CAPSULE: 60000; 12000; 38000 CAPSULE, DELAYED RELEASE PELLETS ORAL at 08:01

## 2023-01-17 NOTE — CONSULTS
GASTROENTEROLOGY INPATIENT CONSULT NOTE  Patient Name: Claire Bowser  Patient MRN: [id number]  Patient : 1960    Admit Date: 1/15/2023  Service date: 2023    Reason for Consult: ascites, abdominal pain    PCP: Samuel Brady MD    Chief Complaint   Patient presents with    Abdominal Pain       HPI: Patient is a 62 y.o. female with PMHx  pancreatic duct leak with cyst formation status post recent ERCP with progression of cysts.  Recent EUS with FNA earlier this month.  Unfortunately patient continues to returned to the hospital with abdominal pain and ascites recurrence.  Plan for transfer to [facility] for higher level of care and  intervention    CT abd 1/15/23  IMPRESSION:     1.  Multiloculated fluid collections in the left hemiabdomen are unchanged.  2.  Large volume ascites.  3.  Small bilateral pleural effusions with overlying passive atelectasis.    Past Medical History:  Past Medical History:   Diagnosis Date    Acute biliary pancreatitis 2022    Anxiety     Chronic pancreatitis 2023    Digestive disorder     Flu 2017    [facility]    Hypertension     Long-term use of immunosuppressant medication 6/10/2022    Rheumatoid arthritis involving multiple sites with positive rheumatoid factor 2021        Past Surgical History:  Past Surgical History:   Procedure Laterality Date    COLONOSCOPY N/A 2021    Procedure: COLONOSCOPY;  Surgeon: Amy Sidhu MD;  Location: [facility];  Service: Endoscopy;  Laterality: N/A;    ENDOSCOPIC ULTRASOUND OF UPPER GASTROINTESTINAL TRACT N/A 2022    Procedure: ULTRASOUND, UPPER GI TRACT, ENDOSCOPIC;  Surgeon: Donavon Jewell III, MD;  Location: [facility];  Service: Endoscopy;  Laterality: N/A;    ENDOSCOPIC ULTRASOUND OF UPPER GASTROINTESTINAL TRACT N/A 2022    Procedure: ULTRASOUND, UPPER GI TRACT, ENDOSCOPIC;  Surgeon: Donavon Jewell III, MD;  Location: [facility] ENDO;  Service: Endoscopy;  Laterality: N/A;     ENDOSCOPIC ULTRASOUND OF UPPER GASTROINTESTINAL TRACT N/A 1/3/2023    Procedure: ULTRASOUND, UPPER GI TRACT, ENDOSCOPIC;  Surgeon: Donavon Jewell III, MD;  Location: [facility] ENDO;  Service: Endoscopy;  Laterality: N/A;    ERCP N/A 12/30/2022    Procedure: ERCP (ENDOSCOPIC RETROGRADE CHOLANGIOPANCREATOGRAPHY);  Surgeon: Donavon Jewell III, MD;  Location: [facility] ENDO;  Service: Endoscopy;  Laterality: N/A;    ESOPHAGOGASTRODUODENOSCOPY N/A 2/26/2021    Procedure: EGD (ESOPHAGOGASTRODUODENOSCOPY);  Surgeon: Amy Sidhu MD;  Location: [facility] ENDO;  Service: Endoscopy;  Laterality: N/A;    LAPAROSCOPIC CHOLECYSTECTOMY N/A 7/27/2022    Procedure: CHOLECYSTECTOMY, LAPAROSCOPIC;  Surgeon: Ramón Hwang III, MD;  Location: [facility] OR;  Service: General;  Laterality: N/A;    TUBAL LIGATION          Home Medications:  Medications Prior to Admission   Medication Sig Dispense Refill Last Dose    apixaban (ELIQUIS) 5 mg Tab Take 1 tablet (5 mg total) by mouth 2 (two) times daily. 60 tablet 0 1/15/2023    cefUROXime (CEFTIN) 500 MG tablet Take 1 tablet (500 mg total) by mouth 2 (two) times a day. for 10 days 20 tablet 0 1/15/2023    cholecalciferol, vitamin D3, (VITAMIN D3) 10 mcg (400 unit) Tab Take 400 Units by mouth once daily.   1/15/2023    CREON 36,000-114,000- 180,000 unit CpDR Take 2 capsules by mouth 3 (three) times daily. 180 capsule 0 1/15/2023    cyclobenzaprine (FLEXERIL) 5 MG tablet Take 5 mg by mouth nightly.   1/15/2023    folic acid (FOLVITE) 1 MG tablet Take 1 tablet (1 mg total) by mouth once daily. 360 tablet 3 1/15/2023    metoprolol succinate (TOPROL-XL) 50 MG 24 hr tablet Take 0.5 tablets (25 mg total) by mouth once daily. 1/2 tab qd   1/15/2023    oxyCODONE-acetaminophen (PERCOCET) 5-325 mg per tablet Take 1 tablet by mouth every 6 (six) hours as needed for Pain. 7 tablet 0 1/15/2023    pantoprazole (PROTONIX) 40 MG tablet Take 1 tablet (40 mg total) by mouth once daily. 30 tablet 0 1/15/2023     potassium gluconate 600 mg (99 mg) Tab Take 1 tablet by mouth once daily.   1/15/2023    predniSONE (DELTASONE) 5 MG tablet Take 1 tablet (5 mg total) by mouth once daily. 30 tablet 0 1/15/2023    sertraline (ZOLOFT) 50 MG tablet Take 1 tablet (50 mg total) by mouth every evening. 30 tablet 11 1/15/2023    traZODone (DESYREL) 50 MG tablet Take 1 tablet (50 mg total) by mouth every evening. 90 tablet 1 1/15/2023    dronabinoL (MARINOL) 2.5 MG capsule Take 1 capsule (2.5 mg total) by mouth daily with dinner or evening meal. (Patient not taking: Reported on 1/16/2023) 30 capsule 1 Not Taking    leflunomide (ARAVA) 20 MG Tab Take 1 tablet by mouth once daily (Patient not taking: Reported on 1/16/2023) 30 tablet 0 Not Taking       Inpatient Medications:   ceFEPime (MAXIPIME) IVPB  1 g Intravenous Q12H    cyclobenzaprine  5 mg Oral Nightly    dronabinoL  2.5 mg Oral Daily with dinner    folic acid  1 mg Oral Daily    leflunomide  20 mg Oral Daily    lipase-protease-amylase 12,000-38,000-60,000 units  2 capsule Oral TID    metoprolol succinate  25 mg Oral Daily    nicotine  1 patch Transdermal Daily    pantoprazole  40 mg Oral Daily    sertraline  50 mg Oral QHS    traZODone  50 mg Oral QHS    vancomycin (VANCOCIN) IVPB  750 mg Intravenous Q24H     melatonin, morphine, ondansetron, oxyCODONE-acetaminophen, promethazine (PHENERGAN) IVPB, sodium chloride 0.9%, Pharmacy to dose Vancomycin consult **AND** vancomycin - pharmacy to dose    Review of patient's allergies indicates:   Allergen Reactions    No known drug allergies        Social History:   Social History     Occupational History    Not on file   Tobacco Use    Smoking status: Every Day     Packs/day: 1.00     Years: 7.00     Pack years: 7.00     Types: Cigarettes    Smokeless tobacco: Never    Tobacco comments:     [phone]   Substance and Sexual Activity    Alcohol use: No    Drug use: Never    Sexual activity: Yes     Partners: Male       Family History:  "  Family History   Problem Relation Age of Onset    Diabetes Mother     Heart disease Mother     Kidney disease Mother     Hypertension Mother     Stroke Mother     Hearing loss Mother     COPD Father     Liver disease Paternal Grandfather     Alcohol abuse Paternal Grandfather     Liver disease Sister     Emphysema Brother     COPD Brother     Sleep apnea Brother     No Known Problems Son     Alcohol abuse Paternal Grandmother     Cirrhosis Paternal Grandmother     Heart disease Maternal Aunt     Diabetes Maternal Aunt     Cancer Maternal Aunt         female    Heart disease Maternal Uncle     Hypertension Maternal Uncle     No Known Problems Paternal Uncle     Psoriasis Neg Hx     Lupus Neg Hx     Eczema Neg Hx     Melanoma Neg Hx        Review of Systems:  A 10 point review of systems was performed and was normal, except as mentioned in the HPI, including constitutional, HEENT, heme, lymph, cardiovascular, respiratory, gastrointestinal, genitourinary, neurologic, endocrine, psychiatric and musculoskeletal.      OBJECTIVE:    Physical Exam:  24 Hour Vital Sign Ranges: Temp:  [97.5 °F (36.4 °C)-97.7 °F (36.5 °C)] 97.6 °F (36.4 °C)  Pulse:  [] 106  Resp:  [15-20] 18  SpO2:  [93 %-99 %] 93 %  BP: (103-131)/(53-71) 121/54  Most recent vitals: BP (!) 121/54   Pulse 106   Temp 97.6 °F (36.4 °C) (Oral)   Resp 18   Ht 5' 8" (1.727 m)   Wt 44.5 kg (98 lb 1.7 oz)   SpO2 (!) 93%   BMI 14.92 kg/m²    GEN: well-developed, well-nourished, awake and alert, non-toxic appearing adult  HEENT: PERRL, sclera anicteric, oral mucosa pink and moist without lesion  NECK: trachea midline; Good ROM  CV: regular rate and rhythm, no murmurs or gallops  RESP: clear to auscultation bilaterally, no wheezes, rhonci or rales  ABD: soft, non-tender, non-distended, normal bowel sounds  EXT: no swelling or edema, 2+ pulses distally  SKIN: no rashes or jaundice  PSYCH: normal affect    Labs:   Recent Labs     01/15/23  1230 " 01/16/23  0455   WBC 4.14 7.86   MCV 93 93   * 457*     Recent Labs     01/15/23  1230 01/16/23  0455   * 133*   K 4.0 3.8   CL 94* 100   CO2 25 23   BUN 14 19   GLU 86 65*     No results for input(s): ALB in the last 72 hours.    Invalid input(s): ALKP, SGOT, SGPT, TBIL, DBIL, TPRO  No results for input(s): PT, INR, PTT in the last 72 hours.      Radiology Review:  CT Abdomen Pelvis With Contrast   Final Result            IMPRESSION / RECOMMENDATIONS:  Multiloculated fluid collections along with large volume ascites and  pancreatic duct leak on ERCP status post pancreatic sphincterotomy and stent placement  -plan for transfer to [facility] for further evaluation  -if patient remains hospitalized can get paracentesis while inpatient for therapeutic purposes.  If taking more than 5 L of fluid please give albumin back    Thank you for this consult.    Morgan Brown  1/16/2023  6:03 PM

## 2023-01-18 PROCEDURE — 63600175 PHARM REV CODE 636 W HCPCS: Performed by: STUDENT IN AN ORGANIZED HEALTH CARE EDUCATION/TRAINING PROGRAM

## 2023-01-18 PROCEDURE — 25000003 PHARM REV CODE 250: Performed by: INTERNAL MEDICINE

## 2023-01-18 PROCEDURE — 25000003 PHARM REV CODE 250: Performed by: RADIOLOGY

## 2023-01-18 PROCEDURE — 25000003 PHARM REV CODE 250: Performed by: STUDENT IN AN ORGANIZED HEALTH CARE EDUCATION/TRAINING PROGRAM

## 2023-01-18 PROCEDURE — 12000002 HC ACUTE/MED SURGE SEMI-PRIVATE ROOM

## 2023-01-18 PROCEDURE — 63600175 PHARM REV CODE 636 W HCPCS: Performed by: INTERNAL MEDICINE

## 2023-01-18 PROCEDURE — S4991 NICOTINE PATCH NONLEGEND: HCPCS | Performed by: STUDENT IN AN ORGANIZED HEALTH CARE EDUCATION/TRAINING PROGRAM

## 2023-01-18 RX ORDER — LIDOCAINE HYDROCHLORIDE 10 MG/ML
INJECTION INFILTRATION; PERINEURAL
Status: COMPLETED | OUTPATIENT
Start: 2023-01-18 | End: 2023-01-18

## 2023-01-18 RX ADMIN — METOPROLOL SUCCINATE 25 MG: 25 TABLET, FILM COATED, EXTENDED RELEASE ORAL at 09:01

## 2023-01-18 RX ADMIN — DRONABINOL 2.5 MG: 2.5 CAPSULE ORAL at 05:01

## 2023-01-18 RX ADMIN — NICOTINE 1 PATCH: 21 PATCH, EXTENDED RELEASE TRANSDERMAL at 09:01

## 2023-01-18 RX ADMIN — LIDOCAINE HYDROCHLORIDE 8 ML: 10 INJECTION, SOLUTION INFILTRATION; PERINEURAL at 09:01

## 2023-01-18 RX ADMIN — CEFEPIME HYDROCHLORIDE 1 G: 1 INJECTION, SOLUTION INTRAVENOUS at 05:01

## 2023-01-18 RX ADMIN — MORPHINE SULFATE 2 MG: 2 INJECTION, SOLUTION INTRAMUSCULAR; INTRAVENOUS at 11:01

## 2023-01-18 RX ADMIN — PANCRELIPASE 2 CAPSULE: 60000; 12000; 38000 CAPSULE, DELAYED RELEASE PELLETS ORAL at 03:01

## 2023-01-18 RX ADMIN — FOLIC ACID 1 MG: 1 TABLET ORAL at 09:01

## 2023-01-18 RX ADMIN — PANTOPRAZOLE SODIUM 40 MG: 40 TABLET, DELAYED RELEASE ORAL at 05:01

## 2023-01-18 RX ADMIN — PANCRELIPASE 2 CAPSULE: 60000; 12000; 38000 CAPSULE, DELAYED RELEASE PELLETS ORAL at 09:01

## 2023-01-18 RX ADMIN — CYCLOBENZAPRINE 5 MG: 5 TABLET, FILM COATED ORAL at 08:01

## 2023-01-18 RX ADMIN — SERTRALINE HYDROCHLORIDE 50 MG: 50 TABLET ORAL at 08:01

## 2023-01-18 RX ADMIN — TRAZODONE HYDROCHLORIDE 50 MG: 50 TABLET ORAL at 08:01

## 2023-01-18 RX ADMIN — ASCORBIC ACID, VITAMIN A PALMITATE, CHOLECALCIFEROL, THIAMINE HYDROCHLORIDE, RIBOFLAVIN-5 PHOSPHATE SODIUM, PYRIDOXINE HYDROCHLORIDE, NIACINAMIDE, DEXPANTHENOL, ALPHA-TOCOPHEROL ACETATE, VITAMIN K1, FOLIC ACID, BIOTIN, CYANOCOBALAMIN: 200; 3300; 200; 6; 3.6; 6; 40; 15; 10; 150; 600; 60; 5 INJECTION, SOLUTION INTRAVENOUS at 08:01

## 2023-01-18 RX ADMIN — OXYCODONE AND ACETAMINOPHEN 1 TABLET: 5; 325 TABLET ORAL at 03:01

## 2023-01-18 RX ADMIN — PANCRELIPASE 2 CAPSULE: 60000; 12000; 38000 CAPSULE, DELAYED RELEASE PELLETS ORAL at 08:01

## 2023-01-18 NOTE — PROGRESS NOTES
Subjective:  Patient seen and examined in the morning with  at the bedside.  She says she is doing okay at this time.  Denies any active pain.  No overnight events reported.  Constitutional:       Appearance: She is ill-appearing.   HENT:      Head: Normocephalic and atraumatic.   Eyes:      Extraocular Movements: Extraocular movements intact.      Conjunctiva/sclera: Conjunctivae normal.   Cardiovascular:      Rate and Rhythm: Normal rate and regular rhythm.   Pulmonary:      Breath sounds: No wheezing or rhonchi.   Abdominal:      General: generalized abdominal discomfort  Musculoskeletal:      Cervical back: Neck supple. No tenderness.      Right lower leg: No edema.      Left lower leg: No edema.      Comments: Moving extremities weakly   Neurological:      General: No focal deficit present.      Mental Status: She is alert and oriented to person, place, and time.       Multiloculated fluid collections along large volume ascites   pancreatic duct leak on ESR P status post pancreatic sphincterectomy and stent placement   -patient currently awaiting transfer to [facility].  Currently on IV cefepime.

## 2023-01-18 NOTE — PROGRESS NOTES
Therapy with Vancomycin complete and / or consult / order discontinued by Crissy Rogers on 1/18 @ 1396     Pharmacy will sign off, please re-consult as needed.  Thank you for allowing us to participate in this patient's care.  Nain Gilbert 1/18/2023 5:14 PM  Dept of Pharmacy  Ext [phone]

## 2023-01-18 NOTE — PLAN OF CARE
Tolerated well.  VSS  2.2L of clear yellow fluid drained. No testing ordered.  Gauze/paper tape dsg to site-RLQ  AAO x 3. INGRAM x4. Generalized weakness. Resp REU.  Report called to FREDY Turner RN on 1200 wing.  Transported back to room via w/c with RIDDHI CRUZ in attendance.

## 2023-01-18 NOTE — PROGRESS NOTES
Subjective:  Patient seen and examined in the morning with  at the bedside.  She is having some abdominal pain.  He is asking for pain medication.  Still awaiting transfer to [facility].    Constitutional:       Appearance: She is ill-appearing.   HENT:      Head: Normocephalic and atraumatic.   Eyes:      Extraocular Movements: Extraocular movements intact.      Conjunctiva/sclera: Conjunctivae normal.   Cardiovascular:      Rate and Rhythm: Normal rate and regular rhythm.   Pulmonary:      Breath sounds: No wheezing or rhonchi.   Abdominal:      General: generalized abdominal discomfort  Musculoskeletal:      Cervical back: Neck supple. No tenderness.      Right lower leg: No edema.      Left lower leg: No edema.      Comments: Moving extremities weakly   Neurological:      General: No focal deficit present.      Mental Status: She is alert and oriented to person, place, and time.       Multiloculated fluid collections along large volume ascites   pancreatic duct leak on ESR P status post pancreatic sphincterectomy and stent placement   -patient currently awaiting transfer to [facility].  Currently on IV cefepime.

## 2023-01-19 PROCEDURE — S4991 NICOTINE PATCH NONLEGEND: HCPCS | Performed by: STUDENT IN AN ORGANIZED HEALTH CARE EDUCATION/TRAINING PROGRAM

## 2023-01-19 PROCEDURE — 63600175 PHARM REV CODE 636 W HCPCS: Performed by: INTERNAL MEDICINE

## 2023-01-19 PROCEDURE — 25000003 PHARM REV CODE 250: Performed by: STUDENT IN AN ORGANIZED HEALTH CARE EDUCATION/TRAINING PROGRAM

## 2023-01-19 PROCEDURE — 63600175 PHARM REV CODE 636 W HCPCS: Performed by: STUDENT IN AN ORGANIZED HEALTH CARE EDUCATION/TRAINING PROGRAM

## 2023-01-19 PROCEDURE — 25000003 PHARM REV CODE 250: Performed by: INTERNAL MEDICINE

## 2023-01-19 PROCEDURE — 12000002 HC ACUTE/MED SURGE SEMI-PRIVATE ROOM

## 2023-01-19 PROCEDURE — 63600175 PHARM REV CODE 636 W HCPCS: Mod: TB,JG | Performed by: STUDENT IN AN ORGANIZED HEALTH CARE EDUCATION/TRAINING PROGRAM

## 2023-01-19 RX ADMIN — SERTRALINE HYDROCHLORIDE 50 MG: 50 TABLET ORAL at 10:01

## 2023-01-19 RX ADMIN — METOPROLOL SUCCINATE 25 MG: 25 TABLET, FILM COATED, EXTENDED RELEASE ORAL at 08:01

## 2023-01-19 RX ADMIN — SODIUM CHLORIDE: 0.9 INJECTION, SOLUTION INTRAVENOUS at 07:01

## 2023-01-19 RX ADMIN — PANTOPRAZOLE SODIUM 40 MG: 40 TABLET, DELAYED RELEASE ORAL at 05:01

## 2023-01-19 RX ADMIN — CYCLOBENZAPRINE 5 MG: 5 TABLET, FILM COATED ORAL at 10:01

## 2023-01-19 RX ADMIN — CEFEPIME HYDROCHLORIDE 1 G: 1 INJECTION, SOLUTION INTRAVENOUS at 06:01

## 2023-01-19 RX ADMIN — CEFEPIME HYDROCHLORIDE 1 G: 1 INJECTION, SOLUTION INTRAVENOUS at 05:01

## 2023-01-19 RX ADMIN — OXYCODONE AND ACETAMINOPHEN 1 TABLET: 5; 325 TABLET ORAL at 10:01

## 2023-01-19 RX ADMIN — FOLIC ACID 1 MG: 1 TABLET ORAL at 08:01

## 2023-01-19 RX ADMIN — DRONABINOL 2.5 MG: 2.5 CAPSULE ORAL at 05:01

## 2023-01-19 RX ADMIN — PANCRELIPASE 2 CAPSULE: 60000; 12000; 38000 CAPSULE, DELAYED RELEASE PELLETS ORAL at 08:01

## 2023-01-19 RX ADMIN — TRAZODONE HYDROCHLORIDE 50 MG: 50 TABLET ORAL at 10:01

## 2023-01-19 RX ADMIN — ASCORBIC ACID, VITAMIN A PALMITATE, CHOLECALCIFEROL, THIAMINE HYDROCHLORIDE, RIBOFLAVIN-5 PHOSPHATE SODIUM, PYRIDOXINE HYDROCHLORIDE, NIACINAMIDE, DEXPANTHENOL, ALPHA-TOCOPHEROL ACETATE, VITAMIN K1, FOLIC ACID, BIOTIN, CYANOCOBALAMIN: 200; 3300; 200; 6; 3.6; 6; 40; 15; 10; 150; 600; 60; 5 INJECTION, SOLUTION INTRAVENOUS at 04:01

## 2023-01-19 RX ADMIN — PANCRELIPASE 2 CAPSULE: 60000; 12000; 38000 CAPSULE, DELAYED RELEASE PELLETS ORAL at 05:01

## 2023-01-19 RX ADMIN — NICOTINE 1 PATCH: 21 PATCH, EXTENDED RELEASE TRANSDERMAL at 08:01

## 2023-01-19 NOTE — PROGRESS NOTES
[facility]  Adult Nutrition   Progress Note (Initial Assessment)     SUMMARY     Recommendations  1.) Advance diet as medically appropriate to regular.   2.) If unable to advance diet, suggest placing double lumen and beginning Clinimix E @ 75mL/hr to provide 612 kcals, 77gm protein; GIR 1.4.   3.) Suggest 20% lipids to provide additional 500 kcals.   4.) Continue digestive enzymes.   5.) Pt continues with severe PCM. Suggest adding severe malnutrition to problem list.    Goals:   1.) Pt to consume/tolerate >25% of clear liquid diet and ONS.   2.) Pts diet to advance to meet >50% of nutritional needs.   3.) Severe PCM to be added to problem list.  4.) Pt to gradually gain 1-2 lbs weekly.  Nutrition Goal Status: new    Dietitian Rounds Brief  Pt presents for abdominal pain. Patient has been recently hospitalized as well as in the ED for repeated bouts of pain related to her chronic pancreatitis. She has been managed closely by GI Dr. Jewell. During hospitalization there was concerns for pancreatic duct / cyst leakage s/p recent ERCP and progression of cysts w/ air in cyst. Patient underwent EUS w/ FNA w/ drainage of about 150cc fluid and improvement in WBC w/ abx. She also had paracentesis w/ additional fluid removal. She continues with nausea/vomiting. Pt currently on CLD diet with very little intake. Pt agreeable to Ensure Clear. Encouraged 1 sip every 15-20 minutes. If pt with N/V between sips to hold until the next 15-20 minute apurva. No other GI distress stated. LBM 1/17. Skin: intact per chart. Single lumen in place and unable to begin PN. Wt reviewed. UBW 41.8kg (10/2022), admit wt 44.5kg likely r/t fluid retention. NFPE completed with moderate/severe fat and muscle wasting. See below. Pt is currently waiting for transfer to [facility]. Pt continues with severe PCM.    Malnutrition Assessment:   Energy intake: pt consuming <50% of nutritional needs >3 months.   Fat Loss: moderate fat loss to  "orbital and upper arm region.   Muscle Loss: Severe muscle depletion to temporal, clavicle, thigh and calf regions  Edema: moderate edema noted to abdomen  Other factors: BMI 14.92    Diet order:   Current Diet Order: clear liquids     Evaluation of Received Nutrient/Fluid Intake  Energy Calories Required: not meeting needs  Protein Required: not meeting needs  Fluid Required: meeting needs  Tolerance: tolerating     % Intake of Estimated Energy Needs: 0 - 25 %  % Meal Intake: 0 - 25 %      Intake/Output Summary (Last 24 hours) at 2023 1613  Last data filed at 2023 1756  Gross per 24 hour   Intake 2500 ml   Output --   Net 2500 ml        Anthropometrics  Temp: 98.2 °F (36.8 °C)  Height Method: Stated  Height: 5' 8" (172.7 cm)  Height (inches): 68 in  Weight Method: Bed Scale  Weight: 44.5 kg (98 lb 1.7 oz)  Weight (lb): 98.11 lb  Ideal Body Weight (IBW), Female: 140 lb  % Ideal Body Weight, Female (lb): 70.08 %  BMI (Calculated): 14.9  BMI Grade: less than 16 protein-energy malnutrition grade III  Usual Body Weight (UBW), k.8 kg (10/2022)  % Usual Body Weight: 106.68       Estimated/Assessed Needs  Weight Used For Calorie Calculations: 44.5 kg (98 lb 1.7 oz)  Energy Calorie Requirements (kcal): 8199-9507  Energy Need Method: Kcal/kg (30-35)  Protein Requirements: 54-67  Weight Used For Protein Calculations: 44.5 kg (98 lb 1.7 oz)  Fluid Requirements (mL): 4657-4667     RDA Method (mL): 1335       Reason for Assessment  Reason For Assessment: identified at risk by screening criteria, length of stay, NPO/clear liquids x 5 days  Relevant Medical History: chronic pancreatitis, pancreatic pseudocyst, RA, DVT on eliquis, tobacco use    Nutrition/Diet History  Food Allergies: NKFA  Factors Affecting Nutritional Intake: nausea/vomiting, altered gastrointestinal function, abdominal distension    Nutrition Risk Screen  Nutrition Risk Screen: no indicators present     MST Score: 2  Have you recently lost weight " without trying?: Yes: 2-13 lbs  Weight loss score: 1  Have you been eating poorly because of a decreased appetite?: Yes  Appetite score: 1       Weight History:  Wt Readings from Last 5 Encounters:   01/16/23 44.5 kg (98 lb 1.7 oz)   01/12/23 37.2 kg (82 lb)   01/06/23 43.4 kg (95 lb 11.2 oz)   12/29/22 42.6 kg (94 lb)   11/29/22 37.2 kg (82 lb)        Lab/Procedures/Meds: Pertinent Labs/Meds Reviewed    Medications:Pertinent Medications Reviewed  Scheduled Meds:   ceFEPime (MAXIPIME) IVPB  1 g Intravenous Q12H    cyclobenzaprine  5 mg Oral Nightly    dronabinoL  2.5 mg Oral Daily with dinner    folic acid  1 mg Oral Daily    leflunomide  20 mg Oral Daily    lipase-protease-amylase 12,000-38,000-60,000 units  2 capsule Oral TID WM    metoprolol succinate  25 mg Oral Daily    nicotine  1 patch Transdermal Daily    pantoprazole  40 mg Oral Daily    sertraline  50 mg Oral QHS    Banana bag   Intravenous Once    traZODone  50 mg Oral QHS     Continuous Infusions:   sodium chloride 0.9% 150 mL/hr at 01/19/23 0702     PRN Meds:.melatonin, morphine, ondansetron, oxyCODONE-acetaminophen, promethazine (PHENERGAN) IVPB, sodium chloride 0.9%    Labs: Pertinent Labs Reviewed  Clinical Chemistry:  Recent Labs   Lab 01/12/23  1921 01/15/23  1230 01/16/23  0455   * 131* 133*   K 3.4* 4.0 3.8   CL 94* 94* 100   CO2 25 25 23   GLU 91 86 65*   BUN 8 14 19   CREATININE 0.6 0.8 0.9   CALCIUM 8.8 9.3 8.7   PROT 5.5* 5.2* 4.3*   ALBUMIN 2.1* 1.7* 1.5*   BILITOT 0.6 0.8 0.5   ALKPHOS 93 86 57   AST 16 14 9*   ALT 9* 10 9*   ANIONGAP 11 12 10   MG 1.6  --  1.6   LIPASE 102* 59 47     CBC:   Recent Labs   Lab 01/16/23  0455   WBC 7.86   RBC 3.00*   HGB 9.0*   HCT 27.9*   *   MCV 93   MCH 30.0   MCHC 32.3       Monitor and Evaluation  Food and Nutrient Intake: energy intake, parenteral nutrition intake, food and beverage intake  Food and Nutrient Adminstration: diet order, enteral and parenteral nutrition  administration  Knowledge/Beliefs/Attitudes: food and nutrition knowledge/skill  Physical Activity and Function: nutrition-related ADLs and IADLs  Anthropometric Measurements: weight, weight change  Biochemical Data, Medical Tests and Procedures: electrolyte and renal panel, gastrointestinal profile, inflammatory profile, glucose/endocrine profile, lipid profile  Nutrition-Focused Physical Findings: overall appearance     Nutrition Risk  Level of Risk/Frequency of Follow-up: high     Nutrition Follow-Up  RD Follow-up?: Yes      Kari Monaco RD 01/19/2023 4:13 PM

## 2023-01-19 NOTE — PLAN OF CARE
Problem: Skin Injury Risk Increased  Goal: Skin Health and Integrity  Intervention: Promote and Optimize Oral Intake  Flowsheets (Taken 1/19/2023 1618)  Oral Nutrition Promotion:   calorie-dense liquids provided   other (see comments)     Problem: Oral Intake Inadequate  Goal: Improved Oral Intake  Outcome: Ongoing, Progressing  Intervention: Promote and Optimize Oral Intake  Flowsheets (Taken 1/19/2023 1618)  Oral Nutrition Promotion:   calorie-dense liquids provided   other (see comments)     Problem: Malnutrition  Goal: Improved Nutritional Intake  Outcome: Ongoing, Progressing  Intervention: Promote and Optimize Nutrition Support  Flowsheets (Taken 1/19/2023 1618)  Nutrition Support Management:   weight trending reviewed   other (see comments)  Intervention: Promote and Optimize Oral Intake  Flowsheets (Taken 1/19/2023 1618)  Oral Nutrition Promotion:   calorie-dense liquids provided   other (see comments)     Recommendations  1.) Advance diet as medically appropriate to regular.   2.) If unable to advance diet, suggest placing double lumen and beginning Clinimix E @ 75mL/hr to provide 612 kcals, 77gm protein; GIR 1.4.   3.) Suggest 20% lipids to provide additional 500 kcals.   4.) Continue digestive enzymes.   5.) Pt continues with severe PCM. Suggest adding severe malnutrition to problem list.     Goals:   1.) Pt to consume/tolerate >25% of clear liquid diet and ONS.   2.) Pts diet to advance to meet >50% of nutritional needs.   3.) Severe PCM to be added to problem list.  4.) Pt to gradually gain 1-2 lbs weekly.  Nutrition Goal Status: new

## 2023-01-19 NOTE — PLAN OF CARE
Problem: Adult Inpatient Plan of Care  Goal: Plan of Care Review  Outcome: Ongoing, Progressing  Goal: Patient-Specific Goal (Individualized)  Outcome: Ongoing, Progressing  Goal: Absence of Hospital-Acquired Illness or Injury  Outcome: Ongoing, Progressing  Goal: Optimal Comfort and Wellbeing  Outcome: Ongoing, Progressing  Goal: Readiness for Transition of Care  Outcome: Ongoing, Progressing     Problem: Fluid Imbalance (Pneumonia)  Goal: Fluid Balance  Outcome: Ongoing, Progressing     Problem: Infection (Pneumonia)  Goal: Resolution of Infection Signs and Symptoms  Outcome: Ongoing, Progressing     Problem: Respiratory Compromise (Pneumonia)  Goal: Effective Oxygenation and Ventilation  Outcome: Ongoing, Progressing     Problem: Skin Injury Risk Increased  Goal: Skin Health and Integrity  Outcome: Ongoing, Progressing     Problem: Oral Intake Inadequate  Goal: Improved Oral Intake  Outcome: Ongoing, Progressing     Problem: Malnutrition  Goal: Improved Nutritional Intake  Outcome: Ongoing, Progressing

## 2023-01-19 NOTE — PROGRESS NOTES
Subjective:  Patient seen and examined in the morning with  at the bedside. Abdominal pain improved markedly from yesterday.  She says she was feeling much better after getting the banana bag yesterday..  Still awaiting transfer to [facility].    Constitutional:       Appearance: She is ill-appearing.   HENT:      Head: Normocephalic and atraumatic.   Eyes:      Extraocular Movements: Extraocular movements intact.      Conjunctiva/sclera: Conjunctivae normal.   Cardiovascular:      Rate and Rhythm: Normal rate and regular rhythm.   Pulmonary:      Breath sounds: No wheezing or rhonchi.   Abdominal:      General: generalized abdominal discomfort  Musculoskeletal:      Cervical back: Neck supple. No tenderness.      Right lower leg: No edema.      Left lower leg: No edema.      Comments: Moving extremities weakly   Neurological:      General: No focal deficit present.      Mental Status: She is alert and oriented to person, place, and time.       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  Currently on IV cefepime.

## 2023-01-20 PROCEDURE — 12000002 HC ACUTE/MED SURGE SEMI-PRIVATE ROOM

## 2023-01-20 PROCEDURE — 25000003 PHARM REV CODE 250: Performed by: INTERNAL MEDICINE

## 2023-01-20 PROCEDURE — 63600175 PHARM REV CODE 636 W HCPCS: Mod: TB,JG | Performed by: STUDENT IN AN ORGANIZED HEALTH CARE EDUCATION/TRAINING PROGRAM

## 2023-01-20 PROCEDURE — S4991 NICOTINE PATCH NONLEGEND: HCPCS | Performed by: STUDENT IN AN ORGANIZED HEALTH CARE EDUCATION/TRAINING PROGRAM

## 2023-01-20 PROCEDURE — 25000003 PHARM REV CODE 250: Performed by: STUDENT IN AN ORGANIZED HEALTH CARE EDUCATION/TRAINING PROGRAM

## 2023-01-20 RX ORDER — SODIUM CHLORIDE 9 MG/ML
INJECTION, SOLUTION INTRAVENOUS CONTINUOUS
Status: CANCELLED | OUTPATIENT
Start: 2023-01-20

## 2023-01-20 RX ADMIN — FOLIC ACID 1 MG: 1 TABLET ORAL at 08:01

## 2023-01-20 RX ADMIN — CEFEPIME HYDROCHLORIDE 1 G: 1 INJECTION, SOLUTION INTRAVENOUS at 05:01

## 2023-01-20 RX ADMIN — SERTRALINE HYDROCHLORIDE 50 MG: 50 TABLET ORAL at 08:01

## 2023-01-20 RX ADMIN — OXYCODONE AND ACETAMINOPHEN 1 TABLET: 5; 325 TABLET ORAL at 05:01

## 2023-01-20 RX ADMIN — PANCRELIPASE 2 CAPSULE: 60000; 12000; 38000 CAPSULE, DELAYED RELEASE PELLETS ORAL at 04:01

## 2023-01-20 RX ADMIN — METOPROLOL SUCCINATE 25 MG: 25 TABLET, FILM COATED, EXTENDED RELEASE ORAL at 08:01

## 2023-01-20 RX ADMIN — PANCRELIPASE 2 CAPSULE: 60000; 12000; 38000 CAPSULE, DELAYED RELEASE PELLETS ORAL at 07:01

## 2023-01-20 RX ADMIN — PANTOPRAZOLE SODIUM 40 MG: 40 TABLET, DELAYED RELEASE ORAL at 06:01

## 2023-01-20 RX ADMIN — CEFEPIME HYDROCHLORIDE 1 G: 1 INJECTION, SOLUTION INTRAVENOUS at 06:01

## 2023-01-20 RX ADMIN — TRAZODONE HYDROCHLORIDE 50 MG: 50 TABLET ORAL at 08:01

## 2023-01-20 RX ADMIN — CYCLOBENZAPRINE 5 MG: 5 TABLET, FILM COATED ORAL at 08:01

## 2023-01-20 RX ADMIN — NICOTINE 1 PATCH: 21 PATCH, EXTENDED RELEASE TRANSDERMAL at 08:01

## 2023-01-20 RX ADMIN — PANCRELIPASE 2 CAPSULE: 60000; 12000; 38000 CAPSULE, DELAYED RELEASE PELLETS ORAL at 11:01

## 2023-01-20 RX ADMIN — LEUCINE, PHENYLALANINE, LYSINE, METHIONINE, ISOLEUCINE, VALINE, HISTIDINE, THREONINE, TRYPTOPHAN, ALANINE, GLYCINE, ARGININE, PROLINE, SERINE, TYROSINE, SODIUM ACETATE, DIBASIC POTASSIUM PHOSPHATE, MAGNESIUM CHLORIDE, SODIUM CHLORIDE, CALCIUM CHLORIDE, DEXTROSE
311; 238; 247; 170; 255; 247; 204; 179; 77; 880; 438; 489; 289; 213; 17; 297; 261; 51; 77; 33; 5 INJECTION INTRAVENOUS at 04:01

## 2023-01-20 NOTE — PROGRESS NOTES
Subjective:  Patient seen and examined in the morning with  at the bedside.  She is doing well today.  Having no abdominal discomfort this morning.  Has no questions or complaints.    Constitutional:       Appearance: She is ill-appearing.   HENT:      Head: Normocephalic and atraumatic.   Eyes:      Extraocular Movements: Extraocular movements intact.      Conjunctiva/sclera: Conjunctivae normal.   Cardiovascular:      Rate and Rhythm: Normal rate and regular rhythm.   Pulmonary:      Breath sounds: No wheezing or rhonchi.   Abdominal:      General: generalized abdominal discomfort  Musculoskeletal:      Cervical back: Neck supple. No tenderness.      Right lower leg: No edema.      Left lower leg: No edema.      Comments: Moving extremities weakly   Neurological:      General: No focal deficit present.      Mental Status: She is alert and oriented to person, place, and time.       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  Currently on IV cefepime. Started paraenteral therapy.

## 2023-01-20 NOTE — PROGRESS NOTES
Recommendations    1.) Advance diet as medically appropriate to regular.   2.) If unable to advance diet, suggest placing double lumen and beginning Clinimix E @ 75mL/hr to provide 612 kcals, 77gm protein; GIR 1.4. (reduce NS to 75 ml/hr)  3.) Continue digestive enzymes.   4.) Pt continues with severe PCM. Suggest adding severe malnutrition to problem list.     Goals:   1.) Pt to consume/tolerate >25% of clear liquid diet and ONS.   2.) Pts diet to advance to meet >50% of nutritional needs.   3.) Severe PCM to be added to problem list.  4.) Pt to gradually gain 1-2 lbs weekly.  Nutrition Goal Status: new     Dietitian Rounds Brief  Single lumen in place and unable to begin PN.  NFPE completed with moderate/severe fat and muscle wasting. Pt is currently waiting for transfer to [facility]. Pt continues with severe PCM.   Patient NPO Clears X 5 days--  RD discussed with MD via secure chat RD recommendation to:  Place double lumen  Decrease IV NaCl 0.9% /2 150 to 75ml/hr  Start Clinimix E @ 75mL/hr to provide 612 kcals, 77gm protein   DID NOT ORDER due to high triglycerides.  20% lipids to provide additional 500 kcals.

## 2023-01-21 PROCEDURE — 25000003 PHARM REV CODE 250: Performed by: STUDENT IN AN ORGANIZED HEALTH CARE EDUCATION/TRAINING PROGRAM

## 2023-01-21 PROCEDURE — 12000002 HC ACUTE/MED SURGE SEMI-PRIVATE ROOM

## 2023-01-21 PROCEDURE — 63600175 PHARM REV CODE 636 W HCPCS: Performed by: STUDENT IN AN ORGANIZED HEALTH CARE EDUCATION/TRAINING PROGRAM

## 2023-01-21 PROCEDURE — S4991 NICOTINE PATCH NONLEGEND: HCPCS | Performed by: STUDENT IN AN ORGANIZED HEALTH CARE EDUCATION/TRAINING PROGRAM

## 2023-01-21 PROCEDURE — 25000003 PHARM REV CODE 250: Performed by: INTERNAL MEDICINE

## 2023-01-21 PROCEDURE — 63600175 PHARM REV CODE 636 W HCPCS: Mod: TB,JG | Performed by: STUDENT IN AN ORGANIZED HEALTH CARE EDUCATION/TRAINING PROGRAM

## 2023-01-21 RX ADMIN — PANTOPRAZOLE SODIUM 40 MG: 40 TABLET, DELAYED RELEASE ORAL at 05:01

## 2023-01-21 RX ADMIN — SERTRALINE HYDROCHLORIDE 50 MG: 50 TABLET ORAL at 08:01

## 2023-01-21 RX ADMIN — CEFEPIME HYDROCHLORIDE 1 G: 1 INJECTION, SOLUTION INTRAVENOUS at 05:01

## 2023-01-21 RX ADMIN — NICOTINE 1 PATCH: 21 PATCH, EXTENDED RELEASE TRANSDERMAL at 09:01

## 2023-01-21 RX ADMIN — OXYCODONE AND ACETAMINOPHEN 1 TABLET: 5; 325 TABLET ORAL at 07:01

## 2023-01-21 RX ADMIN — DRONABINOL 2.5 MG: 2.5 CAPSULE ORAL at 05:01

## 2023-01-21 RX ADMIN — PANCRELIPASE 2 CAPSULE: 60000; 12000; 38000 CAPSULE, DELAYED RELEASE PELLETS ORAL at 09:01

## 2023-01-21 RX ADMIN — LEUCINE, PHENYLALANINE, LYSINE, METHIONINE, ISOLEUCINE, VALINE, HISTIDINE, THREONINE, TRYPTOPHAN, ALANINE, GLYCINE, ARGININE, PROLINE, SERINE, TYROSINE, SODIUM ACETATE, DIBASIC POTASSIUM PHOSPHATE, MAGNESIUM CHLORIDE, SODIUM CHLORIDE, CALCIUM CHLORIDE, DEXTROSE
311; 238; 247; 170; 255; 247; 204; 179; 77; 880; 438; 489; 289; 213; 17; 297; 261; 51; 77; 33; 5 INJECTION INTRAVENOUS at 05:01

## 2023-01-21 RX ADMIN — LEUCINE, PHENYLALANINE, LYSINE, METHIONINE, ISOLEUCINE, VALINE, HISTIDINE, THREONINE, TRYPTOPHAN, ALANINE, GLYCINE, ARGININE, PROLINE, SERINE, TYROSINE, SODIUM ACETATE, DIBASIC POTASSIUM PHOSPHATE, MAGNESIUM CHLORIDE, SODIUM CHLORIDE, CALCIUM CHLORIDE, DEXTROSE
311; 238; 247; 170; 255; 247; 204; 179; 77; 880; 438; 489; 289; 213; 17; 297; 261; 51; 77; 33; 5 INJECTION INTRAVENOUS at 06:01

## 2023-01-21 RX ADMIN — OXYCODONE AND ACETAMINOPHEN 1 TABLET: 5; 325 TABLET ORAL at 08:01

## 2023-01-21 RX ADMIN — METOPROLOL SUCCINATE 25 MG: 25 TABLET, FILM COATED, EXTENDED RELEASE ORAL at 09:01

## 2023-01-21 RX ADMIN — PANCRELIPASE 2 CAPSULE: 60000; 12000; 38000 CAPSULE, DELAYED RELEASE PELLETS ORAL at 05:01

## 2023-01-21 RX ADMIN — TRAZODONE HYDROCHLORIDE 50 MG: 50 TABLET ORAL at 08:01

## 2023-01-21 RX ADMIN — FOLIC ACID 1 MG: 1 TABLET ORAL at 09:01

## 2023-01-21 RX ADMIN — CYCLOBENZAPRINE 5 MG: 5 TABLET, FILM COATED ORAL at 08:01

## 2023-01-21 RX ADMIN — PANCRELIPASE 2 CAPSULE: 60000; 12000; 38000 CAPSULE, DELAYED RELEASE PELLETS ORAL at 12:01

## 2023-01-21 NOTE — PLAN OF CARE
Problem: Adult Inpatient Plan of Care  Goal: Plan of Care Review  Outcome: Ongoing, Progressing  Goal: Patient-Specific Goal (Individualized)  Outcome: Ongoing, Progressing  Goal: Absence of Hospital-Acquired Illness or Injury  Outcome: Ongoing, Progressing  Goal: Optimal Comfort and Wellbeing  Outcome: Ongoing, Progressing  Goal: Readiness for Transition of Care  Outcome: Ongoing, Progressing     Problem: Fluid Imbalance (Pneumonia)  Goal: Fluid Balance  Outcome: Ongoing, Progressing     Problem: Infection (Pneumonia)  Goal: Resolution of Infection Signs and Symptoms  Outcome: Ongoing, Progressing     Problem: Respiratory Compromise (Pneumonia)  Goal: Effective Oxygenation and Ventilation  Outcome: Ongoing, Progressing     Problem: Skin Injury Risk Increased  Goal: Skin Health and Integrity  Outcome: Ongoing, Progressing     Problem: Oral Intake Inadequate  Goal: Improved Oral Intake  Outcome: Ongoing, Progressing     Problem: Malnutrition  Goal: Improved Nutritional Intake  Outcome: Ongoing, Progressing     Problem: Infection  Goal: Absence of Infection Signs and Symptoms  Outcome: Ongoing, Progressing

## 2023-01-21 NOTE — PROGRESS NOTES
Subjective:  Patient seen and examined in the morning with  at the bedside.  She is doing well today.  Having no abdominal discomfort this morning.  Has no questions or complaints.    Constitutional:       Appearance: She is ill-appearing.   HENT:      Head: Normocephalic and atraumatic.   Eyes:      Extraocular Movements: Extraocular movements intact.      Conjunctiva/sclera: Conjunctivae normal.   Cardiovascular:      Rate and Rhythm: Normal rate and regular rhythm.   Pulmonary:      Breath sounds: No wheezing or rhonchi.   Abdominal:      General: generalized abdominal discomfort  Musculoskeletal:      Cervical back: Neck supple. No tenderness.      Right lower leg: No edema.      Left lower leg: No edema.      Comments: Moving extremities weakly   Neurological:      General: No focal deficit present.      Mental Status: She is alert and oriented to person, place, and time.       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    -patient fluid collections are unchanged from previous admission.  On previous admission she was treated with IV Rocephin then discharged with p.o. cefuroxime and completed entire course.  No fevers or evidence of infection here.  She is been on cefepime.  Will DC this now.  Very likely fluid collection does not represent abscesses.  We will get GI to re-evaluate today given she is been waiting for transfer since Monday.   -parenteral therapy ordered as well.

## 2023-01-22 ENCOUNTER — HOSPITAL ENCOUNTER (INPATIENT)
Facility: HOSPITAL | Age: 63
LOS: 5 days | Discharge: HOME | DRG: 438 | End: 2023-01-27
Attending: HOSPITALIST | Admitting: HOSPITALIST
Payer: MEDICAID

## 2023-01-22 VITALS
SYSTOLIC BLOOD PRESSURE: 136 MMHG | HEART RATE: 96 BPM | WEIGHT: 118.63 LBS | DIASTOLIC BLOOD PRESSURE: 88 MMHG | OXYGEN SATURATION: 90 % | TEMPERATURE: 98 F | HEIGHT: 68 IN | RESPIRATION RATE: 18 BRPM | BODY MASS INDEX: 17.98 KG/M2

## 2023-01-22 DIAGNOSIS — R53.81 DEBILITY: ICD-10-CM

## 2023-01-22 DIAGNOSIS — R07.9 CHEST PAIN: ICD-10-CM

## 2023-01-22 DIAGNOSIS — R18.8 PANCREATIC ASCITES: ICD-10-CM

## 2023-01-22 DIAGNOSIS — R94.31 QT PROLONGATION: ICD-10-CM

## 2023-01-22 DIAGNOSIS — Z86.718 HISTORY OF DVT OF LOWER EXTREMITY: Primary | Chronic | ICD-10-CM

## 2023-01-22 LAB
ALBUMIN SERPL BCP-MCNC: 1 G/DL (ref 3.5–5.2)
ALP SERPL-CCNC: 100 U/L (ref 55–135)
ALT SERPL W/O P-5'-P-CCNC: 6 U/L (ref 10–44)
ANION GAP SERPL CALC-SCNC: 7 MMOL/L (ref 8–16)
AST SERPL-CCNC: 15 U/L (ref 10–40)
BILIRUB SERPL-MCNC: 0.3 MG/DL (ref 0.1–1)
BUN SERPL-MCNC: 14 MG/DL (ref 8–23)
CALCIUM SERPL-MCNC: 8.7 MG/DL (ref 8.7–10.5)
CHLORIDE SERPL-SCNC: 104 MMOL/L (ref 95–110)
CO2 SERPL-SCNC: 20 MMOL/L (ref 23–29)
CREAT SERPL-MCNC: 0.4 MG/DL (ref 0.5–1.4)
EST. GFR  (NO RACE VARIABLE): >60 ML/MIN/1.73 M^2
GLUCOSE SERPL-MCNC: 87 MG/DL (ref 70–110)
MAGNESIUM SERPL-MCNC: 1.8 MG/DL (ref 1.6–2.6)
PHOSPHATE SERPL-MCNC: 2 MG/DL (ref 2.7–4.5)
POCT GLUCOSE: 94 MG/DL (ref 70–110)
POTASSIUM SERPL-SCNC: 3.4 MMOL/L (ref 3.5–5.1)
PROT SERPL-MCNC: 5 G/DL (ref 6–8.4)
SODIUM SERPL-SCNC: 131 MMOL/L (ref 136–145)

## 2023-01-22 PROCEDURE — S4991 NICOTINE PATCH NONLEGEND: HCPCS | Performed by: STUDENT IN AN ORGANIZED HEALTH CARE EDUCATION/TRAINING PROGRAM

## 2023-01-22 PROCEDURE — 25000003 PHARM REV CODE 250: Performed by: STUDENT IN AN ORGANIZED HEALTH CARE EDUCATION/TRAINING PROGRAM

## 2023-01-22 PROCEDURE — 85610 PROTHROMBIN TIME: CPT | Performed by: STUDENT IN AN ORGANIZED HEALTH CARE EDUCATION/TRAINING PROGRAM

## 2023-01-22 PROCEDURE — 20600001 HC STEP DOWN PRIVATE ROOM

## 2023-01-22 PROCEDURE — 25000003 PHARM REV CODE 250: Performed by: INTERNAL MEDICINE

## 2023-01-22 PROCEDURE — 83735 ASSAY OF MAGNESIUM: CPT | Performed by: STUDENT IN AN ORGANIZED HEALTH CARE EDUCATION/TRAINING PROGRAM

## 2023-01-22 PROCEDURE — 85730 THROMBOPLASTIN TIME PARTIAL: CPT | Performed by: STUDENT IN AN ORGANIZED HEALTH CARE EDUCATION/TRAINING PROGRAM

## 2023-01-22 PROCEDURE — 84100 ASSAY OF PHOSPHORUS: CPT | Performed by: STUDENT IN AN ORGANIZED HEALTH CARE EDUCATION/TRAINING PROGRAM

## 2023-01-22 PROCEDURE — 36415 COLL VENOUS BLD VENIPUNCTURE: CPT | Performed by: STUDENT IN AN ORGANIZED HEALTH CARE EDUCATION/TRAINING PROGRAM

## 2023-01-22 PROCEDURE — 80053 COMPREHEN METABOLIC PANEL: CPT | Performed by: STUDENT IN AN ORGANIZED HEALTH CARE EDUCATION/TRAINING PROGRAM

## 2023-01-22 RX ORDER — IBUPROFEN 200 MG
24 TABLET ORAL
Status: DISCONTINUED | OUTPATIENT
Start: 2023-01-22 | End: 2023-01-27 | Stop reason: HOSPADM

## 2023-01-22 RX ORDER — MUPIROCIN 20 MG/G
OINTMENT TOPICAL 2 TIMES DAILY
Status: DISCONTINUED | OUTPATIENT
Start: 2023-01-23 | End: 2023-01-27 | Stop reason: HOSPADM

## 2023-01-22 RX ORDER — METOPROLOL SUCCINATE 25 MG/1
25 TABLET, EXTENDED RELEASE ORAL DAILY
Status: DISCONTINUED | OUTPATIENT
Start: 2023-01-23 | End: 2023-01-27 | Stop reason: HOSPADM

## 2023-01-22 RX ORDER — SERTRALINE HYDROCHLORIDE 25 MG/1
50 TABLET, FILM COATED ORAL NIGHTLY
Status: DISCONTINUED | OUTPATIENT
Start: 2023-01-22 | End: 2023-01-27 | Stop reason: HOSPADM

## 2023-01-22 RX ORDER — CYCLOBENZAPRINE HCL 5 MG
5 TABLET ORAL NIGHTLY
Status: DISCONTINUED | OUTPATIENT
Start: 2023-01-22 | End: 2023-01-27 | Stop reason: HOSPADM

## 2023-01-22 RX ORDER — IBUPROFEN 200 MG
16 TABLET ORAL
Status: DISCONTINUED | OUTPATIENT
Start: 2023-01-22 | End: 2023-01-27 | Stop reason: HOSPADM

## 2023-01-22 RX ORDER — TRAZODONE HYDROCHLORIDE 50 MG/1
50 TABLET ORAL NIGHTLY
Status: DISCONTINUED | OUTPATIENT
Start: 2023-01-22 | End: 2023-01-27 | Stop reason: HOSPADM

## 2023-01-22 RX ORDER — OXYCODONE AND ACETAMINOPHEN 5; 325 MG/1; MG/1
1 TABLET ORAL EVERY 6 HOURS PRN
Status: DISCONTINUED | OUTPATIENT
Start: 2023-01-22 | End: 2023-01-27 | Stop reason: HOSPADM

## 2023-01-22 RX ORDER — NALOXONE HCL 0.4 MG/ML
0.02 VIAL (ML) INJECTION
Status: DISCONTINUED | OUTPATIENT
Start: 2023-01-22 | End: 2023-01-27 | Stop reason: HOSPADM

## 2023-01-22 RX ORDER — SODIUM CHLORIDE 0.9 % (FLUSH) 0.9 %
10 SYRINGE (ML) INJECTION EVERY 12 HOURS PRN
Status: DISCONTINUED | OUTPATIENT
Start: 2023-01-22 | End: 2023-01-27 | Stop reason: HOSPADM

## 2023-01-22 RX ORDER — MORPHINE SULFATE 2 MG/ML
2 INJECTION, SOLUTION INTRAMUSCULAR; INTRAVENOUS EVERY 4 HOURS PRN
Status: DISCONTINUED | OUTPATIENT
Start: 2023-01-22 | End: 2023-01-27 | Stop reason: HOSPADM

## 2023-01-22 RX ORDER — GLUCAGON 1 MG
1 KIT INJECTION
Status: DISCONTINUED | OUTPATIENT
Start: 2023-01-22 | End: 2023-01-27 | Stop reason: HOSPADM

## 2023-01-22 RX ORDER — PROCHLORPERAZINE EDISYLATE 5 MG/ML
2.5 INJECTION INTRAMUSCULAR; INTRAVENOUS EVERY 6 HOURS PRN
Status: DISCONTINUED | OUTPATIENT
Start: 2023-01-23 | End: 2023-01-27 | Stop reason: HOSPADM

## 2023-01-22 RX ORDER — INSULIN ASPART 100 [IU]/ML
0-5 INJECTION, SOLUTION INTRAVENOUS; SUBCUTANEOUS
Status: DISCONTINUED | OUTPATIENT
Start: 2023-01-22 | End: 2023-01-27 | Stop reason: HOSPADM

## 2023-01-22 RX ORDER — FOLIC ACID 1 MG/1
1 TABLET ORAL DAILY
Status: DISCONTINUED | OUTPATIENT
Start: 2023-01-23 | End: 2023-01-27 | Stop reason: HOSPADM

## 2023-01-22 RX ORDER — TALC
6 POWDER (GRAM) TOPICAL NIGHTLY PRN
Status: DISCONTINUED | OUTPATIENT
Start: 2023-01-22 | End: 2023-01-27 | Stop reason: HOSPADM

## 2023-01-22 RX ORDER — IBUPROFEN 200 MG
1 TABLET ORAL DAILY
Status: DISCONTINUED | OUTPATIENT
Start: 2023-01-23 | End: 2023-01-27 | Stop reason: HOSPADM

## 2023-01-22 RX ORDER — PANTOPRAZOLE SODIUM 40 MG/1
40 TABLET, DELAYED RELEASE ORAL DAILY
Status: DISCONTINUED | OUTPATIENT
Start: 2023-01-23 | End: 2023-01-27 | Stop reason: HOSPADM

## 2023-01-22 RX ORDER — ONDANSETRON 2 MG/ML
4 INJECTION INTRAMUSCULAR; INTRAVENOUS EVERY 6 HOURS PRN
Status: DISCONTINUED | OUTPATIENT
Start: 2023-01-22 | End: 2023-01-27 | Stop reason: HOSPADM

## 2023-01-22 RX ADMIN — PANCRELIPASE 2 CAPSULE: 60000; 12000; 38000 CAPSULE, DELAYED RELEASE PELLETS ORAL at 05:01

## 2023-01-22 RX ADMIN — OXYCODONE AND ACETAMINOPHEN 1 TABLET: 5; 325 TABLET ORAL at 10:01

## 2023-01-22 RX ADMIN — OXYCODONE HYDROCHLORIDE AND ACETAMINOPHEN 1 TABLET: 5; 325 TABLET ORAL at 11:01

## 2023-01-22 RX ADMIN — TRAZODONE HYDROCHLORIDE 50 MG: 50 TABLET ORAL at 11:01

## 2023-01-22 RX ADMIN — PANCRELIPASE 2 CAPSULE: 60000; 12000; 38000 CAPSULE, DELAYED RELEASE PELLETS ORAL at 02:01

## 2023-01-22 RX ADMIN — LEUCINE, PHENYLALANINE, LYSINE, METHIONINE, ISOLEUCINE, VALINE, HISTIDINE, THREONINE, TRYPTOPHAN, ALANINE, GLYCINE, ARGININE, PROLINE, SERINE, TYROSINE, SODIUM ACETATE, DIBASIC POTASSIUM PHOSPHATE, MAGNESIUM CHLORIDE, SODIUM CHLORIDE, CALCIUM CHLORIDE, DEXTROSE
311; 238; 247; 170; 255; 247; 204; 179; 77; 880; 438; 489; 289; 213; 17; 297; 261; 51; 77; 33; 5 INJECTION INTRAVENOUS at 05:01

## 2023-01-22 RX ADMIN — NICOTINE 1 PATCH: 21 PATCH, EXTENDED RELEASE TRANSDERMAL at 10:01

## 2023-01-22 RX ADMIN — SERTRALINE HYDROCHLORIDE 50 MG: 25 TABLET ORAL at 11:01

## 2023-01-22 RX ADMIN — PANTOPRAZOLE SODIUM 40 MG: 40 TABLET, DELAYED RELEASE ORAL at 05:01

## 2023-01-22 RX ADMIN — METOPROLOL SUCCINATE 25 MG: 25 TABLET, FILM COATED, EXTENDED RELEASE ORAL at 10:01

## 2023-01-22 RX ADMIN — PANCRELIPASE 2 CAPSULE: 60000; 12000; 38000 CAPSULE, DELAYED RELEASE PELLETS ORAL at 10:01

## 2023-01-22 RX ADMIN — FOLIC ACID 1 MG: 1 TABLET ORAL at 10:01

## 2023-01-22 RX ADMIN — CYCLOBENZAPRINE HYDROCHLORIDE 5 MG: 5 TABLET, FILM COATED ORAL at 11:01

## 2023-01-22 NOTE — PLAN OF CARE
Patient discharging to [facility]   01/22/23 1612   Final Note   Assessment Type Final Discharge Note   Anticipated Discharge Disposition [facility]   What phone number can be called within the next 1-3 days to see how you are doing after discharge? [phone]   Post-Acute Status   Discharge Delays None known at this time

## 2023-01-22 NOTE — DISCHARGE SUMMARY
[facility]  Discharge Summary  Patient Name: Claire Bowser MRN: [id number]   Patient Class: IP- Inpatient  Length of Stay: 7   Admission Date: 1/15/2023 12:19 PM Attending Physician: Markell Plunkett MD   Primary Care Provider: Samuel Brady MD Face-to-Face encounter date: 01/22/2023   Chief Complaint: Abdominal Pain    Date of Discharge: 1/22/2023  Discharge Disposition:Another Health Care Inst*    Condition: Stable       Reason for Hospitalization   Pancreatic duct leak      Patient Active Problem List   Diagnosis    Anxiety    Hypertension    Generalized anxiety disorder    Tobacco use    Mixed hyperlipidemia    BMI 22.0-22.9, adult    Rheumatoid arthritis involving multiple sites with positive rheumatoid factor    Black stool    Colon polyps    Pneumonia    Hypokalemia    Acute cystitis without hematuria    Acute pancreatitis    Epigastric pain    Dehydration    Hypercalcemia    ACP (advance care planning)    Severe protein-calorie malnutrition    Long-term use of immunosuppressant medication    Syncope    Acute biliary pancreatitis    Chest pain    Weight loss, unintentional    Acute DVT (deep venous thrombosis)    Abdominal pain    Chronic pancreatitis    Immunocompromised    Pancreatic pseudocyst    Pancreatic cyst    Ascites       Brief History of Present Illness   HPI: 61 yo F with PMH including chronic pancreatitis, pancreatic pseudocyst, RA, DVT on eliquis, tobacco use who presents for abdominal pain. Patient has been recently hospitalized as well as in the ED for repeated bouts of pain related to her chronic pancreatitis. She has been managed closely by GI Dr. Jewell. During hospitalization there was concerns for pancreatic duct / cyst leakage s/p recent ERCP and progression of cysts w/ air in cyst. Patient underwent EUS w/ FNA w/ drainage of about 150cc fluid and improvement in WBC w/ abx. She also had paracentesis w/ additional fluid removal. Patient states she would have some relief  "after the various management for her abdomen but then the pain would return, radiate to her back, and would be associated with nausea/vomiting with patient barely eating. She also endorses subjective fevers and chills. CT showed multiloculated cysts and large volume ascites. In the ED, ED physician discussed case with Dr. Jewell who recommended transfer. They spoke with [facility] accepted by Dr. John LUGO in advanced endo for transfer. Due to patient's prolonged ED stay and updated from [facility] that no beds are imminently available, hospitalist team to admit.      Hospital Course By Problem with Pertinent Findings    Patient was admitted here as she was accepted for transfer to [facility].  Her transfer was delayed due to them not having any beds in the facility.  Her stay was unremarkable.   She has had no abdominal pain or discomfort, fever, chills or any evidence of infection.  Antibiotics were stopped.  She finally got a bed at [facility] today and will be transferring there.      Patient was seen and examined on the date of discharge and determined to be suitable for discharge.    Physical Exam  /88   Pulse 96   Temp 98.4 °F (36.9 °C) (Oral)   Resp 18   Ht 5' 8" (1.727 m)   Wt 53.8 kg (118 lb 9.7 oz)   SpO2 (!) 90%   BMI 18.03 kg/m²   Vitals reviewed..     Following labs were Reviewed   No results for input(s): WBC, HGB, HCT, PLT, GLUCOSE, CALCIUM, ALBUMIN, PROT, NA, K, CO2, CL, BUN, CREATININE, ALKPHOS, ALT, AST, BILITOT in the last 24 hours.  No results found for: POCTGLUCOSE         Microbiology Results (last 7 days)       Procedure Component Value Units Date/Time    Urine culture [[id number]] Collected: 01/15/23 1631    Order Status: Completed Specimen: Urine Updated: 01/17/23 0801     Urine Culture, Routine Multiple organisms isolated. None in predominance.  Repeat if      clinically necessary.    Narrative:      Specimen Source->Urine          US Guided Paracentesis   Final Result    "   1. Successful ultrasound-guided paracentesis as above.   2. In comparison to the prior study from January 4, ascites fluid is now complex, with septations and low level internal echoes.         Electronically signed by: Chapin Carmona MD   Date:    01/19/2023   Time:    15:16      CT Abdomen Pelvis With Contrast   Final Result          No results found for this or any previous visit.      Consultants and Procedures   Consultants:  Consults (From admission, onward)          Status Ordering Provider     Inpatient consult to Gastroenterology  Once        Provider:  ANDREY Stokes MD    Acknowledged ZENON MORENO     Inpatient consult to Registered Dietitian/Nutritionist  Once        Provider:  (Not yet assigned)    Completed ZENON MORENO     Inpatient consult to Midline team  Once        Provider:  (Not yet assigned)    Acknowledged ZENON MORENO     Inpatient consult to Hospitalist  Once        Provider:  Natalia Valadez MD    Acknowledged NATALIA VALADEZ     Inpatient consult to Gastroenterology  Once        Provider:  Donavon Jewell III, MD    Completed NATALIA VALADEZ                        Pending laboratory work/Tests to be performed/followed by the Primary care Physician:    The patient was discharged in the care of her parents//wife/family/caregiver, with discharge instructions were reviewed in written and verbal form. All pertinent questions were discussed and prescriptions were provided. The importance of making follow up appointments and compliance of medications has been stressed repeatedly. The patient will follow up in 1 week or sooner as needed with the PCP, and the patient is on board with the plan. Upon discharge, patient needs to be on following medications.    Discharge Medications:     Medication List        CONTINUE taking these medications      cholecalciferol (vitamin D3) 10 mcg (400 unit) Tab  Commonly known as: VITAMIN D3     CREON  36,000-114,000- 180,000 unit Cpdr  Generic drug: lipase-protease-amylase  Take 2 capsules by mouth 3 (three) times daily.     cyclobenzaprine 5 MG tablet  Commonly known as: FLEXERIL     folic acid 1 MG tablet  Commonly known as: FOLVITE  Take 1 tablet (1 mg total) by mouth once daily.     metoprolol succinate 50 MG 24 hr tablet  Commonly known as: TOPROL-XL  Take 0.5 tablets (25 mg total) by mouth once daily. 1/2 tab qd     pantoprazole 40 MG tablet  Commonly known as: PROTONIX  Take 1 tablet (40 mg total) by mouth once daily.     potassium gluconate 600 mg (99 mg) Tab     predniSONE 5 MG tablet  Commonly known as: DELTASONE  Take 1 tablet (5 mg total) by mouth once daily.     sertraline 50 MG tablet  Commonly known as: ZOLOFT  Take 1 tablet (50 mg total) by mouth every evening.     traZODone 50 MG tablet  Commonly known as: DESYREL  Take 1 tablet (50 mg total) by mouth every evening.            STOP taking these medications      apixaban 5 mg Tab  Commonly known as: ELIQUIS     oxyCODONE-acetaminophen 5-325 mg per tablet  Commonly known as: PERCOCET            ASK your doctor about these medications      cefUROXime 500 MG tablet  Commonly known as: CEFTIN  Take 1 tablet (500 mg total) by mouth 2 (two) times a day. for 10 days  Ask about: Should I take this medication?     dronabinoL 2.5 MG capsule  Commonly known as: MARINOL  Take 1 capsule (2.5 mg total) by mouth daily with dinner or evening meal.     leflunomide 20 MG Tab  Commonly known as: ARAVA  Take 1 tablet by mouth once daily                I spent 45 minutes preparing the discharge including reviewing records from previous encounters, preparation of discharge summary, assessing and final examination of the patient, discharge medicine reconciliation, discussing plan of care, follow up and education and prescriptions.       Markell English  [facility] Hospitalist  01/22/2023

## 2023-01-23 ENCOUNTER — ANESTHESIA EVENT (OUTPATIENT)
Dept: ENDOSCOPY | Facility: HOSPITAL | Age: 63
DRG: 438 | End: 2023-01-23
Payer: MEDICAID

## 2023-01-23 PROBLEM — Z86.718 HISTORY OF DVT OF LOWER EXTREMITY: Status: ACTIVE | Noted: 2023-01-23

## 2023-01-23 LAB
ABO + RH BLD: NORMAL
ALBUMIN FLD-MCNC: 0.7 G/DL
ALBUMIN SERPL BCP-MCNC: 1 G/DL (ref 3.5–5.2)
ALP SERPL-CCNC: 101 U/L (ref 55–135)
ALT SERPL W/O P-5'-P-CCNC: 8 U/L (ref 10–44)
ANION GAP SERPL CALC-SCNC: 6 MMOL/L (ref 8–16)
APPEARANCE FLD: NORMAL
APTT BLDCRRT: 26.2 SEC (ref 21–32)
AST SERPL-CCNC: 19 U/L (ref 10–40)
BASOPHILS # BLD AUTO: 0.04 K/UL (ref 0–0.2)
BASOPHILS NFR BLD: 0.3 % (ref 0–1.9)
BILIRUB SERPL-MCNC: 0.3 MG/DL (ref 0.1–1)
BLD GP AB SCN CELLS X3 SERPL QL: NORMAL
BLD PROD TYP BPU: NORMAL
BLOOD UNIT EXPIRATION DATE: NORMAL
BLOOD UNIT TYPE CODE: 5100
BLOOD UNIT TYPE: NORMAL
BODY FLD TYPE: NORMAL
BODY FLUID SOURCE, LDH: NORMAL
BUN SERPL-MCNC: 12 MG/DL (ref 8–23)
CALCIUM SERPL-MCNC: 8.9 MG/DL (ref 8.7–10.5)
CHLORIDE SERPL-SCNC: 104 MMOL/L (ref 95–110)
CO2 SERPL-SCNC: 23 MMOL/L (ref 23–29)
CODING SYSTEM: NORMAL
COLOR FLD: YELLOW
CREAT SERPL-MCNC: 0.4 MG/DL (ref 0.5–1.4)
DIFFERENTIAL METHOD: ABNORMAL
DISPENSE STATUS: NORMAL
EOSINOPHIL # BLD AUTO: 0.1 K/UL (ref 0–0.5)
EOSINOPHIL NFR BLD: 0.9 % (ref 0–8)
ERYTHROCYTE [DISTWIDTH] IN BLOOD BY AUTOMATED COUNT: 17 % (ref 11.5–14.5)
EST. GFR  (NO RACE VARIABLE): >60 ML/MIN/1.73 M^2
GLUCOSE SERPL-MCNC: 75 MG/DL (ref 70–110)
GRAM STN SPEC: NORMAL
GRAM STN SPEC: NORMAL
HCT VFR BLD AUTO: 21.8 % (ref 37–48.5)
HGB BLD-MCNC: 6.7 G/DL (ref 12–16)
IMM GRANULOCYTES # BLD AUTO: 0.29 K/UL (ref 0–0.04)
IMM GRANULOCYTES NFR BLD AUTO: 2 % (ref 0–0.5)
INR PPP: 1.2 (ref 0.8–1.2)
LDH FLD L TO P-CCNC: 9309 U/L
LYMPHOCYTES # BLD AUTO: 0.9 K/UL (ref 1–4.8)
LYMPHOCYTES NFR BLD: 6.2 % (ref 18–48)
MAGNESIUM SERPL-MCNC: 1.9 MG/DL (ref 1.6–2.6)
MCH RBC QN AUTO: 29.3 PG (ref 27–31)
MCHC RBC AUTO-ENTMCNC: 30.7 G/DL (ref 32–36)
MCV RBC AUTO: 95 FL (ref 82–98)
MONOCYTES # BLD AUTO: 0.7 K/UL (ref 0.3–1)
MONOCYTES NFR BLD: 5 % (ref 4–15)
NEUTROPHILS # BLD AUTO: 12.6 K/UL (ref 1.8–7.7)
NEUTROPHILS NFR BLD: 85.6 % (ref 38–73)
NRBC BLD-RTO: 0 /100 WBC
NUM UNITS TRANS PACKED RBC: NORMAL
PHOSPHATE SERPL-MCNC: 2.2 MG/DL (ref 2.7–4.5)
PLATELET # BLD AUTO: 267 K/UL (ref 150–450)
PMV BLD AUTO: 10.7 FL (ref 9.2–12.9)
POCT GLUCOSE: 104 MG/DL (ref 70–110)
POCT GLUCOSE: 68 MG/DL (ref 70–110)
POCT GLUCOSE: 73 MG/DL (ref 70–110)
POCT GLUCOSE: 93 MG/DL (ref 70–110)
POCT GLUCOSE: 96 MG/DL (ref 70–110)
POTASSIUM SERPL-SCNC: 3.1 MMOL/L (ref 3.5–5.1)
PROT FLD-MCNC: 2.2 G/DL
PROT SERPL-MCNC: 4.7 G/DL (ref 6–8.4)
PROTHROMBIN TIME: 12 SEC (ref 9–12.5)
RBC # BLD AUTO: 2.29 M/UL (ref 4–5.4)
SODIUM SERPL-SCNC: 133 MMOL/L (ref 136–145)
SPECIMEN SOURCE: NORMAL
SPECIMEN SOURCE: NORMAL
WBC # BLD AUTO: 14.73 K/UL (ref 3.9–12.7)
WBC # FLD: NORMAL /CU MM

## 2023-01-23 PROCEDURE — P9016 RBC LEUKOCYTES REDUCED: HCPCS | Performed by: STUDENT IN AN ORGANIZED HEALTH CARE EDUCATION/TRAINING PROGRAM

## 2023-01-23 PROCEDURE — 84100 ASSAY OF PHOSPHORUS: CPT | Performed by: STUDENT IN AN ORGANIZED HEALTH CARE EDUCATION/TRAINING PROGRAM

## 2023-01-23 PROCEDURE — 87070 CULTURE OTHR SPECIMN AEROBIC: CPT | Performed by: STUDENT IN AN ORGANIZED HEALTH CARE EDUCATION/TRAINING PROGRAM

## 2023-01-23 PROCEDURE — 80053 COMPREHEN METABOLIC PANEL: CPT | Performed by: STUDENT IN AN ORGANIZED HEALTH CARE EDUCATION/TRAINING PROGRAM

## 2023-01-23 PROCEDURE — 87075 CULTR BACTERIA EXCEPT BLOOD: CPT | Performed by: STUDENT IN AN ORGANIZED HEALTH CARE EDUCATION/TRAINING PROGRAM

## 2023-01-23 PROCEDURE — 87205 SMEAR GRAM STAIN: CPT | Performed by: STUDENT IN AN ORGANIZED HEALTH CARE EDUCATION/TRAINING PROGRAM

## 2023-01-23 PROCEDURE — 36415 COLL VENOUS BLD VENIPUNCTURE: CPT | Performed by: INTERNAL MEDICINE

## 2023-01-23 PROCEDURE — 99223 PR INITIAL HOSPITAL CARE,LEVL III: ICD-10-PCS | Mod: ,,, | Performed by: INTERNAL MEDICINE

## 2023-01-23 PROCEDURE — 84157 ASSAY OF PROTEIN OTHER: CPT | Performed by: STUDENT IN AN ORGANIZED HEALTH CARE EDUCATION/TRAINING PROGRAM

## 2023-01-23 PROCEDURE — S4991 NICOTINE PATCH NONLEGEND: HCPCS | Performed by: STUDENT IN AN ORGANIZED HEALTH CARE EDUCATION/TRAINING PROGRAM

## 2023-01-23 PROCEDURE — 63600175 PHARM REV CODE 636 W HCPCS: Performed by: STUDENT IN AN ORGANIZED HEALTH CARE EDUCATION/TRAINING PROGRAM

## 2023-01-23 PROCEDURE — 99223 1ST HOSP IP/OBS HIGH 75: CPT | Mod: ,,, | Performed by: INTERNAL MEDICINE

## 2023-01-23 PROCEDURE — 87076 CULTURE ANAEROBE IDENT EACH: CPT | Performed by: STUDENT IN AN ORGANIZED HEALTH CARE EDUCATION/TRAINING PROGRAM

## 2023-01-23 PROCEDURE — 25000003 PHARM REV CODE 250: Performed by: INTERNAL MEDICINE

## 2023-01-23 PROCEDURE — 85025 COMPLETE CBC W/AUTO DIFF WBC: CPT | Performed by: STUDENT IN AN ORGANIZED HEALTH CARE EDUCATION/TRAINING PROGRAM

## 2023-01-23 PROCEDURE — 82042 OTHER SOURCE ALBUMIN QUAN EA: CPT | Performed by: STUDENT IN AN ORGANIZED HEALTH CARE EDUCATION/TRAINING PROGRAM

## 2023-01-23 PROCEDURE — 83735 ASSAY OF MAGNESIUM: CPT | Performed by: STUDENT IN AN ORGANIZED HEALTH CARE EDUCATION/TRAINING PROGRAM

## 2023-01-23 PROCEDURE — 86920 COMPATIBILITY TEST SPIN: CPT | Performed by: STUDENT IN AN ORGANIZED HEALTH CARE EDUCATION/TRAINING PROGRAM

## 2023-01-23 PROCEDURE — 36430 TRANSFUSION BLD/BLD COMPNT: CPT

## 2023-01-23 PROCEDURE — 83615 LACTATE (LD) (LDH) ENZYME: CPT | Performed by: STUDENT IN AN ORGANIZED HEALTH CARE EDUCATION/TRAINING PROGRAM

## 2023-01-23 PROCEDURE — 36415 COLL VENOUS BLD VENIPUNCTURE: CPT | Performed by: STUDENT IN AN ORGANIZED HEALTH CARE EDUCATION/TRAINING PROGRAM

## 2023-01-23 PROCEDURE — 89051 BODY FLUID CELL COUNT: CPT | Performed by: STUDENT IN AN ORGANIZED HEALTH CARE EDUCATION/TRAINING PROGRAM

## 2023-01-23 PROCEDURE — 20600001 HC STEP DOWN PRIVATE ROOM

## 2023-01-23 PROCEDURE — 25000003 PHARM REV CODE 250: Performed by: STUDENT IN AN ORGANIZED HEALTH CARE EDUCATION/TRAINING PROGRAM

## 2023-01-23 PROCEDURE — 86900 BLOOD TYPING SEROLOGIC ABO: CPT | Performed by: STUDENT IN AN ORGANIZED HEALTH CARE EDUCATION/TRAINING PROGRAM

## 2023-01-23 RX ORDER — SODIUM,POTASSIUM PHOSPHATES 280-250MG
2 POWDER IN PACKET (EA) ORAL ONCE
Status: COMPLETED | OUTPATIENT
Start: 2023-01-23 | End: 2023-01-23

## 2023-01-23 RX ORDER — HYDROCODONE BITARTRATE AND ACETAMINOPHEN 500; 5 MG/1; MG/1
TABLET ORAL
Status: DISCONTINUED | OUTPATIENT
Start: 2023-01-23 | End: 2023-01-24

## 2023-01-23 RX ORDER — POTASSIUM CHLORIDE 20 MEQ/1
40 TABLET, EXTENDED RELEASE ORAL ONCE
Status: COMPLETED | OUTPATIENT
Start: 2023-01-23 | End: 2023-01-23

## 2023-01-23 RX ADMIN — FOLIC ACID 1 MG: 1 TABLET ORAL at 09:01

## 2023-01-23 RX ADMIN — CYCLOBENZAPRINE HYDROCHLORIDE 5 MG: 5 TABLET, FILM COATED ORAL at 08:01

## 2023-01-23 RX ADMIN — POTASSIUM CHLORIDE 40 MEQ: 1500 TABLET, EXTENDED RELEASE ORAL at 09:01

## 2023-01-23 RX ADMIN — POTASSIUM & SODIUM PHOSPHATES POWDER PACK 280-160-250 MG 2 PACKET: 280-160-250 PACK at 09:01

## 2023-01-23 RX ADMIN — SERTRALINE HYDROCHLORIDE 50 MG: 25 TABLET ORAL at 08:01

## 2023-01-23 RX ADMIN — PANCRELIPASE 2 CAPSULE: 60000; 12000; 38000 CAPSULE, DELAYED RELEASE PELLETS ORAL at 09:01

## 2023-01-23 RX ADMIN — METOPROLOL SUCCINATE 25 MG: 25 TABLET, EXTENDED RELEASE ORAL at 09:01

## 2023-01-23 RX ADMIN — CEFTRIAXONE 1 G: 1 INJECTION, POWDER, FOR SOLUTION INTRAMUSCULAR; INTRAVENOUS at 12:01

## 2023-01-23 RX ADMIN — PANCRELIPASE 2 CAPSULE: 60000; 12000; 38000 CAPSULE, DELAYED RELEASE PELLETS ORAL at 01:01

## 2023-01-23 RX ADMIN — Medication 1 PATCH: at 09:01

## 2023-01-23 RX ADMIN — DEXTROSE MONOHYDRATE 125 ML: 100 INJECTION, SOLUTION INTRAVENOUS at 12:01

## 2023-01-23 RX ADMIN — MUPIROCIN: 20 OINTMENT TOPICAL at 08:01

## 2023-01-23 RX ADMIN — PANCRELIPASE 2 CAPSULE: 60000; 12000; 38000 CAPSULE, DELAYED RELEASE PELLETS ORAL at 06:01

## 2023-01-23 RX ADMIN — TRAZODONE HYDROCHLORIDE 50 MG: 50 TABLET ORAL at 08:01

## 2023-01-23 RX ADMIN — MUPIROCIN: 20 OINTMENT TOPICAL at 09:01

## 2023-01-23 NOTE — CONSULTS
[facility] ([facility]  Adult Nutrition  Consult Note    SUMMARY     Recommendations    1. Diet advancement per MD.   2. TPN recommendations: 240g D, 70g AA + IV Lipids to provide 1596 calories & 70g of protein (GIR: 3.1).  3. RD to monitor & follow-up.    Goals: Meet % EEN, EPN by RD f/u date  Nutrition Goal Status: new  Communication of RD Recs: reviewed with RN    Assessment and Plan    Severe protein-calorie malnutrition    Nutrition Problem:  Severe Protein-Calorie Malnutrition  Malnutrition in the context of Chronic Illness/Injury    Related to (etiology):  Inability to consume sufficient energy     Signs and Symptoms (as evidenced by):  Energy Intake: <75% of estimated energy requirement for > 3 months   Body Fat Depletion: severe depletion of orbitals and triceps   Muscle Mass Depletion: severe depletion of temples, clavicle region, scapular region and lower extremities   Weight Loss: 27% x 6 months (per pt)    Interventions(treatment strategy):  Collaboration of nutrition care w/ other providers  PN     Nutrition Diagnosis Status:  New/Continues     Malnutrition Assessment    Weight Loss (Malnutrition): greater than 10% in 6 months  Energy Intake (Malnutrition): less than 75% for greater than or equal to 3 months  Subcutaneous Fat (Malnutrition): severe depletion  Muscle Mass (Malnutrition): severe depletion     Reason for Assessment    Reason For Assessment: consult  Diagnosis: other (see comments) (Panc. ascites)  Relevant Medical History: Chronic pancreatitis  Interdisciplinary Rounds: did not attend    General Information Comments: NPO, paracentesis this AM. Information obtained from RD assessment 1/20: Severe wasting noted on physical exam & < 50% energy intake x 3 month PTA. Pt met criteria for severe malnutrition, which continues at this time. Please see PES statement for details. Pt also reports 45# weight loss x 6 months.  Nutrition Discharge Planning: Adequate  "nutrition    Nutrition/Diet History    Factors Affecting Nutritional Intake: altered gastrointestinal function    Anthropometrics    Temp: 97.5 °F (36.4 °C)  Height: 5' 8" (172.7 cm)  Height (inches): 68 in  Weight Method: Bed Scale  Weight: 53.8 kg (118 lb 9.7 oz)  Weight (lb): 118.61 lb  Ideal Body Weight (IBW), Female: 140 lb  % Ideal Body Weight, Female (lb): 84.72 %  BMI (Calculated): 18  BMI Grade: 17 - 18.4 protein-energy malnutrition grade I  Usual Body Weight (UBW), k kg  % Usual Body Weight: 72.85  % Weight Change From Usual Weight: -27.3 %    Lab/Procedures/Meds    Pertinent Labs Reviewed: reviewed  Pertinent Labs Comments: Na 133  Pertinent Medications Reviewed: reviewed    Estimated/Assessed Needs    Weight Used For Calorie Calculations: 53.8 kg (118 lb 9.7 oz)    Energy Calorie Requirements (kcal): 1614 kcal/d  Energy Need Method: Kcal/kg (30 kcal/kg)    Protein Requirements: 70 g/d (1.3 g/kg)  Weight Used For Protein Calculations: 53.8 kg (118 lb 9.7 oz)    Estimated Fluid Requirement Method: other (see comments) (Per MD or 1 mL/kcal)  RDA Method (mL): 1614    Nutrition Prescription Ordered    Current Diet Order: NPO    Evaluation of Received Nutrient/Fluid Intake    I/O: -    Comments: LBM:     Nutrition Risk    Level of Risk/Frequency of Follow-up:  (1x/week)     Monitor and Evaluation    Food and Nutrient Intake: energy intake, food and beverage intake, parenteral nutrition intake  Food and Nutrient Adminstration: diet order, enteral and parenteral nutrition administration  Physical Activity and Function: nutrition-related ADLs and IADLs  Anthropometric Measurements: weight, weight change  Biochemical Data, Medical Tests and Procedures: inflammatory profile, glucose/endocrine profile, gastrointestinal profile, electrolyte and renal panel  Nutrition-Focused Physical Findings: overall appearance     Nutrition Follow-Up    RD Follow-up?: Yes    "

## 2023-01-23 NOTE — HPI
Ms. Bowser is a 62 y.o F w/ hx of rheumatoid arthritis, chronic pancreatitis, pancreatic pseudocyst, pancreatic sphincterectomy and stent placement, cholecystectomy, severe protein calorie malnutrition, DVT, tobacco use who initially presented to [facility] for evaluation of worsening abdominal pain, nausea and vomiting.  She has had multiple recent admissions in the past few months due to similar symptoms associated with her chronic pancreatitis.  She reports abdominal pain with any oral intake and has had very poor appetite mostly limited to liquids including soup and Jell-O.  Reports around 45 lbs weight loss over the past 6 months.  She has been evaluated in the past year with biopsy for a suspicious mass at the neck of pancreas with workup negative for malignancy at the time.       OSH course notable for imaging with concerns for large volume ascites, small bilateral pleural effusions, unchanged multiloculated fluid collections in the left hemiabdomen. She underwent paracentesis[01/18] with 2.2 L of fluid removal, and ascites fluid was concerning for complex appearance with septations, changed from prior. She completed a course of broad-spectrum antibiotics(vancomycin, cefepime).     She was transferred for [facility] for  AES evaluation for:  Pancreatic ascites with concern for pancreatic duct leak and infection.

## 2023-01-23 NOTE — ANESTHESIA PREPROCEDURE EVALUATION
[facility]  Anesthesia Pre-Operative Evaluation         Patient Name: Claire Bowser  YOB: 1960  MRN: [id number]    SUBJECTIVE:     Pre-operative evaluation for Procedure(s) (LRB):  ERCP (ENDOSCOPIC RETROGRADE CHOLANGIOPANCREATOGRAPHY) (N/A)     01/23/2023    Claire Bowser is a 62 y.o. female with a significant medical history of rheumatoid arthritis, chronic pancreatitis, pancreatic pseudocyst, pancreatic sphincterectomy and stent placement, cholecystectomy, severe protein calorie malnutrition, DVT who presents for the above procedure. She was transferred for [facility] for  AES evaluation for:  Pancreatic ascites with concern for pancreatic duct leak and infection.    LDA:        Midline Catheter Insertion/Assessment  - Single Lumen 01/20/23 1430 Right basilic vein (medial side of arm) 20g x 8cm (Active)   Site Assessment Clean;Dry;Intact 01/23/23 0934   IV Device Securement catheter securement device 01/23/23 0934   Line Status Flushed;Saline locked 01/23/23 0934   Dressing Type Central line dressing 01/23/23 0934   Dressing Status Clean;Dry;Intact 01/23/23 0934   Reason Not Rotated Not due 01/23/23 0934   Number of days: 2       Prev airway:     Mask Ventilation:  Easy mask    Attempts:  1    Attempted By:  CRNA    Method of Intubation:  Video laryngoscopy    Blade:  Thompson 3    Laryngeal View Grade: Grade I - full view of cords      Difficult Airway Encountered?: No      Complications:  None    Airway Device:  Oral endotracheal tube    Airway Device Size:  7.5      Patient Active Problem List   Diagnosis    Anxiety    Hypertension    Generalized anxiety disorder    Tobacco use    Mixed hyperlipidemia    BMI 22.0-22.9, adult    Rheumatoid arthritis involving multiple sites with positive rheumatoid factor    Black stool    Colon polyps    Pneumonia    Hypokalemia    Acute cystitis without hematuria    Acute pancreatitis    Epigastric pain    Dehydration    Hypercalcemia     ACP (advance care planning)    Severe protein-calorie malnutrition    Long-term use of immunosuppressant medication    Syncope    Acute biliary pancreatitis    Chest pain    Weight loss, unintentional    Acute DVT (deep venous thrombosis)    Abdominal pain    Chronic pancreatitis    Immunocompromised    Pancreatic pseudocyst    Pancreatic cyst    Pancreatic ascites    History of DVT of lower extremity       Review of patient's allergies indicates:   Allergen Reactions    No known drug allergies        Current Inpatient Medications:   cefTRIAXone (ROCEPHIN) IVPB  1 g Intravenous Q24H    cyclobenzaprine  5 mg Oral Nightly    folic acid  1 mg Oral Daily    lipase-protease-amylase 12,000-38,000-60,000 units  2 capsule Oral TID WM    metoprolol succinate  25 mg Oral Daily    mupirocin   Nasal BID    nicotine  1 patch Transdermal Daily    pantoprazole  40 mg Oral Daily    sertraline  50 mg Oral QHS    traZODone  50 mg Oral QHS       No current facility-administered medications on file prior to encounter.     Current Outpatient Medications on File Prior to Encounter   Medication Sig Dispense Refill    cholecalciferol, vitamin D3, (VITAMIN D3) 10 mcg (400 unit) Tab Take 400 Units by mouth once daily.      CREON 36,000-114,000- 180,000 unit CpDR Take 2 capsules by mouth 3 (three) times daily. 180 capsule 0    cyclobenzaprine (FLEXERIL) 5 MG tablet Take 5 mg by mouth nightly.      folic acid (FOLVITE) 1 MG tablet Take 1 tablet (1 mg total) by mouth once daily. 360 tablet 3    pantoprazole (PROTONIX) 40 MG tablet Take 1 tablet (40 mg total) by mouth once daily. 30 tablet 0    predniSONE (DELTASONE) 5 MG tablet Take 1 tablet (5 mg total) by mouth once daily. 30 tablet 0    sertraline (ZOLOFT) 50 MG tablet Take 1 tablet (50 mg total) by mouth every evening. 30 tablet 11    traZODone (DESYREL) 50 MG tablet Take 1 tablet (50 mg total) by mouth every evening. 90 tablet 1    dronabinoL (MARINOL)  2.5 MG capsule Take 1 capsule (2.5 mg total) by mouth daily with dinner or evening meal. (Patient not taking: Reported on 1/16/2023) 30 capsule 1    leflunomide (ARAVA) 20 MG Tab Take 1 tablet by mouth once daily (Patient not taking: Reported on 1/16/2023) 30 tablet 0    metoprolol succinate (TOPROL-XL) 50 MG 24 hr tablet Take 0.5 tablets (25 mg total) by mouth once daily. 1/2 tab qd      potassium gluconate 600 mg (99 mg) Tab Take 1 tablet by mouth once daily.         Past Surgical History:   Procedure Laterality Date    COLONOSCOPY N/A 2/26/2021    Procedure: COLONOSCOPY;  Surgeon: Amy Sidhu MD;  Location: [facility];  Service: Endoscopy;  Laterality: N/A;    ENDOSCOPIC ULTRASOUND OF UPPER GASTROINTESTINAL TRACT N/A 7/1/2022    Procedure: ULTRASOUND, UPPER GI TRACT, ENDOSCOPIC;  Surgeon: Donavon Jewell III, MD;  Location: [facility] ENDO;  Service: Endoscopy;  Laterality: N/A;    ENDOSCOPIC ULTRASOUND OF UPPER GASTROINTESTINAL TRACT N/A 11/29/2022    Procedure: ULTRASOUND, UPPER GI TRACT, ENDOSCOPIC;  Surgeon: Donavon Jewell III, MD;  Location: [facility] ENDO;  Service: Endoscopy;  Laterality: N/A;    ENDOSCOPIC ULTRASOUND OF UPPER GASTROINTESTINAL TRACT N/A 1/3/2023    Procedure: ULTRASOUND, UPPER GI TRACT, ENDOSCOPIC;  Surgeon: Donavon Jewell III, MD;  Location: [facility] ENDO;  Service: Endoscopy;  Laterality: N/A;    ERCP N/A 12/30/2022    Procedure: ERCP (ENDOSCOPIC RETROGRADE CHOLANGIOPANCREATOGRAPHY);  Surgeon: Donavon Jewell III, MD;  Location: [facility] ENDO;  Service: Endoscopy;  Laterality: N/A;    ESOPHAGOGASTRODUODENOSCOPY N/A 2/26/2021    Procedure: EGD (ESOPHAGOGASTRODUODENOSCOPY);  Surgeon: Amy Sidhu MD;  Location: [facility];  Service: Endoscopy;  Laterality: N/A;    LAPAROSCOPIC CHOLECYSTECTOMY N/A 7/27/2022    Procedure: CHOLECYSTECTOMY, LAPAROSCOPIC;  Surgeon: Ramón Hwang III, MD;  Location: [facility] OR;  Service: General;  Laterality: N/A;    TUBAL LIGATION          Social History     Socioeconomic History    Marital status:    Tobacco Use    Smoking status: Every Day     Packs/day: 1.00     Years: 7.00     Pack years: 7.00     Types: Cigarettes    Smokeless tobacco: Never    Tobacco comments:     [phone]   Substance and Sexual Activity    Alcohol use: No    Drug use: Never    Sexual activity: Yes     Partners: Male     Social Determinants of Health     Financial Resource Strain: Medium Risk    Difficulty of Paying Living Expenses: Somewhat hard   Food Insecurity: Unknown    Worried About Running Out of Food in the Last Year: Never true    Ran Out of Food in the Last Year: Patient refused   Transportation Needs: No Transportation Needs    Lack of Transportation (Medical): No    Lack of Transportation (Non-Medical): No   Physical Activity: Insufficiently Active    Days of Exercise per Week: 5 days    Minutes of Exercise per Session: 20 min   Stress: Unknown    Feeling of Stress : Patient refused   Social Connections: Unknown    Frequency of Communication with Friends and Family: Patient refused    Frequency of Social Gatherings with Friends and Family: Patient refused    Attends [employer]ist Services: Never    Active Member of Clubs or Organizations: No    Attends Club or Organization Meetings: Never    Marital Status:    Housing Stability: Low Risk     Unable to Pay for Housing in the Last Year: No    Number of Places Lived in the Last Year: 1    Unstable Housing in the Last Year: No       OBJECTIVE:     Vital Signs Range:  Vitals - 1 value per visit 1/23/2023 1/23/2023 1/23/2023   SYSTOLIC 117 116 -   DIASTOLIC 66 58 -   Remote BP - - -   Pulse 80 85 -   Temp - 98.2 -   Resp - 18 -   SPO2 99 97 -   Weight (lb) - - 118.61   Weight (kg) - - 53.8   Height - - 68   BMI (Calculated) - - 18   VISIT REPORT - - -   Pain Score  - - -   Some recent data might be hidden         CBC:   Lab Results   Component Value Date    WBC 14.73 (H)  01/23/2023    HGB 6.7 (L) 01/23/2023    HCT 21.8 (L) 01/23/2023    MCV 95 01/23/2023     01/23/2023         CMP:   Sodium   Date Value Ref Range Status   01/23/2023 133 (L) 136 - 145 mmol/L Final     Potassium   Date Value Ref Range Status   01/23/2023 3.1 (L) 3.5 - 5.1 mmol/L Final     Chloride   Date Value Ref Range Status   01/23/2023 104 95 - 110 mmol/L Final     CO2   Date Value Ref Range Status   01/23/2023 23 23 - 29 mmol/L Final     Glucose   Date Value Ref Range Status   01/23/2023 75 70 - 110 mg/dL Final     BUN   Date Value Ref Range Status   01/23/2023 12 8 - 23 mg/dL Final     Creatinine   Date Value Ref Range Status   01/23/2023 0.4 (L) 0.5 - 1.4 mg/dL Final     Calcium   Date Value Ref Range Status   01/23/2023 8.9 8.7 - 10.5 mg/dL Final     Total Protein   Date Value Ref Range Status   01/23/2023 4.7 (L) 6.0 - 8.4 g/dL Final     Albumin   Date Value Ref Range Status   01/23/2023 1.0 (L) 3.5 - 5.2 g/dL Final     Total Bilirubin   Date Value Ref Range Status   01/23/2023 0.3 0.1 - 1.0 mg/dL Final     Comment:     For infants and newborns, interpretation of results should be based  on gestational age, weight and in agreement with clinical  observations.    Premature Infant recommended reference ranges:  Up to 24 hours.............<8.0 mg/dL  Up to 48 hours............<12.0 mg/dL  3-5 days..................<15.0 mg/dL  6-29 days.................<15.0 mg/dL       Alkaline Phosphatase   Date Value Ref Range Status   01/23/2023 101 55 - 135 U/L Final     AST   Date Value Ref Range Status   01/23/2023 19 10 - 40 U/L Final     ALT   Date Value Ref Range Status   01/23/2023 8 (L) 10 - 44 U/L Final     Anion Gap   Date Value Ref Range Status   01/23/2023 6 (L) 8 - 16 mmol/L Final     eGFR if    Date Value Ref Range Status   07/25/2022 >60.0 >60 mL/min/1.73 m^2 Final     eGFR if non    Date Value Ref Range Status   07/25/2022 >60.0 >60 mL/min/1.73 m^2 Final     Comment:      Calculation used to obtain the estimated glomerular filtration  rate (eGFR) is the CKD-EPI equation.          INR:  Lab Results   Component Value Date    INR 1.2 01/22/2023       EKG:   Results for orders placed or performed during the hospital encounter of 01/12/23   EKG 12-lead    Collection Time: 01/12/23  7:47 PM    Narrative    Test Reason : R10.9,    Vent. Rate : 092 BPM     Atrial Rate : 092 BPM     P-R Int : 122 ms          QRS Dur : 078 ms      QT Int : 402 ms       P-R-T Axes : 074 019 -12 degrees     QTc Int : 497 ms    Normal sinus rhythm  Septal infarct (cited on or before 02-JAN-2023)  T wave abnormality, consider anterior ischemia  Abnormal ECG  When compared with ECG of 02-JAN-2023 13:10,  T wave inversion now evident in Anterior leads  Confirmed by Ricci MARIANO, Ta DAINEL ([id number]) on 1/15/2023 12:14:26 PM    Referred By: [email or web address]   SELF           Confirmed By:Ta Wynne MD        2D ECHO:   Results for orders placed during the hospital encounter of 10/17/22    Echo    Interpretation Summary  · The left ventricle is normal in size with concentric remodeling and normal systolic function. No wall motion abnormalities  · The estimated ejection fraction is 55%.  · Normal left ventricular diastolic function.  · Atrial fibrillation not observed.  · Normal right ventricular size with normal right ventricular systolic function.  · There is mild pulmonary hypertension.  · Mild tricuspid regurgitation.  · Intermediate central venous pressure (8 mmHg).  · The estimated PA systolic pressure is 52 mmHg.         ASSESSMENT/PLAN:         Pre-op Assessment    I have reviewed the Patient Summary Reports.     I have reviewed the Nursing Notes. I have reviewed the NPO Status.   I have reviewed the Medications.   Steroids Taken In Past Year: Prednisone    Review of Systems  Anesthesia Hx:  History of prior surgery of interest to airway management or planning: Denies Family Hx of Anesthesia complications.   Denies  Personal Hx of Anesthesia complications.   Social:  Smoker, No Alcohol Use    Hematology/Oncology:  Hematology Normal   Oncology Normal     EENT/Dental:   Both lower incisors are loose.   Cardiovascular:   Hypertension    Pulmonary:  Pulmonary Normal    Renal/:  Renal/ Normal     Hepatic/GI:  Hepatic/GI Normal Recurrent pancreatitis.  1300 cc ascites drained today.  Mild nausea recently but no emesis. Hepatic/GI Symptoms: (history of benign pacreatic mass)    Musculoskeletal:   Arthritis (RA)  Patient has full range of motion of her neck with no pain currently. Spine Disorders: cervical Degenerative disease    Neurological:  Neurology Normal    Endocrine:  Endocrine Normal    Dermatological:  Skin Normal    Psych:   anxiety          Physical Exam  General: Well nourished, Alert, Cooperative and Oriented    Airway:  Mallampati: II   Mouth Opening: Normal  TM Distance: Normal  Tongue: Normal  Neck ROM: Normal ROM    Dental:  Periodontal disease  Poor dentition, loose lower teeth      Anesthesia Plan  Type of Anesthesia, risks & benefits discussed:    Anesthesia Type: Gen ETT, Gen Supraglottic Airway, Gen Natural Airway, MAC  Intra-op Monitoring Plan: Standard ASA Monitors  Post Op Pain Control Plan: multimodal analgesia and IV/PO Opioids PRN  Induction:  IV  Airway Plan: Direct and Video, Post-Induction  Informed Consent: Informed consent signed with the Patient and all parties understand the risks and agree with anesthesia plan.  All questions answered.   ASA Score: 3  Day of Surgery Review of History & Physical: H&P Update referred to the surgeon/provider.    Ready For Surgery From Anesthesia Perspective.     .

## 2023-01-23 NOTE — SUBJECTIVE & OBJECTIVE
Past Medical History:   Diagnosis Date    Acute biliary pancreatitis 6/14/2022    Anxiety     Chronic pancreatitis 1/2/2023    Digestive disorder     Flu 02/2017    [facility]    Hypertension     Long-term use of immunosuppressant medication 6/10/2022    Rheumatoid arthritis involving multiple sites with positive rheumatoid factor 01/14/2021       Past Surgical History:   Procedure Laterality Date    COLONOSCOPY N/A 2/26/2021    Procedure: COLONOSCOPY;  Surgeon: Amy Sidhu MD;  Location: [facility] ENDO;  Service: Endoscopy;  Laterality: N/A;    ENDOSCOPIC ULTRASOUND OF UPPER GASTROINTESTINAL TRACT N/A 7/1/2022    Procedure: ULTRASOUND, UPPER GI TRACT, ENDOSCOPIC;  Surgeon: Donavon Jewell III, MD;  Location: [facility] ENDO;  Service: Endoscopy;  Laterality: N/A;    ENDOSCOPIC ULTRASOUND OF UPPER GASTROINTESTINAL TRACT N/A 11/29/2022    Procedure: ULTRASOUND, UPPER GI TRACT, ENDOSCOPIC;  Surgeon: Donavon Jewell III, MD;  Location: [facility] ENDO;  Service: Endoscopy;  Laterality: N/A;    ENDOSCOPIC ULTRASOUND OF UPPER GASTROINTESTINAL TRACT N/A 1/3/2023    Procedure: ULTRASOUND, UPPER GI TRACT, ENDOSCOPIC;  Surgeon: Donavon Jewell III, MD;  Location: [facility] ENDO;  Service: Endoscopy;  Laterality: N/A;    ERCP N/A 12/30/2022    Procedure: ERCP (ENDOSCOPIC RETROGRADE CHOLANGIOPANCREATOGRAPHY);  Surgeon: Donavon Jewell III, MD;  Location: [facility] ENDO;  Service: Endoscopy;  Laterality: N/A;    ESOPHAGOGASTRODUODENOSCOPY N/A 2/26/2021    Procedure: EGD (ESOPHAGOGASTRODUODENOSCOPY);  Surgeon: Amy Sidhu MD;  Location: [facility] ENDO;  Service: Endoscopy;  Laterality: N/A;    LAPAROSCOPIC CHOLECYSTECTOMY N/A 7/27/2022    Procedure: CHOLECYSTECTOMY, LAPAROSCOPIC;  Surgeon: Ramón Hwang III, MD;  Location: [facility] OR;  Service: General;  Laterality: N/A;    TUBAL LIGATION         Review of patient's allergies indicates:   Allergen Reactions    No known drug allergies      Family History       Problem  Relation (Age of Onset)    Alcohol abuse Paternal Grandfather, Paternal Grandmother    COPD Father, Brother    Cancer Maternal Aunt    Cirrhosis Paternal Grandmother    Diabetes Mother, Maternal Aunt    Emphysema Brother    Hearing loss Mother    Heart disease Mother, Maternal Aunt, Maternal Uncle    Hypertension Mother, Maternal Uncle    Kidney disease Mother    Liver disease Paternal Grandfather, Sister    No Known Problems Son, Paternal Uncle    Sleep apnea Brother    Stroke Mother          Tobacco Use    Smoking status: Every Day     Packs/day: 1.00     Years: 7.00     Pack years: 7.00     Types: Cigarettes    Smokeless tobacco: Never    Tobacco comments:     [phone]   Substance and Sexual Activity    Alcohol use: No    Drug use: Never    Sexual activity: Yes     Partners: Male     Review of Systems  Objective:     Vital Signs (Most Recent):  Temp: 97.5 °F (36.4 °C) (01/23/23 0415)  Pulse: 77 (01/23/23 0754)  Resp: 15 (01/23/23 0754)  BP: (!) 103/54 (01/23/23 0754)  SpO2: 99 % (01/23/23 0754)   Vital Signs (24h Range):  Temp:  [97.5 °F (36.4 °C)-98.7 °F (37.1 °C)] 97.5 °F (36.4 °C)  Pulse:  [72-97] 77  Resp:  [15-20] 15  SpO2:  [90 %-100 %] 99 %  BP: (102-151)/(54-91) 103/54     Weight: 53.8 kg (118 lb 9.7 oz) (01/22/23 2316)  Body mass index is 18.03 kg/m².    No intake or output data in the 24 hours ending 01/23/23 0821    Lines/Drains/Airways       Peripheral Intravenous Line  Duration                  Midline Catheter Insertion/Assessment  - Single Lumen 01/20/23 1430 Right basilic vein (medial side of arm) 20g x 8cm 2 days                    Physical Exam    Significant Labs:  CBC:   Recent Labs   Lab 01/23/23  0458   WBC 14.73*   HGB 6.7*   HCT 21.8*        CMP:   Recent Labs   Lab 01/23/23  0457   GLU 75   CALCIUM 8.9   ALBUMIN 1.0*   PROT 4.7*   *   K 3.1*   CO2 23      BUN 12   CREATININE 0.4*   ALKPHOS 101   ALT 8*   AST 19   BILITOT 0.3     Coagulation:   Recent Labs   Lab  01/22/23  2305   INR 1.2   APTT 26.2     CRP: No results for input(s): CRP in the last 48 hours.  Peritoneal Fluid Cultures: No results for input(s): AEROBICCULTU, LABGRAM in the last 48 hours.    Significant Imaging:  Imaging results within the past 24 hours have been reviewed.

## 2023-01-23 NOTE — ASSESSMENT & PLAN NOTE
Nutrition Problem:  Severe Protein-Calorie Malnutrition  Malnutrition in the context of Chronic Illness/Injury    Related to (etiology):  Inability to consume sufficient energy     Signs and Symptoms (as evidenced by):  Energy Intake: <75% of estimated energy requirement for > 3 months   Body Fat Depletion: severe depletion of orbitals and triceps   Muscle Mass Depletion: severe depletion of temples, clavicle region, scapular region and lower extremities   Weight Loss: 27% x 6 months (per pt)    Interventions(treatment strategy):  Collaboration of nutrition care w/ other providers  PN     Nutrition Diagnosis Status:  New/Continues

## 2023-01-23 NOTE — SUBJECTIVE & OBJECTIVE
Past Medical History:   Diagnosis Date    Acute biliary pancreatitis 6/14/2022    Anxiety     Chronic pancreatitis 1/2/2023    Digestive disorder     Flu 02/2017    [facility]    Hypertension     Long-term use of immunosuppressant medication 6/10/2022    Rheumatoid arthritis involving multiple sites with positive rheumatoid factor 01/14/2021       Past Surgical History:   Procedure Laterality Date    COLONOSCOPY N/A 2/26/2021    Procedure: COLONOSCOPY;  Surgeon: Amy Sidhu MD;  Location: [facility] ENDO;  Service: Endoscopy;  Laterality: N/A;    ENDOSCOPIC ULTRASOUND OF UPPER GASTROINTESTINAL TRACT N/A 7/1/2022    Procedure: ULTRASOUND, UPPER GI TRACT, ENDOSCOPIC;  Surgeon: Donavon Jewell III, MD;  Location: [facility] ENDO;  Service: Endoscopy;  Laterality: N/A;    ENDOSCOPIC ULTRASOUND OF UPPER GASTROINTESTINAL TRACT N/A 11/29/2022    Procedure: ULTRASOUND, UPPER GI TRACT, ENDOSCOPIC;  Surgeon: Donavon Jewell III, MD;  Location: [facility] ENDO;  Service: Endoscopy;  Laterality: N/A;    ENDOSCOPIC ULTRASOUND OF UPPER GASTROINTESTINAL TRACT N/A 1/3/2023    Procedure: ULTRASOUND, UPPER GI TRACT, ENDOSCOPIC;  Surgeon: Donavon Jewell III, MD;  Location: [facility] ENDO;  Service: Endoscopy;  Laterality: N/A;    ERCP N/A 12/30/2022    Procedure: ERCP (ENDOSCOPIC RETROGRADE CHOLANGIOPANCREATOGRAPHY);  Surgeon: Donavon Jewell III, MD;  Location: [facility] ENDO;  Service: Endoscopy;  Laterality: N/A;    ESOPHAGOGASTRODUODENOSCOPY N/A 2/26/2021    Procedure: EGD (ESOPHAGOGASTRODUODENOSCOPY);  Surgeon: Amy Sidhu MD;  Location: [facility] ENDO;  Service: Endoscopy;  Laterality: N/A;    LAPAROSCOPIC CHOLECYSTECTOMY N/A 7/27/2022    Procedure: CHOLECYSTECTOMY, LAPAROSCOPIC;  Surgeon: Ramón Hwang III, MD;  Location: [facility] OR;  Service: General;  Laterality: N/A;    TUBAL LIGATION         Review of patient's allergies indicates:   Allergen Reactions    No known drug allergies        Current Facility-Administered  Medications on File Prior to Encounter   Medication    [] amino acid 4.25% - dextrose 5% (CLINIMIX-E) solution    [DISCONTINUED] 0.9%  NaCl infusion    [DISCONTINUED] amino acid 4.25% - dextrose 5% (CLINIMIX-E) solution    [DISCONTINUED] amino acid 4.25% - dextrose 5% (CLINIMIX-E) solution    [DISCONTINUED] cyclobenzaprine tablet 5 mg    [DISCONTINUED] dronabinoL capsule 2.5 mg    [DISCONTINUED] folic acid tablet 1 mg    [DISCONTINUED] leflunomide tablet 20 mg    [DISCONTINUED] lipase-protease-amylase 12,000-38,000-60,000 units per capsule 2 capsule    [DISCONTINUED] melatonin tablet 6 mg    [DISCONTINUED] metoprolol succinate (TOPROL-XL) 24 hr tablet 25 mg    [DISCONTINUED] morphine injection 2 mg    [DISCONTINUED] nicotine 21 mg/24 hr 1 patch    [DISCONTINUED] ondansetron injection 4 mg    [DISCONTINUED] oxyCODONE-acetaminophen 5-325 mg per tablet 1 tablet    [DISCONTINUED] pantoprazole EC tablet 40 mg    [DISCONTINUED] promethazine (PHENERGAN) 25 mg in dextrose 5 % 50 mL IVPB    [DISCONTINUED] sertraline tablet 50 mg    [DISCONTINUED] sodium chloride 0.9% flush 3 mL    [DISCONTINUED] traZODone tablet 50 mg     Current Outpatient Medications on File Prior to Encounter   Medication Sig    cholecalciferol, vitamin D3, (VITAMIN D3) 10 mcg (400 unit) Tab Take 400 Units by mouth once daily.    CREON 36,000-114,000- 180,000 unit CpDR Take 2 capsules by mouth 3 (three) times daily.    cyclobenzaprine (FLEXERIL) 5 MG tablet Take 5 mg by mouth nightly.    folic acid (FOLVITE) 1 MG tablet Take 1 tablet (1 mg total) by mouth once daily.    pantoprazole (PROTONIX) 40 MG tablet Take 1 tablet (40 mg total) by mouth once daily.    predniSONE (DELTASONE) 5 MG tablet Take 1 tablet (5 mg total) by mouth once daily.    sertraline (ZOLOFT) 50 MG tablet Take 1 tablet (50 mg total) by mouth every evening.    traZODone (DESYREL) 50 MG tablet Take 1 tablet (50 mg total) by mouth every evening.    dronabinoL (MARINOL) 2.5 MG  capsule Take 1 capsule (2.5 mg total) by mouth daily with dinner or evening meal. (Patient not taking: Reported on 1/16/2023)    leflunomide (ARAVA) 20 MG Tab Take 1 tablet by mouth once daily (Patient not taking: Reported on 1/16/2023)    metoprolol succinate (TOPROL-XL) 50 MG 24 hr tablet Take 0.5 tablets (25 mg total) by mouth once daily. 1/2 tab qd    potassium gluconate 600 mg (99 mg) Tab Take 1 tablet by mouth once daily.     Family History       Problem Relation (Age of Onset)    Alcohol abuse Paternal Grandfather, Paternal Grandmother    COPD Father, Brother    Cancer Maternal Aunt    Cirrhosis Paternal Grandmother    Diabetes Mother, Maternal Aunt    Emphysema Brother    Hearing loss Mother    Heart disease Mother, Maternal Aunt, Maternal Uncle    Hypertension Mother, Maternal Uncle    Kidney disease Mother    Liver disease Paternal Grandfather, Sister    No Known Problems Son, Paternal Uncle    Sleep apnea Brother    Stroke Mother          Tobacco Use    Smoking status: Every Day     Packs/day: 1.00     Years: 7.00     Pack years: 7.00     Types: Cigarettes    Smokeless tobacco: Never    Tobacco comments:     [phone]   Substance and Sexual Activity    Alcohol use: No    Drug use: Never    Sexual activity: Yes     Partners: Male     Review of Systems   Constitutional:  Positive for activity change, appetite change and fatigue. Negative for chills and fever.   HENT:  Negative for congestion, rhinorrhea and sore throat.    Respiratory:  Positive for shortness of breath. Negative for cough.    Cardiovascular:  Positive for leg swelling. Negative for chest pain.   Gastrointestinal:  Positive for abdominal pain and nausea. Negative for constipation, diarrhea and vomiting.   Genitourinary:  Negative for difficulty urinating, dysuria and urgency.   Musculoskeletal:  Negative for arthralgias and back pain.   Neurological:  Negative for dizziness and light-headedness.   Objective:     Vital Signs (Most  Recent):  Temp: 98.7 °F (37.1 °C) (01/22/23 2029)  Pulse: 94 (01/22/23 2029)  Resp: 18 (01/22/23 2359)  BP: 129/61 (01/22/23 2029)  SpO2: (!) 94 % (01/22/23 2029)   Vital Signs (24h Range):  Temp:  [97.9 °F (36.6 °C)-98.7 °F (37.1 °C)] 98.7 °F (37.1 °C)  Pulse:  [88-97] 94  Resp:  [14-20] 18  SpO2:  [90 %-94 %] 94 %  BP: (129-151)/(61-91) 129/61     Weight: 53.8 kg (118 lb 9.7 oz)  Body mass index is 18.03 kg/m².    Physical Exam  Vitals and nursing note reviewed.   Constitutional:       General: She is not in acute distress.     Appearance: She is cachectic. She is not ill-appearing, toxic-appearing or diaphoretic.   HENT:      Head: Normocephalic and atraumatic.      Mouth/Throat:      Mouth: Mucous membranes are moist.   Eyes:      General: No scleral icterus.        Right eye: No discharge.         Left eye: No discharge.   Cardiovascular:      Rate and Rhythm: Normal rate and regular rhythm.      Heart sounds: Normal heart sounds.   Pulmonary:      Effort: Pulmonary effort is normal. No respiratory distress.      Breath sounds: Normal breath sounds.   Abdominal:      General: Bowel sounds are normal. There is distension.      Palpations: Abdomen is soft.      Tenderness: There is generalized abdominal tenderness.      Comments: Tenderness: Suprapubic>epigastric   Musculoskeletal:         General: Swelling present.      Cervical back: No rigidity.      Right lower leg: Edema present.      Left lower leg: Edema present.   Skin:     General: Skin is warm and dry.      Coloration: Skin is not jaundiced.   Neurological:      Mental Status: She is oriented to person, place, and time. Mental status is at baseline.   Psychiatric:         Mood and Affect: Mood normal.         Behavior: Behavior normal.           Significant Labs: All pertinent labs within the past 24 hours have been reviewed.    CMP:   Recent Labs   Lab 01/22/23  2305   *   K 3.4*      CO2 20*   GLU 87   BUN 14   CREATININE 0.4*   CALCIUM  8.7   PROT 5.0*   ALBUMIN 1.0*   BILITOT 0.3   ALKPHOS 100   AST 15   ALT 6*   ANIONGAP 7*       Significant Imaging: I have reviewed all pertinent imaging results/findings within the past 24 hours.

## 2023-01-23 NOTE — ASSESSMENT & PLAN NOTE
Nutrition consulted. Most recent weight and BMI monitored-   -Reports 45 lbs. weight loss over the past 6 months due to poor oral intake from abdominal symptoms. Prior labs with CA 19-9 elevation, and she has been evaluated in the past year with biopsy for a suspicious mass at the neck of pancreas with workup negative for malignancy at the time.     Measurements:  Wt Readings from Last 1 Encounters:   01/22/23 53.8 kg (118 lb 9.7 oz)   Body mass index is 18.03 kg/m².    PLAN:    Patient was on Clinimix TPN at recent admission. Nutrition consulted for TPN recs.

## 2023-01-23 NOTE — PLAN OF CARE
Problem: Adult Inpatient Plan of Care  Goal: Plan of Care Review  Outcome: Ongoing, Progressing  Goal: Patient-Specific Goal (Individualized)  Outcome: Ongoing, Progressing  Goal: Absence of Hospital-Acquired Illness or Injury  Outcome: Ongoing, Progressing  Goal: Optimal Comfort and Wellbeing  Outcome: Ongoing, Progressing  Goal: Readiness for Transition of Care  Outcome: Ongoing, Progressing     Problem: Fluid Imbalance (Pneumonia)  Goal: Fluid Balance  Outcome: Ongoing, Progressing     Problem: Infection (Pneumonia)  Goal: Resolution of Infection Signs and Symptoms  Outcome: Ongoing, Progressing     Problem: Respiratory Compromise (Pneumonia)  Goal: Effective Oxygenation and Ventilation  Outcome: Ongoing, Progressing     Problem: Infection  Goal: Absence of Infection Signs and Symptoms  Outcome: Ongoing, Progressing     Problem: Skin Injury Risk Increased  Goal: Skin Health and Integrity  Outcome: Ongoing, Progressing

## 2023-01-23 NOTE — H&P
[facility] ([address] Medicine  History & Physical    Patient Name: Claire Bowser  MRN: [id number]  Patient Class: IP- Inpatient  Admission Date: 1/22/2023  Attending Physician: Margie Dugan MD   Primary Care Provider: Samuel Brady MD         Patient information was obtained from patient, spouse/SO, past medical records and ER records.     Subjective:     Principal Problem:Pancreatic ascites    Chief Complaint:   Chief Complaint   Patient presents with    Pancreatic ascites with concern for pancreatic duct leak.        HPI: Ms. Bowser is a 62 y.o F w/ hx of rheumatoid arthritis, chronic pancreatitis, pancreatic pseudocyst, pancreatic sphincterectomy and stent placement, cholecystectomy, severe protein calorie malnutrition, DVT, tobacco use who initially presented to [facility] for evaluation of worsening abdominal pain, nausea and vomiting.  She has had multiple recent admissions in the past few months due to similar symptoms associated with her chronic pancreatitis.  She reports abdominal pain with any oral intake and has had very poor appetite mostly limited to liquids including soup and Jell-O.  Reports around 45 lbs weight loss over the past 6 months.  She has been evaluated in the past year with biopsy for a suspicious mass at the neck of pancreas with workup negative for malignancy at the time.       OSH course notable for imaging with concerns for large volume ascites, small bilateral pleural effusions, unchanged multiloculated fluid collections in the left hemiabdomen. She underwent paracentesis[01/18] with 2.2 L of fluid removal, and ascites fluid was concerning for complex appearance with septations, changed from prior. She completed a course of broad-spectrum antibiotics(vancomycin, cefepime).     [place] [facility] [place] for:  Pancreatic ascites with concern for pancreatic duct leak and infection.      Past Medical History:   Diagnosis Date     Acute biliary pancreatitis 6/14/2022    Anxiety     Chronic pancreatitis 1/2/2023    Digestive disorder     Flu 02/2017    [facility]    Hypertension     Long-term use of immunosuppressant medication 6/10/2022    Rheumatoid arthritis involving multiple sites with positive rheumatoid factor 01/14/2021       Past Surgical History:   Procedure Laterality Date    COLONOSCOPY N/A 2/26/2021    Procedure: COLONOSCOPY;  Surgeon: Amy Sidhu MD;  Location: [facility] ENDO;  Service: Endoscopy;  Laterality: N/A;    ENDOSCOPIC ULTRASOUND OF UPPER GASTROINTESTINAL TRACT N/A 7/1/2022    Procedure: ULTRASOUND, UPPER GI TRACT, ENDOSCOPIC;  Surgeon: Donavon Jewell III, MD;  Location: [facility] ENDO;  Service: Endoscopy;  Laterality: N/A;    ENDOSCOPIC ULTRASOUND OF UPPER GASTROINTESTINAL TRACT N/A 11/29/2022    Procedure: ULTRASOUND, UPPER GI TRACT, ENDOSCOPIC;  Surgeon: Donavon Jewell III, MD;  Location: [facility] ENDO;  Service: Endoscopy;  Laterality: N/A;    ENDOSCOPIC ULTRASOUND OF UPPER GASTROINTESTINAL TRACT N/A 1/3/2023    Procedure: ULTRASOUND, UPPER GI TRACT, ENDOSCOPIC;  Surgeon: Donavon Jewell III, MD;  Location: [facility] ENDO;  Service: Endoscopy;  Laterality: N/A;    ERCP N/A 12/30/2022    Procedure: ERCP (ENDOSCOPIC RETROGRADE CHOLANGIOPANCREATOGRAPHY);  Surgeon: Donavon Jewell III, MD;  Location: [facility] ENDO;  Service: Endoscopy;  Laterality: N/A;    ESOPHAGOGASTRODUODENOSCOPY N/A 2/26/2021    Procedure: EGD (ESOPHAGOGASTRODUODENOSCOPY);  Surgeon: Amy Sidhu MD;  Location: [facility] ENDO;  Service: Endoscopy;  Laterality: N/A;    LAPAROSCOPIC CHOLECYSTECTOMY N/A 7/27/2022    Procedure: CHOLECYSTECTOMY, LAPAROSCOPIC;  Surgeon: Ramón Hwang III, MD;  Location: [facility] OR;  Service: General;  Laterality: N/A;    TUBAL LIGATION         Review of patient's allergies indicates:   Allergen Reactions    No known drug allergies        Current Facility-Administered Medications on File Prior to Encounter    Medication    [] amino acid 4.25% - dextrose 5% (CLINIMIX-E) solution    [DISCONTINUED] 0.9%  NaCl infusion    [DISCONTINUED] amino acid 4.25% - dextrose 5% (CLINIMIX-E) solution    [DISCONTINUED] amino acid 4.25% - dextrose 5% (CLINIMIX-E) solution    [DISCONTINUED] cyclobenzaprine tablet 5 mg    [DISCONTINUED] dronabinoL capsule 2.5 mg    [DISCONTINUED] folic acid tablet 1 mg    [DISCONTINUED] leflunomide tablet 20 mg    [DISCONTINUED] lipase-protease-amylase 12,000-38,000-60,000 units per capsule 2 capsule    [DISCONTINUED] melatonin tablet 6 mg    [DISCONTINUED] metoprolol succinate (TOPROL-XL) 24 hr tablet 25 mg    [DISCONTINUED] morphine injection 2 mg    [DISCONTINUED] nicotine 21 mg/24 hr 1 patch    [DISCONTINUED] ondansetron injection 4 mg    [DISCONTINUED] oxyCODONE-acetaminophen 5-325 mg per tablet 1 tablet    [DISCONTINUED] pantoprazole EC tablet 40 mg    [DISCONTINUED] promethazine (PHENERGAN) 25 mg in dextrose 5 % 50 mL IVPB    [DISCONTINUED] sertraline tablet 50 mg    [DISCONTINUED] sodium chloride 0.9% flush 3 mL    [DISCONTINUED] traZODone tablet 50 mg     Current Outpatient Medications on File Prior to Encounter   Medication Sig    cholecalciferol, vitamin D3, (VITAMIN D3) 10 mcg (400 unit) Tab Take 400 Units by mouth once daily.    CREON 36,000-114,000- 180,000 unit CpDR Take 2 capsules by mouth 3 (three) times daily.    cyclobenzaprine (FLEXERIL) 5 MG tablet Take 5 mg by mouth nightly.    folic acid (FOLVITE) 1 MG tablet Take 1 tablet (1 mg total) by mouth once daily.    pantoprazole (PROTONIX) 40 MG tablet Take 1 tablet (40 mg total) by mouth once daily.    predniSONE (DELTASONE) 5 MG tablet Take 1 tablet (5 mg total) by mouth once daily.    sertraline (ZOLOFT) 50 MG tablet Take 1 tablet (50 mg total) by mouth every evening.    traZODone (DESYREL) 50 MG tablet Take 1 tablet (50 mg total) by mouth every evening.    dronabinoL (MARINOL) 2.5 MG capsule Take 1 capsule (2.5 mg total) by  mouth daily with dinner or evening meal. (Patient not taking: Reported on 1/16/2023)    leflunomide (ARAVA) 20 MG Tab Take 1 tablet by mouth once daily (Patient not taking: Reported on 1/16/2023)    metoprolol succinate (TOPROL-XL) 50 MG 24 hr tablet Take 0.5 tablets (25 mg total) by mouth once daily. 1/2 tab qd    potassium gluconate 600 mg (99 mg) Tab Take 1 tablet by mouth once daily.     Family History       Problem Relation (Age of Onset)    Alcohol abuse Paternal Grandfather, Paternal Grandmother    COPD Father, Brother    Cancer Maternal Aunt    Cirrhosis Paternal Grandmother    Diabetes Mother, Maternal Aunt    Emphysema Brother    Hearing loss Mother    Heart disease Mother, Maternal Aunt, Maternal Uncle    Hypertension Mother, Maternal Uncle    Kidney disease Mother    Liver disease Paternal Grandfather, Sister    No Known Problems Son, Paternal Uncle    Sleep apnea Brother    Stroke Mother          Tobacco Use    Smoking status: Every Day     Packs/day: 1.00     Years: 7.00     Pack years: 7.00     Types: Cigarettes    Smokeless tobacco: Never    Tobacco comments:     [phone]   Substance and Sexual Activity    Alcohol use: No    Drug use: Never    Sexual activity: Yes     Partners: Male     Review of Systems   Constitutional:  Positive for activity change, appetite change and fatigue. Negative for chills and fever.   HENT:  Negative for congestion, rhinorrhea and sore throat.    Respiratory:  Positive for shortness of breath. Negative for cough.    Cardiovascular:  Positive for leg swelling. Negative for chest pain.   Gastrointestinal:  Positive for abdominal pain and nausea. Negative for constipation, diarrhea and vomiting.   Genitourinary:  Negative for difficulty urinating, dysuria and urgency.   Musculoskeletal:  Negative for arthralgias and back pain.   Neurological:  Negative for dizziness and light-headedness.   Objective:     Vital Signs (Most Recent):  Temp: 98.7 °F (37.1 °C) (01/22/23  2029)  Pulse: 94 (01/22/23 2029)  Resp: 18 (01/22/23 2359)  BP: 129/61 (01/22/23 2029)  SpO2: (!) 94 % (01/22/23 2029)   Vital Signs (24h Range):  Temp:  [97.9 °F (36.6 °C)-98.7 °F (37.1 °C)] 98.7 °F (37.1 °C)  Pulse:  [88-97] 94  Resp:  [14-20] 18  SpO2:  [90 %-94 %] 94 %  BP: (129-151)/(61-91) 129/61     Weight: 53.8 kg (118 lb 9.7 oz)  Body mass index is 18.03 kg/m².    Physical Exam  Vitals and nursing note reviewed.   Constitutional:       General: She is not in acute distress.     Appearance: She is cachectic. She is not ill-appearing, toxic-appearing or diaphoretic.   HENT:      Head: Normocephalic and atraumatic.      Mouth/Throat:      Mouth: Mucous membranes are moist.   Eyes:      General: No scleral icterus.        Right eye: No discharge.         Left eye: No discharge.   Cardiovascular:      Rate and Rhythm: Normal rate and regular rhythm.      Heart sounds: Normal heart sounds.   Pulmonary:      Effort: Pulmonary effort is normal. No respiratory distress.      Breath sounds: Normal breath sounds.   Abdominal:      General: Bowel sounds are normal. There is distension.      Palpations: Abdomen is soft.      Tenderness: There is generalized abdominal tenderness.      Comments: Tenderness: Suprapubic>epigastric   Musculoskeletal:         General: Swelling present.      Cervical back: No rigidity.      Right lower leg: Edema present.      Left lower leg: Edema present.   Skin:     General: Skin is warm and dry.      Coloration: Skin is not jaundiced.   Neurological:      Mental Status: She is oriented to person, place, and time. Mental status is at baseline.   Psychiatric:         Mood and Affect: Mood normal.         Behavior: Behavior normal.           Significant Labs: All pertinent labs within the past 24 hours have been reviewed.    CMP:   Recent Labs   Lab 01/22/23  2305   *   K 3.4*      CO2 20*   GLU 87   BUN 14   CREATININE 0.4*   CALCIUM 8.7   PROT 5.0*   ALBUMIN 1.0*   BILITOT 0.3    ALKPHOS 100   AST 15   ALT 6*   ANIONGAP 7*       Significant Imaging: I have reviewed all pertinent imaging results/findings within the past 24 hours.      Assessment/Plan:     * Pancreatic ascites  62 y.o F w/ hx of rheumatoid arthritis, chronic pancreatitis, pancreatic pseudocyst, cholecystectomy and severe protein calorie malnutrition, DVT, tobacco use with multiple recent admissions in the past few months due to abdominal symptoms associated with her chronic pancreatitis    -Admitted to [facility] on 12/30: underwent ERCP with pancreatic sphincterotomy, stent placement and 1300 mL paracentesis.    -Readmitted on 01/02: imaging with concerns for multifocal rim enhancing fluid collections in the upper abdomen.Treated with broad-spectrum IV antibiotics, transitioned to oral antibiotics and discharged on 1/6  -Readmitted on 01/15: CT imaging with concerns for large volume ascites, small bilateral pleural effusions, unchanged multiloculated fluid collections in the left hemiabdomen.   She underwent paracentesis[01/18] with 2.2 L of fluid removal, and ascites fluid was concerning for complex appearance with septations, changed from prior. She completed a course of broad-spectrum antibiotics .     -Gastroenterology consult at OSH[01/16] noted concerns for Multiloculated fluid collections along with large volume ascites and  pancreatic duct leak on ERCP status post pancreatic sphincterotomy and stent placement. Per consult notes, If continues to have issues, may need distal pancreatectomy; Case discussed w/ surg onc and recommend endoscopic interventions for now to to see if surgery can be avoided kyle w/ degree of inflammation    -Transferred to [facility] for  AES evaluation for:  Pancreatic ascites with concern for pancreatic duct leak and infection    PLAN  -AES consulted for further evaluation  -Recent 01/18 paracentesis with new changes concerns for complex fluid collection. No fluid studies available.   -Paracentesis labs  "ordered and started on Ceftriaxone. Broaden antibiotics if concerns for sepsis/worsening infection.    History of DVT of lower extremity  Imaging on 09/26/22 noted: Positive examination demonstrating left lower extremity DVT involving the popliteal, posterior tibial, anterior tibial, and peroneal veins.    PLAN  Home apixaban held due to possible procedure/intervention.  Resume when appropriate    Abdominal pain  See "Pancreatic ascites" A&P    Weight loss, unintentional  Nutrition consulted. Most recent weight and BMI monitored-   -Reports 45 lbs. weight loss over the past 6 months due to poor oral intake from abdominal symptoms. Prior labs with CA 19-9 elevation, and she has been evaluated in the past year with biopsy for a suspicious mass at the neck of pancreas with workup negative for malignancy at the time.     Measurements:  Wt Readings from Last 1 Encounters:   01/22/23 53.8 kg (118 lb 9.7 oz)   Body mass index is 18.03 kg/m².    PLAN:    Patient was on Clinimix TPN at recent admission. Nutrition consulted for TPN recs.    Severe protein-calorie malnutrition  See "Weight loss, unintentional" A&P    Rheumatoid arthritis involving multiple sites with positive rheumatoid factor  On Leflunomide and Prednisone for RA. Patient reports they were held recently by ID due to immunosuppression in the setting of concerns for infection.     PLAN  Followup infection rule out workup and resume as appropriate.    Tobacco use  Continue nicotine patch      Generalized anxiety disorder  Continue home trazodone, sertraline    Hypertension  Continue home metoprolol      VTE Risk Mitigation (From admission, onward)           Ordered     IP VTE HIGH RISK PATIENT  Once         01/22/23 2211     Place sequential compression device  Until discontinued         01/22/23 2211     Reason for No Pharmacological VTE Prophylaxis  Once        Question:  Reasons:  Answer:  Physician Provided (leave comment)  Comment:  Anticipated procedure    " 01/22/23 2211                       Maksim Hollins DO  Department of Hospital Medicine   [facility] - Intensive Care ([facility]    Note was created using voice recognition software. It may have occasional typographical errors not identified and edited despite initial review prior to signing.

## 2023-01-23 NOTE — ASSESSMENT & PLAN NOTE
Imaging on 09/26/22 noted: Positive examination demonstrating left lower extremity DVT involving the popliteal, posterior tibial, anterior tibial, and peroneal veins.    PLAN  Home apixaban held due to possible procedure/intervention.  Resume when appropriate

## 2023-01-23 NOTE — H&P
Inpatient Radiology Pre-procedure Note    History of Present Illness:  Claire Bowser is a 62 y.o. female who presents for ultrasound guided paracentesis.  Admission H&P reviewed.  Past Medical History:   Diagnosis Date    Acute biliary pancreatitis 6/14/2022    Anxiety     Chronic pancreatitis 1/2/2023    Digestive disorder     Flu 02/2017    [facility]    Hypertension     Long-term use of immunosuppressant medication 6/10/2022    Rheumatoid arthritis involving multiple sites with positive rheumatoid factor 01/14/2021     Past Surgical History:   Procedure Laterality Date    COLONOSCOPY N/A 2/26/2021    Procedure: COLONOSCOPY;  Surgeon: Amy Sidhu MD;  Location: [facility] ENDO;  Service: Endoscopy;  Laterality: N/A;    ENDOSCOPIC ULTRASOUND OF UPPER GASTROINTESTINAL TRACT N/A 7/1/2022    Procedure: ULTRASOUND, UPPER GI TRACT, ENDOSCOPIC;  Surgeon: Donavon Jewell III, MD;  Location: [facility] ENDO;  Service: Endoscopy;  Laterality: N/A;    ENDOSCOPIC ULTRASOUND OF UPPER GASTROINTESTINAL TRACT N/A 11/29/2022    Procedure: ULTRASOUND, UPPER GI TRACT, ENDOSCOPIC;  Surgeon: Donavon Jewell III, MD;  Location: [facility] ENDO;  Service: Endoscopy;  Laterality: N/A;    ENDOSCOPIC ULTRASOUND OF UPPER GASTROINTESTINAL TRACT N/A 1/3/2023    Procedure: ULTRASOUND, UPPER GI TRACT, ENDOSCOPIC;  Surgeon: Donavon Jewell III, MD;  Location: [facility] ENDO;  Service: Endoscopy;  Laterality: N/A;    ERCP N/A 12/30/2022    Procedure: ERCP (ENDOSCOPIC RETROGRADE CHOLANGIOPANCREATOGRAPHY);  Surgeon: Donavon Jewell III, MD;  Location: [facility] ENDO;  Service: Endoscopy;  Laterality: N/A;    ESOPHAGOGASTRODUODENOSCOPY N/A 2/26/2021    Procedure: EGD (ESOPHAGOGASTRODUODENOSCOPY);  Surgeon: Amy Sidhu MD;  Location: [facility] ENDO;  Service: Endoscopy;  Laterality: N/A;    LAPAROSCOPIC CHOLECYSTECTOMY N/A 7/27/2022    Procedure: CHOLECYSTECTOMY, LAPAROSCOPIC;  Surgeon: Ramón Hwang III, MD;  Location: [facility] OR;  Service:  General;  Laterality: N/A;    TUBAL LIGATION         Review of Systems:   As documented in primary team H&P    Home Meds:   Prior to Admission medications    Medication Sig Start Date End Date Taking? Authorizing Provider   cholecalciferol, vitamin D3, (VITAMIN D3) 10 mcg (400 unit) Tab Take 400 Units by mouth once daily. 12/14/20  Yes Historical Provider   CREON 36,000-114,000- 180,000 unit CpDR Take 2 capsules by mouth 3 (three) times daily. 12/31/22 1/30/23 Yes Chapin Leong MD   cyclobenzaprine (FLEXERIL) 5 MG tablet Take 5 mg by mouth nightly.   Yes Historical Provider   folic acid (FOLVITE) 1 MG tablet Take 1 tablet (1 mg total) by mouth once daily. 5/23/22 5/23/23 Yes Flash Fairbanks PA-C   pantoprazole (PROTONIX) 40 MG tablet Take 1 tablet (40 mg total) by mouth once daily. 12/31/22 1/30/23 Yes Chapin Leong MD   predniSONE (DELTASONE) 5 MG tablet Take 1 tablet (5 mg total) by mouth once daily. 12/31/22 1/30/23 Yes Chapin Leong MD   sertraline (ZOLOFT) 50 MG tablet Take 1 tablet (50 mg total) by mouth every evening. 1/6/23 1/6/24 Yes Binu Arciniega MD   traZODone (DESYREL) 50 MG tablet Take 1 tablet (50 mg total) by mouth every evening. 9/1/22  Yes Samuel Brady MD   dronabinoL (MARINOL) 2.5 MG capsule Take 1 capsule (2.5 mg total) by mouth daily with dinner or evening meal.  Patient not taking: Reported on 1/16/2023 1/7/23   Binu Arciniega MD   leflunomide (ARAVA) 20 MG Tab Take 1 tablet by mouth once daily  Patient not taking: Reported on 1/16/2023 12/27/22   Hernandez Gregory MD   metoprolol succinate (TOPROL-XL) 50 MG 24 hr tablet Take 0.5 tablets (25 mg total) by mouth once daily. 1/2 tab qd 12/31/22   Chapin Leong MD   potassium gluconate 600 mg (99 mg) Tab Take 1 tablet by mouth once daily.    Historical Provider     Scheduled Meds:    cefTRIAXone (ROCEPHIN) IVPB  1 g Intravenous Q24H    cyclobenzaprine  5 mg Oral Nightly    folic acid  1 mg Oral Daily    lipase-protease-amylase  12,000-38,000-60,000 units  2 capsule Oral TID WM    metoprolol succinate  25 mg Oral Daily    mupirocin   Nasal BID    nicotine  1 patch Transdermal Daily    pantoprazole  40 mg Oral Daily    potassium chloride  40 mEq Oral Once    potassium, sodium phosphates  2 packet Oral Once    sertraline  50 mg Oral QHS    traZODone  50 mg Oral QHS     Continuous Infusions:   PRN Meds:sodium chloride, dextrose 10%, dextrose 10%, glucagon (human recombinant), glucose, glucose, insulin aspart U-100, melatonin, morphine, naloxone, ondansetron, oxyCODONE-acetaminophen, prochlorperazine, sodium chloride 0.9%  Anticoagulants/Antiplatelets: no anticoagulation    Allergies:   Review of patient's allergies indicates:   Allergen Reactions    No known drug allergies      Sedation Hx: have not been any systemic reactions    Vitals:  Temp: 97.5 °F (36.4 °C) (01/23/23 0415)  Pulse: 77 (01/23/23 0754)  Resp: 15 (01/23/23 0754)  BP: (!) 103/54 (01/23/23 0754)  SpO2: 99 % (01/23/23 0754)     Physical Exam:  ASA: 3  Mallampati: n/a    General: no acute distress  Mental Status: alert and oriented to person, place and time  HEENT: normocephalic, atraumatic  Chest: unlabored breathing  Heart: regular heart rate  Abdomen: distended  Extremity: moves all extremities    Plan: ultrasound guided paracentesis  Sedation Plan: local    KYMBERLY Houser FNP  Interventional Radiology  [phone] [facility]

## 2023-01-23 NOTE — SUBJECTIVE & OBJECTIVE
Past Medical History:   Diagnosis Date    Acute biliary pancreatitis 6/14/2022    Anxiety     Chronic pancreatitis 1/2/2023    Digestive disorder     Flu 02/2017    [facility]    Hypertension     Long-term use of immunosuppressant medication 6/10/2022    Rheumatoid arthritis involving multiple sites with positive rheumatoid factor 01/14/2021       Past Surgical History:   Procedure Laterality Date    COLONOSCOPY N/A 2/26/2021    Procedure: COLONOSCOPY;  Surgeon: Amy Sidhu MD;  Location: [facility] ENDO;  Service: Endoscopy;  Laterality: N/A;    ENDOSCOPIC ULTRASOUND OF UPPER GASTROINTESTINAL TRACT N/A 7/1/2022    Procedure: ULTRASOUND, UPPER GI TRACT, ENDOSCOPIC;  Surgeon: Donavon Jewell III, MD;  Location: [facility] ENDO;  Service: Endoscopy;  Laterality: N/A;    ENDOSCOPIC ULTRASOUND OF UPPER GASTROINTESTINAL TRACT N/A 11/29/2022    Procedure: ULTRASOUND, UPPER GI TRACT, ENDOSCOPIC;  Surgeon: Donavon Jewell III, MD;  Location: [facility] ENDO;  Service: Endoscopy;  Laterality: N/A;    ENDOSCOPIC ULTRASOUND OF UPPER GASTROINTESTINAL TRACT N/A 1/3/2023    Procedure: ULTRASOUND, UPPER GI TRACT, ENDOSCOPIC;  Surgeon: Donavon Jewell III, MD;  Location: [facility] ENDO;  Service: Endoscopy;  Laterality: N/A;    ERCP N/A 12/30/2022    Procedure: ERCP (ENDOSCOPIC RETROGRADE CHOLANGIOPANCREATOGRAPHY);  Surgeon: Donavon Jewell III, MD;  Location: [facility] ENDO;  Service: Endoscopy;  Laterality: N/A;    ESOPHAGOGASTRODUODENOSCOPY N/A 2/26/2021    Procedure: EGD (ESOPHAGOGASTRODUODENOSCOPY);  Surgeon: Amy Sidhu MD;  Location: [facility] ENDO;  Service: Endoscopy;  Laterality: N/A;    LAPAROSCOPIC CHOLECYSTECTOMY N/A 7/27/2022    Procedure: CHOLECYSTECTOMY, LAPAROSCOPIC;  Surgeon: Ramón Hwang III, MD;  Location: [facility] OR;  Service: General;  Laterality: N/A;    TUBAL LIGATION         Review of patient's allergies indicates:   Allergen Reactions    No known drug allergies      Family History       Problem  Relation (Age of Onset)    Alcohol abuse Paternal Grandfather, Paternal Grandmother    COPD Father, Brother    Cancer Maternal Aunt    Cirrhosis Paternal Grandmother    Diabetes Mother, Maternal Aunt    Emphysema Brother    Hearing loss Mother    Heart disease Mother, Maternal Aunt, Maternal Uncle    Hypertension Mother, Maternal Uncle    Kidney disease Mother    Liver disease Paternal Grandfather, Sister    No Known Problems Son, Paternal Uncle    Sleep apnea Brother    Stroke Mother          Tobacco Use    Smoking status: Every Day     Packs/day: 1.00     Years: 7.00     Pack years: 7.00     Types: Cigarettes    Smokeless tobacco: Never    Tobacco comments:     [phone]   Substance and Sexual Activity    Alcohol use: No    Drug use: Never    Sexual activity: Yes     Partners: Male     Review of Systems   Constitutional:  Positive for appetite change.   HENT: Negative.     Eyes: Negative.    Respiratory: Negative.     Cardiovascular: Negative.    Gastrointestinal:  Positive for abdominal distention and abdominal pain.   Endocrine: Negative.    Genitourinary: Negative.    Musculoskeletal: Negative.    Skin: Negative.    Allergic/Immunologic: Negative.    Neurological: Negative.    Hematological: Negative.    Psychiatric/Behavioral: Negative.     Objective:     Vital Signs (Most Recent):  Temp: 97.5 °F (36.4 °C) (01/23/23 0415)  Pulse: 81 (01/23/23 0853)  Resp: 15 (01/23/23 0853)  BP: (!) 120/59 (01/23/23 0853)  SpO2: 100 % (01/23/23 0853)   Vital Signs (24h Range):  Temp:  [97.5 °F (36.4 °C)-98.7 °F (37.1 °C)] 97.5 °F (36.4 °C)  Pulse:  [72-97] 81  Resp:  [15-20] 15  SpO2:  [90 %-100 %] 100 %  BP: (102-151)/(54-91) 120/59     Weight: 53.8 kg (118 lb 9.7 oz) (01/22/23 2316)  Body mass index is 18.03 kg/m².    No intake or output data in the 24 hours ending 01/23/23 0905    Lines/Drains/Airways       Peripheral Intravenous Line  Duration                  Midline Catheter Insertion/Assessment  - Single Lumen  01/20/23 1430 Right basilic vein (medial side of arm) 20g x 8cm 2 days                    Physical Exam    Gen: NAD, lying comfortably  HENT: NCAT, oropharynx clear  Eyes: anicteric sclerae, EOMI grossly  Neck: supple, no visible masses/goiter  Cardiac: RRR, no M/R/G, S1/S2 present  Lungs: CTAB, no crackles, no wheezes  Abd: soft, NT/mildly distended, normoactive BS, no rebound  Ext: no LE edema, warm, well perfused  Skin: skin intact on exposed body parts, no visible rashes, lesions  Neuro: A&Ox4, neuro exam grossly intact, moves all extremities  Psych: appropriate mood, affect      Significant Labs:  CBC:   Recent Labs   Lab 01/23/23  0458   WBC 14.73*   HGB 6.7*   HCT 21.8*        CMP:   Recent Labs   Lab 01/23/23  0457   GLU 75   CALCIUM 8.9   ALBUMIN 1.0*   PROT 4.7*   *   K 3.1*   CO2 23      BUN 12   CREATININE 0.4*   ALKPHOS 101   ALT 8*   AST 19   BILITOT 0.3     Coagulation:   Recent Labs   Lab 01/22/23  2305   INR 1.2   APTT 26.2     CRP: No results for input(s): CRP in the last 48 hours.    Significant Imaging:  Imaging results within the past 24 hours have been reviewed.

## 2023-01-23 NOTE — CONSULTS
[facility] ([place] [address])  Gastroenterology  Consult Note    Patient Name: Claire Bowser  MRN: [id number]  Admission Date: 1/22/2023  Hospital Length of Stay: 1 days  Code Status: Full Code   Attending Provider: Margie Dugan MD   Consulting Provider: Charly Baldwin MD  Primary Care Physician: Samuel Brady MD  Principal Problem:Pancreatic ascites    Inpatient consult to [facility] ([facility])  Consult performed by: Charly Baldwin MD  Consult ordered by: Maksim Hollins DO      Subjective:     HPI:  62 F w/ PMH RA, h/o biliary pancreatitis c/b pancreatic pseudocyst with pancreatic duct leak, pancreatic ascites s/p pancreatic sphincterotomy and PD stenting 12/30/22, severe protein calorie malnutrition, DVT transferred from OSH with abdominal pain, N/V, re-accumulation of large volume ascites.  Patient reported at OSH decrease PO intake and weight loss as well.  At OSH, patient had CT showing continue large complex fluid collections with large volume ascites.  Paracentesis with elevated amylase, fluid WBC 1400 (75% neutrophils) on last paracentesis.  She completed course of Abx at that time.      On transfer, HDS and afebrile.  Labs notable for WBC 14, Plt 267, INR 1.2, Cr 0.4, Bili 0.3, AST 19, ALT 8.  IR paracentesis ordered.  Empiric Abx started.  AES consulted for further evaluation.      Past Medical History:   Diagnosis Date    Acute biliary pancreatitis 6/14/2022    Anxiety     Chronic pancreatitis 1/2/2023    Digestive disorder     Flu 02/2017    [facility]    Hypertension     Long-term use of immunosuppressant medication 6/10/2022    Rheumatoid arthritis involving multiple sites with positive rheumatoid factor 01/14/2021       Past Surgical History:   Procedure Laterality Date    COLONOSCOPY N/A 2/26/2021    Procedure: COLONOSCOPY;  Surgeon: Amy Sidhu MD;  Location: [facility];  Service: Endoscopy;  Laterality: N/A;    ENDOSCOPIC ULTRASOUND OF UPPER  GASTROINTESTINAL TRACT N/A 7/1/2022    Procedure: ULTRASOUND, UPPER GI TRACT, ENDOSCOPIC;  Surgeon: Donavon Jewell III, MD;  Location: [facility] ENDO;  Service: Endoscopy;  Laterality: N/A;    ENDOSCOPIC ULTRASOUND OF UPPER GASTROINTESTINAL TRACT N/A 11/29/2022    Procedure: ULTRASOUND, UPPER GI TRACT, ENDOSCOPIC;  Surgeon: Donavon Jewell III, MD;  Location: [facility] ENDO;  Service: Endoscopy;  Laterality: N/A;    ENDOSCOPIC ULTRASOUND OF UPPER GASTROINTESTINAL TRACT N/A 1/3/2023    Procedure: ULTRASOUND, UPPER GI TRACT, ENDOSCOPIC;  Surgeon: Donavon Jewell III, MD;  Location: [facility] ENDO;  Service: Endoscopy;  Laterality: N/A;    ERCP N/A 12/30/2022    Procedure: ERCP (ENDOSCOPIC RETROGRADE CHOLANGIOPANCREATOGRAPHY);  Surgeon: Donavon Jewell III, MD;  Location: [facility] ENDO;  Service: Endoscopy;  Laterality: N/A;    ESOPHAGOGASTRODUODENOSCOPY N/A 2/26/2021    Procedure: EGD (ESOPHAGOGASTRODUODENOSCOPY);  Surgeon: Amy Sidhu MD;  Location: [facility] ENDO;  Service: Endoscopy;  Laterality: N/A;    LAPAROSCOPIC CHOLECYSTECTOMY N/A 7/27/2022    Procedure: CHOLECYSTECTOMY, LAPAROSCOPIC;  Surgeon: Ramón Hwang III, MD;  Location: [facility] OR;  Service: General;  Laterality: N/A;    TUBAL LIGATION         Review of patient's allergies indicates:   Allergen Reactions    No known drug allergies      Family History       Problem Relation (Age of Onset)    Alcohol abuse Paternal Grandfather, Paternal Grandmother    COPD Father, Brother    Cancer Maternal Aunt    Cirrhosis Paternal Grandmother    Diabetes Mother, Maternal Aunt    Emphysema Brother    Hearing loss Mother    Heart disease Mother, Maternal Aunt, Maternal Uncle    Hypertension Mother, Maternal Uncle    Kidney disease Mother    Liver disease Paternal Grandfather, Sister    No Known Problems Son, Paternal Uncle    Sleep apnea Brother    Stroke Mother          Tobacco Use    Smoking status: Every Day     Packs/day: 1.00     Years: 7.00     Pack years: 7.00      Types: Cigarettes    Smokeless tobacco: Never    Tobacco comments:     [phone]   Substance and Sexual Activity    Alcohol use: No    Drug use: Never    Sexual activity: Yes     Partners: Male     Review of Systems   Constitutional:  Positive for appetite change.   HENT: Negative.     Eyes: Negative.    Respiratory: Negative.     Cardiovascular: Negative.    Gastrointestinal:  Positive for abdominal distention and abdominal pain.   Endocrine: Negative.    Genitourinary: Negative.    Musculoskeletal: Negative.    Skin: Negative.    Allergic/Immunologic: Negative.    Neurological: Negative.    Hematological: Negative.    Psychiatric/Behavioral: Negative.     Objective:     Vital Signs (Most Recent):  Temp: 97.5 °F (36.4 °C) (01/23/23 0415)  Pulse: 81 (01/23/23 0853)  Resp: 15 (01/23/23 0853)  BP: (!) 120/59 (01/23/23 0853)  SpO2: 100 % (01/23/23 0853)   Vital Signs (24h Range):  Temp:  [97.5 °F (36.4 °C)-98.7 °F (37.1 °C)] 97.5 °F (36.4 °C)  Pulse:  [72-97] 81  Resp:  [15-20] 15  SpO2:  [90 %-100 %] 100 %  BP: (102-151)/(54-91) 120/59     Weight: 53.8 kg (118 lb 9.7 oz) (01/22/23 2316)  Body mass index is 18.03 kg/m².    No intake or output data in the 24 hours ending 01/23/23 0905    Lines/Drains/Airways       Peripheral Intravenous Line  Duration                  Midline Catheter Insertion/Assessment  - Single Lumen 01/20/23 1430 Right basilic vein (medial side of arm) 20g x 8cm 2 days                    Physical Exam    Gen: NAD, lying comfortably  HENT: NCAT, oropharynx clear  Eyes: anicteric sclerae, EOMI grossly  Neck: supple, no visible masses/goiter  Cardiac: RRR, no M/R/G, S1/S2 present  Lungs: CTAB, no crackles, no wheezes  Abd: soft, NT/mildly distended, normoactive BS, no rebound  Ext: no LE edema, warm, well perfused  Skin: skin intact on exposed body parts, no visible rashes, lesions  Neuro: A&Ox4, neuro exam grossly intact, moves all extremities  Psych: appropriate mood, affect      Significant  Labs:  CBC:   Recent Labs   Lab 01/23/23  0458   WBC 14.73*   HGB 6.7*   HCT 21.8*        CMP:   Recent Labs   Lab 01/23/23  0457   GLU 75   CALCIUM 8.9   ALBUMIN 1.0*   PROT 4.7*   *   K 3.1*   CO2 23      BUN 12   CREATININE 0.4*   ALKPHOS 101   ALT 8*   AST 19   BILITOT 0.3     Coagulation:   Recent Labs   Lab 01/22/23  2305   INR 1.2   APTT 26.2     CRP: No results for input(s): CRP in the last 48 hours.    Significant Imaging:  Imaging results within the past 24 hours have been reviewed.  Assessment/Plan:     * Pancreatic ascites  Patient with history of complex pancreatic pseudocyst 2/2 biliary pancreatitis, patient developed large volume ascites with elevated amylase concerning for pancreatic duct leak which was seen on prior ERCP.  S/p pancreatic sphincterotomy and PD stent placement 12/30.  Patient now representing with abdominal pain and large volume ascites.  On imaging review, PD stent appears in place but could possibly be dislodged or too small to control leak.  Will plan for ERP today for further evaluation and possible upsizing of PD stent.  Agree with paracentesis.    Recommendations:  -ERP tomorrow  -keep NPO @[place]  -trend CBC, CMP, INR  -f/u paracentesis labs, send cell count, culture, amylase        Thank you for your consult. I will follow-up with patient. Please contact us if you have any additional questions.    Charly Baldwin MD  Gastroenterology  [facility] - Intensive Care ([place]

## 2023-01-23 NOTE — ASSESSMENT & PLAN NOTE
On Leflunomide and Prednisone for RA. Patient reports they were held recently by ID due to immunosuppression in the setting of concerns for infection.     PLAN  Followup infection rule out workup and resume as appropriate.

## 2023-01-23 NOTE — PLAN OF CARE
Problem: Adult Inpatient Plan of Care  Goal: Plan of Care Review  Outcome: Ongoing, Progressing  Goal: Patient-Specific Goal (Individualized)  Outcome: Ongoing, Progressing     Problem: Infection (Pneumonia)  Goal: Resolution of Infection Signs and Symptoms  Outcome: Ongoing, Progressing     Problem: Respiratory Compromise (Pneumonia)  Goal: Effective Oxygenation and Ventilation  Outcome: Ongoing, Progressing     Problem: Infection  Goal: Absence of Infection Signs and Symptoms  Outcome: Ongoing, Progressing     Problem: Skin Injury Risk Increased  Goal: Skin Health and Integrity  Outcome: Ongoing, Progressing

## 2023-01-23 NOTE — PLAN OF CARE
Para complete, patient tolerated well. 200cc of cloudy yellow fluid removed. VSS, site CDI. Report called to nurse. Patient transported to [facility] via transport team.

## 2023-01-23 NOTE — ASSESSMENT & PLAN NOTE
Patient with history of complex pancreatic pseudocyst 2/2 biliary pancreatitis, patient developed large volume ascites with elevated amylase concerning for pancreatic duct leak which was seen on prior ERCP.  S/p pancreatic sphincterotomy and PD stent placement 12/30.  Patient now representing with abdominal pain and large volume ascites.  On imaging review, PD stent appears in place but could possibly be dislodged or too small to control leak.  Will plan for ERP today for further evaluation and possible upsizing of PD stent.  Agree with paracentesis.    Recommendations:  -ERP today  -keep NPO  -trend CBC, CMP, INR  -f/u paracentesis labs, send cell count, culture, amylase

## 2023-01-23 NOTE — PROCEDURES
Radiology Post-Procedure Note    Pre Op Diagnosis: Ascites  Post Op Diagnosis: Same    Procedure: Ultrasound Guided Paracentesis    Procedure performed by: Harrison LOWERY, Aim     Written Informed Consent Obtained: Yes  Specimen Removed: YES clear yellow  Estimated Blood Loss: Minimal    Findings:   Successful paracentesis.  Albumin administered PRN per protocol.    Patient tolerated procedure well.    Ami Terry, APRN, FNP  Interventional Radiology  [phone] clinic

## 2023-01-23 NOTE — ASSESSMENT & PLAN NOTE
62 y.o F w/ hx of rheumatoid arthritis, chronic pancreatitis, pancreatic pseudocyst, cholecystectomy and severe protein calorie malnutrition, DVT, tobacco use with multiple recent admissions in the past few months due to abdominal symptoms associated with her chronic pancreatitis    -Admitted to [facility] on 12/30: underwent ERCP with pancreatic sphincterotomy, stent placement and 1300 mL paracentesis.    -Readmitted on 01/02: imaging with concerns for multifocal rim enhancing fluid collections in the upper abdomen.Treated with broad-spectrum IV antibiotics, transitioned to oral antibiotics and discharged on 1/6  -Readmitted on 01/15: CT imaging with concerns for large volume ascites, small bilateral pleural effusions, unchanged multiloculated fluid collections in the left hemiabdomen.   She underwent paracentesis[01/18] with 2.2 L of fluid removal, and ascites fluid was concerning for complex appearance with septations, changed from prior. She completed a course of broad-spectrum antibiotics .     -Gastroenterology consult at OSH[01/16] noted concerns for Multiloculated fluid collections along with large volume ascites and  pancreatic duct leak on ERCP status post pancreatic sphincterotomy and stent placement. Per consult notes, If continues to have issues, may need distal pancreatectomy; Case discussed w/ surg onc and recommend endoscopic interventions for now to to see if surgery can be avoided kyle w/ degree of inflammation    -Transferred to C for  AES evaluation for:  Pancreatic ascites with concern for pancreatic duct leak and infection    PLAN  -AES consulted for further evaluation  -Recent 01/18 paracentesis with new changes concerns for complex fluid collection. No fluid studies available.   -Paracentesis labs ordered and started on Ceftriaxone. Broaden antibiotics if concerns for sepsis/worsening infection.

## 2023-01-23 NOTE — HPI
62 F w/ PMH RA, h/o biliary pancreatitis c/b pancreatic pseudocyst with pancreatic duct leak, pancreatic ascites s/p pancreatic sphincterotomy and PD stenting 12/30/22, severe protein calorie malnutrition, DVT transferred from OSH with abdominal pain, N/V, re-accumulation of large volume ascites.  Patient reported at OSH decrease PO intake and weight loss as well.  At OSH, patient had CT showing continue large complex fluid collections with large volume ascites.  Paracentesis with elevated amylase, fluid WBC 1400 (75% neutrophils) on last paracentesis.  She completed course of Abx at that time.      On transfer, HDS and afebrile.  Labs notable for WBC 14, Plt 267, INR 1.2, Cr 0.4, Bili 0.3, AST 19, ALT 8.  IR paracentesis ordered.  Empiric Abx started.  AES consulted for further evaluation.

## 2023-01-23 NOTE — NURSING
Patient arrived to unit alert and oriented x 4. Patient c/o pain. Administered pain med as ordered with effective outcome. Denies any respiratory distress. No s/s of hypoglycemic noted. Patient slept well. Family at bedside. Safety measures maintain. Bed in lowest position and call light within reach

## 2023-01-24 ENCOUNTER — ANESTHESIA (OUTPATIENT)
Dept: ENDOSCOPY | Facility: HOSPITAL | Age: 63
DRG: 438 | End: 2023-01-24
Payer: MEDICAID

## 2023-01-24 PROBLEM — K86.89: Status: ACTIVE | Noted: 2023-01-24

## 2023-01-24 PROBLEM — K65.2 SPONTANEOUS BACTERIAL PERITONITIS: Status: ACTIVE | Noted: 2023-01-24

## 2023-01-24 LAB
ALBUMIN SERPL BCP-MCNC: 1 G/DL (ref 3.5–5.2)
ALP SERPL-CCNC: 117 U/L (ref 55–135)
ALT SERPL W/O P-5'-P-CCNC: 8 U/L (ref 10–44)
ANION GAP SERPL CALC-SCNC: 5 MMOL/L (ref 8–16)
AST SERPL-CCNC: 18 U/L (ref 10–40)
BASOPHILS # BLD AUTO: 0.05 K/UL (ref 0–0.2)
BASOPHILS NFR BLD: 0.3 % (ref 0–1.9)
BILIRUB SERPL-MCNC: 0.7 MG/DL (ref 0.1–1)
BUN SERPL-MCNC: 11 MG/DL (ref 8–23)
CALCIUM SERPL-MCNC: 8.9 MG/DL (ref 8.7–10.5)
CHLORIDE SERPL-SCNC: 104 MMOL/L (ref 95–110)
CO2 SERPL-SCNC: 22 MMOL/L (ref 23–29)
CREAT SERPL-MCNC: 0.5 MG/DL (ref 0.5–1.4)
DIFFERENTIAL METHOD: ABNORMAL
EOSINOPHIL # BLD AUTO: 0.1 K/UL (ref 0–0.5)
EOSINOPHIL NFR BLD: 0.8 % (ref 0–8)
ERYTHROCYTE [DISTWIDTH] IN BLOOD BY AUTOMATED COUNT: 16 % (ref 11.5–14.5)
EST. GFR  (NO RACE VARIABLE): >60 ML/MIN/1.73 M^2
GLUCOSE SERPL-MCNC: 76 MG/DL (ref 70–110)
HCT VFR BLD AUTO: 28.4 % (ref 37–48.5)
HGB BLD-MCNC: 9.5 G/DL (ref 12–16)
IMM GRANULOCYTES # BLD AUTO: 0.32 K/UL (ref 0–0.04)
IMM GRANULOCYTES NFR BLD AUTO: 1.8 % (ref 0–0.5)
LYMPHOCYTES # BLD AUTO: 0.9 K/UL (ref 1–4.8)
LYMPHOCYTES NFR BLD: 5.2 % (ref 18–48)
MAGNESIUM SERPL-MCNC: 1.7 MG/DL (ref 1.6–2.6)
MCH RBC QN AUTO: 30.7 PG (ref 27–31)
MCHC RBC AUTO-ENTMCNC: 33.5 G/DL (ref 32–36)
MCV RBC AUTO: 92 FL (ref 82–98)
MONOCYTES # BLD AUTO: 1 K/UL (ref 0.3–1)
MONOCYTES NFR BLD: 5.8 % (ref 4–15)
NEUTROPHILS # BLD AUTO: 15.2 K/UL (ref 1.8–7.7)
NEUTROPHILS NFR BLD: 86.1 % (ref 38–73)
NRBC BLD-RTO: 0 /100 WBC
PHOSPHATE SERPL-MCNC: 2.3 MG/DL (ref 2.7–4.5)
PLATELET # BLD AUTO: 325 K/UL (ref 150–450)
PMV BLD AUTO: 10.5 FL (ref 9.2–12.9)
POCT GLUCOSE: 77 MG/DL (ref 70–110)
POCT GLUCOSE: 84 MG/DL (ref 70–110)
POCT GLUCOSE: 88 MG/DL (ref 70–110)
POCT GLUCOSE: 88 MG/DL (ref 70–110)
POCT GLUCOSE: 90 MG/DL (ref 70–110)
POTASSIUM SERPL-SCNC: 3.8 MMOL/L (ref 3.5–5.1)
PROT SERPL-MCNC: 4.6 G/DL (ref 6–8.4)
RBC # BLD AUTO: 3.09 M/UL (ref 4–5.4)
SODIUM SERPL-SCNC: 131 MMOL/L (ref 136–145)
WBC # BLD AUTO: 17.62 K/UL (ref 3.9–12.7)

## 2023-01-24 PROCEDURE — 27201014 HC GRASPER DEVICE: Performed by: INTERNAL MEDICINE

## 2023-01-24 PROCEDURE — 83735 ASSAY OF MAGNESIUM: CPT | Performed by: STUDENT IN AN ORGANIZED HEALTH CARE EDUCATION/TRAINING PROGRAM

## 2023-01-24 PROCEDURE — 37000008 HC ANESTHESIA 1ST 15 MINUTES: Performed by: INTERNAL MEDICINE

## 2023-01-24 PROCEDURE — 25000003 PHARM REV CODE 250: Performed by: INTERNAL MEDICINE

## 2023-01-24 PROCEDURE — 74328 PR  X-RAY FOR BILE DUCT ENDOSCOPY: ICD-10-PCS | Mod: 26,,, | Performed by: INTERNAL MEDICINE

## 2023-01-24 PROCEDURE — 82962 GLUCOSE BLOOD TEST: CPT | Performed by: INTERNAL MEDICINE

## 2023-01-24 PROCEDURE — 99223 1ST HOSP IP/OBS HIGH 75: CPT | Mod: 25,,, | Performed by: INTERNAL MEDICINE

## 2023-01-24 PROCEDURE — 99223 PR INITIAL HOSPITAL CARE,LEVL III: ICD-10-PCS | Mod: 25,,, | Performed by: INTERNAL MEDICINE

## 2023-01-24 PROCEDURE — 20600001 HC STEP DOWN PRIVATE ROOM

## 2023-01-24 PROCEDURE — D9220A PRA ANESTHESIA: ICD-10-PCS | Mod: ANES,,, | Performed by: ANESTHESIOLOGY

## 2023-01-24 PROCEDURE — 80053 COMPREHEN METABOLIC PANEL: CPT | Performed by: STUDENT IN AN ORGANIZED HEALTH CARE EDUCATION/TRAINING PROGRAM

## 2023-01-24 PROCEDURE — 99233 PR SUBSEQUENT HOSPITAL CARE,LEVL III: ICD-10-PCS | Mod: ,,, | Performed by: INTERNAL MEDICINE

## 2023-01-24 PROCEDURE — D9220A PRA ANESTHESIA: Mod: ANES,,, | Performed by: ANESTHESIOLOGY

## 2023-01-24 PROCEDURE — 25000003 PHARM REV CODE 250: Performed by: NURSE ANESTHETIST, CERTIFIED REGISTERED

## 2023-01-24 PROCEDURE — 74328 X-RAY BILE DUCT ENDOSCOPY: CPT | Mod: 26,,, | Performed by: INTERNAL MEDICINE

## 2023-01-24 PROCEDURE — 87040 BLOOD CULTURE FOR BACTERIA: CPT | Mod: 59 | Performed by: STUDENT IN AN ORGANIZED HEALTH CARE EDUCATION/TRAINING PROGRAM

## 2023-01-24 PROCEDURE — 85025 COMPLETE CBC W/AUTO DIFF WBC: CPT | Performed by: STUDENT IN AN ORGANIZED HEALTH CARE EDUCATION/TRAINING PROGRAM

## 2023-01-24 PROCEDURE — 43276 PR ERCP W/RMVL/EXCH STENT BILIARY/PANCREATIC DUCT W/DILATION: ICD-10-PCS | Mod: ,,, | Performed by: INTERNAL MEDICINE

## 2023-01-24 PROCEDURE — S4991 NICOTINE PATCH NONLEGEND: HCPCS | Performed by: STUDENT IN AN ORGANIZED HEALTH CARE EDUCATION/TRAINING PROGRAM

## 2023-01-24 PROCEDURE — 43276 ERCP STENT EXCHANGE W/DILATE: CPT | Performed by: INTERNAL MEDICINE

## 2023-01-24 PROCEDURE — 36415 COLL VENOUS BLD VENIPUNCTURE: CPT | Performed by: STUDENT IN AN ORGANIZED HEALTH CARE EDUCATION/TRAINING PROGRAM

## 2023-01-24 PROCEDURE — 25000003 PHARM REV CODE 250: Performed by: STUDENT IN AN ORGANIZED HEALTH CARE EDUCATION/TRAINING PROGRAM

## 2023-01-24 PROCEDURE — D9220A PRA ANESTHESIA: ICD-10-PCS | Mod: CRNA,,, | Performed by: NURSE ANESTHETIST, CERTIFIED REGISTERED

## 2023-01-24 PROCEDURE — 63600175 PHARM REV CODE 636 W HCPCS

## 2023-01-24 PROCEDURE — 63600175 PHARM REV CODE 636 W HCPCS: Performed by: NURSE ANESTHETIST, CERTIFIED REGISTERED

## 2023-01-24 PROCEDURE — 63600175 PHARM REV CODE 636 W HCPCS: Performed by: STUDENT IN AN ORGANIZED HEALTH CARE EDUCATION/TRAINING PROGRAM

## 2023-01-24 PROCEDURE — 25500020 PHARM REV CODE 255: Performed by: INTERNAL MEDICINE

## 2023-01-24 PROCEDURE — D9220A PRA ANESTHESIA: Mod: CRNA,,, | Performed by: NURSE ANESTHETIST, CERTIFIED REGISTERED

## 2023-01-24 PROCEDURE — 99233 SBSQ HOSP IP/OBS HIGH 50: CPT | Mod: ,,, | Performed by: INTERNAL MEDICINE

## 2023-01-24 PROCEDURE — 84100 ASSAY OF PHOSPHORUS: CPT | Performed by: STUDENT IN AN ORGANIZED HEALTH CARE EDUCATION/TRAINING PROGRAM

## 2023-01-24 PROCEDURE — 43276 ERCP STENT EXCHANGE W/DILATE: CPT | Mod: ,,, | Performed by: INTERNAL MEDICINE

## 2023-01-24 PROCEDURE — 74328 X-RAY BILE DUCT ENDOSCOPY: CPT | Mod: TC | Performed by: INTERNAL MEDICINE

## 2023-01-24 PROCEDURE — C2617 STENT, NON-COR, TEM W/O DEL: HCPCS | Performed by: INTERNAL MEDICINE

## 2023-01-24 PROCEDURE — 37000009 HC ANESTHESIA EA ADD 15 MINS: Performed by: INTERNAL MEDICINE

## 2023-01-24 RX ORDER — FENTANYL CITRATE 50 UG/ML
25 INJECTION, SOLUTION INTRAMUSCULAR; INTRAVENOUS EVERY 5 MIN PRN
Status: DISCONTINUED | OUTPATIENT
Start: 2023-01-24 | End: 2023-01-25

## 2023-01-24 RX ORDER — METRONIDAZOLE 500 MG/100ML
500 INJECTION, SOLUTION INTRAVENOUS
Status: DISCONTINUED | OUTPATIENT
Start: 2023-01-24 | End: 2023-01-24

## 2023-01-24 RX ORDER — PROPOFOL 10 MG/ML
VIAL (ML) INTRAVENOUS
Status: DISCONTINUED | OUTPATIENT
Start: 2023-01-24 | End: 2023-01-24

## 2023-01-24 RX ORDER — INDOMETHACIN 50 MG/1
SUPPOSITORY RECTAL
Status: COMPLETED | OUTPATIENT
Start: 2023-01-24 | End: 2023-01-24

## 2023-01-24 RX ORDER — FENTANYL CITRATE 50 UG/ML
INJECTION, SOLUTION INTRAMUSCULAR; INTRAVENOUS
Status: COMPLETED
Start: 2023-01-24 | End: 2023-01-24

## 2023-01-24 RX ORDER — ONDANSETRON 2 MG/ML
4 INJECTION INTRAMUSCULAR; INTRAVENOUS ONCE AS NEEDED
Status: DISCONTINUED | OUTPATIENT
Start: 2023-01-24 | End: 2023-01-24 | Stop reason: HOSPADM

## 2023-01-24 RX ORDER — LIDOCAINE HYDROCHLORIDE 20 MG/ML
INJECTION INTRAVENOUS
Status: DISCONTINUED | OUTPATIENT
Start: 2023-01-24 | End: 2023-01-24

## 2023-01-24 RX ORDER — SODIUM,POTASSIUM PHOSPHATES 280-250MG
2 POWDER IN PACKET (EA) ORAL ONCE
Status: COMPLETED | OUTPATIENT
Start: 2023-01-24 | End: 2023-01-24

## 2023-01-24 RX ORDER — FENTANYL CITRATE 50 UG/ML
INJECTION, SOLUTION INTRAMUSCULAR; INTRAVENOUS
Status: DISCONTINUED | OUTPATIENT
Start: 2023-01-24 | End: 2023-01-24

## 2023-01-24 RX ORDER — ONDANSETRON 2 MG/ML
INJECTION INTRAMUSCULAR; INTRAVENOUS
Status: DISCONTINUED | OUTPATIENT
Start: 2023-01-24 | End: 2023-01-24

## 2023-01-24 RX ORDER — PROPOFOL 10 MG/ML
VIAL (ML) INTRAVENOUS CONTINUOUS PRN
Status: DISCONTINUED | OUTPATIENT
Start: 2023-01-24 | End: 2023-01-24

## 2023-01-24 RX ADMIN — FOLIC ACID 1 MG: 1 TABLET ORAL at 10:01

## 2023-01-24 RX ADMIN — TRAZODONE HYDROCHLORIDE 50 MG: 50 TABLET ORAL at 08:01

## 2023-01-24 RX ADMIN — METOPROLOL SUCCINATE 25 MG: 25 TABLET, EXTENDED RELEASE ORAL at 09:01

## 2023-01-24 RX ADMIN — PANCRELIPASE 2 CAPSULE: 60000; 12000; 38000 CAPSULE, DELAYED RELEASE PELLETS ORAL at 12:01

## 2023-01-24 RX ADMIN — FENTANYL CITRATE 25 MCG: 50 INJECTION, SOLUTION INTRAMUSCULAR; INTRAVENOUS at 11:01

## 2023-01-24 RX ADMIN — PROPOFOL 100 MCG/KG/MIN: 10 INJECTION, EMULSION INTRAVENOUS at 10:01

## 2023-01-24 RX ADMIN — FENTANYL CITRATE 25 MCG: 50 INJECTION, SOLUTION INTRAMUSCULAR; INTRAVENOUS at 10:01

## 2023-01-24 RX ADMIN — SODIUM CHLORIDE: 0.9 INJECTION, SOLUTION INTRAVENOUS at 10:01

## 2023-01-24 RX ADMIN — MUPIROCIN: 20 OINTMENT TOPICAL at 08:01

## 2023-01-24 RX ADMIN — PROPOFOL 50 MG: 10 INJECTION, EMULSION INTRAVENOUS at 10:01

## 2023-01-24 RX ADMIN — PANCRELIPASE 2 CAPSULE: 60000; 12000; 38000 CAPSULE, DELAYED RELEASE PELLETS ORAL at 04:01

## 2023-01-24 RX ADMIN — SERTRALINE HYDROCHLORIDE 50 MG: 25 TABLET ORAL at 08:01

## 2023-01-24 RX ADMIN — MUPIROCIN: 20 OINTMENT TOPICAL at 09:01

## 2023-01-24 RX ADMIN — CEFTRIAXONE 1 G: 1 INJECTION, POWDER, FOR SOLUTION INTRAMUSCULAR; INTRAVENOUS at 09:01

## 2023-01-24 RX ADMIN — ONDANSETRON 4 MG: 2 INJECTION INTRAMUSCULAR; INTRAVENOUS at 10:01

## 2023-01-24 RX ADMIN — POTASSIUM & SODIUM PHOSPHATES POWDER PACK 280-160-250 MG 2 PACKET: 280-160-250 PACK at 07:01

## 2023-01-24 RX ADMIN — Medication 1 PATCH: at 09:01

## 2023-01-24 RX ADMIN — CYCLOBENZAPRINE HYDROCHLORIDE 5 MG: 5 TABLET, FILM COATED ORAL at 08:01

## 2023-01-24 RX ADMIN — LIDOCAINE HYDROCHLORIDE 80 MG: 20 INJECTION INTRAVENOUS at 10:01

## 2023-01-24 RX ADMIN — PANTOPRAZOLE SODIUM 40 MG: 40 TABLET, DELAYED RELEASE ORAL at 05:01

## 2023-01-24 NOTE — ASSESSMENT & PLAN NOTE
Nutrition consulted. Most recent weight and BMI monitored-   -Reports 45 lbs. weight loss over the past 6 months due to poor oral intake from abdominal symptoms. Prior labs with CA 19-9 elevation, and she has been evaluated in the past year with biopsy for a suspicious mass at the neck of pancreas with workup negative for malignancy at the time.     Measurements:  Wt Readings from Last 1 Encounters:   01/23/23 53.8 kg (118 lb 9.7 oz)   Body mass index is 18.03 kg/m².    PLAN: Recommendation/Intervention: 1. Diet per MD. 2. TPN recommendations: 3. RD to monitor & follow-up.  Goals: Meet % EEN, EPN by RD f/u date  Patient was on Clinimix TPN at recent admission. Nutrition consulted for TPN recs.

## 2023-01-24 NOTE — TRANSFER OF CARE
"Anesthesia Transfer of Care Note    Patient: Claire Bowser    Procedure(s) Performed: Procedure(s) (LRB):  ERCP (ENDOSCOPIC RETROGRADE CHOLANGIOPANCREATOGRAPHY) (N/A)    Patient location: PACU    Anesthesia Type: general    Transport from OR: Transported from OR on room air with adequate spontaneous ventilation    Post pain: adequate analgesia    Post assessment: no apparent anesthetic complications and tolerated procedure well    Post vital signs: stable    Level of consciousness: awake and alert    Nausea/Vomiting: no nausea/vomiting    Complications: none    Transfer of care protocol was followed      Last vitals:   Visit Vitals  BP (!) 95/51   Pulse 83   Temp 37.2 °C (99 °F) (Temporal)   Resp 17   Ht 5' 8" (1.727 m)   Wt 53.8 kg (118 lb 9.7 oz)   SpO2 97%   Breastfeeding No   BMI 18.03 kg/m²     "

## 2023-01-24 NOTE — PROGRESS NOTES
[facility] - [facility] ([address] Medicine  Progress Note    Patient Name: Claire Bowser  MRN: [id number]  Patient Class: IP- Inpatient   Admission Date: 1/22/2023  Length of Stay: 2 days  Attending Physician: Margie Dugan MD  Primary Care Provider: Samuel Brady MD    Subjective:     Principal Problem:Pancreatic ascites    HPI:  Ms. Bowser is a 62 y.o F w/ hx of rheumatoid arthritis, chronic pancreatitis, pancreatic pseudocyst, pancreatic sphincterectomy and stent placement, cholecystectomy, severe protein calorie malnutrition, DVT, tobacco use who initially presented to [facility] for evaluation of worsening abdominal pain, nausea and vomiting.  She has had multiple recent admissions in the past few months due to similar symptoms associated with her chronic pancreatitis.  She reports abdominal pain with any oral intake and has had very poor appetite mostly limited to liquids including soup and Jell-O.  Reports around 45 lbs weight loss over the past 6 months.  She has been evaluated in the past year with biopsy for a suspicious mass at the neck of pancreas with workup negative for malignancy at the time.       OSH course notable for imaging with concerns for large volume ascites, small bilateral pleural effusions, unchanged multiloculated fluid collections in the left hemiabdomen. She underwent paracentesis[01/18] with 2.2 L of fluid removal, and ascites fluid was concerning for complex appearance with septations, changed from prior. She completed a course of broad-spectrum antibiotics(vancomycin, cefepime).     She was transferred for [facility] for  AES evaluation for:  Pancreatic ascites with concern for pancreatic duct leak and infection.    Overview/Hospital Course:  No notes on file    Interval History:   No reports of abdominal pain or nausea this morning. Underwent ERCP today with AES, now s/p stent removal and placement of plastic stent in the pancreatic duct. Para labs positive for  SBP, on CTX 2g.     Review of Systems   Constitutional:  Negative for chills and fever.   Respiratory:  Negative for shortness of breath.    Cardiovascular:  Negative for chest pain.   Gastrointestinal:  Negative for abdominal pain, diarrhea, nausea and vomiting.   Neurological:  Negative for dizziness and light-headedness.   Objective:     Vital Signs (Most Recent):  Temp: 98.7 °F (37.1 °C) (01/24/23 1145)  Pulse: 88 (01/24/23 1145)  Resp: 11 (01/24/23 1145)  BP: (!) 119/58 (01/24/23 1145)  SpO2: (!) 94 % (01/24/23 1145)   Vital Signs (24h Range):  Temp:  [97.5 °F (36.4 °C)-99 °F (37.2 °C)] 98.7 °F (37.1 °C)  Pulse:  [80-89] 88  Resp:  [11-20] 11  SpO2:  [93 %-97 %] 94 %  BP: ()/(51-64) 119/58     Weight: 53.8 kg (118 lb 9.7 oz)  Body mass index is 18.03 kg/m².    Intake/Output Summary (Last 24 hours) at 1/24/2023 1338  Last data filed at 1/24/2023 1033  Gross per 24 hour   Intake 597 ml   Output 250 ml   Net 347 ml      Physical Exam  Vitals reviewed.   Constitutional:       General: She is not in acute distress.     Appearance: She is ill-appearing.      Comments: Cachetic, chronically ill appearing.   Eyes:      Extraocular Movements: Extraocular movements intact.      Pupils: Pupils are equal, round, and reactive to light.   Cardiovascular:      Rate and Rhythm: Normal rate.   Pulmonary:      Effort: Pulmonary effort is normal. No respiratory distress.   Abdominal:      General: There is no distension.      Palpations: Abdomen is soft.      Tenderness: There is no abdominal tenderness. There is no guarding or rebound.   Musculoskeletal:         General: Normal range of motion.      Right lower leg: No edema.      Left lower leg: No edema.   Skin:     Findings: No rash.   Neurological:      Mental Status: She is alert and oriented to person, place, and time. Mental status is at baseline.   Psychiatric:         Mood and Affect: Mood normal.         Behavior: Behavior normal.         Thought Content: Thought  content normal.         Judgment: Judgment normal.       Significant Labs: All pertinent labs within the past 24 hours have been reviewed.    Significant Imaging: I have reviewed all pertinent imaging results/findings within the past 24 hours.      Assessment/Plan:      * Pancreatic ascites  62 y.o F w/ hx of rheumatoid arthritis, chronic pancreatitis, pancreatic pseudocyst, cholecystectomy and severe protein calorie malnutrition, DVT, tobacco use with multiple recent admissions in the past few months due to abdominal symptoms associated with her chronic pancreatitis    -Admitted to [facility] on 12/30: underwent ERCP with pancreatic sphincterotomy, stent placement and 1300 mL paracentesis.    -Readmitted on 01/02: imaging with concerns for multifocal rim enhancing fluid collections in the upper abdomen.Treated with broad-spectrum IV antibiotics, transitioned to oral antibiotics and discharged on 1/6  -Readmitted on 01/15: CT imaging with concerns for large volume ascites, small bilateral pleural effusions, unchanged multiloculated fluid collections in the left hemiabdomen.   She underwent paracentesis[01/18] with 2.2 L of fluid removal, and ascites fluid was concerning for complex appearance with septations, changed from prior. She completed a course of broad-spectrum antibiotics .     -Gastroenterology consult at OSH[01/16] noted concerns for Multiloculated fluid collections along with large volume ascites and  pancreatic duct leak on ERCP status post pancreatic sphincterotomy and stent placement. Per consult notes, If continues to have issues, may need distal pancreatectomy; Case discussed w/ surg onc and recommend endoscopic interventions for now to to see if surgery can be avoided kyle w/ degree of inflammation    -Transferred to [facility] for  AES evaluation for:  Pancreatic ascites with concern for pancreatic duct leak and infection    PLAN  -AES consulted; s/p stent removal and placement of plastic stent in pancreatic  "duct on 1/24  -Para labs consistent with SBP, Ceftriaxone 1g increased to 2 g  -Blood cx in process     Spontaneous bacterial peritonitis  Para labs consistent with SBP, on Ceftriaxone.     - Increase Ceftriaxone 1g to 2g q24 hours  - Blood cx in process      History of DVT of lower extremity  Imaging on 09/26/22 noted: Positive examination demonstrating left lower extremity DVT involving the popliteal, posterior tibial, anterior tibial, and peroneal veins.    PLAN  Home apixaban held for procedure; will continue to hold       Abdominal pain  See "Pancreatic ascites" A&P    Weight loss, unintentional  Nutrition consulted. Most recent weight and BMI monitored-   -Reports 45 lbs. weight loss over the past 6 months due to poor oral intake from abdominal symptoms. Prior labs with CA 19-9 elevation, and she has been evaluated in the past year with biopsy for a suspicious mass at the neck of pancreas with workup negative for malignancy at the time.     Measurements:  Wt Readings from Last 1 Encounters:   01/23/23 53.8 kg (118 lb 9.7 oz)   Body mass index is 18.03 kg/m².    PLAN: Recommendation/Intervention: 1. Diet per MD. 2. TPN recommendations: 3. RD to monitor & follow-up.  Goals: Meet % EEN, EPN by RD f/u date  Patient was on Clinimix TPN at recent admission. Nutrition consulted for TPN recs.    Severe protein-calorie malnutrition  See "Weight loss, unintentional" A&P    Rheumatoid arthritis involving multiple sites with positive rheumatoid factor  On Leflunomide and Prednisone for RA. Patient reports they were held recently by ID due to immunosuppression in the setting of concerns for infection.     PLAN  Followup infection rule out workup and resume as appropriate.    Tobacco use  Continue nicotine patch      Generalized anxiety disorder  Continue home trazodone, sertraline    Hypertension  Continue home metoprolol        VTE Risk Mitigation (From admission, onward)           Ordered     Place CARLOS hose  Until " discontinued         01/23/23 1306     IP VTE HIGH RISK PATIENT  Once         01/22/23 2211     Place sequential compression device  Until discontinued         01/22/23 2211     Reason for No Pharmacological VTE Prophylaxis  Once        Question:  Reasons:  Answer:  Physician Provided (leave comment)  Comment:  Anticipated procedure    01/22/23 2211                    Discharge Planning   RASHI: 1/26/2023     Code Status: Full Code   Is the patient medically ready for discharge?: No    Reason for patient still in hospital (select all that apply): Patient trending condition and Treatment  Discharge Plan A: Home with family        Ethel Akbar MD  Department of Hospital Medicine   [facility] - Intensive Care ([place]-[facility])

## 2023-01-24 NOTE — ASSESSMENT & PLAN NOTE
Para labs consistent with SBP, on Ceftriaxone.     - Increase Ceftriaxone 1g to 2g q24 hours  - Blood cx in process

## 2023-01-24 NOTE — PLAN OF CARE
Problem: Adult Inpatient Plan of Care  Goal: Patient-Specific Goal (Individualized)  Outcome: Ongoing, Progressing  Goal: Absence of Hospital-Acquired Illness or Injury  Outcome: Ongoing, Progressing  Goal: Optimal Comfort and Wellbeing  Outcome: Ongoing, Progressing  Goal: Readiness for Transition of Care  Outcome: Ongoing, Progressing     Problem: Fluid Imbalance (Pneumonia)  Goal: Fluid Balance  Outcome: Ongoing, Progressing     Problem: Infection (Pneumonia)  Goal: Resolution of Infection Signs and Symptoms  Outcome: Ongoing, Progressing     Problem: Respiratory Compromise (Pneumonia)  Goal: Effective Oxygenation and Ventilation  Outcome: Ongoing, Progressing     Problem: Infection  Goal: Absence of Infection Signs and Symptoms  Outcome: Ongoing, Progressing     Problem: Skin Injury Risk Increased  Goal: Skin Health and Integrity  Outcome: Ongoing, Progressing  Patient remain A&O X3, sleep well overnight, NPO since midnight, Glucose within normal throughout the night. Patient denied any pain at this time. Midline patent.  at bedside

## 2023-01-24 NOTE — PROGRESS NOTES
Vs stable, states pain improved from pre pain med admin to 4/10, abd round but soft,  notified of pending transfer.

## 2023-01-24 NOTE — ASSESSMENT & PLAN NOTE
Imaging on 09/26/22 noted: Positive examination demonstrating left lower extremity DVT involving the popliteal, posterior tibial, anterior tibial, and peroneal veins.    PLAN  Home apixaban held for procedure, will resume today

## 2023-01-24 NOTE — ASSESSMENT & PLAN NOTE
Imaging on 09/26/22 noted: Positive examination demonstrating left lower extremity DVT involving the popliteal, posterior tibial, anterior tibial, and peroneal veins.    PLAN  Home apixaban held for procedure; will resume evening dose 1/26  First DVT patient has ever had was 9/22 as above  At the time was advised to continue eliquis for 6 months

## 2023-01-24 NOTE — PROVATION PATIENT INSTRUCTIONS
Discharge Summary/Instructions after an Endoscopic Procedure  Patient Name: Claire Bowser  Patient MRN: [id number]  Patient YOB: 1960 Tuesday, January 24, 2023  Rock Martines MD  Dear patient,  As a result of recent federal legislation (The Federal Cures Act), you may   receive lab or pathology results from your procedure in your [facility]   account before your physician is able to contact you. Your physician or   their representative will relay the results to you with their   recommendations at their soonest availability.  Thank you,  RESTRICTIONS:  During your procedure today, you received medications for sedation.  These   medications may affect your judgment, balance and coordination.  Therefore,   for 24 hours, you have the following restrictions:   - DO NOT drive a car, operate machinery, make legal/financial decisions,   sign important papers or drink alcohol.    ACTIVITY:  Today: no heavy lifting, straining or running due to procedural   sedation/anesthesia.  The following day: return to full activity including work.  DIET:  Eat and drink normally unless instructed otherwise.     TREATMENT FOR COMMON SIDE EFFECTS:  - Mild abdominal pain, nausea, belching, bloating or excessive gas:  rest,   eat lightly and use a heating pad.  - Sore Throat: treat with throat lozenges and/or gargle with warm salt   water.  - Because air was used during the procedure, expelling large amounts of air   from your rectum or belching is normal.  - If a bowel prep was taken, you may not have a bowel movement for 1-3 days.    This is normal.  SYMPTOMS TO WATCH FOR AND REPORT TO YOUR PHYSICIAN:  1. Abdominal pain or bloating, other than gas cramps.  2. Chest pain.  3. Back pain.  4. Signs of infection such as: chills or fever occurring within 24 hours   after the procedure.  5. Rectal bleeding, which would show as bright red, maroon, or black stools.   (A tablespoon of blood from the rectum is not serious, especially if    hemorrhoids are present.)  6. Vomiting.  7. Weakness or dizziness.  GO DIRECTLY TO THE NEAREST EMERGENCY ROOM IF YOU HAVE ANY OF THE FOLLOWING:      Difficulty breathing              Chills and/or fever over 101 F   Persistent vomiting and/or vomiting blood   Severe abdominal pain   Severe chest pain   Black, tarry stools   Bleeding- more than one tablespoon   Any other symptom or condition that you feel may need urgent attention  Your doctor recommends these additional instructions:  If any biopsies were taken, your doctors clinic will contact you in 1 to 2   weeks with any results.  - Return patient to hospital [facility] for ongoing care.   - Repeat ERCP in 6 weeks to check healing.  For questions, problems or results please call your physician - Rock Martines MD at Work:  [phone].  [facility], EMERGENCY ROOM PHONE NUMBER: [phone]  IF A COMPLICATION OR EMERGENCY SITUATION ARISES AND YOU ARE UNABLE TO REACH   YOUR PHYSICIAN - GO DIRECTLY TO THE EMERGENCY ROOM.  Rock Martines MD  1/24/2023 10:52:58 AM  This report has been verified and signed electronically.  Dear patient,  As a result of recent federal legislation (The Federal Cures Act), you may   receive lab or pathology results from your procedure in your [facility]   account before your physician is able to contact you. Your physician or   their representative will relay the results to you with their   recommendations at their soonest availability.  Thank you,  PROVATION

## 2023-01-24 NOTE — SUBJECTIVE & OBJECTIVE
Interval History:   No reports of abdominal pain or nausea this morning. Underwent ERCP today with AES, now s/p stent removal and placement of plastic stent in the pancreatic duct. Para labs positive for SBP, on CTX 2g.     Review of Systems   Constitutional:  Negative for chills and fever.   Respiratory:  Negative for shortness of breath.    Cardiovascular:  Negative for chest pain.   Gastrointestinal:  Negative for abdominal pain, diarrhea, nausea and vomiting.   Neurological:  Negative for dizziness and light-headedness.   Objective:     Vital Signs (Most Recent):  Temp: 98.7 °F (37.1 °C) (01/24/23 1145)  Pulse: 88 (01/24/23 1145)  Resp: 11 (01/24/23 1145)  BP: (!) 119/58 (01/24/23 1145)  SpO2: (!) 94 % (01/24/23 1145)   Vital Signs (24h Range):  Temp:  [97.5 °F (36.4 °C)-99 °F (37.2 °C)] 98.7 °F (37.1 °C)  Pulse:  [80-89] 88  Resp:  [11-20] 11  SpO2:  [93 %-97 %] 94 %  BP: ()/(51-64) 119/58     Weight: 53.8 kg (118 lb 9.7 oz)  Body mass index is 18.03 kg/m².    Intake/Output Summary (Last 24 hours) at 1/24/2023 1338  Last data filed at 1/24/2023 1033  Gross per 24 hour   Intake 597 ml   Output 250 ml   Net 347 ml      Physical Exam  Vitals reviewed.   Constitutional:       General: She is not in acute distress.     Appearance: She is ill-appearing.      Comments: Cachetic, chronically ill appearing.   Eyes:      Extraocular Movements: Extraocular movements intact.      Pupils: Pupils are equal, round, and reactive to light.   Cardiovascular:      Rate and Rhythm: Normal rate.   Pulmonary:      Effort: Pulmonary effort is normal. No respiratory distress.   Abdominal:      General: There is no distension.      Palpations: Abdomen is soft.      Tenderness: There is no abdominal tenderness. There is no guarding or rebound.   Musculoskeletal:         General: Normal range of motion.      Right lower leg: No edema.      Left lower leg: No edema.   Skin:     Findings: No rash.   Neurological:      Mental Status:  She is alert and oriented to person, place, and time. Mental status is at baseline.   Psychiatric:         Mood and Affect: Mood normal.         Behavior: Behavior normal.         Thought Content: Thought content normal.         Judgment: Judgment normal.       Significant Labs: All pertinent labs within the past 24 hours have been reviewed.    Significant Imaging: I have reviewed all pertinent imaging results/findings within the past 24 hours.

## 2023-01-24 NOTE — NURSING TRANSFER
Nursing Transfer Note      1/24/2023     Reason patient is being transferred: pt released by aldretti    Transfer To: [id number]    Transfer via stretcher    Transfer with     Transported by pct    Medicines sent: none    Any special needs or follow-up needed:       Chart send with patient: Yes    Notified: spouse    Patient reassessed at: 1130 1/24/2023    Upon arrival to floor: patient oriented to room, call bell in reach, and bed in lowest position

## 2023-01-24 NOTE — ASSESSMENT & PLAN NOTE
62 y.o F w/ hx of rheumatoid arthritis, chronic pancreatitis, pancreatic pseudocyst, cholecystectomy and severe protein calorie malnutrition, DVT, tobacco use with multiple recent admissions in the past few months due to abdominal symptoms associated with her chronic pancreatitis    -Admitted to [facility] on 12/30: underwent ERCP with pancreatic sphincterotomy, stent placement and 1300 mL paracentesis.    -Readmitted on 01/02: imaging with concerns for multifocal rim enhancing fluid collections in the upper abdomen.Treated with broad-spectrum IV antibiotics, transitioned to oral antibiotics and discharged on 1/6  -Readmitted on 01/15: CT imaging with concerns for large volume ascites, small bilateral pleural effusions, unchanged multiloculated fluid collections in the left hemiabdomen.   She underwent paracentesis[01/18] with 2.2 L of fluid removal, and ascites fluid was concerning for complex appearance with septations, changed from prior. She completed a course of broad-spectrum antibiotics .     -Gastroenterology consult at OSH[01/16] noted concerns for Multiloculated fluid collections along with large volume ascites and  pancreatic duct leak on ERCP status post pancreatic sphincterotomy and stent placement. Per consult notes, If continues to have issues, may need distal pancreatectomy; Case discussed w/ surg onc and recommend endoscopic interventions for now to to see if surgery can be avoided kyle w/ degree of inflammation    -Transferred to [facility] for  AES evaluation for:  Pancreatic ascites with concern for pancreatic duct leak and infection    PLAN  -AES consulted; s/p stent removal and placement of plastic stent in pancreatic duct on 1/24  -Para labs consistent with SBP, Ceftriaxone 1g increased to 2 g  -Blood cx in process

## 2023-01-24 NOTE — TREATMENT PLAN
Brief GI treatment plan    ERCP performed today.  Findings:      Impression:            - Prior biliary sphincterotomy appeared open.                          - Prior pancreatic sphincterotomy appeared open.                          - One stent from the pancreatic duct was seen in                          the major papilla.                          - One stent was removed from the pancreatic duct.                          - A pancreatic duct leak was found.                          - One plastic stent was placed into the ventral                          pancreatic duct.   Recommendation:        - Return patient to hospital [facility] for ongoing care.                          - Repeat ERCP in 6 weeks to check healing.           Please see full endoscopy report for details.    Recommendations:    -trend CBC, CMP, INR  -monitor for response to PD stenting  -Repeat ERCP in 6 weeks      GI will continue to follow.  Please call questions.    Charly Baldwin MD  GI Fellow

## 2023-01-25 ENCOUNTER — TELEPHONE (OUTPATIENT)
Dept: ENDOSCOPY | Facility: HOSPITAL | Age: 63
End: 2023-01-25

## 2023-01-25 DIAGNOSIS — K86.89 PANCREATIC DUCT LEAK: Primary | ICD-10-CM

## 2023-01-25 LAB
ALBUMIN SERPL BCP-MCNC: 0.9 G/DL (ref 3.5–5.2)
ALP SERPL-CCNC: 121 U/L (ref 55–135)
ALT SERPL W/O P-5'-P-CCNC: 7 U/L (ref 10–44)
ANION GAP SERPL CALC-SCNC: 5 MMOL/L (ref 8–16)
APTT BLDCRRT: 29.1 SEC (ref 21–32)
AST SERPL-CCNC: 22 U/L (ref 10–40)
BASOPHILS # BLD AUTO: 0.09 K/UL (ref 0–0.2)
BASOPHILS NFR BLD: 0.4 % (ref 0–1.9)
BILIRUB SERPL-MCNC: 0.5 MG/DL (ref 0.1–1)
BUN SERPL-MCNC: 10 MG/DL (ref 8–23)
BURR CELLS BLD QL SMEAR: ABNORMAL
CALCIUM SERPL-MCNC: 8.7 MG/DL (ref 8.7–10.5)
CHLORIDE SERPL-SCNC: 105 MMOL/L (ref 95–110)
CO2 SERPL-SCNC: 22 MMOL/L (ref 23–29)
CREAT SERPL-MCNC: 0.5 MG/DL (ref 0.5–1.4)
DIFFERENTIAL METHOD: ABNORMAL
EOSINOPHIL # BLD AUTO: 0.1 K/UL (ref 0–0.5)
EOSINOPHIL NFR BLD: 0.5 % (ref 0–8)
ERYTHROCYTE [DISTWIDTH] IN BLOOD BY AUTOMATED COUNT: 16.2 % (ref 11.5–14.5)
EST. GFR  (NO RACE VARIABLE): >60 ML/MIN/1.73 M^2
GLUCOSE SERPL-MCNC: 74 MG/DL (ref 70–110)
HCT VFR BLD AUTO: 31.6 % (ref 37–48.5)
HGB BLD-MCNC: 10.1 G/DL (ref 12–16)
IMM GRANULOCYTES # BLD AUTO: 0.36 K/UL (ref 0–0.04)
IMM GRANULOCYTES NFR BLD AUTO: 1.4 % (ref 0–0.5)
INR PPP: 1.1 (ref 0.8–1.2)
LYMPHOCYTES # BLD AUTO: 0.7 K/UL (ref 1–4.8)
LYMPHOCYTES NFR BLD: 2.7 % (ref 18–48)
MCH RBC QN AUTO: 30.6 PG (ref 27–31)
MCHC RBC AUTO-ENTMCNC: 32 G/DL (ref 32–36)
MCV RBC AUTO: 96 FL (ref 82–98)
MONOCYTES # BLD AUTO: 1.2 K/UL (ref 0.3–1)
MONOCYTES NFR BLD: 4.6 % (ref 4–15)
NEUTROPHILS # BLD AUTO: 22.8 K/UL (ref 1.8–7.7)
NEUTROPHILS NFR BLD: 90.4 % (ref 38–73)
NRBC BLD-RTO: 0 /100 WBC
PHOSPHATE SERPL-MCNC: 3.1 MG/DL (ref 2.7–4.5)
PLATELET # BLD AUTO: 347 K/UL (ref 150–450)
PLATELET BLD QL SMEAR: ABNORMAL
PMV BLD AUTO: 10 FL (ref 9.2–12.9)
POCT GLUCOSE: 109 MG/DL (ref 70–110)
POCT GLUCOSE: 113 MG/DL (ref 70–110)
POCT GLUCOSE: 133 MG/DL (ref 70–110)
POCT GLUCOSE: 76 MG/DL (ref 70–110)
POIKILOCYTOSIS BLD QL SMEAR: SLIGHT
POTASSIUM SERPL-SCNC: 3.9 MMOL/L (ref 3.5–5.1)
PROT SERPL-MCNC: 4.4 G/DL (ref 6–8.4)
PROTHROMBIN TIME: 11.8 SEC (ref 9–12.5)
RBC # BLD AUTO: 3.3 M/UL (ref 4–5.4)
SODIUM SERPL-SCNC: 132 MMOL/L (ref 136–145)
WBC # BLD AUTO: 25.19 K/UL (ref 3.9–12.7)

## 2023-01-25 PROCEDURE — 99233 SBSQ HOSP IP/OBS HIGH 50: CPT | Mod: ,,,

## 2023-01-25 PROCEDURE — 97165 OT EVAL LOW COMPLEX 30 MIN: CPT

## 2023-01-25 PROCEDURE — 99233 PR SUBSEQUENT HOSPITAL CARE,LEVL III: ICD-10-PCS | Mod: ,,, | Performed by: INTERNAL MEDICINE

## 2023-01-25 PROCEDURE — 97535 SELF CARE MNGMENT TRAINING: CPT

## 2023-01-25 PROCEDURE — 99233 SBSQ HOSP IP/OBS HIGH 50: CPT | Mod: ,,, | Performed by: INTERNAL MEDICINE

## 2023-01-25 PROCEDURE — 97530 THERAPEUTIC ACTIVITIES: CPT

## 2023-01-25 PROCEDURE — 36415 COLL VENOUS BLD VENIPUNCTURE: CPT | Performed by: STUDENT IN AN ORGANIZED HEALTH CARE EDUCATION/TRAINING PROGRAM

## 2023-01-25 PROCEDURE — 20600001 HC STEP DOWN PRIVATE ROOM

## 2023-01-25 PROCEDURE — 25000003 PHARM REV CODE 250: Performed by: STUDENT IN AN ORGANIZED HEALTH CARE EDUCATION/TRAINING PROGRAM

## 2023-01-25 PROCEDURE — S4991 NICOTINE PATCH NONLEGEND: HCPCS | Performed by: STUDENT IN AN ORGANIZED HEALTH CARE EDUCATION/TRAINING PROGRAM

## 2023-01-25 PROCEDURE — 63600175 PHARM REV CODE 636 W HCPCS: Performed by: INTERNAL MEDICINE

## 2023-01-25 PROCEDURE — 36415 COLL VENOUS BLD VENIPUNCTURE: CPT

## 2023-01-25 PROCEDURE — 94761 N-INVAS EAR/PLS OXIMETRY MLT: CPT

## 2023-01-25 PROCEDURE — 85610 PROTHROMBIN TIME: CPT

## 2023-01-25 PROCEDURE — 97161 PT EVAL LOW COMPLEX 20 MIN: CPT

## 2023-01-25 PROCEDURE — 25000003 PHARM REV CODE 250

## 2023-01-25 PROCEDURE — 25000003 PHARM REV CODE 250: Performed by: INTERNAL MEDICINE

## 2023-01-25 PROCEDURE — 85025 COMPLETE CBC W/AUTO DIFF WBC: CPT | Performed by: STUDENT IN AN ORGANIZED HEALTH CARE EDUCATION/TRAINING PROGRAM

## 2023-01-25 PROCEDURE — 99233 PR SUBSEQUENT HOSPITAL CARE,LEVL III: ICD-10-PCS | Mod: ,,,

## 2023-01-25 PROCEDURE — 84100 ASSAY OF PHOSPHORUS: CPT | Performed by: STUDENT IN AN ORGANIZED HEALTH CARE EDUCATION/TRAINING PROGRAM

## 2023-01-25 PROCEDURE — 80053 COMPREHEN METABOLIC PANEL: CPT | Performed by: STUDENT IN AN ORGANIZED HEALTH CARE EDUCATION/TRAINING PROGRAM

## 2023-01-25 PROCEDURE — 85730 THROMBOPLASTIN TIME PARTIAL: CPT

## 2023-01-25 RX ORDER — POLYETHYLENE GLYCOL 3350 17 G/17G
17 POWDER, FOR SOLUTION ORAL 2 TIMES DAILY PRN
Status: DISCONTINUED | OUTPATIENT
Start: 2023-01-25 | End: 2023-01-26

## 2023-01-25 RX ORDER — LEFLUNOMIDE 20 MG/1
20 TABLET ORAL DAILY
COMMUNITY
End: 2023-02-08

## 2023-01-25 RX ORDER — BISACODYL 10 MG
10 SUPPOSITORY, RECTAL RECTAL DAILY PRN
Status: DISCONTINUED | OUTPATIENT
Start: 2023-01-25 | End: 2023-01-26

## 2023-01-25 RX ADMIN — PANCRELIPASE 2 CAPSULE: 60000; 12000; 38000 CAPSULE, DELAYED RELEASE PELLETS ORAL at 05:01

## 2023-01-25 RX ADMIN — Medication 1 PATCH: at 09:01

## 2023-01-25 RX ADMIN — PANCRELIPASE 2 CAPSULE: 60000; 12000; 38000 CAPSULE, DELAYED RELEASE PELLETS ORAL at 09:01

## 2023-01-25 RX ADMIN — MUPIROCIN: 20 OINTMENT TOPICAL at 09:01

## 2023-01-25 RX ADMIN — PANTOPRAZOLE SODIUM 40 MG: 40 TABLET, DELAYED RELEASE ORAL at 05:01

## 2023-01-25 RX ADMIN — POLYETHYLENE GLYCOL 3350 17 G: 17 POWDER, FOR SOLUTION ORAL at 08:01

## 2023-01-25 RX ADMIN — PANCRELIPASE 2 CAPSULE: 60000; 12000; 38000 CAPSULE, DELAYED RELEASE PELLETS ORAL at 11:01

## 2023-01-25 RX ADMIN — TRAZODONE HYDROCHLORIDE 50 MG: 50 TABLET ORAL at 08:01

## 2023-01-25 RX ADMIN — APIXABAN 5 MG: 5 TABLET, FILM COATED ORAL at 08:01

## 2023-01-25 RX ADMIN — FOLIC ACID 1 MG: 1 TABLET ORAL at 09:01

## 2023-01-25 RX ADMIN — MUPIROCIN: 20 OINTMENT TOPICAL at 08:01

## 2023-01-25 RX ADMIN — CYCLOBENZAPRINE HYDROCHLORIDE 5 MG: 5 TABLET, FILM COATED ORAL at 08:01

## 2023-01-25 RX ADMIN — CEFTRIAXONE 2 G: 2 INJECTION, POWDER, FOR SOLUTION INTRAMUSCULAR; INTRAVENOUS at 09:01

## 2023-01-25 RX ADMIN — SERTRALINE HYDROCHLORIDE 50 MG: 25 TABLET ORAL at 08:01

## 2023-01-25 RX ADMIN — METOPROLOL SUCCINATE 25 MG: 25 TABLET, EXTENDED RELEASE ORAL at 09:01

## 2023-01-25 RX ADMIN — OXYCODONE HYDROCHLORIDE AND ACETAMINOPHEN 1 TABLET: 5; 325 TABLET ORAL at 11:01

## 2023-01-25 NOTE — PLAN OF CARE
PT Evaluation completed and PT POC established.    Problem: Physical Therapy  Goal: Physical Therapy Goal  Description: Goals to be met by: 2023     Patient will increase functional independence with mobility by performin. Supine to sit with [place]  2. Sit to supine with [place]  3. Sit to stand transfer with Supervision  4. Bed to chair transfer with Supervision using LRAD  5. Gait  x 100 feet with Supervision using LRAD.     Outcome: Ongoing, Progressing

## 2023-01-25 NOTE — PLAN OF CARE
Problem: Occupational Therapy  Goal: Occupational Therapy Goal  Description: Goals to be met by: 02-04-23     Patient will increase functional independence with ADLs by performing:    UE Dressing with Set-up Assistance.  LE Dressing with Stand-by Assistance.  Grooming while standing at sink with Stand-by Assistance.  Toileting from toilet with Stand-by Assistance for hygiene and clothing management.   Supine to sit with [place].  Stand pivot transfers with Stand-by Assistance.  Toilet transfer to toilet with Stand-by Assistance.  Pt. To be I with HEP to BUE to improve level of endurance    Outcome: Ongoing, Progressing

## 2023-01-25 NOTE — PROGRESS NOTES
[facility] ([place] [address])  Advanced Endoscopy   Progress Note    Patient Name: Claire Bowser  MRN: [id number]  Admission Date: 1/22/2023  Hospital Length of Stay: 3 days  Code Status: Full Code   Attending Provider: Margie Dugan MD  Consulting Provider: Jenelle Douglas NP  Primary Care Physician: Samuel Brady MD  Principal Problem: Pancreatic ascites      Subjective:     Interval History: 1 day s/p ERCP for PD leak with placement of stent into ventral PD. No adverse events overnight. Patient denies abdominal pain, n/v/d, hematochezia, abdominal hematemesis. Patient is tolerating PO intake without post-prandial discomfort. Afebrile and HDS overnight.     Review of Systems   Constitutional:  Negative for chills, diaphoresis and fever.   HENT:  Negative for trouble swallowing and voice change.    Eyes:  Negative for discharge and redness.   Gastrointestinal:  Positive for abdominal distention. Negative for abdominal pain, blood in stool, diarrhea, nausea and vomiting.   Skin:  Negative for color change and pallor.   Neurological:  Negative for syncope and speech difficulty.   Objective:     Vital Signs (Most Recent):  Temp: 97.6 °F (36.4 °C) (01/25/23 0937)  Pulse: 78 (01/25/23 0430)  Resp: 11 (01/24/23 1145)  BP: 101/70 (01/25/23 0937)  SpO2: 96 % (01/25/23 0430) Vital Signs (24h Range):  Temp:  [97.5 °F (36.4 °C)-99 °F (37.2 °C)] 97.6 °F (36.4 °C)  Pulse:  [72-89] 78  Resp:  [11-20] 11  SpO2:  [93 %-97 %] 96 %  BP: ()/(51-70) 101/70     Weight: 53.8 kg (118 lb 9.7 oz) (01/23/23 1245)  Body mass index is 18.03 kg/m².      Intake/Output Summary (Last 24 hours) at 1/25/2023 1014  Last data filed at 1/24/2023 1800  Gross per 24 hour   Intake 300 ml   Output 220 ml   Net 80 ml       Lines/Drains/Airways       Peripheral Intravenous Line  Duration                  Midline Catheter Insertion/Assessment  - Single Lumen 01/20/23 1430 Right basilic vein (medial side of arm) 20g x 8cm 4 days                     Physical Exam  Vitals and nursing note reviewed.   Constitutional:       General: She is not in acute distress.     Appearance: She is underweight. She is not diaphoretic.   Eyes:      General: No scleral icterus.        Right eye: No discharge.         Left eye: No discharge.   Abdominal:      General: Abdomen is protuberant. Bowel sounds are normal. There is distension.      Tenderness: There is no abdominal tenderness. There is no guarding or rebound.   Skin:     General: Skin is warm and dry.      Coloration: Skin is not jaundiced.   Neurological:      Mental Status: She is alert and oriented to person, place, and time.       Significant Labs:  CBC:   Recent Labs   Lab 01/24/23  0224 01/25/23  0416   WBC 17.62* 25.19*   HGB 9.5* 10.1*   HCT 28.4* 31.6*    347     CMP:   Recent Labs   Lab 01/25/23 0416   GLU 74   CALCIUM 8.7   ALBUMIN 0.9*   PROT 4.4*   *   K 3.9   CO2 22*      BUN 10   CREATININE 0.5   ALKPHOS 121   ALT 7*   AST 22   BILITOT 0.5     Coagulation: No results for input(s): PT, INR, APTT in the last 48 hours.      Significant Imaging:  Imaging results within the past 24 hours have been reviewed.    Assessment/Plan:     * Pancreatic ascites  1 day s/p ERCP for PD leak with placement of plastic stent into ventral PD.    Recommendations:  - Diet as tolerated  - CT A/P pancreas protocol in 4 weeks to evaluate healing of PD leak  - Repeat ERCP in approximately 6 weeks with Dr. Martines  - 7 day course of antibiotics  - CBC and CMP daily while hospitalized          Thank you for your consult. I will follow-up with patient. Please contact us if you have any additional questions.    Jenelle Douglas NP  Gastroenterology  [facility] ([place]

## 2023-01-25 NOTE — ASSESSMENT & PLAN NOTE
1 day s/p ERCP for PD leak with placement of plastic stent into ventral PD.    Recommendations:  - Diet as tolerated  - CT A/P pancreas protocol in 4 weeks to evaluate healing of PD leak  - Repeat ERCP in approximately 6 weeks with Dr. Martines  - 7 day course of antibiotics  - CBC and CMP daily while hospitalized

## 2023-01-25 NOTE — NURSING
Pt did not have any complaints during the night. Pt in bed, spouse at bedside. Will continue to monitor

## 2023-01-25 NOTE — PROGRESS NOTES
[facility] ([address] Medicine  Progress Note    Patient Name: Claire Bowser  MRN: [id number]  Patient Class: IP- Inpatient   Admission Date: 1/22/2023  Length of Stay: 3 days  Attending Physician: Margie Dugan MD  Primary Care Provider: Samuel Brady MD        Subjective:     Principal Problem:Pancreatic ascites        HPI:  Ms. Bowser is a 62 y.o F w/ hx of rheumatoid arthritis, chronic pancreatitis, pancreatic pseudocyst, pancreatic sphincterectomy and stent placement, cholecystectomy, severe protein calorie malnutrition, DVT, tobacco use who initially presented to [facility] for evaluation of worsening abdominal pain, nausea and vomiting.  She has had multiple recent admissions in the past few months due to similar symptoms associated with her chronic pancreatitis.  She reports abdominal pain with any oral intake and has had very poor appetite mostly limited to liquids including soup and Jell-O.  Reports around 45 lbs weight loss over the past 6 months.  She has been evaluated in the past year with biopsy for a suspicious mass at the neck of pancreas with workup negative for malignancy at the time.       OSH course notable for imaging with concerns for large volume ascites, small bilateral pleural effusions, unchanged multiloculated fluid collections in the left hemiabdomen. She underwent paracentesis[01/18] with 2.2 L of fluid removal, and ascites fluid was concerning for complex appearance with septations, changed from prior. She completed a course of broad-spectrum antibiotics(vancomycin, cefepime).     She was transferred for [facility] for  AES evaluation for:  Pancreatic ascites with concern for pancreatic duct leak and infection.      Overview/Hospital Course:  Patient transferred to [facility] Medicine from OSH for AES evaluation of suspected pancreatic duct leak and intraabdominal infection. IR sample of peritoneal fluid obtained, consistent with peritonitis on our  review of the cell count. IV CTX dose increased. Cultures pending. Will transition to PO abx and treat for 14d course. Received 1u pRBC for anemia prior to AES procedure. ERCP performed by JG 1/24 with placement of stent over pancreatic duct leak.Tolerated full diet shortly thereafter procedure with resolution of her abdominal pain. H/H appropriately responded following 1u pRBC prior to procedure and found to be stable following procedure. Will resume home eliquis evening 1/25. Being treated for multiple DVT found in E September 2022 for 6 months.       Interval History:   Doing well, sitting up in her chair this morning. Able to work with PT and OT. No reports of abdominal pain or nausea this morning. Underwent ERCP yesterday with AES, now s/p stent removal and placement of plastic stent in the pancreatic duct. Tolerated full meal with steak and potatoes immediately following procedure. Will restart her eliquis with evening dose.     Review of Systems   Constitutional:  Negative for chills and fever.   Respiratory:  Negative for shortness of breath.    Cardiovascular:  Negative for chest pain.   Gastrointestinal:  Negative for abdominal pain, diarrhea, nausea and vomiting.   Neurological:  Negative for dizziness and light-headedness.   Objective:     Vital Signs (Most Recent):  Temp: 98 °F (36.7 °C) (01/25/23 1144)  Pulse: 90 (01/25/23 1144)  Resp: 20 (01/25/23 1144)  BP: (!) 112/53 (01/25/23 1144)  SpO2: 97 % (01/25/23 1144)   Vital Signs (24h Range):  Temp:  [97.5 °F (36.4 °C)-98.2 °F (36.8 °C)] 98 °F (36.7 °C)  Pulse:  [72-99] 90  Resp:  [20] 20  SpO2:  [96 %-98 %] 97 %  BP: (101-117)/(53-70) 112/53     Weight: 53.8 kg (118 lb 9.7 oz)  Body mass index is 18.03 kg/m².    Intake/Output Summary (Last 24 hours) at 1/25/2023 1234  Last data filed at 1/24/2023 1800  Gross per 24 hour   Intake --   Output 220 ml   Net -220 ml        Physical Exam  Vitals reviewed.   Constitutional:       General: She is not in acute  distress.     Appearance: She is ill-appearing.      Comments: Cachetic, chronically ill appearing.   Eyes:      Extraocular Movements: Extraocular movements intact.      Pupils: Pupils are equal, round, and reactive to light.   Cardiovascular:      Rate and Rhythm: Normal rate.   Pulmonary:      Effort: Pulmonary effort is normal. No respiratory distress.   Abdominal:      General: There is no distension.      Palpations: Abdomen is soft.      Tenderness: There is no abdominal tenderness. There is no guarding or rebound.   Musculoskeletal:         General: Normal range of motion.      Right lower leg: No edema.      Left lower leg: No edema.   Skin:     Findings: No rash.   Neurological:      Mental Status: She is alert and oriented to person, place, and time. Mental status is at baseline.   Psychiatric:         Mood and Affect: Mood normal.         Behavior: Behavior normal.         Thought Content: Thought content normal.         Judgment: Judgment normal.       Significant Labs: All pertinent labs within the past 24 hours have been reviewed.    Significant Imaging: I have reviewed all pertinent imaging results/findings within the past 24 hours.      Assessment/Plan:      * Pancreatic ascites  62 y.o F w/ hx of rheumatoid arthritis, chronic pancreatitis, pancreatic pseudocyst, cholecystectomy and severe protein calorie malnutrition, DVT, tobacco use with multiple recent admissions in the past few months due to abdominal symptoms associated with her chronic pancreatitis    -Admitted to [facility] on 12/30: underwent ERCP with pancreatic sphincterotomy, stent placement and 1300 mL paracentesis.    -Readmitted on 01/02: imaging with concerns for multifocal rim enhancing fluid collections in the upper abdomen.Treated with broad-spectrum IV antibiotics, transitioned to oral antibiotics and discharged on 1/6  -Readmitted on 01/15: CT imaging with concerns for large volume ascites, small bilateral pleural effusions, unchanged  "multiloculated fluid collections in the left hemiabdomen.   She underwent paracentesis [01/18] with 2.2 L of fluid removal, and ascites fluid was concerning for complex appearance with septations, changed from prior. She completed a course of broad-spectrum antibiotics .     -Gastroenterology consult at OSH[01/16] noted concerns for Multiloculated fluid collections along with large volume ascites and  pancreatic duct leak on ERCP status post pancreatic sphincterotomy and stent placement. Per consult notes, If continues to have issues, may need distal pancreatectomy; Case discussed w/ surg onc and recommend endoscopic interventions for now to to see if surgery can be avoided kyle w/ degree of inflammation    -Transferred to [facility] for  AES evaluation for:  Pancreatic ascites with concern for pancreatic duct leak and infection    PLAN  -AES consulted; s/p stent removal and placement of plastic stent in pancreatic duct on 1/24  -Para labs consistent with SBP, Ceftriaxone 1g increased to 2 g. Will transition to PO abx for 14 day course  -Para cultures pending  -Blood cx in process     Spontaneous bacterial peritonitis  Para labs consistent with SBP, on Ceftriaxone.     - Increase Ceftriaxone 1g to 2g q24 hours  - Blood cx in process      History of DVT of lower extremity  Imaging on 09/26/22 noted: Positive examination demonstrating left lower extremity DVT involving the popliteal, posterior tibial, anterior tibial, and peroneal veins.    PLAN  Home apixaban held for procedure; will resume evening dose 1/26  First DVT patient has ever had was 9/22 as above  At the time was advised to continue eliquis for 6 months     Abdominal pain  See "Pancreatic ascites" A&P    Weight loss, unintentional  Nutrition consulted. Most recent weight and BMI monitored-   -Reports 45 lbs. weight loss over the past 6 months due to poor oral intake from abdominal symptoms. Prior labs with CA 19-9 elevation, and she has been evaluated in the past " "year with biopsy for a suspicious mass at the neck of pancreas with workup negative for malignancy at the time.     Measurements:  Wt Readings from Last 1 Encounters:   01/23/23 53.8 kg (118 lb 9.7 oz)   Body mass index is 18.03 kg/m².    PLAN: Recommendation/Intervention: 1. Diet per MD. 2. TPN recommendations: 3. RD to monitor & follow-up.  Goals: Meet % EEN, EPN by RD f/u date  Patient was on Clinimix TPN at recent admission. Nutrition consulted for TPN recs.  Continue to encourage advancement of PO diet as tolerated    Severe protein-calorie malnutrition  See "Weight loss, unintentional" A&P    Rheumatoid arthritis involving multiple sites with positive rheumatoid factor  On Leflunomide and Prednisone for RA. Patient reports they were held recently by ID due to immunosuppression in the setting of concerns for infection.     PLAN  Followup infection rule out workup and resume as appropriate.    Tobacco use  Continue nicotine patch      Generalized anxiety disorder  Continue home trazodone, sertraline    Hypertension  Continue home metoprolol        VTE Risk Mitigation (From admission, onward)         Ordered     apixaban tablet 5 mg  2 times daily         01/25/23 0943     Place CARLOS hose  Until discontinued         01/23/23 1306     IP VTE HIGH RISK PATIENT  Once         01/22/23 2211     Place sequential compression device  Until discontinued         01/22/23 2211     Reason for No Pharmacological VTE Prophylaxis  Once        Question:  Reasons:  Answer:  Physician Provided (leave comment)  Comment:  Anticipated procedure    01/22/23 2211                Discharge Planning   RASHI: 1/26/2023     Code Status: Full Code   Is the patient medically ready for discharge?: No    Reason for patient still in hospital (select all that apply): Patient trending condition and PT / OT recommendations  Discharge Plan A: Home with family                  Bogdan Rubio DO  Department of Hospital Medicine   [facility] " Intensive Care ([facility]

## 2023-01-25 NOTE — SUBJECTIVE & OBJECTIVE
Interval History:   Doing well, sitting up in her chair this morning. Able to work with PT and OT. No reports of abdominal pain or nausea this morning. Underwent ERCP yesterday with AES, now s/p stent removal and placement of plastic stent in the pancreatic duct. Tolerated full meal with steak and potatoes immediately following procedure. Will restart her eliquis with evening dose.     Review of Systems   Constitutional:  Negative for chills and fever.   Respiratory:  Negative for shortness of breath.    Cardiovascular:  Negative for chest pain.   Gastrointestinal:  Negative for abdominal pain, diarrhea, nausea and vomiting.   Neurological:  Negative for dizziness and light-headedness.   Objective:     Vital Signs (Most Recent):  Temp: 98 °F (36.7 °C) (01/25/23 1144)  Pulse: 90 (01/25/23 1144)  Resp: 20 (01/25/23 1144)  BP: (!) 112/53 (01/25/23 1144)  SpO2: 97 % (01/25/23 1144)   Vital Signs (24h Range):  Temp:  [97.5 °F (36.4 °C)-98.2 °F (36.8 °C)] 98 °F (36.7 °C)  Pulse:  [72-99] 90  Resp:  [20] 20  SpO2:  [96 %-98 %] 97 %  BP: (101-117)/(53-70) 112/53     Weight: 53.8 kg (118 lb 9.7 oz)  Body mass index is 18.03 kg/m².    Intake/Output Summary (Last 24 hours) at 1/25/2023 1234  Last data filed at 1/24/2023 1800  Gross per 24 hour   Intake --   Output 220 ml   Net -220 ml        Physical Exam  Vitals reviewed.   Constitutional:       General: She is not in acute distress.     Appearance: She is ill-appearing.      Comments: Cachetic, chronically ill appearing.   Eyes:      Extraocular Movements: Extraocular movements intact.      Pupils: Pupils are equal, round, and reactive to light.   Cardiovascular:      Rate and Rhythm: Normal rate.   Pulmonary:      Effort: Pulmonary effort is normal. No respiratory distress.   Abdominal:      General: There is no distension.      Palpations: Abdomen is soft.      Tenderness: There is no abdominal tenderness. There is no guarding or rebound.   Musculoskeletal:          General: Normal range of motion.      Right lower leg: No edema.      Left lower leg: No edema.   Skin:     Findings: No rash.   Neurological:      Mental Status: She is alert and oriented to person, place, and time. Mental status is at baseline.   Psychiatric:         Mood and Affect: Mood normal.         Behavior: Behavior normal.         Thought Content: Thought content normal.         Judgment: Judgment normal.       Significant Labs: All pertinent labs within the past 24 hours have been reviewed.    Significant Imaging: I have reviewed all pertinent imaging results/findings within the past 24 hours.

## 2023-01-25 NOTE — ASSESSMENT & PLAN NOTE
62 y.o F w/ hx of rheumatoid arthritis, chronic pancreatitis, pancreatic pseudocyst, cholecystectomy and severe protein calorie malnutrition, DVT, tobacco use with multiple recent admissions in the past few months due to abdominal symptoms associated with her chronic pancreatitis    -Admitted to [facility] on 12/30: underwent ERCP with pancreatic sphincterotomy, stent placement and 1300 mL paracentesis.    -Readmitted on 01/02: imaging with concerns for multifocal rim enhancing fluid collections in the upper abdomen.Treated with broad-spectrum IV antibiotics, transitioned to oral antibiotics and discharged on 1/6  -Readmitted on 01/15: CT imaging with concerns for large volume ascites, small bilateral pleural effusions, unchanged multiloculated fluid collections in the left hemiabdomen.   She underwent paracentesis [01/18] with 2.2 L of fluid removal, and ascites fluid was concerning for complex appearance with septations, changed from prior. She completed a course of broad-spectrum antibiotics .     -Gastroenterology consult at OSH[01/16] noted concerns for Multiloculated fluid collections along with large volume ascites and  pancreatic duct leak on ERCP status post pancreatic sphincterotomy and stent placement. Per consult notes, If continues to have issues, may need distal pancreatectomy; Case discussed w/ surg onc and recommend endoscopic interventions for now to to see if surgery can be avoided kyle w/ degree of inflammation    -Transferred to [facility] for  AES evaluation for:  Pancreatic ascites with concern for pancreatic duct leak and infection    PLAN  -AES consulted; s/p stent removal and placement of plastic stent in pancreatic duct on 1/24  -Para labs consistent with SBP, Ceftriaxone 1g increased to 2 g. Will transition to PO abx for 14 day course  -Para cultures pending  -Blood cx in process

## 2023-01-25 NOTE — ASSESSMENT & PLAN NOTE
Nutrition consulted. Most recent weight and BMI monitored-   -Reports 45 lbs. weight loss over the past 6 months due to poor oral intake from abdominal symptoms. Prior labs with CA 19-9 elevation, and she has been evaluated in the past year with biopsy for a suspicious mass at the neck of pancreas with workup negative for malignancy at the time.     Measurements:  Wt Readings from Last 1 Encounters:   01/23/23 53.8 kg (118 lb 9.7 oz)   Body mass index is 18.03 kg/m².    PLAN: Recommendation/Intervention: 1. Diet per MD. 2. TPN recommendations: 3. RD to monitor & follow-up.  Goals: Meet % EEN, EPN by RD f/u date  Patient was on Clinimix TPN at recent admission. Nutrition consulted for TPN recs.  Continue to encourage advancement of PO diet as tolerated

## 2023-01-25 NOTE — SUBJECTIVE & OBJECTIVE
Subjective:     Interval History: 1 day s/p ERCP for PD leak with placement of stent into ventral PD. No adverse events overnight. Patient denies abdominal pain, n/v/d, hematochezia, abdominal hematemesis. Patient is tolerating PO intake without post-prandial discomfort. Afebrile and HDS overnight.     Review of Systems   Constitutional:  Negative for chills, diaphoresis and fever.   HENT:  Negative for trouble swallowing and voice change.    Eyes:  Negative for discharge and redness.   Gastrointestinal:  Positive for abdominal distention. Negative for abdominal pain, blood in stool, diarrhea, nausea and vomiting.   Skin:  Negative for color change and pallor.   Neurological:  Negative for syncope and speech difficulty.   Objective:     Vital Signs (Most Recent):  Temp: 97.6 °F (36.4 °C) (01/25/23 0937)  Pulse: 78 (01/25/23 0430)  Resp: 11 (01/24/23 1145)  BP: 101/70 (01/25/23 0937)  SpO2: 96 % (01/25/23 0430) Vital Signs (24h Range):  Temp:  [97.5 °F (36.4 °C)-99 °F (37.2 °C)] 97.6 °F (36.4 °C)  Pulse:  [72-89] 78  Resp:  [11-20] 11  SpO2:  [93 %-97 %] 96 %  BP: ()/(51-70) 101/70     Weight: 53.8 kg (118 lb 9.7 oz) (01/23/23 1245)  Body mass index is 18.03 kg/m².      Intake/Output Summary (Last 24 hours) at 1/25/2023 1014  Last data filed at 1/24/2023 1800  Gross per 24 hour   Intake 300 ml   Output 220 ml   Net 80 ml       Lines/Drains/Airways       Peripheral Intravenous Line  Duration                  Midline Catheter Insertion/Assessment  - Single Lumen 01/20/23 1430 Right basilic vein (medial side of arm) 20g x 8cm 4 days                    Physical Exam  Vitals and nursing note reviewed.   Constitutional:       General: She is not in acute distress.     Appearance: She is underweight. She is not diaphoretic.   Eyes:      General: No scleral icterus.        Right eye: No discharge.         Left eye: No discharge.   Abdominal:      General: Abdomen is protuberant. Bowel sounds are normal. There is  distension.      Tenderness: There is no abdominal tenderness. There is no guarding or rebound.   Skin:     General: Skin is warm and dry.      Coloration: Skin is not jaundiced.   Neurological:      Mental Status: She is alert and oriented to person, place, and time.       Significant Labs:  CBC:   Recent Labs   Lab 01/24/23 0224 01/25/23 0416   WBC 17.62* 25.19*   HGB 9.5* 10.1*   HCT 28.4* 31.6*    347     CMP:   Recent Labs   Lab 01/25/23 0416   GLU 74   CALCIUM 8.7   ALBUMIN 0.9*   PROT 4.4*   *   K 3.9   CO2 22*      BUN 10   CREATININE 0.5   ALKPHOS 121   ALT 7*   AST 22   BILITOT 0.5     Coagulation: No results for input(s): PT, INR, APTT in the last 48 hours.      Significant Imaging:  Imaging results within the past 24 hours have been reviewed.

## 2023-01-25 NOTE — PT/OT/SLP EVAL
Occupational Therapy   Evaluation/tx    Name: Claire Bowser  MRN: [id number]  Admitting Diagnosis: Pancreatic ascites  Recent Surgery: Procedure(s) (LRB):  ERCP (ENDOSCOPIC RETROGRADE CHOLANGIOPANCREATOGRAPHY) (N/A) 1 Day Post-Op    Recommendations:     Discharge Recommendations: home health OT (with family assist)  Discharge Equipment Recommendations:  walker, rolling  Barriers to discharge:  None    Assessment:     Claire Bowser is a 62 y.o. female with a medical diagnosis of Pancreatic ascites.  She presents with deficits in mobility and self-care tasks. HR noted to increase to 130 with activity on this date in stand. Pt. Pleasant and cooperative. Pt. Does require assist for standing especially from lower surface. RUE weakness noted at shoulder. Pt. Tiny and frail and noted to have edema in Bilateral feet/ankles and BUE. Pt. Would benefit from continued OT services to maximize safety and I with ADL tasks.  Performance deficits affecting function: weakness, impaired endurance, impaired self care skills, impaired functional mobility, gait instability, impaired cardiopulmonary response to activity, edema, decreased upper extremity function, decreased lower extremity function.      Rehab Prognosis: Good; patient would benefit from acute skilled OT services to address these deficits and reach maximum level of function.       Plan:     Patient to be seen 3 x/week to address the above listed problems via self-care/home management, therapeutic activities, therapeutic exercises  Plan of Care Expires: 02/24/23  Plan of Care Reviewed with: patient, spouse    Subjective     Chief Complaint: weak abnd being in bed for so many days.  Patient/Family Comments/goals: to get better     Occupational Profile:  Living Environment: Pt. Resides with spouse in  house with threshold step to enter. Pt. Has a tube shower with a stool and no grab bar.   Previous level of function: Pt. Was I PTA with mobility and ADL tasks and does not use  any DME other than shower stool.  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  Patient found supine with PureWick, telemetry, pulse ox (continuous), blood pressure cuff upon OT entry to room.    General Precautions: Standard, fall  Orthopedic Precautions: N/A  Braces: N/A  Respiratory Status: Room air    Occupational Performance:    Bed Mobility:    Patient completed Supine to Sit with moderate assistance with some dizziness noted initially  Mod A to scoot to EOB    Functional Mobility/Transfers:  Patient completed Sit <> Stand Transfer with minimum assistance  with  no assistive device  from EOB and Mod A from chair 2/2 lower surface  Functional Mobility: Pt. Performed MIP x 10 reps with HHA and holding bedrail . HR noted to increase to 130    Activities of Daily Living:  Grooming: stand by assistance seated to wash face EOB  Upper Body Dressing: moderate assistance to guide gown around back  Lower Body Dressing: total assistance to don socks    Cognitive/Visual Perceptual:  Cognitive/Psychosocial Skills:     -       Oriented to: Person, Place, Time, and Situation   -       Follows Commands/attention:Follows multistep  commands  -       Communication: clear/fluent  -       Memory: No Deficits noted  -       Safety awareness/insight to disability: intact   -       Mood/Affect/Coping skills/emotional control: Appropriate to situation  Visual/Perceptual:      -wears glasses    Physical Exam:  Balance: -       sit: SBA: stand Min A  Postural examination/scapula alignment:    -       Rounded shoulders  -       Posterior pelvic  tilt  Skin integrity: Thin and Dry  Dominant hand: -       right  Upper Extremity Range of Motion:     -       Right Upper Extremity: WFL  -       Left Upper Extremity: WNL   Strength:    -       Right Upper Extremity: WFL shoulder limited to 90 degrees AROM but AAROM WFL  -       Left Upper Extremity: WFL    AMPAC 6 Click ADL:  AMPAC Total Score: 17    Treatment & Education:  Pt. Educated on role of OT and pOC  Pt. Educated on safety with transfers and need for staff assist    Patient left up in chair with all lines intact, call button in reach, and spouse present    GOALS:   Multidisciplinary Problems       Occupational Therapy Goals          Problem: Occupational Therapy    Goal Priority Disciplines Outcome Interventions   Occupational Therapy Goal     OT, PT/OT Ongoing, Progressing    Description: Goals to be met by: 02-04-23     Patient will increase functional independence with ADLs by performing:    UE Dressing with Set-up Assistance.  LE Dressing with Stand-by Assistance.  Grooming while standing at sink with Stand-by Assistance.  Toileting from toilet with Stand-by Assistance for hygiene and clothing management.   Supine to sit with [place].  Stand pivot transfers with Stand-by Assistance.  Toilet transfer to toilet with Stand-by Assistance.  Pt. To be I with HEP to BUE to improve level of endurance                         History:     Past Medical History:   Diagnosis Date    Acute biliary pancreatitis 6/14/2022    Anxiety     Chronic pancreatitis 1/2/2023    Digestive disorder     Flu 02/2017    [facility]    Hypertension     Long-term use of immunosuppressant medication 6/10/2022    Rheumatoid arthritis involving multiple sites with positive rheumatoid factor 01/14/2021         Past Surgical History:   Procedure Laterality Date    COLONOSCOPY N/A 2/26/2021    Procedure: COLONOSCOPY;  Surgeon: Amy Sidhu MD;  Location: [facility];  Service: Endoscopy;  Laterality: N/A;     ENDOSCOPIC ULTRASOUND OF UPPER GASTROINTESTINAL TRACT N/A 7/1/2022    Procedure: ULTRASOUND, UPPER GI TRACT, ENDOSCOPIC;  Surgeon: Donavon Jewell III, MD;  Location: [facility] ENDO;  Service: Endoscopy;  Laterality: N/A;    ENDOSCOPIC ULTRASOUND OF UPPER GASTROINTESTINAL TRACT N/A 11/29/2022    Procedure: ULTRASOUND, UPPER GI TRACT, ENDOSCOPIC;  Surgeon: Donavon Jewell III, MD;  Location: [facility] ENDO;  Service: Endoscopy;  Laterality: N/A;    ENDOSCOPIC ULTRASOUND OF UPPER GASTROINTESTINAL TRACT N/A 1/3/2023    Procedure: ULTRASOUND, UPPER GI TRACT, ENDOSCOPIC;  Surgeon: Donavon Jewell III, MD;  Location: [facility] ENDO;  Service: Endoscopy;  Laterality: N/A;    ERCP N/A 12/30/2022    Procedure: ERCP (ENDOSCOPIC RETROGRADE CHOLANGIOPANCREATOGRAPHY);  Surgeon: Donavon Jewell III, MD;  Location: [facility] ENDO;  Service: Endoscopy;  Laterality: N/A;    ERCP N/A 1/24/2023    Procedure: ERCP (ENDOSCOPIC RETROGRADE CHOLANGIOPANCREATOGRAPHY);  Surgeon: Rock Martines MD;  Location: [facility] ENDO ([facility]);  Service: Endoscopy;  Laterality: N/A;    ESOPHAGOGASTRODUODENOSCOPY N/A 2/26/2021    Procedure: EGD (ESOPHAGOGASTRODUODENOSCOPY);  Surgeon: Amy Sidhu MD;  Location: [facility] ENDO;  Service: Endoscopy;  Laterality: N/A;    LAPAROSCOPIC CHOLECYSTECTOMY N/A 7/27/2022    Procedure: CHOLECYSTECTOMY, LAPAROSCOPIC;  Surgeon: Ramón Hwang III, MD;  Location: [facility] OR;  Service: General;  Laterality: N/A;    TUBAL LIGATION         Time Tracking:     OT Date of Treatment: 01/25/23  OT Start Time: 0735  OT Stop Time: 0805  OT Total Time (min): 30 min    Billable Minutes:Evaluation 15  Self Care/Home Management 15    1/25/2023

## 2023-01-25 NOTE — HOSPITAL COURSE
Patient transferred to [facility] Medicine from OSH for AES evaluation of suspected pancreatic duct leak and intraabdominal infection. IR sample of peritoneal fluid obtained, consistent with peritonitis on our review of the cell count. IV CTX dose increased. Cultures pending. Will transition to PO abx and treat for 14d course. Received 1u pRBC for anemia prior to AES procedure. ERCP performed by AES 1/24 with placement of stent over pancreatic duct leak.Tolerated full diet shortly thereafter procedure with resolution of her abdominal pain. H/H appropriately responded following 1u pRBC prior to procedure and found to be stable following procedure. Resumed home eliquis evening 1/25. Being treated for multiple DVT found in E September 2022 for 6 months. Transitioned patient to PO cipro to complete 7 day treatment course from date of source control for peritonitis with secondary cause. GI recommending CTAP in 4 weeks and no prophylactic antibiotics. Tolerating full diet and progressing with PT/OT, recommending home health PT/OT. Patient medically stable for discharge home with home health PT/OT 1/27. Prior to discharge she received the remaining 4 days of PO antibiotics and rolling walker for use at home via bedside delivery. Upon discharge she was instructed to follow up with her PCP and GI along with follow up imaging that has been scheduled in about 4 weeks per GI.

## 2023-01-25 NOTE — PT/OT/SLP EVAL
"Physical Therapy Evaluation    Patient Name:  Cliare Bowser   MRN:  [id number]    Recommendations:     Discharge Recommendations: home health PT (with fam assist)   Discharge Equipment Recommendations: walker, rolling   Barriers to discharge: Pt currently requiring increased level of assistance with mobility    Assessment:     Claire Bowser is a 62 y.o. female admitted with a medical diagnosis of Pancreatic ascites.  She presents with the following impairments/functional limitations: impaired endurance, weakness, impaired functional mobility, gait instability, impaired balance, decreased lower extremity function, edema. AAOx4, pleasant and cooperative, follows simple commands. Pt presented with unsteadiness without AD, however with RW pt amb with no LOB and improved overall steadiness. Pt will benefit from skilled PT services while in house in order to address the aforementioned deficits.    Rehab Prognosis: Good; patient would benefit from acute skilled PT services to address these deficits and reach maximum level of function.    Recent Surgery: Procedure(s) (LRB):  ERCP (ENDOSCOPIC RETROGRADE CHOLANGIOPANCREATOGRAPHY) (N/A) 1 Day Post-Op    Plan:     During this hospitalization, patient to be seen 3 x/week to address the identified rehab impairments via gait training, therapeutic activities, therapeutic exercises, neuromuscular re-education and progress toward the following goals:    Plan of Care Expires:  02/24/23    Subjective     "I want to get back to bed"    Pain/Comfort:  Pain Rating 1: 0/10  Pain Rating Post-Intervention 1: 0/10    Patients cultural, spiritual, Christianity conflicts given the current situation: no    Living Environment:  Per EMR, Pt. Resides with spouse in ss house with threshold step to enter. Pt. Has a tube shower with a stool and no grab bar.   Prior to admission, patients level of function was independent.  Equipment used at home:  (shower stool).  DME owned (not currently used): none.  " Upon discharge, patient will have assistance from spouse.    Objective:     Communicated with RN prior to session.  Patient found up in chair with PureWick, telemetry, pulse ox (continuous)  upon PT entry to room.    General Precautions: Standard, fall  Orthopedic Precautions:N/A   Braces: N/A  Respiratory Status: Room air    Exams:  Sensation:    -       Intact  RLE ROM: WFL  RLE Strength: WFL  LLE ROM: WFL  LLE Strength: WFL    Functional Mobility:  Bed Mobility:     Scooting: stand by assistance  Sit to Supine: minimum assistance  Transfers:     Sit to Stand:  minimum assistance with hand-held assist  Chair to bed: minimum assistance with  hand-held assist  using  Step Transfer  Gait: Pt amb 20' RW SBA and presented with improved karen, B stride length, and overall steadiness.  Balance:   Good sitting balance  Fair standing balance RW      AM-PAC 6 CLICK MOBILITY  Total Score:17       Treatment & Education:  Discussed RW management, fall prevention, and safety  Discussed amb to restroom with RW and RN/staff outside of therapy services  Discussed sitting up EOB and/or UIC with RW and RN/staff outside of therapy services    Educated pt on PT role/POC  Educated pt on importance of OOB activity and daily ambulation   Pt educated on proper body mechanics, safety techniques, and energy conservation with PT facilitation and cueing throughout session   Pt verbalized understanding      Patient left with bed in chair position with all lines intact, call button in reach, RN notified, and spouse present.    GOALS:   Multidisciplinary Problems       Physical Therapy Goals          Problem: Physical Therapy    Goal Priority Disciplines Outcome Goal Variances Interventions   Physical Therapy Goal     PT, PT/OT Ongoing, Progressing     Description: Goals to be met by: 2023     Patient will increase functional independence with mobility by performin. Supine to sit with [place]  2. Sit to supine with  [place]  3. Sit to stand transfer with Supervision  4. Bed to chair transfer with Supervision using LRAD  5. Gait  x 100 feet with Supervision using LRAD.                          History:     Past Medical History:   Diagnosis Date    Acute biliary pancreatitis 6/14/2022    Anxiety     Chronic pancreatitis 1/2/2023    Digestive disorder     Flu 02/2017    [facility]    Hypertension     Long-term use of immunosuppressant medication 6/10/2022    Rheumatoid arthritis involving multiple sites with positive rheumatoid factor 01/14/2021       Past Surgical History:   Procedure Laterality Date    COLONOSCOPY N/A 2/26/2021    Procedure: COLONOSCOPY;  Surgeon: Amy Sidhu MD;  Location: [facility];  Service: Endoscopy;  Laterality: N/A;    ENDOSCOPIC ULTRASOUND OF UPPER GASTROINTESTINAL TRACT N/A 7/1/2022    Procedure: ULTRASOUND, UPPER GI TRACT, ENDOSCOPIC;  Surgeon: Donavon Jewell III, MD;  Location: [facility];  Service: Endoscopy;  Laterality: N/A;    ENDOSCOPIC ULTRASOUND OF UPPER GASTROINTESTINAL TRACT N/A 11/29/2022    Procedure: ULTRASOUND, UPPER GI TRACT, ENDOSCOPIC;  Surgeon: Donavon Jewell III, MD;  Location: [facility];  Service: Endoscopy;  Laterality: N/A;    ENDOSCOPIC ULTRASOUND OF UPPER GASTROINTESTINAL TRACT N/A 1/3/2023    Procedure: ULTRASOUND, UPPER GI TRACT, ENDOSCOPIC;  Surgeon: Donavon Jewell III, MD;  Location: [facility];  Service: Endoscopy;  Laterality: N/A;    ERCP N/A 12/30/2022    Procedure: ERCP (ENDOSCOPIC RETROGRADE CHOLANGIOPANCREATOGRAPHY);  Surgeon: Donavon Jewell III, MD;  Location: [facility];  Service: Endoscopy;  Laterality: N/A;    ERCP N/A 1/24/2023    Procedure: ERCP (ENDOSCOPIC RETROGRADE CHOLANGIOPANCREATOGRAPHY);  Surgeon: Rock Martines MD;  Location: [address]);  Service: Endoscopy;  Laterality: N/A;    ESOPHAGOGASTRODUODENOSCOPY N/A 2/26/2021    Procedure: EGD (ESOPHAGOGASTRODUODENOSCOPY);  Surgeon: Amy Sidhu MD;   Location: [facility];  Service: Endoscopy;  Laterality: N/A;    LAPAROSCOPIC CHOLECYSTECTOMY N/A 7/27/2022    Procedure: CHOLECYSTECTOMY, LAPAROSCOPIC;  Surgeon: Ramón Hwang III, MD;  Location: [facility];  Service: General;  Laterality: N/A;    TUBAL LIGATION         Time Tracking:     PT Received On: 01/25/23  PT Start Time: 1026     PT Stop Time: 1049  PT Total Time (min): 23 min     Billable Minutes: Evaluation 10 and Therapeutic Activity 13      01/25/2023

## 2023-01-26 PROBLEM — Z72.0 TOBACCO USE: Chronic | Status: ACTIVE | Noted: 2018-08-10

## 2023-01-26 PROBLEM — M05.79 RHEUMATOID ARTHRITIS INVOLVING MULTIPLE SITES WITH POSITIVE RHEUMATOID FACTOR: Chronic | Status: ACTIVE | Noted: 2021-01-14

## 2023-01-26 PROBLEM — Z86.718 HISTORY OF DVT OF LOWER EXTREMITY: Chronic | Status: ACTIVE | Noted: 2023-01-23

## 2023-01-26 PROBLEM — E43 SEVERE PROTEIN-CALORIE MALNUTRITION: Chronic | Status: ACTIVE | Noted: 2022-06-05

## 2023-01-26 PROBLEM — F41.1 GENERALIZED ANXIETY DISORDER: Chronic | Status: ACTIVE | Noted: 2018-08-10

## 2023-01-26 PROBLEM — R10.9 ABDOMINAL PAIN: Chronic | Status: ACTIVE | Noted: 2023-01-02

## 2023-01-26 PROBLEM — R63.4 WEIGHT LOSS, UNINTENTIONAL: Chronic | Status: ACTIVE | Noted: 2022-10-18

## 2023-01-26 LAB
ALBUMIN SERPL BCP-MCNC: 0.9 G/DL (ref 3.5–5.2)
ALP SERPL-CCNC: 133 U/L (ref 55–135)
ALT SERPL W/O P-5'-P-CCNC: 8 U/L (ref 10–44)
ANION GAP SERPL CALC-SCNC: 7 MMOL/L (ref 8–16)
APTT BLDCRRT: 35.1 SEC (ref 21–32)
AST SERPL-CCNC: 17 U/L (ref 10–40)
BACTERIA SPEC AEROBE CULT: NO GROWTH
BASOPHILS # BLD AUTO: 0.07 K/UL (ref 0–0.2)
BASOPHILS NFR BLD: 0.4 % (ref 0–1.9)
BILIRUB SERPL-MCNC: 0.3 MG/DL (ref 0.1–1)
BUN SERPL-MCNC: 13 MG/DL (ref 8–23)
CALCIUM SERPL-MCNC: 8.8 MG/DL (ref 8.7–10.5)
CHLORIDE SERPL-SCNC: 105 MMOL/L (ref 95–110)
CO2 SERPL-SCNC: 22 MMOL/L (ref 23–29)
CREAT SERPL-MCNC: 0.5 MG/DL (ref 0.5–1.4)
DIFFERENTIAL METHOD: ABNORMAL
EOSINOPHIL # BLD AUTO: 0.1 K/UL (ref 0–0.5)
EOSINOPHIL NFR BLD: 0.5 % (ref 0–8)
ERYTHROCYTE [DISTWIDTH] IN BLOOD BY AUTOMATED COUNT: 16.4 % (ref 11.5–14.5)
EST. GFR  (NO RACE VARIABLE): >60 ML/MIN/1.73 M^2
GLUCOSE SERPL-MCNC: 92 MG/DL (ref 70–110)
HCT VFR BLD AUTO: 27.3 % (ref 37–48.5)
HGB BLD-MCNC: 8.7 G/DL (ref 12–16)
IMM GRANULOCYTES # BLD AUTO: 0.18 K/UL (ref 0–0.04)
IMM GRANULOCYTES NFR BLD AUTO: 1 % (ref 0–0.5)
INR PPP: 1.2 (ref 0.8–1.2)
LYMPHOCYTES # BLD AUTO: 0.8 K/UL (ref 1–4.8)
LYMPHOCYTES NFR BLD: 4.1 % (ref 18–48)
MCH RBC QN AUTO: 30.5 PG (ref 27–31)
MCHC RBC AUTO-ENTMCNC: 31.9 G/DL (ref 32–36)
MCV RBC AUTO: 96 FL (ref 82–98)
MONOCYTES # BLD AUTO: 1.1 K/UL (ref 0.3–1)
MONOCYTES NFR BLD: 6.2 % (ref 4–15)
NEUTROPHILS # BLD AUTO: 16.1 K/UL (ref 1.8–7.7)
NEUTROPHILS NFR BLD: 87.8 % (ref 38–73)
NRBC BLD-RTO: 0 /100 WBC
PHOSPHATE SERPL-MCNC: 3 MG/DL (ref 2.7–4.5)
PLATELET # BLD AUTO: 378 K/UL (ref 150–450)
PMV BLD AUTO: 10.1 FL (ref 9.2–12.9)
POCT GLUCOSE: 101 MG/DL (ref 70–110)
POCT GLUCOSE: 105 MG/DL (ref 70–110)
POCT GLUCOSE: 69 MG/DL (ref 70–110)
POCT GLUCOSE: 95 MG/DL (ref 70–110)
POTASSIUM SERPL-SCNC: 3.8 MMOL/L (ref 3.5–5.1)
PROT SERPL-MCNC: 4.4 G/DL (ref 6–8.4)
PROTHROMBIN TIME: 12.4 SEC (ref 9–12.5)
RBC # BLD AUTO: 2.85 M/UL (ref 4–5.4)
SODIUM SERPL-SCNC: 134 MMOL/L (ref 136–145)
WBC # BLD AUTO: 18.35 K/UL (ref 3.9–12.7)

## 2023-01-26 PROCEDURE — 97530 THERAPEUTIC ACTIVITIES: CPT

## 2023-01-26 PROCEDURE — 97110 THERAPEUTIC EXERCISES: CPT

## 2023-01-26 PROCEDURE — S4991 NICOTINE PATCH NONLEGEND: HCPCS | Performed by: STUDENT IN AN ORGANIZED HEALTH CARE EDUCATION/TRAINING PROGRAM

## 2023-01-26 PROCEDURE — 97116 GAIT TRAINING THERAPY: CPT

## 2023-01-26 PROCEDURE — 99233 PR SUBSEQUENT HOSPITAL CARE,LEVL III: ICD-10-PCS | Mod: ,,, | Performed by: HOSPITALIST

## 2023-01-26 PROCEDURE — 85730 THROMBOPLASTIN TIME PARTIAL: CPT

## 2023-01-26 PROCEDURE — 93010 ELECTROCARDIOGRAM REPORT: CPT | Mod: ,,, | Performed by: INTERNAL MEDICINE

## 2023-01-26 PROCEDURE — 99232 SBSQ HOSP IP/OBS MODERATE 35: CPT | Mod: ,,,

## 2023-01-26 PROCEDURE — 25000003 PHARM REV CODE 250

## 2023-01-26 PROCEDURE — 80053 COMPREHEN METABOLIC PANEL: CPT | Performed by: STUDENT IN AN ORGANIZED HEALTH CARE EDUCATION/TRAINING PROGRAM

## 2023-01-26 PROCEDURE — 85610 PROTHROMBIN TIME: CPT

## 2023-01-26 PROCEDURE — 85025 COMPLETE CBC W/AUTO DIFF WBC: CPT | Performed by: STUDENT IN AN ORGANIZED HEALTH CARE EDUCATION/TRAINING PROGRAM

## 2023-01-26 PROCEDURE — 93010 EKG 12-LEAD: ICD-10-PCS | Mod: ,,, | Performed by: INTERNAL MEDICINE

## 2023-01-26 PROCEDURE — 94761 N-INVAS EAR/PLS OXIMETRY MLT: CPT

## 2023-01-26 PROCEDURE — 25000003 PHARM REV CODE 250: Performed by: STUDENT IN AN ORGANIZED HEALTH CARE EDUCATION/TRAINING PROGRAM

## 2023-01-26 PROCEDURE — 99233 SBSQ HOSP IP/OBS HIGH 50: CPT | Mod: ,,, | Performed by: HOSPITALIST

## 2023-01-26 PROCEDURE — 93005 ELECTROCARDIOGRAM TRACING: CPT

## 2023-01-26 PROCEDURE — 36415 COLL VENOUS BLD VENIPUNCTURE: CPT

## 2023-01-26 PROCEDURE — 20600001 HC STEP DOWN PRIVATE ROOM

## 2023-01-26 PROCEDURE — 99232 PR SUBSEQUENT HOSPITAL CARE,LEVL II: ICD-10-PCS | Mod: ,,,

## 2023-01-26 PROCEDURE — 84100 ASSAY OF PHOSPHORUS: CPT | Performed by: STUDENT IN AN ORGANIZED HEALTH CARE EDUCATION/TRAINING PROGRAM

## 2023-01-26 RX ORDER — POLYETHYLENE GLYCOL 3350 17 G/17G
17 POWDER, FOR SOLUTION ORAL DAILY
Status: DISCONTINUED | OUTPATIENT
Start: 2023-01-26 | End: 2023-01-27 | Stop reason: HOSPADM

## 2023-01-26 RX ORDER — CIPROFLOXACIN 500 MG/1
500 TABLET ORAL EVERY 12 HOURS
Status: DISCONTINUED | OUTPATIENT
Start: 2023-01-26 | End: 2023-01-27 | Stop reason: HOSPADM

## 2023-01-26 RX ORDER — SENNOSIDES 8.6 MG/1
8.6 TABLET ORAL DAILY
Status: DISCONTINUED | OUTPATIENT
Start: 2023-01-26 | End: 2023-01-27 | Stop reason: HOSPADM

## 2023-01-26 RX ADMIN — Medication 1 PATCH: at 09:01

## 2023-01-26 RX ADMIN — APIXABAN 5 MG: 5 TABLET, FILM COATED ORAL at 09:01

## 2023-01-26 RX ADMIN — TRAZODONE HYDROCHLORIDE 50 MG: 50 TABLET ORAL at 08:01

## 2023-01-26 RX ADMIN — FOLIC ACID 1 MG: 1 TABLET ORAL at 09:01

## 2023-01-26 RX ADMIN — PANTOPRAZOLE SODIUM 40 MG: 40 TABLET, DELAYED RELEASE ORAL at 05:01

## 2023-01-26 RX ADMIN — SERTRALINE HYDROCHLORIDE 50 MG: 25 TABLET ORAL at 08:01

## 2023-01-26 RX ADMIN — METOPROLOL SUCCINATE 25 MG: 25 TABLET, EXTENDED RELEASE ORAL at 09:01

## 2023-01-26 RX ADMIN — CIPROFLOXACIN HYDROCHLORIDE 500 MG: 500 TABLET, FILM COATED ORAL at 08:01

## 2023-01-26 RX ADMIN — SENNOSIDES 8.6 MG: 8.6 TABLET, FILM COATED ORAL at 09:01

## 2023-01-26 RX ADMIN — POLYETHYLENE GLYCOL 3350 17 G: 17 POWDER, FOR SOLUTION ORAL at 09:01

## 2023-01-26 RX ADMIN — PANCRELIPASE 2 CAPSULE: 60000; 12000; 38000 CAPSULE, DELAYED RELEASE PELLETS ORAL at 12:01

## 2023-01-26 RX ADMIN — MUPIROCIN: 20 OINTMENT TOPICAL at 09:01

## 2023-01-26 RX ADMIN — CIPROFLOXACIN HYDROCHLORIDE 500 MG: 500 TABLET, FILM COATED ORAL at 09:01

## 2023-01-26 RX ADMIN — CYCLOBENZAPRINE HYDROCHLORIDE 5 MG: 5 TABLET, FILM COATED ORAL at 08:01

## 2023-01-26 RX ADMIN — APIXABAN 5 MG: 5 TABLET, FILM COATED ORAL at 08:01

## 2023-01-26 RX ADMIN — PANCRELIPASE 2 CAPSULE: 60000; 12000; 38000 CAPSULE, DELAYED RELEASE PELLETS ORAL at 04:01

## 2023-01-26 RX ADMIN — PANCRELIPASE 2 CAPSULE: 60000; 12000; 38000 CAPSULE, DELAYED RELEASE PELLETS ORAL at 09:01

## 2023-01-26 NOTE — SUBJECTIVE & OBJECTIVE
Interval History:   Doing well, sitting up in her chair this morning. Able to work with PT and OT. Using walker to ambulate. No abdominal pain, N/V overnight. Tolerating diet well. Has not had a BM in several days. Will continue with miralax and suppository today. Minor drop in Hgb after resuming eliquis.      Review of Systems   Constitutional:  Negative for chills and fever.   Respiratory:  Negative for shortness of breath.    Cardiovascular:  Negative for chest pain.   Gastrointestinal:  Negative for abdominal pain, diarrhea, nausea and vomiting.   Neurological:  Negative for dizziness and light-headedness.   Objective:     Vital Signs (Most Recent):  Temp: 97.9 °F (36.6 °C) (01/26/23 0045)  Pulse: 81 (01/26/23 0410)  Resp: 18 (01/25/23 2000)  BP: (!) 103/55 (01/26/23 0410)  SpO2: 97 % (01/26/23 0410)   Vital Signs (24h Range):  Temp:  [97.6 °F (36.4 °C)-98.5 °F (36.9 °C)] 97.9 °F (36.6 °C)  Pulse:  [77-99] 81  Resp:  [18-20] 18  SpO2:  [96 %-98 %] 97 %  BP: ()/(50-70) 103/55     Weight: 53.8 kg (118 lb 9.7 oz)  Body mass index is 18.03 kg/m².    Intake/Output Summary (Last 24 hours) at 1/26/2023 0825  Last data filed at 1/25/2023 1245  Gross per 24 hour   Intake --   Output 400 ml   Net -400 ml      Physical Exam  Vitals reviewed.   Constitutional:       General: She is not in acute distress.     Appearance: She is ill-appearing.      Comments: Cachetic, chronically ill appearing.   Eyes:      Extraocular Movements: Extraocular movements intact.      Pupils: Pupils are equal, round, and reactive to light.   Cardiovascular:      Rate and Rhythm: Normal rate.   Pulmonary:      Effort: Pulmonary effort is normal. No respiratory distress.   Abdominal:      General: There is no distension.      Palpations: Abdomen is soft.      Tenderness: There is no abdominal tenderness. There is no guarding or rebound.   Musculoskeletal:         General: Normal range of motion.      Right lower leg: No edema.      Left lower  leg: No edema.   Skin:     Findings: No rash.   Neurological:      Mental Status: She is alert and oriented to person, place, and time. Mental status is at baseline.   Psychiatric:         Mood and Affect: Mood normal.         Behavior: Behavior normal.         Thought Content: Thought content normal.         Judgment: Judgment normal.       Significant Labs: All pertinent labs within the past 24 hours have been reviewed.    Significant Imaging: I have reviewed all pertinent imaging results/findings within the past 24 hours.

## 2023-01-26 NOTE — PT/OT/SLP PROGRESS
"Occupational Therapy   Treatment    Name: Claire Bowser  MRN: [id number]  Admitting Diagnosis:  Pancreatic ascites  2 Days Post-Op    Recommendations:     Discharge Recommendations: home health OT, other (see comments) (with family assist)  Discharge Equipment Recommendations:  bath bench, walker, rolling  Barriers to discharge:  None    Assessment:     Claire Bowser is a 62 y.o. female with a medical diagnosis of Pancreatic ascites.  She presents with the following performance deficits affecting function are weakness, impaired endurance, impaired self care skills, impaired balance, impaired cardiopulmonary response to activity, decreased ROM, decreased lower extremity function, pain, gait instability, impaired functional mobility. Patient participated very well with therapy, improved level of mobility and benefits from increased time and cues to complete without physical assist to complete bed mobility; will continue to benefit from therapy, patient's spouse present and with good carryover of safety; will continue to benefit.     Rehab Prognosis:  Good; patient would benefit from acute skilled OT services to address these deficits and reach maximum level of function.       Plan:     Patient to be seen 3 x/week to address the above listed problems via self-care/home management, therapeutic activities, therapeutic exercises, neuromuscular re-education  Plan of Care Expires: 02/24/23  Plan of Care Reviewed with: patient, spouse    Subjective     "I do feel better knowing I can do this."    Pain/Comfort:  Pain Rating 1: 0/10  Pain Rating Post-Intervention 1: 0/10    Objective:   Communicated with: Nursing prior to session.  Patient found supine with pulse ox (continuous), peripheral IV, telemetry, PureWick upon OT entry to room.    General Precautions: Standard, fall    Orthopedic Precautions:N/A  Braces: N/A  Respiratory Status: Room air     Occupational Performance:     Bed Mobility:    Patient completed " Rolling/Turning to Left with  stand by assistance  Patient completed Scooting/Bridging with contact guard assistance  Patient completed Supine to Sit with contact guard assistance     Functional Mobility/Transfers:  Patient completed Sit <> Stand Transfer with contact guard assistance  with  hand-held assist   Patient completed Bed <> Chair Transfer using Step Transfer technique with contact guard assistance with rolling walker  Functional Mobility:     Activities of Daily Living:  Grooming: stand by assistance seated edge of bed  Upper Body Dressing: stand by assistance seated in bedside chair  Lower Body Dressing: contact guard assistance seated edge of bed     [facility] 6 Click ADL: 19    Treatment & Education:  -Patient and family educated on roles/goals of OT and POC.  -White board updated.  -Therapist provided time for questions and answered within scope of practice.  -Patient educated on importance of EOB/OOB activity to maximize recovery.     Patient left up in chair with all lines intact and call button in reach    GOALS:   Multidisciplinary Problems       Occupational Therapy Goals          Problem: Occupational Therapy    Goal Priority Disciplines Outcome Interventions   Occupational Therapy Goal     OT, PT/OT Ongoing, Progressing    Description: Goals to be met by: 02-04-23     Patient will increase functional independence with ADLs by performing:    UE Dressing with Set-up Assistance.  LE Dressing with Stand-by Assistance.  Grooming while standing at sink with Stand-by Assistance.  Toileting from toilet with Stand-by Assistance for hygiene and clothing management.   Supine to sit with [place].  Stand pivot transfers with Stand-by Assistance.  Toilet transfer to toilet with Stand-by Assistance.  Pt. To be I with HEP to BUE to improve level of endurance                         Time Tracking:     OT Date of Treatment: 01/26/23  OT Start Time: 1256  OT Stop Time: 1321  OT Total Time (min): 25  min    Billable Minutes:Therapeutic Activity 10  Therapeutic Exercise 15               1/26/2023

## 2023-01-26 NOTE — ASSESSMENT & PLAN NOTE
2 days s/p ERCP for PD leak with placement of plastic stent into ventral PD. Subsequently, patient's abdominal pain, nausea, and appetite have markedly improved.     Recommendations:  - Diet as tolerated  - CT A/P pancreas protocol in 4 weeks to evaluate healing of PD leak  - Repeat ERCP in approximately 6 weeks with Dr. Martines  - 7 day course of antibiotics  - CBC and CMP daily while hospitalized

## 2023-01-26 NOTE — PROGRESS NOTES
[facility] ([place] [address])  Advanced Endoscopy   Progress Note    Patient Name: Claire Bowser  MRN: [id number]  Admission Date: 1/22/2023  Hospital Length of Stay: 4 days  Code Status: Full Code   Attending Provider: Aniya Costa MD  Consulting Provider: Jenelle Douglas NP  Primary Care Physician: Samuel Brady MD  Principal Problem: Pancreatic ascites      Subjective:     Interval History: 2 days s/p ERCP for PD leak with placement of stent into ventral PD. No adverse events overnight. Patient denies abdominal pain, n/v/d, hematochezia, abdominal hematemesis. Patient is tolerating PO intake without post-prandial discomfort and reports significant improvement of appetite. Afebrile and HDS overnight.     Review of Systems   Constitutional:  Negative for chills, diaphoresis and fever.   HENT:  Negative for trouble swallowing and voice change.    Eyes:  Negative for discharge and redness.   Gastrointestinal:  Positive for abdominal distention. Negative for abdominal pain, blood in stool, diarrhea, nausea and vomiting.   Skin:  Negative for color change and pallor.   Neurological:  Negative for syncope and speech difficulty.   Objective:     Vital Signs (Most Recent):  Temp: 97.9 °F (36.6 °C) (01/26/23 0045)  Pulse: 81 (01/26/23 0410)  Resp: 18 (01/25/23 2000)  BP: (!) 110/55 (01/26/23 0928)  SpO2: 97 % (01/26/23 0410) Vital Signs (24h Range):  Temp:  [97.7 °F (36.5 °C)-98.5 °F (36.9 °C)] 97.9 °F (36.6 °C)  Pulse:  [77-99] 81  Resp:  [18-20] 18  SpO2:  [96 %-98 %] 97 %  BP: ()/(50-58) 110/55     Weight: 53.8 kg (118 lb 9.7 oz) (01/23/23 1245)  Body mass index is 18.03 kg/m².      Intake/Output Summary (Last 24 hours) at 1/26/2023 0959  Last data filed at 1/25/2023 1245  Gross per 24 hour   Intake --   Output 400 ml   Net -400 ml         Lines/Drains/Airways       Peripheral Intravenous Line  Duration                  Midline Catheter Insertion/Assessment  - Single Lumen 01/20/23 1430 Right  basilic vein (medial side of arm) 20g x 8cm 5 days                    Physical Exam  Vitals and nursing note reviewed.   Constitutional:       General: She is not in acute distress.     Appearance: She is underweight. She is not diaphoretic.   Eyes:      General: No scleral icterus.        Right eye: No discharge.         Left eye: No discharge.   Abdominal:      General: Abdomen is protuberant. Bowel sounds are normal. There is distension.      Tenderness: There is no abdominal tenderness. There is no guarding or rebound.   Skin:     General: Skin is warm and dry.      Coloration: Skin is not jaundiced.   Neurological:      Mental Status: She is alert and oriented to person, place, and time.       Significant Labs:  CBC:   Recent Labs   Lab 01/25/23 0416 01/26/23 0417   WBC 25.19* 18.35*   HGB 10.1* 8.7*   HCT 31.6* 27.3*    378       CMP:   Recent Labs   Lab 01/26/23 0417   GLU 92   CALCIUM 8.8   ALBUMIN 0.9*   PROT 4.4*   *   K 3.8   CO2 22*      BUN 13   CREATININE 0.5   ALKPHOS 133   ALT 8*   AST 17   BILITOT 0.3       Coagulation:   Recent Labs   Lab 01/26/23 0417   INR 1.2   APTT 35.1*           Significant Imaging:  Imaging results within the past 24 hours have been reviewed.    Assessment/Plan:     * Pancreatic ascites  2 days s/p ERCP for PD leak with placement of plastic stent into ventral PD. Subsequently, patient's abdominal pain, nausea, and appetite have markedly improved.     Recommendations:  - Diet as tolerated  - CT A/P pancreas protocol in 4 weeks to evaluate healing of PD leak  - Repeat ERCP in approximately 6 weeks with Dr. Martines  - 7 day course of antibiotics  - CBC and CMP daily while hospitalized           Thank you for your consult. After careful consideration and discussion with Dr. Martines, [facility] will sign off on this patient. Please contact us for any further questions or concerns.     Jenelle Douglas NP  Gastroenterology  [facility] ([facility]

## 2023-01-26 NOTE — SUBJECTIVE & OBJECTIVE
Subjective:     Interval History: 2 days s/p ERCP for PD leak with placement of stent into ventral PD. No adverse events overnight. Patient denies abdominal pain, n/v/d, hematochezia, abdominal hematemesis. Patient is tolerating PO intake without post-prandial discomfort and reports significant improvement of appetite. Afebrile and HDS overnight.     Review of Systems   Constitutional:  Negative for chills, diaphoresis and fever.   HENT:  Negative for trouble swallowing and voice change.    Eyes:  Negative for discharge and redness.   Gastrointestinal:  Positive for abdominal distention. Negative for abdominal pain, blood in stool, diarrhea, nausea and vomiting.   Skin:  Negative for color change and pallor.   Neurological:  Negative for syncope and speech difficulty.   Objective:     Vital Signs (Most Recent):  Temp: 97.9 °F (36.6 °C) (01/26/23 0045)  Pulse: 81 (01/26/23 0410)  Resp: 18 (01/25/23 2000)  BP: (!) 110/55 (01/26/23 0928)  SpO2: 97 % (01/26/23 0410) Vital Signs (24h Range):  Temp:  [97.7 °F (36.5 °C)-98.5 °F (36.9 °C)] 97.9 °F (36.6 °C)  Pulse:  [77-99] 81  Resp:  [18-20] 18  SpO2:  [96 %-98 %] 97 %  BP: ()/(50-58) 110/55     Weight: 53.8 kg (118 lb 9.7 oz) (01/23/23 1245)  Body mass index is 18.03 kg/m².      Intake/Output Summary (Last 24 hours) at 1/26/2023 0957  Last data filed at 1/25/2023 1245  Gross per 24 hour   Intake --   Output 400 ml   Net -400 ml         Lines/Drains/Airways       Peripheral Intravenous Line  Duration                  Midline Catheter Insertion/Assessment  - Single Lumen 01/20/23 1430 Right basilic vein (medial side of arm) 20g x 8cm 5 days                    Physical Exam  Vitals and nursing note reviewed.   Constitutional:       General: She is not in acute distress.     Appearance: She is underweight. She is not diaphoretic.   Eyes:      General: No scleral icterus.        Right eye: No discharge.         Left eye: No discharge.   Abdominal:      General: Abdomen  is protuberant. Bowel sounds are normal. There is distension.      Tenderness: There is no abdominal tenderness. There is no guarding or rebound.   Skin:     General: Skin is warm and dry.      Coloration: Skin is not jaundiced.   Neurological:      Mental Status: She is alert and oriented to person, place, and time.       Significant Labs:  CBC:   Recent Labs   Lab 01/25/23 0416 01/26/23 0417   WBC 25.19* 18.35*   HGB 10.1* 8.7*   HCT 31.6* 27.3*    378       CMP:   Recent Labs   Lab 01/26/23 0417   GLU 92   CALCIUM 8.8   ALBUMIN 0.9*   PROT 4.4*   *   K 3.8   CO2 22*      BUN 13   CREATININE 0.5   ALKPHOS 133   ALT 8*   AST 17   BILITOT 0.3       Coagulation:   Recent Labs   Lab 01/26/23 0417   INR 1.2   APTT 35.1*           Significant Imaging:  Imaging results within the past 24 hours have been reviewed.

## 2023-01-26 NOTE — PROGRESS NOTES
[facility] ([address] Medicine  Progress Note    Patient Name: Claire Bowser  MRN: [id number]  Patient Class: IP- Inpatient   Admission Date: 1/22/2023  Length of Stay: 4 days  Attending Physician: Aniya Costa MD  Primary Care Provider: Samuel Brady MD        Subjective:     Principal Problem:Pancreatic ascites        HPI:  Ms. Bowser is a 62 y.o F w/ hx of rheumatoid arthritis, chronic pancreatitis, pancreatic pseudocyst, pancreatic sphincterectomy and stent placement, cholecystectomy, severe protein calorie malnutrition, DVT, tobacco use who initially presented to [facility] for evaluation of worsening abdominal pain, nausea and vomiting.  She has had multiple recent admissions in the past few months due to similar symptoms associated with her chronic pancreatitis.  She reports abdominal pain with any oral intake and has had very poor appetite mostly limited to liquids including soup and Jell-O.  Reports around 45 lbs weight loss over the past 6 months.  She has been evaluated in the past year with biopsy for a suspicious mass at the neck of pancreas with workup negative for malignancy at the time.       OSH course notable for imaging with concerns for large volume ascites, small bilateral pleural effusions, unchanged multiloculated fluid collections in the left hemiabdomen. She underwent paracentesis[01/18] with 2.2 L of fluid removal, and ascites fluid was concerning for complex appearance with septations, changed from prior. She completed a course of broad-spectrum antibiotics(vancomycin, cefepime).     She was transferred for [facility] for  AES evaluation for:  Pancreatic ascites with concern for pancreatic duct leak and infection.      Overview/Hospital Course:  Patient transferred to [facility] from OSH for AES evaluation of suspected pancreatic duct leak and intraabdominal infection. IR sample of peritoneal fluid obtained, consistent with peritonitis on our  review of the cell count. IV CTX dose increased. Cultures pending. Will transition to PO abx and treat for 14d course. Received 1u pRBC for anemia prior to AES procedure. ERCP performed by JG 1/24 with placement of stent over pancreatic duct leak.Tolerated full diet shortly thereafter procedure with resolution of her abdominal pain. H/H appropriately responded following 1u pRBC prior to procedure and found to be stable following procedure. Resumed home eliquis evening 1/25. Being treated for multiple DVT found in LLE September 2022 for 6 months. Transitioned patient to PO cipro to complete 5 day treatment course from date of source control for peritonitis with secondary cause. GI recommending ERCP in 6 weeks and no prophylactic antibiotics. Tolerating full diet and progressing with PT/OT, recommending home health PT/OT.       Interval History:   Doing well, sitting up in her chair this morning. Able to work with PT and OT. Using walker to ambulate. No abdominal pain, N/V overnight. Tolerating diet well. Has not had a BM in several days. Will continue with miralax and suppository today. Minor drop in Hgb after resuming eliquis.      Review of Systems   Constitutional:  Negative for chills and fever.   Respiratory:  Negative for shortness of breath.    Cardiovascular:  Negative for chest pain.   Gastrointestinal:  Negative for abdominal pain, diarrhea, nausea and vomiting.   Neurological:  Negative for dizziness and light-headedness.   Objective:     Vital Signs (Most Recent):  Temp: 97.9 °F (36.6 °C) (01/26/23 0045)  Pulse: 81 (01/26/23 0410)  Resp: 18 (01/25/23 2000)  BP: (!) 103/55 (01/26/23 0410)  SpO2: 97 % (01/26/23 0410)   Vital Signs (24h Range):  Temp:  [97.6 °F (36.4 °C)-98.5 °F (36.9 °C)] 97.9 °F (36.6 °C)  Pulse:  [77-99] 81  Resp:  [18-20] 18  SpO2:  [96 %-98 %] 97 %  BP: ()/(50-70) 103/55     Weight: 53.8 kg (118 lb 9.7 oz)  Body mass index is 18.03 kg/m².    Intake/Output Summary (Last 24 hours)  at 1/26/2023 0825  Last data filed at 1/25/2023 1245  Gross per 24 hour   Intake --   Output 400 ml   Net -400 ml      Physical Exam  Vitals reviewed.   Constitutional:       General: She is not in acute distress.     Appearance: She is ill-appearing.      Comments: Cachetic, chronically ill appearing.   Eyes:      Extraocular Movements: Extraocular movements intact.      Pupils: Pupils are equal, round, and reactive to light.   Cardiovascular:      Rate and Rhythm: Normal rate.   Pulmonary:      Effort: Pulmonary effort is normal. No respiratory distress.   Abdominal:      General: There is no distension.      Palpations: Abdomen is soft.      Tenderness: There is no abdominal tenderness. There is no guarding or rebound.   Musculoskeletal:         General: Normal range of motion.      Right lower leg: No edema.      Left lower leg: No edema.   Skin:     Findings: No rash.   Neurological:      Mental Status: She is alert and oriented to person, place, and time. Mental status is at baseline.   Psychiatric:         Mood and Affect: Mood normal.         Behavior: Behavior normal.         Thought Content: Thought content normal.         Judgment: Judgment normal.       Significant Labs: All pertinent labs within the past 24 hours have been reviewed.    Significant Imaging: I have reviewed all pertinent imaging results/findings within the past 24 hours.      Assessment/Plan:      * Pancreatic ascites  62 y.o F w/ hx of rheumatoid arthritis, chronic pancreatitis, pancreatic pseudocyst, cholecystectomy and severe protein calorie malnutrition, DVT, tobacco use with multiple recent admissions in the past few months due to abdominal symptoms associated with her chronic pancreatitis    -Admitted to [facility] on 12/30: underwent ERCP with pancreatic sphincterotomy, stent placement and 1300 mL paracentesis.    -Readmitted on 01/02: imaging with concerns for multifocal rim enhancing fluid collections in the upper abdomen.Treated with  "broad-spectrum IV antibiotics, transitioned to oral antibiotics and discharged on 1/6  -Readmitted on 01/15: CT imaging with concerns for large volume ascites, small bilateral pleural effusions, unchanged multiloculated fluid collections in the left hemiabdomen.   She underwent paracentesis [01/18] with 2.2 L of fluid removal, and ascites fluid was concerning for complex appearance with septations, changed from prior. She completed a course of broad-spectrum antibiotics .     -Gastroenterology consult at OSH[01/16] noted concerns for Multiloculated fluid collections along with large volume ascites and  pancreatic duct leak on ERCP status post pancreatic sphincterotomy and stent placement. Per consult notes, If continues to have issues, may need distal pancreatectomy; Case discussed w/ surg onc and recommend endoscopic interventions for now to to see if surgery can be avoided kyle w/ degree of inflammation    -[place] [facility] [place] for:  Pancreatic ascites with concern for pancreatic duct leak and infection    PLAN   -AES consulted; s/p stent removal and placement of plastic stent in pancreatic duct on 1/24  -Para labs consistent with SBP, Ceftriaxone 1g increased to 2 g.   -Will transition to PO cipro for 5 day total course  -Para cultures pending   -Blood cx in process     Spontaneous bacterial peritonitis  Para labs consistent with SBP, on Ceftriaxone.     - Increase Ceftriaxone 1g to 2g q24 hours  - Transition to PO abx   - Blood cx in process      History of DVT of lower extremity  Imaging on 09/26/22 noted: Positive examination demonstrating left lower extremity DVT involving the popliteal, posterior tibial, anterior tibial, and peroneal veins.    PLAN  Home apixaban held for procedure; will resume evening dose 1/26  First DVT patient has ever had was 9/22 as above  At the time was advised to continue eliquis for 6 months     Abdominal pain  See "Pancreatic ascites" A&P    Weight loss, " "unintentional  Nutrition consulted. Most recent weight and BMI monitored-   -Reports 45 lbs. weight loss over the past 6 months due to poor oral intake from abdominal symptoms. Prior labs with CA 19-9 elevation, and she has been evaluated in the past year with biopsy for a suspicious mass at the neck of pancreas with workup negative for malignancy at the time.     Measurements:  Wt Readings from Last 1 Encounters:   01/23/23 53.8 kg (118 lb 9.7 oz)   Body mass index is 18.03 kg/m².    PLAN: Recommendation/Intervention: 1. Diet per MD. 2. TPN recommendations: 3. RD to monitor & follow-up.  Goals: Meet % EEN, EPN by RD f/u date  Patient was on Clinimix TPN at recent admission. Nutrition consulted for TPN recs.  Continue to encourage advancement of PO diet as tolerated    Severe protein-calorie malnutrition  See "Weight loss, unintentional" A&P    Rheumatoid arthritis involving multiple sites with positive rheumatoid factor  On Leflunomide and Prednisone for RA. Patient reports they were held recently by ID due to immunosuppression in the setting of concerns for infection.     PLAN  Followup infection rule out workup and resume as appropriate.    Tobacco use  Continue nicotine patch      Generalized anxiety disorder  Continue home trazodone, sertraline    Hypertension  Continue home metoprolol        VTE Risk Mitigation (From admission, onward)         Ordered     apixaban tablet 5 mg  2 times daily         01/25/23 0943     Place CARLOS hose  Until discontinued         01/23/23 1306     IP VTE HIGH RISK PATIENT  Once         01/22/23 2211     Place sequential compression device  Until discontinued         01/22/23 2211     Reason for No Pharmacological VTE Prophylaxis  Once        Question:  Reasons:  Answer:  Physician Provided (leave comment)  Comment:  Anticipated procedure    01/22/23 2211                Discharge Planning   RASHI: 1/26/2023     Code Status: Full Code   Is the patient medically ready for " discharge?: No    Reason for patient still in hospital (select all that apply): Patient trending condition and Pending disposition  Discharge Plan A: Home with family                  Bogdan Rubio DO  Department of Hospital Medicine   [facility] - Intensive Care ([place]-)

## 2023-01-26 NOTE — ASSESSMENT & PLAN NOTE
Para labs consistent with SBP, on Ceftriaxone.     - Increase Ceftriaxone 1g to 2g q24 hours  - Transition to PO abx   - Blood cx in process

## 2023-01-26 NOTE — PT/OT/SLP PROGRESS
Physical Therapy Treatment    Patient Name:  Claire Bowser   MRN:  [id number]    Recommendations:     Discharge Recommendations: home health PT (with fam assist)  Discharge Equipment Recommendations: walker, rolling, bath bench  Barriers to discharge: None    Assessment:     Claire Bowser is a 62 y.o. female admitted with a medical diagnosis of Pancreatic ascites.  She presents with the following impairments/functional limitations: weakness, impaired endurance, impaired functional mobility, gait instability, impaired balance, impaired cardiopulmonary response to activity. Pt amb in hallway RW SBA with fair karen and mild GALDAMEZ. Pt continues to benefit from skilled PT services while in house in order to address the aforementioned deficits.      Rehab Prognosis: Good; patient would benefit from acute skilled PT services to address these deficits and reach maximum level of function.    Recent Surgery: Procedure(s) (LRB):  ERCP (ENDOSCOPIC RETROGRADE CHOLANGIOPANCREATOGRAPHY) (N/A) 2 Days Post-Op    Plan:     During this hospitalization, patient to be seen 3 x/week to address the identified rehab impairments via gait training, therapeutic activities, therapeutic exercises, neuromuscular re-education and progress toward the following goals:    Plan of Care Expires:  02/24/23    Subjective     Pain/Comfort:  Pain Rating 1: 0/10  Pain Rating Post-Intervention 1: 0/10      Objective:     Communicated with RN prior to session.  Patient found HOB elevated with telemetry, pulse ox (continuous), peripheral IV upon PT entry to room.     General Precautions: Standard, fall  Orthopedic Precautions: N/A  Braces: N/A  Respiratory Status: Room air     Functional Mobility:  Bed Mobility:     Supine to Sit: contact guard assistance  Transfers:     Sit to Stand:    From elevated bed: [facility] CGA  From bedside chair: RW modA  Bed to Chair: stand by assistance with  rolling walker  using  Step Transfer  Gait: Pt amb 180' RW with brief standing  break at window. Pt presented with fair karen, slight trunk flexion, RW too anterior, and mild GALDAMEZ.  Balance:   Good sitting balance  Fair standing balance      AM-PAC 6 CLICK MOBILITY  Turning over in bed (including adjusting bedclothes, sheets and blankets)?: 3  Sitting down on and standing up from a chair with arms (e.g., wheelchair, bedside commode, etc.): 3  Moving from lying on back to sitting on the side of the bed?: 3  Moving to and from a bed to a chair (including a wheelchair)?: 3  Need to walk in hospital room?: 3  Climbing 3-5 steps with a railing?: 2  Basic Mobility Total Score: 17       Treatment & Education:  Discussed sitting upright in chair >1hr, pt agreeable  Discussed RW management, fall prevention, and safety  Discussed amb to restroom with RW and RN/staff outside of therapy services    Educated pt on PT role/POC  Educated pt on importance of OOB activity and daily ambulation   Pt educated on proper body mechanics, safety techniques, and energy conservation with PT facilitation and cueing throughout session   Pt verbalized understanding      Patient left up in chair with all lines intact, call button in reach, RN notified, and Spouse and OT present..    GOALS:   Multidisciplinary Problems       Physical Therapy Goals          Problem: Physical Therapy    Goal Priority Disciplines Outcome Goal Variances Interventions   Physical Therapy Goal     PT, PT/OT Ongoing, Progressing     Description: Goals to be met by: 2023     Patient will increase functional independence with mobility by performin. Supine to sit with [place]  2. Sit to supine with [place]  3. Sit to stand transfer with Supervision  4. Bed to chair transfer with Supervision using LRAD  5. Gait  x 100 feet with Supervision using LRAD.                          Time Tracking:     PT Received On: 23  PT Start Time: 1256     PT Stop Time: 1321  PT Total Time (min): 25 min     Billable Minutes: Gait Training  25    Treatment Type: Treatment  PT/PTA: PT           01/26/2023

## 2023-01-26 NOTE — ASSESSMENT & PLAN NOTE
62 y.o F w/ hx of rheumatoid arthritis, chronic pancreatitis, pancreatic pseudocyst, cholecystectomy and severe protein calorie malnutrition, DVT, tobacco use with multiple recent admissions in the past few months due to abdominal symptoms associated with her chronic pancreatitis    -Admitted to [facility] on 12/30: underwent ERCP with pancreatic sphincterotomy, stent placement and 1300 mL paracentesis.    -Readmitted on 01/02: imaging with concerns for multifocal rim enhancing fluid collections in the upper abdomen.Treated with broad-spectrum IV antibiotics, transitioned to oral antibiotics and discharged on 1/6  -Readmitted on 01/15: CT imaging with concerns for large volume ascites, small bilateral pleural effusions, unchanged multiloculated fluid collections in the left hemiabdomen.   She underwent paracentesis [01/18] with 2.2 L of fluid removal, and ascites fluid was concerning for complex appearance with septations, changed from prior. She completed a course of broad-spectrum antibiotics .     -Gastroenterology consult at OSH[01/16] noted concerns for Multiloculated fluid collections along with large volume ascites and  pancreatic duct leak on ERCP status post pancreatic sphincterotomy and stent placement. Per consult notes, If continues to have issues, may need distal pancreatectomy; Case discussed w/ surg onc and recommend endoscopic interventions for now to to see if surgery can be avoided kyle w/ degree of inflammation    -Transferred to [facility] for  AES evaluation for:  Pancreatic ascites with concern for pancreatic duct leak and infection    PLAN   -AES consulted; s/p stent removal and placement of plastic stent in pancreatic duct on 1/24  -Para labs consistent with SBP, Ceftriaxone 1g increased to 2 g.   -Will transition to PO cipro for 5 day total course  -Para cultures pending   -Blood cx in process

## 2023-01-27 ENCOUNTER — TELEPHONE (OUTPATIENT)
Dept: FAMILY MEDICINE | Facility: CLINIC | Age: 63
End: 2023-01-27
Payer: MEDICAID

## 2023-01-27 VITALS
DIASTOLIC BLOOD PRESSURE: 58 MMHG | WEIGHT: 118.63 LBS | HEART RATE: 75 BPM | SYSTOLIC BLOOD PRESSURE: 119 MMHG | RESPIRATION RATE: 18 BRPM | TEMPERATURE: 99 F | BODY MASS INDEX: 17.98 KG/M2 | HEIGHT: 68 IN | OXYGEN SATURATION: 98 %

## 2023-01-27 LAB
ALBUMIN SERPL BCP-MCNC: 1 G/DL (ref 3.5–5.2)
ALP SERPL-CCNC: 119 U/L (ref 55–135)
ALT SERPL W/O P-5'-P-CCNC: 12 U/L (ref 10–44)
ANION GAP SERPL CALC-SCNC: 6 MMOL/L (ref 8–16)
APTT BLDCRRT: 38.9 SEC (ref 21–32)
AST SERPL-CCNC: 17 U/L (ref 10–40)
BACTERIA SPEC ANAEROBE CULT: NORMAL
BASOPHILS # BLD AUTO: 0.12 K/UL (ref 0–0.2)
BASOPHILS NFR BLD: 0.9 % (ref 0–1.9)
BILIRUB SERPL-MCNC: 0.4 MG/DL (ref 0.1–1)
BUN SERPL-MCNC: 10 MG/DL (ref 8–23)
CALCIUM SERPL-MCNC: 8.8 MG/DL (ref 8.7–10.5)
CHLORIDE SERPL-SCNC: 105 MMOL/L (ref 95–110)
CO2 SERPL-SCNC: 20 MMOL/L (ref 23–29)
CREAT SERPL-MCNC: 0.5 MG/DL (ref 0.5–1.4)
DIFFERENTIAL METHOD: ABNORMAL
EOSINOPHIL # BLD AUTO: 0.1 K/UL (ref 0–0.5)
EOSINOPHIL NFR BLD: 0.9 % (ref 0–8)
ERYTHROCYTE [DISTWIDTH] IN BLOOD BY AUTOMATED COUNT: 16.2 % (ref 11.5–14.5)
EST. GFR  (NO RACE VARIABLE): >60 ML/MIN/1.73 M^2
GLUCOSE SERPL-MCNC: 72 MG/DL (ref 70–110)
HCT VFR BLD AUTO: 29.2 % (ref 37–48.5)
HGB BLD-MCNC: 8.9 G/DL (ref 12–16)
IMM GRANULOCYTES # BLD AUTO: 0.14 K/UL (ref 0–0.04)
IMM GRANULOCYTES NFR BLD AUTO: 1 % (ref 0–0.5)
INR PPP: 1.3 (ref 0.8–1.2)
LYMPHOCYTES # BLD AUTO: 0.7 K/UL (ref 1–4.8)
LYMPHOCYTES NFR BLD: 5.3 % (ref 18–48)
MCH RBC QN AUTO: 30.6 PG (ref 27–31)
MCHC RBC AUTO-ENTMCNC: 30.5 G/DL (ref 32–36)
MCV RBC AUTO: 100 FL (ref 82–98)
MONOCYTES # BLD AUTO: 0.9 K/UL (ref 0.3–1)
MONOCYTES NFR BLD: 6.5 % (ref 4–15)
NEUTROPHILS # BLD AUTO: 11.4 K/UL (ref 1.8–7.7)
NEUTROPHILS NFR BLD: 85.4 % (ref 38–73)
NRBC BLD-RTO: 0 /100 WBC
PHOSPHATE SERPL-MCNC: 2.9 MG/DL (ref 2.7–4.5)
PLATELET # BLD AUTO: 366 K/UL (ref 150–450)
PMV BLD AUTO: 10 FL (ref 9.2–12.9)
POCT GLUCOSE: 75 MG/DL (ref 70–110)
POCT GLUCOSE: 87 MG/DL (ref 70–110)
POTASSIUM SERPL-SCNC: 4 MMOL/L (ref 3.5–5.1)
PROT SERPL-MCNC: 4.5 G/DL (ref 6–8.4)
PROTHROMBIN TIME: 13.6 SEC (ref 9–12.5)
RBC # BLD AUTO: 2.91 M/UL (ref 4–5.4)
SODIUM SERPL-SCNC: 131 MMOL/L (ref 136–145)
WBC # BLD AUTO: 13.39 K/UL (ref 3.9–12.7)

## 2023-01-27 PROCEDURE — 99238 PR HOSPITAL DISCHARGE DAY,<30 MIN: ICD-10-PCS | Mod: ,,, | Performed by: HOSPITALIST

## 2023-01-27 PROCEDURE — 25000003 PHARM REV CODE 250: Performed by: STUDENT IN AN ORGANIZED HEALTH CARE EDUCATION/TRAINING PROGRAM

## 2023-01-27 PROCEDURE — 80053 COMPREHEN METABOLIC PANEL: CPT | Performed by: STUDENT IN AN ORGANIZED HEALTH CARE EDUCATION/TRAINING PROGRAM

## 2023-01-27 PROCEDURE — 99238 HOSP IP/OBS DSCHRG MGMT 30/<: CPT | Mod: ,,, | Performed by: HOSPITALIST

## 2023-01-27 PROCEDURE — 84100 ASSAY OF PHOSPHORUS: CPT | Performed by: STUDENT IN AN ORGANIZED HEALTH CARE EDUCATION/TRAINING PROGRAM

## 2023-01-27 PROCEDURE — 25000003 PHARM REV CODE 250

## 2023-01-27 PROCEDURE — 85610 PROTHROMBIN TIME: CPT

## 2023-01-27 PROCEDURE — 85025 COMPLETE CBC W/AUTO DIFF WBC: CPT | Performed by: STUDENT IN AN ORGANIZED HEALTH CARE EDUCATION/TRAINING PROGRAM

## 2023-01-27 PROCEDURE — 85730 THROMBOPLASTIN TIME PARTIAL: CPT

## 2023-01-27 PROCEDURE — 36415 COLL VENOUS BLD VENIPUNCTURE: CPT

## 2023-01-27 RX ORDER — POLYETHYLENE GLYCOL 3350 17 G/17G
17 POWDER, FOR SOLUTION ORAL DAILY
Qty: 5 EACH | Refills: 0 | Status: SHIPPED | OUTPATIENT
Start: 2023-01-27 | End: 2023-02-01

## 2023-01-27 RX ORDER — CIPROFLOXACIN 500 MG/1
500 TABLET ORAL EVERY 12 HOURS
Qty: 8 TABLET | Refills: 0 | Status: SHIPPED | OUTPATIENT
Start: 2023-01-27 | End: 2023-01-31

## 2023-01-27 RX ADMIN — APIXABAN 5 MG: 5 TABLET, FILM COATED ORAL at 09:01

## 2023-01-27 RX ADMIN — MUPIROCIN: 20 OINTMENT TOPICAL at 09:01

## 2023-01-27 RX ADMIN — PANTOPRAZOLE SODIUM 40 MG: 40 TABLET, DELAYED RELEASE ORAL at 06:01

## 2023-01-27 RX ADMIN — METOPROLOL SUCCINATE 25 MG: 25 TABLET, EXTENDED RELEASE ORAL at 09:01

## 2023-01-27 RX ADMIN — CIPROFLOXACIN HYDROCHLORIDE 500 MG: 500 TABLET, FILM COATED ORAL at 09:01

## 2023-01-27 RX ADMIN — PANCRELIPASE 2 CAPSULE: 60000; 12000; 38000 CAPSULE, DELAYED RELEASE PELLETS ORAL at 09:01

## 2023-01-27 RX ADMIN — FOLIC ACID 1 MG: 1 TABLET ORAL at 09:01

## 2023-01-27 NOTE — TELEPHONE ENCOUNTER
----- Message from Raisa Zhao sent at 1/27/2023  9:31 AM CST -----  Regarding: patient call back  Name of Who is Calling: MILTON ROBERTSON [[id number]] [facility]      What is the request in detail: [facility] nurse called to schedule a 1 week hospital f/u appt for the patient. I tried to offer the next available but no appts was available. Nurse requesting for the office staff to call the patient to schedule her appt. Please advise!         Can the clinic reply by [facility]: NO        What Number to Call Back if not in [facility]: [phone]

## 2023-01-27 NOTE — NURSING
Discharged home via private auto accompanied by . Alert and oriented x 4. VS stable. Speech clear. Discharge instructions given to patient.

## 2023-01-27 NOTE — PLAN OF CARE
Patient is discharging home today with needing home health an DME equipment. TANJA has informed [facility] DME coordinator patient needing a shower chair and rolling walker. TANJA sent  referrals and will follow up.        ,Amalia Pedro LMSW  [facility]   [id number]

## 2023-01-27 NOTE — CARE UPDATE
KHUSHBU Kline in patient access will contact the nurse in Dr. Samuel Brady's office to   schedule a PCP appointment, they will contact the PT.

## 2023-01-27 NOTE — PLAN OF CARE
[facility] ([place] [address])      HOME HEALTH ORDERS  FACE TO FACE ENCOUNTER    Patient Name: Claire Bowser  YOB: 1960    PCP: Samuel Brady MD   PCP Address: [address] [place] [address] [facility] [address]  PCP Phone Number: [phone]  PCP Fax: [phone]    Encounter Date: 1/15/23    Admit to Home Health    Diagnoses:  Active Hospital Problems    Diagnosis  POA    *Pancreatic ascites [R18.8]  Yes    Spontaneous bacterial peritonitis [K65.2]  Yes    Pancreatic duct leakage [K86.89]  Yes    History of DVT of lower extremity [Z86.718]  Not Applicable     Chronic    Abdominal pain [R10.9]  Yes     Chronic    Weight loss, unintentional [R63.4]  Yes     Chronic    Severe protein-calorie malnutrition [E43]  Yes     Chronic    Rheumatoid arthritis involving multiple sites with positive rheumatoid factor [M05.79]  Yes     Chronic    Generalized anxiety disorder [F41.1]  Yes     Chronic    Tobacco use [Z72.0]  Yes     Chronic    Hypertension [I10]  Yes     Chronic      Resolved Hospital Problems   No resolved problems to display.       Follow Up Appointments:  Future Appointments   Date Time Provider Department Center   2/23/2023 10:00 AM [facility] CT2 LIMIT 500 LBS [facility]   3/8/2023 10:30 AM Hernandez Gregory MD [facility]       Allergies:  Review of patient's allergies indicates:   Allergen Reactions    No known drug allergies        Medications: Review discharge medications with patient and family and provide education.    Current Facility-Administered Medications   Medication Dose Route Frequency Provider Last Rate Last Admin    apixaban tablet 5 mg  5 mg Oral BID Bogdan Rubio, DO   5 mg at 01/26/23 2026    ciprofloxacin HCl tablet 500 mg  500 mg Oral Q12H Bogdan Rubio DO   500 mg at 01/26/23 2026    cyclobenzaprine tablet 5 mg  5 mg Oral Nightly Maksim Scottie Gurvinder, DO   5 mg at 01/26/23 2026    dextrose 10% bolus 125 mL 125 mL  12.5 g Intravenous PRN Maksim Scottie  Gurvinder, DO   Stopped at 01/23/23 1243    dextrose 10% bolus 250 mL 250 mL  25 g Intravenous PRN Maksim Scottie Gurvinder, DO        folic acid tablet 1 mg  1 mg Oral Daily Maksim Scottie Gurvinder, DO   1 mg at 01/26/23 0928    glucagon (human recombinant) injection 1 mg  1 mg Intramuscular PRN Maksim Scottie Gurvinder, DO        glucose chewable tablet 16 g  16 g Oral PRN Maksim Scottie Gurvinder, DO        glucose chewable tablet 24 g  24 g Oral PRN Maksim Scottie Gurvinder, DO        insulin aspart U-100 pen 0-5 Units  0-5 Units Subcutaneous QID (AC + HS) PRN Maksim Scottie Gurvinder, DO        lipase-protease-amylase 12,000-38,000-60,000 units per capsule 2 capsule  2 capsule Oral TID WM Maksim Scottie Gurvinder, DO   2 capsule at 01/26/23 1621    melatonin tablet 6 mg  6 mg Oral Nightly PRN Maksim Scottie Gurvinder, DO        metoprolol succinate (TOPROL-XL) 24 hr tablet 25 mg  25 mg Oral Daily Maksim Scottie Gurvinder, DO   25 mg at 01/26/23 0928    morphine injection 2 mg  2 mg Intravenous Q4H PRN Maksim Scottie Gurvinder, DO        mupirocin 2 % ointment   Nasal BID Marige Dugan MD   Given at 01/26/23 0929    naloxone 0.4 mg/mL injection 0.02 mg  0.02 mg Intravenous PRN Maksim Scottie Gurvinder, DO        nicotine 21 mg/24 hr 1 patch  1 patch Transdermal Daily Maksim Scottie Gurvinder, DO   1 patch at 01/26/23 0929    ondansetron injection 4 mg  4 mg Intravenous Q6H PRN Maksim Scottie Gurvinder, DO        oxyCODONE-acetaminophen 5-325 mg per tablet 1 tablet  1 tablet Oral Q6H PRN Maksim Scottie Gurvinder, DO   1 tablet at 01/25/23 1144    pantoprazole EC tablet 40 mg  40 mg Oral Daily Maksim Scottie Gurvinder, DO   40 mg at 01/27/23 0622    polyethylene glycol packet 17 g  17 g Oral Daily Ethel Akbar MD   17 g at 01/26/23 0951    prochlorperazine injection Soln 2.5 mg  2.5 mg Intravenous Q6H PRN Maksim Scottie Hollins DO        senna tablet 8.6 mg  8.6 mg Oral Daily Ethel Akbar MD   8.6 mg at 01/26/23 0950    sertraline tablet 50 mg  50 mg Oral QHS Maksim Scottie Hollins DO   50 mg at 01/26/23 2026     sodium chloride 0.9% flush 10 mL  10 mL Intravenous Q12H PRN Maksim Scottie Gurvinder, DO        traZODone tablet 50 mg  50 mg Oral QHS Maksim Scottie Gurvinder, DO   50 mg at 01/26/23 2026     Current Discharge Medication List        START taking these medications    Details   ciprofloxacin HCl (CIPRO) 500 MG tablet Take 1 tablet (500 mg total) by mouth every 12 (twelve) hours. for 4 days  Qty: 8 tablet, Refills: 0      polyethylene glycol (GLYCOLAX) 17 gram PwPk Take 17 g by mouth once daily. for 5 days  Qty: 5 each, Refills: 0           CONTINUE these medications which have CHANGED    Details   apixaban (ELIQUIS) 5 mg Tab Take 1 tablet (5 mg total) by mouth 2 (two) times daily.  Qty: 60 tablet, Refills: 0    Associated Diagnoses: History of DVT of lower extremity           CONTINUE these medications which have NOT CHANGED    Details   cholecalciferol, vitamin D3, (VITAMIN D3) 10 mcg (400 unit) Tab Take 400 Units by mouth once daily.      CREON 36,000-114,000- 180,000 unit CpDR Take 2 capsules by mouth 3 (three) times daily.  Qty: 180 capsule, Refills: 0      cyclobenzaprine (FLEXERIL) 5 MG tablet Take 5 mg by mouth nightly.      dronabinoL (MARINOL) 2.5 MG capsule Take 1 capsule (2.5 mg total) by mouth daily with dinner or evening meal.  Qty: 30 capsule, Refills: 1      folic acid (FOLVITE) 1 MG tablet Take 1 tablet (1 mg total) by mouth once daily.  Qty: 360 tablet, Refills: 3    Associated Diagnoses: Rheumatoid arthritis, involving unspecified site, unspecified whether rheumatoid factor present      leflunomide (ARAVA) 20 MG Tab Take 20 mg by mouth once daily.      metoprolol succinate (TOPROL-XL) 50 MG 24 hr tablet Take 0.5 tablets (25 mg total) by mouth once daily. 1/2 tab qd    Comments: .  Associated Diagnoses: Palpitations; Essential hypertension      pantoprazole (PROTONIX) 40 MG tablet Take 1 tablet (40 mg total) by mouth once daily.  Qty: 30 tablet, Refills: 0    Associated Diagnoses: PUD (peptic ulcer disease)       predniSONE (DELTASONE) 5 MG tablet Take 1 tablet (5 mg total) by mouth once daily.  Qty: 30 tablet, Refills: 0      sertraline (ZOLOFT) 50 MG tablet Take 1 tablet (50 mg total) by mouth every evening.  Qty: 30 tablet, Refills: 11      traZODone (DESYREL) 50 MG tablet Take 1 tablet (50 mg total) by mouth every evening.  Qty: 90 tablet, Refills: 1    Associated Diagnoses: Generalized anxiety disorder; Insomnia, unspecified type           STOP taking these medications       oxyCODONE-acetaminophen (PERCOCET) 5-325 mg per tablet Comments:   Reason for Stopping:                 I have seen and examined this patient within the last 30 days. My clinical findings that support the need for the home health skilled services and home bound status are the following:no   Weakness/numbness causing balance and gait disturbance due to Infection and Weakness/Debility making it taxing to leave home.  Requiring assistive device to leave home due to unsteady gait caused by  Infection and Weakness/Debility.     Diet:   regular diet    Labs:  N/a. Per PCP    Referrals/ Consults  Physical Therapy to evaluate and treat. Evaluate for home safety and equipment needs; Establish/upgrade home exercise program. Perform / instruct on therapeutic exercises, gait training, transfer training, and Range of Motion.  Occupational Therapy to evaluate and treat. Evaluate home environment for safety and equipment needs. Perform/Instruct on transfers, ADL training, ROM, and therapeutic exercises.    Activities:   activity as tolerated    Nursing:   Agency to admit patient within 24 hours of hospital discharge unless specified on physician order or at patient request    SN to complete comprehensive assessment including routine vital signs. Instruct on disease process and s/s of complications to report to MD. Review/verify medication list sent home with the patient at time of discharge  and instruct patient/caregiver as needed. Frequency may be adjusted  depending on start of care date.     Skilled nurse to perform up to 3 visits PRN for symptoms related to diagnosis    Notify MD if SBP > 160 or < 90; DBP > 90 or < 50; HR > 120 or < 50; Temp > 101; O2 < 88%; Other:      Ok to schedule additional visits based on staff availability and patient request on consecutive days within the home health episode.    When multiple disciplines ordered:    Start of Care occurs on Sunday - Wednesday schedule remaining discipline evaluations as ordered on separate consecutive days following the start of care.    Thursday SOC -schedule subsequent evaluations Friday and Monday the following week.     Friday - Saturday SOC - schedule subsequent discipline evaluations on consecutive days starting Monday of the following week.    For all post-discharge communication and subsequent orders please contact patient's primary care physician. If unable to reach primary care physician or do not receive response within 30 minutes, please contact  for clinical staff order clarification    Miscellaneous   N/a    Home Health Aide:  Physical Therapy Three times weekly, Occupational Therapy Three times weekly, and Home Health Aide Three times weekly    Wound Care Orders  no    I certify that this patient is confined to her home and needs physical therapy and occupational therapy.

## 2023-01-27 NOTE — PLAN OF CARE
[facility] ([facility])  Discharge Final Note    Primary Care Provider: Samuel Brady MD    Expected Discharge Date: 1/27/2023    Final Discharge Note (most recent)       Final Note - 01/27/23 1356          Final Note    Assessment Type Final Discharge Note (P)      Anticipated Discharge Disposition Home or Self Care (P)      Hospital Resources/Appts/Education Provided Provided patient/caregiver with written discharge plan information (P)                      Important Message from Medicare             Contact Info       Samuel Brady MD   Specialty: Family Medicine   Relationship: PCP - General    [address]  [address]  [place] [address]   Phone: [phone]       Next Steps: Schedule an appointment as soon as possible for a visit in 1 week(s)          Patient discharging home today unable to get HH due to no insurance. Rolling walker sent to the bedside. Patient spouse will transport her home.    Future Appointments   Date Time Provider Department Center   2/10/2023 10:00 AM Ana Paula Matute NP [facility]   2/23/2023 10:00 AM [facility] CT2 LIMIT 500 LBS [facility]   3/8/2023 10:30 AM Hernandez Gregory MD [facility] LMSW  [facility]   [id number]

## 2023-01-27 NOTE — ANESTHESIA POSTPROCEDURE EVALUATION
Anesthesia Post Evaluation    Patient: Claire Bowser    Procedure(s) Performed: Procedure(s) (LRB):  ERCP (ENDOSCOPIC RETROGRADE CHOLANGIOPANCREATOGRAPHY) (N/A)    Final Anesthesia Type: general      Patient location during evaluation: PACU  Patient participation: Yes- Able to Participate  Level of consciousness: awake  Post-procedure vital signs: reviewed and stable  Pain management: adequate  Airway patency: patent    PONV status at discharge: No PONV  Anesthetic complications: no      Cardiovascular status: blood pressure returned to baseline  Respiratory status: unassisted  Hydration status: euvolemic  Follow-up not needed.          Vitals Value Taken Time   /60 01/27/23 0805   Temp 36.7 °C (98 °F) 01/27/23 0804   Pulse 86 01/27/23 0822   Resp 18 01/26/23 2325   SpO2 97 % 01/27/23 0822   Vitals shown include unvalidated device data.      Event Time   Out of Recovery 11:54:30         Pain/Gerald Score: No data recorded

## 2023-01-27 NOTE — PLAN OF CARE
Problem: Adult Inpatient Plan of Care  Goal: Plan of Care Review  Outcome: Ongoing, Progressing  Goal: Patient-Specific Goal (Individualized)  Outcome: Ongoing, Progressing  Goal: Absence of Hospital-Acquired Illness or Injury  Outcome: Ongoing, Progressing  Goal: Optimal Comfort and Wellbeing  Outcome: Ongoing, Progressing  Goal: Readiness for Transition of Care  Outcome: Ongoing, Progressing     Problem: Infection (Pneumonia)  Goal: Resolution of Infection Signs and Symptoms  Outcome: Ongoing, Progressing

## 2023-01-27 NOTE — TELEPHONE ENCOUNTER
Call placed to patient. Hospital follow up appointment scheduled for the date of 2-10-23. Patient agreed to appointment date, time, and location. Location confirmed at the time of call. Appointment also added to wait list for possible earlier access.

## 2023-01-29 LAB
BACTERIA BLD CULT: NORMAL
BACTERIA BLD CULT: NORMAL

## 2023-01-30 ENCOUNTER — PATIENT MESSAGE (OUTPATIENT)
Dept: ENDOSCOPY | Facility: HOSPITAL | Age: 63
End: 2023-01-30
Payer: MEDICAID

## 2023-01-30 ENCOUNTER — TELEPHONE (OUTPATIENT)
Dept: ENDOSCOPY | Facility: HOSPITAL | Age: 63
End: 2023-01-30
Payer: MEDICAID

## 2023-01-30 ENCOUNTER — PATIENT OUTREACH (OUTPATIENT)
Dept: ADMINISTRATIVE | Facility: CLINIC | Age: 63
End: 2023-01-30
Payer: MEDICAID

## 2023-01-30 ENCOUNTER — PES CALL (OUTPATIENT)
Dept: ADMINISTRATIVE | Facility: CLINIC | Age: 63
End: 2023-01-30
Payer: MEDICAID

## 2023-01-30 DIAGNOSIS — R18.8 PANCREATIC ASCITES: Primary | ICD-10-CM

## 2023-01-30 NOTE — TELEPHONE ENCOUNTER
Yes, she may hold her Eliquis for two days prior to ERCP.  If there are no bleeding complication she may resume it the day after the procedure  ROSELIA

## 2023-01-30 NOTE — PROGRESS NOTES
C3 nurse spoke with Claire Bowser for a TCC post hospital discharge follow up call. The patient does not have a scheduled HOSFU appointment with Samuel Brady MD within 5-7 days post hospital discharge date 1/27/23. C3 nurse was unable to schedule HOSFU appointment in [facility].    Message sent to PCP staff requesting they contact patient and schedule follow up appointment. NP HV REF PLACED.

## 2023-01-30 NOTE — TELEPHONE ENCOUNTER
Telephoned pt to schedule ERCP.  Spoke with pt and procedure scheduled for 3/7/23 at 11:00am.  Reviewed history and medications.  Approval to hold Eliquis requested from Dr. Brady-pending approval (see telephone encounter 1/30/23).  Instructed on procedure and preparation.  Pt verbalized understanding.  Instructions sent via patient portal.

## 2023-01-30 NOTE — TELEPHONE ENCOUNTER
Good morning,    Pt is tentatively scheduled for an ERCP on 3/7/23 with Dr. Lopez.  Can pt hold her Eliquis for 2 days prior to procedure per endoscopy protocol?  Please advise.    Thank you

## 2023-01-30 NOTE — TELEPHONE ENCOUNTER
Patient has hospital follow up appointment scheduled with BEVERLEY Matute on 2-10-23 (first available with department).

## 2023-01-31 NOTE — TELEPHONE ENCOUNTER
Samuel Brady MD 17 hours ago (5:51 PM)     Yes, she may hold her Eliquis for two days prior to ERCP.  If there are no bleeding complication she may resume it the day after the procedure  ROSELIA

## 2023-02-02 ENCOUNTER — PES CALL (OUTPATIENT)
Dept: ADMINISTRATIVE | Facility: CLINIC | Age: 63
End: 2023-02-02
Payer: MEDICAID

## 2023-02-03 ENCOUNTER — TELEPHONE (OUTPATIENT)
Dept: ENDOSCOPY | Facility: HOSPITAL | Age: 63
End: 2023-02-03
Payer: MEDICAID

## 2023-02-03 ENCOUNTER — NURSE TRIAGE (OUTPATIENT)
Dept: ADMINISTRATIVE | Facility: CLINIC | Age: 63
End: 2023-02-03
Payer: MEDICAID

## 2023-02-03 NOTE — TELEPHONE ENCOUNTER
----- Message from Zenaida Gallardo sent at 2/3/2023  3:27 PM CST -----  Type:  Patient Returning Call    Who Called:pt   Who Left Message for Patient:  Does the patient know what this is regarding?:pt   Would the patient rather a call back or a response via [facility]? Call   Best Call Back Number:[phone]  Additional Information: pt states that she needs creon rx prescribed again and that she has been having nonstop diarrhea since leaving hospital and he buttocks burns. Has been taking anti diarrheal and it still doesn't works. Pt has swelling in feet as well

## 2023-02-03 NOTE — TELEPHONE ENCOUNTER
Reason for Disposition   [1] Drinking very little AND [2] dehydration suspected (e.g., no urine > 12 hours, very dry mouth, very lightheaded)    Additional Information   Negative: Shock suspected (e.g., cold/pale/clammy skin, too weak to stand, low BP, rapid pulse)   Negative: Difficult to awaken or acting confused (e.g., disoriented, slurred speech)   Negative: Sounds like a life-threatening emergency to the triager   Negative: Vomiting also present and worse than the diarrhea   Negative: [1] Blood in stool AND [2] without diarrhea   Negative: Diarrhea in a cancer patient who is currently (or recently) receiving chemotherapy or radiation therapy, or cancer patient who has metastatic or end-stage cancer and is receiving palliative care   Negative: SEVERE abdominal pain (e.g., excruciating)   Negative: [1] Blood in the stool AND [2] moderate or large amount of blood (e.g., any blood clots, passing blood without stool, toilet water turns red)   Negative: Black or tarry bowel movements  (Exception: chronic-unchanged black-grey bowel movements AND is taking iron pills or Pepto-Bismol)    Protocols used: Diarrhea on Antibiotics-A-AH  Pt reports she got out of hosp last fri. on abx. having diarrhea 2-3/d for about a week, no blood. No abd pain. Afeb. On antidiarrheal med- didn't help. having pain due to sores at rectum. Rec ED or option to get in touch with Dr. Pt requests to speak with office. Pt warm transferred to speak with office. Rec ED if no response from MD within 30 mins. Pt agrees.

## 2023-02-03 NOTE — TELEPHONE ENCOUNTER
Placed call to patient to advise that antibiotics may give her diarrhea. Suggest butt paste for irritation to rectum,. Also advised to follow up with gastro group.

## 2023-02-03 NOTE — TELEPHONE ENCOUNTER
----- Message from Khloe Martin sent at 2/3/2023  3:55 PM CST -----  Contact: MILTON ROBERTSON  Type: Needs Medical Advice    Who Called:  Pt     Symptoms (please be specific):  Diarrhea (no bleeding / no fever)    Best Call Back Number: [phone] (home)     Additional Information: Pt complains of diarrhea (no bleeding / no fever) due antibiotic currently prescribe by physician.  Please call back and advise. Thanks

## 2023-02-08 ENCOUNTER — HOSPITAL ENCOUNTER (INPATIENT)
Facility: HOSPITAL | Age: 63
LOS: 8 days | Discharge: HOME | DRG: 438 | End: 2023-02-16
Attending: EMERGENCY MEDICINE | Admitting: FAMILY MEDICINE
Payer: MEDICAID

## 2023-02-08 DIAGNOSIS — R06.09 EXERTIONAL DYSPNEA: ICD-10-CM

## 2023-02-08 DIAGNOSIS — E43 SEVERE PROTEIN-CALORIE MALNUTRITION: Chronic | ICD-10-CM

## 2023-02-08 DIAGNOSIS — K86.89: ICD-10-CM

## 2023-02-08 DIAGNOSIS — K27.9 PUD (PEPTIC ULCER DISEASE): ICD-10-CM

## 2023-02-08 DIAGNOSIS — R53.1 GENERAL WEAKNESS: ICD-10-CM

## 2023-02-08 DIAGNOSIS — R07.9 CHEST PAIN: ICD-10-CM

## 2023-02-08 DIAGNOSIS — R63.4 WEIGHT LOSS, UNINTENTIONAL: Chronic | ICD-10-CM

## 2023-02-08 DIAGNOSIS — J90 BILATERAL PLEURAL EFFUSION: Primary | ICD-10-CM

## 2023-02-08 DIAGNOSIS — R53.1 GENERALIZED WEAKNESS: ICD-10-CM

## 2023-02-08 DIAGNOSIS — I50.9 CHF (CONGESTIVE HEART FAILURE): ICD-10-CM

## 2023-02-08 LAB
ALBUMIN SERPL BCP-MCNC: 1.5 G/DL (ref 3.5–5.2)
ALP SERPL-CCNC: 96 U/L (ref 55–135)
ALT SERPL W/O P-5'-P-CCNC: 42 U/L (ref 10–44)
ANION GAP SERPL CALC-SCNC: 7 MMOL/L (ref 8–16)
AST SERPL-CCNC: 53 U/L (ref 10–40)
BASOPHILS # BLD AUTO: 0.02 K/UL (ref 0–0.2)
BASOPHILS NFR BLD: 0.2 % (ref 0–1.9)
BILIRUB SERPL-MCNC: 1.1 MG/DL (ref 0.1–1)
BNP SERPL-MCNC: 80 PG/ML (ref 0–99)
BUN SERPL-MCNC: 12 MG/DL (ref 8–23)
CALCIUM SERPL-MCNC: 8.9 MG/DL (ref 8.7–10.5)
CHLORIDE SERPL-SCNC: 96 MMOL/L (ref 95–110)
CO2 SERPL-SCNC: 26 MMOL/L (ref 23–29)
CREAT SERPL-MCNC: 0.4 MG/DL (ref 0.5–1.4)
DIFFERENTIAL METHOD: ABNORMAL
EOSINOPHIL # BLD AUTO: 0 K/UL (ref 0–0.5)
EOSINOPHIL NFR BLD: 0.2 % (ref 0–8)
ERYTHROCYTE [DISTWIDTH] IN BLOOD BY AUTOMATED COUNT: 15.5 % (ref 11.5–14.5)
EST. GFR  (NO RACE VARIABLE): >60 ML/MIN/1.73 M^2
GLUCOSE SERPL-MCNC: 91 MG/DL (ref 70–110)
HCT VFR BLD AUTO: 29.9 % (ref 37–48.5)
HGB BLD-MCNC: 9.8 G/DL (ref 12–16)
IMM GRANULOCYTES # BLD AUTO: 0.1 K/UL (ref 0–0.04)
IMM GRANULOCYTES NFR BLD AUTO: 1.2 % (ref 0–0.5)
LIPASE SERPL-CCNC: 40 U/L (ref 4–60)
LYMPHOCYTES # BLD AUTO: 0.9 K/UL (ref 1–4.8)
LYMPHOCYTES NFR BLD: 10.4 % (ref 18–48)
MCH RBC QN AUTO: 29.9 PG (ref 27–31)
MCHC RBC AUTO-ENTMCNC: 32.8 G/DL (ref 32–36)
MCV RBC AUTO: 91 FL (ref 82–98)
MONOCYTES # BLD AUTO: 0.6 K/UL (ref 0.3–1)
MONOCYTES NFR BLD: 6.4 % (ref 4–15)
NEUTROPHILS # BLD AUTO: 7 K/UL (ref 1.8–7.7)
NEUTROPHILS NFR BLD: 81.6 % (ref 38–73)
NRBC BLD-RTO: 0 /100 WBC
OB PNL STL: POSITIVE
PLATELET # BLD AUTO: 310 K/UL (ref 150–450)
PMV BLD AUTO: 9.8 FL (ref 9.2–12.9)
POTASSIUM SERPL-SCNC: 3 MMOL/L (ref 3.5–5.1)
PROT SERPL-MCNC: 5.7 G/DL (ref 6–8.4)
RBC # BLD AUTO: 3.28 M/UL (ref 4–5.4)
SODIUM SERPL-SCNC: 129 MMOL/L (ref 136–145)
TROPONIN I SERPL HS-MCNC: 5.1 PG/ML (ref 0–14.9)
TROPONIN I SERPL HS-MCNC: 5.1 PG/ML (ref 0–14.9)
WBC # BLD AUTO: 8.53 K/UL (ref 3.9–12.7)
WBC #/AREA STL HPF: NORMAL /[HPF]

## 2023-02-08 PROCEDURE — 87209 SMEAR COMPLEX STAIN: CPT | Performed by: FAMILY MEDICINE

## 2023-02-08 PROCEDURE — 99285 EMERGENCY DEPT VISIT HI MDM: CPT | Mod: 25

## 2023-02-08 PROCEDURE — 85025 COMPLETE CBC W/AUTO DIFF WBC: CPT | Performed by: PHYSICIAN ASSISTANT

## 2023-02-08 PROCEDURE — 87177 OVA AND PARASITES SMEARS: CPT | Performed by: FAMILY MEDICINE

## 2023-02-08 PROCEDURE — 63600175 PHARM REV CODE 636 W HCPCS: Performed by: FAMILY MEDICINE

## 2023-02-08 PROCEDURE — 83690 ASSAY OF LIPASE: CPT | Performed by: PHYSICIAN ASSISTANT

## 2023-02-08 PROCEDURE — 80053 COMPREHEN METABOLIC PANEL: CPT | Performed by: PHYSICIAN ASSISTANT

## 2023-02-08 PROCEDURE — 87045 FECES CULTURE AEROBIC BACT: CPT | Performed by: FAMILY MEDICINE

## 2023-02-08 PROCEDURE — 96361 HYDRATE IV INFUSION ADD-ON: CPT

## 2023-02-08 PROCEDURE — 93005 ELECTROCARDIOGRAM TRACING: CPT | Performed by: SPECIALIST

## 2023-02-08 PROCEDURE — 25000003 PHARM REV CODE 250: Performed by: FAMILY MEDICINE

## 2023-02-08 PROCEDURE — 36415 COLL VENOUS BLD VENIPUNCTURE: CPT | Performed by: PHYSICIAN ASSISTANT

## 2023-02-08 PROCEDURE — 87046 STOOL CULTR AEROBIC BACT EA: CPT | Mod: 59 | Performed by: FAMILY MEDICINE

## 2023-02-08 PROCEDURE — 83036 HEMOGLOBIN GLYCOSYLATED A1C: CPT | Performed by: FAMILY MEDICINE

## 2023-02-08 PROCEDURE — 93010 EKG 12-LEAD: ICD-10-PCS | Mod: ,,, | Performed by: SPECIALIST

## 2023-02-08 PROCEDURE — G0378 HOSPITAL OBSERVATION PER HR: HCPCS

## 2023-02-08 PROCEDURE — 89055 LEUKOCYTE ASSESSMENT FECAL: CPT | Performed by: FAMILY MEDICINE

## 2023-02-08 PROCEDURE — 82272 OCCULT BLD FECES 1-3 TESTS: CPT | Performed by: FAMILY MEDICINE

## 2023-02-08 PROCEDURE — 93010 ELECTROCARDIOGRAM REPORT: CPT | Mod: ,,, | Performed by: SPECIALIST

## 2023-02-08 PROCEDURE — 96375 TX/PRO/DX INJ NEW DRUG ADDON: CPT

## 2023-02-08 PROCEDURE — 25000003 PHARM REV CODE 250: Performed by: PHYSICIAN ASSISTANT

## 2023-02-08 PROCEDURE — 84484 ASSAY OF TROPONIN QUANT: CPT | Performed by: PHYSICIAN ASSISTANT

## 2023-02-08 PROCEDURE — 84484 ASSAY OF TROPONIN QUANT: CPT | Mod: 91 | Performed by: FAMILY MEDICINE

## 2023-02-08 PROCEDURE — 21400001 HC TELEMETRY ROOM

## 2023-02-08 PROCEDURE — 83880 ASSAY OF NATRIURETIC PEPTIDE: CPT | Performed by: PHYSICIAN ASSISTANT

## 2023-02-08 RX ORDER — SERTRALINE HYDROCHLORIDE 50 MG/1
50 TABLET, FILM COATED ORAL NIGHTLY
Status: DISCONTINUED | OUTPATIENT
Start: 2023-02-08 | End: 2023-02-16 | Stop reason: HOSPADM

## 2023-02-08 RX ORDER — PANTOPRAZOLE SODIUM 40 MG/1
40 TABLET, DELAYED RELEASE ORAL
Status: DISCONTINUED | OUTPATIENT
Start: 2023-02-09 | End: 2023-02-16 | Stop reason: HOSPADM

## 2023-02-08 RX ORDER — ACETAMINOPHEN 325 MG/1
650 TABLET ORAL EVERY 8 HOURS PRN
Status: DISCONTINUED | OUTPATIENT
Start: 2023-02-08 | End: 2023-02-16 | Stop reason: HOSPADM

## 2023-02-08 RX ORDER — HYDROCODONE BITARTRATE AND ACETAMINOPHEN 5; 325 MG/1; MG/1
1 TABLET ORAL EVERY 4 HOURS PRN
Status: DISCONTINUED | OUTPATIENT
Start: 2023-02-08 | End: 2023-02-16 | Stop reason: HOSPADM

## 2023-02-08 RX ORDER — IBUPROFEN 200 MG
16 TABLET ORAL
Status: DISCONTINUED | OUTPATIENT
Start: 2023-02-08 | End: 2023-02-16 | Stop reason: HOSPADM

## 2023-02-08 RX ORDER — IBUPROFEN 200 MG
24 TABLET ORAL
Status: DISCONTINUED | OUTPATIENT
Start: 2023-02-08 | End: 2023-02-16 | Stop reason: HOSPADM

## 2023-02-08 RX ORDER — FUROSEMIDE 10 MG/ML
40 INJECTION INTRAMUSCULAR; INTRAVENOUS ONCE
Status: DISCONTINUED | OUTPATIENT
Start: 2023-02-08 | End: 2023-02-08

## 2023-02-08 RX ORDER — POLYETHYLENE GLYCOL 3350 17 G/17G
17 POWDER, FOR SOLUTION ORAL 2 TIMES DAILY PRN
Status: DISCONTINUED | OUTPATIENT
Start: 2023-02-08 | End: 2023-02-16 | Stop reason: HOSPADM

## 2023-02-08 RX ORDER — NALOXONE HCL 0.4 MG/ML
0.02 VIAL (ML) INJECTION
Status: DISCONTINUED | OUTPATIENT
Start: 2023-02-08 | End: 2023-02-16 | Stop reason: HOSPADM

## 2023-02-08 RX ORDER — IPRATROPIUM BROMIDE AND ALBUTEROL SULFATE 2.5; .5 MG/3ML; MG/3ML
3 SOLUTION RESPIRATORY (INHALATION) EVERY 4 HOURS PRN
Status: DISCONTINUED | OUTPATIENT
Start: 2023-02-08 | End: 2023-02-16 | Stop reason: HOSPADM

## 2023-02-08 RX ORDER — FUROSEMIDE 10 MG/ML
40 INJECTION INTRAMUSCULAR; INTRAVENOUS ONCE
Status: COMPLETED | OUTPATIENT
Start: 2023-02-08 | End: 2023-02-08

## 2023-02-08 RX ORDER — GLUCAGON 1 MG
1 KIT INJECTION
Status: DISCONTINUED | OUTPATIENT
Start: 2023-02-08 | End: 2023-02-16 | Stop reason: HOSPADM

## 2023-02-08 RX ORDER — TALC
6 POWDER (GRAM) TOPICAL NIGHTLY PRN
Status: DISCONTINUED | OUTPATIENT
Start: 2023-02-08 | End: 2023-02-16 | Stop reason: HOSPADM

## 2023-02-08 RX ORDER — TRAZODONE HYDROCHLORIDE 50 MG/1
50 TABLET ORAL NIGHTLY
Status: DISCONTINUED | OUTPATIENT
Start: 2023-02-08 | End: 2023-02-12

## 2023-02-08 RX ORDER — ONDANSETRON 2 MG/ML
4 INJECTION INTRAMUSCULAR; INTRAVENOUS EVERY 6 HOURS PRN
Status: DISCONTINUED | OUTPATIENT
Start: 2023-02-08 | End: 2023-02-16 | Stop reason: HOSPADM

## 2023-02-08 RX ORDER — SIMETHICONE 80 MG
1 TABLET,CHEWABLE ORAL 4 TIMES DAILY PRN
Status: DISCONTINUED | OUTPATIENT
Start: 2023-02-08 | End: 2023-02-16 | Stop reason: HOSPADM

## 2023-02-08 RX ORDER — MORPHINE SULFATE 4 MG/ML
4 INJECTION, SOLUTION INTRAMUSCULAR; INTRAVENOUS EVERY 4 HOURS PRN
Status: DISCONTINUED | OUTPATIENT
Start: 2023-02-08 | End: 2023-02-16 | Stop reason: HOSPADM

## 2023-02-08 RX ORDER — SODIUM CHLORIDE 0.9 % (FLUSH) 0.9 %
10 SYRINGE (ML) INJECTION
Status: DISCONTINUED | OUTPATIENT
Start: 2023-02-08 | End: 2023-02-16 | Stop reason: HOSPADM

## 2023-02-08 RX ORDER — FOLIC ACID 1 MG/1
1 TABLET ORAL DAILY
Status: DISCONTINUED | OUTPATIENT
Start: 2023-02-09 | End: 2023-02-16 | Stop reason: HOSPADM

## 2023-02-08 RX ORDER — AMOXICILLIN 250 MG
1 CAPSULE ORAL 2 TIMES DAILY PRN
Status: DISCONTINUED | OUTPATIENT
Start: 2023-02-08 | End: 2023-02-16 | Stop reason: HOSPADM

## 2023-02-08 RX ADMIN — APIXABAN 5 MG: 5 TABLET, FILM COATED ORAL at 08:02

## 2023-02-08 RX ADMIN — SERTRALINE HYDROCHLORIDE 50 MG: 50 TABLET ORAL at 08:02

## 2023-02-08 RX ADMIN — POTASSIUM BICARBONATE 40 MEQ: 391 TABLET, EFFERVESCENT ORAL at 12:02

## 2023-02-08 RX ADMIN — SODIUM CHLORIDE 500 ML: 0.9 INJECTION, SOLUTION INTRAVENOUS at 02:02

## 2023-02-08 RX ADMIN — HYDROCODONE BITARTRATE AND ACETAMINOPHEN 1 TABLET: 5; 325 TABLET ORAL at 05:02

## 2023-02-08 RX ADMIN — FUROSEMIDE 40 MG: 10 INJECTION, SOLUTION INTRAMUSCULAR; INTRAVENOUS at 04:02

## 2023-02-08 RX ADMIN — TRAZODONE HYDROCHLORIDE 50 MG: 50 TABLET ORAL at 08:02

## 2023-02-08 RX ADMIN — POTASSIUM BICARBONATE 25 MEQ: 977.5 TABLET, EFFERVESCENT ORAL at 04:02

## 2023-02-08 NOTE — ED PROVIDER NOTES
Encounter Date: 2/8/2023       History     Chief Complaint   Patient presents with    Post-op Problem     END OF January AT [facility] FOR BILIARY STENT    GEN WEAKNESS     64 y/o female with history of Acute biliary pancreatitis, chronic pancreatitis, HTN, RA presents to the ER with her  for evaluation due to generalized weakness, diarrhea and SOB with exertion x 1 week.  Patient had biliary stent replacement on 1/27 and reports since then her abdominal pain has resolved.  She denies black or bloody stool, foul smelling or green stool and  notes that stool is more formed today.  Patient reports perianal skin irritation from diarrhea on 2/3 and was advised to use butt paste.  They suspect diarrhea was due to recent antibiotics after surgery.  Patient reports continued discomfort is perianal area.  She denies chest pain , fever, or cough.   notes bilateral leg swelling that occurred after her surgery, swelling improves with elevation.  No leg pain.  Patient reports bilateral arm and leg weakness, R > L for 1 week,  reports she is having difficulty ambulating without assistance due to generalized weakness.  No syncope, head injury , confusion, or other complaints at this time.        Review of patient's allergies indicates:   Allergen Reactions    No known drug allergies      Past Medical History:   Diagnosis Date    Acute biliary pancreatitis 6/14/2022    Anxiety     Chronic pancreatitis 1/2/2023    Digestive disorder     Flu 02/2017    [facility]    Hypertension     Long-term use of immunosuppressant medication 6/10/2022    Rheumatoid arthritis involving multiple sites with positive rheumatoid factor 01/14/2021     Past Surgical History:   Procedure Laterality Date    COLONOSCOPY N/A 2/26/2021    Procedure: COLONOSCOPY;  Surgeon: Amy Sidhu MD;  Location: [facility];  Service: Endoscopy;  Laterality: N/A;    ENDOSCOPIC ULTRASOUND OF UPPER GASTROINTESTINAL TRACT  N/A 7/1/2022    Procedure: ULTRASOUND, UPPER GI TRACT, ENDOSCOPIC;  Surgeon: Donavon Jewell III, MD;  Location: [facility] ENDO;  Service: Endoscopy;  Laterality: N/A;    ENDOSCOPIC ULTRASOUND OF UPPER GASTROINTESTINAL TRACT N/A 11/29/2022    Procedure: ULTRASOUND, UPPER GI TRACT, ENDOSCOPIC;  Surgeon: Donavon Jewell III, MD;  Location: [facility] ENDO;  Service: Endoscopy;  Laterality: N/A;    ENDOSCOPIC ULTRASOUND OF UPPER GASTROINTESTINAL TRACT N/A 1/3/2023    Procedure: ULTRASOUND, UPPER GI TRACT, ENDOSCOPIC;  Surgeon: Donavon Jewell III, MD;  Location: [facility] ENDO;  Service: Endoscopy;  Laterality: N/A;    ERCP N/A 12/30/2022    Procedure: ERCP (ENDOSCOPIC RETROGRADE CHOLANGIOPANCREATOGRAPHY);  Surgeon: Donavon Jewell III, MD;  Location: [facility] ENDO;  Service: Endoscopy;  Laterality: N/A;    ERCP N/A 1/24/2023    Procedure: ERCP (ENDOSCOPIC RETROGRADE CHOLANGIOPANCREATOGRAPHY);  Surgeon: Rock Martines MD;  Location: [facility] ENDO ([facility]);  Service: Endoscopy;  Laterality: N/A;    ESOPHAGOGASTRODUODENOSCOPY N/A 2/26/2021    Procedure: EGD (ESOPHAGOGASTRODUODENOSCOPY);  Surgeon: Amy Sidhu MD;  Location: [facility] ENDO;  Service: Endoscopy;  Laterality: N/A;    LAPAROSCOPIC CHOLECYSTECTOMY N/A 7/27/2022    Procedure: CHOLECYSTECTOMY, LAPAROSCOPIC;  Surgeon: Ramón Hwang III, MD;  Location: [facility] OR;  Service: General;  Laterality: N/A;    TUBAL LIGATION       Family History   Problem Relation Age of Onset    Diabetes Mother     Heart disease Mother     Kidney disease Mother     Hypertension Mother     Stroke Mother     Hearing loss Mother     COPD Father     Liver disease Paternal Grandfather     Alcohol abuse Paternal Grandfather     Liver disease Sister     Emphysema Brother     COPD Brother     Sleep apnea Brother     No Known Problems Son     Alcohol abuse Paternal Grandmother     Cirrhosis Paternal Grandmother     Heart disease Maternal Aunt     Diabetes Maternal Aunt     Cancer Maternal Aunt          female    Heart disease Maternal Uncle     Hypertension Maternal Uncle     No Known Problems Paternal Uncle     Psoriasis Neg Hx     Lupus Neg Hx     Eczema Neg Hx     Melanoma Neg Hx      Social History     Tobacco Use    Smoking status: Every Day     Packs/day: 1.00     Years: 7.00     Pack years: 7.00     Types: Cigarettes    Smokeless tobacco: Never    Tobacco comments:     [phone]   Substance Use Topics    Alcohol use: No    Drug use: Never     Review of Systems   Constitutional:  Positive for fatigue. Negative for chills and fever.   HENT:  Negative for sore throat.    Respiratory:  Positive for shortness of breath.    Cardiovascular:  Positive for leg swelling. Negative for chest pain.   Gastrointestinal:  Positive for diarrhea. Negative for abdominal pain, nausea and vomiting.   Genitourinary:  Negative for dysuria.   Musculoskeletal:  Negative for back pain.   Skin:  Negative for rash.   Neurological:  Positive for weakness (generalized). Negative for dizziness, syncope and light-headedness.   Hematological:  Does not bruise/bleed easily.     Physical Exam     Initial Vitals [02/08/23 0958]   BP Pulse Resp Temp SpO2   100/65 90 20 98.6 °F (37 °C) 97 %      MAP       --         Physical Exam    Nursing note and vitals reviewed.  Constitutional: She appears well-developed and well-nourished.   HENT:   Head: Atraumatic.   Eyes: Conjunctivae and EOM are normal. Pupils are equal, round, and reactive to light.   Neck: Neck supple.   Normal range of motion.  Cardiovascular:  Normal rate and regular rhythm.           Pulmonary/Chest: Breath sounds normal. No respiratory distress. She has no wheezes. She has no rhonchi. She has no rales.   Abdominal: Abdomen is soft. Bowel sounds are normal. There is no abdominal tenderness. There is no rebound and no guarding.   Musculoskeletal:         General: Edema (2 + pitting edema bilateral legs - no skin color changes, no wounds.) present.      Cervical back: Normal  range of motion and neck supple.     Neurological: She is alert and oriented to person, place, and time. She has normal strength. GCS score is 15. GCS eye subscore is 4. GCS verbal subscore is 5. GCS motor subscore is 6.   Skin: No rash noted.   Psychiatric: She has a normal mood and affect.       ED Course   Procedures  Labs Reviewed   CBC W/ AUTO DIFFERENTIAL - Abnormal; Notable for the following components:       Result Value    RBC 3.28 (*)     Hemoglobin 9.8 (*)     Hematocrit 29.9 (*)     RDW 15.5 (*)     Immature Granulocytes 1.2 (*)     Immature Grans (Abs) 0.10 (*)     Lymph # 0.9 (*)     Gran % 81.6 (*)     Lymph % 10.4 (*)     All other components within normal limits    Narrative:     For upper or mid abdominal pain.   COMPREHENSIVE METABOLIC PANEL - Abnormal; Notable for the following components:    Sodium 129 (*)     Potassium 3.0 (*)     Creatinine 0.4 (*)     Total Protein 5.7 (*)     Albumin 1.5 (*)     Total Bilirubin 1.1 (*)     AST 53 (*)     Anion Gap 7 (*)     All other components within normal limits    Narrative:     For upper or mid abdominal pain.   CULTURE, STOOL   CLOSTRIDIUM DIFFICILE   CULTURE, BLOOD   CULTURE, BLOOD   LIPASE    Narrative:     For upper or mid abdominal pain.   TROPONIN I HIGH SENSITIVITY    Narrative:     For upper or mid abdominal pain.   B-TYPE NATRIURETIC PEPTIDE   TROPONIN I HIGH SENSITIVITY   B-TYPE NATRIURETIC PEPTIDE   URINALYSIS, REFLEX TO URINE CULTURE   STOOL EXAM-OVA,CYSTS,PARASITES   WBC, STOOL   OCCULT BLOOD X 1, STOOL   HEMOGLOBIN A1C   URINALYSIS, REFLEX TO URINE CULTURE        ECG Results              EKG 12-lead (In process)  Result time 02/08/23 10:08:08      In process by Interface, Lab In [facility] (02/08/23 10:08:08)                   Narrative:    Test Reason : R53.1,    Vent. Rate : 095 BPM     Atrial Rate : 095 BPM     P-R Int : 132 ms          QRS Dur : 066 ms      QT Int : 332 ms       P-R-T Axes : 060 026 -75 degrees     QTc Int : 417  ms    Normal sinus rhythm  Low voltage QRS  Nonspecific T wave abnormality  Abnormal ECG  When compared with ECG of 26-JAN-2023 07:57,  QT has lengthened    Referred By: [email or web address]   SELF           Confirmed By:                                   Imaging Results              CT Head Without Contrast (Final result)  Result time 02/08/23 13:44:25      Final result by Tim Carrington Jr., MD (02/08/23 13:44:25)                   Narrative:    CT OF THE HEAD WITHOUT CONTRAST    HISTORY: Neurological deficit. Stroke suspected. Generalized weakness.    Technical factors:   Spiral acquisition of the brain was generated at 3.0 mm thickness from the skull base to the skull vertex in helical fashion in the absence of intravenous contrast.  Additional coronal and sagittal reconstructed images were also included and reviewed.    CMS MANDATED QUALITY DATA - CT RADIATION  436    All CT scans at this facility utilize dose modulation, iterative reconstruction, and/or weight based dosing when appropriate to reduce radiation dose to as low as reasonably achievable.        FINDINGS:  Generalized central and cortical involutional changes are established that are age concordant in keeping with the patient's clinical age of 62 years of age. Moderately prominent low-density changes throughout both the subcortical and periventricular white matter nonspecific although attributed towards chronic microvascular ischemic changes. No evidence of intracranial hemorrhage, masses, mass effect, midline shift, acute vascular insult. Ventricles maintain normal size and overall configuration. Third ventricle is normal in size. The visualized paranasal sinuses are clear. Prominent atheromatous calcification through the cavernous and supraclinoid segments of the internal carotid arteries.    IMPRESSION:  1. Generalized cerebral atrophy and superimposed chronic deep white matter ischemia.  2. No evidence of acute intracranial event    Electronically  signed by:  Tim Carrington MD  2/8/2023 1:44 PM CST Workstation: [id number]                                     X-Ray Chest AP Portable (Final result)  Result time 02/08/23 10:41:37      Final result by Tim Carrington Jr., MD (02/08/23 10:41:37)                   Narrative:    HISTORY: Generalized weakness    COMPARISON:January 12, 2023    FINDINGS:Right-sided pleural effusion has evolved with increased accumulation and apical meniscus sign along the superior edge marginal mass effect upon the inferior edge of the right lower lobe reproducing right basilar atelectasis. This evidence of chronic interstitial markings in the basilar segments. Lungs are emphysematous with minimal perihilar scarring. No evidence of confluent infiltrate or mass lesion. Prominent arch atherosclerosis. The tracheal column is midline without evidence of shift. The osseous structures reveal no significant finding. Postoperative changes of previous gallbladder resection.    IMPRESSION:  1. Evolving right-sided pleural effusion with right lower lobe atelectasis and new finding upon comparison.  2. No evidence of acute cardiopulmonary process.    Electronically signed by:  Tim Carrington MD  2/8/2023 10:41 AM CST Workstation: [id number]                                     Medications   furosemide injection 40 mg (has no administration in time range)   potassium bicarbonate disintegrating tablet 25 mEq (has no administration in time range)   traZODone tablet 50 mg (has no administration in time range)   sertraline tablet 50 mg (has no administration in time range)   pantoprazole EC tablet 40 mg (has no administration in time range)   folic acid tablet 1 mg (has no administration in time range)   apixaban tablet 5 mg (has no administration in time range)   sodium chloride 0.9% flush 10 mL (has no administration in time range)   albuterol-ipratropium 2.5 mg-0.5 mg/3 mL nebulizer solution 3 mL (has no administration in time range)   melatonin  "tablet 6 mg (has no administration in time range)   ondansetron injection 4 mg (has no administration in time range)   polyethylene glycol packet 17 g (has no administration in time range)   senna-docusate 8.6-50 mg per tablet 1 tablet (has no administration in time range)   acetaminophen tablet 650 mg (has no administration in time range)   simethicone chewable tablet 80 mg (has no administration in time range)   HYDROcodone-acetaminophen 5-325 mg per tablet 1 tablet (has no administration in time range)   morphine injection 4 mg (has no administration in time range)   naloxone 0.4 mg/mL injection 0.02 mg (has no administration in time range)   glucose chewable tablet 16 g (has no administration in time range)   glucose chewable tablet 24 g (has no administration in time range)   glucagon (human recombinant) injection 1 mg (has no administration in time range)   dextrose 10% bolus 125 mL 125 mL (has no administration in time range)   dextrose 10% bolus 250 mL 250 mL (has no administration in time range)   potassium bicarbonate disintegrating tablet 40 mEq (40 mEq Oral Given 2/8/23 1213)           APC / Resident Notes:   Patient presenting with  for evaluation due to generalized weakness x 1 week, with diarrhea and SOB with exertion as well.  NO significant cough, no fever, no vomiting or abdominal pain.  They deny recent fall or head injury.  Patient is on eliquis for DVT.    Patients vital signs are stable.  Blood work ordered after initial screening exam.    Patients labs reveal potassium 3.0 replaced orally, she appears dry and considering recent diarrhea she is given 500 cc IV fluids.  She is hemodynamically stable, no other significant electrolyte abnormalities.  Patient has AST mildly elevated and bilirubin 1.1, No other significant increase in LFTs or lipase.   Patient has R > L extremity weakness so head CT is ordered and  shows " 1. Generalized cerebral atrophy and superimposed chronic deep white " "matter ischemia.  2. No evidence of acute intracranial event".   Patient's chest xray shows "1. Evolving right-sided pleural effusion with right lower lobe atelectasis and new finding upon comparison.  2. No evidence of acute cardiopulmonary process."  Will add on BNP and troponin.  Plan for admission as patient will need cardiac echo for new onset pleural effusion and likely diuretics.    I do not suspect PE at this time.  I discussed the care of this patient with my supervising MD.    I discussed patients admission with hospital medicine physician, Dr. REY.  She has agreed to admit the patient, she will evaluate the patient in the ED and place admission orders.  Patient and  are comfortable with plan for admission.  Vital signs are stable at time of admit.                     Clinical Impression:   Final diagnoses:  [R53.1] General weakness  [R53.1] Generalized weakness  [J90] Bilateral pleural effusion (Primary)        ED Disposition Condition    Observation                 SILVESTRE Andujar  02/08/23 [id number]    "

## 2023-02-08 NOTE — H&P
Formerly [facility], later [facility]   History & Physical     Patient Name: Claire Bowser  MRN: [id number]  Admission Date: 2/8/2023 10:00 AM  Attending Physician: Ledy Martínez MD  Face-to-Face encounter date: 02/08/2023 3:19 PM    Patient information was obtained from patient, past medical records, ER physician, and ER records.     HISTORY OF PRESENT ILLNESS:     Claire Bowser is a 63 y.o. White female   With PMH as noted below,  who presents with generalized weakness.    Onset since previous hospitalization  Progressively worsening  Pt not participating much with pt  Also reports new exertional SOB   +LE edema  +orthopnea  +PND  No CP  No fever or chills  No previous cardiac disease    REVIEW OF SYSTEMS:     All systems reviewed and are negative except as noted per above.    PAST MEDICAL HISTORY:     Past Medical History:   Diagnosis Date    Acute biliary pancreatitis 6/14/2022    Anxiety     Chronic pancreatitis 1/2/2023    Digestive disorder     Flu 02/2017    [facility]    Hypertension     Long-term use of immunosuppressant medication 6/10/2022    Rheumatoid arthritis involving multiple sites with positive rheumatoid factor 01/14/2021       PAST SURGICAL HISTORY:     Past Surgical History:   Procedure Laterality Date    COLONOSCOPY N/A 2/26/2021    Procedure: COLONOSCOPY;  Surgeon: Amy Sidhu MD;  Location: [facility];  Service: Endoscopy;  Laterality: N/A;    ENDOSCOPIC ULTRASOUND OF UPPER GASTROINTESTINAL TRACT N/A 7/1/2022    Procedure: ULTRASOUND, UPPER GI TRACT, ENDOSCOPIC;  Surgeon: Donavon Jewell III, MD;  Location: [facility];  Service: Endoscopy;  Laterality: N/A;    ENDOSCOPIC ULTRASOUND OF UPPER GASTROINTESTINAL TRACT N/A 11/29/2022    Procedure: ULTRASOUND, UPPER GI TRACT, ENDOSCOPIC;  Surgeon: Donavon Jewell III, MD;  Location: [facility];  Service: Endoscopy;  Laterality: N/A;    ENDOSCOPIC ULTRASOUND OF UPPER GASTROINTESTINAL TRACT N/A 1/3/2023    Procedure:  ULTRASOUND, UPPER GI TRACT, ENDOSCOPIC;  Surgeon: Donavon Jewell III, MD;  Location: [facility] ENDO;  Service: Endoscopy;  Laterality: N/A;    ERCP N/A 12/30/2022    Procedure: ERCP (ENDOSCOPIC RETROGRADE CHOLANGIOPANCREATOGRAPHY);  Surgeon: Donavon Jewell III, MD;  Location: [facility] ENDO;  Service: Endoscopy;  Laterality: N/A;    ERCP N/A 1/24/2023    Procedure: ERCP (ENDOSCOPIC RETROGRADE CHOLANGIOPANCREATOGRAPHY);  Surgeon: Rock Martines MD;  Location: [facility] ENDO ([facility] FLR);  Service: Endoscopy;  Laterality: N/A;    ESOPHAGOGASTRODUODENOSCOPY N/A 2/26/2021    Procedure: EGD (ESOPHAGOGASTRODUODENOSCOPY);  Surgeon: Amy Sidhu MD;  Location: [facility] ENDO;  Service: Endoscopy;  Laterality: N/A;    LAPAROSCOPIC CHOLECYSTECTOMY N/A 7/27/2022    Procedure: CHOLECYSTECTOMY, LAPAROSCOPIC;  Surgeon: Ramón Hwang III, MD;  Location: [facility] OR;  Service: General;  Laterality: N/A;    TUBAL LIGATION         ALLERGIES:   No known drug allergies    FAMILY HISTORY:     Family History   Problem Relation Age of Onset    Diabetes Mother     Heart disease Mother     Kidney disease Mother     Hypertension Mother     Stroke Mother     Hearing loss Mother     COPD Father     Liver disease Paternal Grandfather     Alcohol abuse Paternal Grandfather     Liver disease Sister     Emphysema Brother     COPD Brother     Sleep apnea Brother     No Known Problems Son     Alcohol abuse Paternal Grandmother     Cirrhosis Paternal Grandmother     Heart disease Maternal Aunt     Diabetes Maternal Aunt     Cancer Maternal Aunt         female    Heart disease Maternal Uncle     Hypertension Maternal Uncle     No Known Problems Paternal Uncle     Psoriasis Neg Hx     Lupus Neg Hx     Eczema Neg Hx     Melanoma Neg Hx        SOCIAL HISTORY:     Social History     Tobacco Use    Smoking status: Every Day     Packs/day: 1.00     Years: 7.00     Pack years: 7.00     Types: Cigarettes    Smokeless tobacco: Never    Tobacco comments:      "[phone]   Substance Use Topics    Alcohol use: No        Social History     Substance and Sexual Activity   Sexual Activity Yes    Partners: Male        HOME MEDICATIONS:     Prior to Admission medications    Medication Sig Start Date End Date Taking? Authorizing Provider   apixaban (ELIQUIS) 5 mg Tab Take 1 tablet (5 mg total) by mouth 2 (two) times daily. 1/26/23 2/26/23 Yes Aniya Costa MD   cyclobenzaprine (FLEXERIL) 5 MG tablet Take 5 mg by mouth nightly.   Yes Historical Provider   folic acid (FOLVITE) 1 MG tablet Take 1 tablet (1 mg total) by mouth once daily. 5/23/22 5/23/23 Yes Flash Fairbanks PA-C   metoprolol succinate (TOPROL-XL) 50 MG 24 hr tablet Take 0.5 tablets (25 mg total) by mouth once daily. 1/2 tab qd 12/31/22  Yes Chapin Leong MD   pantoprazole (PROTONIX) 40 MG tablet Take 1 tablet (40 mg total) by mouth once daily. 12/31/22 2/8/23 Yes Chapin Leong MD   sertraline (ZOLOFT) 50 MG tablet Take 1 tablet (50 mg total) by mouth every evening. 1/6/23 1/6/24 Yes Binu Arciniega MD   traZODone (DESYREL) 50 MG tablet Take 1 tablet (50 mg total) by mouth every evening. 9/1/22  Yes Samuel Brady MD   cholecalciferol, vitamin D3, (VITAMIN D3) 10 mcg (400 unit) Tab Take 400 Units by mouth once daily. 12/14/20 2/8/23  Historical Provider   dronabinoL (MARINOL) 2.5 MG capsule Take 1 capsule (2.5 mg total) by mouth daily with dinner or evening meal. 1/7/23 2/8/23  Binu Arciniega MD   leflunomide (ARAVA) 20 MG Tab Take 20 mg by mouth once daily.  2/8/23  Historical Provider       PHYSICAL EXAM:     BP (!) 110/56   Pulse 83   Temp 98.6 °F (37 °C) (Oral)   Resp 18   Ht 5' 8" (1.727 m)   Wt 49.9 kg (110 lb)   SpO2 (!) 94%   Breastfeeding No   BMI 16.73 kg/m²     Gen: alert, responsive, cachectic  HEENT:  Eyes - no pallor  External ears with no lesions  Nares patent  Mouth/Throat:  trachea midline  CV: RRR  Lungs: rales  Abd: +BS, soft, NT, ND  Ext: no atrophy; NO current " edema  Skin: warm, dry  Neuro: grossly intact  Psych: pleasant     LABS AND IMAGING:     Labs Reviewed   CBC W/ AUTO DIFFERENTIAL - Abnormal; Notable for the following components:       Result Value    RBC 3.28 (*)     Hemoglobin 9.8 (*)     Hematocrit 29.9 (*)     RDW 15.5 (*)     Immature Granulocytes 1.2 (*)     Immature Grans (Abs) 0.10 (*)     Lymph # 0.9 (*)     Gran % 81.6 (*)     Lymph % 10.4 (*)     All other components within normal limits    Narrative:     For upper or mid abdominal pain.   COMPREHENSIVE METABOLIC PANEL - Abnormal; Notable for the following components:    Sodium 129 (*)     Potassium 3.0 (*)     Creatinine 0.4 (*)     Total Protein 5.7 (*)     Albumin 1.5 (*)     Total Bilirubin 1.1 (*)     AST 53 (*)     Anion Gap 7 (*)     All other components within normal limits    Narrative:     For upper or mid abdominal pain.   CULTURE, STOOL   CLOSTRIDIUM DIFFICILE   CULTURE, BLOOD   CULTURE, BLOOD   LIPASE    Narrative:     For upper or mid abdominal pain.   TROPONIN I HIGH SENSITIVITY    Narrative:     For upper or mid abdominal pain.   B-TYPE NATRIURETIC PEPTIDE   TROPONIN I HIGH SENSITIVITY   B-TYPE NATRIURETIC PEPTIDE   URINALYSIS, REFLEX TO URINE CULTURE   STOOL EXAM-OVA,CYSTS,PARASITES   WBC, STOOL   OCCULT BLOOD X 1, STOOL   HEMOGLOBIN A1C   URINALYSIS, REFLEX TO URINE CULTURE       Imaging Results              CT Head Without Contrast (Final result)  Result time 02/08/23 13:44:25      Final result by Tim Carrington Jr., MD (02/08/23 13:44:25)                   Narrative:    CT OF THE HEAD WITHOUT CONTRAST    HISTORY: Neurological deficit. Stroke suspected. Generalized weakness.    Technical factors:   Spiral acquisition of the brain was generated at 3.0 mm thickness from the skull base to the skull vertex in helical fashion in the absence of intravenous contrast.  Additional coronal and sagittal reconstructed images were also included and reviewed.    CMS MANDATED QUALITY DATA - CT  RADIATION  436    All CT scans at this facility utilize dose modulation, iterative reconstruction, and/or weight based dosing when appropriate to reduce radiation dose to as low as reasonably achievable.        FINDINGS:  Generalized central and cortical involutional changes are established that are age concordant in keeping with the patient's clinical age of 62 years of age. Moderately prominent low-density changes throughout both the subcortical and periventricular white matter nonspecific although attributed towards chronic microvascular ischemic changes. No evidence of intracranial hemorrhage, masses, mass effect, midline shift, acute vascular insult. Ventricles maintain normal size and overall configuration. Third ventricle is normal in size. The visualized paranasal sinuses are clear. Prominent atheromatous calcification through the cavernous and supraclinoid segments of the internal carotid arteries.    IMPRESSION:  1. Generalized cerebral atrophy and superimposed chronic deep white matter ischemia.  2. No evidence of acute intracranial event    Electronically signed by:  Tim Carrington MD  2/8/2023 1:44 PM CST Workstation: [id number]                                     X-Ray Chest AP Portable (Final result)  Result time 02/08/23 10:41:37      Final result by Tim Carrington Jr., MD (02/08/23 10:41:37)                   Narrative:    HISTORY: Generalized weakness    COMPARISON:January 12, 2023    FINDINGS:Right-sided pleural effusion has evolved with increased accumulation and apical meniscus sign along the superior edge marginal mass effect upon the inferior edge of the right lower lobe reproducing right basilar atelectasis. This evidence of chronic interstitial markings in the basilar segments. Lungs are emphysematous with minimal perihilar scarring. No evidence of confluent infiltrate or mass lesion. Prominent arch atherosclerosis. The tracheal column is midline without evidence of shift. The osseous  structures reveal no significant finding. Postoperative changes of previous gallbladder resection.    IMPRESSION:  1. Evolving right-sided pleural effusion with right lower lobe atelectasis and new finding upon comparison.  2. No evidence of acute cardiopulmonary process.    Electronically signed by:  Tim Carrington MD  2/8/2023 10:41 AM [facility] Workstation: [id number]                                    ASSESSMENT & PLAN:   Claire Bowser is a 63 y.o. female admitted for    Active Hospital Problems    Diagnosis  POA    *CHF (congestive heart failure) [I50.9]  Yes    History of DVT of lower extremity [Z86.718]  Not Applicable     Chronic    Severe protein-calorie malnutrition [E43]  Yes     Chronic    Rheumatoid arthritis involving multiple sites with positive rheumatoid factor [M05.79]  Yes     Chronic    Mixed hyperlipidemia [E78.2]  Yes    Generalized anxiety disorder [F41.1]  Yes     Chronic    Tobacco use [Z72.0]  Yes     Chronic    Hypertension [I10]  Yes     Chronic      Resolved Hospital Problems   No resolved problems to display.        Plan    Acute CHF  ACS r/o  - lasix, evaluate response  - supplemental oxygen, wean as tolerated  - strict I/Os, daily weights, 1.5L fluid restrictions, Na restrictions  - echo  - EKG, trending troponin  - lexiscan in AM  - concern for complication of therapy / worsening renal function due to diuretics:  - trending BMP; monitor sCr    Chronic conditions as noted above/below; home medications reviewed personally by me and restarted as appropriate  Electrolyte derangement:  Trending BMP; Mg; replacement prn  DVT ppx:  home apixaban   FULL CODE      Ledy Martínez MD  [facility] Hospitalist  02/08/2023

## 2023-02-09 ENCOUNTER — CLINICAL SUPPORT (OUTPATIENT)
Dept: CARDIOLOGY | Facility: HOSPITAL | Age: 63
DRG: 438 | End: 2023-02-09
Attending: FAMILY MEDICINE
Payer: MEDICAID

## 2023-02-09 ENCOUNTER — HOSPITAL ENCOUNTER (OUTPATIENT)
Dept: RADIOLOGY | Facility: HOSPITAL | Age: 63
Discharge: HOME | DRG: 438 | End: 2023-02-09
Attending: FAMILY MEDICINE
Payer: MEDICAID

## 2023-02-09 VITALS — WEIGHT: 110 LBS | BODY MASS INDEX: 16.67 KG/M2 | HEIGHT: 68 IN

## 2023-02-09 LAB
ANION GAP SERPL CALC-SCNC: 8 MMOL/L (ref 8–16)
AV INDEX (PROSTH): 0.74
AV MEAN GRADIENT: 3 MMHG
AV PEAK GRADIENT: 5 MMHG
AV VALVE AREA: 2.33 CM2
AV VELOCITY RATIO: 0.75
BACTERIA #/AREA URNS HPF: NEGATIVE /HPF
BILIRUB UR QL STRIP: NEGATIVE
BSA FOR ECHO PROCEDURE: 1.55 M2
BUN SERPL-MCNC: 15 MG/DL (ref 8–23)
CALCIUM SERPL-MCNC: 8.4 MG/DL (ref 8.7–10.5)
CHLORIDE SERPL-SCNC: 96 MMOL/L (ref 95–110)
CLARITY UR: CLEAR
CO2 SERPL-SCNC: 28 MMOL/L (ref 23–29)
COLOR UR: YELLOW
CREAT SERPL-MCNC: 0.5 MG/DL (ref 0.5–1.4)
CV ECHO LV RWT: 0.86 CM
DOP CALC AO PEAK VEL: 1.17 M/S
DOP CALC AO VTI: 20.5 CM
DOP CALC LVOT AREA: 3.1 CM2
DOP CALC LVOT DIAMETER: 2 CM
DOP CALC LVOT PEAK VEL: 0.88 M/S
DOP CALC LVOT STROKE VOLUME: 47.73 CM3
DOP CALC MV VTI: 15.1 CM
DOP CALCLVOT PEAK VEL VTI: 15.2 CM
E WAVE DECELERATION TIME: 171 MSEC
E/A RATIO: 0.9
E/E' RATIO: 6.42 M/S
ECHO LV POSTERIOR WALL: 1.03 CM (ref 0.6–1.1)
EJECTION FRACTION: 60 %
EST. GFR  (NO RACE VARIABLE): >60 ML/MIN/1.73 M^2
ESTIMATED AVG GLUCOSE: 103 MG/DL (ref 68–131)
FRACTIONAL SHORTENING: 63 % (ref 28–44)
GLUCOSE SERPL-MCNC: 74 MG/DL (ref 70–110)
GLUCOSE UR QL STRIP: NEGATIVE
HBA1C MFR BLD: 5.2 % (ref 4.5–6.2)
HGB UR QL STRIP: NEGATIVE
HYALINE CASTS #/AREA URNS LPF: 46 /LPF
INTERVENTRICULAR SEPTUM: 0.74 CM (ref 0.6–1.1)
IVC DIAMETER: 1.26 CM
KETONES UR QL STRIP: NEGATIVE
LEFT ATRIUM VOLUME INDEX MOD: 9.4 ML/M2
LEFT ATRIUM VOLUME MOD: 15 CM3
LEFT INTERNAL DIMENSION IN SYSTOLE: 0.89 CM (ref 2.1–4)
LEFT VENTRICLE DIASTOLIC VOLUME INDEX: 38.55 ML/M2
LEFT VENTRICLE DIASTOLIC VOLUME: 61.3 ML
LEFT VENTRICLE MASS INDEX: 31 G/M2
LEFT VENTRICLE SYSTOLIC VOLUME INDEX: 15.7 ML/M2
LEFT VENTRICLE SYSTOLIC VOLUME: 25 ML
LEFT VENTRICULAR INTERNAL DIMENSION IN DIASTOLE: 2.4 CM (ref 3.5–6)
LEFT VENTRICULAR MASS: 49.43 G
LEUKOCYTE ESTERASE UR QL STRIP: NEGATIVE
LV LATERAL E/E' RATIO: 6.1 M/S
LV SEPTAL E/E' RATIO: 6.78 M/S
LVOT MG: 2 MMHG
LVOT MV: 0.56 CM/S
MAGNESIUM SERPL-MCNC: 1.6 MG/DL (ref 1.6–2.6)
MICROSCOPIC COMMENT: ABNORMAL
MV MEAN GRADIENT: 1 MMHG
MV PEAK A VEL: 0.68 M/S
MV PEAK E VEL: 0.61 M/S
MV PEAK GRADIENT: 3 MMHG
MV STENOSIS PRESSURE HALF TIME: 69 MS
MV VALVE AREA BY CONTINUITY EQUATION: 3.16 CM2
MV VALVE AREA P 1/2 METHOD: 3.19 CM2
NITRITE UR QL STRIP: NEGATIVE
PH UR STRIP: 6 [PH] (ref 5–8)
PISA TR MAX VEL: 2.84 M/S
POTASSIUM SERPL-SCNC: 3.3 MMOL/L (ref 3.5–5.1)
PROT UR QL STRIP: ABNORMAL
PV MV: 0.67 M/S
PV PEAK VELOCITY: 1.01 CM/S
RBC #/AREA URNS HPF: 5 /HPF (ref 0–4)
RIGHT VENTRICULAR END-DIASTOLIC DIMENSION: 2.4 CM
RV TISSUE DOPPLER FREE WALL SYSTOLIC VELOCITY 1 (APICAL 4 CHAMBER VIEW): 0.02 CM/S
SODIUM SERPL-SCNC: 132 MMOL/L (ref 136–145)
SP GR UR STRIP: 1.02 (ref 1–1.03)
SQUAMOUS #/AREA URNS HPF: 6 /HPF
TDI LATERAL: 0.1 M/S
TDI SEPTAL: 0.09 M/S
TDI: 0.1 M/S
TR MAX PG: 32 MMHG
TRICUSPID ANNULAR PLANE SYSTOLIC EXCURSION: 1.75 CM
URN SPEC COLLECT METH UR: ABNORMAL
UROBILINOGEN UR STRIP-ACNC: NEGATIVE EU/DL
WBC #/AREA URNS HPF: 3 /HPF (ref 0–5)

## 2023-02-09 PROCEDURE — 97162 PT EVAL MOD COMPLEX 30 MIN: CPT

## 2023-02-09 PROCEDURE — G0378 HOSPITAL OBSERVATION PER HR: HCPCS

## 2023-02-09 PROCEDURE — 81001 URINALYSIS AUTO W/SCOPE: CPT | Performed by: FAMILY MEDICINE

## 2023-02-09 PROCEDURE — 83735 ASSAY OF MAGNESIUM: CPT | Performed by: FAMILY MEDICINE

## 2023-02-09 PROCEDURE — 96365 THER/PROPH/DIAG IV INF INIT: CPT

## 2023-02-09 PROCEDURE — 93306 ECHO (CUPID ONLY): ICD-10-PCS | Mod: 26,,, | Performed by: GENERAL PRACTICE

## 2023-02-09 PROCEDURE — 87040 BLOOD CULTURE FOR BACTERIA: CPT | Performed by: FAMILY MEDICINE

## 2023-02-09 PROCEDURE — 80048 BASIC METABOLIC PNL TOTAL CA: CPT | Performed by: FAMILY MEDICINE

## 2023-02-09 PROCEDURE — 63600175 PHARM REV CODE 636 W HCPCS: Performed by: FAMILY MEDICINE

## 2023-02-09 PROCEDURE — 93306 TTE W/DOPPLER COMPLETE: CPT

## 2023-02-09 PROCEDURE — 96366 THER/PROPH/DIAG IV INF ADDON: CPT

## 2023-02-09 PROCEDURE — 97530 THERAPEUTIC ACTIVITIES: CPT

## 2023-02-09 PROCEDURE — 25000003 PHARM REV CODE 250: Performed by: FAMILY MEDICINE

## 2023-02-09 PROCEDURE — 93306 TTE W/DOPPLER COMPLETE: CPT | Mod: 26,,, | Performed by: GENERAL PRACTICE

## 2023-02-09 PROCEDURE — 21400001 HC TELEMETRY ROOM

## 2023-02-09 RX ORDER — MAGNESIUM SULFATE HEPTAHYDRATE 40 MG/ML
4 INJECTION, SOLUTION INTRAVENOUS ONCE
Status: COMPLETED | OUTPATIENT
Start: 2023-02-09 | End: 2023-02-09

## 2023-02-09 RX ADMIN — APIXABAN 5 MG: 5 TABLET, FILM COATED ORAL at 08:02

## 2023-02-09 RX ADMIN — TRAZODONE HYDROCHLORIDE 50 MG: 50 TABLET ORAL at 08:02

## 2023-02-09 RX ADMIN — HYDROCODONE BITARTRATE AND ACETAMINOPHEN 1 TABLET: 5; 325 TABLET ORAL at 05:02

## 2023-02-09 RX ADMIN — MAGNESIUM SULFATE 4 G: 2 INJECTION INTRAVENOUS at 10:02

## 2023-02-09 RX ADMIN — SERTRALINE HYDROCHLORIDE 50 MG: 50 TABLET ORAL at 08:02

## 2023-02-09 RX ADMIN — LACTOBACILLUS TAB 4 TABLET: TAB at 04:02

## 2023-02-09 RX ADMIN — POTASSIUM BICARBONATE 50 MEQ: 977.5 TABLET, EFFERVESCENT ORAL at 10:02

## 2023-02-09 RX ADMIN — LACTOBACILLUS TAB 4 TABLET: TAB at 10:02

## 2023-02-09 RX ADMIN — FOLIC ACID 1 MG: 1 TABLET ORAL at 08:02

## 2023-02-09 NOTE — PROGRESS NOTES
[facility]  Progress Note    Patient Name: Claire Bowser  MRN: [id number]  Admission Date: 2/8/2023 10:00 AM  Attending Physician: Ledy Martínez MD  Face-to-Face encounter date: 02/09/2023    Assessment & Plan:   Claire Bowser is a 63 y.o. female with:    Active Hospital Problems    Diagnosis  POA    *CHF (congestive heart failure) [I50.9]  Yes    History of DVT of lower extremity [Z86.718]  Not Applicable     Chronic    Severe protein-calorie malnutrition [E43]  Yes     Chronic    Rheumatoid arthritis involving multiple sites with positive rheumatoid factor [M05.79]  Yes     Chronic    Mixed hyperlipidemia [E78.2]  Yes    Generalized anxiety disorder [F41.1]  Yes     Chronic    Tobacco use [Z72.0]  Yes     Chronic    Hypertension [I10]  Yes     Chronic      Resolved Hospital Problems   No resolved problems to display.     Acute HFpEF, improved  ACS r/o, no CP  - lasix prn  - supplemental oxygen, weaned  - strict I/Os, daily weights, 1.5L fluid restrictions, Na restrictions  - echo in progress  - EKG, trending troponin  - lexiscan negative Oct 2022  - concern for complication of therapy / worsening renal function due to diuretics:  - trending BMP; monitor sCr  - home oxygen evalution in progress, if pt decides to discharge home    Generalized weakness  - CT head: no acute intracranial abnormality  - PT/OT  - likely will need SNF    Perineum chafing  - wound care    Other chronic conditions including but not limited to those noted above/below:  All home meds reviewed personally by me, and adjusted as appropriate.  Electrolyte derangement:  Trending BMP, Mg; Replacement prn  DVT ppx:  home apixaban, trending CBC  Dispo:  Home when medically stable.  Will need follow-up with their PCP.    FULL CODE    - The above conditions include an acute and/or chronic illness that poses a threat to life (or bodily function).  - Previous notes/encounters/external records reviewed personally by  "me.    Subjective:      Interval History:  SOB improved.  No CP.  Pt continues with generalized weakness.      Review of Systems   All systems reviewed and are negative except as noted per above.    Objective:     Physical Exam  BP (!) 119/59   Pulse 80   Temp 98.2 °F (36.8 °C) (Oral)   Resp 15   Ht 5' 8" (1.727 m)   Wt 49.9 kg (110 lb)   SpO2 99%   Breastfeeding No   BMI 16.73 kg/m²     Gen: alert, responsive, cachectic  HEENT:  Eyes - no pallor  External ears with no lesions  Nares patent  Mouth, Throat:  trachea midline  CV: RRR  Lungs: CTA B/L   Abd: +BS, soft, NT, ND  :  perineal erythema   Ext: +atrophy; no edema  Skin: warm, dry  Neuro: grossly intact  Psych: pleasant    Recent Labs   Lab 02/08/23  1041   WBC 8.53   HGB 9.8*   HCT 29.9*        Recent Labs   Lab 02/08/23  1041 02/09/23  0455   CALCIUM 8.9 8.4*   ALBUMIN 1.5*  --    PROT 5.7*  --    * 132*   K 3.0* 3.3*   CO2 26 28   CL 96 96   BUN 12 15   CREATININE 0.4* 0.5   ALKPHOS 96  --    ALT 42  --    AST 53*  --    BILITOT 1.1*  --      No results found for: POCTGLUCOSE   Microbiology Results (last 7 days)       Procedure Component Value Units Date/Time    Blood culture [[id number]] Collected: 02/09/23 0455    Order Status: Sent Specimen: Blood from Peripheral, Antecubital, Left Updated: 02/09/23 0511    Blood culture [[id number]] Collected: 02/09/23 0455    Order Status: Sent Specimen: Blood from Peripheral, Antecubital, Left Updated: 02/09/23 0511    Stool culture **cannot be ordered stat** [[id number]] Collected: 02/08/23 1638    Order Status: Sent Specimen: Stool Updated: 02/08/23 1654    Clostridium difficile EIA [[id number]] Collected: 02/08/23 1638    Order Status: Canceled Specimen: Stool           Imaging Results              CT Head Without Contrast (Final result)  Result time 02/08/23 13:44:25      Final result by Tim Carrington Jr., MD (02/08/23 13:44:25)                   Narrative:    CT OF THE HEAD WITHOUT " CONTRAST    HISTORY: Neurological deficit. Stroke suspected. Generalized weakness.    Technical factors:   Spiral acquisition of the brain was generated at 3.0 mm thickness from the skull base to the skull vertex in helical fashion in the absence of intravenous contrast.  Additional coronal and sagittal reconstructed images were also included and reviewed.    CMS MANDATED QUALITY DATA - CT RADIATION  436    All CT scans at this facility utilize dose modulation, iterative reconstruction, and/or weight based dosing when appropriate to reduce radiation dose to as low as reasonably achievable.        FINDINGS:  Generalized central and cortical involutional changes are established that are age concordant in keeping with the patient's clinical age of 62 years of age. Moderately prominent low-density changes throughout both the subcortical and periventricular white matter nonspecific although attributed towards chronic microvascular ischemic changes. No evidence of intracranial hemorrhage, masses, mass effect, midline shift, acute vascular insult. Ventricles maintain normal size and overall configuration. Third ventricle is normal in size. The visualized paranasal sinuses are clear. Prominent atheromatous calcification through the cavernous and supraclinoid segments of the internal carotid arteries.    IMPRESSION:  1. Generalized cerebral atrophy and superimposed chronic deep white matter ischemia.  2. No evidence of acute intracranial event    Electronically signed by:  Tim Carrington MD  2/8/2023 1:44 PM [facility] Workstation: [id number]                                     X-Ray Chest AP Portable (Final result)  Result time 02/08/23 10:41:37      Final result by Tim Carrington Jr., MD (02/08/23 10:41:37)                   Narrative:    HISTORY: Generalized weakness    COMPARISON:January 12, 2023    FINDINGS:Right-sided pleural effusion has evolved with increased accumulation and apical meniscus sign along the superior edge  marginal mass effect upon the inferior edge of the right lower lobe reproducing right basilar atelectasis. This evidence of chronic interstitial markings in the basilar segments. Lungs are emphysematous with minimal perihilar scarring. No evidence of confluent infiltrate or mass lesion. Prominent arch atherosclerosis. The tracheal column is midline without evidence of shift. The osseous structures reveal no significant finding. Postoperative changes of previous gallbladder resection.    IMPRESSION:  1. Evolving right-sided pleural effusion with right lower lobe atelectasis and new finding upon comparison.  2. No evidence of acute cardiopulmonary process.    Electronically signed by:  Tim Carrington MD  2/8/2023 10:41 AM [facility] Workstation: [id number]                                     Ledy Martínez MD  [facility] Hospitalist

## 2023-02-09 NOTE — PLAN OF CARE
[facility] - Emergency Dept  Initial Discharge Assessment       Primary Care Provider: Samuel Brady MD    Admission Diagnosis: CHF (congestive heart failure) [I50.9]    Admission Date: 2/8/2023  Expected Discharge Date: 2/10/2023    Social work intern met with Pt at bedside to complete discharge assessment. Robin Bowser (Spouse)   [phone] (Mobile) present during assessment. Pt AAOx4s. Demographics, PCP, and insurance verified. No home health. No dialysis. Pt reports ability to complete ADLs with assistance only since she is very weak. Pt verbalized plan to discharge to SNF (attending advised this was plan as she can for assessment while SW intern was present). Pt has also expressed a need for rollator due to increased weakness.      Discharge Barriers Identified: None    Payor: MEDICAID / Plan: MEDICAID OF [place] / Product Type: Government /     Extended Emergency Contact Information  Primary Emergency Contact: Robin Bowser  Address: [address] [place] [address] [facility]  Home Phone: [phone]  Mobile Phone: [phone]  Relation: Spouse  Preferred language: English   needed? No    Discharge Plan A: Skilled Nursing Facility  Discharge Plan B: Skilled Nursing Facility      [facility] [address] [place] [address] [address]  [address] [facility] [address]  Phone: [phone] Fax: [phone]      Initial Assessment (most recent)       Adult Discharge Assessment - 02/09/23 1110          Discharge Assessment    Assessment Type Discharge Planning Assessment     Confirmed/corrected address, phone number and insurance Yes     Confirmed Demographics Correct on Facesheet     Source of Information patient;family   Robin Bowser (Spouse)   [phone] (Mobile)    Reason For Admission congestive heart failure     People in Home spouse   Robin Bowser (Spouse)   [phone] (Mobile)    Facility Arrived From: oe     Do you expect to return to your current  living situation? No   Pt expressed a need for SNF    Do you have help at home or someone to help you manage your care at home? Yes     Who are your caregiver(s) and their phone number(s)? Robin Bowser (Spouse)   [phone] (Mobile)     Prior to hospitilization cognitive status: Alert/Oriented     Current cognitive status: Alert/Oriented     Walking or Climbing Stairs ambulation difficulty, assistance 1 person     Mobility Management  has to assist she is very weak     Dressing/Bathing bathing difficulty, assistance 1 person     Dressing/Bathing Management  must assist she is very weak     Equipment Currently Used at Home walker, standard;shower chair     Readmission within 30 days? Yes     Patient currently being followed by outpatient case management? No     Do you currently have service(s) that help you manage your care at home? No     Do you take prescription medications? Yes     Do you have prescription coverage? No   waiting on medicaid decision    Do you have any problems affording any of your prescribed medications? Yes     If yes, what medications? eliquis     Is the patient taking medications as prescribed? yes     Who is going to help you get home at discharge? Robin Bowser (Spouse)   [phone] (Mobile)     How do you get to doctors appointments? family or friend will provide     Are you on dialysis? No     Do you take coumadin? No     Discharge Plan A Skilled Nursing Facility     Discharge Plan B Skilled Nursing Facility     DME Needed Upon Discharge  rollator     Discharge Plan discussed with: Spouse/sig other;Patient     Name(s) and Number(s) Robin Bowser (Spouse)   [phone] (Mobile)     Discharge Barriers Identified None        Physical Activity    On average, how many days per week do you engage in moderate to strenuous exercise (like a brisk walk)? 0 days     On average, how many minutes do you engage in exercise at this level? 0 min        Financial Resource Strain    How hard is  it for you to pay for the very basics like food, housing, medical care, and heating? Hard        Housing Stability    In the last 12 months, was there a time when you were not able to pay the mortgage or rent on time? No     In the last 12 months, how many places have you lived? 1     In the last 12 months, was there a time when you did not have a steady place to sleep or slept in a shelter (including now)? No        Transportation Needs    In the past 12 months, has lack of transportation kept you from medical appointments or from getting medications? No     In the past 12 months, has lack of transportation kept you from meetings, work, or from getting things needed for daily living? No        Food Insecurity    Within the past 12 months, you worried that your food would run out before you got the money to buy more. Sometimes true     Within the past 12 months, the food you bought just didn't last and you didn't have money to get more. Never true        Stress    Do you feel stress - tense, restless, nervous, or anxious, or unable to sleep at night because your mind is troubled all the time - these days? Rather much        Social Connections    In a typical week, how many times do you talk on the phone with family, friends, or neighbors? More than three times a week     How often do you get together with friends or relatives? Three times a week     How often do you attend Alevism or Yazidism services? Never     Do you belong to any clubs or organizations such as Alevism groups, unions, fraternal or athletic groups, or school groups? No     How often do you attend meetings of the clubs or organizations you belong to? Never        Alcohol Use    Q1: How often do you have a drink containing alcohol? Never     Q2: How many drinks containing alcohol do you have on a typical day when you are drinking? Patient does not drink     Q3: How often do you have six or more drinks on one occasion? Never        OTHER    Name(s) of  People in Home Robin Bowser (Spouse)   [phone] (Mobile)                     Readmission Assessment (most recent)       Readmission Assessment - 02/09/23 1114          Readmission    Why were you hospitalized in the last 30 days? weakness     Why were you readmitted? Related to previous admission     When you left the hospital how did you feel? short of breath     When you left the hospital where did you go? Home with Family     Did patient/caregiver refused recommended DC plan? No     Tell me about what happened between when you left the hospital and the day you returned. began to get weaker and more short of breath     When did you start not feeling well? yesterday     Did you try to manage your symptoms your self? No     Did you call anyone? No     Why? ER     Did you try to see or did see a doctor or nurse before you came? No     Why? ER     Did you have  a follow-up appointment on discharge? No     Was this a planned readmission? No

## 2023-02-09 NOTE — PT/OT/SLP PROGRESS
Occupational Therapy      Patient Name:  Claire Bowser   MRN:  [id number]    Patient not seen today secondary to  (Off floor for stress test.). Will follow-up tomorrow.    2/9/2023

## 2023-02-09 NOTE — ED NOTES
"Medical screening exam completed.  I have conducted a focused provider triage encounter, findings are as follows:    Brief history of present illness:  Patient with history of recent biliary stent placement, presenting with diarrhea and generalized weakness.      Vitals:    02/08/23 0958   BP: 100/65   BP Location: Left arm   Patient Position: Sitting   Pulse: 90   Resp: 20   Temp: 98.6 °F (37 °C)   TempSrc: Oral   SpO2: 97%   Weight: 49.9 kg (110 lb)   Height: 5' 8" (1.727 m)       Pertinent physical exam:  no abdominal tenderness. No chest pain or shortness of breath.     Brief workup plan:  blood work, IV fluids    Preliminary workup initiated; this workup will be continued and followed by the physician or advanced practice provider that is assigned to the patient when roomed.       SILVESTRE Andujar  02/08/23 1014    "
Assessed perineum with hospitalist and she states she will order wound care.    
Bed: 28  Expected date:   Expected time:   Means of arrival:   Comments:  CC3  
Pt needs ultrasound IV- requested and machine is at bedside  
Pt states she had surgery January 27 and had a stent placed in her gallbladder. Says when she got home 5 days ago shes been sob since. Unable to walk long distances. Denies chest pain.   
Purewick placed prior to lasix admin.  Pt refused cath for ua sample due to pain- her hugo area is excoriated from wearing briefs for the last week per pt.  I have cleaned her with warm soapy water as much as she can tolerate.  Skin is red, and open in areas.  Soft stool collected and sent.  No ointment placed on skin - waiting for hositalist to examine.  Pt has OTC cream.     
WAITING ON ULTRASOUND IV FOR FLUIDS  
bilateral ear pain for 2 days

## 2023-02-09 NOTE — PT/OT/SLP EVAL
Physical Therapy Evaluation    Patient Name:  Claire Bowser   MRN:  [id number]    Recommendations:     Discharge Recommendations: nursing facility, skilled   Discharge Equipment Recommendations: other (see comments) (TBD next level of care)   Barriers to discharge: None    Assessment:     Claire Bowser is a 63 y.o. female admitted with a medical diagnosis of CHF (congestive heart failure).  She presents with the following impairments/functional limitations: weakness, impaired endurance, impaired self care skills, impaired functional mobility, gait instability, impaired balance, decreased upper extremity function, decreased lower extremity function, impaired cardiopulmonary response to activity.    Pt resting in bed with  at bedside.  She is A & Ox4 and recounts recent illness.  States she was admitted at [facility] several weeks ago then transferred to [facility] for 1 week and discharged home without any therapy post-acute but was given a RW.  She is now back in hospital due to SOB.  Pt has significant weakness and requires modA for bed mobility and transfers.  She declines ambulation today due to weakness and not feeling well upon sitting and then standing up.  Progress as able.    Rehab Prognosis: Fair; patient would benefit from acute skilled PT services to address these deficits and reach maximum level of function.    Recent Surgery: * No surgery found *      Plan:     During this hospitalization, patient to be seen 6 x/week to address the identified rehab impairments via gait training, therapeutic activities, therapeutic exercises and progress toward the following goals:    Plan of Care Expires:       Subjective     Chief Complaint: weakness/fatigue  Patient/Family Comments/goals: get stronger  Pain/Comfort:  Pain Rating 1: 0/10    Patients cultural, spiritual, Spiritism conflicts given the current situation:      Living Environment:  Pt lives with her  in  home with small step to enter  Prior to  admission, patients level of function was independent without devices 2 weeks prior to current admit.  Equipment used at home: walker, standard, shower chair.  DME owned (not currently used): none.  Upon discharge, patient will have assistance from .    Objective:     Communicated with RN prior to session.  Patient found HOB elevated with telemetry, blood pressure cuff, peripheral IV, pulse ox (continuous)  upon PT entry to room.    General Precautions: Standard, fall  Orthopedic Precautions:    Braces:    Respiratory Status: Room air    Exams:  Cognitive Exam:  Patient is oriented to Person, Place, Time, and Situation  RLE ROM: WFL  RLE Strength: 3/5  LLE ROM: WFL  LLE Strength: 3/5    Functional Mobility:  Bed Mobility:     Supine to Sit: moderate assistance  Sit to Supine: maximal assistance  Transfers:     Sit to Stand:  moderate assistance with hand-held assist      AM-PAC 6 CLICK MOBILITY  Total Score:12       Treatment & Education:  Pt was educated on the following: call light use, importance of OOB activity and functional mobility to negate the negative effects of prolonged bed rest during this hospitalization, safe transfers/ambulation and discharge planning recommendations/options.     Patient left HOB elevated with all lines intact, call button in reach, RN notified, and  present.    GOALS:   Multidisciplinary Problems       Physical Therapy Goals          Problem: Physical Therapy    Goal Priority Disciplines Outcome Goal Variances Interventions   Physical Therapy Goal     PT, PT/OT Ongoing, Progressing     Description: All PT goals to be met by discharge:    1. Supine to sit with Stand-by Assistance  2. Sit to stand transfer with Stand-by Assistance  3.. Bed to chair transfer with Supervision using Rolling Walker  4. Gait  x 100 feet with Minimal Assistance using Rolling Walker.                          History:     Past Medical History:   Diagnosis Date    Acute biliary pancreatitis  6/14/2022    Anxiety     Chronic pancreatitis 1/2/2023    Digestive disorder     Flu 02/2017    [facility]    Hypertension     Long-term use of immunosuppressant medication 6/10/2022    Rheumatoid arthritis involving multiple sites with positive rheumatoid factor 01/14/2021       Past Surgical History:   Procedure Laterality Date    COLONOSCOPY N/A 2/26/2021    Procedure: COLONOSCOPY;  Surgeon: Amy Sidhu MD;  Location: [facility];  Service: Endoscopy;  Laterality: N/A;    ENDOSCOPIC ULTRASOUND OF UPPER GASTROINTESTINAL TRACT N/A 7/1/2022    Procedure: ULTRASOUND, UPPER GI TRACT, ENDOSCOPIC;  Surgeon: Donavon Jewell III, MD;  Location: [facility] ENDO;  Service: Endoscopy;  Laterality: N/A;    ENDOSCOPIC ULTRASOUND OF UPPER GASTROINTESTINAL TRACT N/A 11/29/2022    Procedure: ULTRASOUND, UPPER GI TRACT, ENDOSCOPIC;  Surgeon: Donavon Jewell III, MD;  Location: [facility] END[facility];  Service: Endoscopy;  Laterality: N/A;    ENDOSCOPIC ULTRASOUND OF UPPER GASTROINTESTINAL TRACT N/A 1/3/2023    Procedure: ULTRASOUND, UPPER GI TRACT, ENDOSCOPIC;  Surgeon: Donavon Jewell III, MD;  Location: [facility] END[facility];  Service: Endoscopy;  Laterality: N/A;    ERCP N/A 12/30/2022    Procedure: ERCP (ENDOSCOPIC RETROGRADE CHOLANGIOPANCREATOGRAPHY);  Surgeon: Donavon Jewell III, MD;  Location: [facility];  Service: Endoscopy;  Laterality: N/A;    ERCP N/A 1/24/2023    Procedure: ERCP (ENDOSCOPIC RETROGRADE CHOLANGIOPANCREATOGRAPHY);  Surgeon: Rock Martines MD;  Location: [facility] ([address] [place] [address]);  Service: Endoscopy;  Laterality: N/A;    ESOPHAGOGASTRODUODENOSCOPY N/A 2/26/2021    Procedure: EGD (ESOPHAGOGASTRODUODENOSCOPY);  Surgeon: Amy Sidhu MD;  Location: [facility] ENDO;  Service: Endoscopy;  Laterality: N/A;    LAPAROSCOPIC CHOLECYSTECTOMY N/A 7/27/2022    Procedure: CHOLECYSTECTOMY, LAPAROSCOPIC;  Surgeon: Ramón Hwang III, MD;  Location: [facility];  Service: General;  Laterality: N/A;    TUBAL LIGATION          Time Tracking:     PT Received On: 02/09/23  PT Start Time: 1102     PT Stop Time: 1120  PT Total Time (min): 18 min     Billable Minutes: Evaluation 8 and Therapeutic Activity 10      02/09/2023

## 2023-02-09 NOTE — PLAN OF CARE
Problem: Physical Therapy  Goal: Physical Therapy Goal  Description: All PT goals to be met by discharge:    1. Supine to sit with Stand-by Assistance  2. Sit to stand transfer with Stand-by Assistance  3.. Bed to chair transfer with Supervision using Rolling Walker  4. Gait  x 100 feet with Minimal Assistance using Rolling Walker.     Outcome: Ongoing, Progressing

## 2023-02-10 LAB
ANION GAP SERPL CALC-SCNC: 6 MMOL/L (ref 8–16)
ANISOCYTOSIS BLD QL SMEAR: SLIGHT
BASOPHILS NFR BLD: 0 % (ref 0–1.9)
BUN SERPL-MCNC: 18 MG/DL (ref 8–23)
CALCIUM SERPL-MCNC: 8.4 MG/DL (ref 8.7–10.5)
CHLORIDE SERPL-SCNC: 98 MMOL/L (ref 95–110)
CO2 SERPL-SCNC: 28 MMOL/L (ref 23–29)
CREAT SERPL-MCNC: 0.5 MG/DL (ref 0.5–1.4)
DIFFERENTIAL METHOD: ABNORMAL
EOSINOPHIL NFR BLD: 0 % (ref 0–8)
ERYTHROCYTE [DISTWIDTH] IN BLOOD BY AUTOMATED COUNT: 15.6 % (ref 11.5–14.5)
ERYTHROCYTE [DISTWIDTH] IN BLOOD BY AUTOMATED COUNT: 15.7 % (ref 11.5–14.5)
EST. GFR  (NO RACE VARIABLE): >60 ML/MIN/1.73 M^2
FERRITIN SERPL-MCNC: 516 NG/ML (ref 20–300)
FOLATE SERPL-MCNC: >24.8 NG/ML (ref 4–24)
GLUCOSE SERPL-MCNC: 84 MG/DL (ref 70–110)
HCT VFR BLD AUTO: 21.3 % (ref 37–48.5)
HCT VFR BLD AUTO: 22.2 % (ref 37–48.5)
HGB BLD-MCNC: 7 G/DL (ref 12–16)
HGB BLD-MCNC: 7.2 G/DL (ref 12–16)
IMM GRANULOCYTES # BLD AUTO: ABNORMAL K/UL (ref 0–0.04)
IMM GRANULOCYTES NFR BLD AUTO: ABNORMAL % (ref 0–0.5)
IRON SERPL-MCNC: 22 UG/DL (ref 30–160)
LYMPHOCYTES NFR BLD: 14 % (ref 18–48)
MAGNESIUM SERPL-MCNC: 2 MG/DL (ref 1.6–2.6)
MCH RBC QN AUTO: 29.3 PG (ref 27–31)
MCH RBC QN AUTO: 29.6 PG (ref 27–31)
MCHC RBC AUTO-ENTMCNC: 32.4 G/DL (ref 32–36)
MCHC RBC AUTO-ENTMCNC: 32.9 G/DL (ref 32–36)
MCV RBC AUTO: 89 FL (ref 82–98)
MCV RBC AUTO: 91 FL (ref 82–98)
MONOCYTES NFR BLD: 5 % (ref 4–15)
NEUTROPHILS NFR BLD: 68 % (ref 38–73)
NEUTS BAND NFR BLD MANUAL: 13 %
NRBC BLD-RTO: 0 /100 WBC
PLATELET # BLD AUTO: 192 K/UL (ref 150–450)
PLATELET # BLD AUTO: 194 K/UL (ref 150–450)
PMV BLD AUTO: 10.5 FL (ref 9.2–12.9)
PMV BLD AUTO: 9.7 FL (ref 9.2–12.9)
POIKILOCYTOSIS BLD QL SMEAR: SLIGHT
POTASSIUM SERPL-SCNC: 3.2 MMOL/L (ref 3.5–5.1)
RBC # BLD AUTO: 2.39 M/UL (ref 4–5.4)
RBC # BLD AUTO: 2.43 M/UL (ref 4–5.4)
RETICS/RBC NFR AUTO: 0.7 % (ref 0.5–2.5)
SATURATED IRON: ABNORMAL % (ref 20–50)
SODIUM SERPL-SCNC: 132 MMOL/L (ref 136–145)
TOTAL IRON BINDING CAPACITY: ABNORMAL UG/DL (ref 250–450)
TRANSFERRIN SERPL-MCNC: <70 MG/DL (ref 200–375)
VIT B12 SERPL-MCNC: 585 PG/ML (ref 210–950)
WBC # BLD AUTO: 6.58 K/UL (ref 3.9–12.7)
WBC # BLD AUTO: 7.98 K/UL (ref 3.9–12.7)

## 2023-02-10 PROCEDURE — 97110 THERAPEUTIC EXERCISES: CPT

## 2023-02-10 PROCEDURE — 25000003 PHARM REV CODE 250: Performed by: FAMILY MEDICINE

## 2023-02-10 PROCEDURE — 82746 ASSAY OF FOLIC ACID SERUM: CPT | Performed by: STUDENT IN AN ORGANIZED HEALTH CARE EDUCATION/TRAINING PROGRAM

## 2023-02-10 PROCEDURE — 83735 ASSAY OF MAGNESIUM: CPT | Performed by: FAMILY MEDICINE

## 2023-02-10 PROCEDURE — 97166 OT EVAL MOD COMPLEX 45 MIN: CPT

## 2023-02-10 PROCEDURE — 85045 AUTOMATED RETICULOCYTE COUNT: CPT | Performed by: STUDENT IN AN ORGANIZED HEALTH CARE EDUCATION/TRAINING PROGRAM

## 2023-02-10 PROCEDURE — G0378 HOSPITAL OBSERVATION PER HR: HCPCS

## 2023-02-10 PROCEDURE — 82728 ASSAY OF FERRITIN: CPT | Performed by: STUDENT IN AN ORGANIZED HEALTH CARE EDUCATION/TRAINING PROGRAM

## 2023-02-10 PROCEDURE — 84466 ASSAY OF TRANSFERRIN: CPT | Performed by: STUDENT IN AN ORGANIZED HEALTH CARE EDUCATION/TRAINING PROGRAM

## 2023-02-10 PROCEDURE — 97530 THERAPEUTIC ACTIVITIES: CPT

## 2023-02-10 PROCEDURE — 80048 BASIC METABOLIC PNL TOTAL CA: CPT | Performed by: FAMILY MEDICINE

## 2023-02-10 PROCEDURE — 36415 COLL VENOUS BLD VENIPUNCTURE: CPT | Performed by: FAMILY MEDICINE

## 2023-02-10 PROCEDURE — 82607 VITAMIN B-12: CPT | Performed by: STUDENT IN AN ORGANIZED HEALTH CARE EDUCATION/TRAINING PROGRAM

## 2023-02-10 PROCEDURE — 21400001 HC TELEMETRY ROOM

## 2023-02-10 PROCEDURE — 85007 BL SMEAR W/DIFF WBC COUNT: CPT | Performed by: FAMILY MEDICINE

## 2023-02-10 PROCEDURE — 85027 COMPLETE CBC AUTOMATED: CPT | Mod: 91 | Performed by: STUDENT IN AN ORGANIZED HEALTH CARE EDUCATION/TRAINING PROGRAM

## 2023-02-10 PROCEDURE — 36415 COLL VENOUS BLD VENIPUNCTURE: CPT | Performed by: STUDENT IN AN ORGANIZED HEALTH CARE EDUCATION/TRAINING PROGRAM

## 2023-02-10 PROCEDURE — 97535 SELF CARE MNGMENT TRAINING: CPT

## 2023-02-10 PROCEDURE — 85027 COMPLETE CBC AUTOMATED: CPT | Performed by: FAMILY MEDICINE

## 2023-02-10 RX ORDER — SODIUM,POTASSIUM PHOSPHATES 280-250MG
2 POWDER IN PACKET (EA) ORAL
Status: DISCONTINUED | OUTPATIENT
Start: 2023-02-10 | End: 2023-02-16 | Stop reason: HOSPADM

## 2023-02-10 RX ORDER — LANOLIN ALCOHOL/MO/W.PET/CERES
800 CREAM (GRAM) TOPICAL
Status: DISCONTINUED | OUTPATIENT
Start: 2023-02-10 | End: 2023-02-16 | Stop reason: HOSPADM

## 2023-02-10 RX ADMIN — APIXABAN 5 MG: 5 TABLET, FILM COATED ORAL at 09:02

## 2023-02-10 RX ADMIN — TRAZODONE HYDROCHLORIDE 50 MG: 50 TABLET ORAL at 08:02

## 2023-02-10 RX ADMIN — LACTOBACILLUS TAB 4 TABLET: TAB at 11:02

## 2023-02-10 RX ADMIN — PANTOPRAZOLE SODIUM 40 MG: 40 TABLET, DELAYED RELEASE ORAL at 05:02

## 2023-02-10 RX ADMIN — LACTOBACILLUS TAB 4 TABLET: TAB at 07:02

## 2023-02-10 RX ADMIN — APIXABAN 5 MG: 5 TABLET, FILM COATED ORAL at 08:02

## 2023-02-10 RX ADMIN — SERTRALINE HYDROCHLORIDE 50 MG: 50 TABLET ORAL at 08:02

## 2023-02-10 RX ADMIN — FOLIC ACID 1 MG: 1 TABLET ORAL at 09:02

## 2023-02-10 RX ADMIN — HYDROCODONE BITARTRATE AND ACETAMINOPHEN 1 TABLET: 5; 325 TABLET ORAL at 11:02

## 2023-02-10 RX ADMIN — LACTOBACILLUS TAB 4 TABLET: TAB at 04:02

## 2023-02-10 NOTE — CONSULTS
"Count includes the [facility]  Adult Nutrition   Consult Note (Initial Assessment)     SUMMARY     Recommendations  Recommendation/Intervention:   1) Add Unjury with meals for patient to try.   2) Add yogurt BID to aid in diarrhea.   3) Add Banatrol with dinner to aid in diarrhea.   4) RD will continue to monitor.    Goals: Patient to meet >75% of EEN/EPN  Nutrition Goal Status: new  Communication of RD Recs: reviewed with RN    Dietitian Rounds Brief  Consult received for wounds. Patient reports decreased appetite and noted weight loss since July of ~40# (27%) which is severe. She could not tell me her UBW. Patient had severe orbital and clavicle muscle waisting, she is weak. She had 3 BMS today. Per RN, they are getting more formed now. Diarrhea x 1 week PTA. We discussed adding yogurt and Banatrol to aid in diarrhea. She cannot tolerate Ensure and stated it goes right through her. Willing to try Unjury.     Diet order:   Current Diet Order: Cardiac     Evaluation of Received Nutrient/Fluid Intake  Energy Calories Required: not meeting needs  Protein Required: not meeting needs  Tolerance: tolerating     % Intake of Estimated Energy Needs: 25 - 50 %  % Meal Intake: 25 - 50 %      Intake/Output Summary (Last 24 hours) at 2/10/2023 1440  Last data filed at 2/10/2023 0345  Gross per 24 hour   Intake --   Output 260 ml   Net -260 ml        Anthropometrics  Temp: 98.6 °F (37 °C)  Height Method: Stated  Height: 5' 8" (172.7 cm)  Height (inches): 68 in  Weight Method: Bed Scale  Weight: 48.6 kg (107 lb 3.2 oz)  Weight (lb): 107.2 lb  Ideal Body Weight (IBW), Female: 140 lb  % Ideal Body Weight, Female (lb): 74.49 %  BMI (Calculated): 16.3  BMI Grade: 16 - 16.9 protein-energy malnutrition grade II       Estimated/Assessed Needs  Weight Used For Calorie Calculations: 49 kg (108 lb 0.4 oz)  Energy Calorie Requirements (kcal): 9539-1220 (35-40)  Energy Need Method: Kcal/kg  Protein Requirements: 59-74gm (1.2-1.5gm/kg)  Weight " Used For Protein Calculations: 49 kg (108 lb 0.4 oz)  Fluid Requirements (mL): 1225ml (25ml/kg)     RDA Method (mL): 1715       Reason for Assessment  Reason For Assessment: consult  Diagnosis:  (CHF (congestive heart failure))  Relevant Medical History: Acute biliary pancreatitis, chronic pancreatitis, HTN, RA    Nutrition/Diet History  Spiritual, Cultural Beliefs, Episcopal Practices, Values that Affect Care: no  Food Allergies: NKFA  Factors Affecting Nutritional Intake: decreased appetite    Nutrition Risk Screen  Nutrition Risk Screen: no indicators present       Altered Skin Integrity 02/09/23 1622 medial;posterior Sacral spine #1 Incontinence associated dermatitis Partial thickness tissue loss. Shallow open ulcer with a red or pink wound bed, without slough. Intact or Open/Ruptured Serum-filled blister.-Wound Image: Images linked       Altered Skin Integrity 02/09/23 1653 Pubis #2 Incontinence associated dermatitis-Wound Image: Images linked  MST Score: 0  Have you recently lost weight without trying?: No  Weight loss score: 0  Have you been eating poorly because of a decreased appetite?: No  Appetite score: 0       Weight History:  Wt Readings from Last 10 Encounters:   02/10/23 48.6 kg (107 lb 3.2 oz)   02/09/23 49.9 kg (110 lb)   01/23/23 53.8 kg (118 lb 9.7 oz)   01/21/23 53.8 kg (118 lb 9.7 oz)   01/22/23 53.8 kg (118 lb 9.7 oz)   01/12/23 37.2 kg (82 lb)   01/06/23 43.4 kg (95 lb 11.2 oz)   12/29/22 42.6 kg (94 lb)   11/29/22 37.2 kg (82 lb)   10/25/22 40.3 kg (88 lb 13.5 oz)        Lab/Procedures/Meds: Pertinent Labs/Meds Reviewed    Medications:Pertinent Medications Reviewed  Scheduled Meds:   apixaban  5 mg Oral BID    folic acid  1 mg Oral Daily    Lactobacillus acidoph-L.bulgar  4 tablet Oral TID WM    pantoprazole  40 mg Oral Before breakfast    sertraline  50 mg Oral QHS    traZODone  50 mg Oral QHS     Continuous Infusions:  PRN Meds:.acetaminophen, albuterol-ipratropium, dextrose 10%, dextrose  10%, glucagon (human recombinant), glucose, glucose, HYDROcodone-acetaminophen, magnesium oxide, magnesium oxide, melatonin, morphine, naloxone, ondansetron, polyethylene glycol, potassium bicarbonate, potassium bicarbonate, potassium bicarbonate, potassium, sodium phosphates, potassium, sodium phosphates, potassium, sodium phosphates, senna-docusate 8.6-50 mg, simethicone, sodium chloride 0.9%    Labs: Pertinent Labs Reviewed  Clinical Chemistry:  Recent Labs   Lab 02/08/23  1041 02/09/23  0455 02/10/23  0336   *   < > 132*   K 3.0*   < > 3.2*   CL 96   < > 98   CO2 26   < > 28   GLU 91   < > 84   BUN 12   < > 18   CREATININE 0.4*   < > 0.5   CALCIUM 8.9   < > 8.4*   PROT 5.7*  --   --    ALBUMIN 1.5*  --   --    BILITOT 1.1*  --   --    ALKPHOS 96  --   --    AST 53*  --   --    ALT 42  --   --    ANIONGAP 7*   < > 6*   MG  --    < > 2.0   LIPASE 40  --   --     < > = values in this interval not displayed.     CBC:   Recent Labs   Lab 02/10/23  0336   WBC 7.98   RBC 2.39*   HGB 7.0*   HCT 21.3*      MCV 89   MCH 29.3   MCHC 32.9     Lipid Panel:  No results for input(s): CHOL, HDL, LDLCALC, TRIG, CHOLHDL in the last 168 hours.  Cardiac Profile:  Recent Labs   Lab 02/08/23  1041   BNP 80     Inflammatory Labs:  No results for input(s): CRP in the last 168 hours.  Diabetes:  Recent Labs   Lab 02/08/23  2040   HGBA1C 5.2     Thyroid & Parathyroid:  No results for input(s): TSH, FREET4, K4TEYOD, S4IPYBC, THYROIDAB in the last 168 hours.    Monitor and Evaluation  Food and Nutrient Intake: energy intake  Food and Nutrient Adminstration: diet order  Physical Activity and Function: nutrition-related ADLs and IADLs  Anthropometric Measurements: weight change  Biochemical Data, Medical Tests and Procedures: electrolyte and renal panel, gastrointestinal profile, glucose/endocrine profile, inflammatory profile, lipid profile  Nutrition-Focused Physical Findings: overall appearance     Nutrition Risk  Level of  Risk/Frequency of Follow-up: high     Nutrition Follow-Up  RD Follow-up?: Yes      Tia Panda, CRISTINA 02/10/2023 11:36 AM

## 2023-02-10 NOTE — PLAN OF CARE
Problem: Skin Injury Risk Increased  Goal: Skin Health and Integrity  Intervention: Promote and Optimize Oral Intake  Flowsheets (Taken 2/10/2023 1448)  Oral Nutrition Promotion: calorie-dense liquids provided     Problem: Oral Intake Inadequate  Goal: Improved Oral Intake  Intervention: Promote and Optimize Oral Intake  Flowsheets (Taken 2/10/2023 1448)  Oral Nutrition Promotion: calorie-dense liquids provided

## 2023-02-10 NOTE — PT/OT/SLP EVAL
Occupational Therapy   Evaluation    Name: Claire Bowser  MRN: [id number]  Admitting Diagnosis: CHF (congestive heart failure)  Recent Surgery: * No surgery found *      Recommendations:     Discharge Recommendations: nursing facility, skilled  Discharge Equipment Recommendations:  other (see comments) (TBD)  Barriers to discharge:  None    Assessment:     Claire Bowser is a 63 y.o. female with a medical diagnosis of CHF (congestive heart failure). Performance deficits affecting function: weakness, impaired endurance, impaired functional mobility, gait instability, decreased safety awareness, edema, impaired cardiopulmonary response to activity.  Patient with recent hospitalization and was discharged home with assistance from spouse. She found it difficult to manage care at home and is now interested in going to a facility.     Rehab Prognosis: Fair; patient would benefit from acute skilled OT services to address these deficits and reach maximum level of function.       Plan:     Patient to be seen 5 x/week to address the above listed problems via self-care/home management, therapeutic activities, therapeutic exercises  Plan of Care Expires:    Plan of Care Reviewed with: patient    Subjective     Chief Complaint: weakness  Patient/Family Comments/goals: return home    Occupational Profile:  Living Environment: lives with spouse in single story home  Previous level of function: patient was performing self care without device prior to hospitalizations  Equipment Used at Home: walker, standard, shower chair  Assistance upon Discharge: spouse is available for assistance    Pain/Comfort:  Pain Rating 1: 0/10    Patients cultural, spiritual, Druze conflicts given the current situation: no    Objective:     Communicated with: nurse prior to session.  Patient found HOB elevated with blood pressure cuff, peripheral IV, lake catheter upon OT entry to room.    General Precautions: Standard, fall  Orthopedic Precautions:  N/A  Braces: N/A  Respiratory Status: Room air    Occupational Performance:    Bed Mobility:    Patient completed Rolling/Turning to Left with  moderate assistance  Patient completed Rolling/Turning to Right with moderate assistance  Patient completed Scooting/Bridging with moderate assistance  Patient completed Supine to Sit with moderate assistance      Activities of Daily Living:  Grooming: stand by assistance/setup for grooming tasks with HOB elevated    Cognitive/Visual Perceptual:  Cognitive/Psychosocial Skills:     -       Oriented to: Person, Place, Time, and Situation   -       Follows Commands/attention:Follows multistep  commands  -       Communication: clear/fluent  -       Memory: No Deficits noted  -       Safety awareness/insight to disability: intact   -       Mood/Affect/Coping skills/emotional control: Appropriate to situation and Cooperative    Physical Exam:  Upper Extremity Range of Motion:     -       Right Upper Extremity: WFL  -       Left Upper Extremity: WFL  Upper Extremity Strength:    -       Right Upper Extremity: WFL  -       Left Upper Extremity: WFL   Strength:    -       Right Upper Extremity: WFL  -       Left Upper Extremity: WFL  Fine Motor Coordination:    -       Intact    AMPAC 6 Click ADL:  AMPAC Total Score: 16    Treatment & Education:  Patient was educated on role of OT/POC, discharge planning and equipment needs, reviewed use of call bell for assistance.     Patient left HOB elevated with all lines intact, call button in reach, and bed alarm on    GOALS:   Multidisciplinary Problems       Occupational Therapy Goals          Problem: Occupational Therapy    Goal Priority Disciplines Outcome Interventions   Occupational Therapy Goal     OT, PT/OT     Description: Goals to be met by: 3/10/23     Patient will increase functional independence with ADLs by performing:    UE Dressing with Minimal Assistance.  LE Dressing with Minimal Assistance.  Grooming while seated with  Minimal Assistance.  Toileting from toilet with Minimal Assistance for hygiene and clothing management.   Toilet transfer to toilet with Minimal Assistance.                         History:     Past Medical History:   Diagnosis Date    Acute biliary pancreatitis 6/14/2022    Anxiety     Chronic pancreatitis 1/2/2023    Digestive disorder     Flu 02/2017    [facility]    Hypertension     Long-term use of immunosuppressant medication 6/10/2022    Rheumatoid arthritis involving multiple sites with positive rheumatoid factor 01/14/2021         Past Surgical History:   Procedure Laterality Date    COLONOSCOPY N/A 2/26/2021    Procedure: COLONOSCOPY;  Surgeon: Amy Sidhu MD;  Location: [facility];  Service: Endoscopy;  Laterality: N/A;    ENDOSCOPIC ULTRASOUND OF UPPER GASTROINTESTINAL TRACT N/A 7/1/2022    Procedure: ULTRASOUND, UPPER GI TRACT, ENDOSCOPIC;  Surgeon: Donavon Jewell III, MD;  Location: [facility];  Service: Endoscopy;  Laterality: N/A;    ENDOSCOPIC ULTRASOUND OF UPPER GASTROINTESTINAL TRACT N/A 11/29/2022    Procedure: ULTRASOUND, UPPER GI TRACT, ENDOSCOPIC;  Surgeon: Donavon Jewell III, MD;  Location: [facility];  Service: Endoscopy;  Laterality: N/A;    ENDOSCOPIC ULTRASOUND OF UPPER GASTROINTESTINAL TRACT N/A 1/3/2023    Procedure: ULTRASOUND, UPPER GI TRACT, ENDOSCOPIC;  Surgeon: Donavon Jewell III, MD;  Location: [facility];  Service: Endoscopy;  Laterality: N/A;    ERCP N/A 12/30/2022    Procedure: ERCP (ENDOSCOPIC RETROGRADE CHOLANGIOPANCREATOGRAPHY);  Surgeon: Donavon Jewell III, MD;  Location: [facility];  Service: Endoscopy;  Laterality: N/A;    ERCP N/A 1/24/2023    Procedure: ERCP (ENDOSCOPIC RETROGRADE CHOLANGIOPANCREATOGRAPHY);  Surgeon: Rock Martines MD;  Location: [address]);  Service: Endoscopy;  Laterality: N/A;    ESOPHAGOGASTRODUODENOSCOPY N/A 2/26/2021    Procedure: EGD (ESOPHAGOGASTRODUODENOSCOPY);  Surgeon: Amy Sidhu MD;  Location:  [facility];  Service: Endoscopy;  Laterality: N/A;    LAPAROSCOPIC CHOLECYSTECTOMY N/A 7/27/2022    Procedure: CHOLECYSTECTOMY, LAPAROSCOPIC;  Surgeon: Ramón Hwang III, MD;  Location: [facility];  Service: General;  Laterality: N/A;    TUBAL LIGATION         Time Tracking:     OT Date of Treatment: 02/10/23  OT Start Time: 0935  OT Stop Time: 1000  OT Total Time (min): 25 min    Billable Minutes:Evaluation 10  Self Care/Home Management 15    2/10/2023

## 2023-02-10 NOTE — PROGRESS NOTES
[facility] Medicine  Progress Note    Patient Name: Claire Bowser  MRN: [id number]  Admission Date: 2/8/2023 10:00 AM  Attending Physician: Ledy Martínez MD  Face-to-Face encounter date: 02/10/2023    Assessment & Plan:   Claire Boswer is a 63 y.o. female with:    Active Hospital Problems    Diagnosis  POA    *CHF (congestive heart failure) [I50.9]  Yes    History of DVT of lower extremity [Z86.718]  Not Applicable     Chronic    Severe protein-calorie malnutrition [E43]  Yes     Chronic    Rheumatoid arthritis involving multiple sites with positive rheumatoid factor [M05.79]  Yes     Chronic    Mixed hyperlipidemia [E78.2]  Yes    Generalized anxiety disorder [F41.1]  Yes     Chronic    Tobacco use [Z72.0]  Yes     Chronic    Hypertension [I10]  Yes     Chronic      Resolved Hospital Problems   No resolved problems to display.     Acute HFpEF, improved  ACS r/o, no CP  - lasix prn  - supplemental oxygen, weaned  - strict I/Os, daily weights, 1.5L fluid restrictions, Na restrictions  - echo with preserved ejection fraction  - EKG, trending troponin  - lexiscan negative Oct 2022  - concern for complication of therapy / worsening renal function due to diuretics:  - trending BMP; monitor sCr    Generalized weakness  - CT head: no acute intracranial abnormality  - PT/OT  - likely will need SNF    Perineum chafing  - wound care    Anemia  - acute anemia  - proceed with workup  - no signs of melena, but may need GI workup if no other evident cause.     Other chronic conditions including but not limited to those noted above/below:  All home meds reviewed personally by me, and adjusted as appropriate.  Electrolyte derangement:  Trending BMP, Mg; Replacement prn  DVT ppx:  home apixaban, trending CBC  Dispo:  Home when medically stable.  Will need follow-up with their PCP.    FULL CODE    - The above conditions include an acute and/or chronic illness that poses a threat to life (or bodily  "function).  - Previous notes/encounters/external records reviewed personally by me.    Subjective:      Interval History:  She is feeling okay today.  No pain.  She is generally weak.  Waiting for skilled nursing facility placement.  No signs of GI bleeding.  Will need to workup anemia further.    Review of Systems   All systems reviewed and are negative except as noted per above.    Objective:     Physical Exam  /84   Pulse 84   Temp 98.6 °F (37 °C) (Oral)   Resp 16   Ht 5' 8" (1.727 m)   Wt 48.6 kg (107 lb 3.2 oz)   SpO2 (!) 93%   Breastfeeding No   BMI 16.30 kg/m²     Gen: alert, responsive, cachectic  HEENT:  Eyes - no pallor  External ears with no lesions  Nares patent  Mouth, Throat:  trachea midline  CV: RRR  Lungs: CTA B/L   Abd: +BS, soft, NT, ND  :  perineal erythema   Ext: +atrophy; no edema  Skin: warm, dry, severe skin breakdown of her perineum and perivaginal / perirectal area.   Neuro: grossly intact  Psych: pleasant    Recent Labs   Lab 02/10/23  0336   WBC 7.98   HGB 7.0*   HCT 21.3*        Recent Labs   Lab 02/10/23  0336   CALCIUM 8.4*   *   K 3.2*   CO2 28   CL 98   BUN 18   CREATININE 0.5     No results found for: POCTGLUCOSE   Microbiology Results (last 7 days)       Procedure Component Value Units Date/Time    Stool culture **cannot be ordered stat** [[id number]] Collected: 02/08/23 1638    Order Status: Completed Specimen: Stool Updated: 02/10/23 1321     Stool Culture Nothing significant to date    Blood culture [[id number]] Collected: 02/09/23 0455    Order Status: Completed Specimen: Blood from Peripheral, Antecubital, Left Updated: 02/10/23 0632     Blood Culture, Routine No Growth to date      No Growth to date    Blood culture [[id number]] Collected: 02/09/23 0455    Order Status: Completed Specimen: Blood from Peripheral, Antecubital, Left Updated: 02/10/23 0632     Blood Culture, Routine No Growth to date      No Growth to date    Clostridium difficile EIA " [[id number]] Collected: 02/08/23 1638    Order Status: Canceled Specimen: Stool           Imaging Results              CT Head Without Contrast (Final result)  Result time 02/08/23 13:44:25      Final result by Tim Carrington Jr., MD (02/08/23 13:44:25)                   Narrative:    CT OF THE HEAD WITHOUT CONTRAST    HISTORY: Neurological deficit. Stroke suspected. Generalized weakness.    Technical factors:   Spiral acquisition of the brain was generated at 3.0 mm thickness from the skull base to the skull vertex in helical fashion in the absence of intravenous contrast.  Additional coronal and sagittal reconstructed images were also included and reviewed.    CMS MANDATED QUALITY DATA - CT RADIATION  436    All CT scans at this facility utilize dose modulation, iterative reconstruction, and/or weight based dosing when appropriate to reduce radiation dose to as low as reasonably achievable.        FINDINGS:  Generalized central and cortical involutional changes are established that are age concordant in keeping with the patient's clinical age of 62 years of age. Moderately prominent low-density changes throughout both the subcortical and periventricular white matter nonspecific although attributed towards chronic microvascular ischemic changes. No evidence of intracranial hemorrhage, masses, mass effect, midline shift, acute vascular insult. Ventricles maintain normal size and overall configuration. Third ventricle is normal in size. The visualized paranasal sinuses are clear. Prominent atheromatous calcification through the cavernous and supraclinoid segments of the internal carotid arteries.    IMPRESSION:  1. Generalized cerebral atrophy and superimposed chronic deep white matter ischemia.  2. No evidence of acute intracranial event    Electronically signed by:  Tim Carrington MD  2/8/2023 1:44 PM [facility] Workstation: [id number]                                     X-Ray Chest AP Portable (Final result)  Result  time 02/08/23 10:41:37      Final result by Tim Carrington Jr., MD (02/08/23 10:41:37)                   Narrative:    HISTORY: Generalized weakness    COMPARISON:January 12, 2023    FINDINGS:Right-sided pleural effusion has evolved with increased accumulation and apical meniscus sign along the superior edge marginal mass effect upon the inferior edge of the right lower lobe reproducing right basilar atelectasis. This evidence of chronic interstitial markings in the basilar segments. Lungs are emphysematous with minimal perihilar scarring. No evidence of confluent infiltrate or mass lesion. Prominent arch atherosclerosis. The tracheal column is midline without evidence of shift. The osseous structures reveal no significant finding. Postoperative changes of previous gallbladder resection.    IMPRESSION:  1. Evolving right-sided pleural effusion with right lower lobe atelectasis and new finding upon comparison.  2. No evidence of acute cardiopulmonary process.    Electronically signed by:  Tim Carrington MD  2/8/2023 10:41 AM [facility] Workstation: [id number]                                     Ollie Obrien MD  [facility] Hospitalist

## 2023-02-10 NOTE — PLAN OF CARE
Problem: Adult Inpatient Plan of Care  Goal: Plan of Care Review  Outcome: Ongoing, Progressing  Goal: Patient-Specific Goal (Individualized)  Outcome: Ongoing, Progressing  Goal: Absence of Hospital-Acquired Illness or Injury  Outcome: Ongoing, Progressing  Goal: Optimal Comfort and Wellbeing  Outcome: Ongoing, Progressing  Goal: Readiness for Transition of Care  Outcome: Ongoing, Progressing     Problem: Skin Injury Risk Increased  Goal: Skin Health and Integrity  Outcome: Ongoing, Progressing     Problem: Fluid Imbalance (Pneumonia)  Goal: Fluid Balance  Outcome: Ongoing, Progressing     Problem: Infection (Pneumonia)  Goal: Resolution of Infection Signs and Symptoms  Outcome: Ongoing, Progressing     Problem: Respiratory Compromise (Pneumonia)  Goal: Effective Oxygenation and Ventilation  Outcome: Ongoing, Progressing     Problem: Impaired Wound Healing  Goal: Optimal Wound Healing  Outcome: Ongoing, Progressing     Problem: Infection  Goal: Absence of Infection Signs and Symptoms  Outcome: Ongoing, Progressing

## 2023-02-10 NOTE — PLAN OF CARE
Problem: Adult Inpatient Plan of Care  Goal: Plan of Care Review  Outcome: Ongoing, Progressing  Goal: Patient-Specific Goal (Individualized)  Outcome: Ongoing, Progressing  Goal: Absence of Hospital-Acquired Illness or Injury  Outcome: Ongoing, Progressing  Goal: Optimal Comfort and Wellbeing  Outcome: Ongoing, Progressing  Goal: Readiness for Transition of Care  Outcome: Ongoing, Progressing     Problem: Skin Injury Risk Increased  Goal: Skin Health and Integrity  Outcome: Ongoing, Progressing     Problem: Fluid Imbalance (Pneumonia)  Goal: Fluid Balance  Outcome: Ongoing, Progressing     Problem: Infection (Pneumonia)  Goal: Resolution of Infection Signs and Symptoms  Outcome: Ongoing, Progressing     Problem: Respiratory Compromise (Pneumonia)  Goal: Effective Oxygenation and Ventilation  Outcome: Ongoing, Progressing     Problem: Impaired Wound Healing  Goal: Optimal Wound Healing  Outcome: Ongoing, Progressing     Problem: Infection  Goal: Absence of Infection Signs and Symptoms  Outcome: Ongoing, Progressing     Problem: Oral Intake Inadequate  Goal: Improved Oral Intake  Outcome: Ongoing, Progressing

## 2023-02-10 NOTE — PT/OT/SLP PROGRESS
"Physical Therapy Treatment    Patient Name:  Claire Bowser   MRN:  [id number]    Recommendations:     Discharge Recommendations: nursing facility, skilled  Discharge Equipment Recommendations:  (TBD)  Barriers to discharge: Decreased caregiver support and pt seems to be failing to thrive at home    Assessment:     Claire Bowser is a 63 y.o. female admitted with a medical diagnosis of CHF (congestive heart failure).  She presents with the following impairments/functional limitations: weakness, impaired endurance, impaired self care skills, impaired functional mobility, gait instability, impaired balance, decreased safety awareness, edema, impaired skin, pain.    Pt found HOB elevated and propped with pillows toward one side for pressure relief. Pt agreeable to PT session and found with small amount of BM in brief. PT/tech cleaned pt and donned clean brief prior to transfer oob, but called RN to see excoriation in perirectal area/applied Calmoseptine per RN. Pt generally weak and has been spending most of her time in the bed with her  cleaning her after urinating or having a BM.     Rehab Prognosis: Fair and Poor; patient would benefit from acute skilled PT services to address these deficits and reach maximum level of function.    Recent Surgery: * No surgery found *      Plan:     During this hospitalization, patient to be seen 6 x/week to address the identified rehab impairments via gait training, therapeutic activities, therapeutic exercises, neuromuscular re-education and progress toward the following goals:    Plan of Care Expires:  03/09/23    Subjective     Chief Complaint: "I'm weak."  Patient/Family Comments/goals: SNF  Pain/Comfort:  Pain Rating 1:  (not rated)  Location 1: perirectal  Pain Addressed 1: Reposition, Distraction, Nurse notified      Objective:     Communicated with SARKIS Turk prior to and during session.  Patient found HOB elevated with blood pressure cuff, peripheral IV, telemetry, lake " catheter upon PT entry to room.     General Precautions: Standard, fall  Orthopedic Precautions: N/A  Braces: N/A  Respiratory Status: Room air     Functional Mobility:  Bed Mobility:   Pt cleaned in bed prior to transfer oob w/ bed mobility as noted below. RN called in due to severe redness/excoriation in perirectal area. RN gave PT Calmoseptine to apply once pt clean. Clean brief donned.   Rolling Left:  stand by assistance and contact guard assistance  Rolling Right: stand by assistance and contact guard assistance  Scooting: moderate assistance  Supine to Sit: moderate assistance  Sit to Supine: moderate assistance  Transfers:     Sit to Stand:  minimum assistance, moderate assistance, and of 1-2 persons with rolling walker and depending on bed/chair height.  Bed to Chair: minimum assistance with  rolling walker  using  Step Transfer  Gait: x 10 feet with RW and minimal A for safety due to generalized weakness.      AM-PAC 6 CLICK MOBILITY          Treatment & Education:  Pt performed B LE there ex sitting x 10 reps with vc for proper execution and joint protection: ap, laq, marching, hip abd/add; heel slides/leg press in supine x 10 each prior to transfer oob.    Pt was educated on the following: call light use, importance of OOB activity and functional mobility to negate the negative effects of prolonged bed rest during this hospitalization, safe transfers/ambulation and discharge planning recommendations/options.      Patient left HOB elevated with all lines intact, call button in reach, bed alarm on, RN  notified, and pt propped with pillow toward her L side. ..    GOALS:   Multidisciplinary Problems       Physical Therapy Goals          Problem: Physical Therapy    Goal Priority Disciplines Outcome Goal Variances Interventions   Physical Therapy Goal     PT, PT/OT Ongoing, Progressing     Description: All PT goals to be met by discharge:    1. Supine to sit with Stand-by Assistance  2. Sit to stand transfer  with Stand-by Assistance  3.. Bed to chair transfer with Supervision using Rolling Walker  4. Gait  x 100 feet with Minimal Assistance using Rolling Walker.                          Time Tracking:     PT Received On: 02/10/23  PT Start Time: 1015     PT Stop Time: 1043  PT Total Time (min): 28 min     Billable Minutes: Therapeutic Activity 18 and Therapeutic Exercise 10    Treatment Type: Treatment  PT/PTA: PT           02/10/2023

## 2023-02-10 NOTE — PLAN OF CARE
PT/OT recommending SNF.  Due to insurance, CM working up for possible Rehab placement as well.  [facility] and [facility] via [facility], and also [facility].  CM following.     1323- Azul with [facility] called CM stating they are working on getting the patient accepted to Rehab in [place].  CM following.     1424- Jose R Hernandez with [facility], none of the [facility] facilities can accept patient.  [facility] also cannot accept the patient due to insurance.  CM following.      02/10/23 1211   Post-Acute Status   Post-Acute Authorization Placement   Post-Acute Placement Status Referrals Sent   Discharge Plan   Discharge Plan A Skilled Nursing Facility   Discharge Plan B Rehab

## 2023-02-11 LAB
ANION GAP SERPL CALC-SCNC: 4 MMOL/L (ref 8–16)
BASOPHILS # BLD AUTO: 0.01 K/UL (ref 0–0.2)
BASOPHILS NFR BLD: 0.2 % (ref 0–1.9)
BUN SERPL-MCNC: 15 MG/DL (ref 8–23)
CALCIUM SERPL-MCNC: 8.4 MG/DL (ref 8.7–10.5)
CHLORIDE SERPL-SCNC: 98 MMOL/L (ref 95–110)
CO2 SERPL-SCNC: 30 MMOL/L (ref 23–29)
CREAT SERPL-MCNC: 0.5 MG/DL (ref 0.5–1.4)
DIFFERENTIAL METHOD: ABNORMAL
EOSINOPHIL # BLD AUTO: 0.1 K/UL (ref 0–0.5)
EOSINOPHIL NFR BLD: 1.1 % (ref 0–8)
ERYTHROCYTE [DISTWIDTH] IN BLOOD BY AUTOMATED COUNT: 16 % (ref 11.5–14.5)
EST. GFR  (NO RACE VARIABLE): >60 ML/MIN/1.73 M^2
GLUCOSE SERPL-MCNC: 69 MG/DL (ref 70–110)
HCT VFR BLD AUTO: 21 % (ref 37–48.5)
HGB BLD-MCNC: 7.1 G/DL (ref 12–16)
IMM GRANULOCYTES # BLD AUTO: 0.05 K/UL (ref 0–0.04)
IMM GRANULOCYTES NFR BLD AUTO: 0.8 % (ref 0–0.5)
LYMPHOCYTES # BLD AUTO: 0.8 K/UL (ref 1–4.8)
LYMPHOCYTES NFR BLD: 13 % (ref 18–48)
MAGNESIUM SERPL-MCNC: 1.8 MG/DL (ref 1.6–2.6)
MCH RBC QN AUTO: 30.3 PG (ref 27–31)
MCHC RBC AUTO-ENTMCNC: 33.8 G/DL (ref 32–36)
MCV RBC AUTO: 90 FL (ref 82–98)
MONOCYTES # BLD AUTO: 0.5 K/UL (ref 0.3–1)
MONOCYTES NFR BLD: 7.9 % (ref 4–15)
NEUTROPHILS # BLD AUTO: 4.7 K/UL (ref 1.8–7.7)
NEUTROPHILS NFR BLD: 77 % (ref 38–73)
NRBC BLD-RTO: 0 /100 WBC
PLATELET # BLD AUTO: 209 K/UL (ref 150–450)
PMV BLD AUTO: 10.2 FL (ref 9.2–12.9)
POTASSIUM SERPL-SCNC: 3.2 MMOL/L (ref 3.5–5.1)
RBC # BLD AUTO: 2.34 M/UL (ref 4–5.4)
SODIUM SERPL-SCNC: 132 MMOL/L (ref 136–145)
STOOL CULTURE: NORMAL
WBC # BLD AUTO: 6.09 K/UL (ref 3.9–12.7)

## 2023-02-11 PROCEDURE — 97110 THERAPEUTIC EXERCISES: CPT

## 2023-02-11 PROCEDURE — 80048 BASIC METABOLIC PNL TOTAL CA: CPT | Performed by: FAMILY MEDICINE

## 2023-02-11 PROCEDURE — 63600175 PHARM REV CODE 636 W HCPCS: Performed by: STUDENT IN AN ORGANIZED HEALTH CARE EDUCATION/TRAINING PROGRAM

## 2023-02-11 PROCEDURE — 83735 ASSAY OF MAGNESIUM: CPT | Performed by: FAMILY MEDICINE

## 2023-02-11 PROCEDURE — 96367 TX/PROPH/DG ADDL SEQ IV INF: CPT

## 2023-02-11 PROCEDURE — 21400001 HC TELEMETRY ROOM

## 2023-02-11 PROCEDURE — 85025 COMPLETE CBC W/AUTO DIFF WBC: CPT | Performed by: FAMILY MEDICINE

## 2023-02-11 PROCEDURE — 25000003 PHARM REV CODE 250: Performed by: FAMILY MEDICINE

## 2023-02-11 PROCEDURE — 25000003 PHARM REV CODE 250: Performed by: STUDENT IN AN ORGANIZED HEALTH CARE EDUCATION/TRAINING PROGRAM

## 2023-02-11 PROCEDURE — G0378 HOSPITAL OBSERVATION PER HR: HCPCS

## 2023-02-11 PROCEDURE — 36415 COLL VENOUS BLD VENIPUNCTURE: CPT | Performed by: FAMILY MEDICINE

## 2023-02-11 RX ADMIN — APIXABAN 5 MG: 5 TABLET, FILM COATED ORAL at 07:02

## 2023-02-11 RX ADMIN — TRAZODONE HYDROCHLORIDE 50 MG: 50 TABLET ORAL at 08:02

## 2023-02-11 RX ADMIN — SODIUM CHLORIDE 125 MG: 9 INJECTION, SOLUTION INTRAVENOUS at 02:02

## 2023-02-11 RX ADMIN — SERTRALINE HYDROCHLORIDE 50 MG: 50 TABLET ORAL at 08:02

## 2023-02-11 RX ADMIN — PANTOPRAZOLE SODIUM 40 MG: 40 TABLET, DELAYED RELEASE ORAL at 05:02

## 2023-02-11 RX ADMIN — POTASSIUM BICARBONATE 50 MEQ: 977.5 TABLET, EFFERVESCENT ORAL at 07:02

## 2023-02-11 RX ADMIN — LACTOBACILLUS TAB 4 TABLET: TAB at 07:02

## 2023-02-11 RX ADMIN — LACTOBACILLUS TAB 4 TABLET: TAB at 12:02

## 2023-02-11 RX ADMIN — FOLIC ACID 1 MG: 1 TABLET ORAL at 07:02

## 2023-02-11 RX ADMIN — LACTOBACILLUS TAB 4 TABLET: TAB at 05:02

## 2023-02-11 RX ADMIN — APIXABAN 5 MG: 5 TABLET, FILM COATED ORAL at 08:02

## 2023-02-11 NOTE — PROGRESS NOTES
[facility] Medicine  Progress Note    Patient Name: Claire Bowser  MRN: [id number]  Admission Date: 2/8/2023 10:00 AM  Attending Physician: Ledy Martínez MD  Face-to-Face encounter date: 02/11/2023    Assessment & Plan:   Claire Bowser is a 63 y.o. female with:    Active Hospital Problems    Diagnosis  POA    *CHF (congestive heart failure) [I50.9]  Yes    History of DVT of lower extremity [Z86.718]  Not Applicable     Chronic    Severe protein-calorie malnutrition [E43]  Yes     Chronic    Rheumatoid arthritis involving multiple sites with positive rheumatoid factor [M05.79]  Yes     Chronic    Mixed hyperlipidemia [E78.2]  Yes    Generalized anxiety disorder [F41.1]  Yes     Chronic    Tobacco use [Z72.0]  Yes     Chronic    Hypertension [I10]  Yes     Chronic      Resolved Hospital Problems   No resolved problems to display.     Acute HFpEF, improved  ACS r/o, no CP  - lasix prn  - supplemental oxygen, weaned  - strict I/Os, daily weights, 1.5L fluid restrictions, Na restrictions  - echo with preserved ejection fraction  - EKG, trending troponin  - lexiscan negative Oct 2022  - concern for complication of therapy / worsening renal function due to diuretics:  - trending BMP; monitor sCr    Generalized weakness  - CT head: no acute intracranial abnormality  - PT/OT  -will need SNF    Perineum chafing  - wound care    Anemia  - anemia due to iron deficiency  - transfuse 1 unit  - IV iron supplementation  - proceed with workup  - no signs of melena, but may need GI workup if no other evident cause.     Malnutrition  - nutrition eval    Other chronic conditions including but not limited to those noted above/below:  All home meds reviewed personally by me, and adjusted as appropriate.  Electrolyte derangement:  Trending BMP, Mg; Replacement prn  DVT ppx:  home apixaban, trending CBC  Dispo:  Home when medically stable.  Will need follow-up with their PCP.    FULL CODE    - The above  "conditions include an acute and/or chronic illness that poses a threat to life (or bodily function).  - Previous notes/encounters/external records reviewed personally by me.    Subjective:      Interval History:  She is feeling weak this morning.  May be due to anemia.  Will consider transfusion of 1 unit.  Continue iron supplementation based on iron deficiency.    Review of Systems   All systems reviewed and are negative except as noted per above.    Objective:     Physical Exam  /77 (BP Location: Left arm, Patient Position: Lying)   Pulse 89   Temp 98.9 °F (37.2 °C) (Oral)   Resp 18   Ht 5' 8" (1.727 m)   Wt 48.6 kg (107 lb 3.2 oz)   SpO2 (!) 92%   Breastfeeding No   BMI 16.30 kg/m²     Gen: alert, responsive, cachectic  HEENT:  Eyes - no pallor  External ears with no lesions  Nares patent  Mouth, Throat:  trachea midline  CV: RRR  Lungs: CTA B/L   Abd: +BS, soft, NT, ND  :  perineal erythema   Ext: +atrophy; no edema  Skin: warm, dry, severe skin breakdown of her perineum and perivaginal / perirectal area.   Neuro: grossly intact  Psych: pleasant    Recent Labs   Lab 02/11/23  0317   WBC 6.09   HGB 7.1*   HCT 21.0*        Recent Labs   Lab 02/11/23  0317   CALCIUM 8.4*   *   K 3.2*   CO2 30*   CL 98   BUN 15   CREATININE 0.5     No results found for: POCTGLUCOSE   Microbiology Results (last 7 days)       Procedure Component Value Units Date/Time    Stool culture **cannot be ordered stat** [[id number]] Collected: 02/08/23 1638    Order Status: Completed Specimen: Stool Updated: 02/11/23 1050     Stool Culture No Salmonella,Shigella,Vibrio,Campylobacter.      No E coli 0157:H7 isolated.      No enteric leobardo    Blood culture [[id number]] Collected: 02/09/23 0455    Order Status: Completed Specimen: Blood from Peripheral, Antecubital, Left Updated: 02/11/23 0632     Blood Culture, Routine No Growth to date      No Growth to date      No Growth to date    Blood culture [[id number]] " Collected: 02/09/23 0455    Order Status: Completed Specimen: Blood from Peripheral, Antecubital, Left Updated: 02/11/23 0632     Blood Culture, Routine No Growth to date      No Growth to date      No Growth to date    Clostridium difficile EIA [[id number]] Collected: 02/08/23 1638    Order Status: Canceled Specimen: Stool           Imaging Results              CT Head Without Contrast (Final result)  Result time 02/08/23 13:44:25      Final result by Tim Carrington Jr., MD (02/08/23 13:44:25)                   Narrative:    CT OF THE HEAD WITHOUT CONTRAST    HISTORY: Neurological deficit. Stroke suspected. Generalized weakness.    Technical factors:   Spiral acquisition of the brain was generated at 3.0 mm thickness from the skull base to the skull vertex in helical fashion in the absence of intravenous contrast.  Additional coronal and sagittal reconstructed images were also included and reviewed.    CMS MANDATED QUALITY DATA - CT RADIATION  436    All CT scans at this facility utilize dose modulation, iterative reconstruction, and/or weight based dosing when appropriate to reduce radiation dose to as low as reasonably achievable.        FINDINGS:  Generalized central and cortical involutional changes are established that are age concordant in keeping with the patient's clinical age of 62 years of age. Moderately prominent low-density changes throughout both the subcortical and periventricular white matter nonspecific although attributed towards chronic microvascular ischemic changes. No evidence of intracranial hemorrhage, masses, mass effect, midline shift, acute vascular insult. Ventricles maintain normal size and overall configuration. Third ventricle is normal in size. The visualized paranasal sinuses are clear. Prominent atheromatous calcification through the cavernous and supraclinoid segments of the internal carotid arteries.    IMPRESSION:  1. Generalized cerebral atrophy and superimposed chronic deep  white matter ischemia.  2. No evidence of acute intracranial event    Electronically signed by:  Tim Carrington MD  2/8/2023 1:44 PM CST Workstation: [id number]                                     X-Ray Chest AP Portable (Final result)  Result time 02/08/23 10:41:37      Final result by Tim Carrington Jr., MD (02/08/23 10:41:37)                   Narrative:    HISTORY: Generalized weakness    COMPARISON:January 12, 2023    FINDINGS:Right-sided pleural effusion has evolved with increased accumulation and apical meniscus sign along the superior edge marginal mass effect upon the inferior edge of the right lower lobe reproducing right basilar atelectasis. This evidence of chronic interstitial markings in the basilar segments. Lungs are emphysematous with minimal perihilar scarring. No evidence of confluent infiltrate or mass lesion. Prominent arch atherosclerosis. The tracheal column is midline without evidence of shift. The osseous structures reveal no significant finding. Postoperative changes of previous gallbladder resection.    IMPRESSION:  1. Evolving right-sided pleural effusion with right lower lobe atelectasis and new finding upon comparison.  2. No evidence of acute cardiopulmonary process.    Electronically signed by:  Tim Carrington MD  2/8/2023 10:41 AM CST Workstation: [id number]                                     Ollie Obrien MD  [facility] Hospitalist

## 2023-02-11 NOTE — PT/OT/SLP PROGRESS
"Physical Therapy Treatment    Patient Name:  Claire Bowser   MRN:  [id number]    Recommendations:     Discharge Recommendations: nursing facility, skilled  Discharge Equipment Recommendations:  (TBD)  Barriers to discharge: Decreased caregiver support    Assessment:     Claire Bowser is a 63 y.o. female admitted with a medical diagnosis of CHF (congestive heart failure).  She presents with the following impairments/functional limitations: weakness, impaired endurance, impaired self care skills, impaired functional mobility, gait instability, impaired balance, decreased safety awareness, edema, impaired skin, pain.    Pt found HOB elevated and agreeable to PT session. Pt tolerated session fairly well and required minimal A of 1-2 persons for safety with mobility. Pt able to increase gt distance slightly and tolerated increased reps for there ex in chair. Pt left sitting up in chair at end of session today for improved endurance. During there ex pt reported she has difficulty swallowing liquids as they "get caught in my throat." With questioning it seems pt has coughing with thin liquids, so PT asked RN to put in consult for ST swallow evaluation.    Rehab Prognosis: Fair; patient would benefit from acute skilled PT services to address these deficits and reach maximum level of function.    Recent Surgery: * No surgery found *      Plan:     During this hospitalization, patient to be seen 6 x/week to address the identified rehab impairments via gait training, therapeutic activities, therapeutic exercises, neuromuscular re-education and progress toward the following goals:    Plan of Care Expires:  03/09/23    Subjective     Chief Complaint: difficulty swallowing liquids as they "get caught in my throat." Pt denied pain other than discomfort in perirectal area due to excoriation from incontinence.  Patient/Family Comments/goals: SNF  Pain/Comfort:  Pain Rating 1: 0/10  Pain Rating Post-Intervention 1: " 0/10      Objective:     Communicated with SARKIS Moise prior to and after session.  Patient found HOB elevated with blood pressure cuff, peripheral IV, telemetry, lake catheter upon PT entry to room.     General Precautions: Standard, fall  Orthopedic Precautions: N/A  Braces: N/A  Respiratory Status: Room air     Functional Mobility:  Bed Mobility:     Scooting: minimum assistance  Supine to Sit: minimum assistance  Transfers:     Sit to Stand:  minimum assistance and moderate assistance with rolling walker  Gait: x 20 feet with rolling walker and minimal A for balance/safety due to generalized weakness.      AM-PAC 6 CLICK MOBILITY          Treatment & Education:  Pt performed B LE there ex sitting x 15 reps with vc for proper execution and joint protection: ap, laq, marching, hip abd/add, gs/qs.    Pt was educated on the following: call light use, importance of OOB activity and functional mobility to negate the negative effects of prolonged bed rest during this hospitalization, safe transfers/ambulation and discharge planning recommendations/options.        Patient left up in chair with all lines intact, call button in reach, chair alarm on, and RN notified..    GOALS:   Multidisciplinary Problems       Physical Therapy Goals          Problem: Physical Therapy    Goal Priority Disciplines Outcome Goal Variances Interventions   Physical Therapy Goal     PT, PT/OT Ongoing, Progressing     Description: All PT goals to be met by discharge:    1. Supine to sit with Stand-by Assistance  2. Sit to stand transfer with Stand-by Assistance  3.. Bed to chair transfer with Supervision using Rolling Walker  4. Gait  x 100 feet with Minimal Assistance using Rolling Walker.                          Time Tracking:     PT Received On: 02/11/23  PT Start Time: 1343     PT Stop Time: 1358  PT Total Time (min): 15 min     Billable Minutes: Therapeutic Exercise 15    Treatment Type: Treatment  PT/PTA: PT           02/11/2023

## 2023-02-12 LAB
ABO + RH BLD: ABNORMAL
ALBUMIN SERPL BCP-MCNC: 1.3 G/DL (ref 3.5–5.2)
ALP SERPL-CCNC: 76 U/L (ref 55–135)
ALT SERPL W/O P-5'-P-CCNC: 47 U/L (ref 10–44)
ANION GAP SERPL CALC-SCNC: 6 MMOL/L (ref 8–16)
AST SERPL-CCNC: 60 U/L (ref 10–40)
BASOPHILS # BLD AUTO: 0.01 K/UL (ref 0–0.2)
BASOPHILS NFR BLD: 0.3 % (ref 0–1.9)
BILIRUB DIRECT SERPL-MCNC: 0.3 MG/DL (ref 0.1–0.3)
BILIRUB SERPL-MCNC: 1.1 MG/DL (ref 0.1–1)
BLD GP AB SCN CELLS X3 SERPL QL: ABNORMAL
BLD PROD TYP BPU: NORMAL
BLOOD GROUP ANTIBODIES SERPL: NORMAL
BLOOD UNIT EXPIRATION DATE: NORMAL
BLOOD UNIT TYPE CODE: 5100
BLOOD UNIT TYPE: NORMAL
BUN SERPL-MCNC: 13 MG/DL (ref 8–23)
CALCIUM SERPL-MCNC: 8.3 MG/DL (ref 8.7–10.5)
CHLORIDE SERPL-SCNC: 96 MMOL/L (ref 95–110)
CO2 SERPL-SCNC: 29 MMOL/L (ref 23–29)
CODING SYSTEM: NORMAL
CREAT SERPL-MCNC: 0.5 MG/DL (ref 0.5–1.4)
CROSSMATCH INTERPRETATION: NORMAL
DIFFERENTIAL METHOD: ABNORMAL
DISPENSE STATUS: NORMAL
EOSINOPHIL # BLD AUTO: 0.1 K/UL (ref 0–0.5)
EOSINOPHIL NFR BLD: 1.3 % (ref 0–8)
ERYTHROCYTE [DISTWIDTH] IN BLOOD BY AUTOMATED COUNT: 16.1 % (ref 11.5–14.5)
EST. GFR  (NO RACE VARIABLE): >60 ML/MIN/1.73 M^2
GLUCOSE SERPL-MCNC: 73 MG/DL (ref 70–110)
HCT VFR BLD AUTO: 21.1 % (ref 37–48.5)
HGB BLD-MCNC: 7 G/DL (ref 12–16)
IMM GRANULOCYTES # BLD AUTO: 0.02 K/UL (ref 0–0.04)
IMM GRANULOCYTES NFR BLD AUTO: 0.5 % (ref 0–0.5)
LYMPHOCYTES # BLD AUTO: 0.7 K/UL (ref 1–4.8)
LYMPHOCYTES NFR BLD: 18 % (ref 18–48)
MAGNESIUM SERPL-MCNC: 1.7 MG/DL (ref 1.6–2.6)
MCH RBC QN AUTO: 29.4 PG (ref 27–31)
MCHC RBC AUTO-ENTMCNC: 32.2 G/DL (ref 32–36)
MCV RBC AUTO: 91 FL (ref 82–98)
MONOCYTES # BLD AUTO: 0.3 K/UL (ref 0.3–1)
MONOCYTES NFR BLD: 8.3 % (ref 4–15)
NEUTROPHILS # BLD AUTO: 2.8 K/UL (ref 1.8–7.7)
NEUTROPHILS NFR BLD: 71.6 % (ref 38–73)
NRBC BLD-RTO: 0 /100 WBC
NUM UNITS TRANS PACKED RBC: NORMAL
PLATELET # BLD AUTO: 215 K/UL (ref 150–450)
PMV BLD AUTO: 10.4 FL (ref 9.2–12.9)
POTASSIUM SERPL-SCNC: 3.6 MMOL/L (ref 3.5–5.1)
PROT SERPL-MCNC: 5 G/DL (ref 6–8.4)
RBC # BLD AUTO: 2.31 M/UL (ref 4–5.4)
SODIUM SERPL-SCNC: 131 MMOL/L (ref 136–145)
WBC # BLD AUTO: 3.84 K/UL (ref 3.9–12.7)

## 2023-02-12 PROCEDURE — 25000003 PHARM REV CODE 250: Performed by: STUDENT IN AN ORGANIZED HEALTH CARE EDUCATION/TRAINING PROGRAM

## 2023-02-12 PROCEDURE — P9016 RBC LEUKOCYTES REDUCED: HCPCS | Performed by: STUDENT IN AN ORGANIZED HEALTH CARE EDUCATION/TRAINING PROGRAM

## 2023-02-12 PROCEDURE — 86900 BLOOD TYPING SEROLOGIC ABO: CPT | Performed by: STUDENT IN AN ORGANIZED HEALTH CARE EDUCATION/TRAINING PROGRAM

## 2023-02-12 PROCEDURE — 25000003 PHARM REV CODE 250: Performed by: FAMILY MEDICINE

## 2023-02-12 PROCEDURE — 96366 THER/PROPH/DIAG IV INF ADDON: CPT

## 2023-02-12 PROCEDURE — 83735 ASSAY OF MAGNESIUM: CPT | Performed by: FAMILY MEDICINE

## 2023-02-12 PROCEDURE — 63600175 PHARM REV CODE 636 W HCPCS: Performed by: STUDENT IN AN ORGANIZED HEALTH CARE EDUCATION/TRAINING PROGRAM

## 2023-02-12 PROCEDURE — 86922 COMPATIBILITY TEST ANTIGLOB: CPT | Performed by: STUDENT IN AN ORGANIZED HEALTH CARE EDUCATION/TRAINING PROGRAM

## 2023-02-12 PROCEDURE — 80076 HEPATIC FUNCTION PANEL: CPT | Performed by: FAMILY MEDICINE

## 2023-02-12 PROCEDURE — 86870 RBC ANTIBODY IDENTIFICATION: CPT | Performed by: STUDENT IN AN ORGANIZED HEALTH CARE EDUCATION/TRAINING PROGRAM

## 2023-02-12 PROCEDURE — 92610 EVALUATE SWALLOWING FUNCTION: CPT

## 2023-02-12 PROCEDURE — 86902 BLOOD TYPE ANTIGEN DONOR EA: CPT | Performed by: STUDENT IN AN ORGANIZED HEALTH CARE EDUCATION/TRAINING PROGRAM

## 2023-02-12 PROCEDURE — 36430 TRANSFUSION BLD/BLD COMPNT: CPT

## 2023-02-12 PROCEDURE — 80048 BASIC METABOLIC PNL TOTAL CA: CPT | Performed by: FAMILY MEDICINE

## 2023-02-12 PROCEDURE — 85025 COMPLETE CBC W/AUTO DIFF WBC: CPT | Performed by: FAMILY MEDICINE

## 2023-02-12 PROCEDURE — 21400001 HC TELEMETRY ROOM

## 2023-02-12 PROCEDURE — 86905 BLOOD TYPING RBC ANTIGENS: CPT | Performed by: STUDENT IN AN ORGANIZED HEALTH CARE EDUCATION/TRAINING PROGRAM

## 2023-02-12 PROCEDURE — 36415 COLL VENOUS BLD VENIPUNCTURE: CPT | Performed by: FAMILY MEDICINE

## 2023-02-12 PROCEDURE — G0378 HOSPITAL OBSERVATION PER HR: HCPCS

## 2023-02-12 PROCEDURE — 36415 COLL VENOUS BLD VENIPUNCTURE: CPT | Performed by: STUDENT IN AN ORGANIZED HEALTH CARE EDUCATION/TRAINING PROGRAM

## 2023-02-12 RX ORDER — HYDROCODONE BITARTRATE AND ACETAMINOPHEN 500; 5 MG/1; MG/1
TABLET ORAL
Status: DISCONTINUED | OUTPATIENT
Start: 2023-02-12 | End: 2023-02-16 | Stop reason: HOSPADM

## 2023-02-12 RX ORDER — LOPERAMIDE HYDROCHLORIDE 2 MG/1
2 CAPSULE ORAL 4 TIMES DAILY PRN
Status: DISCONTINUED | OUTPATIENT
Start: 2023-02-12 | End: 2023-02-16 | Stop reason: HOSPADM

## 2023-02-12 RX ORDER — MEGESTROL ACETATE 20 MG/1
40 TABLET ORAL DAILY
Status: DISCONTINUED | OUTPATIENT
Start: 2023-02-12 | End: 2023-02-16 | Stop reason: HOSPADM

## 2023-02-12 RX ORDER — MIRTAZAPINE 15 MG/1
15 TABLET, FILM COATED ORAL NIGHTLY
Status: DISCONTINUED | OUTPATIENT
Start: 2023-02-12 | End: 2023-02-16 | Stop reason: HOSPADM

## 2023-02-12 RX ADMIN — MEGESTROL ACETATE 40 MG: 20 TABLET ORAL at 11:02

## 2023-02-12 RX ADMIN — POTASSIUM BICARBONATE 50 MEQ: 977.5 TABLET, EFFERVESCENT ORAL at 09:02

## 2023-02-12 RX ADMIN — FOLIC ACID 1 MG: 1 TABLET ORAL at 08:02

## 2023-02-12 RX ADMIN — PANCRELIPASE 2 CAPSULE: 60000; 12000; 38000 CAPSULE, DELAYED RELEASE PELLETS ORAL at 11:02

## 2023-02-12 RX ADMIN — MIRTAZAPINE 15 MG: 15 TABLET, FILM COATED ORAL at 08:02

## 2023-02-12 RX ADMIN — LACTOBACILLUS TAB 4 TABLET: TAB at 05:02

## 2023-02-12 RX ADMIN — APIXABAN 5 MG: 5 TABLET, FILM COATED ORAL at 08:02

## 2023-02-12 RX ADMIN — LOPERAMIDE HYDROCHLORIDE 2 MG: 2 CAPSULE ORAL at 11:02

## 2023-02-12 RX ADMIN — SODIUM CHLORIDE 125 MG: 9 INJECTION, SOLUTION INTRAVENOUS at 02:02

## 2023-02-12 RX ADMIN — LACTOBACILLUS TAB 4 TABLET: TAB at 08:02

## 2023-02-12 RX ADMIN — PANTOPRAZOLE SODIUM 40 MG: 40 TABLET, DELAYED RELEASE ORAL at 06:02

## 2023-02-12 RX ADMIN — PANCRELIPASE 2 CAPSULE: 60000; 12000; 38000 CAPSULE, DELAYED RELEASE PELLETS ORAL at 05:02

## 2023-02-12 RX ADMIN — LACTOBACILLUS TAB 4 TABLET: TAB at 11:02

## 2023-02-12 RX ADMIN — SERTRALINE HYDROCHLORIDE 50 MG: 50 TABLET ORAL at 08:02

## 2023-02-12 NOTE — PT/OT/SLP EVAL
Speech Language Pathology Evaluation  Bedside Swallow    Patient Name:  Claire Bowser   MRN:  [id number]  Admitting Diagnosis: CHF (congestive heart failure)    Recommendations:                 General Recommendations:  Follow-up not indicated  Diet recommendations:  Regular Diet - IDDSI Level 7, Thin   Aspiration Precautions: Standard aspiration precautions   General Precautions: Standard, fall  Communication strategies:  none    History:     Past Medical History:   Diagnosis Date    Acute biliary pancreatitis 6/14/2022    Anxiety     Chronic pancreatitis 1/2/2023    Digestive disorder     Flu 02/2017    [facility]    Hypertension     Long-term use of immunosuppressant medication 6/10/2022    Rheumatoid arthritis involving multiple sites with positive rheumatoid factor 01/14/2021       Past Surgical History:   Procedure Laterality Date    COLONOSCOPY N/A 2/26/2021    Procedure: COLONOSCOPY;  Surgeon: Amy Sidhu MD;  Location[facility];  Service: Endoscopy;  Laterality: N/A;    ENDOSCOPIC ULTRASOUND OF UPPER GASTROINTESTINAL TRACT N/A 7/1/2022    Procedure: ULTRASOUND, UPPER GI TRACT, ENDOSCOPIC;  Surgeon: Donavon Jewell III, MD;  Location: [facility];  Service: Endoscopy;  Laterality: N/A;    ENDOSCOPIC ULTRASOUND OF UPPER GASTROINTESTINAL TRACT N/A 11/29/2022    Procedure: ULTRASOUND, UPPER GI TRACT, ENDOSCOPIC;  Surgeon: Donavon Jewell III, MD;  Location: [facility] ENDO;  Service: Endoscopy;  Laterality: N/A;    ENDOSCOPIC ULTRASOUND OF UPPER GASTROINTESTINAL TRACT N/A 1/3/2023    Procedure: ULTRASOUND, UPPER GI TRACT, ENDOSCOPIC;  Surgeon: Donavon Jewell III, MD;  Location: [facility] ENDO;  Service: Endoscopy;  Laterality: N/A;    ERCP N/A 12/30/2022    Procedure: ERCP (ENDOSCOPIC RETROGRADE CHOLANGIOPANCREATOGRAPHY);  Surgeon: Donavon Jewell III, MD;  Location: [facility] ENDO;  Service: Endoscopy;  Laterality: N/A;    ERCP N/A 1/24/2023    Procedure: ERCP (ENDOSCOPIC RETROGRADE  CHOLANGIOPANCREATOGRAPHY);  Surgeon: Rock Martines MD;  Location: [facility] ENDO ([facility]);  Service: Endoscopy;  Laterality: N/A;    ESOPHAGOGASTRODUODENOSCOPY N/A 2/26/2021    Procedure: EGD (ESOPHAGOGASTRODUODENOSCOPY);  Surgeon: Amy Sidhu MD;  Location: [facility] ENDO;  Service: Endoscopy;  Laterality: N/A;    LAPAROSCOPIC CHOLECYSTECTOMY N/A 7/27/2022    Procedure: CHOLECYSTECTOMY, LAPAROSCOPIC;  Surgeon: Ramón Hwang III, MD;  Location: [facility] OR;  Service: General;  Laterality: N/A;    TUBAL LIGATION         Social History: Patient lives with .    Chest X-Rays: X-Ray Chest AP Portable  Order: [id number]  Status: Final result     Visible to patient: Yes (not seen)     Next appt: 02/13/2023 at 08:00 AM in [facility] (Robe Shah NP)     Dx: Generalized weakness     0 Result Notes  Details    Reading Physician Reading Date Result Priority   Tim Carrington Jr., MD  [phone] 2/8/2023      Narrative & Impression  HISTORY: Generalized weakness     COMPARISON:January 12, 2023     FINDINGS:Right-sided pleural effusion has evolved with increased accumulation and apical meniscus sign along the superior edge marginal mass effect upon the inferior edge of the right lower lobe reproducing right basilar atelectasis. This evidence of chronic interstitial markings in the basilar segments. Lungs are emphysematous with minimal perihilar scarring. No evidence of confluent infiltrate or mass lesion. Prominent arch atherosclerosis. The tracheal column is midline without evidence of shift. The osseous structures reveal no significant finding. Postoperative changes of previous gallbladder resection.     IMPRESSION:  1. Evolving right-sided pleural effusion with right lower lobe atelectasis and new finding upon comparison.  2. No evidence of acute cardiopulmonary process.     Electronically signed by:  Tim Carrington MD  2/8/2023 10:41 AM [facility] Workstation: [id number]           Specimen Collected: 02/08/23 10:23  Last Resulted: 02/08/23 10:41             Prior diet: regular/thin.    Subjective     Pt cleared by RN for bedside swallow evaluation this morning. Patient sitting upright in bed with breakfast tray,  at bedside.   Patient goals: none stated     Pain/Comfort:       Respiratory Status: Room air    Objective:     Oral Musculature Evaluation  Oral Musculature: WFL  Dentition: present and adequate  Secretion Management: adequate  Mandibular Strength and Mobility: WFL  Oral Labial Strength and Mobility: WFL  Lingual Strength and Mobility: WFL  Buccal Strength and Mobility: WFL  Volitional Cough: present, decreased  Volitional Swallow: HLE present upon digital palpation  Voice Prior to PO Intake: clear    Bedside Swallow Eval:   Consistencies Assessed:  Thin liquids water via straw sip self-administered by patient x8  Solids 1/2 radha cracker self-administered by patient with thin liquid wash via straw sip       Oral Phase:   WNL    Pharyngeal Phase:   no overt clinical signs/symptoms of aspiration  no overt clinical signs/symptoms of pharyngeal dysphagia    Compensatory Strategies  None    Treatment: Patient participated in clinical evaluation of swallow function this date secondary to MD orders for possible dysphagia. Patient reports no difficulty swallowing, denies coughing/choking, denies painful swallowing but does appreciate sensations of reflux during meals. Pt presented with no overt s/s of airway compromise or aspiration during the assessment. ST educated patient re: role of SLP, continue current diet recs, GERD diet recs, plan of care. Patient and  verbalized understanding. Discussed recs with RN. No further ST indicated at this time.     Assessment:     Claire Bowser is a 63 y.o. female presenting with complaints of reflux during meals. No overt s/s of oropharyngeal dysphagia at this time. No further ST indicated at this time. Discussed recs with RN.     Goals:   Multidisciplinary Problems        SLP Goals       Not on file                    Plan:     Patient to be seen:      Plan of Care expires:     Plan of Care reviewed with:  patient, spouse   SLP Follow-Up:  No       Discharge recommendations:      Barriers to Discharge:  None    Time Tracking:     SLP Treatment Date:   02/12/23  Speech Start Time:  0823  Speech Stop Time:  0835     Speech Total Time (min):  12 min    Billable Minutes: Eval Swallow and Oral Function 12    02/12/2023

## 2023-02-12 NOTE — PLAN OF CARE
Problem: Adult Inpatient Plan of Care  Goal: Plan of Care Review  Outcome: Ongoing, Progressing  Goal: Patient-Specific Goal (Individualized)  Outcome: Ongoing, Progressing  Goal: Absence of Hospital-Acquired Illness or Injury  Outcome: Ongoing, Progressing  Goal: Optimal Comfort and Wellbeing  Outcome: Ongoing, Progressing  Goal: Readiness for Transition of Care  Outcome: Ongoing, Progressing     Problem: Skin Injury Risk Increased  Goal: Skin Health and Integrity  Outcome: Ongoing, Progressing     Problem: Fluid Imbalance (Pneumonia)  Goal: Fluid Balance  Outcome: Ongoing, Progressing     Problem: Infection (Pneumonia)  Goal: Resolution of Infection Signs and Symptoms  Outcome: Ongoing, Progressing     Problem: Respiratory Compromise (Pneumonia)  Goal: Effective Oxygenation and Ventilation  Outcome: Ongoing, Progressing     Problem: Impaired Wound Healing  Goal: Optimal Wound Healing  Outcome: Ongoing, Progressing     Problem: Infection  Goal: Absence of Infection Signs and Symptoms  Outcome: Ongoing, Progressing     Problem: Oral Intake Inadequate  Goal: Improved Oral Intake  Outcome: Ongoing, Progressing     Problem: Fall Injury Risk  Goal: Absence of Fall and Fall-Related Injury  Outcome: Ongoing, Progressing

## 2023-02-12 NOTE — PROGRESS NOTES
On license of [facility] Medicine  Progress Note    Patient Name: Claire Bowser  MRN: [id number]  Admission Date: 2/8/2023 10:00 AM  Attending Physician: Ledy Martínez MD  Face-to-Face encounter date: 02/12/2023    Assessment & Plan:   Claire Bowser is a 63 y.o. female with:    Active Hospital Problems    Diagnosis  POA    *CHF (congestive heart failure) [I50.9]  Yes    History of DVT of lower extremity [Z86.718]  Not Applicable     Chronic    Severe protein-calorie malnutrition [E43]  Yes     Chronic    Rheumatoid arthritis involving multiple sites with positive rheumatoid factor [M05.79]  Yes     Chronic    Mixed hyperlipidemia [E78.2]  Yes    Generalized anxiety disorder [F41.1]  Yes     Chronic    Tobacco use [Z72.0]  Yes     Chronic    Hypertension [I10]  Yes     Chronic      Resolved Hospital Problems   No resolved problems to display.     Acute HFpEF, improved  ACS r/o, no CP  - lasix prn  - supplemental oxygen, weaned  - echo with preserved ejection fraction  - lexiscan negative Oct 2022  - LE edema due to hypoalbuminemia  - recheck LFT's today  - optimize nutrition  - compression hose    Severe Malnutrition  Diarrhea  - due to pancreatic insufficiency  - resume creon 6 tablets   - imodium prn  - banana flakes  - optimize nutritional intake  - added megace and remeron, discussed risk of DVT with pt. She is already on eliquis.   - trend albumin    Generalized weakness  - CT head: no acute intracranial abnormality  - weakness due to malnutrition  - PT/OT  -will need SNF    Perineum chafing  - wound care    Anemia  - anemia due to iron deficiency, likely chronic disease, possible blood loss.   - transfuse 1 unit  - IV iron supplementation  - retics very low  - no signs of melena, but may need GI workup if no other evident cause.     Chronic Pancreatitis with Pseudocyst  Pancreatic duct leak with peritonitis   - recent hospitalization discharged 1/27 from [facility] after complicated stay due  "to peritonitis with pancreatic duct leak requiring stent placement.   - completed abx for peritonitis  - no abdominal pain / symptoms at this time.     Other chronic conditions including but not limited to those noted above/below:  All home meds reviewed personally by me, and adjusted as appropriate.  Electrolyte derangement:  Trending BMP, Mg; Replacement prn  DVT ppx:  home apixaban, trending CBC  Dispo:  Home when medically stable.  Will need follow-up with their PCP.    FULL CODE    - The above conditions include an acute and/or chronic illness that poses a threat to life (or bodily function).  - Previous notes/encounters/external records reviewed personally by me.    Subjective:      Interval History:  She continues to have diarrhea this morning.  She denies abdominal pain or nausea.  She continues to feel weak and fatigued.  She has been eating slightly more over the last 2 days and is interested in trying appetite stimulant.  She feels very full quickly.    Review of Systems   All systems reviewed and are negative except as noted per above.    Objective:     Physical Exam  /71 (BP Location: Left arm, Patient Position: Lying) Comment: map 80  Pulse 102   Temp 97.4 °F (36.3 °C) (Oral)   Resp 16   Ht 5' 8" (1.727 m)   Wt 48.6 kg (107 lb 3.2 oz)   SpO2 (!) 94%   Breastfeeding No   BMI 16.30 kg/m²     Gen: alert, responsive, cachectic  HEENT:  Eyes - no pallor  External ears with no lesions  Nares patent  Mouth, Throat:  trachea midline  CV: RRR  Lungs: CTA B/L   Abd: +BS, soft, NT, ND  :  perineal erythema   Ext: +atrophy; 2+ edema  Skin: warm, dry, severe skin breakdown of her perineum and perivaginal / perirectal area.   Neuro: grossly intact  Psych: pleasant    Recent Labs   Lab 02/12/23  0328   WBC 3.84*   HGB 7.0*   HCT 21.1*        Recent Labs   Lab 02/12/23  0328   CALCIUM 8.3*   ALBUMIN 1.3*   PROT 5.0*   *   K 3.6   CO2 29   CL 96   BUN 13   CREATININE 0.5   ALKPHOS 76   ALT " 47*   AST 60*   BILITOT 1.1*     No results found for: POCTGLUCOSE   Microbiology Results (last 7 days)       Procedure Component Value Units Date/Time    Blood culture [[id number]] Collected: 02/09/23 0455    Order Status: Completed Specimen: Blood from Peripheral, Antecubital, Left Updated: 02/12/23 0632     Blood Culture, Routine No Growth to date      No Growth to date      No Growth to date      No Growth to date    Blood culture [[id number]] Collected: 02/09/23 0455    Order Status: Completed Specimen: Blood from Peripheral, Antecubital, Left Updated: 02/12/23 0632     Blood Culture, Routine No Growth to date      No Growth to date      No Growth to date      No Growth to date    Stool culture **cannot be ordered stat** [[id number]] Collected: 02/08/23 1638    Order Status: Completed Specimen: Stool Updated: 02/11/23 1050     Stool Culture No Salmonella,Shigella,Vibrio,Campylobacter.      No E coli 0157:H7 isolated.      No enteric leobardo    Clostridium difficile EIA [[id number]] Collected: 02/08/23 1638    Order Status: Canceled Specimen: Stool           Imaging Results              CT Head Without Contrast (Final result)  Result time 02/08/23 13:44:25      Final result by Tim Carrington Jr., MD (02/08/23 13:44:25)                   Narrative:    CT OF THE HEAD WITHOUT CONTRAST    HISTORY: Neurological deficit. Stroke suspected. Generalized weakness.    Technical factors:   Spiral acquisition of the brain was generated at 3.0 mm thickness from the skull base to the skull vertex in helical fashion in the absence of intravenous contrast.  Additional coronal and sagittal reconstructed images were also included and reviewed.    CMS MANDATED QUALITY DATA - CT RADIATION  436    All CT scans at this facility utilize dose modulation, iterative reconstruction, and/or weight based dosing when appropriate to reduce radiation dose to as low as reasonably achievable.        FINDINGS:  Generalized central and cortical  involutional changes are established that are age concordant in keeping with the patient's clinical age of 62 years of age. Moderately prominent low-density changes throughout both the subcortical and periventricular white matter nonspecific although attributed towards chronic microvascular ischemic changes. No evidence of intracranial hemorrhage, masses, mass effect, midline shift, acute vascular insult. Ventricles maintain normal size and overall configuration. Third ventricle is normal in size. The visualized paranasal sinuses are clear. Prominent atheromatous calcification through the cavernous and supraclinoid segments of the internal carotid arteries.    IMPRESSION:  1. Generalized cerebral atrophy and superimposed chronic deep white matter ischemia.  2. No evidence of acute intracranial event    Electronically signed by:  Tim Carrington MD  2/8/2023 1:44 PM CST Workstation: [id number]                                     X-Ray Chest AP Portable (Final result)  Result time 02/08/23 10:41:37      Final result by Tim Carrington Jr., MD (02/08/23 10:41:37)                   Narrative:    HISTORY: Generalized weakness    COMPARISON:January 12, 2023    FINDINGS:Right-sided pleural effusion has evolved with increased accumulation and apical meniscus sign along the superior edge marginal mass effect upon the inferior edge of the right lower lobe reproducing right basilar atelectasis. This evidence of chronic interstitial markings in the basilar segments. Lungs are emphysematous with minimal perihilar scarring. No evidence of confluent infiltrate or mass lesion. Prominent arch atherosclerosis. The tracheal column is midline without evidence of shift. The osseous structures reveal no significant finding. Postoperative changes of previous gallbladder resection.    IMPRESSION:  1. Evolving right-sided pleural effusion with right lower lobe atelectasis and new finding upon comparison.  2. No evidence of acute  cardiopulmonary process.    Electronically signed by:  Tim Carrington MD  2/8/2023 10:41 AM CST Workstation: [id number]                                     Ollie Obrien MD  [facility] Hospitalist

## 2023-02-13 LAB
ANION GAP SERPL CALC-SCNC: 5 MMOL/L (ref 8–16)
ANISOCYTOSIS BLD QL SMEAR: SLIGHT
BASOPHILS # BLD AUTO: 0.02 K/UL (ref 0–0.2)
BASOPHILS NFR BLD: 0.5 % (ref 0–1.9)
BUN SERPL-MCNC: 12 MG/DL (ref 8–23)
CALCIUM SERPL-MCNC: 8.6 MG/DL (ref 8.7–10.5)
CHLORIDE SERPL-SCNC: 98 MMOL/L (ref 95–110)
CO2 SERPL-SCNC: 30 MMOL/L (ref 23–29)
CREAT SERPL-MCNC: 0.4 MG/DL (ref 0.5–1.4)
DIFFERENTIAL METHOD: ABNORMAL
EOSINOPHIL # BLD AUTO: 0.1 K/UL (ref 0–0.5)
EOSINOPHIL NFR BLD: 1.2 % (ref 0–8)
ERYTHROCYTE [DISTWIDTH] IN BLOOD BY AUTOMATED COUNT: 15.7 % (ref 11.5–14.5)
EST. GFR  (NO RACE VARIABLE): >60 ML/MIN/1.73 M^2
GLUCOSE SERPL-MCNC: 85 MG/DL (ref 70–110)
HCT VFR BLD AUTO: 29.1 % (ref 37–48.5)
HGB BLD-MCNC: 9.8 G/DL (ref 12–16)
IMM GRANULOCYTES # BLD AUTO: 0.05 K/UL (ref 0–0.04)
IMM GRANULOCYTES NFR BLD AUTO: 1.2 % (ref 0–0.5)
LYMPHOCYTES # BLD AUTO: 0.7 K/UL (ref 1–4.8)
LYMPHOCYTES NFR BLD: 16.7 % (ref 18–48)
MAGNESIUM SERPL-MCNC: 1.6 MG/DL (ref 1.6–2.6)
MCH RBC QN AUTO: 29.7 PG (ref 27–31)
MCHC RBC AUTO-ENTMCNC: 33.7 G/DL (ref 32–36)
MCV RBC AUTO: 88 FL (ref 82–98)
MONOCYTES # BLD AUTO: 0.4 K/UL (ref 0.3–1)
MONOCYTES NFR BLD: 10 % (ref 4–15)
NEUTROPHILS # BLD AUTO: 2.9 K/UL (ref 1.8–7.7)
NEUTROPHILS NFR BLD: 70.4 % (ref 38–73)
NRBC BLD-RTO: 0 /100 WBC
PLATELET # BLD AUTO: 223 K/UL (ref 150–450)
PLATELET BLD QL SMEAR: ABNORMAL
PMV BLD AUTO: 9.7 FL (ref 9.2–12.9)
POIKILOCYTOSIS BLD QL SMEAR: SLIGHT
POTASSIUM SERPL-SCNC: 3.2 MMOL/L (ref 3.5–5.1)
RBC # BLD AUTO: 3.3 M/UL (ref 4–5.4)
SODIUM SERPL-SCNC: 133 MMOL/L (ref 136–145)
WBC # BLD AUTO: 4.18 K/UL (ref 3.9–12.7)

## 2023-02-13 PROCEDURE — 25000003 PHARM REV CODE 250: Performed by: STUDENT IN AN ORGANIZED HEALTH CARE EDUCATION/TRAINING PROGRAM

## 2023-02-13 PROCEDURE — 83735 ASSAY OF MAGNESIUM: CPT | Performed by: FAMILY MEDICINE

## 2023-02-13 PROCEDURE — 96375 TX/PRO/DX INJ NEW DRUG ADDON: CPT

## 2023-02-13 PROCEDURE — 97116 GAIT TRAINING THERAPY: CPT

## 2023-02-13 PROCEDURE — 63600175 PHARM REV CODE 636 W HCPCS: Performed by: STUDENT IN AN ORGANIZED HEALTH CARE EDUCATION/TRAINING PROGRAM

## 2023-02-13 PROCEDURE — 25000003 PHARM REV CODE 250: Performed by: FAMILY MEDICINE

## 2023-02-13 PROCEDURE — 80048 BASIC METABOLIC PNL TOTAL CA: CPT | Performed by: FAMILY MEDICINE

## 2023-02-13 PROCEDURE — 63600175 PHARM REV CODE 636 W HCPCS: Mod: TB,JG | Performed by: FAMILY MEDICINE

## 2023-02-13 PROCEDURE — 85025 COMPLETE CBC W/AUTO DIFF WBC: CPT | Performed by: FAMILY MEDICINE

## 2023-02-13 PROCEDURE — 21400001 HC TELEMETRY ROOM

## 2023-02-13 PROCEDURE — 36415 COLL VENOUS BLD VENIPUNCTURE: CPT | Performed by: FAMILY MEDICINE

## 2023-02-13 PROCEDURE — 96366 THER/PROPH/DIAG IV INF ADDON: CPT

## 2023-02-13 RX ORDER — POTASSIUM CHLORIDE 20 MEQ/1
20 TABLET, EXTENDED RELEASE ORAL
Status: DISCONTINUED | OUTPATIENT
Start: 2023-02-13 | End: 2023-02-16 | Stop reason: HOSPADM

## 2023-02-13 RX ADMIN — MEGESTROL ACETATE 40 MG: 20 TABLET ORAL at 08:02

## 2023-02-13 RX ADMIN — PANCRELIPASE 2 CAPSULE: 60000; 12000; 38000 CAPSULE, DELAYED RELEASE PELLETS ORAL at 08:02

## 2023-02-13 RX ADMIN — SODIUM CHLORIDE 125 MG: 9 INJECTION, SOLUTION INTRAVENOUS at 03:02

## 2023-02-13 RX ADMIN — LACTOBACILLUS TAB 4 TABLET: TAB at 12:02

## 2023-02-13 RX ADMIN — LACTOBACILLUS TAB 4 TABLET: TAB at 08:02

## 2023-02-13 RX ADMIN — APIXABAN 5 MG: 5 TABLET, FILM COATED ORAL at 08:02

## 2023-02-13 RX ADMIN — PANCRELIPASE 2 CAPSULE: 60000; 12000; 38000 CAPSULE, DELAYED RELEASE PELLETS ORAL at 12:02

## 2023-02-13 RX ADMIN — SERTRALINE HYDROCHLORIDE 50 MG: 50 TABLET ORAL at 08:02

## 2023-02-13 RX ADMIN — MORPHINE SULFATE 4 MG: 4 INJECTION, SOLUTION INTRAMUSCULAR; INTRAVENOUS at 08:02

## 2023-02-13 RX ADMIN — LACTOBACILLUS TAB 4 TABLET: TAB at 04:02

## 2023-02-13 RX ADMIN — PANTOPRAZOLE SODIUM 40 MG: 40 TABLET, DELAYED RELEASE ORAL at 05:02

## 2023-02-13 RX ADMIN — POTASSIUM CHLORIDE 20 MEQ: 1500 TABLET, EXTENDED RELEASE ORAL at 03:02

## 2023-02-13 RX ADMIN — FOLIC ACID 1 MG: 1 TABLET ORAL at 08:02

## 2023-02-13 RX ADMIN — MIRTAZAPINE 15 MG: 15 TABLET, FILM COATED ORAL at 08:02

## 2023-02-13 RX ADMIN — POTASSIUM CHLORIDE 20 MEQ: 1500 TABLET, EXTENDED RELEASE ORAL at 05:02

## 2023-02-13 RX ADMIN — PANCRELIPASE 2 CAPSULE: 60000; 12000; 38000 CAPSULE, DELAYED RELEASE PELLETS ORAL at 04:02

## 2023-02-13 NOTE — PT/OT/SLP PROGRESS
Occupational Therapy      Patient Name:  Claire Bowser   MRN:  [id number]    Patient not seen today secondary to pt unavailable when attempted (eating lunch brought in by family when attempted.)      2/13/2023

## 2023-02-13 NOTE — PT/OT/SLP PROGRESS
Physical Therapy Treatment    Patient Name:  Claire Bowser   MRN:  [id number]    Recommendations:     Discharge Recommendations: nursing facility, skilled, outpatient PT (SNF vs OP PT (pt either doesn't have benefits for IP facility or can't affort co-pays).)  Discharge Equipment Recommendations: bedside commode (transport wheelchair (narrow for use in house if needed, but definitely needed for community to get to OP therapy/MD appts).)  Barriers to discharge:  Pt has failed to thrive since recent surgery despite full time care from . Pt with low motivation to move.    Assessment:     Claire Bowser is a 63 y.o. female admitted with a medical diagnosis of CHF (congestive heart failure).  She presents with the following impairments/functional limitations: weakness, impaired endurance, impaired self care skills, impaired functional mobility, gait instability, impaired balance, decreased safety awareness, edema, impaired skin, pain.    Pt found HOB slightly elevated with  present. Pt agreeable to PT session and tolerated gt x 40 feet today with rolling walker and close CGA. PT discussed strategies for progressing activity with pt and her  and advised would provide gait belt for home use. PT recommended pt have a BSC and transport w/c for home use, as well.     Rehab Prognosis: Fair; patient would benefit from acute skilled PT services to address these deficits and reach maximum level of function.    Recent Surgery: * No surgery found *      Plan:     During this hospitalization, patient to be seen 6 x/week to address the identified rehab impairments via gait training, therapeutic activities, therapeutic exercises, neuromuscular re-education and progress toward the following goals:    Plan of Care Expires:  03/09/23    Subjective     Chief Complaint: Pt's  reported they are having financial difficulty, but getting some assistance. Pt will likely need to go home and receive PT as an outpt as  they have no benefits for SNF or rehab.   Patient/Family Comments/goals: SNF vs OP PT w/ 24/7 care at home.  Pain/Comfort:  Pain Rating 1: 0/10  Pain Rating Post-Intervention 1: 0/10      Objective:     Communicated with RN prior to session and CM after session.  Patient found HOB elevated with blood pressure cuff, peripheral IV, telemetry, lake catheter upon PT entry to room.     General Precautions: Standard, fall  Orthopedic Precautions: N/A  Braces: N/A  Respiratory Status: Room air     Functional Mobility:  Bed Mobility:     Scooting: minimum assistance  Supine to Sit: contact guard assistance  Sit to Supine: minimum assistance  Transfers:     Sit to Stand:  contact guard assistance and minimum assistance with rolling walker  Gait: x 40 feet with rolling walker and close CGA for safety due to generalized weakness.      AM-PAC 6 CLICK MOBILITY          Treatment & Education:  Pt was educated on the following: call light use, importance of OOB activity and functional mobility to negate the negative effects of prolonged bed rest during this hospitalization, safe transfers/ambulation and discharge planning recommendations/options.  PT advised that pt should be getting up to [facility] for toileting, not using bathroom in a brief due to skin excoriation. Also, recommended gait with assistance every 1-2 hours at home and there ex performance daily.       Patient left HOB elevated with all lines intact, call button in reach, bed alarm on, RN notified, and pt's  and another family member present..    GOALS:   Multidisciplinary Problems       Physical Therapy Goals          Problem: Physical Therapy    Goal Priority Disciplines Outcome Goal Variances Interventions   Physical Therapy Goal     PT, PT/OT Ongoing, Progressing     Description: All PT goals to be met by discharge:    1. Supine to sit with Stand-by Assistance  2. Sit to stand transfer with Stand-by Assistance  3.. Bed to chair transfer with Supervision using  Rolling Walker  4. Gait  x 100 feet with Minimal Assistance using Rolling Walker.                          Time Tracking:     PT Received On: 02/13/23  PT Start Time: 1018     PT Stop Time: 1034  PT Total Time (min): 16 min     Billable Minutes: Gait Training 16    Treatment Type: Treatment  PT/PTA: PT           02/13/2023

## 2023-02-13 NOTE — CONSULTS
Bilateral buttock appears less red than previous pictures.  Denuded skin from sacrum groin, labia. Red raw.  Cleaned and dried and applied antifungal powder and Triad to bilateral buttock and sacrum and Triad to groin and labia.  Red healed area mid back Mepilex dressing.

## 2023-02-13 NOTE — PLAN OF CARE
02/13/23 0953   Discharge Reassessment   Assessment Type Discharge Planning Reassessment   Did the patient's condition or plan change since previous assessment? Yes   Discharge Plan discussed with: Spouse/sig other;Patient   Communicated RASHI with patient/caregiver Yes   Discharge Plan A Home with family   Discharge Plan B Home  (outpatient therapy)   DME Needed Upon Discharge  rollator   Discharge Barriers Identified Underinsured   Why the patient remains in the hospital Requires continued medical care;Insurance issues   Post-Acute Status   Post-Acute Authorization HME   HME Status Referrals Sent     Case management met with patient and spouse at bedside to discuss SNF on discharge. CM explained that SNF referral would need to be blasted out throughout state d/t patient's payor source. Patient and spouse voiced inability for patient to discharge to a far SNF facility d/t financials. CM explained discharging home with outpatient therapy - patient and spouse both agreed and v/u. Rollator requested - orders obtained. Per Dr. Obrien - need to optimize patient's nutrition prior to discharge. CM sent therapy secure chat to notify of new discharge plan and for additional recommendations. CM to follow

## 2023-02-13 NOTE — PLAN OF CARE
02/13/23 1406   Post-Acute Status   Post-Acute Authorization HME   HME Status Set-up Complete/Auth obtained     Patient approved for BSC and rollator, per Juju Regalado. DME delivered to patient's bedside and delivery ticket signed by patient's  at bedside.

## 2023-02-13 NOTE — PROGRESS NOTES
[facility] Medicine  Progress Note    Patient Name: Claire Bowser  MRN: [id number]  Admission Date: 2/8/2023 10:00 AM  Attending Physician: Ledy Martínez MD  Face-to-Face encounter date: 02/13/2023    Assessment & Plan:   Claire Bowser is a 63 y.o. female with:    Active Hospital Problems    Diagnosis  POA    *CHF (congestive heart failure) [I50.9]  Yes    History of DVT of lower extremity [Z86.718]  Not Applicable     Chronic    Severe protein-calorie malnutrition [E43]  Yes     Chronic    Rheumatoid arthritis involving multiple sites with positive rheumatoid factor [M05.79]  Yes     Chronic    Mixed hyperlipidemia [E78.2]  Yes    Generalized anxiety disorder [F41.1]  Yes     Chronic    Tobacco use [Z72.0]  Yes     Chronic    Hypertension [I10]  Yes     Chronic      Resolved Hospital Problems   No resolved problems to display.     Acute HFpEF, improved  ACS r/o, no CP  - lasix prn  - supplemental oxygen, weaned  - echo with preserved ejection fraction  - lexiscan negative Oct 2022  - LE edema due to hypoalbuminemia  - recheck LFT's today  - optimize nutrition  - compression hose    Severe Malnutrition  Diarrhea  - due to pancreatic insufficiency  - resume creon 6 tablets   - imodium prn  - banana flakes  - optimize nutritional intake  - added megace and remeron, discussed risk of DVT with pt. She is already on eliquis.   - trend albumin  - need calorie count.   - start clinimix.   - if not eating enough, she may need a feeding tube    Generalized weakness  - CT head: no acute intracranial abnormality  - weakness due to malnutrition  - PT/OT  -will need SNF > no SNF benefits.   - they are agreeable to returning home with home health    [facility]  - wound care    Anemia  - anemia due to iron deficiency, likely chronic disease, possible blood loss.   - transfuse 1 unit  - IV iron supplementation  - retics very low  - no signs of melena, but may need GI workup if no other evident  "cause.     Chronic Pancreatitis with Pseudocyst  Pancreatic duct leak with peritonitis   - recent hospitalization discharged 1/27 from [facility] after complicated stay due to peritonitis with pancreatic duct leak requiring stent placement.   - completed abx for peritonitis  - no abdominal pain / symptoms at this time.     Other chronic conditions including but not limited to those noted above/below:  All home meds reviewed personally by me, and adjusted as appropriate.  Electrolyte derangement:  Trending BMP, Mg; Replacement prn  DVT ppx:  home apixaban, trending CBC  Dispo:  Home when medically stable.  Will need follow-up with their PCP.    FULL CODE    - The above conditions include an acute and/or chronic illness that poses a threat to life (or bodily function).  - Previous notes/encounters/external records reviewed personally by me.    Subjective:      Interval History:  Diarrhea is improving today. She is not hungry and didn't want to eat breakfast. She says she wants a Wol[place] toast. I have instructed the patient and nursing to order whatever she wants to eat. Will start a calorie count.     Review of Systems   All systems reviewed and are negative except as noted per above.    Objective:     Physical Exam  /68 (BP Location: Left arm, Patient Position: Lying)   Pulse 103   Temp 96.7 °F (35.9 °C) (Oral)   Resp 20   Ht 5' 8" (1.727 m)   Wt 48.1 kg (106 lb)   SpO2 96%   Breastfeeding No   BMI 16.12 kg/m²     Gen: alert, responsive, cachectic  HEENT:  Eyes - no pallor  External ears with no lesions  Nares patent  Mouth, Throat:  trachea midline  CV: RRR  Lungs: CTA B/L   Abd: +BS, soft, NT, ND  :  perineal erythema   Ext: +atrophy; 2+ edema  Skin: warm, dry, severe skin breakdown of her perineum and perivaginal / perirectal area.   Neuro: grossly intact  Psych: pleasant    Recent Labs   Lab 02/13/23  0320   WBC 4.18   HGB 9.8*   HCT 29.1*        Recent Labs   Lab 02/13/23  0320 "   CALCIUM 8.6*   *   K 3.2*   CO2 30*   CL 98   BUN 12   CREATININE 0.4*     No results found for: POCTGLUCOSE   Microbiology Results (last 7 days)       Procedure Component Value Units Date/Time    Blood culture [[id number]] Collected: 02/09/23 0455    Order Status: Completed Specimen: Blood from Peripheral, Antecubital, Left Updated: 02/13/23 0632     Blood Culture, Routine No Growth to date      No Growth to date      No Growth to date      No Growth to date      No Growth to date    Blood culture [[id number]] Collected: 02/09/23 0455    Order Status: Completed Specimen: Blood from Peripheral, Antecubital, Left Updated: 02/13/23 0632     Blood Culture, Routine No Growth to date      No Growth to date      No Growth to date      No Growth to date      No Growth to date    Stool culture **cannot be ordered stat** [[id number]] Collected: 02/08/23 1638    Order Status: Completed Specimen: Stool Updated: 02/11/23 1050     Stool Culture No Salmonella,Shigella,Vibrio,Campylobacter.      No E coli 0157:H7 isolated.      No enteric leobardo    Clostridium difficile EIA [[id number]] Collected: 02/08/23 1638    Order Status: Canceled Specimen: Stool           Imaging Results              CT Head Without Contrast (Final result)  Result time 02/08/23 13:44:25      Final result by Tim Carrington Jr., MD (02/08/23 13:44:25)                   Narrative:    CT OF THE HEAD WITHOUT CONTRAST    HISTORY: Neurological deficit. Stroke suspected. Generalized weakness.    Technical factors:   Spiral acquisition of the brain was generated at 3.0 mm thickness from the skull base to the skull vertex in helical fashion in the absence of intravenous contrast.  Additional coronal and sagittal reconstructed images were also included and reviewed.    CMS MANDATED QUALITY DATA - CT RADIATION  436    All CT scans at this facility utilize dose modulation, iterative reconstruction, and/or weight based dosing when appropriate to reduce radiation  dose to as low as reasonably achievable.        FINDINGS:  Generalized central and cortical involutional changes are established that are age concordant in keeping with the patient's clinical age of 62 years of age. Moderately prominent low-density changes throughout both the subcortical and periventricular white matter nonspecific although attributed towards chronic microvascular ischemic changes. No evidence of intracranial hemorrhage, masses, mass effect, midline shift, acute vascular insult. Ventricles maintain normal size and overall configuration. Third ventricle is normal in size. The visualized paranasal sinuses are clear. Prominent atheromatous calcification through the cavernous and supraclinoid segments of the internal carotid arteries.    IMPRESSION:  1. Generalized cerebral atrophy and superimposed chronic deep white matter ischemia.  2. No evidence of acute intracranial event    Electronically signed by:  Tim Carrington MD  2/8/2023 1:44 PM CST Workstation: [id number]                                     X-Ray Chest AP Portable (Final result)  Result time 02/08/23 10:41:37      Final result by Tim Carrington Jr., MD (02/08/23 10:41:37)                   Narrative:    HISTORY: Generalized weakness    COMPARISON:January 12, 2023    FINDINGS:Right-sided pleural effusion has evolved with increased accumulation and apical meniscus sign along the superior edge marginal mass effect upon the inferior edge of the right lower lobe reproducing right basilar atelectasis. This evidence of chronic interstitial markings in the basilar segments. Lungs are emphysematous with minimal perihilar scarring. No evidence of confluent infiltrate or mass lesion. Prominent arch atherosclerosis. The tracheal column is midline without evidence of shift. The osseous structures reveal no significant finding. Postoperative changes of previous gallbladder resection.    IMPRESSION:  1. Evolving right-sided pleural effusion with  right lower lobe atelectasis and new finding upon comparison.  2. No evidence of acute cardiopulmonary process.    Electronically signed by:  Tim Carrington MD  2/8/2023 10:41 AM CST Workstation: [id number]                                     Ollie Obrien MD  [facility] Hospitalist

## 2023-02-14 LAB
ANION GAP SERPL CALC-SCNC: 2 MMOL/L (ref 8–16)
ANISOCYTOSIS BLD QL SMEAR: SLIGHT
BACTERIA BLD CULT: NORMAL
BACTERIA BLD CULT: NORMAL
BASOPHILS NFR BLD: 0 % (ref 0–1.9)
BUN SERPL-MCNC: 13 MG/DL (ref 8–23)
CALCIUM SERPL-MCNC: 8.3 MG/DL (ref 8.7–10.5)
CHLORIDE SERPL-SCNC: 101 MMOL/L (ref 95–110)
CO2 SERPL-SCNC: 28 MMOL/L (ref 23–29)
CREAT SERPL-MCNC: 0.5 MG/DL (ref 0.5–1.4)
DIFFERENTIAL METHOD: ABNORMAL
EOSINOPHIL NFR BLD: 3 % (ref 0–8)
ERYTHROCYTE [DISTWIDTH] IN BLOOD BY AUTOMATED COUNT: 16 % (ref 11.5–14.5)
EST. GFR  (NO RACE VARIABLE): >60 ML/MIN/1.73 M^2
GLUCOSE SERPL-MCNC: 78 MG/DL (ref 70–110)
HCT VFR BLD AUTO: 26.8 % (ref 37–48.5)
HGB BLD-MCNC: 8.8 G/DL (ref 12–16)
IMM GRANULOCYTES # BLD AUTO: ABNORMAL K/UL (ref 0–0.04)
IMM GRANULOCYTES NFR BLD AUTO: ABNORMAL % (ref 0–0.5)
LYMPHOCYTES NFR BLD: 24 % (ref 18–48)
MAGNESIUM SERPL-MCNC: 1.7 MG/DL (ref 1.6–2.6)
MCH RBC QN AUTO: 29.4 PG (ref 27–31)
MCHC RBC AUTO-ENTMCNC: 32.8 G/DL (ref 32–36)
MCV RBC AUTO: 90 FL (ref 82–98)
MONOCYTES NFR BLD: 6 % (ref 4–15)
NEUTROPHILS NFR BLD: 43 % (ref 38–73)
NEUTS BAND NFR BLD MANUAL: 24 %
NRBC BLD-RTO: 0 /100 WBC
PLATELET # BLD AUTO: 213 K/UL (ref 150–450)
PLATELET BLD QL SMEAR: ABNORMAL
PMV BLD AUTO: 9.7 FL (ref 9.2–12.9)
POIKILOCYTOSIS BLD QL SMEAR: SLIGHT
POTASSIUM SERPL-SCNC: 3.7 MMOL/L (ref 3.5–5.1)
RBC # BLD AUTO: 2.99 M/UL (ref 4–5.4)
SODIUM SERPL-SCNC: 131 MMOL/L (ref 136–145)
WBC # BLD AUTO: 4.16 K/UL (ref 3.9–12.7)

## 2023-02-14 PROCEDURE — 97530 THERAPEUTIC ACTIVITIES: CPT

## 2023-02-14 PROCEDURE — 25000003 PHARM REV CODE 250: Performed by: FAMILY MEDICINE

## 2023-02-14 PROCEDURE — 51798 US URINE CAPACITY MEASURE: CPT

## 2023-02-14 PROCEDURE — 85007 BL SMEAR W/DIFF WBC COUNT: CPT | Performed by: FAMILY MEDICINE

## 2023-02-14 PROCEDURE — 25000003 PHARM REV CODE 250: Performed by: STUDENT IN AN ORGANIZED HEALTH CARE EDUCATION/TRAINING PROGRAM

## 2023-02-14 PROCEDURE — 36415 COLL VENOUS BLD VENIPUNCTURE: CPT | Performed by: FAMILY MEDICINE

## 2023-02-14 PROCEDURE — 80048 BASIC METABOLIC PNL TOTAL CA: CPT | Performed by: FAMILY MEDICINE

## 2023-02-14 PROCEDURE — 51701 INSERT BLADDER CATHETER: CPT

## 2023-02-14 PROCEDURE — 85027 COMPLETE CBC AUTOMATED: CPT | Performed by: FAMILY MEDICINE

## 2023-02-14 PROCEDURE — 12000002 HC ACUTE/MED SURGE SEMI-PRIVATE ROOM

## 2023-02-14 PROCEDURE — 83735 ASSAY OF MAGNESIUM: CPT | Performed by: FAMILY MEDICINE

## 2023-02-14 PROCEDURE — 97116 GAIT TRAINING THERAPY: CPT

## 2023-02-14 RX ADMIN — LACTOBACILLUS TAB 4 TABLET: TAB at 05:02

## 2023-02-14 RX ADMIN — MIRTAZAPINE 15 MG: 15 TABLET, FILM COATED ORAL at 08:02

## 2023-02-14 RX ADMIN — PANCRELIPASE 2 CAPSULE: 60000; 12000; 38000 CAPSULE, DELAYED RELEASE PELLETS ORAL at 01:02

## 2023-02-14 RX ADMIN — SERTRALINE HYDROCHLORIDE 50 MG: 50 TABLET ORAL at 08:02

## 2023-02-14 RX ADMIN — APIXABAN 5 MG: 5 TABLET, FILM COATED ORAL at 08:02

## 2023-02-14 RX ADMIN — MEGESTROL ACETATE 40 MG: 20 TABLET ORAL at 08:02

## 2023-02-14 RX ADMIN — PANCRELIPASE 2 CAPSULE: 60000; 12000; 38000 CAPSULE, DELAYED RELEASE PELLETS ORAL at 08:02

## 2023-02-14 RX ADMIN — FOLIC ACID 1 MG: 1 TABLET ORAL at 08:02

## 2023-02-14 RX ADMIN — PANTOPRAZOLE SODIUM 40 MG: 40 TABLET, DELAYED RELEASE ORAL at 05:02

## 2023-02-14 RX ADMIN — LACTOBACILLUS TAB 4 TABLET: TAB at 08:02

## 2023-02-14 RX ADMIN — PANCRELIPASE 2 CAPSULE: 60000; 12000; 38000 CAPSULE, DELAYED RELEASE PELLETS ORAL at 05:02

## 2023-02-14 RX ADMIN — LACTOBACILLUS TAB 4 TABLET: TAB at 01:02

## 2023-02-14 NOTE — PROGRESS NOTES
[facility] Medicine  Progress Note    Patient name: Claire Bowser  MRN: [id number]  Admit Date: 2/8/2023   LOS: 1 day     SUBJECTIVE:     Principal problem: Severe protein-calorie malnutrition    Interval History:  No acute overnight events reported.  She reports improvement in her appetite after liberalization of dietary restrictions.  Denies nausea or vomiting.  No abdominal pain.    Summary:   Patient is a 63-year-old  female with rheumatoid arthritis, chronic pancreatitis, essential hypertension admitted after presenting with generalized weakness and shortness of breath.  Though initially treated for acute diastolic congestive heart failure echocardiogram with no evidence of the same.    Presentation most likely consistent with failure to thrive in the setting of chronic biliary pancreatitis.  Diagnosed with severe malnutrition.  Seen by dietitian.  She has been re-initiated on pancreatic enzyme supplements.  And also initiated on appetite stimulants.  Her oral intake has improved.  Though she has been advised to go to SNF for further rehabilitation, she is unable to do so due to insurance limitations.    Scheduled Meds:   apixaban  5 mg Oral BID    folic acid  1 mg Oral Daily    Lactobacillus acidoph-L.bulgar  4 tablet Oral TID WM    lipase-protease-amylase 12,000-38,000-60,000 units  2 capsule Oral TID WM    megestroL  40 mg Oral Daily    mirtazapine  15 mg Oral QHS    pantoprazole  40 mg Oral Before breakfast    sertraline  50 mg Oral QHS     Continuous Infusions:  PRN Meds:sodium chloride, acetaminophen, albuterol-ipratropium, dextrose 10%, dextrose 10%, glucagon (human recombinant), glucose, glucose, HYDROcodone-acetaminophen, loperamide, magnesium oxide, magnesium oxide, melatonin, morphine, naloxone, ondansetron, polyethylene glycol, potassium chloride, potassium chloride, potassium chloride, potassium, sodium phosphates, potassium, sodium phosphates, potassium, sodium  phosphates, senna-docusate 8.6-50 mg, simethicone, sodium chloride 0.9%    Review of patient's allergies indicates:   Allergen Reactions    No known drug allergies        Review of Systems: As per interval history    OBJECTIVE:     Vital Signs (Most Recent)  Temp: 98.3 °F (36.8 °C) (02/14/23 0716)  Pulse: 105 (02/14/23 0716)  Resp: 18 (02/14/23 0716)  BP: 118/63 (02/14/23 0716)  SpO2: (!) 94 % (02/14/23 0716)    Vital Signs Range (Last 24H):  Temp:  [97.3 °F (36.3 °C)-98.5 °F (36.9 °C)]   Pulse:  []   Resp:  [18-20]   BP: (118-149)/(61-83)   SpO2:  [94 %-96 %]     I & O (Last 24H):  Intake/Output Summary (Last 24 hours) at 2/14/2023 1317  Last data filed at 2/14/2023 0504  Gross per 24 hour   Intake 120 ml   Output 800 ml   Net -680 ml       Physical Exam:  General: Patient resting comfortably in no acute distress.  Frail and cachectic  Eyes: No conjunctival injection. No scleral icterus.  ENT: Hearing grossly intact. No discharge from ears. No nasal discharge.   Neck: Supple, trachea midline  CVS: RRR. No LE edema BL  Lungs:  No tachypnea or accessory muscle use.  Clear to auscultation bilaterally  Abdomen:  Soft, nontender and nondistended.  No organomegaly  Neuro: AOx3. Moves all extremities. Follows commands. Responds appropriately   Skin:  No rash or erythema noted  MSK:  Muscle wasting noted    Laboratory:  I have reviewed all pertinent lab results within the past 24 hours.  CBC:   Recent Labs   Lab 02/14/23  0301   WBC 4.16   RBC 2.99*   HGB 8.8*   HCT 26.8*      MCV 90   MCH 29.4   MCHC 32.8     CMP:   Recent Labs   Lab 02/12/23  0328 02/13/23  0320 02/14/23  0301   GLU 73   < > 78   CALCIUM 8.3*   < > 8.3*   ALBUMIN 1.3*  --   --    PROT 5.0*  --   --    *   < > 131*   K 3.6   < > 3.7   CO2 29   < > 28   CL 96   < > 101   BUN 13   < > 13   CREATININE 0.5   < > 0.5   ALKPHOS 76  --   --    ALT 47*  --   --    AST 60*  --   --    BILITOT 1.1*  --   --     < > = values in this interval not  displayed.       Diagnostic Results:  Labs: Reviewed    ASSESSMENT/PLAN:       Active Hospital Problems    Diagnosis  POA    *Severe protein-calorie malnutrition [E43]  Yes     Chronic    Pancreatic duct leakage [K86.89]  Yes    History of DVT of lower extremity [Z86.718]  Not Applicable     Chronic    Rheumatoid arthritis involving multiple sites with positive rheumatoid factor [M05.79]  Yes     Chronic    Mixed hyperlipidemia [E78.2]  Yes    Generalized anxiety disorder [F41.1]  Yes     Chronic    Tobacco use [Z72.0]  Yes     Chronic    Hypertension [I10]  Yes     Chronic      Resolved Hospital Problems   No resolved problems to display.         Plan:   Continue to encourage oral intake as desired - consuming at least 75% of food  Calorie count in progress  Appetite stimulants ordered   Continue pancreatic enzyme supplement  History of left lower extremity DVT in September 2022; continue apixaban (to stop after March 2022)  Continue sertraline and mirtazapine    VTE Risk Mitigation (From admission, onward)           Ordered     Place CARLOS hose  Until discontinued         02/12/23 0816     apixaban tablet 5 mg  2 times daily         02/08/23 1518     IP VTE HIGH RISK PATIENT  Once         02/08/23 1518     Place sequential compression device  Until discontinued         02/08/23 1518                        Department Hospital Medicine  [facility]  Marbin Ty MD  Date of service: 02/14/2023

## 2023-02-14 NOTE — PROGRESS NOTES
"Pending sale to [facility]  Adult Nutrition   Progress Note (Follow-Up)    SUMMARY     Recommendations  Recommendation/Intervention:   1. Continue with Cardiac meal restriction as tolerated.   2. Continue with Unjury and Banatrol TID as tolerated.   3. Continue yogurt BID as tolerated.   4. Will continue to monitor PO intake,   tolerance, labs and weight changes.    Goals: Patient to meet > 75% EEN/EPN  Nutrition Goal Status: progressing towards goal        Dietitian Rounds Brief  Pt is a 62 y/o female with CHF and protein/energy malnutrition. DI met with patient to assess progression of her nutritional status.     Ms. Rangel's appetite and intake has greatly improved since her initial assessment. Her intake is now 75%+ with intake also included outside food brought in by family/friends. She has been drinking the Unjury provided with her meals and has mixed the Banatrol with either the oral nutrition supplement or her yogurt. She says her diarrhea has been improving.     Pt and  appeared to be in good spirits and happy with the improvements in condition.      Will continue to monitor PO intake, tolerance, weight changes, and labs. Will make further recommendations as appropriate.     Diet order:   Current Diet Order: Cardiac   Oral Nutrition Supplement: Unjury, banatrol all meals          Evaluation of Received Nutrient/Fluid Intake  Energy Calories Required: not meeting needs, meeting needs  Protein Required: meeting needs  Tolerance: tolerating     % Intake of Estimated Energy Needs: 75 - 100 %  % Meal Intake: 75 - 100 %      Intake/Output Summary (Last 24 hours) at 2/14/2023 1048  Last data filed at 2/14/2023 0504  Gross per 24 hour   Intake 240 ml   Output 800 ml   Net -560 ml        Anthropometrics  Temp: 98.3 °F (36.8 °C)  Height Method: Stated  Height: 5' 8" (172.7 cm)  Height (inches): 68 in  Weight Method: Bed Scale  Weight: 48.1 kg (106 lb)  Weight (lb): 106 lb  Ideal Body Weight (IBW), Female: 140 " lb  % Ideal Body Weight, Female (lb): 74.49 %  BMI (Calculated): 16.1  BMI Grade: 16 - 16.9 protein-energy malnutrition grade II       Estimated/Assessed Needs  Weight Used For Calorie Calculations: 48.1 kg (106 lb 0.7 oz)  Energy Calorie Requirements (kcal): 1683-1924kcal (35-40kcal/kg)  Energy Need Method: Kcal/kg  Protein Requirements: 57g-72g (1.2-1.5g/kg per guidelines for age and malnutrition)  Weight Used For Protein Calculations: 48.1 kg (106 lb 0.7 oz)  Fluid Requirements (mL): 1225ml (25ml/kg)     RDA Method (mL): 1683       Reason for Assessment  Reason For Assessment: RD follow-up  Diagnosis:  (CHF (congestive heart failure))  Relevant Medical History: Acute biliary pancreatitis, chronic pancreatitis, HTN, RA  Interdisciplinary Rounds: did not attend    Nutrition/Diet History  Spiritual, Cultural Beliefs, Mosque Practices, Values that Affect Care: no  Food Allergies: NKFA  Factors Affecting Nutritional Intake: decreased appetite    Nutrition Risk Screen  Nutrition Risk Screen: reduced oral intake over the last month, unintentional loss of 10 lbs or more in the past 2 months       Altered Skin Integrity 02/09/23 1622 medial;posterior Sacral spine #1 Incontinence associated dermatitis Partial thickness tissue loss. Shallow open ulcer with a red or pink wound bed, without slough. Intact or Open/Ruptured Serum-filled blister.-Wound Image: Images linked       Altered Skin Integrity 02/09/23 1653 Pubis #2 Incontinence associated dermatitis-Wound Image: Images linked  MST Score: 0  Have you recently lost weight without trying?: No  Weight loss score: 0  Have you been eating poorly because of a decreased appetite?: No  Appetite score: 0       Weight History:  Wt Readings from Last 5 Encounters:   02/14/23 48.1 kg (106 lb)   02/09/23 49.9 kg (110 lb)   01/23/23 53.8 kg (118 lb 9.7 oz)   01/21/23 53.8 kg (118 lb 9.7 oz)   01/22/23 53.8 kg (118 lb 9.7 oz)        Medications:Pertinent Medications  Reviewed  Scheduled Meds:   apixaban  5 mg Oral BID    folic acid  1 mg Oral Daily    Lactobacillus acidoph-L.bulgar  4 tablet Oral TID WM    lipase-protease-amylase 12,000-38,000-60,000 units  2 capsule Oral TID WM    megestroL  40 mg Oral Daily    mirtazapine  15 mg Oral QHS    pantoprazole  40 mg Oral Before breakfast    sertraline  50 mg Oral QHS     Continuous Infusions:  PRN Meds:.sodium chloride, acetaminophen, albuterol-ipratropium, dextrose 10%, dextrose 10%, glucagon (human recombinant), glucose, glucose, HYDROcodone-acetaminophen, loperamide, magnesium oxide, magnesium oxide, melatonin, morphine, naloxone, ondansetron, polyethylene glycol, potassium chloride, potassium chloride, potassium chloride, potassium, sodium phosphates, potassium, sodium phosphates, potassium, sodium phosphates, senna-docusate 8.6-50 mg, simethicone, sodium chloride 0.9%    Labs: Pertinent Labs Reviewed  Clinical Chemistry:  Recent Labs   Lab 02/08/23  1041 02/09/23  0455 02/12/23  0328 02/13/23  0320 02/14/23  0301   *   < > 131*   < > 131*   K 3.0*   < > 3.6   < > 3.7   CL 96   < > 96   < > 101   CO2 26   < > 29   < > 28   GLU 91   < > 73   < > 78   BUN 12   < > 13   < > 13   CREATININE 0.4*   < > 0.5   < > 0.5   CALCIUM 8.9   < > 8.3*   < > 8.3*   PROT 5.7*  --  5.0*  --   --    ALBUMIN 1.5*  --  1.3*  --   --    BILITOT 1.1*  --  1.1*  --   --    ALKPHOS 96  --  76  --   --    AST 53*  --  60*  --   --    ALT 42  --  47*  --   --    ANIONGAP 7*   < > 6*   < > 2*   MG  --    < > 1.7   < > 1.7   LIPASE 40  --   --   --   --     < > = values in this interval not displayed.     CBC:   Recent Labs   Lab 02/14/23  0301   WBC 4.16   RBC 2.99*   HGB 8.8*   HCT 26.8*      MCV 90   MCH 29.4   MCHC 32.8     Lipid Panel:  No results for input(s): CHOL, HDL, LDLCALC, TRIG, CHOLHDL in the last 168 hours.  Cardiac Profile:  Recent Labs   Lab 02/08/23  1041   BNP 80     Diabetes:  Recent Labs   Lab 02/08/23  2040   HGBA1C 5.2        Monitor and Evaluation  Food and Nutrient Intake: energy intake  Food and Nutrient Adminstration: diet order  Knowledge/Beliefs/Attitudes: food and nutrition knowledge/skill, beliefs and attitudes  Physical Activity and Function: nutrition-related ADLs and IADLs  Anthropometric Measurements: weight, weight change, body mass index  Biochemical Data, Medical Tests and Procedures: electrolyte and renal panel, gastrointestinal profile, glucose/endocrine profile, inflammatory profile, lipid profile  Nutrition-Focused Physical Findings: overall appearance     Nutrition Risk  Level of Risk/Frequency of Follow-up: high     Nutrition Follow-Up  RD Follow-up?: Yes      Marco A Blanco, Student Dietitian 02/14/2023 10:48 AM

## 2023-02-14 NOTE — PT/OT/SLP PROGRESS
Physical Therapy Treatment    Patient Name:  Claire Bowser   MRN:  [id number]    Recommendations:     Discharge Recommendations: outpatient PT  Discharge Equipment Recommendations: bedside commode (transport wheelchair (narrow for use in house if needed, but definitely needed for community to get to OP therapy/MD appts).)  Barriers to discharge: None    Assessment:     Claire Bowser is a 63 y.o. female admitted with a medical diagnosis of Severe protein-calorie malnutrition.  She presents with the following impairments/functional limitations: weakness, impaired endurance, impaired self care skills, impaired functional mobility, gait instability, impaired balance, decreased safety awareness, edema, impaired skin, pain.    Pt found HOB elevated and was agreeable to working with PT. As pt stood, PT noted pt was soiled with incontinent BM. Pt was able to amb into bathroom and stood at [facility] for tech to clean her and continued with gait training prior to returning to bed.     Rehab Prognosis: Fair; patient would benefit from acute skilled PT services to address these deficits and reach maximum level of function.    Recent Surgery: * No surgery found *      Plan:     During this hospitalization, patient to be seen 6 x/week to address the identified rehab impairments via gait training, therapeutic activities, therapeutic exercises, neuromuscular re-education and progress toward the following goals:    Plan of Care Expires:  03/09/23    Subjective     Chief Complaint: pt soiled and did not realize it  Patient/Family Comments/goals: home with  to assist and possibly OP PT  Pain/Comfort:  Pain Rating 1: 0/10  Pain Rating Post-Intervention 1: 0/10      Objective:     Communicated with RN prior to session.  Patient found HOB elevated with blood pressure cuff, peripheral IV, telemetry, lake catheter upon PT entry to room.     General Precautions: Standard, fall  Orthopedic Precautions: N/A  Braces: N/A  Respiratory  Status: Room air     Functional Mobility:  Bed Mobility:     Scooting: minimum assistance  Supine to Sit: minimum assistance  Sit to Supine: minimum assistance  Transfers:     Sit to Stand:  contact guard assistance with rolling walker  Gait: x 15' and 75' with  and [facility] for safety (pt performed gait training into bathroom and stood at  [facility] for tech to clean her then proceeded with gait training as noted).      AM-PAC 6 CLICK MOBILITY          Treatment & Education:  Pt was educated on the following: call light use, importance of OOB activity and functional mobility to negate the negative effects of prolonged bed rest during this hospitalization, safe transfers/ambulation and discharge planning recommendations/options.      Patient left HOB elevated with all lines intact, call button in reach, bed alarm on, and RN notified..    GOALS:   Multidisciplinary Problems       Physical Therapy Goals          Problem: Physical Therapy    Goal Priority Disciplines Outcome Goal Variances Interventions   Physical Therapy Goal     PT, PT/OT Ongoing, Progressing     Description: All PT goals to be met by discharge:    1. Supine to sit with Stand-by Assistance  2. Sit to stand transfer with Stand-by Assistance  3.. Bed to chair transfer with Supervision using Rolling Walker  4. Gait  x 100 feet with Minimal Assistance using Rolling Walker.                          Time Tracking:     PT Received On: 02/14/23  PT Start Time: 1417     PT Stop Time: 1435  PT Total Time (min): 18 min     Billable Minutes: Gait Training 18    Treatment Type: Treatment  PT/PTA: PT           02/14/2023

## 2023-02-14 NOTE — PLAN OF CARE
Problem: Occupational Therapy  Goal: Occupational Therapy Goal  Description: Goals to be met by: 3/10/23     Patient will increase functional independence with ADLs by performing:    UE Dressing with Minimal Assistance.  LE Dressing with Minimal Assistance.  Grooming while seated with Minimal Assistance.  Toileting from toilet with Minimal Assistance for hygiene and clothing management.   Toilet transfer to toilet with Minimal Assistance.    Outcome: Ongoing, Progressing

## 2023-02-14 NOTE — PT/OT/SLP PROGRESS
Occupational Therapy   Treatment    Name: Claire Bowser  MRN: [id number]  Admitting Diagnosis:  Severe protein-calorie malnutrition       Recommendations:     Discharge Recommendations: home  Discharge Equipment Recommendations:  other (see comments) (TBD)  Barriers to discharge:  None    Assessment:     Claire Bowser is a 63 y.o. female with a medical diagnosis of Severe protein-calorie malnutrition.  Performance deficits affecting function are weakness, impaired endurance, impaired self care skills, impaired functional mobility, gait instability, impaired balance.     Pt presented supine with spouse present; pt agreed to participate in functional mobility; she and her spouse were provided with a gait belt and demonstrated understanding of use; pt and spouse were also educated on importance of a toileting schedule, out of bed activity, nutrition, and skin care for wound healing.      Rehab Prognosis:  Good; patient would benefit from acute skilled OT services to address these deficits and reach maximum level of function.       Plan:     Patient to be seen 5 x/week to address the above listed problems via self-care/home management, therapeutic activities, therapeutic exercises  Plan of Care Expires:    Plan of Care Reviewed with: patient, spouse    Subjective     Pain/Comfort:  Pain Rating 1: 0/10  Pain Rating Post-Intervention 1: 0/10    Objective:     Communicated with: nurse prior to session.  Patient found supine with telemetry upon OT entry to room.    General Precautions: Standard, fall    Orthopedic Precautions:N/A  Braces: N/A  Respiratory Status: Room air     Occupational Performance:     Bed Mobility:    Patient completed Supine to Sit with minimum assistance  Patient completed Sit to Supine with minimum assistance     Functional Mobility/Transfers:  Patient completed Sit <> Stand Transfer with minimum assistance  with  rolling walker   Functional Mobility: [facility] ~20 feet with RW    Patient left HOB elevated  with all lines intact, call button in reach, and bed alarm on    GOALS:   Multidisciplinary Problems       Occupational Therapy Goals          Problem: Occupational Therapy    Goal Priority Disciplines Outcome Interventions   Occupational Therapy Goal     OT, PT/OT Ongoing, Progressing    Description: Goals to be met by: 3/10/23     Patient will increase functional independence with ADLs by performing:    UE Dressing with Minimal Assistance.  LE Dressing with Minimal Assistance.  Grooming while seated with Minimal Assistance.  Toileting from toilet with Minimal Assistance for hygiene and clothing management.   Toilet transfer to toilet with Minimal Assistance.                         Time Tracking:     OT Date of Treatment: 02/14/23  OT Start Time: 1023  OT Stop Time: 1047  OT Total Time (min): 24 min    Billable Minutes:Therapeutic Activity 24    OT/IRENA: OT          2/14/2023

## 2023-02-14 NOTE — PLAN OF CARE
[facility] NP at home appointment added to patient's AVS. DME delivered to patient's bedside on yesterday. No other discharge needs noted at this time.

## 2023-02-15 LAB
ANION GAP SERPL CALC-SCNC: 5 MMOL/L (ref 8–16)
BASOPHILS # BLD AUTO: 0.04 K/UL (ref 0–0.2)
BASOPHILS NFR BLD: 1 % (ref 0–1.9)
BUN SERPL-MCNC: 12 MG/DL (ref 8–23)
CALCIUM SERPL-MCNC: 8.5 MG/DL (ref 8.7–10.5)
CHLORIDE SERPL-SCNC: 99 MMOL/L (ref 95–110)
CO2 SERPL-SCNC: 27 MMOL/L (ref 23–29)
CREAT SERPL-MCNC: 0.5 MG/DL (ref 0.5–1.4)
DIFFERENTIAL METHOD: ABNORMAL
EOSINOPHIL # BLD AUTO: 0.1 K/UL (ref 0–0.5)
EOSINOPHIL NFR BLD: 2.6 % (ref 0–8)
ERYTHROCYTE [DISTWIDTH] IN BLOOD BY AUTOMATED COUNT: 16.3 % (ref 11.5–14.5)
EST. GFR  (NO RACE VARIABLE): >60 ML/MIN/1.73 M^2
GLUCOSE SERPL-MCNC: 76 MG/DL (ref 70–110)
HCT VFR BLD AUTO: 25.6 % (ref 37–48.5)
HGB BLD-MCNC: 8.2 G/DL (ref 12–16)
IMM GRANULOCYTES # BLD AUTO: 0.06 K/UL (ref 0–0.04)
IMM GRANULOCYTES NFR BLD AUTO: 1.4 % (ref 0–0.5)
LYMPHOCYTES # BLD AUTO: 0.8 K/UL (ref 1–4.8)
LYMPHOCYTES NFR BLD: 19.2 % (ref 18–48)
MAGNESIUM SERPL-MCNC: 1.5 MG/DL (ref 1.6–2.6)
MAGNESIUM SERPL-MCNC: 1.7 MG/DL (ref 1.6–2.6)
MCH RBC QN AUTO: 29.4 PG (ref 27–31)
MCHC RBC AUTO-ENTMCNC: 32 G/DL (ref 32–36)
MCV RBC AUTO: 92 FL (ref 82–98)
MONOCYTES # BLD AUTO: 0.3 K/UL (ref 0.3–1)
MONOCYTES NFR BLD: 8.2 % (ref 4–15)
NEUTROPHILS # BLD AUTO: 2.8 K/UL (ref 1.8–7.7)
NEUTROPHILS NFR BLD: 67.6 % (ref 38–73)
NRBC BLD-RTO: 0 /100 WBC
PLATELET # BLD AUTO: 211 K/UL (ref 150–450)
PMV BLD AUTO: 9.6 FL (ref 9.2–12.9)
POTASSIUM SERPL-SCNC: 3.3 MMOL/L (ref 3.5–5.1)
POTASSIUM SERPL-SCNC: 3.8 MMOL/L (ref 3.5–5.1)
RBC # BLD AUTO: 2.79 M/UL (ref 4–5.4)
SODIUM SERPL-SCNC: 131 MMOL/L (ref 136–145)
WBC # BLD AUTO: 4.17 K/UL (ref 3.9–12.7)

## 2023-02-15 PROCEDURE — 83735 ASSAY OF MAGNESIUM: CPT | Performed by: FAMILY MEDICINE

## 2023-02-15 PROCEDURE — 25000003 PHARM REV CODE 250: Performed by: STUDENT IN AN ORGANIZED HEALTH CARE EDUCATION/TRAINING PROGRAM

## 2023-02-15 PROCEDURE — 12000002 HC ACUTE/MED SURGE SEMI-PRIVATE ROOM

## 2023-02-15 PROCEDURE — 25000003 PHARM REV CODE 250: Performed by: FAMILY MEDICINE

## 2023-02-15 PROCEDURE — 83735 ASSAY OF MAGNESIUM: CPT | Mod: 91 | Performed by: STUDENT IN AN ORGANIZED HEALTH CARE EDUCATION/TRAINING PROGRAM

## 2023-02-15 PROCEDURE — 84132 ASSAY OF SERUM POTASSIUM: CPT | Performed by: STUDENT IN AN ORGANIZED HEALTH CARE EDUCATION/TRAINING PROGRAM

## 2023-02-15 PROCEDURE — 36415 COLL VENOUS BLD VENIPUNCTURE: CPT | Performed by: STUDENT IN AN ORGANIZED HEALTH CARE EDUCATION/TRAINING PROGRAM

## 2023-02-15 PROCEDURE — 85025 COMPLETE CBC W/AUTO DIFF WBC: CPT | Performed by: FAMILY MEDICINE

## 2023-02-15 PROCEDURE — 80048 BASIC METABOLIC PNL TOTAL CA: CPT | Performed by: FAMILY MEDICINE

## 2023-02-15 PROCEDURE — 97535 SELF CARE MNGMENT TRAINING: CPT

## 2023-02-15 PROCEDURE — 36415 COLL VENOUS BLD VENIPUNCTURE: CPT | Performed by: FAMILY MEDICINE

## 2023-02-15 RX ORDER — MIRTAZAPINE 15 MG/1
15 TABLET, FILM COATED ORAL NIGHTLY
Qty: 90 TABLET | Refills: 0 | OUTPATIENT
Start: 2023-02-15 | End: 2023-05-16

## 2023-02-15 RX ORDER — MEGESTROL ACETATE 40 MG/1
40 TABLET ORAL DAILY
Qty: 90 TABLET | Refills: 0 | OUTPATIENT
Start: 2023-02-15 | End: 2023-05-16

## 2023-02-15 RX ORDER — LOPERAMIDE HYDROCHLORIDE 2 MG/1
2 CAPSULE ORAL 4 TIMES DAILY PRN
Qty: 28 CAPSULE | Refills: 0 | OUTPATIENT
Start: 2023-02-15 | End: 2023-02-22

## 2023-02-15 RX ADMIN — LACTOBACILLUS TAB 4 TABLET: TAB at 04:02

## 2023-02-15 RX ADMIN — PANCRELIPASE 2 CAPSULE: 60000; 12000; 38000 CAPSULE, DELAYED RELEASE PELLETS ORAL at 12:02

## 2023-02-15 RX ADMIN — FOLIC ACID 1 MG: 1 TABLET ORAL at 08:02

## 2023-02-15 RX ADMIN — APIXABAN 5 MG: 5 TABLET, FILM COATED ORAL at 08:02

## 2023-02-15 RX ADMIN — PANCRELIPASE 2 CAPSULE: 60000; 12000; 38000 CAPSULE, DELAYED RELEASE PELLETS ORAL at 04:02

## 2023-02-15 RX ADMIN — PANTOPRAZOLE SODIUM 40 MG: 40 TABLET, DELAYED RELEASE ORAL at 05:02

## 2023-02-15 RX ADMIN — LACTOBACILLUS TAB 4 TABLET: TAB at 12:02

## 2023-02-15 RX ADMIN — MIRTAZAPINE 15 MG: 15 TABLET, FILM COATED ORAL at 08:02

## 2023-02-15 RX ADMIN — SERTRALINE HYDROCHLORIDE 50 MG: 50 TABLET ORAL at 08:02

## 2023-02-15 RX ADMIN — PANCRELIPASE 2 CAPSULE: 60000; 12000; 38000 CAPSULE, DELAYED RELEASE PELLETS ORAL at 08:02

## 2023-02-15 RX ADMIN — LACTOBACILLUS TAB 4 TABLET: TAB at 08:02

## 2023-02-15 RX ADMIN — MEGESTROL ACETATE 40 MG: 20 TABLET ORAL at 08:02

## 2023-02-15 RX ADMIN — Medication 800 MG: at 08:02

## 2023-02-15 RX ADMIN — POTASSIUM CHLORIDE 20 MEQ: 1500 TABLET, EXTENDED RELEASE ORAL at 10:02

## 2023-02-15 RX ADMIN — POTASSIUM CHLORIDE 20 MEQ: 1500 TABLET, EXTENDED RELEASE ORAL at 08:02

## 2023-02-15 NOTE — PLAN OF CARE
Problem: Adult Inpatient Plan of Care  Goal: Plan of Care Review  Outcome: Ongoing, Progressing  Goal: Optimal Comfort and Wellbeing  Outcome: Ongoing, Progressing  POC reviewed with pt and .  Pt denies needs.  Pt continue with loose stool.  GI to see pt.  Call light within reach.  Bed in lowest position.  Bed alarm on.

## 2023-02-15 NOTE — PROGRESS NOTES
[facility] Medicine    Progress Note    Patient Name: Claire Bowser  MRN: [id number]  Patient Class: IP- Inpatient   Admission Date: 2/8/2023 10:00 AM  Length of Stay: 2  Attending Physician: Oleg Ross MD  Primary Care Provider: Samuel Brady MD  Face-to-Face encounter date: 02/15/2023  Code status:  Chief Complaint: Post-op Problem (END OF January AT [facility] FOR BILIARY STENT) and GEN WEAKNESS        Subjective:    HPI:Claire Bowser is a 63 y.o. White female With PMH as noted below, who presents with generalized weakness. Onset since previous hospitalization  Progressively worsening. Pt not participating much with pt. Also reports new exertional SOB , +LE edema, +orthopnea, +PND. No CP. No fever or chills. No previous cardiac disease    Interval History:   2/9: SOB improved.  No CP.  Pt continues with generalized weakness.      2/10: She is feeling okay today.  No pain.  She is generally weak.  Waiting for skilled nursing facility placement.  No signs of GI bleeding.  Will need to workup anemia further.    2/11: She is feeling weak this morning.  May be due to anemia.  Will consider transfusion of 1 unit.  Continue iron supplementation based on iron deficiency.    2/12: She continues to have diarrhea this morning.  She denies abdominal pain or nausea.  She continues to feel weak and fatigued.  She has been eating slightly more over the last 2 days and is interested in trying appetite stimulant.  She feels very full quickly.    2/13: Diarrhea is improving today. She is not hungry and didn't want to eat breakfast. She says she wants a [place] toast. I have instructed the patient and nursing to order whatever she wants to eat. Will start a calorie count.     2/14: Patient is a 63-year-old  female with rheumatoid arthritis, chronic pancreatitis, essential hypertension admitted after presenting with generalized weakness and shortness of breath.  Though initially  treated for acute diastolic congestive heart failure echocardiogram with no evidence of the same.  Presentation most likely consistent with failure to thrive in the setting of chronic biliary pancreatitis.  Diagnosed with severe malnutrition.  Seen by dietitian.  She has been re-initiated on pancreatic enzyme supplements.  And also initiated on appetite stimulants.  Her oral intake has improved.  Though she has been advised to go to SNF for further rehabilitation, she is unable to do so due to insurance limitations.    2/15:  Diarrhea has improved although still persistent and probably secondary to chronic biliary pancreatitis.  Will consult Gastroenterology for further recommendations.  Continue supplement and lipase protease amylase.  Replace potassium and Mag.    Patient is doing well. Family present at bedside.No concerns/issues overnight reported by the patient or the nursing staff.    Review of Systems All other Review of Systems were found to be negative expect for that mentioned already in HPI.     Objective:     Vitals:    02/14/23 2355 02/15/23 0437 02/15/23 0833 02/15/23 1114   BP: 125/77 120/73 123/62 137/73   BP Location:       Patient Position:       Pulse: 101 96 107 107   Resp: 18 18 17    Temp: 98.3 °F (36.8 °C) 98.6 °F (37 °C) 98.3 °F (36.8 °C) 98.6 °F (37 °C)   TempSrc:   Oral Oral   SpO2: 96% 99% 97% 98%   Weight:       Height:            Vitals reviewed.  Constitutional: No distress.   HENT: NC  Head: Atraumatic.   Cardiovascular: Normal rate, regular rhythm and normal heart sounds.   Pulmonary/Chest: Effort normal. No wheezes.   Abdominal: Soft. Bowel sounds are normal. No distension and no mass. No tenderness  Neurological: Alert.   Skin: Skin is warm and dry.   Psych: Appropriate mood and affect    Following labs were Reviewed   CBC:  Recent Labs   Lab 02/15/23  0323   WBC 4.17   HGB 8.2*   HCT 25.6*        CMP:  Recent Labs   Lab 02/15/23  0323   CALCIUM 8.5*   *   K 3.3*   CO2  27   CL 99   BUN 12   CREATININE 0.5       Micro Results  Microbiology Results (last 7 days)       Procedure Component Value Units Date/Time    Blood culture [[id number]] Collected: 02/09/23 0455    Order Status: Completed Specimen: Blood from Peripheral, Antecubital, Left Updated: 02/14/23 0632     Blood Culture, Routine No growth after 5 days.    Blood culture [[id number]] Collected: 02/09/23 0455    Order Status: Completed Specimen: Blood from Peripheral, Antecubital, Left Updated: 02/14/23 0632     Blood Culture, Routine No growth after 5 days.    Stool culture **cannot be ordered stat** [[id number]] Collected: 02/08/23 1638    Order Status: Completed Specimen: Stool Updated: 02/11/23 1050     Stool Culture No Salmonella,Shigella,Vibrio,Campylobacter.      No E coli 0157:H7 isolated.      No enteric leobardo    Clostridium difficile EIA [[id number]] Collected: 02/08/23 1638    Order Status: Canceled Specimen: Stool              Radiology Reports  CT Head Without Contrast    Result Date: 2/8/2023  CT OF THE HEAD WITHOUT CONTRAST HISTORY: Neurological deficit. Stroke suspected. Generalized weakness. Technical factors:   Spiral acquisition of the brain was generated at 3.0 mm thickness from the skull base to the skull vertex in helical fashion in the absence of intravenous contrast.  Additional coronal and sagittal reconstructed images were also included and reviewed. CMS MANDATED QUALITY DATA - CT RADIATION  436 All CT scans at this facility utilize dose modulation, iterative reconstruction, and/or weight based dosing when appropriate to reduce radiation dose to as low as reasonably achievable. FINDINGS: Generalized central and cortical involutional changes are established that are age concordant in keeping with the patient's clinical age of 62 years of age. Moderately prominent low-density changes throughout both the subcortical and periventricular white matter nonspecific although attributed towards chronic  microvascular ischemic changes. No evidence of intracranial hemorrhage, masses, mass effect, midline shift, acute vascular insult. Ventricles maintain normal size and overall configuration. Third ventricle is normal in size. The visualized paranasal sinuses are clear. Prominent atheromatous calcification through the cavernous and supraclinoid segments of the internal carotid arteries. IMPRESSION: 1. Generalized cerebral atrophy and superimposed chronic deep white matter ischemia. 2. No evidence of acute intracranial event Electronically signed by:  Tim Carrington MD  2/8/2023 1:44 PM CST Workstation: [id number]    X-Ray Chest AP Portable    Result Date: 2/8/2023  HISTORY: Generalized weakness COMPARISON:January 12, 2023 FINDINGS:Right-sided pleural effusion has evolved with increased accumulation and apical meniscus sign along the superior edge marginal mass effect upon the inferior edge of the right lower lobe reproducing right basilar atelectasis. This evidence of chronic interstitial markings in the basilar segments. Lungs are emphysematous with minimal perihilar scarring. No evidence of confluent infiltrate or mass lesion. Prominent arch atherosclerosis. The tracheal column is midline without evidence of shift. The osseous structures reveal no significant finding. Postoperative changes of previous gallbladder resection. IMPRESSION: 1. Evolving right-sided pleural effusion with right lower lobe atelectasis and new finding upon comparison. 2. No evidence of acute cardiopulmonary process. Electronically signed by:  Tim Carrington MD  2/8/2023 10:41 AM CST Workstation: [id number]    US Guided Paracentesis    Result Date: 1/19/2023  EXAMINATION: US GUIDED PARACENTESIS INC IMAGING CLINICAL HISTORY: H/o pancreatitis; ascites TECHNIQUE: After obtaining written informed consent, including a discussion of the risks and benefits of the procedure to include infection, bleeding, puncture of the bowel, bleeding into the  abdominal cavity requiring surgery, and allowing the patient the opportunity to ask questions, the patient agreed to proceed. COMPARISON: January 4, 2023 FINDINGS: A moderate volume of ascites is noted in the right and left lower quadrants on planning ultrasound.  In comparison to the prior study, the fluid now appears complex, with septations and low level internal echoes. A suitable skin entrance site overlying the right lower quadrant was localized with ultrasound guidance. The skin was prepped and draped in sterile fashion, with several milliliters of lidocaine 1% injected subcutaneously to achieve adequate local anesthesia. Following, a small skin incision was made, and a paracentesis catheter over a needle was advanced into the right lower quadrant peritoneal space. Approximately 2.2L of thin, yellow ascites was obtained. There was no blood loss. The patient tolerated the procedure well, with no immediate postprocedural complications observed.     1. Successful ultrasound-guided paracentesis as above. 2. In comparison to the prior study from January 4, ascites fluid is now complex, with septations and low level internal echoes. Electronically signed by: Chapin Carmona MD Date:    01/19/2023 Time:    15:16    Echo    Result Date: 2/9/2023  · Concentric remodeling and normal systolic function. · The estimated ejection fraction is 60%. · Normal left ventricular diastolic function. · Normal right ventricular size with normal right ventricular systolic function. · Mild mitral regurgitation. · Mild to moderate tricuspid regurgitation. · There is mild pulmonary hypertension. · Guarded interpretation suboptimal secondary to patient factors      FL ERCP Biliary And Pancreatic By Non Rad Tech    Result Date: 1/24/2023  See OP Notes for results. IMPRESSION: See OP Notes for results. This procedure was auto-finalized by: Virtual Radiologist    IR Paracentesis with Imaging    Result Date: 1/23/2023  EXAMINATION:  Ultrasound-guided paracentesis Procedural Personnel Attending physician(s): Rene Swanson MD Fellow physician(s): None Resident physician(s): None Advanced practice provider(s): KYMBERLY Houser FNP Pre-procedure diagnosis: Ascites Post-procedure diagnosis: Same Complications: No immediate complications. CLINICAL HISTORY: Recurrent Ascites TECHNIQUE: - Ultrasound-guided paracentesis COMPARISON: Paracentesis 1/18/2023 FINDINGS: Pre-procedure Consent: Informed consent for the procedure was obtained and time-out was performed prior to the procedure. Preparation: The site was prepared and draped using maximal sterile barrier technique including cutaneous antisepsis. Anesthesia/sedation Level of anesthesia/sedation: No sedation Anesthesia/sedation administered by: Not applicable Total intra-service sedation time (minutes): 0 Limited abdominal ultrasound Limited abdominal ultrasound was performed. Moderate ascites. A safe window for paracentesis was identified. Paracentesis Local anesthesia was administered. The peritoneal cavity was accessed on the left lower quadrant and fluid return confirmed position. Ascites was drained. Paracentesis access technique: Real-time ultrasound guidance Catheter placed: n/a Closure A sterile bandage was applied. Post-drainage ultrasound: Not performed Additional Details Additional description of procedure: None Equipment details: None Specimens removed: Abdominal fluid Estimated blood loss (mL): Less than 10 Standardized report: SIR_Paracentesis_[id number] Attestation Signer name: Rene Swanson I attest that I reviewed the stored images and agree with the report as written.     Ultrasound-guided paracentesis with drainage of 200 mL of serous fluid. _______________________________________________________________ Electronically signed by resident: Ami Terry NP Date:    01/23/2023 Time:    14:35 Electronically signed by: Rene Swanson Date:    01/23/2023 Time:    15:21        Meds  Scheduled Meds:   apixaban  5 mg Oral BID    folic acid  1 mg Oral Daily    Lactobacillus acidoph-L.bulgar  4 tablet Oral TID WM    lipase-protease-amylase 12,000-38,000-60,000 units  2 capsule Oral TID WM    megestroL  40 mg Oral Daily    mirtazapine  15 mg Oral QHS    pantoprazole  40 mg Oral Before breakfast    sertraline  50 mg Oral QHS     Continuous Infusions:  PRN Meds:.sodium chloride, acetaminophen, albuterol-ipratropium, dextrose 10%, dextrose 10%, glucagon (human recombinant), glucose, glucose, HYDROcodone-acetaminophen, loperamide, magnesium oxide, magnesium oxide, melatonin, morphine, naloxone, ondansetron, polyethylene glycol, potassium chloride, potassium chloride, potassium chloride, potassium, sodium phosphates, potassium, sodium phosphates, potassium, sodium phosphates, senna-docusate 8.6-50 mg, simethicone, sodium chloride 0.9%.    Active PT: Yes  Active OT: Yes  Active SLP: No  Assessment & Plan:     Active Hospital Problems    Diagnosis    *Severe protein-calorie malnutrition    Pancreatic duct leakage    History of DVT of lower extremity    Rheumatoid arthritis involving multiple sites with positive rheumatoid factor    Mixed hyperlipidemia    Generalized anxiety disorder    Tobacco use    Hypertension     Plan:   Continue to encourage oral intake as desired - consuming at least 75% of food  Calorie count in progress  Appetite stimulants ordered   Continue pancreatic enzyme supplement  History of left lower extremity DVT in September 2022; continue apixaban (to stop after March 2022)  Continue sertraline and mirtazapine       Discharge Planning:   Is the patient medically ready for discharge?: no    Reason for patient still in hospital (select all that apply): Patient trending condition and Treatment    Above encounter included review of the medical records, interviewing and examining the patient face-to-face, discussion with family and other health care providers, ordering and  interpreting lab/test results and formulating a plan of care.     Medical Decision Making:      [_] Low Complexity  [_] Moderate Complexity  [x] High Complexity      Oleg Ross MD  Department of Hospital Medicine   [facility]

## 2023-02-15 NOTE — PT/OT/SLP PROGRESS
Occupational Therapy   Treatment    Name: Claire Bowser  MRN: [id number]  Admitting Diagnosis:  Severe protein-calorie malnutrition       Recommendations:     Discharge Recommendations: home  Discharge Equipment Recommendations:  other (see comments) (TBD)  Barriers to discharge:  None    Assessment:     Claire Bowser is a 63 y.o. female with a medical diagnosis of Severe protein-calorie malnutrition.  She presents with improving medical acuity and functional mobility. Patient participated in bed mobility, LB dressing sitting EOB, grooming standing at sink, toilet transfer and ambulation using rolling walker. Performance deficits affecting function are weakness, impaired endurance, impaired self care skills, impaired functional mobility, gait instability, impaired balance.     Rehab Prognosis:  Fair; patient would benefit from acute skilled OT services to address these deficits and reach maximum level of function.       Plan:     Patient to be seen 5 x/week to address the above listed problems via self-care/home management, therapeutic activities, therapeutic exercises  Plan of Care Expires:    Plan of Care Reviewed with: patient    Subjective     Pain/Comfort:  Pain Rating 1: 0/10  Pain Rating Post-Intervention 1: 0/10    Objective:     Communicated with: nurse prior to session.  Patient found HOB elevated with telemetry, peripheral IV upon OT entry to room.    General Precautions: Standard, fall    Orthopedic Precautions:N/A  Braces: N/A  Respiratory Status: Room air     Occupational Performance:     Bed Mobility:    Patient completed Scooting/Bridging with minimum assistance  Patient completed Supine to Sit with minimum assistance  Patient completed Sit to Supine with minimum assistance     Functional Mobility/Transfers:  Patient completed Sit <> Stand Transfer with minimum assistance  with  hand-held assist and rolling walker   Patient completed Toilet Transfer Step Transfer technique with minimum assistance with   hand-held assist and rolling walker  Functional Mobility: ambulated 15 feet in the hospital room and bathroom with stand by assistance using rolling walker.    Activities of Daily Living:  Grooming: stand by assistance to wash hands standing at sink.  Lower Body Dressing: stand by assistance to don/doff socks sitting EOB.      [facility] 6 Click ADL:      Treatment & Education:  Patient is making positive progress towards all OT goals.    Patient left HOB elevated with all lines intact, call button in reach, and bed alarm on    GOALS:   Multidisciplinary Problems       Occupational Therapy Goals          Problem: Occupational Therapy    Goal Priority Disciplines Outcome Interventions   Occupational Therapy Goal     OT, PT/OT Ongoing, Progressing    Description: Goals to be met by: 3/10/23     Patient will increase functional independence with ADLs by performing:    UE Dressing with Minimal Assistance.  LE Dressing with Minimal Assistance.  Grooming while seated with Minimal Assistance.  Toileting from toilet with Minimal Assistance for hygiene and clothing management.   Toilet transfer to toilet with Minimal Assistance.                         Time Tracking:     OT Date of Treatment: 02/15/23  OT Start Time: 0918  OT Stop Time: 0934  OT Total Time (min): 16 min    Billable Minutes:Self Care/Home Management 16    OT/IRENA: OT          2/15/2023
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
## 2023-02-15 NOTE — CONSULTS
GASTROENTEROLOGY INPATIENT CONSULT NOTE  Patient Name: Claire Bowser  Patient MRN: [id number]  Patient : 1960    Admit Date: 2023  Service date: 2/15/2023    Reason for Consult: loose stools    PCP: Samuel Brady MD    Chief Complaint   Patient presents with    Post-op Problem     END OF January AT MAIN [facility] FOR BILIARY STENT    GEN WEAKNESS       HPI: Patient is a 63 y.o. female with PMHx BTL, COVID , ongoing tobacco use, cholecystectomy / dual sphincterotomy, pancreatitis w/ leak fluid collection s/p stenting, DVT (Eliquis) that presents for evaluation of weakness / SOB. Acute onset, intermittent, progressive on admission. Since admission feeling better but mild loose stools. Improving and only 3 today. No recent abx. States was not having diarrhea at home. Was on 72-80K units lipase at home w meals but only 24K here. No f/c or abd swelling like previously.      CHART REVIEW:  Stool Cx  - negative  ERCP  - 7Fr x 9cm PD stent placed further into TOP; both sphincterotomies patent  EUS  - 150cc drained from TOP cyst  CT ABd  - emphysema; multiple pancreatic pseudocysts now w/ air (likley from ERCP); PD stent in ventral duct;  moderate ascites; vascular dz  ERCP  - No obvious leak; HOP cyst filled w/ injection; dual sphincterotomy; PD stent placed (not placed out to tail due to narrow PD)  CA 19-9  - 71  EUS  - chronic panc; panc cysts in HOP / BOP; Mass like region in Neck s/p FNA; 6mm CBD s/p martin  -Bx - pancreatic fibrosis  Labs  - CA 19-9 - 113  Labs 10/'22 - CA 19-9 44.6  MRI 10/'22 - nml biliary s/p martin; 7mm HOP cyst; no mass; fluid by tail; tics  EGD / EUS  - ill defined 2.5 cm region in neck s/p FNA  -HP neg gastritis; Neg panc bx w/ low suspicion for cancer. surgery for lap martin and MRI pancreas 2 months  MRCP  - 1.5 cm mass vs cyst in neck of panc; nml CBD / PD  EGD  - mild gastrtis  Colon ' - small polyps. . .     Past  Medical History:  Past Medical History:   Diagnosis Date    Acute biliary pancreatitis 6/14/2022    Anxiety     Chronic pancreatitis 1/2/2023    Digestive disorder     Flu 02/2017    [facility]    Hypertension     Long-term use of immunosuppressant medication 6/10/2022    Rheumatoid arthritis involving multiple sites with positive rheumatoid factor 01/14/2021        Past Surgical History:  Past Surgical History:   Procedure Laterality Date    COLONOSCOPY N/A 2/26/2021    Procedure: COLONOSCOPY;  Surgeon: Amy Sidhu MD;  Location: [facility];  Service: Endoscopy;  Laterality: N/A;    ENDOSCOPIC ULTRASOUND OF UPPER GASTROINTESTINAL TRACT N/A 7/1/2022    Procedure: ULTRASOUND, UPPER GI TRACT, ENDOSCOPIC;  Surgeon: Donavon Jewell III, MD;  Location: [facility];  Service: Endoscopy;  Laterality: N/A;    ENDOSCOPIC ULTRASOUND OF UPPER GASTROINTESTINAL TRACT N/A 11/29/2022    Procedure: ULTRASOUND, UPPER GI TRACT, ENDOSCOPIC;  Surgeon: Donavon Jewell III, MD;  Location: [facility];  Service: Endoscopy;  Laterality: N/A;    ENDOSCOPIC ULTRASOUND OF UPPER GASTROINTESTINAL TRACT N/A 1/3/2023    Procedure: ULTRASOUND, UPPER GI TRACT, ENDOSCOPIC;  Surgeon: Donavon Jewell III, MD;  Location: [facility];  Service: Endoscopy;  Laterality: N/A;    ERCP N/A 12/30/2022    Procedure: ERCP (ENDOSCOPIC RETROGRADE CHOLANGIOPANCREATOGRAPHY);  Surgeon: Donavon Jewell III, MD;  Location: [facility];  Service: Endoscopy;  Laterality: N/A;    ERCP N/A 1/24/2023    Procedure: ERCP (ENDOSCOPIC RETROGRADE CHOLANGIOPANCREATOGRAPHY);  Surgeon: Rock Martines MD;  Location: [facility] ([address] [place] [address]);  Service: Endoscopy;  Laterality: N/A;    ESOPHAGOGASTRODUODENOSCOPY N/A 2/26/2021    Procedure: EGD (ESOPHAGOGASTRODUODENOSCOPY);  Surgeon: Amy Sidhu MD;  Location: [facility];  Service: Endoscopy;  Laterality: N/A;    LAPAROSCOPIC CHOLECYSTECTOMY N/A 7/27/2022    Procedure: CHOLECYSTECTOMY, LAPAROSCOPIC;  Surgeon:  Ramón Hwang III, MD;  Location: [facility] OR;  Service: General;  Laterality: N/A;    TUBAL LIGATION          Home Medications:  Medications Prior to Admission   Medication Sig Dispense Refill Last Dose    apixaban (ELIQUIS) 5 mg Tab Take 1 tablet (5 mg total) by mouth 2 (two) times daily. 60 tablet 0 2/8/2023 at 08:30    cyclobenzaprine (FLEXERIL) 5 MG tablet Take 5 mg by mouth nightly.   2/7/2023 at 20:30    folic acid (FOLVITE) 1 MG tablet Take 1 tablet (1 mg total) by mouth once daily. 360 tablet 3 2/8/2023 at 08:30    metoprolol succinate (TOPROL-XL) 50 MG 24 hr tablet Take 0.5 tablets (25 mg total) by mouth once daily. 1/2 tab qd   2/7/2023 at 20:30    pantoprazole (PROTONIX) 40 MG tablet Take 1 tablet (40 mg total) by mouth once daily. 30 tablet 0 2/8/2023 at 08:30    sertraline (ZOLOFT) 50 MG tablet Take 1 tablet (50 mg total) by mouth every evening. 30 tablet 11 2/7/2023 at 20:30    traZODone (DESYREL) 50 MG tablet Take 1 tablet (50 mg total) by mouth every evening. 90 tablet 1 2/7/2023 at 20:30       Inpatient Medications:   apixaban  5 mg Oral BID    folic acid  1 mg Oral Daily    Lactobacillus acidoph-L.bulgar  4 tablet Oral TID WM    lipase-protease-amylase 12,000-38,000-60,000 units  2 capsule Oral TID WM    megestroL  40 mg Oral Daily    mirtazapine  15 mg Oral QHS    pantoprazole  40 mg Oral Before breakfast    sertraline  50 mg Oral QHS     sodium chloride, acetaminophen, albuterol-ipratropium, dextrose 10%, dextrose 10%, glucagon (human recombinant), glucose, glucose, HYDROcodone-acetaminophen, loperamide, magnesium oxide, magnesium oxide, melatonin, morphine, naloxone, ondansetron, polyethylene glycol, potassium chloride, potassium chloride, potassium chloride, potassium, sodium phosphates, potassium, sodium phosphates, potassium, sodium phosphates, senna-docusate 8.6-50 mg, simethicone, sodium chloride 0.9%    Review of patient's allergies indicates:   Allergen Reactions    No known drug  "allergies        Social History:   Social History     Occupational History    Not on file   Tobacco Use    Smoking status: Every Day     Packs/day: 1.00     Years: 7.00     Pack years: 7.00     Types: Cigarettes    Smokeless tobacco: Never    Tobacco comments:     [phone]   Substance and Sexual Activity    Alcohol use: No    Drug use: Never    Sexual activity: Yes     Partners: Male       Family History:   Family History   Problem Relation Age of Onset    Diabetes Mother     Heart disease Mother     Kidney disease Mother     Hypertension Mother     Stroke Mother     Hearing loss Mother     COPD Father     Liver disease Paternal Grandfather     Alcohol abuse Paternal Grandfather     Liver disease Sister     Emphysema Brother     COPD Brother     Sleep apnea Brother     No Known Problems Son     Alcohol abuse Paternal Grandmother     Cirrhosis Paternal Grandmother     Heart disease Maternal Aunt     Diabetes Maternal Aunt     Cancer Maternal Aunt         female    Heart disease Maternal Uncle     Hypertension Maternal Uncle     No Known Problems Paternal Uncle     Psoriasis Neg Hx     Lupus Neg Hx     Eczema Neg Hx     Melanoma Neg Hx        Review of Systems:  A 10 point review of systems was performed and was normal, except as mentioned in the HPI, including constitutional, HEENT, heme, lymph, cardiovascular, respiratory, gastrointestinal, genitourinary, neurologic, endocrine, psychiatric and musculoskeletal.      OBJECTIVE:    Physical Exam:  24 Hour Vital Sign Ranges: Temp:  [98.2 °F (36.8 °C)-98.9 °F (37.2 °C)] 98.9 °F (37.2 °C)  Pulse:  [] 98  Resp:  [16-18] 16  SpO2:  [95 %-99 %] 95 %  BP: (120-137)/(62-77) 120/64  Most recent vitals: /64   Pulse 98   Temp 98.9 °F (37.2 °C) (Oral)   Resp 16   Ht 5' 8" (1.727 m)   Wt 48.1 kg (106 lb)   SpO2 95%   Breastfeeding No   BMI 16.12 kg/m²    GEN: well-developed, well-nourished, awake and alert, non-toxic appearing adult  HEENT: PERRL, sclera " anicteric, oral mucosa pink and moist without lesion  NECK: trachea midline; Good ROM  CV: regular rate and rhythm, no murmurs or gallops  RESP: clear to auscultation bilaterally, no wheezes, rhonci or rales  ABD: soft, non-tender, non-distended, normal bowel sounds  EXT: no swelling or edema, 2+ pulses distally  SKIN: no rashes or jaundice  PSYCH: normal affect    Labs:   Recent Labs     02/13/23 0320 02/14/23  0301 02/15/23  0323   WBC 4.18 4.16 4.17   MCV 88 90 92    213 211     Recent Labs     02/13/23  0320 02/14/23  0301 02/15/23  0323 02/15/23  1354   * 131* 131*  --    K 3.2* 3.7 3.3* 3.8   CL 98 101 99  --    CO2 30* 28 27  --    BUN 12 13 12  --    GLU 85 78 76  --      No results for input(s): ALB in the last 72 hours.    Invalid input(s): ALKP, SGOT, SGPT, TBIL, DBIL, TPRO  No results for input(s): PT, INR, PTT in the last 72 hours.      Radiology Review:  CT Head Without Contrast   Final Result      X-Ray Chest AP Portable   Final Result            IMPRESSION / RECOMMENDATIONS:  63 y.o. female with PMHx BTL, COVID 5/'22, ongoing tobacco use, cholecystectomy / dual sphincterotomy, pancreatitis w/ leak fluid collection s/p stenting, DVT (Eliquis) that presents for evaluation of weakness / SOB now w/ intermittent looses stools. Suspect this is from underdosed pancreatic enzymes as was not present prior to admission.     -Increase pancreatic enzymes to 72K w/ meal; 36k w/ snack as underdosed enzymes likely cause of loose stools.   -Check c. Diff but less likely based on history / symptoms    Thank you for this consult.    Donavon Jewell III  2/15/2023  4:55 PM

## 2023-02-16 ENCOUNTER — TELEPHONE (OUTPATIENT)
Dept: PHARMACY | Facility: CLINIC | Age: 63
End: 2023-02-16
Payer: MEDICAID

## 2023-02-16 VITALS
SYSTOLIC BLOOD PRESSURE: 131 MMHG | HEIGHT: 68 IN | HEART RATE: 104 BPM | RESPIRATION RATE: 12 BRPM | DIASTOLIC BLOOD PRESSURE: 73 MMHG | TEMPERATURE: 98 F | BODY MASS INDEX: 16.06 KG/M2 | OXYGEN SATURATION: 96 % | WEIGHT: 106 LBS

## 2023-02-16 LAB
MAGNESIUM SERPL-MCNC: 1.7 MG/DL (ref 1.6–2.6)
O+P SPEC MICRO: NORMAL
O+P STL CONC: NORMAL
POTASSIUM SERPL-SCNC: 3.8 MMOL/L (ref 3.5–5.1)

## 2023-02-16 PROCEDURE — 99900031 HC PATIENT EDUCATION (STAT)

## 2023-02-16 PROCEDURE — 99900035 HC TECH TIME PER 15 MIN (STAT)

## 2023-02-16 PROCEDURE — 25000003 PHARM REV CODE 250: Performed by: INTERNAL MEDICINE

## 2023-02-16 PROCEDURE — 83735 ASSAY OF MAGNESIUM: CPT | Performed by: FAMILY MEDICINE

## 2023-02-16 PROCEDURE — 25000003 PHARM REV CODE 250: Performed by: STUDENT IN AN ORGANIZED HEALTH CARE EDUCATION/TRAINING PROGRAM

## 2023-02-16 PROCEDURE — 25000003 PHARM REV CODE 250: Performed by: FAMILY MEDICINE

## 2023-02-16 PROCEDURE — 94761 N-INVAS EAR/PLS OXIMETRY MLT: CPT

## 2023-02-16 PROCEDURE — 36415 COLL VENOUS BLD VENIPUNCTURE: CPT | Performed by: FAMILY MEDICINE

## 2023-02-16 PROCEDURE — 84132 ASSAY OF SERUM POTASSIUM: CPT | Performed by: STUDENT IN AN ORGANIZED HEALTH CARE EDUCATION/TRAINING PROGRAM

## 2023-02-16 RX ORDER — MIRTAZAPINE 15 MG/1
15 TABLET, FILM COATED ORAL NIGHTLY
Qty: 90 TABLET | Refills: 0 | Status: SHIPPED | OUTPATIENT
Start: 2023-02-16 | End: 2024-03-21

## 2023-02-16 RX ORDER — L. ACIDOPHILUS/LACTOBAC SPOR 35MM-25MM
1 TABLET ORAL DAILY
Qty: 30 TABLET | Refills: 0 | Status: SHIPPED | OUTPATIENT
Start: 2023-02-16 | End: 2024-03-21

## 2023-02-16 RX ORDER — LOPERAMIDE HYDROCHLORIDE 2 MG/1
2 CAPSULE ORAL 4 TIMES DAILY PRN
Qty: 30 CAPSULE | Refills: 0 | Status: SHIPPED | OUTPATIENT
Start: 2023-02-16 | End: 2024-03-21

## 2023-02-16 RX ORDER — MEGESTROL ACETATE 40 MG/1
40 TABLET ORAL DAILY
Qty: 30 TABLET | Refills: 0 | Status: ON HOLD | OUTPATIENT
Start: 2023-02-17 | End: 2023-03-06 | Stop reason: SDUPTHER

## 2023-02-16 RX ORDER — PANTOPRAZOLE SODIUM 40 MG/1
40 TABLET, DELAYED RELEASE ORAL DAILY
Qty: 90 TABLET | Refills: 0 | Status: SHIPPED | OUTPATIENT
Start: 2023-02-16 | End: 2023-06-30 | Stop reason: SDUPTHER

## 2023-02-16 RX ADMIN — PANCRELIPASE 6 CAPSULE: 60000; 12000; 38000 CAPSULE, DELAYED RELEASE PELLETS ORAL at 08:02

## 2023-02-16 RX ADMIN — FOLIC ACID 1 MG: 1 TABLET ORAL at 08:02

## 2023-02-16 RX ADMIN — PANTOPRAZOLE SODIUM 40 MG: 40 TABLET, DELAYED RELEASE ORAL at 05:02

## 2023-02-16 RX ADMIN — LACTOBACILLUS TAB 4 TABLET: TAB at 12:02

## 2023-02-16 RX ADMIN — LACTOBACILLUS TAB 4 TABLET: TAB at 08:02

## 2023-02-16 RX ADMIN — MEGESTROL ACETATE 40 MG: 20 TABLET ORAL at 08:02

## 2023-02-16 RX ADMIN — APIXABAN 5 MG: 5 TABLET, FILM COATED ORAL at 08:02

## 2023-02-16 NOTE — PT/OT/SLP DISCHARGE
Occupational Therapy Discharge Summary    Claire Bowser  MRN: [id number]   Principal Problem: Severe protein-calorie malnutrition      Patient Discharged from acute Occupational Therapy on 2/16/23.  Please refer to prior OT note dated 2/15/23 for functional status.    Assessment:      Goals partially met.    Objective:     GOALS:   Multidisciplinary Problems       Occupational Therapy Goals          Problem: Occupational Therapy    Goal Priority Disciplines Outcome Interventions   Occupational Therapy Goal     OT, PT/OT Ongoing, Progressing    Description: Goals to be met by: 3/10/23     Patient will increase functional independence with ADLs by performing:    UE Dressing with Minimal Assistance.  LE Dressing with Minimal Assistance.  Grooming while seated with Minimal Assistance.  Toileting from toilet with Minimal Assistance for hygiene and clothing management.   Toilet transfer to toilet with Minimal Assistance.                         Reasons for Discontinuation of Therapy Services  Transfer to alternate level of care.      Plan:     Patient Discharged to:  Home    2/16/2023

## 2023-02-16 NOTE — CARE UPDATE
02/16/23 0709   Patient Assessment/Suction   Level of Consciousness (AVPU) alert   Respiratory Effort Normal;Unlabored   Expansion/Accessory Muscles/Retractions no use of accessory muscles;no retractions;expansion symmetric   All Lung Fields Breath Sounds clear   Rhythm/Pattern, Respiratory unlabored;pattern regular;depth regular;chest wiggle adequate;no shortness of breath reported   Cough Frequency no cough   PRE-TX-O2   Device (Oxygen Therapy) room air   SpO2 97 %   Pulse Oximetry Type Intermittent   $ Pulse Oximetry - Multiple Charge Pulse Oximetry - Multiple   Pulse 97   Resp 17   Aerosol Therapy   $ Aerosol Therapy Charges PRN treatment not required   Education   $ Education Bronchodilator;15 min   Respiratory Evaluation   $ Care Plan Tech Time 15 min

## 2023-02-16 NOTE — TELEPHONE ENCOUNTER
Unfortunately, The Pharmacy Patient Assistance Team is unable to assist Ms. Bowser at this time due to the following reasons      Patient has Medicaid/[facility]             Pharmacy Patient Assistance Team

## 2023-02-16 NOTE — PLAN OF CARE
Rollator and bedside commode delivered to patient.     Discharge orders and chart reviewed with no further post-acute discharge needs identified at this time.  At this time, patient is cleared for discharge from Case Management standpoint.        02/16/23 1203   Final Note   Assessment Type Final Discharge Note   Anticipated Discharge Disposition Home   Hospital Resources/Appts/Education Provided   (Patient instructed to make post hospital follow up appointment with their PCP in 7 to 10 days from discharge)   Post-Acute Status   Post-Acute Authorization E   [facility] Status Set-up Complete/Auth obtained   Discharge Delays None known at this time

## 2023-02-16 NOTE — PT/OT/SLP PROGRESS
Physical Therapy      Patient Name:  Claire Bowser   MRN:  [id number]    Patient not seen today secondary to Patient unwilling to participate, Other (Comment) (Pt reported she is getting discharged home soon.). Will follow-up 2/17/23.

## 2023-02-16 NOTE — DISCHARGE SUMMARY
[facility]  Discharge Summary      Patient Name: Claire Bowser  MRN: [id number]  ARIA: [id number]  Patient Class: IP- Inpatient  Admission Date: 2/8/2023  Hospital Length of Stay: 3 days  Discharge Date and Time:  02/16/2023 11:43 AM  Attending Physician: Oleg Ross MD   Discharging Provider: Oleg Ross MD  Primary Care Provider: Samuel Brady MD    Primary Care Team: Networked reference to record PCT     HPI:   Claire Bowser is a 63 y.o. White female With PMH as noted below, who presents with generalized weakness. Onset since previous hospitalization  Progressively worsening. Pt not participating much with pt. Also reports new exertional SOB , +LE edema, +orthopnea, +PND. No CP. No fever or chills. No previous cardiac disease    * No surgery found *      Hospital Course:    2/9: SOB improved.  No CP.  Pt continues with generalized weakness.       2/10: She is feeling okay today.  No pain.  She is generally weak.  Waiting for skilled nursing facility placement.  No signs of GI bleeding.  Will need to workup anemia further.     2/11: She is feeling weak this morning.  May be due to anemia.  Will consider transfusion of 1 unit.  Continue iron supplementation based on iron deficiency.     2/12: She continues to have diarrhea this morning.  She denies abdominal pain or nausea.  She continues to feel weak and fatigued.  She has been eating slightly more over the last 2 days and is interested in trying appetite stimulant.  She feels very full quickly.     2/13: Diarrhea is improving today. She is not hungry and didn't want to eat breakfast. She says she wants a Macanese toast. I have instructed the patient and nursing to order whatever she wants to eat. Will start a calorie count.      2/14: Patient is a 63-year-old  female with rheumatoid arthritis, chronic pancreatitis, essential hypertension admitted after presenting with generalized weakness and shortness of  breath.  Though initially treated for acute diastolic congestive heart failure echocardiogram with no evidence of the same.  Presentation most likely consistent with failure to thrive in the setting of chronic biliary pancreatitis.  Diagnosed with severe malnutrition.  Seen by dietitian.  She has been re-initiated on pancreatic enzyme supplements.  And also initiated on appetite stimulants.  Her oral intake has improved.  Though she has been advised to go to SNF for further rehabilitation, she is unable to do so due to insurance limitations.     2/15:  Diarrhea has improved although still persistent and probably secondary to chronic biliary pancreatitis.  Will consult Gastroenterology for further recommendations.  Continue supplement and lipase protease amylase.  Replace potassium and Mag.     2/16:  Patient was seen by Gastroenterology yesterday and it was discovered that patient was under taking have lipase protease amylase.  Gastroenterology is adjusted dose and medication and patient has not had any diarrhea overnight.  Patient subsequently discharged with medications delivered to bedside..    Physical examination on discharge:  Constitutional: No distress.   HENT: NC  Head: Atraumatic.   Cardiovascular: Normal rate, regular rhythm and normal heart sounds.   Pulmonary/Chest: Effort normal. No wheezes.   Abdominal: Soft. Bowel sounds are normal. No distension and no mass. No tenderness  Neurological: Alert.   Skin: Skin is warm and dry.   Psych: Appropriate mood and affect    I have seen the patient on the day of discharge and reviewed the discharge instructions as outlined.      Goals of Care Treatment Preferences:  Code Status: Full Code      Consults:   Consults (From admission, onward)          Status Ordering Provider     Inpatient consult to Gastroenterology  Once        Provider:  Donavon Jewell III, MD    Completed MELISSA ROMEO     Inpatient consult to   Once        Provider:   (Not yet assigned)    Completed CHARU VELEZ     Inpatient consult to Registered Dietitian/Nutritionist  Once        Provider:  (Not yet assigned)    Completed REINIER REY     Inpatient consult to   Once        Provider:  (Not yet assigned)    Completed REINIER REY     Inpatient consult to Hospitalist  Once        Provider:  (Not yet assigned)    Acknowledged REINIER REY            No new Assessment & Plan notes have been filed under this hospital service since the last note was generated.  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.89] 01/24/2023 Yes    History of DVT of lower extremity [Z86.718] 01/23/2023 Not Applicable     Chronic    Rheumatoid arthritis involving multiple sites with positive rheumatoid factor [M05.79] 01/14/2021 Yes     Chronic    Mixed hyperlipidemia [E78.2] 10/28/2019 Yes    Generalized anxiety disorder [F41.1] 08/10/2018 Yes     Chronic    Tobacco use [Z72.0] 08/10/2018 Yes     Chronic    Hypertension [I10] 10/04/2013 Yes     Chronic      Problems Resolved During this Admission:       Discharged Condition: good    Disposition: Home or Self Care    Follow Up:   Contact information for follow-up providers       [facility]. Go on 2/27/2023.    Why: NP at home visit scheduled Feb. 27th at 8AM.             Donavon Jewell III, MD. Schedule an appointment as soon as possible for a visit in 4 week(s).    Specialty: Gastroenterology  Why: Hospital discharge follow up  Contact information:  [address] [facility] [id number] [phone]               Samuel Brady MD Follow up in 1 week(s).    Specialty: Family Medicine  Contact information:  [address] [facility] [address]  [facility] [id number] [phone]                       Contact information for after-discharge care        "Durable Medical Equipment       [facility] .    Service: Durable Medical Equipment  Contact information:  [address] [place] [id number]  [phone]                                 Patient Instructions:      WALKER FOR HOME USE     Order Specific Question Answer Comments   Type of Walker: Rollator with brakes and/or seat    With wheels? Yes    Height: 5' 8" (1.727 m)    Weight: 48.1 kg (106 lb)    Length of need (1-99 months): 99    Does patient have medical equipment at home? walker, standard    Does patient have medical equipment at home? shower chair    Please check all that apply: Patient is unable to safely ambulate without equipment.      COMMODE FOR HOME USE     Order Specific Question Answer Comments   Type: Standard    Height: 5' 8" (1.727 m)    Weight: 48.1 kg (106 lb)    Does patient have medical equipment at home? walker, standard    Does patient have medical equipment at home? shower chair    Length of need (1-99 months): 99      Ambulatory referral/consult to Physical/Occupational Therapy   Standing Status: Future   Referral Priority: Routine Referral Type: Physical Medicine   Referral Reason: Specialty Services Required   Requested Specialty: Physical Therapy   Number of Visits Requested: 1     Activity as tolerated       Significant Diagnostic Studies: Labs: BMP:   Recent Labs   Lab 02/15/23  0323 02/15/23  1354 02/16/23  0522   GLU 76  --   --    *  --   --    K 3.3* 3.8 3.8   CL 99  --   --    CO2 27  --   --    BUN 12  --   --    CREATININE 0.5  --   --    CALCIUM 8.5*  --   --    MG 1.5* 1.7 1.7   , CMP   Recent Labs   Lab 02/15/23  0323 02/15/23  1354 02/16/23  0522   *  --   --    K 3.3* 3.8 3.8   CL 99  --   --    CO2 27  --   --    GLU 76  --   --    BUN 12  --   --    CREATININE 0.5  --   --    CALCIUM 8.5*  --   --    ANIONGAP 5*  --   --    , CBC   Recent Labs   Lab 02/15/23  0323   WBC 4.17   HGB 8.2*   HCT 25.6*      , and All labs " within the past 24 hours have been reviewed  Radiology: X-Ray: CXR: X-Ray Chest 1 View (CXR): No results found for this visit on 02/08/23. and X-Ray Chest PA and Lateral (CXR): No results found for this visit on 02/08/23.  CT scan: CT ABDOMEN PELVIS WITH CONTRAST: No results found for this visit on 02/08/23. and CT ABDOMEN PELVIS WITHOUT CONTRAST: No results found for this visit on 02/08/23.    Pending Diagnostic Studies:       Procedure Component Value Units Date/Time    Stool Exam-Ova,Cysts,Parasites [[id number]] Collected: 02/08/23 1638    Order Status: Sent Lab Status: In process Updated: 02/08/23 9320    Specimen: Stool            Medications:  Reconciled Home Medications:      Medication List        START taking these medications      ACIDOPHILUS EX STR (L. SPOROG) 35 million- 25 million cell Tab  Generic drug: acidophilus-sporogenes  Take 1 tablet by mouth once daily.     lipase-protease-amylase 12,000-38,000-60,000 units Cpdr  Commonly known as: CREON  Take 6 capsules by mouth 3 (three) times daily with meals.     loperamide 2 mg capsule  Commonly known as: IMODIUM  Take 1 capsule (2 mg total) by mouth 4 (four) times daily as needed for Diarrhea.     megestroL 40 MG Tab  Commonly known as: MEGACE  Take 1 tablet (40 mg total) by mouth once daily.  Start taking on: February 17, 2023     mirtazapine 15 MG tablet  Commonly known as: REMERON  Take 1 tablet (15 mg total) by mouth every evening.            CONTINUE taking these medications      cyclobenzaprine 5 MG tablet  Commonly known as: FLEXERIL  Take 5 mg by mouth nightly.     ELIQUIS 5 mg Tab  Generic drug: apixaban  Take 1 tablet (5 mg total) by mouth 2 (two) times daily.     folic acid 1 MG tablet  Commonly known as: FOLVITE  Take 1 tablet (1 mg total) by mouth once daily.     pantoprazole 40 MG tablet  Commonly known as: PROTONIX  Take 1 tablet (40 mg total) by mouth once daily.     sertraline 50 MG tablet  Commonly known as: ZOLOFT  Take 1 tablet (50 mg  total) by mouth every evening.     traZODone 50 MG tablet  Commonly known as: DESYREL  Take 1 tablet (50 mg total) by mouth every evening.            STOP taking these medications      metoprolol succinate 50 MG 24 hr tablet  Commonly known as: TOPROL-XL              Indwelling Lines/Drains at time of discharge:   Lines/Drains/Airways       None                   Time spent on the discharge of patient: 35 minutes         Oleg Ross MD  Department of Hospital Medicine  [facility]

## 2023-02-17 ENCOUNTER — CLINICAL SUPPORT (OUTPATIENT)
Dept: REHABILITATION | Facility: HOSPITAL | Age: 63
End: 2023-02-17
Payer: MEDICAID

## 2023-02-17 DIAGNOSIS — K86.89: ICD-10-CM

## 2023-02-17 DIAGNOSIS — R63.4 WEIGHT LOSS, UNINTENTIONAL: Chronic | ICD-10-CM

## 2023-02-17 DIAGNOSIS — R29.898 IMPAIRED STRENGTH OF LOWER EXTREMITY: ICD-10-CM

## 2023-02-17 DIAGNOSIS — Z74.09 IMPAIRED FUNCTIONAL MOBILITY, BALANCE, GAIT, AND ENDURANCE: Primary | ICD-10-CM

## 2023-02-17 PROCEDURE — 97162 PT EVAL MOD COMPLEX 30 MIN: CPT | Mod: PN

## 2023-02-17 NOTE — PLAN OF CARE
[facility]  Physical Therapy Neurological Rehabilitation Initial Evaluation    Name: Claire Bowser  Clinic Number: [id number]    Therapy Diagnosis:   Encounter Diagnoses   Name Primary?    Pancreatic duct leakage     Weight loss, unintentional     Impaired strength of lower extremity Yes    Impaired functional mobility, balance, gait, and endurance      Physician: Oleg Ross MD    Physician Orders: PT Eval and Treat   Medical Diagnosis from Referral:   Diagnosis   R53.1 (ICD-10-CM) - Generalized weakness   K86.89 (ICD-10-CM) - Pancreatic duct leakage   R63.4 (ICD-10-CM) - Weight loss, unintentional     Evaluation Date: 2/17/2023  Authorization Period Expiration: 12/31/23  Plan of Care Expiration: 4/27/23  Visit # / Visits authorized: 1/ 1    Time In: 9:40 (late arrival for 9:30 appt)   Time Out: 10:12  Total Billable Time: 32 minutes    Precautions: Standard, blood thinners, and Fall    Progress note due: 3/22/23      Subjective     Medical History:   Past Medical History:   Diagnosis Date    Acute biliary pancreatitis 6/14/2022    Anxiety     Chronic pancreatitis 1/2/2023    Digestive disorder     Flu 02/2017    [facility]    Hypertension     Long-term use of immunosuppressant medication 6/10/2022    Rheumatoid arthritis involving multiple sites with positive rheumatoid factor 01/14/2021       Surgical History:   Claire Bowser  has a past surgical history that includes Tubal ligation; Esophagogastroduodenoscopy (N/A, 2/26/2021); Colonoscopy (N/A, 2/26/2021); Endoscopic ultrasound of upper gastrointestinal tract (N/A, 7/1/2022); Laparoscopic cholecystectomy (N/A, 7/27/2022); Endoscopic ultrasound of upper gastrointestinal tract (N/A, 11/29/2022); ERCP (N/A, 12/30/2022); Endoscopic ultrasound of upper gastrointestinal tract (N/A, 1/3/2023); and ERCP (N/A, 1/24/2023).    Medications:   Claire has a current medication list which includes the following prescription(s):  acidophilus ex str (l. sporog), apixaban, cyclobenzaprine, folic acid, lipase-protease-amylase 12,000-38,000-60,000 units, loperamide, megestrol, mirtazapine, pantoprazole, sertraline, and trazodone.    Allergies:   Review of patient's allergies indicates:   Allergen Reactions    No known drug allergies         Imaging   2/8/23- CT without contrast    IMPRESSION:  1. Generalized cerebral atrophy and superimposed chronic deep white matter ischemia.  2. No evidence of acute intracranial event      Date of onset: Dec 2022  History of current condition - Claire reports:      Pt arrives to therapy ambulating with rolling walker with  assisting. Pt able to answer subjective questions with  assisting with details. Pt reports she had her gall bladder removed, the developed pancreatitis. Reports she had a small stent placed which worked initially, but then had to get a bigger stent placed. Pt reports she has trouble with fatigue, walking, decreased strength since being hospitalized so many times. Pt reports she was in and out of the hospital 4-5 times in the past few months. She would have to stay a few days then home a few days and then ended up having to go back. She reports she got extremely weak from being so sick and in bed so much. She also hadn't been able to eat much, lost her appetite with feeling sick. Pt reports she recently has gotten her appetite back and is feeling better since her most recent surgery. Does reports she had lost 40 pounds in the last recent months. MDs aware.       ED DC note 2/16/23  HPI:   Claire Bowser is a 63 y.o. White female With PMH as noted below, who presents with generalized weakness. Onset since previous hospitalization  Progressively worsening. Pt not participating much with pt. Also reports new exertional SOB , +LE edema, +orthopnea, +PND. No CP. No fever or chills. No previous cardiac disease    2/14: Patient is a 63-year-old  female with rheumatoid  arthritis, chronic pancreatitis, essential hypertension admitted after presenting with generalized weakness and shortness of breath.  Though initially treated for acute diastolic congestive heart failure echocardiogram with no evidence of the same.  Presentation most likely consistent with failure to thrive in the setting of chronic biliary pancreatitis.  Diagnosed with severe malnutrition.  Seen by dietitian.  She has been re-initiated on pancreatic enzyme supplements.  And also initiated on appetite stimulants.  Her oral intake has improved.  Though she has been advised to go to SNF for further rehabilitation, she is unable to do so due to insurance limitations.      2/8/23 ED note  64 y/o female with history of Acute biliary pancreatitis, chronic pancreatitis, HTN, RA presents to the ER with her  for evaluation due to generalized weakness, diarrhea and SOB with exertion x 1 week.  Patient had biliary stent replacement on 1/27 and reports since then her abdominal pain has resolved.  She denies black or bloody stool, foul smelling or green stool and  notes that stool is more formed today.  Patient reports perianal skin irritation from diarrhea on 2/3 and was advised to use butt paste.  They suspect diarrhea was due to recent antibiotics after surgery.  Patient reports continued discomfort is perianal area.  She denies chest pain , fever, or cough.   notes bilateral leg swelling that occurred after her surgery, swelling improves with elevation.  No leg pain.  Patient reports bilateral arm and leg weakness, R > L for 1 week,  reports she is having difficulty ambulating without assistance due to generalized weakness.  No syncope, head injury , confusion, or other complaints at this time.     right side weaker in general. Reports scans for stroke. Swelling in bilateral feet. Reports doctors aware.      Prior Therapy:   none  Social History: lives with  Robin  Falls:   no  DME: rolling  walker, bed side commode, shower chair and rollator   Home Environment: single story house no stairs to enter. Small threshold  Family Present at time of Eval:     Occupation:  .    Prior Level of Function:  independent. No walker.   Current Level of Function: requires rolling walker for  walking,  requires help getting into bed and needs help to stand  Driving: no     Pain:  Current 0/10, worst 8/10, best 0/10   Stomach pain but hasn't had any since surgery.     Pts goals: get strength and walking back    Objective     Observation: pleasant and cooperative   Speech: normal    Mental status: alert, oriented to person, place, and time, normal mood, behavior, speech, dress, motor activity, and thought processes  Appearance: Casually dressed  Behavior:  calm and cooperative  Attention Span and Concentration:  Normal    Posture Alignment :slouched posture    Dominant hand:  right     Skin integrity:  pt reports wound on her bottom from diarrhea. Reports is it being medically treated by a MD and is improving.  Visual/Auditory: denies changes     ROM:   GROSS AROM/PROM  UPPER EXTREMITY  (R) UE: WFLs  besides limited with shoulder flexion range of motion    (L) UE: WFLs besides limited with shoulder flexion range of motion    LOWER EXTREMITY  (R) LE: WFLs passively  (L) LE: WFLs passively     BP: unable to get a reading as pt requires a pediatric cuff 2/2 very thin arms.     Lower Extremity Strength   Right LE  Left LE    Hip flexion:  2/5 Hip flexion: 3-/5   Knee extension: 3+/5 Knee extension: 3+/5   Knee flexion: 3/5 Knee flexion: 3/5   Ankle dorsiflexion:  3/5 Ankle dorsiflexion: 3/5   Ankle plantarflexion:  3-/5 Ankle plantarflexion: 3-/5     Upper extremity strength: pt has gross weakness in bilateral upper extremities. Deconditioned.     Coordination: within functional limits     Sensation: no loss of sensation reported    Swelling: bilateral ankle swelling noted. Pt and  report MDs  aware.     Functional Mobility   Evaluation    Bed mobility:  Mod A   Supine to sit: Mod A   Sit to supine:  Mod A   Rolling:  Min A   Transfers to bed: Mod A   Sit to stand Mod A   Stand pivot:   Mod A   Car transfers: Mod A   Floor transfers: Unsafe at this time.           Evaluation   5 times sit to stand Unable to perform at this time.  Mod A for 1 sit to stand   TUG Unable with fatigue   Self selected walking speed (10MWT) Unable to perform with fatigue       Gait     Evaluation   AD used:  Rolling walker    Assistance  Min/ mod   Distance 50 ft       GAIT DEVIATIONS:   Claire displays the following deviations with ambulation: very slow pace, stooped posture, minimal hip and knee flexion bilaterally   Impairments contributing to deviations: impaired motor control, impaired strength, impaired endurance, impaired balance, gait instability.    - Stairs: unsafe at this time.       Endurance Deficit: yes, fatigues very quickly      PT Evaluation Completed? Yes      CMS Impairment/Limitation/Restriction for FOTO Intake Survey    Therapist reviewed FOTO scores for Claire Bowser on 2/17/2023.   FOTO documents entered into "[employer] - see Media section.    Limitation Score: 77%         TREATMENT   Treatment not performed today secondary to time constraints    Home Exercises and Patient Education Provided    Education provided:   - educated about role of PT in  outpatient therapy . educated about POC. Educated about recommending pt have her  walk with her at this time and transfer for safety. Reports understanding.     Written HEP to be provided at first follow-up visit.    Assessment   Claire is a 63 y.o. female referred to outpatient Physical Therapy with a medical diagnosis of generalized weakness. Pt presents to PT with the following impairments leading to his/her functional decline: impaired strength, impaired endurance, impaired balance, gait instability. Pt requires mod A for most functional tranfers,  especially with sit to stand component. Pt fatigues very easily and is very deconditioned 2/2 prolonged hospital stays and weight loss. She is a high risk for falls at this time. Pt has good family support and is motivated to improve. She is a good candidate for physical therapy at this time.     Pt prognosis is Good.   Pt will benefit from skilled outpatient Physical Therapy to address the deficits stated above and in the chart below, provide pt/family education, and to maximize pt's level of independence.     Plan of care discussed with patient: Yes  Pt's spiritual, cultural and educational needs considered and patient is agreeable to the plan of care and goals as stated below:     Anticipated Barriers for therapy: none noted at this time.     Medical Necessity is demonstrated by the following  History  Co-morbidities and personal factors that may impact the plan of care Co-morbidities:   anxiety, HTN, and pancreatitis    Personal Factors:   no deficits     high   Examination  Body Structures and Functions, activity limitations and participation restrictions that may impact the plan of care Body Regions:   back  lower extremities  upper extremities  trunk    Body Systems:    gross symmetry  ROM  strength  gross coordinated movement  balance  gait  transfers  transitions        Activity limitations:   Learning and applying knowledge  no deficits    General Tasks and Commands  no deficits    Communication  no deficits    Mobility  lifting and carrying objects  walking    Self care  looking after one's health    Domestic Life  cooking  doing house work (cleaning house, washing dishes, laundry)    Interactions/Relationships  complex interpersonal interactions    Life Areas  no deficits    Community and Social Life  community life  recreation and leisure         high   Clinical Presentation evolving clinical presentation with changing clinical characteristics moderate   Decision Making/ Complexity Score: moderate          Short Term Goals: 5 weeks Date established:  Status:    1. Patient will be provided with an HEP for strength, endurance  balance. 2/17/2023     New   2.  Patient will demonstrate improve endurance and activity tolerance as evidenced by ability to perform Nu-step x 15 minutes without rests breaks. 2/17/2023  New       Long Term Goals: 10 weeks  Date established: Status:    1. Patient will be independent and compliant with updated HEP. 2/17/2023     New   2.Patient will improve her FOTO score from 77 % limited  to at least 52 % limited for improved self perception of functional mobility.(score 0-100, high score indicates greater level of function) 2/17/2023   New   3. Pt will improve simple transfers from mod A to contact guard assist  to improve independence and safety 2/17/2023    New   4.  Pt will improve car transfer from mod A  to contact guard assist  to improve independence and safety 2/17/2023   New   5. Pt will be able to ambulate 150  ft with standby assist  assist to improve function.  2/17/2023   New       Plan   Plan of care Certification: 2/17/2023 to 4/27/23.    Outpatient Physical Therapy 3 times weekly for 10 weeks to include the following interventions: Gait Training, Manual Therapy, Moist Heat/ Ice, Neuromuscular Re-ed, Patient Education, Therapeutic Activities, and Therapeutic Exercise.     Linh Sharif, PT

## 2023-02-20 ENCOUNTER — TELEPHONE (OUTPATIENT)
Dept: FAMILY MEDICINE | Facility: CLINIC | Age: 63
End: 2023-02-20
Payer: MEDICAID

## 2023-02-20 NOTE — TELEPHONE ENCOUNTER
----- Message from Samuel Brady MD sent at 2/17/2023  6:25 PM CST -----    ----- Message -----  From: Amy Gil MA  Sent: 2/17/2023   4:37 PM CST  To: Samuel Brady MD      ----- Message -----  From: Meliza Raza  Sent: 2/17/2023  11:17 AM CST  To: Gisell Padilla    Good Morning,  I am reaching out to you for the above patient. The client is needing a hospital follow up within 7-14 days. Please call the patient once you have scheduled for the patient. If you would like me to call the patient, send me a message and I will provide a courtesy phone call for an appointment.    Thank You,  Meliza Raza

## 2023-02-24 ENCOUNTER — CLINICAL SUPPORT (OUTPATIENT)
Dept: CARDIOLOGY | Facility: HOSPITAL | Age: 63
DRG: 371 | End: 2023-02-24
Attending: EMERGENCY MEDICINE
Payer: MEDICAID

## 2023-02-24 ENCOUNTER — HOSPITAL ENCOUNTER (INPATIENT)
Facility: HOSPITAL | Age: 63
LOS: 10 days | Discharge: HOME | DRG: 371 | End: 2023-03-06
Attending: EMERGENCY MEDICINE | Admitting: INTERNAL MEDICINE
Payer: MEDICAID

## 2023-02-24 VITALS — HEIGHT: 68 IN | WEIGHT: 106.06 LBS | BODY MASS INDEX: 16.07 KG/M2

## 2023-02-24 DIAGNOSIS — R79.89 ELEVATED TROPONIN: ICD-10-CM

## 2023-02-24 DIAGNOSIS — Z86.718 HISTORY OF DVT OF LOWER EXTREMITY: Chronic | ICD-10-CM

## 2023-02-24 DIAGNOSIS — K65.1 INTRA-ABDOMINAL ABSCESS: ICD-10-CM

## 2023-02-24 DIAGNOSIS — Z74.09 IMPAIRED FUNCTIONAL MOBILITY, BALANCE, GAIT, AND ENDURANCE: ICD-10-CM

## 2023-02-24 DIAGNOSIS — R07.9 CHEST PAIN: ICD-10-CM

## 2023-02-24 DIAGNOSIS — K86.1 OTHER CHRONIC PANCREATITIS: ICD-10-CM

## 2023-02-24 DIAGNOSIS — R53.1 WEAKNESS: Primary | ICD-10-CM

## 2023-02-24 DIAGNOSIS — K86.3 PSEUDOCYST OF PANCREAS: ICD-10-CM

## 2023-02-24 DIAGNOSIS — I21.4 NSTEMI (NON-ST ELEVATED MYOCARDIAL INFARCTION): ICD-10-CM

## 2023-02-24 DIAGNOSIS — R18.8: ICD-10-CM

## 2023-02-24 DIAGNOSIS — E43 SEVERE PROTEIN-CALORIE MALNUTRITION: Chronic | ICD-10-CM

## 2023-02-24 DIAGNOSIS — R18.8 OTHER ASCITES: ICD-10-CM

## 2023-02-24 PROBLEM — D64.9 ACUTE ON CHRONIC ANEMIA: Status: ACTIVE | Noted: 2023-02-24

## 2023-02-24 LAB
ALBUMIN SERPL BCP-MCNC: 1.5 G/DL (ref 3.5–5.2)
ALP SERPL-CCNC: 103 U/L (ref 55–135)
ALT SERPL W/O P-5'-P-CCNC: 39 U/L (ref 10–44)
AMMONIA PLAS-SCNC: 39 UMOL/L (ref 10–50)
ANION GAP SERPL CALC-SCNC: 7 MMOL/L (ref 8–16)
ANISOCYTOSIS BLD QL SMEAR: SLIGHT
AST SERPL-CCNC: 48 U/L (ref 10–40)
BACTERIA #/AREA URNS HPF: ABNORMAL /HPF
BASOPHILS NFR BLD: 0 % (ref 0–1.9)
BILIRUB SERPL-MCNC: 1.6 MG/DL (ref 0.1–1)
BILIRUB UR QL STRIP: NEGATIVE
BNP SERPL-MCNC: 227 PG/ML (ref 0–99)
BSA FOR ECHO PROCEDURE: 1.52 M2
BUN SERPL-MCNC: 18 MG/DL (ref 8–23)
CALCIUM SERPL-MCNC: 8.7 MG/DL (ref 8.7–10.5)
CHLORIDE SERPL-SCNC: 99 MMOL/L (ref 95–110)
CLARITY UR: ABNORMAL
CO2 SERPL-SCNC: 26 MMOL/L (ref 23–29)
COLOR UR: YELLOW
CREAT SERPL-MCNC: 0.6 MG/DL (ref 0.5–1.4)
DIFFERENTIAL METHOD: ABNORMAL
EJECTION FRACTION: 60 %
EOSINOPHIL NFR BLD: 1 % (ref 0–8)
ERYTHROCYTE [DISTWIDTH] IN BLOOD BY AUTOMATED COUNT: 20.5 % (ref 11.5–14.5)
EST. GFR  (NO RACE VARIABLE): >60 ML/MIN/1.73 M^2
FRACTIONAL SHORTENING: 48 % (ref 28–44)
GLUCOSE SERPL-MCNC: 102 MG/DL (ref 70–110)
GLUCOSE SERPL-MCNC: 61 MG/DL (ref 70–110)
GLUCOSE SERPL-MCNC: 66 MG/DL (ref 70–110)
GLUCOSE SERPL-MCNC: 92 MG/DL (ref 70–110)
GLUCOSE UR QL STRIP: NEGATIVE
HCT VFR BLD AUTO: 24.3 % (ref 37–48.5)
HGB BLD-MCNC: 7.6 G/DL (ref 12–16)
HGB UR QL STRIP: NEGATIVE
IMM GRANULOCYTES # BLD AUTO: ABNORMAL K/UL (ref 0–0.04)
IMM GRANULOCYTES NFR BLD AUTO: ABNORMAL % (ref 0–0.5)
KETONES UR QL STRIP: NEGATIVE
LACTATE SERPL-SCNC: 1.7 MMOL/L (ref 0.5–1.9)
LEFT INTERNAL DIMENSION IN SYSTOLE: 1.6 CM (ref 2.1–4)
LEFT VENTRICLE DIASTOLIC VOLUME INDEX: 23.53 ML/M2
LEFT VENTRICLE DIASTOLIC VOLUME: 36.7 ML
LEFT VENTRICLE SYSTOLIC VOLUME INDEX: 10.3 ML/M2
LEFT VENTRICLE SYSTOLIC VOLUME: 16 ML
LEFT VENTRICULAR INTERNAL DIMENSION IN DIASTOLE: 3.06 CM (ref 3.5–6)
LEUKOCYTE ESTERASE UR QL STRIP: ABNORMAL
LIPASE SERPL-CCNC: 31 U/L (ref 4–60)
LYMPHOCYTES NFR BLD: 20 % (ref 18–48)
MAGNESIUM SERPL-MCNC: 1.8 MG/DL (ref 1.6–2.6)
MCH RBC QN AUTO: 30 PG (ref 27–31)
MCHC RBC AUTO-ENTMCNC: 31.3 G/DL (ref 32–36)
MCV RBC AUTO: 96 FL (ref 82–98)
METAMYELOCYTES NFR BLD MANUAL: 1 %
MICROSCOPIC COMMENT: ABNORMAL
MONOCYTES NFR BLD: 5 % (ref 4–15)
NEUTROPHILS NFR BLD: 57 % (ref 38–73)
NEUTS BAND NFR BLD MANUAL: 16 %
NITRITE UR QL STRIP: NEGATIVE
NRBC BLD-RTO: 0 /100 WBC
PH UR STRIP: 8 [PH] (ref 5–8)
PLATELET # BLD AUTO: 310 K/UL (ref 150–450)
PLATELET BLD QL SMEAR: ABNORMAL
PMV BLD AUTO: 9.8 FL (ref 9.2–12.9)
POLYCHROMASIA BLD QL SMEAR: ABNORMAL
POTASSIUM SERPL-SCNC: 2.8 MMOL/L (ref 3.5–5.1)
PROT SERPL-MCNC: 6.3 G/DL (ref 6–8.4)
PROT UR QL STRIP: NEGATIVE
RBC # BLD AUTO: 2.53 M/UL (ref 4–5.4)
RBC #/AREA URNS HPF: 1 /HPF (ref 0–4)
SODIUM SERPL-SCNC: 132 MMOL/L (ref 136–145)
SP GR UR STRIP: 1 (ref 1–1.03)
SQUAMOUS #/AREA URNS HPF: 2 /HPF
TROPONIN I SERPL HS-MCNC: 1032.2 PG/ML (ref 0–14.9)
TROPONIN I SERPL HS-MCNC: 1340.5 PG/ML (ref 0–14.9)
URN SPEC COLLECT METH UR: ABNORMAL
UROBILINOGEN UR STRIP-ACNC: NEGATIVE EU/DL
WBC # BLD AUTO: 4.07 K/UL (ref 3.9–12.7)
WBC #/AREA URNS HPF: 20 /HPF (ref 0–5)

## 2023-02-24 PROCEDURE — 93005 ELECTROCARDIOGRAM TRACING: CPT | Performed by: SPECIALIST

## 2023-02-24 PROCEDURE — 82962 GLUCOSE BLOOD TEST: CPT

## 2023-02-24 PROCEDURE — 63600175 PHARM REV CODE 636 W HCPCS: Performed by: INTERNAL MEDICINE

## 2023-02-24 PROCEDURE — 12000002 HC ACUTE/MED SURGE SEMI-PRIVATE ROOM

## 2023-02-24 PROCEDURE — 83880 ASSAY OF NATRIURETIC PEPTIDE: CPT | Performed by: EMERGENCY MEDICINE

## 2023-02-24 PROCEDURE — 25000003 PHARM REV CODE 250: Performed by: INTERNAL MEDICINE

## 2023-02-24 PROCEDURE — 96365 THER/PROPH/DIAG IV INF INIT: CPT

## 2023-02-24 PROCEDURE — 80053 COMPREHEN METABOLIC PANEL: CPT | Performed by: EMERGENCY MEDICINE

## 2023-02-24 PROCEDURE — 99223 1ST HOSP IP/OBS HIGH 75: CPT | Mod: ,,, | Performed by: STUDENT IN AN ORGANIZED HEALTH CARE EDUCATION/TRAINING PROGRAM

## 2023-02-24 PROCEDURE — 93010 EKG 12-LEAD: ICD-10-PCS | Mod: ,,, | Performed by: SPECIALIST

## 2023-02-24 PROCEDURE — 87077 CULTURE AEROBIC IDENTIFY: CPT | Performed by: EMERGENCY MEDICINE

## 2023-02-24 PROCEDURE — 25000003 PHARM REV CODE 250: Performed by: EMERGENCY MEDICINE

## 2023-02-24 PROCEDURE — 82140 ASSAY OF AMMONIA: CPT | Performed by: EMERGENCY MEDICINE

## 2023-02-24 PROCEDURE — 96361 HYDRATE IV INFUSION ADD-ON: CPT

## 2023-02-24 PROCEDURE — 85007 BL SMEAR W/DIFF WBC COUNT: CPT | Performed by: EMERGENCY MEDICINE

## 2023-02-24 PROCEDURE — 63600175 PHARM REV CODE 636 W HCPCS: Performed by: EMERGENCY MEDICINE

## 2023-02-24 PROCEDURE — 83690 ASSAY OF LIPASE: CPT | Performed by: EMERGENCY MEDICINE

## 2023-02-24 PROCEDURE — 83605 ASSAY OF LACTIC ACID: CPT | Performed by: EMERGENCY MEDICINE

## 2023-02-24 PROCEDURE — 93308 TTE F-UP OR LMTD: CPT

## 2023-02-24 PROCEDURE — 93010 ELECTROCARDIOGRAM REPORT: CPT | Mod: ,,, | Performed by: SPECIALIST

## 2023-02-24 PROCEDURE — G0378 HOSPITAL OBSERVATION PER HR: HCPCS

## 2023-02-24 PROCEDURE — 84484 ASSAY OF TROPONIN QUANT: CPT | Performed by: EMERGENCY MEDICINE

## 2023-02-24 PROCEDURE — 87186 SC STD MICRODIL/AGAR DIL: CPT | Performed by: EMERGENCY MEDICINE

## 2023-02-24 PROCEDURE — 93308 TTE F-UP OR LMTD: CPT | Mod: 26,,, | Performed by: SPECIALIST

## 2023-02-24 PROCEDURE — 87086 URINE CULTURE/COLONY COUNT: CPT | Performed by: EMERGENCY MEDICINE

## 2023-02-24 PROCEDURE — 99223 PR INITIAL HOSPITAL CARE,LEVL III: ICD-10-PCS | Mod: ,,, | Performed by: STUDENT IN AN ORGANIZED HEALTH CARE EDUCATION/TRAINING PROGRAM

## 2023-02-24 PROCEDURE — 85027 COMPLETE CBC AUTOMATED: CPT | Performed by: EMERGENCY MEDICINE

## 2023-02-24 PROCEDURE — 83735 ASSAY OF MAGNESIUM: CPT | Performed by: EMERGENCY MEDICINE

## 2023-02-24 PROCEDURE — 36415 COLL VENOUS BLD VENIPUNCTURE: CPT | Performed by: EMERGENCY MEDICINE

## 2023-02-24 PROCEDURE — 96366 THER/PROPH/DIAG IV INF ADDON: CPT

## 2023-02-24 PROCEDURE — 99285 EMERGENCY DEPT VISIT HI MDM: CPT | Mod: 25

## 2023-02-24 PROCEDURE — 93308 ECHO (CUPID ONLY): ICD-10-PCS | Mod: 26,,, | Performed by: SPECIALIST

## 2023-02-24 PROCEDURE — 25500020 PHARM REV CODE 255: Performed by: EMERGENCY MEDICINE

## 2023-02-24 PROCEDURE — 96367 TX/PROPH/DG ADDL SEQ IV INF: CPT

## 2023-02-24 PROCEDURE — 81001 URINALYSIS AUTO W/SCOPE: CPT | Performed by: EMERGENCY MEDICINE

## 2023-02-24 RX ORDER — LANOLIN ALCOHOL/MO/W.PET/CERES
800 CREAM (GRAM) TOPICAL
Status: DISCONTINUED | OUTPATIENT
Start: 2023-02-24 | End: 2023-03-06 | Stop reason: HOSPADM

## 2023-02-24 RX ORDER — PANTOPRAZOLE SODIUM 40 MG/1
40 TABLET, DELAYED RELEASE ORAL
Status: DISCONTINUED | OUTPATIENT
Start: 2023-02-25 | End: 2023-03-06 | Stop reason: HOSPADM

## 2023-02-24 RX ORDER — SODIUM,POTASSIUM PHOSPHATES 280-250MG
2 POWDER IN PACKET (EA) ORAL
Status: DISCONTINUED | OUTPATIENT
Start: 2023-02-24 | End: 2023-03-06 | Stop reason: HOSPADM

## 2023-02-24 RX ORDER — MIRTAZAPINE 15 MG/1
15 TABLET, FILM COATED ORAL NIGHTLY
Status: DISCONTINUED | OUTPATIENT
Start: 2023-02-24 | End: 2023-03-06 | Stop reason: HOSPADM

## 2023-02-24 RX ORDER — FOLIC ACID 1 MG/1
1 TABLET ORAL DAILY
Status: DISCONTINUED | OUTPATIENT
Start: 2023-02-25 | End: 2023-03-06 | Stop reason: HOSPADM

## 2023-02-24 RX ORDER — POTASSIUM CHLORIDE 7.45 MG/ML
10 INJECTION INTRAVENOUS ONCE
Status: COMPLETED | OUTPATIENT
Start: 2023-02-24 | End: 2023-02-24

## 2023-02-24 RX ORDER — ACETAMINOPHEN 325 MG/1
650 TABLET ORAL EVERY 8 HOURS PRN
Status: DISCONTINUED | OUTPATIENT
Start: 2023-02-24 | End: 2023-03-06 | Stop reason: HOSPADM

## 2023-02-24 RX ORDER — MEGESTROL ACETATE 20 MG/1
40 TABLET ORAL DAILY
Status: DISCONTINUED | OUTPATIENT
Start: 2023-02-25 | End: 2023-03-06 | Stop reason: HOSPADM

## 2023-02-24 RX ORDER — TRAZODONE HYDROCHLORIDE 50 MG/1
50 TABLET ORAL NIGHTLY
Status: DISCONTINUED | OUTPATIENT
Start: 2023-02-24 | End: 2023-03-06 | Stop reason: HOSPADM

## 2023-02-24 RX ORDER — PREDNISONE 5 MG/1
5 TABLET ORAL DAILY
Status: DISCONTINUED | OUTPATIENT
Start: 2023-02-24 | End: 2023-03-06 | Stop reason: HOSPADM

## 2023-02-24 RX ORDER — ONDANSETRON 2 MG/ML
4 INJECTION INTRAMUSCULAR; INTRAVENOUS EVERY 8 HOURS PRN
Status: DISCONTINUED | OUTPATIENT
Start: 2023-02-24 | End: 2023-03-06 | Stop reason: HOSPADM

## 2023-02-24 RX ORDER — NALOXONE HCL 0.4 MG/ML
0.02 VIAL (ML) INJECTION
Status: DISCONTINUED | OUTPATIENT
Start: 2023-02-24 | End: 2023-03-06 | Stop reason: HOSPADM

## 2023-02-24 RX ORDER — METOPROLOL SUCCINATE 50 MG/1
50 TABLET, EXTENDED RELEASE ORAL DAILY
Status: ON HOLD | COMMUNITY
End: 2023-03-06 | Stop reason: HOSPADM

## 2023-02-24 RX ORDER — POTASSIUM CHLORIDE 20 MEQ/1
40 TABLET, EXTENDED RELEASE ORAL ONCE
Status: COMPLETED | OUTPATIENT
Start: 2023-02-24 | End: 2023-02-24

## 2023-02-24 RX ORDER — SERTRALINE HYDROCHLORIDE 50 MG/1
50 TABLET, FILM COATED ORAL NIGHTLY
Status: DISCONTINUED | OUTPATIENT
Start: 2023-02-24 | End: 2023-03-06 | Stop reason: HOSPADM

## 2023-02-24 RX ORDER — POLYETHYLENE GLYCOL 3350 17 G/17G
17 POWDER, FOR SOLUTION ORAL DAILY PRN
Status: DISCONTINUED | OUTPATIENT
Start: 2023-02-24 | End: 2023-03-06 | Stop reason: HOSPADM

## 2023-02-24 RX ORDER — DEXTROSE MONOHYDRATE 100 MG/ML
25 INJECTION, SOLUTION INTRAVENOUS
Status: COMPLETED | OUTPATIENT
Start: 2023-02-24 | End: 2023-02-24

## 2023-02-24 RX ORDER — TALC
6 POWDER (GRAM) TOPICAL NIGHTLY PRN
Status: DISCONTINUED | OUTPATIENT
Start: 2023-02-24 | End: 2023-03-06 | Stop reason: HOSPADM

## 2023-02-24 RX ADMIN — TRAZODONE HYDROCHLORIDE 50 MG: 50 TABLET ORAL at 09:02

## 2023-02-24 RX ADMIN — SODIUM CHLORIDE 1000 ML: 0.9 INJECTION, SOLUTION INTRAVENOUS at 11:02

## 2023-02-24 RX ADMIN — PREDNISONE 5 MG: 5 TABLET ORAL at 07:02

## 2023-02-24 RX ADMIN — IOHEXOL 100 ML: 350 INJECTION, SOLUTION INTRAVENOUS at 03:02

## 2023-02-24 RX ADMIN — POTASSIUM CHLORIDE 10 MEQ: 7.46 INJECTION, SOLUTION INTRAVENOUS at 03:02

## 2023-02-24 RX ADMIN — MIRTAZAPINE 15 MG: 15 TABLET, FILM COATED ORAL at 09:02

## 2023-02-24 RX ADMIN — SERTRALINE HYDROCHLORIDE 50 MG: 50 TABLET ORAL at 09:02

## 2023-02-24 RX ADMIN — POTASSIUM CHLORIDE 40 MEQ: 1500 TABLET, EXTENDED RELEASE ORAL at 02:02

## 2023-02-24 RX ADMIN — DEXTROSE 25 G: 10 SOLUTION INTRAVENOUS at 02:02

## 2023-02-24 RX ADMIN — PIPERACILLIN SODIUM AND TAZOBACTAM SODIUM 3.38 G: 3; .375 INJECTION, POWDER, LYOPHILIZED, FOR SOLUTION INTRAVENOUS at 07:02

## 2023-02-24 NOTE — ED NOTES
PT TO CT AFTER MID LINE INSERTION.  DEPENDANT EDEMA AT BILATERAL ELBOWS CONTINUES.  WORSE AT R ARM. REMAINS ALERT AND ORIENTED. VSS.

## 2023-02-24 NOTE — ED PROVIDER NOTES
Encounter Date: 2/24/2023       History     Chief Complaint   Patient presents with    Fatigue     Pancreatic stent placed a little over 2 weeks ago - patient has progressively felt more fatigued since      Patient presents with history consistent with failure to thrive in the setting of chronic biliary pancreatitis complaining of weakness.  Patient having excessive weakness over the last few days feeling faint on standing.  Too weak to get out of bed.  Patient's appetite has actually improved since receiving a larger biliary stent but weakness got worse over the last couple of days.  No chest pain.    Review of patient's allergies indicates:   Allergen Reactions    No known drug allergies      Past Medical History:   Diagnosis Date    Acute biliary pancreatitis 6/14/2022    Anxiety     Chronic pancreatitis 1/2/2023    Digestive disorder     Flu 02/2017    [facility]    Hypertension     Long-term use of immunosuppressant medication 6/10/2022    Rheumatoid arthritis involving multiple sites with positive rheumatoid factor 01/14/2021     Past Surgical History:   Procedure Laterality Date    COLONOSCOPY N/A 2/26/2021    Procedure: COLONOSCOPY;  Surgeon: Amy Sidhu MD;  Location: [facility] ENDO;  Service: Endoscopy;  Laterality: N/A;    ENDOSCOPIC ULTRASOUND OF UPPER GASTROINTESTINAL TRACT N/A 7/1/2022    Procedure: ULTRASOUND, UPPER GI TRACT, ENDOSCOPIC;  Surgeon: Donavon Jewell III, MD;  Location: [facility] ENDO;  Service: Endoscopy;  Laterality: N/A;    ENDOSCOPIC ULTRASOUND OF UPPER GASTROINTESTINAL TRACT N/A 11/29/2022    Procedure: ULTRASOUND, UPPER GI TRACT, ENDOSCOPIC;  Surgeon: Donavon Jewell III, MD;  Location: [facility] ENDO;  Service: Endoscopy;  Laterality: N/A;    ENDOSCOPIC ULTRASOUND OF UPPER GASTROINTESTINAL TRACT N/A 1/3/2023    Procedure: ULTRASOUND, UPPER GI TRACT, ENDOSCOPIC;  Surgeon: Donavon Jewell III, MD;  Location: [facility] ENDO;  Service: Endoscopy;  Laterality: N/A;    ERCP N/A  12/30/2022    Procedure: ERCP (ENDOSCOPIC RETROGRADE CHOLANGIOPANCREATOGRAPHY);  Surgeon: Donavon Jewell III, MD;  Location: [facility] ENDO;  Service: Endoscopy;  Laterality: N/A;    ERCP N/A 1/24/2023    Procedure: ERCP (ENDOSCOPIC RETROGRADE CHOLANGIOPANCREATOGRAPHY);  Surgeon: Rock Martines MD;  Location: [facility] ENDO (2ND FLR);  Service: Endoscopy;  Laterality: N/A;    ESOPHAGOGASTRODUODENOSCOPY N/A 2/26/2021    Procedure: EGD (ESOPHAGOGASTRODUODENOSCOPY);  Surgeon: Amy Sidhu MD;  Location: [facility] ENDO;  Service: Endoscopy;  Laterality: N/A;    LAPAROSCOPIC CHOLECYSTECTOMY N/A 7/27/2022    Procedure: CHOLECYSTECTOMY, LAPAROSCOPIC;  Surgeon: Ramón Hwang III, MD;  Location: [facility] OR;  Service: General;  Laterality: N/A;    TUBAL LIGATION       Family History   Problem Relation Age of Onset    Diabetes Mother     Heart disease Mother     Kidney disease Mother     Hypertension Mother     Stroke Mother     Hearing loss Mother     COPD Father     Liver disease Paternal Grandfather     Alcohol abuse Paternal Grandfather     Liver disease Sister     Emphysema Brother     COPD Brother     Sleep apnea Brother     No Known Problems Son     Alcohol abuse Paternal Grandmother     Cirrhosis Paternal Grandmother     Heart disease Maternal Aunt     Diabetes Maternal Aunt     Cancer Maternal Aunt         female    Heart disease Maternal Uncle     Hypertension Maternal Uncle     No Known Problems Paternal Uncle     Psoriasis Neg Hx     Lupus Neg Hx     Eczema Neg Hx     Melanoma Neg Hx      Social History     Tobacco Use    Smoking status: Every Day     Packs/day: 1.00     Years: 7.00     Pack years: 7.00     Types: Cigarettes    Smokeless tobacco: Never    Tobacco comments:     [phone]   Substance Use Topics    Alcohol use: No    Drug use: Never     Review of Systems   All other systems reviewed and are negative.    Physical Exam     Initial Vitals [02/24/23 0854]   BP Pulse Resp Temp SpO2   (!) 91/58 93 18 97.6  °F (36.4 °C) 97 %      MAP       --         Physical Exam    Nursing note and vitals reviewed.  Constitutional:   Pleasant, polite, cachectic-appearing   HENT:   Head: Normocephalic and atraumatic.   Eyes: EOM are normal.   Neck: Neck supple.   Normal range of motion.  Cardiovascular:  Normal rate, regular rhythm, normal heart sounds and intact distal pulses.           Pulmonary/Chest: Breath sounds normal. No respiratory distress.   Abdominal: Abdomen is soft. She exhibits distension. There is no abdominal tenderness.   Musculoskeletal:         General: Normal range of motion.      Cervical back: Normal range of motion and neck supple.     Neurological: She is alert and oriented to person, place, and time. She has normal strength.   Skin: Skin is warm and dry. Capillary refill takes less than 2 seconds.   Psychiatric: She has a normal mood and affect. Her behavior is normal. Judgment and thought content normal.       ED Course   Procedures  Labs Reviewed   CBC W/ AUTO DIFFERENTIAL - Abnormal; Notable for the following components:       Result Value    RBC 2.53 (*)     Hemoglobin 7.6 (*)     Hematocrit 24.3 (*)     MCHC 31.3 (*)     RDW 20.5 (*)     All other components within normal limits   COMPREHENSIVE METABOLIC PANEL - Abnormal; Notable for the following components:    Sodium 132 (*)     Potassium 2.8 (*)     Glucose 61 (*)     Albumin 1.5 (*)     Total Bilirubin 1.6 (*)     AST 48 (*)     Anion Gap 7 (*)     All other components within normal limits    Narrative:     K critical result(s) repeated. Called and verbal readback obtained   from Jessica Murillo RN/ed by S 02/24/2023 12:09   TROPONIN I HIGH SENSITIVITY - Abnormal; Notable for the following components:    Troponin I High Sensitivity 1340.5 (*)     All other components within normal limits    Narrative:     Tnihs critical result(s) repeated. Called and verbal readback   obtained from Jessica Murillo RN/ed by [facility] 02/24/2023 12:11   B-TYPE NATRIURETIC  PEPTIDE - Abnormal; Notable for the following components:     (*)     All other components within normal limits   LIPASE   MAGNESIUM   LACTIC ACID, PLASMA   AMMONIA   B-TYPE NATRIURETIC PEPTIDE   URINALYSIS, REFLEX TO URINE CULTURE   TROPONIN I HIGH SENSITIVITY        ECG Results              EKG 12-lead (In process)  Result time 02/24/23 10:02:15      In process by Interface, Lab In [facility] (02/24/23 10:02:15)                   Narrative:    Test Reason : R07.9,    Vent. Rate : 079 BPM     Atrial Rate : 079 BPM     P-R Int : 128 ms          QRS Dur : 066 ms      QT Int : 392 ms       P-R-T Axes : 077 013 -56 degrees     QTc Int : 449 ms    Normal sinus rhythm  Low voltage QRS  Nonspecific T wave abnormality  Abnormal ECG  When compared with ECG of 08-FEB-2023 10:05,  No significant change was found    Referred By: [email or web address]   SELF           Confirmed By:                                   Imaging Results    None          Medications   iohexoL (OMNIPAQUE 350) injection 100 mL (has no administration in time range)   sodium chloride 0.9% bolus 1,000 mL 1,000 mL (0 mLs Intravenous Stopped 2/24/23 1214)   potassium chloride SA CR tablet 40 mEq (40 mEq Oral Given 2/24/23 1407)   dextrose 10 % infusion (25 g Intravenous New Bag 2/24/23 1406)     Medical Decision Making:   History:   Old Medical Records: I decided to obtain old medical records.  Old Records Summarized: records from previous admission(s).  Initial Assessment:   Weak and frail  Differential Diagnosis:   Considerations include but are not limited to electrolyte abnormalities, dehydration, malnutrition, cardiac etiologies, tumor, mass, obstruction  Clinical Tests:   Lab Tests: Reviewed and Ordered  Radiological Study: Ordered and Reviewed  Medical Tests: Reviewed and Ordered  ED Management:  MDM    Patient presents for emergent evaluation of acute weakness that poses a possible threat to life and/or bodily function.    In the ED patient found to have  acute NSTEMI, hypokalemia.   I ordered labs and personally reviewed them.  Labs significant for high sensitivity troponin greater than a 1000, potassium 2.8.    I ordered X-rays and personally reviewed them and reviewed the radiologist interpretation.  Xray significant for no acute cardiopulmonary process.    I ordered EKG and personally reviewed it.  EKG significant for regular rate and rhythm without ST elevation.    I ordered CT scan and personally reviewed it and reviewed the radiologist interpretation.  CT significant for pending at dictation.      Admission MDM  I discussed the patient presentation labs, ekg, X-rays, CT findings with the consultant(s) Dr. Jacome who recommended stat echo , no anticoagulation at this time  Patient was managed in the ED with IV normal saline and oral potassium.    The response to treatment was improved.    Patient required emergent consultation to hospitalist for admission.           ED Course as of 02/24/23 1414   Fri Feb 24, 2023   1217 Troponin I High Sensitivity(!!): 1340.5 [AP]   1217 Sodium(!): 132 [AP]   1217 Potassium(!!): 2.8 [AP]   1217 Chloride: 99 [AP]   1217 CO2: 26 [AP]   1217 Glucose(!): 61 [AP]   1217 BUN: 18 [AP]   1217 Calcium: 8.7 [AP]   1217 PROTEIN TOTAL: 6.3 [AP]   1217 Lactate, Nitesh: 1.7 [AP]   1217 Ammonia: 39 [AP]   1217 Lipase: 31 [AP]   1217 Magnesium: 1.8 [AP]      ED Course User Index  [AP] Han Tierney MD                 Clinical Impression:   Final diagnoses:  [R07.9] Chest pain  [R53.1] Weakness (Primary)  [I21.4] NSTEMI (non-ST elevated myocardial infarction)  [R77.8] Elevated troponin        ED Disposition Condition    Admit Stable                Han Tierney MD  02/24/23 [id number]

## 2023-02-24 NOTE — PROCEDURES
Procedure Location:ER room 17  Education/need:midline  Prep:Chloraprep  Supplies:   Brand:Arrow   Gauge/ Length:20ga/8cm   Lot #:[id number]  Extremity:left upper  Vessel:basilic  Attempts:1  Inserted by:Vidal Garay RN  Date/time placed:02/24/2023/1421  Tolerated:well  Dressing applied: Biopatch occlussive drsg

## 2023-02-24 NOTE — ED NOTES
EMACIATED.  DECREASED STRENGTH FOR A WEEK REPORTED BY FAMILY.  SR AT MONITOR.  NO RD.  FLAT ABDOMEN. SKIN WARM AND DRY.  SMALL SACRAL BREAKDOWN REPORTED BY FAMILY. BILATERAL LE EDEMA. FALLS RISK. FAMILY AT BS CALL LIGHT IN REACH. ALERT AND ORINTED.

## 2023-02-25 LAB
ABO + RH BLD: NORMAL
ANION GAP SERPL CALC-SCNC: 6 MMOL/L (ref 8–16)
BLD GP AB SCN CELLS X3 SERPL QL: NORMAL
BLD PROD TYP BPU: NORMAL
BLOOD UNIT EXPIRATION DATE: NORMAL
BLOOD UNIT TYPE CODE: 5100
BLOOD UNIT TYPE: NORMAL
BUN SERPL-MCNC: 17 MG/DL (ref 8–23)
CALCIUM SERPL-MCNC: 8.2 MG/DL (ref 8.7–10.5)
CHLORIDE SERPL-SCNC: 102 MMOL/L (ref 95–110)
CO2 SERPL-SCNC: 24 MMOL/L (ref 23–29)
CODING SYSTEM: NORMAL
CREAT SERPL-MCNC: 0.7 MG/DL (ref 0.5–1.4)
CROSSMATCH INTERPRETATION: NORMAL
DISPENSE STATUS: NORMAL
ERYTHROCYTE [DISTWIDTH] IN BLOOD BY AUTOMATED COUNT: 21.2 % (ref 11.5–14.5)
EST. GFR  (NO RACE VARIABLE): >60 ML/MIN/1.73 M^2
GLUCOSE SERPL-MCNC: 79 MG/DL (ref 70–110)
HCT VFR BLD AUTO: 21.7 % (ref 37–48.5)
HCT VFR BLD AUTO: 24.1 % (ref 37–48.5)
HGB BLD-MCNC: 6.9 G/DL (ref 12–16)
HGB BLD-MCNC: 7.9 G/DL (ref 12–16)
MAGNESIUM SERPL-MCNC: 1.7 MG/DL (ref 1.6–2.6)
MCH RBC QN AUTO: 30.5 PG (ref 27–31)
MCHC RBC AUTO-ENTMCNC: 31.8 G/DL (ref 32–36)
MCV RBC AUTO: 96 FL (ref 82–98)
NUM UNITS TRANS PACKED RBC: NORMAL
PLATELET # BLD AUTO: 278 K/UL (ref 150–450)
PMV BLD AUTO: 9.7 FL (ref 9.2–12.9)
POTASSIUM SERPL-SCNC: 3.9 MMOL/L (ref 3.5–5.1)
RBC # BLD AUTO: 2.26 M/UL (ref 4–5.4)
SODIUM SERPL-SCNC: 132 MMOL/L (ref 136–145)
WBC # BLD AUTO: 4.55 K/UL (ref 3.9–12.7)

## 2023-02-25 PROCEDURE — 99215 OFFICE O/P EST HI 40 MIN: CPT | Mod: ,,, | Performed by: INTERNAL MEDICINE

## 2023-02-25 PROCEDURE — 36569 INSJ PICC 5 YR+ W/O IMAGING: CPT

## 2023-02-25 PROCEDURE — 80048 BASIC METABOLIC PNL TOTAL CA: CPT | Performed by: INTERNAL MEDICINE

## 2023-02-25 PROCEDURE — 36415 COLL VENOUS BLD VENIPUNCTURE: CPT | Performed by: INTERNAL MEDICINE

## 2023-02-25 PROCEDURE — G0378 HOSPITAL OBSERVATION PER HR: HCPCS

## 2023-02-25 PROCEDURE — 85018 HEMOGLOBIN: CPT | Performed by: INTERNAL MEDICINE

## 2023-02-25 PROCEDURE — 25000003 PHARM REV CODE 250: Performed by: INTERNAL MEDICINE

## 2023-02-25 PROCEDURE — 12000002 HC ACUTE/MED SURGE SEMI-PRIVATE ROOM

## 2023-02-25 PROCEDURE — 83735 ASSAY OF MAGNESIUM: CPT | Performed by: INTERNAL MEDICINE

## 2023-02-25 PROCEDURE — 87040 BLOOD CULTURE FOR BACTERIA: CPT | Performed by: INTERNAL MEDICINE

## 2023-02-25 PROCEDURE — 96366 THER/PROPH/DIAG IV INF ADDON: CPT

## 2023-02-25 PROCEDURE — 86900 BLOOD TYPING SEROLOGIC ABO: CPT | Performed by: INTERNAL MEDICINE

## 2023-02-25 PROCEDURE — P9016 RBC LEUKOCYTES REDUCED: HCPCS | Performed by: INTERNAL MEDICINE

## 2023-02-25 PROCEDURE — 86922 COMPATIBILITY TEST ANTIGLOB: CPT | Performed by: INTERNAL MEDICINE

## 2023-02-25 PROCEDURE — 85027 COMPLETE CBC AUTOMATED: CPT | Performed by: INTERNAL MEDICINE

## 2023-02-25 PROCEDURE — 63600175 PHARM REV CODE 636 W HCPCS: Performed by: INTERNAL MEDICINE

## 2023-02-25 PROCEDURE — 96375 TX/PRO/DX INJ NEW DRUG ADDON: CPT

## 2023-02-25 PROCEDURE — 86902 BLOOD TYPE ANTIGEN DONOR EA: CPT | Performed by: INTERNAL MEDICINE

## 2023-02-25 PROCEDURE — C1751 CATH, INF, PER/CENT/MIDLINE: HCPCS

## 2023-02-25 PROCEDURE — 99215 PR OFFICE/OUTPT VISIT, EST, LEVL V, 40-54 MIN: ICD-10-PCS | Mod: ,,, | Performed by: INTERNAL MEDICINE

## 2023-02-25 PROCEDURE — 85014 HEMATOCRIT: CPT | Performed by: INTERNAL MEDICINE

## 2023-02-25 PROCEDURE — 36430 TRANSFUSION BLD/BLD COMPNT: CPT

## 2023-02-25 RX ORDER — FUROSEMIDE 10 MG/ML
20 INJECTION INTRAMUSCULAR; INTRAVENOUS ONCE
Status: DISCONTINUED | OUTPATIENT
Start: 2023-02-25 | End: 2023-02-25

## 2023-02-25 RX ORDER — HYDROCODONE BITARTRATE AND ACETAMINOPHEN 500; 5 MG/1; MG/1
TABLET ORAL
Status: DISCONTINUED | OUTPATIENT
Start: 2023-02-25 | End: 2023-03-06 | Stop reason: HOSPADM

## 2023-02-25 RX ORDER — FUROSEMIDE 10 MG/ML
20 INJECTION INTRAMUSCULAR; INTRAVENOUS ONCE
Status: COMPLETED | OUTPATIENT
Start: 2023-02-25 | End: 2023-02-25

## 2023-02-25 RX ADMIN — FUROSEMIDE 20 MG: 10 INJECTION, SOLUTION INTRAMUSCULAR; INTRAVENOUS at 10:02

## 2023-02-25 RX ADMIN — PIPERACILLIN SODIUM AND TAZOBACTAM SODIUM 3.38 G: 3; .375 INJECTION, POWDER, LYOPHILIZED, FOR SOLUTION INTRAVENOUS at 03:02

## 2023-02-25 RX ADMIN — PANCRELIPASE 6 CAPSULE: 60000; 12000; 38000 CAPSULE, DELAYED RELEASE PELLETS ORAL at 05:02

## 2023-02-25 RX ADMIN — Medication 800 MG: at 03:02

## 2023-02-25 RX ADMIN — TRAZODONE HYDROCHLORIDE 50 MG: 50 TABLET ORAL at 08:02

## 2023-02-25 RX ADMIN — LACTOBACILLUS TAB 4 TABLET: TAB at 12:02

## 2023-02-25 RX ADMIN — PREDNISONE 5 MG: 5 TABLET ORAL at 08:02

## 2023-02-25 RX ADMIN — PANCRELIPASE 6 CAPSULE: 60000; 12000; 38000 CAPSULE, DELAYED RELEASE PELLETS ORAL at 12:02

## 2023-02-25 RX ADMIN — MIRTAZAPINE 15 MG: 15 TABLET, FILM COATED ORAL at 08:02

## 2023-02-25 RX ADMIN — PANCRELIPASE 6 CAPSULE: 60000; 12000; 38000 CAPSULE, DELAYED RELEASE PELLETS ORAL at 06:02

## 2023-02-25 RX ADMIN — MEGESTROL ACETATE 40 MG: 20 TABLET ORAL at 08:02

## 2023-02-25 RX ADMIN — SERTRALINE HYDROCHLORIDE 50 MG: 50 TABLET ORAL at 08:02

## 2023-02-25 RX ADMIN — FOLIC ACID 1 MG: 1 TABLET ORAL at 08:02

## 2023-02-25 RX ADMIN — LACTOBACILLUS TAB 4 TABLET: TAB at 06:02

## 2023-02-25 RX ADMIN — PIPERACILLIN SODIUM AND TAZOBACTAM SODIUM 3.38 G: 3; .375 INJECTION, POWDER, LYOPHILIZED, FOR SOLUTION INTRAVENOUS at 11:02

## 2023-02-25 RX ADMIN — PANTOPRAZOLE SODIUM 40 MG: 40 TABLET, DELAYED RELEASE ORAL at 06:02

## 2023-02-25 RX ADMIN — Medication 800 MG: at 11:02

## 2023-02-25 RX ADMIN — LACTOBACILLUS TAB 4 TABLET: TAB at 05:02

## 2023-02-25 RX ADMIN — PIPERACILLIN SODIUM AND TAZOBACTAM SODIUM 3.38 G: 3; .375 INJECTION, POWDER, LYOPHILIZED, FOR SOLUTION INTRAVENOUS at 07:02

## 2023-02-25 NOTE — ED NOTES
Changed pt brief and linen, perineum cleansed. New purewick applied. New mepalex applied to sacrum. Pt turned via pillow under right side. Pt encouraged to continue to drink juice and eat.

## 2023-02-25 NOTE — PLAN OF CARE
Problem: Adult Inpatient Plan of Care  Goal: Plan of Care Review  Outcome: Ongoing, Progressing  Goal: Absence of Hospital-Acquired Illness or Injury  Outcome: Ongoing, Progressing  Goal: Optimal Comfort and Wellbeing  Outcome: Ongoing, Progressing     Problem: Fluid Imbalance (Pneumonia)  Goal: Fluid Balance  Outcome: Ongoing, Progressing     Problem: Infection  Goal: Absence of Infection Signs and Symptoms  Outcome: Ongoing, Progressing     Problem: Impaired Wound Healing  Goal: Optimal Wound Healing  Outcome: Ongoing, Progressing

## 2023-02-25 NOTE — SUBJECTIVE & OBJECTIVE
Past Medical History:   Diagnosis Date    Acute biliary pancreatitis 6/14/2022    Anxiety     Chronic pancreatitis 1/2/2023    Digestive disorder     Flu 02/2017    [facility]    Hypertension     Long-term use of immunosuppressant medication 6/10/2022    Rheumatoid arthritis involving multiple sites with positive rheumatoid factor 01/14/2021       Past Surgical History:   Procedure Laterality Date    COLONOSCOPY N/A 2/26/2021    Procedure: COLONOSCOPY;  Surgeon: Amy Sidhu MD;  Location: [facility];  Service: Endoscopy;  Laterality: N/A;    ENDOSCOPIC ULTRASOUND OF UPPER GASTROINTESTINAL TRACT N/A 7/1/2022    Procedure: ULTRASOUND, UPPER GI TRACT, ENDOSCOPIC;  Surgeon: Donavon Jewell III, MD;  Location: [facility];  Service: Endoscopy;  Laterality: N/A;    ENDOSCOPIC ULTRASOUND OF UPPER GASTROINTESTINAL TRACT N/A 11/29/2022    Procedure: ULTRASOUND, UPPER GI TRACT, ENDOSCOPIC;  Surgeon: Donavon Jewell III, MD;  Location: [facility];  Service: Endoscopy;  Laterality: N/A;    ENDOSCOPIC ULTRASOUND OF UPPER GASTROINTESTINAL TRACT N/A 1/3/2023    Procedure: ULTRASOUND, UPPER GI TRACT, ENDOSCOPIC;  Surgeon: Donavon Jewell III, MD;  Location: [facility];  Service: Endoscopy;  Laterality: N/A;    ERCP N/A 12/30/2022    Procedure: ERCP (ENDOSCOPIC RETROGRADE CHOLANGIOPANCREATOGRAPHY);  Surgeon: Donavon Jewell III, MD;  Location: [facility];  Service: Endoscopy;  Laterality: N/A;    ERCP N/A 1/24/2023    Procedure: ERCP (ENDOSCOPIC RETROGRADE CHOLANGIOPANCREATOGRAPHY);  Surgeon: Rock Martines MD;  Location: [facility] ([address] [place] [address]);  Service: Endoscopy;  Laterality: N/A;    ESOPHAGOGASTRODUODENOSCOPY N/A 2/26/2021    Procedure: EGD (ESOPHAGOGASTRODUODENOSCOPY);  Surgeon: Amy Sidhu MD;  Location: [facility];  Service: Endoscopy;  Laterality: N/A;    LAPAROSCOPIC CHOLECYSTECTOMY N/A 7/27/2022    Procedure: CHOLECYSTECTOMY, LAPAROSCOPIC;  Surgeon: Ramón Hwang III, MD;  Location: [facility]  OR;  Service: General;  Laterality: N/A;    TUBAL LIGATION         Review of patient's allergies indicates:   Allergen Reactions    No known drug allergies        No current facility-administered medications on file prior to encounter.     Current Outpatient Medications on File Prior to Encounter   Medication Sig    acidophilus-sporogenes (ACIDOPHILUS EX STR, L. SPOROG,) 35 million- 25 million cell Tab Take 1 tablet by mouth once daily.    apixaban (ELIQUIS) 5 mg Tab Take 1 tablet (5 mg total) by mouth 2 (two) times daily.    cyclobenzaprine (FLEXERIL) 5 MG tablet Take 5 mg by mouth nightly.    folic acid (FOLVITE) 1 MG tablet Take 1 tablet (1 mg total) by mouth once daily.    lipase-protease-amylase 12,000-38,000-60,000 units (CREON) CpDR Take 6 capsules by mouth 3 (three) times daily with meals.    loperamide (IMODIUM) 2 mg capsule Take 1 capsule (2 mg total) by mouth 4 (four) times daily as needed for Diarrhea.    megestroL (MEGACE) 40 MG Tab Take 1 tablet (40 mg total) by mouth once daily.    metoprolol succinate (TOPROL-XL) 50 MG 24 hr tablet Take 50 mg by mouth once daily.    mirtazapine (REMERON) 15 MG tablet Take 1 tablet (15 mg total) by mouth every evening.    pantoprazole (PROTONIX) 40 MG tablet Take 1 tablet (40 mg total) by mouth once daily.    sertraline (ZOLOFT) 50 MG tablet Take 1 tablet (50 mg total) by mouth every evening.    traZODone (DESYREL) 50 MG tablet Take 1 tablet (50 mg total) by mouth every evening.     Family History       Problem Relation (Age of Onset)    Alcohol abuse Paternal Grandfather, Paternal Grandmother    COPD Father, Brother    Cancer Maternal Aunt    Cirrhosis Paternal Grandmother    Diabetes Mother, Maternal Aunt    Emphysema Brother    Hearing loss Mother    Heart disease Mother, Maternal Aunt, Maternal Uncle    Hypertension Mother, Maternal Uncle    Kidney disease Mother    Liver disease Paternal Grandfather, Sister    No Known Problems Son, Paternal Uncle    Sleep apnea  Brother    Stroke Mother          Tobacco Use    Smoking status: Every Day     Packs/day: 1.00     Years: 7.00     Pack years: 7.00     Types: Cigarettes    Smokeless tobacco: Never    Tobacco comments:     [phone]   Substance and Sexual Activity    Alcohol use: No    Drug use: Never    Sexual activity: Yes     Partners: Male       Objective:     Vital Signs (Most Recent):  Temp: 97.6 °F (36.4 °C) (02/24/23 0854)  Pulse: 80 (02/24/23 1430)  Resp: 18 (02/24/23 1430)  BP: 117/66 (02/24/23 1430)  SpO2: 98 % (02/24/23 1430) Vital Signs (24h Range):  Temp:  [97.6 °F (36.4 °C)] 97.6 °F (36.4 °C)  Pulse:  [78-93] 80  Resp:  [18-21] 18  SpO2:  [86 %-98 %] 98 %  BP: ()/(58-66) 117/66     Weight: 48.1 kg (106 lb)  Body mass index is 16.12 kg/m².    Physical Exam      General:  Frail elderly female in no acute distress  Head: Normocephalic and atraumatic   Eyes:  No conjunctival pallor. No scleral icterus.  Mouth: Oral mucosa moist   Lungs:  No tachypnea or accessory muscle use.  Diminished breath sounds at the bases  Cor: Regular rate and rhythm. No murmurs.  2+ pitting pedal edema.  Abd: Soft. Nontender  Musculoskeletal: No arthropathy, deformity.  Skin: No rashes, swelling, or erythema.  Neuro: A&O x 3. Moving all extremities equally. Follows commands  Ext: No cyanosis. Peripheral pulses +  Psych: Normal mood and affect. Memory intact  MSK:  Muscle wasting noted     Significant Labs: All pertinent labs within the past 24 hours have been reviewed.  CBC:   Recent Labs   Lab 02/24/23  1114   WBC 4.07   HGB 7.6*   HCT 24.3*        CMP:   Recent Labs   Lab 02/24/23  1114   *   K 2.8*   CL 99   CO2 26   GLU 61*   BUN 18   CREATININE 0.6   CALCIUM 8.7   PROT 6.3   ALBUMIN 1.5*   BILITOT 1.6*   ALKPHOS 103   AST 48*   ALT 39   ANIONGAP 7*       Significant Imaging: I have reviewed all pertinent imaging results/findings within the past 24 hours.    Imaging Results              CTA Chest Non-Coronary (PE  Studies) (Final result)  Result time 02/24/23 15:32:09      Final result by Mick Rodriguez MD (02/24/23 15:32:09)                   Narrative:    CMS MANDATED QUALITY DATA-CT RADIATION DOSE-436  All CT scans at this facility use dose modulation, iterative reconstruction, and or weight-based dosing when appropriate to reduce radiation dose to as low as reasonably achievable.    HISTORY: Pulmonary embolism (PE) suspected, high prob    FINDINGS: Thin axial imaging through the chest was performed with 100 mL Omnipaque 350 IV contrast, with sagittal and coronal reformatted images and MIP reconstructions performed, and images stored in the patient's permanent electronic medical record.    Comparison to multiple prior exams including 10/18/2022. There are no pulmonary arterial filling defects to suggest pulmonary thromboembolism. The central pulmonary arteries are normal in caliber.    The aorta enhances normally and tapers appropriately, with mild scattered calcified plaque. No aortic dissection. The heart is normal in size, with no pericardial effusion. There are a few scattered coronary arterial calcifications, with no enlarged mediastinal or hilar lymph nodes.    There is apical fibronodular scarring, scattered lucencies reflecting pulmonary emphysema. There are large low-density bilateral effusions, with bilateral lower lobe compressive atelectasis. No evidence of interstitial pulmonary edema or pneumothorax. The central airways are patent.    There are no acute fractures or destructive osseous lesions. Images of the upper abdomen show low-density ascites, with cholecystectomy clips, pancreatic stent and bilateral low-density renal cysts.    IMPRESSION:  1. Negative for pulmonary thromboembolism.  2. Large simple appearing bilateral pleural effusions, with bilateral lower lobe compressive atelectasis.  3. Additional observations as described.    Electronically signed by:  Mick Rodriguez MD  2/24/2023 3:32 PM CST  Workstation: [id number]                                     CT Abdomen Pelvis With Contrast (Final result)  Result time 02/24/23 15:54:32      Final result by Nick Mendez MD (02/24/23 15:54:32)                   Narrative:      EXAM: CT ABDOMEN PELVIS WITH CONTRAST    HISTORY: Abdominal abscess/infection suspected    COMPARISON: CT scan of the abdomen and pelvis dated 1/15/2023    TECHNIQUE: Multiple axial 2 mm thick images were acquired through the abdomen and pelvis with IV contrast only.    This exam was performed according to our departmental dose-optimization program, which includes automated exposure control, adjustment of the mA and/or kV according to patient size and/or use of iterative reconstruction technique.    FINDINGS: The previous CT scan showed a large volume of ascites within the abdomen and pelvis and also multiple multiloculated collections of fluid in the left hemipelvis that appeared to communicate with the peritoneal cavity as well. These were anteriorly displacing and compressing the stomach. Free fluid within the peritoneal cavity has essentially resolved. This includes the multiloculated collections in the left upper quadrant that were compressing the stomach. However, fluid and a large amount of air persists within a large collection in the pelvis that extends into both paracolic gutters. This appears essentially new and is highly suspicious for a large abscess. This is likely a loculated collection within a portion of the peritoneal cavity. It may communicate with bowel given the large amount of air in the collection. The largest portion of the fluid collection in the pelvis measures approximately 19.6 x 13.3 x 9.6 cm in diameter. There is a well-defined enhancing wall surrounding the collection that is likely peritoneum. Further evaluation with percutaneous drainage is recommended. There are multiple segments of mildly dilated air and fluid-filled small bowel that are most likely  due to adynamic ileus. There is no definite evidence of obstruction. Formed stool is present within the colon. The liver and spleen and pancreas and adrenal glands are unremarkable. Large simple cysts are present in the upper poles of both kidneys. There is no hydronephrosis. Images through the lung bases show a large posteriorly layering right pleural effusion that has increased in size significantly. There is also a moderate-sized left pleural effusion that has increased significantly. There is compressive atelectasis of both lower lobes, particularly on the right. Diffuse anasarca is also noted.    IMPRESSION:   Essentially complete resolution of free fluid within the peritoneal cavity since the preceding CT scan dated 1/15/2023. There has been interval development of a loculated appearing collection of fluid and air in the pelvis extending up both paracolic gutters that is likely contained within a portion of the peritoneal cavity. This may represent a large abscess given the presence of the air. It may communicate with bowel, although no direct indication is evident. There is a large right and moderately large left pleural effusion that of both increased in size significantly as well.    Electronically signed by:  Leodan Mendez MD  2/24/2023 3:54 PM [facility] Workstation: [id number]

## 2023-02-25 NOTE — HPI
63-year-old  female with known rheumatoid arthritis, chronic pancreatitis complicated by pancreatic pseudocyst, pancreatic sphincterotomy, bile leak status post stenting with improvement in symptoms, DVT on apixaban (dx in 09/2022) who was recently discharged after being managed for generalized weakness and diarrhea presents with exertional dyspnea.    History supplemented by  at bedside.  Since discharge her appetite and weakness have improved.  However with last 2 days she has been experiencing dyspnea which is worse with minimal exertion and better with rest.  No chest pain, orthopnea or paroxysmal nocturnal dyspnea.  Admits chronic bilateral lower extremity edema which is largely unchanged.  It is worse at the end of the day and better with limb elevation.  No fever, chills, abdominal pain or nausea/vomiting.  No further diarrhea with increased pancreatic enzyme supplement dosing as per GI recommendations from prior hospitalization.    In the ED, she was found to have elevated troponin.  CTA chest was negative for pulmonary embolism but revealed bilateral pleural effusions.  CT of the abdomen/pelvis revealed large loculated fluid collection with air within the pelvis concerning for abscess.    Rest of the 10 point review of systems is negative except as mentioned above.

## 2023-02-25 NOTE — PLAN OF CARE
Problem: Adult Inpatient Plan of Care  Goal: Plan of Care Review  Outcome: Ongoing, Progressing  Goal: Patient-Specific Goal (Individualized)  Outcome: Ongoing, Progressing  Goal: Absence of Hospital-Acquired Illness or Injury  Outcome: Ongoing, Progressing  Goal: Optimal Comfort and Wellbeing  Outcome: Ongoing, Progressing  Goal: Readiness for Transition of Care  Outcome: Ongoing, Progressing     Problem: Skin Injury Risk Increased  Goal: Skin Health and Integrity  Outcome: Ongoing, Progressing     Problem: Fluid Imbalance (Pneumonia)  Goal: Fluid Balance  Outcome: Ongoing, Progressing     Problem: Infection (Pneumonia)  Goal: Resolution of Infection Signs and Symptoms  Outcome: Ongoing, Progressing     Problem: Respiratory Compromise (Pneumonia)  Goal: Effective Oxygenation and Ventilation  Outcome: Ongoing, Progressing     Problem: Infection  Goal: Absence of Infection Signs and Symptoms  Outcome: Ongoing, Progressing     Problem: Impaired Wound Healing  Goal: Optimal Wound Healing  Outcome: Ongoing, Progressing

## 2023-02-25 NOTE — CONSULTS
[facility]  Adult Nutrition   Consult Note (Initial Assessment)     SUMMARY     Recommendations  Recommendation/Intervention: 1. Continue regular diet. Promote PO intake of high calorie/ high protein foods. 2. Menu  to obtain meal selections and obtain food preferences daily. 3. Continue appetite stimulant.  Goals: 1. Patient to meet at least 75% of estimated energy and protein needs. 2. Prevent further weight loss.  Nutrition Goal Status: new  Communication of RD Recs: reviewed with RN     Dietitian Rounds Brief  Consult for skin tears. Pt also seen due to MST score 3. Spoke with patient and . PO intake has improved in past 1 day. PTA very poor intake. Yesterday patient ate almost all of [employer] loaded baked potato, ~75% of breakfast this AM. Also drank about 50% of smoothie myriam smoothie so far today. Patient drank ensure and it goes right through her (diarrhea), does not wish to have supplement. RD encouraged PO intake of calorie dense foods and outside foods as she wishes.   Patient is on appetite stimulant. Pt states she was getting steroid for RA, but is no longer getting. Pt states steroids increased appetite in past. Notified pt she is on megestrol and this should continue to help with appetite.   Note UBW of 138 lbs (62.7 kg) in December 2022 per patient, per wt history UBW was about 118 lbs - still indicates severe weight loss. Overt visible malnutrition and intake and weight loss alone are indicator of malnutrition.   LBM: 02/25/23, soft, loose    Nutritional Diagnosis (PES Statement)   Medical dx of severe protein calorie malnutrition. RD agrees.   Severe malnutrition related to inadequate intake due to decreased appetite as evidence by severe weight loss of 5.7 kg 10.5% in < 3 months, patient reports poor intake PTA estimated meeting < 75% needs for >/=1 month; visible severe muscle wasting temples. Noted: BMI 16.12.     Reason for Assessment  Reason For Assessment: consult,  "identified at risk by screening criteria  Relevant Medical History: Generalized anxiety disorder    Tobacco use    Severe protein-calorie malnutrition  Hypokalemia  Chronic pancreatitis  Pseudocyst of pancreas  Peritoneal fluid collection  Acute on chronic anemia  Elevated troponin Rheumatoid arthritis involving multiple sites with positive rheumatoid factor    Nutrition Risk Screen  Nutrition Risk Screen: no indicators present     MST Score: 3  Have you recently lost weight without trying?: Yes: Unsure how much  Weight loss score: 2  Have you been eating poorly because of a decreased appetite?: Yes  Appetite score: 1       Nutrition/Diet History  Patient Reported Diet/Restrictions/Preferences: general  Typical Food/Fluid Intake: poor PO intake  Food Allergies: NKFA  Factors Affecting Nutritional Intake: decreased appetite    Anthropometrics  Temp: 97.8 °F (36.6 °C)  Height Method: Stated  Height: 5' 8" (172.7 cm)  Height (inches): 68 in  Weight Method: Standard Scale  Weight: 48.1 kg (106 lb 0.7 oz)  Weight (lb): 106.04 lb  Ideal Body Weight (IBW), Female: 140 lb  % Ideal Body Weight, Female (lb): 75.74 %  BMI (Calculated): 16.1  BMI Grade: 16 - 16.9 protein-energy malnutrition grade II  Weight Loss: unintentional  Usual Body Weight (UBW), k.8 kg  % Usual Body Weight: 89.59  % Weight Change From Usual Weight: -10.6 %       Weight History:  Wt Readings from Last 10 Encounters:   23 48.1 kg (106 lb 0.7 oz)   23 48.1 kg (106 lb 0.7 oz)   23 48.1 kg (106 lb)   23 49.9 kg (110 lb)   23 53.8 kg (118 lb 9.7 oz)   23 53.8 kg (118 lb 9.7 oz)   23 53.8 kg (118 lb 9.7 oz)   23 37.2 kg (82 lb)   23 43.4 kg (95 lb 11.2 oz)   22 42.6 kg (94 lb)       Lab/Procedures/Meds: Pertinent Labs Reviewed    Clinical Chemistry:  Recent Labs   Lab 23  1114 23  0842 23  0328   *   < > 133*   K 2.8*   < > 3.0*   CL 99   < > 101   CO2 26   < > 26   GLU 61* "   < > 90   BUN 18   < > 16   CREATININE 0.6   < > 0.7   CALCIUM 8.7   < > 8.3*   PROT 6.3  --   --    ALBUMIN 1.5*  --   --    BILITOT 1.6*  --   --    ALKPHOS 103  --   --    AST 48*  --   --    ALT 39  --   --    ANIONGAP 7*   < > 6*   MG 1.8   < > 1.9   LIPASE 31  --   --     < > = values in this interval not displayed.       CBC:   Recent Labs   Lab 02/26/23  0328   WBC 3.90   RBC 2.73*   HGB 8.1*   HCT 24.7*      MCV 92   MCH 29.7   MCHC 32.8         Cardiac Profile:  Recent Labs   Lab 02/24/23  1114   *         Medications: Pertinent Medications reviewed    Scheduled Meds:   folic acid  1 mg Oral Daily    Lactobacillus acidoph-L.bulgar  4 tablet Oral TID WM    lipase-protease-amylase 12,000-38,000-60,000 units  6 capsule Oral TID WM    megestroL  40 mg Oral Daily    mirtazapine  15 mg Oral QHS    pantoprazole  40 mg Oral Before breakfast    piperacillin-tazobactam (ZOSYN) IVPB  3.375 g Intravenous Q8H    predniSONE  5 mg Oral Daily    sertraline  50 mg Oral QHS    traZODone  50 mg Oral QHS       PRN Meds:.sodium chloride, acetaminophen, magnesium oxide, magnesium oxide, melatonin, naloxone, ondansetron, polyethylene glycol, potassium bicarbonate, potassium bicarbonate, potassium bicarbonate, potassium, sodium phosphates, potassium, sodium phosphates, potassium, sodium phosphates    Estimated/Assessed Needs  Weight Used For Calorie Calculations: 48.1 kg (106 lb 0.7 oz)  Energy Calorie Requirements (kcal): 1443 - 1684 (30 - 35)  Energy Need Method: Kcal/kg  Protein Requirements: 58 - 72 ( 1.2 - 1.5)  Weight Used For Protein Calculations: 48.1 kg (106 lb 0.7 oz)     Estimated Fluid Requirement Method: RDA Method  RDA Method (mL): 1443       Nutrition Prescription Ordered  Current Diet Order: Regular    Evaluation of Received Nutrient/Fluid Intake  Energy Calories Required: not meeting needs  Protein Required: not meeting needs  Fluid Required: meeting needs  Tolerance: tolerating     Intake/Output  Summary (Last 24 hours) at 2/26/2023 1459  Last data filed at 2/26/2023 1403  Gross per 24 hour   Intake 1616.67 ml   Output 1101 ml   Net 515.67 ml      % Intake of Estimated Energy Needs: 50 - 75 %  % Meal Intake: 50 - 75 %    Nutrition Risk  Level of Risk/Frequency of Follow-up: high     Monitor and Evaluation  Food and Nutrient Intake: energy intake, food and beverage intake  Food and Nutrient Adminstration: diet order  Physical Activity and Function: nutrition-related ADLs and IADLs, factors affecting access to physical activity  Anthropometric Measurements: weight, weight change, body mass index  Biochemical Data, Medical Tests and Procedures: electrolyte and renal panel, inflammatory profile, gastrointestinal profile, lipid profile, glucose/endocrine profile  Nutrition-Focused Physical Findings: overall appearance     Nutrition Follow-Up  RD Follow-up?: Yes    Alee Mcintyre RD 02/26/2023 2:59 PM

## 2023-02-25 NOTE — CONSULTS
Consult Note  Infectious Disease    Reason for Consult:  Intra-abdominal collection    HPI: Claire Bowser is a 63 y.o. female known to ID service from January, 2023 with PMHx emphysema, smoker, HTN, DVT on Eliquis, anxiety, chronic pancreatitis since biliary pancreatitis and cholecystectomy 07/2022, ,pancreatic sphincterotomy, bile leak-status post stent 12/30/22, multiple pancreatic pseudocysts, cyst gastrostomy tube placement, malnutrition, albumin 1.5, OA,  rheumatoid arthritis,    Patient came to ED on 2/24 with complaints of generalized weakness, fatigue, dyspnea on exertion, not able to stand, but not much abdominal discomfort??  CT chest ruled out PE.  CT abdomen was impressive for a big air-fluid collection in pelvis.  Patient is seen by General surgery who is recommending percutaneous drainage, no operative intervention at this time.    Patient is started on Zosyn and we are waiting for IR.    Patient has no new complaints overnight.  She feels slightly better, but still weak.  At baseline patient lays in bed most of the time.  She uses the bathroom 3 times a day.  She eats in bed, her  who is retired attends to her.  Urine culture is no growth to date.  UA with mild pyuria 20.  She had no urinary complaints until yesterday when she had some urgency and frequency.  No blood cultures are sent.  Troponin is a 1000 and EC HO is as below.  .  Hemoglobin of 6.9.  A unit of PRBC is already ordered.  No obvious source of bleeding  Antibiotics (From admission, onward)      Start     Stop Route Frequency Ordered    02/25/23 0330  piperacillin-tazobactam 3.375 g in dextrose 5 % 50 mL IVPB (ready to mix system)         -- IV Every 8 hours (non-standard times) 02/24/23 1719          Antifungals (From admission, onward)      None          Antivirals (From admission, onward)      None              Review of patient's allergies indicates:   Allergen Reactions    No known drug allergies      Past Medical History:    Diagnosis Date    Acute biliary pancreatitis 6/14/2022    Anxiety     Chronic pancreatitis 1/2/2023    Digestive disorder     Flu 02/2017    [facility]    Hypertension     Long-term use of immunosuppressant medication 6/10/2022    Rheumatoid arthritis involving multiple sites with positive rheumatoid factor 01/14/2021     Past Surgical History:   Procedure Laterality Date    COLONOSCOPY N/A 2/26/2021    Procedure: COLONOSCOPY;  Surgeon: Amy Sidhu MD;  Location: [facility] ENDO;  Service: Endoscopy;  Laterality: N/A;    ENDOSCOPIC ULTRASOUND OF UPPER GASTROINTESTINAL TRACT N/A 7/1/2022    Procedure: ULTRASOUND, UPPER GI TRACT, ENDOSCOPIC;  Surgeon: Donavon Jewell III, MD;  Location: [facility] ENDO;  Service: Endoscopy;  Laterality: N/A;    ENDOSCOPIC ULTRASOUND OF UPPER GASTROINTESTINAL TRACT N/A 11/29/2022    Procedure: ULTRASOUND, UPPER GI TRACT, ENDOSCOPIC;  Surgeon: Donavon Jewell III, MD;  Location: [facility] ENDO;  Service: Endoscopy;  Laterality: N/A;    ENDOSCOPIC ULTRASOUND OF UPPER GASTROINTESTINAL TRACT N/A 1/3/2023    Procedure: ULTRASOUND, UPPER GI TRACT, ENDOSCOPIC;  Surgeon: Donavon Jewell III, MD;  Location: [facility] ENDO;  Service: Endoscopy;  Laterality: N/A;    ERCP N/A 12/30/2022    Procedure: ERCP (ENDOSCOPIC RETROGRADE CHOLANGIOPANCREATOGRAPHY);  Surgeon: Donavon Jewell III, MD;  Location: [facility] ENDO;  Service: Endoscopy;  Laterality: N/A;    ERCP N/A 1/24/2023    Procedure: ERCP (ENDOSCOPIC RETROGRADE CHOLANGIOPANCREATOGRAPHY);  Surgeon: Rock Martines MD;  Location: [facility] ENDO ([address] [place] [address]);  Service: Endoscopy;  Laterality: N/A;    ESOPHAGOGASTRODUODENOSCOPY N/A 2/26/2021    Procedure: EGD (ESOPHAGOGASTRODUODENOSCOPY);  Surgeon: Amy Sidhu MD;  Location: [facility] ENDO;  Service: Endoscopy;  Laterality: N/A;    LAPAROSCOPIC CHOLECYSTECTOMY N/A 7/27/2022    Procedure: CHOLECYSTECTOMY, LAPAROSCOPIC;  Surgeon: Ramón Hwang III, MD;  Location: [facility];  Service: General;   Laterality: N/A;    TUBAL LIGATION       Social History     Socioeconomic History    Marital status:    Tobacco Use    Smoking status: Every Day     Packs/day: 1.00     Years: 7.00     Pack years: 7.00     Types: Cigarettes    Smokeless tobacco: Never    Tobacco comments:     [phone]   Substance and Sexual Activity    Alcohol use: No    Drug use: Never    Sexual activity: Yes     Partners: Male     Social Determinants of Health     Financial Resource Strain: High Risk    Difficulty of Paying Living Expenses: Hard   Food Insecurity: Food Insecurity Present    Worried About Running Out of Food in the Last Year: Sometimes true    Ran Out of Food in the Last Year: Never true   Transportation Needs: No Transportation Needs    Lack of Transportation (Medical): No    Lack of Transportation (Non-Medical): No   Physical Activity: Inactive    Days of Exercise per Week: 0 days    Minutes of Exercise per Session: 0 min   Stress: Stress Concern Present    Feeling of Stress : Rather much   Social Connections: Moderately Isolated    Frequency of Communication with Friends and Family: More than three times a week    Frequency of Social Gatherings with Friends and Family: Three times a week    Attends [employer] Services: Never    Active Member of Clubs or Organizations: No    Attends Club or Organization Meetings: Never    Marital Status:    Housing Stability: Low Risk     Unable to Pay for Housing in the Last Year: No    Number of Places Lived in the Last Year: 1    Unstable Housing in the Last Year: No     Family History   Problem Relation Age of Onset    Diabetes Mother     Heart disease Mother     Kidney disease Mother     Hypertension Mother     Stroke Mother     Hearing loss Mother     COPD Father     Liver disease Paternal Grandfather     Alcohol abuse Paternal Grandfather     Liver disease Sister     Emphysema Brother     COPD Brother     Sleep apnea Brother     No Known Problems Son     Alcohol abuse  Paternal Grandmother     Cirrhosis Paternal Grandmother     Heart disease Maternal Aunt     Diabetes Maternal Aunt     Cancer Maternal Aunt         female    Heart disease Maternal Uncle     Hypertension Maternal Uncle     No Known Problems Paternal Uncle     Psoriasis Neg Hx     Lupus Neg Hx     Eczema Neg Hx     Melanoma Neg Hx          Review of Systems:   No chills, fever, sweats, weight loss  No change in vision, loss of vision or diplopia  No sinus congestion, purulent nasal discharge, post nasal drip or facial pain  No pain in mouth or throat. No problems with teeth, gums.  No chest pain, palpitations, syncope  No cough, sputum production, shortness of breath, dyspnea on exertion, pleurisy, hemoptysis  No nausea, vomiting, diarrhea, constipation, blood in stool, or focal abd pain,  No dysphagia, odynophagia  Only 1 day of dysuria, urgency and frequency.    No vaginal/penile discharge, genital ulcers, risk for STD  No swelling of joints, redness of joints, injuries, or new focal pain  No unusual headaches, dizziness, vertigo, numbness, paresthesias, neuropathy, falls  No anxiety, depression, substance abuse, sleep disturbance  No diabetes, thyroid, hypogonadal conditions  No bleeding, lymphadenopathy, anemia, malignancy, unusual bruising  No new rashes, lesions, or wounds  No TB exposure  No recent/prior steroids  Outdoor activities:  None  Travel:  No  Implants:   Antibiotic History:   Now on Zosyn   01/26-01/31/2023 ciprofloxacin  01/23/2023--01/26/2023 ceftriaxone   01/24/2023 metronidazole   01/15/2023--01/21/2023 cefepime   01/15/2023--01/18/2023 vancomycin   01/06/2023--01/16/2023 Ceftin  Prior to it she she received doses of ertapenem, Zosyn, vancomycin, Bactrim.      EXAM & DIAGNOSTICS REVIEWED:   Vitals:     Temp:  [97.2 °F (36.2 °C)-98.1 °F (36.7 °C)]   Temp: 97.2 °F (36.2 °C) (02/25/23 1048)  Pulse: 80 (02/25/23 1048)  Resp: 18 (02/25/23 1048)  BP: (!) 89/68 (02/25/23 1048)  SpO2: 97 % (02/25/23  1048)    Intake/Output Summary (Last 24 hours) at 2/25/2023 1302  Last data filed at 2/25/2023 0945  Gross per 24 hour   Intake 1015 ml   Output --   Net 1015 ml       General:  In NAD. Alert and attentive, cooperative, comfortable.  Older looking than her age.  Thin.  Eyes:  Anicteric, PERRL, EOMI  ENT:  No ulcers, exudates, thrush, nares patent, dentition is fair  Neck:  supple, no masses or adenopathy appreciated  Lungs: Clear, no consolidation, rales, wheezes, rub  Heart:  RRR, no gallop/murmur/rub noted  Abd:  Soft, NT, ND, normal BS, no masses or organomegaly appreciated.  :  Voids urine clear, no flank tenderness  Musc:  Joints without effusion, swelling, erythema, synovitis, muscle wasting.   Skin:  easy bruising.  Thin skin.  No palmar or plantar lesions. No subungual petechiae  Neuro:             Alert, attentive, speech fluent, face symmetric, moves all extremities, no focal weakness. Min Ambulatory  Psych:  Calm, cooperative.  Pleasant.  Lymphatic:     No cervical, supraclavicular, axillary, or inguinal nodes  Extrem: No edema, erythema, phlebitis, cellulitis, warm and well perfused  VAD:       Isolation:    Wound:       Lines/Tubes/Drains:    General Labs reviewed:  Recent Labs   Lab 02/24/23  1114   WBC 4.07   HGB 7.6*   HCT 24.3*          Recent Labs   Lab 02/24/23  1114 02/25/23  0842   * 132*   K 2.8* 3.9   CL 99 102   CO2 26 24   BUN 18 17   CREATININE 0.6 0.7   CALCIUM 8.7 8.2*   PROT 6.3  --    BILITOT 1.6*  --    ALKPHOS 103  --    ALT 39  --    AST 48*  --      No results for input(s): CRP in the last 168 hours.      Micro:  Microbiology Results (last 7 days)       Procedure Component Value Units Date/Time    Urine culture [[id number]] Collected: 02/24/23 1528    Order Status: Completed Specimen: Urine Updated: 02/25/23 0727     Urine Culture, Routine No growth to date    Narrative:      Specimen Source->Urine            Imaging Reviewed:   CXR   CT  Essentially complete  resolution of free fluid within the peritoneal cavity since the preceding CT scan dated 1/15/2023. There has been interval development of a loculated appearing collection of fluid and air in the pelvis extending up both paracolic gutters that is likely contained within a portion of the peritoneal cavity. This may represent a large abscess given the presence of the air. It may communicate with bowel, although no direct indication is evident. There is a large right and moderately large left pleural effusion that of both increased in size significantly as well.  Cardiology:  02/24/2023.  The left ventricle is normal in size with normal systolic function.  The estimated ejection fraction is 60%.  Normal left ventricular diastolic function.  Normal right ventricular size with normal right ventricular systolic function.  Trivial circumferential pericardial effusion.  There are bilateral pleural effusions. Large    EC HO [id number]   Concentric remodeling and normal systolic function.  The estimated ejection fraction is 60%.  Normal left ventricular diastolic function.  Normal right ventricular size with normal right ventricular systolic function.  Mild mitral regurgitation.  Mild to moderate tricuspid regurgitation.  There is mild pulmonary hypertension.  Guarded interpretation suboptimal secondary to patient factors      Procedures.    01/24/2023 ERCP - Prior biliary sphincterotomy appeared open.                          - Prior pancreatic sphincterotomy appeared open.                          - One stent from the pancreatic duct was seen in    the major papilla.                          - One stent was removed from the pancreatic duct.                          - A pancreatic duct leak was found.                          - One plastic stent was placed into the ventral  pancreatic duct.   Recommendation:   Repeat ERCP in 6 weeks to check healing.   01/03/2023 upper EUS  The pancreatic duct measured up to 2 mm in diameter.  Stent seen in PD in HOP. Therapeutic needle aspiration for fluid was performed.  The amount of fluid collected was 150 mL. The fluid was opaque and brown.   12/30/2022 ERCP  - A mild ectactic, slight narrowed region just above ampulla was found.   A pancreatic sphincterotomy was performed. Pancreatic sludge / debris seen folowing   sphincterotomy.  One pancreatic stent was placed into the ventral pancreatic duct. The entire main bile duct was mildly dilated. The patient has had a cholecystectomy.  A biliary sphincterotomy was performed.  An area successfully injected w/ epi.   11/29/2022 upper EUS.    07/01/2022 upper EUS.    02/26/2021 upper GI.    02/26/2021 colonoscopy.        IMPRESSION & PLAN   Reason for ED visit =generalized weakness which is multifactorial.  Markedly elevated troponin.  EC HO within normal limits.  As per hospitalist.  Bilateral large effusions in setting of recent pancreatitis, low albumin.   Anemia.  Status post PRBC x1    Large pelvic air fluid collection.  Cyst, versus abscess, versus communicating with bowel?  Pyuria, possible mild UTI    Immunosuppressed due to treatment for RA  Malnutrition, albumin 1.5.  Decreased appetite  History of chronic pancreatitis, pancreatic pseudocyst   History of rheumatoid arthritis on leflunomide and prednisone 5 mg,.    History of DVT on anticoagulation.      Recommendations:    Continue Zosyn until we sort out this pelvic collection   IR consultation  has already been placed.  CRP   Blood cultures  Need to improve albumin/nutrition .  Protein shakes.+-TPN?      Medical Decision Making during this encounter was  [_] Low Complexity  [_] Moderate Complexity  [ xx ] High Complexity

## 2023-02-25 NOTE — PLAN OF CARE
[facility]  Initial Discharge Assessment       Primary Care Provider: Samuel Brady MD    Admission Diagnosis: Elevated troponin [R77.8]    Admission Date: 2/24/2023  Expected Discharge Date:     Discharge Barriers Identified: None    Payor: MEDICAID / Plan: MEDICA[place] / Product Type: Government /     Extended Emergency Contact Information  Primary Emergency Contact: Robin Bowser  Address: [address] [place] [address]           [place] [address] [facility]  Home Phone: [phone]  Mobile Phone: [phone]  Relation: Spouse  Preferred language: English   needed? No    Discharge Plan A: Home with family  Discharge Plan B: Home with family      [facility]  [place] - [address]  [id number]  [place] [address]  Phone: [phone] Fax: [phone]      Initial Assessment (most recent)       Adult Discharge Assessment - 02/25/23 0932          Discharge Assessment    Assessment Type Discharge Planning Assessment     Confirmed/corrected address, phone number and insurance Yes     Confirmed Demographics Correct on Facesheet     Source of Information patient;family;health record     Reason For Admission Peritoneal fluid collection     People in Home spouse     Facility Arrived From: Home     Do you expect to return to your current living situation? Yes     Do you have help at home or someone to help you manage your care at home? Yes     Who are your caregiver(s) and their phone number(s)? Robin Bowser (Spouse)   [phone] (Mobile)     Prior to hospitilization cognitive status: Alert/Oriented     Current cognitive status: Alert/Oriented     Walking or Climbing Stairs ambulation difficulty, assistance 1 person;ambulation difficulty, requires equipment     Mobility Management Rollator and spouse assist     Dressing/Bathing bathing difficulty, requires equipment;bathing difficulty, assistance 1 person     Dressing/Bathing Management showe chair and spouse assist      Home Accessibility wheelchair accessible     Equipment Currently Used at Home shower chair;walker, standard;rollator;bedside commode     Readmission within 30 days? Yes     Patient currently being followed by outpatient case management? No     Do you currently have service(s) that help you manage your care at home? No     Do you take prescription medications? Yes     Do you have prescription coverage? Yes     Coverage Medicaid     Do you have any problems affording any of your prescribed medications? TBD     Is the patient taking medications as prescribed? yes     Who is going to help you get home at discharge? Robin Bowser (Spouse)   [phone] (Mobile)     How do you get to doctors appointments? family or friend will provide     Are you on dialysis? No     Do you take coumadin? No     Discharge Plan A Home with family     Discharge Plan B Home with family     DME Needed Upon Discharge  none     Discharge Plan discussed with: Spouse/sig other;Patient     Name(s) and Number(s) Robin Bowser (Spouse)   [phone] (Mobile)     Discharge Barriers Identified None

## 2023-02-25 NOTE — CONSULTS
[facility] - Emergency Dept  General Surgery  Consult Note    Inpatient consult to General Surgery  Consult performed by: Paul Sheehan MD  Consult ordered by: Marbin Ty MD      Subjective:     Chief Complaint/Reason for Admission:  Fatigue    History of Present Illness:  This is a 63-year-old female who has been dealing with chronic pancreatitis with multiple pseudocysts formation.  She is undergone cyst gastrostomy tube placement by history.  She is been in and out of the hospital multiple times for malnutrition in ongoing complications related to her chronic pancreatitis.  She was brought into the ED today for weakness and fatigue and being unable to stand.  Imaging in the ER showed a large pelvic fluid collection with air concerning for abscess.    No current facility-administered medications on file prior to encounter.     Current Outpatient Medications on File Prior to Encounter   Medication Sig    acidophilus-sporogenes (ACIDOPHILUS EX STR, L. SPOROG,) 35 million- 25 million cell Tab Take 1 tablet by mouth once daily.    apixaban (ELIQUIS) 5 mg Tab Take 1 tablet (5 mg total) by mouth 2 (two) times daily.    cyclobenzaprine (FLEXERIL) 5 MG tablet Take 5 mg by mouth nightly.    folic acid (FOLVITE) 1 MG tablet Take 1 tablet (1 mg total) by mouth once daily.    lipase-protease-amylase 12,000-38,000-60,000 units (CREON) CpDR Take 6 capsules by mouth 3 (three) times daily with meals.    loperamide (IMODIUM) 2 mg capsule Take 1 capsule (2 mg total) by mouth 4 (four) times daily as needed for Diarrhea.    megestroL (MEGACE) 40 MG Tab Take 1 tablet (40 mg total) by mouth once daily.    metoprolol succinate (TOPROL-XL) 50 MG 24 hr tablet Take 50 mg by mouth once daily.    mirtazapine (REMERON) 15 MG tablet Take 1 tablet (15 mg total) by mouth every evening.    pantoprazole (PROTONIX) 40 MG tablet Take 1 tablet (40 mg total) by mouth once daily.    sertraline (ZOLOFT) 50 MG tablet Take 1 tablet  (50 mg total) by mouth every evening.    traZODone (DESYREL) 50 MG tablet Take 1 tablet (50 mg total) by mouth every evening.       Review of patient's allergies indicates:   Allergen Reactions    No known drug allergies        Past Medical History:   Diagnosis Date    Acute biliary pancreatitis 6/14/2022    Anxiety     Chronic pancreatitis 1/2/2023    Digestive disorder     Flu 02/2017    [facility]    Hypertension     Long-term use of immunosuppressant medication 6/10/2022    Rheumatoid arthritis involving multiple sites with positive rheumatoid factor 01/14/2021     Past Surgical History:   Procedure Laterality Date    COLONOSCOPY N/A 2/26/2021    Procedure: COLONOSCOPY;  Surgeon: Amy Sidhu MD;  Location: [facility];  Service: Endoscopy;  Laterality: N/A;    ENDOSCOPIC ULTRASOUND OF UPPER GASTROINTESTINAL TRACT N/A 7/1/2022    Procedure: ULTRASOUND, UPPER GI TRACT, ENDOSCOPIC;  Surgeon: Donavon Jewell III, MD;  Location: [facility];  Service: Endoscopy;  Laterality: N/A;    ENDOSCOPIC ULTRASOUND OF UPPER GASTROINTESTINAL TRACT N/A 11/29/2022    Procedure: ULTRASOUND, UPPER GI TRACT, ENDOSCOPIC;  Surgeon: Donavon Jewell III, MD;  Location: [facility];  Service: Endoscopy;  Laterality: N/A;    ENDOSCOPIC ULTRASOUND OF UPPER GASTROINTESTINAL TRACT N/A 1/3/2023    Procedure: ULTRASOUND, UPPER GI TRACT, ENDOSCOPIC;  Surgeon: Donavon Jewell III, MD;  Location: [facility];  Service: Endoscopy;  Laterality: N/A;    ERCP N/A 12/30/2022    Procedure: ERCP (ENDOSCOPIC RETROGRADE CHOLANGIOPANCREATOGRAPHY);  Surgeon: Donavon Jewell III, MD;  Location: [facility];  Service: Endoscopy;  Laterality: N/A;    ERCP N/A 1/24/2023    Procedure: ERCP (ENDOSCOPIC RETROGRADE CHOLANGIOPANCREATOGRAPHY);  Surgeon: Rock Martines MD;  [facility] ([address] [place] [address]);  Service: Endoscopy;  Laterality: N/A;    ESOPHAGOGASTRODUODENOSCOPY N/A 2/26/2021    Procedure: EGD (ESOPHAGOGASTRODUODENOSCOPY);  Surgeon:  Amy Sidhu MD;  Location: [facility] ENDO;  Service: Endoscopy;  Laterality: N/A;    LAPAROSCOPIC CHOLECYSTECTOMY N/A 7/27/2022    Procedure: CHOLECYSTECTOMY, LAPAROSCOPIC;  Surgeon: Ramón Hwang III, MD;  Location: [facility] OR;  Service: General;  Laterality: N/A;    TUBAL LIGATION       Family History       Problem Relation (Age of Onset)    Alcohol abuse Paternal Grandfather, Paternal Grandmother    COPD Father, Brother    Cancer Maternal Aunt    Cirrhosis Paternal Grandmother    Diabetes Mother, Maternal Aunt    Emphysema Brother    Hearing loss Mother    Heart disease Mother, Maternal Aunt, Maternal Uncle    Hypertension Mother, Maternal Uncle    Kidney disease Mother    Liver disease Paternal Grandfather, Sister    No Known Problems Son, Paternal Uncle    Sleep apnea Brother    Stroke Mother          Tobacco Use    Smoking status: Every Day     Packs/day: 1.00     Years: 7.00     Pack years: 7.00     Types: Cigarettes    Smokeless tobacco: Never    Tobacco comments:     [phone]   Substance and Sexual Activity    Alcohol use: No    Drug use: Never    Sexual activity: Yes     Partners: Male     Review of Systems   Constitutional:  Positive for activity change, appetite change, fatigue and unexpected weight change. Negative for fever.   HENT: Negative.     Eyes: Negative.    Respiratory: Negative.     Cardiovascular: Negative.    Gastrointestinal: Negative.    Endocrine: Negative.    Genitourinary: Negative.    Musculoskeletal: Negative.    Skin: Negative.    Allergic/Immunologic: Negative.    Neurological:  Positive for weakness.   Hematological: Negative.    Psychiatric/Behavioral: Negative.     Objective:     Vital Signs (Most Recent):  Temp: 97.5 °F (36.4 °C) (02/24/23 1930)  Pulse: 83 (02/24/23 1930)  Resp: 19 (02/24/23 1930)  BP: 100/64 (02/24/23 1930)  SpO2: 98 % (02/24/23 1930) Vital Signs (24h Range):  Temp:  [97.5 °F (36.4 °C)-97.6 °F (36.4 °C)] 97.5 °F (36.4 °C)  Pulse:  [78-93] 83  Resp:   [15-26] 19  SpO2:  [86 %-100 %] 98 %  BP: ()/(55-67) 100/64     Weight: 48.1 kg (106 lb)  Body mass index is 16.12 kg/m².      Intake/Output Summary (Last 24 hours) at 2/24/2023 2009  Last data filed at 2/24/2023 1924  Gross per 24 hour   Intake 350 ml   Output --   Net 350 ml       Physical Exam  Constitutional:       General: She is not in acute distress.     Appearance: Normal appearance. She is ill-appearing. She is not toxic-appearing or diaphoretic.   HENT:      Head: Normocephalic.      Nose: Nose normal.   Eyes:      Conjunctiva/sclera: Conjunctivae normal.   Cardiovascular:      Rate and Rhythm: Normal rate and regular rhythm.   Pulmonary:      Effort: Pulmonary effort is normal.   Abdominal:      Palpations: Abdomen is soft.   Musculoskeletal:         General: Normal range of motion.      Cervical back: Normal range of motion.   Skin:     General: Skin is warm.   Neurological:      General: No focal deficit present.      Mental Status: She is alert.   Psychiatric:         Mood and Affect: Mood normal.       Significant Labs:  CBC:   Recent Labs   Lab 02/24/23  1114   WBC 4.07   RBC 2.53*   HGB 7.6*   HCT 24.3*      MCV 96   MCH 30.0   MCHC 31.3*     CMP:   Recent Labs   Lab 02/24/23  1114   GLU 61*   CALCIUM 8.7   ALBUMIN 1.5*   PROT 6.3   *   K 2.8*   CO2 26   CL 99   BUN 18   CREATININE 0.6   ALKPHOS 103   ALT 39   AST 48*   BILITOT 1.6*     Coagulation: No results for input(s): PT, INR, APTT in the last 48 hours.  Lactic Acid:   Recent Labs   Lab 02/24/23  1114   LACTATE 1.7       Significant Diagnostics:  CT scan was reviewed and compared to her most recent CT of the abdomen and pelvis.  The fluid collections in her stomach have resolved.  She had significant amounts of ascites on her last CT scan that seems to be organized and a walled off on her most recent scan.  There also appears to be air within this collection in the pelvis.    Assessment/Plan:     Active Diagnoses:     Diagnosis Date Noted POA    PRINCIPAL PROBLEM:  Peritoneal fluid collection [R18.8] 01/16/2023 Yes    Acute on chronic anemia [D64.9] 02/24/2023 Yes    Elevated troponin [R77.8] 02/24/2023 Yes    Chronic pancreatitis [K86.1] 01/02/2023 Yes    Severe protein-calorie malnutrition [E43] 06/05/2022 Yes     Chronic    Hypokalemia [E87.6] 05/25/2022 Yes    Tobacco use [Z72.0] 08/10/2018 Yes     Chronic    Generalized anxiety disorder [F41.1] 08/10/2018 Yes     Chronic      Problems Resolved During this Admission:     63-year-old female with chronic pancreatitis with pseudocyst formation who was undergone prior drainage procedures.  It looks like she has ascites from an old CT scan that is now organized and is walled off and likely represents a pseudocyst plus or minus an infected pseudocyst.      Patient is not septic  She does not have a leukocytosis  She is not complaining of significant abdominal pain    Would recommend proceeding with percutaneous drainage and sampling of the fluid which has already been ordered.  Would not recommend operative intervention currently  Will follow peripherally and follow-up percutaneous drainage      Thank you for your consult. I will follow-up with patient. Please contact us if you have any additional questions.    Paul Sheehan MD  General Surgery  [facility] - Emergency Dept

## 2023-02-25 NOTE — CONSULTS
GASTROENTEROLOGY INPATIENT CONSULT NOTE  Patient Name: Claire Bowser  Patient MRN: [id number]  Patient : 1960    Admit Date: 2023  Service date: 2023    Reason for Consult: abnormal ct scan    PCP: Samuel Brady MD    Chief Complaint   Patient presents with    Fatigue     Pancreatic stent placed a little over 2 weeks ago - patient has progressively felt more fatigued since        HPI: Patient is a 63 y.o. female with  PMHx BTL, COVID , ongoing tobacco use, cholecystectomy / dual sphincterotomy, pancreatitis w/ leak fluid collection s/p stenting, DVT (Eliquis) that presents for evaluation of weakness. Acute onset, intermittent, progressive recurrent. CT imaging showing loculated fluid collections in the abdomen.  Normal wbc. Afebrile. No abdominal pain.      CTA Chest  IMPRESSION:  1. Negative for pulmonary thromboembolism.  2. Large simple appearing bilateral pleural effusions, with bilateral lower lobe compressive atelectasis.  3. Additional observations as described.    CT Abd  IMPRESSION:   Essentially complete resolution of free fluid within the peritoneal cavity since the preceding CT scan dated 1/15/2023. There has been interval development of a loculated appearing collection of fluid and air in the pelvis extending up both paracolic gutters that is likely contained within a portion of the peritoneal cavity. This may represent a large abscess given the presence of the air. It may communicate with bowel, although no direct indication is evident. There is a large right and moderately large left pleural effusion that of both increased in size significantly as well. 19.6 x 13.3 x 9.6 cm in diameter         CHART REVIEW:  Stool Cx  - negative  ERCP  - 7Fr x 9cm PD stent placed further into TOP; both sphincterotomies patent  EUS  - 150cc drained from TOP cyst  CT ABd  - emphysema; multiple pancreatic pseudocysts now w/ air (ingris from ERCP); PD stent in ventral duct;   moderate ascites; vascular dz  ERCP 12/'22 - No obvious leak; HOP cyst filled w/ injection; dual sphincterotomy; PD stent placed (not placed out to tail due to narrow PD)  CA 19-9 12/'22 - 71  EUS 11/'22 - chronic panc; panc cysts in HOP / BOP; Mass like region in Neck s/p FNA; 6mm CBD s/p martin  -Bx - pancreatic fibrosis  Labs 11/'22 - CA 19-9 - 113  Labs 10/'22 - CA 19-9 44.6  MRI 10/'22 - nml biliary s/p martin; 7mm HOP cyst; no mass; fluid by tail; tics  EGD / EUS 7/'22 - ill defined 2.5 cm region in neck s/p FNA  -HP neg gastritis; Neg panc bx w/ low suspicion for cancer. surgery for lap martin and MRI pancreas 2 months  MRCP 6/'22 - 1.5 cm mass vs cyst in neck of panc; nml CBD / PD  EGD '21 - mild gastrtis  Colon '21 - small polyps. . .        Past Medical History:  Past Medical History:   Diagnosis Date    Acute biliary pancreatitis 6/14/2022    Anxiety     Chronic pancreatitis 1/2/2023    Digestive disorder     Flu 02/2017    [facility]    Hypertension     Long-term use of immunosuppressant medication 6/10/2022    Rheumatoid arthritis involving multiple sites with positive rheumatoid factor 01/14/2021        Past Surgical History:  Past Surgical History:   Procedure Laterality Date    COLONOSCOPY N/A 2/26/2021    Procedure: COLONOSCOPY;  Surgeon: Amy Sidhu MD;  Location: [facility] ENDO;  Service: Endoscopy;  Laterality: N/A;    ENDOSCOPIC ULTRASOUND OF UPPER GASTROINTESTINAL TRACT N/A 7/1/2022    Procedure: ULTRASOUND, UPPER GI TRACT, ENDOSCOPIC;  Surgeon: Donavon Jewell III, MD;  Location: [facility] ENDO;  Service: Endoscopy;  Laterality: N/A;    ENDOSCOPIC ULTRASOUND OF UPPER GASTROINTESTINAL TRACT N/A 11/29/2022    Procedure: ULTRASOUND, UPPER GI TRACT, ENDOSCOPIC;  Surgeon: Donavon Jewell III, MD;  Location: [facility] ENDO;  Service: Endoscopy;  Laterality: N/A;    ENDOSCOPIC ULTRASOUND OF UPPER GASTROINTESTINAL TRACT N/A 1/3/2023    Procedure: ULTRASOUND, UPPER GI TRACT, ENDOSCOPIC;  Surgeon:  Donavon Jewell III, MD;  Location: [facility] ENDO;  Service: Endoscopy;  Laterality: N/A;    ERCP N/A 12/30/2022    Procedure: ERCP (ENDOSCOPIC RETROGRADE CHOLANGIOPANCREATOGRAPHY);  Surgeon: Donavon Jewell III, MD;  Location: [facility] ENDO;  Service: Endoscopy;  Laterality: N/A;    ERCP N/A 1/24/2023    Procedure: ERCP (ENDOSCOPIC RETROGRADE CHOLANGIOPANCREATOGRAPHY);  Surgeon: Rock Martines MD;  Location: [facility] ENDO ([facility]);  Service: Endoscopy;  Laterality: N/A;    ESOPHAGOGASTRODUODENOSCOPY N/A 2/26/2021    Procedure: EGD (ESOPHAGOGASTRODUODENOSCOPY);  Surgeon: Amy Sidhu MD;  Location: [facility] ENDO;  Service: Endoscopy;  Laterality: N/A;    LAPAROSCOPIC CHOLECYSTECTOMY N/A 7/27/2022    Procedure: CHOLECYSTECTOMY, LAPAROSCOPIC;  Surgeon: Ramón Hwang III, MD;  Location: [facility] OR;  Service: General;  Laterality: N/A;    TUBAL LIGATION          Home Medications:  Medications Prior to Admission   Medication Sig Dispense Refill Last Dose    acidophilus-sporogenes (ACIDOPHILUS EX STR, L. SPOROG,) 35 million- 25 million cell Tab Take 1 tablet by mouth once daily. 30 tablet 0 2/24/2023 at 08:00    apixaban (ELIQUIS) 5 mg Tab Take 1 tablet (5 mg total) by mouth 2 (two) times daily. 60 tablet 0 2/24/2023 at 08:00    cyclobenzaprine (FLEXERIL) 5 MG tablet Take 5 mg by mouth nightly.   2/23/2023 at 20:00    folic acid (FOLVITE) 1 MG tablet Take 1 tablet (1 mg total) by mouth once daily. 360 tablet 3 2/24/2023 at 08:00    lipase-protease-amylase 12,000-38,000-60,000 units (CREON) CpDR Take 6 capsules by mouth 3 (three) times daily with meals. 540 capsule 3 2/24/2023 at 08:00    loperamide (IMODIUM) 2 mg capsule Take 1 capsule (2 mg total) by mouth 4 (four) times daily as needed for Diarrhea. 30 capsule 0 2/23/2023 at 09:00    megestroL (MEGACE) 40 MG Tab Take 1 tablet (40 mg total) by mouth once daily. 30 tablet 0 2/24/2023 at 08:00    metoprolol succinate (TOPROL-XL) 50 MG 24 hr tablet Take 50 mg by mouth once  daily.   2/23/2023 at 20:00    mirtazapine (REMERON) 15 MG tablet Take 1 tablet (15 mg total) by mouth every evening. 90 tablet 0 2/23/2023 at 20:00    pantoprazole (PROTONIX) 40 MG tablet Take 1 tablet (40 mg total) by mouth once daily. 90 tablet 0 2/24/2023 at 08:00    sertraline (ZOLOFT) 50 MG tablet Take 1 tablet (50 mg total) by mouth every evening. 30 tablet 11 2/23/2023 at 20:00    traZODone (DESYREL) 50 MG tablet Take 1 tablet (50 mg total) by mouth every evening. 90 tablet 1 2/23/2023 at 20:00       Inpatient Medications:   folic acid  1 mg Oral Daily    Lactobacillus acidoph-L.bulgar  4 tablet Oral TID WM    lipase-protease-amylase 12,000-38,000-60,000 units  6 capsule Oral TID WM    megestroL  40 mg Oral Daily    mirtazapine  15 mg Oral QHS    pantoprazole  40 mg Oral Before breakfast    piperacillin-tazobactam (ZOSYN) IVPB  3.375 g Intravenous Q8H    predniSONE  5 mg Oral Daily    sertraline  50 mg Oral QHS    traZODone  50 mg Oral QHS     acetaminophen, magnesium oxide, magnesium oxide, melatonin, naloxone, ondansetron, polyethylene glycol, potassium bicarbonate, potassium bicarbonate, potassium bicarbonate, potassium, sodium phosphates, potassium, sodium phosphates, potassium, sodium phosphates    Review of patient's allergies indicates:   Allergen Reactions    No known drug allergies        Social History:   Social History     Occupational History    Not on file   Tobacco Use    Smoking status: Every Day     Packs/day: 1.00     Years: 7.00     Pack years: 7.00     Types: Cigarettes    Smokeless tobacco: Never    Tobacco comments:     [phone]   Substance and Sexual Activity    Alcohol use: No    Drug use: Never    Sexual activity: Yes     Partners: Male       Family History:   Family History   Problem Relation Age of Onset    Diabetes Mother     Heart disease Mother     Kidney disease Mother     Hypertension Mother     Stroke Mother     Hearing loss Mother     COPD Father     Liver disease  "Paternal Grandfather     Alcohol abuse Paternal Grandfather     Liver disease Sister     Emphysema Brother     COPD Brother     Sleep apnea Brother     No Known Problems Son     Alcohol abuse Paternal Grandmother     Cirrhosis Paternal Grandmother     Heart disease Maternal Aunt     Diabetes Maternal Aunt     Cancer Maternal Aunt         female    Heart disease Maternal Uncle     Hypertension Maternal Uncle     No Known Problems Paternal Uncle     Psoriasis Neg Hx     Lupus Neg Hx     Eczema Neg Hx     Melanoma Neg Hx        Review of Systems:  A 10 point review of systems was performed and was normal, except as mentioned in the HPI, including constitutional, HEENT, heme, lymph, cardiovascular, respiratory, gastrointestinal, genitourinary, neurologic, endocrine, psychiatric and musculoskeletal.      OBJECTIVE:    Physical Exam:  24 Hour Vital Sign Ranges: Temp:  [97.3 °F (36.3 °C)-98.1 °F (36.7 °C)] 97.3 °F (36.3 °C)  Pulse:  [77-93] 84  Resp:  [15-26] 18  SpO2:  [86 %-100 %] 96 %  BP: ()/(54-79) 117/79  Most recent vitals: /79   Pulse 84   Temp 97.3 °F (36.3 °C) (Oral)   Resp 18   Ht 5' 8" (1.727 m)   Wt 48.1 kg (106 lb 0.7 oz)   SpO2 96%   BMI 16.12 kg/m²    GEN: well-developed, well-nourished, awake and alert, non-toxic appearing adult  HEENT: PERRL, sclera anicteric, oral mucosa pink and moist without lesion  NECK: trachea midline; Good ROM  CV: regular rate and rhythm, no murmurs or gallops  RESP: clear to auscultation bilaterally, no wheezes, rhonci or rales  ABD: soft, non-tender, non-distended, normal bowel sounds  EXT: no swelling or edema, 2+ pulses distally  SKIN: no rashes or jaundice  PSYCH: normal affect    Labs:   Recent Labs     02/24/23  1114   WBC 4.07   MCV 96        Recent Labs     02/24/23  1114   *   K 2.8*   CL 99   CO2 26   BUN 18   GLU 61*     No results for input(s): ALB in the last 72 hours.    Invalid input(s): ALKP, SGOT, SGPT, TBIL, DBIL, TPRO  No results " for input(s): PT, INR, PTT in the last 72 hours.      Radiology Review:  CTA Chest Non-Coronary (PE Studies)   Final Result      CT Abdomen Pelvis With Contrast   Final Result      IR Abscess Drainage Peritoneal    (Results Pending)         IMPRESSION / RECOMMENDATIONS:  Loculated fluid collections  -possible infected pancreatic cysts due to panc duct leak  -will need sampling of fluid with drainage when eliquis out of system.    -recommend cell count diff, culture and amylase level on the fluid    Thank you for this consult.    Morgan Brown  2/25/2023  8:34 AM

## 2023-02-26 LAB
ANION GAP SERPL CALC-SCNC: 6 MMOL/L (ref 8–16)
BUN SERPL-MCNC: 16 MG/DL (ref 8–23)
CALCIUM SERPL-MCNC: 8.3 MG/DL (ref 8.7–10.5)
CHLORIDE SERPL-SCNC: 101 MMOL/L (ref 95–110)
CO2 SERPL-SCNC: 26 MMOL/L (ref 23–29)
CREAT SERPL-MCNC: 0.7 MG/DL (ref 0.5–1.4)
CRP SERPL-MCNC: 7.8 MG/DL
ERYTHROCYTE [DISTWIDTH] IN BLOOD BY AUTOMATED COUNT: 19.4 % (ref 11.5–14.5)
EST. GFR  (NO RACE VARIABLE): >60 ML/MIN/1.73 M^2
GLUCOSE SERPL-MCNC: 90 MG/DL (ref 70–110)
HCT VFR BLD AUTO: 24.7 % (ref 37–48.5)
HGB BLD-MCNC: 8.1 G/DL (ref 12–16)
MAGNESIUM SERPL-MCNC: 1.9 MG/DL (ref 1.6–2.6)
MCH RBC QN AUTO: 29.7 PG (ref 27–31)
MCHC RBC AUTO-ENTMCNC: 32.8 G/DL (ref 32–36)
MCV RBC AUTO: 92 FL (ref 82–98)
PLATELET # BLD AUTO: 196 K/UL (ref 150–450)
PMV BLD AUTO: 9.3 FL (ref 9.2–12.9)
POTASSIUM SERPL-SCNC: 3 MMOL/L (ref 3.5–5.1)
RBC # BLD AUTO: 2.73 M/UL (ref 4–5.4)
SODIUM SERPL-SCNC: 133 MMOL/L (ref 136–145)
WBC # BLD AUTO: 3.9 K/UL (ref 3.9–12.7)

## 2023-02-26 PROCEDURE — 63600175 PHARM REV CODE 636 W HCPCS: Performed by: INTERNAL MEDICINE

## 2023-02-26 PROCEDURE — 86140 C-REACTIVE PROTEIN: CPT | Performed by: INTERNAL MEDICINE

## 2023-02-26 PROCEDURE — 96366 THER/PROPH/DIAG IV INF ADDON: CPT

## 2023-02-26 PROCEDURE — 85027 COMPLETE CBC AUTOMATED: CPT | Performed by: INTERNAL MEDICINE

## 2023-02-26 PROCEDURE — 99222 1ST HOSP IP/OBS MODERATE 55: CPT | Mod: ,,, | Performed by: INTERNAL MEDICINE

## 2023-02-26 PROCEDURE — 87040 BLOOD CULTURE FOR BACTERIA: CPT | Performed by: INTERNAL MEDICINE

## 2023-02-26 PROCEDURE — 83735 ASSAY OF MAGNESIUM: CPT | Performed by: INTERNAL MEDICINE

## 2023-02-26 PROCEDURE — 12000002 HC ACUTE/MED SURGE SEMI-PRIVATE ROOM

## 2023-02-26 PROCEDURE — 25000003 PHARM REV CODE 250: Performed by: INTERNAL MEDICINE

## 2023-02-26 PROCEDURE — 99222 PR INITIAL HOSPITAL CARE,LEVL II: ICD-10-PCS | Mod: ,,, | Performed by: INTERNAL MEDICINE

## 2023-02-26 PROCEDURE — 80048 BASIC METABOLIC PNL TOTAL CA: CPT | Performed by: INTERNAL MEDICINE

## 2023-02-26 RX ORDER — POTASSIUM CHLORIDE 20 MEQ/1
40 TABLET, EXTENDED RELEASE ORAL EVERY 4 HOURS
Status: COMPLETED | OUTPATIENT
Start: 2023-02-26 | End: 2023-02-26

## 2023-02-26 RX ADMIN — MEGESTROL ACETATE 40 MG: 20 TABLET ORAL at 08:02

## 2023-02-26 RX ADMIN — POTASSIUM CHLORIDE 40 MEQ: 1500 TABLET, EXTENDED RELEASE ORAL at 08:02

## 2023-02-26 RX ADMIN — PREDNISONE 5 MG: 5 TABLET ORAL at 08:02

## 2023-02-26 RX ADMIN — PANTOPRAZOLE SODIUM 40 MG: 40 TABLET, DELAYED RELEASE ORAL at 06:02

## 2023-02-26 RX ADMIN — PIPERACILLIN SODIUM AND TAZOBACTAM SODIUM 3.38 G: 3; .375 INJECTION, POWDER, LYOPHILIZED, FOR SOLUTION INTRAVENOUS at 03:02

## 2023-02-26 RX ADMIN — PIPERACILLIN SODIUM AND TAZOBACTAM SODIUM 3.38 G: 3; .375 INJECTION, POWDER, LYOPHILIZED, FOR SOLUTION INTRAVENOUS at 10:02

## 2023-02-26 RX ADMIN — LACTOBACILLUS TAB 4 TABLET: TAB at 12:02

## 2023-02-26 RX ADMIN — PANCRELIPASE 6 CAPSULE: 60000; 12000; 38000 CAPSULE, DELAYED RELEASE PELLETS ORAL at 12:02

## 2023-02-26 RX ADMIN — PANCRELIPASE 6 CAPSULE: 60000; 12000; 38000 CAPSULE, DELAYED RELEASE PELLETS ORAL at 04:02

## 2023-02-26 RX ADMIN — FOLIC ACID 1 MG: 1 TABLET ORAL at 08:02

## 2023-02-26 RX ADMIN — PANCRELIPASE 6 CAPSULE: 60000; 12000; 38000 CAPSULE, DELAYED RELEASE PELLETS ORAL at 08:02

## 2023-02-26 RX ADMIN — LACTOBACILLUS TAB 4 TABLET: TAB at 08:02

## 2023-02-26 RX ADMIN — LACTOBACILLUS TAB 4 TABLET: TAB at 04:02

## 2023-02-26 RX ADMIN — POTASSIUM CHLORIDE 40 MEQ: 1500 TABLET, EXTENDED RELEASE ORAL at 01:02

## 2023-02-26 RX ADMIN — PIPERACILLIN SODIUM AND TAZOBACTAM SODIUM 3.38 G: 3; .375 INJECTION, POWDER, LYOPHILIZED, FOR SOLUTION INTRAVENOUS at 06:02

## 2023-02-26 RX ADMIN — MIRTAZAPINE 15 MG: 15 TABLET, FILM COATED ORAL at 08:02

## 2023-02-26 RX ADMIN — TRAZODONE HYDROCHLORIDE 50 MG: 50 TABLET ORAL at 08:02

## 2023-02-26 RX ADMIN — SERTRALINE HYDROCHLORIDE 50 MG: 50 TABLET ORAL at 08:02

## 2023-02-26 NOTE — PLAN OF CARE
Problem: Malnutrition  Goal: Improved Nutritional Intake  Outcome: Ongoing, Progressing  Intervention: Promote and Optimize Oral Intake  Flowsheets (Taken 2/26/2023 4639)  Oral Nutrition Promotion:   nutritional therapy counseling provided   calorie-dense foods provided

## 2023-02-26 NOTE — NURSING
[facility]  PICC Line Insertion  Procedure Note    SUMMARY     Date of Procedure: 2/25/2023    Procedure: Insertion of #5f  PICC    Provider: Han Zeng RN    Assisting Provider: khushi barriga     Indications: Pt./ Preference    Pre-Procedure Diagnosis: generalized weakness    Post-Procedure Diagnosis: generalized weakness     Anesthesia: n/a    Technical Procedures Used: picc insertion    Description of the Findings of the Procedure:    Informed consent was obtained for the procedure, including sedation.  Risks of lung perforation, hemorrhage, and adverse drug reaction were discussed.     Maximum sterile technique was used including antiseptics, cap, gloves, gown, hand hygiene, mask, and sheet.    #5f PICC inserted to the R Upper Arm vein per hospital protocol.   Blood return:  yes    Catheter inserted to 33 cm, with 0 cm. Exposed. Mid upper arm circumference is 21 cm.  There were no changes to vital signs. Catheter was flushed with 20 cc NS. Patient did tolerate procedure well.    Recommendations:    CXR ordered to verify placement.  PICC Brochure given to patient with teaching instruction.    Significant Surgical Tasks Conducted by the Assistant(s), if Applicable: n/a    Complications: None; patient tolerated the procedure well.    Estimated Blood Loss (EBL): Minimal    Implants: n/a    Specimens: n/a           Condition: stable    Disposition:  n/a    Attestation: I was present and scrubbed for the entire procedure.    RUE PICC placed per MD request for venous access for IV abx and lab draws.

## 2023-02-26 NOTE — CONSULTS
[facility]  Department of Cardiology  Consult Note      PATIENT NAME: Claire Bowser    MRN: [id number]  TODAY'S DATE: 02/26/2023  ADMIT DATE: 2/24/2023                          CONSULT REQUESTED BY: Marbin Ty MD    SUBJECTIVE     PRINCIPAL PROBLEM: Peritoneal fluid collection      REASON FOR CONSULT:    From H&P: 63-year-old  female with known rheumatoid arthritis, chronic pancreatitis complicated by pancreatic pseudocyst, pancreatic sphincterotomy, bile leak status post stenting with improvement in symptoms, DVT on apixaban (dx in 09/2022) who was recently discharged after being managed for generalized weakness and diarrhea presents with exertional dyspnea.     History supplemented by  at bedside.  Since discharge her appetite and weakness have improved.  However with last 2 days she has been experiencing dyspnea which is worse with minimal exertion and better with rest.  No chest pain, orthopnea or paroxysmal nocturnal dyspnea.  Admits chronic bilateral lower extremity edema which is largely unchanged.  It is worse at the end of the day and better with limb elevation.  No fever, chills, abdominal pain or nausea/vomiting.  No further diarrhea with increased pancreatic enzyme supplement dosing as per GI recommendations from prior hospitalization.     In the ED, she was found to have elevated troponin.  CTA chest was negative for pulmonary embolism but revealed bilateral pleural effusions.  CT of the abdomen/pelvis revealed large loculated fluid collection with air within the pelvis concerning for abscess.     Rest of the 10 point review of systems is negative except as mentioned above.      HPI:  63-year-old female who presented to the emergency room with exertional dyspnea.  Patient noted to have bilateral pleural effusions and elevated troponin level.  Troponin level was flat.  Patient is noted to have peritoneal fluid collection and there is plan for IR drainage on Monday.   Patient seen today sitting up in bed with  at bedside.  She appears to be breathing without much difficulty on room air and denies any chest discomfort.  She denies any past history of heart related issues.  Her rhythm is stable on telemetry.        Review of patient's allergies indicates:   Allergen Reactions    No known drug allergies        Past Medical History:   Diagnosis Date    Acute biliary pancreatitis 6/14/2022    Anxiety     Chronic pancreatitis 1/2/2023    Digestive disorder     Flu 02/2017    [facility]    Hypertension     Long-term use of immunosuppressant medication 6/10/2022    Rheumatoid arthritis involving multiple sites with positive rheumatoid factor 01/14/2021     Past Surgical History:   Procedure Laterality Date    COLONOSCOPY N/A 2/26/2021    Procedure: COLONOSCOPY;  Surgeon: Amy Sidhu MD;  Location: [facility];  Service: Endoscopy;  Laterality: N/A;    ENDOSCOPIC ULTRASOUND OF UPPER GASTROINTESTINAL TRACT N/A 7/1/2022    Procedure: ULTRASOUND, UPPER GI TRACT, ENDOSCOPIC;  Surgeon: Donavon Jewell III, MD;  Location: [facility];  Service: Endoscopy;  Laterality: N/A;    ENDOSCOPIC ULTRASOUND OF UPPER GASTROINTESTINAL TRACT N/A 11/29/2022    Procedure: ULTRASOUND, UPPER GI TRACT, ENDOSCOPIC;  Surgeon: Donavon Jewell III, MD;  Location: [facility];  Service: Endoscopy;  Laterality: N/A;    ENDOSCOPIC ULTRASOUND OF UPPER GASTROINTESTINAL TRACT N/A 1/3/2023    Procedure: ULTRASOUND, UPPER GI TRACT, ENDOSCOPIC;  Surgeon: Donavon Jewell III, MD;  Location: [facility];  Service: Endoscopy;  Laterality: N/A;    ERCP N/A 12/30/2022    Procedure: ERCP (ENDOSCOPIC RETROGRADE CHOLANGIOPANCREATOGRAPHY);  Surgeon: Donavon Jewell III, MD;  Location: [facility];  Service: Endoscopy;  Laterality: N/A;    ERCP N/A 1/24/2023    Procedure: ERCP (ENDOSCOPIC RETROGRADE CHOLANGIOPANCREATOGRAPHY);  Surgeon: Rock Martines MD;  Location: [facility] ([address] [place] [address]);  Service: Endoscopy;   Laterality: N/A;    ESOPHAGOGASTRODUODENOSCOPY N/A 2/26/2021    Procedure: EGD (ESOPHAGOGASTRODUODENOSCOPY);  Surgeon: Amy Sidhu MD;  Location: [facility];  Service: Endoscopy;  Laterality: N/A;    LAPAROSCOPIC CHOLECYSTECTOMY N/A 7/27/2022    Procedure: CHOLECYSTECTOMY, LAPAROSCOPIC;  Surgeon: Ramón Hwang III, MD;  Location: [facility];  Service: General;  Laterality: N/A;    TUBAL LIGATION       Social History     Tobacco Use    Smoking status: Every Day     Packs/day: 1.00     Years: 7.00     Pack years: 7.00     Types: Cigarettes    Smokeless tobacco: Never    Tobacco comments:     [phone]   Substance Use Topics    Alcohol use: No    Drug use: Never        REVIEW OF SYSTEMS  Constitutional: Negative for chills and fever.   Eyes: No double vision, No blurred vision  Neuro: No headaches, No dizziness  Respiratory: Negative for cough, shortness of breath and wheezing.    Cardiovascular: Negative for chest pain. Negative for palpitations and leg swelling.   Gastrointestinal: + abdominal bloating  Genitourinary: Negative for dysuria and frequency, Negative for hematuria  Skin: Negative for bruising, Negative for edema or discoloration noted.   Musculoskeletal: Negative for neck pain, Negative for muscle weakness, Negative for back pain      OBJECTIVE     VITAL SIGNS (Most Recent)  Temp: 97.8 °F (36.6 °C) (02/26/23 1113)  Pulse: 95 (02/26/23 1113)  Resp: 16 (02/26/23 1113)  BP: 118/82 (02/26/23 1113)  SpO2: 98 % (02/26/23 1113)    VENTILATION STATUS  Resp: 16 (02/26/23 1113)  SpO2: 98 % (02/26/23 1113)       I & O (Last 24H):  Intake/Output Summary (Last 24 hours) at 2/26/2023 1534  Last data filed at 2/26/2023 1403  Gross per 24 hour   Intake 1616.67 ml   Output 1101 ml   Net 515.67 ml       WEIGHTS  Wt Readings from Last 1 Encounters:   02/26/23 1456 48.1 kg (106 lb 0.7 oz)   02/26/23 1453 48.1 kg (106 lb 0.7 oz)   02/25/23 0040 48.1 kg (106 lb 0.7 oz)   02/24/23 0854 48.1 kg (106 lb)        PHYSICAL EXAM  CONSTITUTIONAL: thin frail adult female lying in bed with no distress; No fever, no chills  HEENT: Normocephalic, atraumatic,pupils reactive to light                 NECK:  No JVD no carotid bruit  CVS: S1S2+, RRR  LUNGS: Clear  ABDOMEN: Soft, NT, BS+  EXTREMITIES: No cyanosis, edema  : No lake catheter  NEURO: AAO X 3  PSY: Normal affect      HOME MEDICATIONS:  No current facility-administered medications on file prior to encounter.     Current Outpatient Medications on File Prior to Encounter   Medication Sig Dispense Refill    acidophilus-sporogenes (ACIDOPHILUS EX STR, L. SPOROG,) 35 million- 25 million cell Tab Take 1 tablet by mouth once daily. 30 tablet 0    apixaban (ELIQUIS) 5 mg Tab Take 1 tablet (5 mg total) by mouth 2 (two) times daily. 60 tablet 0    cyclobenzaprine (FLEXERIL) 5 MG tablet Take 5 mg by mouth nightly.      folic acid (FOLVITE) 1 MG tablet Take 1 tablet (1 mg total) by mouth once daily. 360 tablet 3    lipase-protease-amylase 12,000-38,000-60,000 units (CREON) CpDR Take 6 capsules by mouth 3 (three) times daily with meals. 540 capsule 3    loperamide (IMODIUM) 2 mg capsule Take 1 capsule (2 mg total) by mouth 4 (four) times daily as needed for Diarrhea. 30 capsule 0    megestroL (MEGACE) 40 MG Tab Take 1 tablet (40 mg total) by mouth once daily. 30 tablet 0    metoprolol succinate (TOPROL-XL) 50 MG 24 hr tablet Take 50 mg by mouth once daily.      mirtazapine (REMERON) 15 MG tablet Take 1 tablet (15 mg total) by mouth every evening. 90 tablet 0    pantoprazole (PROTONIX) 40 MG tablet Take 1 tablet (40 mg total) by mouth once daily. 90 tablet 0    sertraline (ZOLOFT) 50 MG tablet Take 1 tablet (50 mg total) by mouth every evening. 30 tablet 11    traZODone (DESYREL) 50 MG tablet Take 1 tablet (50 mg total) by mouth every evening. 90 tablet 1       SCHEDULED MEDS:   folic acid  1 mg Oral Daily    Lactobacillus acidoph-L.bulgar  4 tablet Oral TID WM     lipase-protease-amylase 12,000-38,000-60,000 units  6 capsule Oral TID WM    megestroL  40 mg Oral Daily    mirtazapine  15 mg Oral QHS    pantoprazole  40 mg Oral Before breakfast    piperacillin-tazobactam (ZOSYN) IVPB  3.375 g Intravenous Q8H    predniSONE  5 mg Oral Daily    sertraline  50 mg Oral QHS    traZODone  50 mg Oral QHS       CONTINUOUS INFUSIONS:    PRN MEDS:sodium chloride, acetaminophen, magnesium oxide, magnesium oxide, melatonin, naloxone, ondansetron, polyethylene glycol, potassium bicarbonate, potassium bicarbonate, potassium bicarbonate, potassium, sodium phosphates, potassium, sodium phosphates, potassium, sodium phosphates    LABS AND DIAGNOSTICS     CBC LAST 3 DAYS  Recent Labs   Lab 02/24/23  1114 02/25/23  1308 02/25/23  2212 02/26/23  0328   WBC 4.07 4.55  --  3.90   RBC 2.53* 2.26*  --  2.73*   HGB 7.6* 6.9* 7.9* 8.1*   HCT 24.3* 21.7* 24.1* 24.7*   MCV 96 96  --  92   MCH 30.0 30.5  --  29.7   MCHC 31.3* 31.8*  --  32.8   RDW 20.5* 21.2*  --  19.4*    278  --  196   MPV 9.8 9.7  --  9.3   GRAN 57.0  --   --   --    LYMPH 20.0  --   --   --    MONO 5.0  --   --   --    NRBC 0  --   --   --        COAGULATION LAST 3 DAYS  No results for input(s): LABPT, INR, APTT in the last 168 hours.    CHEMISTRY LAST 3 DAYS  Recent Labs   Lab 02/24/23  1114 02/25/23  0842 02/26/23  0328   * 132* 133*   K 2.8* 3.9 3.0*   CL 99 102 101   CO2 26 24 26   ANIONGAP 7* 6* 6*   BUN 18 17 16   CREATININE 0.6 0.7 0.7   GLU 61* 79 90   CALCIUM 8.7 8.2* 8.3*   MG 1.8 1.7 1.9   ALBUMIN 1.5*  --   --    PROT 6.3  --   --    ALKPHOS 103  --   --    ALT 39  --   --    AST 48*  --   --    BILITOT 1.6*  --   --        CARDIAC PROFILE LAST 3 DAYS  Recent Labs   Lab 02/24/23  1114 02/24/23  1411   *  --    TROPONINIHS 1340.5* 1032.2*       ENDOCRINE LAST 3 DAYS  No results for input(s): TSH, PROCAL in the last 168 hours.    LAST ARTERIAL BLOOD GAS  ABG  No results for input(s): PH, PO2, PCO2, HCO3, BE  in the last 168 hours.    LAST 7 DAYS MICROBIOLOGY   Microbiology Results (last 7 days)       Procedure Component Value Units Date/Time    Blood culture [[id number]] Collected: 02/26/23 0535    Order Status: Completed Specimen: Blood from Line, PICC Right Basilic Updated: 02/26/23 1317     Blood Culture, Routine No Growth to date    Urine culture [[id number]]  (Abnormal) Collected: 02/24/23 1528    Order Status: Completed Specimen: Urine Updated: 02/26/23 0807     Urine Culture, Routine GRAM NEGATIVE COLLEEN, LACTOSE   > 100,000 cfu/ml  Identification and susceptibility pending      Narrative:      Specimen Source->Urine    Blood culture [[id number]] Collected: 02/25/23 1831    Order Status: Completed Specimen: Blood from Line, PICC Right Basilic Updated: 02/26/23 0117     Blood Culture, Routine No Growth to date            MOST RECENT IMAGING  X-Ray Chest 1 View  AP chest radiograph: 2/25/2023 7:10 PM CST    History: 63 years  old Female with Verify PICC placement.    Comparison: Chest radiograph performed 2/8/2023.    Findings: The cardiomediastinal silhouette is normal in size.    No pneumothorax is seen.    There are bibasilar space opacities with small bilateral effusions, right greater than left. These appear slightly increased since prior imaging.    There is partial obscuration of both diaphragms, likely due to overlying airspace opacities and/or effusions.    A right upper extremity PICC line catheter tip projects at the SVC/right atrial junction.    Impression: There are bibasilar space opacities with small bilateral effusions, right greater than left. These appear slightly increased since prior imaging.    Electronically signed by:  Karly Jones DO  2/25/2023 7:10 PM CST Workstation: [id number]      ECHOCARDIOGRAM RESULTS (last 5)  Results for orders placed during the hospital encounter of 02/24/23    Echo    Interpretation Summary  · The left ventricle is normal in size with normal systolic  function.  · The estimated ejection fraction is 60%.  · Normal left ventricular diastolic function.  · Normal right ventricular size with normal right ventricular systolic function.  · Trivial circumferential pericardial effusion.  · There are bilateral pleural effusions. Large      Results for orders placed during the hospital encounter of 02/08/23    Echo    Interpretation Summary  · Concentric remodeling and normal systolic function.  · The estimated ejection fraction is 60%.  · Normal left ventricular diastolic function.  · Normal right ventricular size with normal right ventricular systolic function.  · Mild mitral regurgitation.  · Mild to moderate tricuspid regurgitation.  · There is mild pulmonary hypertension.  · Guarded interpretation suboptimal secondary to patient factors      Results for orders placed during the hospital encounter of 10/17/22    STRESS TEST REPORT      Echo    Interpretation Summary  · The left ventricle is normal in size with concentric remodeling and normal systolic function. No wall motion abnormalities  · The estimated ejection fraction is 55%.  · Normal left ventricular diastolic function.  · Atrial fibrillation not observed.  · Normal right ventricular size with normal right ventricular systolic function.  · There is mild pulmonary hypertension.  · Mild tricuspid regurgitation.  · Intermediate central venous pressure (8 mmHg).  · The estimated PA systolic pressure is 52 mmHg.      Results for orders placed during the hospital encounter of 06/14/22    Echo    Interpretation Summary  · The left ventricle is normal in size with normal systolic function.  · The estimated ejection fraction is 54%.  · Normal left ventricular diastolic function.  · Atrial fibrillation not observed.  · Normal right ventricular size with normal right ventricular systolic function.  · There is mild pulmonary hypertension.  · Mild to moderate tricuspid regurgitation.  · Intermediate central venous pressure  (8 mmHg).  · The estimated PA systolic pressure is 44 mmHg.  · No bubble study      CURRENT/PREVIOUS VISIT EKG  Results for orders placed or performed during the hospital encounter of 02/24/23   EKG 12-lead    Collection Time: 02/24/23  9:38 AM    Narrative    Test Reason : R07.9,    Vent. Rate : 079 BPM     Atrial Rate : 079 BPM     P-R Int : 128 ms          QRS Dur : 066 ms      QT Int : 392 ms       P-R-T Axes : 077 013 -56 degrees     QTc Int : 449 ms    Normal sinus rhythm  Low voltage QRS  Nonspecific T wave abnormality  Abnormal ECG  When compared with ECG of 08-FEB-2023 10:05,  No significant change was found  Confirmed by Ta Wynne MD ([id number]) on 2/25/2023 6:48:33 PM    Referred By: HAM   SELF           Confirmed By:Ta Wynne MD           ASSESSMENT/PLAN:     Active Hospital Problems    Diagnosis    *Peritoneal fluid collection    Acute on chronic anemia    Elevated troponin    Pseudocyst of pancreas    Chronic pancreatitis    Severe protein-calorie malnutrition    Hypokalemia    Tobacco use    Generalized anxiety disorder       ASSESSMENT & PLAN:   Nondiagnostic troponin elevation  Acute on chronic anemia  Peritoneal fluid collection  Hypokalemia   Pancreatitis   Protein-calorie malnutrition  Smoker       RECOMMENDATIONS:    Had a discussion with the patient and her  regarding the elevated troponin level.  Will repeat a troponin today for completeness.  No indication of ACS and she is not a great candidate for invasive cardiac workup at this time due to multiple factors including anemia, infection, and overall poor conditioning.  Echocardiogram shows normal ejection fraction with no significant valve issues noted.  Continue to check and replace potassium and magnesium. Goal for potassium is 4.0, and goal for magnesium is 2.0.   Recommend outpatient follow up with cardiology. Will have our team see her tomorrow and likely sign off if she remains stable from cardiac standpoint.          Alma Marcial NP  [facility]  Department of Cardiology  Date of Service: 02/26/2023  3:34 PM     I have personally interviewed and examined the patient. I have reviewed the Nurse Practitioner's history and physical, assessment, and plan. I agree with the findings and made appropriate changes as necessary in recommendations.  Troponin elevation likely from demand ischemia secondary to anemia and infection.     ARMEN Mcmahan MD  Department of Cardiology  [facility]  02/26/2023 3:39 PM

## 2023-02-26 NOTE — PROGRESS NOTES
[facility] Medicine  Progress Note    Patient name: Claire Bowser  MRN: [id number]  Admit Date: 2/24/2023   LOS: 0 days     SUBJECTIVE:     Principal problem: Peritoneal fluid collection    Interval History:  No acute overnight events reported.  AM labs notable for drop in hemoglobin to less than 7 gm/dl.  No shortness of breath while at rest.  Denies abdominal pain or nausea/vomiting.    Scheduled Meds:   folic acid  1 mg Oral Daily    furosemide (LASIX) injection  20 mg Intravenous Once    Lactobacillus acidoph-L.bulgar  4 tablet Oral TID WM    lipase-protease-amylase 12,000-38,000-60,000 units  6 capsule Oral TID WM    megestroL  40 mg Oral Daily    mirtazapine  15 mg Oral QHS    pantoprazole  40 mg Oral Before breakfast    piperacillin-tazobactam (ZOSYN) IVPB  3.375 g Intravenous Q8H    predniSONE  5 mg Oral Daily    sertraline  50 mg Oral QHS    traZODone  50 mg Oral QHS     Continuous Infusions:  PRN Meds:sodium chloride, acetaminophen, magnesium oxide, magnesium oxide, melatonin, naloxone, ondansetron, polyethylene glycol, potassium bicarbonate, potassium bicarbonate, potassium bicarbonate, potassium, sodium phosphates, potassium, sodium phosphates, potassium, sodium phosphates    Review of patient's allergies indicates:   Allergen Reactions    No known drug allergies        Review of Systems: As per interval history    OBJECTIVE:     Vital Signs (Most Recent)  Temp: 98.1 °F (36.7 °C) (02/25/23 2036)  Pulse: 85 (02/25/23 2036)  Resp: 18 (02/25/23 2036)  BP: 122/82 (02/25/23 2036)  SpO2: 97 % (02/25/23 2036)    Vital Signs Range (Last 24H):  Temp:  [97.2 °F (36.2 °C)-98.1 °F (36.7 °C)]   Pulse:  [77-88]   Resp:  [16-22]   BP: ()/(54-89)   SpO2:  [95 %-100 %]     I & O (Last 24H):  Intake/Output Summary (Last 24 hours) at 2/25/2023 2042  Last data filed at 2/25/2023 1843  Gross per 24 hour   Intake 1215 ml   Output 100 ml   Net 1115 ml       Physical Exam:  General: Frail. No acute  distress  Eyes: No conjunctival injection. No scleral icterus.  ENT: Hearing grossly intact. No discharge from ears. No nasal discharge.   Neck: Supple, trachea midline. No JVD  CVS: RRR. No LE edema BL  Lungs:  No tachypnea or accessory muscle use.  Clear to auscultation bilaterally  Abdomen:  Soft, nontender and nondistended.  No organomegaly  Neuro: AOx3. Moves all extremities. Follows commands. Responds appropriately   Skin:  No rash or erythema noted    Laboratory:  I have reviewed all pertinent lab results within the past 24 hours.  CBC:   Recent Labs   Lab 02/25/23  1308   WBC 4.55   RBC 2.26*   HGB 6.9*   HCT 21.7*      MCV 96   MCH 30.5   MCHC 31.8*     CMP:   Recent Labs   Lab 02/24/23  1114 02/25/23  0842   GLU 61* 79   CALCIUM 8.7 8.2*   ALBUMIN 1.5*  --    PROT 6.3  --    * 132*   K 2.8* 3.9   CO2 26 24   CL 99 102   BUN 18 17   CREATININE 0.6 0.7   ALKPHOS 103  --    ALT 39  --    AST 48*  --    BILITOT 1.6*  --        Diagnostic Results:  Labs: Reviewed    ASSESSMENT/PLAN:       Active Hospital Problems    Diagnosis  POA    *Peritoneal fluid collection [R18.8]  Yes    Acute on chronic anemia [D64.9]  Yes    Elevated troponin [R77.8]  Yes    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         Plan:   Large peritoneal fluid collection concerning for possible abscess in the presence of air   No systemic signs of infection   However given size of the fluid collection will start antibiotic therapy with piperacillin-tazobactam  Infectious disease, general surgery and GI on board   IR consultation for drainage - tentatively aiming for Monday (needs to be off apixaban)     Bilateral pleural effusions  Could be 3rd  spacing in the setting of hypoalbuminemia versus less likely congestive heart failure  BNP within normal limits   Previous echocardiogram from 2 weeks ago with no evidence of congestive heart failure   Repeated echocardiogram today  2/24 - trivial pericardial effusion  Cardiology following      Chronic pancreatitis  Symptoms are stable.  Continue pancreatic enzyme supplement  GI following     Acute on chronic anemia   1 unit of pRBCs ordered followed by IV furosemide 20 mg      History of rheumatoid arthritis  Hold leflunomide.  Continue low-dose prednisone     History of DVT in September 2022   On apixaban which is held for IR drain placement   Estimated end date of anticoagulation is March 2022     Severe malnutrition  Continue megestrol      VTE Risk Mitigation (From admission, onward)           Ordered     IP VTE HIGH RISK PATIENT  Once         02/24/23 2016     Place sequential compression device  Until discontinued         02/24/23 2016     Reason for No Pharmacological VTE Prophylaxis  Once        Question:  Reasons:  Answer:  Physician Provided (leave comment)    02/24/23 2016                        Department Hospital Medicine  [facility]  Marbin Ty MD  Date of service: 02/25/2023

## 2023-02-26 NOTE — PLAN OF CARE
Problem: Adult Inpatient Plan of Care  Goal: Plan of Care Review  Outcome: Ongoing, Progressing  Goal: Patient-Specific Goal (Individualized)  Outcome: Ongoing, Progressing  Goal: Absence of Hospital-Acquired Illness or Injury  Outcome: Ongoing, Progressing  Goal: Optimal Comfort and Wellbeing  Outcome: Ongoing, Progressing  Goal: Readiness for Transition of Care  Outcome: Ongoing, Progressing     Problem: Skin Injury Risk Increased  Goal: Skin Health and Integrity  Outcome: Ongoing, Progressing     Problem: Fluid Imbalance (Pneumonia)  Goal: Fluid Balance  Outcome: Ongoing, Progressing     Problem: Infection (Pneumonia)  Goal: Resolution of Infection Signs and Symptoms  Outcome: Ongoing, Progressing     Problem: Respiratory Compromise (Pneumonia)  Goal: Effective Oxygenation and Ventilation  Outcome: Ongoing, Progressing     Problem: Infection  Goal: Absence of Infection Signs and Symptoms  Outcome: Ongoing, Progressing     Problem: Impaired Wound Healing  Goal: Optimal Wound Healing  Outcome: Ongoing, Progressing     Problem: Malnutrition  Goal: Improved Nutritional Intake  Outcome: Ongoing, Progressing

## 2023-02-26 NOTE — PROGRESS NOTES
Consult Note  Infectious Disease    Reason for Consult:  Intra-abdominal collection    HPI: Claire Bowser is a 63 y.o. female known to ID service from January, 2023 with PMHx emphysema, smoker, HTN, DVT on Eliquis, anxiety, chronic pancreatitis since biliary pancreatitis and cholecystectomy 07/2022, ,pancreatic sphincterotomy, bile leak-status post stent 12/30/22, multiple pancreatic pseudocysts, cyst gastrostomy tube placement, malnutrition, albumin 1.5, OA,  rheumatoid arthritis,    Patient came to ED on 2/24 with complaints of generalized weakness, fatigue, dyspnea on exertion, not able to stand, but not much abdominal discomfort??  CT chest ruled out PE.  CT abdomen was impressive for a big air-fluid collection in pelvis.  Patient is seen by General surgery who is recommending percutaneous drainage, no operative intervention at this time.    Patient is started on Zosyn and we are waiting for IR.    Patient has no new complaints overnight.  She feels slightly better, but still weak.  At baseline patient lays in bed most of the time.  She uses the bathroom 3 times a day.  She eats in bed, her  who is retired attends to her.  Urine culture is no growth to date.  UA with mild pyuria 20.  She had no urinary complaints until yesterday when she had some urgency and frequency.  No blood cultures are sent.  Troponin is a 1000 and EC HO is as below.  .  Hemoglobin of 6.9.  A unit of PRBC is already ordered.  No obvious source of bleeding  02/26/2023 Some cramping abd pain today am. Awaiting IR tomorrow    Antibiotics (From admission, onward)      Start     Stop Route Frequency Ordered    02/25/23 0330  piperacillin-tazobactam 3.375 g in dextrose 5 % 50 mL IVPB (ready to mix system)         -- IV Every 8 hours (non-standard times) 02/24/23 1719          Antifungals (From admission, onward)      None          Antivirals (From admission, onward)      None              Review of patient's allergies indicates:    Allergen Reactions    No known drug allergies      Past Medical History:   Diagnosis Date    Acute biliary pancreatitis 6/14/2022    Anxiety     Chronic pancreatitis 1/2/2023    Digestive disorder     Flu 02/2017    [facility]    Hypertension     Long-term use of immunosuppressant medication 6/10/2022    Rheumatoid arthritis involving multiple sites with positive rheumatoid factor 01/14/2021     Past Surgical History:   Procedure Laterality Date    COLONOSCOPY N/A 2/26/2021    Procedure: COLONOSCOPY;  Surgeon: Amy Sidhu MD;  Location: [facility];  Service: Endoscopy;  Laterality: N/A;    ENDOSCOPIC ULTRASOUND OF UPPER GASTROINTESTINAL TRACT N/A 7/1/2022    Procedure: ULTRASOUND, UPPER GI TRACT, ENDOSCOPIC;  Surgeon: Donavon Jewell III, MD;  Location: [facility];  Service: Endoscopy;  Laterality: N/A;    ENDOSCOPIC ULTRASOUND OF UPPER GASTROINTESTINAL TRACT N/A 11/29/2022    Procedure: ULTRASOUND, UPPER GI TRACT, ENDOSCOPIC;  Surgeon: Donavon Jewell III, MD;  Location: [facility];  Service: Endoscopy;  Laterality: N/A;    ENDOSCOPIC ULTRASOUND OF UPPER GASTROINTESTINAL TRACT N/A 1/3/2023    Procedure: ULTRASOUND, UPPER GI TRACT, ENDOSCOPIC;  Surgeon: Donavon Jewell III, MD;  Location: [facility];  Service: Endoscopy;  Laterality: N/A;    ERCP N/A 12/30/2022    Procedure: ERCP (ENDOSCOPIC RETROGRADE CHOLANGIOPANCREATOGRAPHY);  Surgeon: Donavon Jweell III, MD;  Location: [facility];  Service: Endoscopy;  Laterality: N/A;    ERCP N/A 1/24/2023    Procedure: ERCP (ENDOSCOPIC RETROGRADE CHOLANGIOPANCREATOGRAPHY);  Surgeon: Rock Martines MD;  Location: [facility] ([address] [place] [address]);  Service: Endoscopy;  Laterality: N/A;    ESOPHAGOGASTRODUODENOSCOPY N/A 2/26/2021    Procedure: EGD (ESOPHAGOGASTRODUODENOSCOPY);  Surgeon: Amy Sidhu MD;  Location: [facility];  Service: Endoscopy;  Laterality: N/A;    LAPAROSCOPIC CHOLECYSTECTOMY N/A 7/27/2022    Procedure: CHOLECYSTECTOMY, LAPAROSCOPIC;   Surgeon: Ramón Hwang III, MD;  Location[place] [facility];  Service: General;  Laterality: N/A;    TUBAL LIGATION       Social History     Socioeconomic History    Marital status:    Tobacco Use    Smoking status: Every Day     Packs/day: 1.00     Years: 7.00     Pack years: 7.00     Types: Cigarettes    Smokeless tobacco: Never    Tobacco comments:     [phone]   Substance and Sexual Activity    Alcohol use: No    Drug use: Never    Sexual activity: Yes     Partners: Male     Social Determinants of Health     Financial Resource Strain: High Risk    Difficulty of Paying Living Expenses: Hard   Food Insecurity: Food Insecurity Present    Worried About Running Out of Food in the Last Year: Sometimes true    Ran Out of Food in the Last Year: Never true   Transportation Needs: No Transportation Needs    Lack of Transportation (Medical): No    Lack of Transportation (Non-Medical): No   Physical Activity: Inactive    Days of Exercise per Week: 0 days    Minutes of Exercise per Session: 0 min   Stress: Stress Concern Present    Feeling of Stress : Rather much   Social Connections: Moderately Isolated    Frequency of Communication with Friends and Family: More than three times a week    Frequency of Social Gatherings with Friends and Family: Three times a week    Attends Religion Services: Never    Active Member of Clubs or Organizations: No    Attends Club or Organization Meetings: Never    Marital Status:    Housing Stability: Low Risk     Unable to Pay for Housing in the Last Year: No    Number of Places Lived in the Last Year: 1    Unstable Housing in the Last Year: No     Family History   Problem Relation Age of Onset    Diabetes Mother     Heart disease Mother     Kidney disease Mother     Hypertension Mother     Stroke Mother     Hearing loss Mother     COPD Father     Liver disease Paternal Grandfather     Alcohol abuse Paternal Grandfather     Liver disease Sister     Emphysema Brother     COPD  Brother     Sleep apnea Brother     No Known Problems Son     Alcohol abuse Paternal Grandmother     Cirrhosis Paternal Grandmother     Heart disease Maternal Aunt     Diabetes Maternal Aunt     Cancer Maternal Aunt         female    Heart disease Maternal Uncle     Hypertension Maternal Uncle     No Known Problems Paternal Uncle     Psoriasis Neg Hx     Lupus Neg Hx     Eczema Neg Hx     Melanoma Neg Hx          Review of Systems:   No chills, fever, sweats, weight loss  No change in vision, loss of vision or diplopia  No sinus congestion, purulent nasal discharge, post nasal drip or facial pain  No pain in mouth or throat. No problems with teeth, gums.  No chest pain, palpitations, syncope  No cough, sputum production, shortness of breath, dyspnea on exertion, pleurisy, hemoptysis  No nausea, vomiting, diarrhea, constipation, blood in stool, or focal abd pain,  No dysphagia, odynophagia  Only 1 day of dysuria, urgency and frequency.    No vaginal/penile discharge, genital ulcers, risk for STD  No swelling of joints, redness of joints, injuries, or new focal pain  No unusual headaches, dizziness, vertigo, numbness, paresthesias, neuropathy, falls  No anxiety, depression, substance abuse, sleep disturbance  No diabetes, thyroid, hypogonadal conditions  No bleeding, lymphadenopathy, anemia, malignancy, unusual bruising  No new rashes, lesions, or wounds  No TB exposure  No recent/prior steroids  Outdoor activities:  None  Travel:  No  Implants:   Antibiotic History:   Now on Zosyn   01/26-01/31/2023 ciprofloxacin  01/23/2023--01/26/2023 ceftriaxone   01/24/2023 metronidazole   01/15/2023--01/21/2023 cefepime   01/15/2023--01/18/2023 vancomycin   01/06/2023--01/16/2023 Ceftin  Prior to it she she received doses of ertapenem, Zosyn, vancomycin, Bactrim.      EXAM & DIAGNOSTICS REVIEWED:   Vitals:     Temp:  [97.2 °F (36.2 °C)-98.2 °F (36.8 °C)]   Temp: 97.9 °F (36.6 °C) (02/26/23 0704)  Pulse: 90 (02/26/23 0704)  Resp:  18 (02/26/23 0704)  BP: 115/75 (02/26/23 0704)  SpO2: 97 % (02/26/23 0704)    Intake/Output Summary (Last 24 hours) at 2/26/2023 0835  Last data filed at 2/26/2023 0245  Gross per 24 hour   Intake 1156.67 ml   Output 1101 ml   Net 55.67 ml       General:  In NAD. Alert and attentive, cooperative, comfortable.  Older looking than her age.  Thin.  Eyes:  Anicteric, PERRL, EOMI  ENT:  No ulcers, exudates, thrush, nares patent, dentition is fair  Neck:  supple, no masses or adenopathy appreciated  Lungs: Clear, no consolidation, rales, wheezes, rub  Heart:  RRR, no gallop/murmur/rub noted  Abd:  Soft, NT, ND, normal BS, no masses or organomegaly appreciated.  :  Voids urine clear, no flank tenderness  Musc:  Joints without effusion, swelling, erythema, synovitis, muscle wasting.   Skin:  easy bruising.  Thin skin.  No palmar or plantar lesions. No subungual petechiae  Neuro:             Alert, attentive, speech fluent, face symmetric, moves all extremities, no focal weakness. Min Ambulatory  Psych:  Calm, cooperative.  Pleasant.  Lymphatic:     No cervical, supraclavicular, axillary, or inguinal nodes  Extrem: No edema, erythema, phlebitis, cellulitis, warm and well perfused  VAD:       Isolation:    Wound:       Lines/Tubes/Drains:    General Labs reviewed:  Recent Labs   Lab 02/24/23  1114 02/25/23  1308 02/25/23  2212 02/26/23  0328   WBC 4.07 4.55  --  3.90   HGB 7.6* 6.9* 7.9* 8.1*   HCT 24.3* 21.7* 24.1* 24.7*    278  --  196       Recent Labs   Lab 02/24/23  1114 02/25/23  0842 02/26/23  0328   * 132* 133*   K 2.8* 3.9 3.0*   CL 99 102 101   CO2 26 24 26   BUN 18 17 16   CREATININE 0.6 0.7 0.7   CALCIUM 8.7 8.2* 8.3*   PROT 6.3  --   --    BILITOT 1.6*  --   --    ALKPHOS 103  --   --    ALT 39  --   --    AST 48*  --   --      Recent Labs   Lab 02/26/23  0328   CRP 7.80*         Micro:  Microbiology Results (last 7 days)       Procedure Component Value Units Date/Time    Urine culture [[id number]]   (Abnormal) Collected: 02/24/23 1528    Order Status: Completed Specimen: Urine Updated: 02/26/23 0807     Urine Culture, Routine GRAM NEGATIVE COLLEEN, LACTOSE   > 100,000 cfu/ml  Identification and susceptibility pending      Narrative:      Specimen Source->Urine    Blood culture [[id number]] Collected: 02/26/23 0535    Order Status: Sent Specimen: Blood from Line, PICC Right Basilic Updated: 02/26/23 0632    Blood culture [[id number]] Collected: 02/25/23 1831    Order Status: Completed Specimen: Blood from Line, PICC Right Basilic Updated: 02/26/23 0117     Blood Culture, Routine No Growth to date            Imaging Reviewed:   CXR   CT  Essentially complete resolution of free fluid within the peritoneal cavity since the preceding CT scan dated 1/15/2023. There has been interval development of a loculated appearing collection of fluid and air in the pelvis extending up both paracolic gutters that is likely contained within a portion of the peritoneal cavity. This may represent a large abscess given the presence of the air. It may communicate with bowel, although no direct indication is evident. There is a large right and moderately large left pleural effusion that of both increased in size significantly as well.  Cardiology:  02/24/2023.  The left ventricle is normal in size with normal systolic function.  The estimated ejection fraction is 60%.  Normal left ventricular diastolic function.  Normal right ventricular size with normal right ventricular systolic function.  Trivial circumferential pericardial effusion.  There are bilateral pleural effusions. Large    EC HO [id number]   Concentric remodeling and normal systolic function.  The estimated ejection fraction is 60%.  Normal left ventricular diastolic function.  Normal right ventricular size with normal right ventricular systolic function.  Mild mitral regurgitation.  Mild to moderate tricuspid regurgitation.  There is mild pulmonary hypertension.  Guarded  interpretation suboptimal secondary to patient factors      Procedures.    01/24/2023 ERCP - Prior biliary sphincterotomy appeared open.                          - Prior pancreatic sphincterotomy appeared open.                          - One stent from the pancreatic duct was seen in    the major papilla.                          - One stent was removed from the pancreatic duct.                          - A pancreatic duct leak was found.                          - One plastic stent was placed into the ventral  pancreatic duct.   Recommendation:   Repeat ERCP in 6 weeks to check healing.   01/03/2023 upper EUS  The pancreatic duct measured up to 2 mm in diameter. Stent seen in PD in HOP. Therapeutic needle aspiration for fluid was performed.  The amount of fluid collected was 150 mL. The fluid was opaque and brown.   12/30/2022 ERCP  - A mild ectactic, slight narrowed region just above ampulla was found.   A pancreatic sphincterotomy was performed. Pancreatic sludge / debris seen folowing   sphincterotomy.  One pancreatic stent was placed into the ventral pancreatic duct. The entire main bile duct was mildly dilated. The patient has had a cholecystectomy.  A biliary sphincterotomy was performed.  An area successfully injected w/ epi.   11/29/2022 upper EUS.    07/01/2022 upper EUS.    02/26/2021 upper GI.    02/26/2021 colonoscopy.        IMPRESSION & PLAN   Reason for ED visit =generalized weakness which is multifactorial.  NSTMI,  elevated troponin.  As per hospitalist and cardiologist.  Bilateral large effusions in setting of recent pancreatitis, low albumin.   Anemia.  Status post PRBC x1    Large pelvic air fluid collection.  Cyst, versus abscess, versus communicating with bowel?  Pyuria, possible mild UTI    Immunosuppressed due to treatment for RA  Malnutrition, albumin 1.5.  Decreased appetite  History of chronic pancreatitis, pancreatic pseudocyst   History of rheumatoid arthritis on leflunomide and  prednisone 5 mg,.    History of DVT on anticoagulation.      Recommendations:    Continue Zosyn until we sort out this pelvic collection   IR consultation  has already been placed.  CRP   Blood cultures  Need to improve albumin/nutrition       Medical Decision Making during this encounter was  [_] Low Complexity  [_] Moderate Complexity  [ xx ] High Complexity

## 2023-02-26 NOTE — PROGRESS NOTES
[facility]  Progress Note    Patient name: Claire Bowser  MRN: [id number]  Admit Date: 2/24/2023   LOS: 0 days     SUBJECTIVE:     Principal problem: Peritoneal fluid collection    Interval History:  No acute overnight events reported.  Hemoglobin with appropriate rise after blood transfusion yesterday.  Denies abdominal pain or nausea/vomiting.    Summary:  63-year-old  female with complicated GI history including pancreatitis (June 2022) complicated by leak status post stenting, status post laparoscopic cholecystectomy as well as dual sphincterotomy who was admitted for shortness of breath and was found to have peritoneal fluid collection.    Scheduled Meds:   folic acid  1 mg Oral Daily    Lactobacillus acidoph-L.bulgar  4 tablet Oral TID WM    lipase-protease-amylase 12,000-38,000-60,000 units  6 capsule Oral TID WM    megestroL  40 mg Oral Daily    mirtazapine  15 mg Oral QHS    pantoprazole  40 mg Oral Before breakfast    piperacillin-tazobactam (ZOSYN) IVPB  3.375 g Intravenous Q8H    predniSONE  5 mg Oral Daily    sertraline  50 mg Oral QHS    traZODone  50 mg Oral QHS     Continuous Infusions:  PRN Meds:sodium chloride, acetaminophen, magnesium oxide, magnesium oxide, melatonin, naloxone, ondansetron, polyethylene glycol, potassium bicarbonate, potassium bicarbonate, potassium bicarbonate, potassium, sodium phosphates, potassium, sodium phosphates, potassium, sodium phosphates    Review of patient's allergies indicates:   Allergen Reactions    No known drug allergies        Review of Systems: As per interval history    OBJECTIVE:     Vital Signs (Most Recent)  Temp: 98.8 °F (37.1 °C) (02/26/23 1540)  Pulse: 95 (02/26/23 1540)  Resp: 18 (02/26/23 1540)  BP: 100/82 (02/26/23 1540)  SpO2: 97 % (02/26/23 1540)    Vital Signs Range (Last 24H):  Temp:  [97.8 °F (36.6 °C)-98.8 °F (37.1 °C)]   Pulse:  [85-96]   Resp:  [16-18]   BP: (100-122)/(75-89)   SpO2:  [95 %-100 %]      I & O (Last 24H):  Intake/Output Summary (Last 24 hours) at 2/26/2023 1716  Last data filed at 2/26/2023 1634  Gross per 24 hour   Intake 1741.67 ml   Output 1251 ml   Net 490.67 ml         Physical Exam:  General: Frail. No acute distress  Eyes: No conjunctival injection. No scleral icterus.  ENT: Hearing grossly intact. No discharge from ears. No nasal discharge.   Neck: Supple, trachea midline. No JVD  CVS: RRR. No LE edema BL  Lungs:  No tachypnea or accessory muscle use.  Clear to auscultation bilaterally  Abdomen:  Soft, nontender and nondistended.  No organomegaly  Neuro: AOx3. Moves all extremities. Follows commands. Responds appropriately   Skin:  No rash or erythema noted    Laboratory:  I have reviewed all pertinent lab results within the past 24 hours.  CBC:   Recent Labs   Lab 02/26/23  0328   WBC 3.90   RBC 2.73*   HGB 8.1*   HCT 24.7*      MCV 92   MCH 29.7   MCHC 32.8       CMP:   Recent Labs   Lab 02/24/23  1114 02/25/23  0842 02/26/23  0328   GLU 61*   < > 90   CALCIUM 8.7   < > 8.3*   ALBUMIN 1.5*  --   --    PROT 6.3  --   --    *   < > 133*   K 2.8*   < > 3.0*   CO2 26   < > 26   CL 99   < > 101   BUN 18   < > 16   CREATININE 0.6   < > 0.7   ALKPHOS 103  --   --    ALT 39  --   --    AST 48*  --   --    BILITOT 1.6*  --   --     < > = values in this interval not displayed.         Diagnostic Results:  Labs: Reviewed    ASSESSMENT/PLAN:       Active Hospital Problems    Diagnosis  POA    *Peritoneal fluid collection [R18.8]  Yes    Acute on chronic anemia [D64.9]  Yes    Elevated troponin [R77.8]  Yes    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         Plan:   Large peritoneal fluid collection  concerning for possible abscess in the presence of air   No systemic signs of infection   However given size of the fluid collection started on antibiotic therapy with piperacillin-tazobactam  Infectious disease, general surgery and GI on board   IR consultation for drainage - tentatively aiming for Monday (needs to be off apixaban)  NPO from midnight     Bilateral pleural effusions  Elevated troponin - nondiagnostic and unlikely ACS  Could be 3rd spacing in the setting of hypoalbuminemia versus less likely congestive heart failure  BNP within normal limits   Previous echocardiogram from 2 weeks ago with no evidence of congestive heart failure   Repeated echocardiogram 02/24 - trivial pericardial effusion  Cardiology following      Chronic pancreatitis  Symptoms are stable.  Continue pancreatic enzyme supplement  GI following     Acute on chronic anemia   Improved with blood transfusion   Monitor with daily CBC     History of rheumatoid arthritis  Hold leflunomide.  Continue low-dose prednisone     History of DVT in September 2022   On apixaban which is held for IR drain placement   Estimated end date of anticoagulation is March 2022     Severe malnutrition  Continue megestrol      VTE Risk Mitigation (From admission, onward)           Ordered     IP VTE HIGH RISK PATIENT  Once         02/24/23 2016     Place sequential compression device  Until discontinued         02/24/23 2016     Reason for No Pharmacological VTE Prophylaxis  Once        Question:  Reasons:  Answer:  Physician Provided (leave comment)    02/24/23 2016                        Department Hospital Medicine  [facility]  Marbin Ty MD  Date of service: 02/26/2023

## 2023-02-27 PROBLEM — K65.1 INTRA-ABDOMINAL ABSCESS: Status: ACTIVE | Noted: 2023-02-27

## 2023-02-27 LAB
ALBUMIN FLD-MCNC: <1 G/DL
AMYLASE SERPL-CCNC: 11 U/L (ref 20–110)
AMYLASE SERPL-CCNC: 25 U/L (ref 20–110)
AMYLASE, BODY FLUID: 427 U/L
ANION GAP SERPL CALC-SCNC: 7 MMOL/L (ref 8–16)
ANION GAP SERPL CALC-SCNC: 7 MMOL/L (ref 8–16)
APPEARANCE FLD: NORMAL
BACTERIA UR CULT: ABNORMAL
BODY FLD TYPE: NORMAL
BODY FLUID SOURCE AMYLASE: NORMAL
BUN SERPL-MCNC: 12 MG/DL (ref 8–23)
BUN SERPL-MCNC: 13 MG/DL (ref 8–23)
CALCIUM SERPL-MCNC: 7.9 MG/DL (ref 8.7–10.5)
CALCIUM SERPL-MCNC: 8.1 MG/DL (ref 8.7–10.5)
CHLORIDE SERPL-SCNC: 100 MMOL/L (ref 95–110)
CHLORIDE SERPL-SCNC: 102 MMOL/L (ref 95–110)
CO2 SERPL-SCNC: 26 MMOL/L (ref 23–29)
CO2 SERPL-SCNC: 29 MMOL/L (ref 23–29)
COLOR FLD: NORMAL
CREAT SERPL-MCNC: 0.6 MG/DL (ref 0.5–1.4)
CREAT SERPL-MCNC: 0.6 MG/DL (ref 0.5–1.4)
ERYTHROCYTE [DISTWIDTH] IN BLOOD BY AUTOMATED COUNT: 19.7 % (ref 11.5–14.5)
EST. GFR  (NO RACE VARIABLE): >60 ML/MIN/1.73 M^2
EST. GFR  (NO RACE VARIABLE): >60 ML/MIN/1.73 M^2
GLUCOSE SERPL-MCNC: 247 MG/DL (ref 70–110)
GLUCOSE SERPL-MCNC: 72 MG/DL (ref 70–110)
HCT VFR BLD AUTO: 23.5 % (ref 37–48.5)
HGB BLD-MCNC: 7.8 G/DL (ref 12–16)
KOH PREP SPEC: NORMAL
LIPASE SERPL-CCNC: 19 U/L (ref 4–60)
LIPASE SERPL-CCNC: 39 U/L (ref 4–60)
MAGNESIUM SERPL-MCNC: 1.5 MG/DL (ref 1.6–2.6)
MAGNESIUM SERPL-MCNC: 1.9 MG/DL (ref 1.6–2.6)
MCH RBC QN AUTO: 30.4 PG (ref 27–31)
MCHC RBC AUTO-ENTMCNC: 33.2 G/DL (ref 32–36)
MCV RBC AUTO: 91 FL (ref 82–98)
MONOS+MACROS NFR FLD MANUAL: 4 %
NEUTROPHILS NFR FLD MANUAL: 96 %
PLATELET # BLD AUTO: 153 K/UL (ref 150–450)
PMV BLD AUTO: 9.5 FL (ref 9.2–12.9)
POTASSIUM SERPL-SCNC: 3.8 MMOL/L (ref 3.5–5.1)
POTASSIUM SERPL-SCNC: 3.9 MMOL/L (ref 3.5–5.1)
RBC # BLD AUTO: 2.57 M/UL (ref 4–5.4)
SODIUM SERPL-SCNC: 133 MMOL/L (ref 136–145)
SODIUM SERPL-SCNC: 138 MMOL/L (ref 136–145)
SPECIMEN SOURCE: NORMAL
WBC # BLD AUTO: 3.82 K/UL (ref 3.9–12.7)
WBC # FLD: NORMAL /CU MM

## 2023-02-27 PROCEDURE — 63600175 PHARM REV CODE 636 W HCPCS: Performed by: RADIOLOGY

## 2023-02-27 PROCEDURE — 36415 COLL VENOUS BLD VENIPUNCTURE: CPT | Performed by: INTERNAL MEDICINE

## 2023-02-27 PROCEDURE — 87186 SC STD MICRODIL/AGAR DIL: CPT | Mod: 59 | Performed by: INTERNAL MEDICINE

## 2023-02-27 PROCEDURE — 87205 SMEAR GRAM STAIN: CPT | Performed by: INTERNAL MEDICINE

## 2023-02-27 PROCEDURE — 80048 BASIC METABOLIC PNL TOTAL CA: CPT | Performed by: INTERNAL MEDICINE

## 2023-02-27 PROCEDURE — 85027 COMPLETE CBC AUTOMATED: CPT | Performed by: INTERNAL MEDICINE

## 2023-02-27 PROCEDURE — 82150 ASSAY OF AMYLASE: CPT | Performed by: INTERNAL MEDICINE

## 2023-02-27 PROCEDURE — 82150 ASSAY OF AMYLASE: CPT | Mod: 91 | Performed by: INTERNAL MEDICINE

## 2023-02-27 PROCEDURE — 86140 C-REACTIVE PROTEIN: CPT | Performed by: INTERNAL MEDICINE

## 2023-02-27 PROCEDURE — 99232 PR SUBSEQUENT HOSPITAL CARE,LEVL II: ICD-10-PCS | Mod: ,,, | Performed by: INTERNAL MEDICINE

## 2023-02-27 PROCEDURE — 83735 ASSAY OF MAGNESIUM: CPT | Performed by: INTERNAL MEDICINE

## 2023-02-27 PROCEDURE — 82042 OTHER SOURCE ALBUMIN QUAN EA: CPT | Performed by: INTERNAL MEDICINE

## 2023-02-27 PROCEDURE — 87070 CULTURE OTHR SPECIMN AEROBIC: CPT | Performed by: INTERNAL MEDICINE

## 2023-02-27 PROCEDURE — 87077 CULTURE AEROBIC IDENTIFY: CPT | Performed by: INTERNAL MEDICINE

## 2023-02-27 PROCEDURE — 87102 FUNGUS ISOLATION CULTURE: CPT | Performed by: INTERNAL MEDICINE

## 2023-02-27 PROCEDURE — 89051 BODY FLUID CELL COUNT: CPT | Performed by: INTERNAL MEDICINE

## 2023-02-27 PROCEDURE — 99232 SBSQ HOSP IP/OBS MODERATE 35: CPT | Mod: ,,, | Performed by: STUDENT IN AN ORGANIZED HEALTH CARE EDUCATION/TRAINING PROGRAM

## 2023-02-27 PROCEDURE — 63600175 PHARM REV CODE 636 W HCPCS: Performed by: INTERNAL MEDICINE

## 2023-02-27 PROCEDURE — 87206 SMEAR FLUORESCENT/ACID STAI: CPT | Performed by: INTERNAL MEDICINE

## 2023-02-27 PROCEDURE — 25000003 PHARM REV CODE 250: Performed by: INTERNAL MEDICINE

## 2023-02-27 PROCEDURE — 87116 MYCOBACTERIA CULTURE: CPT | Performed by: INTERNAL MEDICINE

## 2023-02-27 PROCEDURE — 83690 ASSAY OF LIPASE: CPT | Mod: 91 | Performed by: INTERNAL MEDICINE

## 2023-02-27 PROCEDURE — 87210 SMEAR WET MOUNT SALINE/INK: CPT | Performed by: INTERNAL MEDICINE

## 2023-02-27 PROCEDURE — 99232 PR SUBSEQUENT HOSPITAL CARE,LEVL II: ICD-10-PCS | Mod: ,,, | Performed by: STUDENT IN AN ORGANIZED HEALTH CARE EDUCATION/TRAINING PROGRAM

## 2023-02-27 PROCEDURE — 25000003 PHARM REV CODE 250: Performed by: STUDENT IN AN ORGANIZED HEALTH CARE EDUCATION/TRAINING PROGRAM

## 2023-02-27 PROCEDURE — 83690 ASSAY OF LIPASE: CPT | Performed by: INTERNAL MEDICINE

## 2023-02-27 PROCEDURE — 99232 SBSQ HOSP IP/OBS MODERATE 35: CPT | Mod: ,,, | Performed by: INTERNAL MEDICINE

## 2023-02-27 PROCEDURE — 12000002 HC ACUTE/MED SURGE SEMI-PRIVATE ROOM

## 2023-02-27 RX ORDER — MIDAZOLAM HYDROCHLORIDE 1 MG/ML
INJECTION INTRAMUSCULAR; INTRAVENOUS
Status: COMPLETED | OUTPATIENT
Start: 2023-02-27 | End: 2023-02-27

## 2023-02-27 RX ORDER — HYDROCODONE BITARTRATE AND ACETAMINOPHEN 7.5; 325 MG/1; MG/1
1 TABLET ORAL ONCE
Status: COMPLETED | OUTPATIENT
Start: 2023-02-27 | End: 2023-02-27

## 2023-02-27 RX ORDER — FENTANYL CITRATE 50 UG/ML
INJECTION, SOLUTION INTRAMUSCULAR; INTRAVENOUS
Status: COMPLETED | OUTPATIENT
Start: 2023-02-27 | End: 2023-02-27

## 2023-02-27 RX ADMIN — MIDAZOLAM HYDROCHLORIDE 2 MG: 1 INJECTION, SOLUTION INTRAMUSCULAR; INTRAVENOUS at 12:02

## 2023-02-27 RX ADMIN — PANCRELIPASE 6 CAPSULE: 60000; 12000; 38000 CAPSULE, DELAYED RELEASE PELLETS ORAL at 05:02

## 2023-02-27 RX ADMIN — FENTANYL CITRATE 50 MCG: 50 INJECTION INTRAMUSCULAR; INTRAVENOUS at 12:02

## 2023-02-27 RX ADMIN — HYDROCODONE BITARTRATE AND ACETAMINOPHEN 1 TABLET: 7.5; 325 TABLET ORAL at 08:02

## 2023-02-27 RX ADMIN — SERTRALINE HYDROCHLORIDE 50 MG: 50 TABLET ORAL at 08:02

## 2023-02-27 RX ADMIN — MIRTAZAPINE 15 MG: 15 TABLET, FILM COATED ORAL at 08:02

## 2023-02-27 RX ADMIN — MEGESTROL ACETATE 40 MG: 20 TABLET ORAL at 05:02

## 2023-02-27 RX ADMIN — FOLIC ACID 1 MG: 1 TABLET ORAL at 05:02

## 2023-02-27 RX ADMIN — PREDNISONE 5 MG: 5 TABLET ORAL at 05:02

## 2023-02-27 RX ADMIN — TRAZODONE HYDROCHLORIDE 50 MG: 50 TABLET ORAL at 08:02

## 2023-02-27 RX ADMIN — LACTOBACILLUS TAB 4 TABLET: TAB at 05:02

## 2023-02-27 RX ADMIN — PIPERACILLIN SODIUM AND TAZOBACTAM SODIUM 3.38 G: 3; .375 INJECTION, POWDER, LYOPHILIZED, FOR SOLUTION INTRAVENOUS at 07:02

## 2023-02-27 RX ADMIN — LEUCINE, PHENYLALANINE, LYSINE, METHIONINE, ISOLEUCINE, VALINE, HISTIDINE, THREONINE, TRYPTOPHAN, ALANINE, GLYCINE, ARGININE, PROLINE, SERINE, TYROSINE, SODIUM ACETATE, DIBASIC POTASSIUM PHOSPHATE, MAGNESIUM CHLORIDE, SODIUM CHLORIDE, CALCIUM CHLORIDE, DEXTROSE
311; 238; 247; 170; 255; 247; 204; 179; 77; 880; 438; 489; 289; 213; 17; 297; 261; 51; 77; 33; 5 INJECTION INTRAVENOUS at 06:02

## 2023-02-27 RX ADMIN — PIPERACILLIN SODIUM AND TAZOBACTAM SODIUM 3.38 G: 3; .375 INJECTION, POWDER, LYOPHILIZED, FOR SOLUTION INTRAVENOUS at 04:02

## 2023-02-27 NOTE — PROGRESS NOTES
[facility]  Department of Cardiology  Consult Note      PATIENT NAME: Claire Bowser    MRN: [id number]  TODAY'S DATE: 02/27/2023  ADMIT DATE: 2/24/2023                          CONSULT REQUESTED BY: Marbin Ty MD    SUBJECTIVE     PRINCIPAL PROBLEM: Peritoneal fluid collection      2/27/2023    Patient lying in bed in no distress. Visitor at bedside. She denies CP. She denies an increase in SOB. She has a history of DVT September 2022 in which she is on eliquis however this is being held secondary to IR drain placement. Troponin is trending down. ECHO is WNL. LVEF 60%.     REASON FOR CONSULT:    From H&P: 63-year-old  female with known rheumatoid arthritis, chronic pancreatitis complicated by pancreatic pseudocyst, pancreatic sphincterotomy, bile leak status post stenting with improvement in symptoms, DVT on apixaban (dx in 09/2022) who was recently discharged after being managed for generalized weakness and diarrhea presents with exertional dyspnea.     History supplemented by  at bedside.  Since discharge her appetite and weakness have improved.  However with last 2 days she has been experiencing dyspnea which is worse with minimal exertion and better with rest.  No chest pain, orthopnea or paroxysmal nocturnal dyspnea.  Admits chronic bilateral lower extremity edema which is largely unchanged.  It is worse at the end of the day and better with limb elevation.  No fever, chills, abdominal pain or nausea/vomiting.  No further diarrhea with increased pancreatic enzyme supplement dosing as per GI recommendations from prior hospitalization.     In the ED, she was found to have elevated troponin.  CTA chest was negative for pulmonary embolism but revealed bilateral pleural effusions.  CT of the abdomen/pelvis revealed large loculated fluid collection with air within the pelvis concerning for abscess.     Rest of the 10 point review of systems is negative except as mentioned  above.      HPI:  63-year-old female who presented to the emergency room with exertional dyspnea.  Patient noted to have bilateral pleural effusions and elevated troponin level.  Troponin level was flat.  Patient is noted to have peritoneal fluid collection and there is plan for IR drainage on Monday.  Patient seen today sitting up in bed with  at bedside.  She appears to be breathing without much difficulty on room air and denies any chest discomfort.  She denies any past history of heart related issues.  Her rhythm is stable on telemetry.        Review of patient's allergies indicates:   Allergen Reactions    No known drug allergies        Past Medical History:   Diagnosis Date    Acute biliary pancreatitis 6/14/2022    Anxiety     Chronic pancreatitis 1/2/2023    Digestive disorder     Flu 02/2017    [facility]    Hypertension     Long-term use of immunosuppressant medication 6/10/2022    Rheumatoid arthritis involving multiple sites with positive rheumatoid factor 01/14/2021     Past Surgical History:   Procedure Laterality Date    COLONOSCOPY N/A 2/26/2021    Procedure: COLONOSCOPY;  Surgeon: Amy Sidhu MD;  Location: [facility];  Service: Endoscopy;  Laterality: N/A;    ENDOSCOPIC ULTRASOUND OF UPPER GASTROINTESTINAL TRACT N/A 7/1/2022    Procedure: ULTRASOUND, UPPER GI TRACT, ENDOSCOPIC;  Surgeon: Donavon Jewell III, MD;  Location: [facility] ENDO;  Service: Endoscopy;  Laterality: N/A;    ENDOSCOPIC ULTRASOUND OF UPPER GASTROINTESTINAL TRACT N/A 11/29/2022    Procedure: ULTRASOUND, UPPER GI TRACT, ENDOSCOPIC;  Surgeon: Donavon Jewell III, MD;  Location: [facility] ENDO;  Service: Endoscopy;  Laterality: N/A;    ENDOSCOPIC ULTRASOUND OF UPPER GASTROINTESTINAL TRACT N/A 1/3/2023    Procedure: ULTRASOUND, UPPER GI TRACT, ENDOSCOPIC;  Surgeon: Donavon Jewell III, MD;  Location: [facility] ENDO;  Service: Endoscopy;  Laterality: N/A;    ERCP N/A 12/30/2022    Procedure: ERCP (ENDOSCOPIC  RETROGRADE CHOLANGIOPANCREATOGRAPHY);  Surgeon: Donavon Jewell III, MD;  Location: [facility] ENDO;  Service: Endoscopy;  Laterality: N/A;    ERCP N/A 1/24/2023    Procedure: ERCP (ENDOSCOPIC RETROGRADE CHOLANGIOPANCREATOGRAPHY);  Surgeon: Rock Martines MD;  Location: [facility] ENDO (2ND FLR);  Service: Endoscopy;  Laterality: N/A;    ESOPHAGOGASTRODUODENOSCOPY N/A 2/26/2021    Procedure: EGD (ESOPHAGOGASTRODUODENOSCOPY);  Surgeon: Amy Sidhu MD;  Location: [facility] ENDO;  Service: Endoscopy;  Laterality: N/A;    LAPAROSCOPIC CHOLECYSTECTOMY N/A 7/27/2022    Procedure: CHOLECYSTECTOMY, LAPAROSCOPIC;  Surgeon: Ramón Hwang III, MD;  Location: [facility] OR;  Service: General;  Laterality: N/A;    TUBAL LIGATION       Social History     Tobacco Use    Smoking status: Every Day     Packs/day: 1.00     Years: 7.00     Pack years: 7.00     Types: Cigarettes    Smokeless tobacco: Never    Tobacco comments:     [phone]   Substance Use Topics    Alcohol use: No    Drug use: Never        REVIEW OF SYSTEMS  Per HPI      OBJECTIVE     VITAL SIGNS (Most Recent)  Temp: 98.2 °F (36.8 °C) (02/27/23 0740)  Pulse: 105 (02/27/23 0740)  Resp: 18 (02/27/23 0740)  BP: 121/80 (02/27/23 0740)  SpO2: 97 % (02/27/23 0740)    VENTILATION STATUS  Resp: 18 (02/27/23 0740)  SpO2: 97 % (02/27/23 0740)       I & O (Last 24H):  Intake/Output Summary (Last 24 hours) at 2/27/2023 0909  Last data filed at 2/26/2023 1634  Gross per 24 hour   Intake 525 ml   Output 150 ml   Net 375 ml       WEIGHTS  Wt Readings from Last 1 Encounters:   02/26/23 1456 48.1 kg (106 lb 0.7 oz)   02/26/23 1453 48.1 kg (106 lb 0.7 oz)   02/25/23 0040 48.1 kg (106 lb 0.7 oz)   02/24/23 0854 48.1 kg (106 lb)       PHYSICAL EXAM  CONSTITUTIONAL: thin frail adult female lying in bed with no distress; No fever, no chills  HEENT: Normocephalic, atraumatic, PALLOR NOTED          NECK:  No JVD no carotid bruit  CVS: S1S2+, RRR  LUNGS: Diminished R>L  ABDOMEN: Soft, NT,  BS+  EXTREMITIES: No cyanosis, edema  : No lkae catheter  NEURO: AAO X 3  PSY: Normal affect      HOME MEDICATIONS:  No current facility-administered medications on file prior to encounter.     Current Outpatient Medications on File Prior to Encounter   Medication Sig Dispense Refill    acidophilus-sporogenes (ACIDOPHILUS EX STR, L. SPOROG,) 35 million- 25 million cell Tab Take 1 tablet by mouth once daily. 30 tablet 0    [] apixaban (ELIQUIS) 5 mg Tab Take 1 tablet (5 mg total) by mouth 2 (two) times daily. 60 tablet 0    cyclobenzaprine (FLEXERIL) 5 MG tablet Take 5 mg by mouth nightly.      folic acid (FOLVITE) 1 MG tablet Take 1 tablet (1 mg total) by mouth once daily. 360 tablet 3    lipase-protease-amylase 12,000-38,000-60,000 units (CREON) CpDR Take 6 capsules by mouth 3 (three) times daily with meals. 540 capsule 3    loperamide (IMODIUM) 2 mg capsule Take 1 capsule (2 mg total) by mouth 4 (four) times daily as needed for Diarrhea. 30 capsule 0    megestroL (MEGACE) 40 MG Tab Take 1 tablet (40 mg total) by mouth once daily. 30 tablet 0    metoprolol succinate (TOPROL-XL) 50 MG 24 hr tablet Take 50 mg by mouth once daily.      mirtazapine (REMERON) 15 MG tablet Take 1 tablet (15 mg total) by mouth every evening. 90 tablet 0    pantoprazole (PROTONIX) 40 MG tablet Take 1 tablet (40 mg total) by mouth once daily. 90 tablet 0    sertraline (ZOLOFT) 50 MG tablet Take 1 tablet (50 mg total) by mouth every evening. 30 tablet 11    traZODone (DESYREL) 50 MG tablet Take 1 tablet (50 mg total) by mouth every evening. 90 tablet 1       SCHEDULED MEDS:   folic acid  1 mg Oral Daily    Lactobacillus acidoph-L.bulgar  4 tablet Oral TID WM    lipase-protease-amylase 12,000-38,000-60,000 units  6 capsule Oral TID WM    megestroL  40 mg Oral Daily    mirtazapine  15 mg Oral QHS    pantoprazole  40 mg Oral Before breakfast    piperacillin-tazobactam (ZOSYN) IVPB  3.375 g Intravenous Q8H    predniSONE  5 mg Oral  Daily    sertraline  50 mg Oral QHS    traZODone  50 mg Oral QHS       CONTINUOUS INFUSIONS:    PRN MEDS:sodium chloride, acetaminophen, magnesium oxide, magnesium oxide, melatonin, naloxone, ondansetron, polyethylene glycol, potassium bicarbonate, potassium bicarbonate, potassium bicarbonate, potassium, sodium phosphates, potassium, sodium phosphates, potassium, sodium phosphates    LABS AND DIAGNOSTICS     CBC LAST 3 DAYS  Recent Labs   Lab 02/24/23  1114 02/25/23  1308 02/25/23  2212 02/26/23  0328 02/27/23  0445   WBC 4.07 4.55  --  3.90 3.82*   RBC 2.53* 2.26*  --  2.73* 2.57*   HGB 7.6* 6.9* 7.9* 8.1* 7.8*   HCT 24.3* 21.7* 24.1* 24.7* 23.5*   MCV 96 96  --  92 91   MCH 30.0 30.5  --  29.7 30.4   MCHC 31.3* 31.8*  --  32.8 33.2   RDW 20.5* 21.2*  --  19.4* 19.7*    278  --  196 153   MPV 9.8 9.7  --  9.3 9.5   GRAN 57.0  --   --   --   --    LYMPH 20.0  --   --   --   --    MONO 5.0  --   --   --   --    NRBC 0  --   --   --   --        COAGULATION LAST 3 DAYS  No results for input(s): LABPT, INR, APTT in the last 168 hours.    CHEMISTRY LAST 3 DAYS  Recent Labs   Lab 02/24/23  1114 02/25/23  0842 02/26/23  0328 02/27/23  0344 02/27/23  0548   *   < > 133* 138 133*   K 2.8*   < > 3.0* 3.9 3.8   CL 99   < > 101 102 100   CO2 26   < > 26 29 26   ANIONGAP 7*   < > 6* 7* 7*   BUN 18   < > 16 12 13   CREATININE 0.6   < > 0.7 0.6 0.6   GLU 61*   < > 90 247* 72   CALCIUM 8.7   < > 8.3* 7.9* 8.1*   MG 1.8   < > 1.9 1.5* 1.9   ALBUMIN 1.5*  --   --   --   --    PROT 6.3  --   --   --   --    ALKPHOS 103  --   --   --   --    ALT 39  --   --   --   --    AST 48*  --   --   --   --    BILITOT 1.6*  --   --   --   --     < > = values in this interval not displayed.       CARDIAC PROFILE LAST 3 DAYS  Recent Labs   Lab 02/24/23  1114 02/24/23  1411   *  --    TROPONINIHS 1340.5* 1032.2*       ENDOCRINE LAST 3 DAYS  No results for input(s): TSH, PROCAL in the last 168 hours.    LAST ARTERIAL BLOOD  GAS  ABG  No results for input(s): PH, PO2, PCO2, HCO3, BE in the last 168 hours.    LAST 7 DAYS MICROBIOLOGY   Microbiology Results (last 7 days)       Procedure Component Value Units Date/Time    Blood culture [[id number]] Collected: 02/26/23 0535    Order Status: Completed Specimen: Blood from Line, PICC Right Basilic Updated: 02/27/23 0832     Blood Culture, Routine No Growth to date      No Growth to date    Urine culture [[id number]]  (Abnormal)  (Susceptibility) Collected: 02/24/23 1528    Order Status: Completed Specimen: Urine Updated: 02/27/23 0650     Urine Culture, Routine ESCHERICHIA COLI  > 100,000 cfu/ml      Narrative:      Specimen Source->Urine    Gram stain [[id number]]     Order Status: No result Specimen: Body Fluid from Peritoneal Fluid     AFB culture [[id number]]     Order Status: No result Specimen: Body Fluid from Peritoneal Fluid     Fungus culture [[id number]]     Order Status: No result Specimen: Body Fluid from Peritoneal Fluid     KOH prep [[id number]]     Order Status: No result Specimen: Body Fluid from Peritoneal Fluid     Blood culture [[id number]] Collected: 02/25/23 1831    Order Status: Completed Specimen: Blood from Line, PICC Right Basilic Updated: 02/26/23 2032     Blood Culture, Routine No Growth to date      No Growth to date            MOST RECENT IMAGING  X-Ray Chest 1 View  AP chest radiograph: 2/25/2023 7:10 PM CST    History: 63 years  old Female with Verify PICC placement.    Comparison: Chest radiograph performed 2/8/2023.    Findings: The cardiomediastinal silhouette is normal in size.    No pneumothorax is seen.    There are bibasilar space opacities with small bilateral effusions, right greater than left. These appear slightly increased since prior imaging.    There is partial obscuration of both diaphragms, likely due to overlying airspace opacities and/or effusions.    A right upper extremity PICC line catheter tip projects at the SVC/right atrial  junction.    Impression: There are bibasilar space opacities with small bilateral effusions, right greater than left. These appear slightly increased since prior imaging.    Electronically signed by:  Karly Jones DO  2/25/2023 7:10 PM [facility] Workstation: [id number]      ECHOCARDIOGRAM RESULTS (last 5)  Results for orders placed during the hospital encounter of 02/24/23    Echo    Interpretation Summary  · The left ventricle is normal in size with normal systolic function.  · The estimated ejection fraction is 60%.  · Normal left ventricular diastolic function.  · Normal right ventricular size with normal right ventricular systolic function.  · Trivial circumferential pericardial effusion.  · There are bilateral pleural effusions. Large      Results for orders placed during the hospital encounter of 02/08/23    Echo    Interpretation Summary  · Concentric remodeling and normal systolic function.  · The estimated ejection fraction is 60%.  · Normal left ventricular diastolic function.  · Normal right ventricular size with normal right ventricular systolic function.  · Mild mitral regurgitation.  · Mild to moderate tricuspid regurgitation.  · There is mild pulmonary hypertension.  · Guarded interpretation suboptimal secondary to patient factors      Results for orders placed during the hospital encounter of 10/17/22    STRESS TEST REPORT      Echo    Interpretation Summary  · The left ventricle is normal in size with concentric remodeling and normal systolic function. No wall motion abnormalities  · The estimated ejection fraction is 55%.  · Normal left ventricular diastolic function.  · Atrial fibrillation not observed.  · Normal right ventricular size with normal right ventricular systolic function.  · There is mild pulmonary hypertension.  · Mild tricuspid regurgitation.  · Intermediate central venous pressure (8 mmHg).  · The estimated PA systolic pressure is 52 mmHg.      Results for orders placed during the  hospital encounter of 06/14/22    Echo    Interpretation Summary  · The left ventricle is normal in size with normal systolic function.  · The estimated ejection fraction is 54%.  · Normal left ventricular diastolic function.  · Atrial fibrillation not observed.  · Normal right ventricular size with normal right ventricular systolic function.  · There is mild pulmonary hypertension.  · Mild to moderate tricuspid regurgitation.  · Intermediate central venous pressure (8 mmHg).  · The estimated PA systolic pressure is 44 mmHg.  · No bubble study      CURRENT/PREVIOUS VISIT EKG  Results for orders placed or performed during the hospital encounter of 02/24/23   EKG 12-lead    Collection Time: 02/24/23  9:38 AM    Narrative    Test Reason : R07.9,    Vent. Rate : 079 BPM     Atrial Rate : 079 BPM     P-R Int : 128 ms          QRS Dur : 066 ms      QT Int : 392 ms       P-R-T Axes : 077 013 -56 degrees     QTc Int : 449 ms    Normal sinus rhythm  Low voltage QRS  Nonspecific T wave abnormality  Abnormal ECG  When compared with ECG of 08-FEB-2023 10:05,  No significant change was found  Confirmed by Ricci MARIANO, Ta DANIEL ([id number]) on 2/25/2023 6:48:33 PM    Referred By: [email or web address]   SELF           Confirmed By:Ta Wynne MD           ASSESSMENT/PLAN:     Active Hospital Problems    Diagnosis    *Peritoneal fluid collection    Acute on chronic anemia    Elevated troponin    Pseudocyst of pancreas    Chronic pancreatitis    Severe protein-calorie malnutrition    Hypokalemia    Tobacco use    Generalized anxiety disorder     63-year-old  female with complicated GI history including pancreatitis (June 2022) complicated by leak status post stenting, status post laparoscopic cholecystectomy as well as dual sphincterotomy who was admitted for shortness of breath and was found to have peritoneal fluid collection    ASSESSMENT & PLAN:     Nondiagnostic troponin elevation-trending down no chest pain LVEF stable at 60%  Acute  on chronic anemia  Peritoneal fluid collection  Hypokalemia   Pancreatitis   Severe malnutrition  Smoker   Hypoalbuminemia  Bilateral pleural effusion  H/O DVT -Eliquis on hold for possible IR drainage      RECOMMENDATIONS:    Troponin elevation likely from demand ischemia secondary to anemia and infection.   Would consider GI work up for anemia although malnutrition could be a cause as well  Once cleared from infection and GI standpoint because she is a smoker she can follow up in the clinic for more testing as an OP.   Will sign off for now   Reconsult if needed    Kari Paula NP  [facility]  Department of Cardiology  Date of Service: 02/27/2023  3:34 PM     I have reviewed the Nurse Practitioner's history and physical, assessment, and plan. I agree with the findings and made appropriate changes as necessary in recommendations.      Jeff Garcia MD  Department of Cardiology  [facility]  02/27/2023

## 2023-02-27 NOTE — PROGRESS NOTES
[facility]  Progress Note    Patient name: Claire Bowser  MRN: [id number]  Admit Date: 2/24/2023   LOS: 1 day     SUBJECTIVE:     Principal problem: Peritoneal fluid collection    Interval History:  No acute overnight events reported.  Awaiting IR drain placement.  Denies abdominal pain, nausea or vomiting.    Summary:  63-year-old  female with complicated GI history including pancreatitis (June 2022) complicated by leak status post stenting, status post laparoscopic cholecystectomy as well as dual sphincterotomy who was admitted for shortness of breath and was found to have peritoneal fluid collection on imaging.  Started on empiric antibiotics due to concern for infectious process.  GI, General surgery and Infectious Disease consulted.  Patient underwent IR guided peritoneal drain placement on 02/27.    Scheduled Meds:   folic acid  1 mg Oral Daily    Lactobacillus acidoph-L.bulgar  4 tablet Oral TID WM    lipase-protease-amylase 12,000-38,000-60,000 units  6 capsule Oral TID WM    megestroL  40 mg Oral Daily    mirtazapine  15 mg Oral QHS    pantoprazole  40 mg Oral Before breakfast    piperacillin-tazobactam (ZOSYN) IVPB  3.375 g Intravenous Q8H    predniSONE  5 mg Oral Daily    sertraline  50 mg Oral QHS    traZODone  50 mg Oral QHS     Continuous Infusions:  PRN Meds:sodium chloride, acetaminophen, magnesium oxide, magnesium oxide, melatonin, naloxone, ondansetron, polyethylene glycol, potassium bicarbonate, potassium bicarbonate, potassium bicarbonate, potassium, sodium phosphates, potassium, sodium phosphates, potassium, sodium phosphates    Review of patient's allergies indicates:   Allergen Reactions    No known drug allergies        Review of Systems: As per interval history    OBJECTIVE:     Vital Signs (Most Recent)  Temp: 98.6 °F (37 °C) (02/27/23 1108)  Pulse: (!) 130 (02/27/23 1224)  Resp: 18 (02/27/23 1224)  BP: 112/74 (02/27/23 1224)  SpO2: 99 % (02/27/23  1224)    Vital Signs Range (Last 24H):  Temp:  [97.9 °F (36.6 °C)-98.6 °F (37 °C)]   Pulse:  []   Resp:  [18]   BP: (112-168)/(74-93)   SpO2:  [95 %-100 %]     I & O (Last 24H):  Intake/Output Summary (Last 24 hours) at 2/27/2023 1639  Last data filed at 2/27/2023 1615  Gross per 24 hour   Intake --   Output 400 ml   Net -400 ml         Physical Exam:  General: Frail. No acute distress  Eyes: No conjunctival injection. No scleral icterus.  ENT: Hearing grossly intact. No discharge from ears. No nasal discharge.   Neck: Supple, trachea midline. No JVD  CVS: RRR. No LE edema BL  Lungs:  No tachypnea or accessory muscle use.  Clear to auscultation bilaterally  Abdomen:  Soft, nontender and nondistended.  No organomegaly  Neuro: AOx3. Moves all extremities. Follows commands. Responds appropriately   Skin:  No rash or erythema noted    Laboratory:  I have reviewed all pertinent lab results within the past 24 hours.  CBC:   Recent Labs   Lab 02/27/23  0445   WBC 3.82*   RBC 2.57*   HGB 7.8*   HCT 23.5*      MCV 91   MCH 30.4   MCHC 33.2       CMP:   Recent Labs   Lab 02/24/23  1114 02/25/23  0842 02/27/23  0548   GLU 61*   < > 72   CALCIUM 8.7   < > 8.1*   ALBUMIN 1.5*  --   --    PROT 6.3  --   --    *   < > 133*   K 2.8*   < > 3.8   CO2 26   < > 26   CL 99   < > 100   BUN 18   < > 13   CREATININE 0.6   < > 0.6   ALKPHOS 103  --   --    ALT 39  --   --    AST 48*  --   --    BILITOT 1.6*  --   --     < > = values in this interval not displayed.         Diagnostic Results:  Labs: Reviewed    ASSESSMENT/PLAN:       Active Hospital Problems    Diagnosis  POA    *Peritoneal fluid collection [R18.8]  Yes    Acute on chronic anemia [D64.9]  Yes    Elevated troponin [R77.8]  Yes    Pseudocyst of pancreas [K86.3]  Yes    Chronic pancreatitis [K86.1]  Yes    Severe protein-calorie malnutrition [E43]  Yes     Chronic    Hypokalemia [E87.6]  Yes    Tobacco use [Z72.0]  Yes     Chronic    Generalized anxiety  disorder [F41.1]  Yes     Chronic      Resolved Hospital Problems   No resolved problems to display.         Plan:   Large peritoneal fluid collection concerning for possible abscess in the presence of air   No systemic signs of infection   However given size of the fluid collection started on antibiotic therapy with piperacillin-tazobactam which will be continued pending fluid analysis  Infectious disease, general surgery and GI on board   Status post peritoneal drain placement by IR earlier today  Follow up fluid analysis     Bilateral pleural effusions  Elevated troponin - nondiagnostic and unlikely ACS  Could be 3rd spacing in the setting of hypoalbuminemia versus less likely congestive heart failure  BNP within normal limits   Previous echocardiogram from 2 weeks ago with no evidence of congestive heart failure   Repeated echocardiogram 02/24 - trivial pericardial effusion  Appreciate cardiology input     Chronic pancreatitis  Symptoms are stable.  Continue pancreatic enzyme supplement  GI following     Acute on chronic anemia   Improved with 1 unit of blood transfusion   Monitor with daily CBC     History of rheumatoid arthritis  Hold leflunomide.  Continue low-dose prednisone     History of DVT in September 2022   On apixaban which was held for IR drain placement; restart from tomorrow a.m.  Estimated end date of anticoagulation is March 2022     Severe malnutrition  Continue megestrol      VTE Risk Mitigation (From admission, onward)           Ordered     IP VTE HIGH RISK PATIENT  Once         02/24/23 2016     Place sequential compression device  Until discontinued         02/24/23 2016     Reason for No Pharmacological VTE Prophylaxis  Once        Question:  Reasons:  Answer:  Physician Provided (leave comment)    02/24/23 2016                        Department Hospital Medicine  [facility]  Marbin Ty MD  Date of service: 02/27/2023

## 2023-02-27 NOTE — SEDATION DOCUMENTATION
Ct guided peritoneal drain placement completed.  Pt peter well.  8fr drain to rLQ secured with Stay Fixx dressing attached to JULIUS drain with reservoir bag. Brownish purulent drainage noted.  Spec to lab.  Pt transported to  1203 with RN and  bedside report given to BRINDA Mo.  Sedation meds given in procedure reported to pt's nurse.

## 2023-02-27 NOTE — PROGRESS NOTES
Patient seen and examined.  Resting comfortably.  Underwent IR drain placement today.    Vitals were stable until noon when she was tachycardic   Afebrile abdomen soft  IR drain draining copious amounts of purulent fluid     No leukocytosis     Overall doing well after IR drain placement.  Leave the drain in for the next 2 weeks.  If drainage is thickened starting to clogged the drain, okay to flush the tubing with 10 cc of sterile saline as needed.  This may need to be done at home as well.  Patient may follow-up with me in the office in 2 weeks for drain removal once output is low.  Would keep inpatient on antibiotics until cultures result.

## 2023-02-27 NOTE — PROGRESS NOTES
Consult Note  Infectious Disease    Reason for Consult:  Intra-abdominal collection    HPI: Claire Bowser is a 63 y.o. female known to ID service from January, 2023 with PMHx emphysema, smoker, HTN, DVT on Eliquis, anxiety, chronic pancreatitis since biliary pancreatitis and cholecystectomy 07/2022, ,pancreatic sphincterotomy, bile leak-status post stent 12/30/22, multiple pancreatic pseudocysts, cyst gastrostomy tube placement, malnutrition, albumin 1.5, OA,  rheumatoid arthritis,    Patient came to ED on 2/24 with complaints of generalized weakness, fatigue, dyspnea on exertion, not able to stand, but not much abdominal discomfort??  CT chest ruled out PE.  CT abdomen was impressive for a big air-fluid collection in pelvis.  Patient is seen by General surgery who is recommending percutaneous drainage, no operative intervention at this time.    Patient is started on Zosyn and we are waiting for IR.    Patient has no new complaints overnight.  She feels slightly better, but still weak.  At baseline patient lays in bed most of the time.  She uses the bathroom 3 times a day.  She eats in bed, her  who is retired attends to her.  Urine culture is no growth to date.  UA with mild pyuria 20.  She had no urinary complaints until yesterday when she had some urgency and frequency.  No blood cultures are sent.  Troponin is a 1000 and EC HO is as below.  .  Hemoglobin of 6.9.  A unit of PRBC is already ordered.  No obvious source of bleeding    2/27(Paulette): consult reviewed and d/w Dr. Valenzuela, family. She is even more underweight than my last visit. The IR drain has put out 200 cc already. She has been too weak to move around for the last several days. Discussed need for IV antibiotics for a while, and this plus IV nutrition may be enough to get her into LTAC.     Antibiotics (From admission, onward)      Start     Stop Route Frequency Ordered    02/25/23 0330  piperacillin-tazobactam 3.375 g in dextrose 5 % 50 mL  IVPB (ready to mix system)         -- IV Every 8 hours (non-standard times) 02/24/23 1719          Antifungals (From admission, onward)      None           Antibiotic History:   Now on Zosyn   01/26-01/31/2023 ciprofloxacin  01/23/2023--01/26/2023 ceftriaxone   01/24/2023 metronidazole   01/15/2023--01/21/2023 cefepime   01/15/2023--01/18/2023 vancomycin   01/06/2023--01/16/2023 Ceftin  Prior to it she she received doses of ertapenem, Zosyn, vancomycin, Bactrim.      EXAM & DIAGNOSTICS REVIEWED:   Vitals:     Temp:  [97.9 °F (36.6 °C)-98.6 °F (37 °C)]   Temp: 98.3 °F (36.8 °C) (02/27/23 1653)  Pulse: (!) 122 (02/27/23 1653)  Resp: 18 (02/27/23 1653)  BP: 132/71 (02/27/23 1653)  SpO2: 96 % (02/27/23 1653)    Intake/Output Summary (Last 24 hours) at 2/27/2023 1706  Last data filed at 2/27/2023 1615  Gross per 24 hour   Intake --   Output 400 ml   Net -400 ml       General:  In NAD. Alert and attentive, cooperative, comfortable.  Older looking than her age. cachectic  Eyes:  Anicteric,  EOMI  ENT:  No ulcers, exudates, thrush, nares patent, dentition is fair  Neck:  supple,   Lungs: Clear, no consolidation, rales, wheezes, rub  Heart:  RRR, no gallop/murmur/rub noted  Abd:  Soft, NT, ND, normal BS, no masses or organomegaly appreciated.  :  Voids urine clear, no flank tenderness  Musc:  Joints without effusion, swelling, erythema, synovitis, with extreme muscle wasting.   Skin:  easy bruising.  Thin skin.     Neuro:             Alert, attentive, speech fluent, face symmetric, moves all extremities, no focal weakness. not Ambulatory  Psych:  Calm, cooperative.  Pleasant.  Lymphatic:     Extrem: No edema, erythema, phlebitis, cellulitis, warm and well perfused  VAD:  Midline left arm     Isolation:    Wound: IR drain with 200 cc of pus      Lines/Tubes/Drains:    General Labs reviewed:  Recent Labs   Lab 02/25/23  1308 02/25/23  2212 02/26/23  0328 02/27/23  0445   WBC 4.55  --  3.90 3.82*   HGB 6.9* 7.9* 8.1* 7.8*    HCT 21.7* 24.1* 24.7* 23.5*     --  196 153       Recent Labs   Lab 02/24/23  1114 02/25/23  0842 02/26/23  0328 02/27/23  0344 02/27/23  0548   *   < > 133* 138 133*   K 2.8*   < > 3.0* 3.9 3.8   CL 99   < > 101 102 100   CO2 26   < > 26 29 26   BUN 18   < > 16 12 13   CREATININE 0.6   < > 0.7 0.6 0.6   CALCIUM 8.7   < > 8.3* 7.9* 8.1*   PROT 6.3  --   --   --   --    BILITOT 1.6*  --   --   --   --    ALKPHOS 103  --   --   --   --    ALT 39  --   --   --   --    AST 48*  --   --   --   --     < > = values in this interval not displayed.     Recent Labs   Lab 02/26/23 0328   CRP 7.80*         Micro:  Microbiology Results (last 7 days)       Procedure Component Value Units Date/Time    Culture, Body Fluid (Aerobic) w/ GS [[id number]] Collected: 02/27/23 1242    Order Status: Completed Specimen: Peritoneal Fluid Updated: 02/27/23 1551     Gram Stain Result Moderate WBC's      Few Gram positive cocci      Few variable positive rods    Narrative:      Peritoneal Fluid (Abscess)    KOH prep [[id number]] Collected: 02/27/23 1242    Order Status: Completed Specimen: Body Fluid from Peritoneal Fluid Updated: 02/27/23 1551     KOH Prep No yeast or fungal elements seen    Fungus culture [[id number]] Collected: 02/27/23 1242    Order Status: Sent Specimen: Body Fluid from Peritoneal Fluid Updated: 02/27/23 1257    AFB culture [[id number]] Collected: 02/27/23 1242    Order Status: Sent Specimen: Body Fluid from Peritoneal Fluid Updated: 02/27/23 1256    Gram stain [[id number]] Collected: 02/27/23 1242    Order Status: Canceled Specimen: Body Fluid from Peritoneal Fluid     Blood culture [[id number]] Collected: 02/26/23 0535    Order Status: Completed Specimen: Blood from Line, PICC Right Basilic Updated: 02/27/23 0832     Blood Culture, Routine No Growth to date      No Growth to date    Urine culture [[id number]]  (Abnormal)  (Susceptibility) Collected: 02/24/23 1528    Order Status: Completed Specimen: Urine  Updated: 02/27/23 0650     Urine Culture, Routine ESCHERICHIA COLI  > 100,000 cfu/ml      Narrative:      Specimen Source->Urine    Blood culture [[id number]] Collected: 02/25/23 1831    Order Status: Completed Specimen: Blood from Line, PICC Right Basilic Updated: 02/26/23 2032     Blood Culture, Routine No Growth to date      No Growth to date            Imaging Reviewed:   CXR   CT  Essentially complete resolution of free fluid within the peritoneal cavity since the preceding CT scan dated 1/15/2023. There has been interval development of a loculated appearing collection of fluid and air in the pelvis extending up both paracolic gutters that is likely contained within a portion of the peritoneal cavity. This may represent a large abscess given the presence of the air. It may communicate with bowel, although no direct indication is evident. There is a large right and moderately large left pleural effusion that of both increased in size significantly as well.  Cardiology:  02/24/2023.  The left ventricle is normal in size with normal systolic function.  The estimated ejection fraction is 60%.  Normal left ventricular diastolic function.  Normal right ventricular size with normal right ventricular systolic function.  Trivial circumferential pericardial effusion.  There are bilateral pleural effusions. Large    EC HO [id number]   Concentric remodeling and normal systolic function.  The estimated ejection fraction is 60%.  Normal left ventricular diastolic function.  Normal right ventricular size with normal right ventricular systolic function.  Mild mitral regurgitation.  Mild to moderate tricuspid regurgitation.  There is mild pulmonary hypertension.  Guarded interpretation suboptimal secondary to patient factors      Procedures.    01/24/2023 ERCP - Prior biliary sphincterotomy appeared open.                          - Prior pancreatic sphincterotomy appeared open.                          - One stent from the  pancreatic duct was seen in    the major papilla.                          - One stent was removed from the pancreatic duct.                          - A pancreatic duct leak was found.                          - One plastic stent was placed into the ventral  pancreatic duct.   Recommendation:   Repeat ERCP in 6 weeks to check healing.   01/03/2023 upper EUS  The pancreatic duct measured up to 2 mm in diameter. Stent seen in PD in HOP. Therapeutic needle aspiration for fluid was performed.  The amount of fluid collected was 150 mL. The fluid was opaque and brown.   12/30/2022 ERCP  - A mild ectactic, slight narrowed region just above ampulla was found.   A pancreatic sphincterotomy was performed. Pancreatic sludge / debris seen folowing   sphincterotomy.  One pancreatic stent was placed into the ventral pancreatic duct. The entire main bile duct was mildly dilated. The patient has had a cholecystectomy.  A biliary sphincterotomy was performed.  An area successfully injected w/ epi.   11/29/2022 upper EUS.    07/01/2022 upper EUS.    02/26/2021 upper GI.    02/26/2021 colonoscopy.        IMPRESSION & PLAN   Reason for ED visit =generalized weakness which is multifactorial.  Markedly elevated troponin.  EC HO within normal limits.  As per hospitalist.      Bilateral large effusions in setting of recent pancreatitis, low albumin.   Anemia.  Status post PRBC x1      Large pelvic abscess, polymicrobial gram stain, culture in progress    Drain placed 2/27  Pyuria,  UTI Ecoli   Immunosuppressed due to treatment for RA  Malnutrition, albumin 1.5.  Decreased appetite  History of chronic pancreatitis, pancreatic pseudocyst   History of rheumatoid arthritis on leflunomide and prednisone 5 mg,.     History of DVT on anticoagulation.      Recommendations:    Continue Zosyn       Trend CRP   Push nutrition, consider clinimix or TPN,LTAC for this and IV anti biotics.    D/w Dr. Ty      Medical Decision Making during this  encounter was  [_] Low Complexity  [_] Moderate Complexity  [ xx ] High Complexity

## 2023-02-28 LAB — CRP SERPL-MCNC: 6.57 MG/DL

## 2023-02-28 PROCEDURE — 25000003 PHARM REV CODE 250: Performed by: INTERNAL MEDICINE

## 2023-02-28 PROCEDURE — 99232 PR SUBSEQUENT HOSPITAL CARE,LEVL II: ICD-10-PCS | Mod: ,,, | Performed by: INTERNAL MEDICINE

## 2023-02-28 PROCEDURE — 99232 SBSQ HOSP IP/OBS MODERATE 35: CPT | Mod: ,,, | Performed by: INTERNAL MEDICINE

## 2023-02-28 PROCEDURE — 63600175 PHARM REV CODE 636 W HCPCS: Performed by: INTERNAL MEDICINE

## 2023-02-28 PROCEDURE — 36415 COLL VENOUS BLD VENIPUNCTURE: CPT | Performed by: INTERNAL MEDICINE

## 2023-02-28 PROCEDURE — 12000002 HC ACUTE/MED SURGE SEMI-PRIVATE ROOM

## 2023-02-28 RX ADMIN — LACTOBACILLUS TAB 4 TABLET: TAB at 08:02

## 2023-02-28 RX ADMIN — PANCRELIPASE 6 CAPSULE: 60000; 12000; 38000 CAPSULE, DELAYED RELEASE PELLETS ORAL at 05:02

## 2023-02-28 RX ADMIN — LACTOBACILLUS TAB 4 TABLET: TAB at 05:02

## 2023-02-28 RX ADMIN — APIXABAN 5 MG: 5 TABLET, FILM COATED ORAL at 08:02

## 2023-02-28 RX ADMIN — PREDNISONE 5 MG: 5 TABLET ORAL at 08:02

## 2023-02-28 RX ADMIN — MIRTAZAPINE 15 MG: 15 TABLET, FILM COATED ORAL at 08:02

## 2023-02-28 RX ADMIN — SERTRALINE HYDROCHLORIDE 50 MG: 50 TABLET ORAL at 08:02

## 2023-02-28 RX ADMIN — PANCRELIPASE 6 CAPSULE: 60000; 12000; 38000 CAPSULE, DELAYED RELEASE PELLETS ORAL at 08:02

## 2023-02-28 RX ADMIN — PIPERACILLIN SODIUM AND TAZOBACTAM SODIUM 3.38 G: 3; .375 INJECTION, POWDER, LYOPHILIZED, FOR SOLUTION INTRAVENOUS at 03:02

## 2023-02-28 RX ADMIN — LACTOBACILLUS TAB 4 TABLET: TAB at 12:02

## 2023-02-28 RX ADMIN — MEGESTROL ACETATE 40 MG: 20 TABLET ORAL at 08:02

## 2023-02-28 RX ADMIN — TRAZODONE HYDROCHLORIDE 50 MG: 50 TABLET ORAL at 08:02

## 2023-02-28 RX ADMIN — FOLIC ACID 1 MG: 1 TABLET ORAL at 08:02

## 2023-02-28 RX ADMIN — PANTOPRAZOLE SODIUM 40 MG: 40 TABLET, DELAYED RELEASE ORAL at 05:02

## 2023-02-28 RX ADMIN — ACETAMINOPHEN 650 MG: 325 TABLET ORAL at 08:02

## 2023-02-28 RX ADMIN — PANCRELIPASE 6 CAPSULE: 60000; 12000; 38000 CAPSULE, DELAYED RELEASE PELLETS ORAL at 12:02

## 2023-02-28 RX ADMIN — PIPERACILLIN SODIUM AND TAZOBACTAM SODIUM 3.38 G: 3; .375 INJECTION, POWDER, LYOPHILIZED, FOR SOLUTION INTRAVENOUS at 08:02

## 2023-02-28 RX ADMIN — LEUCINE, PHENYLALANINE, LYSINE, METHIONINE, ISOLEUCINE, VALINE, HISTIDINE, THREONINE, TRYPTOPHAN, ALANINE, GLYCINE, ARGININE, PROLINE, SERINE, TYROSINE, SODIUM ACETATE, DIBASIC POTASSIUM PHOSPHATE, MAGNESIUM CHLORIDE, SODIUM CHLORIDE, CALCIUM CHLORIDE, DEXTROSE
311; 238; 247; 170; 255; 247; 204; 179; 77; 880; 438; 489; 289; 213; 17; 297; 261; 51; 77; 33; 5 INJECTION INTRAVENOUS at 08:02

## 2023-02-28 RX ADMIN — PIPERACILLIN SODIUM AND TAZOBACTAM SODIUM 3.38 G: 3; .375 INJECTION, POWDER, LYOPHILIZED, FOR SOLUTION INTRAVENOUS at 12:02

## 2023-02-28 RX ADMIN — ACETAMINOPHEN 650 MG: 325 TABLET ORAL at 05:02

## 2023-02-28 NOTE — PROGRESS NOTES
[facility] Medicine  Progress Note    Patient name: Claire Bowser  MRN: [id number]  Admit Date: 2/24/2023   LOS: 2 days     SUBJECTIVE:     Principal problem: Peritoneal fluid collection    Interval History:  No acute overnight events reported.  S/p IR drain placement - drainage improving.   Denies abdominal pain, nausea or vomiting.    Summary:  63-year-old  female with complicated GI history including pancreatitis (June 2022) complicated by leak status post stenting, status post laparoscopic cholecystectomy as well as dual sphincterotomy who was admitted for shortness of breath and was found to have peritoneal fluid collection on imaging.  Started on empiric antibiotics due to concern for infectious process.  GI, General surgery and Infectious Disease consulted.  Patient underwent IR guided peritoneal drain placement on 02/27. Cx growing Gram negative rods and Enterococcus sp.     Scheduled Meds:   apixaban  5 mg Oral BID    folic acid  1 mg Oral Daily    Lactobacillus acidoph-L.bulgar  4 tablet Oral TID WM    lipase-protease-amylase 12,000-38,000-60,000 units  6 capsule Oral TID WM    megestroL  40 mg Oral Daily    mirtazapine  15 mg Oral QHS    pantoprazole  40 mg Oral Before breakfast    piperacillin-tazobactam (ZOSYN) IVPB  3.375 g Intravenous Q8H    predniSONE  5 mg Oral Daily    sertraline  50 mg Oral QHS    traZODone  50 mg Oral QHS     Continuous Infusions:   amino acids 4.25%-[id number] 75 mL/hr at 02/27/23 1845     PRN Meds:sodium chloride, acetaminophen, magnesium oxide, magnesium oxide, melatonin, naloxone, ondansetron, polyethylene glycol, potassium bicarbonate, potassium bicarbonate, potassium bicarbonate, potassium, sodium phosphates, potassium, sodium phosphates, potassium, sodium phosphates    Review of patient's allergies indicates:   Allergen Reactions    No known drug allergies        Review of Systems: As per interval history    OBJECTIVE:     Vital Signs  (Most Recent)  Temp: 98.4 °F (36.9 °C) (02/28/23 1113)  Pulse: 106 (02/28/23 1113)  Resp: 18 (02/28/23 1113)  BP: (!) 138/91 (02/28/23 1113)  SpO2: 98 % (02/28/23 1113)    Vital Signs Range (Last 24H):  Temp:  [98 °F (36.7 °C)-98.7 °F (37.1 °C)]   Pulse:  []   Resp:  [17-18]   BP: (126-182)/(78-94)   SpO2:  [96 %-100 %]     I & O (Last 24H):  Intake/Output Summary (Last 24 hours) at 2/28/2023 1734  Last data filed at 2/28/2023 0519  Gross per 24 hour   Intake --   Output 600 ml   Net -600 ml         Physical Exam:  General: Frail. No acute distress  Eyes: No conjunctival injection. No scleral icterus.  ENT: Hearing grossly intact. No discharge from ears. No nasal discharge.   Neck: Supple, trachea midline. No JVD  CVS: RRR. No LE edema BL  Lungs:  No tachypnea or accessory muscle use.  Clear to auscultation bilaterally  Abdomen:  Soft, nontender and nondistended.  No organomegaly  Neuro: AOx3. Moves all extremities. Follows commands. Responds appropriately   Skin:  No rash or erythema noted    Laboratory:  I have reviewed all pertinent lab results within the past 24 hours.  CBC:   Recent Labs   Lab 02/27/23  0445   WBC 3.82*   RBC 2.57*   HGB 7.8*   HCT 23.5*      MCV 91   MCH 30.4   MCHC 33.2       CMP:   Recent Labs   Lab 02/24/23  1114 02/25/23  0842 02/27/23  0548   GLU 61*   < > 72   CALCIUM 8.7   < > 8.1*   ALBUMIN 1.5*  --   --    PROT 6.3  --   --    *   < > 133*   K 2.8*   < > 3.8   CO2 26   < > 26   CL 99   < > 100   BUN 18   < > 13   CREATININE 0.6   < > 0.6   ALKPHOS 103  --   --    ALT 39  --   --    AST 48*  --   --    BILITOT 1.6*  --   --     < > = values in this interval not displayed.         Diagnostic Results:  Labs: Reviewed    ASSESSMENT/PLAN:       Active Hospital Problems    Diagnosis  POA    *Peritoneal fluid collection [R18.8]  Yes    Intra-abdominal abscess [K65.1]  Yes    Acute on chronic anemia [D64.9]  Yes    Elevated troponin [R77.8]  Yes    Pseudocyst of pancreas  [K86.3]  Yes    Chronic pancreatitis [K86.1]  Yes    Severe protein-calorie malnutrition [E43]  Yes     Chronic    Hypokalemia [E87.6]  Yes    Tobacco use [Z72.0]  Yes     Chronic    Generalized anxiety disorder [F41.1]  Yes     Chronic      Resolved Hospital Problems   No resolved problems to display.         Plan:   Large peritoneal fluid collection concerning for possible abscess in the presence of air   No systemic signs of infection   However given size of the fluid collection started on antibiotic therapy with piperacillin-tazobactam which will be continued pending fluid analysis  Infectious disease, general surgery and GI on board   Status post peritoneal drain placement by IR on 02/27   Cx growing Enterococcus and Gram negative rods      Bilateral pleural effusions  Elevated troponin - nondiagnostic and unlikely ACS  Could be 3rd spacing in the setting of hypoalbuminemia versus less likely congestive heart failure  BNP within normal limits   Previous echocardiogram from 2 weeks ago with no evidence of congestive heart failure   Repeated echocardiogram 02/24 - trivial pericardial effusion  Appreciate cardiology input     Chronic pancreatitis  Symptoms are stable.  Continue pancreatic enzyme supplement  GI following     Acute on chronic anemia   Improved with 1 unit of blood transfusion   Monitor with daily CBC     History of rheumatoid arthritis  Hold leflunomide.  Continue low-dose prednisone     History of DVT in September 2022   On apixaban which was held for IR drain placement; restart from tomorrow a.m.  Estimated end date of anticoagulation is March 2022     Severe malnutrition  Continue megestrol  TPN started. Dietitian following     LTAC for long term abx and optimizing nutrition. CM consult for the same       VTE Risk Mitigation (From admission, onward)           Ordered     apixaban tablet 5 mg  2 times daily         02/27/23 1702     IP VTE HIGH RISK PATIENT  Once         02/24/23 2016     Place  sequential compression device  Until discontinued         02/24/23 2016     Reason for No Pharmacological VTE Prophylaxis  Once        Question:  Reasons:  Answer:  Physician Provided (leave comment)    02/24/23 2016                        Department Hospital Medicine  [facility]  Marbin Ty MD  Date of service: 02/28/2023

## 2023-02-28 NOTE — PLAN OF CARE
JULIUS drain clean dry intact to RLQ draining milky brown drainage. Pain controlled.   Problem: Adult Inpatient Plan of Care  Goal: Plan of Care Review  Outcome: Ongoing, Progressing  Goal: Patient-Specific Goal (Individualized)  Outcome: Ongoing, Progressing  Goal: Absence of Hospital-Acquired Illness or Injury  Outcome: Ongoing, Progressing  Goal: Optimal Comfort and Wellbeing  Outcome: Ongoing, Progressing  Goal: Readiness for Transition of Care  Outcome: Ongoing, Progressing

## 2023-02-28 NOTE — PLAN OF CARE
Spoke with patient at bedside concerning Long Term Acute Care placement, to which patient verbalizes understanding and agreement.  Patient choice form signed and scanned into .  Referral sent to the following facilities via [facility]: [facility], [facility], and [facility].       02/28/23 1122   Discharge Reassessment   Assessment Type Discharge Planning Reassessment   Did the patient's condition or plan change since previous assessment? Yes   Discharge Plan discussed with: Patient   Communicated RASHI with patient/caregiver Date not available/Unable to determine   Discharge Plan A Long-term acute care facility (LTAC)   Discharge Plan B Long-term acute care facility (LTAC)   DME Needed Upon Discharge  none   Discharge Barriers Identified None   Why the patient remains in the hospital Requires continued medical care   Post-Acute Status   Post-Acute Authorization Placement   Post-Acute Placement Status Referrals Sent   Patient choice form signed by patient/caregiver List with quality metrics by geographic area provided   Discharge Delays None known at this time

## 2023-02-28 NOTE — NURSING
Patient went for abscess drained and has a JULIUS drain with drainage pouch attached. Patient has copious amounts of brown milky drainage noted, no odor noted. Family at bedside all shift. Denies any c/o pain

## 2023-02-28 NOTE — PROGRESS NOTES
Consult Note  Infectious Disease    Reason for Consult:  Intra-abdominal collection    HPI: Claire Bowser is a 63 y.o. female known to ID service from January, 2023 with PMHx emphysema, smoker, HTN, DVT on Eliquis, anxiety, chronic pancreatitis since biliary pancreatitis and cholecystectomy 07/2022, ,pancreatic sphincterotomy, bile leak-status post stent 12/30/22, multiple pancreatic pseudocysts, cyst gastrostomy tube placement, malnutrition, albumin 1.5, OA,  rheumatoid arthritis,    Patient came to ED on 2/24 with complaints of generalized weakness, fatigue, dyspnea on exertion, not able to stand, but not much abdominal discomfort??  CT chest ruled out PE.  CT abdomen was impressive for a big air-fluid collection in pelvis.  Patient is seen by General surgery who is recommending percutaneous drainage, no operative intervention at this time.    Patient is started on Zosyn and we are waiting for IR.    Patient has no new complaints overnight.  She feels slightly better, but still weak.  At baseline patient lays in bed most of the time.  She uses the bathroom 3 times a day.  She eats in bed, her  who is retired attends to her.  Urine culture is no growth to date.  UA with mild pyuria 20.  She had no urinary complaints until yesterday when she had some urgency and frequency.  No blood cultures are sent.  Troponin is a 1000 and EC HO is as below.  .  Hemoglobin of 6.9.  A unit of PRBC is already ordered.  No obvious source of bleeding    2/27(Paulette): consult reviewed and d/w Dr. Valenzuela, family. She is even more underweight than my last visit. The IR drain has put out 200 cc already. She has been too weak to move around for the last several days. Discussed need for IV antibiotics for a while, and this plus IV nutrition may be enough to get her into LTAC.   2/28: interim reviewed. Afebrile. WBC still in the normal range. 1000 mL relieved from intra-abd abscess since placement yesterday. Enterococcus and a GNR  isolated from culture, final pending. Intake not recorded yesterday but she did eat at least one meal. She ate well today. She is reluctant to get out of bed    Antibiotics (From admission, onward)      Start     Stop Route Frequency Ordered    02/25/23 0330  piperacillin-tazobactam 3.375 g in dextrose 5 % 50 mL IVPB (ready to mix system)         -- IV Every 8 hours (non-standard times) 02/24/23 1719          Antifungals (From admission, onward)      None           Antibiotic History:   Now on Zosyn   01/26-01/31/2023 ciprofloxacin  01/23/2023--01/26/2023 ceftriaxone   01/24/2023 metronidazole   01/15/2023--01/21/2023 cefepime   01/15/2023--01/18/2023 vancomycin   01/06/2023--01/16/2023 Ceftin  Prior to it she she received doses of ertapenem, Zosyn, vancomycin, Bactrim.      EXAM & DIAGNOSTICS REVIEWED:   Vitals:     Temp:  [98 °F (36.7 °C)-98.7 °F (37.1 °C)]   Temp: 98.4 °F (36.9 °C) (02/28/23 1113)  Pulse: 106 (02/28/23 1113)  Resp: 18 (02/28/23 1113)  BP: (!) 138/91 (02/28/23 1113)  SpO2: 98 % (02/28/23 1113)    Intake/Output Summary (Last 24 hours) at 2/28/2023 1443  Last data filed at 2/28/2023 0519  Gross per 24 hour   Intake --   Output 1000 ml   Net -1000 ml       General:  In NAD. Alert and attentive, cooperative, comfortable.  Older looking than her age. cachectic  Eyes:  Anicteric,  EOMI  ENT:  No ulcers, exudates, thrush, nares patent, dentition is fair  Neck:  supple,   Lungs: Clear, no consolidation, rales, wheezes, rub  Heart:  RRR, no gallop/murmur/rub noted  Abd:  Soft, NT, ND, normal BS, no masses or organomegaly appreciated.  :  Voids purewick, urine clear but a little concentrated, no flank tenderness  Musc:  Joints without effusion, swelling, erythema, synovitis, with extreme muscle wasting.   Skin:  easy bruising.  Thin skin.     Neuro:             Alert, attentive, speech fluent, face symmetric, moves all extremities, no focal weakness. not Ambulatory  Psych:  Calm, cooperative.   Pleasant.  Lymphatic:     Extrem: No edema, erythema, phlebitis, cellulitis, warm and well perfused  VAD:  Midline left arm     Isolation:    Wound: IR drain with thinner pus      Lines/Tubes/Drains:    General Labs reviewed:  Recent Labs   Lab 02/25/23  1308 02/25/23  2212 02/26/23  0328 02/27/23  0445   WBC 4.55  --  3.90 3.82*   HGB 6.9* 7.9* 8.1* 7.8*   HCT 21.7* 24.1* 24.7* 23.5*     --  196 153       Recent Labs   Lab 02/24/23  1114 02/25/23  0842 02/26/23  0328 02/27/23  0344 02/27/23  0548   *   < > 133* 138 133*   K 2.8*   < > 3.0* 3.9 3.8   CL 99   < > 101 102 100   CO2 26   < > 26 29 26   BUN 18   < > 16 12 13   CREATININE 0.6   < > 0.7 0.6 0.6   CALCIUM 8.7   < > 8.3* 7.9* 8.1*   PROT 6.3  --   --   --   --    BILITOT 1.6*  --   --   --   --    ALKPHOS 103  --   --   --   --    ALT 39  --   --   --   --    AST 48*  --   --   --   --     < > = values in this interval not displayed.     Recent Labs   Lab 02/26/23 0328 02/27/23  0548   CRP 7.80* 6.57*         Micro:  Microbiology Results (last 7 days)       Procedure Component Value Units Date/Time    Culture, Body Fluid (Aerobic) w/ GS [[id number]]  (Abnormal) Collected: 02/27/23 1242    Order Status: Completed Specimen: Peritoneal Fluid Updated: 02/28/23 0841     AEROBIC CULTURE - FLUID ENTEROCOCCUS SPECIES  Moderate  Identification and susceptibility pending        GRAM NEGATIVE COLLEEN, LACTOSE   Moderate  Identification and susceptibility pending       Gram Stain Result Moderate WBC's      Few Gram positive cocci      Few variable positive rods    Narrative:      Peritoneal Fluid (Abscess)    Blood culture [[id number]] Collected: 02/26/23 0535    Order Status: Completed Specimen: Blood from Line, PICC Right Basilic Updated: 02/28/23 0832     Blood Culture, Routine No Growth to date      No Growth to date      No Growth to date    Blood culture [[id number]] Collected: 02/25/23 1831    Order Status: Completed Specimen: Blood from Line,  PICC Right Basilic Updated: 02/27/23 2032     Blood Culture, Routine No Growth to date      No Growth to date      No Growth to date    KOH prep [[id number]] Collected: 02/27/23 1242    Order Status: Completed Specimen: Body Fluid from Peritoneal Fluid Updated: 02/27/23 1551     KOH Prep No yeast or fungal elements seen    Fungus culture [[id number]] Collected: 02/27/23 1242    Order Status: Sent Specimen: Body Fluid from Peritoneal Fluid Updated: 02/27/23 1257    AFB culture [[id number]] Collected: 02/27/23 1242    Order Status: Sent Specimen: Body Fluid from Peritoneal Fluid Updated: 02/27/23 1256    Gram stain [[id number]] Collected: 02/27/23 1242    Order Status: Canceled Specimen: Body Fluid from Peritoneal Fluid     Urine culture [[id number]]  (Abnormal)  (Susceptibility) Collected: 02/24/23 1528    Order Status: Completed Specimen: Urine Updated: 02/27/23 0650     Urine Culture, Routine ESCHERICHIA COLI  > 100,000 cfu/ml      Narrative:      Specimen Source->Urine            Imaging Reviewed:   CXR   CT  Essentially complete resolution of free fluid within the peritoneal cavity since the preceding CT scan dated 1/15/2023. There has been interval development of a loculated appearing collection of fluid and air in the pelvis extending up both paracolic gutters that is likely contained within a portion of the peritoneal cavity. This may represent a large abscess given the presence of the air. It may communicate with bowel, although no direct indication is evident. There is a large right and moderately large left pleural effusion that of both increased in size significantly as well.  Cardiology:  02/24/2023.  The left ventricle is normal in size with normal systolic function.  The estimated ejection fraction is 60%.  Normal left ventricular diastolic function.  Normal right ventricular size with normal right ventricular systolic function.  Trivial circumferential pericardial effusion.  There are bilateral pleural  effusions. Large    EC HO [id number]   Concentric remodeling and normal systolic function.  The estimated ejection fraction is 60%.  Normal left ventricular diastolic function.  Normal right ventricular size with normal right ventricular systolic function.  Mild mitral regurgitation.  Mild to moderate tricuspid regurgitation.  There is mild pulmonary hypertension.  Guarded interpretation suboptimal secondary to patient factors      Procedures.    01/24/2023 ERCP - Prior biliary sphincterotomy appeared open.                          - Prior pancreatic sphincterotomy appeared open.                          - One stent from the pancreatic duct was seen in    the major papilla.                          - One stent was removed from the pancreatic duct.                          - A pancreatic duct leak was found.                          - One plastic stent was placed into the ventral  pancreatic duct.   Recommendation:   Repeat ERCP in 6 weeks to check healing.   01/03/2023 upper EUS  The pancreatic duct measured up to 2 mm in diameter. Stent seen in PD in HOP. Therapeutic needle aspiration for fluid was performed.  The amount of fluid collected was 150 mL. The fluid was opaque and brown.   12/30/2022 ERCP  - A mild ectactic, slight narrowed region just above ampulla was found.   A pancreatic sphincterotomy was performed. Pancreatic sludge / debris seen folowing   sphincterotomy.  One pancreatic stent was placed into the ventral pancreatic duct. The entire main bile duct was mildly dilated. The patient has had a cholecystectomy.  A biliary sphincterotomy was performed.  An area successfully injected w/ epi.   11/29/2022 upper EUS.    07/01/2022 upper EUS.    02/26/2021 upper GI.    02/26/2021 colonoscopy.        IMPRESSION & PLAN     1. Reason for ED visit =generalized weakness which is multifactorial.  2 Markedly elevated troponin.  EC HO within normal limits.  As per hospitalist.      3. Bilateral large effusions in  setting of recent pancreatitis, low albumin.  4  Anemia.  Status post PRBC x1  5.  Large pelvic abscess, polymicrobial gram stain, culture in progress    Drain placed 2/27, 1000 mL of pus relieved in the first 24 hours    6. Pyuria,  UTI Ecoli  7.  Immunosuppressed due to treatment for RA  8. Malnutrition, albumin 1.5.  Decreased appetite  9. History of chronic pancreatitis, pancreatic pseudocyst   10. History of rheumatoid arthritis on leflunomide and prednisone 5 mg,.     11. History of DVT on anticoagulation.      Recommendations:    Continue Zosyn  , awaiting final culture results     Trend CRP   Push nutrition, consider clinimix or TPN,LTAC for this and IV anti biotics.    D/w Dr. Ty and patient/    Medical Decision Making during this encounter was  [_] Low Complexity  [_] Moderate Complexity  [ xx ] High Complexity

## 2023-02-28 NOTE — CONSULTS
Small red area mid back previous pressure injury closed at this time.  Padded with Mepilex    sacrum 2 small stage 2 pressure injuries, healing 0.4x0.3x0.1cm, 0.3x0.3x0.1cm pink wound bed red periwound, redness rectum and labia.  Cleaned and dried purewick did not seem to be working, brief wet.  Notified staff who is changing purewick.  Patient cleand and new brief place.  Calmoseptine thin layer applied    Left arm healing skin tear and bruising. 2.3x4x0.1cm pink wound bed. Cleaned and applied Medihoney covered with adaptic, gauze and wrapped with kerlix    Right arm healing skin tear 2.4x1.6x0.1cm pink wound bed bruising Cleaned and applied Medihoney covered with adaptic, gauze and wrapped with kerlix.  Waffle mattress in use, elevated heels on pillows.

## 2023-03-01 LAB
ABO + RH BLD: NORMAL
ANION GAP SERPL CALC-SCNC: 3 MMOL/L (ref 8–16)
BLD GP AB SCN CELLS X3 SERPL QL: NORMAL
BLD PROD TYP BPU: NORMAL
BLOOD UNIT EXPIRATION DATE: NORMAL
BLOOD UNIT TYPE CODE: 5100
BLOOD UNIT TYPE: NORMAL
BUN SERPL-MCNC: 16 MG/DL (ref 8–23)
CALCIUM SERPL-MCNC: 8.1 MG/DL (ref 8.7–10.5)
CHLORIDE SERPL-SCNC: 104 MMOL/L (ref 95–110)
CO2 SERPL-SCNC: 27 MMOL/L (ref 23–29)
CODING SYSTEM: NORMAL
CREAT SERPL-MCNC: 0.7 MG/DL (ref 0.5–1.4)
CROSSMATCH INTERPRETATION: NORMAL
CRP SERPL-MCNC: 3.05 MG/DL
DISPENSE STATUS: NORMAL
ERYTHROCYTE [DISTWIDTH] IN BLOOD BY AUTOMATED COUNT: 18.8 % (ref 11.5–14.5)
EST. GFR  (NO RACE VARIABLE): >60 ML/MIN/1.73 M^2
GLUCOSE SERPL-MCNC: 112 MG/DL (ref 70–110)
HCT VFR BLD AUTO: 21 % (ref 37–48.5)
HGB BLD-MCNC: 6.7 G/DL (ref 12–16)
MAGNESIUM SERPL-MCNC: 2.1 MG/DL (ref 1.6–2.6)
MCH RBC QN AUTO: 29.8 PG (ref 27–31)
MCHC RBC AUTO-ENTMCNC: 31.9 G/DL (ref 32–36)
MCV RBC AUTO: 93 FL (ref 82–98)
NUM UNITS TRANS PACKED RBC: NORMAL
PLATELET # BLD AUTO: 126 K/UL (ref 150–450)
PMV BLD AUTO: 9.8 FL (ref 9.2–12.9)
POTASSIUM SERPL-SCNC: 3.7 MMOL/L (ref 3.5–5.1)
RBC # BLD AUTO: 2.25 M/UL (ref 4–5.4)
SODIUM SERPL-SCNC: 134 MMOL/L (ref 136–145)
WBC # BLD AUTO: 5.77 K/UL (ref 3.9–12.7)

## 2023-03-01 PROCEDURE — 85027 COMPLETE CBC AUTOMATED: CPT | Performed by: INTERNAL MEDICINE

## 2023-03-01 PROCEDURE — 25000003 PHARM REV CODE 250: Performed by: INTERNAL MEDICINE

## 2023-03-01 PROCEDURE — 80048 BASIC METABOLIC PNL TOTAL CA: CPT | Performed by: INTERNAL MEDICINE

## 2023-03-01 PROCEDURE — P9016 RBC LEUKOCYTES REDUCED: HCPCS | Performed by: STUDENT IN AN ORGANIZED HEALTH CARE EDUCATION/TRAINING PROGRAM

## 2023-03-01 PROCEDURE — 83735 ASSAY OF MAGNESIUM: CPT | Performed by: INTERNAL MEDICINE

## 2023-03-01 PROCEDURE — 97162 PT EVAL MOD COMPLEX 30 MIN: CPT

## 2023-03-01 PROCEDURE — 12000002 HC ACUTE/MED SURGE SEMI-PRIVATE ROOM

## 2023-03-01 PROCEDURE — 99232 SBSQ HOSP IP/OBS MODERATE 35: CPT | Mod: ,,, | Performed by: INTERNAL MEDICINE

## 2023-03-01 PROCEDURE — 97530 THERAPEUTIC ACTIVITIES: CPT

## 2023-03-01 PROCEDURE — 86902 BLOOD TYPE ANTIGEN DONOR EA: CPT | Performed by: STUDENT IN AN ORGANIZED HEALTH CARE EDUCATION/TRAINING PROGRAM

## 2023-03-01 PROCEDURE — 86900 BLOOD TYPING SEROLOGIC ABO: CPT | Performed by: STUDENT IN AN ORGANIZED HEALTH CARE EDUCATION/TRAINING PROGRAM

## 2023-03-01 PROCEDURE — 86140 C-REACTIVE PROTEIN: CPT | Performed by: INTERNAL MEDICINE

## 2023-03-01 PROCEDURE — 86922 COMPATIBILITY TEST ANTIGLOB: CPT | Performed by: STUDENT IN AN ORGANIZED HEALTH CARE EDUCATION/TRAINING PROGRAM

## 2023-03-01 PROCEDURE — 99232 PR SUBSEQUENT HOSPITAL CARE,LEVL II: ICD-10-PCS | Mod: ,,, | Performed by: INTERNAL MEDICINE

## 2023-03-01 PROCEDURE — 63600175 PHARM REV CODE 636 W HCPCS: Performed by: INTERNAL MEDICINE

## 2023-03-01 RX ORDER — HYDROCODONE BITARTRATE AND ACETAMINOPHEN 500; 5 MG/1; MG/1
TABLET ORAL
Status: DISCONTINUED | OUTPATIENT
Start: 2023-03-01 | End: 2023-03-06 | Stop reason: HOSPADM

## 2023-03-01 RX ORDER — LABETALOL HYDROCHLORIDE 5 MG/ML
10 INJECTION, SOLUTION INTRAVENOUS EVERY 6 HOURS PRN
Status: DISCONTINUED | OUTPATIENT
Start: 2023-03-01 | End: 2023-03-06 | Stop reason: HOSPADM

## 2023-03-01 RX ADMIN — PANCRELIPASE 6 CAPSULE: 60000; 12000; 38000 CAPSULE, DELAYED RELEASE PELLETS ORAL at 05:03

## 2023-03-01 RX ADMIN — PREDNISONE 5 MG: 5 TABLET ORAL at 07:03

## 2023-03-01 RX ADMIN — PIPERACILLIN SODIUM AND TAZOBACTAM SODIUM 3.38 G: 3; .375 INJECTION, POWDER, LYOPHILIZED, FOR SOLUTION INTRAVENOUS at 11:03

## 2023-03-01 RX ADMIN — PANCRELIPASE 6 CAPSULE: 60000; 12000; 38000 CAPSULE, DELAYED RELEASE PELLETS ORAL at 07:03

## 2023-03-01 RX ADMIN — PANTOPRAZOLE SODIUM 40 MG: 40 TABLET, DELAYED RELEASE ORAL at 05:03

## 2023-03-01 RX ADMIN — LACTOBACILLUS TAB 4 TABLET: TAB at 07:03

## 2023-03-01 RX ADMIN — APIXABAN 5 MG: 5 TABLET, FILM COATED ORAL at 07:03

## 2023-03-01 RX ADMIN — FOLIC ACID 1 MG: 1 TABLET ORAL at 07:03

## 2023-03-01 RX ADMIN — MIRTAZAPINE 15 MG: 15 TABLET, FILM COATED ORAL at 08:03

## 2023-03-01 RX ADMIN — PIPERACILLIN SODIUM AND TAZOBACTAM SODIUM 3.38 G: 3; .375 INJECTION, POWDER, LYOPHILIZED, FOR SOLUTION INTRAVENOUS at 03:03

## 2023-03-01 RX ADMIN — LEUCINE, PHENYLALANINE, LYSINE, METHIONINE, ISOLEUCINE, VALINE, HISTIDINE, THREONINE, TRYPTOPHAN, ALANINE, GLYCINE, ARGININE, PROLINE, SERINE, TYROSINE, SODIUM ACETATE, DIBASIC POTASSIUM PHOSPHATE, MAGNESIUM CHLORIDE, SODIUM CHLORIDE, CALCIUM CHLORIDE, DEXTROSE
311; 238; 247; 170; 255; 247; 204; 179; 77; 880; 438; 489; 289; 213; 17; 297; 261; 51; 77; 33; 5 INJECTION INTRAVENOUS at 07:03

## 2023-03-01 RX ADMIN — LACTOBACILLUS TAB 4 TABLET: TAB at 12:03

## 2023-03-01 RX ADMIN — SERTRALINE HYDROCHLORIDE 50 MG: 50 TABLET ORAL at 08:03

## 2023-03-01 RX ADMIN — PIPERACILLIN SODIUM AND TAZOBACTAM SODIUM 3.38 G: 3; .375 INJECTION, POWDER, LYOPHILIZED, FOR SOLUTION INTRAVENOUS at 06:03

## 2023-03-01 RX ADMIN — APIXABAN 5 MG: 5 TABLET, FILM COATED ORAL at 08:03

## 2023-03-01 RX ADMIN — TRAZODONE HYDROCHLORIDE 50 MG: 50 TABLET ORAL at 08:03

## 2023-03-01 RX ADMIN — LACTOBACILLUS TAB 4 TABLET: TAB at 05:03

## 2023-03-01 RX ADMIN — PIPERACILLIN SODIUM AND TAZOBACTAM SODIUM 3.38 G: 3; .375 INJECTION, POWDER, LYOPHILIZED, FOR SOLUTION INTRAVENOUS at 08:03

## 2023-03-01 RX ADMIN — PANCRELIPASE 6 CAPSULE: 60000; 12000; 38000 CAPSULE, DELAYED RELEASE PELLETS ORAL at 12:03

## 2023-03-01 NOTE — PT/OT/SLP EVAL
"Physical Therapy Evaluation    Patient Name:  Claire Bowser   MRN:  [id number]    Recommendations:     Discharge Recommendations: nursing facility, skilled, LTACH (long-term acute care hospital)   Discharge Equipment Recommendations:   TBD  Barriers to discharge:  Increased caregiver burden of care    Assessment:     Claire Bowser is a 63 y.o. female admitted with a medical diagnosis of Peritoneal fluid collection.  She presents with the following impairments/functional limitations: weakness, impaired endurance, impaired self care skills, impaired functional mobility, gait instability, impaired balance, decreased lower extremity function, decreased upper extremity function, impaired cardiopulmonary response to activity .  Pt presented in supine Alert and oriented x4. She is fail and cachectic. She t/f'ed supine to sit with Min A. She voiced fear of falling with t/f sit to stand with Min A x2.  She took 4 steps with RW and Min A. Her HR increased  from 124 to 160 with minimal activity. Pt was returning to bed as nurse entered room to report pt's HR.     Rehab Prognosis: Good; patient would benefit from acute skilled PT services to address these deficits and reach maximum level of function.    Recent Surgery: * No surgery found *      Plan:     During this hospitalization, patient to be seen 6 x/week to address the identified rehab impairments via gait training, therapeutic activities, therapeutic exercises and progress toward the following goals:    Plan of Care Expires:  04/01/23    Subjective     Chief Complaint: "I can't walk."  Patient/Family Comments/goals: Increased ability  for ambulation  Pain/Comfort:  Pain Rating 1: 0/10    Patients cultural, spiritual, [employer] conflicts given the current situation:      Living Environment:  Pt lives at home with her  in a [facility] with threshold entrance.  Prior to admission, patient required assistance with limited ambulation with AD and required assistance for " bathing and dressing.  Equipment used at home: shower chair, walker, rolling, rollator.  DME owned (not currently used): none.  Upon discharge, patient will have assistance from facility/family.    Objective:     Communicated with nurse prior to session.  Patient found supine with PICC line, telemetry, bed alarm (closed suction drain)  upon PT entry to room.    General Precautions: Standard, fall  Orthopedic Precautions:    Braces:    Respiratory Status: Room air    Exams:  Cognitive Exam:  Patient is oriented to Person, Place, Time, and Situation  RLE ROM: WFL  RLE Strength: WFL  LLE ROM: WFL  LLE Strength: WFL    Functional Mobility:  Bed Mobility:     Supine to Sit: minimum assistance  Sit to Supine: minimum assistance  Transfers:     Sit to Stand:  minimum assistance x2  with rolling walker  Gait:  4 steps with RW and Min A x2  Balance: Unsupported  sitting balance  Min A for standing balance with RW      AM-PAC 6 CLICK MOBILITY  Total Score:14       Treatment & Education:  Pt was educated on the role of PT  for assessment, treatment with emphasis on safety and increased mobility and D/C  recommendations.     Patient left supine with all lines intact, call button in reach, and bed alarm on.    GOALS:   Multidisciplinary Problems       Physical Therapy Goals          Problem: Physical Therapy    Goal Priority Disciplines Outcome Goal Variances Interventions   Physical Therapy Goal     PT, PT/OT      Description: Goals to be met by: 2023     Patient will increase functional independence with mobility by performin. Supine to sit with Stand-by Assistance  2. Sit to stand transfer with Contact Guard Assistance  3. Gait  x 50  feet with Minimal Assistance using Rolling Walker.                          History:     Past Medical History:   Diagnosis Date    Acute biliary pancreatitis 2022    Anxiety     Chronic pancreatitis 2023    Digestive disorder     Flu 2017    [facility]     Hypertension     Long-term use of immunosuppressant medication 6/10/2022    Rheumatoid arthritis involving multiple sites with positive rheumatoid factor 01/14/2021       Past Surgical History:   Procedure Laterality Date    COLONOSCOPY N/A 2/26/2021    Procedure: COLONOSCOPY;  Surgeon: Amy Sidhu MD;  Location: [facility];  Service: Endoscopy;  Laterality: N/A;    ENDOSCOPIC ULTRASOUND OF UPPER GASTROINTESTINAL TRACT N/A 7/1/2022    Procedure: ULTRASOUND, UPPER GI TRACT, ENDOSCOPIC;  Surgeon: Donavon Jewell III, MD;  Location: [facility];  Service: Endoscopy;  Laterality: N/A;    ENDOSCOPIC ULTRASOUND OF UPPER GASTROINTESTINAL TRACT N/A 11/29/2022    Procedure: ULTRASOUND, UPPER GI TRACT, ENDOSCOPIC;  Surgeon: Donavon Jewell III, MD;  Location: [facility];  Service: Endoscopy;  Laterality: N/A;    ENDOSCOPIC ULTRASOUND OF UPPER GASTROINTESTINAL TRACT N/A 1/3/2023    Procedure: ULTRASOUND, UPPER GI TRACT, ENDOSCOPIC;  Surgeon: Donavon Jewell III, MD;  Location: [facility];  Service: Endoscopy;  Laterality: N/A;    ERCP N/A 12/30/2022    Procedure: ERCP (ENDOSCOPIC RETROGRADE CHOLANGIOPANCREATOGRAPHY);  Surgeon: Donavon Jewell III, MD;  Location: [facility];  Service: Endoscopy;  Laterality: N/A;    ERCP N/A 1/24/2023    Procedure: ERCP (ENDOSCOPIC RETROGRADE CHOLANGIOPANCREATOGRAPHY);  Surgeon: Rock Martines MD;  Location: [address]);  Service: Endoscopy;  Laterality: N/A;    ESOPHAGOGASTRODUODENOSCOPY N/A 2/26/2021    Procedure: EGD (ESOPHAGOGASTRODUODENOSCOPY);  Surgeon: Amy Sidhu MD;  Location: [facility];  Service: Endoscopy;  Laterality: N/A;    LAPAROSCOPIC CHOLECYSTECTOMY N/A 7/27/2022    Procedure: CHOLECYSTECTOMY, LAPAROSCOPIC;  Surgeon: Ramón Hwang III, MD;  Location: [facility];  Service: General;  Laterality: N/A;    TUBAL LIGATION         Time Tracking:     PT Received On: 03/01/23  PT Start Time: 1037     PT Stop Time: 1055  PT Total Time (min): 18 min      Billable Minutes: Evaluation 9 minutes  and Therapeutic Activity 9 minutes      03/01/2023

## 2023-03-01 NOTE — PROGRESS NOTES
"Calorie Count  Calorie count not started until this am.  Patient reports good oral intake this morning with breakfast. 50% per nursing notes and 75% per patient. Patient reports than ever since her gallbladder surgery Ensure "goes straight through her". Pt  Agreeable to try Unjury.    Breakfast 3/01--  Total Calories  Total Protein  604 kcal  12 gm pro    "

## 2023-03-01 NOTE — NURSING
03/01/2023      Assumed care of patient. AOX4  Assessment and vital signs assessed.   Labs and meds reviewed.  Last documentation =  Temp: 98.8 °F (37.1 °C) (03/01/23 0705)  Pulse: 103 (03/01/23 0705)  Resp: 18 (03/01/23 0705)  BP: (!) 139/94 (nurse notified) (03/01/23 0705)  SpO2: 96 % (03/01/23 0705)   JULIUS draining with serous fluid  No acute distress. Denies pain.    1838  Patient received 1 unit RBC this shift. Ongoing monitoring BP/HR. PRN labetalol available if needed.   BM x 2 this shift.    Report given to SARKIS Wright

## 2023-03-01 NOTE — PROGRESS NOTES
[facility] Medicine  Progress Note    Patient name: Claire Bowser  MRN: [id number]  Admit Date: 2/24/2023   LOS: 3 days     SUBJECTIVE:     Principal problem: Peritoneal fluid collection    Interval History:  No acute overnight events reported.  Hemoglobin dropped to less than 7 grams/deciliter.  Appetite is improving as per report.  Calorie count in progress.  She denies abdominal pain, nausea/vomiting or diarrhea.    Summary:  63-year-old  female with complicated GI history including pancreatitis (June 2022) complicated by leak status post stenting, status post laparoscopic cholecystectomy as well as dual sphincterotomy who was admitted for shortness of breath and was found to have peritoneal fluid collection on imaging.  Started on empiric antibiotics due to concern for infectious process.  GI, General surgery and Infectious Disease consulted.  Patient underwent IR guided peritoneal drain placement on 02/27. Cx growing Gram negative rods and Enterococcus sp.     Scheduled Meds:   apixaban  5 mg Oral BID    folic acid  1 mg Oral Daily    Lactobacillus acidoph-L.bulgar  4 tablet Oral TID WM    lipase-protease-amylase 12,000-38,000-60,000 units  6 capsule Oral TID WM    megestroL  40 mg Oral Daily    mirtazapine  15 mg Oral QHS    pantoprazole  40 mg Oral Before breakfast    piperacillin-tazobactam (ZOSYN) IVPB  3.375 g Intravenous Q8H    predniSONE  5 mg Oral Daily    sertraline  50 mg Oral QHS    traZODone  50 mg Oral QHS     Continuous Infusions:   amino acids 4.25%-kclhg-Gd-A8H 75 mL/hr at 03/01/23 0710     PRN Meds:sodium chloride, sodium chloride, acetaminophen, labetalol, magnesium oxide, magnesium oxide, melatonin, naloxone, ondansetron, polyethylene glycol, potassium bicarbonate, potassium bicarbonate, potassium bicarbonate, potassium, sodium phosphates, potassium, sodium phosphates, potassium, sodium phosphates    Review of patient's allergies indicates:   Allergen Reactions     No known drug allergies        Review of Systems: As per interval history    OBJECTIVE:     Vital Signs (Most Recent)  Temp: 96.7 °F (35.9 °C) (03/01/23 1540)  Pulse: 102 (03/01/23 1540)  Resp: 18 (03/01/23 1540)  BP: 133/87 (03/01/23 1540)  SpO2: 98 % (03/01/23 1540)    Vital Signs Range (Last 24H):  Temp:  [96.7 °F (35.9 °C)-98.8 °F (37.1 °C)]   Pulse:  []   Resp:  [15-18]   BP: (130-185)/()   SpO2:  [95 %-98 %]     I & O (Last 24H):  Intake/Output Summary (Last 24 hours) at 3/1/2023 1604  Last data filed at 3/1/2023 0904  Gross per 24 hour   Intake 1020 ml   Output 1650 ml   Net -630 ml         Physical Exam:  General: Frail. No acute distress  Eyes: No conjunctival injection. No scleral icterus.  ENT: Hearing grossly intact. No discharge from ears. No nasal discharge.   Neck: Supple, trachea midline. No JVD  CVS: RRR. No LE edema BL  Lungs:  No tachypnea or accessory muscle use.  Clear to auscultation bilaterally  Abdomen:  Soft, nontender and nondistended.  No organomegaly  Neuro: AOx3. Moves all extremities. Follows commands. Responds appropriately   Skin:  No rash or erythema noted    Laboratory:  I have reviewed all pertinent lab results within the past 24 hours.  CBC:   Recent Labs   Lab 03/01/23  0338   WBC 5.77   RBC 2.25*   HGB 6.7*   HCT 21.0*   *   MCV 93   MCH 29.8   MCHC 31.9*       CMP:   Recent Labs   Lab 02/24/23  1114 02/25/23  0842 03/01/23  0338   GLU 61*   < > 112*   CALCIUM 8.7   < > 8.1*   ALBUMIN 1.5*  --   --    PROT 6.3  --   --    *   < > 134*   K 2.8*   < > 3.7   CO2 26   < > 27   CL 99   < > 104   BUN 18   < > 16   CREATININE 0.6   < > 0.7   ALKPHOS 103  --   --    ALT 39  --   --    AST 48*  --   --    BILITOT 1.6*  --   --     < > = values in this interval not displayed.         Diagnostic Results:  Labs: Reviewed    ASSESSMENT/PLAN:       Active Hospital Problems    Diagnosis  POA    *Peritoneal fluid collection [R18.8]  Yes    Intra-abdominal abscess  [K65.1]  Yes    Acute on chronic anemia [D64.9]  Yes    Elevated troponin [R77.8]  Yes    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         Plan:   Large peritoneal fluid collection concerning for possible abscess in the presence of air - ?  Likely walled-off pancreatic fluid collection with secondary infection  No systemic signs of infection   However given size of the fluid collection started on antibiotic therapy with piperacillin-tazobactam which will be continued pending fluid analysis  Infectious disease, general surgery and GI on board   Status post peritoneal drain placement by IR on 02/27   Cx growing Enterococcus and Gram negative rods      Bilateral pleural effusions  Elevated troponin - nondiagnostic and unlikely ACS  Could be 3rd spacing in the setting of hypoalbuminemia versus less likely congestive heart failure  BNP within normal limits   Previous echocardiogram from 2 weeks ago with no evidence of congestive heart failure   Repeated echocardiogram 02/24 - trivial pericardial effusion  Appreciate cardiology input     Chronic pancreatitis  Symptoms are stable.  Continue pancreatic enzyme supplement  Seen by GI     Acute on chronic anemia   Could be nutritional; no clinical evidence of blood loss  Another unit of blood ordered today  Monitor with daily CBC     History of rheumatoid arthritis  Hold leflunomide.  Continue low-dose prednisone     History of DVT in September 2022   On apixaban which was held for IR drain placement; restart from tomorrow a.m.  Estimated end date of anticoagulation is March 2022     Severe malnutrition  Continue megestrol  TPN started. Dietitian following     LTAC for long term abx and  optimizing nutrition. CM consult for the same       VTE Risk Mitigation (From admission, onward)           Ordered     apixaban tablet 5 mg  2 times daily         02/27/23 1702     IP VTE HIGH RISK PATIENT  Once         02/24/23 2016     Place sequential compression device  Until discontinued         02/24/23 2016     Reason for No Pharmacological VTE Prophylaxis  Once        Question:  Reasons:  Answer:  Physician Provided (leave comment)    02/24/23 2016                        Department Hospital Medicine  Formerly [facility]  Marbin Ty MD  Date of service: 03/01/2023

## 2023-03-01 NOTE — PROGRESS NOTES
Consult Note  Infectious Disease    Reason for Consult:  Intra-abdominal collection    HPI: Claire Bowser is a 63 y.o. female known to ID service from January, 2023 with PMHx emphysema, smoker, HTN, DVT on Eliquis, anxiety, chronic pancreatitis since biliary pancreatitis and cholecystectomy 07/2022, ,pancreatic sphincterotomy, bile leak-status post stent 12/30/22, multiple pancreatic pseudocysts, cyst gastrostomy tube placement, malnutrition, albumin 1.5, OA,  rheumatoid arthritis,    Patient came to ED on 2/24 with complaints of generalized weakness, fatigue, dyspnea on exertion, not able to stand, but not much abdominal discomfort??  CT chest ruled out PE.  CT abdomen was impressive for a big air-fluid collection in pelvis.  Patient is seen by General surgery who is recommending percutaneous drainage, no operative intervention at this time.    Patient is started on Zosyn and we are waiting for IR.    Patient has no new complaints overnight.  She feels slightly better, but still weak.  At baseline patient lays in bed most of the time.  She uses the bathroom 3 times a day.  She eats in bed, her  who is retired attends to her.  Urine culture is no growth to date.  UA with mild pyuria 20.  She had no urinary complaints until yesterday when she had some urgency and frequency.  No blood cultures are sent.  Troponin is a 1000 and EC HO is as below.  .  Hemoglobin of 6.9.  A unit of PRBC is already ordered.  No obvious source of bleeding    2/27(Paulette): consult reviewed and d/w Dr. Valenzuela, family. She is even more underweight than my last visit. The IR drain has put out 200 cc already. She has been too weak to move around for the last several days. Discussed need for IV antibiotics for a while, and this plus IV nutrition may be enough to get her into LTAC.   2/28: interim reviewed. Afebrile. WBC still in the normal range. 1000 mL relieved from intra-abd abscess since placement yesterday. Enterococcus and a GNR  isolated from culture, final pending. Intake not recorded yesterday but she did eat at least one meal. She ate well today. She is reluctant to get out of bed  3/1: interim reviewed. Did take a few steps with PT and  reports she is eating well. The drainage from right abdomen is thinner and output has decreased. No benefits for LTAC. She is in good spirits.    Antibiotics (From admission, onward)      Start     Stop Route Frequency Ordered    02/25/23 0330  piperacillin-tazobactam 3.375 g in dextrose 5 % 50 mL IVPB (ready to mix system)         -- IV Every 8 hours (non-standard times) 02/24/23 1719          Antifungals (From admission, onward)      None           Antibiotic History:   Now on Zosyn   01/26-01/31/2023 ciprofloxacin  01/23/2023--01/26/2023 ceftriaxone   01/24/2023 metronidazole   01/15/2023--01/21/2023 cefepime   01/15/2023--01/18/2023 vancomycin   01/06/2023--01/16/2023 Ceftin  Prior to it she she received doses of ertapenem, Zosyn, vancomycin, Bactrim.      EXAM & DIAGNOSTICS REVIEWED:   Vitals:     Temp:  [96.7 °F (35.9 °C)-98.8 °F (37.1 °C)]   Temp: 96.7 °F (35.9 °C) (03/01/23 1540)  Pulse: 102 (03/01/23 1540)  Resp: 18 (03/01/23 1540)  BP: 133/87 (03/01/23 1540)  SpO2: 98 % (03/01/23 1540)    Intake/Output Summary (Last 24 hours) at 3/1/2023 1751  Last data filed at 3/1/2023 0904  Gross per 24 hour   Intake 1020 ml   Output 1650 ml   Net -630 ml       General:  In NAD. Alert and attentive, cooperative, comfortable.  Older looking than her age. cachectic  Eyes:  Anicteric,  EOMI  ENT:  No ulcers, exudates, thrush, nares patent, dentition is fair  Neck:  supple,   Lungs: Clear, no consolidation, rales, wheezes, rub  Heart:  RRR, no gallop/murmur/rub noted  Abd:  Soft, NT, ND, normal BS, no masses or organomegaly appreciated.  :  Voids purewick,   Musc:  Joints without effusion, swelling, erythema, synovitis, with extreme muscle wasting.   Skin:  easy bruising.  Thin skin.     Neuro:              Alert, attentive, speech fluent, face symmetric, moves all extremities, no focal weakness. Ambulatory  Psych:  Calm, cooperative.  Pleasant.  Lymphatic:     Extrem: No edema, erythema, phlebitis, cellulitis, warm and well perfused  VAD:  Midline left arm     Isolation:    Wound: IR drain with thinner pus      Lines/Tubes/Drains:    General Labs reviewed:  Recent Labs   Lab 02/26/23  0328 02/27/23  0445 03/01/23  0338   WBC 3.90 3.82* 5.77   HGB 8.1* 7.8* 6.7*   HCT 24.7* 23.5* 21.0*    153 126*       Recent Labs   Lab 02/24/23  1114 02/25/23  0842 02/27/23  0344 02/27/23  0548 03/01/23  0338   *   < > 138 133* 134*   K 2.8*   < > 3.9 3.8 3.7   CL 99   < > 102 100 104   CO2 26   < > 29 26 27   BUN 18   < > 12 13 16   CREATININE 0.6   < > 0.6 0.6 0.7   CALCIUM 8.7   < > 7.9* 8.1* 8.1*   PROT 6.3  --   --   --   --    BILITOT 1.6*  --   --   --   --    ALKPHOS 103  --   --   --   --    ALT 39  --   --   --   --    AST 48*  --   --   --   --     < > = values in this interval not displayed.     Recent Labs   Lab 02/26/23 0328 02/27/23  0548 03/01/23 0338   CRP 7.80* 6.57* 3.05*         Micro:  Microbiology Results (last 7 days)       Procedure Component Value Units Date/Time    Culture, Body Fluid (Aerobic) w/ GS [[id number]]  (Abnormal)  (Susceptibility) Collected: 02/27/23 1242    Order Status: Completed Specimen: Peritoneal Fluid Updated: 03/01/23 0906     AEROBIC CULTURE - FLUID ENTEROCOCCUS FAECALIS  Moderate        GRAM NEGATIVE COLLEEN, LACTOSE   Moderate  Identification and susceptibility pending       Gram Stain Result Moderate WBC's      Few Gram positive cocci      Few variable positive rods    Narrative:      Peritoneal Fluid (Abscess)    Blood culture [[id number]] Collected: 02/26/23 0535    Order Status: Completed Specimen: Blood from Line, PICC Right Basilic Updated: 03/01/23 0832     Blood Culture, Routine No Growth to date      No Growth to date      No Growth to date      No Growth  to date    Blood culture [[id number]] Collected: 02/25/23 1831    Order Status: Completed Specimen: Blood from Line, PICC Right Basilic Updated: 02/28/23 2032     Blood Culture, Routine No Growth to date      No Growth to date      No Growth to date      No Growth to date    KOH prep [[id number]] Collected: 02/27/23 1242    Order Status: Completed Specimen: Body Fluid from Peritoneal Fluid Updated: 02/27/23 1551     KOH Prep No yeast or fungal elements seen    Fungus culture [[id number]] Collected: 02/27/23 1242    Order Status: Sent Specimen: Body Fluid from Peritoneal Fluid Updated: 02/27/23 1257    AFB culture [[id number]] Collected: 02/27/23 1242    Order Status: Sent Specimen: Body Fluid from Peritoneal Fluid Updated: 02/27/23 1256    Gram stain [[id number]] Collected: 02/27/23 1242    Order Status: Canceled Specimen: Body Fluid from Peritoneal Fluid     Urine culture [[id number]]  (Abnormal)  (Susceptibility) Collected: 02/24/23 1528    Order Status: Completed Specimen: Urine Updated: 02/27/23 0650     Urine Culture, Routine ESCHERICHIA COLI  > 100,000 cfu/ml      Narrative:      Specimen Source->Urine            Imaging Reviewed:   CXR   CT  Essentially complete resolution of free fluid within the peritoneal cavity since the preceding CT scan dated 1/15/2023. There has been interval development of a loculated appearing collection of fluid and air in the pelvis extending up both paracolic gutters that is likely contained within a portion of the peritoneal cavity. This may represent a large abscess given the presence of the air. It may communicate with bowel, although no direct indication is evident. There is a large right and moderately large left pleural effusion that of both increased in size significantly as well.  Cardiology:  02/24/2023.  The left ventricle is normal in size with normal systolic function.  The estimated ejection fraction is 60%.  Normal left ventricular diastolic function.  Normal right  ventricular size with normal right ventricular systolic function.  Trivial circumferential pericardial effusion.  There are bilateral pleural effusions. Large    EC HO [id number]   Concentric remodeling and normal systolic function.  The estimated ejection fraction is 60%.  Normal left ventricular diastolic function.  Normal right ventricular size with normal right ventricular systolic function.  Mild mitral regurgitation.  Mild to moderate tricuspid regurgitation.  There is mild pulmonary hypertension.  Guarded interpretation suboptimal secondary to patient factors      Procedures.    01/24/2023 ERCP - Prior biliary sphincterotomy appeared open.                          - Prior pancreatic sphincterotomy appeared open.                          - One stent from the pancreatic duct was seen in    the major papilla.                          - One stent was removed from the pancreatic duct.                          - A pancreatic duct leak was found.                          - One plastic stent was placed into the ventral  pancreatic duct.   Recommendation:   Repeat ERCP in 6 weeks to check healing.   01/03/2023 upper EUS  The pancreatic duct measured up to 2 mm in diameter. Stent seen in PD in HOP. Therapeutic needle aspiration for fluid was performed.  The amount of fluid collected was 150 mL. The fluid was opaque and brown.   12/30/2022 ERCP  - A mild ectactic, slight narrowed region just above ampulla was found.   A pancreatic sphincterotomy was performed. Pancreatic sludge / debris seen folowing   sphincterotomy.  One pancreatic stent was placed into the ventral pancreatic duct. The entire main bile duct was mildly dilated. The patient has had a cholecystectomy.  A biliary sphincterotomy was performed.  An area successfully injected w/ epi.   11/29/2022 upper EUS.    07/01/2022 upper EUS.    02/26/2021 upper GI.    02/26/2021 colonoscopy.        IMPRESSION & PLAN     1. Reason for ED visit =generalized weakness which  is multifactorial.  2 Markedly elevated troponin.  EC HO within normal limits.  As per hospitalist.      3. Bilateral large effusions in setting of recent pancreatitis, low albumin.  4  Anemia.  Status post PRBC x1  5.  Large pelvic abscess, polymicrobial gram stain, culture in progress    Drain placed 2/27, 1000 mL of pus relieved in the first 24 hours    6. Pyuria,  UTI Ecoli  7.  Immunosuppressed due to treatment for RA  8. Malnutrition, albumin 1.5.  Decreased appetite  9. History of chronic pancreatitis, pancreatic pseudocyst   10. History of rheumatoid arthritis on leflunomide and prednisone 5 mg,.     11. History of DVT on anticoagulation.      Recommendations:    Continue Zosyn  , awaiting final culture results     Trend CRP   Push nutrition,   \she is a candidate for home IV antibiotics. Looks like she will be able to do without prolonged TPC    D/w   patient/    Medical Decision Making during this encounter was  [_] Low Complexity  [_] Moderate Complexity  [ xx ] High Complexity

## 2023-03-01 NOTE — NURSING
Patient JULIUS drain has decreased in output and drainage is yellow and thin. Denied any c/o pain.

## 2023-03-02 LAB
ANION GAP SERPL CALC-SCNC: 6 MMOL/L (ref 8–16)
BACTERIA BLD CULT: NORMAL
BUN SERPL-MCNC: 17 MG/DL (ref 8–23)
CALCIUM SERPL-MCNC: 8.1 MG/DL (ref 8.7–10.5)
CHLORIDE SERPL-SCNC: 103 MMOL/L (ref 95–110)
CO2 SERPL-SCNC: 26 MMOL/L (ref 23–29)
CREAT SERPL-MCNC: 0.6 MG/DL (ref 0.5–1.4)
CRP SERPL-MCNC: 1.74 MG/DL
ERYTHROCYTE [DISTWIDTH] IN BLOOD BY AUTOMATED COUNT: 17.3 % (ref 11.5–14.5)
EST. GFR  (NO RACE VARIABLE): >60 ML/MIN/1.73 M^2
GLUCOSE SERPL-MCNC: 87 MG/DL (ref 70–110)
HCT VFR BLD AUTO: 30.7 % (ref 37–48.5)
HGB BLD-MCNC: 10.4 G/DL (ref 12–16)
MAGNESIUM SERPL-MCNC: 2.1 MG/DL (ref 1.6–2.6)
MCH RBC QN AUTO: 30.7 PG (ref 27–31)
MCHC RBC AUTO-ENTMCNC: 33.9 G/DL (ref 32–36)
MCV RBC AUTO: 91 FL (ref 82–98)
PLATELET # BLD AUTO: 129 K/UL (ref 150–450)
PMV BLD AUTO: 9.5 FL (ref 9.2–12.9)
POTASSIUM SERPL-SCNC: 3.4 MMOL/L (ref 3.5–5.1)
RBC # BLD AUTO: 3.39 M/UL (ref 4–5.4)
SODIUM SERPL-SCNC: 135 MMOL/L (ref 136–145)
WBC # BLD AUTO: 8.02 K/UL (ref 3.9–12.7)

## 2023-03-02 PROCEDURE — 83735 ASSAY OF MAGNESIUM: CPT | Performed by: INTERNAL MEDICINE

## 2023-03-02 PROCEDURE — 99232 PR SUBSEQUENT HOSPITAL CARE,LEVL II: ICD-10-PCS | Mod: ,,, | Performed by: INTERNAL MEDICINE

## 2023-03-02 PROCEDURE — 12000002 HC ACUTE/MED SURGE SEMI-PRIVATE ROOM

## 2023-03-02 PROCEDURE — 86140 C-REACTIVE PROTEIN: CPT | Performed by: INTERNAL MEDICINE

## 2023-03-02 PROCEDURE — 25000003 PHARM REV CODE 250: Performed by: INTERNAL MEDICINE

## 2023-03-02 PROCEDURE — 99232 SBSQ HOSP IP/OBS MODERATE 35: CPT | Mod: ,,, | Performed by: INTERNAL MEDICINE

## 2023-03-02 PROCEDURE — 63600175 PHARM REV CODE 636 W HCPCS: Performed by: INTERNAL MEDICINE

## 2023-03-02 PROCEDURE — 85027 COMPLETE CBC AUTOMATED: CPT | Performed by: INTERNAL MEDICINE

## 2023-03-02 PROCEDURE — 80048 BASIC METABOLIC PNL TOTAL CA: CPT | Performed by: INTERNAL MEDICINE

## 2023-03-02 PROCEDURE — 97116 GAIT TRAINING THERAPY: CPT

## 2023-03-02 RX ADMIN — TRAZODONE HYDROCHLORIDE 50 MG: 50 TABLET ORAL at 08:03

## 2023-03-02 RX ADMIN — PIPERACILLIN SODIUM AND TAZOBACTAM SODIUM 3.38 G: 3; .375 INJECTION, POWDER, LYOPHILIZED, FOR SOLUTION INTRAVENOUS at 11:03

## 2023-03-02 RX ADMIN — APIXABAN 5 MG: 5 TABLET, FILM COATED ORAL at 08:03

## 2023-03-02 RX ADMIN — PIPERACILLIN SODIUM AND TAZOBACTAM SODIUM 3.38 G: 3; .375 INJECTION, POWDER, LYOPHILIZED, FOR SOLUTION INTRAVENOUS at 04:03

## 2023-03-02 RX ADMIN — LEUCINE, PHENYLALANINE, LYSINE, METHIONINE, ISOLEUCINE, VALINE, HISTIDINE, THREONINE, TRYPTOPHAN, ALANINE, GLYCINE, ARGININE, PROLINE, SERINE, TYROSINE, SODIUM ACETATE, DIBASIC POTASSIUM PHOSPHATE, MAGNESIUM CHLORIDE, SODIUM CHLORIDE, CALCIUM CHLORIDE, DEXTROSE
311; 238; 247; 170; 255; 247; 204; 179; 77; 880; 438; 489; 289; 213; 17; 297; 261; 51; 77; 33; 5 INJECTION INTRAVENOUS at 04:03

## 2023-03-02 RX ADMIN — PREDNISONE 5 MG: 5 TABLET ORAL at 09:03

## 2023-03-02 RX ADMIN — PANCRELIPASE 6 CAPSULE: 60000; 12000; 38000 CAPSULE, DELAYED RELEASE PELLETS ORAL at 04:03

## 2023-03-02 RX ADMIN — MEGESTROL ACETATE 40 MG: 20 TABLET ORAL at 09:03

## 2023-03-02 RX ADMIN — PANCRELIPASE 6 CAPSULE: 60000; 12000; 38000 CAPSULE, DELAYED RELEASE PELLETS ORAL at 11:03

## 2023-03-02 RX ADMIN — FOLIC ACID 1 MG: 1 TABLET ORAL at 09:03

## 2023-03-02 RX ADMIN — LACTOBACILLUS TAB 4 TABLET: TAB at 04:03

## 2023-03-02 RX ADMIN — APIXABAN 5 MG: 5 TABLET, FILM COATED ORAL at 09:03

## 2023-03-02 RX ADMIN — PANCRELIPASE 6 CAPSULE: 60000; 12000; 38000 CAPSULE, DELAYED RELEASE PELLETS ORAL at 08:03

## 2023-03-02 RX ADMIN — LACTOBACILLUS TAB 4 TABLET: TAB at 11:03

## 2023-03-02 RX ADMIN — LACTOBACILLUS TAB 4 TABLET: TAB at 08:03

## 2023-03-02 RX ADMIN — PIPERACILLIN SODIUM AND TAZOBACTAM SODIUM 3.38 G: 3; .375 INJECTION, POWDER, LYOPHILIZED, FOR SOLUTION INTRAVENOUS at 08:03

## 2023-03-02 RX ADMIN — MIRTAZAPINE 15 MG: 15 TABLET, FILM COATED ORAL at 08:03

## 2023-03-02 RX ADMIN — PANTOPRAZOLE SODIUM 40 MG: 40 TABLET, DELAYED RELEASE ORAL at 05:03

## 2023-03-02 RX ADMIN — SERTRALINE HYDROCHLORIDE 50 MG: 50 TABLET ORAL at 08:03

## 2023-03-02 NOTE — PROGRESS NOTES
Consult Note  Infectious Disease    Reason for Consult:  Intra-abdominal collection    HPI: Claire Bowser is a 63 y.o. female known to ID service from January, 2023 with PMHx emphysema, smoker, HTN, DVT on Eliquis, anxiety, chronic pancreatitis since biliary pancreatitis and cholecystectomy 07/2022, ,pancreatic sphincterotomy, bile leak-status post stent 12/30/22, multiple pancreatic pseudocysts, cyst gastrostomy tube placement, malnutrition, albumin 1.5, OA,  rheumatoid arthritis,    Patient came to ED on 2/24 with complaints of generalized weakness, fatigue, dyspnea on exertion, not able to stand, but not much abdominal discomfort??  CT chest ruled out PE.  CT abdomen was impressive for a big air-fluid collection in pelvis.  Patient is seen by General surgery who is recommending percutaneous drainage, no operative intervention at this time.    Patient is started on Zosyn and we are waiting for IR.    Patient has no new complaints overnight.  She feels slightly better, but still weak.  At baseline patient lays in bed most of the time.  She uses the bathroom 3 times a day.  She eats in bed, her  who is retired attends to her.  Urine culture is no growth to date.  UA with mild pyuria 20.  She had no urinary complaints until yesterday when she had some urgency and frequency.  No blood cultures are sent.  Troponin is a 1000 and EC HO is as below.  .  Hemoglobin of 6.9.  A unit of PRBC is already ordered.  No obvious source of bleeding    2/27(Paulette): consult reviewed and d/w Dr. Valenzuela, family. She is even more underweight than my last visit. The IR drain has put out 200 cc already. She has been too weak to move around for the last several days. Discussed need for IV antibiotics for a while, and this plus IV nutrition may be enough to get her into LTAC.   2/28: interim reviewed. Afebrile. WBC still in the normal range. 1000 mL relieved from intra-abd abscess since placement yesterday. Enterococcus and a GNR  isolated from culture, final pending. Intake not recorded yesterday but she did eat at least one meal. She ate well today. She is reluctant to get out of bed  3/1: interim reviewed. Did take a few steps with PT and  reports she is eating well. The drainage from right abdomen is thinner and output has decreased. No benefits for LTAC. She is in good spirits.  3/2: Interim reviewed output from the drain has tapered off to 50 cc over the last 24 hours.  Surgery is planning a repeat CT scan tomorrow.  The cultures are not final as the Gram-negative jennifer id and susceptibility had to be repeated.  CRP has fallen to only slightly above normal. She walked to the door with PT. She is worried about being discharged too soon.     Antibiotics (From admission, onward)      Start     Stop Route Frequency Ordered    02/25/23 0330  piperacillin-tazobactam 3.375 g in dextrose 5 % 50 mL IVPB (ready to mix system)         -- IV Every 8 hours (non-standard times) 02/24/23 1719          Antifungals (From admission, onward)      None           Antibiotic History:   Now on Zosyn   01/26-01/31/2023 ciprofloxacin  01/23/2023--01/26/2023 ceftriaxone   01/24/2023 metronidazole   01/15/2023--01/21/2023 cefepime   01/15/2023--01/18/2023 vancomycin   01/06/2023--01/16/2023 Ceftin  Prior to it she she received doses of ertapenem, Zosyn, vancomycin, Bactrim.      EXAM & DIAGNOSTICS REVIEWED:   Vitals:     Temp:  [98.2 °F (36.8 °C)-98.7 °F (37.1 °C)]   Temp: 98.7 °F (37.1 °C) (03/02/23 1127)  Pulse: 108 (03/02/23 1127)  Resp: 18 (03/02/23 1127)  BP: (!) 164/82 (03/02/23 1127)  SpO2: (!) 94 % (03/02/23 1127)    Intake/Output Summary (Last 24 hours) at 3/2/2023 1612  Last data filed at 3/1/2023 1915  Gross per 24 hour   Intake 280 ml   Output 50 ml   Net 230 ml       General:  In NAD. Alert and attentive, cooperative, comfortable.  Older looking than her age. cachectic  Eyes:  Anicteric,  EOMI  ENT:  No ulcers, exudates, thrush, nares patent,  dentition is fair  Neck:  supple,   Lungs: Breathing comfortably  Heart:  RRR, no gallop/murmur/rub noted  Abd:  Soft, NT, ND, normal BS, no masses or organomegaly appreciated.  :  Voids   Musc:  Joints without effusion, swelling, erythema, synovitis, with extreme muscle wasting.   Skin:  easy bruising.  Thin skin.     Neuro:             Alert, attentive, speech fluent, face symmetric, moves all extremities, no focal weakness. Ambulatory  Psych:  Calm, cooperative.  Pleasant.  Lymphatic:     Extrem: No edema, erythema, phlebitis, cellulitis, warm and well perfused  VAD:  Midline left arm     Isolation:    Wound: IR drain with thinner pus      Lines/Tubes/Drains:    General Labs reviewed:  Recent Labs   Lab 02/27/23  0445 03/01/23 0338 03/02/23  0600   WBC 3.82* 5.77 8.02   HGB 7.8* 6.7* 10.4*   HCT 23.5* 21.0* 30.7*    126* 129*       Recent Labs   Lab 02/24/23  1114 02/25/23  0842 02/27/23  0548 03/01/23 0338 03/02/23  0600   *   < > 133* 134* 135*   K 2.8*   < > 3.8 3.7 3.4*   CL 99   < > 100 104 103   CO2 26   < > 26 27 26   BUN 18   < > 13 16 17   CREATININE 0.6   < > 0.6 0.7 0.6   CALCIUM 8.7   < > 8.1* 8.1* 8.1*   PROT 6.3  --   --   --   --    BILITOT 1.6*  --   --   --   --    ALKPHOS 103  --   --   --   --    ALT 39  --   --   --   --    AST 48*  --   --   --   --     < > = values in this interval not displayed.     Recent Labs   Lab 02/27/23  0548 03/01/23 0338 03/02/23  0600   CRP 6.57* 3.05* 1.74*         Micro:  Microbiology Results (last 7 days)       Procedure Component Value Units Date/Time    Blood culture [[id number]] Collected: 02/26/23 0535    Order Status: Completed Specimen: Blood from Line, PICC Right Basilic Updated: 03/02/23 0832     Blood Culture, Routine No Growth to date      No Growth to date      No Growth to date      No Growth to date      No Growth to date    Blood culture [[id number]] Collected: 02/25/23 1831    Order Status: Completed Specimen: Blood from Line, PICC  Right Basilic Updated: 03/01/23 2032     Blood Culture, Routine No Growth to date      No Growth to date      No Growth to date      No Growth to date      No Growth to date    Culture, Body Fluid (Aerobic) w/ GS [[id number]]  (Abnormal)  (Susceptibility) Collected: 02/27/23 1242    Order Status: Completed Specimen: Peritoneal Fluid Updated: 03/01/23 0906     AEROBIC CULTURE - FLUID ENTEROCOCCUS FAECALIS  Moderate        GRAM NEGATIVE COLLEEN, LACTOSE   Moderate  Identification and susceptibility pending       Gram Stain Result Moderate WBC's      Few Gram positive cocci      Few variable positive rods    Narrative:      Peritoneal Fluid (Abscess)    KOH prep [[id number]] Collected: 02/27/23 1242    Order Status: Completed Specimen: Body Fluid from Peritoneal Fluid Updated: 02/27/23 1551     KOH Prep No yeast or fungal elements seen    Fungus culture [[id number]] Collected: 02/27/23 1242    Order Status: Sent Specimen: Body Fluid from Peritoneal Fluid Updated: 02/27/23 1257    AFB culture [[id number]] Collected: 02/27/23 1242    Order Status: Sent Specimen: Body Fluid from Peritoneal Fluid Updated: 02/27/23 1256    Gram stain [[id number]] Collected: 02/27/23 1242    Order Status: Canceled Specimen: Body Fluid from Peritoneal Fluid     Urine culture [[id number]]  (Abnormal)  (Susceptibility) Collected: 02/24/23 1528    Order Status: Completed Specimen: Urine Updated: 02/27/23 0650     Urine Culture, Routine ESCHERICHIA COLI  > 100,000 cfu/ml      Narrative:      Specimen Source->Urine            Imaging Reviewed:   CXR   CT  Essentially complete resolution of free fluid within the peritoneal cavity since the preceding CT scan dated 1/15/2023. There has been interval development of a loculated appearing collection of fluid and air in the pelvis extending up both paracolic gutters that is likely contained within a portion of the peritoneal cavity. This may represent a large abscess given the presence of the air. It may  communicate with bowel, although no direct indication is evident. There is a large right and moderately large left pleural effusion that of both increased in size significantly as well.  Cardiology:  02/24/2023.  The left ventricle is normal in size with normal systolic function.  The estimated ejection fraction is 60%.  Normal left ventricular diastolic function.  Normal right ventricular size with normal right ventricular systolic function.  Trivial circumferential pericardial effusion.  There are bilateral pleural effusions. Large    EC HO [id number]   Concentric remodeling and normal systolic function.  The estimated ejection fraction is 60%.  Normal left ventricular diastolic function.  Normal right ventricular size with normal right ventricular systolic function.  Mild mitral regurgitation.  Mild to moderate tricuspid regurgitation.  There is mild pulmonary hypertension.  Guarded interpretation suboptimal secondary to patient factors      Procedures.    01/24/2023 ERCP - Prior biliary sphincterotomy appeared open.                          - Prior pancreatic sphincterotomy appeared open.                          - One stent from the pancreatic duct was seen in    the major papilla.                          - One stent was removed from the pancreatic duct.                          - A pancreatic duct leak was found.                          - One plastic stent was placed into the ventral  pancreatic duct.   Recommendation:   Repeat ERCP in 6 weeks to check healing.   01/03/2023 upper EUS  The pancreatic duct measured up to 2 mm in diameter. Stent seen in PD in HOP. Therapeutic needle aspiration for fluid was performed.  The amount of fluid collected was 150 mL. The fluid was opaque and brown.   12/30/2022 ERCP  - A mild ectactic, slight narrowed region just above ampulla was found.   A pancreatic sphincterotomy was performed. Pancreatic sludge / debris seen folowing   sphincterotomy.  One pancreatic stent was  placed into the ventral pancreatic duct. The entire main bile duct was mildly dilated. The patient has had a cholecystectomy.  A biliary sphincterotomy was performed.  An area successfully injected w/ epi.   11/29/2022 upper EUS.    07/01/2022 upper EUS.    02/26/2021 upper GI.    02/26/2021 colonoscopy.        IMPRESSION & PLAN     1. Reason for ED visit =generalized weakness which is multifactorial.  2 Markedly elevated troponin.  EC HO within normal limits.  As per hospitalist.      3. Bilateral large effusions in setting of recent pancreatitis, low albumin.  4  Anemia.  Status post PRBC x1  5.  Large pelvic abscess, polymicrobial gram stain, culture in progress    Drain placed 2/27, 1000 mL of pus relieved in the first 24 hours    6. Pyuria,  UTI Ecoli  7.  Immunosuppressed due to treatment for RA  8. Malnutrition, albumin 1.5.  Decreased appetite  9. History of chronic pancreatitis, pancreatic pseudocyst   10. History of rheumatoid arthritis on leflunomide and prednisone 5 mg,.     11. History of DVT on anticoagulation.      Recommendations:    Continue Zosyn  , awaiting final culture results     Trend CRP   Push nutrition,   \she is a candidate for home IV antibiotics. Looks like she will be able to do without prolonged TPN  Repeat CT per surgery tomorrow  Not ready for discharge  D/w   patient/    Medical Decision Making during this encounter was  [_] Low Complexity  [_] Moderate Complexity  [ xx ] High Complexity

## 2023-03-02 NOTE — NURSING
03/02/2023      Assumed care of patient.  AOX4  Assessment and vital signs assessed.   Labs and meds reviewed.  Last documentation =  Temp: 98.7 °F (37.1 °C) (03/02/23 0720)  Pulse: 109 (03/02/23 0720)  Resp: 18 (03/02/23 0720)  BP: (!) 164/108 (03/02/23 0720)  SpO2: 96 % (03/02/23 0720)   Messaged MD r/t [employer] Rx expiring.  Calorie count ongoing.    [employer] reorders to resume at 1700.  No acute distress noted. WCTM    Patient very emotional regarding health issues, stating job loss in October, in and out of the hospital every other week, feels helpless, lost, and tired. Will mention to MD regarding her depression, anxiety, and coping issues she mentioned.     Encouraged patient to get in wheelchair to go outside with  to get some fresh air. Patient went and thanked RN after for pushing her to do it.

## 2023-03-02 NOTE — PROGRESS NOTES
[facility]  Progress Note    Patient name: Claire Bowser  MRN: [id number]  Admit Date: 2/24/2023   LOS: 4 days     SUBJECTIVE:     Principal problem: Peritoneal fluid collection    Interval History:  No acute overnight events reported.  Her appetite continues to improve.  Output from abdominal JULIUS drain is declining.  Hb improved with blood transfusion.    Summary:  63-year-old  female with complicated GI history including pancreatitis (June 2022) complicated by leak status post stenting, status post laparoscopic cholecystectomy as well as dual sphincterotomy who was admitted for shortness of breath and was found to have peritoneal fluid collection on imaging.  Started on empiric antibiotics due to concern for infectious process.  GI, General surgery and Infectious Disease consulted.  Patient underwent IR guided peritoneal drain placement on 02/27. Cx growing Gram negative rods and Enterococcus sp.     Scheduled Meds:   amino acids 4.25%-[id number]   Intravenous Daily    apixaban  5 mg Oral BID    folic acid  1 mg Oral Daily    Lactobacillus acidoph-L.bulgar  4 tablet Oral TID WM    lipase-protease-amylase 12,000-38,000-60,000 units  6 capsule Oral TID WM    megestroL  40 mg Oral Daily    mirtazapine  15 mg Oral QHS    pantoprazole  40 mg Oral Before breakfast    piperacillin-tazobactam (ZOSYN) IVPB  3.375 g Intravenous Q8H    predniSONE  5 mg Oral Daily    sertraline  50 mg Oral QHS    traZODone  50 mg Oral QHS     Continuous Infusions:    PRN Meds:sodium chloride, sodium chloride, acetaminophen, labetalol, magnesium oxide, magnesium oxide, melatonin, naloxone, ondansetron, polyethylene glycol, potassium bicarbonate, potassium bicarbonate, potassium bicarbonate, potassium, sodium phosphates, potassium, sodium phosphates, potassium, sodium phosphates    Review of patient's allergies indicates:   Allergen Reactions    No known drug allergies        Review of Systems: As per  interval history    OBJECTIVE:     Vital Signs (Most Recent)  Temp: 98.7 °F (37.1 °C) (03/02/23 1127)  Pulse: 108 (03/02/23 1127)  Resp: 18 (03/02/23 1127)  BP: (!) 164/82 (03/02/23 1127)  SpO2: (!) 94 % (03/02/23 1127)    Vital Signs Range (Last 24H):  Temp:  [98.2 °F (36.8 °C)-98.7 °F (37.1 °C)]   Pulse:  []   Resp:  [18]   BP: (145-164)/()   SpO2:  [94 %-98 %]     I & O (Last 24H):  Intake/Output Summary (Last 24 hours) at 3/2/2023 1630  Last data filed at 3/1/2023 1915  Gross per 24 hour   Intake 280 ml   Output 50 ml   Net 230 ml         Physical Exam:  General: Frail. No acute distress  Eyes: No conjunctival injection. No scleral icterus.  ENT: Hearing grossly intact. No discharge from ears. No nasal discharge.   Neck: Supple, trachea midline. No JVD  CVS: RRR. No LE edema BL  Lungs:  No tachypnea or accessory muscle use.  Clear to auscultation bilaterally  Abdomen:  Soft, nontender and nondistended.  No organomegaly. JULIUS drain with serous drainage   Neuro: AOx3. Moves all extremities. Follows commands. Responds appropriately   Skin:  No rash or erythema noted    Laboratory:  I have reviewed all pertinent lab results within the past 24 hours.  CBC:   Recent Labs   Lab 03/02/23  0600   WBC 8.02   RBC 3.39*   HGB 10.4*   HCT 30.7*   *   MCV 91   MCH 30.7   MCHC 33.9       CMP:   Recent Labs   Lab 02/24/23  1114 02/25/23  0842 03/02/23  0600   GLU 61*   < > 87   CALCIUM 8.7   < > 8.1*   ALBUMIN 1.5*  --   --    PROT 6.3  --   --    *   < > 135*   K 2.8*   < > 3.4*   CO2 26   < > 26   CL 99   < > 103   BUN 18   < > 17   CREATININE 0.6   < > 0.6   ALKPHOS 103  --   --    ALT 39  --   --    AST 48*  --   --    BILITOT 1.6*  --   --     < > = values in this interval not displayed.       Microbiology Results (last 7 days)       Procedure Component Value Units Date/Time    Blood culture [[id number]] Collected: 02/26/23 0535    Order Status: Completed Specimen: Blood from Line, PICC Right Basilic  Updated: 03/02/23 0832     Blood Culture, Routine No Growth to date      No Growth to date      No Growth to date      No Growth to date      No Growth to date    Blood culture [[id number]] Collected: 02/25/23 1831    Order Status: Completed Specimen: Blood from Line, PICC Right Basilic Updated: 03/01/23 2032     Blood Culture, Routine No Growth to date      No Growth to date      No Growth to date      No Growth to date      No Growth to date    Culture, Body Fluid (Aerobic) w/ GS [[id number]]  (Abnormal)  (Susceptibility) Collected: 02/27/23 1242    Order Status: Completed Specimen: Peritoneal Fluid Updated: 03/01/23 0906     AEROBIC CULTURE - FLUID ENTEROCOCCUS FAECALIS  Moderate        GRAM NEGATIVE COLLEEN, LACTOSE   Moderate  Identification and susceptibility pending       Gram Stain Result Moderate WBC's      Few Gram positive cocci      Few variable positive rods    Narrative:      Peritoneal Fluid (Abscess)    KOH prep [[id number]] Collected: 02/27/23 1242    Order Status: Completed Specimen: Body Fluid from Peritoneal Fluid Updated: 02/27/23 1551     KOH Prep No yeast or fungal elements seen    Fungus culture [[id number]] Collected: 02/27/23 1242    Order Status: Sent Specimen: Body Fluid from Peritoneal Fluid Updated: 02/27/23 1257    AFB culture [[id number]] Collected: 02/27/23 1242    Order Status: Sent Specimen: Body Fluid from Peritoneal Fluid Updated: 02/27/23 1256    Gram stain [[id number]] Collected: 02/27/23 1242    Order Status: Canceled Specimen: Body Fluid from Peritoneal Fluid     Urine culture [[id number]]  (Abnormal)  (Susceptibility) Collected: 02/24/23 1528    Order Status: Completed Specimen: Urine Updated: 02/27/23 0650     Urine Culture, Routine ESCHERICHIA COLI  > 100,000 cfu/ml      Narrative:      Specimen Source->Urine            Diagnostic Results:  Labs: Reviewed    ASSESSMENT/PLAN:       Active Hospital Problems    Diagnosis  POA    *Peritoneal fluid collection [R18.8]  Yes     Intra-abdominal abscess [K65.1]  Yes    Acute on chronic anemia [D64.9]  Yes    Elevated troponin [R77.8]  Yes    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         Plan:   Large peritoneal fluid collection concerning for possible abscess in the presence of air - ?  Likely walled-off pancreatic fluid collection with secondary infection  No systemic signs of infection   However given size of the fluid collection started on antibiotic therapy with piperacillin-tazobactam which will be continued pending fluid analysis  Infectious disease, general surgery and GI on board   Status post peritoneal drain placement by IR on 02/27   Cx growing Enterococcus and Gram negative rods - follow until final   Repeat CT abd/pelvis ordered for tmrw      Bilateral pleural effusions  Elevated troponin - nondiagnostic and unlikely ACS  Could be 3rd spacing in the setting of hypoalbuminemia versus less likely congestive heart failure  BNP within normal limits   Previous echocardiogram from 2 weeks ago with no evidence of congestive heart failure   Repeated echocardiogram 02/24 - trivial pericardial effusion  Appreciate cardiology input     Chronic pancreatitis  Symptoms are stable.  Continue pancreatic enzyme supplement  Seen by GI     Acute on chronic anemia   Could be nutritional; no clinical evidence of blood loss  S/p a total of 2 units of pRBC this admission   Monitor with daily CBC     History of rheumatoid arthritis  Hold leflunomide.  Continue low-dose prednisone     History of DVT in September 2022   On apixaban which was held for IR drain placement; restarted  Estimated end date of anticoagulation is March 2022     Severe malnutrition  Continue  megestrol    Disposition: No LTAC benefits through insurance. Will resume outpatient PT     VTE Risk Mitigation (From admission, onward)           Ordered     apixaban tablet 5 mg  2 times daily         02/27/23 1702     IP VTE HIGH RISK PATIENT  Once         02/24/23 2016     Place sequential compression device  Until discontinued         02/24/23 2016     Reason for No Pharmacological VTE Prophylaxis  Once        Question:  Reasons:  Answer:  Physician Provided (leave comment)    02/24/23 2016                        Department Hospital Medicine  [facility]  Marbin Ty MD  Date of service: 03/02/2023

## 2023-03-02 NOTE — PLAN OF CARE
Discussed with patient and family yesterday that unfortunately, patient's spend-down [place] Medicaid plan does not offer post-acute benefits.  Reached out to Medicaid liaison [place] who verified this as accurate and states patient does not qualify for other Medicaid plan due to income limit.  Homero states she spoke with the patient's  and recommended the patient apply for SSI benefits.  Will continue to follow for further discharge planning needs.        03/02/23 0740   Discharge Reassessment   Assessment Type Discharge Planning Reassessment   Did the patient's condition or plan change since previous assessment? Yes   Discharge Plan discussed with: Patient;Spouse/sig other   Communicated RASHI with patient/caregiver Date not available/Unable to determine   Discharge Plan A Home with family   Discharge Plan B Home with family   DME Needed Upon Discharge  none   Discharge Barriers Identified None   Why the patient remains in the hospital Requires continued medical care   Post-Acute Status   Post-Acute Authorization Placement   Post-Acute Placement Status Discharge Plan Changed   Patient choice form signed by patient/caregiver List with quality metrics by geographic area provided   Discharge Delays None known at this time

## 2023-03-02 NOTE — PT/OT/SLP PROGRESS
Physical Therapy Treatment    Patient Name:  Claire Bowser   MRN:  [id number]    Recommendations:     Discharge Recommendations: outpatient PT  Discharge Equipment Recommendations:  Wheelchair  Barriers to discharge: None    Assessment:     Claire Bowser is a 63 y.o. female admitted with a medical diagnosis of Peritoneal fluid collection.  She presents with the following impairments/functional limitations: weakness, impaired endurance, impaired self care skills, impaired functional mobility, gait instability, impaired balance, decreased lower extremity function, decreased upper extremity function, impaired cardiopulmonary response to activity .    Rehab Prognosis: Good; patient would benefit from acute skilled PT services to address these deficits and reach maximum level of function.    Recent Surgery: * No surgery found *      Plan:     During this hospitalization, patient to be seen 6 x/week to address the identified rehab impairments via gait training, therapeutic activities, therapeutic exercises and progress toward the following goals:    Plan of Care Expires:  04/01/23    Subjective     Chief Complaint: weakness  Patient/Family Comments/goals: Get stronger  Pain/Comfort:  Pain Rating 1: 0/10      Objective:     Patient found supine with PICC line, telemetry, bed alarm (closed suction drain) upon PT entry to room.     General Precautions: Standard, fall  Orthopedic Precautions:    Braces:    Respiratory Status: Room air     Functional Mobility:  Bed Mobility:     Supine to Sit: minimum assistance  Sit to Supine: minimum assistance  Transfers:     Sit to Stand:  minimum assistance with rolling walker  Gait: 20 ft RW and Min A       AM-PAC 6 CLICK MOBILITY          Treatment & Education:  Bed mobility,t/f, and gait training , educated on benefits of increased time OOB     Patient left supine with all lines intact, call button in reach, and bed alarm on..    GOALS:   Multidisciplinary Problems       Physical  Therapy Goals          Problem: Physical Therapy    Goal Priority Disciplines Outcome Goal Variances Interventions   Physical Therapy Goal     PT, PT/OT Ongoing, Progressing     Description: Goals to be met by: 2023     Patient will increase functional independence with mobility by performin. Supine to sit with Stand-by Assistance  2. Sit to stand transfer with Contact Guard Assistance  3. Gait  x 50  feet with Minimal Assistance using Rolling Walker.                          Time Tracking:     PT Received On: 23  PT Start Time: 1340     PT Stop Time: 1352  PT Total Time (min): 12 min     Billable Minutes: Gait Training 12 minutes    Treatment Type: Treatment  PT/PTA: PT     PTA Visit Number: 0     2023

## 2023-03-02 NOTE — PLAN OF CARE
Problem: Physical Therapy  Goal: Physical Therapy Goal  Description: Goals to be met by: 2023     Patient will increase functional independence with mobility by performin. Supine to sit with Stand-by Assistance  2. Sit to stand transfer with Contact Guard Assistance  3. Gait  x 50  feet with Minimal Assistance using Rolling Walker.     Outcome: Ongoing, Progressing

## 2023-03-02 NOTE — PROGRESS NOTES
Calorie Count    Patient reports good oral intake this morning Pt does not like Unjury and did not drink it yesterday or today.  Noted MD order for Clinimix E @ 100 ml / hr 816 kcal and 102 gm protein, 2400 ml.   Recommend:  Discontinue Clinimix E @ 100 ml/hr and manage electrolytes via IVPB correction due to excessive calorie intake.  Consider dc Calorie count as patient has good oral intake > estimated needs: 0211-8057 kcal and 58-72 gm protein, 1448 ml/day fluid.  03/01/23 total intake documented--   2766 kcal and 67 gm protein.    3/01--    Total Calories             Total Protein    Breakfast   604 kcal                      12 gm pro    Lunch  836 kcal  18 gm pro  Dinner    1326 kcal  37 gm pro  Daily totals  2766 kcal  67 gm pro    3/02--    Total Calories             Total Protein     Breakfast   775 kcal  23 gm pro

## 2023-03-02 NOTE — PROGRESS NOTES
Patient seen and examined.  Feeling better today.  Better appetite.  Reports minimal drain output.      Vitals are stable   Afebrile  Abdomen is soft, nontender   Drain is seropurulent    Labs reviewed, no leukocytosis     Overall seems to be doing reasonably well with drain placement.  Diet as tolerated   Will plan on repeat CT scan tomorrow morning to assess for improvement

## 2023-03-03 LAB
ANION GAP SERPL CALC-SCNC: 5 MMOL/L (ref 8–16)
BACTERIA BLD CULT: NORMAL
BACTERIA FLD AEROBE CULT: ABNORMAL
BACTERIA FLD AEROBE CULT: ABNORMAL
BUN SERPL-MCNC: 16 MG/DL (ref 8–23)
CALCIUM SERPL-MCNC: 8.1 MG/DL (ref 8.7–10.5)
CHLORIDE SERPL-SCNC: 101 MMOL/L (ref 95–110)
CO2 SERPL-SCNC: 26 MMOL/L (ref 23–29)
CREAT SERPL-MCNC: 0.5 MG/DL (ref 0.5–1.4)
CRP SERPL-MCNC: 1.17 MG/DL
ERYTHROCYTE [DISTWIDTH] IN BLOOD BY AUTOMATED COUNT: 17.4 % (ref 11.5–14.5)
EST. GFR  (NO RACE VARIABLE): >60 ML/MIN/1.73 M^2
GLUCOSE SERPL-MCNC: 107 MG/DL (ref 70–110)
GRAM STN SPEC: ABNORMAL
HCT VFR BLD AUTO: 29.4 % (ref 37–48.5)
HGB BLD-MCNC: 9.9 G/DL (ref 12–16)
MAGNESIUM SERPL-MCNC: 2 MG/DL (ref 1.6–2.6)
MCH RBC QN AUTO: 30.6 PG (ref 27–31)
MCHC RBC AUTO-ENTMCNC: 33.7 G/DL (ref 32–36)
MCV RBC AUTO: 91 FL (ref 82–98)
PLATELET # BLD AUTO: 146 K/UL (ref 150–450)
PMV BLD AUTO: 9.5 FL (ref 9.2–12.9)
POTASSIUM SERPL-SCNC: 3.4 MMOL/L (ref 3.5–5.1)
RBC # BLD AUTO: 3.24 M/UL (ref 4–5.4)
SODIUM SERPL-SCNC: 132 MMOL/L (ref 136–145)
WBC # BLD AUTO: 8.44 K/UL (ref 3.9–12.7)

## 2023-03-03 PROCEDURE — 99232 SBSQ HOSP IP/OBS MODERATE 35: CPT | Mod: ,,, | Performed by: STUDENT IN AN ORGANIZED HEALTH CARE EDUCATION/TRAINING PROGRAM

## 2023-03-03 PROCEDURE — 25500020 PHARM REV CODE 255: Performed by: INTERNAL MEDICINE

## 2023-03-03 PROCEDURE — 99232 SBSQ HOSP IP/OBS MODERATE 35: CPT | Mod: ,,, | Performed by: INTERNAL MEDICINE

## 2023-03-03 PROCEDURE — 86140 C-REACTIVE PROTEIN: CPT | Performed by: INTERNAL MEDICINE

## 2023-03-03 PROCEDURE — 99232 PR SUBSEQUENT HOSPITAL CARE,LEVL II: ICD-10-PCS | Mod: ,,, | Performed by: STUDENT IN AN ORGANIZED HEALTH CARE EDUCATION/TRAINING PROGRAM

## 2023-03-03 PROCEDURE — 12000002 HC ACUTE/MED SURGE SEMI-PRIVATE ROOM

## 2023-03-03 PROCEDURE — 63600175 PHARM REV CODE 636 W HCPCS: Performed by: INTERNAL MEDICINE

## 2023-03-03 PROCEDURE — 99232 PR SUBSEQUENT HOSPITAL CARE,LEVL II: ICD-10-PCS | Mod: ,,, | Performed by: INTERNAL MEDICINE

## 2023-03-03 PROCEDURE — 97530 THERAPEUTIC ACTIVITIES: CPT | Mod: CQ

## 2023-03-03 PROCEDURE — 97116 GAIT TRAINING THERAPY: CPT | Mod: CQ

## 2023-03-03 PROCEDURE — 85027 COMPLETE CBC AUTOMATED: CPT | Performed by: INTERNAL MEDICINE

## 2023-03-03 PROCEDURE — 83735 ASSAY OF MAGNESIUM: CPT | Performed by: INTERNAL MEDICINE

## 2023-03-03 PROCEDURE — 25000003 PHARM REV CODE 250: Performed by: INTERNAL MEDICINE

## 2023-03-03 PROCEDURE — 80048 BASIC METABOLIC PNL TOTAL CA: CPT | Performed by: INTERNAL MEDICINE

## 2023-03-03 RX ADMIN — LACTOBACILLUS TAB 4 TABLET: TAB at 08:03

## 2023-03-03 RX ADMIN — SERTRALINE HYDROCHLORIDE 50 MG: 50 TABLET ORAL at 08:03

## 2023-03-03 RX ADMIN — PANTOPRAZOLE SODIUM 40 MG: 40 TABLET, DELAYED RELEASE ORAL at 05:03

## 2023-03-03 RX ADMIN — PIPERACILLIN SODIUM AND TAZOBACTAM SODIUM 3.38 G: 3; .375 INJECTION, POWDER, LYOPHILIZED, FOR SOLUTION INTRAVENOUS at 12:03

## 2023-03-03 RX ADMIN — MEGESTROL ACETATE 40 MG: 20 TABLET ORAL at 08:03

## 2023-03-03 RX ADMIN — PIPERACILLIN SODIUM AND TAZOBACTAM SODIUM 3.38 G: 3; .375 INJECTION, POWDER, LYOPHILIZED, FOR SOLUTION INTRAVENOUS at 08:03

## 2023-03-03 RX ADMIN — LACTOBACILLUS TAB 4 TABLET: TAB at 12:03

## 2023-03-03 RX ADMIN — PANCRELIPASE 2 CAPSULE: 36000; 180000; 114000 CAPSULE, DELAYED RELEASE PELLETS ORAL at 05:03

## 2023-03-03 RX ADMIN — LACTOBACILLUS TAB 4 TABLET: TAB at 05:03

## 2023-03-03 RX ADMIN — MIRTAZAPINE 15 MG: 15 TABLET, FILM COATED ORAL at 08:03

## 2023-03-03 RX ADMIN — TRAZODONE HYDROCHLORIDE 50 MG: 50 TABLET ORAL at 08:03

## 2023-03-03 RX ADMIN — PIPERACILLIN SODIUM AND TAZOBACTAM SODIUM 3.38 G: 3; .375 INJECTION, POWDER, LYOPHILIZED, FOR SOLUTION INTRAVENOUS at 03:03

## 2023-03-03 RX ADMIN — APIXABAN 5 MG: 5 TABLET, FILM COATED ORAL at 08:03

## 2023-03-03 RX ADMIN — PANCRELIPASE 6 CAPSULE: 60000; 12000; 38000 CAPSULE, DELAYED RELEASE PELLETS ORAL at 07:03

## 2023-03-03 RX ADMIN — LABETALOL HYDROCHLORIDE 10 MG: 5 INJECTION, SOLUTION INTRAVENOUS at 08:03

## 2023-03-03 RX ADMIN — PREDNISONE 5 MG: 5 TABLET ORAL at 08:03

## 2023-03-03 RX ADMIN — FOLIC ACID 1 MG: 1 TABLET ORAL at 08:03

## 2023-03-03 RX ADMIN — LEUCINE, PHENYLALANINE, LYSINE, METHIONINE, ISOLEUCINE, VALINE, HISTIDINE, THREONINE, TRYPTOPHAN, ALANINE, GLYCINE, ARGININE, PROLINE, SERINE, TYROSINE, SODIUM ACETATE, DIBASIC POTASSIUM PHOSPHATE, MAGNESIUM CHLORIDE, SODIUM CHLORIDE, CALCIUM CHLORIDE, DEXTROSE
311; 238; 247; 170; 255; 247; 204; 179; 77; 880; 438; 489; 289; 213; 17; 297; 261; 51; 77; 33; 5 INJECTION INTRAVENOUS at 05:03

## 2023-03-03 RX ADMIN — IOHEXOL 100 ML: 350 INJECTION, SOLUTION INTRAVENOUS at 08:03

## 2023-03-03 RX ADMIN — PANCRELIPASE 2 CAPSULE: 36000; 180000; 114000 CAPSULE, DELAYED RELEASE PELLETS ORAL at 12:03

## 2023-03-03 NOTE — PROGRESS NOTES
"[facility]  Adult Nutrition   Progress Note (Follow-Up)    SUMMARY     Recommendations  Recommendation/Intervention:   1) Recommend discontinuing or decreasing Clinimix rate to 50ml/hr to not overly exceed patient's protein needs. Clinimix would provide 408kcal, 51gm Protein.   2) Encouraged po intake.   3) RD will contineu to monitor.    Goals: 1. Patient to meet at least 75% of estimated energy and protein needs. 2. Prevent further weight loss.  Nutrition Goal Status: progressing towards goal  Communication of RD Recs: reviewed with RN    Dietitian Rounds Brief  Patient reports po intake improved, eating >75% of meals and loves the Unjury shake.   Calorie count   3/3 BF and lunch: 1096kcal, 77gm Protein  3/2  BF: 775kcal, 23gm Protein  3/1 all meals total: 2766kcal, 67gm Protein    Diet order:   Current Diet Order: Regular      Current Nutrition Support Formula Ordered: Clinimix E 5/20  Current Nutrition Support Rate Ordered: 100 (ml)  Current Nutrition Support Frequency Ordered: ml/hr    Evaluation of Received Nutrient/Fluid Intake  Parenteral Calories (kcal): 816  Parenteral Protein (gm): 102  Parenteral Fluid (mL): 2400  Energy Calories Required: meeting needs  Protein Required: exceeds needs  Fluid Required: meeting needs  Tolerance: tolerating     % Intake of Estimated Energy Needs: 75 - 100 %  % Meal Intake: 75 - 100 %      Intake/Output Summary (Last 24 hours) at 3/3/2023 1648  Last data filed at 3/3/2023 0505  Gross per 24 hour   Intake --   Output 75 ml   Net -75 ml        Anthropometrics  Temp: 97.8 °F (36.6 °C)  Height Method: Stated  Height: 5' 8" (172.7 cm)  Height (inches): 68 in  Weight Method: Standard Scale  Weight: 48.1 kg (106 lb 0.7 oz)  Weight (lb): 106.04 lb  Ideal Body Weight (IBW), Female: 140 lb  % Ideal Body Weight, Female (lb): 75.74 %  BMI (Calculated): 16.1  BMI Grade: 16 - 16.9 protein-energy malnutrition grade II  Weight Loss: unintentional  Usual Body Weight (UBW), " k.8 kg  % Usual Body Weight: 89.59  % Weight Change From Usual Weight: -10.6 %       Estimated/Assessed Needs  Weight Used For Calorie Calculations: 48.1 kg (106 lb 0.7 oz)  Energy Calorie Requirements (kcal): 0048-6467 (35-40)  Energy Need Method: Kcal/kg  Protein Requirements: 58 - 72 ( 1.2 - 1.5)  Weight Used For Protein Calculations: 48.1 kg (106 lb 0.7 oz)     Estimated Fluid Requirement Method: RDA Method  RDA Method (mL): 1684       Reason for Assessment  Reason For Assessment: RD follow-up  Relevant Medical History: Generalized anxiety disorder    Tobacco use    Severe protein-calorie malnutrition  Hypokalemia  Chronic pancreatitis  Pseudocyst of pancreas  Peritoneal fluid collection  Acute on chronic anemia  Elevated troponin Rheumatoid arthritis involving multiple sites with positive rheumatoid factor    Nutrition/Diet History  Patient Reported Diet/Restrictions/Preferences: general  Typical Food/Fluid Intake: poor PO intake  Spiritual, Cultural Beliefs, Hindu Practices, Values that Affect Care: no  Food Allergies: NKFA  Factors Affecting Nutritional Intake: None identified at this time    Nutrition Risk Screen  Nutrition Risk Screen: tube feeding or parenteral nutrition       Altered Skin Integrity 23 1622 medial;posterior Sacral spine #1 Incontinence associated dermatitis Partial thickness tissue loss. Shallow open ulcer with a red or pink wound bed, without slough. Intact or Open/Ruptured Serum-filled blister.-Wound Image: Images linked       Altered Skin Integrity 23 0040 Left anterior;lateral Knee Other (comment)-Wound Image: Images linked       Altered Skin Integrity 23 1101 Right lower Arm Skin Tear-Wound Image: Images linked       Altered Skin Integrity 23 1000 Left anterior;lower;proximal Arm Skin Tear-Wound Image: Images linked  MST Score: 3  Have you recently lost weight without trying?: Yes: Unsure how much  Weight loss score: 2  Have you been eating poorly  because of a decreased appetite?: Yes  Appetite score: 1       Weight History:  Wt Readings from Last 10 Encounters:   02/26/23 48.1 kg (106 lb 0.7 oz)   02/24/23 48.1 kg (106 lb 0.7 oz)   02/14/23 48.1 kg (106 lb)   02/09/23 49.9 kg (110 lb)   01/23/23 53.8 kg (118 lb 9.7 oz)   01/21/23 53.8 kg (118 lb 9.7 oz)   01/22/23 53.8 kg (118 lb 9.7 oz)   01/12/23 37.2 kg (82 lb)   01/06/23 43.4 kg (95 lb 11.2 oz)   12/29/22 42.6 kg (94 lb)        Lab/Procedures/Meds: Pertinent Labs/Meds Reviewed    Medications:Pertinent Medications Reviewed  Scheduled Meds:   amino acids 4.25%-[id number]   Intravenous Daily    apixaban  5 mg Oral BID    folic acid  1 mg Oral Daily    Lactobacillus acidoph-L.bulgar  4 tablet Oral TID WM    lipase-protease-amylase  2 capsule Oral TID WM    megestroL  40 mg Oral Daily    mirtazapine  15 mg Oral QHS    pantoprazole  40 mg Oral Before breakfast    piperacillin-tazobactam (ZOSYN) IVPB  3.375 g Intravenous Q8H    predniSONE  5 mg Oral Daily    sertraline  50 mg Oral QHS    traZODone  50 mg Oral QHS     Continuous Infusions:  PRN Meds:.sodium chloride, sodium chloride, acetaminophen, labetalol, magnesium oxide, magnesium oxide, melatonin, naloxone, ondansetron, polyethylene glycol, potassium bicarbonate, potassium bicarbonate, potassium bicarbonate, potassium, sodium phosphates, potassium, sodium phosphates, potassium, sodium phosphates    Labs: Pertinent Labs Reviewed  Clinical Chemistry:  Recent Labs   Lab 02/27/23  0344 02/27/23  0548 03/01/23  0338 03/03/23  0505    133*   < > 132*   K 3.9 3.8   < > 3.4*    100   < > 101   CO2 29 26   < > 26   * 72   < > 107   BUN 12 13   < > 16   CREATININE 0.6 0.6   < > 0.5   CALCIUM 7.9* 8.1*   < > 8.1*   ANIONGAP 7* 7*   < > 5*   MG 1.5* 1.9   < > 2.0   AMYLASE 11* 25  --   --    LIPASE 19 39  --   --     < > = values in this interval not displayed.     CBC:   Recent Labs   Lab 03/03/23  0505   WBC 8.44   RBC 3.24*   HGB 9.9*   HCT  29.4*   *   MCV 91   MCH 30.6   MCHC 33.7     Lipid Panel:  No results for input(s): CHOL, HDL, LDLCALC, TRIG, CHOLHDL in the last 168 hours.  Cardiac Profile:  No results for input(s): BNP, CPK, CPKMB, TROPONINI, CKTOTAL in the last 168 hours.  Inflammatory Labs:  Recent Labs   Lab 03/01/23  0338 03/02/23  0600 03/03/23  0505   CRP 3.05* 1.74* 1.17*     Diabetes:  No results for input(s): HGBA1C, POCTGLUCOSE in the last 168 hours.  Thyroid & Parathyroid:  No results for input(s): TSH, FREET4, T7TBZWB, B3YILHZ, THYROIDAB in the last 168 hours.    Monitor and Evaluation  Food and Nutrient Intake: energy intake, food and beverage intake  Food and Nutrient Adminstration: diet order  Physical Activity and Function: nutrition-related ADLs and IADLs, factors affecting access to physical activity  Anthropometric Measurements: weight, weight change, body mass index  Biochemical Data, Medical Tests and Procedures: electrolyte and renal panel, inflammatory profile, gastrointestinal profile, lipid profile, glucose/endocrine profile  Nutrition-Focused Physical Findings: overall appearance     Nutrition Risk  Level of Risk/Frequency of Follow-up: high     Nutrition Follow-Up  RD Follow-up?: Yes      Tia Panda, CRISTINA 03/03/2023 4:48 PM

## 2023-03-03 NOTE — NURSING
03/03/2023      Assumed care of patient. AOX4  Assessment and vital signs assessed.   Labs and meds reviewed.  Clinimix infusing no issues  No c/o pain.  Possible d/c today home with home health and IV nutrition from my understanding.    1124  Patient resting quietly. CT abd performed this morning. Dr. Sheehan came to see patient. Drain will remain in place for discharge.     1800  Patient has remained stable this shift.  Patient did have one bout with hypertension this morning and received PRN IV labetalol as ordered. Clinimix has now been discontinued. Plans for possible discharge home Monday with home health after all antibiotic therapy is arranged.

## 2023-03-03 NOTE — PROGRESS NOTES
Consult Note  Infectious Disease    Reason for Consult:  Intra-abdominal collection    HPI: Claire Bowser is a 63 y.o. female known to ID service from January, 2023 with PMHx emphysema, smoker, HTN, DVT on Eliquis, anxiety, chronic pancreatitis since biliary pancreatitis and cholecystectomy 07/2022, ,pancreatic sphincterotomy, bile leak-status post stent 12/30/22, multiple pancreatic pseudocysts, cyst gastrostomy tube placement, malnutrition, albumin 1.5, OA,  rheumatoid arthritis,    Patient came to ED on 2/24 with complaints of generalized weakness, fatigue, dyspnea on exertion, not able to stand, but not much abdominal discomfort??  CT chest ruled out PE.  CT abdomen was impressive for a big air-fluid collection in pelvis.  Patient is seen by General surgery who is recommending percutaneous drainage, no operative intervention at this time.    Patient is started on Zosyn and we are waiting for IR.    Patient has no new complaints overnight.  She feels slightly better, but still weak.  At baseline patient lays in bed most of the time.  She uses the bathroom 3 times a day.  She eats in bed, her  who is retired attends to her.  Urine culture is no growth to date.  UA with mild pyuria 20.  She had no urinary complaints until yesterday when she had some urgency and frequency.  No blood cultures are sent.  Troponin is a 1000 and EC HO is as below.  .  Hemoglobin of 6.9.  A unit of PRBC is already ordered.  No obvious source of bleeding    2/27(Paulette): consult reviewed and d/w Dr. Valenzuela, family. She is even more underweight than my last visit. The IR drain has put out 200 cc already. She has been too weak to move around for the last several days. Discussed need for IV antibiotics for a while, and this plus IV nutrition may be enough to get her into LTAC.   2/28: interim reviewed. Afebrile. WBC still in the normal range. 1000 mL relieved from intra-abd abscess since placement yesterday. Enterococcus and a GNR  isolated from culture, final pending. Intake not recorded yesterday but she did eat at least one meal. She ate well today. She is reluctant to get out of bed  3/1: interim reviewed. Did take a few steps with PT and  reports she is eating well. The drainage from right abdomen is thinner and output has decreased. No benefits for LTAC. She is in good spirits.  3/2: Interim reviewed output from the drain has tapered off to 50 cc over the last 24 hours.  Surgery is planning a repeat CT scan tomorrow.  The cultures are not final as the Gram-negative jennifer id and susceptibility had to be repeated.  CRP has fallen to only slightly above normal. She walked to the door with PT. She is worried about being discharged too soon.   3/3: interim reviewed. CT shows substantial improvement in fluid collection size, down from huge to just big. The drain is in position, but output is less than 100 cc per day. Culture from abscess on 2/27 has enterococcus and Ecoli. Micro updating the culture report(both sensitive to zosyn). CT also shows large bilateral pleural effusions, alb 1.5 on admission.  She is in good spirits, walked much farther with therapy, eating well. I was able to milk fluid from IR drain.     Antibiotics (From admission, onward)      Start     Stop Route Frequency Ordered    02/25/23 0330  piperacillin-tazobactam 3.375 g in dextrose 5 % 50 mL IVPB (ready to mix system)         -- IV Every 8 hours (non-standard times) 02/24/23 1719          Antifungals (From admission, onward)      None           Antibiotic History:   Now on Zosyn   01/26-01/31/2023 ciprofloxacin  01/23/2023--01/26/2023 ceftriaxone   01/24/2023 metronidazole   01/15/2023--01/21/2023 cefepime   01/15/2023--01/18/2023 vancomycin   01/06/2023--01/16/2023 Ceftin  Prior to it she she received doses of ertapenem, Zosyn, vancomycin, Bactrim.      EXAM & DIAGNOSTICS REVIEWED:   Vitals:     Temp:  [97.7 °F (36.5 °C)-98.4 °F (36.9 °C)]   Temp: 97.8 °F (36.6  °C) (03/03/23 1100)  Pulse: 96 (03/03/23 1100)  Resp: 16 (03/03/23 1100)  BP: (!) 169/87 (03/03/23 1100)  SpO2: 100 % (03/03/23 1100)    Intake/Output Summary (Last 24 hours) at 3/3/2023 1318  Last data filed at 3/3/2023 0505  Gross per 24 hour   Intake --   Output 75 ml   Net -75 ml       General:  In NAD. Alert and attentive, cooperative, comfortable.  Older looking than her age. cachectic  Eyes:  Anicteric,  EOMI  ENT:  No ulcers, exudates, thrush, nares patent, dentition is fair  Neck:  supple,   Lungs: Breathing comfortably  Heart:  RRR, no gallop/murmur/rub noted  Abd:  Soft, NT, ND, normal BS, no masses or organomegaly appreciated.  :  Voids   Musc:  Joints without effusion, swelling, erythema, synovitis, with extreme muscle wasting.   Skin:  easy bruising.  Thin skin.     Neuro:             Alert, attentive, speech fluent, face symmetric, moves all extremities, no focal weakness. Ambulatory  Psych:  Calm, cooperative.  Pleasant.  Lymphatic:     Extrem: No edema, erythema, phlebitis, cellulitis, warm and well perfused  VAD:  Midline left arm     Isolation:    Wound: IR drain with thinner pus, which can be milked through the tubing into the JULIUS bulb      Lines/Tubes/Drains:    General Labs reviewed:  Recent Labs   Lab 03/01/23 0338 03/02/23  0600 03/03/23  0505   WBC 5.77 8.02 8.44   HGB 6.7* 10.4* 9.9*   HCT 21.0* 30.7* 29.4*   * 129* 146*       Recent Labs   Lab 03/01/23 0338 03/02/23  0600 03/03/23  0505   * 135* 132*   K 3.7 3.4* 3.4*    103 101   CO2 27 26 26   BUN 16 17 16   CREATININE 0.7 0.6 0.5   CALCIUM 8.1* 8.1* 8.1*     Recent Labs   Lab 03/01/23 0338 03/02/23  0600 03/03/23  0505   CRP 3.05* 1.74* 1.17*         Micro:  Microbiology Results (last 7 days)       Procedure Component Value Units Date/Time    Culture, Body Fluid (Aerobic) w/ GS [[id number]]  (Abnormal)  (Susceptibility) Collected: 02/27/23 1242    Order Status: Completed Specimen: Peritoneal Fluid Updated: 03/03/23  1207     AEROBIC CULTURE - FLUID ENTEROCOCCUS FAECALIS  Moderate        GRAM NEGATIVE COLLEEN, LACTOSE   Moderate        ESCHERICHIA COLI  Moderate       Gram Stain Result Moderate WBC's      Few Gram positive cocci      Few variable positive rods    Narrative:      Peritoneal Fluid (Abscess)    Blood culture [[id number]] Collected: 02/26/23 0535    Order Status: Completed Specimen: Blood from Line, PICC Right Basilic Updated: 03/03/23 0832     Blood Culture, Routine No growth after 5 days.    Blood culture [[id number]] Collected: 02/25/23 1831    Order Status: Completed Specimen: Blood from Line, PICC Right Basilic Updated: 03/02/23 2032     Blood Culture, Routine No growth after 5 days.    KOH prep [[id number]] Collected: 02/27/23 1242    Order Status: Completed Specimen: Body Fluid from Peritoneal Fluid Updated: 02/27/23 1551     KOH Prep No yeast or fungal elements seen    Fungus culture [[id number]] Collected: 02/27/23 1242    Order Status: Sent Specimen: Body Fluid from Peritoneal Fluid Updated: 02/27/23 1257    AFB culture [[id number]] Collected: 02/27/23 1242    Order Status: Sent Specimen: Body Fluid from Peritoneal Fluid Updated: 02/27/23 1256    Gram stain [[id number]] Collected: 02/27/23 1242    Order Status: Canceled Specimen: Body Fluid from Peritoneal Fluid     Urine culture [[id number]]  (Abnormal)  (Susceptibility) Collected: 02/24/23 1528    Order Status: Completed Specimen: Urine Updated: 02/27/23 0650     Urine Culture, Routine ESCHERICHIA COLI  > 100,000 cfu/ml      Narrative:      Specimen Source->Urine            Imaging Reviewed:   CXR   CT  Essentially complete resolution of free fluid within the peritoneal cavity since the preceding CT scan dated 1/15/2023. There has been interval development of a loculated appearing collection of fluid and air in the pelvis extending up both paracolic gutters that is likely contained within a portion of the peritoneal cavity. This may represent a large  abscess given the presence of the air. It may communicate with bowel, although no direct indication is evident. There is a large right and moderately large left pleural effusion that of both increased in size significantly as well.  Cardiology:  02/24/2023.  The left ventricle is normal in size with normal systolic function.  The estimated ejection fraction is 60%.  Normal left ventricular diastolic function.  Normal right ventricular size with normal right ventricular systolic function.  Trivial circumferential pericardial effusion.  There are bilateral pleural effusions. Large    3/3/23 CT scan  Marked decrease in size of the air-fluid collection lower mid abdomen and pelvis with an 8.7 x 3.4 cm fluid collection remaining. The pigtail catheters within the fluid collection   . Pancreatic stent in place with improvement of the anterior anterior to the tail of the pancreas   Minimal ascites with diffuse anasarca   Moderate size pleural effusions and bibasilar atelectasis.        ECHO [id number]   Concentric remodeling and normal systolic function.  The estimated ejection fraction is 60%.  Normal left ventricular diastolic function.  Normal right ventricular size with normal right ventricular systolic function.  Mild mitral regurgitation.  Mild to moderate tricuspid regurgitation.  There is mild pulmonary hypertension.  Guarded interpretation suboptimal secondary to patient factors      Procedures.    01/24/2023 ERCP - Prior biliary sphincterotomy appeared open.                          - Prior pancreatic sphincterotomy appeared open.                          - One stent from the pancreatic duct was seen in    the major papilla.                          - One stent was removed from the pancreatic duct.                          - A pancreatic duct leak was found.                          - One plastic stent was placed into the ventral  pancreatic duct.   Recommendation:   Repeat ERCP in 6 weeks to check healing.    01/03/2023 upper EUS  The pancreatic duct measured up to 2 mm in diameter. Stent seen in PD in HOP. Therapeutic needle aspiration for fluid was performed.  The amount of fluid collected was 150 mL. The fluid was opaque and brown.   12/30/2022 ERCP  - A mild ectactic, slight narrowed region just above ampulla was found.   A pancreatic sphincterotomy was performed. Pancreatic sludge / debris seen folowing   sphincterotomy.  One pancreatic stent was placed into the ventral pancreatic duct. The entire main bile duct was mildly dilated. The patient has had a cholecystectomy.  A biliary sphincterotomy was performed.  An area successfully injected w/ epi.   11/29/2022 upper EUS.    07/01/2022 upper EUS.    02/26/2021 upper GI.    02/26/2021 colonoscopy.        IMPRESSION & PLAN     1. Reason for ED visit =generalized weakness which is multifactorial.  2 Markedly elevated troponin.  EC HO within normal limits.  As per hospitalist.      3. Bilateral large effusions in setting of recent pancreatitis, low albumin.  4  Anemia.  Status post PRBC x1  5.  Large pelvic abscess, polymicrobial gram stain, culture in progress    Drain placed 2/27, 1000 mL of pus relieved in the first 24 hours    6. Pyuria,  UTI Ecoli  7.  Immunosuppressed due to treatment for RA  8. Malnutrition, albumin 1.5.  Decreased appetite  9. History of chronic pancreatitis, pancreatic pseudocyst   10. History of rheumatoid arthritis on leflunomide and prednisone 5 mg,.     11. History of DVT on anticoagulation.      Recommendations:    Continue Zosyn  and anticipating home on Zosyn Monday     Trend CRP   Push nutrition,       Not ready for discharge  D/w   patient, hospital Medicine    Medical Decision Making during this encounter was  [_] Low Complexity  [_] Moderate Complexity  [ xx ] High Complexity

## 2023-03-03 NOTE — PT/OT/SLP PROGRESS
Physical Therapy Treatment    Patient Name:  Claire Bowser   MRN:  [id number]    Recommendations:     Discharge Recommendations: outpatient PT  Discharge Equipment Recommendations:    Barriers to discharge:  Pt requires min to contact guard assist for safe mobility    Assessment:     Claire Bowser is a 63 y.o. female admitted with a medical diagnosis of Peritoneal fluid collection.  She presents with the following impairments/functional limitations: weakness, impaired endurance, impaired self care skills, impaired functional mobility, gait instability, impaired balance, decreased lower extremity function, decreased upper extremity function, impaired cardiopulmonary response to activity.    Pt agreeable to visit. Pt's spouse present. Pt required min assist to contact guard assist for safety with transfers and ambulation. Pt able to ambulate 50' with RW and min to contact guard assist. Pt tolerated static standing at RW with CGA for hygiene. Pt request back to bed due to fatigue.    Rehab Prognosis: Fair; patient would benefit from acute skilled PT services to address these deficits and reach maximum level of function.    Recent Surgery: * No surgery found *      Plan:     During this hospitalization, patient to be seen 6 x/week to address the identified rehab impairments via gait training, therapeutic activities, therapeutic exercises and progress toward the following goals:    Plan of Care Expires:  04/01/23    Subjective     Chief Complaint: pt reports that she's had a long morning with having CT this am  Patient/Family Comments/goals: to get better  Pain/Comfort:         Objective:     Communicated with RN prior to session.  Patient found HOB elevated with PICC line, telemetry, bed alarm, JULIUS drain upon PT entry to room.     General Precautions: Standard, fall  Orthopedic Precautions:    Braces:    Respiratory Status: Room air     Functional Mobility:  Bed Mobility:     Supine to Sit: minimum assistance  Sit to  Supine: minimum assistance  Transfers:     Sit to Stand:  minimum assistance with rolling walker  Gait: x 50' with RW and Erica-CGA      AM-PAC 6 CLICK MOBILITY          Treatment & Education:  Pt educated on importance of time OOB, importance of intermittent mobility, safe techniques for transfers/ambulation, discharge recommendations/options, and use of call light for assistance and fall prevention.      Patient left HOB elevated with all lines intact, call button in reach, bed alarm on, RN notified, and spouse present..    GOALS:   Multidisciplinary Problems       Physical Therapy Goals          Problem: Physical Therapy    Goal Priority Disciplines Outcome Goal Variances Interventions   Physical Therapy Goal     PT, PT/OT Ongoing, Progressing     Description: Goals to be met by: 2023     Patient will increase functional independence with mobility by performin. Supine to sit with Stand-by Assistance  2. Sit to stand transfer with Contact Guard Assistance  3. Gait  x 50  feet with Minimal Assistance using Rolling Walker.                          Time Tracking:     PT Received On: 23  PT Start Time: 0945     PT Stop Time: 1010  PT Total Time (min): 25 min     Billable Minutes: Gait Training 10 and Therapeutic Activity 15    Treatment Type: Treatment  PT/PTA: PTA     PTA Visit Number: 1     2023

## 2023-03-03 NOTE — PROGRESS NOTES
Patient seen and examined.  Feeling well.  Tolerating p.o..      Vitals are stable   Afebrile  Abdomen soft   Low volume purulent fluid in the drain     Labs reviewed, no leukocytosis     CT scan was reviewed.  Significant decrease in the lower abdominal and pelvic abscess.  The drain appears to be within the residual abscess cavity    No significant changes from my perspective.  Would recommend leaving the drain in for least another few days given CT findings.  The drain can be removed in the office, likely next Thursday if patient DC today or over the weekend.  Okay for DC from my standpoint.  Will arrange for outpatient follow-up and drain removal.

## 2023-03-04 LAB
ANION GAP SERPL CALC-SCNC: 3 MMOL/L (ref 8–16)
BUN SERPL-MCNC: 16 MG/DL (ref 8–23)
CALCIUM SERPL-MCNC: 8.4 MG/DL (ref 8.7–10.5)
CHLORIDE SERPL-SCNC: 103 MMOL/L (ref 95–110)
CO2 SERPL-SCNC: 26 MMOL/L (ref 23–29)
CREAT SERPL-MCNC: 0.5 MG/DL (ref 0.5–1.4)
CRP SERPL-MCNC: 0.87 MG/DL
ERYTHROCYTE [DISTWIDTH] IN BLOOD BY AUTOMATED COUNT: 17.6 % (ref 11.5–14.5)
EST. GFR  (NO RACE VARIABLE): >60 ML/MIN/1.73 M^2
GLUCOSE SERPL-MCNC: 106 MG/DL (ref 70–110)
HCT VFR BLD AUTO: 28 % (ref 37–48.5)
HGB BLD-MCNC: 9.4 G/DL (ref 12–16)
MAGNESIUM SERPL-MCNC: 2.1 MG/DL (ref 1.6–2.6)
MCH RBC QN AUTO: 31.1 PG (ref 27–31)
MCHC RBC AUTO-ENTMCNC: 33.6 G/DL (ref 32–36)
MCV RBC AUTO: 93 FL (ref 82–98)
PLATELET # BLD AUTO: 179 K/UL (ref 150–450)
PMV BLD AUTO: 9.5 FL (ref 9.2–12.9)
POTASSIUM SERPL-SCNC: 3.7 MMOL/L (ref 3.5–5.1)
RBC # BLD AUTO: 3.02 M/UL (ref 4–5.4)
SODIUM SERPL-SCNC: 132 MMOL/L (ref 136–145)
WBC # BLD AUTO: 8.87 K/UL (ref 3.9–12.7)

## 2023-03-04 PROCEDURE — 83735 ASSAY OF MAGNESIUM: CPT | Performed by: INTERNAL MEDICINE

## 2023-03-04 PROCEDURE — 12000002 HC ACUTE/MED SURGE SEMI-PRIVATE ROOM

## 2023-03-04 PROCEDURE — 80048 BASIC METABOLIC PNL TOTAL CA: CPT | Performed by: INTERNAL MEDICINE

## 2023-03-04 PROCEDURE — 25000003 PHARM REV CODE 250: Performed by: INTERNAL MEDICINE

## 2023-03-04 PROCEDURE — 85027 COMPLETE CBC AUTOMATED: CPT | Performed by: INTERNAL MEDICINE

## 2023-03-04 PROCEDURE — 86140 C-REACTIVE PROTEIN: CPT | Performed by: INTERNAL MEDICINE

## 2023-03-04 PROCEDURE — 63600175 PHARM REV CODE 636 W HCPCS: Performed by: INTERNAL MEDICINE

## 2023-03-04 PROCEDURE — 97116 GAIT TRAINING THERAPY: CPT | Mod: CQ

## 2023-03-04 RX ORDER — AMLODIPINE BESYLATE 5 MG/1
5 TABLET ORAL DAILY
Status: DISCONTINUED | OUTPATIENT
Start: 2023-03-04 | End: 2023-03-06 | Stop reason: HOSPADM

## 2023-03-04 RX ADMIN — PIPERACILLIN SODIUM AND TAZOBACTAM SODIUM 3.38 G: 3; .375 INJECTION, POWDER, LYOPHILIZED, FOR SOLUTION INTRAVENOUS at 08:03

## 2023-03-04 RX ADMIN — PREDNISONE 5 MG: 5 TABLET ORAL at 08:03

## 2023-03-04 RX ADMIN — PIPERACILLIN SODIUM AND TAZOBACTAM SODIUM 3.38 G: 3; .375 INJECTION, POWDER, LYOPHILIZED, FOR SOLUTION INTRAVENOUS at 10:03

## 2023-03-04 RX ADMIN — AMLODIPINE BESYLATE 5 MG: 5 TABLET ORAL at 12:03

## 2023-03-04 RX ADMIN — LACTOBACILLUS TAB 4 TABLET: TAB at 04:03

## 2023-03-04 RX ADMIN — LACTOBACILLUS TAB 4 TABLET: TAB at 08:03

## 2023-03-04 RX ADMIN — LACTOBACILLUS TAB 4 TABLET: TAB at 12:03

## 2023-03-04 RX ADMIN — APIXABAN 5 MG: 5 TABLET, FILM COATED ORAL at 08:03

## 2023-03-04 RX ADMIN — PIPERACILLIN SODIUM AND TAZOBACTAM SODIUM 3.38 G: 3; .375 INJECTION, POWDER, LYOPHILIZED, FOR SOLUTION INTRAVENOUS at 03:03

## 2023-03-04 RX ADMIN — APIXABAN 5 MG: 5 TABLET, FILM COATED ORAL at 09:03

## 2023-03-04 RX ADMIN — PANTOPRAZOLE SODIUM 40 MG: 40 TABLET, DELAYED RELEASE ORAL at 05:03

## 2023-03-04 RX ADMIN — MEGESTROL ACETATE 40 MG: 20 TABLET ORAL at 09:03

## 2023-03-04 RX ADMIN — MIRTAZAPINE 15 MG: 15 TABLET, FILM COATED ORAL at 08:03

## 2023-03-04 RX ADMIN — FOLIC ACID 1 MG: 1 TABLET ORAL at 08:03

## 2023-03-04 RX ADMIN — TRAZODONE HYDROCHLORIDE 50 MG: 50 TABLET ORAL at 08:03

## 2023-03-04 RX ADMIN — SERTRALINE HYDROCHLORIDE 50 MG: 50 TABLET ORAL at 08:03

## 2023-03-04 NOTE — PROGRESS NOTES
[facility] Medicine  Progress Note    Patient name: Claire Bowser  MRN: [id number]  Admit Date: 2/24/2023   LOS: 5 days     SUBJECTIVE:     Principal problem: Peritoneal fluid collection    Interval History:  No acute overnight events reported.  Denies abdominal pain.  Walked further with PT today.  Repeat CT abdomen/pelvis today with improvement in pelvic fluid collection.    Summary:  63-year-old  female with complicated GI history including pancreatitis (June 2022) complicated by leak status post stenting, status post laparoscopic cholecystectomy as well as dual sphincterotomy who was admitted for shortness of breath and was found to have peritoneal fluid collection on imaging.  Started on empiric antibiotics due to concern for infectious process.  GI, General surgery and Infectious Disease consulted.  Patient underwent IR guided peritoneal drain placement on 02/27. Cx growing E coli and Enterococcus faecalis.    Scheduled Meds:   apixaban  5 mg Oral BID    folic acid  1 mg Oral Daily    Lactobacillus acidoph-L.bulgar  4 tablet Oral TID WM    lipase-protease-amylase  2 capsule Oral TID WM    megestroL  40 mg Oral Daily    mirtazapine  15 mg Oral QHS    pantoprazole  40 mg Oral Before breakfast    piperacillin-tazobactam (ZOSYN) IVPB  3.375 g Intravenous Q8H    predniSONE  5 mg Oral Daily    sertraline  50 mg Oral QHS    traZODone  50 mg Oral QHS     Continuous Infusions:    PRN Meds:sodium chloride, sodium chloride, acetaminophen, labetalol, magnesium oxide, magnesium oxide, melatonin, naloxone, ondansetron, polyethylene glycol, potassium bicarbonate, potassium bicarbonate, potassium bicarbonate, potassium, sodium phosphates, potassium, sodium phosphates, potassium, sodium phosphates    Review of patient's allergies indicates:   Allergen Reactions    No known drug allergies        Review of Systems: As per interval history    OBJECTIVE:     Vital Signs (Most Recent)  Temp: 97.4 °F  (36.3 °C) (03/03/23 1653)  Pulse: 105 (03/03/23 1653)  Resp: 18 (03/03/23 1653)  BP: (!) 157/87 (03/03/23 1653)  SpO2: 96 % (03/03/23 1653)    Vital Signs Range (Last 24H):  Temp:  [97.4 °F (36.3 °C)-98.4 °F (36.9 °C)]   Pulse:  []   Resp:  [16-20]   BP: (142-195)/()   SpO2:  [96 %-100 %]     I & O (Last 24H):  Intake/Output Summary (Last 24 hours) at 3/3/2023 1848  Last data filed at 3/3/2023 0505  Gross per 24 hour   Intake --   Output 50 ml   Net -50 ml         Physical Exam:  General: Frail. No acute distress  Eyes: No conjunctival injection. No scleral icterus.  ENT: Hearing grossly intact. No discharge from ears. No nasal discharge.   Neck: Supple, trachea midline. No JVD  CVS: RRR. No LE edema BL  Lungs:  No tachypnea or accessory muscle use.  Clear to auscultation bilaterally  Abdomen:  Soft, nontender and nondistended.  No organomegaly. JULIUS drain with serous drainage   Neuro: AOx3. Moves all extremities. Follows commands. Responds appropriately   Skin:  No rash or erythema noted    Laboratory:  I have reviewed all pertinent lab results within the past 24 hours.  CBC:   Recent Labs   Lab 03/03/23  0505   WBC 8.44   RBC 3.24*   HGB 9.9*   HCT 29.4*   *   MCV 91   MCH 30.6   MCHC 33.7       CMP:   Recent Labs   Lab 03/03/23  0505      CALCIUM 8.1*   *   K 3.4*   CO2 26      BUN 16   CREATININE 0.5       Microbiology Results (last 7 days)       Procedure Component Value Units Date/Time    Culture, Body Fluid (Aerobic) w/ GS [[id number]]  (Abnormal)  (Susceptibility) Collected: 02/27/23 1242    Order Status: Completed Specimen: Peritoneal Fluid Updated: 03/03/23 1650     AEROBIC CULTURE - FLUID ENTEROCOCCUS FAECALIS  Moderate        ESCHERICHIA COLI  Moderate       Gram Stain Result Moderate WBC's      Few Gram positive cocci      Few variable positive rods    Narrative:      Peritoneal Fluid (Abscess)    AFB culture [[id number]] Collected: 02/27/23 1242    Order Status:  Completed Specimen: Body Fluid from Peritoneal Fluid Updated: 03/03/23 1432     AFB CULTURE STAIN No acid fast bacilli seen.     AFB CULTURE STAIN Testing performed by:     AFB CULTURE STAIN Lab [facility] [place]     [facility] [address] [facility]     [place] [address]     AFB CULTURE STAIN Dr.Brian Ele MD    Blood culture [[id number]] Collected: 02/26/23 0535    Order Status: Completed Specimen: Blood from Line, PICC Right Basilic Updated: 03/03/23 0832     Blood Culture, Routine No growth after 5 days.    Blood culture [[id number]] Collected: 02/25/23 1831    Order Status: Completed Specimen: Blood from Line, PICC Right Basilic Updated: 03/02/23 2032     Blood Culture, Routine No growth after 5 days.    KOH prep [[id number]] Collected: 02/27/23 1242    Order Status: Completed Specimen: Body Fluid from Peritoneal Fluid Updated: 02/27/23 1551     KOH Prep No yeast or fungal elements seen    Fungus culture [[id number]] Collected: 02/27/23 1242    Order Status: Sent Specimen: Body Fluid from Peritoneal Fluid Updated: 02/27/23 1257    Gram stain [[id number]] Collected: 02/27/23 1242    Order Status: Canceled Specimen: Body Fluid from Peritoneal Fluid     Urine culture [[id number]]  (Abnormal)  (Susceptibility) Collected: 02/24/23 1528    Order Status: Completed Specimen: Urine Updated: 02/27/23 0650     Urine Culture, Routine ESCHERICHIA COLI  > 100,000 cfu/ml      Narrative:      Specimen Source->Urine            Diagnostic Results:  Labs: Reviewed  CT: Reviewed        ASSESSMENT/PLAN:       Active Hospital Problems    Diagnosis  POA    *Peritoneal fluid collection [R18.8]  Yes    Intra-abdominal abscess [K65.1]  Yes    Acute on chronic anemia [D64.9]  Yes    Elevated troponin [R77.8]  Yes    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         Plan:   Large peritoneal fluid collection concerning for possible abscess in the presence of air - ?  Likely walled-off pancreatic fluid collection with secondary infection  No systemic signs of infection   However given size of the fluid collection started on antibiotic therapy with piperacillin-tazobactam  Infectious disease, general surgery and GI on board   Status post peritoneal drain placement by IR on 02/27   Cx growing Enterococcus faecalis and E coli - continue as per ID recommendations.  Will likely go home on pip-tazo   Repeat CT abd/pelvis with improvement in size of fluid collection     Bilateral pleural effusions  Elevated troponin - nondiagnostic and unlikely ACS  Could be 3rd spacing in the setting of hypoalbuminemia versus less likely congestive heart failure  BNP within normal limits   Previous echocardiogram from 2 weeks ago with no evidence of congestive heart failure   Repeated echocardiogram 02/24 - trivial pericardial effusion  Appreciate cardiology input     Chronic pancreatitis  Symptoms are stable.  Continue pancreatic enzyme supplement  Seen by GI     Acute on chronic anemia   Could be nutritional; no clinical evidence of blood loss  S/p total of 2 units of pRBC this admission   Monitor with daily CBC     History of rheumatoid arthritis  Hold leflunomide while being treated for infection.  Continue low-dose prednisone     History of DVT in September 2022   On apixaban which was held for IR drain placement; restarted  Estimated end date of anticoagulation is March 2022     Severe malnutrition  Continue megestrol    Disposition: No LTAC benefits through insurance. Will resume outpatient PT at discharge.  Likely discharge on Monday    VTE Risk Mitigation (From admission, onward)           Ordered     apixaban tablet 5 mg  2 times daily         02/27/23  1702     IP VTE HIGH RISK PATIENT  Once         02/24/23 2016     Place sequential compression device  Until discontinued         02/24/23 2016     Reason for No Pharmacological VTE Prophylaxis  Once        Question:  Reasons:  Answer:  Physician Provided (leave comment)    02/24/23 2016                        Department Hospital Medicine  [facility]  Marbin Ty MD  Date of service: 03/03/2023

## 2023-03-04 NOTE — PLAN OF CARE
Problem: Adult Inpatient Plan of Care  Goal: Plan of Care Review  Outcome: Ongoing, Progressing  Goal: Patient-Specific Goal (Individualized)  Outcome: Ongoing, Progressing  Goal: Absence of Hospital-Acquired Illness or Injury  Outcome: Ongoing, Progressing  Goal: Optimal Comfort and Wellbeing  Outcome: Ongoing, Progressing  Goal: Readiness for Transition of Care  Outcome: Ongoing, Progressing     Problem: Skin Injury Risk Increased  Goal: Skin Health and Integrity  Outcome: Ongoing, Progressing     Problem: Fluid Imbalance (Pneumonia)  Goal: Fluid Balance  Outcome: Ongoing, Progressing     Problem: Infection (Pneumonia)  Goal: Resolution of Infection Signs and Symptoms  Outcome: Ongoing, Progressing     Problem: Respiratory Compromise (Pneumonia)  Goal: Effective Oxygenation and Ventilation  Outcome: Ongoing, Progressing     Problem: Infection  Goal: Absence of Infection Signs and Symptoms  Outcome: Ongoing, Progressing     Problem: Impaired Wound Healing  Goal: Optimal Wound Healing  Outcome: Ongoing, Progressing     Problem: Malnutrition  Goal: Improved Nutritional Intake  Outcome: Ongoing, Progressing     Problem: Fall Injury Risk  Goal: Absence of Fall and Fall-Related Injury  Outcome: Ongoing, Progressing

## 2023-03-04 NOTE — PT/OT/SLP PROGRESS
Physical Therapy Treatment    Patient Name:  Claire Bowser   MRN:  [id number]    Recommendations:     Discharge Recommendations: outpatient PT  Discharge Equipment Recommendations:    Barriers to discharge:  Pt requires min to contact guard assist for safe mobility    Assessment:     Claire Bowser is a 63 y.o. female admitted with a medical diagnosis of Peritoneal fluid collection.  She presents with the following impairments/functional limitations: weakness, impaired endurance, impaired self care skills, impaired functional mobility, gait instability, impaired balance, decreased lower extremity function, decreased upper extremity function, impaired cardiopulmonary response to activity.    Pt agreeable to visit. Spouse present throughout treatment. Pt with improved bed mobility requiring contact guard assist instead of min assist today. Pt continues to require min assist for sit to stand as she attempts to push mostly through her arms and requires verbal cuing to push through her legs. Pt ambulated a little farther today and required less assist. Discussed with pt and spouse different exercises and activities pt can do at home to help build her strength and endurance.    Rehab Prognosis: Fair; patient would benefit from acute skilled PT services to address these deficits and reach maximum level of function.    Recent Surgery: * No surgery found *      Plan:     During this hospitalization, patient to be seen 6 x/week to address the identified rehab impairments via gait training, therapeutic activities, therapeutic exercises and progress toward the following goals:    Plan of Care Expires:  04/01/23    Subjective     Chief Complaint: pt reports she still has some anxiety with transfers  Patient/Family Comments/goals: to get better  Pain/Comfort:  Pain Rating 1: 0/10      Objective:     Communicated with RN prior to session.  Patient found HOB elevated with PICC line, telemetry, bed alarm, JULIUS drain upon PT entry to  room.     General Precautions: Standard, fall  Orthopedic Precautions:    Braces:    Respiratory Status: Room air     Functional Mobility:  Bed Mobility:     Scooting: moderate assistance  Supine to Sit: contact guard assistance  Sit to Supine: contact guard assistance  Transfers:     Sit to Stand:  minimum assistance with rolling walker  Gait: x 60' with RW and CGA      AM-PAC 6 CLICK MOBILITY          Treatment & Education:  Pt educated on importance of time OOB, importance of intermittent mobility, safe techniques for transfers/ambulation, discharge recommendations/options, and use of call light for assistance and fall prevention.      Patient left HOB elevated with all lines intact, call button in reach, bed alarm on, RN notified, and spouse present..    GOALS:   Multidisciplinary Problems       Physical Therapy Goals          Problem: Physical Therapy    Goal Priority Disciplines Outcome Goal Variances Interventions   Physical Therapy Goal     PT, PT/OT Ongoing, Progressing     Description: Goals to be met by: 2023     Patient will increase functional independence with mobility by performin. Supine to sit with Stand-by Assistance  2. Sit to stand transfer with Contact Guard Assistance  3. Gait  x 50  feet with Minimal Assistance using Rolling Walker.                          Time Tracking:     PT Received On: 23  PT Start Time: 934     PT Stop Time: 954  PT Total Time (min): 20 min     Billable Minutes: Gait Training 20    Treatment Type: Treatment  PT/PTA: PTA     PTA Visit Number: 2     2023

## 2023-03-04 NOTE — PROGRESS NOTES
[facility]  Progress Note    Patient name: Claire Bowser  MRN: [id number]  Admit Date: 2/24/2023   LOS: 6 days     SUBJECTIVE:     Principal problem: Peritoneal fluid collection    Interval History:  No acute overnight events reported.  Eating and drinking okay.  I've discussed with CM who is trying to verify if she has outpatient IV abx benefits to arrange given no LTAC benefits.  Per ID's note, will be here at least until Monday with inpatient IV abx.     Summary:  63-year-old  female with complicated GI history including pancreatitis (June 2022) complicated by leak status post stenting, status post laparoscopic cholecystectomy as well as dual sphincterotomy who was admitted for shortness of breath and was found to have peritoneal fluid collection on imaging.  Started on empiric antibiotics due to concern for infectious process.  GI, General surgery and Infectious Disease consulted.  Patient underwent IR guided peritoneal drain placement on 02/27. Cx growing E coli and Enterococcus faecalis. Repeat CT scan 3/3 with marked decrease in fluid collection with cath in place. She has received 2 units of pRBCs this hospital stay with no overt signs of bleeding.  RA medication Leflunomide remains on hold given active infection.  She has bilateral pleural effusion.  Echo with normal EF and thus no indication for heart failure.  She does not have LTAC benefits and thus exploring outpatient IV abx.     Scheduled Meds:   amLODIPine  5 mg Oral Daily    apixaban  5 mg Oral BID    folic acid  1 mg Oral Daily    Lactobacillus acidoph-L.bulgar  4 tablet Oral TID WM    lipase-protease-amylase  2 capsule Oral TID WM    megestroL  40 mg Oral Daily    mirtazapine  15 mg Oral QHS    pantoprazole  40 mg Oral Before breakfast    piperacillin-tazobactam (ZOSYN) IVPB  3.375 g Intravenous Q8H    predniSONE  5 mg Oral Daily    sertraline  50 mg Oral QHS    traZODone  50 mg Oral QHS     Continuous  Infusions:    PRN Meds:sodium chloride, sodium chloride, acetaminophen, labetalol, magnesium oxide, magnesium oxide, melatonin, naloxone, ondansetron, polyethylene glycol, potassium bicarbonate, potassium bicarbonate, potassium bicarbonate, potassium, sodium phosphates, potassium, sodium phosphates, potassium, sodium phosphates    Review of patient's allergies indicates:   Allergen Reactions    No known drug allergies        Review of Systems: As per interval history    OBJECTIVE:     Vital Signs (Most Recent)  Temp: 98.2 °F (36.8 °C) (03/04/23 1120)  Pulse: (!) 111 (03/04/23 1120)  Resp: 18 (03/04/23 1120)  BP: (!) 173/112 (03/04/23 1120)  SpO2: 95 % (03/04/23 1120)    Vital Signs Range (Last 24H):  Temp:  [97.4 °F (36.3 °C)-98.8 °F (37.1 °C)]   Pulse:  []   Resp:  [18-20]   BP: (157-186)/()   SpO2:  [95 %-97 %]     I & O (Last 24H):  Intake/Output Summary (Last 24 hours) at 3/4/2023 1450  Last data filed at 3/4/2023 1010  Gross per 24 hour   Intake 360 ml   Output 100 ml   Net 260 ml         Physical Exam:  General: Frail. No acute distress  Eyes: No conjunctival injection. No scleral icterus.  ENT: Hearing grossly intact. No discharge from ears. No nasal discharge.   Neck: Supple, trachea midline. No JVD  CVS: No LE edema BL  Lungs:  No tachypnea or accessory muscle use.    Abdomen:  Soft, nontender and nondistended.  No organomegaly. JULIUS drain with serous drainage   Neuro: AOx3. Moves all extremities. Follows commands. Responds appropriately   Skin:  No rash or erythema noted    Laboratory:  I have reviewed all pertinent lab results within the past 24 hours.  CBC:   Recent Labs   Lab 03/04/23  0505   WBC 8.87   RBC 3.02*   HGB 9.4*   HCT 28.0*      MCV 93   MCH 31.1*   MCHC 33.6       CMP:   Recent Labs   Lab 03/04/23  0505      CALCIUM 8.4*   *   K 3.7   CO2 26      BUN 16   CREATININE 0.5       Microbiology Results (last 7 days)       Procedure Component Value Units  Date/Time    Culture, Body Fluid (Aerobic) w/ GS [[id number]]  (Abnormal)  (Susceptibility) Collected: 02/27/23 1242    Order Status: Completed Specimen: Peritoneal Fluid Updated: 03/03/23 1650     AEROBIC CULTURE - FLUID ENTEROCOCCUS FAECALIS  Moderate        ESCHERICHIA COLI  Moderate       Gram Stain Result Moderate WBC's      Few Gram positive cocci      Few variable positive rods    Narrative:      Peritoneal Fluid (Abscess)    AFB culture [[id number]] Collected: 02/27/23 1242    Order Status: Completed Specimen: Body Fluid from Peritoneal Fluid Updated: 03/03/23 1432     AFB CULTURE STAIN No acid fast bacilli seen.     AFB CULTURE STAIN Testing performed by:     AF[facility] CULTURE STAIN [facility] [place]     [facility]     [place] [address]     AFB CULTURE STAIN Dr.Brian Ele MD    Blood culture [[id number]] Collected: 02/26/23 0535    Order Status: Completed Specimen: Blood from Line, PICC Right Basilic Updated: 03/03/23 0832     Blood Culture, Routine No growth after 5 days.    Blood culture [[id number]] Collected: 02/25/23 1831    Order Status: Completed Specimen: Blood from Line, PICC Right Basilic Updated: 03/02/23 2032     Blood Culture, Routine No growth after 5 days.    KOH prep [[id number]] Collected: 02/27/23 1242    Order Status: Completed Specimen: Body Fluid from Peritoneal Fluid Updated: 02/27/23 1551     KOH Prep No yeast or fungal elements seen    Fungus culture [[id number]] Collected: 02/27/23 1242    Order Status: Sent Specimen: Body Fluid from Peritoneal Fluid Updated: 02/27/23 1257    Gram stain [[id number]] Collected: 02/27/23 1242    Order Status: Canceled Specimen: Body Fluid from Peritoneal Fluid     Urine culture [[id number]]  (Abnormal)  (Susceptibility) Collected: 02/24/23 1528    Order Status: Completed Specimen: Urine Updated: 02/27/23 0650     Urine Culture, Routine ESCHERICHIA COLI  > 100,000 cfu/ml      Narrative:      Specimen  Source->Urine            Diagnostic Results:  Labs: Reviewed  CT: Reviewed        ASSESSMENT/PLAN:       Active Hospital Problems    Diagnosis  POA    *Peritoneal fluid collection [R18.8]  Yes    Intra-abdominal abscess [K65.1]  Yes    Acute on chronic anemia [D64.9]  Yes    Elevated troponin [R77.8]  Yes    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         Plan:   Large peritoneal fluid collection concerning for possible abscess in the presence of air - ?  Likely walled-off pancreatic fluid collection with secondary infection  No systemic signs of infection   However given size of the fluid collection started on antibiotic therapy with piperacillin-tazobactam  Infectious disease, general surgery and GI on board   Status post peritoneal drain placement by IR on 02/27   Cx growing Enterococcus faecalis and E coli - continue as per ID recommendations.  Will likely go home on pip-tazo   Repeat CT abd/pelvis with improvement in size of fluid collection     Bilateral pleural effusions  Elevated troponin - nondiagnostic and unlikely ACS  Could be 3rd spacing in the setting of hypoalbuminemia versus less likely congestive heart failure  BNP within normal limits   Previous echocardiogram from 2 weeks ago with no evidence of congestive heart failure   Repeated echocardiogram 02/24 - trivial pericardial effusion  Appreciate cardiology input     Chronic pancreatitis  Symptoms are stable.  Continue pancreatic enzyme supplement  Seen by GI     Acute on chronic anemia   Could be nutritional; no clinical evidence of blood loss  S/p total of 2 units of pRBC this admission   Monitor with daily CBC     History of rheumatoid arthritis  Hold leflunomide  while being treated for infection.  Continue low-dose prednisone     History of DVT in September 2022   On apixaban which was held for IR drain placement; restarted  Estimated end date of anticoagulation is March 2022     Severe malnutrition  Continue megestrol    Disposition: No LTAC benefits through insurance. Will resume outpatient PT at discharge.  Likely discharge on Monday. CM exploring outpatient IV abx benefits.    VTE Risk Mitigation (From admission, onward)           Ordered     apixaban tablet 5 mg  2 times daily         02/27/23 1702     IP VTE HIGH RISK PATIENT  Once         02/24/23 2016     Place sequential compression device  Until discontinued         02/24/23 2016     Reason for No Pharmacological VTE Prophylaxis  Once        Question:  Reasons:  Answer:  Physician Provided (leave comment)    02/24/23 2016                        Department Hospital Medicine  [facility]  Sukh Melo MD  Date of service: 03/04/2023

## 2023-03-05 LAB
ANION GAP SERPL CALC-SCNC: 4 MMOL/L (ref 8–16)
BUN SERPL-MCNC: 13 MG/DL (ref 8–23)
CALCIUM SERPL-MCNC: 8.3 MG/DL (ref 8.7–10.5)
CHLORIDE SERPL-SCNC: 106 MMOL/L (ref 95–110)
CO2 SERPL-SCNC: 26 MMOL/L (ref 23–29)
CREAT SERPL-MCNC: 0.5 MG/DL (ref 0.5–1.4)
CRP SERPL-MCNC: 1.29 MG/DL
ERYTHROCYTE [DISTWIDTH] IN BLOOD BY AUTOMATED COUNT: 17.7 % (ref 11.5–14.5)
EST. GFR  (NO RACE VARIABLE): >60 ML/MIN/1.73 M^2
GLUCOSE SERPL-MCNC: 85 MG/DL (ref 70–110)
HCT VFR BLD AUTO: 27.1 % (ref 37–48.5)
HGB BLD-MCNC: 8.8 G/DL (ref 12–16)
MAGNESIUM SERPL-MCNC: 2.1 MG/DL (ref 1.6–2.6)
MCH RBC QN AUTO: 30.1 PG (ref 27–31)
MCHC RBC AUTO-ENTMCNC: 32.5 G/DL (ref 32–36)
MCV RBC AUTO: 93 FL (ref 82–98)
PLATELET # BLD AUTO: 217 K/UL (ref 150–450)
PMV BLD AUTO: 9.1 FL (ref 9.2–12.9)
POTASSIUM SERPL-SCNC: 3.3 MMOL/L (ref 3.5–5.1)
RBC # BLD AUTO: 2.92 M/UL (ref 4–5.4)
SODIUM SERPL-SCNC: 136 MMOL/L (ref 136–145)
WBC # BLD AUTO: 6.86 K/UL (ref 3.9–12.7)

## 2023-03-05 PROCEDURE — 80048 BASIC METABOLIC PNL TOTAL CA: CPT | Performed by: INTERNAL MEDICINE

## 2023-03-05 PROCEDURE — 85027 COMPLETE CBC AUTOMATED: CPT | Performed by: INTERNAL MEDICINE

## 2023-03-05 PROCEDURE — 25000003 PHARM REV CODE 250: Performed by: INTERNAL MEDICINE

## 2023-03-05 PROCEDURE — 12000002 HC ACUTE/MED SURGE SEMI-PRIVATE ROOM

## 2023-03-05 PROCEDURE — 86140 C-REACTIVE PROTEIN: CPT | Performed by: INTERNAL MEDICINE

## 2023-03-05 PROCEDURE — 63600175 PHARM REV CODE 636 W HCPCS: Performed by: INTERNAL MEDICINE

## 2023-03-05 PROCEDURE — 83735 ASSAY OF MAGNESIUM: CPT | Performed by: INTERNAL MEDICINE

## 2023-03-05 RX ADMIN — MEGESTROL ACETATE 40 MG: 20 TABLET ORAL at 09:03

## 2023-03-05 RX ADMIN — LACTOBACILLUS TAB 4 TABLET: TAB at 12:03

## 2023-03-05 RX ADMIN — SERTRALINE HYDROCHLORIDE 50 MG: 50 TABLET ORAL at 08:03

## 2023-03-05 RX ADMIN — POTASSIUM BICARBONATE 40 MEQ: 782 TABLET, EFFERVESCENT ORAL at 05:03

## 2023-03-05 RX ADMIN — TRAZODONE HYDROCHLORIDE 50 MG: 50 TABLET ORAL at 08:03

## 2023-03-05 RX ADMIN — PIPERACILLIN SODIUM AND TAZOBACTAM SODIUM 3.38 G: 3; .375 INJECTION, POWDER, LYOPHILIZED, FOR SOLUTION INTRAVENOUS at 08:03

## 2023-03-05 RX ADMIN — PREDNISONE 5 MG: 5 TABLET ORAL at 09:03

## 2023-03-05 RX ADMIN — APIXABAN 5 MG: 5 TABLET, FILM COATED ORAL at 08:03

## 2023-03-05 RX ADMIN — LACTOBACILLUS TAB 4 TABLET: TAB at 05:03

## 2023-03-05 RX ADMIN — LACTOBACILLUS TAB 4 TABLET: TAB at 09:03

## 2023-03-05 RX ADMIN — FOLIC ACID 1 MG: 1 TABLET ORAL at 09:03

## 2023-03-05 RX ADMIN — PANCRELIPASE 2 CAPSULE: 36000; 180000; 114000 CAPSULE, DELAYED RELEASE PELLETS ORAL at 05:03

## 2023-03-05 RX ADMIN — PANTOPRAZOLE SODIUM 40 MG: 40 TABLET, DELAYED RELEASE ORAL at 05:03

## 2023-03-05 RX ADMIN — MIRTAZAPINE 15 MG: 15 TABLET, FILM COATED ORAL at 08:03

## 2023-03-05 RX ADMIN — AMLODIPINE BESYLATE 5 MG: 5 TABLET ORAL at 09:03

## 2023-03-05 RX ADMIN — PIPERACILLIN SODIUM AND TAZOBACTAM SODIUM 3.38 G: 3; .375 INJECTION, POWDER, LYOPHILIZED, FOR SOLUTION INTRAVENOUS at 12:03

## 2023-03-05 RX ADMIN — PANCRELIPASE 2 CAPSULE: 36000; 180000; 114000 CAPSULE, DELAYED RELEASE PELLETS ORAL at 09:03

## 2023-03-05 RX ADMIN — PANCRELIPASE 2 CAPSULE: 36000; 180000; 114000 CAPSULE, DELAYED RELEASE PELLETS ORAL at 12:03

## 2023-03-05 RX ADMIN — PIPERACILLIN SODIUM AND TAZOBACTAM SODIUM 3.38 G: 3; .375 INJECTION, POWDER, LYOPHILIZED, FOR SOLUTION INTRAVENOUS at 03:03

## 2023-03-05 RX ADMIN — APIXABAN 5 MG: 5 TABLET, FILM COATED ORAL at 09:03

## 2023-03-05 NOTE — PROGRESS NOTES
[facility] Medicine  Progress Note    Patient name: Claire Bowser  MRN: [id number]  Admit Date: 2/24/2023   LOS: 7 days     SUBJECTIVE:     Principal problem: Peritoneal fluid collection    Interval History:  No acute overnight events reported.  CM kindly discussing with [facility] outpatient IV abx.  It is a continuous infusion and thus will need to be at home and will figure out further labs and line care given does not have HH benefits. We will see if surgery thinks drain can be pulled tomorrow or Thursday in clinic.    Summary:  63-year-old  female with complicated GI history including pancreatitis (June 2022) complicated by leak status post stenting, status post laparoscopic cholecystectomy as well as dual sphincterotomy who was admitted for shortness of breath and was found to have peritoneal fluid collection on imaging.  Started on empiric antibiotics due to concern for infectious process.  GI, General surgery and Infectious Disease consulted.  Patient underwent IR guided peritoneal drain placement on 02/27. Cx growing E coli and Enterococcus faecalis. Repeat CT scan 3/3 with marked decrease in fluid collection with cath in place. She has received 2 units of pRBCs this hospital stay with no overt signs of bleeding.  RA medication Leflunomide remains on hold given active infection.  She has bilateral pleural effusion.  Echo with normal EF and thus no indication for heart failure.  She does not have LTAC benefits and thus exploring outpatient IV abx.     Scheduled Meds:   amLODIPine  5 mg Oral Daily    apixaban  5 mg Oral BID    folic acid  1 mg Oral Daily    Lactobacillus acidoph-L.bulgar  4 tablet Oral TID WM    lipase-protease-amylase  2 capsule Oral TID WM    megestroL  40 mg Oral Daily    mirtazapine  15 mg Oral QHS    pantoprazole  40 mg Oral Before breakfast    piperacillin-tazobactam (ZOSYN) IVPB  3.375 g Intravenous Q8H    predniSONE  5 mg Oral Daily    sertraline  50 mg  Oral QHS    traZODone  50 mg Oral QHS     Continuous Infusions:    PRN Meds:sodium chloride, sodium chloride, acetaminophen, labetalol, magnesium oxide, magnesium oxide, melatonin, naloxone, ondansetron, polyethylene glycol, potassium bicarbonate, potassium bicarbonate, potassium bicarbonate, potassium, sodium phosphates, potassium, sodium phosphates, potassium, sodium phosphates    Review of patient's allergies indicates:   Allergen Reactions    No known drug allergies        Review of Systems: As per interval history    OBJECTIVE:     Vital Signs (Most Recent)  Temp: 97.9 °F (36.6 °C) (03/05/23 1139)  Pulse: 106 (03/05/23 1139)  Resp: 18 (03/05/23 1139)  BP: (!) 160/81 (notified nurse ) (03/05/23 1139)  SpO2: 98 % (03/05/23 1139)    Vital Signs Range (Last 24H):  Temp:  [97.4 °F (36.3 °C)-98.4 °F (36.9 °C)]   Pulse:  []   Resp:  [17-19]   BP: (127-184)/()   SpO2:  [95 %-98 %]     I & O (Last 24H):  Intake/Output Summary (Last 24 hours) at 3/5/2023 1243  Last data filed at 3/5/2023 1034  Gross per 24 hour   Intake --   Output 65 ml   Net -65 ml         Physical Exam:  General: Frail. No acute distress  Eyes: No conjunctival injection. No scleral icterus.  ENT: Hearing grossly intact. No discharge from ears. No nasal discharge.   Neck: Supple, trachea midline. No JVD  CVS: No LE edema BL  Lungs:  No tachypnea or accessory muscle use.    Abdomen:  Soft, nontender and nondistended.  No organomegaly. JULIUS drain with serous drainage   Neuro: AOx3. Moves all extremities. Follows commands. Responds appropriately   Skin:  No rash or erythema noted    Laboratory:  I have reviewed all pertinent lab results within the past 24 hours.  CBC:   Recent Labs   Lab 03/05/23  0342   WBC 6.86   RBC 2.92*   HGB 8.8*   HCT 27.1*      MCV 93   MCH 30.1   MCHC 32.5       CMP:   Recent Labs   Lab 03/05/23  0342   GLU 85   CALCIUM 8.3*      K 3.3*   CO2 26      BUN 13   CREATININE 0.5       Microbiology Results  (last 7 days)       Procedure Component Value Units Date/Time    Culture, Body Fluid (Aerobic) w/ GS [[id number]]  (Abnormal)  (Susceptibility) Collected: 02/27/23 1242    Order Status: Completed Specimen: Peritoneal Fluid Updated: 03/03/23 1650     AEROBIC CULTURE - FLUID ENTEROCOCCUS FAECALIS  Moderate        ESCHERICHIA COLI  Moderate       Gram Stain Result Moderate WBC's      Few Gram positive cocci      Few variable positive rods    Narrative:      Peritoneal Fluid (Abscess)    AFB culture [[id number]] Collected: 02/27/23 1242    Order Status: Completed Specimen: Body Fluid from Peritoneal Fluid Updated: 03/03/23 1432     AFB CULTURE STAIN No acid fast bacilli seen.     AFB CULTURE STAIN Testing performed by:     AFB CULTURE STAIN Lab [facility] [place]     [facility] [address] [facility]     [place] [address]     AFB CULTURE STAIN Dr.Brian Ele MD    Blood culture [[id number]] Collected: 02/26/23 0535    Order Status: Completed Specimen: Blood from Line, PICC Right Basilic Updated: 03/03/23 0832     Blood Culture, Routine No growth after 5 days.    Blood culture [[id number]] Collected: 02/25/23 1831    Order Status: Completed Specimen: Blood from Line, PICC Right Basilic Updated: 03/02/23 2032     Blood Culture, Routine No growth after 5 days.    KOH prep [[id number]] Collected: 02/27/23 1242    Order Status: Completed Specimen: Body Fluid from Peritoneal Fluid Updated: 02/27/23 1551     KOH Prep No yeast or fungal elements seen    Fungus culture [[id number]] Collected: 02/27/23 1242    Order Status: Sent Specimen: Body Fluid from Peritoneal Fluid Updated: 02/27/23 1257    Gram stain [[id number]] Collected: 02/27/23 1242    Order Status: Canceled Specimen: Body Fluid from Peritoneal Fluid     Urine culture [[id number]]  (Abnormal)  (Susceptibility) Collected: 02/24/23 1528    Order Status: Completed Specimen: Urine Updated: 02/27/23 0650     Urine Culture, Routine ESCHERICHIA  COLI  > 100,000 cfu/ml      Narrative:      Specimen Source->Urine            Diagnostic Results:  Labs: Reviewed  CT: Reviewed        ASSESSMENT/PLAN:       Active Hospital Problems    Diagnosis  POA    *Peritoneal fluid collection [R18.8]  Yes    Intra-abdominal abscess [K65.1]  Yes    Acute on chronic anemia [D64.9]  Yes    Elevated troponin [R77.8]  Yes    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         Plan:   Large peritoneal fluid collection concerning for possible abscess in the presence of air - ?  Likely walled-off pancreatic fluid collection with secondary infection  No systemic signs of infection   However given size of the fluid collection started on antibiotic therapy with piperacillin-tazobactam  Infectious disease, general surgery and GI on board   Status post peritoneal drain placement by IR on 02/27   Cx growing Enterococcus faecalis and E coli - continue as per ID recommendations.  Will likely go home on pip-tazo   Repeat CT abd/pelvis with improvement in size of fluid collection     Bilateral pleural effusions  Elevated troponin - nondiagnostic and unlikely ACS  Could be 3rd spacing in the setting of hypoalbuminemia versus less likely congestive heart failure  BNP within normal limits   Previous echocardiogram from 2 weeks ago with no evidence of congestive heart failure   Repeated echocardiogram 02/24 - trivial pericardial effusion  Appreciate cardiology input     Chronic pancreatitis  Symptoms are stable.  Continue pancreatic enzyme supplement  Seen by GI     Acute on chronic anemia   Could be nutritional; no clinical evidence of blood loss  S/p total of 2 units of pRBC this admission   Monitor with daily CBC      History of rheumatoid arthritis  Hold leflunomide while being treated for infection.  Continue low-dose prednisone     History of DVT in September 2022   On apixaban which was held for IR drain placement; restarted  Estimated end date of anticoagulation is March 2022     Severe malnutrition  Continue megestrol    Disposition: No LTAC benefits through insurance. Will resume outpatient PT at discharge.  Likely discharge on Monday. CM exploring outpatient IV abx benefits.    VTE Risk Mitigation (From admission, onward)           Ordered     apixaban tablet 5 mg  2 times daily         02/27/23 1702     IP VTE HIGH RISK PATIENT  Once         02/24/23 2016     Place sequential compression device  Until discontinued         02/24/23 2016     Reason for No Pharmacological VTE Prophylaxis  Once        Question:  Reasons:  Answer:  Physician Provided (leave comment)    02/24/23 2016                        Department Hospital Medicine  [facility]  Sukh Melo MD  Date of service: 03/05/2023

## 2023-03-05 NOTE — PLAN OF CARE
Received call from Shankar with [facility].  They will be able to provide the IV home abx infusion service.  Patient's  agreed to transport her to the infusion center for line care and labs.  Shankar will contact patient's  tomorrow.

## 2023-03-05 NOTE — PLAN OF CARE
Sent referral for home IV antibiotics to [facility].  Also spoke to Nicko with [facility] to inform home of referral.  Nicko stated that will reviews referral and update CM.

## 2023-03-05 NOTE — PROGRESS NOTES
[facility]  Adult Nutrition   Progress Note (Follow-Up)    SUMMARY      Recommendations:   1. Continue current diet as tolerated.  2. Calorie Count completed. No information documented today.     Goals:   Goals: 1. Patient to meet at least 75% of estimated energy and protein needs. 2. Prevent further weight loss.    Dietitian Rounds Brief    Calorie count completed-- no documented in take today but reports good appetite and intake.   Clinimix dc'd.  Continues to drink the unjury shake.  Calorie count   3/3 BF and lunch: 1096kcal, 77gm Protein  3/2  BF: 775kcal, 23gm Protein  3/1 all meals total: 2766kcal, 67gm Protein     Diet order: Regular     Oral Supplement: Unjury with ice cream and bene calorie    % Intake of Estimated Energy Needs: 75 - 100 %  % Meal Intake: 75 - 100 %    Estimated/Assessed Needs  Weight Used For Calorie Calculations: 48.1 kg (106 lb 0.7 oz)  Energy Calorie Requirements (kcal): 1388-1811 (35-40)  Energy Need Method: Kcal/kg  Protein Requirements: 58 - 72 ( 1.2 - 1.5)  Weight Used For Protein Calculations: 48.1 kg (106 lb 0.7 oz)     Estimated Fluid Requirement Method: RDA Method  RDA Method (mL): 1684       Weight History:  Wt Readings from Last 5 Encounters:   02/26/23 48.1 kg (106 lb 0.7 oz)   02/24/23 48.1 kg (106 lb 0.7 oz)   02/14/23 48.1 kg (106 lb)   02/09/23 49.9 kg (110 lb)   01/23/23 53.8 kg (118 lb 9.7 oz)        Reason for Assessment  Reason For Assessment: RD follow-up  Relevant Medical History: Generalized anxiety disorder    Tobacco use    Severe protein-calorie malnutrition  Hypokalemia  Chronic pancreatitis  Pseudocyst of pancreas  Peritoneal fluid collection  Acute on chronic anemia  Elevated troponin Rheumatoid arthritis involving multiple sites with positive rheumatoid factor    Medications:Pertinent Medications Reviewed  Scheduled Meds:   amLODIPine  5 mg Oral Daily    apixaban  5 mg Oral BID    folic acid  1 mg Oral Daily    Lactobacillus acidoph-L.bulgar   4 tablet Oral TID WM    lipase-protease-amylase  2 capsule Oral TID WM    megestroL  40 mg Oral Daily    mirtazapine  15 mg Oral QHS    pantoprazole  40 mg Oral Before breakfast    piperacillin-tazobactam (ZOSYN) IVPB  3.375 g Intravenous Q8H    predniSONE  5 mg Oral Daily    sertraline  50 mg Oral QHS    traZODone  50 mg Oral QHS     Continuous Infusions:  PRN Meds:.sodium chloride, sodium chloride, acetaminophen, labetalol, magnesium oxide, magnesium oxide, melatonin, naloxone, ondansetron, polyethylene glycol, potassium bicarbonate, potassium bicarbonate, potassium bicarbonate, potassium, sodium phosphates, potassium, sodium phosphates, potassium, sodium phosphates    Labs: Pertinent Labs Reviewed  Clinical Chemistry:  Recent Labs   Lab 02/27/23  0344 02/27/23  0548 03/01/23  0338 03/04/23  0505    133*   < > 132*   K 3.9 3.8   < > 3.7    100   < > 103   CO2 29 26   < > 26   * 72   < > 106   BUN 12 13   < > 16   CREATININE 0.6 0.6   < > 0.5   CALCIUM 7.9* 8.1*   < > 8.4*   ANIONGAP 7* 7*   < > 3*   MG 1.5* 1.9   < > 2.1   AMYLASE 11* 25  --   --    LIPASE 19 39  --   --     < > = values in this interval not displayed.     CBC:   Recent Labs   Lab 03/04/23  0505   WBC 8.87   RBC 3.02*   HGB 9.4*   HCT 28.0*      MCV 93   MCH 31.1*   MCHC 33.6     Lipid Panel:  No results for input(s): CHOL, HDL, LDLCALC, TRIG, CHOLHDL in the last 168 hours.  Cardiac Profile:  No results for input(s): BNP, CPK, CPKMB, TROPONINI, CKTOTAL in the last 168 hours.  Inflammatory Labs:  Recent Labs   Lab 03/02/23  0600 03/03/23  0505 03/04/23  0505   CRP 1.74* 1.17* 0.87*     Diabetes:  No results for input(s): HGBA1C, POCTGLUCOSE in the last 168 hours.  Thyroid & Parathyroid:  No results for input(s): TSH, FREET4, O3QHOXF, K0NVYFP, THYROIDAB in the last 168 hours.    Monitor and Evaluation  Food and Nutrient Intake: energy intake, food and beverage intake  Food and Nutrient Adminstration: diet order  Physical  Activity and Function: nutrition-related ADLs and IADLs, factors affecting access to physical activity  Anthropometric Measurements: weight, weight change, body mass index  Biochemical Data, Medical Tests and Procedures: electrolyte and renal panel, inflammatory profile, gastrointestinal profile, lipid profile, glucose/endocrine profile  Nutrition-Focused Physical Findings: overall appearance     Nutrition Risk  Level of Risk/Frequency of Follow-up: moderate-high     Nutrition Follow-Up  RD Follow-up?: Yes    Sara Duggan RD 03/04/2023 6:15 PM

## 2023-03-06 VITALS
BODY MASS INDEX: 16.07 KG/M2 | HEIGHT: 68 IN | DIASTOLIC BLOOD PRESSURE: 69 MMHG | HEART RATE: 105 BPM | SYSTOLIC BLOOD PRESSURE: 136 MMHG | TEMPERATURE: 97 F | OXYGEN SATURATION: 97 % | WEIGHT: 106.06 LBS | RESPIRATION RATE: 16 BRPM

## 2023-03-06 LAB
ERYTHROCYTE [DISTWIDTH] IN BLOOD BY AUTOMATED COUNT: 18.1 % (ref 11.5–14.5)
HCT VFR BLD AUTO: 25.5 % (ref 37–48.5)
HGB BLD-MCNC: 8.3 G/DL (ref 12–16)
MCH RBC QN AUTO: 30.5 PG (ref 27–31)
MCHC RBC AUTO-ENTMCNC: 32.5 G/DL (ref 32–36)
MCV RBC AUTO: 94 FL (ref 82–98)
PLATELET # BLD AUTO: 249 K/UL (ref 150–450)
PMV BLD AUTO: 9.1 FL (ref 9.2–12.9)
RBC # BLD AUTO: 2.72 M/UL (ref 4–5.4)
WBC # BLD AUTO: 9.48 K/UL (ref 3.9–12.7)

## 2023-03-06 PROCEDURE — 85027 COMPLETE CBC AUTOMATED: CPT | Performed by: INTERNAL MEDICINE

## 2023-03-06 PROCEDURE — 86140 C-REACTIVE PROTEIN: CPT | Performed by: INTERNAL MEDICINE

## 2023-03-06 PROCEDURE — 80048 BASIC METABOLIC PNL TOTAL CA: CPT | Performed by: INTERNAL MEDICINE

## 2023-03-06 PROCEDURE — 83735 ASSAY OF MAGNESIUM: CPT | Performed by: INTERNAL MEDICINE

## 2023-03-06 PROCEDURE — 25000003 PHARM REV CODE 250: Performed by: INTERNAL MEDICINE

## 2023-03-06 PROCEDURE — 63600175 PHARM REV CODE 636 W HCPCS: Performed by: INTERNAL MEDICINE

## 2023-03-06 RX ORDER — AMLODIPINE BESYLATE 5 MG/1
10 TABLET ORAL DAILY
Qty: 60 TABLET | Refills: 11 | Status: SHIPPED | OUTPATIENT
Start: 2023-03-06 | End: 2024-03-06 | Stop reason: DRUGHIGH

## 2023-03-06 RX ORDER — MEGESTROL ACETATE 40 MG/1
40 TABLET ORAL DAILY
Qty: 30 TABLET | Refills: 2 | Status: SHIPPED | OUTPATIENT
Start: 2023-03-06 | End: 2024-03-21

## 2023-03-06 RX ORDER — AMLODIPINE BESYLATE 5 MG/1
10 TABLET ORAL DAILY
Qty: 60 TABLET | Refills: 11 | Status: SHIPPED | OUTPATIENT
Start: 2023-03-06 | End: 2023-03-06 | Stop reason: SDUPTHER

## 2023-03-06 RX ORDER — PREDNISONE 5 MG/1
5 TABLET ORAL DAILY
Start: 2023-03-06 | End: 2023-05-09 | Stop reason: SDUPTHER

## 2023-03-06 RX ADMIN — APIXABAN 5 MG: 5 TABLET, FILM COATED ORAL at 09:03

## 2023-03-06 RX ADMIN — PIPERACILLIN SODIUM AND TAZOBACTAM SODIUM 3.38 G: 3; .375 INJECTION, POWDER, LYOPHILIZED, FOR SOLUTION INTRAVENOUS at 04:03

## 2023-03-06 RX ADMIN — PANCRELIPASE 2 CAPSULE: 36000; 180000; 114000 CAPSULE, DELAYED RELEASE PELLETS ORAL at 09:03

## 2023-03-06 RX ADMIN — LACTOBACILLUS TAB 4 TABLET: TAB at 09:03

## 2023-03-06 RX ADMIN — FOLIC ACID 1 MG: 1 TABLET ORAL at 09:03

## 2023-03-06 RX ADMIN — MEGESTROL ACETATE 40 MG: 20 TABLET ORAL at 09:03

## 2023-03-06 RX ADMIN — PANTOPRAZOLE SODIUM 40 MG: 40 TABLET, DELAYED RELEASE ORAL at 04:03

## 2023-03-06 RX ADMIN — PREDNISONE 5 MG: 5 TABLET ORAL at 09:03

## 2023-03-06 RX ADMIN — AMLODIPINE BESYLATE 5 MG: 5 TABLET ORAL at 09:03

## 2023-03-06 RX ADMIN — ACETAMINOPHEN 650 MG: 325 TABLET ORAL at 09:03

## 2023-03-06 NOTE — PLAN OF CARE
Problem: Adult Inpatient Plan of Care  Goal: Plan of Care Review  Outcome: Met  Goal: Patient-Specific Goal (Individualized)  Outcome: Met  Goal: Absence of Hospital-Acquired Illness or Injury  Outcome: Met  Goal: Optimal Comfort and Wellbeing  Outcome: Met  Goal: Readiness for Transition of Care  Outcome: Met     Problem: Skin Injury Risk Increased  Goal: Skin Health and Integrity  Outcome: Met     Problem: Fluid Imbalance (Pneumonia)  Goal: Fluid Balance  Outcome: Met     Problem: Infection (Pneumonia)  Goal: Resolution of Infection Signs and Symptoms  Outcome: Met     Problem: Respiratory Compromise (Pneumonia)  Goal: Effective Oxygenation and Ventilation  Outcome: Met     Problem: Infection  Goal: Absence of Infection Signs and Symptoms  Outcome: Met     Problem: Impaired Wound Healing  Goal: Optimal Wound Healing  Outcome: Met     Problem: Malnutrition  Goal: Improved Nutritional Intake  Outcome: Met     Problem: Fall Injury Risk  Goal: Absence of Fall and Fall-Related Injury  Outcome: Met

## 2023-03-06 NOTE — DISCHARGE INSTRUCTIONS
[facility] will take care of the meds and you will go [facility] for IV care and labs

## 2023-03-06 NOTE — NURSING
Mid line d/c'ed and dressing change to [facility] line. Discharge instruction given and verbalized understanding. [facility] was at bedside with training

## 2023-03-06 NOTE — PT/OT/SLP PROGRESS
Physical Therapy      Patient Name:  Claire Bowser   MRN:  [id number]    Patient not seen today secondary to Other (Comment) (Pt prepping for discharge.). Will follow-up next service date if pt is still here.

## 2023-03-06 NOTE — PLAN OF CARE
Patient cleared for discharge from case management standpoint.    Follow up appointments scheduled and added to AVS.    LAVERNE sent referral for wheelchair to Juju at [facility]. Juju has reviewed but noted patient recently received a rollator and does not qualify for a wheelchair. Juju has escalated the referral to a Supervisor and if patient qualifies will have wheelchair delivered. Patient and spouse state they have a wheelchair that they have rented privately and if insurance does not cover they will continue to rent the one they currently have.    LAVERNE sent referral to [facility] for weekly site care and labs. PICC drsg changed today and first appointment at outpatient planned for 3/13 for drsg change and labs. Dr Caldwell following labs and antibiotic orders.     Kirk with [facility] has taught patient and spouse Robin on self IV infusion and [facility] is supplying IV antibiotics and all IV supplies.     Chart and discharge orders reviewed.  Patient discharged home with no further case management needs.    1157 Update- Patient will be eligible for a wheelchair after 3/13 according to Juju at [facility]. The wheelchair will be sent to the patient after that date.       03/06/23 1138   Final Note   Assessment Type Final Discharge Note   Anticipated Discharge Disposition Home   Hospital Resources/Appts/Education Provided Provided patient/caregiver with written discharge plan information;Post-Acute resouces added to AVS;Appointments scheduled and added to AVS   Post-Acute Status   Post-Acute Authorization IV Infusion;HME   Post-Acute Placement Status Set-up Complete/Auth obtained   HME Status Pending payor review   IV Infusion Status Set-up Complete/Auth obtained   Discharge Delays None known at this time

## 2023-03-06 NOTE — PLAN OF CARE
CM s/w Kirk from [facility], antibiotics covered at 100%. Kirk has an appointment with patient and spouse , Robin, this am to teach on IV infusion. CM s/w patient and spouse at bedside regarding discharge plan. Patient will self adminiter IV antibiotics and come to [facility] weekly for lab draw and site care. Lab and outpatient orders faxed to Mayra Marquez in scheduling. Labs to be sent to Dr Caldwell. CM awaiting outpatient appointments and IV teaching to clear pt for discharge.       03/06/23 1038   Post-Acute Status   Post-Acute Authorization IV Infusion   IV Infusion Status Awaiting delivery   Discharge Plan   Discharge Plan A Home with family   Discharge Plan B Home with family

## 2023-03-07 ENCOUNTER — TELEPHONE (OUTPATIENT)
Dept: FAMILY MEDICINE | Facility: CLINIC | Age: 63
End: 2023-03-07
Payer: MEDICAID

## 2023-03-07 NOTE — HOSPITAL COURSE
63-year-old  female with complicated GI history including pancreatitis (June 2022) complicated by leak status post stenting, status post laparoscopic cholecystectomy as well as dual sphincterotomy who was admitted for shortness of breath and was found to have peritoneal fluid collection on imaging.  Started on empiric antibiotics due to concern for infectious process.  GI, General surgery and Infectious Disease consulted.  Patient underwent IR guided peritoneal drain placement on 02/27. Cx growing E coli and Enterococcus faecalis. Repeat CT scan 3/3 with marked decrease in fluid collection with cath in place. She has received 2 units of pRBCs this hospital stay with no overt signs of bleeding.  RA medication Leflunomide remains on hold given active infection.  She has bilateral pleural effusion.  Echo with normal EF and thus no indication for heart failure.  She does not have LTAC benefits and thus exploring outpatient IV abx. Home IV abx for 14 days of continuous Zosyn has been arranged and she will have line care and weekly labs performed. She will follow up with Dr. Sheehan this Thursday for post hospital check.  She will go home with drain and surgery will consider pulling in office if appropriate. I have provided Rx for new BP med, Norvasc, as well as a refill on her Megace.  Of note, she was already on prednisone 5mg daily and has been for years.  Discharge MAR appears that I added this as new but I added this medication for accuracy.  Examined on day of discharge and alert, NAD, comfortable respirations on room air.  Return precautions given.

## 2023-03-07 NOTE — DISCHARGE SUMMARY
[facility] Hospital Medicine  Discharge Summary      Patient Name: Claire Bowser  MRN: [id number]  ARIA: [id number]  Patient Class: IP- Inpatient  Admission Date: 2/24/2023  Hospital Length of Stay: 8 days  Discharge Date and Time: 3/6/2023  1:48 PM  Attending Physician: No att. providers found   Discharging Provider: Sukh Melo MD  Primary Care Provider: Samuel Brady MD    Primary Care Team: Networked reference to record PCT     HPI:   63-year-old  female with known rheumatoid arthritis, chronic pancreatitis complicated by pancreatic pseudocyst, pancreatic sphincterotomy, bile leak status post stenting with improvement in symptoms, DVT on apixaban (dx in 09/2022) who was recently discharged after being managed for generalized weakness and diarrhea presents with exertional dyspnea.    History supplemented by  at bedside.  Since discharge her appetite and weakness have improved.  However with last 2 days she has been experiencing dyspnea which is worse with minimal exertion and better with rest.  No chest pain, orthopnea or paroxysmal nocturnal dyspnea.  Admits chronic bilateral lower extremity edema which is largely unchanged.  It is worse at the end of the day and better with limb elevation.  No fever, chills, abdominal pain or nausea/vomiting.  No further diarrhea with increased pancreatic enzyme supplement dosing as per GI recommendations from prior hospitalization.    In the ED, she was found to have elevated troponin.  CTA chest was negative for pulmonary embolism but revealed bilateral pleural effusions.  CT of the abdomen/pelvis revealed large loculated fluid collection with air within the pelvis concerning for abscess.    Rest of the 10 point review of systems is negative except as mentioned above.      * No surgery found *      Hospital Course:   63-year-old  female with complicated GI history including pancreatitis (June 2022) complicated by leak status post  stenting, status post laparoscopic cholecystectomy as well as dual sphincterotomy who was admitted for shortness of breath and was found to have peritoneal fluid collection on imaging.  Started on empiric antibiotics due to concern for infectious process.  GI, General surgery and Infectious Disease consulted.  Patient underwent IR guided peritoneal drain placement on 02/27. Cx growing E coli and Enterococcus faecalis. Repeat CT scan 3/3 with marked decrease in fluid collection with cath in place. She has received 2 units of pRBCs this hospital stay with no overt signs of bleeding.  RA medication Leflunomide remains on hold given active infection.  She has bilateral pleural effusion.  Echo with normal EF and thus no indication for heart failure.  She does not have LTAC benefits and thus exploring outpatient IV abx. Home IV abx for 14 days of continuous Zosyn has been arranged and she will have line care and weekly labs performed. She will follow up with Dr. Sheehan this Thursday for post hospital check.  She will go home with drain and surgery will consider pulling in office if appropriate. I have provided Rx for new BP med, Norvasc, as well as a refill on her Megace.  Of note, she was already on prednisone 5mg daily and has been for years.  Discharge MAR appears that I added this as new but I added this medication for accuracy.  Examined on day of discharge and alert, NAD, comfortable respirations on room air.  Return precautions given.          Goals of Care Treatment Preferences:  Code Status: Full Code      Consults:   Consults (From admission, onward)        Status Ordering Provider     Inpatient consult to Registered Dietitian/Nutritionist  Once        Provider:  (Not yet assigned)    Completed JAVIER HAYES     Inpatient consult to Infectious Diseases  Once        Provider:  Yumiko Valenzuela MD    Completed KANIKA GREGORIO     Inpatient consult to Gastroenterology  Once        Provider:  Morgan Brown MD     Completed HIPOLITO SIMPSON     Inpatient consult to General Surgery  Once        Provider:  Paul Sheehan MD    Completed KANIKA GREGORIO     Inpatient consult to Cardiology  Once        Provider:  Curly Jacome MD    Completed KANIKA GREGORIO     Inpatient consult to Registered Dietitian/Nutritionist  Once        Provider:  (Not yet assigned)    Completed KANIKA GREGORIO          No notes have been filed under this hospital service.  Service: Hospital Medicine    Final Active Diagnoses:    Diagnosis Date Noted POA    PRINCIPAL PROBLEM:  Peritoneal fluid collection [R18.8] 01/16/2023 Yes    Intra-abdominal abscess [K65.1] 02/27/2023 Yes    Acute on chronic anemia [D64.9] 02/24/2023 Yes    Elevated troponin [R77.8] 02/24/2023 Yes    Pseudocyst of pancreas [K86.3] 01/04/2023 Yes    Chronic pancreatitis [K86.1] 01/02/2023 Yes    Severe protein-calorie malnutrition [E43] 06/05/2022 Yes     Chronic    Hypokalemia [E87.6] 05/25/2022 Yes    Tobacco use [Z72.0] 08/10/2018 Yes     Chronic    Generalized anxiety disorder [F41.1] 08/10/2018 Yes     Chronic      Problems Resolved During this Admission:       Discharged Condition: good    Disposition: Home or Self Care    Follow Up:   Follow-up Information     Paul Sheehan MD. Go on 3/9/2023.    Specialty: General Surgery  Why: at 10:15 am  post hospital discharge follow up for abdominal abscess  Contact information:  [address]  [address]  [place] [address]  [phone]             Jacqui Caldwell MD. Go on 3/13/2023.    Specialty: Infectious Diseases  Why: 10:30 am  ffice will call patient with appointment details.    post hospital discharge follow up  Contact information:  [address]  [address]  [place] [address]  [phone]             Samuel Brady MD. Schedule an appointment as soon as possible for a visit in 1 week(s).    Specialty: Family Medicine  Why: Office will call patient with appointment details.  Contact information:  [address]  [facility] [address]  [phone]             [facility]fusion [facility] Follow up.    Specialty: Infusion Therapy  Contact information:  [place] [address] [phone]                     Patient Instructions:      WHEELCHAIR FOR HOME USE     Order Specific Question Answer Comments   Hours in W/C per day: 8    Type of Wheelchair: Standard    Size(Width): 16"(small adult)    Leg Support: STD footrests    Lap Belt: Velcro    Accessories: Front brakes    Cushion: Basic    Height: 5' 8" (1.727 m)    Weight: 48.1 kg (106 lb 0.7 oz)    Does patient have medical equipment at home? shower chair    Does patient have medical equipment at home? walker, rolling    Does patient have medical equipment at home? rollator    Length of need (1-99 months): 99    Please check all that apply: Caregiver is capable and willing to operate wheelchair safely.      Ambulatory referral/consult to Physical/Occupational Therapy   Standing Status: Future   Referral Priority: Routine Referral Type: Physical Medicine   Referral Reason: Specialty Services Required   Number of Visits Requested: 1     Diet Adult Regular     Notify your health care provider if you experience any of the following:  increased confusion or weakness     Notify your health care provider if you experience any of the following:  redness, tenderness, or signs of infection (pain, swelling, redness, odor or green/yellow discharge around incision site)     Activity as tolerated       Significant Diagnostic Studies: Labs:   CMP   Recent Labs   Lab 03/05/23  0342      K 3.3*      CO2 26   GLU 85   BUN 13   CREATININE 0.5   CALCIUM 8.3*   ANIONGAP 4*    and CBC   Recent Labs   Lab 03/05/23  0342 03/06/23  0630   WBC 6.86 9.48   HGB 8.8* 8.3*   HCT 27.1* 25.5*    249       Pending Diagnostic Studies:     Procedure Component Value Units Date/Time    Basic Metabolic Panel [[id number]] Collected: 03/06/23 0551    Order Status: Sent " Lab Status: In process Updated: 03/06/23 0602    Specimen: Blood     C-Reactive Protein [[id number]] Collected: 03/06/23 0551    Order Status: Sent Lab Status: In process Updated: 03/06/23 0602    Specimen: Blood     Freeze and Hold,  [[id number]] Collected: 02/27/23 1242    Order Status: Sent Lab Status: No result     Specimen: Body Fluid from Peritoneal Fluid     Magnesium [[id number]] Collected: 03/06/23 0551    Order Status: Sent Lab Status: In process Updated: 03/06/23 0602    Specimen: Blood          Medications:  Reconciled Home Medications:      Medication List      START taking these medications    amLODIPine 5 MG tablet  Commonly known as: NORVASC  Take 2 tablets (10 mg total) by mouth once daily.     PIPERACILLIN-TAZOBACTAM 3.375G/50ML D5W (READY TO MIX)  10.125gram IV Daily as continuous infusion     predniSONE 5 MG tablet  Commonly known as: DELTASONE  Take 1 tablet (5 mg total) by mouth once daily.        CONTINUE taking these medications    ACIDOPHILUS EX STR (L. SPOROG) 35 million- 25 million cell Tab  Generic drug: acidophilus-sporogenes  Take 1 tablet by mouth once daily.     apixaban 5 mg Tab  Commonly known as: ELIQUIS  Take 1 tablet (5 mg total) by mouth 2 (two) times daily.     CREON Cpdr  Generic drug: lipase-protease-amylase 12,000-38,000-60,000 units  Take 6 capsules by mouth 3 (three) times daily with meals.     cyclobenzaprine 5 MG tablet  Commonly known as: FLEXERIL  Take 5 mg by mouth nightly.     folic acid 1 MG tablet  Commonly known as: FOLVITE  Take 1 tablet (1 mg total) by mouth once daily.     loperamide 2 mg capsule  Commonly known as: IMODIUM  Take 1 capsule (2 mg total) by mouth 4 (four) times daily as needed for Diarrhea.     megestroL 40 MG Tab  Commonly known as: MEGACE  Take 1 tablet (40 mg total) by mouth once daily.     mirtazapine 15 MG tablet  Commonly known as: REMERON  Take 1 tablet (15 mg total) by mouth every evening.     pantoprazole 40 MG tablet  Commonly known as:  PROTONIX  Take 1 tablet (40 mg total) by mouth once daily.     sertraline 50 MG tablet  Commonly known as: ZOLOFT  Take 1 tablet (50 mg total) by mouth every evening.     traZODone 50 MG tablet  Commonly known as: DESYREL  Take 1 tablet (50 mg total) by mouth every evening.        STOP taking these medications    metoprolol succinate 50 MG 24 hr tablet  Commonly known as: TOPROL-XL            Indwelling Lines/Drains at time of discharge:   Lines/Drains/Airways     Peripherally Inserted Central Catheter Line  Duration           PICC Double Lumen 02/25/23 1850 right brachial 9 days          Drain  Duration                Closed/Suction Drain 02/27/23 1228 Right LLQ 8 Fr. 7 days                Time spent on the discharge of patient: 33 minutes         Sukh Melo MD  Department of Hospital Medicine  [facility]

## 2023-03-07 NOTE — TELEPHONE ENCOUNTER
Received message stating patient requesting ER follow up appointment. Call placed to patient who states she was actually admitted to hospital. Hospital follow up appointment scheduled for the date of 3-17-23, and added to wait list per patient request for possible earlier appointment.

## 2023-03-07 NOTE — TELEPHONE ENCOUNTER
----- Message from Michelle Betancur sent at 3/7/2023  2:17 PM CST -----  Type:  Sooner Appointment Request    Caller is requesting a sooner appointment.  Caller declined first available appointment listed below.  Caller will not accept being placed on the waitlist and is requesting a message be sent to doctor.    Name of Caller:  pt   When is the first available appointment?  Unk   Symptoms:  ed f/u   Best Call Back Number:  [phone] (home)     Additional Information:  pt is requesting a appt asap please advise thank you

## 2023-03-09 ENCOUNTER — OFFICE VISIT (OUTPATIENT)
Dept: SURGERY | Facility: CLINIC | Age: 63
End: 2023-03-09
Payer: MEDICAID

## 2023-03-09 VITALS
WEIGHT: 106.06 LBS | RESPIRATION RATE: 16 BRPM | BODY MASS INDEX: 16.07 KG/M2 | SYSTOLIC BLOOD PRESSURE: 101 MMHG | TEMPERATURE: 98 F | HEART RATE: 109 BPM | DIASTOLIC BLOOD PRESSURE: 66 MMHG | HEIGHT: 68 IN

## 2023-03-09 DIAGNOSIS — K65.1 ABDOMINAL VISCERAL ABSCESS: Primary | ICD-10-CM

## 2023-03-09 PROCEDURE — 99212 OFFICE O/P EST SF 10 MIN: CPT | Mod: S$GLB,,, | Performed by: STUDENT IN AN ORGANIZED HEALTH CARE EDUCATION/TRAINING PROGRAM

## 2023-03-09 PROCEDURE — 1111F DSCHRG MED/CURRENT MED MERGE: CPT | Mod: CPTII,S$GLB,, | Performed by: STUDENT IN AN ORGANIZED HEALTH CARE EDUCATION/TRAINING PROGRAM

## 2023-03-09 PROCEDURE — 3074F PR MOST RECENT SYSTOLIC BLOOD PRESSURE < 130 MM HG: ICD-10-PCS | Mod: CPTII,S$GLB,, | Performed by: STUDENT IN AN ORGANIZED HEALTH CARE EDUCATION/TRAINING PROGRAM

## 2023-03-09 PROCEDURE — 3074F SYST BP LT 130 MM HG: CPT | Mod: CPTII,S$GLB,, | Performed by: STUDENT IN AN ORGANIZED HEALTH CARE EDUCATION/TRAINING PROGRAM

## 2023-03-09 PROCEDURE — 3044F PR MOST RECENT HEMOGLOBIN A1C LEVEL <7.0%: ICD-10-PCS | Mod: CPTII,S$GLB,, | Performed by: STUDENT IN AN ORGANIZED HEALTH CARE EDUCATION/TRAINING PROGRAM

## 2023-03-09 PROCEDURE — 3044F HG A1C LEVEL LT 7.0%: CPT | Mod: CPTII,S$GLB,, | Performed by: STUDENT IN AN ORGANIZED HEALTH CARE EDUCATION/TRAINING PROGRAM

## 2023-03-09 PROCEDURE — 99212 PR OFFICE/OUTPT VISIT, EST, LEVL II, 10-19 MIN: ICD-10-PCS | Mod: S$GLB,,, | Performed by: STUDENT IN AN ORGANIZED HEALTH CARE EDUCATION/TRAINING PROGRAM

## 2023-03-09 PROCEDURE — 3078F PR MOST RECENT DIASTOLIC BLOOD PRESSURE < 80 MM HG: ICD-10-PCS | Mod: CPTII,S$GLB,, | Performed by: STUDENT IN AN ORGANIZED HEALTH CARE EDUCATION/TRAINING PROGRAM

## 2023-03-09 PROCEDURE — 3008F BODY MASS INDEX DOCD: CPT | Mod: CPTII,S$GLB,, | Performed by: STUDENT IN AN ORGANIZED HEALTH CARE EDUCATION/TRAINING PROGRAM

## 2023-03-09 PROCEDURE — 1111F PR DISCHARGE MEDS RECONCILED W/ CURRENT OUTPATIENT MED LIST: ICD-10-PCS | Mod: CPTII,S$GLB,, | Performed by: STUDENT IN AN ORGANIZED HEALTH CARE EDUCATION/TRAINING PROGRAM

## 2023-03-09 PROCEDURE — 3008F PR BODY MASS INDEX (BMI) DOCUMENTED: ICD-10-PCS | Mod: CPTII,S$GLB,, | Performed by: STUDENT IN AN ORGANIZED HEALTH CARE EDUCATION/TRAINING PROGRAM

## 2023-03-09 PROCEDURE — 3078F DIAST BP <80 MM HG: CPT | Mod: CPTII,S$GLB,, | Performed by: STUDENT IN AN ORGANIZED HEALTH CARE EDUCATION/TRAINING PROGRAM

## 2023-03-09 PROCEDURE — 1159F PR MEDICATION LIST DOCUMENTED IN MEDICAL RECORD: ICD-10-PCS | Mod: CPTII,S$GLB,, | Performed by: STUDENT IN AN ORGANIZED HEALTH CARE EDUCATION/TRAINING PROGRAM

## 2023-03-09 PROCEDURE — 1159F MED LIST DOCD IN RCRD: CPT | Mod: CPTII,S$GLB,, | Performed by: STUDENT IN AN ORGANIZED HEALTH CARE EDUCATION/TRAINING PROGRAM

## 2023-03-09 NOTE — PROGRESS NOTES
Follow-up note     This is a 63-year-old female who had large pseudocysts.  I saw her in the office for a likely infected pseudocyst in the pelvis.  She underwent percutaneous drain placement.  She is doing well.  Minimal drain output.      Abdomen soft  Vitals are stable line minimal seropurulent drainage in the JULIUS    Drain was removed without complication she is 2 weeks of antibiotics.  Follow-up with me as needed.

## 2023-03-13 ENCOUNTER — INFUSION (OUTPATIENT)
Dept: INFUSION THERAPY | Facility: HOSPITAL | Age: 63
End: 2023-03-13
Attending: INTERNAL MEDICINE
Payer: MEDICAID

## 2023-03-13 ENCOUNTER — TELEPHONE (OUTPATIENT)
Dept: INFECTIOUS DISEASES | Facility: CLINIC | Age: 63
End: 2023-03-13

## 2023-03-13 ENCOUNTER — OFFICE VISIT (OUTPATIENT)
Dept: INFECTIOUS DISEASES | Facility: CLINIC | Age: 63
End: 2023-03-13
Payer: MEDICAID

## 2023-03-13 ENCOUNTER — TELEPHONE (OUTPATIENT)
Dept: HOME HEALTH SERVICES | Facility: CLINIC | Age: 63
End: 2023-03-13

## 2023-03-13 ENCOUNTER — INFUSION (OUTPATIENT)
Dept: INFUSION THERAPY | Facility: HOSPITAL | Age: 63
End: 2023-03-13
Attending: INTERNAL MEDICINE
Payer: COMMERCIAL

## 2023-03-13 VITALS
HEART RATE: 113 BPM | RESPIRATION RATE: 16 BRPM | DIASTOLIC BLOOD PRESSURE: 69 MMHG | TEMPERATURE: 98 F | OXYGEN SATURATION: 98 % | SYSTOLIC BLOOD PRESSURE: 121 MMHG

## 2023-03-13 VITALS
DIASTOLIC BLOOD PRESSURE: 62 MMHG | HEART RATE: 102 BPM | TEMPERATURE: 97 F | BODY MASS INDEX: 12.55 KG/M2 | HEIGHT: 68 IN | WEIGHT: 82.81 LBS | OXYGEN SATURATION: 98 % | SYSTOLIC BLOOD PRESSURE: 116 MMHG

## 2023-03-13 VITALS
SYSTOLIC BLOOD PRESSURE: 109 MMHG | DIASTOLIC BLOOD PRESSURE: 63 MMHG | OXYGEN SATURATION: 100 % | HEART RATE: 100 BPM | RESPIRATION RATE: 16 BRPM

## 2023-03-13 DIAGNOSIS — E87.6 HYPOKALEMIA: ICD-10-CM

## 2023-03-13 DIAGNOSIS — K65.1 INTRA-ABDOMINAL ABSCESS: Primary | ICD-10-CM

## 2023-03-13 DIAGNOSIS — K86.3 INFECTED PANCREATIC PSEUDOCYST: ICD-10-CM

## 2023-03-13 DIAGNOSIS — E87.6 HYPOKALEMIA: Primary | ICD-10-CM

## 2023-03-13 DIAGNOSIS — R64 CACHEXIA: ICD-10-CM

## 2023-03-13 DIAGNOSIS — Z79.2 ENCOUNTER FOR LONG-TERM (CURRENT) USE OF ANTIBIOTICS: ICD-10-CM

## 2023-03-13 DIAGNOSIS — Z79.60 LONG-TERM USE OF IMMUNOSUPPRESSANT MEDICATION: ICD-10-CM

## 2023-03-13 LAB
ALBUMIN SERPL BCP-MCNC: 1.7 G/DL (ref 3.5–5.2)
ALP SERPL-CCNC: 84 U/L (ref 55–135)
ALT SERPL W/O P-5'-P-CCNC: 16 U/L (ref 10–44)
ANION GAP SERPL CALC-SCNC: 4 MMOL/L (ref 8–16)
AST SERPL-CCNC: 19 U/L (ref 10–40)
BASOPHILS # BLD AUTO: 0.12 K/UL (ref 0–0.2)
BASOPHILS NFR BLD: 0.9 % (ref 0–1.9)
BILIRUB SERPL-MCNC: 0.6 MG/DL (ref 0.1–1)
BUN SERPL-MCNC: 8 MG/DL (ref 8–23)
CALCIUM SERPL-MCNC: 8.5 MG/DL (ref 8.7–10.5)
CHLORIDE SERPL-SCNC: 106 MMOL/L (ref 95–110)
CO2 SERPL-SCNC: 27 MMOL/L (ref 23–29)
CREAT SERPL-MCNC: 0.5 MG/DL (ref 0.5–1.4)
CRP SERPL-MCNC: 12.42 MG/DL
DIFFERENTIAL METHOD: ABNORMAL
EOSINOPHIL # BLD AUTO: 0.3 K/UL (ref 0–0.5)
EOSINOPHIL NFR BLD: 2 % (ref 0–8)
ERYTHROCYTE [DISTWIDTH] IN BLOOD BY AUTOMATED COUNT: 19.2 % (ref 11.5–14.5)
EST. GFR  (NO RACE VARIABLE): >60 ML/MIN/1.73 M^2
GLUCOSE SERPL-MCNC: 81 MG/DL (ref 70–110)
HCT VFR BLD AUTO: 25.2 % (ref 37–48.5)
HGB BLD-MCNC: 8.4 G/DL (ref 12–16)
IMM GRANULOCYTES # BLD AUTO: 0.07 K/UL (ref 0–0.04)
IMM GRANULOCYTES NFR BLD AUTO: 0.5 % (ref 0–0.5)
LYMPHOCYTES # BLD AUTO: 1 K/UL (ref 1–4.8)
LYMPHOCYTES NFR BLD: 7.4 % (ref 18–48)
MAGNESIUM SERPL-MCNC: 1.7 MG/DL (ref 1.6–2.6)
MCH RBC QN AUTO: 32.2 PG (ref 27–31)
MCHC RBC AUTO-ENTMCNC: 33.3 G/DL (ref 32–36)
MCV RBC AUTO: 97 FL (ref 82–98)
MONOCYTES # BLD AUTO: 1 K/UL (ref 0.3–1)
MONOCYTES NFR BLD: 7.7 % (ref 4–15)
NEUTROPHILS # BLD AUTO: 10.4 K/UL (ref 1.8–7.7)
NEUTROPHILS NFR BLD: 81.5 % (ref 38–73)
NRBC BLD-RTO: 0 /100 WBC
PLATELET # BLD AUTO: 630 K/UL (ref 150–450)
PMV BLD AUTO: 8.9 FL (ref 9.2–12.9)
POTASSIUM SERPL-SCNC: 2.3 MMOL/L (ref 3.5–5.1)
PROT SERPL-MCNC: 5.7 G/DL (ref 6–8.4)
RBC # BLD AUTO: 2.61 M/UL (ref 4–5.4)
SODIUM SERPL-SCNC: 137 MMOL/L (ref 136–145)
WBC # BLD AUTO: 12.81 K/UL (ref 3.9–12.7)

## 2023-03-13 PROCEDURE — 1111F PR DISCHARGE MEDS RECONCILED W/ CURRENT OUTPATIENT MED LIST: ICD-10-PCS | Mod: CPTII,S$GLB,, | Performed by: INTERNAL MEDICINE

## 2023-03-13 PROCEDURE — 3008F BODY MASS INDEX DOCD: CPT | Mod: CPTII,S$GLB,, | Performed by: INTERNAL MEDICINE

## 2023-03-13 PROCEDURE — 25000003 PHARM REV CODE 250: Performed by: INTERNAL MEDICINE

## 2023-03-13 PROCEDURE — 3074F SYST BP LT 130 MM HG: CPT | Mod: CPTII,S$GLB,, | Performed by: INTERNAL MEDICINE

## 2023-03-13 PROCEDURE — 1111F DSCHRG MED/CURRENT MED MERGE: CPT | Mod: CPTII,S$GLB,, | Performed by: INTERNAL MEDICINE

## 2023-03-13 PROCEDURE — 3044F HG A1C LEVEL LT 7.0%: CPT | Mod: CPTII,S$GLB,, | Performed by: INTERNAL MEDICINE

## 2023-03-13 PROCEDURE — 99214 OFFICE O/P EST MOD 30 MIN: CPT | Mod: S$GLB,,, | Performed by: INTERNAL MEDICINE

## 2023-03-13 PROCEDURE — 36591 DRAW BLOOD OFF VENOUS DEVICE: CPT | Mod: 59

## 2023-03-13 PROCEDURE — 3044F PR MOST RECENT HEMOGLOBIN A1C LEVEL <7.0%: ICD-10-PCS | Mod: CPTII,S$GLB,, | Performed by: INTERNAL MEDICINE

## 2023-03-13 PROCEDURE — 80053 COMPREHEN METABOLIC PANEL: CPT | Performed by: INTERNAL MEDICINE

## 2023-03-13 PROCEDURE — 3078F DIAST BP <80 MM HG: CPT | Mod: CPTII,S$GLB,, | Performed by: INTERNAL MEDICINE

## 2023-03-13 PROCEDURE — 96366 THER/PROPH/DIAG IV INF ADDON: CPT

## 2023-03-13 PROCEDURE — 99211 OFF/OP EST MAY X REQ PHY/QHP: CPT | Mod: 25

## 2023-03-13 PROCEDURE — 83735 ASSAY OF MAGNESIUM: CPT | Performed by: INTERNAL MEDICINE

## 2023-03-13 PROCEDURE — 99214 PR OFFICE/OUTPT VISIT, EST, LEVL IV, 30-39 MIN: ICD-10-PCS | Mod: S$GLB,,, | Performed by: INTERNAL MEDICINE

## 2023-03-13 PROCEDURE — 36415 COLL VENOUS BLD VENIPUNCTURE: CPT | Performed by: INTERNAL MEDICINE

## 2023-03-13 PROCEDURE — 96365 THER/PROPH/DIAG IV INF INIT: CPT

## 2023-03-13 PROCEDURE — 63600175 PHARM REV CODE 636 W HCPCS: Performed by: INTERNAL MEDICINE

## 2023-03-13 PROCEDURE — 1159F MED LIST DOCD IN RCRD: CPT | Mod: CPTII,S$GLB,, | Performed by: INTERNAL MEDICINE

## 2023-03-13 PROCEDURE — 3008F PR BODY MASS INDEX (BMI) DOCUMENTED: ICD-10-PCS | Mod: CPTII,S$GLB,, | Performed by: INTERNAL MEDICINE

## 2023-03-13 PROCEDURE — 3078F PR MOST RECENT DIASTOLIC BLOOD PRESSURE < 80 MM HG: ICD-10-PCS | Mod: CPTII,S$GLB,, | Performed by: INTERNAL MEDICINE

## 2023-03-13 PROCEDURE — 3074F PR MOST RECENT SYSTOLIC BLOOD PRESSURE < 130 MM HG: ICD-10-PCS | Mod: CPTII,S$GLB,, | Performed by: INTERNAL MEDICINE

## 2023-03-13 PROCEDURE — 1159F PR MEDICATION LIST DOCUMENTED IN MEDICAL RECORD: ICD-10-PCS | Mod: CPTII,S$GLB,, | Performed by: INTERNAL MEDICINE

## 2023-03-13 PROCEDURE — 85025 COMPLETE CBC W/AUTO DIFF WBC: CPT | Performed by: INTERNAL MEDICINE

## 2023-03-13 PROCEDURE — 86140 C-REACTIVE PROTEIN: CPT | Performed by: INTERNAL MEDICINE

## 2023-03-13 RX ORDER — POTASSIUM CHLORIDE 20 MEQ/1
40 TABLET, EXTENDED RELEASE ORAL ONCE
Status: DISCONTINUED | OUTPATIENT
Start: 2023-03-13 | End: 2023-03-13

## 2023-03-13 RX ORDER — DEXTROSE MONOHYDRATE, SODIUM CHLORIDE, AND POTASSIUM CHLORIDE 50; 2.24; 4.5 G/1000ML; G/1000ML; G/1000ML
INJECTION, SOLUTION INTRAVENOUS CONTINUOUS
Status: DISPENSED | OUTPATIENT
Start: 2023-03-13 | End: 2023-03-13

## 2023-03-13 RX ORDER — POTASSIUM CHLORIDE 7.45 MG/ML
30 INJECTION INTRAVENOUS ONCE
Status: COMPLETED | OUTPATIENT
Start: 2023-03-13 | End: 2023-03-13

## 2023-03-13 RX ORDER — POTASSIUM CHLORIDE 20 MEQ/1
20 TABLET, EXTENDED RELEASE ORAL 3 TIMES DAILY
Qty: 60 TABLET | Refills: 1 | Status: SHIPPED | OUTPATIENT
Start: 2023-03-13 | End: 2023-08-16 | Stop reason: SDUPTHER

## 2023-03-13 RX ORDER — POTASSIUM CHLORIDE 20 MEQ/1
40 TABLET, EXTENDED RELEASE ORAL ONCE
Status: COMPLETED | OUTPATIENT
Start: 2023-03-13 | End: 2023-03-13

## 2023-03-13 RX ADMIN — POTASSIUM CHLORIDE 40 MEQ: 1500 TABLET, EXTENDED RELEASE ORAL at 12:03

## 2023-03-13 RX ADMIN — POTASSIUM CHLORIDE 30 MEQ: 7.46 INJECTION, SOLUTION INTRAVENOUS at 12:03

## 2023-03-13 NOTE — PROGRESS NOTES
"Subjective:       Patient ID: Claire Bowser is a 63 y.o. female.    Chief Complaint:: Follow-up and hospital follow up visit     HPI seen at [facility] from 02/26 until 3/3"Claire Bowser is a 63 y.o. female known to ID service from January, 2023 with PMHx emphysema, smoker, HTN, DVT on Eliquis, anxiety, chronic pancreatitis since biliary pancreatitis and cholecystectomy 07/2022, ,pancreatic sphincterotomy, bile leak-status post stent 12/30/22, multiple pancreatic pseudocysts, cyst gastrostomy tube placement, malnutrition, albumin 1.5, OA,  rheumatoid arthritis,    Patient came to ED on 2/24 with complaints of generalized weakness, fatigue, dyspnea on exertion, not able to stand, but not much abdominal discomfort??  CT chest ruled out PE.  CT abdomen was impressive for a big air-fluid collection in pelvis.  Patient is seen by General surgery who is recommending percutaneous drainage, no operative intervention at this time.    Patient is started on Zosyn and we are waiting for IR.    Patient has no new complaints overnight.  She feels slightly better, but still weak.  At baseline patient lays in bed most of the time.  She uses the bathroom 3 times a day.  She eats in bed, her  who is retired attends to her.  Urine culture is no growth to date.  UA with mild pyuria 20.  She had no urinary complaints until yesterday when she had some urgency and frequency.  No blood cultures are sent.  Troponin is a 1000 and EC HO is as below.  .  Hemoglobin of 6.9.  A unit of PRBC is already ordered.  No obvious source of bleeding     2/27(Paulette): consult reviewed and d/w Dr. Valenzuela, family. She is even more underweight than my last visit. The IR drain has put out 200 cc already. She has been too weak to move around for the last several days. Discussed need for IV antibiotics for a while, and this plus IV nutrition may be enough to get her into LTAC.   2/28: interim reviewed. Afebrile. WBC still in the normal range. " "1000 mL relieved from intra-abd abscess since placement yesterday. Enterococcus and a GNR isolated from culture, final pending. Intake not recorded yesterday but she did eat at least one meal. She ate well today. She is reluctant to get out of bed  3/1: interim reviewed. Did take a few steps with PT and  reports she is eating well. The drainage from right abdomen is thinner and output has decreased. No benefits for LTAC. She is in good spirits.  3/2: Interim reviewed output from the drain has tapered off to 50 cc over the last 24 hours.  Surgery is planning a repeat CT scan tomorrow.  The cultures are not final as the Gram-negative jennifer id and susceptibility had to be repeated.  CRP has fallen to only slightly above normal. She walked to the door with PT. She is worried about being discharged too soon.   3/3: interim reviewed. CT shows substantial improvement in fluid collection size, down from huge to just big. The drain is in position, but output is less than 100 cc per day. Culture from abscess on 2/27 has enterococcus and Ecoli. Micro updating the culture report(both sensitive to zosyn). CT also shows large bilateral pleural effusions, alb 1.5 on admission.  She is in good spirits, walked much farther with therapy, eating well. I was able to milk fluid from IR drain. "    3/13 office:  She has been receiving the Zosyn at home without any difficulty.  Some how she has lost a tremendous amount of weight since her hospitalization,?  Is this fluid weight.   confirms that she has been eating very very well.  Critical Potassium was 2.3 this am. CRP 12 up from 1.7. She is having 2-5 BM's per day but attributes increase to eating crawfish 3 days ago  The drain was removed by Dr. Sheehan last week, with output less than 20, perhaps around 10 cc per day.   . Drinking plenty and eating well, urine is dilute. No abd pain.   Smoking again, 7 cig per day  Joints are fair off of the Arava, only on 5 of " prednisone    Review of patient's allergies indicates:   Allergen Reactions    No known drug allergies      Past Medical History:   Diagnosis Date    Acute biliary pancreatitis 6/14/2022    Anxiety     Chronic pancreatitis 1/2/2023    Digestive disorder     Flu 02/2017    [facility]    Hypertension     Long-term use of immunosuppressant medication 6/10/2022    Rheumatoid arthritis involving multiple sites with positive rheumatoid factor 01/14/2021     Past Surgical History:   Procedure Laterality Date    COLONOSCOPY N/A 2/26/2021    Procedure: COLONOSCOPY;  Surgeon: Amy Sidhu MD;  Location: [facility];  Service: Endoscopy;  Laterality: N/A;    ENDOSCOPIC ULTRASOUND OF UPPER GASTROINTESTINAL TRACT N/A 7/1/2022    Procedure: ULTRASOUND, UPPER GI TRACT, ENDOSCOPIC;  Surgeon: Donavon Jewell III, MD;  Location: [facility];  Service: Endoscopy;  Laterality: N/A;    ENDOSCOPIC ULTRASOUND OF UPPER GASTROINTESTINAL TRACT N/A 11/29/2022    Procedure: ULTRASOUND, UPPER GI TRACT, ENDOSCOPIC;  Surgeon: Donavon Jewell III, MD;  Location: [facility];  Service: Endoscopy;  Laterality: N/A;    ENDOSCOPIC ULTRASOUND OF UPPER GASTROINTESTINAL TRACT N/A 1/3/2023    Procedure: ULTRASOUND, UPPER GI TRACT, ENDOSCOPIC;  Surgeon: Donavon Jewell III, MD;  Location: [facility];  Service: Endoscopy;  Laterality: N/A;    ERCP N/A 12/30/2022    Procedure: ERCP (ENDOSCOPIC RETROGRADE CHOLANGIOPANCREATOGRAPHY);  Surgeon: Donavon Jewell III, MD;  Location: [facility];  Service: Endoscopy;  Laterality: N/A;    ERCP N/A 1/24/2023    Procedure: ERCP (ENDOSCOPIC RETROGRADE CHOLANGIOPANCREATOGRAPHY);  Surgeon: Rock Martines MD;  Location: [facility] ([address] [place] [address]);  Service: Endoscopy;  Laterality: N/A;    ESOPHAGOGASTRODUODENOSCOPY N/A 2/26/2021    Procedure: EGD (ESOPHAGOGASTRODUODENOSCOPY);  Surgeon: Amy Sidhu MD;  Location: [facility];  Service: Endoscopy;  Laterality: N/A;    LAPAROSCOPIC CHOLECYSTECTOMY N/A  7/27/2022    Procedure: CHOLECYSTECTOMY, LAPAROSCOPIC;  Surgeon: Ramón Hwang III, MD;  Location: [facility];  Service: General;  Laterality: N/A;    TUBAL LIGATION       Social History     Tobacco Use    Smoking status: Every Day     Packs/day: 1.00     Years: 7.00     Pack years: 7.00     Types: Cigarettes    Smokeless tobacco: Never    Tobacco comments:     [phone]   Substance Use Topics    Alcohol use: No        Family History   Problem Relation Age of Onset    Diabetes Mother     Heart disease Mother     Kidney disease Mother     Hypertension Mother     Stroke Mother     Hearing loss Mother     COPD Father     Liver disease Paternal Grandfather     Alcohol abuse Paternal Grandfather     Liver disease Sister     Emphysema Brother     COPD Brother     Sleep apnea Brother     No Known Problems Son     Alcohol abuse Paternal Grandmother     Cirrhosis Paternal Grandmother     Heart disease Maternal Aunt     Diabetes Maternal Aunt     Cancer Maternal Aunt         female    Heart disease Maternal Uncle     Hypertension Maternal Uncle     No Known Problems Paternal Uncle     Psoriasis Neg Hx     Lupus Neg Hx     Eczema Neg Hx     Melanoma Neg Hx          Review of Systems    Constitutional: No fever, chills, sweats, fatigue, weakness,     Eyes: No change in vision, loss of vision or diplopia, photophobia    ENT: No sore throat, mouth pains, or lesions    Cardiovascular: No chest pain, GALDAMEZ, or pedal edema    Respiratory: No shortness of breath, GALDAMEZ, but she does have a mild chronic cough,    Gastrointestinal: No abdominal pain, nausea, vomiting, and she did have some diarrhea which is improved.  She uses Creon every day for diarrhea from pancreatic insufficiency,      Genitourinary: No dysuria, hematuria or vaginitis    Musculoskeletal: No new pain, joint swelling, or injuries    Integumentary: No new rashes, skin lesions, or wounds    Neurological: No dizziness, vertigo, unusual headaches, neuropathy, loss of  "vision, falls    Psychiatric: No anxiety, depression    Endocrine:      Lymphatic: No lymphadenopathy, blood loss, anemia, malignancy    VAD:  No problems with her PICC line    Objective:      Blood pressure 116/62, pulse 102, temperature 97.4 °F (36.3 °C), height 5' 8" (1.727 m), weight 37.6 kg (82 lb 12.8 oz), SpO2 98 %. Body mass index is 12.59 kg/m².  Physical Exam      General: Alert and attentive, cooperative and in no distress extremely thin and underweight but mentally vigorous    Eyes: Pupils equal, round, reactive to light, anicteric, EOMI    Neck: Supple, non-tender, no thyromegaly or masses    ENT: EAC patent, TM normal, nares patent, no oral lesions,   no thrush    Cardiovascular: Regular rate and rhythm, no murmurs, rubs, or gallop    Respiratory: Lungs clear without wheezes, rales, rubs or rhonci with decreased breath sounds in the bases likely the result of bilateral effusions    Gastrointestinal:  Soft, bowel sounds normal,  no mass or organomegaly, no tenderness or distention    Genitourinary:  no flank tenderness    Integumentary: Skin without rashes, lesions, or wounds     Vascular: No peripheral edema or phlebitis, warm and well perfused    Musculoskeletal:  In wheelchair today but patient and  endorse that she is getting around the house fairly well., no acute arthritis, synovitis or myositis.  Reduced muscle bulk and strength    Lymphatic: No cervical,  LAD    Neurological: Normal LOC, cranial nerves, speech,      Psychiatric: Normal mood, speech,  demeanor     Wound:    VAD:  PICC line with no redness, tenderness, phlebitis       Recent Diagnostics:   Labs reviewed from this morning       Assessment and Plan:           Intra-abdominal abscess  -     CT Abdomen Pelvis With Contrast; Future; Expected date: 03/20/2023    Hypokalemia  -     Cancel: Ambulatory referral/consult to Infusion Dept; Future; Expected date: 03/20/2023  -     Ambulatory referral/consult to Infusion Dept; Future; " Expected date: 03/20/2023    Infected pancreatic pseudocyst    Encounter for long-term (current) use of antibiotics    Long-term use of immunosuppressant medication    Cachexia    Other orders  -     potassium chloride SA (K-DUR,KLOR-CON) 20 MEQ tablet; Take 1 tablet (20 mEq total) by mouth 3 (three) times daily.  Dispense: 60 tablet; Refill: 1      Continue to take as much nourishment as possible    Please go back to [facility] to get some potassium    Return to [facility] on 3/15 to repeat potassium level    CT abd requested for next Monday    We will squeeze you in after the CT    We may follow the IV antibiotics with oral    This note was created using Dragon voice recognition software that occasionally misinterpreted phrases or words.

## 2023-03-13 NOTE — PATIENT INSTRUCTIONS
Continue to take as much nourishment as possible    Please go back to [facility] to get some potassium    Return to [facility] on 3/15 to repeat potassium level    CT abd requested for next Monday    We will squeeze you in after the CT    We may follow the IV antibiotics with oral

## 2023-03-13 NOTE — TELEPHONE ENCOUNTER
Patient was scheduled to be seen by [facility] NP today 3/13.  Pt's spouse notified that provider is out sick but will be available until Thursday.  Spouse states that she is able to go out to see  In the clinic and wont need appointment.  Appointment canceled.

## 2023-03-13 NOTE — TELEPHONE ENCOUNTER
Lara with [facility] called  [phone]    She reported a critical Potassium of 2.3    [facility]  3/13/23

## 2023-03-14 ENCOUNTER — CLINICAL SUPPORT (OUTPATIENT)
Dept: REHABILITATION | Facility: HOSPITAL | Age: 63
End: 2023-03-14
Payer: MEDICAID

## 2023-03-14 DIAGNOSIS — K65.1 INTRA-ABDOMINAL ABSCESS: ICD-10-CM

## 2023-03-14 DIAGNOSIS — R29.898 IMPAIRED STRENGTH OF LOWER EXTREMITY: Primary | ICD-10-CM

## 2023-03-14 DIAGNOSIS — Z74.09 IMPAIRED FUNCTIONAL MOBILITY, BALANCE, GAIT, AND ENDURANCE: ICD-10-CM

## 2023-03-14 PROCEDURE — 97164 PT RE-EVAL EST PLAN CARE: CPT | Mod: PN

## 2023-03-14 PROCEDURE — 97110 THERAPEUTIC EXERCISES: CPT | Mod: PN

## 2023-03-14 NOTE — PLAN OF CARE
[facility]  Physical Therapy Neurological Rehabilitation Re-evaluation     Name: Claire Bowser  Clinic Number: [id number]     Therapy Diagnosis:        Encounter Diagnoses   Name Primary?    Pancreatic duct leakage      Weight loss, unintentional      Impaired strength of lower extremity Yes    Impaired functional mobility, balance, gait, and endurance        Physician: Oleg Ross MD     Physician Orders: PT Eval and Treat    Medical Diagnosis from Referral:   Diagnosis   K65.1 (ICD-10-CM) - Intra-abdominal abscess   R53.1 (ICD-10-CM) - Generalized weakness   K86.89 (ICD-10-CM) - Pancreatic duct leakage   R63.4 (ICD-10-CM) - Weight loss, unintentional      Initial Evaluation Date: 2/17/2023  Reevaluation date 3/14/23  Authorization Period Expiration: 4/30/23  Plan of Care Expiration: 5/23/23  Visit # / Visits authorized: 1/ 15     Time In: 930  Time Out: 1014  Total Billable Time: 44 minutes (34  min for revaluation and 10 min for treatment)     Precautions: Standard, blood thinners, and Fall     Progress note due: 4/18/23        Subjective      Medical History:        Past Medical History:   Diagnosis Date    Acute biliary pancreatitis 6/14/2022    Anxiety      Chronic pancreatitis 1/2/2023    Digestive disorder      Flu 02/2017     [facility]    Hypertension      Long-term use of immunosuppressant medication 6/10/2022    Rheumatoid arthritis involving multiple sites with positive rheumatoid factor 01/14/2021         Surgical History:   Claire Bowser  has a past surgical history that includes Tubal ligation; Esophagogastroduodenoscopy (N/A, 2/26/2021); Colonoscopy (N/A, 2/26/2021); Endoscopic ultrasound of upper gastrointestinal tract (N/A, 7/1/2022); Laparoscopic cholecystectomy (N/A, 7/27/2022); Endoscopic ultrasound of upper gastrointestinal tract (N/A, 11/29/2022); ERCP (N/A, 12/30/2022); Endoscopic ultrasound of upper gastrointestinal tract (N/A, 1/3/2023); and  ERCP (N/A, 1/24/2023).     Medications:   Claire has a current medication list which includes the following prescription(s): acidophilus ex str (l. sporog), apixaban, cyclobenzaprine, folic acid, lipase-protease-amylase 12,000-38,000-60,000 units, loperamide, megestrol, mirtazapine, pantoprazole, sertraline, and trazodone.     Allergies:        Review of patient's allergies indicates:   Allergen Reactions    No known drug allergies           Imaging     2/8/23- CT without contrast     IMPRESSION:  1. Generalized cerebral atrophy and superimposed chronic deep white matter ischemia.  2. No evidence of acute intracranial event        Date of onset: Dec 2022  History of current condition - Claire reports:      Reevaluation performed today after hospitalization. Subjective information and history reviewed with pt and  to updates needed but the rest taken from initial evaluation performed on 2/17/23. Pt arrives in wheelchair with  assisting. Reports she was feeling week on 2/24/23 and went to hospital. Reports she had to get a lot of fluid removed from her stomach and is on IV antibiotics. Also reports her Potassium was very low  but is improving. Pt reports she is eating much better and has been feeling much better since having fluid buildup removed. Pt is more talkative and moving around better since evaluation.       From Initial Physical therapy eval on 2/17/23  Pt arrives to therapy ambulating with rolling walker with  assisting. Pt able to answer subjective questions with  assisting with details. Pt reports she had her gall bladder removed, the developed pancreatitis. Reports she had a small stent placed which worked initially, but then had to get a bigger stent placed. Pt reports she has trouble with fatigue, walking, decreased strength since being hospitalized so many times. Pt reports she was in and out of the hospital 4-5 times in the past few months. She would have to stay a few  days then home a few days and then ended up having to go back. She reports she got extremely weak from being so sick and in bed so much. She also hadn't been able to eat much, lost her appetite with feeling sick. Pt reports she recently has gotten her appetite back and is feeling better since her most recent surgery. Does reports she had lost 40 pounds in the last recent months. MDs aware.         ED DC note 2/16/23  HPI:   Claire Bowser is a 63 y.o. White female With PMH as noted below, who presents with generalized weakness. Onset since previous hospitalization  Progressively worsening. Pt not participating much with pt. Also reports new exertional SOB , +LE edema, +orthopnea, +PND. No CP. No fever or chills. No previous cardiac disease     2/14: Patient is a 63-year-old  female with rheumatoid arthritis, chronic pancreatitis, essential hypertension admitted after presenting with generalized weakness and shortness of breath.  Though initially treated for acute diastolic congestive heart failure echocardiogram with no evidence of the same.  Presentation most likely consistent with failure to thrive in the setting of chronic biliary pancreatitis.  Diagnosed with severe malnutrition.  Seen by dietitian.  She has been re-initiated on pancreatic enzyme supplements.  And also initiated on appetite stimulants.  Her oral intake has improved.  Though she has been advised to go to SNF for further rehabilitation, she is unable to do so due to insurance limitations.        2/8/23 ED note  62 y/o female with history of Acute biliary pancreatitis, chronic pancreatitis, HTN, RA presents to the ER with her  for evaluation due to generalized weakness, diarrhea and SOB with exertion x 1 week.  Patient had biliary stent replacement on 1/27 and reports since then her abdominal pain has resolved.  She denies black or bloody stool, foul smelling or green stool and  notes that stool is more formed today.  Patient  reports perianal skin irritation from diarrhea on 2/3 and was advised to use butt paste.  They suspect diarrhea was due to recent antibiotics after surgery.  Patient reports continued discomfort is perianal area.  She denies chest pain , fever, or cough.   notes bilateral leg swelling that occurred after her surgery, swelling improves with elevation.  No leg pain.  Patient reports bilateral arm and leg weakness, R > L for 1 week,  reports she is having difficulty ambulating without assistance due to generalized weakness.  No syncope, head injury , confusion, or other complaints at this time.     right side weaker in general. Reports scans for stroke. Swelling in bilateral feet. Reports doctors aware.       Prior Therapy:   none  Social History: lives with  Robin  Falls:   no  DME: rolling walker, bed side commode, shower chair and rollator   Home Environment: single story house no stairs to enter. Small threshold  Family Present at time of Eval:     Occupation:  .    Prior Level of Function:  independent. No walker.   Current Level of Function: requires rolling walker for  walking,  requires help getting into bed and needs help to stand, requires assist for toileting and bathing  Driving: no      Pain:   Current 0/10, worst 0/10, best 0/10   Reports no pain since the fluid in her stomach was drained.      Pts goals: get strength and walking back     Objective      Observation: pleasant and cooperative   Speech: normal     Mental status: alert, oriented to person, place, and time, normal mood, behavior, speech, dress, motor activity, and thought processes  Appearance: Casually dressed  Behavior:  calm and cooperative  Attention Span and Concentration:  Normal     Posture Alignment: slouched posture     Dominant hand: right      Skin integrity:     Visual/Auditory: denies changes      ROM:   GROSS AROM/PROM  UPPER EXTREMITY  (R) UE: WFLs  besides limited with shoulder  flexion range of motion    (L) UE: WFLs besides limited with shoulder flexion range of motion    LOWER EXTREMITY  (R) LE: WFLs passively  (L) LE: WFLs passively      BP: unable to get a reading as pt requires a pediatric cuff 2/2 very thin arms. Reports no dizziness and BP has been 116/74 last time she checked     Lower Extremity Strength   Right LE Evaluation (2/17/23)  Reevaluation 3/14/23    Hip flexion:  2/5 3-/5   Knee extension: 3+/5 3+/5   Knee flexion: 3/5 3+/5   Ankle dorsiflexion:  3/5 3+/5   Ankle plantarflexion:  3-/5 3+/5        Left LE Evaluation   (2/17/23) Reevaluation  3/14/23   Hip flexion: 3-/5 3/5   Knee extension: 3+/5 3+/5   Knee flexion: 3/5 3+/5   Ankle dorsiflexion: 3/5 3+/5   Ankle plantarflexion: 3-/5 3+/5       Upper extremity strength: pt has gross weakness in bilateral upper extremities. Deconditioned.      Coordination: within functional limits      Sensation: no loss of sensation reported        Functional Mobility    Evaluation  (2/17/23)  Reevaluation (3/14/23)   Bed mobility:  Mod A Mod A    Supine to sit: Mod A Min A    Sit to supine:  Mod A Contact guard assist    Rolling:  Min A Supervision    Transfers to bed: Mod A Mod A rolling walker    Sit to stand Mod A Mod A rolling walker    Stand pivot:   Mod A Mod A rolling walker    Car transfers: Mod A Mod A    Floor transfers: Unsafe at this time.  Unsafe at this time.               Evaluation (2/17/23)   Reevaluation (3/14/23)   5 times sit to stand Unable to perform at this time.  Mod A for 1 sit to stand Unable to perform at this time.    TUG Unable with fatigue Unable to perform at this time.    Self selected walking speed (10MWT) Unable to perform with fatigue Unable to perform at this time.          Gait       Evaluation (2/17/23) Re- evaluation (3/14/23)    AD used:  Rolling walker  Rolling walker    Assistance  Min/ mod Contact guard assist    Distance 50 ft 60 ft         GAIT DEVIATIONS:              Claire displays the  following deviations with ambulation: very slow pace, stooped posture, minimal hip and knee flexion bilaterally              Impairments contributing to deviations: impaired motor control, impaired strength, impaired endurance, impaired balance, gait instability.     - Stairs: unsafe at this time.         Endurance Deficit: yes, fatigues very quickly          TREATMENT     Claire received therapeutic exercises to develop strength, endurance, and ROM for 10 minutes including:      Home Exercises and Patient Education Provided     Education provided:     - educated about role of PT in  outpatient therapy . educated about POC. Educated about recommending pt have her  walk with her at this time and transfer for safety. HEP handout given and went over in detail with pt and . Reports understanding.      Written Home Exercises Provided: yes.  Exercises were reviewed and the patient was able to demonstrate them prior to the end of the session.  She demonstrated good  understanding of the education provided.     See EMR under Patient Instructions for exercises provided 3/14/23     Assessment     Claire is a 63 y.o. female referred to outpatient Physical Therapy with a medical diagnosis of generalized weakness. Pt presents to PT with the following impairments leading to his/her functional decline: impaired strength, impaired endurance, impaired balance, gait instability. During re-evaluation today, patients bilateral lower extremity strength has improved and her ambulation distance improved with her rolling walker with contact guard assist. She still requires mod A for the sit to stand component but is contact guard assist for the rest of the transfer.   Pt fatigues very easily and is very deconditioned 2/2 prolonged hospital stays and weight loss. She is a high risk for falls at this time. Pt has good family support and is motivated to improve. She is a good candidate for physical therapy at this time.       Pt prognosis is Good.   Pt will benefit from skilled outpatient Physical Therapy to address the deficits stated above and in the chart below, provide pt/family education, and to maximize pt's level of independence.      Plan of care discussed with patient: Yes  Pt's spiritual, cultural and educational needs considered and patient is agreeable to the plan of care and goals as stated below:      Anticipated Barriers for therapy: none noted at this time.       Goals      Short Term Goals: 5 weeks Date established:  Status:    1.  Patient will demonstrate improve endurance and activity tolerance as evidenced by ability to perform Nu-step x 15 minutes without rests breaks. 3/13/23  New   2. Pt will perform sit to stand with least restrictive assistive device with contact guard assist  3/14/2023   New         Long Term Goals: 10 weeks  Date established: Status:    1. Patient will be independent and compliant with updated HEP. 3/13/23       New   2.Patient will improve her FOTO score from 40 % limited  to at least 32 % limited for improved self perception of functional mobility.(score 0-100, high score indicates greater level of function) 3/13/23    New   3. Pt will improve simple transfers from mod A to contact guard assist  to improve independence and safety 3/13/23     New   4.  Pt will improve car transfer from mod A  to contact guard assist  to improve independence and safety 3/13/23    New   5. Pt will be able to ambulate 150  ft with standby assist  assist to improve function.  3/13/23    New       Plan   Plan of care Certification: 3/14/23- 5/23/23     Outpatient Physical Therapy 3 times weekly for 10 weeks to include the following interventions: Gait Training, Manual Therapy, Moist Heat/ Ice, Neuromuscular Re-ed, Patient Education, Therapeutic Activities, and Therapeutic Exercise.      Linh Sharif, PT

## 2023-03-15 ENCOUNTER — INFUSION (OUTPATIENT)
Dept: INFUSION THERAPY | Facility: HOSPITAL | Age: 63
End: 2023-03-15
Attending: INTERNAL MEDICINE
Payer: MEDICAID

## 2023-03-15 DIAGNOSIS — E87.6 HYPOKALEMIA: ICD-10-CM

## 2023-03-15 LAB — POTASSIUM SERPL-SCNC: 4.1 MMOL/L (ref 3.5–5.1)

## 2023-03-15 PROCEDURE — 36591 DRAW BLOOD OFF VENOUS DEVICE: CPT

## 2023-03-15 PROCEDURE — 84132 ASSAY OF SERUM POTASSIUM: CPT | Performed by: PHYSICIAN ASSISTANT

## 2023-03-16 DIAGNOSIS — R53.1 GENERALIZED WEAKNESS: ICD-10-CM

## 2023-03-16 DIAGNOSIS — R63.4 WEIGHT LOSS, UNINTENTIONAL: ICD-10-CM

## 2023-03-16 DIAGNOSIS — K65.1 INTRA-ABDOMINAL ABSCESS: Primary | ICD-10-CM

## 2023-03-17 ENCOUNTER — OFFICE VISIT (OUTPATIENT)
Dept: FAMILY MEDICINE | Facility: CLINIC | Age: 63
End: 2023-03-17
Payer: MEDICAID

## 2023-03-17 ENCOUNTER — TELEPHONE (OUTPATIENT)
Dept: INFECTIOUS DISEASES | Facility: CLINIC | Age: 63
End: 2023-03-17

## 2023-03-17 VITALS
WEIGHT: 80.44 LBS | BODY MASS INDEX: 14.8 KG/M2 | DIASTOLIC BLOOD PRESSURE: 64 MMHG | SYSTOLIC BLOOD PRESSURE: 116 MMHG | OXYGEN SATURATION: 98 % | TEMPERATURE: 98 F | HEART RATE: 112 BPM | HEIGHT: 62 IN

## 2023-03-17 DIAGNOSIS — M05.79 RHEUMATOID ARTHRITIS INVOLVING MULTIPLE SITES WITH POSITIVE RHEUMATOID FACTOR: Chronic | ICD-10-CM

## 2023-03-17 DIAGNOSIS — Z12.31 ENCOUNTER FOR SCREENING MAMMOGRAM FOR MALIGNANT NEOPLASM OF BREAST: ICD-10-CM

## 2023-03-17 DIAGNOSIS — K65.1 INTRA-ABDOMINAL ABSCESS: ICD-10-CM

## 2023-03-17 DIAGNOSIS — E43 SEVERE PROTEIN-CALORIE MALNUTRITION: Chronic | ICD-10-CM

## 2023-03-17 DIAGNOSIS — Z86.718 HISTORY OF DVT OF LOWER EXTREMITY: Chronic | ICD-10-CM

## 2023-03-17 DIAGNOSIS — D69.2 SOLAR PURPURA: ICD-10-CM

## 2023-03-17 DIAGNOSIS — Z09 HOSPITAL DISCHARGE FOLLOW-UP: Primary | ICD-10-CM

## 2023-03-17 DIAGNOSIS — Z79.60 LONG-TERM USE OF IMMUNOSUPPRESSANT MEDICATION: ICD-10-CM

## 2023-03-17 DIAGNOSIS — I10 HYPERTENSION, UNSPECIFIED TYPE: Chronic | ICD-10-CM

## 2023-03-17 DIAGNOSIS — K86.3 INFECTED PANCREATIC PSEUDOCYST: ICD-10-CM

## 2023-03-17 PROBLEM — I82.409 ACUTE DVT (DEEP VENOUS THROMBOSIS): Status: RESOLVED | Noted: 2022-10-18 | Resolved: 2023-03-17

## 2023-03-17 PROCEDURE — 3008F PR BODY MASS INDEX (BMI) DOCUMENTED: ICD-10-PCS | Mod: CPTII,,, | Performed by: PHYSICIAN ASSISTANT

## 2023-03-17 PROCEDURE — 3074F SYST BP LT 130 MM HG: CPT | Mod: CPTII,,, | Performed by: PHYSICIAN ASSISTANT

## 2023-03-17 PROCEDURE — 3074F PR MOST RECENT SYSTOLIC BLOOD PRESSURE < 130 MM HG: ICD-10-PCS | Mod: CPTII,,, | Performed by: PHYSICIAN ASSISTANT

## 2023-03-17 PROCEDURE — 3078F DIAST BP <80 MM HG: CPT | Mod: CPTII,,, | Performed by: PHYSICIAN ASSISTANT

## 2023-03-17 PROCEDURE — 99214 OFFICE O/P EST MOD 30 MIN: CPT | Mod: S$PBB,,, | Performed by: PHYSICIAN ASSISTANT

## 2023-03-17 PROCEDURE — 1111F PR DISCHARGE MEDS RECONCILED W/ CURRENT OUTPATIENT MED LIST: ICD-10-PCS | Mod: CPTII,,, | Performed by: PHYSICIAN ASSISTANT

## 2023-03-17 PROCEDURE — 99214 PR OFFICE/OUTPT VISIT, EST, LEVL IV, 30-39 MIN: ICD-10-PCS | Mod: S$PBB,,, | Performed by: PHYSICIAN ASSISTANT

## 2023-03-17 PROCEDURE — 3044F PR MOST RECENT HEMOGLOBIN A1C LEVEL <7.0%: ICD-10-PCS | Mod: CPTII,,, | Performed by: PHYSICIAN ASSISTANT

## 2023-03-17 PROCEDURE — 1159F PR MEDICATION LIST DOCUMENTED IN MEDICAL RECORD: ICD-10-PCS | Mod: CPTII,,, | Performed by: PHYSICIAN ASSISTANT

## 2023-03-17 PROCEDURE — 1111F DSCHRG MED/CURRENT MED MERGE: CPT | Mod: CPTII,,, | Performed by: PHYSICIAN ASSISTANT

## 2023-03-17 PROCEDURE — 99999 PR PBB SHADOW E&M-EST. PATIENT-LVL IV: CPT | Mod: PBBFAC,,, | Performed by: PHYSICIAN ASSISTANT

## 2023-03-17 PROCEDURE — 99214 OFFICE O/P EST MOD 30 MIN: CPT | Mod: PBBFAC,PO | Performed by: PHYSICIAN ASSISTANT

## 2023-03-17 PROCEDURE — 99999 PR PBB SHADOW E&M-EST. PATIENT-LVL IV: ICD-10-PCS | Mod: PBBFAC,,, | Performed by: PHYSICIAN ASSISTANT

## 2023-03-17 PROCEDURE — 1159F MED LIST DOCD IN RCRD: CPT | Mod: CPTII,,, | Performed by: PHYSICIAN ASSISTANT

## 2023-03-17 PROCEDURE — 3078F PR MOST RECENT DIASTOLIC BLOOD PRESSURE < 80 MM HG: ICD-10-PCS | Mod: CPTII,,, | Performed by: PHYSICIAN ASSISTANT

## 2023-03-17 PROCEDURE — 3008F BODY MASS INDEX DOCD: CPT | Mod: CPTII,,, | Performed by: PHYSICIAN ASSISTANT

## 2023-03-17 PROCEDURE — 3044F HG A1C LEVEL LT 7.0%: CPT | Mod: CPTII,,, | Performed by: PHYSICIAN ASSISTANT

## 2023-03-17 NOTE — TELEPHONE ENCOUNTER
I advised patient of normal Potassium level and she   May reduce the oral potassium to 20 meq twice a day   Instead of three times a day ; per Dr Caldwell's orders    [facility]  3/17/23      ----- Message from Jacqui Caldwell MD sent at 3/15/2023  9:55 PM CDT -----  Please let her know that her potassium is in the normal range now and to reduce the oral potassium to 20 mg EQ twice a day(reduce from 3x times a day)

## 2023-03-17 NOTE — PROGRESS NOTES
Subjective:       Patient ID: Claire Bowser is a 63 y.o. female.    Chief Complaint: Hospital Follow Up    Patient presents for hospital follow up she has complicated GI history including pancreatitis (June 2022) complicated by leak status post stenting, status post laparoscopic cholecystectomy as well as dual sphincterotomy who was admitted for shortness of breath and was found to have peritoneal fluid collection on imaging.  Started on empiric antibiotics due to concern for infectious process.  GI, General surgery and Infectious Disease consulted.  Patient underwent IR guided peritoneal drain placement on 02/27. Cx growing E coli and Enterococcus faecalis. Repeat CT scan 3/3 with marked decrease in fluid collection with cath in place. She received 2 units of pRBCs this hospital stay with no overt signs of bleeding.  RA medication Leflunomide remains on hold given active infection.  She has bilateral pleural effusion.  Echo with normal EF and thus no indication for heart failure.  Home IV abx for 14 days of continuous Zosyn.   Since being home she continue home IV abx.  She had drain pulled from Gen Surg.    She has CT schedule in 3 days and will follow up with infectious disease  Patients patient medical/surgical, social and family histories have been reviewed       Review of Systems   Constitutional:  Negative for activity change, appetite change, fatigue and fever.   Gastrointestinal:  Negative for abdominal pain.   Neurological:  Positive for weakness.     Objective:      Physical Exam  Constitutional:       General: She is not in acute distress.     Appearance: She is well-developed. She is cachectic.   HENT:      Head: Normocephalic and atraumatic.   Cardiovascular:      Rate and Rhythm: Normal rate and regular rhythm.      Heart sounds: Normal heart sounds.   Pulmonary:      Effort: Pulmonary effort is normal.      Breath sounds: Normal breath sounds.   Musculoskeletal:      Right lower leg: No edema.       "Left lower leg: No edema.   Skin:     General: Skin is warm and dry.   Neurological:      Mental Status: She is alert.   Psychiatric:         Behavior: Behavior is cooperative.       Assessment:       1. Hospital discharge follow-up    2. Intra-abdominal abscess    3. Infected pancreatic pseudocyst    4. Rheumatoid arthritis involving multiple sites with positive rheumatoid factor    5. Long-term use of immunosuppressant medication    6. Hypertension, unspecified type    7. Encounter for screening mammogram for malignant neoplasm of breast    8. History of DVT of lower extremity    9. Severe protein-calorie malnutrition    10. Solar purpura          Plan:       Claire was seen today for hospital follow up.    Diagnoses and all orders for this visit:    Hospital discharge follow-up    Intra-abdominal abscess  Continue per ID   Infected pancreatic pseudocyst  Continue per ID   Rheumatoid arthritis involving multiple sites with positive rheumatoid factor  Long-term use of immunosuppressant medication  Continue per Rheumatology , currently holding Leflunomide   Hypertension, unspecified type  BP at goal   Continue current medication   Encounter for screening mammogram for malignant neoplasm of breast  -     Mammo Digital Screening Bilat; Future    History of DVT of lower extremity  Completed 6 months of anticoagulation and now off of Eliquis Severe protein-calorie malnutrition  Appetite is improving     Solar purpura, stable monitor            Follow up for pcp next avail .           Documentation entered by me for this encounter may have been done in part using speech-recognition technology. Although I have made an effort to ensure accuracy, "sound like" errors may exist and should be interpreted in context.   I spent a total of 33 minutes on the day of the visit.This includes face to face time and non-face to face time preparing to see the patient (eg, review of tests), obtaining and/or reviewing separately obtained " history, documenting clinical information in the electronic or other health record, independently interpreting results and communicating results to the patient/family/caregiver, or care coordinator.

## 2023-03-20 ENCOUNTER — CLINICAL SUPPORT (OUTPATIENT)
Dept: REHABILITATION | Facility: HOSPITAL | Age: 63
End: 2023-03-20
Payer: MEDICAID

## 2023-03-20 ENCOUNTER — INFUSION (OUTPATIENT)
Dept: INFUSION THERAPY | Facility: HOSPITAL | Age: 63
End: 2023-03-20
Attending: INTERNAL MEDICINE
Payer: MEDICAID

## 2023-03-20 ENCOUNTER — TELEPHONE (OUTPATIENT)
Dept: FAMILY MEDICINE | Facility: CLINIC | Age: 63
End: 2023-03-20
Payer: MEDICAID

## 2023-03-20 ENCOUNTER — HOSPITAL ENCOUNTER (OUTPATIENT)
Dept: RADIOLOGY | Facility: HOSPITAL | Age: 63
Discharge: HOME | End: 2023-03-20
Attending: INTERNAL MEDICINE
Payer: MEDICAID

## 2023-03-20 VITALS
SYSTOLIC BLOOD PRESSURE: 134 MMHG | OXYGEN SATURATION: 99 % | HEART RATE: 110 BPM | RESPIRATION RATE: 16 BRPM | DIASTOLIC BLOOD PRESSURE: 56 MMHG | TEMPERATURE: 99 F

## 2023-03-20 DIAGNOSIS — Z74.09 IMPAIRED FUNCTIONAL MOBILITY, BALANCE, GAIT, AND ENDURANCE: ICD-10-CM

## 2023-03-20 DIAGNOSIS — R29.898 IMPAIRED STRENGTH OF LOWER EXTREMITY: Primary | ICD-10-CM

## 2023-03-20 DIAGNOSIS — K65.1 INTRA-ABDOMINAL ABSCESS: Primary | ICD-10-CM

## 2023-03-20 DIAGNOSIS — K65.1 INTRA-ABDOMINAL ABSCESS: ICD-10-CM

## 2023-03-20 LAB
ALBUMIN SERPL BCP-MCNC: 1.9 G/DL (ref 3.5–5.2)
ALP SERPL-CCNC: 91 U/L (ref 55–135)
ALT SERPL W/O P-5'-P-CCNC: 14 U/L (ref 10–44)
ANION GAP SERPL CALC-SCNC: 8 MMOL/L (ref 8–16)
AST SERPL-CCNC: 18 U/L (ref 10–40)
BASOPHILS # BLD AUTO: 0.13 K/UL (ref 0–0.2)
BASOPHILS NFR BLD: 0.8 % (ref 0–1.9)
BILIRUB SERPL-MCNC: 0.2 MG/DL (ref 0.1–1)
BUN SERPL-MCNC: 8 MG/DL (ref 8–23)
CALCIUM SERPL-MCNC: 9.2 MG/DL (ref 8.7–10.5)
CHLORIDE SERPL-SCNC: 104 MMOL/L (ref 95–110)
CO2 SERPL-SCNC: 25 MMOL/L (ref 23–29)
CREAT SERPL-MCNC: 0.6 MG/DL (ref 0.5–1.4)
CRP SERPL-MCNC: 6.02 MG/DL
DIFFERENTIAL METHOD: ABNORMAL
EOSINOPHIL # BLD AUTO: 0.3 K/UL (ref 0–0.5)
EOSINOPHIL NFR BLD: 2 % (ref 0–8)
ERYTHROCYTE [DISTWIDTH] IN BLOOD BY AUTOMATED COUNT: 17.9 % (ref 11.5–14.5)
EST. GFR  (NO RACE VARIABLE): >60 ML/MIN/1.73 M^2
GLUCOSE SERPL-MCNC: 83 MG/DL (ref 70–110)
HCT VFR BLD AUTO: 27 % (ref 37–48.5)
HGB BLD-MCNC: 8.3 G/DL (ref 12–16)
IMM GRANULOCYTES # BLD AUTO: 0.1 K/UL (ref 0–0.04)
IMM GRANULOCYTES NFR BLD AUTO: 0.7 % (ref 0–0.5)
LYMPHOCYTES # BLD AUTO: 1.4 K/UL (ref 1–4.8)
LYMPHOCYTES NFR BLD: 9.4 % (ref 18–48)
MCH RBC QN AUTO: 31.7 PG (ref 27–31)
MCHC RBC AUTO-ENTMCNC: 30.7 G/DL (ref 32–36)
MCV RBC AUTO: 103 FL (ref 82–98)
MONOCYTES # BLD AUTO: 1.1 K/UL (ref 0.3–1)
MONOCYTES NFR BLD: 7.3 % (ref 4–15)
NEUTROPHILS # BLD AUTO: 12.2 K/UL (ref 1.8–7.7)
NEUTROPHILS NFR BLD: 79.8 % (ref 38–73)
NRBC BLD-RTO: 0 /100 WBC
PLATELET # BLD AUTO: 657 K/UL (ref 150–450)
PMV BLD AUTO: 8.9 FL (ref 9.2–12.9)
POTASSIUM SERPL-SCNC: 3.6 MMOL/L (ref 3.5–5.1)
PROT SERPL-MCNC: 6.5 G/DL (ref 6–8.4)
RBC # BLD AUTO: 2.62 M/UL (ref 4–5.4)
SODIUM SERPL-SCNC: 137 MMOL/L (ref 136–145)
WBC # BLD AUTO: 15.3 K/UL (ref 3.9–12.7)

## 2023-03-20 PROCEDURE — 25500020 PHARM REV CODE 255: Performed by: INTERNAL MEDICINE

## 2023-03-20 PROCEDURE — 36591 DRAW BLOOD OFF VENOUS DEVICE: CPT

## 2023-03-20 PROCEDURE — 85025 COMPLETE CBC W/AUTO DIFF WBC: CPT | Performed by: INTERNAL MEDICINE

## 2023-03-20 PROCEDURE — 86140 C-REACTIVE PROTEIN: CPT | Performed by: INTERNAL MEDICINE

## 2023-03-20 PROCEDURE — 80053 COMPREHEN METABOLIC PANEL: CPT | Performed by: INTERNAL MEDICINE

## 2023-03-20 PROCEDURE — 99211 OFF/OP EST MAY X REQ PHY/QHP: CPT | Mod: 25

## 2023-03-20 PROCEDURE — 74177 CT ABD & PELVIS W/CONTRAST: CPT | Mod: TC

## 2023-03-20 PROCEDURE — 97110 THERAPEUTIC EXERCISES: CPT | Mod: PN,CQ

## 2023-03-20 RX ADMIN — IOHEXOL 80 ML: 350 INJECTION, SOLUTION INTRAVENOUS at 01:03

## 2023-03-20 NOTE — PROGRESS NOTES
"[facility]   Physical Therapy Treatment Note     Name: Claire Bowser  Clinic Number: [id number]    Therapy Diagnosis:   Encounter Diagnoses   Name Primary?    Impaired strength of lower extremity Yes    Impaired functional mobility, balance, gait, and endurance      Physician: Oleg Ross MD    Visit Date: 3/20/2023    Initial Evaluation Date: 2/17/2023  Reevaluation date 3/14/23  Authorization Period Expiration: 4/30/23  Plan of Care Expiration: 5/23/23  Visit # / Visits authorized: 2/5       PTA Visit #: 1/5     Precautions: Standard, blood thinners, and Fall    Time In: 1745  Time Out: 1827  Total Billable Time: 42 minutes    SUBJECTIVE     Pt reports: Doing fine, had a good weekend. .  She was compliant with home exercise program.  Response to previous treatment: "feeling better"   Functional change: none    Pain: 0/10  Location:  Not Applicable      OBJECTIVE     Objective Measures updated at progress report unless specified.     Treatment     Claire received the treatments listed below:      therapeutic exercises to develop strength, endurance, and ROM for 23 minutes including:    NuStep level 1 3 minutes before feeling tired   Supine:  Posterior pelvic tilt  2 sets of 10   Ball squeezes 3 sets of 10  Straight leg raise right left 1 sets of 10 verbal cueing to slow pace and decrease speed   Trunk rotation 2 sets of 10       therapeutic activities to improve functional performance for 19 minutes, including:     Sit to stands from high mat in front of rolling walker with verbal cues to push hands on mat and cues to bend knees when sitting 2 sets of 5     Mat to Wheelchair stand pivot transfer with moderate assistance     Gait with rolling walker 2 times 20 feet contact guard assistance.       Patient Education and Home Exercises     Home Exercises Provided and Patient Education Provided     Education provided:   - Continue with home exercise program as often as able.   - " Educated to use hands on seat when doing sit to stands     Written Home Exercises Provided: Patient instructed to cont prior HEP.  See EMR under Patient Instructions for exercises provided during therapy sessions    ASSESSMENT     Claire provided good participation and effort during today's session with treatment focused on lower extremity strengthening and endurance. Claire fatigued with activities requiring rest breaks and cues for proper form with exercises.     Claire Is progressing well towards her goals.   Pt prognosis is Fair.     Pt will continue to benefit from skilled outpatient physical therapy to address the deficits listed in the problem list box on initial evaluation, provide pt/family education and to maximize pt's level of independence in the home and community environment.     Pt's spiritual, cultural and educational needs considered and pt agreeable to plan of care and goals.     Anticipated barriers to physical therapy: None at this time    Goals:    Short Term Goals: 5 weeks Date established:  Status:    1.  Patient will demonstrate improve endurance and activity tolerance as evidenced by ability to perform Nu-step x 15 minutes without rests breaks. 3/13/23  progressing   2. Pt will perform sit to stand with least restrictive assistive device with contact guard assist  3/14/2023    progressing         Long Term Goals: 10 weeks  Date established: Status:    1. Patient will be independent and compliant with updated HEP. 3/13/23       progressing   2.Patient will improve her FOTO score from 40 % limited  to at least 32 % limited for improved self perception of functional mobility.(score 0-100, high score indicates greater level of function) 3/13/23    Ongoing    3. Pt will improve simple transfers from mod A to contact guard assist  to improve independence and safety 3/13/23     progressing   4.  Pt will improve car transfer from mod A  to contact guard assist  to improve independence and safety  3/13/23    ongoing   5. Pt will be able to ambulate 150  ft with standby assist  assist to improve function.  3/13/23    progressing       PLAN     Plan of care Certification: 3/14/23- 5/23/23     Outpatient Physical Therapy 3 times weekly for 10 weeks to include the following interventions: Gait Training, Manual Therapy, Moist Heat/ Ice, Neuromuscular Re-ed, Patient Education, Therapeutic Activities, and Therapeutic Exercise.     I certify that I was present in the room directing DAVID Justin  in service delivery and guiding them using my skilled judgment. As the co-signing therapist I have reviewed the students documentation and am responsible for the treatment, assessment, and plan.        Marlena Pascal, PTA

## 2023-03-20 NOTE — TELEPHONE ENCOUNTER
----- Message from SILVESTRE Dorman sent at 3/20/2023  9:04 AM CDT -----  Patient was seen for hospital follow up last week.   She brought in disability papers -long term  Does Dr Brady do this or refers to someone else?   Cely

## 2023-03-22 ENCOUNTER — OFFICE VISIT (OUTPATIENT)
Dept: FAMILY MEDICINE | Facility: CLINIC | Age: 63
End: 2023-03-22
Attending: FAMILY MEDICINE
Payer: MEDICAID

## 2023-03-22 ENCOUNTER — OFFICE VISIT (OUTPATIENT)
Dept: INFECTIOUS DISEASES | Facility: CLINIC | Age: 63
End: 2023-03-22
Payer: MEDICAID

## 2023-03-22 VITALS
BODY MASS INDEX: 14.85 KG/M2 | HEART RATE: 46 BPM | HEIGHT: 62 IN | TEMPERATURE: 98 F | SYSTOLIC BLOOD PRESSURE: 128 MMHG | OXYGEN SATURATION: 99 % | DIASTOLIC BLOOD PRESSURE: 62 MMHG | WEIGHT: 80.69 LBS

## 2023-03-22 VITALS
OXYGEN SATURATION: 97 % | TEMPERATURE: 99 F | BODY MASS INDEX: 14.1 KG/M2 | RESPIRATION RATE: 17 BRPM | DIASTOLIC BLOOD PRESSURE: 67 MMHG | HEART RATE: 111 BPM | SYSTOLIC BLOOD PRESSURE: 102 MMHG | HEIGHT: 62 IN | WEIGHT: 76.63 LBS

## 2023-03-22 DIAGNOSIS — M05.79 RHEUMATOID ARTHRITIS INVOLVING MULTIPLE SITES WITH POSITIVE RHEUMATOID FACTOR: Chronic | ICD-10-CM

## 2023-03-22 DIAGNOSIS — Z79.2 ENCOUNTER FOR LONG-TERM (CURRENT) USE OF ANTIBIOTICS: ICD-10-CM

## 2023-03-22 DIAGNOSIS — E87.6 HYPOKALEMIA: ICD-10-CM

## 2023-03-22 DIAGNOSIS — K86.89: ICD-10-CM

## 2023-03-22 DIAGNOSIS — Z79.60 LONG-TERM USE OF IMMUNOSUPPRESSANT MEDICATION: ICD-10-CM

## 2023-03-22 DIAGNOSIS — K86.3 PSEUDOCYST OF PANCREAS: ICD-10-CM

## 2023-03-22 DIAGNOSIS — R63.4 WEIGHT LOSS, UNINTENTIONAL: Chronic | ICD-10-CM

## 2023-03-22 DIAGNOSIS — Z45.2 ENCOUNTER FOR REMOVAL OF PERIPHERALLY INSERTED CENTRAL CATHETER: ICD-10-CM

## 2023-03-22 DIAGNOSIS — K65.1 INTRA-ABDOMINAL ABSCESS: Primary | ICD-10-CM

## 2023-03-22 DIAGNOSIS — K65.2 SPONTANEOUS BACTERIAL PERITONITIS: ICD-10-CM

## 2023-03-22 DIAGNOSIS — R29.898 IMPAIRED STRENGTH OF LOWER EXTREMITY: ICD-10-CM

## 2023-03-22 DIAGNOSIS — D75.839 THROMBOCYTOSIS: ICD-10-CM

## 2023-03-22 DIAGNOSIS — E43 SEVERE PROTEIN-CALORIE MALNUTRITION: Primary | Chronic | ICD-10-CM

## 2023-03-22 DIAGNOSIS — K85.10 ACUTE BILIARY PANCREATITIS, UNSPECIFIED COMPLICATION STATUS: ICD-10-CM

## 2023-03-22 PROCEDURE — 3008F BODY MASS INDEX DOCD: CPT | Mod: CPTII,,, | Performed by: FAMILY MEDICINE

## 2023-03-22 PROCEDURE — 1111F PR DISCHARGE MEDS RECONCILED W/ CURRENT OUTPATIENT MED LIST: ICD-10-PCS | Mod: CPTII,S$GLB,, | Performed by: INTERNAL MEDICINE

## 2023-03-22 PROCEDURE — 1160F PR REVIEW ALL MEDS BY PRESCRIBER/CLIN PHARMACIST DOCUMENTED: ICD-10-PCS | Mod: CPTII,,, | Performed by: FAMILY MEDICINE

## 2023-03-22 PROCEDURE — 99999 PR PBB SHADOW E&M-EST. PATIENT-LVL IV: CPT | Mod: PBBFAC,,, | Performed by: FAMILY MEDICINE

## 2023-03-22 PROCEDURE — 1160F RVW MEDS BY RX/DR IN RCRD: CPT | Mod: CPTII,,, | Performed by: FAMILY MEDICINE

## 2023-03-22 PROCEDURE — 3074F PR MOST RECENT SYSTOLIC BLOOD PRESSURE < 130 MM HG: ICD-10-PCS | Mod: CPTII,,, | Performed by: FAMILY MEDICINE

## 2023-03-22 PROCEDURE — 3078F PR MOST RECENT DIASTOLIC BLOOD PRESSURE < 80 MM HG: ICD-10-PCS | Mod: CPTII,,, | Performed by: FAMILY MEDICINE

## 2023-03-22 PROCEDURE — 1111F DSCHRG MED/CURRENT MED MERGE: CPT | Mod: CPTII,S$GLB,, | Performed by: INTERNAL MEDICINE

## 2023-03-22 PROCEDURE — 3044F PR MOST RECENT HEMOGLOBIN A1C LEVEL <7.0%: ICD-10-PCS | Mod: CPTII,,, | Performed by: FAMILY MEDICINE

## 2023-03-22 PROCEDURE — 3078F DIAST BP <80 MM HG: CPT | Mod: CPTII,,, | Performed by: FAMILY MEDICINE

## 2023-03-22 PROCEDURE — 3044F HG A1C LEVEL LT 7.0%: CPT | Mod: CPTII,S$GLB,, | Performed by: INTERNAL MEDICINE

## 2023-03-22 PROCEDURE — 3074F SYST BP LT 130 MM HG: CPT | Mod: CPTII,S$GLB,, | Performed by: INTERNAL MEDICINE

## 2023-03-22 PROCEDURE — 3074F PR MOST RECENT SYSTOLIC BLOOD PRESSURE < 130 MM HG: ICD-10-PCS | Mod: CPTII,S$GLB,, | Performed by: INTERNAL MEDICINE

## 2023-03-22 PROCEDURE — 99214 OFFICE O/P EST MOD 30 MIN: CPT | Mod: PBBFAC,PN | Performed by: FAMILY MEDICINE

## 2023-03-22 PROCEDURE — 99213 OFFICE O/P EST LOW 20 MIN: CPT | Mod: S$PBB,,, | Performed by: FAMILY MEDICINE

## 2023-03-22 PROCEDURE — 99214 PR OFFICE/OUTPT VISIT, EST, LEVL IV, 30-39 MIN: ICD-10-PCS | Mod: S$GLB,,, | Performed by: INTERNAL MEDICINE

## 2023-03-22 PROCEDURE — 99213 PR OFFICE/OUTPT VISIT, EST, LEVL III, 20-29 MIN: ICD-10-PCS | Mod: S$PBB,,, | Performed by: FAMILY MEDICINE

## 2023-03-22 PROCEDURE — 3078F PR MOST RECENT DIASTOLIC BLOOD PRESSURE < 80 MM HG: ICD-10-PCS | Mod: CPTII,S$GLB,, | Performed by: INTERNAL MEDICINE

## 2023-03-22 PROCEDURE — 99999 PR PBB SHADOW E&M-EST. PATIENT-LVL IV: ICD-10-PCS | Mod: PBBFAC,,, | Performed by: FAMILY MEDICINE

## 2023-03-22 PROCEDURE — 1111F PR DISCHARGE MEDS RECONCILED W/ CURRENT OUTPATIENT MED LIST: ICD-10-PCS | Mod: CPTII,,, | Performed by: FAMILY MEDICINE

## 2023-03-22 PROCEDURE — 3008F BODY MASS INDEX DOCD: CPT | Mod: CPTII,S$GLB,, | Performed by: INTERNAL MEDICINE

## 2023-03-22 PROCEDURE — 3078F DIAST BP <80 MM HG: CPT | Mod: CPTII,S$GLB,, | Performed by: INTERNAL MEDICINE

## 2023-03-22 PROCEDURE — 3044F PR MOST RECENT HEMOGLOBIN A1C LEVEL <7.0%: ICD-10-PCS | Mod: CPTII,S$GLB,, | Performed by: INTERNAL MEDICINE

## 2023-03-22 PROCEDURE — 1111F DSCHRG MED/CURRENT MED MERGE: CPT | Mod: CPTII,,, | Performed by: FAMILY MEDICINE

## 2023-03-22 PROCEDURE — 1159F PR MEDICATION LIST DOCUMENTED IN MEDICAL RECORD: ICD-10-PCS | Mod: CPTII,,, | Performed by: FAMILY MEDICINE

## 2023-03-22 PROCEDURE — 99214 OFFICE O/P EST MOD 30 MIN: CPT | Mod: S$GLB,,, | Performed by: INTERNAL MEDICINE

## 2023-03-22 PROCEDURE — 1159F MED LIST DOCD IN RCRD: CPT | Mod: CPTII,,, | Performed by: FAMILY MEDICINE

## 2023-03-22 PROCEDURE — 3008F PR BODY MASS INDEX (BMI) DOCUMENTED: ICD-10-PCS | Mod: CPTII,S$GLB,, | Performed by: INTERNAL MEDICINE

## 2023-03-22 PROCEDURE — 3044F HG A1C LEVEL LT 7.0%: CPT | Mod: CPTII,,, | Performed by: FAMILY MEDICINE

## 2023-03-22 PROCEDURE — 3074F SYST BP LT 130 MM HG: CPT | Mod: CPTII,,, | Performed by: FAMILY MEDICINE

## 2023-03-22 PROCEDURE — 3008F PR BODY MASS INDEX (BMI) DOCUMENTED: ICD-10-PCS | Mod: CPTII,,, | Performed by: FAMILY MEDICINE

## 2023-03-22 RX ORDER — AMOXICILLIN AND CLAVULANATE POTASSIUM 250; 62.5 MG/5ML; MG/5ML
500 POWDER, FOR SUSPENSION ORAL EVERY 12 HOURS
Qty: 200 ML | Refills: 0 | Status: SHIPPED | OUTPATIENT
Start: 2023-03-22 | End: 2023-04-02

## 2023-03-22 NOTE — PROGRESS NOTES
"Subjective:       Patient ID: Claire Bowser is a 63 y.o. female.    Chief Complaint: Follow-up (Pt states that she is here to have her form for disability after hospitalization)    [age]-year-old female with a history of biliary pancreatitis, development of a pancreatic duct leak and pseudocyst status post surgery to close the cyst and leak, rheumatoid arthritis of multiple joints on immunosuppressant medications comes in to discuss an application for social security disability with a form from her  requesting information on her "seizures".  She does not have seizures.  She has been feeling much better, she has not yet regained her strength and her weight is still low but she is eating better with no nausea or vomiting and no diarrhea.  She is had problems with chronic low potassium probably from diarrhea and the loss has been controlled so she should start recovering.  Her BMI is down to 14.  She is on Remeron to assist her appetite and she is sleeping well.  She is in good spirits but she is not able to return to work and was actually let go from her position because she could not go into work.    Past Medical History:  6/14/2022: Acute biliary pancreatitis  No date: Anxiety  1/2/2023: Chronic pancreatitis  No date: Digestive disorder  02/2017: Flu      Comment:  [facility]  No date: Hypertension  6/10/2022: Long-term use of immunosuppressant medication  01/14/2021: Rheumatoid arthritis involving multiple sites with   positive rheumatoid factor    Past Surgical History:  2/26/2021: COLONOSCOPY; N/A      Comment:  Procedure: COLONOSCOPY;  Surgeon: Amy Sidhu MD;               Location: [facility];  Service: Endoscopy;  Laterality:                N/A;  7/1/2022: ENDOSCOPIC ULTRASOUND OF UPPER GASTROINTESTINAL TRACT; N/A      Comment:  Procedure: ULTRASOUND, UPPER GI TRACT, ENDOSCOPIC;                 Surgeon: Donavon Jewell III, MD;  Location: [facility];  Service: " Endoscopy;  Laterality: N/A;  11/29/2022: ENDOSCOPIC ULTRASOUND OF UPPER GASTROINTESTINAL TRACT; N/A      Comment:  Procedure: ULTRASOUND, UPPER GI TRACT, ENDOSCOPIC;                 Surgeon: Donavon Jewell III, MD;  Location: [facility]                ENDO;  Service: Endoscopy;  Laterality: N/A;  1/3/2023: ENDOSCOPIC ULTRASOUND OF UPPER GASTROINTESTINAL TRACT; N/A      Comment:  Procedure: ULTRASOUND, UPPER GI TRACT, ENDOSCOPIC;                 Surgeon: Donavon Jewell III, MD;  Location: [facility]                ENDO;  Service: Endoscopy;  Laterality: N/A;  12/30/2022: ERCP; N/A      Comment:  Procedure: ERCP (ENDOSCOPIC RETROGRADE                CHOLANGIOPANCREATOGRAPHY);  Surgeon: Donavon Jewell III, MD;  Location: [facility] ENDO;  Service: Endoscopy;                 Laterality: N/A;  1/24/2023: ERCP; N/A      Comment:  Procedure: ERCP (ENDOSCOPIC RETROGRADE                CHOLANGIOPANCREATOGRAPHY);  Surgeon: Rock Martines MD;                 Location: [facility] ([address] [place] [address]);  Service: Endoscopy;                 Laterality: N/A;  2/26/2021: ESOPHAGOGASTRODUODENOSCOPY; N/A      Comment:  Procedure: EGD (ESOPHAGOGASTRODUODENOSCOPY);  Surgeon:                Amy Sidhu MD;  Location: [facility] ENDO;  Service:                Endoscopy;  Laterality: N/A;  7/27/2022: LAPAROSCOPIC CHOLECYSTECTOMY; N/A      Comment:  Procedure: CHOLECYSTECTOMY, LAPAROSCOPIC;  Surgeon:                Ramón Hwang III, MD;  Location: [facility];  Service:               General;  Laterality: N/A;  No date: TUBAL LIGATION    Current Outpatient Medications on File Prior to Visit:  acidophilus-sporogenes (ACIDOPHILUS EX STR, L. SPOROG,) 35 million- 25 million cell Tab, Take 1 tablet by mouth once daily., Disp: 30 tablet, Rfl: 0  apixaban (ELIQUIS) 5 mg Tab, Take 1 tablet (5 mg total) by mouth 2 (two) times daily., Disp: 60 tablet, Rfl: 0  cyclobenzaprine (FLEXERIL) 5 MG tablet, Take 5 mg by mouth nightly., Disp: , Rfl:    folic acid (FOLVITE) 1 MG tablet, Take 1 tablet (1 mg total) by mouth once daily., Disp: 360 tablet, Rfl: 3  lipase-protease-amylase 12,000-38,000-60,000 units (CREON) CpDR, Take 6 capsules by mouth 3 (three) times daily with meals., Disp: 540 capsule, Rfl: 3  loperamide (IMODIUM) 2 mg capsule, Take 1 capsule (2 mg total) by mouth 4 (four) times daily as needed for Diarrhea., Disp: 30 capsule, Rfl: 0  megestroL (MEGACE) 40 MG Tab, Take 1 tablet (40 mg total) by mouth once daily., Disp: 30 tablet, Rfl: 2  mirtazapine (REMERON) 15 MG tablet, Take 1 tablet (15 mg total) by mouth every evening., Disp: 90 tablet, Rfl: 0  pantoprazole (PROTONIX) 40 MG tablet, Take 1 tablet (40 mg total) by mouth once daily., Disp: 90 tablet, Rfl: 0  potassium chloride SA (K-DUR,KLOR-CON) 20 MEQ tablet, Take 1 tablet (20 mEq total) by mouth 3 (three) times daily., Disp: 60 tablet, Rfl: 1  predniSONE (DELTASONE) 5 MG tablet, Take 1 tablet (5 mg total) by mouth once daily., Disp: , Rfl:   sertraline (ZOLOFT) 50 MG tablet, Take 1 tablet (50 mg total) by mouth every evening., Disp: 30 tablet, Rfl: 11  traZODone (DESYREL) 50 MG tablet, Take 1 tablet (50 mg total) by mouth every evening., Disp: 90 tablet, Rfl: 1  amLODIPine (NORVASC) 5 MG tablet, Take 2 tablets (10 mg total) by mouth once daily. (Patient not taking: Reported on 3/22/2023), Disp: 60 tablet, Rfl: 11  amoxicillin-pot clavulanate 250-62.5 mg/5ml (AUGMENTIN) 250-62.5 mg/5 mL suspension, Take 10 mLs (500 mg total) by mouth every 12 (twelve) hours for 7 days and discard remainder (Patient not taking: Reported on 3/22/2023), Disp: 200 mL, Rfl: 0    No current facility-administered medications on file prior to visit.        Review of Systems   Constitutional:  Positive for activity change and appetite change. Negative for chills, diaphoresis, fever and unexpected weight change.   HENT:  Negative for congestion, postnasal drip, rhinorrhea, sinus pressure and sinus pain.    Respiratory:   Negative for cough, chest tightness and shortness of breath.    Cardiovascular:  Negative for chest pain and palpitations.   Gastrointestinal:  Negative for abdominal distention, abdominal pain, diarrhea, nausea and vomiting.   Endocrine: Negative for cold intolerance, heat intolerance, polydipsia and polyuria.   Genitourinary:  Negative for dysuria, frequency and hematuria.   Neurological:  Positive for weakness. Negative for dizziness, seizures, facial asymmetry, speech difficulty, light-headedness, numbness and headaches.   Psychiatric/Behavioral:  Negative for dysphoric mood and sleep disturbance. The patient is not nervous/anxious.      Objective:      Physical Exam  Vitals and nursing note reviewed.   Constitutional:       General: She is not in acute distress.     Appearance: She is ill-appearing. She is not toxic-appearing or diaphoretic.      Comments: Low normal blood pressure but no complaints of dizziness or orthostatic dizziness   Underweight with a BMI of 14 she is down 35.3 lb from her last visit with me July 19, 2022   HENT:      Head: Normocephalic and atraumatic.   Cardiovascular:      Rate and Rhythm: Normal rate and regular rhythm.      Heart sounds: Normal heart sounds.   Pulmonary:      Effort: Pulmonary effort is normal. No respiratory distress.      Breath sounds: Normal breath sounds. No stridor. No wheezing or rhonchi.   Abdominal:      General: Abdomen is flat.      Palpations: Abdomen is soft.   Neurological:      General: No focal deficit present.      Mental Status: She is alert and oriented to person, place, and time.   Psychiatric:         Mood and Affect: Mood normal.         Behavior: Behavior normal.         Thought Content: Thought content normal.         Judgment: Judgment normal.       Assessment:       1. Severe protein-calorie malnutrition    2. Spontaneous bacterial peritonitis    3. Pseudocyst of pancreas    4. Pancreatic duct leakage    5. Acute biliary pancreatitis,  unspecified complication status    6. Impaired strength of lower extremity    7. Rheumatoid arthritis involving multiple sites with positive rheumatoid factor    8. Long-term use of immunosuppressant medication          Plan:       1. Severe protein-calorie malnutrition  She is eating well but has not yet begun to regained weight    2. Spontaneous bacterial peritonitis  Resolved    3. Pseudocyst of pancreas  Resolved    4. Pancreatic duct leakage  Resolved with stent    5. Acute biliary pancreatitis, unspecified complication status  Resolved    6. Impaired strength of lower extremity  Working with physical therapy three days a week    7. Rheumatoid arthritis involving multiple sites with positive rheumatoid factor  Relatively asymptomatic at this time, slight warmth and left knee but no pain and patient presents no symptoms at this time    8. Long-term use of immunosuppressant medication  Not currently on immunosuppressants but is on low dose prednisone

## 2023-03-22 NOTE — PROGRESS NOTES
"Subjective:       Patient ID: Claire Bowser is a 63 y.o. female.    Chief Complaint:: Follow-up    Follow-up   seen at [facility] from 02/26 until 3/3"Claire Bowser is a 63 y.o. female known to ID service from January, 2023 with PMHx emphysema, smoker, HTN, DVT on Eliquis, anxiety, chronic pancreatitis since biliary pancreatitis and cholecystectomy 07/2022, ,pancreatic sphincterotomy, bile leak-status post stent 12/30/22, multiple pancreatic pseudocysts, cyst gastrostomy tube placement, malnutrition, albumin 1.5, OA,  rheumatoid arthritis,    Patient came to ED on 2/24 with complaints of generalized weakness, fatigue, dyspnea on exertion, not able to stand, but not much abdominal discomfort??  CT chest ruled out PE.  CT abdomen was impressive for a big air-fluid collection in pelvis.  Patient is seen by General surgery who is recommending percutaneous drainage, no operative intervention at this time.    Patient is started on Zosyn and we are waiting for IR.    Patient has no new complaints overnight.  She feels slightly better, but still weak.  At baseline patient lays in bed most of the time.  She uses the bathroom 3 times a day.  She eats in bed, her  who is retired attends to her.  Urine culture is no growth to date.  UA with mild pyuria 20.  She had no urinary complaints until yesterday when she had some urgency and frequency.  No blood cultures are sent.  Troponin is a 1000 and EC HO is as below.  .  Hemoglobin of 6.9.  A unit of PRBC is already ordered.  No obvious source of bleeding     2/27(Paulette): consult reviewed and d/w Dr. Valenzuela, family. She is even more underweight than my last visit. The IR drain has put out 200 cc already. She has been too weak to move around for the last several days. Discussed need for IV antibiotics for a while, and this plus IV nutrition may be enough to get her into LTAC.   2/28: interim reviewed. Afebrile. WBC still in the normal range. 1000 mL relieved from " "intra-abd abscess since placement yesterday. Enterococcus and a GNR isolated from culture, final pending. Intake not recorded yesterday but she did eat at least one meal. She ate well today. She is reluctant to get out of bed  3/1: interim reviewed. Did take a few steps with PT and  reports she is eating well. The drainage from right abdomen is thinner and output has decreased. No benefits for LTAC. She is in good spirits.  3/2: Interim reviewed output from the drain has tapered off to 50 cc over the last 24 hours.  Surgery is planning a repeat CT scan tomorrow.  The cultures are not final as the Gram-negative jennifer id and susceptibility had to be repeated.  CRP has fallen to only slightly above normal. She walked to the door with PT. She is worried about being discharged too soon.   3/3: interim reviewed. CT shows substantial improvement in fluid collection size, down from huge to just big. The drain is in position, but output is less than 100 cc per day. Culture from abscess on 2/27 has enterococcus and Ecoli. Micro updating the culture report(both sensitive to zosyn). CT also shows large bilateral pleural effusions, alb 1.5 on admission.  She is in good spirits, walked much farther with therapy, eating well. I was able to milk fluid from IR drain. "    3/13 office:  She has been receiving the Zosyn at home without any difficulty.  Some how she has lost a tremendous amount of weight since her hospitalization,?  Is this fluid weight.   confirms that she has been eating very very well.  Critical Potassium was 2.3 this am. CRP 12 up from 1.7. She is having 2-5 BM's per day but attributes increase to eating crawfish 3 days ago  The drain was removed by Dr. Sheehan last week, with output less than 20, perhaps around 10 cc per day.   . Drinking plenty and eating well, urine is dilute. No abd pain.   Smoking again, 7 cig per day  Joints are fair off of the Arava, only on 5 of prednisone    3/22/23: doing well " since last visit. Completed the prescribed antibiotics. CT scan read as resolution of pelvic abscess, but I can see a sliver of extra-intestinal fluid in the previous path. Her appetite is great but she cannot tell me that she has gained any weight. Her CRP went up and then down and not yet back to baseline. No other area that is uncomfortable or new. Her joints seem stable on low dose prednisone. She is still smoking, albeit a little less. Her K is on the low side of normal with kdur 20 mEq bid. We reviewed the CT images.     Review of patient's allergies indicates:   Allergen Reactions    No known drug allergies      Past Medical History:   Diagnosis Date    Acute biliary pancreatitis 6/14/2022    Anxiety     Chronic pancreatitis 1/2/2023    Digestive disorder     Flu 02/2017    [facility]    Hypertension     Long-term use of immunosuppressant medication 6/10/2022    Rheumatoid arthritis involving multiple sites with positive rheumatoid factor 01/14/2021     Past Surgical History:   Procedure Laterality Date    COLONOSCOPY N/A 2/26/2021    Procedure: COLONOSCOPY;  Surgeon: Amy Sidhu MD;  Location: [facility] ENDO;  Service: Endoscopy;  Laterality: N/A;    ENDOSCOPIC ULTRASOUND OF UPPER GASTROINTESTINAL TRACT N/A 7/1/2022    Procedure: ULTRASOUND, UPPER GI TRACT, ENDOSCOPIC;  Surgeon: Donavon Jewell III, MD;  Location: [facility] ENDO;  Service: Endoscopy;  Laterality: N/A;    ENDOSCOPIC ULTRASOUND OF UPPER GASTROINTESTINAL TRACT N/A 11/29/2022    Procedure: ULTRASOUND, UPPER GI TRACT, ENDOSCOPIC;  Surgeon: Donavon Jewell III, MD;  Location: [facility] ENDO;  Service: Endoscopy;  Laterality: N/A;    ENDOSCOPIC ULTRASOUND OF UPPER GASTROINTESTINAL TRACT N/A 1/3/2023    Procedure: ULTRASOUND, UPPER GI TRACT, ENDOSCOPIC;  Surgeon: Donavon Jewell III, MD;  Location: [facility] ENDO;  Service: Endoscopy;  Laterality: N/A;    ERCP N/A 12/30/2022    Procedure: ERCP (ENDOSCOPIC RETROGRADE  CHOLANGIOPANCREATOGRAPHY);  Surgeon: Donavon Jewell III, MD;  Location: [facility] ENDO;  Service: Endoscopy;  Laterality: N/A;    ERCP N/A 1/24/2023    Procedure: ERCP (ENDOSCOPIC RETROGRADE CHOLANGIOPANCREATOGRAPHY);  Surgeon: Rock Martines MD;  Location: [facility] ENDO (2ND FLR);  Service: Endoscopy;  Laterality: N/A;    ESOPHAGOGASTRODUODENOSCOPY N/A 2/26/2021    Procedure: EGD (ESOPHAGOGASTRODUODENOSCOPY);  Surgeon: Amy Sidhu MD;  Location: [facility] ENDO;  Service: Endoscopy;  Laterality: N/A;    LAPAROSCOPIC CHOLECYSTECTOMY N/A 7/27/2022    Procedure: CHOLECYSTECTOMY, LAPAROSCOPIC;  Surgeon: Ramón Hwang III, MD;  Location: [facility] OR;  Service: General;  Laterality: N/A;    TUBAL LIGATION       Social History     Tobacco Use    Smoking status: Every Day     Packs/day: 1.00     Years: 7.00     Pack years: 7.00     Types: Cigarettes    Smokeless tobacco: Never    Tobacco comments:     [phone]   Substance Use Topics    Alcohol use: No        Family History   Problem Relation Age of Onset    Diabetes Mother     Heart disease Mother     Kidney disease Mother     Hypertension Mother     Stroke Mother     Hearing loss Mother     COPD Father     Liver disease Paternal Grandfather     Alcohol abuse Paternal Grandfather     Liver disease Sister     Emphysema Brother     COPD Brother     Sleep apnea Brother     No Known Problems Son     Alcohol abuse Paternal Grandmother     Cirrhosis Paternal Grandmother     Heart disease Maternal Aunt     Diabetes Maternal Aunt     Cancer Maternal Aunt         female    Heart disease Maternal Uncle     Hypertension Maternal Uncle     No Known Problems Paternal Uncle     Psoriasis Neg Hx     Lupus Neg Hx     Eczema Neg Hx     Melanoma Neg Hx          Review of Systems    Constitutional: No fever, chills, sweats, fatigue, weakness, can't tell me if she is gaining weight    Eyes: No change in vision, loss of vision or diplopia, photophobia    ENT: No sore throat, mouth pains, or  "lesions and no sinus trouble    Cardiovascular: No chest pain, GALDAMEZ, or pedal edema    Respiratory: No shortness of breath, GALDAMEZ, but she does have a mild chronic cough,  still smoking but reduced cig minimally  Gastrointestinal: No abdominal pain, nausea, vomiting, and no diarrhea (and infact her CT showed lots of stool)   She uses Creon every day for diarrhea from pancreatic insufficiency,      Genitourinary: No dysuria, hematuria or vaginitis    Musculoskeletal: No new pain, joint swelling, or injuries    Integumentary: No new rashes, skin lesions, or wounds, just lots of ecchymoses    Neurological: No dizziness, vertigo, unusual headaches, neuropathy, loss of vision, falls    Psychiatric: No anxiety, depression    Endocrine:      Lymphatic: No lymphadenopathy, blood loss, anemia, malignancy    VAD:  No problems with her PICC line except skin irritation from the bandagtes    Objective:      Blood pressure 128/62, pulse (!) 46, temperature 98.3 °F (36.8 °C), height 5' 2" (1.575 m), weight 36.6 kg (80 lb 11.2 oz), SpO2 99 %. Body mass index is 14.76 kg/m².  Physical Exam      General: Alert and attentive, cooperative and in no distress extremely thin and underweight but mentally vigorous    Eyes: Pupils equal, round, reactive to light, anicteric, EOMI    Neck: Supple, non-tender, no thyromegaly or masses    ENT: EAC patent, TM normal, nares patent, no oral lesions,   no thrush    Cardiovascular: Regular rate and rhythm, no murmurs, rubs, or gallop    Respiratory: Lungs clear without wheezes, rales, rubs or rhonci with decreased breath sounds in the baseswith improvement of bilateral effusions on the CT scan    Gastrointestinal:  Soft, bowel sounds normal,  no mass or organomegaly, no tenderness or distention    Genitourinary:  no flank tenderness    Integumentary: Skin without rashes, lesions, or wounds with numerous small ecchymoses and a small skin tear right outside her picc bandage, dressed with bactroban and " telfa.     Vascular: No peripheral edema or phlebitis, warm and well perfused    Musculoskeletal:  In wheelchair today but patient and  endorse that she is getting around the house fairly well., no acute arthritis, synovitis or myositis.  Reduced muscle bulk and strength    Lymphatic: No cervical,  LAD    Neurological: Normal LOC, cranial nerves, speech,      Psychiatric: Normal mood, speech,  demeanor     Wound:    VAD:  PICC line with no redness, tenderness, phlebitis, removed, 33 cm, pressure bandage applied       Recent Diagnostics:   Labs reviewed from this morning       Assessment and Plan:           Intra-abdominal abscess  -     C-Reactive Protein; Future; Expected date: 04/03/2023    Hypokalemia  -     Comprehensive Metabolic Panel; Future; Expected date: 04/03/2023    Encounter for long-term (current) use of antibiotics    Thrombocytosis  -     CBC Auto Differential; Future; Expected date: 04/03/2023    Long-term use of immunosuppressant medication    Weight loss, unintentional    Rheumatoid arthritis involving multiple sites with positive rheumatoid factor    Encounter for removal of peripherally inserted central catheter    Other orders  -     amoxicillin-pot clavulanate 250-62.5 mg/5ml (AUGMENTIN) 250-62.5 mg/5 mL suspension; Take 10 mLs (500 mg total) by mouth every 12 (twelve) hours for 7 days and discard remainder (Patient not taking: Reported on 3/22/2023)  Dispense: 200 mL; Refill: 0  -     Cancel: Comprehensive Metabolic Panel; Future; Expected date: 04/03/2023      IV antibiotics are complete  Picc line removed 33 cm  Leave bandage on until supper  Augmentin liquid 500 mg twice a day for 7 days  Repeat blood tests Monday 4/3    Return in 3 weeks       This note was created using Dragon voice recognition software that occasionally misinterpreted phrases or words.

## 2023-03-22 NOTE — PATIENT INSTRUCTIONS
IV antibiotics are complete  Picc line removed 33 cm  Leave bandage on until supper  Augmentin liquid 500 mg twice a day for 7 days  Repeat blood tests Monday 4/3    Return in 3 weeks

## 2023-03-24 ENCOUNTER — CLINICAL SUPPORT (OUTPATIENT)
Dept: REHABILITATION | Facility: HOSPITAL | Age: 63
End: 2023-03-24
Payer: MEDICAID

## 2023-03-24 DIAGNOSIS — Z74.09 IMPAIRED FUNCTIONAL MOBILITY, BALANCE, GAIT, AND ENDURANCE: ICD-10-CM

## 2023-03-24 DIAGNOSIS — R29.898 IMPAIRED STRENGTH OF LOWER EXTREMITY: Primary | ICD-10-CM

## 2023-03-24 PROCEDURE — 97110 THERAPEUTIC EXERCISES: CPT | Mod: PN,CQ

## 2023-03-24 NOTE — PROGRESS NOTES
[facility]   Physical Therapy Treatment Note     Name: Claire Bowser  Clinic Number: [id number]    Therapy Diagnosis:   Encounter Diagnoses   Name Primary?    Impaired strength of lower extremity Yes    Impaired functional mobility, balance, gait, and endurance      Physician: Oleg Ross MD    Visit Date: 3/24/2023    Initial Evaluation Date: 2/17/2023  Reevaluation date 3/14/23  Authorization Period Expiration: 4/30/23  Plan of Care Expiration: 5/23/23  Visit # / Visits authorized: 3/5       PTA Visit #: 2/5     Precautions: Standard, blood thinners, and Fall    Time In: 0930  Time Out: 1014  Total Billable Time: 44 minutes     SUBJECTIVE     Pt reports: Doing good today. Reports appetite is much better, and she is using a walker at home.     She was compliant with home exercise program.  Response to previous treatment: a little tired after treatment   Functional change: improved mobility at home    Pain: 0/10  Location:  Not Applicable      OBJECTIVE     Objective Measures updated at progress report unless specified.     Treatment     Claire received the treatments listed below:      therapeutic exercises to develop strength, endurance, and ROM for 36 minutes including:    NuStep level 1 15 minutes before feeling tired   Supine:  Straight leg raise Right/Left 2 sets of 10 each   Ball squeezes with bridge 2 sets of 10   Lateral Trunk rotation with Theraball  2 sets of 10   Knees to chest with Theraball   Bridge with bolster under ankles 15 times    therapeutic activities to improve functional performance for 8 minutes, including:     Wheelchair to NuStep stand pivot transfer with contact guard assistance  assistance    Sit to stand from mat with verbal cues for hand placement contact guard assistance     Gait with rolling walker contact guard assistance 30 feet contact guard assistance from NuStep to mat, 133 feet from mat to lobby.       Patient Education and Home Exercises      Home Exercises Provided and Patient Education Provided     Education provided:   - Continue with home exercise program daily    Written Home Exercises Provided: Patient instructed to cont prior HEP.  See EMR under Patient Instructions for exercises provided during therapy sessions    ASSESSMENT     Claire provided good participation and effort during today's session with treatment focused on lower extremity strengthening and endurance. Claire was able to improve to contact guard assistance on her stand pivot transfers from Wheelchair to NuStep, as well as increase time on NuStep.     Claire Is progressing well towards her goals.   Pt prognosis is Fair.     Pt will continue to benefit from skilled outpatient physical therapy to address the deficits listed in the problem list box on initial evaluation, provide pt/family education and to maximize pt's level of independence in the home and community environment.     Pt's spiritual, cultural and educational needs considered and pt agreeable to plan of care and goals.     Anticipated barriers to physical therapy: None at this time    Goals:    Short Term Goals: 5 weeks Date established:  Status:    1.  Patient will demonstrate improve endurance and activity tolerance as evidenced by ability to perform Nu-step x 15 minutes without rests breaks. 3/13/23  progressing   2. Pt will perform sit to stand with least restrictive assistive device with contact guard assist  3/14/2023    progressing         Long Term Goals: 10 weeks  Date established: Status:    1. Patient will be independent and compliant with updated HEP. 3/13/23       progressing   2.Patient will improve her FOTO score from 40 % limited  to at least 32 % limited for improved self perception of functional mobility.(score 0-100, high score indicates greater level of function) 3/13/23    Ongoing    3. Pt will improve simple transfers from mod A to contact guard assist  to improve independence and safety  3/13/23     progressing   4.  Pt will improve car transfer from mod A  to contact guard assist  to improve independence and safety 3/13/23    ongoing   5. Pt will be able to ambulate 150  ft with standby assist  assist to improve function.  3/13/23    progressing       PLAN     Plan of care Certification: 3/14/23- 5/23/23     Outpatient Physical Therapy 3 times weekly for 10 weeks to include the following interventions: Gait Training, Manual Therapy, Moist Heat/ Ice, Neuromuscular Re-ed, Patient Education, Therapeutic Activities, and Therapeutic Exercise.     I certify that I was present in the room directing DAVID Justin  in service delivery and guiding them using my skilled judgment. As the co-signing therapist I have reviewed the students documentation and am responsible for the treatment, assessment, and plan.        Marlena Pascal, PTA

## 2023-03-27 ENCOUNTER — CLINICAL SUPPORT (OUTPATIENT)
Dept: REHABILITATION | Facility: HOSPITAL | Age: 63
End: 2023-03-27
Payer: MEDICAID

## 2023-03-27 DIAGNOSIS — R29.898 IMPAIRED STRENGTH OF LOWER EXTREMITY: Primary | ICD-10-CM

## 2023-03-27 DIAGNOSIS — Z74.09 IMPAIRED FUNCTIONAL MOBILITY, BALANCE, GAIT, AND ENDURANCE: ICD-10-CM

## 2023-03-27 LAB — FUNGUS SPEC CULT: NORMAL

## 2023-03-27 PROCEDURE — 97110 THERAPEUTIC EXERCISES: CPT | Mod: PN,CQ

## 2023-03-27 NOTE — PROGRESS NOTES
[facility]   Physical Therapy Treatment Note     Name: Claire Bowser  Clinic Number: [id number]    Therapy Diagnosis:   Encounter Diagnoses   Name Primary?    Impaired strength of lower extremity Yes    Impaired functional mobility, balance, gait, and endurance      Physician: Oleg Ross MD    Visit Date: 3/27/2023    Initial Evaluation Date: 2/17/2023  Reevaluation date 3/14/23  Authorization Period Expiration: 4/30/23  Plan of Care Expiration: 5/23/23  Visit # / Visits authorized: 4/5       PTA Visit #: 3/5     Precautions: Standard, blood thinners, and Fall    Time In: 1037 (late arrival)  Time Out: 1123  Total Billable Time: 46 minutes     SUBJECTIVE     Pt reports: Made chicken salad at the table after bringing items to the table and not using Wheelchair in home. Reports had 2 outings on Saturday and did well with Wheelchair.    She was compliant with home exercise program.  Response to previous treatment: a little tired after treatment   Functional change: improved mobility at home and not taking naps during the day    Pain: 0/10  Location:  Not Applicable      OBJECTIVE     Objective Measures updated at progress report unless specified.     Treatment     Claire received the treatments listed below:      therapeutic exercises to develop strength, endurance, and ROM for 46 minutes including:    NuStep level 2 15 minutes with Bilateral Upper Extremities     Supine:  Ball squeezes with bridge 3 sets of 10   Lateral trunk rotation 2 minutes Right Left   Double knee to chest with Theraball 1 minute    Straight leg raise Right/Left 2 sets of 10 each with verbal cues to rest between each repetition and perform quad set before lift    Standing with rolling walker:  Bilateral Heel Raises 10 times  Bilateral Mini Squats 10 times with cues for increased hip flexion  March in place 10 times Right Left alternating  Hip abduction 10 times Right Left alternating  Hip extension 10 times  Right Left alternating  Hamstring curls 10 times Right Left alternating     Gait with rolling walker contact guard assistance throughout clinic and  300 feet standby assistance     Patient Education and Home Exercises     Home Exercises Provided and Patient Education Provided     Education provided:   - Continue with home exercise program daily    Written Home Exercises Provided: Patient instructed to cont prior HEP.  See EMR under Patient Instructions for exercises provided during therapy sessions    ASSESSMENT     Claire provided good participation and effort during today's session with treatment focused on lower extremity strengthening/endurance and balance activities. Claire tolerated activities with minimal rest breaks, verbal cues for proper form with good follow through.     Claire Is progressing well towards her goals.   Pt prognosis is Fair.     Pt will continue to benefit from skilled outpatient physical therapy to address the deficits listed in the problem list box on initial evaluation, provide pt/family education and to maximize pt's level of independence in the home and community environment.     Pt's spiritual, cultural and educational needs considered and pt agreeable to plan of care and goals.     Anticipated barriers to physical therapy: None at this time    Goals:    Short Term Goals: 5 weeks Date established:  Status:    1.  Patient will demonstrate improve endurance and activity tolerance as evidenced by ability to perform Nu-step x 15 minutes without rests breaks. 3/13/23  progressing   2. Pt will perform sit to stand with least restrictive assistive device with contact guard assist  3/14/2023    progressing         Long Term Goals: 10 weeks  Date established: Status:    1. Patient will be independent and compliant with updated HEP. 3/13/23       progressing   2.Patient will improve her FOTO score from 40 % limited  to at least 32 % limited for improved self perception of functional  mobility.(score 0-100, high score indicates greater level of function) 3/13/23    Ongoing    3. Pt will improve simple transfers from mod A to contact guard assist  to improve independence and safety 3/13/23     progressing   4.  Pt will improve car transfer from mod A  to contact guard assist  to improve independence and safety 3/13/23    ongoing   5. Pt will be able to ambulate 150  ft with standby assist  assist to improve function.  3/13/23    progressing       PLAN     Plan of care Certification: 3/14/23- 5/23/23     Outpatient Physical Therapy 3 times weekly for 10 weeks to include the following interventions: Gait Training, Manual Therapy, Moist Heat/ Ice, Neuromuscular Re-ed, Patient Education, Therapeutic Activities, and Therapeutic Exercise.     Marlena Pascal, PTA

## 2023-03-29 ENCOUNTER — CLINICAL SUPPORT (OUTPATIENT)
Dept: REHABILITATION | Facility: HOSPITAL | Age: 63
End: 2023-03-29
Payer: MEDICAID

## 2023-03-29 ENCOUNTER — LAB VISIT (OUTPATIENT)
Dept: LAB | Facility: HOSPITAL | Age: 63
End: 2023-03-29
Attending: INTERNAL MEDICINE
Payer: MEDICAID

## 2023-03-29 DIAGNOSIS — E87.6 HYPOKALEMIA: ICD-10-CM

## 2023-03-29 DIAGNOSIS — D75.839 THROMBOCYTOSIS: ICD-10-CM

## 2023-03-29 DIAGNOSIS — K65.1 INTRA-ABDOMINAL ABSCESS: ICD-10-CM

## 2023-03-29 DIAGNOSIS — Z74.09 IMPAIRED FUNCTIONAL MOBILITY, BALANCE, GAIT, AND ENDURANCE: ICD-10-CM

## 2023-03-29 DIAGNOSIS — R29.898 IMPAIRED STRENGTH OF LOWER EXTREMITY: Primary | ICD-10-CM

## 2023-03-29 LAB
ALBUMIN SERPL BCP-MCNC: 2.4 G/DL (ref 3.5–5.2)
ALP SERPL-CCNC: 100 U/L (ref 55–135)
ALT SERPL W/O P-5'-P-CCNC: 13 U/L (ref 10–44)
ANION GAP SERPL CALC-SCNC: 10 MMOL/L (ref 8–16)
AST SERPL-CCNC: 17 U/L (ref 10–40)
BASOPHILS # BLD AUTO: 0.09 K/UL (ref 0–0.2)
BASOPHILS NFR BLD: 0.6 % (ref 0–1.9)
BILIRUB SERPL-MCNC: 0.4 MG/DL (ref 0.1–1)
BUN SERPL-MCNC: 12 MG/DL (ref 8–23)
CALCIUM SERPL-MCNC: 9.5 MG/DL (ref 8.7–10.5)
CHLORIDE SERPL-SCNC: 106 MMOL/L (ref 95–110)
CO2 SERPL-SCNC: 21 MMOL/L (ref 23–29)
CREAT SERPL-MCNC: 0.8 MG/DL (ref 0.5–1.4)
CRP SERPL-MCNC: 9.45 MG/DL
DIFFERENTIAL METHOD: ABNORMAL
EOSINOPHIL # BLD AUTO: 0 K/UL (ref 0–0.5)
EOSINOPHIL NFR BLD: 0.1 % (ref 0–8)
ERYTHROCYTE [DISTWIDTH] IN BLOOD BY AUTOMATED COUNT: 17.9 % (ref 11.5–14.5)
EST. GFR  (NO RACE VARIABLE): >60 ML/MIN/1.73 M^2
GLUCOSE SERPL-MCNC: 190 MG/DL (ref 70–110)
HCT VFR BLD AUTO: 28.2 % (ref 37–48.5)
HGB BLD-MCNC: 8.7 G/DL (ref 12–16)
IMM GRANULOCYTES # BLD AUTO: 0.16 K/UL (ref 0–0.04)
IMM GRANULOCYTES NFR BLD AUTO: 1 % (ref 0–0.5)
LYMPHOCYTES # BLD AUTO: 0.7 K/UL (ref 1–4.8)
LYMPHOCYTES NFR BLD: 4.7 % (ref 18–48)
MCH RBC QN AUTO: 32.7 PG (ref 27–31)
MCHC RBC AUTO-ENTMCNC: 30.9 G/DL (ref 32–36)
MCV RBC AUTO: 106 FL (ref 82–98)
MONOCYTES # BLD AUTO: 0.5 K/UL (ref 0.3–1)
MONOCYTES NFR BLD: 2.9 % (ref 4–15)
NEUTROPHILS # BLD AUTO: 14 K/UL (ref 1.8–7.7)
NEUTROPHILS NFR BLD: 90.7 % (ref 38–73)
NRBC BLD-RTO: 0 /100 WBC
PLATELET # BLD AUTO: 607 K/UL (ref 150–450)
PMV BLD AUTO: 9.1 FL (ref 9.2–12.9)
POTASSIUM SERPL-SCNC: 4.1 MMOL/L (ref 3.5–5.1)
PROT SERPL-MCNC: 6.8 G/DL (ref 6–8.4)
RBC # BLD AUTO: 2.66 M/UL (ref 4–5.4)
SODIUM SERPL-SCNC: 137 MMOL/L (ref 136–145)
WBC # BLD AUTO: 15.44 K/UL (ref 3.9–12.7)

## 2023-03-29 PROCEDURE — 80053 COMPREHEN METABOLIC PANEL: CPT | Performed by: INTERNAL MEDICINE

## 2023-03-29 PROCEDURE — 86140 C-REACTIVE PROTEIN: CPT | Performed by: INTERNAL MEDICINE

## 2023-03-29 PROCEDURE — 85025 COMPLETE CBC W/AUTO DIFF WBC: CPT | Performed by: INTERNAL MEDICINE

## 2023-03-29 PROCEDURE — 97110 THERAPEUTIC EXERCISES: CPT | Mod: PN

## 2023-03-29 PROCEDURE — 36415 COLL VENOUS BLD VENIPUNCTURE: CPT | Performed by: INTERNAL MEDICINE

## 2023-03-29 NOTE — PROGRESS NOTES
"[facility]   Physical Therapy Treatment Note     Name: Claire Bowser  Clinic Number: [id number]    Therapy Diagnosis:   Encounter Diagnoses   Name Primary?    Impaired strength of lower extremity Yes    Impaired functional mobility, balance, gait, and endurance        Physician: Oleg Ross MD    Visit Date: 3/29/2023    Initial Evaluation Date: 2/17/2023  Reevaluation date 3/14/23  Authorization Period Expiration: 4/30/23  Plan of Care Expiration: 5/23/23  Visit # / Visits authorized: 4/5       PTA Visit #: 0/5     Precautions: Standard, blood thinners, and Fall    Time In: 10:31 am   Time Out: 11:15 am   Total Billable Time: 44 minutes     SUBJECTIVE     Pt reports: " I have been waking more around the house with my ."    She was compliant with home exercise program.  Response to previous treatment: a little tired after treatment   Functional change: improved mobility at home and not taking naps during the day    Pain: 0/10    Location:  Not Applicable      OBJECTIVE     Objective Measures updated at progress report unless specified.     Treatment     Claire received the treatments listed below:      therapeutic exercises to develop strength, endurance, and ROM for 29 minutes including:    NuStep level 2 15 minutes with Bilateral Upper Extremities      In parallel bars:   - standing marches 2x10 each lower extremity   - Hip abduction 2x10 10 times each lower extremity   - Hip extension 2x10 times each lower extremity     (Therapeutic rest breaks in between)    Claire participated in gait training to improve functional mobility and safety for 15  minutes, including:    - pt ambulated 350 ft with rolling walker  standby assist/ contact guard assist x 2 trials.   - pt ambulated 150 ft no assistive device contact guard assist .       Patient Education and Home Exercises     Home Exercises Provided and Patient Education Provided     Education provided:   - educated about " continuing with HEP . reports understanding.     Written Home Exercises Provided: Patient instructed to cont prior HEP.  See EMR under Patient Instructions for exercises provided during therapy sessions    ASSESSMENT     Tolerated session well. Pr is improving her ability to ambulate with a rolling walker and trialing walking with no assistive device. She is progressing well with her strength and tolerance to standing activity despite being sick the last few months. She is motivated to improve and has good family support.     Claire Is progressing well towards her goals.   Pt prognosis is Fair.     Pt will continue to benefit from skilled outpatient physical therapy to address the deficits listed in the problem list box on initial evaluation, provide pt/family education and to maximize pt's level of independence in the home and community environment.     Pt's spiritual, cultural and educational needs considered and pt agreeable to plan of care and goals.     Anticipated barriers to physical therapy: None at this time    Goals:    Short Term Goals: 5 weeks Date established:  Status:    1.  Patient will demonstrate improve endurance and activity tolerance as evidenced by ability to perform Nu-step x 15 minutes without rests breaks. 3/13/23  progressing   2. Pt will perform sit to stand with least restrictive assistive device with contact guard assist  3/14/2023    progressing         Long Term Goals: 10 weeks  Date established: Status:    1. Patient will be independent and compliant with updated HEP. 3/13/23       progressing   2.Patient will improve her FOTO score from 40 % limited  to at least 32 % limited for improved self perception of functional mobility.(score 0-100, high score indicates greater level of function) 3/13/23    Ongoing    3. Pt will improve simple transfers from mod A to contact guard assist  to improve independence and safety 3/13/23     progressing   4.  Pt will improve car transfer from mod A   to contact guard assist  to improve independence and safety 3/13/23    ongoing   5. Pt will be able to ambulate 150  ft with standby assist  assist to improve function.  3/13/23    progressing       PLAN     Plan of care Certification: 3/14/23- 5/23/23     Outpatient Physical Therapy 3 times weekly for 10 weeks to include the following interventions: Gait Training, Manual Therapy, Moist Heat/ Ice, Neuromuscular Re-ed, Patient Education, Therapeutic Activities, and Therapeutic Exercise.     Recommendations for next session:   - strength  - walking endurance.     Linh Sharif, PT

## 2023-03-31 ENCOUNTER — CLINICAL SUPPORT (OUTPATIENT)
Dept: REHABILITATION | Facility: HOSPITAL | Age: 63
End: 2023-03-31
Payer: MEDICAID

## 2023-03-31 DIAGNOSIS — R29.898 IMPAIRED STRENGTH OF LOWER EXTREMITY: Primary | ICD-10-CM

## 2023-03-31 DIAGNOSIS — Z74.09 IMPAIRED FUNCTIONAL MOBILITY, BALANCE, GAIT, AND ENDURANCE: ICD-10-CM

## 2023-03-31 PROCEDURE — 97110 THERAPEUTIC EXERCISES: CPT | Mod: PN,CQ

## 2023-03-31 NOTE — PROGRESS NOTES
[facility]   Physical Therapy Treatment Note     Name: Claire Bowser  Clinic Number: [id number]    Therapy Diagnosis:   Encounter Diagnoses   Name Primary?    Impaired strength of lower extremity Yes    Impaired functional mobility, balance, gait, and endurance      Physician: Oleg Ross MD    Visit Date: 3/31/2023    Initial Evaluation Date: 2/17/2023  Reevaluation date 3/14/23  Authorization Period Expiration: 4/30/23  Plan of Care Expiration: 5/23/23  Visit # / Visits authorized: 6/5       PTA Visit #: 1/5     Precautions: Standard, blood thinners, and Fall    Time In: 0930  Time Out: 1020  Total Billable Time: 50 minutes     SUBJECTIVE     Pt reports: tried to use 4 wheel walker at home but did not feel comfortable, stated did not know how to set the brake.  She was compliant with home exercise program.  Response to previous treatment: a little tired after treatment   Functional change: improved mobility at home and not taking naps during the day    Pain: 0/10  Location:  Not Applicable      OBJECTIVE     Objective Measures updated at progress report unless specified.     Treatment     Claire received the treatments listed below:      therapeutic exercises to develop strength, endurance, and range of motion for 15 minutes including:    NuStep level 5 15 minutes with Bilateral Upper Extremities     neuromuscular re-education activities to improve: Balance, Coordination, Proprioception, and Posture for 15 minutes. The following activities were included:    Parallel bars:  Weight shift on foam cushion 2 minutes: anterior/posterior, Right /Left without upper extremity support  Romberg Tandem weight shift 1 minute on foam cushion Right Left leading with light up support    gait training to improve functional mobility and safety for 20  minutes, including:    Sit<>Stand with 4 wheel walker with verbal cues for setting brake    Gait with 4 wheel walker with verbal cues for braking  with sit<>Stand, up/down ramp, even/uneven surface standby assistance 250 feet and 200 feet    Car transfer stand > Sit standby assistance     Patient Education and Home Exercises     Home Exercises Provided and Patient Education Provided     Education provided:   - Patient provided with verbal and demonstrative instruction for all activities performed in today's session.  - Set brake on walker before standing and before sitting    Written Home Exercises Provided: Patient instructed to cont prior HEP.  See EMR under Patient Instructions for exercises provided during therapy sessions    ASSESSMENT     Claire provided good participation and effort during today's session with treatment focused on lower extremity strengthening/endurance and balance activities. Claire tolerated activities with minimal rest breaks, verbal cues for proper form with good follow through, ambulation with 4 wheel walker with standby assistance to car and to sit in front passenger seat.    Claire Is progressing well towards her goals.   Pt prognosis is Fair.     Pt will continue to benefit from skilled outpatient physical therapy to address the deficits listed in the problem list box on initial evaluation, provide pt/family education and to maximize pt's level of independence in the home and community environment.     Pt's spiritual, cultural and educational needs considered and pt agreeable to plan of care and goals.     Anticipated barriers to physical therapy: None at this time    Goals:    Short Term Goals: 5 weeks Date established:  Status:    1.  Patient will demonstrate improve endurance and activity tolerance as evidenced by ability to perform Nu-step x 15 minutes without rests breaks. 3/13/23  progressing   2. Pt will perform sit to stand with least restrictive assistive device with contact guard assist  3/14/2023    progressing         Long Term Goals: 10 weeks  Date established: Status:    1. Patient will be independent and  compliant with updated HEP. 3/13/23       progressing   2.Patient will improve her FOTO score from 40 % limited  to at least 32 % limited for improved self perception of functional mobility.(score 0-100, high score indicates greater level of function) 3/13/23    Ongoing    3. Pt will improve simple transfers from mod A to contact guard assist  to improve independence and safety 3/13/23     progressing   4.  Pt will improve car transfer from mod A  to contact guard assist  to improve independence and safety 3/13/23    ongoing   5. Pt will be able to ambulate 150  ft with standby assist  assist to improve function.  3/13/23    progressing       PLAN     Plan of care Certification: 3/14/23- 5/23/23     Outpatient Physical Therapy 3 times weekly for 10 weeks to include the following interventions: Gait Training, Manual Therapy, Moist Heat/ Ice, Neuromuscular Re-ed, Patient Education, Therapeutic Activities, and Therapeutic Exercise.     Marlena Pascal, PTA

## 2023-04-03 NOTE — PROGRESS NOTES
"[facility]   Physical Therapy Treatment Note     Name: Claire Bowser  Clinic Number: [id number]    Therapy Diagnosis:   Encounter Diagnoses   Name Primary?    Impaired strength of lower extremity Yes    Impaired functional mobility, balance, gait, and endurance        Physician: Oleg Ross MD    Visit Date: 4/5/2023    Initial Evaluation Date: 2/17/2023  Reevaluation date 3/14/23  Authorization Period Expiration: 4/30/23  Plan of Care Expiration: 5/23/23  Visit # / Visits authorized: 7/15      PTA Visit #: 0/5     Precautions: Standard, blood thinners, and Fall    Time In: 10:31 am   Time Out: 11:15 am   Total Billable Time: 44 minutes     SUBJECTIVE     Pt reports:   " I have been walking some at home without walker and my  was fussing at me. But I feel balanced. " Reminding pt to have assist with ambulation at home for safety .    She was compliant with home exercise program.  Response to previous treatment: a little tired after treatment   Functional change: improved mobility at home     Pain: 0/10    Location:  Not Applicable      OBJECTIVE     Objective Measures updated at progress report unless specified.     Treatment     Claire received the treatments listed below:      therapeutic exercises to develop strength, endurance, and range of motion for 39  minutes including:    - Sci fit  level 3 15 minutes with Bilateral Upper Extremities   - Shuttle leg press bilateral lower extremity 18# 3x10   - shuttle leg press single leg press 12 pounds 3x10 each lower extremity   - shuttle calf raises 12 # 3x10 bilateral lower extremity .  - Standing marches 3x10 each lower extremity   - standing hip abduction 3x10 each lower extremity      (Therapeutic rest breaks in between).       Claire participated in gait training to improve functional mobility and safety for 5  minutes, including:     - pt ambulated 140 ft x 2 trials no assistive device with standby assist from " therapist,    Patient Education and Home Exercises     Home Exercises Provided and Patient Education Provided     Education provided:     Educated about proper form when strengthening. Reports understanding.     Written Home Exercises Provided: Patient instructed to cont prior HEP.  See EMR under Patient Instructions for exercises provided during therapy sessions    ASSESSMENT     Tolerated session well. Working on ambulation endurance with no assistive device and has Progressed to standby assist . Working on general strengthening of bilateral lower extremity to improve function and safety. Pt remains motivated and is progressing well with therapy.     Claire Is progressing well towards her goals.   Pt prognosis is Fair.     Pt will continue to benefit from skilled outpatient physical therapy to address the deficits listed in the problem list box on initial evaluation, provide pt/family education and to maximize pt's level of independence in the home and community environment.     Pt's spiritual, cultural and educational needs considered and pt agreeable to plan of care and goals.     Anticipated barriers to physical therapy: None at this time    Goals:    Short Term Goals: 5 weeks Date established:  Status:    1.  Patient will demonstrate improve endurance and activity tolerance as evidenced by ability to perform Nu-step x 15 minutes without rests breaks. 3/13/23  progressing   2. Pt will perform sit to stand with least restrictive assistive device with contact guard assist  3/14/2023    progressing         Long Term Goals: 10 weeks  Date established: Status:    1. Patient will be independent and compliant with updated HEP. 3/13/23       progressing   2.Patient will improve her FOTO score from 40 % limited  to at least 32 % limited for improved self perception of functional mobility.(score 0-100, high score indicates greater level of function) 3/13/23    Ongoing    3. Pt will improve simple transfers from mod A to  contact guard assist  to improve independence and safety 3/13/23     progressing   4.  Pt will improve car transfer from mod A  to contact guard assist  to improve independence and safety 3/13/23    ongoing   5. Pt will be able to ambulate 150  ft with standby assist  assist to improve function.  3/13/23    progressing       PLAN     Plan of care Certification: 3/14/23- 5/23/23.      Outpatient Physical Therapy 3 times weekly for 10 weeks to include the following interventions: Gait Training, Manual Therapy, Moist Heat/ Ice, Neuromuscular Re-ed, Patient Education, Therapeutic Activities, and Therapeutic Exercise.     Recommendations for next session: strength and ambulation endurance with no assistive device     Linh Sharif, PT

## 2023-04-05 ENCOUNTER — DOCUMENTATION ONLY (OUTPATIENT)
Dept: REHABILITATION | Facility: HOSPITAL | Age: 63
End: 2023-04-05
Payer: MEDICAID

## 2023-04-05 ENCOUNTER — CLINICAL SUPPORT (OUTPATIENT)
Dept: REHABILITATION | Facility: HOSPITAL | Age: 63
End: 2023-04-05
Payer: MEDICAID

## 2023-04-05 DIAGNOSIS — R29.898 IMPAIRED STRENGTH OF LOWER EXTREMITY: Primary | ICD-10-CM

## 2023-04-05 DIAGNOSIS — Z74.09 IMPAIRED FUNCTIONAL MOBILITY, BALANCE, GAIT, AND ENDURANCE: ICD-10-CM

## 2023-04-05 PROCEDURE — 97110 THERAPEUTIC EXERCISES: CPT | Mod: PN

## 2023-04-05 NOTE — PROGRESS NOTES
PT/PTA met face to face to discuss pt's treatment plan and progress towards established goals. Pt will be seen by a physical therapist minimally every 6th visit or every 30 days.    Linh Sharif PT, DPT

## 2023-04-10 ENCOUNTER — CLINICAL SUPPORT (OUTPATIENT)
Dept: REHABILITATION | Facility: HOSPITAL | Age: 63
End: 2023-04-10
Payer: MEDICAID

## 2023-04-10 DIAGNOSIS — Z74.09 IMPAIRED FUNCTIONAL MOBILITY, BALANCE, AND ENDURANCE: ICD-10-CM

## 2023-04-10 DIAGNOSIS — Z74.09 IMPAIRED FUNCTIONAL MOBILITY, BALANCE, GAIT, AND ENDURANCE: ICD-10-CM

## 2023-04-10 DIAGNOSIS — R29.898 IMPAIRED STRENGTH OF LOWER EXTREMITY: Primary | ICD-10-CM

## 2023-04-10 DIAGNOSIS — R53.1 DECREASED STRENGTH: ICD-10-CM

## 2023-04-10 PROCEDURE — 97110 THERAPEUTIC EXERCISES: CPT | Mod: PN,CQ

## 2023-04-10 PROCEDURE — 97165 OT EVAL LOW COMPLEX 30 MIN: CPT | Mod: PN

## 2023-04-10 NOTE — PROGRESS NOTES
[facility]   Physical Therapy Treatment Note     Name: Claire Bowser  Clinic Number: [id number]    Therapy Diagnosis:   Encounter Diagnoses   Name Primary?    Impaired strength of lower extremity Yes    Impaired functional mobility, balance, gait, and endurance      Physician: Oleg Ross MD    Visit Date: 4/10/2023    Initial Evaluation Date: 2/17/2023  Reevaluation date 3/14/23  Authorization Period Expiration: 4/30/23  Plan of Care Expiration: 5/23/23  Visit # / Visits authorized: 6/5       PTA Visit #: 1/5     Precautions: Standard, blood thinners, and Fall    Time In: 1120 (late arrival)  Time Out: 1200  Total Billable Time: 40 minutes     SUBJECTIVE     Pt reports: Had a busy weekend and did not use walker in the house, reports not feeling tired from busy weekend.  She was compliant with home exercise program.  Response to previous treatment: a little tired after treatment   Functional change: improved mobility at home and not taking naps during the day    Pain: 0/10  Location:  Not Applicable      OBJECTIVE     Objective Measures updated at progress report unless specified.     Treatment     Claire received the treatments listed below:      therapeutic exercises to develop strength, endurance, and range of motion for 40 minutes including:    Recumbent bike Level 3.5 15 minutes    Sit:  Hamstring stretch with foot on stool performing dorsiflexion/plantarflexion 1 minute Right Left     Shuttle:  31# Bilateral leg press 3 minutes  31# Bilateral heel raises/heel dips 2 sets of 10     SportsArt:  11# Bilateral knee flexion 2 sets of 10   11# Bilateral knee extension 2 sets of 10     Gait with 4 wheel walker 2x130 feet with verbal cues to decrease distance      Patient Education and Home Exercises     Home Exercises Provided and Patient Education Provided     Education provided:   - Patient provided with verbal and demonstrative instruction for all activities performed in today's  session.    Written Home Exercises Provided: Patient instructed to cont prior HEP.  See EMR under Patient Instructions for exercises provided during therapy sessions    ASSESSMENT     Claire provided good participation and effort during today's session with treatment focused on lower extremity strengthening/endurance with good tolerance. Claire walking to and from equipment with walker use for short distances and without loss of balance.    Claire Is progressing well towards her goals.   Pt prognosis is Fair.     Pt will continue to benefit from skilled outpatient physical therapy to address the deficits listed in the problem list box on initial evaluation, provide pt/family education and to maximize pt's level of independence in the home and community environment.     Pt's spiritual, cultural and educational needs considered and pt agreeable to plan of care and goals.     Anticipated barriers to physical therapy: None at this time    Goals:    Short Term Goals: 5 weeks Date established:  Status:    1.  Patient will demonstrate improve endurance and activity tolerance as evidenced by ability to perform Nu-step x 15 minutes without rests breaks. 3/13/23  progressing   2. Pt will perform sit to stand with least restrictive assistive device with contact guard assist  3/14/2023    progressing         Long Term Goals: 10 weeks  Date established: Status:    1. Patient will be independent and compliant with updated HEP. 3/13/23       progressing   2.Patient will improve her FOTO score from 40 % limited  to at least 32 % limited for improved self perception of functional mobility.(score 0-100, high score indicates greater level of function) 3/13/23    Ongoing    3. Pt will improve simple transfers from mod A to contact guard assist  to improve independence and safety 3/13/23     progressing   4.  Pt will improve car transfer from mod A  to contact guard assist  to improve independence and safety 3/13/23    progressing    5. Pt will be able to ambulate 150  ft with standby assist  assist to improve function.  3/13/23    progressing       PLAN     Plan of care Certification: 3/14/23- 5/23/23     Outpatient Physical Therapy 3 times weekly for 10 weeks to include the following interventions: Gait Training, Manual Therapy, Moist Heat/ Ice, Neuromuscular Re-ed, Patient Education, Therapeutic Activities, and Therapeutic Exercise.     Marlena Pascal, PTA

## 2023-04-10 NOTE — PROGRESS NOTES
Occupational Therapy   Evaluation    Claire Bowser   MRN: [id number]     Occupational therapy evaluation complete. Please see attached plan of care for details. Thank you for consult!    Jen Regalado MS, OTR/L   4/10/2023

## 2023-04-12 ENCOUNTER — OFFICE VISIT (OUTPATIENT)
Dept: INFECTIOUS DISEASES | Facility: CLINIC | Age: 63
End: 2023-04-12
Payer: MEDICAID

## 2023-04-12 ENCOUNTER — CLINICAL SUPPORT (OUTPATIENT)
Dept: REHABILITATION | Facility: HOSPITAL | Age: 63
End: 2023-04-12
Payer: MEDICAID

## 2023-04-12 VITALS
DIASTOLIC BLOOD PRESSURE: 62 MMHG | SYSTOLIC BLOOD PRESSURE: 105 MMHG | BODY MASS INDEX: 14.01 KG/M2 | OXYGEN SATURATION: 99 % | HEART RATE: 117 BPM | WEIGHT: 76.63 LBS

## 2023-04-12 DIAGNOSIS — K65.1 INTRA-ABDOMINAL ABSCESS: Primary | ICD-10-CM

## 2023-04-12 DIAGNOSIS — R63.4 WEIGHT LOSS, UNINTENTIONAL: ICD-10-CM

## 2023-04-12 DIAGNOSIS — M05.79 RHEUMATOID ARTHRITIS INVOLVING MULTIPLE SITES WITH POSITIVE RHEUMATOID FACTOR: ICD-10-CM

## 2023-04-12 DIAGNOSIS — R64 CACHEXIA: ICD-10-CM

## 2023-04-12 DIAGNOSIS — Z79.60 LONG-TERM USE OF IMMUNOSUPPRESSANT MEDICATION: ICD-10-CM

## 2023-04-12 DIAGNOSIS — Z74.09 IMPAIRED FUNCTIONAL MOBILITY, BALANCE, GAIT, AND ENDURANCE: ICD-10-CM

## 2023-04-12 DIAGNOSIS — R29.898 IMPAIRED STRENGTH OF LOWER EXTREMITY: Primary | ICD-10-CM

## 2023-04-12 PROBLEM — R53.1 DECREASED STRENGTH: Status: ACTIVE | Noted: 2023-04-12

## 2023-04-12 PROCEDURE — 97110 THERAPEUTIC EXERCISES: CPT | Mod: PN

## 2023-04-12 PROCEDURE — 3078F PR MOST RECENT DIASTOLIC BLOOD PRESSURE < 80 MM HG: ICD-10-PCS | Mod: CPTII,S$GLB,, | Performed by: INTERNAL MEDICINE

## 2023-04-12 PROCEDURE — 3008F BODY MASS INDEX DOCD: CPT | Mod: CPTII,S$GLB,, | Performed by: INTERNAL MEDICINE

## 2023-04-12 PROCEDURE — 3074F PR MOST RECENT SYSTOLIC BLOOD PRESSURE < 130 MM HG: ICD-10-PCS | Mod: CPTII,S$GLB,, | Performed by: INTERNAL MEDICINE

## 2023-04-12 PROCEDURE — 99214 PR OFFICE/OUTPT VISIT, EST, LEVL IV, 30-39 MIN: ICD-10-PCS | Mod: S$GLB,,, | Performed by: INTERNAL MEDICINE

## 2023-04-12 PROCEDURE — 3044F HG A1C LEVEL LT 7.0%: CPT | Mod: CPTII,S$GLB,, | Performed by: INTERNAL MEDICINE

## 2023-04-12 PROCEDURE — 1159F MED LIST DOCD IN RCRD: CPT | Mod: CPTII,S$GLB,, | Performed by: INTERNAL MEDICINE

## 2023-04-12 PROCEDURE — 3074F SYST BP LT 130 MM HG: CPT | Mod: CPTII,S$GLB,, | Performed by: INTERNAL MEDICINE

## 2023-04-12 PROCEDURE — 1159F PR MEDICATION LIST DOCUMENTED IN MEDICAL RECORD: ICD-10-PCS | Mod: CPTII,S$GLB,, | Performed by: INTERNAL MEDICINE

## 2023-04-12 PROCEDURE — 3044F PR MOST RECENT HEMOGLOBIN A1C LEVEL <7.0%: ICD-10-PCS | Mod: CPTII,S$GLB,, | Performed by: INTERNAL MEDICINE

## 2023-04-12 PROCEDURE — 99214 OFFICE O/P EST MOD 30 MIN: CPT | Mod: S$GLB,,, | Performed by: INTERNAL MEDICINE

## 2023-04-12 PROCEDURE — 3078F DIAST BP <80 MM HG: CPT | Mod: CPTII,S$GLB,, | Performed by: INTERNAL MEDICINE

## 2023-04-12 PROCEDURE — 3008F PR BODY MASS INDEX (BMI) DOCUMENTED: ICD-10-PCS | Mod: CPTII,S$GLB,, | Performed by: INTERNAL MEDICINE

## 2023-04-12 NOTE — PROGRESS NOTES
"Subjective:       Patient ID: Claire Bowser is a 63 y.o. female.    Chief Complaint:: Follow-up (3 week follow up)    Follow-up   seen at [facility] from 02/26 until 3/3"Claire Bowser is a 63 y.o. female known to ID service from January, 2023 with PMHx emphysema, smoker, HTN, DVT on Eliquis, anxiety, chronic pancreatitis since biliary pancreatitis and cholecystectomy 07/2022, ,pancreatic sphincterotomy, bile leak-status post stent 12/30/22, multiple pancreatic pseudocysts, cyst gastrostomy tube placement, malnutrition, albumin 1.5, OA,  rheumatoid arthritis,    Patient came to ED on 2/24 with complaints of generalized weakness, fatigue, dyspnea on exertion, not able to stand, but not much abdominal discomfort??  CT chest ruled out PE.  CT abdomen was impressive for a big air-fluid collection in pelvis.  Patient is seen by General surgery who is recommending percutaneous drainage, no operative intervention at this time.    Patient is started on Zosyn and we are waiting for IR.    Patient has no new complaints overnight.  She feels slightly better, but still weak.  At baseline patient lays in bed most of the time.  She uses the bathroom 3 times a day.  She eats in bed, her  who is retired attends to her.  Urine culture is no growth to date.  UA with mild pyuria 20.  She had no urinary complaints until yesterday when she had some urgency and frequency.  No blood cultures are sent.  Troponin is a 1000 and EC HO is as below.  .  Hemoglobin of 6.9.  A unit of PRBC is already ordered.  No obvious source of bleeding     2/27(Paulette): consult reviewed and d/w Dr. Valenzuela, family. She is even more underweight than my last visit. The IR drain has put out 200 cc already. She has been too weak to move around for the last several days. Discussed need for IV antibiotics for a while, and this plus IV nutrition may be enough to get her into LTAC.   2/28: interim reviewed. Afebrile. WBC still in the normal range. 1000 " "mL relieved from intra-abd abscess since placement yesterday. Enterococcus and a GNR isolated from culture, final pending. Intake not recorded yesterday but she did eat at least one meal. She ate well today. She is reluctant to get out of bed  3/1: interim reviewed. Did take a few steps with PT and  reports she is eating well. The drainage from right abdomen is thinner and output has decreased. No benefits for LTAC. She is in good spirits.  3/2: Interim reviewed output from the drain has tapered off to 50 cc over the last 24 hours.  Surgery is planning a repeat CT scan tomorrow.  The cultures are not final as the Gram-negative jennifer id and susceptibility had to be repeated.  CRP has fallen to only slightly above normal. She walked to the door with PT. She is worried about being discharged too soon.   3/3: interim reviewed. CT shows substantial improvement in fluid collection size, down from huge to just big. The drain is in position, but output is less than 100 cc per day. Culture from abscess on 2/27 has enterococcus and Ecoli. Micro updating the culture report(both sensitive to zosyn). CT also shows large bilateral pleural effusions, alb 1.5 on admission.  She is in good spirits, walked much farther with therapy, eating well. I was able to milk fluid from IR drain. "    3/13 office:  She has been receiving the Zosyn at home without any difficulty.  Some how she has lost a tremendous amount of weight since her hospitalization,?  Is this fluid weight.   confirms that she has been eating very very well.  Critical Potassium was 2.3 this am. CRP 12 up from 1.7. She is having 2-5 BM's per day but attributes increase to eating crawfish 3 days ago  The drain was removed by Dr. Sheehan last week, with output less than 20, perhaps around 10 cc per day.   . Drinking plenty and eating well, urine is dilute. No abd pain.   Smoking again, 7 cig per day  Joints are fair off of the Arava, only on 5 of " prednisone    3/22/23: doing well since last visit. Completed the prescribed antibiotics. CT scan read as resolution of pelvic abscess, but I can see a sliver of extra-intestinal fluid in the previous path. Her appetite is great but she cannot tell me that she has gained any weight. Her CRP went up and then down and not yet back to baseline. No other area that is uncomfortable or new. Her joints seem stable on low dose prednisone. She is still smoking, albeit a little less. Her K is on the low side of normal with kdur 20 mEq bid. We reviewed the CT images.     4/12/23: they report weight gain on home scale but weight is the same here.  She has much improved since the last visit.  She walked in on her own 2 ft with no assistive device, has been doing well in therapy.  Her appetite is great, she has no diarrhea, no abdominal swelling or abdominal discomfort.  It is possible that she has no weight net gain because increased muscle mass and decreased fluid weight.  She had the labs I ordered on a little bit early but the CRP was higher.  Her joints are stable and she is seeing her rheumatologist next week.    Review of patient's allergies indicates:   Allergen Reactions    No known drug allergies      Past Medical History:   Diagnosis Date    Acute biliary pancreatitis 6/14/2022    Anxiety     Chronic pancreatitis 1/2/2023    Digestive disorder     Flu 02/2017    [facility]    Hypertension     Long-term use of immunosuppressant medication 6/10/2022    Rheumatoid arthritis involving multiple sites with positive rheumatoid factor 01/14/2021     Past Surgical History:   Procedure Laterality Date    COLONOSCOPY N/A 2/26/2021    Procedure: COLONOSCOPY;  Surgeon: Amy Sidhu MD;  Location: [facility];  Service: Endoscopy;  Laterality: N/A;    ENDOSCOPIC ULTRASOUND OF UPPER GASTROINTESTINAL TRACT N/A 7/1/2022    Procedure: ULTRASOUND, UPPER GI TRACT, ENDOSCOPIC;  Surgeon: Donavon Jewell III, MD;  Location:  [facility] ENDO;  Service: Endoscopy;  Laterality: N/A;    ENDOSCOPIC ULTRASOUND OF UPPER GASTROINTESTINAL TRACT N/A 11/29/2022    Procedure: ULTRASOUND, UPPER GI TRACT, ENDOSCOPIC;  Surgeon: Donavon Jewell III, MD;  Location: [facility] ENDO;  Service: Endoscopy;  Laterality: N/A;    ENDOSCOPIC ULTRASOUND OF UPPER GASTROINTESTINAL TRACT N/A 1/3/2023    Procedure: ULTRASOUND, UPPER GI TRACT, ENDOSCOPIC;  Surgeon: Donavon Jewell III, MD;  Location: [facility] ENDO;  Service: Endoscopy;  Laterality: N/A;    ERCP N/A 12/30/2022    Procedure: ERCP (ENDOSCOPIC RETROGRADE CHOLANGIOPANCREATOGRAPHY);  Surgeon: Donavon Jewell III, MD;  Location: [facility] ENDO;  Service: Endoscopy;  Laterality: N/A;    ERCP N/A 1/24/2023    Procedure: ERCP (ENDOSCOPIC RETROGRADE CHOLANGIOPANCREATOGRAPHY);  Surgeon: Rock Martines MD;  Location: [facility] ENDO ([address] [place] [address]);  Service: Endoscopy;  Laterality: N/A;    ESOPHAGOGASTRODUODENOSCOPY N/A 2/26/2021    Procedure: EGD (ESOPHAGOGASTRODUODENOSCOPY);  Surgeon: Amy Sidhu MD;  Location: [facility] ENDO;  Service: Endoscopy;  Laterality: N/A;    LAPAROSCOPIC CHOLECYSTECTOMY N/A 7/27/2022    Procedure: CHOLECYSTECTOMY, LAPAROSCOPIC;  Surgeon: Ramón Hwang III, MD;  Location: [facility] OR;  Service: General;  Laterality: N/A;    TUBAL LIGATION       Social History     Tobacco Use    Smoking status: Every Day     Packs/day: 1.00     Years: 7.00     Pack years: 7.00     Types: Cigarettes    Smokeless tobacco: Never    Tobacco comments:     [phone]     Smokes 1/2 pack daily   Substance Use Topics    Alcohol use: No        Family History   Problem Relation Age of Onset    Diabetes Mother     Heart disease Mother     Kidney disease Mother     Hypertension Mother     Stroke Mother     Hearing loss Mother     COPD Father     Liver disease Paternal Grandfather     Alcohol abuse Paternal Grandfather     Liver disease Sister     Emphysema Brother     COPD Brother     Sleep apnea Brother     No Known Problems Son      Alcohol abuse Paternal Grandmother     Cirrhosis Paternal Grandmother     Heart disease Maternal Aunt     Diabetes Maternal Aunt     Cancer Maternal Aunt         female    Heart disease Maternal Uncle     Hypertension Maternal Uncle     No Known Problems Paternal Uncle     Psoriasis Neg Hx     Lupus Neg Hx     Eczema Neg Hx     Melanoma Neg Hx          Review of Systems    Constitutional: No fever, chills, sweats, fatigue, weakness,  and in fact her strength is improving    Eyes: No change in vision, loss of vision or diplopia, photophobia    ENT: No sore throat, mouth pains, or lesions and no sinus trouble    Cardiovascular: No chest pain, GALDAMEZ, or pedal edema    Respiratory: No shortness of breath, GALDAMEZ,    still smoking  10 per day. Doing well with therapy without dyspnea  Gastrointestinal: No abdominal pain, nausea, vomiting, and no diarrhea    She uses Creon every day for diarrhea from pancreatic insufficiency,      Genitourinary: No dysuria, hematuria or vaginitis    Musculoskeletal: No new pain, joint swelling, or injuries. RA is stable    Integumentary: No new rashes, skin lesions, or wounds, just lots of ecchymoses    Neurological: No dizziness, vertigo, unusual headaches, neuropathy, loss of vision, falls. No longer needs any assistive devices unless she has to walk really far. Can walk from parking garage here without difficulty    Psychiatric: No anxiety, depression    Endocrine:      Lymphatic: No lymphadenopathy, blood loss , malignancy    VAD:       Objective:      Blood pressure 105/62, pulse (!) 117, weight 34.7 kg (76 lb 9.6 oz), SpO2 99 %. Body mass index is 14.01 kg/m².  Physical Exam      General: Alert and attentive, cooperative and in no distress extremely thin and underweight but mentally vigorous and walked in unassisted today, smiling encouraged.    Eyes: Pupils equal, round, reactive to light, anicteric, EOMI    Neck: Supple, non-tender, no thyromegaly or masses    ENT: EAC patent, TM  normal, nares patent, no oral lesions,   no thrush    Cardiovascular: Regular rate and rhythm, no murmurs, rubs, or gallop    Respiratory: Lungs clear without wheezes, rales, rubs or rhonci      Gastrointestinal:  Soft, bowel sounds normal,  scaphoid no mass or organomegaly, no tenderness or distention    Genitourinary:  no flank tenderness    Integumentary: Skin without rashes, lesions, or wounds with numerous small ecchymoses      Vascular: No peripheral edema or phlebitis, warm and well perfused    Musculoskeletal:   no acute arthritis, synovitis or myositis.  Reduced muscle bulk and strength    Lymphatic: No cervical,  LAD    Neurological: Normal LOC, cranial nerves, speech,      Psychiatric: Normal mood, speech,  demeanor     Wound:    VAD:        Recent Diagnostics:   Labs reviewed from 3/29       Assessment and Plan:           Intra-abdominal abscess    Weight loss, unintentional    Rheumatoid arthritis involving multiple sites with positive rheumatoid factor    Long-term use of immunosuppressant medication    Cachexia       Please let me know when/if you have blood work with your rheumatologist so that I can review    Call me if you need me       This note was created using Dragon voice recognition software that occasionally misinterpreted phrases or words.

## 2023-04-12 NOTE — PATIENT INSTRUCTIONS
Please let me know when/if you have blood work with your rheumatologist so that I can review    Call me if you need me

## 2023-04-12 NOTE — PROGRESS NOTES
"[facility]   Physical Therapy Treatment Note     Name: Claire Bowser  Clinic Number: [id number]    Therapy Diagnosis:   Encounter Diagnoses   Name Primary?    Impaired strength of lower extremity Yes    Impaired functional mobility, balance, gait, and endurance        Physician: Oleg Ross MD    Visit Date: 4/12/2023    Initial Evaluation Date: 2/17/2023  Reevaluation date 3/14/23  Authorization Period Expiration: 4/30/23  Plan of Care Expiration: 5/23/23  Visit # / Visits authorized: 8/15       PTA Visit #: 0/5     Precautions: Standard, blood thinners, and Fall    Time In: 9:31 am   Time Out: 10:15  Total Billable Time: 44 minutes     SUBJECTIVE     Pt reports:  " I am doing good with the rollator"    She was compliant with home exercise program.  Response to previous treatment: a little tired after treatment   Functional change: improved mobility at home and not taking naps during the day    Pain: 0/10    Location:  Not Applicable      OBJECTIVE     Objective Measures updated at progress report unless specified.     Pt arrived ambulating with rollator.     Treatment     Claire received the treatments listed below:      therapeutic exercises to develop strength, endurance, and range of motion for 22 minutes including:      - Recumbent bike Level 5 12 minutes.   - standing hamstring curls 3x10 each lower extremity   - standing mini squats 3x10   - step ups in parallel bars on 4 inch step 3x10 each lower extremity bilateral upper extremity support on bars.   - lateral step ups on 4 inch step in parallel bars 3x10 each lower extremity. Bilateral upper extremity support on bar        Claire participated in gait training to improve functional mobility and safety for 20  minutes, including:   - pt ambulated 250 ft x 3 trials no assistive device       Gait with 4 wheel walker 2x130 feet with verbal cues to decrease distance      (Therapeutic rest breaks in between)    Patient " Education and Home Exercises     Home Exercises Provided and Patient Education Provided     Education provided:     Educated about importance of rest breaks to allow her to maximize therapy sessions. Reports understanding    Written Home Exercises Provided: Patient instructed to cont prior HEP.  See EMR under Patient Instructions for exercises provided during therapy sessions      ASSESSMENT     Tolerated session well. Improving strength and endurance with therapy. Able to walk farther with additional trials with no assistive device with supervision. Pt progressing well and motivated to improve.     Claire Is progressing well towards her goals.   Pt prognosis is Fair.     Pt will continue to benefit from skilled outpatient physical therapy to address the deficits listed in the problem list box on initial evaluation, provide pt/family education and to maximize pt's level of independence in the home and community environment.     Pt's spiritual, cultural and educational needs considered and pt agreeable to plan of care and goals.     Anticipated barriers to physical therapy: None at this time    Goals:    Short Term Goals: 5 weeks Date established:  Status:    1.  Patient will demonstrate improve endurance and activity tolerance as evidenced by ability to perform Nu-step x 15 minutes without rests breaks. 3/13/23  progressing   2. Pt will perform sit to stand with least restrictive assistive device with contact guard assist  3/14/2023    progressing         Long Term Goals: 10 weeks  Date established: Status:    1. Patient will be independent and compliant with updated HEP. 3/13/23       progressing   2.Patient will improve her FOTO score from 40 % limited  to at least 32 % limited for improved self perception of functional mobility.(score 0-100, high score indicates greater level of function) 3/13/23    Ongoing    3. Pt will improve simple transfers from mod A to contact guard assist  to improve independence and  safety 3/13/23     progressing   4.  Pt will improve car transfer from mod A  to contact guard assist  to improve independence and safety 3/13/23    progressing   5. Pt will be able to ambulate 150  ft with standby assist  assist to improve function.  3/13/23    progressing       PLAN     Plan of care Certification: 3/14/23- 5/23/23     Outpatient Physical Therapy 3 times weekly for 10 weeks to include the following interventions: Gait Training, Manual Therapy, Moist Heat/ Ice, Neuromuscular Re-ed, Patient Education, Therapeutic Activities, and Therapeutic Exercise.     Recommendations for next session:     - recommending continued strength and endurance training.     Linh Sharif, PT

## 2023-04-12 NOTE — PLAN OF CARE
[facility] Occupational Therapy  Initial Neurological Evaluation     Date: 4/10/2023  Patient: Claire Bowser  Chart Number: [id number]    Therapy Diagnosis:   Encounter Diagnoses   Name Primary?    Decreased strength     Impaired functional mobility, balance, and endurance      Physician: Oleg Ross MD    Physician Orders: OT evaluation and treat   Medical Diagnosis:   K65.1 (ICD-10-CM) - Intra-abdominal abscess   R63.4 (ICD-10-CM) - Weight loss, unintentional   R53.1 (ICD-10-CM) - Generalized weakness   Evaluation Date: 4/10/2023  Progress note due: 5/8/2023  Plan of Care Expiration Period: 6/9/2023  Insurance Authorization period Expiration: 5/1/2023  Date of Return to MD: goes to infectious disease doctor on 4/12/2023  Visit # / Visits Authorized: 1 / 1  FOTO: to be assessed     Time In:12:00 PM  Time Out: 12:45 PM  Total Billable (one on one) Time: 45 minutes    Precautions: Standard    Subjective     History of Current Condition: Claire reports she had her gall bladder removed, then developed pancreatitis. She reports she had a small stent placed which worked initially, but then had to get a bigger stent placed. She had two cysts on her pancreas that burst into her abdominal cavity causing infection. She has had many hospitalizations that have decreased her strength and overall function.     Involved Side: Right   Dominant Side: Right  Date of Onset: 7/1/2023  Surgical Procedure: stent for  Imaging: CT scan films (abdomen and pelvis) 3/13/2023  FINDINGS: Axial postcontrast imaging was performed with 80 mL Omnipaque 350 IV contrast and enteric contrast. Comparison to prior exams including CT of 03/03/2023.     CT ABDOMEN: There is emphysema at both lung bases, with localized bronchiectasis and mucous plugging in the right middle lobe. There are low-density bilateral pleural effusions, bilateral lower lobe atelectasis.     The liver enhances normally, with cholecystectomy clips. There  "is no intrahepatic biliary ductal dilatation. The spleen is normal in size and enhances normally, with internal pancreatic duct stent unchanged in position. Low-density cystic lesions in the pancreatic head and uncinate process are unchanged, with the adrenal glands enhancing normally. Several bilateral hypoattenuating renal lesions reflecting cysts are unchanged, with the kidneys otherwise enhancing normally. No hydronephrosis.     Scattered calcified and soft plaque involves normal caliber abdominal aorta and iliac arteries. There are no enlarged mesenteric or retroperitoneal lymph nodes, with small volume of low-density ascites. Enteric contrast material reaches the distal ileum, with no bowel obstruction or findings of enteritis. Moderate stool lies throughout the colon, with no findings of colitis or intraperitoneal free air.     CT PELVIS: Previous upper pelvic percutaneous drainage catheter has been removed, with no residual rim-enhancing fluid collection or pelvic ascites. The sigmoid colon, rectum and urinary bladder enhance normally, with no enlarged pelvic or inguinal lymph nodes.     The extraperitoneal soft tissues enhance normally, with diffuse reticular edema throughout the subcutaneous fat. There are no acute fractures or destructive osseous lesions. There is intervertebral disc space narrowing and facet arthropathy in the spine.    Previous Therapy: none prior to diagnosis; currently receives physical therapy in this clinic.     Patient's Goals for Therapy: get off the toilet (transfers), learning to get things off the bottom shelf of the fridge without falling, gain strength     Pain:  Pain Related Behaviors Observed: no   Functional Pain Scale Rating 0-10:   0/10 on average  0/10 at best  0/10 at worst  Location: NA   "I do not have any pain anymore. I used to have pain."     Occupation:    Hasn't worked since October 2022. She was a  and was reportedly let go for missing too much " "due to illness. She would be open to part time work if it came along.     Functional Limitations/Social History:    Prior Level of Function: (I)   Current Level of Function:difficulty with tasks that require her to "get low" such as standing up from the toilet, getting up from the floor, and grabbing items off of bottom shelves.     Home/Living environment : lives with their spouse  Home Access: 1 story, threshold to get in and out   DME: rollator, wheelchair, bedside commode, raised toilet seat, tub bench, rolling walker.      Leisure: go to social group that meets once a month, word search puzzles, watch tv.     Driving: has not driven in ~4 months     Past Medical History/Physical Systems Review:     Past Medical History:  Claire Bowser  has a past medical history of Acute biliary pancreatitis, Anxiety, Chronic pancreatitis, Digestive disorder, Flu, Hypertension, Long-term use of immunosuppressant medication, and Rheumatoid arthritis involving multiple sites with positive rheumatoid factor.    Past Surgical History:  Claire Bowser  has a past surgical history that includes Tubal ligation; Esophagogastroduodenoscopy (N/A, 2/26/2021); Colonoscopy (N/A, 2/26/2021); Endoscopic ultrasound of upper gastrointestinal tract (N/A, 7/1/2022); Laparoscopic cholecystectomy (N/A, 7/27/2022); Endoscopic ultrasound of upper gastrointestinal tract (N/A, 11/29/2022); ERCP (N/A, 12/30/2022); Endoscopic ultrasound of upper gastrointestinal tract (N/A, 1/3/2023); and ERCP (N/A, 1/24/2023).    Current Medications:  Claire has a current medication list which includes the following prescription(s): acidophilus ex str (l. sporog), amlodipine, cyclobenzaprine, folic acid, lipase-protease-amylase 12,000-38,000-60,000 units, loperamide, megestrol, mirtazapine, pantoprazole, potassium chloride sa, prednisone, sertraline, and trazodone.    Allergies:  Review of patient's allergies indicates:   Allergen Reactions    No known drug allergies  "       Objective     Cognitive Exam:  Oriented: Person, Place, Time, and Situation  Behaviors: normal, cooperative  Follows Commands/attention: Follows multistep  commands  Communication: clear/fluent  Memory: No Deficits noted as determined by 3 word recall after 1 minute and 3 minutes  Safety awareness/insight to disability: aware of diagnosis, treatment, and prognosis  Coping skills/emotional control: Appropriate to situation    Visual/Perceptual: no changes; no concerns     Physical Exam:  Postural examination/scapula alignment: Slouched posture  Joint integrity: no concerns   Skin integrity: visible skin intact   Edema: no concerns      Joint Evaluation  AROM  4/10/2023 AROM  4/10/2023    Left Right   Shoulder flex 0-180 WFL WFL   Shoulder Abd 0-180 WFL WFL   Shoulder ER 0-90 WNL WNL   Shoulder IR 0-90 WNL WNL   Shoulder Extension 0-80 WNL WNL   Elbow flex/ext 0-150 WNL WNL   Wrist flex 0-80 WNL WNL   Wrist ext 0-70 WNL WNL     Fist: normal    Strength 4/10/2023 4/10/2023   **within available ROM** Left Right   Shoulder flex 3+/5 3+/5   Shoulder abd 3+/5 3+/5   Shoulder ER 3+/5 3+/5   Shoulder IR 3+/5 3+/5   Shoulder Extension 3+/5 3+/5   Elbow flex 3/5 3/5   Elbow ext 3/5 3/5   Wrist flex 4/5 4/5   Wrist ext 4/5 4/5     Hand Strength (NADIA Dynamometer, Setting II)   (lbs) Left  4/10/2023  Right  4/10/2023    1 21 22   2 20 18   3 21 21   avg 20.6 20.3   [norms for women aged 60-64: R=55.1 +/-10.1; L=45.7 +/-10.1 (Meghann et al, 1985)]    Pinch Left  4/10/2023  Right  4/10/2023    Lateral 10 11   Tip (2 point) 6 7   3 jaw diane 8 8      Gross motor coordination:   MEÑO (Rapid Alternating Movements): intact   Finger to Nose (5 times): intact  Finger Flicks (coordination moving from digit flexion to digit extension): intact     Box and Block Assessment Left Right   4/10/2023 46 49   [norms for women aged 60-64: R=76.1 +/-6.9; L=73.6 +/-6.4 (Meghann et al, 1985)]    Fine motor coordination:   -   Thumb to  Finger Opposition: intact      9 Hole Peg Test (9HPT) Left Right   4/10/2023 25s 22s   [norms for women aged 61-65: R=18.99s +/-2.18s; L=20.33 +/-2.76s (Mauri et al, 2003)]     Tone:  Modified Maria Elena Scale:   0 - No increase in muscle tone    Sensation:  Claire reports she has rheumatoid arthitis   Light touch: bilateral intact    Balance:   Static Sit - NORMAL: No deviations seen in posture held statically  Dynamic sit- GOOD+: Takes MAXIMAL challenges from all directions.    Static Stand - GOOD+: Takes MAXIMAL challenges from all directions.    Dynamic stand - GOOD: Takes MODERATE challenges from all directions    Endurance Deficit: moderate                    Functional Status      Functional Mobility:  Bed mobility: Mod I  Roll to left: Mod I  Roll to right: Mod I  Supine to sit: Mod I  Sit to supine: Mod I  Transfers to bed: Mod I  Transfers to toilet: Mod A  Car transfers: Mod I    ADL's:  Feeding: Mod I  Grooming: Mod I  Hygiene: Mod I  UB Dressing: Mod I  LB Dressing: Mod I  Toileting: Mod I (only has difficulty with the transfer from toilet)  Bathing: Mod I    IADL's:  Homecare:  completes (and has since before illness).   Cooking:  completes (and has since before illness).   Laundry:  completes (and has since before illness).   Yard work: Min A to mod A (gardening)  Use of telephone: Mod I  Money management: Mod I  Medication management:  does this for her   Handwriting:Mod I  Technology Use:Mod I      Treatment     Total Treatment time separate from Evaluation time:0 minutes (time did not allow for separate treatment)     Home Exercises and Patient Education Provided: not given today     Education provided:   -role of OT, goals for OT, scheduling/cancellations, insurance limitations with patient.    Written Home Exercises Provided: not given today     Assessment     Claire Bowser is a 63 y.o. female referred to outpatient occupational therapy and presents with a medical  diagnosis of intra abdominal abscess, weight loss, and weakness , resulting in decreased muscle strength and impaired function and demonstrates limitations as described in the chart below. Following medical record review it is determined that patient will benefit from occupational therapy services in order to maximize functional  participation and success with BADLs, IADLs, and functional mobility.     Patient prognosis is Excellent due to motivation and family support   Patient will benefit from skilled outpatient Occupational Therapy to address the deficits stated above and in the chart below, provide patient/family education, and to maximize patient's level of independence.     Plan of care discussed with patient: Yes  Patient's spiritual, cultural and educational needs considered and patient is agreeable to the plan of care and goals as stated below:     Anticipated Barriers for therapy: none     Medical Necessity is demonstrated by the following  Profile and History Assessment of Occupational Performance Level of Clinical Decision Making Complexity Score   Occupational Profile:   Claire Bowser is a 63 y.o. female who lives with their family and is currently unemployed. Claire Bowser has difficulty with  Strength, endurance, and transfers, affecting her daily functional abilities. Her main goal for therapy is to gain strength to be able to complete activities as she did before.     Comorbidities:    has a past medical history of Acute biliary pancreatitis, Anxiety, Chronic pancreatitis, Digestive disorder, Flu, Hypertension, Long-term use of immunosuppressant medication, and Rheumatoid arthritis involving multiple sites with positive rheumatoid factor.     Medical and Therapy History Review:   Brief   Performance Deficits    Physical:  Muscle Power/Strength  Muscle Endurance   Strength  Pinch Strength    Cognitive:  No Deficits    Psychosocial:    Social Interaction  Habits  Routines  Family Support      Clinical Decision Making:  low    Assessment Process:  Problem-Focused Assessments    Modification/Need for Assistance:  Not Necessary    Intervention Selection:  Multiple Treatment Options       low  Based on PMHX, co morbidities , data from assessments and functional level of assistance required with task and clinical presentation directly impacting function.       The following goals were discussed with the patient and patient is in agreement with them as to be addressed in the treatment plan.     Goals:    Short Term Goals (4 weeks) Status When Last Assessed  Progressing/ Met   1) (I) with HEP  TBA  progressing 4/10/2023    2) Mod I / Transfer from toilet  Mod A   progressing 4/10/2023        LongTerm Goals (8 weeks) Status When Last Assessed  Progressing/ Met   1)  Mod I / reaching on low shelf of fridge without loss of balance  Unable  progressing 4/10/2023    2) Will have endurance to stand for 10 minutes to complete the ergometer on level 2.5  TBA  progressing 4/10/2023    3) Will increase bilateral  strength by 10 lbs for increased ability to participate in desired activities  L = 20. 6  R = 20.3  progressing 4/10/2023    4) 4+/5 gross shoulder strength  3+/5 progressing 4/10/2023       Additional functional goals to be added as pt progresses and per her priorities.   Plan   Certification Period/Plan of care expiration: 4/10/2023 to 6/9/2023.    Outpatient Occupational Therapy 2 times weekly for 8 weeks to include the following interventions: Manual Therapy, Patient Education, Self Care, Therapeutic Activities, and Therapeutic Exercise.    Jen Regalado MS, OTR/L     I certify the need for these services furnished under this plan of treatment and while under my care.  ____________________________________ Physician/Referring Practitioner   Date of Signature

## 2023-04-13 LAB
ACID FAST MOD KINY STN SPEC: NORMAL
MYCOBACTERIUM SPEC QL CULT: NORMAL

## 2023-04-14 ENCOUNTER — CLINICAL SUPPORT (OUTPATIENT)
Dept: REHABILITATION | Facility: HOSPITAL | Age: 63
End: 2023-04-14
Payer: MEDICAID

## 2023-04-14 DIAGNOSIS — R29.898 IMPAIRED STRENGTH OF LOWER EXTREMITY: Primary | ICD-10-CM

## 2023-04-14 DIAGNOSIS — Z74.09 IMPAIRED FUNCTIONAL MOBILITY, BALANCE, GAIT, AND ENDURANCE: ICD-10-CM

## 2023-04-14 PROCEDURE — 97110 THERAPEUTIC EXERCISES: CPT | Mod: PN,CQ

## 2023-04-14 NOTE — PROGRESS NOTES
[facility]   Physical Therapy Treatment Note     Name: Claire Bowser  Clinic Number: [id number]    Therapy Diagnosis:   Encounter Diagnoses   Name Primary?    Impaired strength of lower extremity Yes    Impaired functional mobility, balance, gait, and endurance      Physician: Oleg Ross MD    Visit Date: 4/14/2023    Initial Evaluation Date: 2/17/2023  Reevaluation date 3/14/23  Authorization Period Expiration: 4/30/23  Plan of Care Expiration: 5/23/23  Visit # / Visits authorized: 10/15       PTA Visit #: 1/5     Precautions: Standard, blood thinners, and Fall    Time In: 0930  Time Out: 1013  Total Billable Time: 43 minutes     SUBJECTIVE     Pt reports: Doing well, no complaints  She was compliant with home exercise program.  Response to previous treatment: ano problems stated  Functional change: ongoing    Pain: 0/10  Location:  Not Applicable      OBJECTIVE     Objective Measures updated at progress report unless specified.     Treatment     Claire received the treatments listed below:      therapeutic exercises to develop strength, endurance, and range of motion for 43 minutes including:    Recumbent bike Level 5 15 minutes    Sit:  Hamstring stretch with foot on stool performing dorsiflexion/plantarflexion 1 minute Right Left     Shuttle:  37# Bilateral leg press 3 minutes  37# Bilateral heel raises/heel dips 2 sets of 10   12# Unilateral leg press 3 sets of 10 Right Left     SportsArt:  11# Bilateral knee flexion 3 sets of 10   11# Bilateral knee extension 3 sets of 10 verbal cues to decrease speed and increase rest break between repetitions     Gait with 4 wheel walker 2x130 feet     Patient Education and Home Exercises     Home Exercises Provided and Patient Education Provided     Education provided:   - Patient provided with verbal and demonstrative instruction for all activities performed in today's session.    Written Home Exercises Provided: Patient instructed to  cont prior HEP.  See EMR under Patient Instructions for exercises provided during therapy sessions    ASSESSMENT     Claire provided good participation and effort during today's session with treatment focused on lower extremity strengthening/endurance with good tolerance. Claire tolerated  increased resistance and repetitions to all activities, continued verbal cues for decreased speed with SportsArt.    Claire Is progressing well towards her goals.   Pt prognosis is Fair.     Pt will continue to benefit from skilled outpatient physical therapy to address the deficits listed in the problem list box on initial evaluation, provide pt/family education and to maximize pt's level of independence in the home and community environment.     Pt's spiritual, cultural and educational needs considered and pt agreeable to plan of care and goals.     Anticipated barriers to physical therapy: None at this time    Goals:    Short Term Goals: 5 weeks Date established:  Status:    1.  Patient will demonstrate improve endurance and activity tolerance as evidenced by ability to perform Nu-step x 15 minutes without rests breaks. 3/13/23  progressing   2. Pt will perform sit to stand with least restrictive assistive device with contact guard assist  3/14/2023    progressing         Long Term Goals: 10 weeks  Date established: Status:    1. Patient will be independent and compliant with updated HEP. 3/13/23       progressing   2.Patient will improve her FOTO score from 40 % limited  to at least 32 % limited for improved self perception of functional mobility.(score 0-100, high score indicates greater level of function) 3/13/23    Ongoing    3. Pt will improve simple transfers from mod A to contact guard assist  to improve independence and safety 3/13/23     progressing   4.  Pt will improve car transfer from mod A  to contact guard assist  to improve independence and safety 3/13/23    progressing   5. Pt will be able to ambulate 150   ft with standby assist  assist to improve function.  3/13/23    progressing       PLAN     Plan of care Certification: 3/14/23- 5/23/23     Outpatient Physical Therapy 3 times weekly for 10 weeks to include the following interventions: Gait Training, Manual Therapy, Moist Heat/ Ice, Neuromuscular Re-ed, Patient Education, Therapeutic Activities, and Therapeutic Exercise.     Marlena Pascal, PTA

## 2023-04-17 ENCOUNTER — CLINICAL SUPPORT (OUTPATIENT)
Dept: REHABILITATION | Facility: HOSPITAL | Age: 63
End: 2023-04-17
Payer: MEDICAID

## 2023-04-17 DIAGNOSIS — Z74.09 IMPAIRED FUNCTIONAL MOBILITY, BALANCE, GAIT, AND ENDURANCE: ICD-10-CM

## 2023-04-17 DIAGNOSIS — R29.898 IMPAIRED STRENGTH OF LOWER EXTREMITY: Primary | ICD-10-CM

## 2023-04-17 PROCEDURE — 97110 THERAPEUTIC EXERCISES: CPT | Mod: PN,CQ

## 2023-04-17 NOTE — PROGRESS NOTES
[facility]   Physical Therapy Treatment Note     Name: Claire Bowser  Clinic Number: [id number]    Therapy Diagnosis:   Encounter Diagnoses   Name Primary?    Impaired strength of lower extremity Yes    Impaired functional mobility, balance, gait, and endurance      Physician: Oleg Ross MD    Visit Date: 4/17/2023    Initial Evaluation Date: 2/17/2023  Reevaluation date 3/14/23  Authorization Period Expiration: 4/30/23  Plan of Care Expiration: 5/23/23  Visit # / Visits authorized: 10/15       PTA Visit #: 1/5     Precautions: Standard, blood thinners, and Fall    Time In: 1030  Time Out: 1115  Total Billable Time: 45 minutes     SUBJECTIVE     Pt reports: Doing well, no complaints  She was not compliant with home exercise program.  Response to previous treatment: ano problems stated  Functional change: ongoing    Pain: 0/10  Location:  Not Applicable      OBJECTIVE     Objective Measures updated at progress report unless specified.     Treatment     Claire received the treatments listed below:      therapeutic exercises to develop strength, endurance, and range of motion for 45 minutes including:    Recumbent bike Level 5 15 minutes with verbal cues to increase Revolutions Per Minute to 40+    Sit:  Hamstring stretch with foot on stool performing dorsiflexion/plantarflexion 1 minute Right Left     Shuttle:  37# Bilateral leg press 4 minutes  37# Bilateral heel raises/heel dips 2 sets of 10   18# Unilateral leg press 3 sets of 10 Right Left     SportsArt verbal cues to decrease speed and increase rest break between repetitions :  11# Bilateral knee flexion 2 sets of 15   11# Bilateral knee extension 3 sets of 10     Sit <> Stand 5 times moderate assistance for anterior lean    Parallel bars:  Bilateral Heel Raises 10 times  Bilateral Mini Squats 10 times  March in place 10 times Right Left alternating  Hip abduction 10 times Right Left alternating  Hip extension 10 times Right  Left alternating  Hamstring curls 10 times Right Left alternating    Gait with 4 wheel walker 2x130 feet     Patient Education and Home Exercises     Home Exercises Provided and Patient Education Provided     Education provided:   - Patient provided with verbal and demonstrative instruction for all activities performed in today's session.    Written Home Exercises Provided: Patient instructed to cont prior HEP. Exercises were reviewed and Claire was able to demonstrate them prior to the end of the session and demonstrated good understanding of the education provided. See EMR under Patient Instructions for exercises provided during therapy sessions    ASSESSMENT     Claire provided good participation and effort during today's session with treatment focused on lower extremity strengthening/endurance. Claire tolerated recumbent bike with brief rest breaks with increased Revolutions Per Minute where she was able to maintain 35-40 Revolutions Per Minute. Claire needing cues to decrease speed with exercises and increase range.    Claire Is progressing well towards her goals.   Pt prognosis is Fair.     Pt will continue to benefit from skilled outpatient physical therapy to address the deficits listed in the problem list box on initial evaluation, provide pt/family education and to maximize pt's level of independence in the home and community environment.     Pt's spiritual, cultural and educational needs considered and pt agreeable to plan of care and goals.     Anticipated barriers to physical therapy: None at this time    Goals:    Short Term Goals: 5 weeks Date established:  Status:    1.  Patient will demonstrate improve endurance and activity tolerance as evidenced by ability to perform Nu-step x 15 minutes without rests breaks. 3/13/23  progressing   2. Pt will perform sit to stand with least restrictive assistive device with contact guard assist  3/14/2023    progressing         Long Term Goals: 10 weeks  Date  established: Status:    1. Patient will be independent and compliant with updated HEP. 3/13/23       progressing   2.Patient will improve her FOTO score from 40 % limited  to at least 32 % limited for improved self perception of functional mobility.(score 0-100, high score indicates greater level of function) 3/13/23    Ongoing    3. Pt will improve simple transfers from mod A to contact guard assist  to improve independence and safety 3/13/23     progressing   4.  Pt will improve car transfer from mod A  to contact guard assist  to improve independence and safety 3/13/23    progressing   5. Pt will be able to ambulate 150  ft with standby assist  assist to improve function.  3/13/23    progressing       PLAN     Plan of care Certification: 3/14/23- 5/23/23     Outpatient Physical Therapy 3 times weekly for 10 weeks to include the following interventions: Gait Training, Manual Therapy, Moist Heat/ Ice, Neuromuscular Re-ed, Patient Education, Therapeutic Activities, and Therapeutic Exercise.     Marlena Pascal, PTA

## 2023-04-18 NOTE — PROGRESS NOTES
"[facility]   Physical Therapy Treatment Note     Name: Claire Bowser  Clinic Number: [id number]    Therapy Diagnosis:   Encounter Diagnoses   Name Primary?    Impaired strength of lower extremity Yes    Impaired functional mobility, balance, gait, and endurance        Physician: Oleg Ross MD    Visit Date: 4/19/2023    Initial Evaluation Date: 2/17/2023  Reevaluation date 3/14/23  Authorization Period Expiration: 4/30/23  Plan of Care Expiration: 5/23/23  Visit # / Visits authorized: 12/15       PTA Visit #: 0/5     Precautions: Standard, blood thinners, and Fall    Time In: 9:30 am   Time Out: 10:15 am   Total Billable Time: 45 minutes     SUBJECTIVE     Pt reports: " I am doing good." repotrs no new issues.     She was compliant with home exercise program.  Response to previous treatment: good. reports walking farther at home.   Functional change: ongoing    Pain: 0/10    Location:  Not Applicable      OBJECTIVE     Objective Measures updated at progress report unless specified.     Treatment     Claire received the treatments listed below:      Claire participated in gait training to improve functional mobility and safety for 20  minutes, including:    - pt ambulated 240 ft no assistive device indoors supervision no assistive device   - pt ambulated 300 ft no assistive device outdoors on uneven surfaces  - pt ambulated on grass 250 ft no assistive device supervision   - pt ambulated up and down 60 ft ramp x 3 trials supervision no assistive device.       therapeutic exercises to develop strength, endurance, and range of motion for 25 minutes including:    Sci fit bilateral  upper extremity/ lower extremity level 4 12 minutes.     Shuttle:  37# Bilateral leg press  3x10  37# Bilateral heel raises/heel dips 2 sets of 10   25# Unilateral leg press 3 sets of 10 Right Left   25# bilateral calf raises 3x10     SportsArt   11# Bilateral knee flexion 2 sets of 15   11# Bilateral knee " extension 3 sets of 10     (Therapeutic rest breaks in between)    Patient Education and Home Exercises     Home Exercises Provided and Patient Education Provided     Education provided:     - educated about safety walking with no assistive device. Reports understanding.     Written Home Exercises Provided: Patient instructed to cont prior HEP. Exercises were reviewed and Claire was able to demonstrate them prior to the end of the session and demonstrated good understanding of the education provided. See EMR under Patient Instructions for exercises provided during therapy sessions    ASSESSMENT     Tolerated session well. Working on outdoor mobility on various surfaces. Pt is improving and tolerating therapy well. Tolerating increased weight on shuttle machine today.     Claire Is progressing well towards her goals.   Pt prognosis is Fair.     Pt will continue to benefit from skilled outpatient physical therapy to address the deficits listed in the problem list box on initial evaluation, provide pt/family education and to maximize pt's level of independence in the home and community environment.     Pt's spiritual, cultural and educational needs considered and pt agreeable to plan of care and goals.     Anticipated barriers to physical therapy: None at this time    Goals:    Short Term Goals: 5 weeks Date established:  Status:    1.  Patient will demonstrate improve endurance and activity tolerance as evidenced by ability to perform Nu-step x 15 minutes without rests breaks. 3/13/23  progressing   2. Pt will perform sit to stand with least restrictive assistive device with contact guard assist  3/14/2023    progressing         Long Term Goals: 10 weeks  Date established: Status:    1. Patient will be independent and compliant with updated HEP. 3/13/23       progressing   2.Patient will improve her FOTO score from 40 % limited  to at least 32 % limited for improved self perception of functional mobility.(score  0-100, high score indicates greater level of function) 3/13/23    Ongoing    3. Pt will improve simple transfers from mod A to contact guard assist  to improve independence and safety 3/13/23     progressing   4.  Pt will improve car transfer from mod A  to contact guard assist  to improve independence and safety 3/13/23    progressing   5. Pt will be able to ambulate 150  ft with standby assist  assist to improve function.  3/13/23    progressing       PLAN     Plan of care Certification: 3/14/23- 5/23/23     Outpatient Physical Therapy 3 times weekly for 10 weeks to include the following interventions: Gait Training, Manual Therapy, Moist Heat/ Ice, Neuromuscular Re-ed, Patient Education, Therapeutic Activities, and Therapeutic Exercise.     Recommendations for next session:   - strength and endurance.     Linh Sharif, PT

## 2023-04-19 ENCOUNTER — CLINICAL SUPPORT (OUTPATIENT)
Dept: REHABILITATION | Facility: HOSPITAL | Age: 63
End: 2023-04-19
Payer: MEDICAID

## 2023-04-19 DIAGNOSIS — R53.1 DECREASED STRENGTH: Primary | ICD-10-CM

## 2023-04-19 DIAGNOSIS — Z74.09 IMPAIRED FUNCTIONAL MOBILITY, BALANCE, AND ENDURANCE: ICD-10-CM

## 2023-04-19 DIAGNOSIS — Z74.09 IMPAIRED FUNCTIONAL MOBILITY, BALANCE, GAIT, AND ENDURANCE: ICD-10-CM

## 2023-04-19 DIAGNOSIS — R29.898 IMPAIRED STRENGTH OF LOWER EXTREMITY: Primary | ICD-10-CM

## 2023-04-19 PROCEDURE — 97530 THERAPEUTIC ACTIVITIES: CPT | Mod: PN

## 2023-04-19 PROCEDURE — 97110 THERAPEUTIC EXERCISES: CPT | Mod: PN

## 2023-04-19 NOTE — PROGRESS NOTES
"[facility]  Occupational Therapy Treatment Note    Date: 4/19/2023  Name: Claire Bowser  Clinic Number: [id number]    Therapy Diagnosis:   Encounter Diagnoses   Name Primary?    Decreased strength Yes    Impaired functional mobility, balance, and endurance      Physician: Oleg Ross MD    Physician Orders: OT evaluation and treat   Medical Diagnosis:   K65.1 (ICD-10-CM) - Intra-abdominal abscess   R63.4 (ICD-10-CM) - Weight loss, unintentional   R53.1 (ICD-10-CM) - Generalized weakness   Evaluation Date: 4/10/2023  Progress note due: 5/8/2023  Plan of Care Expiration Period: 6/9/2023  Insurance Authorization period Expiration: 12/31/2023  Date of Return to MD: tba  Visit # / Visits Authorized: 1 / 2  FOTO: to be assessed      Time In: 0807   Time Out: 0835  Total Billable (one on one) Time: 38 minutes     Precautions: Standard    SUBJECTIVE     Pt reports: no concerns this morning. With help of medication, she's been eating more.  She was compliant with home exercise program given last session (none given from eval)  Response to previous treatment: no concerns from eval  Functional change:  no significant change compared to previous session per pt report     Pain: 0/10  Location:  patient denies pain today     OBJECTIVE     Objective Measures updated at progress report unless specified.    Treatment     Claire received the treatments listed below:     Therapeutic activities to improve functional performance for 38  minutes, including:    -UBE x5 min forward/ 5 min back w/ cristian UE, level 1.0. she completed 3.5 minutes standing and then requested to sit. She completed the remainder of the task seated. Task completed to increase cristian UE act peter and UB strength. She was encouraged to keep rate of perceived exertion (RPE) at "easy to moderate" (3-4/10). Mustafa avg=2, peak mustafa=4. Pt's reported RPE= 5/10.    -standing x 17m30s while completing word-search activity on iPad.    Bilateral " upper extremity strengthening initiated with AROM. Completed bilateral shoulder abduction, alternating shoulder flexion & internal rotation (hands behind back)/ external rotation (hands behind neck) x10 each.    Patient Education and Home Exercises      Education provided:   - progressing of current HEP  - Progress towards goals     Written Home Exercises Provided: yes.  Exercises were reviewed and Claire was able to demonstrate them prior to the end of the session.  Claire demonstrated good  understanding of the HEP provided. See EMR under Patient Instructions for exercises provided during therapy sessions.       Assessment     Pt would continue to benefit from skilled OT. Claire tolerated standing well today, though tolerance was significantly limited with upper extremity activity. Claire can continue to benefit from skilled occupational therapy to improve strength and activity tolerance for increased IADL participation, independence & efficiency.     Claire is progressing well towards her goals and there are no updates to goals at this time. Pt prognosis is Good.     Pt will continue to benefit from skilled outpatient occupational therapy to address the deficits listed in the problem list on initial evaluation provide pt/family education and to maximize pt's level of independence in the home and community environment.     Pt's spiritual, cultural and educational needs considered and pt agreeable to plan of care and goals.    Anticipated barriers to occupational therapy: none    Goals:     Short Term Goals (4 weeks) Status When Last Assessed  Progressing/ Met   1) (I) with HEP  TBA  (progressing 4/19/2023)    2) Mod I / Transfer from toilet  Mod A   (progressing 4/19/2023)          LongTerm Goals (8 weeks) Status When Last Assessed  Progressing/ Met   1)  Mod I / reaching on low shelf of fridge without loss of balance  Unable  (progressing 4/19/2023)    2) Will have endurance to stand for 10 minutes to  complete the ergometer on level 2.5  3.5 minutes on level 1 on 4/19 (progressing 4/19/2023)     3) Will increase bilateral  strength by 10 lbs for increased ability to participate in desired activities  L = 20. 6  R = 20.3  (progressing 4/19/2023)    4) 4+/5 gross shoulder strength  3+/5 (progressing 4/19/2023)       Additional functional goals to be added as pt progresses and per her priorities.   Plan   Certification Period/Plan of care expiration: 4/10/2023 to 6/9/2023.     Outpatient Occupational Therapy 2 times weekly for 8 weeks to include the following interventions: Manual Therapy, Patient Education, Self Care, Therapeutic Activities, and Therapeutic Exercise.    Updates/Grading for next session: progress standing UBE as tolerated; review new HEP and progress as tolerated.      Lisa Mcintyre OT

## 2023-04-19 NOTE — PATIENT INSTRUCTIONS
To progress weighted exercises:   When 3 sets of 10 is fairly easy, increase reps to 15 per set.   When that is fairly easy, increase your current weight by 1# and decrease to 2 sets of 10.   Then increase to 3x10 and then 3x15.  Repeat this progression with more challenging weights as desired/ able (with good mechanics).

## 2023-04-21 ENCOUNTER — CLINICAL SUPPORT (OUTPATIENT)
Dept: REHABILITATION | Facility: HOSPITAL | Age: 63
End: 2023-04-21
Payer: MEDICAID

## 2023-04-21 DIAGNOSIS — Z74.09 IMPAIRED FUNCTIONAL MOBILITY, BALANCE, AND ENDURANCE: ICD-10-CM

## 2023-04-21 DIAGNOSIS — R53.1 DECREASED STRENGTH: Primary | ICD-10-CM

## 2023-04-21 DIAGNOSIS — R29.898 IMPAIRED STRENGTH OF LOWER EXTREMITY: Primary | ICD-10-CM

## 2023-04-21 DIAGNOSIS — Z74.09 IMPAIRED FUNCTIONAL MOBILITY, BALANCE, GAIT, AND ENDURANCE: ICD-10-CM

## 2023-04-21 PROCEDURE — 97530 THERAPEUTIC ACTIVITIES: CPT | Mod: PN

## 2023-04-21 PROCEDURE — 97110 THERAPEUTIC EXERCISES: CPT | Mod: PN,CQ

## 2023-04-21 NOTE — PROGRESS NOTES
"[facility]  Occupational Therapy Treatment Note    Date: 4/21/2023  Name: Claire Bowser  Clinic Number: [id number]    Therapy Diagnosis:   Encounter Diagnoses   Name Primary?    Decreased strength Yes    Impaired functional mobility, balance, and endurance      Physician: Oleg Ross MD    Physician Orders: OT evaluation and treat   Medical Diagnosis:   K65.1 (ICD-10-CM) - Intra-abdominal abscess   R63.4 (ICD-10-CM) - Weight loss, unintentional   R53.1 (ICD-10-CM) - Generalized weakness   Evaluation Date: 4/10/2023  Progress note due: 5/8/2023  Plan of Care Expiration Period: 6/9/2023  Insurance Authorization period Expiration: 12/31/2023  Date of Return to MD: anthonya  Visit # / Visits Authorized: 2 / 2 + eval   FOTO: to be assessed      Time In: 8:50 AM   Time Out: 9:30 AM   Total Billable (one on one) Time: 40 minutes     Precautions: Standard    SUBJECTIVE     Pt reports: No complaints today.     She was not compliant with home exercise program given last session     Response to previous treatment: no concerns from eval  Functional change:  no significant change compared to previous session per pt report     Pain: 0/10  Location:  patient denies pain today     OBJECTIVE     Objective Measures updated at progress report unless specified.    Treatment     Claire received the treatments listed below:     Therapeutic activities to improve functional performance for 40  minutes, including:    -UBE x5 min forward/ 5 min back w/ cristian UE, level 1.0. she completed 7 minutes standing and then requested to sit. She completed the remainder of the task seated. Task completed to increase cristian UE act peter and UB strength. She was encouraged to keep rate of perceived exertion (RPE) at "easy to moderate" (3-4/10). Mustafa avg=2, peak mustafa=6. Pt's reported RPE= 5/10.    Bilateral upper extremity strengthening initiated with AROM. Completed bilateral shoulder abduction, alternating shoulder flexion & " internal rotation (hands behind back)/ external rotation (hands behind neck) 3x10 each.    4 hand grippers with increasing resistance for increased hand strength - 10x each hand, each gripper     Patient Education and Home Exercises      Education provided:   - progressing of current HEP  - Progress towards goals     Written Home Exercises Provided: yes.  Exercises were reviewed and Claire was able to demonstrate them prior to the end of the session.  Claire demonstrated good  understanding of the HEP provided. See EMR under Patient Instructions for exercises provided during therapy sessions.       Assessment     Pt would continue to benefit from skilled OT. Claire was given HEP and asked to complete the exercises given by reading. She had min difficulty reading her HEP and following the instructions. Due to decreased strength and range of motion, she required mod verbal cues to correct form and body positioning. She was able to stand longer on UBE today versus last session. Claire can continue to benefit from skilled occupational therapy to improve strength and activity tolerance for increased IADL participation, independence & efficiency.     Claire is progressing well towards her goals and there are no updates to goals at this time. Pt prognosis is Good.     Pt will continue to benefit from skilled outpatient occupational therapy to address the deficits listed in the problem list on initial evaluation provide pt/family education and to maximize pt's level of independence in the home and community environment.     Pt's spiritual, cultural and educational needs considered and pt agreeable to plan of care and goals.    Anticipated barriers to occupational therapy: none    Goals:     Short Term Goals (4 weeks) Status When Last Assessed  Progressing/ Met   1) (I) with HEP  TBA  (progressing 4/21/2023)    2) Mod I / Transfer from toilet  Mod A   (progressing 4/21/2023)          LongTerm Goals (8 weeks) Status When  Last Assessed  Progressing/ Met   1)  Mod I / reaching on low shelf of fridge without loss of balance  Unable  (progressing 4/21/2023)    2) Will have endurance to stand for 10 minutes to complete the ergometer on level 2.5  3.5 minutes on level 1 on 4/19 (progressing 4/21/2023)     3) Will increase bilateral  strength by 10 lbs for increased ability to participate in desired activities  L = 20. 6  R = 20.3  (progressing 4/21/2023)    4) 4+/5 gross shoulder strength  3+/5 (progressing 4/21/2023)       Additional functional goals to be added as pt progresses and per her priorities.   Plan   Certification Period/Plan of care expiration: 4/10/2023 to 6/9/2023.     Outpatient Occupational Therapy 2 times weekly for 8 weeks to include the following interventions: Manual Therapy, Patient Education, Self Care, Therapeutic Activities, and Therapeutic Exercise.    Updates/Grading for next session: progress standing UBE as tolerated; review new HEP and progress as tolerated.    Jen Regalado OT

## 2023-04-21 NOTE — PROGRESS NOTES
[facility]   Physical Therapy Treatment Note     Name: Claire Bowser  Clinic Number: [id number]    Therapy Diagnosis:   Encounter Diagnoses   Name Primary?    Impaired strength of lower extremity Yes    Impaired functional mobility, balance, gait, and endurance        Physician: Oleg Ross MD    Visit Date: 4/21/2023    Initial Evaluation Date: 2/17/2023  Reevaluation date 3/14/23  Authorization Period Expiration: 4/30/23  Plan of Care Expiration: 5/23/23  Visit # / Visits authorized: 13/15       PTA Visit #: 1/5     Precautions: Standard, blood thinners, and Fall    Time In: 0930  Time Out: 1010  Total Billable Time: 40 minutes     SUBJECTIVE     Pt reports: Doing okay, no complaints  She was compliant with home exercise program.  Response to previous treatment: no problems stated  Functional change: ongoing    Pain: 0/10    Location:  Not Applicable      OBJECTIVE     Objective Measures updated at progress report unless specified.     Treatment     Claire received the treatments listed below:      therapeutic exercises to develop strength, endurance, and range of motion for 40 minutes including:    Recumbent bike Level 4 15 minutes with verbal cues to increase Revolutions Per Minute to 40+    Sit<>Stand 5 times with decreasing elevation x4 with verbal cues to increase hip abduction, assist for anterior lean last set     Sit trunk flexion with Theraball 3 sets of 10     SportsArt   11# Bilateral knee flexion 3 sets of 10   11# Bilateral knee extension 3 sets of 10     Parallel bars:  Step up 4 inch box 1x10 Right Left   Step up 8 inch box 1x10 Right Left     (Therapeutic rest breaks in between)    Patient Education and Home Exercises     Home Exercises Provided and Patient Education Provided     Education provided:     -Patient provided with verbal and demonstrative instruction for all activities performed in today's session.     Written Home Exercises Provided: Patient  instructed to cont prior HEP.  See EMR under Patient Instructions for exercises provided during therapy sessions    ASSESSMENT     Claire provided good participation and effort during today's session with treatment focused on lower extremity strengthening and endurance. Claire tolerated activities without complaints and minimal rest breaks. Attempted Treadmill but patient unable to pull self to stand, assisted to stand while sitting end of treadmill.    Claire Is progressing well towards her goals.   Pt prognosis is Fair.     Pt will continue to benefit from skilled outpatient physical therapy to address the deficits listed in the problem list box on initial evaluation, provide pt/family education and to maximize pt's level of independence in the home and community environment.     Pt's spiritual, cultural and educational needs considered and pt agreeable to plan of care and goals.     Anticipated barriers to physical therapy: None at this time    Goals:    Short Term Goals: 5 weeks Date established:  Status:    1.  Patient will demonstrate improve endurance and activity tolerance as evidenced by ability to perform Nu-step x 15 minutes without rests breaks. 3/13/23  progressing   2. Pt will perform sit to stand with least restrictive assistive device with contact guard assist  3/14/2023    progressing         Long Term Goals: 10 weeks  Date established: Status:    1. Patient will be independent and compliant with updated HEP. 3/13/23       progressing   2.Patient will improve her FOTO score from 40 % limited  to at least 32 % limited for improved self perception of functional mobility.(score 0-100, high score indicates greater level of function) 3/13/23    Ongoing    3. Pt will improve simple transfers from mod A to contact guard assist  to improve independence and safety 3/13/23     progressing   4.  Pt will improve car transfer from mod A  to contact guard assist  to improve independence and safety 3/13/23     progressing   5. Pt will be able to ambulate 150  ft with standby assist  assist to improve function.  3/13/23    progressing       PLAN     Plan of care Certification: 3/14/23- 5/23/23     Outpatient Physical Therapy 3 times weekly for 10 weeks to include the following interventions: Gait Training, Manual Therapy, Moist Heat/ Ice, Neuromuscular Re-ed, Patient Education, Therapeutic Activities, and Therapeutic Exercise.     Marlena Pascal, PTA

## 2023-04-24 ENCOUNTER — CLINICAL SUPPORT (OUTPATIENT)
Dept: REHABILITATION | Facility: HOSPITAL | Age: 63
End: 2023-04-24
Payer: MEDICAID

## 2023-04-24 DIAGNOSIS — R53.1 DECREASED STRENGTH: Primary | ICD-10-CM

## 2023-04-24 DIAGNOSIS — R29.898 IMPAIRED STRENGTH OF LOWER EXTREMITY: Primary | ICD-10-CM

## 2023-04-24 DIAGNOSIS — Z74.09 IMPAIRED FUNCTIONAL MOBILITY, BALANCE, GAIT, AND ENDURANCE: ICD-10-CM

## 2023-04-24 DIAGNOSIS — Z74.09 IMPAIRED FUNCTIONAL MOBILITY, BALANCE, AND ENDURANCE: ICD-10-CM

## 2023-04-24 PROCEDURE — 97530 THERAPEUTIC ACTIVITIES: CPT | Mod: PN

## 2023-04-24 PROCEDURE — 97110 THERAPEUTIC EXERCISES: CPT | Mod: PN

## 2023-04-24 NOTE — PROGRESS NOTES
"[facility]  Occupational Therapy Treatment Note    Date: 4/24/2023  Name: Claire Bowser  Clinic Number: [id number]    Therapy Diagnosis:   Encounter Diagnoses   Name Primary?    Decreased strength Yes    Impaired functional mobility, balance, and endurance      Physician: Oleg Ross MD    Physician Orders: OT evaluation and treat   Medical Diagnosis:   K65.1 (ICD-10-CM) - Intra-abdominal abscess   R63.4 (ICD-10-CM) - Weight loss, unintentional   R53.1 (ICD-10-CM) - Generalized weakness   Evaluation Date: 4/10/2023  Progress note due: 5/8/2023  Plan of Care Expiration Period: 6/9/2023  Insurance Authorization period Expiration: 12/31/2023  Date of Return to MD: tba  Visit # / Visits Authorized: 3 / 2 + eval   FOTO: to be assessed      Time In: 9:30 AM   Time Out: 10:15 AM   Total Billable (one on one) Time: 45 minutes     Precautions: Standard    SUBJECTIVE     Pt reports: No complaints today. She is without her rollator today. She states that she walked around all weekend without it, completing errands.     She was compliant with home exercise program given last session     Response to previous treatment: no concerns from eval  Functional change:  no significant change compared to previous session per pt report     Pain: 0/10  Location:  patient denies pain today     OBJECTIVE     Objective Measures updated at progress report unless specified.    Treatment     Claire received the treatments listed below:     Therapeutic activities to improve functional performance for 45  minutes, including:    -UBE x5 min forward/ 5 min back w/ cristian UE, level 1.2, standing. Task completed to increase cristian UE act peter and UB strength. She was encouraged to keep rate of perceived exertion (RPE) at "easy to moderate" (3-4/10). Mustafa avg=3, peak mustafa=7. Pt's reported RPE= 5/10. "This was really tough."     Bilateral upper extremity strengthening initiated with AROM. Completed bilateral shoulder " abduction, alternating shoulder flexion & internal rotation (hands behind back)/ external rotation (hands behind neck) 2x10 each with no weight added. 1x10 with 1 lb bilateral wrist weights donned.     Sit <> supine / Mod I     Supine chest press to shoulder flexion / 2x10 with mod verbal cues to keep elbows extended    Supine shoulder circles to eccentric lowering 3x10 each shoulder, each direction    Supine scapular depression 1x10     Supine scapular protraction 1x10     Seated scapular retraction 1x10     Sit to stands without support of mat 30sx3     Patient Education and Home Exercises      Education provided:   - progressing of current HEP  - Progress towards goals     Written Home Exercises Provided: yes.  Exercises were reviewed and Claire was able to demonstrate them prior to the end of the session.  Claire demonstrated good  understanding of the HEP provided. See EMR under Patient Instructions for exercises provided during therapy sessions.       Assessment     Pt would continue to benefit from skilled OT. Claire was able to stand for the entire 10 minutes on the ergometer. Mod fatigue reported after completion. Claire participated in various range of motion and strengthening activities both seated and spine. Due to decrease strength of shoulders, she has difficulty performing without shoulder compensation. Mod verbal cues for form and positioning. She did very well with her sit to stands. She was able to complete the activity for 1.5 minutes in total. No difficulty with the sit to stands from the lowest setting on the mat. Claire can continue to benefit from skilled occupational therapy to improve strength and activity tolerance for increased IADL participation, independence & efficiency.     Claire is progressing well towards her goals and there are no updates to goals at this time. Pt prognosis is Good.     Pt will continue to benefit from skilled outpatient occupational therapy to address the  deficits listed in the problem list on initial evaluation provide pt/family education and to maximize pt's level of independence in the home and community environment.     Pt's spiritual, cultural and educational needs considered and pt agreeable to plan of care and goals.    Anticipated barriers to occupational therapy: none    Goals:     Short Term Goals (4 weeks) Status When Last Assessed  Progressing/ Met   1) (I) with HEP  TBA  (progressing 4/24/2023)    2) Mod I / Transfer from toilet  Mod A   (progressing 4/24/2023)          LongTerm Goals (8 weeks) Status When Last Assessed  Progressing/ Met   1)  Mod I / reaching on low shelf of fridge without loss of balance  Unable  (progressing 4/24/2023)    2) Will have endurance to stand for 10 minutes to complete the ergometer on level 2.5  3.5 minutes on level 1 on 4/19 (progressing 4/24/2023)     3) Will increase bilateral  strength by 10 lbs for increased ability to participate in desired activities  L = 20. 6  R = 20.3  (progressing 4/24/2023)    4) 4+/5 gross shoulder strength  3+/5 (progressing 4/24/2023)       Additional functional goals to be added as pt progresses and per her priorities.   Plan   Certification Period/Plan of care expiration: 4/10/2023 to 6/9/2023.     Outpatient Occupational Therapy 2 times weekly for 8 weeks to include the following interventions: Manual Therapy, Patient Education, Self Care, Therapeutic Activities, and Therapeutic Exercise.    Updates/Grading for next session: progress standing UBE as tolerated; review new HEP and progress as tolerated.    Jen Regalado, OT

## 2023-04-24 NOTE — PROGRESS NOTES
"[facility]   Physical Therapy Treatment Note     Name: Claire Bowser  Clinic Number: [id number]    Therapy Diagnosis:   Encounter Diagnoses   Name Primary?    Impaired strength of lower extremity Yes    Impaired functional mobility, balance, gait, and endurance        Physician: Oleg Ross MD    Visit Date: 4/24/2023    Initial Evaluation Date: 2/17/2023  Reevaluation date 3/14/23  Authorization Period Expiration: 4/30/23  Plan of Care Expiration: 5/23/23  Visit # / Visits authorized: 14/15       PTA Visit #: 0/5     Precautions: Standard, blood thinners, and Fall    Time In: 10:16 am (early arrival for PT)  Time Out: 11:00 am   Total Billable Time: 44 minutes     SUBJECTIVE     Pt reports: "Doing okay, nothing new." Pt reports she has not used her rollator since Friday and has been doing good without it.     She was compliant with home exercise program.  Response to previous treatment: no problems stated  Functional change: ongoing     Pain: 0/10     Location:  Not Applicable      OBJECTIVE     Objective Measures updated at progress report unless specified.     Treatment     Claire received the treatments listed below:      therapeutic exercises to develop strength, endurance, and range of motion for 34 minutes including:    Sci fit level 3 bilateral upper extremity/ lower extremity 10 minutes.       In parallel bars:   -Step ups onto 7 inch step 3x10 each lower extremity leading. Minimal upper extremity use on bars.   - lateral step ups onto 7 inch step 3x10 each lower extremity . Bilateral upper extremity  support on parallel bars.     - pt ascended/ descended 20 stairs 1 handrail standby assist reciprocal pattern.       (Therapeutic rest breaks in between)      Claire participated in gait training to improve functional mobility and safety for 10  minutes, including:      pt ambulated 550 ft no assistive device supervision. Requires rest break after.       Patient Education " and Home Exercises     Home Exercises Provided and Patient Education Provided     Education provided:     Educated about eccentric control and using her legs to step up versus her arms. Reports understanding.      Written Home Exercises Provided: Patient instructed to cont prior HEP.  See EMR under Patient Instructions for exercises provided during therapy sessions    ASSESSMENT     Tolerated session well. Working on step ups and stair training as well as walking endurance. Pt is no longer using rollator and is ambulating with supervision with no assistive device. She is progressing well and is a good candidate for physical therapy.     Claire Is progressing well towards her goals.   Pt prognosis is Fair.     Pt will continue to benefit from skilled outpatient physical therapy to address the deficits listed in the problem list box on initial evaluation, provide pt/family education and to maximize pt's level of independence in the home and community environment.     Pt's spiritual, cultural and educational needs considered and pt agreeable to plan of care and goals.     Anticipated barriers to physical therapy: None at this time    Goals:    Short Term Goals: 5 weeks Date established:  Status:    1.  Patient will demonstrate improve endurance and activity tolerance as evidenced by ability to perform Nu-step x 15 minutes without rests breaks. 3/13/23  progressing   2. Pt will perform sit to stand with least restrictive assistive device with contact guard assist  3/14/2023    progressing         Long Term Goals: 10 weeks  Date established: Status:    1. Patient will be independent and compliant with updated HEP. 3/13/23       progressing   2.Patient will improve her FOTO score from 40 % limited  to at least 32 % limited for improved self perception of functional mobility.(score 0-100, high score indicates greater level of function) 3/13/23    Ongoing    3. Pt will improve simple transfers from mod A to contact guard  assist  to improve independence and safety 3/13/23     progressing   4.  Pt will improve car transfer from mod A  to contact guard assist  to improve independence and safety 3/13/23    progressing   5. Pt will be able to ambulate 150  ft with standby assist  assist to improve function.  3/13/23    progressing       PLAN     Plan of care Certification: 3/14/23- 5/23/23     Outpatient Physical Therapy 3 times weekly for 10 weeks to include the following interventions: Gait Training, Manual Therapy, Moist Heat/ Ice, Neuromuscular Re-ed, Patient Education, Therapeutic Activities, and Therapeutic Exercise.     Recommendations for next session:   - strength and walking endurance.     Linh Sharif, PT

## 2023-04-28 ENCOUNTER — CLINICAL SUPPORT (OUTPATIENT)
Dept: REHABILITATION | Facility: HOSPITAL | Age: 63
End: 2023-04-28
Payer: MEDICAID

## 2023-04-28 DIAGNOSIS — Z74.09 IMPAIRED FUNCTIONAL MOBILITY, BALANCE, GAIT, AND ENDURANCE: ICD-10-CM

## 2023-04-28 DIAGNOSIS — R29.898 IMPAIRED STRENGTH OF LOWER EXTREMITY: Primary | ICD-10-CM

## 2023-04-28 DIAGNOSIS — Z74.09 IMPAIRED FUNCTIONAL MOBILITY, BALANCE, AND ENDURANCE: ICD-10-CM

## 2023-04-28 DIAGNOSIS — R53.1 DECREASED STRENGTH: Primary | ICD-10-CM

## 2023-04-28 PROCEDURE — 97530 THERAPEUTIC ACTIVITIES: CPT | Mod: PN

## 2023-04-28 PROCEDURE — 97110 THERAPEUTIC EXERCISES: CPT | Mod: PN

## 2023-04-28 NOTE — PROGRESS NOTES
"[facility]   Physical Therapy Treatment Note     Name: Claire Bowser  Clinic Number: [id number]    Therapy Diagnosis:   Encounter Diagnoses   Name Primary?    Impaired strength of lower extremity Yes    Impaired functional mobility, balance, gait, and endurance        Physician: Oleg Ross MD    Visit Date: 4/28/2023    Initial Evaluation Date: 2/17/2023  Reevaluation date 3/14/23  Authorization Period Expiration: 4/30/23  Plan of Care Expiration: 5/23/23  Visit # / Visits authorized: 14/15       PTA Visit #: 0/5     Precautions: Standard, blood thinners, and Fall    Time In: 9:30   Time Out: 10:13  Total Billable Time: 43 minutes     SUBJECTIVE     Pt reports:  " I am doing great. Feeling good."    She was compliant with home exercise program.  Response to previous treatment: no problems stated  Functional change: ongoing     Pain: 0/10     Location:  Not Applicable      OBJECTIVE     Objective Measures updated at progress report unless specified.     Treatment     Claire received the treatments listed below:      Claire participated in gait training to improve functional mobility and safety for 23  minutes, including:    Patient ambulated on support treadmill for 13.5 total minutes, achieving 0.372 km,  with parameters as follows:    Body-weight support: none   Speed: 0.46 m/s   Rest breaks required: 1   Use of upper extremity support: bilateral upper extremity support.      Pt ambulated 150 ft x 2 trials supervision no assistive device .     (Therapeutic rest breaks in between)    therapeutic exercises to develop strength, endurance, and range of motion for 20 minutes including:    Sci fit level 3 bilateral upper extremity/ lower extremity 10 minutes.     Pt ascended descended 24 stairs 1 handrail supervision reciprocal pattern.     (Therapeutic rest breaks in between)        Patient Education and Home Exercises     Home Exercises Provided and Patient Education Provided "     Education provided:     - educated about treadmill and safety. Reports understanding.     Written Home Exercises Provided: Patient instructed to cont prior HEP.  See EMR under Patient Instructions for exercises provided during therapy sessions    ASSESSMENT     Tolerated session well. Improving ability to navigate stairs as she was able to do 24 without rest break. Treadmill training initiated for endurance and gait mechanics. Pt tolerated 13.5 minutes without needing a rest break. Motivated and improving well with therapy.     Claire Is progressing well towards her goals.   Pt prognosis is Fair.     Pt will continue to benefit from skilled outpatient physical therapy to address the deficits listed in the problem list box on initial evaluation, provide pt/family education and to maximize pt's level of independence in the home and community environment.     Pt's spiritual, cultural and educational needs considered and pt agreeable to plan of care and goals.     Anticipated barriers to physical therapy: None at this time    Goals:    Short Term Goals: 5 weeks Date established:  Status:    1.  Patient will demonstrate improve endurance and activity tolerance as evidenced by ability to perform Nu-step x 15 minutes without rests breaks. 3/13/23  progressing   2. Pt will perform sit to stand with least restrictive assistive device with contact guard assist  3/14/2023    progressing         Long Term Goals: 10 weeks  Date established: Status:    1. Patient will be independent and compliant with updated HEP. 3/13/23       progressing   2.Patient will improve her FOTO score from 40 % limited  to at least 32 % limited for improved self perception of functional mobility.(score 0-100, high score indicates greater level of function) 3/13/23    Ongoing    3. Pt will improve simple transfers from mod A to contact guard assist  to improve independence and safety 3/13/23     progressing   4.  Pt will improve car transfer from  mod A  to contact guard assist  to improve independence and safety 3/13/23    progressing   5. Pt will be able to ambulate 150  ft with standby assist  assist to improve function.  3/13/23    progressing       PLAN     Plan of care Certification: 3/14/23- 5/23/23     Outpatient Physical Therapy 3 times weekly for 10 weeks to include the following interventions: Gait Training, Manual Therapy, Moist Heat/ Ice, Neuromuscular Re-ed, Patient Education, Therapeutic Activities, and Therapeutic Exercise.     Recommendations for next session:     - strength and walking endurance.     Linh Sharif, PT

## 2023-04-28 NOTE — PROGRESS NOTES
[facility]  Occupational Therapy Treatment Note    Date: 4/28/2023  Name: Claire Bowser  Clinic Number: [id number]    Therapy Diagnosis:   Encounter Diagnoses   Name Primary?    Decreased strength Yes    Impaired functional mobility, balance, and endurance      Physician: Oleg Ross MD    Physician Orders: OT evaluation and treat   Medical Diagnosis:   K65.1 (ICD-10-CM) - Intra-abdominal abscess   R63.4 (ICD-10-CM) - Weight loss, unintentional   R53.1 (ICD-10-CM) - Generalized weakness   Evaluation Date: 4/10/2023  Progress note due: 5/8/2023  Plan of Care Expiration Period: 6/9/2023  Insurance Authorization period Expiration: 12/31/2023  Date of Return to MD: tba  Visit # / Visits Authorized: 4 / 2 + eval   FOTO: to be assessed      Time In: 0845   Time Out: 0930  Total Billable (one on one) Time: 45 minutes     Precautions: Standard    SUBJECTIVE     Pt reports: Claire's biggest complaint today is low activity tolerance.     She was compliant with home exercise program given last session     Response to previous treatment: no concerns from eval  Functional change:  improving ability to accomplish things at home, but still with multiple rest breaks for light to moderate activity.     Pain: 0/10  Location:  patient denies pain today     OBJECTIVE     Objective Measures updated at progress report unless specified.    Treatment     Claire received the treatments listed below:     Therapeutic activities to improve functional performance for 45  minutes, including:    Bilateral upper extremity strengthening, 1#, 3x10 for alternating shoulder flexion & internal rotation (hands behind back)/ external rotation (hands behind neck)  Bilateral upper extremity strengthening, 1/2#, 3x10 for bilateral shoulder abduction, alt shoulder flexion     Peach band for bilateral external rotation, right & left internal rotation, 3x10 each for strengthening.    -UBE x5 min forward/ 5 min back w/ cristian  "UE, level 1.3, standing. Task completed to increase cristian UE act peter and UB strength. She was encouraged to keep rate of perceived exertion (RPE) at "easy to moderate" (3-4/10), but said she was unsure of how to  this. OT encouraged her to keep MET's at 1.5 or better (noted on UBE LCD screen). Whitaker avg=5, peak whitaker=10. Pt's reported RPE= 5/10.     Bilateral upper extremity overhead reaching with bilateral 1/2# wrist weights to move tumble blocks to a higher shelf, 4x3. Repeated with AROM only to improve activity tolerance.       Patient Education and Home Exercises      Education provided:   - progressing of current HEP  - Progress towards goals     Written Home Exercises Provided: yes.  Exercises were reviewed and Claire was able to demonstrate them prior to the end of the session.  Claire demonstrated good  understanding of the HEP provided. See EMR under Patient Instructions for exercises provided during therapy sessions.       Assessment     Pt would continue to benefit from skilled OT. Claire required multiple rest breaks throughout today's session, but with those breaks, was able to complete all the activities asked of her. Overhead tasks remain difficulty due to decreased strength in the shoulders, as well as min concerns with scapulo-humeral rhythm due to mild kyphosis and rounding of the shoulder resulting from her extended hospital stay.   Claire can continue to benefit from skilled occupational therapy to improve strength and activity tolerance for increased IADL participation, independence & efficiency.     Claire is progressing well towards her goals and there are no updates to goals at this time. Pt prognosis is Good.     Pt will continue to benefit from skilled outpatient occupational therapy to address the deficits listed in the problem list on initial evaluation provide pt/family education and to maximize pt's level of independence in the home and community environment.     Pt's spiritual, " cultural and educational needs considered and pt agreeable to plan of care and goals.    Anticipated barriers to occupational therapy: none    Goals:     Short Term Goals (4 weeks) Status When Last Assessed  Progressing/ Met   1) (I) with HEP   MET for initial HEP 4/28/2023   2) Mod I / Transfer from toilet   MET 4/28/2023         LongTerm Goals (8 weeks) Status When Last Assessed  Progressing/ Met   1)  Mod I / reaching on low shelf of fridge without loss of balance  Unable  (progressing 4/28/2023)    2) Will have endurance to stand for 10 minutes to complete the ergometer on level 2.5  3.5 minutes on level 1 on 4/19 (progressing 4/28/2023)     3) Will increase bilateral  strength by 10 lbs for increased ability to participate in desired activities  L = 20. 6  R = 20.3  (progressing 4/28/2023)    4) 4+/5 gross shoulder strength  3+/5 (progressing 4/28/2023)       Additional functional goals to be added as pt progresses and per her priorities.   Plan   Certification Period/Plan of care expiration: 4/10/2023 to 6/9/2023.     Outpatient Occupational Therapy 2 times weekly for 8 weeks to include the following interventions: Manual Therapy, Patient Education, Self Care, Therapeutic Activities, and Therapeutic Exercise.    Updates/Grading for next session: progress standing UBE as tolerated; progress/ add to HEP as tolerated; progress activity tolerance    Lisa Mcintyre OT

## 2023-05-01 ENCOUNTER — CLINICAL SUPPORT (OUTPATIENT)
Dept: REHABILITATION | Facility: HOSPITAL | Age: 63
End: 2023-05-01
Payer: MEDICAID

## 2023-05-01 DIAGNOSIS — Z74.09 IMPAIRED FUNCTIONAL MOBILITY, BALANCE, AND ENDURANCE: ICD-10-CM

## 2023-05-01 DIAGNOSIS — Z74.09 IMPAIRED FUNCTIONAL MOBILITY, BALANCE, GAIT, AND ENDURANCE: ICD-10-CM

## 2023-05-01 DIAGNOSIS — R29.898 IMPAIRED STRENGTH OF LOWER EXTREMITY: Primary | ICD-10-CM

## 2023-05-01 DIAGNOSIS — R53.1 DECREASED STRENGTH: Primary | ICD-10-CM

## 2023-05-01 PROCEDURE — 97530 THERAPEUTIC ACTIVITIES: CPT | Mod: PN

## 2023-05-01 PROCEDURE — 97110 THERAPEUTIC EXERCISES: CPT | Mod: PN

## 2023-05-01 NOTE — PROGRESS NOTES
"[facility]  Occupational Therapy Treatment Note    Date: 5/1/2023  Name: Claire Bowser  Clinic Number: [id number]    Therapy Diagnosis:   Encounter Diagnoses   Name Primary?    Decreased strength Yes    Impaired functional mobility, balance, and endurance      Physician: Oleg Ross MD    Physician Orders: OT evaluation and treat   Medical Diagnosis:   K65.1 (ICD-10-CM) - Intra-abdominal abscess   R63.4 (ICD-10-CM) - Weight loss, unintentional   R53.1 (ICD-10-CM) - Generalized weakness   Evaluation Date: 4/10/2023  Progress note due: 5/8/2023  Plan of Care Expiration Period: 6/9/2023  Insurance Authorization period Expiration: 12/31/2023  Date of Return to MD: tba  Visit # / Visits Authorized: 5 / 2 + eval   FOTO: to be assessed      Time In: 0850   Time Out: 0930  Total Billable (one on one) Time: 40 minutes     Precautions: Standard    SUBJECTIVE     Pt reports: she cleaned part of her closet this weekend sitting on a low stool. Had to get help to get up from it. She notes that she still can't raise her arms overhead right.      She was compliant with home exercise program given last session     Response to previous treatment: no concerns from eval  Functional change:  improving ability to accomplish things at home, but still with multiple rest breaks for light to moderate activity.     Pain: 0/10  Location:  patient denies pain today     OBJECTIVE     Objective Measures updated at progress report unless specified.    Treatment     Claire received the treatments listed below:     Therapeutic activities to improve functional performance for 40 minutes, including:    -UBE x5 min forward/ 5 min back w/ cristian UE, level 1.4, standing. Task completed to increase cristian UE act peter and UB strength. She was encouraged to keep rate of perceived exertion (RPE) at "easy to moderate" (3-4/10), but said she was unsure of how to  this. OT encouraged her to keep MET's at 1.5 or better (noted on " "UBE LCD screen). Whitaker avg=6, peak whitaker=10. Pt's reported RPE= 5/10. She required a brief therapeutic rest break at the midway point. She also was able to produce more effort when not engaged in conversation.    Gentle mobilization and stretching of soft-tissues throughout bilateral shoulders, including levator, serratus anterior, subscapularis, anterior complex,  lats and combined internal rotators to allow for increased PROM.  Gentle manual techniques to decrease thoracic kyphosis.  Standing against wall, bilateral scapular retraction & depression followed by abduction only to the point that she's still able to keep arms/ hips/ shoulders against the wall 3x10    Sit<>stand at 22" x3 with mod I.  Sit<>stand x18" x3 with min A & x3 with CGA after ed to increase forward weight shift.     Patient Education and Home Exercises      Education provided:   - progressing of current HEP  - Progress towards goals     Written Home Exercises Provided: yes.  Exercises were reviewed and Claire was able to demonstrate them prior to the end of the session.  Claire demonstrated good  understanding of the HEP provided. See EMR under Patient Instructions for exercises provided during therapy sessions.       Assessment     Pt would continue to benefit from skilled OT. Claire demonstrates slow progress with activity tolerance, but is still only able to consistently achieve 1.5 METs on the UBE when she's not engaged in conversation. Success with overhead reaching is limited by structural changes due to thoracic kyphosis and any improvement in the positioning of her thoracic spine and scapulae should help with bilateral upper extremity function. Sit <>stand from low surfaces is limited by decreased strength and act peter in her quads and difficulty with getting her center of gravity far enough forward. Finesse can continue to benefit from skilled occupational therapy to improve strength and activity tolerance for increased IADL " participation, independence & efficiency.     Claire is progressing well towards her goals and there are no updates to goals at this time. Pt prognosis is Good.     Pt will continue to benefit from skilled outpatient occupational therapy to address the deficits listed in the problem list on initial evaluation provide pt/family education and to maximize pt's level of independence in the home and community environment.     Pt's spiritual, cultural and educational needs considered and pt agreeable to plan of care and goals.    Anticipated barriers to occupational therapy: none    Goals:     Short Term Goals (4 weeks) Status When Last Assessed  Progressing/ Met   1) (I) with HEP   MET for initial HEP 4/28/2023   2) Mod I / Transfer from toilet   MET 4/28/2023         LongTerm Goals (8 weeks) Status When Last Assessed  Progressing/ Met   1)  Mod I / reaching on low shelf of fridge without loss of balance  Unable  (progressing 5/1/2023)    2) Will have endurance to stand for 10 minutes to complete the ergometer on level 2.5  3.5 minutes on level 1 on 4/19 (progressing 5/1/2023)     3) Will increase bilateral  strength by 10 lbs for increased ability to participate in desired activities  L = 20. 6  R = 20.3  (progressing 5/1/2023)    4) 4+/5 gross shoulder strength  3+/5 (progressing 5/1/2023)       Additional functional goals to be added as pt progresses and per her priorities.   Plan   Certification Period/Plan of care expiration: 4/10/2023 to 6/9/2023.     Outpatient Occupational Therapy 2 times weekly for 8 weeks to include the following interventions: Manual Therapy, Patient Education, Self Care, Therapeutic Activities, and Therapeutic Exercise.    Updates/Grading for next session: continue to address thoracic kyphosis; progress standing UBE as tolerated; progress/ add to HEP as tolerated; progress activity tolerance    Lisa Mcintyre OT

## 2023-05-01 NOTE — PLAN OF CARE
"[facility]   Physical Therapy Plan of Care Update    Name: Claire Bowser  Clinic Number: [id number]    Therapy Diagnosis:   Encounter Diagnoses   Name Primary?    Impaired strength of lower extremity Yes    Impaired functional mobility, balance, gait, and endurance        Physician: Oleg Ross MD    Visit Date: 2023    Initial Evaluation Date: 2023  Reevaluation date 3/14/23  Authorization Period Expiration: 23  Updated Plan of Care Expiration: 23  Visit # / Visits authorized: 16/15 (17)       PTA Visit #: 0/5     Precautions: Standard, blood thinners, and Fall    Time In: 9:32 am   Time Out: 10:15 am   Total Billable Time: 43 minutes     SUBJECTIVE     Pt reports: "I am doing good. I sat on a low stool cleaning out my closet and my  had to help me up. " Reports she did not fall but needed help to stand from the low surface.       She was compliant with home exercise program.  Response to previous treatment: no problems stated  Functional change: ongoing     Pain: 0/10   Location:  Not Applicable      OBJECTIVE     Objective Measures updated at progress report unless specified.     Treatment     Claire received the treatments listed below:      therapeutic activities to improve functional performance for 28 minutes, includin/1/23   Single Limb Stance R LE 3 seconds   (<10 sec = HIGH FALL RISK)   Single Limb Stance L LE 4 seconds.   (<10 sec = HIGH FALL RISK)   5 times sit to stand 14.6  seconds bilateral upper extremity support.    TUG 10.33 seconds no assistive device    Self selected walking speed 0.90 m/sec (6m/6.63s) no assistive device    Fast walking speed 1.09 m/sec (6m/5.52s) no assistive device    Functional Gait Assessment 15/30   6 MWT 1046 ft no assistive device . No seated rest breaks required.         Functional Gait Assessment:   1. Gait on level surface =  2   (3) Normal: less than 5.5 sec, no A.D., no imbalance, normal gait " pattern, deviates< 6in   (2) Mild impairment: 7-5.6 sec, uses A.D., mild gait deviations, or deviates 6-10 in   (1) Moderate impairment: > 7 sec, slow speed, imbalance, deviates 10-15 in.   (0) Severe impairment: needs assist, deviates >15 in, reach/touch wall  2. Change in Gait Speed = 2   (3) Normal: smooth change w/o loss of balance or gait deviation, deviates < 6 in, significant difference between speeds   (2) Mild impairment: changes speed, but demonstrates mild gait deviations, deviates 6-10 in, OR no deviations but unable to significantly speed, OR uses A.D.   (1) Moderate impairment: minor changes to speed, OR changes speed w/ significant deviations, deviates 10-15 in, OR  Changes speed , but loses balance & recovers   (0) Severe impairment: cannot change speed, deviates >15 in, or loses balance & needs assist  3. Gait with horizontal head turns  = 2   (3) Normal: no change in gait, deviates <6 in   (2) Mild impairment: slight change in speed, deviates 6-10 in, OR uses A.D.   (1) Moderate impairment: moderate change in speed, deviates 10-15 in   (0) Severe impairment: severe disruption of gait, deviates >15in  4. Gait with vertical head turns = 2   (3) Normal: no change in gait, deviates <6 in   (2) Mild impairment: slight change in speed, deviates 6-10 in OR uses A.D.   (1) Moderate impairment: moderate change in speed, deviates 10-15 in   (0) Severe impairment: severe disruption of gait, deviates >15 in  5. Gait with pivot turns = 2   (3) Normal: performs safely in 3 sec, no LOB   (2) Mild impairment: performs in >3 sec & no LOB, OR turns safely & requires several steps to regain LOB   (1) Moderate impairment: turns slow, OR requires several small steps for balance following turn & stop   (0) Severe impairment: cannot turn safely, needs assist  6. Step over obstacle = 1   (3) Normal: steps over 2 stacked boxes w/o change in speed or LOB   (2) Mild impairment: able to step over 1 box w/o change in speed or  LOB   (1) Moderate impairment: steps over 1 box but must slow down, may require VC   (0) Severe impairment: cannot perform w/o assist  7. Gait with Narrow GENIA = 0   (3) Normal: 10 steps no staggering   (2) Mild impairment: 7-9 steps   (1) Moderate impairment: 4-7 steps   (0) Severe impairment: < 4 steps or cannot perform w/o assist  8. Gait with eyes closed = 1   (3) Normal: < 7 sec, no A.D., no LOB, normal gait pattern, deviates <6 in   (2) Mild impairment: 7.1-9 sec, mild gait deviations, deviates 6-10 in   (1) Moderate impairment: > 9 sec, abnormal pattern, LOB, deviates 10-15 in   (0) Severe impairment: cannot perform w/o assist, LOB, deviates >15in  9. Ambulating Backwards = 1   (3) Normal: no A.D., no LOB, normal gait pattern, deviates <6in   (2) Mild impairment: uses A.D., slower speed, mild gait deviations, deviates 6-10 in   (1) Moderate impairment: slow speed, abnormal gait pattern, LOB, deviates 10-15 in   (0) Severe impairment: severe gait deviations or LOB, deviates >15in  10. Steps = 2   (3) Normal: alternating feet, no rail   (2) Mild Impairment: alternating feet, uses rail   (1) Moderate impairment: step-to, uses rail   (0) Severe impairment: cannot perform safely    Score 15/30     Score:   <22/30 fall risk   <20/30 fall risk in older adults   <18/30 fall risk in Parkinsons      therapeutic exercises to develop strength, endurance, and range of motion for 15 minutes including:    Sci fit level 3 bilateral upper extremity/ lower extremity 15 minutes.     (Therapeutic rest breaks in between)      Patient Education and Home Exercises     Home Exercises Provided and Patient Education Provided     Education provided:     - educated about progress and new plan of care update. Educated about future areas where we can continue to improve and practice. Reports understanding.     Written Home Exercises Provided: Patient instructed to cont prior HEP.  See EMR under Patient Instructions for exercises provided  "during therapy sessions    ASSESSMENT     Pt tolerated session well. Plan of Care Update to reassess goals and create new goals as pt has made significant improvements since physical therapy evaluation. She has met 2/2 short term goals and 4/5 long term goals. Initial goals were more functional mobility, transfer, and ambulation based. She has greatly improved in these areas. Additional objective measures were performed today and new goals written. Although improving significantly, she remains in the " fall risk" category on the 5x sit to stand test, self selected walking speed, Functional Gait assessment and single leg stance. She remains motivated and is a good candidate for physical therapy to continue to improve independence and decrease her fall risk .     Claire Is progressing well towards her goals.   Pt prognosis is Fair.     Pt will continue to benefit from skilled outpatient physical therapy to address the deficits listed in the problem list box on initial evaluation, provide pt/family education and to maximize pt's level of independence in the home and community environment.     Pt's spiritual, cultural and educational needs considered and pt agreeable to plan of care and goals.     Anticipated barriers to physical therapy: None at this time    Goals:    Short Term Goals: 2 weeks Date last assessed Status:    1.  Patient will demonstrate improve endurance and activity tolerance as evidenced by ability to perform Nu-step x 15 minutes without rests breaks. 5/1/23  MET   2. Pt will perform sit to stand with least restrictive assistive device with contact guard assist  5/1/23    MET   3. Pt will improve 5x sit to stand score from 14.6 seconds to less than 12 seconds to decrease fall risk.  5/1/2023   NEW   4. Pt will improve single leg stance on right lower extremity from 3 seconds to atleast 10 seconds to decrease fall risk.  5/1/2023   NEW    5.  Pt will improve single leg stance on left lower extremity " from 4 seconds to atleast 10 seconds to decrease fall risk. 5/1/2023   NEW         Long Term Goals: 4 weeks  Date last assessed Status:    1. Patient will be independent and compliant with updated HEP. 5/1/23       Ongoing/ MET    2.Patient will improve her FOTO score from 40 % limited  to at least 32 % limited for improved self perception of functional mobility.(score 0-100, high score indicates greater level of function) 4/19/23    Ongoing (39% )   3. Pt will improve simple transfers from mod A to contact guard assist  to improve independence and safety 5/1/23     MET   4.  Pt will improve car transfer from mod A  to contact guard assist  to improve independence and safety 5/1/23    MET   5. Pt will be able to ambulate 150  ft with standby assist  assist to improve function.  5/1/23    MET   6. Pt will perform floor transfer with min A or less to improve function  5/1/2023     NEW   7. Pt will be able to perform sit to stand from low surface (less than 18 inches for pt) with supervision to improve function and independence 5/1/2023    NEW   Patient will increase her   gait speed as assessed by the timed 10MWT from 0.90 m/s to 1.04 m/s to increase her safety and (I) with gait at a community level. (MDC for CVA= 0.14 m/s)  5/1/2023     NEW   The patient will demonstrate improved efficiency with functional mobility and decreased risk for falls as evidenced by an increase in her score on the Timed up and Go from 10.3 sec  to 7.4 sec. (Minimal detectable change in chronic stroke = 2.9 seconds)  5/1/2023   NEW   The patient will improve her score on the  Functional Gait Assessment (FGA) from 15/30 to 19/30, indicating improved safety and (I) with dynamic,comunity level gait. (Minimal detectable change for stroke= 4.2 points)  5/1/2023   NEW   Patient will increase her  distance achieved on the 6MWT from 1,046 feet to 1,158 feet to demonstrate improved endurance and efficiency with community level gait. (MDC for CVA =  112 feet) 5/1/2023   NEW       PLAN      Updated Plan of Care Certification: 5/1/23- 5/29/23.     Outpatient Physical Therapy 2 times weekly for 4 weeks to include the following interventions: Gait Training, Manual Therapy, Moist Heat/ Ice, Neuromuscular Re-ed, Patient Education, Therapeutic Activities, and Therapeutic Exercise.     Recommendations for next session:     part task training for floor transfers.   - strength and walking endurance.     Linh Sharif, PT

## 2023-05-03 ENCOUNTER — DOCUMENTATION ONLY (OUTPATIENT)
Dept: REHABILITATION | Facility: HOSPITAL | Age: 63
End: 2023-05-03
Payer: MEDICAID

## 2023-05-03 ENCOUNTER — CLINICAL SUPPORT (OUTPATIENT)
Dept: REHABILITATION | Facility: HOSPITAL | Age: 63
End: 2023-05-03
Payer: MEDICAID

## 2023-05-03 DIAGNOSIS — Z74.09 IMPAIRED FUNCTIONAL MOBILITY, BALANCE, AND ENDURANCE: ICD-10-CM

## 2023-05-03 DIAGNOSIS — R53.1 DECREASED STRENGTH: Primary | ICD-10-CM

## 2023-05-03 DIAGNOSIS — R29.898 IMPAIRED STRENGTH OF LOWER EXTREMITY: Primary | ICD-10-CM

## 2023-05-03 DIAGNOSIS — Z74.09 IMPAIRED FUNCTIONAL MOBILITY, BALANCE, GAIT, AND ENDURANCE: ICD-10-CM

## 2023-05-03 PROCEDURE — 97530 THERAPEUTIC ACTIVITIES: CPT | Mod: PN

## 2023-05-03 PROCEDURE — 97110 THERAPEUTIC EXERCISES: CPT | Mod: PN,CQ

## 2023-05-03 NOTE — PROGRESS NOTES
[facility]   Physical Therapy Treatment Note     Name: Claire Bowser  Clinic Number: [id number]    Therapy Diagnosis:   Encounter Diagnoses   Name Primary?    Impaired strength of lower extremity Yes    Impaired functional mobility, balance, gait, and endurance        Physician: Oleg Ross MD    Visit Date: 5/3/2023    Initial Evaluation Date: 2/17/2023  Reevaluation date 3/14/23  Authorization Period Expiration: 4/30/23  Plan of Care Expiration: 5/23/23  Visit # / Visits authorized: 17/27       PTA Visit #: 1/5     Precautions: Standard, blood thinners, and Fall    Time In: 1015  Time Out: 1105  Total Billable Time: 50 minutes     SUBJECTIVE     Pt reports:  Doing well overall  She was compliant with home exercise program.  Response to previous treatment: no problems stated  Functional change: ongoing, increased mobility overall     Pain: 0/10     Location:  Not Applicable      OBJECTIVE     Objective Measures updated at progress report unless specified.     Treatment     Claire received the treatments listed below:      therapeutic exercises to develop strength, endurance, range of motion, and flexibility for 25 minutes including:    Recumbent bike Level 4.5 15 minutes    Seated:  Hamstring stretch with foot on stool performing quad sets 30 times Right Left      SportsArt   11# Bilateral knee flexion 3 sets of 15,  11# Bilateral knee extension 3 sets of 15      Claire participated in gait training to improve functional mobility and safety for 25 minutes, including:    Virtual-reality treadmill training with focus on visual motion tolerance during functional activity with the following set-up and specifications:     Skilled set-up for use of virtual reality treadmill including: donning of harness with patient in standing, securing all straps for optimal fit of harness and cut off switch for safety.     Date Trial Duration Speed Distance Obstacles Navigation Handrails Score    5/3/23    1 10 min 1.0 mph 459 yards  Not Applicable  Not Applicable  Bilateral   Unilateral Without  420             Patient Education and Home Exercises     Home Exercises Provided and Patient Education Provided     Education provided:     -Patient provided with verbal and demonstrative instruction for all activities performed in today's session.  - Patient instructed to decrease speed with weighted exercises and take a 1 second break between each repetition.     Written Home Exercises Provided: Patient instructed to cont prior HEP.  See EMR under Patient Instructions for exercises provided during therapy sessions    ASSESSMENT     Claire provided good participation and effort during today's session with treatment focused on Bilateral lower extremity strengthening/endurance and gait endurance. Claire tolerated treadmill with step stool to step on/off without assistance and with encouragement to decrease Bilateral upper extremity use.    Claire Is progressing well towards her goals.   Pt prognosis is Fair.     Pt will continue to benefit from skilled outpatient physical therapy to address the deficits listed in the problem list box on initial evaluation, provide pt/family education and to maximize pt's level of independence in the home and community environment.     Pt's spiritual, cultural and educational needs considered and pt agreeable to plan of care and goals.     Anticipated barriers to physical therapy: None at this time    Goals:      Short Term Goals: 2 weeks Date last assessed Status:    1.  Patient will demonstrate improve endurance and activity tolerance as evidenced by ability to perform Nu-step x 15 minutes without rests breaks. 5/1/23  MET   2. Pt will perform sit to stand with least restrictive assistive device with contact guard assist  5/1/23    MET   3. Pt will improve 5x sit to stand score from 14.6 seconds to less than 12 seconds to decrease fall risk.  5/1/2023    ongoing   4. Pt will  improve single leg stance on right lower extremity from 3 seconds to atleast 10 seconds to decrease fall risk.  5/1/2023    ongoing    5.  Pt will improve single leg stance on left lower extremity from 4 seconds to atleast 10 seconds to decrease fall risk. 5/1/2023    ongoing         Long Term Goals: 4 weeks  Date last assessed Status:    1. Patient will be independent and compliant with updated HEP. 5/1/23       Ongoing/ MET    2.Patient will improve her FOTO score from 40 % limited  to at least 32 % limited for improved self perception of functional mobility.(score 0-100, high score indicates greater level of function) 4/19/23    Ongoing (39% )   3. Pt will improve simple transfers from mod A to contact guard assist  to improve independence and safety 5/1/23     MET   4.  Pt will improve car transfer from mod A  to contact guard assist  to improve independence and safety 5/1/23    MET   5. Pt will be able to ambulate 150  ft with standby assist  assist to improve function.  5/1/23    MET   6. Pt will perform floor transfer with min A or less to improve function  5/1/2023       ongoing   7. Pt will be able to perform sit to stand from low surface (less than 18 inches for pt) with supervision to improve function and independence 5/1/2023    ongoing   Patient will increase her   gait speed as assessed by the timed 10MWT from 0.90 m/s to 1.04 m/s to increase her safety and (I) with gait at a community level. (MDC for CVA= 0.14 m/s)  5/1/2023      ongoing   The patient will demonstrate improved efficiency with functional mobility and decreased risk for falls as evidenced by an increase in her score on the Timed up and Go from 10.3 sec  to 7.4 sec. (Minimal detectable change in chronic stroke = 2.9 seconds)  5/1/2023    ongoing   The patient will improve her score on the  Functional Gait Assessment (FGA) from 15/30 to 19/30, indicating improved safety and (I) with dynamic,comunity level gait. (Minimal detectable change  for stroke= 4.2 points)  5/1/2023    ongoing   Patient will increase her  distance achieved on the 6MWT from 1,046 feet to 1,158 feet to demonstrate improved endurance and efficiency with community level gait. (MDC for CVA = 112 feet) 5/1/2023    ongoing        PLAN     Plan of care Certification: 3/14/23- 5/23/23     Outpatient Physical Therapy 3 times weekly for 10 weeks to include the following interventions: Gait Training, Manual Therapy, Moist Heat/ Ice, Neuromuscular Re-ed, Patient Education, Therapeutic Activities, and Therapeutic Exercise.     Recommendations for next session:     - strength and walking endurance.     Marlena Pascal, PTA

## 2023-05-03 NOTE — PROGRESS NOTES
"[facility]  Occupational Therapy Treatment Note    Date: 5/3/2023  Name: Claire Bowser  Clinic Number: [id number]    Therapy Diagnosis:   Encounter Diagnoses   Name Primary?    Decreased strength Yes    Impaired functional mobility, balance, and endurance      Physician: Oleg Ross MD    Physician Orders: OT evaluation and treat   Medical Diagnosis:   K65.1 (ICD-10-CM) - Intra-abdominal abscess   R63.4 (ICD-10-CM) - Weight loss, unintentional   R53.1 (ICD-10-CM) - Generalized weakness   Evaluation Date: 4/10/2023  Progress note due: 5/8/2023  Plan of Care Expiration Period: 6/9/2023  Insurance Authorization period Expiration: 12/31/2023  Date of Return to MD: tba  Visit # / Visits Authorized: 6 / 2 + eval   FOTO: to be assessed      Time In: 9:35 AM  Time Out: 10:15 AM  Total Billable (one on one) Time: 40 minutes     Precautions: Standard    SUBJECTIVE     Pt reports: she had difficulty getting in and out of her husbands truck yesterday.     She was compliant with home exercise program given last session     Response to previous treatment: no concerns from eval  Functional change:  improving ability to accomplish things at home, but still with multiple rest breaks for light to moderate activity.     Pain: 0/10  Location:  patient denies pain today     OBJECTIVE     Objective Measures updated at progress report unless specified.    Treatment     Cliare received the treatments listed below:     Therapeutic activities to improve functional performance for 40 minutes, including:    -UBE x5 min forward/ 5 min back w/ cristian UE, level 1.6, standing. Task completed to increase cristian UE act peter and UB strength. She was encouraged to keep rate of perceived exertion (RPE) at "easy to moderate" (3-4/10), but said she was unsure of how to  this. OT encouraged her to keep MET's at 1.5 or better (noted on UBE LCD screen). Mustafa avg=7, peak mustafa=11 Pt's reported RPE= 5/10. She required a brief " "therapeutic rest break with 3 minutes left.     Wall slides (up/down) 2x10 each upper extremity with no weight and 2x10 with 1 lb bilateral wrist weights donned.     Standing against wall, bilateral scapular retraction & depression followed by abduction only to the point that she's still able to keep arms/ hips/ shoulders against the wall 3x10 with 1lb bilateral wrist weights donned.    With front of body facing wall, bilateral shoulder flexion upwards followed by adduction of upper extremities back to side of body and then repeat. 3x10 With 1 lb bilateral wrist weights donned    Sit<>stand x18" x10  with SBA     Sit <> stand x10" x10 CGA and min A x 2 for minor loss of balances      Patient Education and Home Exercises      Education provided:   - progressing of current HEP  - Progress towards goals     Written Home Exercises Provided: yes.  Exercises were reviewed and Claire was able to demonstrate them prior to the end of the session.  Claire demonstrated good  understanding of the HEP provided. See EMR under Patient Instructions for exercises provided during therapy sessions.       Assessment     Pt would continue to benefit from skilled OT. Claire demonstrates slow progress with activity tolerance, but she was able to consistently achieve 1.5 METs on the UBE. She completed range of motion exercises while focusing on quality of movement instead of distance. Sit <>stand from low surfaces is limited by decreased strength and act peter in her quads, but she was able to stand from a 10" stool today with min difficulty. She did have 2 instances of loss of balance when leaning too far forward to stand up. She has difficult tolerating multiple attempts in a row due to fatigue of muscles. Claire can continue to benefit from skilled occupational therapy to improve strength and activity tolerance for increased IADL participation, independence & efficiency.     Claire is progressing well towards her goals and there are " no updates to goals at this time. Pt prognosis is Good.     Pt will continue to benefit from skilled outpatient occupational therapy to address the deficits listed in the problem list on initial evaluation provide pt/family education and to maximize pt's level of independence in the home and community environment.     Pt's spiritual, cultural and educational needs considered and pt agreeable to plan of care and goals.    Anticipated barriers to occupational therapy: none    Goals:     Short Term Goals (4 weeks) Status When Last Assessed  Progressing/ Met   1) (I) with HEP   MET for initial HEP 4/28/2023   2) Mod I / Transfer from toilet   MET 4/28/2023         LongTerm Goals (8 weeks) Status When Last Assessed  Progressing/ Met   1)  Mod I / reaching on low shelf of fridge without loss of balance  Unable  (progressing 5/3/2023)    2) Will have endurance to stand for 10 minutes to complete the ergometer on level 2.5  3.5 minutes on level 1 on 4/19 (progressing 5/3/2023)     3) Will increase bilateral  strength by 10 lbs for increased ability to participate in desired activities  L = 20. 6  R = 20.3  (progressing 5/3/2023)    4) 4+/5 gross shoulder strength  3+/5 (progressing 5/3/2023)       Additional functional goals to be added as pt progresses and per her priorities.   Plan   Certification Period/Plan of care expiration: 4/10/2023 to 6/9/2023.     Outpatient Occupational Therapy 2 times weekly for 8 weeks to include the following interventions: Manual Therapy, Patient Education, Self Care, Therapeutic Activities, and Therapeutic Exercise.    Updates/Grading for next session: continue to address thoracic kyphosis; progress standing UBE as tolerated; progress/ add to HEP as tolerated; progress activity tolerance; getting in and out of car (tundra)    Jen Regalado, OT

## 2023-05-08 ENCOUNTER — PATIENT MESSAGE (OUTPATIENT)
Dept: FAMILY MEDICINE | Facility: CLINIC | Age: 63
End: 2023-05-08
Payer: MEDICAID

## 2023-05-08 ENCOUNTER — PATIENT MESSAGE (OUTPATIENT)
Dept: RHEUMATOLOGY | Facility: CLINIC | Age: 63
End: 2023-05-08
Payer: MEDICAID

## 2023-05-09 ENCOUNTER — PATIENT MESSAGE (OUTPATIENT)
Dept: RHEUMATOLOGY | Facility: CLINIC | Age: 63
End: 2023-05-09
Payer: MEDICAID

## 2023-05-09 RX ORDER — PREDNISONE 5 MG/1
5 TABLET ORAL DAILY
Qty: 30 TABLET | Refills: 0 | Status: SHIPPED | OUTPATIENT
Start: 2023-05-09 | End: 2023-05-31 | Stop reason: SDUPTHER

## 2023-05-09 NOTE — PROGRESS NOTES
"[facility]   Physical Therapy Treatment Note     Name: Claire Bowser  Clinic Number: [id number]    Therapy Diagnosis:   Encounter Diagnoses   Name Primary?    Impaired strength of lower extremity Yes    Impaired functional mobility, balance, gait, and endurance        Physician: Oleg Ross MD    Visit Date: 5/10/2023    Initial Evaluation Date: 2/17/2023  Reevaluation date 3/14/23  Authorization Period Expiration: 4/30/23  Plan of Care Expiration: 5/23/23  Visit # / Visits authorized: 17/27       PTA Visit #: 0/5     Precautions: Standard, blood thinners, and Fall    Time In: 10:31 am   Time Out: 11:15 am   Total Billable Time: 44 minutes     SUBJECTIVE     Pt reports:  " I am doing great. I am doing things more and more without having to really think about it. "    She was compliant with home exercise program.  Response to previous treatment: no problems stated  Functional change: ongoing, increased mobility overall     Pain: 0/10      Location:  Not Applicable      OBJECTIVE     Objective Measures updated at progress report unless specified.     Treatment     Claire received the treatments listed below:      therapeutic exercises to develop strength, endurance, range of motion, and flexibility for 15 minutes including:    Sci fit level 5 15 minutes bilateral upper extremity/ lower extremity       Claire participated in neuromuscular re-education activities to improve: Balance and Coordination for 29 minutes. The following activities were included:     -  walking in and out of cones x 10 x 4 trials standby assist   - Stepping  up and over cones leading with each lower extremity standby assist x 4 reps.   - squatting to  cones x 10 contact guard assist   - step  ups on 9 inch step 3x10 leading with each lower extremity .   - single leg stance 6-7 secodns at best each lower extremity x 10 trials each  - Tandem walking in parallel bars x 6 laps minimal use of upper " extremity.        Pt ambulated 120 ft x 2 trials supervision no assistive device     (Therapeutic rest breaks in between as needed)    Patient Education and Home Exercises     Home Exercises Provided and Patient Education Provided     Education provided:     Educated about importance of strengthening and walking endurance. Reports understanding.     Written Home Exercises Provided: Patient instructed to cont prior HEP.  See EMR under Patient Instructions for exercises provided during therapy sessions    ASSESSMENT     Tolerated session well. Working on strength and squatting to  cones. Pt tolerated session well. Working on improving tandem walking and single leg stance. Pt is greatly improving with therapy sessions and remains motivated to improve.     Claire Is progressing well towards her goals.   Pt prognosis is Fair.     Pt will continue to benefit from skilled outpatient physical therapy to address the deficits listed in the problem list box on initial evaluation, provide pt/family education and to maximize pt's level of independence in the home and community environment.     Pt's spiritual, cultural and educational needs considered and pt agreeable to plan of care and goals.     Anticipated barriers to physical therapy: None at this time    Goals:      Short Term Goals: 2 weeks Date last assessed Status:    1.  Patient will demonstrate improve endurance and activity tolerance as evidenced by ability to perform Nu-step x 15 minutes without rests breaks. 5/1/23  MET   2. Pt will perform sit to stand with least restrictive assistive device with contact guard assist  5/1/23    MET   3. Pt will improve 5x sit to stand score from 14.6 seconds to less than 12 seconds to decrease fall risk.  5/1/2023    ongoing   4. Pt will improve single leg stance on right lower extremity from 3 seconds to atleast 10 seconds to decrease fall risk.  5/1/2023    ongoing    5.  Pt will improve single leg stance on left lower  extremity from 4 seconds to atleast 10 seconds to decrease fall risk. 5/1/2023    ongoing         Long Term Goals: 4 weeks  Date last assessed Status:    1. Patient will be independent and compliant with updated HEP. 5/1/23       Ongoing/ MET    2.Patient will improve her FOTO score from 40 % limited  to at least 32 % limited for improved self perception of functional mobility.(score 0-100, high score indicates greater level of function) 4/19/23    Ongoing (39% )   3. Pt will improve simple transfers from mod A to contact guard assist  to improve independence and safety 5/1/23     MET   4.  Pt will improve car transfer from mod A  to contact guard assist  to improve independence and safety 5/1/23    MET   5. Pt will be able to ambulate 150  ft with standby assist  assist to improve function.  5/1/23    MET   6. Pt will perform floor transfer with min A or less to improve function  5/1/2023       ongoing   7. Pt will be able to perform sit to stand from low surface (less than 18 inches for pt) with supervision to improve function and independence 5/1/2023    ongoing   Patient will increase her   gait speed as assessed by the timed 10MWT from 0.90 m/s to 1.04 m/s to increase her safety and (I) with gait at a community level. (MDC for CVA= 0.14 m/s)  5/1/2023      ongoing   The patient will demonstrate improved efficiency with functional mobility and decreased risk for falls as evidenced by an increase in her score on the Timed up and Go from 10.3 sec  to 7.4 sec. (Minimal detectable change in chronic stroke = 2.9 seconds)  5/1/2023    ongoing   The patient will improve her score on the  Functional Gait Assessment (FGA) from 15/30 to 19/30, indicating improved safety and (I) with dynamic,comunity level gait. (Minimal detectable change for stroke= 4.2 points)  5/1/2023    ongoing   Patient will increase her  distance achieved on the 6MWT from 1,046 feet to 1,158 feet to demonstrate improved endurance and efficiency with  community level gait. (MDC for CVA = 112 feet) 5/1/2023    ongoing        PLAN     Plan of Care Certification: 3/14/23- 5/23/23     Outpatient Physical Therapy 3 times weekly for 10 weeks to include the following interventions: Gait Training, Manual Therapy, Moist Heat/ Ice, Neuromuscular Re-ed, Patient Education, Therapeutic Activities, and Therapeutic Exercise.     Recommendations for next session:     - strength and walking endurance.   - floor transfers.     Linh Sharif, PT

## 2023-05-10 ENCOUNTER — CLINICAL SUPPORT (OUTPATIENT)
Dept: REHABILITATION | Facility: HOSPITAL | Age: 63
End: 2023-05-10
Payer: MEDICAID

## 2023-05-10 DIAGNOSIS — Z74.09 IMPAIRED FUNCTIONAL MOBILITY, BALANCE, GAIT, AND ENDURANCE: ICD-10-CM

## 2023-05-10 DIAGNOSIS — R53.1 DECREASED STRENGTH: Primary | ICD-10-CM

## 2023-05-10 DIAGNOSIS — R29.898 IMPAIRED STRENGTH OF LOWER EXTREMITY: Primary | ICD-10-CM

## 2023-05-10 DIAGNOSIS — Z74.09 IMPAIRED FUNCTIONAL MOBILITY, BALANCE, AND ENDURANCE: ICD-10-CM

## 2023-05-10 PROCEDURE — 97110 THERAPEUTIC EXERCISES: CPT | Mod: PN

## 2023-05-10 PROCEDURE — 97530 THERAPEUTIC ACTIVITIES: CPT | Mod: PN

## 2023-05-10 NOTE — PROGRESS NOTES
"[facility]  Occupational Therapy Treatment Note    Date: 5/10/2023  Name: Claire Bowser  Clinic Number: [id number]    Therapy Diagnosis:   Encounter Diagnoses   Name Primary?    Decreased strength Yes    Impaired functional mobility, balance, and endurance      Physician: Oleg Ross MD    Physician Orders: OT evaluation and treat   Medical Diagnosis:   K65.1 (ICD-10-CM) - Intra-abdominal abscess   R63.4 (ICD-10-CM) - Weight loss, unintentional   R53.1 (ICD-10-CM) - Generalized weakness   Evaluation Date: 4/10/2023  Progress note due: 5/8/2023  Plan of Care Expiration Period: 6/9/2023  Insurance Authorization period Expiration: 12/31/2023  Date of Return to MD: tba  Visit # / Visits Authorized: 7 / 2 + eval   FOTO: to be assessed      Time In: 11:20 AM  Time Out: 12:00 PM  Total Billable (one on one) Time: 40 minutes     Precautions: Standard    SUBJECTIVE     Pt reports: she has not tried to get in and out of the truck again.     She was compliant with home exercise program given last session     Response to previous treatment: no concerns from eval  Functional change:  improving ability to accomplish things at home, but still with multiple rest breaks for light to moderate activity.     Pain: 0/10  Location:  patient denies pain today     OBJECTIVE     Objective Measures updated at progress report unless specified.    Treatment     Claire received the treatments listed below:     Therapeutic activities to improve functional performance for 40 minutes, including:    -UBE x5 min forward/ 5 min back w/ cristian UE, level 1.8, standing. Task completed to increase cristian UE act peter and UB strength. She was encouraged to keep rate of perceived exertion (RPE) at "easy to moderate" (3-4/10), but said she was unsure of how to  this. OT encouraged her to keep MET's at 1.5 or better (noted on UBE LCD screen). Mustafa avg=7, peak mustafa=12 Pt's reported RPE= 5/10. No rest break required today. " "    Wall slides (up/down) 3x10 each upper extremity with 1 lb bilateral wrist weights donned.     Standing against wall, bilateral scapular retraction & depression followed by abduction only to the point that she's still able to keep arms/ hips/ shoulders against the wall 3x10 with 1lb bilateral wrist weights donned.    For increased ability to get in and out of her husbands truck: Step up from 9" step 20x / Step up from 12" step 20x - more difficult for her to complete. Rest breaks taken      Patient Education and Home Exercises      Education provided:   - progressing of current HEP  - Progress towards goals     Written Home Exercises Provided: yes.  Exercises were reviewed and Claire was able to demonstrate them prior to the end of the session.  Claire demonstrated good  understanding of the HEP provided. See EMR under Patient Instructions for exercises provided during therapy sessions.       Assessment     Pt would continue to benefit from skilled OT. Claire demonstrates slow progress with activity tolerance, but she was able to consistently achieve 1.5 METs on the UBE without taking a rest break. She completed range of motion exercises (with weight added) while focusing on quality of movement. She was able to practice stepping up from varying heights to strengthen her extremities and make it easier for her to get the vehicle or her choice.  Claire can continue to benefit from skilled occupational therapy to improve strength and activity tolerance for increased IADL participation, independence & efficiency.     Claire is progressing well towards her goals and there are no updates to goals at this time. Pt prognosis is Good.     Pt will continue to benefit from skilled outpatient occupational therapy to address the deficits listed in the problem list on initial evaluation provide pt/family education and to maximize pt's level of independence in the home and community environment.     Pt's spiritual, cultural " and educational needs considered and pt agreeable to plan of care and goals.    Anticipated barriers to occupational therapy: none    Goals:     Short Term Goals (4 weeks) Status When Last Assessed  Progressing/ Met   1) (I) with HEP   MET for initial HEP 4/28/2023   2) Mod I / Transfer from toilet   MET 4/28/2023         LongTerm Goals (8 weeks) Status When Last Assessed  Progressing/ Met   1)  Mod I / reaching on low shelf of fridge without loss of balance  Unable  (progressing 5/10/2023)    2) Will have endurance to stand for 10 minutes to complete the ergometer on level 2.5  3.5 minutes on level 1 on 4/19 (progressing 5/10/2023)     3) Will increase bilateral  strength by 10 lbs for increased ability to participate in desired activities  L = 20. 6  R = 20.3  (progressing 5/10/2023)    4) 4+/5 gross shoulder strength  3+/5 (progressing 5/10/2023)       Additional functional goals to be added as pt progresses and per her priorities.   Plan   Certification Period/Plan of care expiration: 4/10/2023 to 6/9/2023.     Outpatient Occupational Therapy 2 times weekly for 8 weeks to include the following interventions: Manual Therapy, Patient Education, Self Care, Therapeutic Activities, and Therapeutic Exercise.    Updates/Grading for next session: continue to address thoracic kyphosis; progress standing UBE as tolerated; progress/ add to HEP as tolerated; progress activity tolerance; getting in and out of car (tundra)    Jen Regalado, OT

## 2023-05-15 ENCOUNTER — CLINICAL SUPPORT (OUTPATIENT)
Dept: REHABILITATION | Facility: HOSPITAL | Age: 63
End: 2023-05-15
Payer: MEDICAID

## 2023-05-15 DIAGNOSIS — Z74.09 IMPAIRED FUNCTIONAL MOBILITY, BALANCE, GAIT, AND ENDURANCE: ICD-10-CM

## 2023-05-15 DIAGNOSIS — R29.898 IMPAIRED STRENGTH OF LOWER EXTREMITY: Primary | ICD-10-CM

## 2023-05-15 PROCEDURE — 97110 THERAPEUTIC EXERCISES: CPT | Mod: PN,CQ

## 2023-05-15 NOTE — PROGRESS NOTES
[facility]   Physical Therapy Treatment Note     Name: Claire Bowser  Clinic Number: [id number]    Therapy Diagnosis:   Encounter Diagnoses   Name Primary?    Impaired strength of lower extremity Yes    Impaired functional mobility, balance, gait, and endurance        Physician: Oleg Ross MD    Visit Date: 5/15/2023    Initial Evaluation Date: 2/17/2023  Reevaluation date 3/14/23  Authorization Period Expiration: 4/30/23  Plan of Care Expiration: 5/23/23  Visit # / Visits authorized: 17/27       PTA Visit #: 1/5     Precautions: Standard, blood thinners, and Fall    Time In: 1030  Time Out: 1113  Total Billable Time: 43 minutes     SUBJECTIVE     Pt reports:  Doing well overall  She was compliant with home exercise program.  Response to previous treatment: no problems stated  Functional change: ongoing, got out of shower independently     Pain: 0/10     Location:  Not Applicable      OBJECTIVE     Objective Measures updated at progress report unless specified.     Treatment     Claire received the treatments listed below:      therapeutic exercises to develop strength, endurance, range of motion, and flexibility for 15 minutes including:    Wall slides with Theraball 3 sets of 10 with 18.5 inch stool underneath for safety     Shuttle:  37# Bilateral leg press 3 minutes    SportsArt   11# Bilateral knee flexion 20  times  11# Bilateral knee extension 20 times      therapeutic activities to improve functional performance for 28  minutes, including:    Sit <> Stand 5 times 16 seconds with hands on chair arms    6 minute walk test - 1229 feet  10 meter walk test - 1.11 meters per second (0.9 seconds x3)    Floor transfer to stand with use of 20 inch mat x2 standby assistance with instruction  Floor transfer to stand with use of 7 inch box standby assistance with instruction    Sit <> stand from 17 inch mat 5 times with hands on knees    Patient Education and Home Exercises     Home  Exercises Provided and Patient Education Provided     Education provided:     -Patient provided with verbal and demonstrative instruction for all activities performed in today's session.      Written Home Exercises Provided: Patient instructed to cont prior HEP.  See EMR under Patient Instructions for exercises provided during therapy sessions    ASSESSMENT     Claire provided good participation and effort during today's session with treatment focused on Bilateral lower extremity strengthening/endurance and functional mobility. Claire tolerated increased lower extremity strengthening exercises today along with functional mobility of Bilateral Lower Extremities with no complaints and reports glad for her improvement. Claire able to stand from low surface with hands on knees and stand from floor with use of 7 inch box.      Claire Is progressing well towards her goals.   Pt prognosis is Fair.     Pt will continue to benefit from skilled outpatient physical therapy to address the deficits listed in the problem list box on initial evaluation, provide pt/family education and to maximize pt's level of independence in the home and community environment.     Pt's spiritual, cultural and educational needs considered and pt agreeable to plan of care and goals.     Anticipated barriers to physical therapy: None at this time    Goals:      Short Term Goals: 2 weeks Date last assessed Status:    1.  Patient will demonstrate improve endurance and activity tolerance as evidenced by ability to perform Nu-step x 15 minutes without rests breaks. 5/1/23  MET   2. Pt will perform sit to stand with least restrictive assistive device with contact guard assist  5/1/23    MET   3. Pt will improve 5x sit to stand score from 14.6 seconds to less than 12 seconds to decrease fall risk.  5/1/2023    Ongoing   4. Pt will improve single leg stance on right lower extremity from 3 seconds to atleast 10 seconds to decrease fall risk.  5/1/2023     ongoing    5.  Pt will improve single leg stance on left lower extremity from 4 seconds to atleast 10 seconds to decrease fall risk. 5/1/2023    ongoing         Long Term Goals: 4 weeks  Date last assessed Status:    1. Patient will be independent and compliant with updated HEP. 5/1/23       Ongoing/ MET    2.Patient will improve her FOTO score from 40 % limited  to at least 32 % limited for improved self perception of functional mobility.(score 0-100, high score indicates greater level of function) 4/19/23    Ongoing (39% )   3. Pt will improve simple transfers from mod A to contact guard assist  to improve independence and safety 5/1/23     MET   4.  Pt will improve car transfer from mod A  to contact guard assist  to improve independence and safety 5/1/23    MET   5. Pt will be able to ambulate 150  ft with standby assist  assist to improve function.  5/1/23    MET   6. Pt will perform floor transfer with min A or less to improve function  5/1/2023       Progressing (standby assistance 5/15/23)   7. Pt will be able to perform sit to stand from low surface (less than 18 inches for pt) with supervision to improve function and independence 5/1/2023    Progressing (sit <> stand from 17 inch surface 5/15/23)   Patient will increase her   gait speed as assessed by the timed 10MWT from 0.90 m/s to 1.04 m/s to increase her safety and (I) with gait at a community level. (MDC for CVA= 0.14 m/s)  5/1/2023      progressing (1.11 meters per second  5/15/23)   The patient will demonstrate improved efficiency with functional mobility and decreased risk for falls as evidenced by an increase in her score on the Timed up and Go from 10.3 sec  to 7.4 sec. (Minimal detectable change in chronic stroke = 2.9 seconds)  5/1/2023    Ongoing   The patient will improve her score on the  Functional Gait Assessment (FGA) from 15/30 to 19/30, indicating improved safety and (I) with dynamic,comunity level gait. (Minimal detectable change for  stroke= 4.2 points)  5/1/2023    ongoing   Patient will increase her  distance achieved on the 6MWT from 1,046 feet to 1,158 feet to demonstrate improved endurance and efficiency with community level gait. (MDC for CVA = 112 feet) 5/1/2023    Progressing (1229 feet 5/15/23        PLAN     Plan of care Certification: 3/14/23- 5/23/23     Outpatient Physical Therapy 3 times weekly for 10 weeks to include the following interventions: Gait Training, Manual Therapy, Moist Heat/ Ice, Neuromuscular Re-ed, Patient Education, Therapeutic Activities, and Therapeutic Exercise.       Marlena Pascal, PTA

## 2023-05-17 ENCOUNTER — CLINICAL SUPPORT (OUTPATIENT)
Dept: REHABILITATION | Facility: HOSPITAL | Age: 63
End: 2023-05-17
Payer: MEDICAID

## 2023-05-17 DIAGNOSIS — R53.1 DECREASED STRENGTH: Primary | ICD-10-CM

## 2023-05-17 DIAGNOSIS — Z74.09 IMPAIRED FUNCTIONAL MOBILITY, BALANCE, AND ENDURANCE: ICD-10-CM

## 2023-05-17 DIAGNOSIS — Z74.09 IMPAIRED FUNCTIONAL MOBILITY, BALANCE, GAIT, AND ENDURANCE: ICD-10-CM

## 2023-05-17 DIAGNOSIS — R29.898 IMPAIRED STRENGTH OF LOWER EXTREMITY: Primary | ICD-10-CM

## 2023-05-17 PROCEDURE — 97110 THERAPEUTIC EXERCISES: CPT | Mod: PN

## 2023-05-17 PROCEDURE — 97530 THERAPEUTIC ACTIVITIES: CPT | Mod: PN

## 2023-05-17 NOTE — PLAN OF CARE
"[facility]   Physical Therapy Discharge Summary    Name: Claire Bowser  Clinic Number: [id number]    Therapy Diagnosis:   Encounter Diagnoses   Name Primary?    Impaired strength of lower extremity Yes    Impaired functional mobility, balance, gait, and endurance        Physician: Oleg Ross MD    Visit Date: 5/17/2023    Initial Evaluation Date: 2/17/2023  Reevaluation date 3/14/23  Authorization Period Expiration: 4/30/23  Plan of Care Expiration: 5/23/23  Visit # / Visits authorized: 19/27 (20)     Missed Visits including no show and cancels: 14  Total visits: 20    Precautions: Standard, blood thinners, and Fall    Time In: 9:32 am   Time Out: 10:17  Total Billable Time: 45 minutes     SUBJECTIVE     Pt reports:  " I am doing well. I feel like I am ready for discharge honestly."    She was compliant with home exercise program.  Response to previous treatment: no problems stated  Functional change: ongoing, got out of shower independently     Pain: 0/10     Location:  Not Applicable      OBJECTIVE     Objective Measures updated at progress report unless specified.     Treatment     Claire received the treatments listed below:      therapeutic activities to improve functional performance for 35  minutes, including:       Functional Mobility    Evaluation  (2/17/23)  Reevaluation (3/14/23) 5/17/23   Bed mobility:  Mod A Mod A  independent   Supine to sit: Mod A Min A   independent   Sit to supine:  Mod A Contact guard assist  independent   Rolling:  Min A Supervision  independent   Transfers to bed: Mod A Mod A rolling walker  independent   Sit to stand Mod A Mod A rolling walker  independent   Stand pivot:   Mod A Mod A rolling walker  independent   Car transfers: Mod A Mod A  Mod I    Floor transfers: Unsafe at this time.  Unsafe at this time.  Supervision         Gait       Evaluation (2/17/23) Re- evaluation (3/14/23)  5/17/23   AD used:  Rolling walker  Rolling walker  none "   Assistance  Min/ mod Contact guard assist  independent   Distance 50 ft 60 ft 1301 ft           5/1/23 5/17/23   Single Limb Stance R LE 3 seconds   (<10 sec = HIGH FALL RISK) 3 seconds   Single Limb Stance L LE 4 seconds.   (<10 sec = HIGH FALL RISK)  4 seconds    5 times sit to stand 14.6  seconds bilateral upper extremity support.  14.2 seconds bilateral upper extremity support.    TUG 10.33 seconds no assistive device  9.46 seconds no assistive device    Self selected walking speed 0.90 m/sec (6m/6.63s) no assistive device   1.02 m/s 6m (5.87 sec)   Fast walking speed 1.09 m/sec (6m/5.52s) no assistive device   1.34 m/s (6m/ 4.47 sec)   Functional Gait Assessment 15/30 20/30   6 MWT 1046 ft no assistive device . No seated rest breaks required.  1301 ft no assistive device. No rest breaks       Functional Gait Assessment:   1. Gait on level surface =  2   (3) Normal: less than 5.5 sec, no A.D., no imbalance, normal gait pattern, deviates< 6in   (2) Mild impairment: 7-5.6 sec, uses A.D., mild gait deviations, or deviates 6-10 in   (1) Moderate impairment: > 7 sec, slow speed, imbalance, deviates 10-15 in.   (0) Severe impairment: needs assist, deviates >15 in, reach/touch wall  2. Change in Gait Speed = 2   (3) Normal: smooth change w/o loss of balance or gait deviation, deviates < 6 in, significant difference between speeds   (2) Mild impairment: changes speed, but demonstrates mild gait deviations, deviates 6-10 in, OR no deviations but unable to significantly speed, OR uses A.D.   (1) Moderate impairment: minor changes to speed, OR changes speed w/ significant deviations, deviates 10-15 in, OR  Changes speed , but loses balance & recovers   (0) Severe impairment: cannot change speed, deviates >15 in, or loses balance & needs assist  3. Gait with horizontal head turns  = 2   (3) Normal: no change in gait, deviates <6 in   (2) Mild impairment: slight change in speed, deviates 6-10 in, OR uses A.D.   (1)  Moderate impairment: moderate change in speed, deviates 10-15 in   (0) Severe impairment: severe disruption of gait, deviates >15in  4. Gait with vertical head turns = 3   (3) Normal: no change in gait, deviates <6 in   (2) Mild impairment: slight change in speed, deviates 6-10 in OR uses A.D.   (1) Moderate impairment: moderate change in speed, deviates 10-15 in   (0) Severe impairment: severe disruption of gait, deviates >15 in  5. Gait with pivot turns = 3   (3) Normal: performs safely in 3 sec, no LOB   (2) Mild impairment: performs in >3 sec & no LOB, OR turns safely & requires several steps to regain LOB   (1) Moderate impairment: turns slow, OR requires several small steps for balance following turn & stop   (0) Severe impairment: cannot turn safely, needs assist  6. Step over obstacle = 2   (3) Normal: steps over 2 stacked boxes w/o change in speed or LOB   (2) Mild impairment: able to step over 1 box w/o change in speed or LOB   (1) Moderate impairment: steps over 1 box but must slow down, may require VC   (0) Severe impairment: cannot perform w/o assist  7. Gait with Narrow GENIA = 0   (3) Normal: 10 steps no staggering   (2) Mild impairment: 7-9 steps   (1) Moderate impairment: 4-7 steps   (0) Severe impairment: < 4 steps or cannot perform w/o assist  8. Gait with eyes closed = 2   (3) Normal: < 7 sec, no A.D., no LOB, normal gait pattern, deviates <6 in   (2) Mild impairment: 7.1-9 sec, mild gait deviations, deviates 6-10 in   (1) Moderate impairment: > 9 sec, abnormal pattern, LOB, deviates 10-15 in   (0) Severe impairment: cannot perform w/o assist, LOB, deviates >15in  9. Ambulating Backwards = 2   (3) Normal: no A.D., no LOB, normal gait pattern, deviates <6in   (2) Mild impairment: uses A.D., slower speed, mild gait deviations, deviates 6-10 in   (1) Moderate impairment: slow speed, abnormal gait pattern, LOB, deviates 10-15 in   (0) Severe impairment: severe gait deviations or LOB, deviates  >15in  10. Steps = 2   (3) Normal: alternating feet, no rail   (2) Mild Impairment: alternating feet, uses rail   (1) Moderate impairment: step-to, uses rail   (0) Severe impairment: cannot perform safely    Score 20/30     Score:   <22/30 fall risk   <20/30 fall risk in older adults   <18/30 fall risk in Parkinsons      therapeutic exercises to develop strength, endurance, range of motion, and flexibility for 10 minutes including:    - sci fit level 5 bilateral upper extremity lower extremity 10 minutes.     Patient Education and Home Exercises     Home Exercises Provided and Patient Education Provided     Education provided:     HEP handout given and went over with pt. Pt educated about DC and continuing with HEP and would benefit from joining a fitness center to continue to gain strength and endurance. Pt reports understanding and has no questions at this time.       Written Home Exercises Provided: Patient instructed to cont prior HEP.  See EMR under Patient Instructions for exercises provided during therapy sessions    ASSESSMENT      Pt tolerated session well. Reassessment of goals for discharge from therapy. Pt reporting she feels like she is back to baseline and is ready for discharge from PT. Pt has met 2/5 short term goals and 9/11 long term goals. She has drastically improved her functional mobility since beginning PT. She has progressed from mod A for transfers, to ambulating short distances with a rolling walker with contact guard assist, to ambulating over 1300 ft with no assistive device and independently. She has improved her self selected walking speed, her fast walking speed, her FGA goals and her TUG score. She continues to have some difficulty with single leg stance but reports she had trouble with this prior to her hospitalization. She was given a detailed HEP and pt agrees she is ready for DC from skilled PT at this time. Recommending HEP and to join a  basic fitness program to continue to  gain strength and endurance.       Discharge reason : Patient has maximized benefit from PT at this time      Goals:      Short Term Goals: 2 weeks Date last assessed Status:    1.  Patient will demonstrate improve endurance and activity tolerance as evidenced by ability to perform Nu-step x 15 minutes without rests breaks. 5/1/23  MET   2. Pt will perform sit to stand with least restrictive assistive device with contact guard assist  5/1/23    MET   3. Pt will improve 5x sit to stand score from 14.6 seconds to less than 12 seconds to decrease fall risk.  5/17/23    NOT MET   4. Pt will improve single leg stance on right lower extremity from 3 seconds to atleast 10 seconds to decrease fall risk.  5/17/23    NOT MET    5.  Pt will improve single leg stance on left lower extremity from 4 seconds to atleast 10 seconds to decrease fall risk. 5/17/23    NOT MET          Long Term Goals: 4 weeks  Date last assessed Status:    1. Patient will be independent and compliant with updated HEP. 5/17/23       MET   2.Patient will improve her FOTO score from 40 % limited  to at least 32 % limited for improved self perception of functional mobility.(score 0-100, high score indicates greater level of function) 5/17/23    MET   3. Pt will improve simple transfers from mod A to contact guard assist  to improve independence and safety 5/1/23     MET   4.  Pt will improve car transfer from mod A  to contact guard assist  to improve independence and safety 5/1/23    MET   5. Pt will be able to ambulate 150  ft with standby assist  assist to improve function.  5/1/23    MET   6. Pt will perform floor transfer with min A or less to improve function  5/17/23       MET    7. Pt will be able to perform sit to stand from low surface (less than 18 inches for pt) with supervision to improve function and independence 5/17/2023    MET   Patient will increase her   gait speed as assessed by the timed 10MWT from 0.90 m/s to 1.04 m/s to increase her  safety and (I) with gait at a community level. (MDC for CVA= 0.14 m/s)  5/17/2023      NOT MET   The patient will demonstrate improved efficiency with functional mobility and decreased risk for falls as evidenced by an increase in her score on the Timed up and Go from 10.3 sec  to 7.4 sec. (Minimal detectable change in chronic stroke = 2.9 seconds)  5/17/2023    NOT MET   The patient will improve her score on the  Functional Gait Assessment (FGA) from 15/30 to 19/30, indicating improved safety and (I) with dynamic,comunity level gait. (Minimal detectable change for stroke= 4.2 points)  5/17/23    MET   Patient will increase her  distance achieved on the 6MWT from 1,046 feet to 1,158 feet to demonstrate improved endurance and efficiency with community level gait. (MDC for CVA = 112 feet) 5/17/23    MET        PLAN     Patient is appropriate for DC at this time with HEP.     Linh Sharif, PT

## 2023-05-17 NOTE — PROGRESS NOTES
"[facility]  Occupational Therapy Treatment Note & Discharge Summary     Date: 5/17/2023  Name: Claire Bowser  Clinic Number: [id number]    Therapy Diagnosis:   Encounter Diagnoses   Name Primary?    Decreased strength Yes    Impaired functional mobility, balance, and endurance      Physician: Oleg Ross MD    Physician Orders: OT evaluation and treat   Medical Diagnosis:   K65.1 (ICD-10-CM) - Intra-abdominal abscess   R63.4 (ICD-10-CM) - Weight loss, unintentional   R53.1 (ICD-10-CM) - Generalized weakness   Evaluation Date: 4/10/2023  Plan of Care Expiration Period: 6/9/2023  Insurance Authorization period Expiration: 12/31/2023  Date of Return to MD: justin  Visit # / Visits Authorized: 8 / 2 + nani   FOTO: to be assessed      Time In: 0853   Time Out: 0931  Total Billable (one on one) Time: 38 minutes     Precautions: Standard    SUBJECTIVE     Pt reports: she's able to get in/out of the truck with a step & rail, able to get out of the bathtub (pushing herself up) without help, learned with physical therapy how to get on/off the floor (so she can look for shoes under her bed and get back up.)  "I feel like I can do more stuff without thinking about it. I've gotten back into doing some housework and I've done some gardening too."    She's not having to nap anymore. She will be active for a bit and then take a 10-15 minute break and then be active around the house again.     She was compliant with home exercise program given last session     Response to previous treatment: no concerns   Functional change:  improving ability to accomplish things at home, but still with multiple rest breaks for light to moderate activity.     Pain: 0/10  Location:  patient denies pain today     OBJECTIVE     Objective Measures updated at progress report unless specified.    Strength 5/17/2023  5/17/2023  4/10/2023 4/10/2023   **within available ROM** Left Right  Left Right   Shoulder flex 4/5 4-/5 3+/5 " "3+/5   Shoulder abd 4/5 4-/5 3+/5 3+/5   Shoulder ER 4-/5 4-/5 3+/5 3+/5   Shoulder IR 4-/5 4-/5 3+/5 3+/5   Shoulder Extension 4/5 4/5 3+/5 3+/5   Elbow flex 4/5 4-/5 3/5 3/5   Elbow ext 4-/5 3+/5 3/5 3/5   Wrist flex 4/5 4-/5 4/5 4/5   Wrist ext 4/5 4-/5 4/5 4/5      Hand Strength (NADIA Dynamometer, Setting II)   (lbs) Left  5/17/2023  Right  5/17/2023     1 29 33   2 31 36   3 31 32   Avg 5/17/2023  30.3 33.7   Avg 4/10/2023 20.6 20.3   [norms for women aged 60-64: R=55.1 +/-10.1; L=45.7 +/-10.1 (dAantz et al, 1985)]     Pinch Left  4/10/2023  Right  4/10/2023    Lateral 10 11   Tip (2 point) 6 7   3 jaw diane 8 8      Gross motor coordination:   MEÑO (Rapid Alternating Movements): intact   Finger to Nose (5 times): intact  Finger Flicks (coordination moving from digit flexion to digit extension): intact      Box and Block Assessment Left Right   5/17/2023  56 61   4/10/2023 46 49   [norms for women aged 60-64: R=76.1 +/-6.9; L=73.6 +/-6.4 (Snehalwetz et al, 1985)]     Fine motor coordination:   -   Thumb to Finger Opposition: intact       9 Hole Peg Test (9HPT) Left Right   4/10/2023 25s 22s   [norms for women aged 61-65: R=18.99s +/-2.18s; L=20.33 +/-2.76s (Mauri et al, 2003)]      Treatment     Claire received the treatments listed below:     Therapeutic activities to improve functional performance for 38 minutes, including:    -UBE x6 min forward/ 4 min back w/ cristian UE, level 2.0, standing to to increase cristian UE act peter and UB strength, as well as standing tolerance. She was encouraged to keep rate of perceived exertion (RPE) at "easy to moderate" (3-4/10), but said she was unsure of how to  this. OT encouraged her to keep MET's at 1.5 or better (noted on UBE LCD screen). Whitaker avg=9, peak whitaker=12 Pt's reported RPE= 5/10. No rest break required today.     Measurements as noted above.     Green putty for  strengthening.     Patient ed re: energy conservation/ work simplification, including a " discussion of various strategies to facilitate doing more with less energy.     Patient Education and Home Exercises      Education provided:   - as noted above.   - Progress towards goals     Written Home Exercises Provided: Written education re: energy conservation/ work simplification. Patient instructed to cont prior HEP with focus on increasing functional activity as tolerated.   Claire demonstrated good  understanding of the education HEP provided. See EMR under Patient Instructions for exercises/ education provided during therapy sessions.       Assessment     Claire has met 2/2 short-term goals and 1/4 long-term goals. She demonstrated significant progress towards the remaining goals. At this point, though the could still benefit from therapy, it is not medically necessary. She is able to do her necessary tasks at home. She understands how to plan in rest breaks and slowly build her activity tolerance and strength through function. Recommend d/c as patient is able to progress herself from this point forward.     Pt's spiritual, cultural and educational needs considered and pt agreeable to plan of care and goals.    Anticipated barriers to occupational therapy: none    Goals:  Short Term Goals (4 weeks) Status When Last Assessed  Progressing/ Met   1) (I) with HEP   MET for initial HEP 4/28/2023   2) Mod I / Transfer from toilet   MET 4/28/2023         LongTerm Goals (8 weeks) Status When Last Assessed  Progressing/ Met   1)  Mod I / reaching on low shelf of fridge without loss of balance  Unable  MET per patient report 5/17/2023    2) Will have endurance to stand for 10 minutes to complete the ergometer on level 2.5  10 min on level 2 on 5/17/2023  Progressed but not met   3) Will increase bilateral  strength by 10 lbs for increased ability to participate in desired activities (30.6 left & 30.3 right)   Avg 5/17/23  Left=  30.3 Right=  33.7    Progressed but not met   4) 4+/5 gross shoulder strength   3+/5 Progressed but not met      Plan     Discharge skilled occupational therapy.     Lisa Mcintyre, OT

## 2023-05-17 NOTE — PATIENT INSTRUCTIONS
Energy Conservation Tips  http://[email or web address]_[email or web address]_energy_patient.pdf    We all get worn out sometimes. But fatigue secondary to disability or aging can really interfere with the ability to function independently. If you find that fatigue is keeping you from doing things you want to do in your life, energy conservation techniques may help you. Energy conservation means looking at your daily routines to find ways to reduce the amount of effort needed to perform certain tasks, and building more rest throughout the day. Keep in mind that not every technique will work for you. These are suggestions you can use and adapt to find the right fit for you. Remember: Energy is like money - you only have so much, so think about what you are spending it on!    Rearrange Your Environment  Keep frequently used items in easily accessible places.  Replace existing heavy items with lighter ones; for example, use plastic plates & cups rather than china & glass.  Install long handles on faucets & doorknobs.  Adjust work spaces, such as raising a tabletop, to eliminate awkward positions; bad posture drains energy.  Install pull-out or swing-out shelving in cabinets.  Wear an apron with pockets to carry around cooking utensils or cleaning tools.  Consider moving your bed to the first floor to eliminate stair climbing.    Eliminate Unnecessary Effort  Sit rather than stand whenever possible; while preparing meals, washing dishes, ironing, etc.  Use adaptive equipment to make tasks easier; try a jar opener, a reacher, a shower chair to allow you to sit while bathing or a hands-free headset while you talk on the phone.  Soak your dishes before washing, then let them air dry; or use paper plates and napkins.  Use prepared foods when possible.  Get a rolling cart to transport things around the house, rather than carry them.   See if your grocery store will deliver your groceries.  Use store-provided  wheelchairs or scooters when you shop.  Carrying heavy objects and activities done above shoulder level creates an extra burden on your heart, and uses more energy. Make you activities intermittent ones.  Avoid working stooped or in a bent posture for prolonged periods.  To put on socks and shoes cross legs rather than bending over.  Try to avoid holding things.    Plan Ahead  Gather all the supplies you need for a task or project before starting, so everything is in one place.  Call ahead to stores to make sure the items you need are available.  Cook in larger quantities and refrigerate or freeze extra portions for later.  Work rest breaks into activities as often as possible. Take a break before you get tired.  Schedule enough time for activities - rushing takes more energy.  Break up your activities into 15-20 minute intervals.  Try keeping a daily activity journal for a few weeks to identify times of day or certain tasks that result in more fatigue.     Prioritize  Eliminate or reduce tasks that are not that important to you.  Delegate tasks to friends or family members who offer help.  Consider hiring professionals, such as a cleaning or lawn care service, to cut down your workload.    Miscellaneous   Do not hold your breath while straining, lifting, or exercising. Try to breathe normally through any exertion.   Be cautions about over-exerting in hot or humid weather. Avoid excessively hot or cold showers.  It is best to do moderate activity before meals or after a rest period if you have eaten.  If you become tired while walking or going up stairs, stop momentarily until you can continue without effort.  When reading a book or newspaper, support your arms.  Do not push yourself until you are exhausted.    PUTTY        Squeeze putty in hand trying to keep it round by rotating putty after each squeeze. Push fingers through putty to palm each time.   Avoid pain. 20 squeezes, 1 time(s) per day.       PUTTY  "ROLL OUT AND TIP PINCH           Roll some putty into a ball with each hand. Then, roll it into a tube. Gently pinch the putty with the tips of your index finger and thumb; repeat down the section.  Then roll into a ball again and then into a tube again and repeat. Do the entire set 3 times.        PUTTY ROLL OUT AND 3 POINT PINCH           Roll some putty into a ball with your hand. Then, roll it into a tube. Gently pinch the putty with the tips of your index finger, long finger and thumb; repeat down the section. Then roll into a ball again and then into a tube again and repeat. Do the entire set 3 times.    PUTTY ROLL OUT FOLLOWED BY TIP PINCH AND 3-POINT PINCH    Start with the putty in a ball. Keeping your fingers very straight, roll the putty out in to a log, (about a foot long). Using the index finger & thumb, create a "tip pinch", and gently pinch down the length of the putty. Then turn the roll on it's side. Using the index finger, middle finger & thumb, to create a "3-point pinch", gently pinch down the length of the putty. Roll it into a ball again, roll out & repeat. Do the entire set 1 times per day.      PUTTY LATERAL PINCH      Hold the putty at the top of your hand. Squeeze the putty between your thumb and the side of your 2nd finger as shown. Repeat 20 pinches. 1 times per day.      PUTTY LATERAL PINCH & PULL       -Using both hands, pinch putty between the pads of your thumbs and the sides of your index fingers. As you pinch, pull the putty out. Rotate the putty 3-4 times pinching & pulling. Roll the putty back into a ball and complete this set 10 times. Repeat 1 time(s) per day.           PUTTY FINGER ADDUCTION     place the putty between two of your fingers and squeeze. Repeat this between each set of fingers (including the thumb). Repeat for 10 sets, 1 times per day.         "

## 2023-05-25 RX ORDER — MIRTAZAPINE 15 MG/1
15 TABLET, FILM COATED ORAL NIGHTLY
Qty: 90 TABLET | Refills: 0 | Status: CANCELLED | OUTPATIENT
Start: 2023-05-25 | End: 2023-08-23

## 2023-05-31 RX ORDER — PREDNISONE 5 MG/1
5 TABLET ORAL DAILY
Qty: 30 TABLET | Refills: 0 | Status: SHIPPED | OUTPATIENT
Start: 2023-05-31 | End: 2023-06-30 | Stop reason: SDUPTHER

## 2023-06-05 ENCOUNTER — TELEPHONE (OUTPATIENT)
Dept: ENDOSCOPY | Facility: HOSPITAL | Age: 63
End: 2023-06-05
Payer: MEDICAID

## 2023-06-05 DIAGNOSIS — R93.89 ABNORMAL FINDING ON IMAGING: Primary | ICD-10-CM

## 2023-06-05 RX ORDER — MIRTAZAPINE 15 MG/1
15 TABLET, FILM COATED ORAL NIGHTLY
Qty: 90 TABLET | Refills: 0 | OUTPATIENT
Start: 2023-06-05 | End: 2023-09-03

## 2023-06-05 NOTE — TELEPHONE ENCOUNTER
----- Message from Rock Martines MD sent at 6/5/2023 12:45 PM CDT -----  Sure. Let's get an AXR to see if the stent is still in place.      ----- Message -----  From: Radha Hoang MA  Sent: 6/3/2023   2:24 PM CDT  To: MD Dr Bobbi Bateman,   Does she need any updated imaging?   Is it possible the stent may have come out?  Cherise

## 2023-06-09 ENCOUNTER — HOSPITAL ENCOUNTER (OUTPATIENT)
Dept: RADIOLOGY | Facility: HOSPITAL | Age: 63
Discharge: HOME | End: 2023-06-09
Attending: INTERNAL MEDICINE
Payer: MEDICAID

## 2023-06-09 DIAGNOSIS — R93.89 ABNORMAL FINDING ON IMAGING: ICD-10-CM

## 2023-06-09 PROCEDURE — 74019 RADEX ABDOMEN 2 VIEWS: CPT | Mod: TC

## 2023-06-11 ENCOUNTER — TELEPHONE (OUTPATIENT)
Dept: ENDOSCOPY | Facility: HOSPITAL | Age: 63
End: 2023-06-11

## 2023-06-11 NOTE — TELEPHONE ENCOUNTER
----- Message from Rock Martines MD sent at 6/9/2023 11:37 AM CDT -----  PS stent is still in place. Please schedule her for EGD with PD stent removal. I don't think we need to do ERCP. Can be done at [facility] or [place].

## 2023-06-12 ENCOUNTER — TELEPHONE (OUTPATIENT)
Dept: ENDOSCOPY | Facility: HOSPITAL | Age: 63
End: 2023-06-12
Payer: MEDICAID

## 2023-06-12 ENCOUNTER — PATIENT MESSAGE (OUTPATIENT)
Dept: ENDOSCOPY | Facility: HOSPITAL | Age: 63
End: 2023-06-12
Payer: MEDICAID

## 2023-06-12 NOTE — TELEPHONE ENCOUNTER
Telephoned pt to schedule EGD.  Spoke with pt and procedure scheduled for 6/26/23 at 7:30am.  Reviewed history and medications.  Eliquis discontinued in March.  Instructed on procedure and preparation.  Pt verbalized understanding.  Instructions sent via patient portal.  Instructed pt on how to locate prep instructions within the portal.  Pt verbalized understanding.

## 2023-06-14 RX ORDER — PANCRELIPASE 60000; 12000; 38000 [USP'U]/1; [USP'U]/1; [USP'U]/1
6 CAPSULE, DELAYED RELEASE PELLETS ORAL
Qty: 540 CAPSULE | Refills: 3 | Status: CANCELLED | OUTPATIENT
Start: 2023-06-14 | End: 2023-10-12

## 2023-06-15 ENCOUNTER — PATIENT MESSAGE (OUTPATIENT)
Dept: FAMILY MEDICINE | Facility: CLINIC | Age: 63
End: 2023-06-15
Payer: MEDICAID

## 2023-06-15 NOTE — TELEPHONE ENCOUNTER
I am asked to refill medication I did not give her and it is not the same as what is on record.  No message indicating a change was being made.  I can not refill this

## 2023-06-21 ENCOUNTER — OFFICE VISIT (OUTPATIENT)
Dept: RHEUMATOLOGY | Facility: CLINIC | Age: 63
End: 2023-06-21
Payer: MEDICAID

## 2023-06-21 ENCOUNTER — LAB VISIT (OUTPATIENT)
Dept: LAB | Facility: HOSPITAL | Age: 63
End: 2023-06-21
Attending: INTERNAL MEDICINE
Payer: MEDICAID

## 2023-06-21 VITALS
BODY MASS INDEX: 18.43 KG/M2 | DIASTOLIC BLOOD PRESSURE: 73 MMHG | WEIGHT: 104 LBS | HEART RATE: 96 BPM | HEIGHT: 63 IN | SYSTOLIC BLOOD PRESSURE: 135 MMHG

## 2023-06-21 DIAGNOSIS — Z79.60 LONG-TERM USE OF IMMUNOSUPPRESSANT MEDICATION: ICD-10-CM

## 2023-06-21 DIAGNOSIS — Z79.52 LONG TERM CURRENT USE OF SYSTEMIC STEROIDS: ICD-10-CM

## 2023-06-21 DIAGNOSIS — K86.1 CHRONIC PANCREATITIS, UNSPECIFIED PANCREATITIS TYPE: ICD-10-CM

## 2023-06-21 DIAGNOSIS — M05.79 RHEUMATOID ARTHRITIS INVOLVING MULTIPLE SITES WITH POSITIVE RHEUMATOID FACTOR: ICD-10-CM

## 2023-06-21 DIAGNOSIS — M05.79 RHEUMATOID ARTHRITIS INVOLVING MULTIPLE SITES WITH POSITIVE RHEUMATOID FACTOR: Primary | ICD-10-CM

## 2023-06-21 LAB
ALBUMIN SERPL BCP-MCNC: 3.3 G/DL (ref 3.5–5.2)
ALP SERPL-CCNC: 95 U/L (ref 55–135)
ALT SERPL W/O P-5'-P-CCNC: 9 U/L (ref 10–44)
ANION GAP SERPL CALC-SCNC: 10 MMOL/L (ref 8–16)
AST SERPL-CCNC: 12 U/L (ref 10–40)
BASOPHILS # BLD AUTO: 0.27 K/UL (ref 0–0.2)
BASOPHILS NFR BLD: 1.6 % (ref 0–1.9)
BILIRUB SERPL-MCNC: 0.2 MG/DL (ref 0.1–1)
BUN SERPL-MCNC: 19 MG/DL (ref 8–23)
CALCIUM SERPL-MCNC: 11.2 MG/DL (ref 8.7–10.5)
CHLORIDE SERPL-SCNC: 109 MMOL/L (ref 95–110)
CO2 SERPL-SCNC: 23 MMOL/L (ref 23–29)
CREAT SERPL-MCNC: 0.9 MG/DL (ref 0.5–1.4)
CRP SERPL-MCNC: 29.4 MG/L (ref 0–8.2)
DIFFERENTIAL METHOD: ABNORMAL
EOSINOPHIL # BLD AUTO: 1 K/UL (ref 0–0.5)
EOSINOPHIL NFR BLD: 6 % (ref 0–8)
ERYTHROCYTE [DISTWIDTH] IN BLOOD BY AUTOMATED COUNT: 14.9 % (ref 11.5–14.5)
ERYTHROCYTE [SEDIMENTATION RATE] IN BLOOD BY PHOTOMETRIC METHOD: 96 MM/HR (ref 0–36)
EST. GFR  (NO RACE VARIABLE): >60 ML/MIN/1.73 M^2
GLUCOSE SERPL-MCNC: 105 MG/DL (ref 70–110)
HCT VFR BLD AUTO: 39.6 % (ref 37–48.5)
HGB BLD-MCNC: 12 G/DL (ref 12–16)
IMM GRANULOCYTES # BLD AUTO: 0.72 K/UL (ref 0–0.04)
IMM GRANULOCYTES NFR BLD AUTO: 4.2 % (ref 0–0.5)
LYMPHOCYTES # BLD AUTO: 1.4 K/UL (ref 1–4.8)
LYMPHOCYTES NFR BLD: 8 % (ref 18–48)
MCH RBC QN AUTO: 29.9 PG (ref 27–31)
MCHC RBC AUTO-ENTMCNC: 30.3 G/DL (ref 32–36)
MCV RBC AUTO: 99 FL (ref 82–98)
MONOCYTES # BLD AUTO: 1.4 K/UL (ref 0.3–1)
MONOCYTES NFR BLD: 8 % (ref 4–15)
NEUTROPHILS # BLD AUTO: 12.5 K/UL (ref 1.8–7.7)
NEUTROPHILS NFR BLD: 72.2 % (ref 38–73)
NRBC BLD-RTO: 0 /100 WBC
PLATELET # BLD AUTO: 500 K/UL (ref 150–450)
PMV BLD AUTO: 9.2 FL (ref 9.2–12.9)
POTASSIUM SERPL-SCNC: 4 MMOL/L (ref 3.5–5.1)
PROT SERPL-MCNC: 8.3 G/DL (ref 6–8.4)
RBC # BLD AUTO: 4.01 M/UL (ref 4–5.4)
SODIUM SERPL-SCNC: 142 MMOL/L (ref 136–145)
WBC # BLD AUTO: 17.22 K/UL (ref 3.9–12.7)

## 2023-06-21 PROCEDURE — 3078F DIAST BP <80 MM HG: CPT | Mod: CPTII,,, | Performed by: INTERNAL MEDICINE

## 2023-06-21 PROCEDURE — 3008F BODY MASS INDEX DOCD: CPT | Mod: CPTII,,, | Performed by: INTERNAL MEDICINE

## 2023-06-21 PROCEDURE — 3008F PR BODY MASS INDEX (BMI) DOCUMENTED: ICD-10-PCS | Mod: CPTII,,, | Performed by: INTERNAL MEDICINE

## 2023-06-21 PROCEDURE — 80053 COMPREHEN METABOLIC PANEL: CPT | Performed by: INTERNAL MEDICINE

## 2023-06-21 PROCEDURE — 1160F RVW MEDS BY RX/DR IN RCRD: CPT | Mod: CPTII,,, | Performed by: INTERNAL MEDICINE

## 2023-06-21 PROCEDURE — 99214 OFFICE O/P EST MOD 30 MIN: CPT | Mod: S$PBB,,, | Performed by: INTERNAL MEDICINE

## 2023-06-21 PROCEDURE — 36415 COLL VENOUS BLD VENIPUNCTURE: CPT | Performed by: INTERNAL MEDICINE

## 2023-06-21 PROCEDURE — 85652 RBC SED RATE AUTOMATED: CPT | Performed by: INTERNAL MEDICINE

## 2023-06-21 PROCEDURE — 3044F PR MOST RECENT HEMOGLOBIN A1C LEVEL <7.0%: ICD-10-PCS | Mod: CPTII,,, | Performed by: INTERNAL MEDICINE

## 2023-06-21 PROCEDURE — 3044F HG A1C LEVEL LT 7.0%: CPT | Mod: CPTII,,, | Performed by: INTERNAL MEDICINE

## 2023-06-21 PROCEDURE — 99213 OFFICE O/P EST LOW 20 MIN: CPT | Mod: PBBFAC | Performed by: INTERNAL MEDICINE

## 2023-06-21 PROCEDURE — 3075F PR MOST RECENT SYSTOLIC BLOOD PRESS GE 130-139MM HG: ICD-10-PCS | Mod: CPTII,,, | Performed by: INTERNAL MEDICINE

## 2023-06-21 PROCEDURE — 1159F PR MEDICATION LIST DOCUMENTED IN MEDICAL RECORD: ICD-10-PCS | Mod: CPTII,,, | Performed by: INTERNAL MEDICINE

## 2023-06-21 PROCEDURE — 99214 PR OFFICE/OUTPT VISIT, EST, LEVL IV, 30-39 MIN: ICD-10-PCS | Mod: S$PBB,,, | Performed by: INTERNAL MEDICINE

## 2023-06-21 PROCEDURE — 99999 PR PBB SHADOW E&M-EST. PATIENT-LVL III: ICD-10-PCS | Mod: PBBFAC,,, | Performed by: INTERNAL MEDICINE

## 2023-06-21 PROCEDURE — 3078F PR MOST RECENT DIASTOLIC BLOOD PRESSURE < 80 MM HG: ICD-10-PCS | Mod: CPTII,,, | Performed by: INTERNAL MEDICINE

## 2023-06-21 PROCEDURE — 99999 PR PBB SHADOW E&M-EST. PATIENT-LVL III: CPT | Mod: PBBFAC,,, | Performed by: INTERNAL MEDICINE

## 2023-06-21 PROCEDURE — 1159F MED LIST DOCD IN RCRD: CPT | Mod: CPTII,,, | Performed by: INTERNAL MEDICINE

## 2023-06-21 PROCEDURE — 3075F SYST BP GE 130 - 139MM HG: CPT | Mod: CPTII,,, | Performed by: INTERNAL MEDICINE

## 2023-06-21 PROCEDURE — 85025 COMPLETE CBC W/AUTO DIFF WBC: CPT | Performed by: INTERNAL MEDICINE

## 2023-06-21 PROCEDURE — 1160F PR REVIEW ALL MEDS BY PRESCRIBER/CLIN PHARMACIST DOCUMENTED: ICD-10-PCS | Mod: CPTII,,, | Performed by: INTERNAL MEDICINE

## 2023-06-21 PROCEDURE — 86140 C-REACTIVE PROTEIN: CPT | Performed by: INTERNAL MEDICINE

## 2023-06-21 RX ORDER — LEFLUNOMIDE 10 MG/1
20 TABLET ORAL DAILY
COMMUNITY
End: 2023-06-21 | Stop reason: SDUPTHER

## 2023-06-21 RX ORDER — LEFLUNOMIDE 10 MG/1
20 TABLET ORAL DAILY
Qty: 90 TABLET | Refills: 1 | Status: SHIPPED | OUTPATIENT
Start: 2023-06-21 | End: 2023-09-18 | Stop reason: SDUPTHER

## 2023-06-21 NOTE — PROGRESS NOTES
Rapid3 Question Responses and Scores 6/16/2023   MDHAQ Score 0.2   Psychologic Score 2.2   Pain Score 8   When you awakened in the morning OVER THE LAST WEEK, did you feel stiff? Yes   If Yes, please indicate the number of hours until you are as limber as you will be for the day 5   Fatigue Score 8   Global Health Score 8   RAPID3 Score 5.56     Answers submitted by the patient for this visit:  Rheumatology Questionnaire (Submitted on 6/16/2023)  fever: No  eye redness: No  mouth sores: No  headaches: No  shortness of breath: No  chest pain: No  trouble swallowing: No  diarrhea: No  constipation: No  unexpected weight change: Yes  genital sore: No  dysuria: No  During the last 3 days, have you had a skin rash?: No  Bruises or bleeds easily: Yes  cough: No

## 2023-06-22 NOTE — PROGRESS NOTES
History of present illness:  63-year-old female who has been followed by Dr. Cortez since December, 2020. She presented at that time with a several month history of foot and ankle pain.  It then spread to the hands.  She was diagnosed as having rheumatoid arthritis.  She was started initially on prednisone and then methotrexate.  She had been tapered off her prednisone.  She states her pain is worse with stopping the prednisone.  Her methotrexate had been increased up to 25 mg weekly.  She cut it back to 20 mg because of hair loss.  She then developed COVID and stopped the methotrexate.  She did not restart it because she generally felt better.  She remained on prednisone 5 mg daily.    I saw her last in September.  She still had evidence of active rheumatoid arthritis.  I placed her on leflunomide 20 mg daily.  I continued her on prednisone 5 mg weekly.    She is had multiple hospitalizations since her last visit.  She is had problems with recurrent pancreatitis and pancreatic pseudocyst.  Her last hospitalization was in March.  She is been off antibiotics for a month.  She had lost down to 68 lb.  She is now back to 104 lb.  She became very weak.  She went to physical therapy.  Her strength is now back to baseline.    Her arthritis is doing well.  She remains on leflunomide, which is helping and she is tolerating it.  She remains on prednisone 5 mg daily.  She takes Flexeril intermittently for her neck.  She is taking no medication for pain.    Physical examination:  Musculoskeletal: She is decreased range of motion of the cervical spine.    Shoulders, elbows, wrists are unremarkable.    She has synovitis of the right 2nd and 3rd MCP.  She has bony hypertrophy of the right 2nd PIP which is tender.  She has tenderness of the left 3rd MCP but no swelling.    Knees, ankles, small joints the feet are unremarkable.    Assessment:  She still has some evidence of active rheumatoid arthritis    Plans:  1.  Continue  leflunomide 20 mg daily  2.  Continue prednisone 5 mg daily  3.  Continue Flexeril as needed  4.  Laboratory studies obtained  5.  Return in 4 months    Answers submitted by the patient for this visit:  Rheumatology Questionnaire (Submitted on 6/16/2023)  fever: No  eye redness: No  mouth sores: No  headaches: No  shortness of breath: No  chest pain: No  trouble swallowing: No  diarrhea: No  constipation: No  unexpected weight change: Yes  genital sore: No  dysuria: No  During the last 3 days, have you had a skin rash?: No  Bruises or bleeds easily: Yes  cough: No

## 2023-06-26 ENCOUNTER — HOSPITAL ENCOUNTER (OUTPATIENT)
Facility: HOSPITAL | Age: 63
Discharge: HOME | End: 2023-06-26
Attending: INTERNAL MEDICINE | Admitting: INTERNAL MEDICINE
Payer: MEDICAID

## 2023-06-26 ENCOUNTER — ANESTHESIA EVENT (OUTPATIENT)
Dept: ENDOSCOPY | Facility: HOSPITAL | Age: 63
End: 2023-06-26
Payer: MEDICAID

## 2023-06-26 ENCOUNTER — ANESTHESIA (OUTPATIENT)
Dept: ENDOSCOPY | Facility: HOSPITAL | Age: 63
End: 2023-06-26
Payer: MEDICAID

## 2023-06-26 VITALS
SYSTOLIC BLOOD PRESSURE: 149 MMHG | HEART RATE: 83 BPM | WEIGHT: 104 LBS | RESPIRATION RATE: 19 BRPM | DIASTOLIC BLOOD PRESSURE: 72 MMHG | TEMPERATURE: 98 F | HEIGHT: 62 IN | OXYGEN SATURATION: 99 % | BODY MASS INDEX: 19.14 KG/M2

## 2023-06-26 DIAGNOSIS — Z46.89 ENCOUNTER FOR REMOVAL OF PANCREATIC STENT: ICD-10-CM

## 2023-06-26 PROCEDURE — 43247 PR EGD, FLEX, W/REMOVAL, FOREIGN BODY: ICD-10-PCS | Mod: ,,, | Performed by: INTERNAL MEDICINE

## 2023-06-26 PROCEDURE — 25000003 PHARM REV CODE 250: Performed by: STUDENT IN AN ORGANIZED HEALTH CARE EDUCATION/TRAINING PROGRAM

## 2023-06-26 PROCEDURE — 27201014 HC GRASPER DEVICE: Performed by: INTERNAL MEDICINE

## 2023-06-26 PROCEDURE — 25000003 PHARM REV CODE 250: Performed by: INTERNAL MEDICINE

## 2023-06-26 PROCEDURE — D9220A PRA ANESTHESIA: ICD-10-PCS | Mod: ANES,,, | Performed by: ANESTHESIOLOGY

## 2023-06-26 PROCEDURE — D9220A PRA ANESTHESIA: ICD-10-PCS | Mod: CRNA,,, | Performed by: STUDENT IN AN ORGANIZED HEALTH CARE EDUCATION/TRAINING PROGRAM

## 2023-06-26 PROCEDURE — D9220A PRA ANESTHESIA: Mod: CRNA,,, | Performed by: STUDENT IN AN ORGANIZED HEALTH CARE EDUCATION/TRAINING PROGRAM

## 2023-06-26 PROCEDURE — 43247 EGD REMOVE FOREIGN BODY: CPT | Performed by: INTERNAL MEDICINE

## 2023-06-26 PROCEDURE — 63600175 PHARM REV CODE 636 W HCPCS: Performed by: STUDENT IN AN ORGANIZED HEALTH CARE EDUCATION/TRAINING PROGRAM

## 2023-06-26 PROCEDURE — 37000009 HC ANESTHESIA EA ADD 15 MINS: Performed by: INTERNAL MEDICINE

## 2023-06-26 PROCEDURE — 43247 EGD REMOVE FOREIGN BODY: CPT | Mod: ,,, | Performed by: INTERNAL MEDICINE

## 2023-06-26 PROCEDURE — D9220A PRA ANESTHESIA: Mod: ANES,,, | Performed by: ANESTHESIOLOGY

## 2023-06-26 PROCEDURE — 37000008 HC ANESTHESIA 1ST 15 MINUTES: Performed by: INTERNAL MEDICINE

## 2023-06-26 RX ORDER — SODIUM CHLORIDE 0.9 % (FLUSH) 0.9 %
10 SYRINGE (ML) INJECTION
Status: DISCONTINUED | OUTPATIENT
Start: 2023-06-26 | End: 2023-06-26 | Stop reason: HOSPADM

## 2023-06-26 RX ORDER — PROPOFOL 10 MG/ML
VIAL (ML) INTRAVENOUS
Status: DISCONTINUED | OUTPATIENT
Start: 2023-06-26 | End: 2023-06-26

## 2023-06-26 RX ORDER — PROPOFOL 10 MG/ML
VIAL (ML) INTRAVENOUS CONTINUOUS PRN
Status: DISCONTINUED | OUTPATIENT
Start: 2023-06-26 | End: 2023-06-26

## 2023-06-26 RX ORDER — LIDOCAINE HYDROCHLORIDE 20 MG/ML
INJECTION INTRAVENOUS
Status: DISCONTINUED | OUTPATIENT
Start: 2023-06-26 | End: 2023-06-26

## 2023-06-26 RX ORDER — SODIUM CHLORIDE 9 MG/ML
INJECTION, SOLUTION INTRAVENOUS CONTINUOUS
Status: DISCONTINUED | OUTPATIENT
Start: 2023-06-26 | End: 2023-06-26 | Stop reason: HOSPADM

## 2023-06-26 RX ORDER — PANCRELIPASE 36000; 180000; 114000 [USP'U]/1; [USP'U]/1; [USP'U]/1
2 CAPSULE, DELAYED RELEASE PELLETS ORAL 3 TIMES DAILY
Qty: 180 CAPSULE | Refills: 3 | Status: SHIPPED | OUTPATIENT
Start: 2023-06-26 | End: 2023-10-31 | Stop reason: SDUPTHER

## 2023-06-26 RX ORDER — FENTANYL CITRATE 50 UG/ML
25 INJECTION, SOLUTION INTRAMUSCULAR; INTRAVENOUS EVERY 5 MIN PRN
Status: DISCONTINUED | OUTPATIENT
Start: 2023-06-26 | End: 2023-06-26 | Stop reason: HOSPADM

## 2023-06-26 RX ORDER — PROCHLORPERAZINE EDISYLATE 5 MG/ML
5 INJECTION INTRAMUSCULAR; INTRAVENOUS EVERY 30 MIN PRN
Status: DISCONTINUED | OUTPATIENT
Start: 2023-06-26 | End: 2023-06-26 | Stop reason: HOSPADM

## 2023-06-26 RX ADMIN — PROPOFOL 150 MCG/KG/MIN: 10 INJECTION, EMULSION INTRAVENOUS at 07:06

## 2023-06-26 RX ADMIN — PROPOFOL 20 MG: 10 INJECTION, EMULSION INTRAVENOUS at 07:06

## 2023-06-26 RX ADMIN — SODIUM CHLORIDE: 9 INJECTION, SOLUTION INTRAVENOUS at 07:06

## 2023-06-26 RX ADMIN — PROPOFOL 50 MG: 10 INJECTION, EMULSION INTRAVENOUS at 07:06

## 2023-06-26 RX ADMIN — PROPOFOL 10 MG: 10 INJECTION, EMULSION INTRAVENOUS at 07:06

## 2023-06-26 RX ADMIN — LIDOCAINE HYDROCHLORIDE 100 MG: 20 INJECTION INTRAVENOUS at 07:06

## 2023-06-26 NOTE — ANESTHESIA PREPROCEDURE EVALUATION
06/26/2023  Claire Bowser is a 63 y.o., female.  [facility]  Anesthesia Pre-Operative Evaluation       Patient Name: Claire Bowser  YOB: 1960  MRN: [id number]  [facility]: [id number]      Code Status: Prior   Date of Procedure: 6/26/2023  Anesthesia: General/MAC Procedure: Procedure(s) (LRB):  EGD (ESOPHAGOGASTRODUODENOSCOPY) (N/A)  Pre-Operative Diagnosis: Pancreatic duct leak [K86.89]  Proceduralist: Surgeon(s) and Role:     * Kirk Nuñez MD - Primary        SUBJECTIVE:   Claire Bowser is a 63 y.o. female who  has a past medical history of Acute biliary pancreatitis (6/14/2022), Anxiety, Chronic pancreatitis (1/2/2023), Digestive disorder, Flu (02/2017), Hypertension, Long-term use of immunosuppressant medication (6/10/2022), and Rheumatoid arthritis involving multiple sites with positive rheumatoid factor (01/14/2021). No notes on file    she has a current medication list which includes the following long-term medication(s): amlodipine, folic acid, leflunomide, creon, lipase-protease-amylase 12,000-38,000-60,000 units, megestrol, mirtazapine, pantoprazole, sertraline, and trazodone.   ALLERGIES:     Review of patient's allergies indicates:   Allergen Reactions    No known drug allergies      LDA:          Lines/Drains/Airways     Peripherally Inserted Central Catheter Line  Duration           PICC Double Lumen 02/25/23 1850 right brachial 120 days          Drain  Duration                Closed/Suction Drain 02/27/23 1228 Right LLQ 8 Fr. 118 days              History:   There are no hospital problems to display for this patient.    Patient Active Problem List   Diagnosis    Anxiety    Hypertension    Generalized anxiety disorder    Tobacco use    Mixed hyperlipidemia    BMI 22.0-22.9, adult    Rheumatoid arthritis involving multiple sites with positive rheumatoid factor     Black stool    Colon polyps    Pneumonia    Hypokalemia    Acute cystitis without hematuria    Acute pancreatitis    Epigastric pain    Dehydration    Hypercalcemia    ACP (advance care planning)    Severe protein-calorie malnutrition    Long-term use of immunosuppressant medication    Syncope    Acute biliary pancreatitis    Chest pain    Weight loss, unintentional    Abdominal pain    Chronic pancreatitis    Immunocompromised    Pseudocyst of pancreas    Pancreatic cyst    Peritoneal fluid collection    History of DVT of lower extremity    Spontaneous bacterial peritonitis    Pancreatic duct leakage    Impaired strength of lower extremity    Impaired functional mobility, balance, gait, and endurance    Acute on chronic anemia    Elevated troponin    Intra-abdominal abscess    Solar purpura    Decreased strength    Impaired functional mobility, balance, and endurance    Long term current use of systemic steroids       Surgical History:    has a past surgical history that includes Tubal ligation; Esophagogastroduodenoscopy (N/A, 2/26/2021); Colonoscopy (N/A, 2/26/2021); Endoscopic ultrasound of upper gastrointestinal tract (N/A, 7/1/2022); Laparoscopic cholecystectomy (N/A, 7/27/2022); Endoscopic ultrasound of upper gastrointestinal tract (N/A, 11/29/2022); ERCP (N/A, 12/30/2022); Endoscopic ultrasound of upper gastrointestinal tract (N/A, 1/3/2023); and ERCP (N/A, 1/24/2023).   Social History:     reports that she has been smoking cigarettes. She has a 7.00 pack-year smoking history. She has never used smokeless tobacco. She reports that she does not drink alcohol and does not use drugs.     OBJECTIVE:     Vital Signs (Most Recent):    Vital Signs Range (Last 24H):          There is no height or weight on file to calculate BMI.   Wt Readings from Last 4 Encounters:   06/21/23 47.2 kg (104 lb)   04/12/23 34.7 kg (76 lb 9.6 oz)   03/22/23 34.8 kg (76 lb 9.8 oz)   03/22/23 36.6 kg  (80 lb 11.2 oz)     Significant Labs:  Lab Results   Component Value Date    WBC 17.22 (H) 06/21/2023    HGB 12.0 06/21/2023    HCT 39.6 06/21/2023     (H) 06/21/2023     06/21/2023    K 4.0 06/21/2023     06/21/2023    CREATININE 0.9 06/21/2023    BUN 19 06/21/2023    CO2 23 06/21/2023     06/21/2023    CALCIUM 11.2 (H) 06/21/2023    MG 1.7 03/13/2023    PHOS 2.9 01/27/2023    ALKPHOS 95 06/21/2023    ALT 9 (L) 06/21/2023    AST 12 06/21/2023    ALBUMIN 3.3 (L) 06/21/2023    INR 1.3 (H) 01/27/2023    APTT 38.9 (H) 01/27/2023    HGBA1C 5.2 02/08/2023    CPK 15 (L) 06/15/2022    CPKMB 0.6 06/15/2022    TROPONINI 0.037 (H) 10/18/2022    MB 4.0 06/15/2022     (H) 02/24/2023     No LMP recorded. Patient is postmenopausal.  No results found for this or any previous visit (from the past 72 hour(s)).    EKG:   Results for orders placed or performed during the hospital encounter of 02/24/23   EKG 12-lead    Collection Time: 02/24/23  9:38 AM    Narrative    Test Reason : R07.9,    Vent. Rate : 079 BPM     Atrial Rate : 079 BPM     P-R Int : 128 ms          QRS Dur : 066 ms      QT Int : 392 ms       P-R-T Axes : 077 013 -56 degrees     QTc Int : 449 ms    Normal sinus rhythm  Low voltage QRS  Nonspecific T wave abnormality  Abnormal ECG  When compared with ECG of 08-FEB-2023 10:05,  No significant change was found  Confirmed by Ta Wynne MD [id number]) on 2/25/2023 6:48:33 PM    Referred By: [email or web address]   SELF           Confirmed By:Ta Wynne MD       TTE:  Results for orders placed or performed during the hospital encounter of 02/24/23   Echo   Result Value Ref Range    BSA 1.52 m2    LVIDd 3.06 (A) 3.5 - 6.0 cm    LVIDs 1.60 (A) 2.1 - 4.0 cm    FS 48 28 - 44 %    LV Systolic Volume 16.00 mL    LV Systolic Volume Index 10.3 mL/m2    LV Diastolic Volume 36.70 mL    LV Diastolic Volume Index 23.53 mL/m2    EF 60 %    Narrative    · The left ventricle is normal in size with normal  systolic function.  · The estimated ejection fraction is 60%.  · Normal left ventricular diastolic function.  · Normal right ventricular size with normal right ventricular systolic   function.  · Trivial circumferential pericardial effusion.  · There are bilateral pleural effusions. Large        EF   Date Value Ref Range Status   02/24/2023 60 % Final   02/09/2023 60 % Final      No results found for this or any previous visit.    ASSESSMENT/PLAN:         Pre-op Assessment    I have reviewed the Patient Summary Reports.     I have reviewed the Nursing Notes. I have reviewed the NPO Status.   I have reviewed the Medications.     Review of Systems      Physical Exam  General: Well nourished, Cooperative and Alert    Airway:  Mallampati: III / II  Mouth Opening: Normal  TM Distance: Normal  Tongue: Normal  Neck ROM: Normal ROM    Dental:  Loose teeth    Chest/Lungs:  Normal Respiratory Rate    Heart:  Rate: Normal  Rhythm: Regular Rhythm        Anesthesia Plan  Type of Anesthesia, risks & benefits discussed:    Anesthesia Type: Gen Natural Airway, MAC  Intra-op Monitoring Plan: Standard ASA Monitors  Post Op Pain Control Plan: IV/PO Opioids PRN  Induction:  IV  Informed Consent: Informed consent signed with the Patient and all parties understand the risks and agree with anesthesia plan.  All questions answered.   ASA Score: 3  Day of Surgery Review of History & Physical: H&P Update referred to the surgeon/provider.    Ready For Surgery From Anesthesia Perspective.     .

## 2023-06-26 NOTE — H&P
Short Stay Endoscopy History and Physical    PCP - Samuel Brady MD  Referring Physician - Jenelle Douglas, BEVERLEY  [address] [place] [address]    Procedure - egd  ASA - per anesthesia  Mallampati - per anesthesia  History of Anesthesia problems - no  Family history Anesthesia problems -  no   Plan of anesthesia - General    HPI:  This is a 63 y.o. female here for evaluation of: stent removal    Reflux - no  Dysphagia - no  Abdominal pain - no  Diarrhea - no    ROS:  Constitutional: No fevers, chills, No weight loss  CV: No chest pain  Pulm: No cough, No shortness of breath  Ophtho: No vision changes  GI: see HPI  Derm: No rash    Medical History:  has a past medical history of Acute biliary pancreatitis (6/14/2022), Anxiety, Chronic pancreatitis (1/2/2023), Digestive disorder, Flu (02/2017), Hypertension, Long-term use of immunosuppressant medication (6/10/2022), and Rheumatoid arthritis involving multiple sites with positive rheumatoid factor (01/14/2021).    Surgical History:  has a past surgical history that includes Tubal ligation; Esophagogastroduodenoscopy (N/A, 2/26/2021); Colonoscopy (N/A, 2/26/2021); Endoscopic ultrasound of upper gastrointestinal tract (N/A, 7/1/2022); Laparoscopic cholecystectomy (N/A, 7/27/2022); Endoscopic ultrasound of upper gastrointestinal tract (N/A, 11/29/2022); ERCP (N/A, 12/30/2022); Endoscopic ultrasound of upper gastrointestinal tract (N/A, 1/3/2023); and ERCP (N/A, 1/24/2023).    Family History: family history includes Alcohol abuse in her paternal grandfather and paternal grandmother; COPD in her brother and father; Cancer in her maternal aunt; Cirrhosis in her paternal grandmother; Diabetes in her maternal aunt and mother; Emphysema in her brother; Hearing loss in her mother; Heart disease in her maternal aunt, maternal uncle, and mother; Hypertension in her maternal uncle and mother; Kidney disease in her mother; Liver disease in her paternal  grandfather and sister; No Known Problems in her paternal uncle and son; Sleep apnea in her brother; Stroke in her mother..    Social History:  reports that she has been smoking cigarettes. She has a 7.00 pack-year smoking history. She has never used smokeless tobacco. She reports that she does not drink alcohol and does not use drugs.    Review of patient's allergies indicates:   Allergen Reactions    No known drug allergies        Medications:   Medications Prior to Admission   Medication Sig Dispense Refill Last Dose    acidophilus-sporogenes (ACIDOPHILUS EX STR, L. SPOROG,) 35 million- 25 million cell Tab Take 1 tablet by mouth once daily. 30 tablet 0 6/25/2023    amLODIPine (NORVASC) 5 MG tablet Take 2 tablets (10 mg total) by mouth once daily. 60 tablet 11 6/25/2023    cyclobenzaprine (FLEXERIL) 5 MG tablet Take 1 tablet (5 mg total) by mouth nightly. 30 tablet 3 6/25/2023    folic acid (FOLVITE) 1 MG tablet Take 1 tablet (1 mg total) by mouth once daily. 90 tablet 3 6/25/2023    leflunomide (ARAVA) 10 MG Tab Take 2 tablets (20 mg total) by mouth once daily. 90 tablet 1 6/25/2023    lipase-protease-amylase (CREON) 36,000-114,000- 180,000 unit CpDR Take 2 capsules by mouth 3 (three) times daily. 180 capsule 0 6/25/2023    lipase-protease-amylase 12,000-38,000-60,000 units (CREON) CpDR Take 6 capsules by mouth 3 (three) times daily with meals. 540 capsule 3 6/25/2023    pantoprazole (PROTONIX) 40 MG tablet Take 1 tablet (40 mg total) by mouth once daily. 90 tablet 0 6/25/2023    potassium chloride SA (K-DUR,KLOR-CON) 20 MEQ tablet Take 1 tablet (20 mEq total) by mouth 3 (three) times daily. 60 tablet 1 6/25/2023    potassium chloride SA (K-DUR,KLOR-CON) 20 MEQ tablet Take 1 tablet (20 mEq total) by mouth 3 (three) times daily. 60 tablet 1 6/25/2023    predniSONE (DELTASONE) 5 MG tablet Take 1 tablet (5 mg total) by mouth once daily. 30 tablet 0 6/25/2023    sertraline (ZOLOFT) 50 MG tablet Take 1 tablet (50 mg  total) by mouth every evening. 30 tablet 11 6/25/2023    traZODone (DESYREL) 50 MG tablet Take 1 tablet by mouth in the evening 90 tablet 3 6/25/2023    loperamide (IMODIUM) 2 mg capsule Take 1 capsule (2 mg total) by mouth 4 (four) times daily as needed for Diarrhea. 30 capsule 0     megestroL (MEGACE) 40 MG Tab Take 1 tablet (40 mg total) by mouth once daily. 30 tablet 2 Unknown    mirtazapine (REMERON) 15 MG tablet Take 1 tablet (15 mg total) by mouth every evening. 90 tablet 0 Unknown       Physical Exam:    Vital Signs:   Vitals:    06/26/23 0702   BP: (!) 168/87   Resp: 16   Temp: 98.1 °F (36.7 °C)       General Appearance: Well appearing in no acute distress    Labs:  Lab Results   Component Value Date    WBC 17.22 (H) 06/21/2023    HGB 12.0 06/21/2023    HCT 39.6 06/21/2023     (H) 06/21/2023    CHOL 140 06/15/2022    TRIG 295 (H) 06/15/2022    HDL 33 (L) 06/15/2022    ALT 9 (L) 06/21/2023    AST 12 06/21/2023     06/21/2023    K 4.0 06/21/2023     06/21/2023    CREATININE 0.9 06/21/2023    BUN 19 06/21/2023    CO2 23 06/21/2023    TSH 2.254 10/17/2022    INR 1.3 (H) 01/27/2023    HGBA1C 5.2 02/08/2023       I have explained the risks and benefits of this endoscopic procedure to the patient including but not limited to bleeding, inflammation, infection, perforation, and death.      Kirk Nuñez MD

## 2023-06-26 NOTE — PLAN OF CARE
Pt a/o x4.Vss. no c/o pain or nausea. No signs of distress noted. Pt tolerate oral intake. Discharge instructions reviewed. Will continue to monitor.

## 2023-06-26 NOTE — ANESTHESIA POSTPROCEDURE EVALUATION
Anesthesia Post Evaluation    Patient: Claire Bowser    Procedure(s) Performed: Procedure(s) (LRB):  EGD (ESOPHAGOGASTRODUODENOSCOPY) (N/A)    Final Anesthesia Type: general      Patient location during evaluation: [facility]  Patient participation: Yes- Able to Participate  Level of consciousness: awake and alert  Post-procedure vital signs: reviewed and stable  Pain management: adequate  Airway patency: patent    PONV status at discharge: No PONV  Anesthetic complications: no      Cardiovascular status: blood pressure returned to baseline  Respiratory status: unassisted, spontaneous ventilation and room air  Hydration status: euvolemic            Vitals Value Taken Time   /72 06/26/23 0831   Temp 36.6 °C (97.9 °F) 06/26/23 0830   Pulse 84 06/26/23 0835   Resp 36 06/26/23 0835   SpO2 98 % 06/26/23 0835   Vitals shown include unvalidated device data.      No case tracking events are documented in the log.      Pain/Gerald Score: Gerald Score: 10 (6/26/2023  8:30 AM)

## 2023-06-26 NOTE — PROVATION PATIENT INSTRUCTIONS
Discharge Summary/Instructions after an Endoscopic Procedure  Patient Name: Claire Bowser  Patient MRN: [id number]  Patient YOB: 1960 Monday, June 26, 2023  Kirk Nuñez MD  Dear patient,  As a result of recent federal legislation (The Federal Cures Act), you may   receive lab or pathology results from your procedure in your [facility]   account before your physician is able to contact you. Your physician or   their representative will relay the results to you with their   recommendations at their soonest availability.  Thank you,  RESTRICTIONS:  During your procedure today, you received medications for sedation.  These   medications may affect your judgment, balance and coordination.  Therefore,   for 24 hours, you have the following restrictions:   - DO NOT drive a car, operate machinery, make legal/financial decisions,   sign important papers or drink alcohol.    ACTIVITY:  Today: no heavy lifting, straining or running due to procedural   sedation/anesthesia.  The following day: return to full activity including work.  DIET:  Eat and drink normally unless instructed otherwise.     TREATMENT FOR COMMON SIDE EFFECTS:  - Mild abdominal pain, nausea, belching, bloating or excessive gas:  rest,   eat lightly and use a heating pad.  - Sore Throat: treat with throat lozenges and/or gargle with warm salt   water.  - Because air was used during the procedure, expelling large amounts of air   from your rectum or belching is normal.  - If a bowel prep was taken, you may not have a bowel movement for 1-3 days.    This is normal.  SYMPTOMS TO WATCH FOR AND REPORT TO YOUR PHYSICIAN:  1. Abdominal pain or bloating, other than gas cramps.  2. Chest pain.  3. Back pain.  4. Signs of infection such as: chills or fever occurring within 24 hours   after the procedure.  5. Rectal bleeding, which would show as bright red, maroon, or black stools.   (A tablespoon of blood from the rectum is not serious, especially if    hemorrhoids are present.)  6. Vomiting.  7. Weakness or dizziness.  GO DIRECTLY TO THE NEAREST EMERGENCY ROOM IF YOU HAVE ANY OF THE FOLLOWING:      Difficulty breathing              Chills and/or fever over 101 F   Persistent vomiting and/or vomiting blood   Severe abdominal pain   Severe chest pain   Black, tarry stools   Bleeding- more than one tablespoon   Any other symptom or condition that you feel may need urgent attention  Your doctor recommends these additional instructions:  If any biopsies were taken, your doctors clinic will contact you in 1 to 2   weeks with any results.  - Discharge patient to home.   - Resume previous diet.   - Continue present medications.   - Return to primary care physician at appointment to be scheduled.  For questions, problems or results please call your physician - Kirk Nuñez MD at Work:  [phone].  [facility], EMERGENCY ROOM PHONE NUMBER: [phone]  IF A COMPLICATION OR EMERGENCY SITUATION ARISES AND YOU ARE UNABLE TO REACH   YOUR PHYSICIAN - GO DIRECTLY TO THE EMERGENCY ROOM.  Kirk Nuñez MD  6/26/2023 8:03:06 AM  This report has been verified and signed electronically.  Dear patient,  As a result of recent federal legislation (The Federal Cures Act), you may   receive lab or pathology results from your procedure in your [facility]   account before your physician is able to contact you. Your physician or   their representative will relay the results to you with their   recommendations at their soonest availability.  Thank you,  [employer]ATION

## 2023-06-30 DIAGNOSIS — K27.9 PUD (PEPTIC ULCER DISEASE): ICD-10-CM

## 2023-07-01 RX ORDER — PANTOPRAZOLE SODIUM 40 MG/1
40 TABLET, DELAYED RELEASE ORAL DAILY
Qty: 90 TABLET | Refills: 2 | Status: SHIPPED | OUTPATIENT
Start: 2023-07-01 | End: 2024-03-21

## 2023-07-01 RX ORDER — PREDNISONE 5 MG/1
5 TABLET ORAL DAILY
Qty: 30 TABLET | Refills: 4 | Status: SHIPPED | OUTPATIENT
Start: 2023-07-01 | End: 2023-11-21 | Stop reason: SDUPTHER

## 2023-07-01 NOTE — TELEPHONE ENCOUNTER
Refill Decision Note   Claire Burky  is requesting a refill authorization.  Brief Assessment and Rationale for Refill:  Approve     Medication Therapy Plan:         Comments:     Note composed:12:37 AM 07/01/2023

## 2023-07-01 NOTE — TELEPHONE ENCOUNTER
No care due was identified.  [facility] Embedded Care Due Messages. Reference number: [id number].   7/01/2023 12:30:18 AM CDT

## 2023-07-17 RX ORDER — POTASSIUM CHLORIDE 20 MEQ/1
20 TABLET, EXTENDED RELEASE ORAL 3 TIMES DAILY
Qty: 60 TABLET | Refills: 1 | OUTPATIENT
Start: 2023-07-17

## 2023-07-18 NOTE — TELEPHONE ENCOUNTER
"I spoke with patient to advise her to call her PCP to obtain Potassium refills ; as he should be following her potassium levels.  Per Dr Caldwell's orders  Patient responded , "OK, no problem"  CAROLINE s [facility]  7/18/23"

## 2023-07-31 RX ORDER — CYCLOBENZAPRINE HCL 5 MG
5 TABLET ORAL NIGHTLY
Qty: 30 TABLET | Refills: 3 | Status: SHIPPED | OUTPATIENT
Start: 2023-07-31 | End: 2023-11-24 | Stop reason: SDUPTHER

## 2023-08-07 NOTE — PLAN OF CARE
Patient calling, conveyed results. Patient verbalized understanding, agreed to Cardiology referral (was ordered). Patient is wondering what provider recommends regarding the 'multiple liver cysts'. Please advise.    Patient expected to dc home with home health when stable to dc.        Amalia Pedro LMSW  [facility]   [phone]

## 2023-08-16 ENCOUNTER — PATIENT MESSAGE (OUTPATIENT)
Dept: FAMILY MEDICINE | Facility: CLINIC | Age: 63
End: 2023-08-16
Payer: MEDICAID

## 2023-08-16 RX ORDER — POTASSIUM CHLORIDE 20 MEQ/1
20 TABLET, EXTENDED RELEASE ORAL 3 TIMES DAILY
Qty: 60 TABLET | Refills: 1 | Status: SHIPPED | OUTPATIENT
Start: 2023-08-16 | End: 2023-10-23 | Stop reason: SDUPTHER

## 2023-08-16 NOTE — TELEPHONE ENCOUNTER
No care due was identified.  [facility] Embedded Care Due Messages. Reference number: [id number].   8/16/2023 3:06:48 PM CDT

## 2023-09-18 RX ORDER — LEFLUNOMIDE 10 MG/1
20 TABLET ORAL DAILY
Qty: 90 TABLET | Refills: 1 | Status: SHIPPED | OUTPATIENT
Start: 2023-09-18 | End: 2023-11-24 | Stop reason: SDUPTHER

## 2023-10-19 ENCOUNTER — PATIENT MESSAGE (OUTPATIENT)
Dept: GASTROENTEROLOGY | Facility: CLINIC | Age: 63
End: 2023-10-19
Payer: MEDICAID

## 2023-10-23 ENCOUNTER — TELEPHONE (OUTPATIENT)
Dept: ENDOSCOPY | Facility: HOSPITAL | Age: 63
End: 2023-10-23
Payer: MEDICAID

## 2023-10-23 ENCOUNTER — PATIENT MESSAGE (OUTPATIENT)
Dept: GASTROENTEROLOGY | Facility: CLINIC | Age: 63
End: 2023-10-23
Payer: MEDICAID

## 2023-10-23 ENCOUNTER — PATIENT MESSAGE (OUTPATIENT)
Dept: FAMILY MEDICINE | Facility: CLINIC | Age: 63
End: 2023-10-23
Payer: MEDICAID

## 2023-10-23 DIAGNOSIS — K86.89 PANCREATIC INSUFFICIENCY: Primary | ICD-10-CM

## 2023-10-23 NOTE — TELEPHONE ENCOUNTER
No care due was identified.  [facility] Embedded Care Due Messages. Reference number: [id number].   10/23/2023 3:36:03 PM CDT

## 2023-10-24 ENCOUNTER — TELEPHONE (OUTPATIENT)
Dept: FAMILY MEDICINE | Facility: CLINIC | Age: 63
End: 2023-10-24
Payer: MEDICAID

## 2023-10-24 RX ORDER — POTASSIUM CHLORIDE 20 MEQ/1
20 TABLET, EXTENDED RELEASE ORAL 3 TIMES DAILY
Qty: 270 TABLET | Refills: 1 | Status: SHIPPED | OUTPATIENT
Start: 2023-10-24

## 2023-10-24 NOTE — TELEPHONE ENCOUNTER
Refill Routing Note   Medication(s) are not appropriate for processing by [facility] for the following reason(s):      New or recently adjusted medication    [facility] action(s):  Defer Care Due:  None identified            Appointments  past 12m or future 3m with PCP    Date Provider   Last Visit   3/22/2023 Samuel Brady MD   Next Visit   Visit date not found Samuel Brady MD   ED visits in past 90 days: 0        Note composed:9:52 AM 10/24/2023

## 2023-10-24 NOTE — TELEPHONE ENCOUNTER
Spoke with patient   Rolanda is waiting for Dr Brady to approve refill   Her medication has not been denied.      ----- Message from Rohan Iniguez sent at 10/24/2023  1:56 PM CDT -----  Type: Needs Medical Advice  Who Called:  pt  Symptoms (please be specific):  pt said she need to know why her rx for her potassium chloride SA (ROLANDA,KLOR-CON) 20 MEQ tablet was denied--please call and advise    Pharmacy name and phone #:    [facility] [address] [address]  Phone: [phone] Fax: [phone]  Best Call Back Number: [phone] (home)     Additional Information: thank you

## 2023-10-25 ENCOUNTER — LAB VISIT (OUTPATIENT)
Dept: LAB | Facility: HOSPITAL | Age: 63
End: 2023-10-25
Attending: INTERNAL MEDICINE
Payer: MEDICAID

## 2023-10-25 DIAGNOSIS — K86.89 PANCREATIC INSUFFICIENCY: ICD-10-CM

## 2023-10-25 PROCEDURE — 82653 EL-1 FECAL QUANTITATIVE: CPT | Performed by: INTERNAL MEDICINE

## 2023-10-31 LAB — ELASTASE PANC STL-MCNT: 84 UG ELAST./G

## 2023-10-31 RX ORDER — PANCRELIPASE 36000; 180000; 114000 [USP'U]/1; [USP'U]/1; [USP'U]/1
2 CAPSULE, DELAYED RELEASE PELLETS ORAL 3 TIMES DAILY
Qty: 540 CAPSULE | Refills: 3 | Status: SHIPPED | OUTPATIENT
Start: 2023-10-31 | End: 2024-10-30

## 2023-11-21 RX ORDER — PREDNISONE 5 MG/1
5 TABLET ORAL DAILY
Qty: 30 TABLET | Refills: 4 | Status: SHIPPED | OUTPATIENT
Start: 2023-11-21

## 2023-11-24 RX ORDER — LEFLUNOMIDE 10 MG/1
20 TABLET ORAL DAILY
Qty: 90 TABLET | Refills: 1 | Status: SHIPPED | OUTPATIENT
Start: 2023-11-24 | End: 2024-01-10 | Stop reason: SDUPTHER

## 2023-11-24 RX ORDER — CYCLOBENZAPRINE HCL 5 MG
5 TABLET ORAL NIGHTLY
Qty: 30 TABLET | Refills: 3 | Status: SHIPPED | OUTPATIENT
Start: 2023-11-24 | End: 2024-03-19 | Stop reason: SDUPTHER

## 2024-01-10 ENCOUNTER — LAB VISIT (OUTPATIENT)
Dept: LAB | Facility: HOSPITAL | Age: 64
End: 2024-01-10
Attending: INTERNAL MEDICINE
Payer: MEDICAID

## 2024-01-10 ENCOUNTER — OFFICE VISIT (OUTPATIENT)
Dept: RHEUMATOLOGY | Facility: CLINIC | Age: 64
End: 2024-01-10
Payer: MEDICAID

## 2024-01-10 VITALS
BODY MASS INDEX: 22.07 KG/M2 | WEIGHT: 119.94 LBS | DIASTOLIC BLOOD PRESSURE: 82 MMHG | SYSTOLIC BLOOD PRESSURE: 142 MMHG | HEIGHT: 62 IN | HEART RATE: 84 BPM

## 2024-01-10 DIAGNOSIS — Z79.60 LONG-TERM USE OF IMMUNOSUPPRESSANT MEDICATION: ICD-10-CM

## 2024-01-10 DIAGNOSIS — M79.672 PAIN IN BOTH FEET: ICD-10-CM

## 2024-01-10 DIAGNOSIS — M05.79 RHEUMATOID ARTHRITIS INVOLVING MULTIPLE SITES WITH POSITIVE RHEUMATOID FACTOR: Primary | ICD-10-CM

## 2024-01-10 DIAGNOSIS — M05.79 RHEUMATOID ARTHRITIS INVOLVING MULTIPLE SITES WITH POSITIVE RHEUMATOID FACTOR: ICD-10-CM

## 2024-01-10 DIAGNOSIS — M79.671 PAIN IN BOTH FEET: ICD-10-CM

## 2024-01-10 DIAGNOSIS — Z79.52 LONG TERM CURRENT USE OF SYSTEMIC STEROIDS: ICD-10-CM

## 2024-01-10 LAB
25(OH)D3+25(OH)D2 SERPL-MCNC: 27 NG/ML (ref 30–96)
ALBUMIN SERPL BCP-MCNC: 3.7 G/DL (ref 3.5–5.2)
ALP SERPL-CCNC: 199 U/L (ref 55–135)
ALT SERPL W/O P-5'-P-CCNC: 22 U/L (ref 10–44)
ANION GAP SERPL CALC-SCNC: 9 MMOL/L (ref 8–16)
AST SERPL-CCNC: 25 U/L (ref 10–40)
BASOPHILS # BLD AUTO: 0.12 K/UL (ref 0–0.2)
BASOPHILS NFR BLD: 1 % (ref 0–1.9)
BILIRUB SERPL-MCNC: 0.5 MG/DL (ref 0.1–1)
BUN SERPL-MCNC: 11 MG/DL (ref 8–23)
CALCIUM SERPL-MCNC: 10.8 MG/DL (ref 8.7–10.5)
CHLORIDE SERPL-SCNC: 102 MMOL/L (ref 95–110)
CO2 SERPL-SCNC: 27 MMOL/L (ref 23–29)
CREAT SERPL-MCNC: 0.8 MG/DL (ref 0.5–1.4)
CRP SERPL-MCNC: 9.3 MG/L (ref 0–8.2)
DIFFERENTIAL METHOD BLD: ABNORMAL
EOSINOPHIL # BLD AUTO: 0.2 K/UL (ref 0–0.5)
EOSINOPHIL NFR BLD: 1.3 % (ref 0–8)
ERYTHROCYTE [DISTWIDTH] IN BLOOD BY AUTOMATED COUNT: 12.9 % (ref 11.5–14.5)
ERYTHROCYTE [SEDIMENTATION RATE] IN BLOOD BY PHOTOMETRIC METHOD: 39 MM/HR (ref 0–36)
EST. GFR  (NO RACE VARIABLE): >60 ML/MIN/1.73 M^2
GLUCOSE SERPL-MCNC: 112 MG/DL (ref 70–110)
HCT VFR BLD AUTO: 45.8 % (ref 37–48.5)
HGB BLD-MCNC: 14.6 G/DL (ref 12–16)
IMM GRANULOCYTES # BLD AUTO: 0.06 K/UL (ref 0–0.04)
IMM GRANULOCYTES NFR BLD AUTO: 0.5 % (ref 0–0.5)
LYMPHOCYTES # BLD AUTO: 0.9 K/UL (ref 1–4.8)
LYMPHOCYTES NFR BLD: 7.8 % (ref 18–48)
MCH RBC QN AUTO: 31.8 PG (ref 27–31)
MCHC RBC AUTO-ENTMCNC: 31.9 G/DL (ref 32–36)
MCV RBC AUTO: 100 FL (ref 82–98)
MONOCYTES # BLD AUTO: 0.8 K/UL (ref 0.3–1)
MONOCYTES NFR BLD: 7.2 % (ref 4–15)
NEUTROPHILS # BLD AUTO: 9.6 K/UL (ref 1.8–7.7)
NEUTROPHILS NFR BLD: 82.2 % (ref 38–73)
NRBC BLD-RTO: 0 /100 WBC
PLATELET # BLD AUTO: 299 K/UL (ref 150–450)
PMV BLD AUTO: 9.5 FL (ref 9.2–12.9)
POTASSIUM SERPL-SCNC: 3.8 MMOL/L (ref 3.5–5.1)
PROT SERPL-MCNC: 7.9 G/DL (ref 6–8.4)
RBC # BLD AUTO: 4.59 M/UL (ref 4–5.4)
SODIUM SERPL-SCNC: 138 MMOL/L (ref 136–145)
WBC # BLD AUTO: 11.67 K/UL (ref 3.9–12.7)

## 2024-01-10 PROCEDURE — 99999 PR PBB SHADOW E&M-EST. PATIENT-LVL IV: CPT | Mod: PBBFAC,,, | Performed by: INTERNAL MEDICINE

## 2024-01-10 PROCEDURE — 3077F SYST BP >= 140 MM HG: CPT | Mod: CPTII,,, | Performed by: INTERNAL MEDICINE

## 2024-01-10 PROCEDURE — 3079F DIAST BP 80-89 MM HG: CPT | Mod: CPTII,,, | Performed by: INTERNAL MEDICINE

## 2024-01-10 PROCEDURE — 85025 COMPLETE CBC W/AUTO DIFF WBC: CPT | Performed by: INTERNAL MEDICINE

## 2024-01-10 PROCEDURE — 36415 COLL VENOUS BLD VENIPUNCTURE: CPT | Performed by: INTERNAL MEDICINE

## 2024-01-10 PROCEDURE — 80053 COMPREHEN METABOLIC PANEL: CPT | Performed by: INTERNAL MEDICINE

## 2024-01-10 PROCEDURE — 82306 VITAMIN D 25 HYDROXY: CPT | Performed by: INTERNAL MEDICINE

## 2024-01-10 PROCEDURE — 1159F MED LIST DOCD IN RCRD: CPT | Mod: CPTII,,, | Performed by: INTERNAL MEDICINE

## 2024-01-10 PROCEDURE — 3008F BODY MASS INDEX DOCD: CPT | Mod: CPTII,,, | Performed by: INTERNAL MEDICINE

## 2024-01-10 PROCEDURE — 99214 OFFICE O/P EST MOD 30 MIN: CPT | Mod: S$PBB,,, | Performed by: INTERNAL MEDICINE

## 2024-01-10 PROCEDURE — 85652 RBC SED RATE AUTOMATED: CPT | Performed by: INTERNAL MEDICINE

## 2024-01-10 PROCEDURE — 99214 OFFICE O/P EST MOD 30 MIN: CPT | Mod: PBBFAC | Performed by: INTERNAL MEDICINE

## 2024-01-10 PROCEDURE — 86140 C-REACTIVE PROTEIN: CPT | Performed by: INTERNAL MEDICINE

## 2024-01-10 RX ORDER — PREDNISONE 1 MG/1
4 TABLET ORAL DAILY
Qty: 120 TABLET | Refills: 5 | Status: SHIPPED | OUTPATIENT
Start: 2024-01-10

## 2024-01-10 RX ORDER — MELOXICAM 7.5 MG/1
7.5 TABLET ORAL DAILY
Qty: 30 TABLET | Refills: 6 | Status: SHIPPED | OUTPATIENT
Start: 2024-01-10

## 2024-01-10 NOTE — PROGRESS NOTES
1/3/2024     8:50 AM   Rapid3 Question Responses and Scores   MDHAQ Score 0.8   Psychologic Score 3.3   Pain Score 5   When you awakened in the morning OVER THE LAST WEEK, did you feel stiff? Yes   If Yes, please indicate the number of hours until you are as limber as you will be for the day 2   Fatigue Score 5   Global Health Score 8   RAPID3 Score 5.22     Answers submitted by the patient for this visit:  Rheumatology Questionnaire (Submitted on 1/3/2024)  fever: No  eye redness: No  mouth sores: No  headaches: No  shortness of breath: No  chest pain: No  trouble swallowing: No  diarrhea: No  constipation: No  unexpected weight change: No  genital sore: No  dysuria: No  During the last 3 days, have you had a skin rash?: No  Bruises or bleeds easily: Yes  cough: No

## 2024-01-11 ENCOUNTER — HOSPITAL ENCOUNTER (OUTPATIENT)
Dept: RADIOLOGY | Facility: HOSPITAL | Age: 64
Discharge: HOME | End: 2024-01-11
Attending: INTERNAL MEDICINE
Payer: MEDICAID

## 2024-01-11 ENCOUNTER — HOSPITAL ENCOUNTER (OUTPATIENT)
Dept: RADIOLOGY | Facility: CLINIC | Age: 64
Discharge: HOME | End: 2024-01-11
Attending: PHYSICIAN ASSISTANT
Payer: MEDICAID

## 2024-01-11 DIAGNOSIS — M79.672 PAIN IN BOTH FEET: ICD-10-CM

## 2024-01-11 DIAGNOSIS — M05.79 RHEUMATOID ARTHRITIS INVOLVING MULTIPLE SITES WITH POSITIVE RHEUMATOID FACTOR: ICD-10-CM

## 2024-01-11 DIAGNOSIS — M79.671 PAIN IN BOTH FEET: ICD-10-CM

## 2024-01-11 DIAGNOSIS — Z12.31 ENCOUNTER FOR SCREENING MAMMOGRAM FOR MALIGNANT NEOPLASM OF BREAST: ICD-10-CM

## 2024-01-11 PROCEDURE — 77067 SCR MAMMO BI INCL CAD: CPT | Mod: TC,PO

## 2024-01-11 PROCEDURE — 77067 SCR MAMMO BI INCL CAD: CPT | Mod: 26,,, | Performed by: RADIOLOGY

## 2024-01-11 PROCEDURE — 73630 X-RAY EXAM OF FOOT: CPT | Mod: TC,50

## 2024-01-11 PROCEDURE — 77063 BREAST TOMOSYNTHESIS BI: CPT | Mod: 26,,, | Performed by: RADIOLOGY

## 2024-01-11 PROCEDURE — 73610 X-RAY EXAM OF ANKLE: CPT | Mod: TC,LT

## 2024-01-11 RX ORDER — LEFLUNOMIDE 10 MG/1
20 TABLET ORAL DAILY
Qty: 90 TABLET | Refills: 1 | Status: SHIPPED | OUTPATIENT
Start: 2024-01-11

## 2024-01-11 NOTE — PROGRESS NOTES
History of present illness:  63-year-old female who had been followed by Dr. Cortez since December, 2020. She presented at that time with a several month history of foot and ankle pain.  It then spread to the hands.  She was diagnosed as having rheumatoid arthritis.  She was started initially on prednisone and then methotrexate.  She had been tapered off her prednisone.  She states her pain was worse with stopping the prednisone.  Her methotrexate had been increased up to 25 mg weekly.  She cut it back to 20 mg because of hair loss.  She then developed COVID and stopped the methotrexate.  She did not restart it because she generally felt better.  She remained on prednisone 5 mg daily.     I saw her in September, 2022.  She still had evidence of active rheumatoid arthritis.  I placed her on leflunomide 20 mg daily.  I continued her on prednisone 5 mg weekly.     She is had multiple hospitalizations since that visit.  She is had problems with recurrent pancreatitis and pancreatic pseudocyst.  Her last hospitalization was in March.  She had lost down to 68 lb.  She is now back to 104 lb.  She became very weak.  She went to physical therapy.  Her strength is now back to baseline.  She was doing well when I saw her in June and continued her on the same medication.    She is developed swelling on the top of her feet.  It is worse on the left than on the right.  It is worse with prolonged standing.  It does not wake her up at night.  It is better in the morning.  The swelling also goes away overnight.  Her other joints are stable.  She tried Voltaren gel with some relief.  She is tolerating the leflunomide.  She had the stent removed from her pancreatic duct but has had no other recent medical problems.    Physical examination:   Extremities:  There is no pedal edema   Musculoskeletal:  Upper extremities have full range of motion without pain on range of motion.  Hips and knees are unremarkable.    She is tenderness in  the left ankle but no synovitis.  She has tenderness on the dorsal surface of both feet.  She has a bunion on the left great toe.  She has a small osteophyte in the subtalar areas bilaterally.    Assessment:  1. She has no evidence of active rheumatoid arthritis   2. I think she is developing osteoarthritis in the feet   3. The swelling is most likely venous insufficiency     Plans:  1.  Laboratory studies obtained  2. I have ordered x-ray of the feet  3. I ordered a DEXA scan since it has been 3 years since her last 1  4. Continue leflunomide as before   5. Decrease prednisone to 4 mg daily.  If she does well, she can decrease the prednisone by 1 mg monthly.  6.  I placed her on meloxicam 7.5 mg daily for her osteoarthritis pain   7. Continue Flexeril as before   8.  Return in 6 months      Answers submitted by the patient for this visit:  Rheumatology Questionnaire (Submitted on 1/3/2024)  fever: No  eye redness: No  mouth sores: No  headaches: No  shortness of breath: No  chest pain: No  trouble swallowing: No  diarrhea: No  constipation: No  unexpected weight change: No  genital sore: No  dysuria: No  During the last 3 days, have you had a skin rash?: No  Bruises or bleeds easily: Yes  cough: No

## 2024-01-26 ENCOUNTER — PATIENT MESSAGE (OUTPATIENT)
Dept: FAMILY MEDICINE | Facility: CLINIC | Age: 64
End: 2024-01-26
Payer: MEDICAID

## 2024-01-26 RX ORDER — SERTRALINE HYDROCHLORIDE 50 MG/1
50 TABLET, FILM COATED ORAL NIGHTLY
Qty: 30 TABLET | Refills: 0 | Status: SHIPPED | OUTPATIENT
Start: 2024-01-26 | End: 2024-02-21 | Stop reason: SDUPTHER

## 2024-01-26 NOTE — TELEPHONE ENCOUNTER
No care due was identified.  Health [facility] Embedded Care Due Messages. Reference number: [id number].   1/26/2024 9:54:51 AM CST

## 2024-01-29 ENCOUNTER — TELEPHONE (OUTPATIENT)
Dept: FAMILY MEDICINE | Facility: CLINIC | Age: 64
End: 2024-01-29
Payer: MEDICAID

## 2024-01-29 ENCOUNTER — TELEPHONE (OUTPATIENT)
Dept: RHEUMATOLOGY | Facility: CLINIC | Age: 64
End: 2024-01-29

## 2024-01-29 ENCOUNTER — HOSPITAL ENCOUNTER (OUTPATIENT)
Dept: RADIOLOGY | Facility: CLINIC | Age: 64
Discharge: HOME | End: 2024-01-29
Attending: INTERNAL MEDICINE
Payer: MEDICAID

## 2024-01-29 DIAGNOSIS — M81.0 OSTEOPOROSIS, POSTMENOPAUSAL: ICD-10-CM

## 2024-01-29 DIAGNOSIS — Z79.52 LONG TERM CURRENT USE OF SYSTEMIC STEROIDS: ICD-10-CM

## 2024-01-29 DIAGNOSIS — E83.52 HYPERCALCEMIA: Primary | ICD-10-CM

## 2024-01-29 DIAGNOSIS — M05.79 RHEUMATOID ARTHRITIS INVOLVING MULTIPLE SITES WITH POSITIVE RHEUMATOID FACTOR: ICD-10-CM

## 2024-01-29 PROCEDURE — 77080 DXA BONE DENSITY AXIAL: CPT | Mod: TC,PO

## 2024-01-29 PROCEDURE — 77080 DXA BONE DENSITY AXIAL: CPT | Mod: 26,,, | Performed by: RADIOLOGY

## 2024-01-29 NOTE — TELEPHONE ENCOUNTER
----- Message from González Jeronimo sent at 1/29/2024  2:14 PM CST -----  Type:  Sooner Apoointment Request    Caller is requesting a sooner appointment.  Caller declined first available appointment listed below.  Caller will not accept being placed on the waitlist and is requesting a message be sent to doctor.  Name of Caller:the patient  When is the first available appointment?  Symptoms:annual  Would the patient rather a call back or a response via [employer]? call back  Best Call Back Number:[phone]   Additional Information: Thanks

## 2024-02-21 RX ORDER — SERTRALINE HYDROCHLORIDE 50 MG/1
50 TABLET, FILM COATED ORAL NIGHTLY
Qty: 90 TABLET | Refills: 0 | Status: SHIPPED | OUTPATIENT
Start: 2024-02-21 | End: 2024-05-08 | Stop reason: SDUPTHER

## 2024-02-21 NOTE — TELEPHONE ENCOUNTER
No care due was identified.  Health [facility] Embedded Care Due Messages. Reference number: [id number].   2/21/2024 11:21:44 AM CST

## 2024-02-21 NOTE — TELEPHONE ENCOUNTER
Refill Routing Note   Medication(s) are not appropriate for processing by [facility] for the following reason(s):        New or recently adjusted medication( new start under PCP, only 30 days with 0 refills given recently)    [facility] action(s):  Defer        Medication Therapy Plan: New start under PCP. Patient has history of this med but last order was only given for 30 days with 0 refills. She has FOVS in 2 months. Current protocols could approve 90 days plus 0. Deferring for review in case there was a reason for on ly 30 day supply given.      Appointments  past 12m or future 3m with PCP    Date Provider   Last Visit   3/22/2023 Samuel Brady MD   Next Visit   4/26/2024 Samuel Brady MD   ED visits in past 90 days: 0        Note composed:11:57 AM 02/21/2024

## 2024-03-05 ENCOUNTER — PATIENT MESSAGE (OUTPATIENT)
Dept: FAMILY MEDICINE | Facility: CLINIC | Age: 64
End: 2024-03-05
Payer: MEDICAID

## 2024-03-05 DIAGNOSIS — I10 HYPERTENSION, UNSPECIFIED TYPE: Primary | ICD-10-CM

## 2024-03-05 RX ORDER — AMLODIPINE BESYLATE 5 MG/1
10 TABLET ORAL DAILY
Qty: 180 TABLET | Refills: 0 | Status: CANCELLED | OUTPATIENT
Start: 2024-03-05

## 2024-03-05 NOTE — TELEPHONE ENCOUNTER
No care due was identified.  [facility] Embedded Care Due Messages. Reference number: [id number].   3/05/2024 1:36:24 PM CST

## 2024-03-05 NOTE — TELEPHONE ENCOUNTER
Refill Routing Note   Medication(s) are not appropriate for processing by [facility] for the following reason(s):        Required vitals abnormal: BP (!) 142/82      [facility] action(s):  Defer      Medication Therapy Plan:         Appointments  past 12m or future 3m with PCP    Date Provider   Last Visit   3/22/2023 Samuel Brady MD   Next Visit   4/26/2024 Samuel Brady MD   ED visits in past 90 days: 0        Note composed:1:48 PM 03/05/2024

## 2024-03-06 RX ORDER — AMLODIPINE BESYLATE 10 MG/1
10 TABLET ORAL DAILY
Qty: 30 TABLET | Refills: 11 | Status: SHIPPED | OUTPATIENT
Start: 2024-03-06 | End: 2025-03-06

## 2024-03-06 NOTE — TELEPHONE ENCOUNTER
I have a request to refill amlodipine 5 mg two taken once a day.  Would she prefer to increase to a 10 mg tablet and take just one a day?  It would make things a little simpler.

## 2024-03-19 DIAGNOSIS — F41.1 GENERALIZED ANXIETY DISORDER: ICD-10-CM

## 2024-03-19 DIAGNOSIS — G47.00 INSOMNIA, UNSPECIFIED TYPE: ICD-10-CM

## 2024-03-19 RX ORDER — CYCLOBENZAPRINE HCL 5 MG
5 TABLET ORAL NIGHTLY
Qty: 30 TABLET | Refills: 6 | Status: SHIPPED | OUTPATIENT
Start: 2024-03-19

## 2024-03-19 RX ORDER — TRAZODONE HYDROCHLORIDE 50 MG/1
TABLET ORAL
Qty: 90 TABLET | Refills: 0 | Status: SHIPPED | OUTPATIENT
Start: 2024-03-19

## 2024-03-19 NOTE — TELEPHONE ENCOUNTER
No care due was identified.  Health [facility] Embedded Care Due Messages. Reference number: [id number].   3/19/2024 10:57:17 AM CDT

## 2024-03-20 NOTE — TELEPHONE ENCOUNTER
Refill Decision Note   Claire Niels  is requesting a refill authorization.  Brief Assessment and Rationale for Refill:  Approve     Medication Therapy Plan:        Comments:     Note composed:11:19 PM 03/19/2024

## 2024-03-21 ENCOUNTER — OFFICE VISIT (OUTPATIENT)
Dept: FAMILY MEDICINE | Facility: CLINIC | Age: 64
End: 2024-03-21
Payer: MEDICAID

## 2024-03-21 ENCOUNTER — TELEPHONE (OUTPATIENT)
Dept: FAMILY MEDICINE | Facility: CLINIC | Age: 64
End: 2024-03-21
Payer: MEDICAID

## 2024-03-21 ENCOUNTER — HOSPITAL ENCOUNTER (OUTPATIENT)
Dept: RADIOLOGY | Facility: HOSPITAL | Age: 64
Discharge: HOME | End: 2024-03-21
Attending: FAMILY MEDICINE
Payer: MEDICAID

## 2024-03-21 VITALS
TEMPERATURE: 98 F | BODY MASS INDEX: 20.29 KG/M2 | WEIGHT: 110.25 LBS | OXYGEN SATURATION: 95 % | HEART RATE: 99 BPM | DIASTOLIC BLOOD PRESSURE: 82 MMHG | HEIGHT: 62 IN | SYSTOLIC BLOOD PRESSURE: 138 MMHG

## 2024-03-21 DIAGNOSIS — M54.32 LEFT SIDED SCIATICA: Primary | ICD-10-CM

## 2024-03-21 DIAGNOSIS — M79.662 PAIN OF LEFT CALF: Primary | ICD-10-CM

## 2024-03-21 DIAGNOSIS — Z86.718 HISTORY OF DVT OF LOWER EXTREMITY: ICD-10-CM

## 2024-03-21 DIAGNOSIS — M79.662 PAIN OF LEFT CALF: ICD-10-CM

## 2024-03-21 PROCEDURE — 1159F MED LIST DOCD IN RCRD: CPT | Mod: CPTII,,, | Performed by: FAMILY MEDICINE

## 2024-03-21 PROCEDURE — 1160F RVW MEDS BY RX/DR IN RCRD: CPT | Mod: CPTII,,, | Performed by: FAMILY MEDICINE

## 2024-03-21 PROCEDURE — 93971 EXTREMITY STUDY: CPT | Mod: TC,LT

## 2024-03-21 PROCEDURE — 99213 OFFICE O/P EST LOW 20 MIN: CPT | Mod: S$PBB,,, | Performed by: FAMILY MEDICINE

## 2024-03-21 PROCEDURE — 3079F DIAST BP 80-89 MM HG: CPT | Mod: CPTII,,, | Performed by: FAMILY MEDICINE

## 2024-03-21 PROCEDURE — 99214 OFFICE O/P EST MOD 30 MIN: CPT | Mod: PBBFAC,PO | Performed by: FAMILY MEDICINE

## 2024-03-21 PROCEDURE — 3075F SYST BP GE 130 - 139MM HG: CPT | Mod: CPTII,,, | Performed by: FAMILY MEDICINE

## 2024-03-21 PROCEDURE — 3008F BODY MASS INDEX DOCD: CPT | Mod: CPTII,,, | Performed by: FAMILY MEDICINE

## 2024-03-21 PROCEDURE — 99999 PR PBB SHADOW E&M-EST. PATIENT-LVL IV: CPT | Mod: PBBFAC,,, | Performed by: FAMILY MEDICINE

## 2024-03-21 RX ORDER — METHYLPREDNISOLONE 4 MG/1
TABLET ORAL
Qty: 21 EACH | Refills: 0 | Status: ON HOLD | OUTPATIENT
Start: 2024-03-21 | End: 2024-04-11 | Stop reason: HOSPADM

## 2024-03-21 NOTE — PROGRESS NOTES
Subjective:       Patient ID: Claire Bowser is a 64 y.o. female.    Chief Complaint: No chief complaint on file.    New to me patient here for UC visit.  Pain in left calf.  Past Hx of DVT same leg 2 yrs ago.  Onset 6 days ago and 2 days prior had done some yard/bed cleaning working off of a low stool.  The pain now is also felt down to ankle and at times in left upper leg - lateral thigh/hip area.    No SOB or pleuritic pain.  Griffith shave some cough but x 3 weeks w occasional clear sputum.  Occ palps but that is not unusual for her.        Review of Systems   Constitutional:  Negative for fever.   Respiratory:  Negative for shortness of breath.    Cardiovascular:  Negative for chest pain.   Gastrointestinal:  Negative for abdominal pain and nausea.   Skin:  Negative for rash.   All other systems reviewed and are negative.      Objective:      Physical Exam  Vitals reviewed.   Constitutional:       General: She is not in acute distress.     Appearance: She is well-developed.   Cardiovascular:      Rate and Rhythm: Normal rate and regular rhythm.      Heart sounds: No murmur heard.  Pulmonary:      Effort: Pulmonary effort is normal.      Breath sounds: Normal breath sounds.   Musculoskeletal:      Lumbar back: Positive left straight leg raise test. Negative right straight leg raise test.        Back:       Right lower leg: Tenderness (with calf palpation) present. No edema.      Left lower leg: No edema.   Skin:     General: Skin is warm and dry.         Assessment:       1. Pain of left calf    2. History of DVT of lower extremity      Pain pattern of sciatica; need to r/o DVT though.  Plan:       Pain of left calf  -     US Lower Extremity Veins Left; Future; Expected date: 03/21/2024    History of DVT of lower extremity  -     US Lower Extremity Veins Left; Future; Expected date: 03/21/2024      Patient Instructions   Osteoporosis - start Calcium 600 mg /day plus Vit D (Caltrate Plus) and take additional Vitamin  D3 4,000 IU a day.  Send a message to Dr Brady for advice for further treatment of the Osteoporosis.    Further Treatment advice pending US results.

## 2024-03-21 NOTE — PATIENT INSTRUCTIONS
Osteoporosis - start Calcium 600 mg /day plus Vit D (Caltrate Plus) and take additional Vitamin D3 4,000 IU a day.  Send a message to Dr Brady for advice for further treatment of the Osteoporosis.    Further Treatment advice pending US results.

## 2024-03-21 NOTE — TELEPHONE ENCOUNTER
Spoke to patient c/o left calf cramping and swelling with pain radiating to foot and up leg.   Appt scheduled with Dr Flannery 3/21/2024 today  ----- Message from Faina Page sent at 3/21/2024  9:12 AM CDT -----  Contact: pt  Type: Needs Medical Advice  Who Called:  pt  Best Call Back Number: [phone]    Additional Information: Pt is calling the office having left leg pain trying to be seen pt doesn't know if she may be low on potassium.Please call back and advise.

## 2024-03-30 ENCOUNTER — HOSPITAL ENCOUNTER (EMERGENCY)
Facility: HOSPITAL | Age: 64
Discharge: HOME | End: 2024-03-30
Attending: EMERGENCY MEDICINE
Payer: MEDICAID

## 2024-03-30 VITALS
BODY MASS INDEX: 20.24 KG/M2 | OXYGEN SATURATION: 98 % | RESPIRATION RATE: 18 BRPM | DIASTOLIC BLOOD PRESSURE: 79 MMHG | WEIGHT: 110 LBS | HEIGHT: 62 IN | TEMPERATURE: 99 F | HEART RATE: 95 BPM | SYSTOLIC BLOOD PRESSURE: 165 MMHG

## 2024-03-30 DIAGNOSIS — E87.6 HYPOKALEMIA: ICD-10-CM

## 2024-03-30 DIAGNOSIS — D72.829 LEUKOCYTOSIS, UNSPECIFIED TYPE: ICD-10-CM

## 2024-03-30 DIAGNOSIS — R10.9 ABDOMINAL PAIN, UNSPECIFIED ABDOMINAL LOCATION: Primary | ICD-10-CM

## 2024-03-30 DIAGNOSIS — K86.1 CHRONIC PANCREATITIS, UNSPECIFIED PANCREATITIS TYPE: ICD-10-CM

## 2024-03-30 LAB
ALBUMIN SERPL BCP-MCNC: 4.3 G/DL (ref 3.5–5.2)
ALP SERPL-CCNC: 119 U/L (ref 55–135)
ALT SERPL W/O P-5'-P-CCNC: 12 U/L (ref 10–44)
AMORPH CRY URNS QL MICRO: NORMAL
ANION GAP SERPL CALC-SCNC: 9 MMOL/L (ref 8–16)
AST SERPL-CCNC: 14 U/L (ref 10–40)
BACTERIA #/AREA URNS HPF: NORMAL /HPF
BASOPHILS # BLD AUTO: 0.06 K/UL (ref 0–0.2)
BASOPHILS NFR BLD: 0.4 % (ref 0–1.9)
BILIRUB SERPL-MCNC: 0.8 MG/DL (ref 0.1–1)
BILIRUB UR QL STRIP: NEGATIVE
BUN SERPL-MCNC: 13 MG/DL (ref 8–23)
CALCIUM SERPL-MCNC: 10.9 MG/DL (ref 8.7–10.5)
CHLORIDE SERPL-SCNC: 102 MMOL/L (ref 95–110)
CLARITY UR: ABNORMAL
CO2 SERPL-SCNC: 25 MMOL/L (ref 23–29)
COLOR UR: YELLOW
CREAT SERPL-MCNC: 0.7 MG/DL (ref 0.5–1.4)
DIFFERENTIAL METHOD BLD: ABNORMAL
EOSINOPHIL # BLD AUTO: 0.2 K/UL (ref 0–0.5)
EOSINOPHIL NFR BLD: 1 % (ref 0–8)
ERYTHROCYTE [DISTWIDTH] IN BLOOD BY AUTOMATED COUNT: 13.2 % (ref 11.5–14.5)
EST. GFR  (NO RACE VARIABLE): >60 ML/MIN/1.73 M^2
GLUCOSE SERPL-MCNC: 139 MG/DL (ref 70–110)
GLUCOSE UR QL STRIP: NEGATIVE
HCT VFR BLD AUTO: 43.4 % (ref 37–48.5)
HGB BLD-MCNC: 14.2 G/DL (ref 12–16)
HGB UR QL STRIP: NEGATIVE
IMM GRANULOCYTES # BLD AUTO: 0.1 K/UL (ref 0–0.04)
IMM GRANULOCYTES NFR BLD AUTO: 0.6 % (ref 0–0.5)
KETONES UR QL STRIP: NEGATIVE
LEUKOCYTE ESTERASE UR QL STRIP: NEGATIVE
LIPASE SERPL-CCNC: 76 U/L (ref 4–60)
LYMPHOCYTES # BLD AUTO: 1 K/UL (ref 1–4.8)
LYMPHOCYTES NFR BLD: 5.9 % (ref 18–48)
MCH RBC QN AUTO: 31 PG (ref 27–31)
MCHC RBC AUTO-ENTMCNC: 32.7 G/DL (ref 32–36)
MCV RBC AUTO: 95 FL (ref 82–98)
MICROSCOPIC COMMENT: NORMAL
MONOCYTES # BLD AUTO: 1.2 K/UL (ref 0.3–1)
MONOCYTES NFR BLD: 7 % (ref 4–15)
NEUTROPHILS # BLD AUTO: 14.4 K/UL (ref 1.8–7.7)
NEUTROPHILS NFR BLD: 85.1 % (ref 38–73)
NITRITE UR QL STRIP: POSITIVE
NRBC BLD-RTO: 0 /100 WBC
PH UR STRIP: 8 [PH] (ref 5–8)
PLATELET # BLD AUTO: 238 K/UL (ref 150–450)
PMV BLD AUTO: 10.1 FL (ref 9.2–12.9)
POTASSIUM SERPL-SCNC: 3.3 MMOL/L (ref 3.5–5.1)
PROT SERPL-MCNC: 8 G/DL (ref 6–8.4)
PROT UR QL STRIP: ABNORMAL
RBC # BLD AUTO: 4.58 M/UL (ref 4–5.4)
RBC #/AREA URNS HPF: 4 /HPF (ref 0–4)
SODIUM SERPL-SCNC: 136 MMOL/L (ref 136–145)
SP GR UR STRIP: 1.02 (ref 1–1.03)
SQUAMOUS #/AREA URNS HPF: 1 /HPF
URN SPEC COLLECT METH UR: ABNORMAL
UROBILINOGEN UR STRIP-ACNC: NEGATIVE EU/DL
WBC # BLD AUTO: 16.89 K/UL (ref 3.9–12.7)
WBC #/AREA URNS HPF: 4 /HPF (ref 0–5)

## 2024-03-30 PROCEDURE — 99285 EMERGENCY DEPT VISIT HI MDM: CPT | Mod: 25

## 2024-03-30 PROCEDURE — 80053 COMPREHEN METABOLIC PANEL: CPT | Performed by: EMERGENCY MEDICINE

## 2024-03-30 PROCEDURE — 85025 COMPLETE CBC W/AUTO DIFF WBC: CPT | Performed by: EMERGENCY MEDICINE

## 2024-03-30 PROCEDURE — 25000003 PHARM REV CODE 250: Performed by: EMERGENCY MEDICINE

## 2024-03-30 PROCEDURE — 63600175 PHARM REV CODE 636 W HCPCS: Performed by: EMERGENCY MEDICINE

## 2024-03-30 PROCEDURE — 83690 ASSAY OF LIPASE: CPT | Performed by: EMERGENCY MEDICINE

## 2024-03-30 PROCEDURE — 25500020 PHARM REV CODE 255: Performed by: EMERGENCY MEDICINE

## 2024-03-30 PROCEDURE — 81001 URINALYSIS AUTO W/SCOPE: CPT | Performed by: EMERGENCY MEDICINE

## 2024-03-30 PROCEDURE — 96374 THER/PROPH/DIAG INJ IV PUSH: CPT

## 2024-03-30 PROCEDURE — 96375 TX/PRO/DX INJ NEW DRUG ADDON: CPT

## 2024-03-30 RX ORDER — POTASSIUM CHLORIDE 20 MEQ/1
40 TABLET, EXTENDED RELEASE ORAL ONCE
Status: COMPLETED | OUTPATIENT
Start: 2024-03-30 | End: 2024-03-30

## 2024-03-30 RX ORDER — ONDANSETRON HYDROCHLORIDE 2 MG/ML
4 INJECTION, SOLUTION INTRAVENOUS
Status: COMPLETED | OUTPATIENT
Start: 2024-03-30 | End: 2024-03-30

## 2024-03-30 RX ORDER — AMOXICILLIN AND CLAVULANATE POTASSIUM 875; 125 MG/1; MG/1
1 TABLET, FILM COATED ORAL 2 TIMES DAILY
Qty: 14 TABLET | Refills: 0 | Status: ON HOLD | OUTPATIENT
Start: 2024-03-30 | End: 2024-04-11 | Stop reason: HOSPADM

## 2024-03-30 RX ORDER — MORPHINE SULFATE 4 MG/ML
4 INJECTION, SOLUTION INTRAMUSCULAR; INTRAVENOUS
Status: COMPLETED | OUTPATIENT
Start: 2024-03-30 | End: 2024-03-30

## 2024-03-30 RX ORDER — ONDANSETRON 4 MG/1
4 TABLET, FILM COATED ORAL EVERY 6 HOURS PRN
Qty: 12 TABLET | Refills: 0 | Status: SHIPPED | OUTPATIENT
Start: 2024-03-30

## 2024-03-30 RX ADMIN — POTASSIUM CHLORIDE 40 MEQ: 1500 TABLET, EXTENDED RELEASE ORAL at 11:03

## 2024-03-30 RX ADMIN — IOHEXOL 100 ML: 350 INJECTION, SOLUTION INTRAVENOUS at 12:03

## 2024-03-30 RX ADMIN — ONDANSETRON 4 MG: 2 INJECTION INTRAMUSCULAR; INTRAVENOUS at 11:03

## 2024-03-30 RX ADMIN — MORPHINE SULFATE 4 MG: 4 INJECTION, SOLUTION INTRAMUSCULAR; INTRAVENOUS at 11:03

## 2024-03-30 NOTE — DISCHARGE INSTRUCTIONS
Watch for any persistence or worsening abdominal pain.  Watch for any fever, nausea vomiting, diarrhea, bloody stools.  Take antibiotics twice daily as directed and Zofran as needed for nausea.  Otherwise, follow up with Dr. Jewell or your primary physician this coming week.  Return to the emergency room immediately if symptoms worsen.

## 2024-03-30 NOTE — ED PROVIDER NOTES
Encounter Date: 3/30/2024       History     Chief Complaint   Patient presents with    Abdominal Pain     LEFT SIDED ,ONSET 2 WEEKS AGO     64-year-old female who has had a history of chronic pancreatitis and hypertension and who has had pancreatic cyst and states she has had stents placed in the pancreas done both by Dr. Jewell as well as [facility], now presents with complaints of left-sided abdominal pain.  No complaint of any flank pain dysuria hematuria.  No history any prior diverticular disease.  No recent changes in weight.  The patient has no history of any recent trauma.  No history any peptic ulcer disease or hyperlipidemia.  No fever.  Eating without problems.  Has had no nausea without vomiting.  She does admit to tobacco use but denies alcohol use.  Last normal stool was yesterday.      Review of patient's allergies indicates:   Allergen Reactions    No known drug allergies      Past Medical History:   Diagnosis Date    Acute biliary pancreatitis 6/14/2022    Anxiety     Chronic pancreatitis 1/2/2023    Digestive disorder     Flu 02/2017    [facility]    Hypertension     Long-term use of immunosuppressant medication 6/10/2022    Rheumatoid arthritis involving multiple sites with positive rheumatoid factor 01/14/2021     Past Surgical History:   Procedure Laterality Date    COLONOSCOPY N/A 2/26/2021    Procedure: COLONOSCOPY;  Surgeon: Amy Sidhu MD;  Location: [facility];  Service: Endoscopy;  Laterality: N/A;    ENDOSCOPIC ULTRASOUND OF UPPER GASTROINTESTINAL TRACT N/A 7/1/2022    Procedure: ULTRASOUND, UPPER GI TRACT, ENDOSCOPIC;  Surgeon: Donavon Jewell III, MD;  Location: [facility];  Service: Endoscopy;  Laterality: N/A;    ENDOSCOPIC ULTRASOUND OF UPPER GASTROINTESTINAL TRACT N/A 11/29/2022    Procedure: ULTRASOUND, UPPER GI TRACT, ENDOSCOPIC;  Surgeon: Donavon Jewell III, MD;  Location: [facility] ENDO;  Service: Endoscopy;  Laterality: N/A;    ENDOSCOPIC ULTRASOUND OF  UPPER GASTROINTESTINAL TRACT N/A 1/3/2023    Procedure: ULTRASOUND, UPPER GI TRACT, ENDOSCOPIC;  Surgeon: Donavon Jewell III, MD;  Location: [facility] ENDO;  Service: Endoscopy;  Laterality: N/A;    ERCP N/A 12/30/2022    Procedure: ERCP (ENDOSCOPIC RETROGRADE CHOLANGIOPANCREATOGRAPHY);  Surgeon: Donavon Jewell III, MD;  Location: [facility] ENDO;  Service: Endoscopy;  Laterality: N/A;    ERCP N/A 1/24/2023    Procedure: ERCP (ENDOSCOPIC RETROGRADE CHOLANGIOPANCREATOGRAPHY);  Surgeon: Rock Martines MD;  Location: [facility] ENDO (2ND FLR);  Service: Endoscopy;  Laterality: N/A;    ESOPHAGOGASTRODUODENOSCOPY N/A 2/26/2021    Procedure: EGD (ESOPHAGOGASTRODUODENOSCOPY);  Surgeon: Amy Sidhu MD;  Location: [facility] ENDO;  Service: Endoscopy;  Laterality: N/A;    ESOPHAGOGASTRODUODENOSCOPY N/A 6/26/2023    Procedure: EGD (ESOPHAGOGASTRODUODENOSCOPY);  Surgeon: Kirk Nuñez MD;  Location: [facility] ENDO (2ND FLR);  Service: Endoscopy;  Laterality: N/A;  PD stent removal    6/12/23-Instructions via portal-DS  6/20/23- Prec[place] confirmed- [place]    LAPAROSCOPIC CHOLECYSTECTOMY N/A 7/27/2022    Procedure: CHOLECYSTECTOMY, LAPAROSCOPIC;  Surgeon: Ramón Hwang III, MD;  Location: [facility] OR;  Service: General;  Laterality: N/A;    TUBAL LIGATION       Family History   Problem Relation Age of Onset    Diabetes Mother     Heart disease Mother     Kidney disease Mother     Hypertension Mother     Stroke Mother     Hearing loss Mother     COPD Father     Liver disease Paternal Grandfather     Alcohol abuse Paternal Grandfather     Liver disease Sister     Emphysema Brother     COPD Brother     Sleep apnea Brother     No Known Problems Son     Alcohol abuse Paternal Grandmother     Cirrhosis Paternal Grandmother     Heart disease Maternal Aunt     Diabetes Maternal Aunt     Cancer Maternal Aunt         female    Heart disease Maternal Uncle     Hypertension Maternal Uncle     No Known Problems Paternal Uncle     Psoriasis Neg Hx      Lupus Neg Hx     Eczema Neg Hx     Melanoma Neg Hx      Social History     Tobacco Use    Smoking status: Every Day     Current packs/day: 1.00     Average packs/day: 1 pack/day for 7.0 years (7.0 ttl pk-yrs)     Types: Cigarettes    Smokeless tobacco: Never    Tobacco comments:     [phone]     Smokes 1/2 pack daily   Substance Use Topics    Alcohol use: No    Drug use: Never     Review of Systems   Constitutional:  Negative for fever.   HENT:  Negative for congestion, sore throat and trouble swallowing.    Respiratory:  Negative for shortness of breath.    Cardiovascular:  Negative for chest pain.   Gastrointestinal:  Positive for abdominal pain and nausea. Negative for diarrhea and vomiting.   Genitourinary:  Negative for difficulty urinating and dysuria.   Musculoskeletal:  Negative for back pain.   Skin: Negative.  Negative for rash.   Neurological:  Negative for weakness.   Hematological:  Does not bruise/bleed easily.   All other systems reviewed and are negative.      Physical Exam     Initial Vitals [03/30/24 0915]   BP Pulse Resp Temp SpO2   (!) 155/83 91 16 98.9 °F (37.2 °C) 98 %      MAP       --         Physical Exam    Vitals reviewed.  Constitutional: She appears well-developed and well-nourished. She is not diaphoretic. No distress.   HENT:   Head: Normocephalic and atraumatic.   Nose: Nose normal.   Mouth/Throat: Oropharynx is clear and moist. No oropharyngeal exudate.   Eyes: Conjunctivae are normal. Pupils are equal, round, and reactive to light.   Neck: Neck supple. No JVD present.   Normal range of motion.  Cardiovascular:  Normal rate, regular rhythm, normal heart sounds and intact distal pulses.     Exam reveals no gallop and no friction rub.       No murmur heard.  Pulmonary/Chest: Breath sounds normal. No respiratory distress. She has no wheezes. She has no rhonchi. She has no rales. She exhibits no tenderness.   Abdominal: Abdomen is soft. Bowel sounds are normal. She exhibits no  distension. There is abdominal tenderness.   Tender over the left abdomen There is no rebound and no guarding.   Musculoskeletal:         General: No tenderness or edema. Normal range of motion.      Cervical back: Normal range of motion and neck supple.     Lymphadenopathy:     She has no cervical adenopathy.   Neurological: She is alert and oriented to person, place, and time. She has normal strength. GCS score is 15. GCS eye subscore is 4. GCS verbal subscore is 5. GCS motor subscore is 6.   Skin: Skin is warm and dry. Capillary refill takes less than 2 seconds. No rash noted. No erythema. No pallor.   Psychiatric: She has a normal mood and affect. Her behavior is normal. Judgment and thought content normal.         ED Course   Procedures  Labs Reviewed   CBC W/ AUTO DIFFERENTIAL - Abnormal; Notable for the following components:       Result Value    WBC 16.89 (*)     Immature Granulocytes 0.6 (*)     Gran # (ANC) 14.4 (*)     Immature Grans (Abs) 0.10 (*)     Mono # 1.2 (*)     Gran % 85.1 (*)     Lymph % 5.9 (*)     All other components within normal limits   COMPREHENSIVE METABOLIC PANEL - Abnormal; Notable for the following components:    Potassium 3.3 (*)     Glucose 139 (*)     Calcium 10.9 (*)     All other components within normal limits   LIPASE - Abnormal; Notable for the following components:    Lipase 76 (*)     All other components within normal limits   URINALYSIS, REFLEX TO URINE CULTURE - Abnormal; Notable for the following components:    Appearance, UA Hazy (*)     Protein, UA Trace (*)     Nitrite, UA Positive (*)     All other components within normal limits    Narrative:     Specimen Source->Urine   URINALYSIS MICROSCOPIC    Narrative:     Specimen Source->Urine          Imaging Results              CT Abdomen Pelvis With IV Contrast NO Oral Contrast (Final result)  Result time 03/30/24 12:47:52      Final result by Mick Rodriguez MD (03/30/24 12:47:52)                   Narrative:    CMS  MANDATED QUALITY DATA-CT RADIATION DOSE-436  All CT scans at this facility use dose modulation, iterative reconstruction, and or weight-based dosing when appropriate to reduce radiation dose to as low as reasonably achievable. Unless otherwise stated, incidental findings do not require dedicated follow-up imaging.    HISTORY: Left-sided abdominal pain    FINDINGS: Axial postcontrast imaging was performed with 100 mL Omnipaque 350 IV contrast. Comparison to prior exams including 03/20/2023.    CT ABDOMEN: Scattered lucencies reflecting emphysema involve both lung bases, with multifocal segmental mucous plugging in the visualized right middle lobe. No pleural effusion.    The liver enhances normally, with cholecystectomy clips and unchanged dilated common bile duct. Scattered pancreatic parenchymal calcifications suggest chronic pancreatitis, with stable hypoattenuating fluid collections in the pancreatic head and uncinate process, and prominence of the main pancreatic duct. There is fat stranding about the pancreatic head and uncinate process reflecting edema, with no peripancreatic fluid. Edema also lies about the proximal duodenum.    The spleen is normal in size and enhances normally, with the adrenal glands enhancing normally. There are multiple hypoattenuating bilateral renal lesions suggesting simple cysts, some too small to characterize. The kidneys otherwise enhance normally, with no renal calculi or hydronephrosis. Scattered calcified plaque involves the abdominal aorta and iliac arteries, with no aneurysm or dissection. No enlarged mesenteric or retroperitoneal lymph nodes.    There is no bowel wall thickening, inflammation, or bowel obstruction. The appendix is normal. No ascites or intraperitoneal free air.    CT PELVIS: The rectum, uterus, adnexa and urinary bladder enhance normally, with no pelvic free fluid or mass, no enlarged pelvic or inguinal lymph nodes. There are a few calcified pelvic  phleboliths.    The extraperitoneal soft tissues enhance normally. There are no acute fractures or destructive osseous lesions, with intervertebral disc space narrowing and facet arthropathy in the lumbar spine.    IMPRESSION:  1. Findings suggestive of acute on chronic pancreatitis, without associated peripancreatic fluid collection, abscess or pseudocyst. Multiple pancreatic parenchymal hypodensities are similar to prior CT, with no enhancing pancreatic mass.  2. Prior cholecystectomy, with stable dilated common bile duct.  3. Additional observations as described.    Electronically signed by:  Mick Rodriguez MD  03/30/2024 12:47 PM CDT Workstation: [id number]                                     Medications   morphine injection 4 mg (4 mg Intravenous Given 3/30/24 1120)   ondansetron injection 4 mg (4 mg Intravenous Given 3/30/24 1120)   potassium chloride SA CR tablet 40 mEq (40 mEq Oral Given 3/30/24 1120)   iohexoL (OMNIPAQUE 350) injection 100 mL (100 mLs Intravenous Given 3/30/24 1212)     Medical Decision Making  Amount and/or Complexity of Data Reviewed  Labs: ordered.  Radiology: ordered.    Risk  Prescription drug management.              Attending Attestation:             Attending ED Notes:   ED course and MDM:  This 64-year-old female who has had a prior history of pancreatitis presented with left-sided abdominal pain.  The patient was not been febrile and has direct tenderness with no rebound.  ED workup showed a CT scan suggestive of some chronic pancreatitis but no evidence of any diverticular disease, obstruction or mass.  The screening labs showed an elevated white count of 16.89 and the patient has had a history of chronically elevated white count.  Chemistries remarkable for potassium of 3.3 the patient did receive supplemental oral potassium.  Lipase is mildly elevated at 76.  During the ED course the patient received 4 mg of morphine and Zofran and later reassessed and had no evidence of any  abdominal tenderness.  I did have the opportunity to speak to Dr. Addison, gastroenterology who recommended office follow up but suggested the patient be placed on a the a few days of antibiotics.  Subsequently I will start her on Augmentin 875 mg twice daily.  The patient was counseled that if she has any worsening pain, fever, vomiting, that she is to return to the hospital immediately.                             Clinical Impression:  Final diagnoses:  [R10.9] Abdominal pain, unspecified abdominal location (Primary)  [K86.1] Chronic pancreatitis, unspecified pancreatitis type  [D72.829] Leukocytosis, unspecified type  [E87.6] Hypokalemia          ED Disposition Condition    Discharge Stable          ED Prescriptions       Medication Sig Dispense Start Date End Date Auth. Provider    amoxicillin-clavulanate 875-125mg (AUGMENTIN) 875-125 mg per tablet Take 1 tablet by mouth 2 (two) times daily. 14 tablet 3/30/2024 -- Domingo Thompson Jr., MD    ondansetron (ZOFRAN) 4 MG tablet Take 1 tablet (4 mg total) by mouth every 6 (six) hours as needed for Nausea. 12 tablet 3/30/2024 -- Domingo Thompson Jr., MD          Follow-up Information    None          Domingo Thompson Jr., MD  03/30/24 7899

## 2024-04-09 ENCOUNTER — HOSPITAL ENCOUNTER (INPATIENT)
Facility: HOSPITAL | Age: 64
LOS: 2 days | Discharge: HOME | DRG: 554 | End: 2024-04-11
Attending: EMERGENCY MEDICINE | Admitting: INTERNAL MEDICINE

## 2024-04-09 DIAGNOSIS — F17.200 TOBACCO DEPENDENCY: ICD-10-CM

## 2024-04-09 DIAGNOSIS — M25.552 LEFT HIP PAIN: ICD-10-CM

## 2024-04-09 DIAGNOSIS — R07.9 CHEST PAIN: ICD-10-CM

## 2024-04-09 DIAGNOSIS — M87.00 AVN (AVASCULAR NECROSIS OF BONE): ICD-10-CM

## 2024-04-09 DIAGNOSIS — M54.32 SCIATICA OF LEFT SIDE: Primary | ICD-10-CM

## 2024-04-09 LAB
ALBUMIN SERPL BCP-MCNC: 4 G/DL (ref 3.5–5.2)
ALP SERPL-CCNC: 115 U/L (ref 55–135)
ALT SERPL W/O P-5'-P-CCNC: 16 U/L (ref 10–44)
ANION GAP SERPL CALC-SCNC: 8 MMOL/L (ref 8–16)
AST SERPL-CCNC: 14 U/L (ref 10–40)
BASOPHILS # BLD AUTO: 0.08 K/UL (ref 0–0.2)
BASOPHILS NFR BLD: 0.7 % (ref 0–1.9)
BILIRUB SERPL-MCNC: 0.5 MG/DL (ref 0.1–1)
BUN SERPL-MCNC: 18 MG/DL (ref 8–23)
CALCIUM SERPL-MCNC: 10.6 MG/DL (ref 8.7–10.5)
CHLORIDE SERPL-SCNC: 103 MMOL/L (ref 95–110)
CO2 SERPL-SCNC: 26 MMOL/L (ref 23–29)
CREAT SERPL-MCNC: 0.7 MG/DL (ref 0.5–1.4)
DIFFERENTIAL METHOD BLD: ABNORMAL
EOSINOPHIL # BLD AUTO: 0.2 K/UL (ref 0–0.5)
EOSINOPHIL NFR BLD: 1.7 % (ref 0–8)
ERYTHROCYTE [DISTWIDTH] IN BLOOD BY AUTOMATED COUNT: 13.9 % (ref 11.5–14.5)
EST. GFR  (NO RACE VARIABLE): >60 ML/MIN/1.73 M^2
GLUCOSE SERPL-MCNC: 105 MG/DL (ref 70–110)
HCT VFR BLD AUTO: 41.3 % (ref 37–48.5)
HGB BLD-MCNC: 13.5 G/DL (ref 12–16)
IMM GRANULOCYTES # BLD AUTO: 0.12 K/UL (ref 0–0.04)
IMM GRANULOCYTES NFR BLD AUTO: 1 % (ref 0–0.5)
LYMPHOCYTES # BLD AUTO: 1.2 K/UL (ref 1–4.8)
LYMPHOCYTES NFR BLD: 10.3 % (ref 18–48)
MCH RBC QN AUTO: 31 PG (ref 27–31)
MCHC RBC AUTO-ENTMCNC: 32.7 G/DL (ref 32–36)
MCV RBC AUTO: 95 FL (ref 82–98)
MONOCYTES # BLD AUTO: 0.5 K/UL (ref 0.3–1)
MONOCYTES NFR BLD: 4.1 % (ref 4–15)
NEUTROPHILS # BLD AUTO: 9.6 K/UL (ref 1.8–7.7)
NEUTROPHILS NFR BLD: 82.2 % (ref 38–73)
NRBC BLD-RTO: 0 /100 WBC
PLATELET # BLD AUTO: 368 K/UL (ref 150–450)
PMV BLD AUTO: 8.8 FL (ref 9.2–12.9)
POTASSIUM SERPL-SCNC: 3.4 MMOL/L (ref 3.5–5.1)
PROT SERPL-MCNC: 7.3 G/DL (ref 6–8.4)
RBC # BLD AUTO: 4.36 M/UL (ref 4–5.4)
SODIUM SERPL-SCNC: 137 MMOL/L (ref 136–145)
WBC # BLD AUTO: 11.68 K/UL (ref 3.9–12.7)

## 2024-04-09 PROCEDURE — 96374 THER/PROPH/DIAG INJ IV PUSH: CPT

## 2024-04-09 PROCEDURE — 99285 EMERGENCY DEPT VISIT HI MDM: CPT | Mod: 25

## 2024-04-09 PROCEDURE — 63600175 PHARM REV CODE 636 W HCPCS

## 2024-04-09 PROCEDURE — 84443 ASSAY THYROID STIM HORMONE: CPT | Performed by: INTERNAL MEDICINE

## 2024-04-09 PROCEDURE — 84100 ASSAY OF PHOSPHORUS: CPT | Performed by: INTERNAL MEDICINE

## 2024-04-09 PROCEDURE — 12000002 HC ACUTE/MED SURGE SEMI-PRIVATE ROOM

## 2024-04-09 PROCEDURE — 83735 ASSAY OF MAGNESIUM: CPT | Performed by: INTERNAL MEDICINE

## 2024-04-09 PROCEDURE — 82550 ASSAY OF CK (CPK): CPT | Performed by: INTERNAL MEDICINE

## 2024-04-09 PROCEDURE — 25000003 PHARM REV CODE 250

## 2024-04-09 PROCEDURE — 85025 COMPLETE CBC W/AUTO DIFF WBC: CPT

## 2024-04-09 PROCEDURE — 80053 COMPREHEN METABOLIC PANEL: CPT

## 2024-04-09 PROCEDURE — 36415 COLL VENOUS BLD VENIPUNCTURE: CPT

## 2024-04-09 RX ORDER — OXYCODONE HYDROCHLORIDE 5 MG/1
5 TABLET ORAL
Status: DISCONTINUED | OUTPATIENT
Start: 2024-04-09 | End: 2024-04-09

## 2024-04-09 RX ORDER — HYDROCODONE BITARTRATE AND ACETAMINOPHEN 5; 325 MG/1; MG/1
1 TABLET ORAL EVERY 6 HOURS PRN
Qty: 12 TABLET | Refills: 0 | Status: SHIPPED | OUTPATIENT
Start: 2024-04-09 | End: 2024-04-09 | Stop reason: CLARIF

## 2024-04-09 RX ORDER — PANTOPRAZOLE SODIUM 40 MG/1
40 TABLET, DELAYED RELEASE ORAL DAILY
Qty: 30 TABLET | Refills: 0 | Status: SHIPPED | OUTPATIENT
Start: 2024-04-09 | End: 2024-05-21 | Stop reason: SDUPTHER

## 2024-04-09 RX ORDER — METHOCARBAMOL 500 MG/1
1000 TABLET, FILM COATED ORAL
Status: COMPLETED | OUTPATIENT
Start: 2024-04-09 | End: 2024-04-09

## 2024-04-09 RX ORDER — LIDOCAINE 50 MG/G
1 PATCH TOPICAL ONCE
Status: DISCONTINUED | OUTPATIENT
Start: 2024-04-09 | End: 2024-04-09

## 2024-04-09 RX ORDER — HYDROMORPHONE HYDROCHLORIDE 1 MG/ML
0.5 INJECTION, SOLUTION INTRAMUSCULAR; INTRAVENOUS; SUBCUTANEOUS
Status: COMPLETED | OUTPATIENT
Start: 2024-04-09 | End: 2024-04-09

## 2024-04-09 RX ORDER — HYDROCODONE BITARTRATE AND ACETAMINOPHEN 5; 325 MG/1; MG/1
1 TABLET ORAL ONCE
Status: COMPLETED | OUTPATIENT
Start: 2024-04-09 | End: 2024-04-09

## 2024-04-09 RX ADMIN — METHOCARBAMOL TABLETS 1000 MG: 500 TABLET, COATED ORAL at 09:04

## 2024-04-09 RX ADMIN — HYDROCODONE BITARTRATE AND ACETAMINOPHEN 1 TABLET: 5; 325 TABLET ORAL at 09:04

## 2024-04-09 RX ADMIN — HYDROMORPHONE HYDROCHLORIDE 0.5 MG: 0.5 INJECTION, SOLUTION INTRAMUSCULAR; INTRAVENOUS; SUBCUTANEOUS at 11:04

## 2024-04-10 PROBLEM — M87.152: Status: ACTIVE | Noted: 2024-04-10

## 2024-04-10 LAB
CK SERPL-CCNC: 38 U/L (ref 20–180)
MAGNESIUM SERPL-MCNC: 2 MG/DL (ref 1.6–2.6)
PHOSPHATE SERPL-MCNC: 3 MG/DL (ref 2.7–4.5)
TSH SERPL DL<=0.005 MIU/L-ACNC: 3.07 UIU/ML (ref 0.34–5.6)

## 2024-04-10 PROCEDURE — 63600175 PHARM REV CODE 636 W HCPCS: Performed by: INTERNAL MEDICINE

## 2024-04-10 PROCEDURE — 96376 TX/PRO/DX INJ SAME DRUG ADON: CPT

## 2024-04-10 PROCEDURE — 12000002 HC ACUTE/MED SURGE SEMI-PRIVATE ROOM

## 2024-04-10 PROCEDURE — 25000003 PHARM REV CODE 250: Performed by: INTERNAL MEDICINE

## 2024-04-10 PROCEDURE — 36415 COLL VENOUS BLD VENIPUNCTURE: CPT | Performed by: INTERNAL MEDICINE

## 2024-04-10 RX ORDER — HEPARIN SODIUM 5000 [USP'U]/ML
5000 INJECTION, SOLUTION INTRAVENOUS; SUBCUTANEOUS EVERY 8 HOURS
Status: DISCONTINUED | OUTPATIENT
Start: 2024-04-10 | End: 2024-04-11 | Stop reason: HOSPADM

## 2024-04-10 RX ORDER — HYDROCODONE BITARTRATE AND ACETAMINOPHEN 5; 325 MG/1; MG/1
1 TABLET ORAL EVERY 6 HOURS PRN
Status: DISCONTINUED | OUTPATIENT
Start: 2024-04-10 | End: 2024-04-11 | Stop reason: HOSPADM

## 2024-04-10 RX ORDER — GLUCAGON 1 MG
1 KIT INJECTION
Status: DISCONTINUED | OUTPATIENT
Start: 2024-04-10 | End: 2024-04-11 | Stop reason: HOSPADM

## 2024-04-10 RX ORDER — HYDROMORPHONE HYDROCHLORIDE 1 MG/ML
0.2 INJECTION, SOLUTION INTRAMUSCULAR; INTRAVENOUS; SUBCUTANEOUS EVERY 6 HOURS PRN
Status: DISCONTINUED | OUTPATIENT
Start: 2024-04-10 | End: 2024-04-11 | Stop reason: HOSPADM

## 2024-04-10 RX ORDER — IBUPROFEN 200 MG
16 TABLET ORAL
Status: DISCONTINUED | OUTPATIENT
Start: 2024-04-10 | End: 2024-04-11 | Stop reason: HOSPADM

## 2024-04-10 RX ORDER — NALOXONE HCL 0.4 MG/ML
0.02 VIAL (ML) INJECTION
Status: DISCONTINUED | OUTPATIENT
Start: 2024-04-10 | End: 2024-04-11 | Stop reason: HOSPADM

## 2024-04-10 RX ORDER — IBUPROFEN 200 MG
24 TABLET ORAL
Status: DISCONTINUED | OUTPATIENT
Start: 2024-04-10 | End: 2024-04-11 | Stop reason: HOSPADM

## 2024-04-10 RX ORDER — SODIUM CHLORIDE 0.9 % (FLUSH) 0.9 %
10 SYRINGE (ML) INJECTION EVERY 12 HOURS PRN
Status: DISCONTINUED | OUTPATIENT
Start: 2024-04-10 | End: 2024-04-11 | Stop reason: HOSPADM

## 2024-04-10 RX ORDER — IBUPROFEN 400 MG/1
400 TABLET ORAL EVERY 6 HOURS PRN
Status: DISCONTINUED | OUTPATIENT
Start: 2024-04-10 | End: 2024-04-11 | Stop reason: HOSPADM

## 2024-04-10 RX ORDER — MUPIROCIN 20 MG/G
OINTMENT TOPICAL 2 TIMES DAILY
Status: DISCONTINUED | OUTPATIENT
Start: 2024-04-10 | End: 2024-04-11 | Stop reason: HOSPADM

## 2024-04-10 RX ORDER — HYDROCODONE BITARTRATE AND ACETAMINOPHEN 10; 325 MG/1; MG/1
1 TABLET ORAL EVERY 6 HOURS PRN
Status: DISCONTINUED | OUTPATIENT
Start: 2024-04-10 | End: 2024-04-10

## 2024-04-10 RX ADMIN — MUPIROCIN 1 G: 20 OINTMENT TOPICAL at 08:04

## 2024-04-10 RX ADMIN — HYDROMORPHONE HYDROCHLORIDE 0.2 MG: 0.5 INJECTION, SOLUTION INTRAMUSCULAR; INTRAVENOUS; SUBCUTANEOUS at 02:04

## 2024-04-10 RX ADMIN — HEPARIN SODIUM 5000 UNITS: 5000 INJECTION, SOLUTION INTRAVENOUS; SUBCUTANEOUS at 06:04

## 2024-04-10 RX ADMIN — HYDROMORPHONE HYDROCHLORIDE 0.2 MG: 0.5 INJECTION, SOLUTION INTRAMUSCULAR; INTRAVENOUS; SUBCUTANEOUS at 09:04

## 2024-04-10 RX ADMIN — HYDROCODONE BITARTRATE AND ACETAMINOPHEN 1 TABLET: 5; 325 TABLET ORAL at 08:04

## 2024-04-10 RX ADMIN — HEPARIN SODIUM 5000 UNITS: 5000 INJECTION, SOLUTION INTRAVENOUS; SUBCUTANEOUS at 01:04

## 2024-04-10 RX ADMIN — HYDROMORPHONE HYDROCHLORIDE 0.2 MG: 0.5 INJECTION, SOLUTION INTRAMUSCULAR; INTRAVENOUS; SUBCUTANEOUS at 03:04

## 2024-04-10 RX ADMIN — HYDROCODONE BITARTRATE AND ACETAMINOPHEN 1 TABLET: 5; 325 TABLET ORAL at 10:04

## 2024-04-10 RX ADMIN — MUPIROCIN 1 G: 20 OINTMENT TOPICAL at 09:04

## 2024-04-10 RX ADMIN — HEPARIN SODIUM 5000 UNITS: 5000 INJECTION, SOLUTION INTRAVENOUS; SUBCUTANEOUS at 09:04

## 2024-04-10 RX ADMIN — HYDROCODONE BITARTRATE AND ACETAMINOPHEN 1 TABLET: 10; 325 TABLET ORAL at 02:04

## 2024-04-10 NOTE — PLAN OF CARE
Formerly [facility], [facility]  Initial Discharge Assessment    Met with patient at bedside for initial cm assessment. Patient denies having advanced directive, living will or POA. Patient lives at home with her  in a one level home with no stairs to enter the home. She reports that recently she has been having a lot of hip pain that is limiting her ability to complete ADLs without assistance, but her  helps. Current DME: wheelchair, rolling walker and shower chair. She is currently uninsured due to her medicaid expiring. Sent SC to Cathy Hernandez for assistance with medicaid application. No discharge needs assessed at this time.       Primary Care Provider: Samuel Brady MD    Admission Diagnosis: Sciatica of left side [M54.32]    Admission Date: 4/9/2024  Expected Discharge Date: 4/11/2024    Transition of Care Barriers: (P) Unisured    Payor: /     Extended Emergency Contact Information  Primary Emergency Contact: Robin Bowser  Address: [address] [place] [address] [facility]  Home Phone: [phone]  Mobile Phone: [phone]  Relation: Spouse  Preferred language: English   needed? No    Discharge Plan A: (P) Home  Discharge Plan B: (P) Home with family      [facility]  [address]  [facility] [address]  Phone: [phone] Fax: [phone]    [facility] [address] [place] [address] [address]  [address] [facility] [address]  Phone: [phone] Fax: [phone]      Initial Assessment (most recent)       Adult Discharge Assessment - 04/10/24 1445          Discharge Assessment    Assessment Type Discharge Planning Assessment (P)      Confirmed/corrected address, phone number and insurance Yes (P)      Confirmed Demographics Correct on Facesheet (P)      Source of Information patient (P)      When was your last doctors appointment? 10/01/23 (P)      Communicated RASHI with patient/caregiver Yes (P)      Reason For  "Admission hip pain (P)      People in Home spouse (P)      Do you expect to return to your current living situation? Yes (P)      Do you have help at home or someone to help you manage your care at home? Yes (P)      Who are your caregiver(s) and their phone number(s)? Robin Bowser [phone] (P)      Prior to hospitilization cognitive status: Alert/Oriented (P)      Current cognitive status: Alert/Oriented (P)      Walking or Climbing Stairs Difficulty yes (P)      Walking or Climbing Stairs ambulation difficulty, requires equipment (P)      Mobility Management patient uses a walker or "hops" due to hip pain (P)      Dressing/Bathing Difficulty no (P)      Home Accessibility wheelchair accessible (P)      Home Layout Able to live on 1st floor (P)      Equipment Currently Used at Home shower chair;walker, rolling;wheelchair (P)      Readmission within 30 days? No (P)      Patient currently being followed by outpatient case management? No (P)      Do you currently have service(s) that help you manage your care at home? No (P)      Do you take prescription medications? Yes (P)      Do you have prescription coverage? No (P)      Do you have any problems affording any of your prescribed medications? TBD (P)      Is the patient taking medications as prescribed? yes (P)      Who is going to help you get home at discharge? patient's  will pick her up at discharge (P)      How do you get to doctors appointments? family or friend will provide (P)      Are you on dialysis? No (P)      Do you take coumadin? No (P)      Discharge Plan A Home (P)      Discharge Plan B Home with family (P)      DME Needed Upon Discharge  none (P)      Discharge Plan discussed with: Patient (P)      Transition of Care Barriers Unisured (P)                                 "

## 2024-04-10 NOTE — ED PROVIDER NOTES
Encounter Date: 4/9/2024       History     Chief Complaint   Patient presents with    Leg Pain     Left leg pain x3 weeks. Hx of blood clot     Claire Bowser is a 64 y.o. female with history of rheumatoid arthritis, DVT, hypertension and chronic pancreatitis presenting to the ED with left leg pain.  Patient states pain started 3 weeks ago and has been progressively worsening.  It is exacerbated with activity with no alleviating factors.  Denies trauma, fevers, numbness, falls or syncope.    Patient states she was seen by ED and primary care physician has had negative DVT ultrasound and negative CT abdomen pelvis.  Was discharged with amoxicillin and Zofran 10 days ago but has had worsening in pain.    Review of patient's allergies indicates:   Allergen Reactions    No known drug allergies      Past Medical History:   Diagnosis Date    Acute biliary pancreatitis 6/14/2022    Anxiety     Chronic pancreatitis 1/2/2023    Digestive disorder     Flu 02/2017    [facility]    Hypertension     Long-term use of immunosuppressant medication 6/10/2022    Rheumatoid arthritis involving multiple sites with positive rheumatoid factor 01/14/2021     Past Surgical History:   Procedure Laterality Date    COLONOSCOPY N/A 2/26/2021    Procedure: COLONOSCOPY;  Surgeon: Amy Sidhu MD;  Location: [facility];  Service: Endoscopy;  Laterality: N/A;    ENDOSCOPIC ULTRASOUND OF UPPER GASTROINTESTINAL TRACT N/A 7/1/2022    Procedure: ULTRASOUND, UPPER GI TRACT, ENDOSCOPIC;  Surgeon: Donavon Jewell III, MD;  Location: [facility];  Service: Endoscopy;  Laterality: N/A;    ENDOSCOPIC ULTRASOUND OF UPPER GASTROINTESTINAL TRACT N/A 11/29/2022    Procedure: ULTRASOUND, UPPER GI TRACT, ENDOSCOPIC;  Surgeon: Donavon Jewell III, MD;  Location: [facility] ENDO;  Service: Endoscopy;  Laterality: N/A;    ENDOSCOPIC ULTRASOUND OF UPPER GASTROINTESTINAL TRACT N/A 1/3/2023    Procedure: ULTRASOUND, UPPER GI TRACT, ENDOSCOPIC;  Surgeon:  Donavon Jewell III, MD;  Location: [facility] ENDO;  Service: Endoscopy;  Laterality: N/A;    ERCP N/A 12/30/2022    Procedure: ERCP (ENDOSCOPIC RETROGRADE CHOLANGIOPANCREATOGRAPHY);  Surgeon: Donavon Jewell III, MD;  Location: [facility] ENDO;  Service: Endoscopy;  Laterality: N/A;    ERCP N/A 1/24/2023    Procedure: ERCP (ENDOSCOPIC RETROGRADE CHOLANGIOPANCREATOGRAPHY);  Surgeon: Rock Martines MD;  Location: [facility] ENDO (2ND FLR);  Service: Endoscopy;  Laterality: N/A;    ESOPHAGOGASTRODUODENOSCOPY N/A 2/26/2021    Procedure: EGD (ESOPHAGOGASTRODUODENOSCOPY);  Surgeon: Amy Sidhu MD;  Location: [facility] ENDO;  Service: Endoscopy;  Laterality: N/A;    ESOPHAGOGASTRODUODENOSCOPY N/A 6/26/2023    Procedure: EGD (ESOPHAGOGASTRODUODENOSCOPY);  Surgeon: Kirk Nuñez MD;  Location: [facility] (2ND FLR);  Service: Endoscopy;  Laterality: N/A;  PD stent removal    6/12/23-Instructions via portal-DS  6/20/23- [place] confirmed- [place]    LAPAROSCOPIC CHOLECYSTECTOMY N/A 7/27/2022    Procedure: CHOLECYSTECTOMY, LAPAROSCOPIC;  Surgeon: Ramón Hwang III, MD;  Location: [facility] OR;  Service: General;  Laterality: N/A;    TUBAL LIGATION       Family History   Problem Relation Age of Onset    Diabetes Mother     Heart disease Mother     Kidney disease Mother     Hypertension Mother     Stroke Mother     Hearing loss Mother     COPD Father     Liver disease Paternal Grandfather     Alcohol abuse Paternal Grandfather     Liver disease Sister     Emphysema Brother     COPD Brother     Sleep apnea Brother     No Known Problems Son     Alcohol abuse Paternal Grandmother     Cirrhosis Paternal Grandmother     Heart disease Maternal Aunt     Diabetes Maternal Aunt     Cancer Maternal Aunt         female    Heart disease Maternal Uncle     Hypertension Maternal Uncle     No Known Problems Paternal Uncle     Psoriasis Neg Hx     Lupus Neg Hx     Eczema Neg Hx     Melanoma Neg Hx      Social History     Tobacco Use    Smoking status:  Every Day     Current packs/day: 1.00     Average packs/day: 1 pack/day for 7.0 years (7.0 ttl pk-yrs)     Types: Cigarettes    Smokeless tobacco: Never    Tobacco comments:     [phone]     Smokes 1/2 pack daily   Substance Use Topics    Alcohol use: No    Drug use: Never     Review of Systems  As per HPI  Physical Exam     Initial Vitals [04/09/24 1926]   BP Pulse Resp Temp SpO2   (!) 142/89 103 18 98.5 °F (36.9 °C) 95 %      MAP       --         Physical Exam    Nursing note and vitals reviewed.  Constitutional: She appears well-developed and well-nourished.   HENT:   Head: Normocephalic and atraumatic.   Right Ear: External ear normal.   Left Ear: External ear normal.   Nose: Nose normal.   Mouth/Throat: Oropharynx is clear and moist.   Eyes: Conjunctivae and EOM are normal.   Neck:   Normal range of motion.  Cardiovascular:  Normal rate.           Pulmonary/Chest: No respiratory distress.   Abdominal: Abdomen is soft. She exhibits no mass. There is no abdominal tenderness. There is no rebound and no guarding.   Musculoskeletal:         General: No tenderness. Normal range of motion.      Cervical back: Normal range of motion.      Comments: Positive straight leg test     Neurological: She is oriented to person, place, and time. GCS score is 15. GCS eye subscore is 4. GCS verbal subscore is 5. GCS motor subscore is 6.   Skin: Skin is warm and dry. Capillary refill takes less than 2 seconds.   Psychiatric: She has a normal mood and affect. Her behavior is normal. Judgment and thought content normal.         ED Course   Procedures  Labs Reviewed   CBC W/ AUTO DIFFERENTIAL - Abnormal; Notable for the following components:       Result Value    MPV 8.8 (*)     Immature Granulocytes 1.0 (*)     Gran # (ANC) 9.6 (*)     Immature Grans (Abs) 0.12 (*)     Gran % 82.2 (*)     Lymph % 10.3 (*)     All other components within normal limits   COMPREHENSIVE METABOLIC PANEL - Abnormal; Notable for the following  components:    Potassium 3.4 (*)     Calcium 10.6 (*)     All other components within normal limits          Imaging Results              X-Ray Hip 2 or 3 views Left (with Pelvis when performed) (Final result)  Result time 04/09/24 23:37:23      Final result by Melody Dos Santos MD (04/09/24 23:37:23)                   Impression:      As above.      Electronically signed by: Melody Dos Santos  Date:    04/09/2024  Time:    23:37               Narrative:    EXAMINATION:  XR HIP WITH PELVIS WHEN PERFORMED, 2 OR 3 VIEWS LEFT    CLINICAL HISTORY:  Pain in left hip    TECHNIQUE:  AP view of the pelvis and frog leg lateral view of the left hip were performed.    COMPARISON:  CT 03/30/2024    FINDINGS:  Small focus of osteonecrosis at the left acetabulum.  Mild bilateral hip osteoarthritis.  No acute fracture or dislocation.                                       Medications   HYDROcodone-acetaminophen 5-325 mg per tablet 1 tablet (1 tablet Oral Given 4/9/24 2102)   methocarbamoL tablet 1,000 mg (1,000 mg Oral Given 4/9/24 2102)   HYDROmorphone injection 0.5 mg (0.5 mg Intravenous Given 4/9/24 2313)     Medical Decision Making  64-year-old female presenting to the ED with left leg pain vitals on arrival notable for hypertension tachycardia.  Tachycardia resolved on exam without intervention.  Patient is not in acute distress.  Respiratory effort normal.  Patient points to left buttock that radiates posteriorly down her left leg.  No overlying skin changes.  5/5 strength in bilateral knees and ankles.  Hip flexion is decreased secondary to pain.  Sensation is equal.  2+ DP bilaterally.  Reviewed patient's previous primary care in ED visit.  Ultrasound demonstrated resolving blood clot.  Patient has no calf edema or tenderness in vascular regions.  Do not believe DVT is likely.  Given limited range of motion concern for fracture, malignancy, strain, sprain. Exam suggestive of possible sciatica.     Problems  Addressed:  AVN (avascular necrosis of bone): acute illness or injury    Amount and/or Complexity of Data Reviewed  Independent Historian: spouse     Details: At bedside  External Data Reviewed: radiology and notes.     Details: Review of CT obtained 10 days ago demonstrated small appearing lesion left acetabulum.  Spoke with radiology who believes this to be representative of avascular necrosis - not on original report.     PCP diagnosed patient with sciatica at onset of symptoms.   Labs: ordered.     Details: CBC clinically unremarkable.  CMP with potassium of 3.4, calcium of 10.6, both improved from prior.  Radiology: ordered and independent interpretation performed.     Details: Left hip x-ray with redemonstrated lesion at superior acetabulum concerning for AVN.  No signs of acute fractures.    Risk  Prescription drug management.  Decision regarding hospitalization.  Diagnosis or treatment significantly limited by social determinants of health.  Risk Details: Spoke with patient regarding CT findings suggestive avascular necrosis.  Patient continued to uncontrolled pain despite sutures and p.o. pain medication, pain 10/10.  IV pain medication given.  In the setting of diagnosis with progressive symptoms and associated intractable pain, medicine consulted for admission.                                       Clinical Impression:  Final diagnoses:  [M54.32] Sciatica of left side (Primary)  [M87.00] AVN (avascular necrosis of bone)  [M25.552] Left hip pain                 Carrie Jimenez MD  Resident  04/10/24 0038

## 2024-04-10 NOTE — CONSULTS
The patients chart and x-rays reviewed    64-year-old female with a history of rheumatoid arthritis on chronic steroid therapy.  The patient was admitted for chronic hip pain of several weeks' duration.    X-rays show avascular necrosis of the femoral head and acetabulum with well-maintained joint space and no collapse of the femoral head.    The patient could potentially be a candidate for hip replacement surgery which can be worked up as an outpatient    She can follow up in the office after discharge.  No acute orthopedic surgical intervention needed at this time.

## 2024-04-10 NOTE — DISCHARGE INSTRUCTIONS
Stay as active as you can without causing too much pain. It is OK to rest your back for a day or so. Be sure to get up and move around gently during the day as you are able. After a few days, slowly start to increase your activity level as you are able to. If something causes your pain to come back or get worse, stop and go back to doing easier activities that did not hurt.    Use Norco and Robaxin at home for breakthrough pain.  We have sent Protonix prescription however as discussed please follow-up with your primary care for refills.

## 2024-04-10 NOTE — HPI
is a 64 y.o. female with PMH significant for Rheumatoid arthritis and anxiety who presented with a three week history of left hip pain. She rates the pain as 10/10 in severity and prevents her from being able to stand for long. She states it has worsened over the past week. She denies hx of prior stroke, HTN, HLD, DM and CAD. Denies hx of tobacco use, alcohol use, drug abuse.     She lives alone and performs all ADLs independently. Pt denies alcohol or recreational drug use, but admits to smoking 1 pack of cigarettes per day. She states she was on 5mg per day of Prednesone which was decreased to 2.5 mg dly at the beginning of the year then 5 days ago she decided to increase her dose to 5mg dly when she was having intractable left hip pain. Xray of left hip in the ED revealed osteonecrosis of the left femoral head. Orthopedics consulted with Dr. Thurston. Patient will be admitted for intractable hip pain.

## 2024-04-10 NOTE — FIRST PROVIDER EVALUATION
Emergency Department TeleTriage Encounter Note      CHIEF COMPLAINT    Chief Complaint   Patient presents with    Leg Pain     Left leg pain x3 weeks. Hx of blood clot       VITAL SIGNS   Initial Vitals [04/09/24 1926]   BP Pulse Resp Temp SpO2   (!) 142/89 103 18 98.5 °F (36.9 °C) 95 %      MAP       --            ALLERGIES    Review of patient's allergies indicates:   Allergen Reactions    No known drug allergies        PROVIDER TRIAGE NOTE  This is a teletriage evaluation of a 64 y.o. female presenting to the ED complaining of chronic left leg pain.     Initial orders will be placed and care will be transferred to an alternate provider when patient is roomed for a full evaluation. Any additional orders and the final disposition will be determined by that provider.       ORDERS  Labs Reviewed - No data to display    ED Orders (720h ago, onward)      None              Virtual Visit Note: The provider triage portion of this emergency department evaluation and documentation was performed via [employer], a HIPAA-compliant telemedicine application, in concert with a tele-presenter in the room. A face to face patient evaluation with one of my colleagues will occur once the patient is placed in an emergency department room.      DISCLAIMER: This note was prepared with [employer] voice recognition transcription software. Garbled syntax, mangled pronouns, and other bizarre constructions may be attributed to that software system.

## 2024-04-10 NOTE — ASSESSMENT & PLAN NOTE
--Continue vital signs q 4hr  -- Pain control PRN, staggered pain control approach ordered  -- Orthopedic surgery consulted  -- Xray left hip reveals osteonecrosis of left femoral head  -- SBP goal <180  -- PT/OT    Pulmonary:   -- PRN CXR  -- Incentive spirometry     Cardiac:   Hypertension  -- Continue to monitor HR and BP   --SBP goal < 180    Renal:   --Continue to monitor I/O  --Continue to monitor BUN/Cr    ID:   -- Afebrile   -- No leukocytosis     Hem/Onc:   Normocytic Anemia  --continue to monitor H/H  -- likely dilutional     Endocrine:   --Continue to monitor BG  -- Continue to monitor and replace electrolytes    PPX   --DVT: : heparin 5000 units q 8, SCD    Kahlil Bonilla MD MPH

## 2024-04-10 NOTE — NURSING
Nurses Note -- 4 Eyes      4/10/2024   11:16 AM      Skin assessed during: Admit      [x] No Altered Skin Integrity Present    []Prevention Measures Documented      [] Yes- Altered Skin Integrity Present or Discovered   [] LDA Added if Not in Epic (Describe Wound)   [] New Altered Skin Integrity was Present on Admit and Documented in LDA   [] Wound Image Taken    Wound Care Consulted? No    Attending Nurse:  Desi aRi RN/Staff Member:   adrian

## 2024-04-10 NOTE — PHARMACY MED REC
"Admission Medication History     The home medication history was taken by Desi Regalado.    You may go to "Admission" then "Reconcile Home Medications" tabs to review and/or act upon these items.     The home medication list has been updated by the Pharmacy department.   Please read ALL comments highlighted in yellow.   Please address this information as you see fit.    Feel free to contact us if you have any questions or require assistance.        Medications listed below were obtained from: Patient/family and Analytic software- [employer]  No current facility-administered medications on file prior to encounter.     Current Outpatient Medications on File Prior to Encounter   Medication Sig Dispense Refill    amLODIPine (NORVASC) 10 MG tablet Take 1 tablet (10 mg total) by mouth once daily. 30 tablet 11    cyclobenzaprine (FLEXERIL) 5 MG tablet Take 1 tablet (5 mg total) by mouth nightly. 30 tablet 6    folic acid (FOLVITE) 1 MG tablet Take 1 tablet (1 mg total) by mouth once daily. 90 tablet 3    leflunomide (ARAVA) 10 MG Tab Take 2 tablets (20 mg total) by mouth once daily. 90 tablet 1    lipase-protease-amylase (CREON) 36,000-114,000- 180,000 unit CpDR Take 2 capsules by mouth 3 (three) times daily. 540 capsule 3    meloxicam (MOBIC) 7.5 MG tablet Take 1 tablet (7.5 mg total) by mouth once daily. 30 tablet 6    methylPREDNISolone (MEDROL DOSEPACK) 4 mg tablet use as directed (Patient taking differently: Take 4 mg by mouth once daily. use as directed) 21 each 0    ondansetron (ZOFRAN) 4 MG tablet Take 1 tablet (4 mg total) by mouth every 6 (six) hours as needed for Nausea. 12 tablet 0    potassium chloride SA (K-DUR,KLOR-CON) 20 MEQ tablet Take 1 tablet (20 mEq total) by mouth 3 (three) times daily. 270 tablet 1    predniSONE (DELTASONE) 1 MG tablet Take 4 tablets (4 mg total) by mouth once daily. 120 tablet 5    sertraline (ZOLOFT) 50 MG tablet Take 1 tablet (50 mg total) by mouth every evening. 90 tablet 0    " traZODone (DESYREL) 50 MG tablet Take 1 tablet by mouth in the evening (Patient taking differently: Take 50 mg by mouth every evening.) 90 tablet 0    amoxicillin-clavulanate 875-125mg (AUGMENTIN) 875-125 mg per tablet Take 1 tablet by mouth 2 (two) times daily. (Patient not taking: Reported on 4/10/2024) 14 tablet 0    predniSONE (DELTASONE) 5 MG tablet Take 1 tablet (5 mg total) by mouth once daily. (Patient not taking: Reported on 4/10/2024) 30 tablet 4       Potential issues to be addressed PRIOR TO DISCHARGE  Patient reported not taking the following medications: (Augmentin). These medications remain on the home medication list. Please address accordingly.     Desi Regalado  EXT [id number]                  .

## 2024-04-10 NOTE — SUBJECTIVE & OBJECTIVE
Past Medical History:   Diagnosis Date    Acute biliary pancreatitis 6/14/2022    Anxiety     Chronic pancreatitis 1/2/2023    Digestive disorder     Flu 02/2017    [facility]    Hypertension     Long-term use of immunosuppressant medication 6/10/2022    Rheumatoid arthritis involving multiple sites with positive rheumatoid factor 01/14/2021       Past Surgical History:   Procedure Laterality Date    COLONOSCOPY N/A 2/26/2021    Procedure: COLONOSCOPY;  Surgeon: Amy Sidhu MD;  Location: [facility] ENDO;  Service: Endoscopy;  Laterality: N/A;    ENDOSCOPIC ULTRASOUND OF UPPER GASTROINTESTINAL TRACT N/A 7/1/2022    Procedure: ULTRASOUND, UPPER GI TRACT, ENDOSCOPIC;  Surgeon: Donavon Jewell III, MD;  Location: [facility] ENDO;  Service: Endoscopy;  Laterality: N/A;    ENDOSCOPIC ULTRASOUND OF UPPER GASTROINTESTINAL TRACT N/A 11/29/2022    Procedure: ULTRASOUND, UPPER GI TRACT, ENDOSCOPIC;  Surgeon: Donavon Jewell III, MD;  Location: [facility];  Service: Endoscopy;  Laterality: N/A;    ENDOSCOPIC ULTRASOUND OF UPPER GASTROINTESTINAL TRACT N/A 1/3/2023    Procedure: ULTRASOUND, UPPER GI TRACT, ENDOSCOPIC;  Surgeon: Donavon Jewell III, MD;  Location: [facility];  Service: Endoscopy;  Laterality: N/A;    ERCP N/A 12/30/2022    Procedure: ERCP (ENDOSCOPIC RETROGRADE CHOLANGIOPANCREATOGRAPHY);  Surgeon: Donavon Jewell III, MD;  Location: [facility];  Service: Endoscopy;  Laterality: N/A;    ERCP N/A 1/24/2023    Procedure: ERCP (ENDOSCOPIC RETROGRADE CHOLANGIOPANCREATOGRAPHY);  Surgeon: Rock Martines MD;  Location: [facility] ENDO (2ND FLR);  Service: Endoscopy;  Laterality: N/A;    ESOPHAGOGASTRODUODENOSCOPY N/A 2/26/2021    Procedure: EGD (ESOPHAGOGASTRODUODENOSCOPY);  Surgeon: Amy Sidhu MD;  Location: [facility] ENDO;  Service: Endoscopy;  Laterality: N/A;    ESOPHAGOGASTRODUODENOSCOPY N/A 6/26/2023    Procedure: EGD (ESOPHAGOGASTRODUODENOSCOPY);  Surgeon: Kirk Nuñez MD;  Location: [facility]);  Service: Endoscopy;  Laterality: N/A;  PD stent removal    6/12/23-Instructions via portal-DS  6/20/23- Precall confirmed- [place]    LAPAROSCOPIC CHOLECYSTECTOMY N/A 7/27/2022    Procedure: CHOLECYSTECTOMY, LAPAROSCOPIC;  Surgeon: Ramón Hwang III, MD;  Location: [facility];  Service: General;  Laterality: N/A;    TUBAL LIGATION         Review of patient's allergies indicates:   Allergen Reactions    No known drug allergies        No current facility-administered medications on file prior to encounter.     Current Outpatient Medications on File Prior to Encounter   Medication Sig    amLODIPine (NORVASC) 10 MG tablet Take 1 tablet (10 mg total) by mouth once daily.    amoxicillin-clavulanate 875-125mg (AUGMENTIN) 875-125 mg per tablet Take 1 tablet by mouth 2 (two) times daily.    cyclobenzaprine (FLEXERIL) 5 MG tablet Take 1 tablet (5 mg total) by mouth nightly.    folic acid (FOLVITE) 1 MG tablet Take 1 tablet (1 mg total) by mouth once daily.    leflunomide (ARAVA) 10 MG Tab Take 2 tablets (20 mg total) by mouth once daily.    lipase-protease-amylase (CREON) 36,000-114,000- 180,000 unit CpDR Take 2 capsules by mouth 3 (three) times daily.    meloxicam (MOBIC) 7.5 MG tablet Take 1 tablet (7.5 mg total) by mouth once daily.    methylPREDNISolone (MEDROL DOSEPACK) 4 mg tablet use as directed    ondansetron (ZOFRAN) 4 MG tablet Take 1 tablet (4 mg total) by mouth every 6 (six) hours as needed for Nausea.    potassium chloride SA (K-DUR,KLOR-CON) 20 MEQ tablet Take 1 tablet (20 mEq total) by mouth 3 (three) times daily.    predniSONE (DELTASONE) 1 MG tablet Take 4 tablets (4 mg total) by mouth once daily.    predniSONE (DELTASONE) 5 MG tablet Take 1 tablet (5 mg total) by mouth once daily.    sertraline (ZOLOFT) 50 MG tablet Take 1 tablet (50 mg total) by mouth every evening.    traZODone (DESYREL) 50 MG tablet Take 1 tablet by mouth in the evening     Family History       Problem Relation (Age of Onset)     "Alcohol abuse Paternal Grandfather, Paternal Grandmother    COPD Father, Brother    Cancer Maternal Aunt    Cirrhosis Paternal Grandmother    Diabetes Mother, Maternal Aunt    Emphysema Brother    Hearing loss Mother    Heart disease Mother, Maternal Aunt, Maternal Uncle    Hypertension Mother, Maternal Uncle    Kidney disease Mother    Liver disease Paternal Grandfather, Sister    No Known Problems Son, Paternal Uncle    Sleep apnea Brother    Stroke Mother          Tobacco Use    Smoking status: Every Day     Current packs/day: 1.00     Average packs/day: 1 pack/day for 7.0 years (7.0 ttl pk-yrs)     Types: Cigarettes    Smokeless tobacco: Never    Tobacco comments:     [phone]     Smokes 1/2 pack daily   Substance and Sexual Activity    Alcohol use: No    Drug use: Never    Sexual activity: Yes     Partners: Male     Review of Systems  Objective:     Vital Signs (Most Recent):  Temp: 98.5 °F (36.9 °C) (04/09/24 1926)  Pulse: 72 (04/10/24 0544)  Resp: 16 (04/10/24 0339)  BP: 129/62 (04/10/24 0456)  SpO2: 95 % (04/10/24 0544) Vital Signs (24h Range):  Temp:  [98.5 °F (36.9 °C)] 98.5 °F (36.9 °C)  Pulse:  [] 72  Resp:  [16-18] 16  SpO2:  [92 %-98 %] 95 %  BP: (127-185)/(62-94) 129/62     Weight: 49.9 kg (110 lb)  Body mass index is 20.12 kg/m².     Physical Exam           Significant Labs: All pertinent labs within the past 24 hours have been reviewed.  A1C: No results for input(s): "HGBA1C" in the last 4320 hours.  ABGs: No results for input(s): "PH", "PCO2", "HCO3", "POCSATURATED", "BE", "TOTALHB", "COHB", "METHB", "O2HB", "POCFIO2", "PO2" in the last 48 hours.  Bilirubin:   Recent Labs   Lab 03/30/24  1009 04/09/24  2304   BILITOT 0.8 0.5     Blood Culture: No results for input(s): "LABBLOO" in the last 48 hours.  BMP:   Recent Labs   Lab 04/09/24  2304         K 3.4*      CO2 26   BUN 18   CREATININE 0.7   CALCIUM 10.6*   MG 2.0     CBC:   Recent Labs   Lab 04/09/24  2304   WBC " "11.68   HGB 13.5   HCT 41.3        CMP:   Recent Labs   Lab 04/09/24  2304      K 3.4*      CO2 26      BUN 18   CREATININE 0.7   CALCIUM 10.6*   PROT 7.3   ALBUMIN 4.0   BILITOT 0.5   ALKPHOS 115   AST 14   ALT 16   ANIONGAP 8     Cardiac Markers: No results for input(s): "CKMB", "MYOGLOBIN", "BNP", "TROPISTAT" in the last 48 hours.  Coagulation: No results for input(s): "PT", "INR", "APTT" in the last 48 hours.  Lactic Acid: No results for input(s): "LACTATE" in the last 48 hours.  Lipase: No results for input(s): "LIPASE" in the last 48 hours.  Lipid Panel: No results for input(s): "CHOL", "HDL", "LDLCALC", "TRIG", "CHOLHDL" in the last 48 hours.  Magnesium:   Recent Labs   Lab 04/09/24  2304   MG 2.0     Pathology Results  (Last 10 years)                 07/06/21 0855  Specimen to Pathology, Dermatology Final result    Narrative:  Number of Specimens:->1   ------------------------->-------------------------   Spec 1 Procedure:->Biopsy   Spec 1 Clinical Impression:->atypical nevus vs. bcc vs.   traumatized nevus   Spec 1 Source:->right forehead       02/26/21 1142  Specimen to Pathology, Surgery Gastrointestinal tract Final result    Narrative:  Pre-op Diagnosis: Black stools [K92.1]   Procedure(s):   EGD (ESOPHAGOGASTRODUODENOSCOPY)   COLONOSCOPY   Number of specimens: 2   Name of specimens: 1. Antrum and body bx, h pylori   2. Rectosigmoid polyp x 2   Specimen total (fresh, frozen, permanent):->2             POCT Glucose: No results for input(s): "POCTGLUCOSE" in the last 48 hours.  Prealbumin: No results for input(s): "PREALBUMIN" in the last 48 hours.  Respiratory Culture: No results for input(s): "GSRESP", "RESPIRATORYC" in the last 48 hours.  Troponin: No results for input(s): "TROPONINI", "TROPONINIHS" in the last 48 hours.  TSH:   Recent Labs   Lab 04/09/24  2304   TSH 3.074     Urine Culture: No results for input(s): "LABURIN" in the last 48 hours.  Urine Studies: No results for " "input(s): "COLORU", "APPEARANCEUA", "PHUR", "SPECGRAV", "PROTEINUA", "GLUCUA", "KETONESU", "BILIRUBINUA", "OCCULTUA", "NITRITE", "UROBILINOGEN", "LEUKOCYTESUR", "RBCUA", "WBCUA", "BACTERIA", "SQUAMEPITHEL", "HYALINECASTS" in the last 48 hours.    Invalid input(s): "WRIGHTSUR"  Recent Lab Results         04/09/24  2304        Albumin 4.0              ALT 16       Anion Gap 8       AST 14       Baso # 0.08       Basophil % 0.7       BILIRUBIN TOTAL 0.5  Comment: For infants and newborns, interpretation of results should be based  on gestational age, weight and in agreement with clinical  observations.    Premature Infant recommended reference ranges:  Up to 24 hours.............<8.0 mg/dL  Up to 48 hours............<12.0 mg/dL  3-5 days..................<15.0 mg/dL  6-29 days.................<15.0 mg/dL         BUN 18       Calcium 10.6       Chloride 103       CO2 26       CPK 38       Creatinine 0.7       Differential Method Automated       eGFR >60.0       Eos # 0.2       Eos % 1.7       Glucose 105       Gran # (ANC) 9.6       Gran % 82.2       Hematocrit 41.3       Hemoglobin 13.5       Immature Grans (Abs) 0.12  Comment: Mild elevation in immature granulocytes is non specific and   can be seen in a variety of conditions including stress response,   acute inflammation, trauma and pregnancy. Correlation with other   laboratory and clinical findings is essential.         Immature Granulocytes 1.0       Lymph # 1.2       Lymph % 10.3       Magnesium  2.0       MCH 31.0       MCHC 32.7       MCV 95       Mono # 0.5       Mono % 4.1       MPV 8.8       nRBC 0       Phosphorus Level 3.0       Platelet Count 368       Potassium 3.4       PROTEIN TOTAL 7.3       RBC 4.36       RDW 13.9       Sodium 137       TSH 3.074       WBC 11.68               Significant Imaging: I have reviewed all pertinent imaging results/findings within the past 24 hours.  I have reviewed and interpreted all pertinent imaging " results/findings within the past 24 hours.

## 2024-04-10 NOTE — H&P
[facility] - Emergency Dept  Hospital Medicine  History & Physical    Patient Name: Claire Bowser  MRN: [id number]  Patient Class: IP- Inpatient  Admission Date: 4/9/2024  Attending Physician: Kahlil Bonilla MD   Primary Care Provider: Samuel Brady MD         Patient information was obtained from ER records.     Subjective:     Principal Problem:<principal problem not specified>    Chief Complaint:   Chief Complaint   Patient presents with    Leg Pain     Left leg pain x3 weeks. Hx of blood clot        HPI:  is a 64 y.o. female with PMH significant for Rheumatoid arthritis and anxiety who presented with a three week history of left hip pain. She rates the pain as 10/10 in severity and prevents her from being able to stand for long. She states it has worsened over the past week. She denies hx of prior stroke, HTN, HLD, DM and CAD. Denies hx of tobacco use, alcohol use, drug abuse.     She lives alone and performs all ADLs independently. Pt denies alcohol or recreational drug use, but admits to smoking 1 pack of cigarettes per day. She states she was on 5mg per day of Prednesone which was decreased to 2.5 mg dly at the beginning of the year then 5 days ago she decided to increase her dose to 5mg dly when she was having intractable left hip pain. Xray of left hip in the ED revealed osteonecrosis of the left femoral head. Orthopedics consulted with Dr. Thurston. Patient will be admitted for intractable hip pain.     Past Medical History:   Diagnosis Date    Acute biliary pancreatitis 6/14/2022    Anxiety     Chronic pancreatitis 1/2/2023    Digestive disorder     Flu 02/2017    [facility]    Hypertension     Long-term use of immunosuppressant medication 6/10/2022    Rheumatoid arthritis involving multiple sites with positive rheumatoid factor 01/14/2021       Past Surgical History:   Procedure Laterality Date    COLONOSCOPY N/A 2/26/2021    Procedure: COLONOSCOPY;  Surgeon:  Amy Sidhu MD;  Location: [facility] ENDO;  Service: Endoscopy;  Laterality: N/A;    ENDOSCOPIC ULTRASOUND OF UPPER GASTROINTESTINAL TRACT N/A 7/1/2022    Procedure: ULTRASOUND, UPPER GI TRACT, ENDOSCOPIC;  Surgeon: Donavon Jewell III, MD;  Location: [facility] ENDO;  Service: Endoscopy;  Laterality: N/A;    ENDOSCOPIC ULTRASOUND OF UPPER GASTROINTESTINAL TRACT N/A 11/29/2022    Procedure: ULTRASOUND, UPPER GI TRACT, ENDOSCOPIC;  Surgeon: Donavon Jewell III, MD;  Location: [facility] ENDO;  Service: Endoscopy;  Laterality: N/A;    ENDOSCOPIC ULTRASOUND OF UPPER GASTROINTESTINAL TRACT N/A 1/3/2023    Procedure: ULTRASOUND, UPPER GI TRACT, ENDOSCOPIC;  Surgeon: Donavon Jewell III, MD;  Location: [facility] ENDO;  Service: Endoscopy;  Laterality: N/A;    ERCP N/A 12/30/2022    Procedure: ERCP (ENDOSCOPIC RETROGRADE CHOLANGIOPANCREATOGRAPHY);  Surgeon: Donavon Jewell III, MD;  Location: [facility] ENDO;  Service: Endoscopy;  Laterality: N/A;    ERCP N/A 1/24/2023    Procedure: ERCP (ENDOSCOPIC RETROGRADE CHOLANGIOPANCREATOGRAPHY);  Surgeon: Rock Martines MD;  Location: [facility] ENDO (2ND FLR);  Service: Endoscopy;  Laterality: N/A;    ESOPHAGOGASTRODUODENOSCOPY N/A 2/26/2021    Procedure: EGD (ESOPHAGOGASTRODUODENOSCOPY);  Surgeon: Amy Sidhu MD;  Location: [facility] ENDO;  Service: Endoscopy;  Laterality: N/A;    ESOPHAGOGASTRODUODENOSCOPY N/A 6/26/2023    Procedure: EGD (ESOPHAGOGASTRODUODENOSCOPY);  Surgeon: Kirk Nuñez MD;  Location: [facility] ENDO (2ND FLR);  Service: Endoscopy;  Laterality: N/A;  PD stent removal    6/12/23-Instructions via portal-DS  6/20/23- Precall confirmed- [place]    LAPAROSCOPIC CHOLECYSTECTOMY N/A 7/27/2022    Procedure: CHOLECYSTECTOMY, LAPAROSCOPIC;  Surgeon: Ramón Hwang III, MD;  Location: [facility] OR;  Service: General;  Laterality: N/A;    TUBAL LIGATION         Review of patient's allergies indicates:   Allergen Reactions    No known drug allergies        No current facility-administered  medications on file prior to encounter.     Current Outpatient Medications on File Prior to Encounter   Medication Sig    amLODIPine (NORVASC) 10 MG tablet Take 1 tablet (10 mg total) by mouth once daily.    amoxicillin-clavulanate 875-125mg (AUGMENTIN) 875-125 mg per tablet Take 1 tablet by mouth 2 (two) times daily.    cyclobenzaprine (FLEXERIL) 5 MG tablet Take 1 tablet (5 mg total) by mouth nightly.    folic acid (FOLVITE) 1 MG tablet Take 1 tablet (1 mg total) by mouth once daily.    leflunomide (ARAVA) 10 MG Tab Take 2 tablets (20 mg total) by mouth once daily.    lipase-protease-amylase (CREON) 36,000-114,000- 180,000 unit CpDR Take 2 capsules by mouth 3 (three) times daily.    meloxicam (MOBIC) 7.5 MG tablet Take 1 tablet (7.5 mg total) by mouth once daily.    methylPREDNISolone (MEDROL DOSEPACK) 4 mg tablet use as directed    ondansetron (ZOFRAN) 4 MG tablet Take 1 tablet (4 mg total) by mouth every 6 (six) hours as needed for Nausea.    potassium chloride SA (K-DUR,KLOR-CON) 20 MEQ tablet Take 1 tablet (20 mEq total) by mouth 3 (three) times daily.    predniSONE (DELTASONE) 1 MG tablet Take 4 tablets (4 mg total) by mouth once daily.    predniSONE (DELTASONE) 5 MG tablet Take 1 tablet (5 mg total) by mouth once daily.    sertraline (ZOLOFT) 50 MG tablet Take 1 tablet (50 mg total) by mouth every evening.    traZODone (DESYREL) 50 MG tablet Take 1 tablet by mouth in the evening     Family History       Problem Relation (Age of Onset)    Alcohol abuse Paternal Grandfather, Paternal Grandmother    COPD Father, Brother    Cancer Maternal Aunt    Cirrhosis Paternal Grandmother    Diabetes Mother, Maternal Aunt    Emphysema Brother    Hearing loss Mother    Heart disease Mother, Maternal Aunt, Maternal Uncle    Hypertension Mother, Maternal Uncle    Kidney disease Mother    Liver disease Paternal Grandfather, Sister    No Known Problems Son, Paternal Uncle    Sleep apnea Brother    Stroke Mother          Tobacco  "Use    Smoking status: Every Day     Current packs/day: 1.00     Average packs/day: 1 pack/day for 7.0 years (7.0 ttl pk-yrs)     Types: Cigarettes    Smokeless tobacco: Never    Tobacco comments:     [phone]     Smokes 1/2 pack daily   Substance and Sexual Activity    Alcohol use: No    Drug use: Never    Sexual activity: Yes     Partners: Male     Review of Systems  Objective:     Vital Signs (Most Recent):  Temp: 98.5 °F (36.9 °C) (04/09/24 1926)  Pulse: 72 (04/10/24 0544)  Resp: 16 (04/10/24 0339)  BP: 129/62 (04/10/24 0456)  SpO2: 95 % (04/10/24 0544) Vital Signs (24h Range):  Temp:  [98.5 °F (36.9 °C)] 98.5 °F (36.9 °C)  Pulse:  [] 72  Resp:  [16-18] 16  SpO2:  [92 %-98 %] 95 %  BP: (127-185)/(62-94) 129/62     Weight: 49.9 kg (110 lb)  Body mass index is 20.12 kg/m².     Physical Exam           Significant Labs: All pertinent labs within the past 24 hours have been reviewed.  A1C: No results for input(s): "HGBA1C" in the last 4320 hours.  ABGs: No results for input(s): "PH", "PCO2", "HCO3", "POCSATURATED", "BE", "TOTALHB", "COHB", "METHB", "O2HB", "POCFIO2", "PO2" in the last 48 hours.  Bilirubin:   Recent Labs   Lab 03/30/24  1009 04/09/24  2304   BILITOT 0.8 0.5     Blood Culture: No results for input(s): "LABBLOO" in the last 48 hours.  BMP:   Recent Labs   Lab 04/09/24  2304         K 3.4*      CO2 26   BUN 18   CREATININE 0.7   CALCIUM 10.6*   MG 2.0     CBC:   Recent Labs   Lab 04/09/24  2304   WBC 11.68   HGB 13.5   HCT 41.3        CMP:   Recent Labs   Lab 04/09/24  2304      K 3.4*      CO2 26      BUN 18   CREATININE 0.7   CALCIUM 10.6*   PROT 7.3   ALBUMIN 4.0   BILITOT 0.5   ALKPHOS 115   AST 14   ALT 16   ANIONGAP 8     Cardiac Markers: No results for input(s): "CKMB", "MYOGLOBIN", "BNP", "TROPISTAT" in the last 48 hours.  Coagulation: No results for input(s): "PT", "INR", "APTT" in the last 48 hours.  Lactic Acid: No results for input(s): " ""LACTATE" in the last 48 hours.  Lipase: No results for input(s): "LIPASE" in the last 48 hours.  Lipid Panel: No results for input(s): "CHOL", "HDL", "LDLCALC", "TRIG", "CHOLHDL" in the last 48 hours.  Magnesium:   Recent Labs   Lab 04/09/24  2304   MG 2.0     Pathology Results  (Last 10 years)                 07/06/21 0855  Specimen to Pathology, Dermatology Final result    Narrative:  Number of Specimens:->1   ------------------------->-------------------------   Spec 1 Procedure:->Biopsy   Spec 1 Clinical Impression:->atypical nevus vs. bcc vs.   traumatized nevus   Spec 1 Source:->right forehead       02/26/21 1142  Specimen to Pathology, Surgery Gastrointestinal tract Final result    Narrative:  Pre-op Diagnosis: Black stools [K92.1]   Procedure(s):   EGD (ESOPHAGOGASTRODUODENOSCOPY)   COLONOSCOPY   Number of specimens: 2   Name of specimens: 1. Antrum and body bx, h pylori   2. Rectosigmoid polyp x 2   Specimen total (fresh, frozen, permanent):->2             POCT Glucose: No results for input(s): "POCTGLUCOSE" in the last 48 hours.  Prealbumin: No results for input(s): "PREALBUMIN" in the last 48 hours.  Respiratory Culture: No results for input(s): "GSRESP", "RESPIRATORYC" in the last 48 hours.  Troponin: No results for input(s): "TROPONINI", "TROPONINIHS" in the last 48 hours.  TSH:   Recent Labs   Lab 04/09/24  2304   TSH 3.074     Urine Culture: No results for input(s): "LABURIN" in the last 48 hours.  Urine Studies: No results for input(s): "COLORU", "APPEARANCEUA", "PHUR", "SPECGRAV", "PROTEINUA", "GLUCUA", "KETONESU", "BILIRUBINUA", "OCCULTUA", "NITRITE", "UROBILINOGEN", "LEUKOCYTESUR", "RBCUA", "WBCUA", "BACTERIA", "SQUAMEPITHEL", "HYALINECASTS" in the last 48 hours.    Invalid input(s): "DIANNA"  Recent Lab Results         04/09/24  2304        Albumin 4.0              ALT 16       Anion Gap 8       AST 14       Baso # 0.08       Basophil % 0.7       BILIRUBIN TOTAL 0.5  Comment: For " infants and newborns, interpretation of results should be based  on gestational age, weight and in agreement with clinical  observations.    Premature Infant recommended reference ranges:  Up to 24 hours.............<8.0 mg/dL  Up to 48 hours............<12.0 mg/dL  3-5 days..................<15.0 mg/dL  6-29 days.................<15.0 mg/dL         BUN 18       Calcium 10.6       Chloride 103       CO2 26       CPK 38       Creatinine 0.7       Differential Method Automated       eGFR >60.0       Eos # 0.2       Eos % 1.7       Glucose 105       Gran # (ANC) 9.6       Gran % 82.2       Hematocrit 41.3       Hemoglobin 13.5       Immature Grans (Abs) 0.12  Comment: Mild elevation in immature granulocytes is non specific and   can be seen in a variety of conditions including stress response,   acute inflammation, trauma and pregnancy. Correlation with other   laboratory and clinical findings is essential.         Immature Granulocytes 1.0       Lymph # 1.2       Lymph % 10.3       Magnesium  2.0       MCH 31.0       MCHC 32.7       MCV 95       Mono # 0.5       Mono % 4.1       MPV 8.8       nRBC 0       Phosphorus Level 3.0       Platelet Count 368       Potassium 3.4       PROTEIN TOTAL 7.3       RBC 4.36       RDW 13.9       Sodium 137       TSH 3.074       WBC 11.68               Significant Imaging: I have reviewed all pertinent imaging results/findings within the past 24 hours.  I have reviewed and interpreted all pertinent imaging results/findings within the past 24 hours.  Assessment/Plan:     Osteonecrosis due to drugs, left femur  --Continue vital signs q 4hr  -- Pain control PRN, staggered pain control approach ordered  -- Orthopedic surgery consulted  -- Xray left hip reveals osteonecrosis of left femoral head  -- SBP goal <180  -- PT/OT    Pulmonary:   -- PRN CXR  -- Incentive spirometry     Cardiac:   Hypertension  -- Continue to monitor HR and BP   --SBP goal < 180    Renal:   --Continue to monitor  I/O  --Continue to monitor BUN/Cr    ID:   -- Afebrile   -- No leukocytosis     Hem/Onc:   Normocytic Anemia  --continue to monitor H/H  -- likely dilutional     Endocrine:   --Continue to monitor BG  -- Continue to monitor and replace electrolytes    PPX   --DVT: : heparin 5000 units q 8, SCD    Kahlil Bonilla MD MPH          VTE Risk Mitigation (From admission, onward)           Ordered     heparin (porcine) injection 5,000 Units  Every 8 hours         04/10/24 0107     IP VTE HIGH RISK PATIENT  Once         04/10/24 0107     Place sequential compression device  Until discontinued         04/10/24 0107                                    Kahlil Bonilla MD  Department of Hospital Medicine  [facility] - Emergency Dept

## 2024-04-11 ENCOUNTER — TELEPHONE (OUTPATIENT)
Dept: FAMILY MEDICINE | Facility: CLINIC | Age: 64
End: 2024-04-11

## 2024-04-11 ENCOUNTER — CLINICAL SUPPORT (OUTPATIENT)
Dept: SMOKING CESSATION | Facility: CLINIC | Age: 64
End: 2024-04-11
Payer: COMMERCIAL

## 2024-04-11 VITALS
OXYGEN SATURATION: 96 % | BODY MASS INDEX: 20.24 KG/M2 | DIASTOLIC BLOOD PRESSURE: 84 MMHG | RESPIRATION RATE: 18 BRPM | SYSTOLIC BLOOD PRESSURE: 125 MMHG | HEART RATE: 100 BPM | WEIGHT: 110 LBS | TEMPERATURE: 98 F | HEIGHT: 62 IN

## 2024-04-11 DIAGNOSIS — F17.200 NICOTINE DEPENDENCE: Primary | ICD-10-CM

## 2024-04-11 PROCEDURE — 99239 HOSP IP/OBS DSCHRG MGMT >30: CPT | Mod: ,,, | Performed by: INTERNAL MEDICINE

## 2024-04-11 PROCEDURE — 97165 OT EVAL LOW COMPLEX 30 MIN: CPT

## 2024-04-11 PROCEDURE — 99406 BEHAV CHNG SMOKING 3-10 MIN: CPT

## 2024-04-11 PROCEDURE — 97116 GAIT TRAINING THERAPY: CPT

## 2024-04-11 PROCEDURE — 25000003 PHARM REV CODE 250: Performed by: INTERNAL MEDICINE

## 2024-04-11 PROCEDURE — 97161 PT EVAL LOW COMPLEX 20 MIN: CPT

## 2024-04-11 PROCEDURE — 63600175 PHARM REV CODE 636 W HCPCS: Performed by: INTERNAL MEDICINE

## 2024-04-11 PROCEDURE — 99407 BEHAV CHNG SMOKING > 10 MIN: CPT | Mod: S$GLB,,, | Performed by: INTERNAL MEDICINE

## 2024-04-11 RX ORDER — HYDROCODONE BITARTRATE AND ACETAMINOPHEN 5; 325 MG/1; MG/1
1 TABLET ORAL EVERY 6 HOURS PRN
Qty: 12 TABLET | Refills: 0 | Status: SHIPPED | OUTPATIENT
Start: 2024-04-11 | End: 2024-04-18 | Stop reason: ALTCHOICE

## 2024-04-11 RX ORDER — IBUPROFEN 200 MG
1 TABLET ORAL DAILY
Qty: 30 PATCH | Refills: 0 | Status: SHIPPED | OUTPATIENT
Start: 2024-04-11 | End: 2024-04-18

## 2024-04-11 RX ORDER — AMLODIPINE BESYLATE 5 MG/1
10 TABLET ORAL DAILY
Status: DISCONTINUED | OUTPATIENT
Start: 2024-04-11 | End: 2024-04-11 | Stop reason: HOSPADM

## 2024-04-11 RX ADMIN — AMLODIPINE BESYLATE 10 MG: 5 TABLET ORAL at 05:04

## 2024-04-11 RX ADMIN — HYDROMORPHONE HYDROCHLORIDE 0.2 MG: 0.5 INJECTION, SOLUTION INTRAMUSCULAR; INTRAVENOUS; SUBCUTANEOUS at 07:04

## 2024-04-11 RX ADMIN — HYDROCODONE BITARTRATE AND ACETAMINOPHEN 1 TABLET: 5; 325 TABLET ORAL at 04:04

## 2024-04-11 RX ADMIN — MUPIROCIN 1 G: 20 OINTMENT TOPICAL at 12:04

## 2024-04-11 RX ADMIN — HEPARIN SODIUM 5000 UNITS: 5000 INJECTION, SOLUTION INTRAVENOUS; SUBCUTANEOUS at 05:04

## 2024-04-11 NOTE — TELEPHONE ENCOUNTER
----- Message from Diane Ornelas RN sent at 4/11/2024  3:46 PM CDT -----  Regarding: follow up  Hi. Patient already has an appointment scheduled for 4/26/24. Please change to hospital follow up. Thanks

## 2024-04-11 NOTE — NURSING
All discharge instructions given. Answered all questions. Removed saline lock. cardiac monitor placed at desk with . Escorted patient out to monitor vehicle.

## 2024-04-11 NOTE — DISCHARGE SUMMARY
[facility] Medicine  Discharge Summary      Patient Name: Claire Bowser  MRN: [id number]  [facility]: [id number]  Patient Class: IP- Inpatient  Admission Date: 4/9/2024  Hospital Length of Stay: 1 days  Discharge Date and Time:  04/11/2024 3:25 PM  Attending Physician: Ta Vidales Jr., MD   Discharging Provider: Ta Vidales Jr, MD  Primary Care Provider: Samuel Brady MD    Primary Care Team: Networked reference to record PCT     HPI:    is a 64 y.o. female with PMH significant for Rheumatoid arthritis and anxiety who presented with a three week history of left hip pain. She rates the pain as 10/10 in severity and prevents her from being able to stand for long. She states it has worsened over the past week. She denies hx of prior stroke, HTN, HLD, DM and CAD. Denies hx of tobacco use, alcohol use, drug abuse.     She lives alone and performs all ADLs independently. Pt denies alcohol or recreational drug use, but admits to smoking 1 pack of cigarettes per day. She states she was on 5mg per day of Prednesone which was decreased to 2.5 mg dly at the beginning of the year then 5 days ago she decided to increase her dose to 5mg dly when she was having intractable left hip pain. Xray of left hip in the ED revealed osteonecrosis of the left femoral head. Orthopedics consulted with Dr. Thurston. Patient will be admitted for intractable hip pain.     * No surgery found *      Hospital Course:   Patient presented for pain. Was found to have osteonecrosis of her hip. Pain was controlled and ortho was called. No plan for surgery and they would like to see her as an outpatient.      Goals of Care Treatment Preferences:  Code Status: Full Code      Consults:   Consults (From admission, onward)          Status Ordering Provider     Inpatient consult to Orthopedics  Once        Provider:  Samuel Thurston II, MD    Completed RAMÓN MURRAY            No new Assessment & Plan notes have been  filed under this hospital service since the last note was generated.  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 Go to .    Specialty: Emergency Medicine  Why: As needed, If symptoms worsen  Contact information:  [address] [facility] [id number] [phone]  Additional information:  1st floor             Samuel Brady MD. Schedule an appointment as soon as possible for a visit in 2 weeks.    Specialty: Family Medicine  Why: Follow up  Contact information:  [address] [facility] [address]  [facility] [address]  [phone]                           Patient Instructions:      Ambulatory referral/consult to Orthopedics   Standing Status: Future   Referral Priority: Routine Referral Type: Consultation   Requested Specialty: Orthopedic Surgery   Number of Visits Requested: 1     Ambulatory referral/consult to Smoking Cessation Program   Standing Status: Future   Referral Priority: Routine Referral Type: Consultation   Referral Reason: Specialty Services Required   Requested Specialty: CTTS   Number of Visits Requested: 1       Significant Diagnostic Studies: Labs: CMP   Recent Labs   Lab 04/09/24  2304      K 3.4*      CO2 26      BUN 18   CREATININE 0.7   CALCIUM 10.6*   PROT 7.3   ALBUMIN 4.0   BILITOT 0.5   ALKPHOS 115   AST 14   ALT 16   ANIONGAP 8    and CBC   Recent Labs   Lab 04/09/24  2304   WBC 11.68   HGB 13.5   HCT 41.3          Pending Diagnostic Studies:       None           Medications:  Reconciled Home Medications:      Medication List        START taking these medications       HYDROcodone-acetaminophen 5-325 mg per tablet  Commonly known as: NORCO  Take 1 tablet by mouth every 6 (six) hours as needed for Pain.     nicotine 14 mg/24 hr  Commonly known as: NICODERM CQ  Place 1 patch onto the skin once daily.     pantoprazole 40 MG tablet  Commonly known as: PROTONIX  Take 1 tablet (40 mg total) by mouth once daily.            CHANGE how you take these medications      predniSONE 1 MG tablet  Commonly known as: DELTASONE  Take 4 tablets (4 mg total) by mouth once daily.  What changed: Another medication with the same name was removed. Continue taking this medication, and follow the directions you see here.            CONTINUE taking these medications      amLODIPine 10 MG tablet  Commonly known as: NORVASC  Take 1 tablet (10 mg total) by mouth once daily.     CREON 36,000-114,000- 180,000 unit Cpdr  Generic drug: lipase-protease-amylase  Take 2 capsules by mouth 3 (three) times daily.     cyclobenzaprine 5 MG tablet  Commonly known as: FLEXERIL  Take 1 tablet (5 mg total) by mouth nightly.     folic acid 1 MG tablet  Commonly known as: FOLVITE  Take 1 tablet (1 mg total) by mouth once daily.     leflunomide 10 MG Tab  Commonly known as: ARAVA  Take 2 tablets (20 mg total) by mouth once daily.     meloxicam 7.5 MG tablet  Commonly known as: MOBIC  Take 1 tablet (7.5 mg total) by mouth once daily.     ondansetron 4 MG tablet  Commonly known as: ZOFRAN  Take 1 tablet (4 mg total) by mouth every 6 (six) hours as needed for Nausea.     potassium chloride SA 20 MEQ tablet  Commonly known as: K-DUR,KLOR-CON  Take 1 tablet (20 mEq total) by mouth 3 (three) times daily.     sertraline 50 MG tablet  Commonly known as: ZOLOFT  Take 1 tablet (50 mg total) by mouth every evening.     traZODone 50 MG tablet  Commonly known as: DESYREL  Take 1 tablet by mouth in the evening            STOP taking these medications      amoxicillin-clavulanate 875-125mg 875-125 mg per tablet  Commonly known as:  AUGMENTIN     methylPREDNISolone 4 mg tablet  Commonly known as: MEDROL DOSEPACK              Indwelling Lines/Drains at time of discharge:   Lines/Drains/Airways       None                   Time spent on the discharge of patient: 40 minutes         Ta Vidales Jr, MD  Department of Hospital Medicine  [facility]

## 2024-04-11 NOTE — PT/OT/SLP EVAL
Occupational Therapy   Evaluation and Discharge Note    Name: Claire Bowser  MRN: [id number]  Admitting Diagnosis: <principal problem not specified>  Recent Surgery: * No surgery found *      Recommendations:     Discharge Recommendations: No Therapy Indicated  Discharge Equipment Recommendations: none  Barriers to discharge:  None    Assessment:     Claire Bowser is a 64 y.o. female with a medical diagnosis of <principal problem not specified>. At this time, patient is functioning at their prior level of function and does not require further acute OT services.     Plan:     During this hospitalization, patient does not require further acute OT services.  Please re-consult if situation changes.    Plan of Care Reviewed with: patient, spouse    Subjective     Chief Complaint: None  Patient/Family Comments/goals: Ready to go home    Occupational Profile:  Living Environment: Lives with spouse in single story home with tub/shower combo  Previous level of function: Modified Independent to occasional assistance with dressing/bathing depending on pain level  Roles and Routines: Wife, mother  Equipment Used at home: wheelchair, walker, rolling, bedside commode, shower chair  Assistance upon Discharge: Spouse    Pain/Comfort:  Pain Rating 1: other (see comments) (not rated)    Patients cultural, spiritual, Mu-ism conflicts given the current situation: no    Objective:     Communicated with: Nurse prior to session.  Patient found HOB elevated with peripheral IV, telemetry (Spouse present) upon OT entry to room.    General Precautions: Standard, fall  Orthopedic Precautions: N/A  Braces: N/A  Respiratory Status: Room air     Occupational Performance:    Bed Mobility:    SBA to Supervision    Functional Mobility/Transfers:  Patient completed Sit <> Stand Transfer with stand by assistance and contact guard assistance  with  hand-held assist and as needed     Activities of Daily Living:  Feeding:  Set-up of meal and otherwise  Independent    Grooming: supervision    Upper Body Dressing: independence    Lower Body Dressing: supervision - OTR providing education/instruction regarding adaptive dressing techniques to increase safety and independence with LB dressing tasks - patient verbalizes/demonstrates understanding and in agreement when appropriate    Cognitive/Visual Perceptual:  Oriented x 4; pleasant/cooperative; safety awareness/insight intact    Physical Exam:  Upper Extremity Strength:    -       Right Upper Extremity: WNL  -       Left Upper Extremity: WNL    AMPAC 6 Click ADL:  AMPAC Total Score: 21    Treatment & Education:  - OTR providing education/instruction regarding OT role/purpose, safety awareness/fall prevention including use of RW for mobility and use of shower chair for bathing - patient/spouse verbalize/demonstrate understanding and in agreement, instructing on adaptive dressing techniques, importance of frequent/supervised mobilization during the day time hours as part of DVT prevention and facilitation of rehabilitation. Patient and spouse verbalize understanding and in agreement.    Patient left HOB elevated with all lines intact, call button in reach, bed alarm on, Nurse, Michelle, notified, and spouse present    GOALS:   Multidisciplinary Problems       Occupational Therapy Goals       Not on file                    History:     Past Medical History:   Diagnosis Date    Acute biliary pancreatitis 6/14/2022    Anxiety     Chronic pancreatitis 1/2/2023    Digestive disorder     Flu 02/2017    [facility]    Hypertension     Long-term use of immunosuppressant medication 6/10/2022    Rheumatoid arthritis involving multiple sites with positive rheumatoid factor 01/14/2021         Past Surgical History:   Procedure Laterality Date    COLONOSCOPY N/A 2/26/2021    Procedure: COLONOSCOPY;  Surgeon: Amy Sidhu MD;  Location: [facility];  Service: Endoscopy;  Laterality: N/A;    ENDOSCOPIC ULTRASOUND OF UPPER  GASTROINTESTINAL TRACT N/A 7/1/2022    Procedure: ULTRASOUND, UPPER GI TRACT, ENDOSCOPIC;  Surgeon: Donavon Jewell III, MD;  Location: [facility] ENDO;  Service: Endoscopy;  Laterality: N/A;    ENDOSCOPIC ULTRASOUND OF UPPER GASTROINTESTINAL TRACT N/A 11/29/2022    Procedure: ULTRASOUND, UPPER GI TRACT, ENDOSCOPIC;  Surgeon: Donavon Jewell III, MD;  Location: [facility] ENDO;  Service: Endoscopy;  Laterality: N/A;    ENDOSCOPIC ULTRASOUND OF UPPER GASTROINTESTINAL TRACT N/A 1/3/2023    Procedure: ULTRASOUND, UPPER GI TRACT, ENDOSCOPIC;  Surgeon: Donavon Jewell III, MD;  Location: [facility] ENDO;  Service: Endoscopy;  Laterality: N/A;    ERCP N/A 12/30/2022    Procedure: ERCP (ENDOSCOPIC RETROGRADE CHOLANGIOPANCREATOGRAPHY);  Surgeon: Donavon Jewell III, MD;  Location: [facility] ENDO;  Service: Endoscopy;  Laterality: N/A;    ERCP N/A 1/24/2023    Procedure: ERCP (ENDOSCOPIC RETROGRADE CHOLANGIOPANCREATOGRAPHY);  Surgeon: Rock Martines MD;  Location: [facility] (2ND FLR);  Service: Endoscopy;  Laterality: N/A;    ESOPHAGOGASTRODUODENOSCOPY N/A 2/26/2021    Procedure: EGD (ESOPHAGOGASTRODUODENOSCOPY);  Surgeon: Amy Sidhu MD;  Location: [facility] ENDO;  Service: Endoscopy;  Laterality: N/A;    ESOPHAGOGASTRODUODENOSCOPY N/A 6/26/2023    Procedure: EGD (ESOPHAGOGASTRODUODENOSCOPY);  Surgeon: Kirk Nuñez MD;  Location: [facility] ENDO (2ND FLR);  Service: Endoscopy;  Laterality: N/A;  PD stent removal    6/12/23-Instructions via portal-DS  6/20/23- Precall confirmed- [place]    LAPAROSCOPIC CHOLECYSTECTOMY N/A 7/27/2022    Procedure: CHOLECYSTECTOMY, LAPAROSCOPIC;  Surgeon: Ramón Hwang III, MD;  Location: [facility] OR;  Service: General;  Laterality: N/A;    TUBAL LIGATION         Time Tracking:     OT Date of Treatment: 04/11/24  OT Start Time: 1434  OT Stop Time: 1445  OT Total Time (min): 11 min    Billable Minutes:Evaluation 11    4/11/2024

## 2024-04-11 NOTE — HOSPITAL COURSE
Patient presented for pain. Was found to have osteonecrosis of her hip. Pain was controlled and ortho was called. No plan for surgery and they would like to see her as an outpatient.

## 2024-04-11 NOTE — PROGRESS NOTES
04/11/24 0904   Tobacco Cessation Intervention   Do you use any type of tobacco product? Yes   Are you interested in quitting use of tobacco products? Ready to quit   Are you interested in Nicotine Replacement for symptom relief? No   $ Smoking Cessation Charges Smoking Cessation - Intermediate (Non-CTTS)   COPD Questionnaire   How often do you cough? 2   How often do you have phlegm (mucus) in your chest? 1   How often does your chest feel tight? 0   When you walk up a hill or one flight of stairs, how often are you breathless? 4   How often are you limited doing any activities at home? 5   How often are you confident leaving the house despite your lung condition? 0   How often do you sleep soundly? 1   How often do you have energy? 5   Total score 18

## 2024-04-11 NOTE — TELEPHONE ENCOUNTER
When is the patient discharging from the hospital.? Dr. Brady is not going to be in the office on 4/26, we will need to schedule her for the discharge clinic.  Will have Anamaria schedule her for a hospital follow up.

## 2024-04-11 NOTE — PLAN OF CARE
Patient cleared for discharge by cm to home, no needs.    Patient already had a visit scheduled with PCP in 2 weeks, information added to AVS       04/11/24 8153   Final Note   Assessment Type Final Discharge Note   Anticipated Discharge Disposition Home   Hospital Resources/Appts/Education Provided Appointments scheduled and added to AVS

## 2024-04-11 NOTE — PT/OT/SLP EVAL
Physical Therapy Evaluation and Discharge Note    Patient Name:  Claire Bowser   MRN:  [id number]    Recommendations:     Discharge Recommendations: No Therapy Indicated  Discharge Equipment Recommendations:     Barriers to discharge:  pain    Assessment:     Claire Bowser is a 64 y.o. female admitted with a medical diagnosis of <principal problem not specified>. .  At this time, patient is functioning at their prior level of function and does not require further acute PT services.     Recent Surgery: * No surgery found *      Plan:     During this hospitalization, patient does not require further acute PT services.  Please re-consult if situation changes.      Subjective     Chief Complaint: pain R hip/lower leg  Patient/Family Comments/goals: pain control  Pain/Comfort:  Pain Rating 1: 8/10  Location - Side 1: Left  Location - Orientation 1: lower  Location 1: leg  Pain Addressed 1: Reposition, Distraction, Cessation of Activity  Pain Rating Post-Intervention 1: 8/10    Patients cultural, spiritual, Orthodoxy conflicts given the current situation:      Living Environment:  Ptlives with  SS with threshold entry  Prior to admission, patients level of function was modified indep with RW.  Equipment used at home: wheelchair, walker, rolling, shower chair, bedside commode.  DME owned (not currently used): none.  Upon discharge, patient will have assistance from .    Objective:     Communicated with RN prior to session.  Patient found HOB elevated with telemetry, peripheral IV upon PT entry to room.    General Precautions: Standard,      Orthopedic Precautions:    Braces:    Respiratory Status: Room air    Exams:  Cognitive Exam:  Patient is oriented to Person, Place, Time, and Situation  RLE ROM: WFL  RLE Strength: WFL  LLE ROM: WFL  LLE Strength: 4-/5    Functional Mobility:  Bed Mobility:     Supine to Sit: independence  Sit to Supine: independence  Transfers:     Sit to Stand:  independence with  rolling walker  Gait: x30' w/RW modified indep with pain as limiting factor, new dx AVN L hip.    AM-PAC 6 CLICK MOBILITY  Total Score:23       Treatment and Education:  Pt was educated on the following: call light use, importance of OOB activity and functional mobility to negate the negative effects of prolonged bed rest during this hospitalization, safe transfers/ambulation and discharge planning recommendations/options.     AM-PAC 6 CLICK MOBILITY  Total Score:23     Patient left HOB elevated with all lines intact and call button in reach.    GOALS:   Multidisciplinary Problems       Physical Therapy Goals       Not on file                    History:     Past Medical History:   Diagnosis Date    Acute biliary pancreatitis 6/14/2022    Anxiety     Chronic pancreatitis 1/2/2023    Digestive disorder     Flu 02/2017    [facility]    Hypertension     Long-term use of immunosuppressant medication 6/10/2022    Rheumatoid arthritis involving multiple sites with positive rheumatoid factor 01/14/2021       Past Surgical History:   Procedure Laterality Date    COLONOSCOPY N/A 2/26/2021    Procedure: COLONOSCOPY;  Surgeon: Amy Sidhu MD;  Location: [facility];  Service: Endoscopy;  Laterality: N/A;    ENDOSCOPIC ULTRASOUND OF UPPER GASTROINTESTINAL TRACT N/A 7/1/2022    Procedure: ULTRASOUND, UPPER GI TRACT, ENDOSCOPIC;  Surgeon: Donavon Jewell III, MD;  Location: [facility] [place];  Service: Endoscopy;  Laterality: N/A;    ENDOSCOPIC ULTRASOUND OF UPPER GASTROINTESTINAL TRACT N/A 11/29/2022    Procedure: ULTRASOUND, UPPER GI TRACT, ENDOSCOPIC;  Surgeon: Donavon Jewell III, MD;  Location: [facility] ENDO;  Service: Endoscopy;  Laterality: N/A;    ENDOSCOPIC ULTRASOUND OF UPPER GASTROINTESTINAL TRACT N/A 1/3/2023    Procedure: ULTRASOUND, UPPER GI TRACT, ENDOSCOPIC;  Surgeon: Donavon Jewell III, MD;  Location: [facility] ENDO;  Service: Endoscopy;  Laterality: N/A;    ERCP N/A 12/30/2022    Procedure: ERCP  (ENDOSCOPIC RETROGRADE CHOLANGIOPANCREATOGRAPHY);  Surgeon: Donavon Jewell III, MD;  Location: [facility] ENDO;  Service: Endoscopy;  Laterality: N/A;    ERCP N/A 1/24/2023    Procedure: ERCP (ENDOSCOPIC RETROGRADE CHOLANGIOPANCREATOGRAPHY);  Surgeon: Rock Martines MD;  Location: [facility] (2ND FLR);  Service: Endoscopy;  Laterality: N/A;    ESOPHAGOGASTRODUODENOSCOPY N/A 2/26/2021    Procedure: EGD (ESOPHAGOGASTRODUODENOSCOPY);  Surgeon: Amy Sidhu MD;  Location: [facility] ENDO;  Service: Endoscopy;  Laterality: N/A;    ESOPHAGOGASTRODUODENOSCOPY N/A 6/26/2023    Procedure: EGD (ESOPHAGOGASTRODUODENOSCOPY);  Surgeon: Kirk Nuñez MD;  Location: [facility] (2ND FLR);  Service: Endoscopy;  Laterality: N/A;  PD stent removal    6/12/23-Instructions via portal-DS  6/20/23- Precall confirmed- [place]    LAPAROSCOPIC CHOLECYSTECTOMY N/A 7/27/2022    Procedure: CHOLECYSTECTOMY, LAPAROSCOPIC;  Surgeon: Ramón Hwang III, MD;  Location: [facility] OR;  Service: General;  Laterality: N/A;    TUBAL LIGATION         Time Tracking:     PT Received On: 04/11/24  PT Start Time: 1110     PT Stop Time: 1126  PT Total Time (min): 16 min     Billable Minutes: Evaluation 8 and Gait Training 8      04/11/2024

## 2024-04-17 ENCOUNTER — PATIENT OUTREACH (OUTPATIENT)
Dept: ADMINISTRATIVE | Facility: CLINIC | Age: 64
End: 2024-04-17

## 2024-04-17 RX ORDER — PREDNISONE 5 MG/1
5 TABLET ORAL DAILY
COMMUNITY
End: 2024-06-10 | Stop reason: SDUPTHER

## 2024-04-17 NOTE — PROGRESS NOTES
.C3 nurse spoke with Claire Bowser  for a TCC post hospital discharge follow up call. The patient has a scheduled HOSFU appointment with [facility], Discharge Clinic 4/18/2024 at 10 AM.

## 2024-04-17 NOTE — PROGRESS NOTES
C3 nurse attempted to contact Claire Bowser  for a TCC post hospital discharge follow up call. No answer. Left voicemail with callback information. The patient has a scheduled HOSFU appointment with [facility], Discharge Clinic 4/18/2024 at 10 AM

## 2024-04-18 ENCOUNTER — OFFICE VISIT (OUTPATIENT)
Dept: PRIMARY CARE CLINIC | Facility: CLINIC | Age: 64
End: 2024-04-18

## 2024-04-18 VITALS
DIASTOLIC BLOOD PRESSURE: 64 MMHG | HEART RATE: 72 BPM | WEIGHT: 108.25 LBS | HEIGHT: 62 IN | TEMPERATURE: 98 F | BODY MASS INDEX: 19.92 KG/M2 | SYSTOLIC BLOOD PRESSURE: 124 MMHG | OXYGEN SATURATION: 92 %

## 2024-04-18 DIAGNOSIS — M87.9 OSTEONECROSIS: Primary | ICD-10-CM

## 2024-04-18 PROCEDURE — 99213 OFFICE O/P EST LOW 20 MIN: CPT | Mod: S$PBB,,, | Performed by: NURSE PRACTITIONER

## 2024-04-18 PROCEDURE — 99999 PR PBB SHADOW E&M-EST. PATIENT-LVL IV: CPT | Mod: PBBFAC,,, | Performed by: NURSE PRACTITIONER

## 2024-04-18 PROCEDURE — 99214 OFFICE O/P EST MOD 30 MIN: CPT | Mod: PBBFAC,PN | Performed by: NURSE PRACTITIONER

## 2024-04-18 RX ORDER — IBUPROFEN 800 MG/1
800 TABLET ORAL 4 TIMES DAILY
Qty: 120 TABLET | Refills: 0 | Status: SHIPPED | OUTPATIENT
Start: 2024-04-18

## 2024-04-18 NOTE — PROGRESS NOTES
This dictation has been generated using Modal Fluency Dictation some phonetic errors may occur. Please contact author for clarification if needed.     Problem List Items Addressed This Visit    None  Visit Diagnoses       Osteonecrosis    -  Primary    Relevant Orders    Ambulatory referral/consult to Orthopedics            Orders Placed This Encounter    Ambulatory referral/consult to Orthopedics    ibuprofen (ADVIL,MOTRIN) 800 MG tablet     Osteonecrosis referral to ortho discussed appropriate follow-up parameters   Ibuprofen 800 4 times a day alternate every 3 hours with Tylenol 650 mg 4 times a day.    No follow-ups on file.    ________________________________________________________________  ________________________________________________________________      Chief Complaint   Patient presents with    Hospital Follow Up     History of present illness  This 64 y.o. presents today for complaint of hospital follow-up osteonecrosis left hip.  Patient has long-term prednisone use due to arthritis.  Patient denies injury.  Reviewed with her CT images x-ray images and discussed.  Ortho saw her in the hospital and did not recommend emergent intervention and deemed her appropriate for follow-up in the outpatient setting.  She has been taking Excedrin, Tylenol, and hydrocodone for pain.  Notes breakthrough pain with Excedrin and Tylenol use.  Transitional Care Note    Family and/or Caretaker present at visit?  Yes.   Diagnostic tests reviewed/disposition: No diagnosic tests pending after this hospitalization.  Disease/illness education: osteonecrosis, prednisone and med discussion with specialists.   Home health/community services discussion/referrals: Patient does not have home health established from hospital visit.  They do not need home health.  If needed, we will set up home health for the patient. Not home bound.   Establishment or re-establishment of referral orders for community resources:  case management  referral .   Discussion with other health care providers:  message rheumatology regarding prednisone use. She is not on high dose. Any relationship between steroid and osteonecrosis is likely due to cumulative dose .        Admission Date: 4/9/2024  Hospital Length of Stay: 1 days  Discharge Date and Time:  04/11/2024 3:25 PM  Attending Physician: Ta Vidales Jr., MD   Discharging Provider: Ta Vidales Jr, MD  Primary Care Provider: Samuel Brady MD     Primary Care Team: Networked reference to record PCT      HPI:    is a 64 y.o. female with PMH significant for Rheumatoid arthritis and anxiety who presented with a three week history of left hip pain. She rates the pain as 10/10 in severity and prevents her from being able to stand for long. She states it has worsened over the past week. She denies hx of prior stroke, HTN, HLD, DM and CAD. Denies hx of tobacco use, alcohol use, drug abuse.      She lives alone and performs all ADLs independently. Pt denies alcohol or recreational drug use, but admits to smoking 1 pack of cigarettes per day. She states she was on 5mg per day of Prednesone which was decreased to 2.5 mg dly at the beginning of the year then 5 days ago she decided to increase her dose to 5mg dly when she was having intractable left hip pain. Xray of left hip in the ED revealed osteonecrosis of the left femoral head. Orthopedics consulted with Dr. Thurston. Patient will be admitted for intractable hip pain.      * No surgery found *       Hospital Course:   Patient presented for pain. Was found to have osteonecrosis of her hip. Pain was controlled and ortho was called. No plan for surgery and they would like to see her as an outpatient.           Past Medical History:   Diagnosis Date    Acute biliary pancreatitis 6/14/2022    Anxiety     Chronic pancreatitis 1/2/2023    Digestive disorder     Flu 02/2017    [facility]    Hypertension     Long-term use of immunosuppressant  medication 6/10/2022    Rheumatoid arthritis involving multiple sites with positive rheumatoid factor 01/14/2021       Past Surgical History:   Procedure Laterality Date    COLONOSCOPY N/A 2/26/2021    Procedure: COLONOSCOPY;  Surgeon: Amy Sidhu MD;  Location: [facility] ENDO;  Service: Endoscopy;  Laterality: N/A;    ENDOSCOPIC ULTRASOUND OF UPPER GASTROINTESTINAL TRACT N/A 7/1/2022    Procedure: ULTRASOUND, UPPER GI TRACT, ENDOSCOPIC;  Surgeon: Donavon Jewell III, MD;  Location: [facility] ENDO;  Service: Endoscopy;  Laterality: N/A;    ENDOSCOPIC ULTRASOUND OF UPPER GASTROINTESTINAL TRACT N/A 11/29/2022    Procedure: ULTRASOUND, UPPER GI TRACT, ENDOSCOPIC;  Surgeon: Donavon Jewell III, MD;  Location: [facility] ENDO;  Service: Endoscopy;  Laterality: N/A;    ENDOSCOPIC ULTRASOUND OF UPPER GASTROINTESTINAL TRACT N/A 1/3/2023    Procedure: ULTRASOUND, UPPER GI TRACT, ENDOSCOPIC;  Surgeon: Donavon Jewell III, MD;  Location: [facility] ENDO;  Service: Endoscopy;  Laterality: N/A;    ERCP N/A 12/30/2022    Procedure: ERCP (ENDOSCOPIC RETROGRADE CHOLANGIOPANCREATOGRAPHY);  Surgeon: Donavon Jewell III, MD;  Location: [facility] ENDO;  Service: Endoscopy;  Laterality: N/A;    ERCP N/A 1/24/2023    Procedure: ERCP (ENDOSCOPIC RETROGRADE CHOLANGIOPANCREATOGRAPHY);  Surgeon: Rock Martines MD;  Location: [facility] (2ND FLR);  Service: Endoscopy;  Laterality: N/A;    ESOPHAGOGASTRODUODENOSCOPY N/A 2/26/2021    Procedure: EGD (ESOPHAGOGASTRODUODENOSCOPY);  Surgeon: Amy Sidhu MD;  Location: [facility] ENDO;  Service: Endoscopy;  Laterality: N/A;    ESOPHAGOGASTRODUODENOSCOPY N/A 6/26/2023    Procedure: EGD (ESOPHAGOGASTRODUODENOSCOPY);  Surgeon: Kirk Nuñez MD;  Location: [facility] (2ND FLR);  Service: Endoscopy;  Laterality: N/A;  PD stent removal    6/12/23-Instructions via portal-DS  6/20/23- Precall confirmed- [place]    LAPAROSCOPIC CHOLECYSTECTOMY N/A 7/27/2022    Procedure: CHOLECYSTECTOMY, LAPAROSCOPIC;  Surgeon:  Ramón Hwang III, MD;  Location: [facility] OR;  Service: General;  Laterality: N/A;    TUBAL LIGATION         Family History   Problem Relation Name Age of Onset    Diabetes Mother      Heart disease Mother      Kidney disease Mother      Hypertension Mother      Stroke Mother      Hearing loss Mother      COPD Father      Liver disease Paternal Grandfather      Alcohol abuse Paternal Grandfather      Liver disease Sister      Emphysema Brother      COPD Brother      Sleep apnea Brother      No Known Problems Son      Alcohol abuse Paternal Grandmother      Cirrhosis Paternal Grandmother      Heart disease Maternal Aunt      Diabetes Maternal Aunt      Cancer Maternal Aunt          female    Heart disease Maternal Uncle      Hypertension Maternal Uncle      No Known Problems Paternal Uncle      Psoriasis Neg Hx      Lupus Neg Hx      Eczema Neg Hx      Melanoma Neg Hx         Social History     Socioeconomic History    Marital status:    Tobacco Use    Smoking status: Every Day     Current packs/day: 0.50     Average packs/day: 0.5 packs/day for 20.0 years (10.0 ttl pk-yrs)     Types: Cigarettes    Smokeless tobacco: Never    Tobacco comments:     20 year smoking history.   Substance and Sexual Activity    Alcohol use: No    Drug use: Never    Sexual activity: Yes     Partners: Male     Social Determinants of Health     Financial Resource Strain: High Risk (1/3/2024)    Overall Financial Resource Strain (CARDIA)     Difficulty of Paying Living Expenses: Very hard   Food Insecurity: Food Insecurity Present (1/3/2024)    Hunger Vital Sign     Worried About Running Out of Food in the Last Year: Often true     Ran Out of Food in the Last Year: Often true   Transportation Needs: No Transportation Needs (1/3/2024)    PRAPARE - Transportation     Lack of Transportation (Medical): No     Lack of Transportation (Non-Medical): No   Physical Activity: Insufficiently Active (1/3/2024)    Exercise Vital Sign     Days of  Exercise per Week: 3 days     Minutes of Exercise per Session: 20 min   Stress: Stress Concern Present (1/3/2024)    [employer] - Occupational Stress Questionnaire     Feeling of Stress : To some extent   Social Connections: Unknown (1/3/2024)    Social Connection and Isolation Panel [NHANES]     Frequency of Communication with Friends and Family: Three times a week     Frequency of Social Gatherings with Friends and Family: Once a week     Active Member of Clubs or Organizations: Yes     Attends Club or Organization Meetings: More than 4 times per year     Marital Status:    Housing Stability: High Risk (1/3/2024)    Housing Stability Vital Sign     Unable to Pay for Housing in the Last Year: Yes     Number of Places Lived in the Last Year: 1     Unstable Housing in the Last Year: No       Current Outpatient Medications   Medication Sig Dispense Refill    amLODIPine (NORVASC) 10 MG tablet Take 1 tablet (10 mg total) by mouth once daily. 30 tablet 11    cyclobenzaprine (FLEXERIL) 5 MG tablet Take 1 tablet (5 mg total) by mouth nightly. 30 tablet 6    folic acid (FOLVITE) 1 MG tablet Take 1 tablet (1 mg total) by mouth once daily. 90 tablet 3    leflunomide (ARAVA) 10 MG Tab Take 2 tablets (20 mg total) by mouth once daily. 90 tablet 1    lipase-protease-amylase (CREON) 36,000-114,000- 180,000 unit CpDR Take 2 capsules by mouth 3 (three) times daily. 540 capsule 3    ondansetron (ZOFRAN) 4 MG tablet Take 1 tablet (4 mg total) by mouth every 6 (six) hours as needed for Nausea. 12 tablet 0    pantoprazole (PROTONIX) 40 MG tablet Take 1 tablet (40 mg total) by mouth once daily. 30 tablet 0    potassium chloride SA (K-DUR,KLOR-CON) 20 MEQ tablet Take 1 tablet (20 mEq total) by mouth 3 (three) times daily. 270 tablet 1    predniSONE (DELTASONE) 5 MG tablet Take 5 mg by mouth once daily.      sertraline (ZOLOFT) 50 MG tablet Take 1 tablet (50 mg total) by mouth every evening. 90 tablet  "0    traZODone (DESYREL) 50 MG tablet Take 1 tablet by mouth in the evening (Patient taking differently: Take 50 mg by mouth every evening.) 90 tablet 0    HYDROcodone-acetaminophen (NORCO) 5-325 mg per tablet Take 1 tablet by mouth every 6 (six) hours as needed for Pain. 12 tablet 0    ibuprofen (ADVIL,MOTRIN) 800 MG tablet Take 1 tablet (800 mg total) by mouth 4 (four) times daily. 120 tablet 0    nicotine (NICODERM CQ) 14 mg/24 hr Place 1 patch onto the skin once daily. 30 patch 0    predniSONE (DELTASONE) 1 MG tablet Take 4 tablets (4 mg total) by mouth once daily. 120 tablet 5     No current facility-administered medications for this visit.       Review of patient's allergies indicates:   Allergen Reactions    No known drug allergies        Physical examination  Vitals Reviewed  /64 (BP Location: Right arm, Patient Position: Sitting, BP Method: Medium (Manual))   Pulse 72   Temp 98.2 °F (36.8 °C) (Oral)   Ht 5' 2" (1.575 m)   Wt 49.1 kg (108 lb 3.9 oz)   SpO2 (!) 92%   BMI 19.80 kg/m²  Body mass index is 19.8 kg/m².     BP Readings from Last 3 Encounters:   04/18/24 124/64   04/11/24 125/84   03/30/24 (!) 165/79       Wt Readings from Last 3 Encounters:   04/18/24 49.1 kg (108 lb 3.9 oz)   04/10/24 49.9 kg (110 lb 0.2 oz)   03/30/24 49.9 kg (110 lb)     Gen. Well-dressed well-nourished   Skin warm dry and intact.  No rashes noted.  Chest.  Respirations are even unlabored.  Lungs are clear to auscultation.  Cardiac regular rate and rhythm.  No chest wall adenopathy noted.  Neuro. Awake alert oriented x4.  Normal judgment and cognition noted.  Extremities no clubbing cyanosis or edema noted. Ambulating without assistance and minimal gait change.     Call or return to clinic prn if these symptoms worsen or fail to improve as anticipated.      "

## 2024-05-08 ENCOUNTER — CLINICAL SUPPORT (OUTPATIENT)
Dept: SMOKING CESSATION | Facility: CLINIC | Age: 64
End: 2024-05-08
Payer: COMMERCIAL

## 2024-05-08 DIAGNOSIS — F17.210 MODERATE CIGARETTE SMOKER (10-19 PER DAY): Primary | ICD-10-CM

## 2024-05-08 PROCEDURE — 99999 PR PBB SHADOW E&M-EST. PATIENT-LVL II: CPT | Mod: PBBFAC,,,

## 2024-05-08 PROCEDURE — 99404 PREV MED CNSL INDIV APPRX 60: CPT | Mod: S$GLB,,,

## 2024-05-08 RX ORDER — MICONAZOLE NITRATE 2 %
2 CREAM (GRAM) TOPICAL
Qty: 110 EACH | Refills: 0 | Status: SHIPPED | OUTPATIENT
Start: 2024-05-08

## 2024-05-08 RX ORDER — IBUPROFEN 200 MG
1 TABLET ORAL DAILY
Qty: 28 PATCH | Refills: 0 | Status: SHIPPED | OUTPATIENT
Start: 2024-05-08

## 2024-05-08 NOTE — Clinical Note
Patient is was seen for tobacco cessation intake assessment.  Patient will begin on 21 mg nicotine patch and 2 mg nicotine gum.  We discussed self awarness, triggers, tracking usage, reduction/treatment plan and follow up.

## 2024-05-14 ENCOUNTER — HOSPITAL ENCOUNTER (EMERGENCY)
Facility: HOSPITAL | Age: 64
Discharge: HOME | End: 2024-05-14
Attending: STUDENT IN AN ORGANIZED HEALTH CARE EDUCATION/TRAINING PROGRAM

## 2024-05-14 VITALS
HEART RATE: 90 BPM | TEMPERATURE: 99 F | WEIGHT: 107 LBS | BODY MASS INDEX: 19.69 KG/M2 | DIASTOLIC BLOOD PRESSURE: 96 MMHG | HEIGHT: 62 IN | RESPIRATION RATE: 18 BRPM | OXYGEN SATURATION: 98 % | SYSTOLIC BLOOD PRESSURE: 185 MMHG

## 2024-05-14 DIAGNOSIS — M87.9 OSTEONECROSIS OF LEFT HIP: Primary | ICD-10-CM

## 2024-05-14 PROCEDURE — 99284 EMERGENCY DEPT VISIT MOD MDM: CPT

## 2024-05-14 PROCEDURE — 25000003 PHARM REV CODE 250: Performed by: STUDENT IN AN ORGANIZED HEALTH CARE EDUCATION/TRAINING PROGRAM

## 2024-05-14 RX ORDER — OXYCODONE AND ACETAMINOPHEN 5; 325 MG/1; MG/1
1 TABLET ORAL EVERY 6 HOURS PRN
Qty: 8 TABLET | Refills: 0 | Status: SHIPPED | OUTPATIENT
Start: 2024-05-14

## 2024-05-14 RX ORDER — HYDROMORPHONE HYDROCHLORIDE 2 MG/1
2 TABLET ORAL ONCE
Status: COMPLETED | OUTPATIENT
Start: 2024-05-14 | End: 2024-05-14

## 2024-05-14 RX ORDER — ONDANSETRON 4 MG/1
4 TABLET, ORALLY DISINTEGRATING ORAL
Status: COMPLETED | OUTPATIENT
Start: 2024-05-14 | End: 2024-05-14

## 2024-05-14 RX ORDER — LIDOCAINE 50 MG/G
1 PATCH TOPICAL DAILY
Qty: 14 PATCH | Refills: 0 | Status: SHIPPED | OUTPATIENT
Start: 2024-05-14

## 2024-05-14 RX ADMIN — ONDANSETRON 4 MG: 4 TABLET, ORALLY DISINTEGRATING ORAL at 08:05

## 2024-05-14 RX ADMIN — HYDROMORPHONE HYDROCHLORIDE 2 MG: 2 TABLET ORAL at 08:05

## 2024-05-14 NOTE — FIRST PROVIDER EVALUATION
Emergency Department TeleTriage Encounter Note      CHIEF COMPLAINT    Chief Complaint   Patient presents with    Hip Pain     Chronic x 1 month, not getting better, denies injury       VITAL SIGNS   Initial Vitals [05/14/24 1814]   BP Pulse Resp Temp SpO2   (!) 167/87 110 18 98.8 °F (37.1 °C) 95 %      MAP       --            ALLERGIES    Review of patient's allergies indicates:   Allergen Reactions    No known drug allergies        PROVIDER TRIAGE NOTE  Patient presents with complaint of chronic left hip pain.  Seen here a month ago for similar.  Denies new injury.      Phy:   Constitutional: well nourished, well developed, appearing stated age, NAD        Initial orders will be placed and care will be transferred to an alternate provider when patient is roomed for a full evaluation. Any additional orders and the final disposition will be determined by that provider.        ORDERS  Labs Reviewed - No data to display    ED Orders (720h ago, onward)      None              Virtual Visit Note: The provider triage portion of this emergency department evaluation and documentation was performed via [employer], a HIPAA-compliant telemedicine application, in concert with a tele-presenter in the room. A face to face patient evaluation with one of my colleagues will occur once the patient is placed in an emergency department room.      DISCLAIMER: This note was prepared with [employer] voice recognition transcription software. Garbled syntax, mangled pronouns, and other bizarre constructions may be attributed to that software system.

## 2024-05-15 NOTE — ED PROVIDER NOTES
Encounter Date: 5/14/2024       History     Chief Complaint   Patient presents with    Hip Pain     Chronic x 1 month, not getting better, denies injury     64-year-old female presents for evaluation of ongoing left hip pain.  She was diagnosed with osteonecrosis of the left hip about a month ago, and she has been unable to follow up with Orthopedics due to lack of insurance.  Pain is worse with walking, relieved with rest.  It is unchanged.  She is normal range of motion of the hip.  She denies trauma to the hip.  She is tried ibuprofen 800 mg and Excedrin, with minimal and brief improvement.  She started taking 2 ibuprofen 800 but was still having pain so she came in for further evaluation. .      Review of patient's allergies indicates:   Allergen Reactions    No known drug allergies      Past Medical History:   Diagnosis Date    Acute biliary pancreatitis 6/14/2022    Anxiety     Chronic pancreatitis 1/2/2023    Digestive disorder     Flu 02/2017    [facility]    Hypertension     Long-term use of immunosuppressant medication 6/10/2022    Rheumatoid arthritis involving multiple sites with positive rheumatoid factor 01/14/2021     Past Surgical History:   Procedure Laterality Date    COLONOSCOPY N/A 2/26/2021    Procedure: COLONOSCOPY;  Surgeon: Amy Sidhu MD;  Location: [facility];  Service: Endoscopy;  Laterality: N/A;    ENDOSCOPIC ULTRASOUND OF UPPER GASTROINTESTINAL TRACT N/A 7/1/2022    Procedure: ULTRASOUND, UPPER GI TRACT, ENDOSCOPIC;  Surgeon: Donavon Jewell III, MD;  Location: [facility];  Service: Endoscopy;  Laterality: N/A;    ENDOSCOPIC ULTRASOUND OF UPPER GASTROINTESTINAL TRACT N/A 11/29/2022    Procedure: ULTRASOUND, UPPER GI TRACT, ENDOSCOPIC;  Surgeon: Donavon Jewell III, MD;  Location: [facility];  Service: Endoscopy;  Laterality: N/A;    ENDOSCOPIC ULTRASOUND OF UPPER GASTROINTESTINAL TRACT N/A 1/3/2023    Procedure: ULTRASOUND, UPPER GI TRACT, ENDOSCOPIC;  Surgeon:  Donavon Jewell III, MD;  Location: [facility] ENDO;  Service: Endoscopy;  Laterality: N/A;    ERCP N/A 12/30/2022    Procedure: ERCP (ENDOSCOPIC RETROGRADE CHOLANGIOPANCREATOGRAPHY);  Surgeon: Donavon Jewell III, MD;  Location: [facility] ENDO;  Service: Endoscopy;  Laterality: N/A;    ERCP N/A 1/24/2023    Procedure: ERCP (ENDOSCOPIC RETROGRADE CHOLANGIOPANCREATOGRAPHY);  Surgeon: Rock Martines MD;  Location: [facility] ENDO (2ND FLR);  Service: Endoscopy;  Laterality: N/A;    ESOPHAGOGASTRODUODENOSCOPY N/A 2/26/2021    Procedure: EGD (ESOPHAGOGASTRODUODENOSCOPY);  Surgeon: Amy Sidhu MD;  Location: [facility] ENDO;  Service: Endoscopy;  Laterality: N/A;    ESOPHAGOGASTRODUODENOSCOPY N/A 6/26/2023    Procedure: EGD (ESOPHAGOGASTRODUODENOSCOPY);  Surgeon: Kirk Nuñez MD;  Location: [facility] (2ND FLR);  Service: Endoscopy;  Laterality: N/A;  PD stent removal    6/12/23-Instructions via portal-DS  6/20/23- Precall confirmed- [place]    LAPAROSCOPIC CHOLECYSTECTOMY N/A 7/27/2022    Procedure: CHOLECYSTECTOMY, LAPAROSCOPIC;  Surgeon: Ramón Hwnag III, MD;  Location: [facility] OR;  Service: General;  Laterality: N/A;    TUBAL LIGATION       Family History   Problem Relation Name Age of Onset    Diabetes Mother      Heart disease Mother      Kidney disease Mother      Hypertension Mother      Stroke Mother      Hearing loss Mother      COPD Father      Liver disease Paternal Grandfather      Alcohol abuse Paternal Grandfather      Liver disease Sister      Emphysema Brother      COPD Brother      Sleep apnea Brother      No Known Problems Son      Alcohol abuse Paternal Grandmother      Cirrhosis Paternal Grandmother      Heart disease Maternal Aunt      Diabetes Maternal Aunt      Cancer Maternal Aunt          female    Heart disease Maternal Uncle      Hypertension Maternal Uncle      No Known Problems Paternal Uncle      Psoriasis Neg Hx      Lupus Neg Hx      Eczema Neg Hx      Melanoma Neg Hx       Social History      Tobacco Use    Smoking status: Every Day     Current packs/day: 0.50     Average packs/day: 0.3 packs/day for 43.4 years (12.2 ttl pk-yrs)     Types: Cigarettes     Start date: 1981    Smokeless tobacco: Never    Tobacco comments:     20 year smoking history.   Substance Use Topics    Alcohol use: No    Drug use: Never     Review of Systems   Musculoskeletal:  Positive for arthralgias.       Physical Exam     Initial Vitals [05/14/24 1814]   BP Pulse Resp Temp SpO2   (!) 167/87 110 18 98.8 °F (37.1 °C) 95 %      MAP       --         Physical Exam    Nursing note and vitals reviewed.  Constitutional: She appears well-developed and well-nourished. She is not diaphoretic.   HENT:   Head: Normocephalic and atraumatic.   Mouth/Throat: Oropharynx is clear and moist.   Eyes: EOM are normal. Pupils are equal, round, and reactive to light. Right eye exhibits no discharge. Left eye exhibits no discharge.   Neck: No tracheal deviation present.   Normal range of motion.  Cardiovascular:  Normal rate, regular rhythm and intact distal pulses.           Pulmonary/Chest: No respiratory distress. She has no wheezes. She exhibits no tenderness.   Abdominal: Abdomen is soft. She exhibits no distension. There is no abdominal tenderness.   Musculoskeletal:         General: No tenderness or edema. Normal range of motion.      Cervical back: Normal range of motion.     Neurological: She is alert and oriented to person, place, and time. She has normal strength. No cranial nerve deficit or sensory deficit. GCS eye subscore is 4. GCS verbal subscore is 5. GCS motor subscore is 6.   Skin: Skin is warm and dry. No rash noted.   Psychiatric: She has a normal mood and affect. Her behavior is normal. Thought content normal.         ED Course   Procedures  Labs Reviewed - No data to display       Imaging Results    None          Medications   HYDROmorphone tablet 2 mg (2 mg Oral Given 5/14/24 2051)   ondansetron disintegrating tablet 4 mg (4  mg Oral Given 5/14/24 2050)     Medical Decision Making  64-year-old female with history of osteonecrosis of the left hip presents for left hip pain.  Vitals normal.  She has been unable to follow-up with an orthopedist due to lack of insurance.  She was ambulatory without difficulty.  She has normal range of motion of the hip.  She is weight-bearing without difficulty.  Doubt occult fracture.  Patient given morphine with some improvement in symptoms.  Will discharge with lidocaine patches, outpatient follow-up and return precautions for worsening symptoms.    Risk  Prescription drug management.                                      Clinical Impression:  Final diagnoses:  [M87.9] Osteonecrosis of left hip (Primary)          ED Disposition Condition    Discharge Stable          ED Prescriptions       Medication Sig Dispense Start Date End Date Auth. Provider    LIDOcaine (LIDODERM) 5 % Place 1 patch onto the skin once daily. Remove & Discard patch within 12 hours or as directed by MD 14 patch 5/14/2024 -- Renato Talbert MD    oxyCODONE-acetaminophen (PERCOCET) 5-325 mg per tablet Take 1 tablet by mouth every 6 (six) hours as needed. 8 tablet 5/14/2024 -- Renato Talbert MD          Follow-up Information       Follow up With Specialties Details Why Contact Info    Michael Munoz MD Orthopedic Surgery, Pediatric Orthopedic Surgery Call in 2 days To set up follow-up orthopedics visit [id number] [place] [address]  [facility]  [facility] [address] [phone]               Renato Talbert MD  05/15/24 0585

## 2024-05-15 NOTE — DISCHARGE INSTRUCTIONS

## 2024-05-21 ENCOUNTER — PATIENT MESSAGE (OUTPATIENT)
Dept: GASTROENTEROLOGY | Facility: CLINIC | Age: 64
End: 2024-05-21
Payer: COMMERCIAL

## 2024-05-21 ENCOUNTER — PATIENT MESSAGE (OUTPATIENT)
Dept: FAMILY MEDICINE | Facility: CLINIC | Age: 64
End: 2024-05-21
Payer: COMMERCIAL

## 2024-05-21 DIAGNOSIS — M06.9 RHEUMATOID ARTHRITIS, INVOLVING UNSPECIFIED SITE, UNSPECIFIED WHETHER RHEUMATOID FACTOR PRESENT: ICD-10-CM

## 2024-05-21 NOTE — TELEPHONE ENCOUNTER
No care due was identified.  Health [facility] Embedded Care Due Messages. Reference number: [id number].   5/21/2024 4:40:14 PM CDT

## 2024-05-22 RX ORDER — FOLIC ACID 1 MG/1
1 TABLET ORAL DAILY
Qty: 90 TABLET | Refills: 3 | Status: CANCELLED | OUTPATIENT
Start: 2024-05-22 | End: 2025-05-22

## 2024-05-22 RX ORDER — PANTOPRAZOLE SODIUM 40 MG/1
40 TABLET, DELAYED RELEASE ORAL DAILY
Qty: 90 TABLET | Refills: 0 | Status: SHIPPED | OUTPATIENT
Start: 2024-05-22

## 2024-05-22 NOTE — TELEPHONE ENCOUNTER
Refill Routing Note   Medication(s) are not appropriate for processing by [facility] for the following reason(s):        No active prescription written by provider  ED/Hospital Visit since last OV with provider  Outside of protocol    [facility] action(s):  Defer  Route        Medication Therapy Plan: Folic acid is outside of [facility] protocol; ROUTE      Appointments  past 12m or future 3m with PCP    Date Provider   Last Visit   3/22/2023 Samuel Brady MD   Next Visit   8/6/2024 Samuel Brady MD   ED visits in past 90 days: 2        Note composed:6:44 AM 05/22/2024

## 2024-05-22 NOTE — TELEPHONE ENCOUNTER
The folic acid was started in January of 2021 when she began taking methotrexate for her rheumatoid arthritis.  The methotrexate was discontinued in 2022.  Folic acid is given with methotrexate to prevent the levels of folic acid from being depleted by the drug.  She is no longer taking the methotrexate so it is not necessary to continue taking the folic acid.  She may stop it if she wishes.  Continuing it should not hurt her but it is not really necessary and also would constitute an unnecessary expense.

## 2024-05-24 ENCOUNTER — PATIENT MESSAGE (OUTPATIENT)
Dept: ENDOSCOPY | Facility: HOSPITAL | Age: 64
End: 2024-05-24
Payer: COMMERCIAL

## 2024-05-28 RX ORDER — LEFLUNOMIDE 10 MG/1
20 TABLET ORAL DAILY
Qty: 180 TABLET | Refills: 0 | Status: SHIPPED | OUTPATIENT
Start: 2024-05-28

## 2024-06-05 ENCOUNTER — TELEPHONE (OUTPATIENT)
Dept: SMOKING CESSATION | Facility: CLINIC | Age: 64
End: 2024-06-05
Payer: COMMERCIAL

## 2024-06-09 ENCOUNTER — PATIENT MESSAGE (OUTPATIENT)
Dept: RHEUMATOLOGY | Facility: CLINIC | Age: 64
End: 2024-06-09
Payer: COMMERCIAL

## 2024-06-10 RX ORDER — PREDNISONE 1 MG/1
4 TABLET ORAL DAILY
Qty: 90 TABLET | Refills: 1 | Status: SHIPPED | OUTPATIENT
Start: 2024-06-10

## 2024-06-20 ENCOUNTER — TELEPHONE (OUTPATIENT)
Dept: SMOKING CESSATION | Facility: CLINIC | Age: 64
End: 2024-06-20
Payer: COMMERCIAL

## 2024-06-27 ENCOUNTER — TELEPHONE (OUTPATIENT)
Dept: SMOKING CESSATION | Facility: CLINIC | Age: 64
End: 2024-06-27
Payer: COMMERCIAL

## 2024-07-09 DIAGNOSIS — F41.1 GENERALIZED ANXIETY DISORDER: ICD-10-CM

## 2024-07-09 DIAGNOSIS — G47.00 INSOMNIA, UNSPECIFIED TYPE: ICD-10-CM

## 2024-07-09 NOTE — TELEPHONE ENCOUNTER
No care due was identified.  [facility] Embedded Care Due Messages. Reference number: [id number].   7/09/2024 6:26:21 PM CDT

## 2024-07-10 RX ORDER — TRAZODONE HYDROCHLORIDE 50 MG/1
TABLET ORAL
Qty: 90 TABLET | Refills: 0 | Status: SHIPPED | OUTPATIENT
Start: 2024-07-10

## 2024-08-06 ENCOUNTER — OFFICE VISIT (OUTPATIENT)
Dept: FAMILY MEDICINE | Facility: CLINIC | Age: 64
End: 2024-08-06
Attending: FAMILY MEDICINE

## 2024-08-06 VITALS
BODY MASS INDEX: 17.29 KG/M2 | DIASTOLIC BLOOD PRESSURE: 76 MMHG | HEIGHT: 62 IN | WEIGHT: 93.94 LBS | HEART RATE: 123 BPM | OXYGEN SATURATION: 96 % | TEMPERATURE: 98 F | SYSTOLIC BLOOD PRESSURE: 134 MMHG

## 2024-08-06 DIAGNOSIS — E78.2 MIXED HYPERLIPIDEMIA: ICD-10-CM

## 2024-08-06 DIAGNOSIS — I10 HYPERTENSION, UNSPECIFIED TYPE: Chronic | ICD-10-CM

## 2024-08-06 DIAGNOSIS — M87.152 OSTEONECROSIS DUE TO DRUGS, LEFT FEMUR: ICD-10-CM

## 2024-08-06 DIAGNOSIS — M54.42 CHRONIC LEFT-SIDED LOW BACK PAIN WITH LEFT-SIDED SCIATICA: Primary | ICD-10-CM

## 2024-08-06 DIAGNOSIS — R63.6 UNDERWEIGHT: ICD-10-CM

## 2024-08-06 DIAGNOSIS — E83.52 HYPERCALCEMIA: ICD-10-CM

## 2024-08-06 DIAGNOSIS — D84.9 IMMUNOCOMPROMISED: ICD-10-CM

## 2024-08-06 DIAGNOSIS — G89.29 CHRONIC LEFT-SIDED LOW BACK PAIN WITH LEFT-SIDED SCIATICA: Primary | ICD-10-CM

## 2024-08-06 DIAGNOSIS — M05.79 RHEUMATOID ARTHRITIS INVOLVING MULTIPLE SITES WITH POSITIVE RHEUMATOID FACTOR: Chronic | ICD-10-CM

## 2024-08-06 DIAGNOSIS — M81.0 OSTEOPOROSIS, UNSPECIFIED OSTEOPOROSIS TYPE, UNSPECIFIED PATHOLOGICAL FRACTURE PRESENCE: ICD-10-CM

## 2024-08-06 PROCEDURE — 99214 OFFICE O/P EST MOD 30 MIN: CPT | Mod: PBBFAC,PN | Performed by: FAMILY MEDICINE

## 2024-08-06 PROCEDURE — 99999 PR PBB SHADOW E&M-EST. PATIENT-LVL IV: CPT | Mod: PBBFAC,,, | Performed by: FAMILY MEDICINE

## 2024-08-06 PROCEDURE — 99214 OFFICE O/P EST MOD 30 MIN: CPT | Mod: S$PBB,,, | Performed by: FAMILY MEDICINE

## 2024-08-06 RX ORDER — LIDOCAINE 50 MG/G
PATCH TOPICAL
Qty: 60 PATCH | Refills: 0 | Status: SHIPPED | OUTPATIENT
Start: 2024-08-06

## 2024-08-06 RX ORDER — GABAPENTIN 300 MG/1
CAPSULE ORAL
Qty: 90 CAPSULE | Refills: 2 | Status: SHIPPED | OUTPATIENT
Start: 2024-08-06

## 2024-08-06 RX ORDER — SERTRALINE HYDROCHLORIDE 50 MG/1
50 TABLET, FILM COATED ORAL NIGHTLY
Qty: 90 TABLET | Refills: 3 | Status: SHIPPED | OUTPATIENT
Start: 2024-08-06

## 2024-08-06 RX ORDER — PANTOPRAZOLE SODIUM 40 MG/1
40 TABLET, DELAYED RELEASE ORAL DAILY
Qty: 90 TABLET | Refills: 3 | Status: SHIPPED | OUTPATIENT
Start: 2024-08-06

## 2024-08-08 ENCOUNTER — LAB VISIT (OUTPATIENT)
Dept: LAB | Facility: HOSPITAL | Age: 64
End: 2024-08-08
Attending: FAMILY MEDICINE

## 2024-08-08 DIAGNOSIS — E83.52 HYPERCALCEMIA: ICD-10-CM

## 2024-08-08 LAB — PTH-INTACT SERPL-MCNC: 41.8 PG/ML (ref 9–77)

## 2024-08-08 PROCEDURE — 83970 ASSAY OF PARATHORMONE: CPT | Performed by: FAMILY MEDICINE

## 2024-08-08 PROCEDURE — 36415 COLL VENOUS BLD VENIPUNCTURE: CPT | Mod: PO | Performed by: FAMILY MEDICINE

## 2024-08-09 ENCOUNTER — PATIENT MESSAGE (OUTPATIENT)
Dept: FAMILY MEDICINE | Facility: CLINIC | Age: 64
End: 2024-08-09
Payer: COMMERCIAL

## 2024-08-09 DIAGNOSIS — E34.9 ELEVATED CALCITONIN LEVEL: Primary | ICD-10-CM

## 2024-08-09 RX ORDER — IBUPROFEN 800 MG/1
800 TABLET ORAL 3 TIMES DAILY PRN
Qty: 90 TABLET | Refills: 0 | Status: SHIPPED | OUTPATIENT
Start: 2024-08-09

## 2024-08-14 ENCOUNTER — HOSPITAL ENCOUNTER (OUTPATIENT)
Dept: RADIOLOGY | Facility: CLINIC | Age: 64
Discharge: HOME | End: 2024-08-14
Attending: FAMILY MEDICINE

## 2024-08-14 DIAGNOSIS — E34.9 ELEVATED CALCITONIN LEVEL: ICD-10-CM

## 2024-08-14 PROCEDURE — 72100 X-RAY EXAM L-S SPINE 2/3 VWS: CPT | Mod: TC,FY,PO

## 2024-08-14 PROCEDURE — 72100 X-RAY EXAM L-S SPINE 2/3 VWS: CPT | Mod: 26,,, | Performed by: STUDENT IN AN ORGANIZED HEALTH CARE EDUCATION/TRAINING PROGRAM

## 2024-08-26 ENCOUNTER — TELEPHONE (OUTPATIENT)
Dept: SMOKING CESSATION | Facility: CLINIC | Age: 64
End: 2024-08-26
Payer: COMMERCIAL

## 2024-09-10 RX ORDER — LEFLUNOMIDE 10 MG/1
20 TABLET ORAL DAILY
Qty: 180 TABLET | Refills: 0 | Status: SHIPPED | OUTPATIENT
Start: 2024-09-10

## 2024-09-24 RX ORDER — CYCLOBENZAPRINE HCL 5 MG
5 TABLET ORAL NIGHTLY
Qty: 30 TABLET | Refills: 0 | Status: SHIPPED | OUTPATIENT
Start: 2024-09-24

## 2024-09-30 DIAGNOSIS — F41.1 GENERALIZED ANXIETY DISORDER: ICD-10-CM

## 2024-09-30 DIAGNOSIS — G47.00 INSOMNIA, UNSPECIFIED TYPE: ICD-10-CM

## 2024-09-30 RX ORDER — TRAZODONE HYDROCHLORIDE 50 MG/1
TABLET ORAL
Qty: 90 TABLET | Refills: 3 | Status: SHIPPED | OUTPATIENT
Start: 2024-09-30

## 2024-09-30 NOTE — TELEPHONE ENCOUNTER
Refill Routing Note   Medication(s) are not appropriate for processing by [facility] for the following reason(s):        Drug-disease interaction    [facility] action(s):  Defer        Medication Therapy Plan: Drug-Disease: traZODone and Hypokalemia; DEFER    Pharmacist review requested: Yes     Appointments  past 12m or future 3m with PCP    Date Provider   Last Visit   8/6/2024 Samuel Brady MD   Next Visit   Visit date not found Samuel Brady MD   ED visits in past 90 days: 0        Note composed:2:06 PM 09/30/2024

## 2024-09-30 NOTE — TELEPHONE ENCOUNTER
No care due was identified.  Health [facility] Embedded Care Due Messages. Reference number: [id number].   9/30/2024 9:03:27 AM CDT

## 2024-09-30 NOTE — TELEPHONE ENCOUNTER
Refill Decision Note   Claire Bowser  is requesting a refill authorization.  Brief Assessment and Rationale for Refill:  Approve     Medication Therapy Plan:         Pharmacist review requested: Yes   Comments:     Note composed:2:58 PM 09/30/2024

## 2024-10-24 ENCOUNTER — PATIENT MESSAGE (OUTPATIENT)
Dept: FAMILY MEDICINE | Facility: CLINIC | Age: 64
End: 2024-10-24
Payer: COMMERCIAL

## 2024-10-24 DIAGNOSIS — M54.42 CHRONIC LEFT-SIDED LOW BACK PAIN WITH LEFT-SIDED SCIATICA: ICD-10-CM

## 2024-10-24 DIAGNOSIS — G89.29 CHRONIC LEFT-SIDED LOW BACK PAIN WITH LEFT-SIDED SCIATICA: ICD-10-CM

## 2024-10-24 RX ORDER — GABAPENTIN 300 MG/1
CAPSULE ORAL
Qty: 90 CAPSULE | Refills: 2 | Status: SHIPPED | OUTPATIENT
Start: 2024-10-24

## 2024-10-24 NOTE — TELEPHONE ENCOUNTER
No care due was identified.  [facility] Embedded Care Due Messages. Reference number: [id number].   10/24/2024 1:32:54 PM CDT

## 2024-10-25 ENCOUNTER — TELEPHONE (OUTPATIENT)
Dept: RHEUMATOLOGY | Facility: CLINIC | Age: 64
End: 2024-10-25
Payer: COMMERCIAL

## 2024-10-25 RX ORDER — CYCLOBENZAPRINE HCL 5 MG
5 TABLET ORAL NIGHTLY
Qty: 30 TABLET | Refills: 0 | OUTPATIENT
Start: 2024-10-25

## 2024-10-28 ENCOUNTER — TELEPHONE (OUTPATIENT)
Dept: RHEUMATOLOGY | Facility: CLINIC | Age: 64
End: 2024-10-28
Payer: COMMERCIAL

## 2024-11-08 ENCOUNTER — TELEPHONE (OUTPATIENT)
Dept: SMOKING CESSATION | Facility: CLINIC | Age: 64
End: 2024-11-08
Payer: COMMERCIAL

## 2024-12-10 RX ORDER — LEFLUNOMIDE 10 MG/1
20 TABLET ORAL DAILY
Qty: 180 TABLET | Refills: 0 | Status: SHIPPED | OUTPATIENT
Start: 2024-12-10

## 2024-12-18 ENCOUNTER — OFFICE VISIT (OUTPATIENT)
Dept: FAMILY MEDICINE | Facility: CLINIC | Age: 64
End: 2024-12-18

## 2024-12-18 VITALS
DIASTOLIC BLOOD PRESSURE: 72 MMHG | BODY MASS INDEX: 18.23 KG/M2 | TEMPERATURE: 96 F | WEIGHT: 99.63 LBS | SYSTOLIC BLOOD PRESSURE: 128 MMHG | HEART RATE: 70 BPM | OXYGEN SATURATION: 97 %

## 2024-12-18 DIAGNOSIS — Z00.00 PHYSICAL EXAM: Primary | ICD-10-CM

## 2024-12-18 DIAGNOSIS — F41.1 GENERALIZED ANXIETY DISORDER: ICD-10-CM

## 2024-12-18 DIAGNOSIS — I10 ESSENTIAL HYPERTENSION: ICD-10-CM

## 2024-12-18 DIAGNOSIS — Z12.31 BREAST CANCER SCREENING BY MAMMOGRAM: ICD-10-CM

## 2024-12-18 DIAGNOSIS — M54.16 LUMBAR RADICULOPATHY: ICD-10-CM

## 2024-12-18 DIAGNOSIS — K21.9 GASTROESOPHAGEAL REFLUX DISEASE, UNSPECIFIED WHETHER ESOPHAGITIS PRESENT: ICD-10-CM

## 2024-12-18 DIAGNOSIS — F17.210 SMOKING GREATER THAN 30 PACK YEARS: ICD-10-CM

## 2024-12-18 DIAGNOSIS — K63.5 POLYP OF COLON, UNSPECIFIED PART OF COLON, UNSPECIFIED TYPE: ICD-10-CM

## 2024-12-18 DIAGNOSIS — M05.79 RHEUMATOID ARTHRITIS INVOLVING MULTIPLE SITES WITH POSITIVE RHEUMATOID FACTOR: ICD-10-CM

## 2024-12-18 DIAGNOSIS — M54.32 LEFT SCIATIC NERVE PAIN: ICD-10-CM

## 2024-12-18 DIAGNOSIS — I25.10 ASCVD (ARTERIOSCLEROTIC CARDIOVASCULAR DISEASE): ICD-10-CM

## 2024-12-18 PROCEDURE — 99215 OFFICE O/P EST HI 40 MIN: CPT | Mod: PBBFAC,PN | Performed by: FAMILY MEDICINE

## 2024-12-18 PROCEDURE — 99396 PREV VISIT EST AGE 40-64: CPT | Mod: S$PBB,,, | Performed by: FAMILY MEDICINE

## 2024-12-18 PROCEDURE — 99999 PR PBB SHADOW E&M-EST. PATIENT-LVL V: CPT | Mod: PBBFAC,,, | Performed by: FAMILY MEDICINE

## 2024-12-18 RX ORDER — POTASSIUM CHLORIDE 20 MEQ/1
20 TABLET, EXTENDED RELEASE ORAL 2 TIMES DAILY
Qty: 180 TABLET | Refills: 1 | Status: SHIPPED | OUTPATIENT
Start: 2024-12-18

## 2024-12-18 RX ORDER — CYCLOBENZAPRINE HCL 5 MG
5 TABLET ORAL DAILY PRN
Qty: 30 TABLET | Refills: 2 | Status: SHIPPED | OUTPATIENT
Start: 2024-12-18

## 2024-12-18 NOTE — PROGRESS NOTES
Subjective:       Patient ID: Claire Bowser is a 64 y.o. female.    Chief Complaint: Establish Care    New patient to me.  Has been seeing Dr. Brady.    Social history.  Pack per day smoker.  Has never had a CT scan of the chest.  Former .  No alcohol intake.  Smoking for over 20 pack years.      Family history coronary artery disease and diabetes mellitus type 2 in her mother who is   with COVID at age 82.  Hypertension in the family also her mother.  Dad smoked  at age 52.    Past medical history.  BMI of 18.2.  Had COVID and then pneumoniae and after that pancreatitis with hip pancreatic pseudocyst.  History of colon polyps.  Status post cholecystectomy.  With resultant biliary pancreatitis.  Hypertension which is controlled.  Generalized anxiety disorder on Zoloft and trazodone.  Gastroesophageal reflux disease.  Rheumatoid arthritis on Arava.  Rheumatologist at [facility].  Has lumbar radiculopathy on gabapentin for left sciatic pain.      Review of Systems   Constitutional: Negative.    HENT: Negative.     Eyes: Negative.    Respiratory: Negative.     Cardiovascular: Negative.    Gastrointestinal: Negative.    Endocrine: Negative.    Genitourinary: Negative.    Musculoskeletal: Negative.    Skin: Negative.    Allergic/Immunologic: Negative.    Neurological: Negative.    Hematological: Negative.    Psychiatric/Behavioral: Negative.     All other systems reviewed and are negative.      Objective:      Physical Exam  Vitals and nursing note reviewed.   Constitutional:       Appearance: Normal appearance. She is well-developed and normal weight.      Comments: Thin female.  No acute distress.   HENT:      Head: Normocephalic and atraumatic.      Right Ear: Tympanic membrane normal.      Left Ear: Tympanic membrane normal.      Nose: Nose normal.      Mouth/Throat:      Mouth: Mucous membranes are moist.   Eyes:      Conjunctiva/sclera: Conjunctivae normal.      Pupils: Pupils are  equal, round, and reactive to light.   Neck:      Vascular: No carotid bruit.   Cardiovascular:      Rate and Rhythm: Normal rate and regular rhythm.      Pulses: Normal pulses.      Heart sounds: Normal heart sounds. No murmur heard.     No gallop.      Comments: Right carotid bruit.  Pulmonary:      Effort: Pulmonary effort is normal.      Breath sounds: Normal breath sounds.   Abdominal:      General: Bowel sounds are normal.      Palpations: Abdomen is soft.      Tenderness: There is no abdominal tenderness.   Musculoskeletal:         General: Normal range of motion.      Cervical back: Normal range of motion.      Right lower leg: No edema.      Left lower leg: No edema.   Lymphadenopathy:      Cervical: No cervical adenopathy.   Skin:     General: Skin is warm and dry.   Neurological:      General: No focal deficit present.      Mental Status: She is alert and oriented to person, place, and time.   Psychiatric:         Behavior: Behavior normal.         Thought Content: Thought content normal.         Judgment: Judgment normal.         Assessment:       1. Physical exam    2. BMI < 18.5    3. Polyp of colon, unspecified part of colon, unspecified type    4. Essential hypertension    5. Generalized anxiety disorder    6. Gastroesophageal reflux disease, unspecified whether esophagitis present    7. Rheumatoid arthritis involving multiple sites with positive rheumatoid factor    8. Lumbar radiculopathy    9. Left sciatic nerve pain    10. Breast cancer screening by mammogram    11. Smoking greater than 30 pack years    12. ASCVD (arteriosclerotic cardiovascular disease)        Plan:       Physical exam    BMI < 18.5    Polyp of colon, unspecified part of colon, unspecified type    Essential hypertension    Generalized anxiety disorder    Gastroesophageal reflux disease, unspecified whether esophagitis present    Rheumatoid arthritis involving multiple sites with positive rheumatoid factor    Lumbar  radiculopathy    Left sciatic nerve pain    Breast cancer screening by mammogram  -     Mammo Digital Screening Bilat; Future; Expected date: 01/18/2025    Smoking greater than 30 pack years  -     CT Chest Lung Screening Low Dose; Future; Expected date: 01/02/2025    ASCVD (arteriosclerotic cardiovascular disease)  -     US Carotid Bilateral; Future; Expected date: 01/02/2025    Other orders  -     potassium chloride SA (K-DUR,KLOR-CON) 20 MEQ tablet; Take 1 tablet (20 mEq total) by mouth 2 (two) times daily.  Dispense: 180 tablet; Refill: 1  -     cyclobenzaprine (FLEXERIL) 5 MG tablet; Take 1 tablet (5 mg total) by mouth daily as needed (neck pain).  Dispense: 30 tablet; Refill: 2  -     potassium chloride SA (K-DUR,KLOR-CON) 20 MEQ tablet; Take 1 tablet (20 mEq total) by mouth 2 (two) times daily.  Dispense: 180 tablet; Refill: 1      Refill her medications.  Recommend when her insurances in place that she get a CT chest low-dose screening she has never had 1.  She needs a Pap smear.  Needs to go for a mammogram also.  Potassium refilled.  Flexeril daily p.r.n. for her neck pain.  Continue her gabapentin.  Recommend we get a carotid ultrasound weaning as she is able.  Recommend smoking cessation.

## 2024-12-18 NOTE — PATIENT INSTRUCTIONS
Potassium 20 mg and flexeril 5 mg- All medications will be sent to pharm after 5:30 pm today     CT chest, US carotid due after January -[facility] will call you to schedule

## 2024-12-19 ENCOUNTER — TELEPHONE (OUTPATIENT)
Dept: SMOKING CESSATION | Facility: CLINIC | Age: 64
End: 2024-12-19
Payer: COMMERCIAL

## 2025-01-24 ENCOUNTER — PATIENT MESSAGE (OUTPATIENT)
Dept: FAMILY MEDICINE | Facility: CLINIC | Age: 65
End: 2025-01-24
Payer: COMMERCIAL

## 2025-01-24 DIAGNOSIS — M54.42 CHRONIC LEFT-SIDED LOW BACK PAIN WITH LEFT-SIDED SCIATICA: ICD-10-CM

## 2025-01-24 DIAGNOSIS — G89.29 CHRONIC LEFT-SIDED LOW BACK PAIN WITH LEFT-SIDED SCIATICA: ICD-10-CM

## 2025-01-24 RX ORDER — GABAPENTIN 300 MG/1
300 CAPSULE ORAL 3 TIMES DAILY
Qty: 90 CAPSULE | Refills: 1 | OUTPATIENT
Start: 2025-01-24 | End: 2026-01-24

## 2025-01-24 NOTE — TELEPHONE ENCOUNTER
Refill Decision Note   Claire Bowser  is requesting a refill authorization.  Brief Assessment and Rationale for Refill:  Quick Discontinue     Medication Therapy Plan: Pharmacy is requesting new scripts for the following medications without required information, (sig/ frequency/qty/etc)     Medication Reconciliation Completed: No   Comments:     No Care Gaps recommended.     Note composed:12:10 PM 01/24/2025

## 2025-01-30 DIAGNOSIS — G89.29 CHRONIC LEFT-SIDED LOW BACK PAIN WITH LEFT-SIDED SCIATICA: ICD-10-CM

## 2025-01-30 DIAGNOSIS — M54.42 CHRONIC LEFT-SIDED LOW BACK PAIN WITH LEFT-SIDED SCIATICA: ICD-10-CM

## 2025-01-30 RX ORDER — GABAPENTIN 300 MG/1
300 CAPSULE ORAL 3 TIMES DAILY
Qty: 90 CAPSULE | Refills: 1 | Status: SHIPPED | OUTPATIENT
Start: 2025-01-30

## 2025-01-30 RX ORDER — GABAPENTIN 300 MG/1
CAPSULE ORAL
Qty: 90 CAPSULE | Refills: 2 | Status: SHIPPED | OUTPATIENT
Start: 2025-01-30

## 2025-01-30 NOTE — TELEPHONE ENCOUNTER
No care due was identified.  [facility] Embedded Care Due Messages. Reference number: [id number].   1/30/2025 10:34:08 AM CST

## 2025-01-30 NOTE — TELEPHONE ENCOUNTER
Care Due:                  Date            Visit Type   Department     Provider  --------------------------------------------------------------------------------                                             [facility]  [facility]  Next Visit: None Scheduled  None         None Found                                                            Last  Test          Frequency    Reason                     Performed    Due Date  --------------------------------------------------------------------------------    CBC.........  12 months..  ibuprofen................  04-   04-    CMP.........  12 months..  ibuprofen, potassium.....  04-   04-    [facility] [employer] Embedded Care Due Messages. Reference number: [id number].   1/30/2025 9:46:11 AM CST

## 2025-01-30 NOTE — TELEPHONE ENCOUNTER
Refill sent to dr jefferson to sign , pt here to est dr jefferson in dec did not need refill then out now ,[facility] due march.

## 2025-02-25 ENCOUNTER — PATIENT MESSAGE (OUTPATIENT)
Dept: RHEUMATOLOGY | Facility: CLINIC | Age: 65
End: 2025-02-25
Payer: COMMERCIAL

## 2025-03-06 ENCOUNTER — PATIENT MESSAGE (OUTPATIENT)
Dept: RHEUMATOLOGY | Facility: CLINIC | Age: 65
End: 2025-03-06
Payer: COMMERCIAL

## 2025-03-06 RX ORDER — AMLODIPINE BESYLATE 10 MG/1
10 TABLET ORAL DAILY
Qty: 90 TABLET | Refills: 3 | Status: SHIPPED | OUTPATIENT
Start: 2025-03-06

## 2025-03-06 NOTE — TELEPHONE ENCOUNTER
Refill Routing Note   Medication(s) are not appropriate for processing by [facility] for the following reason(s):        No active prescription written by provider    [facility] action(s):  Defer             Appointments  past 12m or future 3m with PCP    Date Provider   Last Visit   12/18/2024 Boo Jameson III, MD   Next Visit   Visit date not found Boo Jameson III, MD   ED visits in past 90 days: 0        Note composed:3:12 PM 03/06/2025

## 2025-03-06 NOTE — TELEPHONE ENCOUNTER
No care due was identified.  [facility] Embedded Care Due Messages. Reference number: [id number].   3/06/2025 3:09:43 PM CST

## 2025-03-07 ENCOUNTER — PATIENT MESSAGE (OUTPATIENT)
Dept: RHEUMATOLOGY | Facility: CLINIC | Age: 65
End: 2025-03-07
Payer: COMMERCIAL

## 2025-03-13 ENCOUNTER — RESULTS FOLLOW-UP (OUTPATIENT)
Dept: RHEUMATOLOGY | Facility: CLINIC | Age: 65
End: 2025-03-13

## 2025-03-13 ENCOUNTER — LAB VISIT (OUTPATIENT)
Dept: LAB | Facility: HOSPITAL | Age: 65
End: 2025-03-13
Attending: INTERNAL MEDICINE

## 2025-03-13 DIAGNOSIS — M05.79 RHEUMATOID ARTHRITIS INVOLVING MULTIPLE SITES WITH POSITIVE RHEUMATOID FACTOR: ICD-10-CM

## 2025-03-13 DIAGNOSIS — Z79.52 LONG TERM CURRENT USE OF SYSTEMIC STEROIDS: ICD-10-CM

## 2025-03-13 LAB
ALBUMIN SERPL BCP-MCNC: 4.1 G/DL (ref 3.5–5.2)
ALP SERPL-CCNC: 201 U/L (ref 55–135)
ALT SERPL W/O P-5'-P-CCNC: 13 U/L (ref 10–44)
ANION GAP SERPL CALC-SCNC: 8 MMOL/L (ref 8–16)
AST SERPL-CCNC: 20 U/L (ref 10–40)
BASOPHILS # BLD AUTO: 0.11 K/UL (ref 0–0.2)
BASOPHILS NFR BLD: 1.6 % (ref 0–1.9)
BILIRUB SERPL-MCNC: 0.5 MG/DL (ref 0.1–1)
BUN SERPL-MCNC: 12 MG/DL (ref 8–23)
CALCIUM SERPL-MCNC: 11.2 MG/DL (ref 8.7–10.5)
CHLORIDE SERPL-SCNC: 105 MMOL/L (ref 95–110)
CO2 SERPL-SCNC: 28 MMOL/L (ref 23–29)
CREAT SERPL-MCNC: 0.9 MG/DL (ref 0.5–1.4)
CRP SERPL-MCNC: 1.2 MG/DL
DIFFERENTIAL METHOD BLD: ABNORMAL
EOSINOPHIL # BLD AUTO: 0.3 K/UL (ref 0–0.5)
EOSINOPHIL NFR BLD: 4.5 % (ref 0–8)
ERYTHROCYTE [DISTWIDTH] IN BLOOD BY AUTOMATED COUNT: 12.8 % (ref 11.5–14.5)
ERYTHROCYTE [SEDIMENTATION RATE] IN BLOOD BY WESTERGREN METHOD: 17 MM/HR (ref 0–20)
EST. GFR  (NO RACE VARIABLE): >60 ML/MIN/1.73 M^2
GLUCOSE SERPL-MCNC: 166 MG/DL (ref 70–110)
HCT VFR BLD AUTO: 40.3 % (ref 37–48.5)
HGB BLD-MCNC: 12.8 G/DL (ref 12–16)
IMM GRANULOCYTES # BLD AUTO: 0.03 K/UL (ref 0–0.04)
IMM GRANULOCYTES NFR BLD AUTO: 0.4 % (ref 0–0.5)
LYMPHOCYTES # BLD AUTO: 1.4 K/UL (ref 1–4.8)
LYMPHOCYTES NFR BLD: 20.3 % (ref 18–48)
MCH RBC QN AUTO: 30.6 PG (ref 27–31)
MCHC RBC AUTO-ENTMCNC: 31.8 G/DL (ref 32–36)
MCV RBC AUTO: 96 FL (ref 82–98)
MONOCYTES # BLD AUTO: 0.6 K/UL (ref 0.3–1)
MONOCYTES NFR BLD: 8.3 % (ref 4–15)
NEUTROPHILS # BLD AUTO: 4.6 K/UL (ref 1.8–7.7)
NEUTROPHILS NFR BLD: 64.9 % (ref 38–73)
NRBC BLD-RTO: 0 /100 WBC
PLATELET # BLD AUTO: 320 K/UL (ref 150–450)
PMV BLD AUTO: 9.7 FL (ref 9.2–12.9)
POTASSIUM SERPL-SCNC: 3.2 MMOL/L (ref 3.5–5.1)
PROT SERPL-MCNC: 8.2 G/DL (ref 6–8.4)
RBC # BLD AUTO: 4.18 M/UL (ref 4–5.4)
SODIUM SERPL-SCNC: 141 MMOL/L (ref 136–145)
WBC # BLD AUTO: 7.09 K/UL (ref 3.9–12.7)

## 2025-03-13 PROCEDURE — 85651 RBC SED RATE NONAUTOMATED: CPT | Performed by: INTERNAL MEDICINE

## 2025-03-13 PROCEDURE — 86140 C-REACTIVE PROTEIN: CPT | Performed by: INTERNAL MEDICINE

## 2025-03-13 PROCEDURE — 85025 COMPLETE CBC W/AUTO DIFF WBC: CPT | Performed by: INTERNAL MEDICINE

## 2025-03-13 PROCEDURE — 80053 COMPREHEN METABOLIC PANEL: CPT | Performed by: INTERNAL MEDICINE

## 2025-03-13 PROCEDURE — 36415 COLL VENOUS BLD VENIPUNCTURE: CPT | Performed by: INTERNAL MEDICINE

## 2025-03-14 ENCOUNTER — TELEPHONE (OUTPATIENT)
Dept: RHEUMATOLOGY | Facility: CLINIC | Age: 65
End: 2025-03-14
Payer: COMMERCIAL

## 2025-03-14 DIAGNOSIS — E87.6 HYPOKALEMIA: ICD-10-CM

## 2025-03-14 DIAGNOSIS — R74.8 ALKALINE PHOSPHATASE ELEVATION: ICD-10-CM

## 2025-03-14 DIAGNOSIS — E83.52 HYPERCALCEMIA: Primary | ICD-10-CM

## 2025-03-14 NOTE — TELEPHONE ENCOUNTER
Please schedule fasting labs ordered today 3/14 and put Dr. Gregory's and Gisell's name on them Thank you JOSE FRANCISCO

## 2025-03-14 NOTE — PROGRESS NOTES
Your cbc is normal. Your crp is mildly elevated Your potassium remains low. Have you been taking your potassium tablet twice daily? Let me now so the dose can be adjusted.  Your calcium is still elevated and slightly higher. Your alk phos is elevated again . Your kidney function is normal. Your sed rate is normal. Will copy Dr. Jameson your PCP with these results as Dr. Gregory is out until 3/24 Will schedule repeat potassium and labs related to calcium in 2 wks. JOSE FRANCISCO

## 2025-03-18 ENCOUNTER — PATIENT MESSAGE (OUTPATIENT)
Dept: RHEUMATOLOGY | Facility: CLINIC | Age: 65
End: 2025-03-18
Payer: COMMERCIAL

## 2025-03-18 RX ORDER — LEFLUNOMIDE 20 MG/1
20 TABLET ORAL DAILY
Qty: 90 TABLET | Refills: 0 | Status: SHIPPED | OUTPATIENT
Start: 2025-03-18

## 2025-04-02 ENCOUNTER — LAB VISIT (OUTPATIENT)
Dept: LAB | Facility: HOSPITAL | Age: 65
End: 2025-04-02
Attending: INTERNAL MEDICINE
Payer: MEDICARE

## 2025-04-02 DIAGNOSIS — E87.6 HYPOKALEMIA: ICD-10-CM

## 2025-04-02 DIAGNOSIS — E83.52 HYPERCALCEMIA: ICD-10-CM

## 2025-04-02 DIAGNOSIS — R74.8 ALKALINE PHOSPHATASE ELEVATION: ICD-10-CM

## 2025-04-02 LAB
25(OH)D3+25(OH)D2 SERPL-MCNC: 29 NG/ML (ref 30–96)
CALCIUM SERPL-MCNC: 10.9 MG/DL (ref 8.7–10.5)
GGT SERPL-CCNC: 52 U/L (ref 8–55)
MAGNESIUM SERPL-MCNC: 1.9 MG/DL (ref 1.6–2.6)
PHOSPHATE SERPL-MCNC: 2.7 MG/DL (ref 2.7–4.5)
POTASSIUM SERPL-SCNC: 3.7 MMOL/L (ref 3.5–5.1)
PTH-INTACT SERPL-MCNC: 47.7 PG/ML (ref 9–77)
TSH SERPL-ACNC: 2.48 UIU/ML (ref 0.34–5.6)

## 2025-04-02 PROCEDURE — 82977 ASSAY OF GGT: CPT

## 2025-04-02 PROCEDURE — 82306 VITAMIN D 25 HYDROXY: CPT

## 2025-04-02 PROCEDURE — 84080 ASSAY ALKALINE PHOSPHATASES: CPT

## 2025-04-02 PROCEDURE — 84132 ASSAY OF SERUM POTASSIUM: CPT

## 2025-04-02 PROCEDURE — 82784 ASSAY IGA/IGD/IGG/IGM EACH: CPT

## 2025-04-02 PROCEDURE — 36415 COLL VENOUS BLD VENIPUNCTURE: CPT

## 2025-04-02 PROCEDURE — 84443 ASSAY THYROID STIM HORMONE: CPT

## 2025-04-02 PROCEDURE — 84100 ASSAY OF PHOSPHORUS: CPT

## 2025-04-02 PROCEDURE — 83735 ASSAY OF MAGNESIUM: CPT

## 2025-04-02 PROCEDURE — 82310 ASSAY OF CALCIUM: CPT

## 2025-04-02 PROCEDURE — 83970 ASSAY OF PARATHORMONE: CPT

## 2025-04-03 LAB
IGA SERPL-MCNC: 272 MG/DL (ref 87–352)
IGG SERPL-MCNC: 1226 MG/DL (ref 586–1602)
IGM SERPL-MCNC: 569 MG/DL (ref 26–217)
PROT PATTERN SERPL IFE-IMP: ABNORMAL

## 2025-04-07 LAB
ALP BONE CFR SERPL: 28 % (ref 14–68)
ALP INTEST CFR SERPL: 0 % (ref 0–18)
ALP LIVER CFR SERPL: 72 % (ref 18–85)
ALP SERPL-CCNC: 240 IU/L (ref 44–121)

## 2025-04-08 ENCOUNTER — PATIENT MESSAGE (OUTPATIENT)
Dept: ADMINISTRATIVE | Facility: HOSPITAL | Age: 65
End: 2025-04-08
Payer: MEDICARE

## 2025-04-10 ENCOUNTER — HOSPITAL ENCOUNTER (OUTPATIENT)
Dept: RADIOLOGY | Facility: HOSPITAL | Age: 65
Discharge: HOME | End: 2025-04-10
Attending: FAMILY MEDICINE
Payer: MEDICARE

## 2025-04-10 ENCOUNTER — RESULTS FOLLOW-UP (OUTPATIENT)
Dept: FAMILY MEDICINE | Facility: CLINIC | Age: 65
End: 2025-04-10
Payer: MEDICARE

## 2025-04-10 DIAGNOSIS — Z12.31 BREAST CANCER SCREENING BY MAMMOGRAM: ICD-10-CM

## 2025-04-10 DIAGNOSIS — F17.210 SMOKING GREATER THAN 30 PACK YEARS: ICD-10-CM

## 2025-04-10 PROCEDURE — 71271 CT THORAX LUNG CANCER SCR C-: CPT | Mod: TC

## 2025-04-10 PROCEDURE — 77067 SCR MAMMO BI INCL CAD: CPT | Mod: 26,,, | Performed by: RADIOLOGY

## 2025-04-10 PROCEDURE — 77067 SCR MAMMO BI INCL CAD: CPT | Mod: TC

## 2025-04-10 PROCEDURE — 77063 BREAST TOMOSYNTHESIS BI: CPT | Mod: 26,,, | Performed by: RADIOLOGY

## 2025-04-10 PROCEDURE — 71271 CT THORAX LUNG CANCER SCR C-: CPT | Mod: 26,,, | Performed by: RADIOLOGY

## 2025-04-14 ENCOUNTER — TELEPHONE (OUTPATIENT)
Dept: FAMILY MEDICINE | Facility: CLINIC | Age: 65
End: 2025-04-14
Payer: MEDICARE

## 2025-04-14 DIAGNOSIS — R91.1 PULMONARY NODULE: Primary | ICD-10-CM

## 2025-04-14 NOTE — TELEPHONE ENCOUNTER
----- Message from [employer] sent at 4/11/2025 11:01 AM CDT -----  Type:  Patient Returning JohnWhyoana Called:  Cammy Logan Message for Patient:  Jorge A the patient know what this is regarding?:  Best Call Back Number:  [phone] Information:  rtn call , please call back thanks

## 2025-04-14 NOTE — TELEPHONE ENCOUNTER
Spoke with patient regarding results, patient verbalized understanding.                                                                           JACKY Ramsay LPN

## 2025-04-15 ENCOUNTER — TELEPHONE (OUTPATIENT)
Dept: FAMILY MEDICINE | Facility: CLINIC | Age: 65
End: 2025-04-15
Payer: MEDICARE

## 2025-04-15 NOTE — TELEPHONE ENCOUNTER
----- Message from [email or web address] sent at 4/11/2025 11:01 AM CDT -----  Type:  Patient Returning JohnWhyoana Called:  Cammy Logan Message for Patient:  Jorge A the patient know what this is regarding?:  Best Call Back Number:  [phone] Information:  rtn call , please call back thanks

## 2025-04-16 ENCOUNTER — TELEPHONE (OUTPATIENT)
Dept: FAMILY MEDICINE | Facility: CLINIC | Age: 65
End: 2025-04-16
Payer: MEDICARE

## 2025-04-16 DIAGNOSIS — R91.1 PULMONARY NODULE: Primary | ICD-10-CM

## 2025-04-16 NOTE — TELEPHONE ENCOUNTER
Done    ----- Message from Mary sent at 4/14/2025 11:06 AM CDT -----  Regarding: Returning call  Type:  Patient Returning CallWho Called:  PtAkash Left Message for Patient:  Juju?Does the patient know what this is regarding?:  Evelina Call Back Number:  [phone] Information:

## 2025-04-21 ENCOUNTER — TELEPHONE (OUTPATIENT)
Facility: CLINIC | Age: 65
End: 2025-04-21
Payer: MEDICARE

## 2025-04-23 ENCOUNTER — TELEPHONE (OUTPATIENT)
Facility: CLINIC | Age: 65
End: 2025-04-23
Payer: MEDICARE

## 2025-05-09 ENCOUNTER — E-CONSULT (OUTPATIENT)
Dept: ENDOCRINOLOGY | Facility: CLINIC | Age: 65
End: 2025-05-09
Payer: MEDICARE

## 2025-05-09 ENCOUNTER — OFFICE VISIT (OUTPATIENT)
Dept: RHEUMATOLOGY | Facility: CLINIC | Age: 65
End: 2025-05-09
Payer: MEDICARE

## 2025-05-09 ENCOUNTER — PATIENT MESSAGE (OUTPATIENT)
Dept: RHEUMATOLOGY | Facility: CLINIC | Age: 65
End: 2025-05-09

## 2025-05-09 VITALS
WEIGHT: 98.56 LBS | BODY MASS INDEX: 18.14 KG/M2 | DIASTOLIC BLOOD PRESSURE: 78 MMHG | SYSTOLIC BLOOD PRESSURE: 144 MMHG | HEART RATE: 80 BPM | HEIGHT: 62 IN

## 2025-05-09 DIAGNOSIS — M81.8 OTHER OSTEOPOROSIS WITHOUT CURRENT PATHOLOGICAL FRACTURE: ICD-10-CM

## 2025-05-09 DIAGNOSIS — E21.3 HYPERPARATHYROIDISM: ICD-10-CM

## 2025-05-09 DIAGNOSIS — M05.79 RHEUMATOID ARTHRITIS INVOLVING MULTIPLE SITES WITH POSITIVE RHEUMATOID FACTOR: Primary | ICD-10-CM

## 2025-05-09 DIAGNOSIS — M54.16 LUMBAR RADICULOPATHY: ICD-10-CM

## 2025-05-09 DIAGNOSIS — M15.0 PRIMARY OSTEOARTHRITIS INVOLVING MULTIPLE JOINTS: ICD-10-CM

## 2025-05-09 DIAGNOSIS — M06.30 RHEUMATOID NODULES: ICD-10-CM

## 2025-05-09 DIAGNOSIS — E83.52 HYPERCALCEMIA: ICD-10-CM

## 2025-05-09 DIAGNOSIS — E83.52 HYPERCALCEMIA: Primary | ICD-10-CM

## 2025-05-09 DIAGNOSIS — Z79.60 LONG-TERM USE OF IMMUNOSUPPRESSANT MEDICATION: ICD-10-CM

## 2025-05-09 PROCEDURE — 99999 PR PBB SHADOW E&M-EST. PATIENT-LVL IV: CPT | Mod: PBBFAC,,, | Performed by: INTERNAL MEDICINE

## 2025-05-09 RX ORDER — HYDROXYCHLOROQUINE SULFATE 200 MG/1
200 TABLET, FILM COATED ORAL DAILY
Qty: 90 TABLET | Refills: 3 | Status: SHIPPED | OUTPATIENT
Start: 2025-05-09

## 2025-05-09 RX ORDER — IBUPROFEN 800 MG/1
800 TABLET ORAL 3 TIMES DAILY PRN
Qty: 90 TABLET | Refills: 0 | Status: SHIPPED | OUTPATIENT
Start: 2025-05-09

## 2025-05-09 RX ORDER — SULFASALAZINE 500 MG/1
TABLET, DELAYED RELEASE ORAL
Qty: 120 TABLET | Refills: 12 | Status: SHIPPED | OUTPATIENT
Start: 2025-05-09

## 2025-05-09 RX ORDER — PREDNISONE 5 MG/1
TABLET ORAL
Qty: 60 TABLET | Refills: 1 | Status: SHIPPED | OUTPATIENT
Start: 2025-05-09

## 2025-05-09 RX ORDER — LEFLUNOMIDE 20 MG/1
20 TABLET ORAL DAILY
Qty: 90 TABLET | Refills: 0 | Status: SHIPPED | OUTPATIENT
Start: 2025-05-09

## 2025-05-09 ASSESSMENT — ROUTINE ASSESSMENT OF PATIENT INDEX DATA (RAPID3)
PAIN SCORE: 8.5
AM STIFFNESS SCORE: 1, YES
TOTAL RAPID3 SCORE: 7.61
WHEN YOU AWAKENED IN THE MORNING OVER THE LAST WEEK, PLEASE INDICATE THE AMOUNT OF TIME IT TAKES UNTIL YOU ARE AS LIMBER AS YOU WILL BE FOR THE DAY: 4
FATIGUE SCORE: 8
MDHAQ FUNCTION SCORE: 1.9
PSYCHOLOGICAL DISTRESS SCORE: 3.3
PATIENT GLOBAL ASSESSMENT SCORE: 8

## 2025-05-09 NOTE — PROGRESS NOTES
5/2/2025    10:15 AM   Rapid3 Question Responses and Scores   MDHAQ Score 1.9   Psychologic Score 3.3   Pain Score 8.5   When you awakened in the morning OVER THE LAST WEEK, did you feel stiff? Yes   If Yes, please indicate the number of hours until you are as limber as you will be for the day 4   Fatigue Score 8   Global Health Score 8   RAPID3 Score 7.61    Answers submitted by the patient for this visit:  Rheumatology Questionnaire (Submitted on 5/2/2025)  fever: No  eye redness: No  mouth sores: No  headaches: No  shortness of breath: Yes  chest pain: No  trouble swallowing: No  diarrhea: No  constipation: No  unexpected weight change: No  genital sore: No  dysuria: No  During the last 3 days, have you had a skin rash?: No  Bruises or bleeds easily: Yes  cough: Yes

## 2025-05-09 NOTE — CONSULTS
[facility] 6th [facility]  Response for E-Consult     Patient Name: Claire Bowser  MRN: [id number]  Primary Care Provider: Boo Jameson III, MD   Requesting Provider: Hernandez Gregory MD  E-Consult to Endocrinology  Consult performed by: Sven Blake DO  Consult ordered by: Hernandez Gregory MD  Reason for consult: Hypercalcemia/Hyperparathyroidism  Assessment/Recommendations: See Note      I have reviewed the chart and recent labs for this patient, who has findings consistent with primary hyperparathyroidism.  Despite the PTH levels being normal range in setting of the hypercalcemia her PTH levels are in the mid normal range which is actually inappropriately normal for that degree of hypercalcemia.      With her having osteoporosis on DXA the concern would be that her hyperparathyroidism is playing a role and actually contributing to the ALP elevations with that.  Also prior steroid use if longer term may have contributed to osteoporosis overtime.      She does meet surgical criteria for parathyroidectomy as long as urine testing can be completed to be sure there is not a component of FHH.  That would show up as very low or undetectable urine calcium on 24 hour sample.      Surgical criteria for parathyroidectomy in primary hyperparathyroidism includes:  Serum calcium >=1.0 mg/dL above the upper limit of normal (not currently met).  Urine calcium excretion >400 mg/day (or >4mg/kg/day of body weight - not yet tested).  Osteoporosis or fragility fracture--(meets criteria).  History of kidney stones, which would be a surgical indication regardless of urine calcium levels (unclear from chart review; a KUB X-ray may help assess for current stones).  Age < 50 years (not met)  Reduced GFR (stable on latest labs)    So for now with her meeting surgery criteria based on osteoporosis I would suggest a 24 hour urine sample be ordered (to rule out FHH first).  With current Vitamin D level I would also suggest  at least 1000 IU of Vit D3 be taken every day (over the counter).        If desire for surgery she may be able to avoid separate therapy for osteoporosis if lesion can be found on future imaging of the neck which is compatible with parathyroid adenoma for removal.  In that case I would suggest referral to endocrine and/or endocrine surgery (assuming urine calcium on labs is not very low).    If she has no desire for future parathyroidectomy, continued monitoring of serum calcium and PTH levels is recommended.  She should avoid excessive oral calcium supplementation and thiazide diuretics.  Therapy would be suggested for osteoporosis to reduce fracture risk if not already treated.      For the hypercalcemia medical management would be considered if no surgery desire with cinacalcet if the serum calcium approaches 11.5 and remains at or above that level.  Goal of treatment would be to lower calcium levels to upper normal range.  Endocrine referral is also an option if desire for monitoring and no intervention as well.      Reach out if questions.    Total time of Consultation: 10 minute    I did not speak to the requesting provider verbally about this.     *This eConsult is based on the clinical data available to me and is furnished without benefit of a physical examination. The eConsult will need to be interpreted in light of any clinical issues or changes in patient status not available to me at the time of filing this eConsults. Significant changes in patient condition or level of acuity should result in immediate formal consultation and reevaluation. Please alert me if you have further questions.    Thank you for this eConsult referral.     DO [facility] - [facility]

## 2025-05-09 NOTE — PROGRESS NOTES
History of present illness:  63-year-old female who had been followed by Dr. Cortez since December, 2020. She presented at that time with a several month history of foot and ankle pain.  It then spread to the hands.  She was diagnosed as having rheumatoid arthritis.  She was started initially on prednisone and then methotrexate.  She had been tapered off her prednisone.  She states her pain was worse with stopping the prednisone.  Her methotrexate had been increased up to 25 mg weekly.  She cut it back to 20 mg because of hair loss.  She then developed COVID and stopped the methotrexate.  She did not restart it because she generally felt better.  She remained on prednisone 5 mg daily.     I saw her in September, 2022.  She still had evidence of active rheumatoid arthritis.  I placed her on leflunomide 20 mg daily.  I continued her on prednisone 5 mg weekly. She was last seen in January in 2024.     She finished tapering down her prednisone about one year ago. She had to take a prednisone 5mg two days ago because of pain in her hands. She reports worsening joint pain and swelling in her hands over the past 6 months, especially worsening over the past 1 month. She uses voltaren gel once a day which helps with her pain. Reported taking ibuprofen for her back that she hasn't taken for a years. Reports some swelling in her feet as well. States her back pain is controlled with gabapentin. Denies any oral/nasal ulcers, hair eyes, vision changes, dry eyes, dry mouth. She was unable to get into a endocrinologist to discuss potential osteoporosis treatment since last visit.      Physical examination:     Tenderness 2nd and 3rd MCP right. Enlarged 2nd and 3rd MCP right. Tenderness 2nd MCP Left.      Extremities:  There is no pedal edema   Musculoskeletal:  Upper extremities have full range of motion without pain on range of motion.  Tenderness and Synovitis in MCP and PIP of right hand  Some tenderness and swelling noted in  DIPs of left hand.  Hips and knees are unremarkable.    She is tenderness in the left ankle but no synovitis.  She has tenderness on the dorsal surface of both feet.  She has a bunion on the left great toe.  She has a small osteophyte in the subtalar areas bilaterally.    Assessment:  1. She has some evidence of active rheumatoid arthritis   2. I think she is developing osteoarthritis in the feet     Plans:  1. Continue leflunomide as before  2. Start sulfasalazine  3. Start hydroxychloroquine  4. Start prednisone taper. 20mg for 1 week, 15 mg for 1 week, 10 mg for 1 week, 5 mg for 1 week  5. X-ray bilateral hands ordered.  6.  Ibuprofen PRN, refill sent  7. Continue Flexeril as before   8.  Referral to endocrinology for hypercalcemia  9. RTC 4 months    I have personally taken the history and examined the patient and concur with the resident's note as above.  She did well initially after stopping the prednisone.  She is now having a mild flare of the disease of the hands and feet.  I offered her several options and she elected to try triple therapy since she does not wish to do self injections.  I also placed her on a 1 month prednisone taper.    She has developed hypercalcemia over the past 18 months.  Her PTH level is normal.  Her vitamin-D level is borderline.  I have requested an endocrine consult.  I am concerned about her elevated alkaline phosphatase.    I will see her in follow-up in 4 months.      Answers submitted by the patient for this visit:  Rheumatology Questionnaire (Submitted on 5/2/2025)  fever: No  eye redness: No  mouth sores: No  headaches: No  shortness of breath: Yes  chest pain: No  trouble swallowing: No  diarrhea: No  constipation: No  unexpected weight change: No  genital sore: No  dysuria: No  During the last 3 days, have you had a skin rash?: No  Bruises or bleeds easily: Yes  cough: Yes

## 2025-05-12 ENCOUNTER — TELEPHONE (OUTPATIENT)
Dept: SMOKING CESSATION | Facility: CLINIC | Age: 65
End: 2025-05-12
Payer: MEDICARE

## 2025-05-12 NOTE — TELEPHONE ENCOUNTER
Called patient about 12 month follow up. Left message for patient to call [phone]. Resolved quit episode.

## 2025-05-14 ENCOUNTER — HOSPITAL ENCOUNTER (OUTPATIENT)
Dept: RADIOLOGY | Facility: HOSPITAL | Age: 65
Discharge: HOME | End: 2025-05-14
Attending: FAMILY MEDICINE
Payer: MEDICARE

## 2025-05-14 ENCOUNTER — HOSPITAL ENCOUNTER (OUTPATIENT)
Dept: RADIOLOGY | Facility: HOSPITAL | Age: 65
Discharge: HOME | End: 2025-05-14
Attending: INTERNAL MEDICINE
Payer: MEDICARE

## 2025-05-14 DIAGNOSIS — M05.79 RHEUMATOID ARTHRITIS INVOLVING MULTIPLE SITES WITH POSITIVE RHEUMATOID FACTOR: ICD-10-CM

## 2025-05-14 DIAGNOSIS — I25.10 ASCVD (ARTERIOSCLEROTIC CARDIOVASCULAR DISEASE): ICD-10-CM

## 2025-05-14 PROCEDURE — 93880 EXTRACRANIAL BILAT STUDY: CPT | Mod: TC

## 2025-05-14 PROCEDURE — 73130 X-RAY EXAM OF HAND: CPT | Mod: TC,50

## 2025-05-14 PROCEDURE — 93880 EXTRACRANIAL BILAT STUDY: CPT | Mod: 26,,, | Performed by: RADIOLOGY

## 2025-05-16 ENCOUNTER — RESULTS FOLLOW-UP (OUTPATIENT)
Dept: RHEUMATOLOGY | Facility: CLINIC | Age: 65
End: 2025-05-16

## 2025-05-19 ENCOUNTER — LAB VISIT (OUTPATIENT)
Dept: LAB | Facility: HOSPITAL | Age: 65
End: 2025-05-19
Attending: INTERNAL MEDICINE
Payer: MEDICARE

## 2025-05-19 DIAGNOSIS — E83.52 HYPERCALCEMIA: ICD-10-CM

## 2025-05-19 LAB
CALCIUM 24H UR-MRATE: 7 MG/HR (ref 4–12)
CALCIUM UR-MCNC: 16.2 MG/DL
Lab: 178 MG/SPEC
TOTAL HOURS OF COLLECTION (SMH): 24 HR
TOTAL VOLUME  (SMH): 1100 ML

## 2025-05-19 PROCEDURE — 82340 ASSAY OF CALCIUM IN URINE: CPT

## 2025-05-28 ENCOUNTER — TELEPHONE (OUTPATIENT)
Dept: SMOKING CESSATION | Facility: CLINIC | Age: 65
End: 2025-05-28
Payer: MEDICARE

## 2025-05-28 NOTE — TELEPHONE ENCOUNTER
2nd attempt to contact patient about 12 month follow up. Left message to call CARMELO Price @ [phone].

## 2025-06-17 ENCOUNTER — PATIENT MESSAGE (OUTPATIENT)
Dept: RHEUMATOLOGY | Facility: CLINIC | Age: 65
End: 2025-06-17
Payer: MEDICARE

## 2025-06-22 DIAGNOSIS — M54.42 CHRONIC LEFT-SIDED LOW BACK PAIN WITH LEFT-SIDED SCIATICA: ICD-10-CM

## 2025-06-22 DIAGNOSIS — G89.29 CHRONIC LEFT-SIDED LOW BACK PAIN WITH LEFT-SIDED SCIATICA: ICD-10-CM

## 2025-06-22 RX ORDER — GABAPENTIN 300 MG/1
CAPSULE ORAL
Qty: 30 CAPSULE | Refills: 0 | Status: SHIPPED | OUTPATIENT
Start: 2025-06-22

## 2025-06-22 NOTE — TELEPHONE ENCOUNTER
No care due was identified.  [facility] Embedded Care Due Messages. Reference number: [id number].   6/22/2025 11:38:25 AM CDT

## 2025-07-02 DIAGNOSIS — G89.29 CHRONIC LEFT-SIDED LOW BACK PAIN WITH LEFT-SIDED SCIATICA: ICD-10-CM

## 2025-07-02 DIAGNOSIS — M54.42 CHRONIC LEFT-SIDED LOW BACK PAIN WITH LEFT-SIDED SCIATICA: ICD-10-CM

## 2025-07-02 RX ORDER — GABAPENTIN 300 MG/1
CAPSULE ORAL
Qty: 30 CAPSULE | Refills: 0 | Status: SHIPPED | OUTPATIENT
Start: 2025-07-02

## 2025-07-02 NOTE — TELEPHONE ENCOUNTER
No care due was identified.  [facility] Embedded Care Due Messages. Reference number: [id number].   7/02/2025 10:26:45 AM CDT

## 2025-07-07 RX ORDER — PREDNISONE 5 MG/1
TABLET ORAL
Qty: 60 TABLET | Refills: 1 | Status: SHIPPED | OUTPATIENT
Start: 2025-07-07

## 2025-07-09 DIAGNOSIS — G89.29 CHRONIC LEFT-SIDED LOW BACK PAIN WITH LEFT-SIDED SCIATICA: ICD-10-CM

## 2025-07-09 DIAGNOSIS — M54.42 CHRONIC LEFT-SIDED LOW BACK PAIN WITH LEFT-SIDED SCIATICA: ICD-10-CM

## 2025-07-09 RX ORDER — GABAPENTIN 300 MG/1
CAPSULE ORAL
Qty: 30 CAPSULE | Refills: 0 | Status: SHIPPED | OUTPATIENT
Start: 2025-07-09

## 2025-07-09 NOTE — TELEPHONE ENCOUNTER
No care due was identified.  [facility] Embedded Care Due Messages. Reference number: [id number].   7/09/2025 10:28:57 AM CDT

## 2025-07-11 RX ORDER — SERTRALINE HYDROCHLORIDE 50 MG/1
50 TABLET, FILM COATED ORAL NIGHTLY
Qty: 90 TABLET | Refills: 0 | Status: SHIPPED | OUTPATIENT
Start: 2025-07-11

## 2025-07-11 NOTE — TELEPHONE ENCOUNTER
No care due was identified.  [facility] Embedded Care Due Messages. Reference number: [id number].   7/11/2025 9:28:45 AM CDT

## 2025-07-19 DIAGNOSIS — M54.42 CHRONIC LEFT-SIDED LOW BACK PAIN WITH LEFT-SIDED SCIATICA: ICD-10-CM

## 2025-07-19 DIAGNOSIS — G89.29 CHRONIC LEFT-SIDED LOW BACK PAIN WITH LEFT-SIDED SCIATICA: ICD-10-CM

## 2025-07-19 RX ORDER — GABAPENTIN 300 MG/1
CAPSULE ORAL
Qty: 30 CAPSULE | Refills: 0 | Status: SHIPPED | OUTPATIENT
Start: 2025-07-19

## 2025-07-19 NOTE — TELEPHONE ENCOUNTER
No care due was identified.  Health [facility] Embedded Care Due Messages. Reference number: [id number].   7/19/2025 10:48:54 AM CDT

## 2025-07-23 ENCOUNTER — PATIENT MESSAGE (OUTPATIENT)
Dept: ADMINISTRATIVE | Facility: HOSPITAL | Age: 65
End: 2025-07-23
Payer: MEDICARE

## 2025-07-24 ENCOUNTER — PATIENT OUTREACH (OUTPATIENT)
Dept: ADMINISTRATIVE | Facility: HOSPITAL | Age: 65
End: 2025-07-24
Payer: MEDICARE

## 2025-07-24 NOTE — PROGRESS NOTES
Population Health Chart Review & Patient Outreach Details      Updates Requested / Reviewed:      Updated Care Coordination Note, Care Everywhere, , External Sources: [facility] and [facility], and Immunizations Reconciliation Completed or Queried: [facility] Topics Overdue:      [facility] Score: 1     Cervical Cancer Screening  Uncontrolled BP    Pneumonia Vaccine  Tetanus Vaccine  Shingles/Zoster Vaccine  RSV Vaccine                  Health Maintenance Topic(s) Outreach Outcomes & Actions Taken:    Cervical Cancer Screening - Outreach Outcomes & Actions Taken  :

## 2025-07-27 ENCOUNTER — PATIENT MESSAGE (OUTPATIENT)
Dept: FAMILY MEDICINE | Facility: CLINIC | Age: 65
End: 2025-07-27
Payer: MEDICARE

## 2025-07-27 DIAGNOSIS — G89.29 CHRONIC LEFT-SIDED LOW BACK PAIN WITH LEFT-SIDED SCIATICA: ICD-10-CM

## 2025-07-27 DIAGNOSIS — M54.42 CHRONIC LEFT-SIDED LOW BACK PAIN WITH LEFT-SIDED SCIATICA: ICD-10-CM

## 2025-07-28 DIAGNOSIS — M54.42 CHRONIC LEFT-SIDED LOW BACK PAIN WITH LEFT-SIDED SCIATICA: ICD-10-CM

## 2025-07-28 DIAGNOSIS — G89.29 CHRONIC LEFT-SIDED LOW BACK PAIN WITH LEFT-SIDED SCIATICA: ICD-10-CM

## 2025-07-28 RX ORDER — GABAPENTIN 300 MG/1
CAPSULE ORAL
Qty: 30 CAPSULE | Refills: 0 | Status: SHIPPED | OUTPATIENT
Start: 2025-07-28

## 2025-07-28 RX ORDER — GABAPENTIN 300 MG/1
300 CAPSULE ORAL 3 TIMES DAILY
Qty: 90 CAPSULE | Refills: 1 | Status: SHIPPED | OUTPATIENT
Start: 2025-07-28 | End: 2025-07-31 | Stop reason: SDUPTHER

## 2025-07-28 NOTE — TELEPHONE ENCOUNTER
No care due was identified.  [facility] Embedded Care Due Messages. Reference number: [id number].   7/28/2025 9:25:55 AM CDT

## 2025-07-28 NOTE — TELEPHONE ENCOUNTER
No care due was identified.  [facility] Embedded Care Due Messages. Reference number: [id number].   7/28/2025 11:04:05 AM CDT

## 2025-07-31 ENCOUNTER — OFFICE VISIT (OUTPATIENT)
Dept: FAMILY MEDICINE | Facility: CLINIC | Age: 65
End: 2025-07-31
Payer: MEDICARE

## 2025-07-31 VITALS
DIASTOLIC BLOOD PRESSURE: 64 MMHG | OXYGEN SATURATION: 96 % | HEART RATE: 64 BPM | TEMPERATURE: 99 F | BODY MASS INDEX: 18.22 KG/M2 | WEIGHT: 99 LBS | SYSTOLIC BLOOD PRESSURE: 114 MMHG | HEIGHT: 62 IN

## 2025-07-31 DIAGNOSIS — M54.42 CHRONIC LEFT-SIDED LOW BACK PAIN WITH LEFT-SIDED SCIATICA: ICD-10-CM

## 2025-07-31 DIAGNOSIS — M06.9 RHEUMATOID ARTHRITIS, INVOLVING UNSPECIFIED SITE, UNSPECIFIED WHETHER RHEUMATOID FACTOR PRESENT: ICD-10-CM

## 2025-07-31 DIAGNOSIS — E83.52 HYPERCALCEMIA: ICD-10-CM

## 2025-07-31 DIAGNOSIS — F41.1 GENERALIZED ANXIETY DISORDER: ICD-10-CM

## 2025-07-31 DIAGNOSIS — G89.29 CHRONIC LEFT-SIDED LOW BACK PAIN WITH LEFT-SIDED SCIATICA: ICD-10-CM

## 2025-07-31 DIAGNOSIS — I10 ESSENTIAL HYPERTENSION: Primary | ICD-10-CM

## 2025-07-31 DIAGNOSIS — G47.00 INSOMNIA, UNSPECIFIED TYPE: ICD-10-CM

## 2025-07-31 DIAGNOSIS — R91.8 PULMONARY NODULES: ICD-10-CM

## 2025-07-31 DIAGNOSIS — M87.9 OSTEONECROSIS OF LEFT HIP: ICD-10-CM

## 2025-07-31 DIAGNOSIS — S81.802S WOUND OF LEFT LOWER EXTREMITY, SEQUELA: ICD-10-CM

## 2025-07-31 DIAGNOSIS — F17.210 SMOKING GREATER THAN 30 PACK YEARS: ICD-10-CM

## 2025-07-31 PROCEDURE — 1160F RVW MEDS BY RX/DR IN RCRD: CPT | Mod: CPTII,S$GLB,, | Performed by: FAMILY MEDICINE

## 2025-07-31 PROCEDURE — 99999 PR PBB SHADOW E&M-EST. PATIENT-LVL IV: CPT | Mod: PBBFAC,,, | Performed by: FAMILY MEDICINE

## 2025-07-31 PROCEDURE — 1159F MED LIST DOCD IN RCRD: CPT | Mod: CPTII,S$GLB,, | Performed by: FAMILY MEDICINE

## 2025-07-31 PROCEDURE — 3074F SYST BP LT 130 MM HG: CPT | Mod: CPTII,S$GLB,, | Performed by: FAMILY MEDICINE

## 2025-07-31 PROCEDURE — 3008F BODY MASS INDEX DOCD: CPT | Mod: CPTII,S$GLB,, | Performed by: FAMILY MEDICINE

## 2025-07-31 PROCEDURE — 99214 OFFICE O/P EST MOD 30 MIN: CPT | Mod: S$GLB,,, | Performed by: FAMILY MEDICINE

## 2025-07-31 PROCEDURE — 3078F DIAST BP <80 MM HG: CPT | Mod: CPTII,S$GLB,, | Performed by: FAMILY MEDICINE

## 2025-07-31 RX ORDER — MUPIROCIN 20 MG/G
OINTMENT TOPICAL 3 TIMES DAILY
COMMUNITY
End: 2025-07-31 | Stop reason: SDUPTHER

## 2025-07-31 RX ORDER — TRAZODONE HYDROCHLORIDE 50 MG/1
TABLET ORAL
Qty: 90 TABLET | Refills: 1 | Status: SHIPPED | OUTPATIENT
Start: 2025-07-31

## 2025-07-31 RX ORDER — SERTRALINE HYDROCHLORIDE 50 MG/1
50 TABLET, FILM COATED ORAL NIGHTLY
Qty: 90 TABLET | Refills: 1 | Status: SHIPPED | OUTPATIENT
Start: 2025-07-31

## 2025-07-31 RX ORDER — MUPIROCIN 20 MG/G
OINTMENT TOPICAL 2 TIMES DAILY
Qty: 22 G | Refills: 0 | Status: SHIPPED | OUTPATIENT
Start: 2025-07-31

## 2025-07-31 RX ORDER — GABAPENTIN 300 MG/1
300 CAPSULE ORAL 3 TIMES DAILY
Qty: 90 CAPSULE | Refills: 5 | Status: SHIPPED | OUTPATIENT
Start: 2025-07-31

## 2025-07-31 RX ORDER — CYCLOBENZAPRINE HCL 5 MG
5 TABLET ORAL DAILY PRN
Qty: 90 TABLET | Refills: 1 | Status: SHIPPED | OUTPATIENT
Start: 2025-07-31

## 2025-07-31 RX ORDER — ONDANSETRON 4 MG/1
4 TABLET, FILM COATED ORAL EVERY 6 HOURS PRN
Qty: 20 TABLET | Refills: 0 | Status: SHIPPED | OUTPATIENT
Start: 2025-07-31

## 2025-07-31 RX ORDER — PANTOPRAZOLE SODIUM 40 MG/1
40 TABLET, DELAYED RELEASE ORAL DAILY
Qty: 90 TABLET | Refills: 1 | Status: SHIPPED | OUTPATIENT
Start: 2025-07-31

## 2025-07-31 RX ORDER — ONDANSETRON 4 MG/1
4 TABLET, FILM COATED ORAL EVERY 6 HOURS PRN
Qty: 20 TABLET | Refills: 0 | Status: CANCELLED | OUTPATIENT
Start: 2025-07-31

## 2025-07-31 NOTE — PROGRESS NOTES
SCRIBE #1 NOTE: I, Stephon Easton, am scribing for, and in the presence of,  Boo Jameson III, MD. I have scribed the entire note.     Subjective:       Patient ID: Claire Bowser is a 65 y.o. female.    Chief Complaint: Follow-up (Med refills//)    Ms. Bowser is here for a follow up with her last appointment being here on December 18, 2024. Leg wound. She has a wound on her left shin from her dogs 8 days ago. She has been using sea cleanse and Neosporin along with Medhoney cream. Smoking half a pack per day. Smoking greater than 30 pack years. BMI of 18.11. Cardiovascular good. No chest pain. No palpitations. Blood pressure is 114/64. Hypertension controlled. Pulmonary nodules. Voicemail was left on April 21, 2025 and April 23, 2025 in regards to nodule but no answer. Did not see pulmonology yet. Sees Dr. Rebolledo on August 26, 2025. Elevated alkaline phosphatase. Hypercalcemia. States rheumatology sent her to endocrinology. RA.     Review of Systems   Constitutional:  Negative for chills and fever.   HENT:  Negative for congestion and sore throat.    Eyes:  Negative for visual disturbance.   Respiratory:  Negative for chest tightness and shortness of breath.    Cardiovascular:  Negative for chest pain.   Gastrointestinal:  Negative for nausea.   Endocrine: Negative for polydipsia and polyuria.   Genitourinary:  Negative for dysuria and flank pain.   Musculoskeletal:  Negative for back pain, neck pain and neck stiffness.   Skin:  Positive for wound. Negative for rash.   Neurological:  Negative for weakness.   Hematological:  Bruises/bleeds easily.   Psychiatric/Behavioral:  Negative for behavioral problems.    All other systems reviewed and are negative.      Objective:      Physical examination: Vital signs noted. No acute distress. No carotid bruit. Regular heart rate and rhythm. Lungs clear to auscultation bilaterally. Abdomen bowel sounds positive soft and nontender. Extremities without edema. 2+ pedal pulses.  Senile purpura on forearms. Leg ulcer that measure 1 by 1.5cm on left shin.       Assessment:       1. Essential hypertension    2. Hypercalcemia    3. Smoking greater than 30 pack years    4. Rheumatoid arthritis, involving unspecified site, unspecified whether rheumatoid factor present    5. Pulmonary nodules    6. Generalized anxiety disorder    7. Insomnia, unspecified type    8. Chronic left-sided low back pain with left-sided sciatica    9. Wound of left lower extremity, sequela    10. Osteonecrosis of left hip        Plan:       Essential hypertension  -     CBC Auto Differential; Future; Expected date: 08/14/2025    Hypercalcemia  -     Comprehensive Metabolic Panel; Future; Expected date: 08/14/2025    Smoking greater than 30 pack years    Rheumatoid arthritis, involving unspecified site, unspecified whether rheumatoid factor present  -     CBC Auto Differential; Future; Expected date: 08/14/2025  -     Comprehensive Metabolic Panel; Future; Expected date: 08/14/2025    Pulmonary nodules  -     X-Ray Chest PA And Lateral; Future; Expected date: 07/31/2025    Generalized anxiety disorder  -     traZODone (DESYREL) 50 MG tablet; Take 1 tablet by mouth in the evening  Dispense: 90 tablet; Refill: 1    Insomnia, unspecified type  -     traZODone (DESYREL) 50 MG tablet; Take 1 tablet by mouth in the evening  Dispense: 90 tablet; Refill: 1    Chronic left-sided low back pain with left-sided sciatica  -     gabapentin (NEURONTIN) 300 MG capsule; Take 1 capsule (300 mg total) by mouth 3 (three) times daily.  Dispense: 90 capsule; Refill: 5    Wound of left lower extremity, sequela    Osteonecrosis of left hip    Other orders  -     pantoprazole (PROTONIX) 40 MG tablet; Take 1 tablet (40 mg total) by mouth once daily.  Dispense: 90 tablet; Refill: 1  -     cyclobenzaprine (FLEXERIL) 5 MG tablet; Take 1 tablet (5 mg total) by mouth daily as needed (neck pain).  Dispense: 90 tablet; Refill: 1  -     sertraline  (ZOLOFT) 50 MG tablet; Take 1 tablet (50 mg total) by mouth every evening.  Dispense: 90 tablet; Refill: 1  -     ondansetron (ZOFRAN) 4 MG tablet; Take 1 tablet (4 mg total) by mouth every 6 (six) hours as needed for Nausea.  Dispense: 20 tablet; Refill: 0  -     mupirocin (BACTROBAN) 2 % ointment; Apply topically 2 (two) times daily. Apply to left leg twice a day.  Dispense: 22 g; Refill: 0    Keep the leg ulcer clean and dry.  Referred to Pulmonary couple of months ago has not been able to get in.  Needs to discontinue smoking.  Saw rheumatology but they referred her to Endocrine regarding the elevated calcium PTH is normal.  Alkaline phos is also elevated slightly.  Needs to see pulmonology as soon as possible regarding the nodules.  Will get a chest x-ray.  May need repeat CT if this does not show anything.  Check CBC CMP.  Gabapentin 300 t.i.d. 90 with 5 refills.  Zoloft 50 refill Protonix 40 refilled Flexeril 5 mg HS.  Trazodone 50 HS.  Zofran PRN.  Some Bactroban ointment 22 g apply this to the left leg b.i.d..  Office will contact Pulmonary see if we can get her moved up.  All care gaps addressed or discussed.     I, Boo Jameson III, MD, personally performed the services described in this documentation. All medical record entries made by the scribe were at my direction and in my presence. I have reviewed the chart and agree that the record reflects my personal performance and is accurate and complete.

## 2025-08-01 ENCOUNTER — TELEPHONE (OUTPATIENT)
Dept: FAMILY MEDICINE | Facility: CLINIC | Age: 65
End: 2025-08-01
Payer: MEDICARE

## 2025-08-01 ENCOUNTER — TELEPHONE (OUTPATIENT)
Dept: PULMONOLOGY | Facility: CLINIC | Age: 65
End: 2025-08-01
Payer: MEDICARE

## 2025-08-01 ENCOUNTER — RESULTS FOLLOW-UP (OUTPATIENT)
Dept: FAMILY MEDICINE | Facility: CLINIC | Age: 65
End: 2025-08-01
Payer: MEDICARE

## 2025-08-01 ENCOUNTER — HOSPITAL ENCOUNTER (OUTPATIENT)
Dept: RADIOLOGY | Facility: CLINIC | Age: 65
Discharge: HOME | End: 2025-08-01
Attending: FAMILY MEDICINE
Payer: MEDICARE

## 2025-08-01 DIAGNOSIS — R91.8 PULMONARY NODULES: Primary | ICD-10-CM

## 2025-08-01 DIAGNOSIS — R91.8 MULTIPLE LUNG NODULES: Primary | ICD-10-CM

## 2025-08-01 DIAGNOSIS — R91.8 PULMONARY NODULES: ICD-10-CM

## 2025-08-01 PROCEDURE — 71046 X-RAY EXAM CHEST 2 VIEWS: CPT | Mod: 26,,, | Performed by: RADIOLOGY

## 2025-08-01 PROCEDURE — 71046 X-RAY EXAM CHEST 2 VIEWS: CPT | Mod: TC,PO

## 2025-08-01 NOTE — TELEPHONE ENCOUNTER
Spoke with syed palencia ma and she will ask him on Monday when back from 1 mo vac if he can see her soon. She has apt with dr blair 8-26-25 but req dr palencia and syed will shoe him her notes and res in system re pulm nodule. She will call u back on Monday , since I am not back into office til linda strauss is aware she will be contacting her.

## 2025-08-01 NOTE — TELEPHONE ENCOUNTER
Ref put in for dr talha jacques. Reaching out to shannon to get an apt asap and pt aware will contact her .

## 2025-08-01 NOTE — TELEPHONE ENCOUNTER
office called an they wanted me to see if you can look over her cxr an see if there is anyway we can see her anytime soon . She does have an appointment scheduled with  on 08/28/25 I informed them that we do not have anything until about October for a new patient slot .

## 2025-08-02 PROBLEM — M87.9 OSTEONECROSIS OF LEFT HIP: Status: ACTIVE | Noted: 2025-08-02

## 2025-08-02 PROBLEM — K86.1 CHRONIC PANCREATITIS: Status: RESOLVED | Noted: 2023-01-02 | Resolved: 2025-08-02

## 2025-08-05 ENCOUNTER — HOSPITAL ENCOUNTER (OUTPATIENT)
Dept: RADIOLOGY | Facility: HOSPITAL | Age: 65
Discharge: HOME | End: 2025-08-05
Attending: FAMILY MEDICINE
Payer: MEDICARE

## 2025-08-05 ENCOUNTER — RESULTS FOLLOW-UP (OUTPATIENT)
Dept: FAMILY MEDICINE | Facility: CLINIC | Age: 65
End: 2025-08-05
Payer: MEDICARE

## 2025-08-05 DIAGNOSIS — F17.210 SMOKING GREATER THAN 30 PACK YEARS: ICD-10-CM

## 2025-08-05 DIAGNOSIS — R91.8 PULMONARY NODULES: ICD-10-CM

## 2025-08-05 DIAGNOSIS — R91.1 SOLITARY PULMONARY NODULE: Primary | ICD-10-CM

## 2025-08-05 DIAGNOSIS — R73.01 ELEVATED FASTING GLUCOSE: ICD-10-CM

## 2025-08-05 DIAGNOSIS — R91.1 SOLITARY PULMONARY NODULE: ICD-10-CM

## 2025-08-05 PROCEDURE — 71250 CT THORAX DX C-: CPT | Mod: 26,,, | Performed by: RADIOLOGY

## 2025-08-05 PROCEDURE — 71250 CT THORAX DX C-: CPT | Mod: TC

## 2025-08-07 NOTE — TELEPHONE ENCOUNTER
Spoke with patient and let her know the results per Dr. Jameson verbatim. Agrees to both tests. Will call Dr. Spencer to get scheduled.

## 2025-08-08 ENCOUNTER — LAB VISIT (OUTPATIENT)
Dept: LAB | Facility: HOSPITAL | Age: 65
End: 2025-08-08
Attending: FAMILY MEDICINE
Payer: MEDICARE

## 2025-08-08 ENCOUNTER — RESULTS FOLLOW-UP (OUTPATIENT)
Dept: FAMILY MEDICINE | Facility: CLINIC | Age: 65
End: 2025-08-08
Payer: MEDICARE

## 2025-08-08 DIAGNOSIS — R73.01 ELEVATED FASTING GLUCOSE: ICD-10-CM

## 2025-08-08 DIAGNOSIS — R91.8 PULMONARY NODULES: ICD-10-CM

## 2025-08-08 DIAGNOSIS — F17.210 SMOKING GREATER THAN 30 PACK YEARS: ICD-10-CM

## 2025-08-08 DIAGNOSIS — R91.1 SOLITARY PULMONARY NODULE: ICD-10-CM

## 2025-08-08 LAB
EAG (SMH): 128 MG/DL (ref 68–131)
HBA1C MFR BLD: 6.1 % (ref 4.5–6.2)

## 2025-08-08 PROCEDURE — 86480 TB TEST CELL IMMUN MEASURE: CPT | Mod: 59

## 2025-08-08 PROCEDURE — 86480 TB TEST CELL IMMUN MEASURE: CPT

## 2025-08-08 PROCEDURE — 83036 HEMOGLOBIN GLYCOSYLATED A1C: CPT

## 2025-08-08 PROCEDURE — 36415 COLL VENOUS BLD VENIPUNCTURE: CPT

## 2025-08-11 ENCOUNTER — LAB VISIT (OUTPATIENT)
Dept: LAB | Facility: HOSPITAL | Age: 65
End: 2025-08-11
Attending: INTERNAL MEDICINE
Payer: MEDICARE

## 2025-08-11 ENCOUNTER — TELEPHONE (OUTPATIENT)
Dept: FAMILY MEDICINE | Facility: CLINIC | Age: 65
End: 2025-08-11
Payer: MEDICARE

## 2025-08-11 DIAGNOSIS — R91.8 MULTIPLE LUNG NODULES: ICD-10-CM

## 2025-08-11 LAB
C-REACTIVE PROTEIN (SMH): 2.6 MG/DL
CYCLIC CITRULLINATED PEPTIDE (CCP) (OHS): 403.6 U/ML
ERYTHROCYTE [SEDIMENTATION RATE] IN BLOOD: 32 MM/HR (ref 0–20)
RHEUMATOID FACT SERPL-ACNC: 65.1 IU/ML

## 2025-08-11 PROCEDURE — 86037 ANCA TITER EACH ANTIBODY: CPT | Mod: 59

## 2025-08-11 PROCEDURE — 85651 RBC SED RATE NONAUTOMATED: CPT

## 2025-08-11 PROCEDURE — 86200 CCP ANTIBODY: CPT

## 2025-08-11 PROCEDURE — 86200 CCP ANTIBODY: CPT | Mod: 59

## 2025-08-11 PROCEDURE — 36415 COLL VENOUS BLD VENIPUNCTURE: CPT

## 2025-08-11 PROCEDURE — 86140 C-REACTIVE PROTEIN: CPT

## 2025-08-11 PROCEDURE — 86431 RHEUMATOID FACTOR QUANT: CPT

## 2025-08-12 ENCOUNTER — OFFICE VISIT (OUTPATIENT)
Dept: PULMONOLOGY | Facility: CLINIC | Age: 65
End: 2025-08-12
Payer: MEDICARE

## 2025-08-12 VITALS
HEART RATE: 63 BPM | WEIGHT: 100.31 LBS | DIASTOLIC BLOOD PRESSURE: 78 MMHG | OXYGEN SATURATION: 96 % | SYSTOLIC BLOOD PRESSURE: 118 MMHG | BODY MASS INDEX: 18.35 KG/M2

## 2025-08-12 DIAGNOSIS — R93.89 ABNORMAL CT OF THE CHEST: ICD-10-CM

## 2025-08-12 DIAGNOSIS — J43.9 PULMONARY EMPHYSEMA, UNSPECIFIED EMPHYSEMA TYPE: Primary | ICD-10-CM

## 2025-08-12 DIAGNOSIS — R91.8 PULMONARY NODULES: ICD-10-CM

## 2025-08-12 DIAGNOSIS — Z72.0 TOBACCO USE: Chronic | ICD-10-CM

## 2025-08-12 LAB
C-ANCA TITR SER IF: NORMAL TITER
MYELOPEROXIDASE AB SER IA-ACNC: <0.2 UNITS (ref 0–0.9)
P-ANCA ATYPICAL TITR SER IF: NORMAL TITER
P-ANCA TITR SER IF: NORMAL TITER
PROTEINASE3 AB SER IA-ACNC: <0.2 UNITS (ref 0–0.9)

## 2025-08-12 PROCEDURE — 1159F MED LIST DOCD IN RCRD: CPT | Mod: CPTII,S$GLB,, | Performed by: INTERNAL MEDICINE

## 2025-08-12 PROCEDURE — 3044F HG A1C LEVEL LT 7.0%: CPT | Mod: CPTII,S$GLB,, | Performed by: INTERNAL MEDICINE

## 2025-08-12 PROCEDURE — 99204 OFFICE O/P NEW MOD 45 MIN: CPT | Mod: S$GLB,,, | Performed by: INTERNAL MEDICINE

## 2025-08-12 PROCEDURE — 3008F BODY MASS INDEX DOCD: CPT | Mod: CPTII,S$GLB,, | Performed by: INTERNAL MEDICINE

## 2025-08-12 PROCEDURE — 3078F DIAST BP <80 MM HG: CPT | Mod: CPTII,S$GLB,, | Performed by: INTERNAL MEDICINE

## 2025-08-12 PROCEDURE — 3074F SYST BP LT 130 MM HG: CPT | Mod: CPTII,S$GLB,, | Performed by: INTERNAL MEDICINE

## 2025-08-15 LAB
GAMMA INTERFERON BACKGROUND BLD IA-ACNC: 0.02 IU/ML
M TB IFN-G BLD-IMP: NEGATIVE
M TB IFN-G CD4+ BCKGRND COR BLD-ACNC: 0.04 IU/ML
M TB IFN-G CD4+CD8+ BCKGRND COR BLD-ACNC: 0.04 IU/ML
MITOGEN IGNF BCKGRD COR BLD-ACNC: >10 IU/ML
QUANTIFERON TB GOLD (INCUBATED): NORMAL
SERVICE CMNT-IMP: NORMAL

## 2025-08-21 ENCOUNTER — HOSPITAL ENCOUNTER (OUTPATIENT)
Dept: PULMONOLOGY | Facility: HOSPITAL | Age: 65
Discharge: HOME | End: 2025-08-21
Attending: INTERNAL MEDICINE
Payer: MEDICARE

## 2025-08-21 DIAGNOSIS — J43.9 PULMONARY EMPHYSEMA, UNSPECIFIED EMPHYSEMA TYPE: ICD-10-CM

## 2025-08-21 PROCEDURE — 94727 GAS DIL/WSHOT DETER LNG VOL: CPT

## 2025-08-21 PROCEDURE — 94729 DIFFUSING CAPACITY: CPT

## 2025-08-21 PROCEDURE — 94060 EVALUATION OF WHEEZING: CPT

## 2025-08-21 PROCEDURE — 94618 PULMONARY STRESS TESTING: CPT

## 2025-08-25 RX ORDER — LEFLUNOMIDE 20 MG/1
20 TABLET ORAL DAILY
Qty: 90 TABLET | Refills: 0 | Status: SHIPPED | OUTPATIENT
Start: 2025-08-25

## (undated) DEVICE — TROCAR BALL. BLNT HASSON C0R47

## (undated) DEVICE — APPLIER MULTIPLE CLIP LG 12MM ER420

## (undated) DEVICE — SCISSORS 5MM APPLIED MEDICAL   CB030

## (undated) DEVICE — TROCAR OPTICAL ZTHREAD 5MMX100MM CTF03

## (undated) DEVICE — ADHESIVE TISSUE DERMAFLEX WITH TIP

## (undated) DEVICE — UNDERGLOVE BIOGEL PI MICRO BLUE SZ 8

## (undated) DEVICE — SOLUTION IRRI H2O BOTTLE 1000ML

## (undated) DEVICE — DERMABOND HVD MINI  DHVM12

## (undated) DEVICE — SUCTION/IRRIGATOR W/TIP

## (undated) DEVICE — SLEEVE TROCAR 5MMX100MM  CTS02

## (undated) DEVICE — PAD BOVIE ADULT

## (undated) DEVICE — SET INSUFFLATION TUBING HIGH FLOW SMOKE EVACUATION PNEUMOCLE

## (undated) DEVICE — SUTURE VICRYL #0 27 UR-6 VCP603H

## (undated) DEVICE — SUTURE MONOCRYL 4-0 PS-2 27 MCP426H

## (undated) DEVICE — DISSECTOR KITTNER 13300

## (undated) DEVICE — GLOVE BIOGEL MICRO SURGEON PINK SZ 7.5

## (undated) DEVICE — CABLE MONOPOLAR 10FT DISPOSABLE

## (undated) DEVICE — SOLUTION IRRI NS BOTTLE 1000ML R5200-01

## (undated) DEVICE — TRAY GENERAL LAPAROSCOPY